# Patient Record
Sex: FEMALE | Race: WHITE | URBAN - METROPOLITAN AREA
[De-identification: names, ages, dates, MRNs, and addresses within clinical notes are randomized per-mention and may not be internally consistent; named-entity substitution may affect disease eponyms.]

---

## 2012-11-07 LAB — PAP SMEAR - HIM PATIENT REPORTED: NORMAL

## 2016-09-01 LAB
CHOLEST SERPL-MCNC: 196 MG/DL
HDLC SERPL-MCNC: 73 MG/DL
LDLC SERPL CALC-MCNC: 106 MG/DL
PAP-ABSTRACT: NORMAL
PHQ9 SCORE: 3
TRIGL SERPL-MCNC: 83 MG/DL

## 2016-10-18 LAB — PHQ9 SCORE: 0

## 2016-11-23 LAB
ALT SERPL-CCNC: 11 U/L (ref 0–45)
AST SERPL-CCNC: 17 U/L (ref 0–40)
CREAT SERPL-MCNC: 0.97 MG/DL (ref 0.6–1.1)
GFR SERPL CREATININE-BSD FRML MDRD: 60 ML/MIN/1.73M2
GLUCOSE SERPL-MCNC: 87 MG/DL (ref 70–125)
POTASSIUM SERPL-SCNC: 4.3 MMOL/L (ref 3.5–5)
TSH SERPL-ACNC: 0.93 UIU/ML (ref 0.3–5)

## 2017-01-12 ENCOUNTER — TRANSFERRED RECORDS (OUTPATIENT)
Dept: HEALTH INFORMATION MANAGEMENT | Facility: CLINIC | Age: 52
End: 2017-01-12

## 2017-01-16 ENCOUNTER — TRANSFERRED RECORDS (OUTPATIENT)
Dept: HEALTH INFORMATION MANAGEMENT | Facility: CLINIC | Age: 52
End: 2017-01-16

## 2017-01-16 ENCOUNTER — TELEPHONE (OUTPATIENT)
Dept: ONCOLOGY | Facility: CLINIC | Age: 52
End: 2017-01-16

## 2017-01-16 ENCOUNTER — CARE COORDINATION (OUTPATIENT)
Dept: ONCOLOGY | Facility: CLINIC | Age: 52
End: 2017-01-16

## 2017-01-16 DIAGNOSIS — C64.1 MALIGNANT NEOPLASM OF RIGHT KIDNEY (H): Primary | ICD-10-CM

## 2017-01-16 DIAGNOSIS — R91.8 LUNG MASS: ICD-10-CM

## 2017-01-16 NOTE — PROGRESS NOTES
Dr. Green requested patients CT from 1/12/17 be pushed to [facility] and report obtained from [facility].  Patient requires a CT guided Bx of a new MERARY mass. Contacted [facility] and received CT report from Dr. Louie MD.  [facility] [phone] will push images. MD is aware of scheduling status.  Shravan Wiseman RN, BSN, OCN  [facility] [place]  Patient Care Coordinator

## 2017-01-17 ENCOUNTER — CARE COORDINATION (OUTPATIENT)
Dept: ONCOLOGY | Facility: CLINIC | Age: 52
End: 2017-01-17

## 2017-01-17 NOTE — PROGRESS NOTES
Jennie from Rad dept called to inform Dr. Green that she was waiting to hear from the Radiologist ~ whether patient should have a PET prior to the Bx as suggested in CT report.  Will contact writer once Radiologist has determined best course of action.  Dr. Green is aware of patient's scheduling status.  Shravan Wiseman RN, BSN, OCN  [facility]  Patient Care Coordinator

## 2017-01-17 NOTE — PROGRESS NOTES
"Sierra from the Radiology dept.   from Radiology would prefer to have a PET scan completed prior to the Bx as recommended on the CT report.  Dr. Green notified and he will discuss plan with Pulmonary med at the [facility] and develop POC.  [facility] radiology dept notified that Bx  is on hold at this time.  Shravan Wiseman RN, BSN, OCN  [facility] [place]  Patient Care Coordinator    "

## 2017-01-18 ENCOUNTER — CARE COORDINATION (OUTPATIENT)
Dept: ONCOLOGY | Facility: CLINIC | Age: 52
End: 2017-01-18

## 2017-01-18 ENCOUNTER — PRE VISIT (OUTPATIENT)
Dept: SURGERY | Facility: CLINIC | Age: 52
End: 2017-01-18

## 2017-01-18 ENCOUNTER — TRANSFERRED RECORDS (OUTPATIENT)
Dept: HEALTH INFORMATION MANAGEMENT | Facility: CLINIC | Age: 52
End: 2017-01-18

## 2017-01-18 DIAGNOSIS — R91.8 PULMONARY NODULES: Primary | ICD-10-CM

## 2017-01-18 LAB
% GRANULOCYTES: 58.7 %
ALBUMIN SERPL-MCNC: 3.6 G/DL
ALP SERPL-CCNC: 82 U/L
ALT SERPL-CCNC: 18 U/L
ALT SERPL-CCNC: 18 U/L (ref 0–45)
ANION GAP SERPL CALCULATED.3IONS-SCNC: 7 MMOL/L
AST SERPL-CCNC: 23 U/L
AST SERPL-CCNC: 23 U/L (ref 0–40)
BILIRUB SERPL-MCNC: 0.3 MG/DL
BUN SERPL-MCNC: 16 MG/DL
CALCIUM SERPL-MCNC: 9.5 MG/DL
CHLORIDE SERPLBLD-SCNC: 108 MMOL/L
CO2 SERPL-SCNC: 25 MMOL/L
CREAT SERPL-MCNC: 1.03 MG/DL
CREAT SERPL-MCNC: 1.03 MG/DL (ref 0.6–1.1)
ERYTHROCYTE [DISTWIDTH] IN BLOOD BY AUTOMATED COUNT: 13.6 %
GFR SERPL CREATININE-BSD FRML MDRD: 56 ML/MIN/1.73M2
GFR SERPL CREATININE-BSD FRML MDRD: 58 ML/MIN/1.73M2
GLUCOSE SERPL-MCNC: 100 MG/DL (ref 70–125)
GLUCOSE SERPL-MCNC: 100 MG/DL (ref 70–99)
GRANULOCYTES #: 2.8
HCT VFR BLD AUTO: 38.9 %
HEMOGLOBIN: 12.2 G/DL
LYMPHOCYTES # BLD AUTO: 1.5 10*3/UL
LYMPHOCYTES NFR BLD AUTO: 32.8 %
MCH RBC QN AUTO: 28.8 PG
MCHC RBC AUTO-ENTMCNC: 31.4 G/DL
MCV RBC AUTO: 91.9 FL
MID #: 0.4 K/UL
MID %: 8.5 % (ref 0.1–24)
PLATELET COUNT - QUEST: 569 10^9/L (ref 150–450)
POTASSIUM SERPL-SCNC: 4.3 MMOL/L
POTASSIUM SERPL-SCNC: 4.3 MMOL/L (ref 3.5–5)
PROT SERPL-MCNC: 6.6 G/DL
RBC # BLD AUTO: 4.23 10^12/L
SODIUM SERPL-SCNC: 140 MMOL/L
WBC # BLD AUTO: 4.7 10^9/L

## 2017-01-18 NOTE — TELEPHONE ENCOUNTER
1.  Date/reason for appt: 17 - Lung Nodules  2.  Referring provider: Dr Green  3.  Call to patient (Yes / No - short description): Yes  4.  Previous care at / records requested from: [facility] in [facility]  5.  Recent Imagin17 CT Lung Bx, 17 CT chest/abd/pelv - in Epic  *Pt scheduled for chest CT prior to seeing Dr Velez*

## 2017-01-18 NOTE — PROGRESS NOTES
" spoke with the patient ~ her CT and the plan is to refer her to Thoracic Surgery at the [facility]". The Thoracic Surgery dept/scheduling  will make all the arrangements for the patient's appt.  Shravan Wiseman RN, BSN, OCN  [facility]  Patient Care Coordinator    "

## 2017-01-23 ENCOUNTER — CARE COORDINATION (OUTPATIENT)
Dept: ONCOLOGY | Facility: CLINIC | Age: 52
End: 2017-01-23

## 2017-01-23 NOTE — PROGRESS NOTES
Returned patient's call regarding her symptoms of some minor pain in her left upper shoulder blade area when she takes a deep breath or coughs.  She was asking about moving up her CT scan.  She states this pain is not as noticeable today; she is able to work as a  and plans to work tomorrow.  Discussed with Dr. Green ; patient given option of moving up her CT scan - but she wouldn't be able to get her answer regarding surgery sooner as she is is seeing Dr. Velez next Wednesday for consultation regarding possible surgery for pulmonary nodules.  Patient decided to keep appointments as they are and if symptoms worsen she will give our clinic a call.

## 2017-01-26 NOTE — PROGRESS NOTES
THORACIC SURGERY - NEW PATIENT OFFICE VISIT      Dear Dr. Saini,    I saw Ms. Gonsales at Dr. Green s request in consultation for the evaluation and treatment of a left upper lobe mass.     HPI  Ms. Suzi Gonsales is a 51 year old year-old female with a left upper lobe lung mass. She reports new onset of JIMÉNEZ, worse over the last several months. This somewhat limits her activities. +mildly productive cough; denies hemoptysis. Reports URI in Fall 2016 that lasted several weeks with spontaneous resolution. At that time she was found to have mild anemia (negative colonoscopy, treated with po iron) and the CT scans were performed as part of that workup.     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 Gonsales works as a  and is here with her  Tunde.     IMPRESSION   (R91.8) Mass of left lung  (primary encounter diagnosis)  (C64.1) Clear cell carcinoma of kidney, right (H)    51 year old year-old female with a left upper lobe mass and multiple pulmonary nodules  Renal cell carcinoma (T2bN0), S/P right nephrectomy 2014    PLAN  I spent a total of 30 minutes with Ms. Gonsales and her , Tunde, more than 50% of which were spent in counseling, coordination of care, and face-to-face time. I reviewed the plan as follows:  1.) IR Biopsy  2.) Will discuss in nodule clinic  All expressed questions were answered and all parties present were in agreement with the plan.  I appreciate the opportunity to participate in the care of your patient and will keep you updated.  Sincerely,

## 2017-01-27 ASSESSMENT — ENCOUNTER SYMPTOMS
RESPIRATORY PAIN: 0
SHORTNESS OF BREATH: 1
BRUISES/BLEEDS EASILY: 0
WHEEZING: 0
DYSPNEA ON EXERTION: 0
MUSCLE WEAKNESS: 0
SWOLLEN GLANDS: 0
NECK PAIN: 0
COUGH DISTURBING SLEEP: 1
BACK PAIN: 0
POSTURAL DYSPNEA: 0
ARTHRALGIAS: 0
SPUTUM PRODUCTION: 0
COUGH: 1
MYALGIAS: 1
MUSCLE CRAMPS: 0
SNORES LOUDLY: 0
JOINT SWELLING: 0
HEMOPTYSIS: 0
STIFFNESS: 0

## 2017-02-01 ENCOUNTER — OFFICE VISIT (OUTPATIENT)
Dept: SURGERY | Facility: CLINIC | Age: 52
End: 2017-02-01
Attending: THORACIC SURGERY (CARDIOTHORACIC VASCULAR SURGERY)
Payer: COMMERCIAL

## 2017-02-01 VITALS
HEART RATE: 77 BPM | DIASTOLIC BLOOD PRESSURE: 56 MMHG | TEMPERATURE: 98 F | BODY MASS INDEX: 22.31 KG/M2 | OXYGEN SATURATION: 95 % | SYSTOLIC BLOOD PRESSURE: 92 MMHG | WEIGHT: 121.2 LBS | RESPIRATION RATE: 18 BRPM | HEIGHT: 62 IN

## 2017-02-01 DIAGNOSIS — R91.8 LUNG MASS: Primary | ICD-10-CM

## 2017-02-01 DIAGNOSIS — C64.1 CLEAR CELL CARCINOMA OF KIDNEY, RIGHT (H): ICD-10-CM

## 2017-02-01 DIAGNOSIS — R91.8 MASS OF LEFT LUNG: Primary | ICD-10-CM

## 2017-02-01 PROCEDURE — 99212 OFFICE O/P EST SF 10 MIN: CPT | Mod: ZF | Performed by: THORACIC SURGERY (CARDIOTHORACIC VASCULAR SURGERY)

## 2017-02-01 RX ORDER — TRIAMCINOLONE ACETONIDE 1 MG/G
CREAM TOPICAL
Refills: 2 | Status: ON HOLD | COMMUNITY
Start: 2016-05-06 | End: 2017-06-23

## 2017-02-01 RX ORDER — BUPROPION HYDROCHLORIDE 300 MG/1
300 TABLET ORAL EVERY MORNING
COMMUNITY
Start: 2011-11-29

## 2017-02-01 RX ORDER — LORAZEPAM 0.5 MG/1
0.5 TABLET ORAL
Status: ON HOLD | COMMUNITY
Start: 2010-05-24 | End: 2017-02-06

## 2017-02-01 RX ORDER — ETHYNODIOL DIACETATE AND ETHINYL ESTRADIOL 1 MG-35MCG
1 KIT ORAL
Status: ON HOLD | COMMUNITY
Start: 2011-01-21 | End: 2017-02-06

## 2017-02-01 RX ORDER — BUPROPION HYDROCHLORIDE 300 MG/1
TABLET ORAL
Status: ON HOLD | COMMUNITY
Start: 2016-09-06 | End: 2017-02-06

## 2017-02-01 RX ORDER — CITALOPRAM HYDROBROMIDE 20 MG/1
20 TABLET ORAL EVERY EVENING
COMMUNITY
Start: 2011-01-21 | End: 2017-10-25

## 2017-02-01 RX ORDER — BUPROPION HYDROCHLORIDE 300 MG/1
TABLET ORAL
Status: ON HOLD | COMMUNITY
Start: 2016-10-01 | End: 2017-02-06

## 2017-02-01 NOTE — Clinical Note
2/1/2017      RE: Suzi Gonsales  [address] [place] [address]       THORACIC SURGERY - NEW PATIENT OFFICE VISIT      Dear Dr. Saini,    I saw Ms. Gonsales at Dr. Green s request in consultation for the evaluation and treatment of a left upper lobe mass.     HPI  Ms. Suzi Gonsales is a 51 year old year-old female with a left upper lobe lung mass. She reports new onset of JIMÉNEZ, worse over the last several months. This somewhat limits her activities. +mildly productive cough; denies hemoptysis. Reports URI in Fall 2016 that lasted several weeks with spontaneous resolution. At that time she was found to have mild anemia (negative colonoscopy, treated with po iron) and the CT scans were performed as part of that workup.     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 Gonsales works as a  and is here with her  Tunde.     IMPRESSION   (R91.8) Mass of left lung  (primary encounter diagnosis)  (C64.1) Clear cell carcinoma of kidney, right (H)    51 year old year-old female with a left upper lobe mass and multiple pulmonary nodules  Renal cell carcinoma (T2bN0), S/P right nephrectomy 2014    PLAN  I spent a total of 30 minutes with Ms. Gonsales and her , Tunde, more than 50% of which were spent in counseling, coordination of care, and face-to-face time. I reviewed the plan as follows:  1.) IR Biopsy  2.) Will discuss in nodule clinic  All expressed questions were answered and all parties present were in agreement with the plan.  I appreciate the opportunity to participate in the care of your patient and will keep you updated.      Sincerely,    Asad Velez MD

## 2017-02-01 NOTE — Clinical Note
2/1/2017       RE: Suzi Gonsales  [address]  [place] [address]     Dear Colleague,    Thank you for referring your patient, Suzi Gonsales, to the [facility]. Please see a copy of my visit note below.    THORACIC SURGERY - NEW PATIENT OFFICE VISIT      Dear Dr. Saini,    I saw Ms. Gonsales at Dr. Green s request in consultation for the evaluation and treatment of a left upper lobe mass.     HPI  Ms. Suzi Gonsales is a 51 year old year-old female with a left upper lobe lung mass. She reports new onset of JIMÉNEZ, worse over the last several months. This somewhat limits her activities. +mildly productive cough; denies hemoptysis. Reports URI in Fall 2016 that lasted several weeks with spontaneous resolution. At that time she was found to have mild anemia (negative colonoscopy, treated with po iron) and the CT scans were performed as part of that workup.     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     ETOH occasional  TOB 1/2 ppd x20 years; quit 13 years ago    Physical examination  BMI 24.35      From a personal perspective, Mrs. Gonsales works as a  and is here with her  Tunde.     IMPRESSION No diagnosis found.   51 year old year-old female with a left upper lobe mass    PLAN  I spent a total of *** minutes with Ms. Gonsales and ***, more than 50% of which were spent in counseling, coordination of care, and face-to-face time. I reviewed the plan as follows:  1.) Procedure planned: ***.  Necessary Preop Tests & Appointments: ***  Regional Anesthesia Plan: ***  Anticoagulation Plan: ***      All expressed questions were answered and all parties present were in agreement with the plan.  I appreciate the opportunity to participate in the care of your patient and will keep you updated.  Sincerely,    Again, thank you for allowing me to  participate in the care of your patient.      Sincerely,    Asad Velez MD

## 2017-02-01 NOTE — NURSING NOTE
"Suzi Gonsales is a 51 year old female who presents for:  Chief Complaint   Patient presents with     Oncology Clinic Visit     Pt is here for possible lung surgery        Initial Vitals:  BP 92/56 mmHg  Pulse 77  Temp(Src) 98  F (36.7  C) (Oral)  Resp 18  Ht 1.562 m (5' 1.5\")  Wt 54.976 kg (121 lb 3.2 oz)  BMI 22.53 kg/m2  SpO2 95% Estimated body mass index is 22.53 kg/(m^2) as calculated from the following:    Height as of this encounter: 1.562 m (5' 1.5\").    Weight as of this encounter: 54.976 kg (121 lb 3.2 oz).. Body surface area is 1.54 meters squared. BP completed using cuff size: regular  Data Unavailable No LMP recorded. Allergies and medications reviewed.     Medications: Medication refills not needed today.  Pharmacy name entered into [facility]: [facility] - [place], [place] - [address] AT [facility] [address]    Comments:     6 minutes for nursing intake (face to face time)   Brianna Brito CMA          "

## 2017-02-02 ASSESSMENT — ENCOUNTER SYMPTOMS
COUGH: 1
POLYPHAGIA: 0
SPUTUM PRODUCTION: 0
DECREASED APPETITE: 1
COUGH DISTURBING SLEEP: 0
CHILLS: 0
HEMOPTYSIS: 0
FATIGUE: 1
RESPIRATORY PAIN: 0
FEVER: 0
DYSPNEA ON EXERTION: 1
WEIGHT LOSS: 1
SHORTNESS OF BREATH: 1
SNORES LOUDLY: 0
WEIGHT GAIN: 0
INCREASED ENERGY: 1
POLYDIPSIA: 0
POSTURAL DYSPNEA: 0
NIGHT SWEATS: 0
HALLUCINATIONS: 0
ALTERED TEMPERATURE REGULATION: 1
WHEEZING: 0

## 2017-02-03 ENCOUNTER — OFFICE VISIT (OUTPATIENT)
Dept: INTERVENTIONAL RADIOLOGY/VASCULAR | Facility: CLINIC | Age: 52
End: 2017-02-03

## 2017-02-03 ENCOUNTER — TELEPHONE (OUTPATIENT)
Dept: INTERVENTIONAL RADIOLOGY/VASCULAR | Facility: CLINIC | Age: 52
End: 2017-02-03

## 2017-02-03 VITALS
BODY MASS INDEX: 22.5 KG/M2 | OXYGEN SATURATION: 97 % | WEIGHT: 121 LBS | SYSTOLIC BLOOD PRESSURE: 96 MMHG | DIASTOLIC BLOOD PRESSURE: 64 MMHG | HEART RATE: 76 BPM

## 2017-02-03 DIAGNOSIS — C64.1 MALIGNANT NEOPLASM OF RIGHT KIDNEY (H): ICD-10-CM

## 2017-02-03 DIAGNOSIS — R91.1 LESION OF LEFT LUNG: Primary | ICD-10-CM

## 2017-02-03 DIAGNOSIS — R91.1 LESION OF LEFT LUNG: ICD-10-CM

## 2017-02-03 LAB
ALBUMIN SERPL-MCNC: 3.8 G/DL (ref 3.4–5)
ALP SERPL-CCNC: 84 U/L (ref 40–150)
ALT SERPL W P-5'-P-CCNC: 25 U/L (ref 0–50)
ANION GAP SERPL CALCULATED.3IONS-SCNC: 7 MMOL/L (ref 3–14)
AST SERPL W P-5'-P-CCNC: 18 U/L (ref 0–45)
BASOPHILS # BLD AUTO: 0 10E9/L (ref 0–0.2)
BASOPHILS NFR BLD AUTO: 0.9 %
BILIRUB SERPL-MCNC: 0.2 MG/DL (ref 0.2–1.3)
BUN SERPL-MCNC: 16 MG/DL (ref 7–30)
CALCIUM SERPL-MCNC: 9.5 MG/DL (ref 8.5–10.1)
CHLORIDE SERPL-SCNC: 102 MMOL/L (ref 94–109)
CO2 SERPL-SCNC: 31 MMOL/L (ref 20–32)
CREAT SERPL-MCNC: 1.03 MG/DL (ref 0.52–1.04)
DIFFERENTIAL METHOD BLD: NORMAL
EOSINOPHIL # BLD AUTO: 0.2 10E9/L (ref 0–0.7)
EOSINOPHIL NFR BLD AUTO: 4.4 %
ERYTHROCYTE [DISTWIDTH] IN BLOOD BY AUTOMATED COUNT: 14.4 % (ref 10–15)
GFR SERPL CREATININE-BSD FRML MDRD: 56 ML/MIN/1.7M2
GLUCOSE SERPL-MCNC: 94 MG/DL (ref 70–99)
HCT VFR BLD AUTO: 40.8 % (ref 35–47)
HGB BLD-MCNC: 13 G/DL (ref 11.7–15.7)
IMM GRANULOCYTES # BLD: 0 10E9/L (ref 0–0.4)
IMM GRANULOCYTES NFR BLD: 0.7 %
INR PPP: 0.92 (ref 0.86–1.14)
LYMPHOCYTES # BLD AUTO: 1.7 10E9/L (ref 0.8–5.3)
LYMPHOCYTES NFR BLD AUTO: 36.9 %
MCH RBC QN AUTO: 29.7 PG (ref 26.5–33)
MCHC RBC AUTO-ENTMCNC: 31.9 G/DL (ref 31.5–36.5)
MCV RBC AUTO: 93 FL (ref 78–100)
MONOCYTES # BLD AUTO: 0.3 10E9/L (ref 0–1.3)
MONOCYTES NFR BLD AUTO: 7.3 %
NEUTROPHILS # BLD AUTO: 2.3 10E9/L (ref 1.6–8.3)
NEUTROPHILS NFR BLD AUTO: 49.8 %
NRBC # BLD AUTO: 0 10*3/UL
NRBC BLD AUTO-RTO: 0 /100
PLATELET # BLD AUTO: 405 10E9/L (ref 150–450)
POTASSIUM SERPL-SCNC: 4.4 MMOL/L (ref 3.4–5.3)
PROT SERPL-MCNC: 8.2 G/DL (ref 6.8–8.8)
RBC # BLD AUTO: 4.38 10E12/L (ref 3.8–5.2)
SODIUM SERPL-SCNC: 139 MMOL/L (ref 133–144)
WBC # BLD AUTO: 4.5 10E9/L (ref 4–11)

## 2017-02-03 NOTE — Clinical Note
2/3/2017       RE: Suzi Gonsales  [address] [place] [address]     Dear Colleague,    Thank you for referring your patient, Suzi Gonsales, to the [facility] INTERVENTIONAL RADIOLOGY at [facility]. Please see a copy of my visit note below.    First Name: Suzi   Age: 51 year old   Referring Physician: Dr. Cummings   REASON FOR REFERRAL: Consult for left upper lobe lung lesion biopsy    Assessment:  Suzi is a 52 yo with a hx of right renal cell carcinoma, s/p nephrectomy in 2014 with clean scans since then.  She was ill with an URI last fall, with continued dry cough, low appetite weight loss and fatigue.  Chest CT shows a new left upper lobe lung mass, malignancy vs infection.  Plan:  Image guided biopsy of left upper lobe lung mass (series 2, image 12).  In addition to pathology send for aerobic, anaerobic and fungal.  Blood work today    HPI: This is a patient with a PMH of right renal cell carcinoma, s/o right nephrectomy in 12/2/014.  She has been under surveillance since then.  The patient reports that late last fall she developed an upper respiratory tract infection that hung on for 3-4 weeks.  She had a non-productive cough and continues to have this.  No fever.  10-11 pound weight loss.  She has no appetite and is probably drinking less than the optimal amount of water.  She is still fatigued.  She had gone to her PCP a couple of times, but was never given an antibiotic.  She became worried and called her urologist.  He ordered a CT of the chest early.  Dr. Green her oncologist was notified of the abnormalities on the chest CT and the patient was referred to Dr. Velez from thoracic surgery.  She was also found to have anemia and had a normal colonoscopy.   The chest CT shows a left upper lobe lung mass.  Dr. Mcallister from  reviewed the imaging and felt that biopsy was feasible.  Series 2, Image 12.  The patient works as a  with [employer].  She  has next week off.  PAST MEDICAL HISTORY:   Past Medical History   Diagnosis Date     Anxiety      PAST SURGICAL HISTORY:   Past Surgical History   Procedure Laterality Date     C/section, low transverse  ,      , Low Transverse     Open radical nephrectomy Right 14     FAMILY HISTORY:   Family History   Problem Relation Age of Onset     Hypertension Father      Chronic Obstructive Pulmonary Disease Father      SOCIAL HISTORY:   Social History   Substance Use Topics     Smoking status: Former Smoker     Quit date: 2004     Smokeless tobacco: Never Used     Alcohol Use: Yes      Comment: Social about once a week     PROBLEM LIST:   Patient Active Problem List    Diagnosis Date Noted     Malignant neoplasm of right kidney (H) 2016     Priority: Medium     clear cell RCC with grade 3 of 4. Per charts tumor resection had negative margins and it was staged at pT2bN0       MEDICATIONS:   Prescription Medications as of 2/3/2017             Fexofenadine-Pseudoephedrine (ALLEGRA-D 12 HOUR PO)     buPROPion (WELLBUTRIN XL) 300 MG 24 hr tablet     ethynodiol-ethinyl estradiol (ZOVIA 1/35E, 28,) 1-35 MG-MCG per tablet Take 1 tablet by mouth    LORazepam (ATIVAN) 0.5 MG tablet Take 0.5 mg by mouth    citalopram (CELEXA) 20 MG tablet Take 20 mg by mouth    buPROPion (WELLBUTRIN XL) 300 MG 24 hr tablet     triamcinolone (KENALOG) 0.1 % cream APPLY TOPICALLY BID PRN    buPROPion (WELLBUTRIN XL) 300 MG 24 hr tablet     CITALOPRAM HYDROBROMIDE PO Take 20 mg by mouth daily    Zolpidem Tartrate (AMBIEN PO) Take 5 mg by mouth nightly as needed for sleep    Phenylephrine-Acetaminophen (TYLENOL SINUS CONGESTION/PAIN PO)     IBUPROFEN         ALLERGIES: Review of patient's allergies indicates no known allergies.  VITALS: BP 96/64 mmHg  Pulse 76  Wt 54.885 kg (121 lb)  SpO2 97%    ROS:  Answers for HPI/ROS submitted by the patient on 2017   General Symptoms: Yes  Skin Symptoms: No  HENT Symptoms:  No  EYE SYMPTOMS: No  HEART SYMPTOMS: No  LUNG SYMPTOMS: Yes  INTESTINAL SYMPTOMS: No  URINARY SYMPTOMS: No  GYNECOLOGIC SYMPTOMS: No  BREAST SYMPTOMS: No  SKELETAL SYMPTOMS: No  BLOOD SYMPTOMS: No  NERVOUS SYSTEM SYMPTOMS: No  MENTAL HEALTH SYMPTOMS: No  Fever: No  Loss of appetite: Yes  Weight loss: Yes  Weight gain: No  Fatigue: Yes  Night sweats: No  Chills: No  Increased stress: Yes  Excessive hunger: No  Excessive thirst: No  Feeling hot or cold when others believe the temperature is normal: Yes  Loss of height: No  Post-operative complications: No  Surgical site pain: No  Hallucinations: No  Change in or Loss of Energy: Yes  Hyperactivity: No  Confusion: No  Cough: Yes  Sputum or phlegm: No  Coughing up blood: No  Difficulty breating or shortness of breath: Yes  Snoring: No  Wheezing: No  Difficulty breathing on exertion: Yes  Respiratory pain: No  Nighttime Cough: No  Difficulty breathing when lying flat: No    Physical Examination: Vital signs are reviewed and they are stable  Constitutional: Pleasant, middle aged woman, in no acute physical distress, came with a friend to the appt  Cardiovascular: negative, RRR  Respiratory: negative findings: normal respiratory rate and rhythm, lungs clear to auscultation  Musculoskeletal: extremities normal- no gross deformities noted, gait normal and normal muscle tone  Skin: no suspicious lesions or rashes  Neurologic: negative  Psychiatric: affect normal/bright and mentation appears normal.    BMP RESULTS:  Lab Results   Component Value Date     02/03/2017    POTASSIUM 4.4 02/03/2017    CHLORIDE 102 02/03/2017    CO2 31 02/03/2017    ANIONGAP 7 02/03/2017    GLC 94 02/03/2017    BUN 16 02/03/2017    CR 1.03 02/03/2017    GFRESTIMATED 56* 02/03/2017    GFRESTBLACK 68 02/03/2017    MUNDO 9.5 02/03/2017        CBC RESULTS:  Lab Results   Component Value Date    WBC 4.5 02/03/2017    RBC 4.38 02/03/2017    HGB 13.0 02/03/2017    HCT 40.8 02/03/2017    MCV 93  02/03/2017    MCH 29.7 02/03/2017    MCHC 31.9 02/03/2017    RDW 14.4 02/03/2017     02/03/2017    .0* 01/18/2017       INR/PTT:  Lab Results   Component Value Date    INR 0.92 02/03/2017       Diagnostic studies: see CT 2/1/17.    PROVIDER NOTE:  I explained the procedure to Suzi and her friend.  This included:  Preparing for the procedure, the actual procedure and recovery.  I explained the risk of the lung biopsy:  pneumothorax, hemoptysis, possible need for a chest tube and overnight stay in the hospital, bleeding from the lung requiring an embolization procedure and death from the procedure.  I explained that usually the results return after three to four business days.    I explained that they would be contacted by a nurse coordinator following this to determine a future plan.  Thank you for involving us in the care of your patient.    40 minutes was spent with Suzi and her friend.  30 minutes was spent in counseling.  Shobha Ayala MS, ARIEL, CNS  Clinical Nurse Specialist  Interventional Radiology  [phone] (voice mail)  [phone] (pager)    CC  Patient Care Team:  Idalia Saini MD as PCP - General (Family Practice)  Alea Cummings APRN CNS (Clinical Nurse Specialist)  Shohba Ayala APRN CNS as Nurse Practitioner (Nurse)

## 2017-02-03 NOTE — PROGRESS NOTES
First Name: Suzi   Age: 51 year old   Referring Physician: Dr. Cummings   REASON FOR REFERRAL: Consult for left upper lobe lung lesion biopsy    Assessment:  Suzi is a 52 yo with a hx of right renal cell carcinoma, s/p nephrectomy in  with clean scans since then.  She was ill with an URI last fall, with continued dry cough, low appetite weight loss and fatigue.  Chest CT shows a new left upper lobe lung mass, malignancy vs infection.  Plan:  Image guided biopsy of left upper lobe lung mass (series 2, image 12).  In addition to pathology send for aerobic, anaerobic and fungal.  Blood work today    HPI: This is a patient with a PMH of right renal cell carcinoma, s/o right nephrectomy in .  She has been under surveillance since then.  The patient reports that late last  she developed an upper respiratory tract infection that hung on for 3-4 weeks.  She had a non-productive cough and continues to have this.  No fever.  10-11 pound weight loss.  She has no appetite and is probably drinking less than the optimal amount of water.  She is still fatigued.  She had gone to her PCP a couple of times, but was never given an antibiotic.  She became worried and called her urologist.  He ordered a CT of the chest early.  Dr. Green her oncologist was notified of the abnormalities on the chest CT and the patient was referred to Dr. Velez from thoracic surgery.  She was also found to have anemia and had a normal colonoscopy.   The chest CT shows a left upper lobe lung mass.  Dr. Mcallister from  reviewed the imaging and felt that biopsy was feasible.  Series 2, Image 12.  The patient works as a  with [employer].  She has next week off.  PAST MEDICAL HISTORY:   Past Medical History   Diagnosis Date     Anxiety      PAST SURGICAL HISTORY:   Past Surgical History   Procedure Laterality Date     C/section, low transverse  ,      , Low Transverse     Open radical nephrectomy Right  12/31/14     FAMILY HISTORY:   Family History   Problem Relation Age of Onset     Hypertension Father      Chronic Obstructive Pulmonary Disease Father      SOCIAL HISTORY:   Social History   Substance Use Topics     Smoking status: Former Smoker     Quit date: 01/01/2004     Smokeless tobacco: Never Used     Alcohol Use: Yes      Comment: Social about once a week     PROBLEM LIST:   Patient Active Problem List    Diagnosis Date Noted     Malignant neoplasm of right kidney (H) 05/27/2016     Priority: Medium     clear cell RCC with grade 3 of 4. Per charts tumor resection had negative margins and it was staged at pT2bN0       MEDICATIONS:   Prescription Medications as of 2/3/2017             Fexofenadine-Pseudoephedrine (ALLEGRA-D 12 HOUR PO)     buPROPion (WELLBUTRIN XL) 300 MG 24 hr tablet     ethynodiol-ethinyl estradiol (ZOVIA 1/35E, 28,) 1-35 MG-MCG per tablet Take 1 tablet by mouth    LORazepam (ATIVAN) 0.5 MG tablet Take 0.5 mg by mouth    citalopram (CELEXA) 20 MG tablet Take 20 mg by mouth    buPROPion (WELLBUTRIN XL) 300 MG 24 hr tablet     triamcinolone (KENALOG) 0.1 % cream APPLY TOPICALLY BID PRN    buPROPion (WELLBUTRIN XL) 300 MG 24 hr tablet     CITALOPRAM HYDROBROMIDE PO Take 20 mg by mouth daily    Zolpidem Tartrate (AMBIEN PO) Take 5 mg by mouth nightly as needed for sleep    Phenylephrine-Acetaminophen (TYLENOL SINUS CONGESTION/PAIN PO)     IBUPROFEN         ALLERGIES: Review of patient's allergies indicates no known allergies.  VITALS: BP 96/64 mmHg  Pulse 76  Wt 54.885 kg (121 lb)  SpO2 97%    ROS:  Answers for HPI/ROS submitted by the patient on 2/2/2017   General Symptoms: Yes  Skin Symptoms: No  HENT Symptoms: No  EYE SYMPTOMS: No  HEART SYMPTOMS: No  LUNG SYMPTOMS: Yes  INTESTINAL SYMPTOMS: No  URINARY SYMPTOMS: No  GYNECOLOGIC SYMPTOMS: No  BREAST SYMPTOMS: No  SKELETAL SYMPTOMS: No  BLOOD SYMPTOMS: No  NERVOUS SYSTEM SYMPTOMS: No  MENTAL HEALTH SYMPTOMS: No  Fever: No  Loss of  appetite: Yes  Weight loss: Yes  Weight gain: No  Fatigue: Yes  Night sweats: No  Chills: No  Increased stress: Yes  Excessive hunger: No  Excessive thirst: No  Feeling hot or cold when others believe the temperature is normal: Yes  Loss of height: No  Post-operative complications: No  Surgical site pain: No  Hallucinations: No  Change in or Loss of Energy: Yes  Hyperactivity: No  Confusion: No  Cough: Yes  Sputum or phlegm: No  Coughing up blood: No  Difficulty breating or shortness of breath: Yes  Snoring: No  Wheezing: No  Difficulty breathing on exertion: Yes  Respiratory pain: No  Nighttime Cough: No  Difficulty breathing when lying flat: No    Physical Examination: Vital signs are reviewed and they are stable  Constitutional: Pleasant, middle aged woman, in no acute physical distress, came with a friend to the appt  Cardiovascular: negative, RRR  Respiratory: negative findings: normal respiratory rate and rhythm, lungs clear to auscultation  Musculoskeletal: extremities normal- no gross deformities noted, gait normal and normal muscle tone  Skin: no suspicious lesions or rashes  Neurologic: negative  Psychiatric: affect normal/bright and mentation appears normal.    BMP RESULTS:  Lab Results   Component Value Date     02/03/2017    POTASSIUM 4.4 02/03/2017    CHLORIDE 102 02/03/2017    CO2 31 02/03/2017    ANIONGAP 7 02/03/2017    GLC 94 02/03/2017    BUN 16 02/03/2017    CR 1.03 02/03/2017    GFRESTIMATED 56* 02/03/2017    GFRESTBLACK 68 02/03/2017    MUNDO 9.5 02/03/2017        CBC RESULTS:  Lab Results   Component Value Date    WBC 4.5 02/03/2017    RBC 4.38 02/03/2017    HGB 13.0 02/03/2017    HCT 40.8 02/03/2017    MCV 93 02/03/2017    MCH 29.7 02/03/2017    MCHC 31.9 02/03/2017    RDW 14.4 02/03/2017     02/03/2017    .0* 01/18/2017       INR/PTT:  Lab Results   Component Value Date    INR 0.92 02/03/2017       Diagnostic studies: see CT 2/1/17.    PROVIDER NOTE:  I explained the  procedure to Suzi and her friend.  This included:  Preparing for the procedure, the actual procedure and recovery.  I explained the risk of the lung biopsy:  pneumothorax, hemoptysis, possible need for a chest tube and overnight stay in the hospital, bleeding from the lung requiring an embolization procedure and death from the procedure.  I explained that usually the results return after three to four business days.    I explained that they would be contacted by a nurse coordinator following this to determine a future plan.  Thank you for involving us in the care of your patient.    40 minutes was spent with Suzi and her friend.  30 minutes was spent in counseling.  Shobha Ayala MS, ARIEL, CNS  Clinical Nurse Specialist  Interventional Radiology  [phone] (voice mail)  [phone] (pager)    CC  Patient Care Team:  Idalia Saini MD as PCP - General (Family Practice)  Rebeca Damon APRN CNS (Clinical Nurse Specialist)  Shobha Ayala APRN CNS as Nurse Practitioner (Nurse)  REBECA DAMON

## 2017-02-06 ENCOUNTER — HOSPITAL ENCOUNTER (OUTPATIENT)
Dept: CT IMAGING | Facility: CLINIC | Age: 52
End: 2017-02-06
Attending: INTERNAL MEDICINE | Admitting: INTERNAL MEDICINE
Payer: COMMERCIAL

## 2017-02-06 ENCOUNTER — APPOINTMENT (OUTPATIENT)
Dept: MEDSURG UNIT | Facility: CLINIC | Age: 52
End: 2017-02-06
Attending: INTERNAL MEDICINE
Payer: COMMERCIAL

## 2017-02-06 ENCOUNTER — MYC MEDICAL ADVICE (OUTPATIENT)
Dept: SURGERY | Facility: CLINIC | Age: 52
End: 2017-02-06

## 2017-02-06 ENCOUNTER — HOSPITAL ENCOUNTER (OUTPATIENT)
Facility: CLINIC | Age: 52
Discharge: HOME | End: 2017-02-06
Attending: INTERNAL MEDICINE | Admitting: INTERNAL MEDICINE
Payer: COMMERCIAL

## 2017-02-06 ENCOUNTER — APPOINTMENT (OUTPATIENT)
Dept: GENERAL RADIOLOGY | Facility: CLINIC | Age: 52
End: 2017-02-06
Attending: INTERNAL MEDICINE
Payer: COMMERCIAL

## 2017-02-06 VITALS
DIASTOLIC BLOOD PRESSURE: 43 MMHG | HEART RATE: 75 BPM | RESPIRATION RATE: 14 BRPM | SYSTOLIC BLOOD PRESSURE: 92 MMHG | OXYGEN SATURATION: 98 %

## 2017-02-06 VITALS
HEIGHT: 62 IN | WEIGHT: 120 LBS | BODY MASS INDEX: 22.08 KG/M2 | RESPIRATION RATE: 16 BRPM | DIASTOLIC BLOOD PRESSURE: 58 MMHG | HEART RATE: 72 BPM | TEMPERATURE: 97.9 F | SYSTOLIC BLOOD PRESSURE: 92 MMHG | OXYGEN SATURATION: 96 %

## 2017-02-06 DIAGNOSIS — R91.8 LUNG MASS: ICD-10-CM

## 2017-02-06 DIAGNOSIS — R91.1 LESION OF LEFT LUNG: ICD-10-CM

## 2017-02-06 DIAGNOSIS — C64.1 MALIGNANT NEOPLASM OF RIGHT KIDNEY (H): ICD-10-CM

## 2017-02-06 LAB
GRAM STN SPEC: NORMAL
HCG SERPL QL: NEGATIVE
MICRO REPORT STATUS: NORMAL
SPECIMEN SOURCE: NORMAL

## 2017-02-06 PROCEDURE — 27210995 ZZH RX 272: Performed by: RADIOLOGY

## 2017-02-06 PROCEDURE — 71010 XR CHEST 1 VW: CPT

## 2017-02-06 PROCEDURE — 32405 CT LUNG MEDIASTINUM BIOPSY: CPT

## 2017-02-06 PROCEDURE — 87102 FUNGUS ISOLATION CULTURE: CPT | Performed by: CLINICAL NURSE SPECIALIST

## 2017-02-06 PROCEDURE — 99152 MOD SED SAME PHYS/QHP 5/>YRS: CPT

## 2017-02-06 PROCEDURE — 87205 SMEAR GRAM STAIN: CPT | Performed by: CLINICAL NURSE SPECIALIST

## 2017-02-06 PROCEDURE — 87176 TISSUE HOMOGENIZATION CULTR: CPT | Performed by: CLINICAL NURSE SPECIALIST

## 2017-02-06 PROCEDURE — 25000125 ZZHC RX 250: Performed by: RADIOLOGY

## 2017-02-06 PROCEDURE — 88333 PATH CONSLTJ SURG CYTO XM 1: CPT | Performed by: INTERNAL MEDICINE

## 2017-02-06 PROCEDURE — 99153 MOD SED SAME PHYS/QHP EA: CPT

## 2017-02-06 PROCEDURE — 87076 CULTURE ANAEROBE IDENT EACH: CPT | Performed by: CLINICAL NURSE SPECIALIST

## 2017-02-06 PROCEDURE — 27210909 ZZH NEEDLE CR5

## 2017-02-06 PROCEDURE — 40000166 ZZH STATISTIC PP CARE STAGE 1

## 2017-02-06 PROCEDURE — 40000940 XR CHEST 1 VW

## 2017-02-06 PROCEDURE — 87070 CULTURE OTHR SPECIMN AEROBIC: CPT | Performed by: CLINICAL NURSE SPECIALIST

## 2017-02-06 PROCEDURE — 88312 SPECIAL STAINS GROUP 1: CPT | Performed by: INTERNAL MEDICINE

## 2017-02-06 PROCEDURE — 88305 TISSUE EXAM BY PATHOLOGIST: CPT | Performed by: INTERNAL MEDICINE

## 2017-02-06 PROCEDURE — 87075 CULTR BACTERIA EXCEPT BLOOD: CPT | Performed by: CLINICAL NURSE SPECIALIST

## 2017-02-06 PROCEDURE — 84703 CHORIONIC GONADOTROPIN ASSAY: CPT | Performed by: PHYSICIAN ASSISTANT

## 2017-02-06 PROCEDURE — 25000128 H RX IP 250 OP 636: Performed by: PHYSICIAN ASSISTANT

## 2017-02-06 PROCEDURE — 88342 IMHCHEM/IMCYTCHM 1ST ANTB: CPT | Performed by: INTERNAL MEDICINE

## 2017-02-06 PROCEDURE — 25000128 H RX IP 250 OP 636: Performed by: RADIOLOGY

## 2017-02-06 PROCEDURE — 27210258 CT LUNG MEDIASTINUM BIOPSY

## 2017-02-06 RX ORDER — SODIUM CHLORIDE 9 MG/ML
INJECTION, SOLUTION INTRAVENOUS CONTINUOUS
Status: DISCONTINUED | OUTPATIENT
Start: 2017-02-06 | End: 2017-02-06 | Stop reason: HOSPADM

## 2017-02-06 RX ORDER — IBUPROFEN 200 MG
200-400 TABLET ORAL EVERY 6 HOURS PRN
Status: DISCONTINUED | OUTPATIENT
Start: 2017-02-06 | End: 2017-02-06 | Stop reason: HOSPADM

## 2017-02-06 RX ORDER — HYDROCODONE BITARTRATE AND ACETAMINOPHEN 5; 325 MG/1; MG/1
1-2 TABLET ORAL
Status: DISCONTINUED | OUTPATIENT
Start: 2017-02-06 | End: 2017-02-06 | Stop reason: HOSPADM

## 2017-02-06 RX ORDER — ACETAMINOPHEN 325 MG/1
650 TABLET ORAL EVERY 4 HOURS PRN
Status: DISCONTINUED | OUTPATIENT
Start: 2017-02-06 | End: 2017-02-06 | Stop reason: HOSPADM

## 2017-02-06 RX ORDER — FLUMAZENIL 0.1 MG/ML
0.2 INJECTION, SOLUTION INTRAVENOUS
Status: DISCONTINUED | OUTPATIENT
Start: 2017-02-06 | End: 2017-02-06 | Stop reason: HOSPADM

## 2017-02-06 RX ORDER — NALOXONE HYDROCHLORIDE 0.4 MG/ML
.1-.4 INJECTION, SOLUTION INTRAMUSCULAR; INTRAVENOUS; SUBCUTANEOUS
Status: DISCONTINUED | OUTPATIENT
Start: 2017-02-06 | End: 2017-02-06 | Stop reason: HOSPADM

## 2017-02-06 RX ORDER — FENTANYL CITRATE 50 UG/ML
25-50 INJECTION, SOLUTION INTRAMUSCULAR; INTRAVENOUS EVERY 5 MIN PRN
Status: DISCONTINUED | OUTPATIENT
Start: 2017-02-06 | End: 2017-02-06 | Stop reason: HOSPADM

## 2017-02-06 RX ORDER — LIDOCAINE 40 MG/G
CREAM TOPICAL
Status: DISCONTINUED | OUTPATIENT
Start: 2017-02-06 | End: 2017-02-06 | Stop reason: HOSPADM

## 2017-02-06 RX ADMIN — MIDAZOLAM 2 MG: 1 INJECTION INTRAMUSCULAR; INTRAVENOUS at 09:43

## 2017-02-06 RX ADMIN — FENTANYL CITRATE 100 MCG: 50 INJECTION, SOLUTION INTRAMUSCULAR; INTRAVENOUS at 09:44

## 2017-02-06 RX ADMIN — LIDOCAINE HYDROCHLORIDE 15 ML: 10 INJECTION, SOLUTION EPIDURAL; INFILTRATION; INTRACAUDAL; PERINEURAL at 09:44

## 2017-02-06 RX ADMIN — SODIUM CHLORIDE: 9 INJECTION, SOLUTION INTRAVENOUS at 07:17

## 2017-02-06 NOTE — PROGRESS NOTES
Prep and teaching complete for CT guided lung biopsy;  Tunde at bedside, will stay through procedure. Consent complete, awaiting HCG results and H and P is current.

## 2017-02-06 NOTE — PROGRESS NOTES
Pt up walking, steady. Tolerated PO. Site on left chest remains flat, dry and intact. Dr Pallas reviewed CXR and gave verbal order okay to discharge to home. Discharge instructions reviewed and copy to pt. Discharged to home per wheelchair with family.

## 2017-02-06 NOTE — PROGRESS NOTES
Interventional Radiology Intra-procedural Nursing Note    Patient Name: Suzi Gonsales  Medical Record Number: [id number]  Today's Date: February 6, 2017         Start Time: 0840  End of procedure time: 0945  Procedure: Percutaneous Biopsy of left upper lobe lung mass  Fire Safety Score: 2    Consent Review/Timeout Performed by: Itz Mcallister MD/ Juanito Delatorre MD  Procedure Performed By: Itz Mcallister MD/ Juanito Delatorre MD       Fentanyl administered: 100 mcgs  Versed administered: 2 mg    Start of Sedation Time: 0840  End of Sedation Time: 0945  Total Sedation Time:  65 minutes    Report given to: Melina KRISHNAN 2A  Time pt departs:  0955      Other Notes:  Alert female transported via cart from 2A Room 3 to IR Procedure Room CT 2 for planned intervention.  ID band confirmed and patient acknowledges understanding of planned procedure. Patient repositioned to procedure table via hover-mat and positioned supine.  Patient prepped and draped per policy see VS flowsheet, MAR for further information.   Pathology notified at 0904 for MD request for their presence.    Pathology present.  Core samples x 3 obtained and provided for evaluation.   Additional speicimen sent to lab per orders.  Post Scan stable.  NO change in oxygenation throughout procedure.  Left upper lobe lung site cleansed and dressed per protocol.      Patient returned to 2A Room 3 for post-procedure monitoring.    Estrella Avilez RN

## 2017-02-06 NOTE — BRIEF OP NOTE
Interventional Radiology Brief Post Procedure Note    Procedure: @FVRISFRMTLINK([id number])@    Proceduralist: Itz Mcallister MD and Chalo Angeles MD    Assistant: Romie Delatorre MD    Time Out: Prior to the start of the procedure and with procedural staff participation, I verbally confirmed the patient s identity using two indicators, relevant allergies, that the procedure was appropriate and matched the consent or emergent situation, and that the correct equipment/implants were available. Immediately prior to starting the procedure I conducted the Time Out with the procedural staff and re-confirmed the patient s name, procedure, and site/side. (The [employer] universal protocol was followed.)  Yes    Sedation: IR Nurse Monitored Care   Post Procedure Summary:  Prior to the start of the procedure and with procedural staff participation, I verbally confirmed the patient s identity using two indicators, relevant allergies, that the procedure was appropriate and matched the consent or emergent situation, and that the correct equipment/implants were available. Immediately prior to starting the procedure I conducted the Time Out with the procedural staff and re-confirmed the patient s name, procedure, and site/side. (The Joint Commission universal protocol was followed.)  Yes       Sedatives: Fentanyl and Midazolam (Versed)    Vital signs, airway and pulse oximetry were monitored and remained stable throughout the procedure and sedation was maintained until the procedure was complete.  The patient was monitored by staff until sedation discharge criteria were met.    Patient tolerance: Patient tolerated the procedure well with no immediate complications.    Time of sedation in minutes: 30 Minutes minutes from beginning to end of physician one to one monitoring.        Findings: no pneumothorax.  Successful left lung mass biopsy    Estimated Blood Loss: Minimal    Fluoroscopy Time: Not  applicable    SPECIMENS: Fluid and/or tissue for laboratory analysis and culture    Complications: 1. None     Condition: Stable    Plan: 2a.  CXR in 1 hr    Comments: See dictated procedure note for full details.    Romie Delatorre MD

## 2017-02-06 NOTE — IP AVS SNAPSHOT
MRN:[id number]                      After Visit Summary   2/6/2017    Suzi Gonsales    MRN: [id number]           Visit Information        Department      2/6/2017  6:30 AM [address] [facility]          Review of your medicines      UNREVIEWED medicines. Ask your doctor about these medicines        Dose / Directions    AMBIEN PO   Used for:  Malignant neoplasm of right kidney (H)        Dose:  5 mg   Take 5 mg by mouth nightly as needed for sleep   Refills:  0       buPROPion 300 MG 24 hr tablet   Commonly known as:  WELLBUTRIN XL        Refills:  0       citalopram 20 MG tablet   Commonly known as:  celeXA        Dose:  20 mg   Take 20 mg by mouth   Refills:  0       IBUPROFEN        Dose:  400 mg   Take 400 mg by mouth every 6 hours as needed   Refills:  0       triamcinolone 0.1 % cream   Commonly known as:  KENALOG        APPLY TOPICALLY BID PRN   Refills:  2                Protect others around you: Learn how to safely use, store and throw away your medicines at www.disposemymeds.org.         Follow-ups after your visit        Your next 10 appointments already scheduled     May 16, 2017  9:30 AM   Return Visit with  ONCOLOGY NURSE   [facility] ([facility])    [facility]  [address]  [facility] [address]   [phone]            May 16, 2017 10:00 AM   CT CHEST/ABDOMEN/PELVIS W CONTRAST with [facility] ([facility])    [address]  [place] [address]   [phone]           Please bring any scans or X-rays taken at other hospitals, if similar tests were done. Also bring a list of your medicines, including vitamins, minerals and over-the-counter drugs. It is safest to leave personal items at home.  Be sure to tell your doctor:   If you have any allergies.   If there s any chance you are pregnant.   If you are breastfeeding.   If you  have any special needs.  You may have contrast for this exam. To prepare:   Do not eat or drink for 2 hours before your exam. If you need to take medicine, you may take it with small sips of water. (We may ask you to take liquid medicine as well.)   The day before your exam, drink extra fluids at least six 8-ounce glasses (unless your doctor tells you to restrict your fluids).  Patients over 70 or patients with diabetes or kidney problems:   If you haven t had a blood test (creatinine test) within the last 30 days, go to your clinic or Diagnostic Imaging Department for this test.  If you have diabetes:   If your kidney function is normal, continue taking your metformin (Avandamet, Glucophage, Glucovance, Metaglip) on the day of your exam.   If your kidney function is abnormal, wait 48 hours before restarting this medicine.  You will have oral contrast for this exam:   You will drink the contrast at home. Get this from your clinic or Diagnostic Imaging Department. Please follow the directions given.  Please wear loose clothing, such as a sweat suit or jogging clothes. Avoid snaps, zippers and other metal. We may ask you to undress and put on a hospital gown.  If you have any questions, please call the Imaging Department where you will have your exam.            May 23, 2017  1:45 PM   Return Visit with Henri Green MD   [facility] ([facility])    [facility]  [address]  [facility] [address]   [phone]               Care Instructions        Further instructions from your care team       [facility]    Interventional Radiology  Patient Instructions Following Left Lung Biopsy    AFTER YOU GO HOME  ? If you were given sedation DO NOT drive or operate machinery at home or at work for at least 24 hours  ? DO relax and take it easy for 48 hours, no strenuous activity for 24 hours  ? DO drink plenty of fluids  ? DO  resume your regular diet, unless otherwise instructed by your Primary Physician  ? Keep the dressing dry and in place for 24 hours.  ? DO NOT SMOKE FOR AT LEAST 24 HOURS, if you have been given any medications that were to help you relax or sedate you during your procedure  ? DO NOT drink alcoholic beverages the day of your procedure  ? DO NOT do any strenuous exercise or lifting (> 10 lbs) for at least 7 days following your procedure  ? DO NOT take a bath or shower for at least 12 hours following your procedure  ? Remove dressing after shower the next day. Replace with Band aid for 2 days.  Never leave a wet dressing in place.  ? DO NOT make any important or legal decisions for 24 hours following your procedure  ? There should be minimum drainage from the biopsy site    CALL THE PHYSICIAN IF:  ? You start bleeding from the procedure site.  If you do start to bleed from that site, lie down flat and hold pressure on the site for a minimum of 10 minutes.  Your physician will tell you if you need to return to the hospital  ? You develop nausea or vomiting  ? You have excessive swelling, redness, or tenderness at the site  ? You have drainage that looks like it is infected.  ? You experience severe pain  ? You develop hives or a rash or unexplained itching  ? You develop shortness of breath  ? You develop a temperature of 101 degrees F or greater  ? You develop chest pain or cough up blood, lightheadedness or fainting    ADDITIONAL INSTRUCTIONS  If you are taking Coumadin, restart tonight.  Follow up with your Coumadin Clinic or Primary Care MD to have your INR rechecked.    [facility] INTERVENTIONAL RADIOLOGY DEPARTMENT  Procedure Physician:  Dr Mcallister and Dr Delatorre                                     Date of procedure:   February 6, 2017  Telephone Numbers: [phone] Monday-Friday 8:00 am to 4:30 pm  [phone] After 4:30 pm Monday-Friday, Weekends & Holidays.   Ask for the Interventional Radiologist on call.   "Someone is on call 24 hrs/day  [facility] toll free number: [phone] Monday-Friday 8:00 am to 4:30 pm  [facility] Emergency Dept: [phone]           Additional Information About Your Visit        [employer] Information     [employer] gives you secure access to your electronic health record. If you see a primary care provider, you can also send messages to your care team and make appointments. If you have questions, please call your primary care clinic.  If you do not have a primary care provider, please call [phone] and they will assist you.        Care EveryWhere ID     This is your Care EveryWhere ID. This could be used by other organizations to access your [facility] medical records  [id number]        Your Vitals Were     Blood Pressure Pulse Temperature    94/64 mmHg 76 97.9  F (36.6  C) (Oral)    Respirations Height Weight    16 1.575 m (5' 2\") 54.432 kg (120 lb)    BMI (Body Mass Index) Pulse Oximetry       21.94 kg/m2 99%        Primary Care Provider Office Phone # Fax #    Idalia Saini [phone]      Thank you!     Thank you for choosing [facility] for your care. Our goal is always to provide you with excellent care. Hearing back from our patients is one way we can continue to improve our services. Please take a few minutes to complete the written survey that you may receive in the mail after you visit with us. Thank you!             Medication List: This is a list of all your medications and when to take them. Check marks below indicate your daily home schedule. Keep this list as a reference.      Medications           Morning Afternoon Evening Bedtime As Needed    AMBIEN PO   Take 5 mg by mouth nightly as needed for sleep                                buPROPion 300 MG 24 hr tablet   Commonly known as:  WELLBUTRIN XL                                citalopram 20 MG tablet   Commonly known as:  celeXA   Take 20 mg by mouth                                IBUPROFEN   Take 400 mg by mouth every " 6 hours as needed                                triamcinolone 0.1 % cream   Commonly known as:  KENALOG   APPLY TOPICALLY BID PRN

## 2017-02-06 NOTE — IP AVS SNAPSHOT
[address] [address]                                       After Visit Summary   2/6/2017    Suzi Gonsales    MRN: [id number]           After Visit Summary Signature Page     I have received my discharge instructions, and my questions have been answered. I have discussed any challenges I see with this plan with the nurse or doctor.    ..........................................................................................................................................  Patient/Patient Representative Signature      ..........................................................................................................................................  Patient Representative Print Name and Relationship to Patient    ..................................................               ................................................  Date                                            Time    ..........................................................................................................................................  Reviewed by Signature/Title    ...................................................              ..............................................  Date                                                            Time

## 2017-02-06 NOTE — DISCHARGE INSTRUCTIONS
[facility]    Interventional Radiology  Patient Instructions Following Left Lung Biopsy    AFTER YOU GO HOME  ? If you were given sedation DO NOT drive or operate machinery at home or at work for at least 24 hours  ? DO relax and take it easy for 48 hours, no strenuous activity for 24 hours  ? DO drink plenty of fluids  ? DO resume your regular diet, unless otherwise instructed by your Primary Physician  ? Keep the dressing dry and in place for 24 hours.  ? DO NOT SMOKE FOR AT LEAST 24 HOURS, if you have been given any medications that were to help you relax or sedate you during your procedure  ? DO NOT drink alcoholic beverages the day of your procedure  ? DO NOT do any strenuous exercise or lifting (> 10 lbs) for at least 7 days following your procedure  ? DO NOT take a bath or shower for at least 12 hours following your procedure  ? Remove dressing after shower the next day. Replace with Band aid for 2 days.  Never leave a wet dressing in place.  ? DO NOT make any important or legal decisions for 24 hours following your procedure  ? There should be minimum drainage from the biopsy site    CALL THE PHYSICIAN IF:  ? You start bleeding from the procedure site.  If you do start to bleed from that site, lie down flat and hold pressure on the site for a minimum of 10 minutes.  Your physician will tell you if you need to return to the hospital  ? You develop nausea or vomiting  ? You have excessive swelling, redness, or tenderness at the site  ? You have drainage that looks like it is infected.  ? You experience severe pain  ? You develop hives or a rash or unexplained itching  ? You develop shortness of breath  ? You develop a temperature of 101 degrees F or greater  ? You develop chest pain or cough up blood, lightheadedness or fainting    ADDITIONAL INSTRUCTIONS  If you are taking Coumadin, restart tonight.  Follow up with your Coumadin Clinic or Primary Care MD to have your INR rechecked.    [facility]  INTERVENTIONAL RADIOLOGY DEPARTMENT  Procedure Physician:  Dr Mcallister and Dr Delatorre                                     Date of procedure:   February 6, 2017  Telephone Numbers: [phone] Monday-Friday 8:00 am to 4:30 pm  [phone] After 4:30 pm Monday-Friday, Weekends & Holidays.   Ask for the Interventional Radiologist on call.  Someone is on call 24 hrs/day  [facility] toll free number: [phone] Monday-Friday 8:00 am to 4:30 pm  [facility] Emergency Dept: [phone]

## 2017-02-06 NOTE — PROGRESS NOTES
Pt returned to 2A s/p L lung biopsy. VSS, pt denies pain. L upper chest site CDI. Continue to monitor

## 2017-02-06 NOTE — PROGRESS NOTES
Interventional Radiology Pre-Procedure Sedation Assessment   Time of Assessment: 7:30 AM    Expected Level: Moderate Sedation    Indication: Sedation is required for the following type of Procedure: Biopsy    Sedation and procedural consent: Risks, benefits and alternatives were discussed with Patient    PO Intake: Appropriately NPO for procedure    ASA Class: Class 2 - MILD SYSTEMIC DISEASE, NO ACUTE PROBLEMS, NO FUNCTIONAL LIMITATIONS.    Mallampati: Grade 3:  Soft palate visible, posterior pharyngeal wall not visible    Lungs: Lungs Clear with good breath sounds bilaterally    Heart: Normal heart sounds and rate    History and physical reviewed and no updates needed. I have reviewed the lab findings, diagnostic data, medications, and the plan for sedation. I have determined this patient to be an appropriate candidate for the planned sedation and procedure and have reassessed the patient IMMEDIATELY PRIOR to sedation and procedure.    Romie Delatorre MD

## 2017-02-07 LAB — COPATH REPORT: NORMAL

## 2017-02-08 ENCOUNTER — TELEPHONE (OUTPATIENT)
Dept: SURGERY | Facility: CLINIC | Age: 52
End: 2017-02-08

## 2017-02-08 NOTE — TELEPHONE ENCOUNTER
I contacted Suzi with her recent CT lung biopsy results which show organizing pneumonia, fibrosis and chronic inflammation.  Will discuss at nodule conference this Friday and I will contact her with any recommended follow up.

## 2017-02-09 ENCOUNTER — TEAM CONFERENCE (OUTPATIENT)
Dept: SURGERY | Facility: CLINIC | Age: 52
End: 2017-02-09

## 2017-02-09 DIAGNOSIS — R91.8 LUNG MASS: Primary | ICD-10-CM

## 2017-02-09 DIAGNOSIS — J84.89 ORGANIZING PNEUMONIA (H): ICD-10-CM

## 2017-02-09 NOTE — TELEPHONE ENCOUNTER
Pulmonary Nodule Conference      Patient Name: Suzi Gonsales    Reason for conference discussion (brief overview): Patient with history of clear cell kidney cancer, stage II.  Has new onset of JIMÉNEZ worse over the last several months, cough, and fatigue.  Had URI in Fall 2016 that lasted several weeks, then resolved.  CT performed at that time showed an area of consolidation in the MERARY, left hilar adenopathy and multiple, bilateral pulmonary nodules.  CT guided biopsy of MERARY mass on 2/6/2016 (see results below).    Specific Question:  Recommended follow up of organizing pneumonia    Pertinent Histology:    Lung, left upper lobe mass, CT-guided needle core biopsy:   - Organizing pneumonia with fibrosis and chronic inflammation   - GMS stain negative for fungal organisms   - No malignancy identified     Referring Physician: Agustin    The patient's case was presented at the multidisciplinary conference for the above noted reason.        There was a consensus recommendation for the following actions:     Recommend 2 week course of antibiotics with follow up in nodule clinic in 6 weeks with CT chest.    Case Lead:  Alea Cummings spoke with patient who agrees with plan.  Will call in rx for abx and arrange follow up appts.    Interventional Radiology Staff Present: [email or web address]

## 2017-02-10 ENCOUNTER — PRE VISIT (OUTPATIENT)
Dept: PULMONOLOGY | Facility: CLINIC | Age: 52
End: 2017-02-10

## 2017-02-10 NOTE — TELEPHONE ENCOUNTER
1.  Date/reason for appt: 3/22/17 - Lung Mass  2.  Referring provider: Dr Velez     3.  Call to patient (Yes / No - short description): No - not needed per Alea.  4.  Previous care at / records requested from: [facility] - records in Epic  5.  Recent Imagin17 CT chest, 17 CT lung biopsy & chest XR , 17 CT chest - in Epic

## 2017-02-11 LAB
BACTERIA SPEC CULT: NO GROWTH
MICRO REPORT STATUS: NORMAL
SPECIMEN SOURCE: NORMAL

## 2017-02-13 LAB
BACTERIA SPEC CULT: ABNORMAL
Lab: ABNORMAL
MICRO REPORT STATUS: ABNORMAL
SPECIMEN SOURCE: ABNORMAL

## 2017-03-01 DIAGNOSIS — J18.9 PULMONARY INFECTION: Primary | ICD-10-CM

## 2017-03-01 RX ORDER — LEVOFLOXACIN 500 MG/1
500 TABLET, FILM COATED ORAL DAILY
Qty: 10 TABLET | Refills: 0 | Status: SHIPPED | OUTPATIENT
Start: 2017-03-01 | End: 2017-03-11

## 2017-03-06 LAB
FUNGUS SPEC CULT: NORMAL
MICRO REPORT STATUS: NORMAL
SPECIMEN SOURCE: NORMAL

## 2017-03-22 ENCOUNTER — OFFICE VISIT (OUTPATIENT)
Dept: PULMONOLOGY | Facility: CLINIC | Age: 52
End: 2017-03-22
Attending: INTERNAL MEDICINE
Payer: COMMERCIAL

## 2017-03-22 VITALS
RESPIRATION RATE: 18 BRPM | SYSTOLIC BLOOD PRESSURE: 97 MMHG | TEMPERATURE: 97.6 F | WEIGHT: 124.2 LBS | OXYGEN SATURATION: 99 % | BODY MASS INDEX: 22.86 KG/M2 | HEART RATE: 58 BPM | DIASTOLIC BLOOD PRESSURE: 63 MMHG | HEIGHT: 62 IN

## 2017-03-22 DIAGNOSIS — R91.8 PULMONARY NODULES: Primary | ICD-10-CM

## 2017-03-22 PROCEDURE — 99212 OFFICE O/P EST SF 10 MIN: CPT | Mod: ZF

## 2017-03-22 ASSESSMENT — PAIN SCALES - GENERAL: PAINLEVEL: NO PAIN (0)

## 2017-03-22 NOTE — NURSING NOTE
"Suzi Gonsales is a 51 year old female who presents for:  Chief Complaint   Patient presents with     Oncology Clinic Visit     Malignant neoplasm of right kidney        Initial Vitals:  BP 97/63 (BP Location: Left arm, Patient Position: Chair, Cuff Size: Adult Regular)  Pulse 58  Temp 97.6  F (36.4  C) (Oral)  Resp 18  Ht 1.575 m (5' 2.01\")  Wt 56.3 kg (124 lb 3.2 oz)  SpO2 99%  BMI 22.71 kg/m2 Estimated body mass index is 22.71 kg/(m^2) as calculated from the following:    Height as of this encounter: 1.575 m (5' 2.01\").    Weight as of this encounter: 56.3 kg (124 lb 3.2 oz).. Body surface area is 1.57 meters squared. BP completed using cuff size: regular  No Pain (0) No LMP recorded. Allergies and medications reviewed.     Medications: Medication refills not needed today.  Pharm[facility] entered into [facility]: [id number] [place], [place] - [address] AT [facility] [address]    Comments:     7 minutes for nursing intake (face to face time)   Afsaneh Houser MA        "

## 2017-03-22 NOTE — LETTER
3/22/2017       RE: Suzi Gonsales  [address] [place] [address]     Dear Colleague,    Thank you for referring your patient, Suzi Gonsales, to the [facility] at [facility]. Please see a copy of my visit note below.    [facility]  Lung Nodule Clinic Note  March 22, 2017    Chief complaint:  Suzi Gonsales is a 51 year old female seen in the Pulmonary Clinic  for   Chief Complaint   Patient presents with     Oncology Clinic Visit     Malignant neoplasm of right kidney     Assessment and Plan:  Left upper lobe mass-like density recognized in 01/2017 that was new when compared to previous CT scan from 08/2016.  The patient has a history of clear cell tumor of the kidney, and is status post total nephrectomy a couple of years ago.  She has been followed by Dr. Green.  She was also seen by Dr. Velez from Thoracic Surgery and discussing in Pulmonary Nodule Meeting.  The decision was a trial of antibiotics, which she did for 2 weeks, and came here for repeat CT scan of the chest after 2 months of completing antibiotics.  To our review, her CT images look better with less mass-like density in the left upper lobe.  She has no symptoms.  She was also having URI symptoms when she presented in January.  She is almost back to her baseline.  We discussed today's CT findings, and recommended repeat CT scan in 3 months.  We will also discussed her case in our Pulmonary Nodule Meeting this Friday and call her back.     I spent more than 30 minutes face to face and greater than 50% of time was for counseling and coordination of care about abnormal ct chest     History of Present Illness:  A 51-year-old woman with respiratory symptoms in January of this year, which prompted her oncologist to get a CT scan of the chest revealing a mass-like density in the left upper lobe, which was biopsied with CT guidance revealing no infection, but organizing pneumonia.  She was treated with  antibiotics for 2 weeks.  Her symptoms almost completely resolved.  She had CT scan of the chest prior to this clinic visit, and is here to discuss the findings.     Exposure history: Asbestos;  No , TB;  No , Radiation;   No     No Known Allergies     Past Medical History:   Diagnosis Date     Anxiety         Past Surgical History:   Procedure Laterality Date     C/SECTION, LOW TRANSVERSE  ,     , Low Transverse     open radical nephrectomy Right 14        Social History     Social History     Marital status:      Spouse name: N/A     Number of children: N/A     Years of education: N/A     Occupational History     Not on file.     Social History Main Topics     Smoking status: Former Smoker     Quit date: 2004     Smokeless tobacco: Never Used     Alcohol use Yes      Comment: Social about once a week     Drug use: No     Sexual activity: Not on file     Other Topics Concern     Not on file     Social History Narrative        Family History   Problem Relation Age of Onset     Hypertension Father      Chronic Obstructive Pulmonary Disease Father           There is no immunization history on file for this patient.    Current Outpatient Prescriptions   Medication Sig     Fexofenadine-Pseudoephedrine (ALLEGRA-D 12 HOUR PO) Take 60 mg by mouth as needed     buPROPion (WELLBUTRIN XL) 300 MG 24 hr tablet      citalopram (CELEXA) 20 MG tablet Take 20 mg by mouth     triamcinolone (KENALOG) 0.1 % cream APPLY TOPICALLY BID PRN     Zolpidem Tartrate (AMBIEN PO) Take 5 mg by mouth nightly as needed for sleep     IBUPROFEN Take 400 mg by mouth every 6 hours as needed      No current facility-administered medications for this visit.         Review of Systems:  See below    Physical examination  Constitutional: Alert, oriented, not in distress  Vitals: B/P: 97/63, T: 97.6, P: 58, R: 18  Eyes: No icterus, nystagmus, pupils isocoric  Musculoskeletal: Normal muscle mass, no  dephormity  Integumentary:  No rash, normal texture and turgor, no edema  Neurological: Alert, orientedx3, no motor deficits, cranial nerves grossly normal  Psychiatric:  Mood and affect are appropriate with insight into his/her medical condition    Data:  Lab Results   Component Value Date    WBC 4.5 02/03/2017     Lab Results   Component Value Date    RBC 4.38 02/03/2017     Lab Results   Component Value Date    HGB 13.0 02/03/2017     Lab Results   Component Value Date    HCT 40.8 02/03/2017     Lab Results   Component Value Date    MCV 93 02/03/2017     Lab Results   Component Value Date    MCH 29.7 02/03/2017     Lab Results   Component Value Date    MCHC 31.9 02/03/2017     Lab Results   Component Value Date    RDW 14.4 02/03/2017     Lab Results   Component Value Date     02/03/2017       Lab Results   Component Value Date     02/03/2017      Lab Results   Component Value Date    POTASSIUM 4.4 02/03/2017     Lab Results   Component Value Date    CHLORIDE 102 02/03/2017     Lab Results   Component Value Date    MUNDO 9.5 02/03/2017     Lab Results   Component Value Date    CO2 31 02/03/2017     Lab Results   Component Value Date    BUN 16 02/03/2017     Lab Results   Component Value Date    CR 1.03 02/03/2017     Lab Results   Component Value Date    GLC 94 02/03/2017       Thank you for allowing me participate in the care of Suzi Gonsales.    EYAD Ni MD

## 2017-03-22 NOTE — MR AVS SNAPSHOT
After Visit Summary   3/22/2017    Suzi Gonsales    MRN: [id number]           Patient Information     Date Of Birth          1965        Visit Information        Provider Department      3/22/2017 10:00 AM Vasiliy Ni MD [facility]        Today's Diagnoses     Pulmonary nodules    -  1       Follow-ups after your visit        Your next 10 appointments already scheduled     May 16, 2017  9:30 AM CDT   Return Visit with  ONCOLOGY NURSE   [facility] ([facility])    [facility]  [address]  [address]  [facility] [address]   [phone]            May 16, 2017 10:00 AM CDT   CT CHEST/ABDOMEN/PELVIS W CONTRAST with [address] [facility] ([facility])    [address]  [facility] [id number] [phone]           Please bring any scans or X-rays taken at other hospitals, if similar tests were done. Also bring a list of your medicines, including vitamins, minerals and over-the-counter drugs. It is safest to leave personal items at home.  Be sure to tell your doctor:   If you have any allergies.   If there s any chance you are pregnant.   If you are breastfeeding.   If you have any special needs.  You may have contrast for this exam. To prepare:   Do not eat or drink for 2 hours before your exam. If you need to take medicine, you may take it with small sips of water. (We may ask you to take liquid medicine as well.)   The day before your exam, drink extra fluids at least six 8-ounce glasses (unless your doctor tells you to restrict your fluids).  Patients over 70 or patients with diabetes or kidney problems:   If you haven t had a blood test (creatinine test) within the last 30 days, go to your clinic or Diagnostic Imaging Department for this test.  If you have diabetes:   If your kidney function is normal, continue taking your metformin  (Avandamet, Glucophage, Glucovance, Metaglip) on the day of your exam.   If your kidney function is abnormal, wait 48 hours before restarting this medicine.  You will have oral contrast for this exam:   You will drink the contrast at home. Get this from your clinic or Diagnostic Imaging Department. Please follow the directions given.  Please wear loose clothing, such as a sweat suit or jogging clothes. Avoid snaps, zippers and other metal. We may ask you to undress and put on a hospital gown.  If you have any questions, please call the Imaging Department where you will have your exam.            May 23, 2017  1:45 PM CDT   Return Visit with Henri Green MD   [facility] ([facility])    [facility]  [address]  [facility] [address]   [phone]            Jun 21, 2017 10:00 AM CDT   (Arrive by 9:45 AM)   CT CHEST W/O CONTRAST with [facility]   [facility] CT ([facility])    [address] [place] [address] [phone]           Please bring any scans or X-rays taken at other hospitals, if similar tests were done. Also bring a list of your medicines, including vitamins, minerals and over-the-counter drugs. It is safest to leave personal items at home.  Be sure to tell your doctor:   If you have any allergies.   If there s any chance you are pregnant.   If you are breastfeeding.   If you have any special needs.  You do not need to do anything special to prepare.  Please wear loose clothing, such as a sweat suit or jogging clothes. Avoid snaps, zippers and other metal. We may ask you to undress and put on a hospital gown.            Jun 21, 2017 10:40 AM CDT   (Arrive by 10:25 AM)   Return Visit with Vasiliy Ni MD   [facility] ([facility])    [address] [place] [address] [phone]        "       Future tests that were ordered for you today     Open Future Orders        Priority Expected Expires Ordered    CT Chest w/o contrast Routine 6/22/2017 3/22/2018 3/22/2017            Who to contact     If you have questions or need follow up information about today's clinic visit or your schedule please contact [facility] directly at [phone].  Normal or non-critical lab and imaging results will be communicated to you by MyChart, letter or phone within 4 business days after the clinic has received the results. If you do not hear from us within 7 days, please contact the clinic through [employer] or phone. If you have a critical or abnormal lab result, we will notify you by phone as soon as possible.  Submit refill requests through [employer] or call your pharmacy and they will forward the refill request to us. Please allow 3 business days for your refill to be completed.          Additional Information About Your Visit        [employer] Information     [employer] gives you secure access to your electronic health record. If you see a primary care provider, you can also send messages to your care team and make appointments. If you have questions, please call your primary care clinic.  If you do not have a primary care provider, please call [phone] and they will assist you.        Care EveryWhere ID     This is your Care EveryWhere ID. This could be used by other organizations to access your [place] medical records  [id number]        Your Vitals Were     Pulse Temperature Respirations Height Pulse Oximetry BMI (Body Mass Index)    58 97.6  F (36.4  C) (Oral) 18 1.575 m (5' 2.01\") 99% 22.71 kg/m2       Blood Pressure from Last 3 Encounters:   03/22/17 97/63   02/06/17 92/58   02/06/17 92/43    Weight from Last 3 Encounters:   03/22/17 56.3 kg (124 lb 3.2 oz)   02/06/17 54.4 kg (120 lb)   02/03/17 54.9 kg (121 lb)               Primary Care Provider Office Phone # Fax #    Idalia Saini MD " [phone]       [facility] [address] [facility] [address]        Thank you!     Thank you for choosing [facility]  for your care. Our goal is always to provide you with excellent care. Hearing back from our patients is one way we can continue to improve our services. Please take a few minutes to complete the written survey that you may receive in the mail after your visit with us. Thank you!             Your Updated Medication List - Protect others around you: Learn how to safely use, store and throw away your medicines at www.disposemymeds.org.          This list is accurate as of: 3/22/17 10:29 AM.  Always use your most recent med list.                   Brand Name Dispense Instructions for use    ALLEGRA-D 12 HOUR PO      Take 60 mg by mouth as needed       AMBIEN PO      Take 5 mg by mouth nightly as needed for sleep       buPROPion 300 MG 24 hr tablet    WELLBUTRIN XL         citalopram 20 MG tablet    celeXA     Take 20 mg by mouth       IBUPROFEN      Take 400 mg by mouth every 6 hours as needed       triamcinolone 0.1 % cream    KENALOG     APPLY TOPICALLY BID PRN

## 2017-03-22 NOTE — PROGRESS NOTES
[facility]  Lung Nodule Clinic Note  March 22, 2017    Chief complaint:  Suzi Gonsales is a 51 year old female seen in the Pulmonary Clinic  for   Chief Complaint   Patient presents with     Oncology Clinic Visit     Malignant neoplasm of right kidney     Assessment and Plan:  Left upper lobe mass-like density recognized in 01/2017 that was new when compared to previous CT scan from 08/2016.  The patient has a history of clear cell tumor of the kidney, and is status post total nephrectomy a couple of years ago.  She has been followed by Dr. Green.  She was also seen by Dr. Velez from Thoracic Surgery and discussing in Pulmonary Nodule Meeting.  The decision was a trial of antibiotics, which she did for 2 weeks, and came here for repeat CT scan of the chest after 2 months of completing antibiotics.  To our review, her CT images look better with less mass-like density in the left upper lobe.  She has no symptoms.  She was also having URI symptoms when she presented in January.  She is almost back to her baseline.  We discussed today's CT findings, and recommended repeat CT scan in 3 months.  We will also discussed her case in our Pulmonary Nodule Meeting this Friday and call her back.     I spent more than 30 minutes face to face and greater than 50% of time was for counseling and coordination of care about abnormal ct chest     History of Present Illness:  A 51-year-old woman with respiratory symptoms in January of this year, which prompted her oncologist to get a CT scan of the chest revealing a mass-like density in the left upper lobe, which was biopsied with CT guidance revealing no infection, but organizing pneumonia.  She was treated with antibiotics for 2 weeks.  Her symptoms almost completely resolved.  She had CT scan of the chest prior to this clinic visit, and is here to discuss the findings.     Exposure history: Asbestos;  No , TB;  No , Radiation;   No     No Known Allergies     Past Medical History:    Diagnosis Date     Anxiety         Past Surgical History:   Procedure Laterality Date     C/SECTION, LOW TRANSVERSE  ,     , Low Transverse     open radical nephrectomy Right 14        Social History     Social History     Marital status:      Spouse name: N/A     Number of children: N/A     Years of education: N/A     Occupational History     Not on file.     Social History Main Topics     Smoking status: Former Smoker     Quit date: 2004     Smokeless tobacco: Never Used     Alcohol use Yes      Comment: Social about once a week     Drug use: No     Sexual activity: Not on file     Other Topics Concern     Not on file     Social History Narrative        Family History   Problem Relation Age of Onset     Hypertension Father      Chronic Obstructive Pulmonary Disease Father           There is no immunization history on file for this patient.    Current Outpatient Prescriptions   Medication Sig     Fexofenadine-Pseudoephedrine (ALLEGRA-D 12 HOUR PO) Take 60 mg by mouth as needed     buPROPion (WELLBUTRIN XL) 300 MG 24 hr tablet      citalopram (CELEXA) 20 MG tablet Take 20 mg by mouth     triamcinolone (KENALOG) 0.1 % cream APPLY TOPICALLY BID PRN     Zolpidem Tartrate (AMBIEN PO) Take 5 mg by mouth nightly as needed for sleep     IBUPROFEN Take 400 mg by mouth every 6 hours as needed      No current facility-administered medications for this visit.         Review of Systems:  See below    Physical examination  Constitutional: Alert, oriented, not in distress  Vitals: B/P: 97/63, T: 97.6, P: 58, R: 18  Eyes: No icterus, nystagmus, pupils isocoric  Musculoskeletal: Normal muscle mass, no dephormity  Integumentary:  No rash, normal texture and turgor, no edema  Neurological: Alert, orientedx3, no motor deficits, cranial nerves grossly normal  Psychiatric:  Mood and affect are appropriate with insight into his/her medical condition    Data:  Lab Results   Component Value Date    WBC  4.5 02/03/2017     Lab Results   Component Value Date    RBC 4.38 02/03/2017     Lab Results   Component Value Date    HGB 13.0 02/03/2017     Lab Results   Component Value Date    HCT 40.8 02/03/2017     Lab Results   Component Value Date    MCV 93 02/03/2017     Lab Results   Component Value Date    MCH 29.7 02/03/2017     Lab Results   Component Value Date    MCHC 31.9 02/03/2017     Lab Results   Component Value Date    RDW 14.4 02/03/2017     Lab Results   Component Value Date     02/03/2017       Lab Results   Component Value Date     02/03/2017      Lab Results   Component Value Date    POTASSIUM 4.4 02/03/2017     Lab Results   Component Value Date    CHLORIDE 102 02/03/2017     Lab Results   Component Value Date    MUNDO 9.5 02/03/2017     Lab Results   Component Value Date    CO2 31 02/03/2017     Lab Results   Component Value Date    BUN 16 02/03/2017     Lab Results   Component Value Date    CR 1.03 02/03/2017     Lab Results   Component Value Date    GLC 94 02/03/2017       Thank you for allowing me participate in the care of Suzi Gonsales.    EYAD Ni MD      Answers for HPI/ROS submitted by the patient on 3/21/2017   General Symptoms: No  Skin Symptoms: No  HENT Symptoms: No  EYE SYMPTOMS: No  HEART SYMPTOMS: No  LUNG SYMPTOMS: No  INTESTINAL SYMPTOMS: No  URINARY SYMPTOMS: No  GYNECOLOGIC SYMPTOMS: No  BREAST SYMPTOMS: No  SKELETAL SYMPTOMS: No  BLOOD SYMPTOMS: No  NERVOUS SYSTEM SYMPTOMS: No  MENTAL HEALTH SYMPTOMS: No

## 2017-03-24 ENCOUNTER — TEAM CONFERENCE (OUTPATIENT)
Dept: SURGERY | Facility: CLINIC | Age: 52
End: 2017-03-24

## 2017-03-24 DIAGNOSIS — R91.1 LUNG NODULE: Primary | ICD-10-CM

## 2017-03-24 NOTE — TELEPHONE ENCOUNTER
Pulmonary Nodule Conference      Patient Name: Suzi Gonsales    Reason for conference discussion (brief overview): Discussed in Feb. 2017, hx of clear cell kidney cancer, stage II.  CT guided biopsy was done because of an wood of consolidation in MERARY, left hilar adenopathy and multiple nodules.  (Results below)  At that time, had several months of JIMÉNEZ, cough and fatigue, was treated with 2 week course of antibiotics and repeat CT recommended in 6 weeks.   This was done and repeat CT recently done for review.   Radiology report mentions concern for lymphangitic spread of malignancy:  IMPRESSION:  1. Perilymphatic nodularity and interlobular thickening with soft  tissue extension to the left hilum still concerning for lymphangitic  spread of malignancy. Granulomatous process such as sarcoidosis is  also within the differential.  2. Other scattered nodules in the lungs unchanged from the prior  study. Continued follow-up is recommended.  3. Decrease in consolidation in the left apex biopsy proven organizing  pneumonia.      Specific Question:  What is opinion of group, next steps?    Pertinent Histology:  FINAL DIAGNOSIS:   Lung, left upper lobe mass, CT-guided needle core biopsy:   - Organizing pneumonia with fibrosis and chronic inflammation   - GMS stain negative for fungal organisms   - No malignancy identified     Referring Physician: Mya Ni/Agustin    The patient's case was presented at the multidisciplinary conference for the above noted reason.        There was a consensus recommendation for the following actions:     It was felt that patient should meet with Dr. Velez again in clinic to talk about possible surgical resection/biopsy to determine what is occurring.    Case Lead:  Shanon Newell    Interventional Radiology Staff Present: MD Shanon Weller will call patient with above recommendation and help arrange f/u.      ADDENDUM:   I attempted to reach patient, left VM asking her to call me  directly to discuss recommendations from our nodule conference today.  Will await her return call.

## 2017-03-30 ENCOUNTER — TELEPHONE (OUTPATIENT)
Dept: SURGERY | Facility: CLINIC | Age: 52
End: 2017-03-30

## 2017-03-30 DIAGNOSIS — Z85.528 H/O RENAL CELL CANCER: ICD-10-CM

## 2017-03-30 DIAGNOSIS — R91.1 LUNG NODULE: Primary | ICD-10-CM

## 2017-03-30 NOTE — TELEPHONE ENCOUNTER
I tried again to reach Suzi, left another VM asking her to call me to discuss recommendations that came from our Lung Nodule Conference last week.   I left my direct # for her to call, would like for her to meet with Dr. Velez again to discuss possible surgery.  Will await her return call.    ADDENDUM:   I spoke to Suzi and she is in agreement with meeting with Dr. Velez to discuss her recent CT findings and if surgery might be necessary.   We will get PFT's prior, message sent to scheduling to arrange.

## 2017-04-07 ASSESSMENT — ENCOUNTER SYMPTOMS
ALTERED TEMPERATURE REGULATION: 1
FATIGUE: 1
TASTE DISTURBANCE: 0
POSTURAL DYSPNEA: 0
FEVER: 0
SMELL DISTURBANCE: 0
NECK MASS: 0
RESPIRATORY PAIN: 0
HALLUCINATIONS: 0
SORE THROAT: 0
NIGHT SWEATS: 0
SINUS CONGESTION: 1
WEIGHT LOSS: 0
SINUS PAIN: 0
DECREASED APPETITE: 0
TROUBLE SWALLOWING: 0
CHILLS: 0
POLYPHAGIA: 0
COUGH DISTURBING SLEEP: 1
WHEEZING: 0
COUGH: 1
POLYDIPSIA: 0
SPUTUM PRODUCTION: 0
WEIGHT GAIN: 0
INCREASED ENERGY: 0
DYSPNEA ON EXERTION: 1
HEMOPTYSIS: 0
SHORTNESS OF BREATH: 1
SNORES LOUDLY: 0
HOARSE VOICE: 1

## 2017-04-13 NOTE — PROGRESS NOTES
THORACIC SURGERY FOLLOW UP VISIT    I saw Ms. Gonsales in follow-up today. The clinical summary follows:    INTERVAL STUDIES  CT chest 3/22/2017: MERARY peribronchial vascular nodular areas of consolidation that are decreased from previous study      IR biopsy 2/6/17: organizing pneumonia    CT chest 2/1/2017         CT chest 1/12/2017         CT chest 8/22/16       FEV1: 95  DLCO: 88    PMH  1) pT2bN0 G3 (Stage II) clear cell RCC s/p open R radical nephrectomy 12/31/14  2) Anxiety      ETOH occasional  TOB 1/2 ppd x20 years; quit 13 years ago  BMI 23.05    SUBJECTIVE   Patient states that she continues to have an occasional cough and fatigue; her SOB is improved, however, and she is overall feeling much better. No fevers or chills. Is eager to either have a answers and a plan for her lung nodule.    From a personal perspective, Mrs. Gonsales works as a  and is here with her  Tunde. They have two sons, one turns 15 today and they are going out for dinner. Surgery will need to be scheduled after May 10.      IMPRESSION   (R91.8) Mass of left lung (primary encounter diagnosis)  (C64.1) Clear cell carcinoma of kidney, right (H)    51 year old year-old female with a left upper lobe mass and multiple pulmonary nodules  Renal cell carcinoma (T2bN0), S/P right nephrectomy 2014    PLAN  I spent a total of 15 minutes with Ms. Suzi Gonsales and her  Tunde, more than 50% of which were spent in counseling, coordination of care, and face-to-face time. I reviewed the plan as follows:  1) Procedure planned: Left VATS upper lobe wedge/segmentectomy vs left upper lobectomy, possible thoracotomy.  I reviewed the indications, risks, and benefits of the procedure with Mrs. Suzi Gonsales. We discussed the intraoperative risks of bleeding and injury to vital organs, potential postoperative complications including, but not limited to, major respiratory events, arrhythmia, bleeding, infection, reoperation, and death. I  explained the anticipated hospital course (+/- 2-4 days) and postoperative recovery including pain control, chest drain management, and variable degrees of dyspnea (or need for supplemental oxygen) and fatigue that tend to get better with time.    2) Surgery after May 10  Necessary Tests & Appointments: PAC, CT chest with IV contrast  Pain Control Plan: EPIDURAL  - we believe an epidural would be best to help her with pain control, even if VATS.  Anticoagulation Plan: pneumoboots    They had all their questions answered and were in agreement with the plan.  I appreciate the opportunity to participate in the care of your patient and will keep you updated.  Sincerely,

## 2017-04-19 ENCOUNTER — OFFICE VISIT (OUTPATIENT)
Dept: SURGERY | Facility: CLINIC | Age: 52
End: 2017-04-19
Attending: THORACIC SURGERY (CARDIOTHORACIC VASCULAR SURGERY)
Payer: COMMERCIAL

## 2017-04-19 VITALS
HEIGHT: 62 IN | SYSTOLIC BLOOD PRESSURE: 100 MMHG | RESPIRATION RATE: 12 BRPM | WEIGHT: 124 LBS | DIASTOLIC BLOOD PRESSURE: 67 MMHG | HEART RATE: 65 BPM | BODY MASS INDEX: 22.82 KG/M2 | OXYGEN SATURATION: 96 % | TEMPERATURE: 98.9 F

## 2017-04-19 DIAGNOSIS — Z85.528 H/O RENAL CELL CANCER: ICD-10-CM

## 2017-04-19 DIAGNOSIS — R91.1 LUNG NODULE: ICD-10-CM

## 2017-04-19 DIAGNOSIS — C64.1: ICD-10-CM

## 2017-04-19 DIAGNOSIS — R91.1 LUNG NODULE: Primary | ICD-10-CM

## 2017-04-19 DIAGNOSIS — R91.8 MASS OF LEFT LUNG: Primary | ICD-10-CM

## 2017-04-19 PROCEDURE — 99212 OFFICE O/P EST SF 10 MIN: CPT | Mod: ZF

## 2017-04-19 ASSESSMENT — PAIN SCALES - GENERAL: PAINLEVEL: NO PAIN (0)

## 2017-04-19 NOTE — MR AVS SNAPSHOT
After Visit Summary   4/19/2017    Suzi Gonsales    MRN: [id number]           Patient Information     Date Of Birth          1965        Visit Information        Provider Department      4/19/2017 4:30 PM Asad Velez MD [facility]        Today's Diagnoses     Mass of left lung    -  1    Primary clear cell carcinoma of right kidney (H)           Follow-ups after your visit        Your next 10 appointments already scheduled     May 08, 2017 12:40 PM CDT   MyChart Long with ARIEL Carter CNP   [facility]    [address] [address]   [phone]            May 11, 2017 10:20 AM CDT   (Arrive by 10:05 AM)   CT CHEST/ABDOMEN/PELVIS W CONTRAST with UCCT1   [facility] [place] [facility] ([facility])    [address] [address] [phone]           Please bring any scans or X-rays taken at other hospitals, if similar tests were done. Also bring a list of your medicines, including vitamins, minerals and over-the-counter drugs. It is safest to leave personal items at home.  Be sure to tell your doctor:   If you have any allergies.   If there s any chance you are pregnant.   If you are breastfeeding.   If you have any special needs.  You may have contrast for this exam. To prepare:   Do not eat or drink for 2 hours before your exam. If you need to take medicine, you may take it with small sips of water. (We may ask you to take liquid medicine as well.)   The day before your exam, drink extra fluids at least six 8-ounce glasses (unless your doctor tells you to restrict your fluids).  Patients over 70 or patients with diabetes or kidney problems:   If you haven t had a blood test (creatinine test) within the last 30 days, go to your clinic or Diagnostic Imaging Department for this test.  If you have diabetes:   If your kidney function is  normal, continue taking your metformin (Avandamet, Glucophage, Glucovance, Metaglip) on the day of your exam.   If your kidney function is abnormal, wait 48 hours before restarting this medicine.  You will have oral contrast for this exam:   You will drink the contrast at home. Get this from your clinic or Diagnostic Imaging Department. Please follow the directions given.  Please wear loose clothing, such as a sweat suit or jogging clothes. Avoid snaps, zippers and other metal. We may ask you to undress and put on a hospital gown.  If you have any questions, please call the Imaging Department where you will have your exam.            May 11, 2017 11:30 AM CDT   (Arrive by 11:15 AM)   PAC EVALUATION with  Pac Deanne 3   [facility] [place] ([facility])    [address] [place] [address]   [phone]            May 11, 2017 12:30 PM CDT   (Arrive by 12:15 PM)   PAC Anesthesia Consult with  Pac Anesthesiologist   [facility] [place] ([facility])    [address] [place] [address]   [phone]            May 11, 2017 12:30 PM CDT   (Arrive by 12:15 PM)   PAC RN ASSESSMENT with  Pac Rn   [facility] Preoperative [facility] [place] ([facility])    [address] [place] [address]   [phone]            May 16, 2017  9:30 AM CDT   Return Visit with  ONCOLOGY NURSE   [facility] ([facility])    [facility]  [address]  [facility] [address]   [phone]            May 23, 2017   Procedure with Asad Velez MD   [facility] [place], Same Day Surgery (--)    [address] [facility] [address]   [phone]            May 23, 2017  1:45 PM CDT   Return Visit with Henri Green MD   [facility] ([facility])    [facility]  [address]  [place] [address]   [phone]            Jun 21, 2017 10:00 AM CDT   (Arrive by 9:45 AM)   CT CHEST W/O CONTRAST with UCCT2   [facility] [place] [facility] ([facility] [place])    [address] [address] [phone]           Please bring any scans or X-rays taken at other hospitals, if similar tests were done. Also bring a list of your medicines, including vitamins, minerals and over-the-counter drugs. It is safest to leave personal items at home.  Be sure to tell your doctor:   If you have any allergies.   If there s any chance you are pregnant.   If you are breastfeeding.   If you have any special needs.  You do not need to do anything special to prepare.  Please wear loose clothing, such as a sweat suit or jogging clothes. Avoid snaps, zippers and other metal. We may ask you to undress and put on a hospital gown.            Jun 21, 2017 10:40 AM CDT   (Arrive by 10:25 AM)   Return Visit with Vasiliy Ni MD   [facility] ([facility] [place])    [address] [address] [phone]              Who to contact     If you have questions or need follow up information about today's clinic visit or your schedule please contact [facility] directly at [phone].  Normal or non-critical lab and imaging results will be communicated to you by MyChart, letter or phone within 4 business days after the clinic has received the results. If you do not hear from us within 7 days, please contact the clinic through MyChart or phone. If you have a critical or abnormal lab result, we will notify you by phone as soon as possible.  Submit refill requests through [employer] or call your pharmacy and they will forward the refill request to us. Please allow 3 business days for your refill to be completed.  "         Additional Information About Your Visit        MyChart Information     [employer] gives you secure access to your electronic health record. If you see a primary care provider, you can also send messages to your care team and make appointments. If you have questions, please call your primary care clinic.  If you do not have a primary care provider, please call [phone] and they will assist you.        Care EveryWhere ID     This is your Care EveryWhere ID. This could be used by other organizations to access your [place] medical records  [id number]        Your Vitals Were     Pulse Temperature Respirations Height Pulse Oximetry BMI (Body Mass Index)    65 98.9  F (37.2  C) (Oral) 12 1.562 m (5' 1.5\") 96% 23.05 kg/m2       Blood Pressure from Last 3 Encounters:   04/19/17 100/67   03/22/17 97/63   02/06/17 92/58    Weight from Last 3 Encounters:   04/19/17 56.2 kg (124 lb)   03/22/17 56.3 kg (124 lb 3.2 oz)   02/06/17 54.4 kg (120 lb)              Today, you had the following     No orders found for display       Primary Care Provider Office Phone # Fax #    Idalia Saini [phone]       [facility] [address] [place] [address]        Thank you!     Thank you for choosing [facility]  for your care. Our goal is always to provide you with excellent care. Hearing back from our patients is one way we can continue to improve our services. Please take a few minutes to complete the written survey that you may receive in the mail after your visit with us. Thank you!             Your Updated Medication List - Protect others around you: Learn how to safely use, store and throw away your medicines at www.disposemymeds.org.          This list is accurate as of: 4/19/17 11:59 PM.  Always use your most recent med list.                   Brand Name Dispense Instructions for use    ALLEGRA-D 12 HOUR PO      Take 60 mg by mouth as needed       AMBIEN PO      Take 5 mg by " mouth nightly as needed for sleep       buPROPion 300 MG 24 hr tablet    WELLBUTRIN XL         citalopram 20 MG tablet    celeXA     Take 20 mg by mouth       IBUPROFEN      Take 400 mg by mouth every 6 hours as needed       triamcinolone 0.1 % cream    KENALOG     APPLY TOPICALLY BID PRN

## 2017-04-19 NOTE — LETTER
4/19/2017      RE: Suzi Gonsales  [address]  [place] [address]       THORACIC SURGERY FOLLOW UP VISIT    I saw Ms. Gonsales in follow-up today. The clinical summary follows:    INTERVAL STUDIES  CT chest 3/22/2017: MERARY peribronchial vascular nodular areas of consolidation that are decreased from previous study      IR biopsy 2/6/17: organizing pneumonia    CT chest 2/1/2017         CT chest 1/12/2017         CT chest 8/22/16       FEV1: 95  DLCO: 88    PMH  1) pT2bN0 G3 (Stage II) clear cell RCC s/p open R radical nephrectomy 12/31/14  2) Anxiety      ETOH occasional  TOB 1/2 ppd x20 years; quit 13 years ago  BMI 23.05    SUBJECTIVE   Patient states that she continues to have an occasional cough and fatigue; her SOB is improved, however, and she is overall feeling much better. No fevers or chills. Is eager to either have a answers and a plan for her lung nodule.    From a personal perspective, Mrs. Gonsales works as a  and is here with her  Tunde. They have two sons, one turns 15 today and they are going out for dinner. Surgery will need to be scheduled after May 10.      IMPRESSION   (R91.8) Mass of left lung (primary encounter diagnosis)  (C64.1) Clear cell carcinoma of kidney, right (H)    51 year old year-old female with a left upper lobe mass and multiple pulmonary nodules  Renal cell carcinoma (T2bN0), S/P right nephrectomy 2014    PLAN  I spent a total of 15 minutes with Ms. Suzi Gonsales and her  Tunde, more than 50% of which were spent in counseling, coordination of care, and face-to-face time. I reviewed the plan as follows:  1) Procedure planned: Left VATS upper lobe wedge/segmentectomy vs left upper lobectomy, possible thoracotomy.  I reviewed the indications, risks, and benefits of the procedure with Mrs. Suzi Gonsales. We discussed the intraoperative risks of bleeding and injury to vital organs, potential postoperative complications including, but not limited to, major  respiratory events, arrhythmia, bleeding, infection, reoperation, and death. I explained the anticipated hospital course (+/- 2-4 days) and postoperative recovery including pain control, chest drain management, and variable degrees of dyspnea (or need for supplemental oxygen) and fatigue that tend to get better with time.    2) Surgery after May 10  Necessary Tests & Appointments: PAC, CT chest with IV contrast  Pain Control Plan: EPIDURAL  - we believe an epidural would be best to help her with pain control, even if VATS.  Anticoagulation Plan: pneumoboots    They had all their questions answered and were in agreement with the plan.  I appreciate the opportunity to participate in the care of your patient and will keep you updated.  Sincerely,      Asad Velez MD

## 2017-04-19 NOTE — NURSING NOTE
"Suzi Gonsales is a 51 year old female who presents for:  Chief Complaint   Patient presents with     Oncology Clinic Visit     Malignant neoplasm of right kidney         Initial Vitals:  /67  Pulse 65  Temp 98.9  F (37.2  C) (Oral)  Resp 12  Ht 1.562 m (5' 1.5\")  Wt 56.2 kg (124 lb)  SpO2 96%  BMI 23.05 kg/m2 Estimated body mass index is 23.05 kg/(m^2) as calculated from the following:    Height as of this encounter: 1.562 m (5' 1.5\").    Weight as of this encounter: 56.2 kg (124 lb).. Body surface area is 1.56 meters squared. BP completed using cuff size: regular  No Pain (0) No LMP recorded. Patient is not currently having periods (Reason: Premenopausal). Allergies and medications reviewed.     Medications: Medication refills not needed today.  Pharmacy name entered into [employer]: [id number] - [place], [place] - [address] AT [facility] [address]    Comments: pt denies pain    5 minutes for nursing intake (face to face time)   Yas Espinosa CMA        "

## 2017-05-03 LAB
DLCOUNC-%PRED-PRE: 88 %
DLCOUNC-PRE: 19.05 ML/MIN/MMHG
DLCOUNC-PRED: 21.41 ML/MIN/MMHG
ERV-%PRED-PRE: 104 %
ERV-PRE: 0.96 L
ERV-PRED: 0.92 L
EXPTIME-PRE: 6.29 SEC
FEF2575-%PRED-PRE: 109 %
FEF2575-PRE: 2.68 L/SEC
FEF2575-PRED: 2.46 L/SEC
FEFMAX-%PRED-PRE: 103 %
FEFMAX-PRE: 6.47 L/SEC
FEFMAX-PRED: 6.25 L/SEC
FEV1-%PRED-PRE: 95 %
FEV1-PRE: 2.38 L
FEV1FEV6-PRE: 83 %
FEV1FEV6-PRED: 82 %
FEV1FVC-PRE: 82 %
FEV1FVC-PRED: 81 %
FEV1SVC-PRE: 79 %
FEV1SVC-PRED: 80 %
FIFMAX-PRE: 3.66 L/SEC
FRCPLETH-%PRED-PRE: 93 %
FRCPLETH-PRE: 2.37 L
FRCPLETH-PRED: 2.55 L
FVC-%PRED-PRE: 93 %
FVC-PRE: 2.89 L
FVC-PRED: 3.09 L
IC-%PRED-PRE: 94 %
IC-PRE: 2.06 L
IC-PRED: 2.18 L
MVV-%PRED-PRE: 121 %
MVV-PRE: 109 L/MIN
MVV-PRED: 90 L/MIN
RVPLETH-%PRED-PRE: 84 %
RVPLETH-PRE: 1.41 L
RVPLETH-PRED: 1.67 L
TLCPLETH-%PRED-PRE: 98 %
TLCPLETH-PRE: 4.44 L
TLCPLETH-PRED: 4.52 L
VA-%PRED-PRE: 92 %
VA-PRE: 4.3 L
VC-%PRED-PRE: 97 %
VC-PRE: 3.02 L
VC-PRED: 3.1 L

## 2017-05-10 ENCOUNTER — ANESTHESIA EVENT (OUTPATIENT)
Dept: SURGERY | Facility: CLINIC | Age: 52
End: 2017-05-10
Payer: COMMERCIAL

## 2017-05-11 ENCOUNTER — ALLIED HEALTH/NURSE VISIT (OUTPATIENT)
Dept: SURGERY | Facility: CLINIC | Age: 52
End: 2017-05-11

## 2017-05-11 ENCOUNTER — OFFICE VISIT (OUTPATIENT)
Dept: SURGERY | Facility: CLINIC | Age: 52
End: 2017-05-11

## 2017-05-11 VITALS
OXYGEN SATURATION: 97 % | BODY MASS INDEX: 23.15 KG/M2 | TEMPERATURE: 99 F | HEART RATE: 66 BPM | DIASTOLIC BLOOD PRESSURE: 63 MMHG | HEIGHT: 62 IN | WEIGHT: 125.8 LBS | RESPIRATION RATE: 16 BRPM | SYSTOLIC BLOOD PRESSURE: 95 MMHG

## 2017-05-11 DIAGNOSIS — Z01.818 PREOP EXAMINATION: Primary | ICD-10-CM

## 2017-05-11 DIAGNOSIS — Z01.818 PREOP EXAMINATION: ICD-10-CM

## 2017-05-11 LAB
ANION GAP SERPL CALCULATED.3IONS-SCNC: 8 MMOL/L (ref 3–14)
BUN SERPL-MCNC: 18 MG/DL (ref 7–30)
CALCIUM SERPL-MCNC: 9 MG/DL (ref 8.5–10.1)
CHLORIDE SERPL-SCNC: 103 MMOL/L (ref 94–109)
CO2 SERPL-SCNC: 27 MMOL/L (ref 20–32)
CREAT SERPL-MCNC: 0.98 MG/DL (ref 0.52–1.04)
ERYTHROCYTE [DISTWIDTH] IN BLOOD BY AUTOMATED COUNT: 13.8 % (ref 10–15)
GFR SERPL CREATININE-BSD FRML MDRD: 59 ML/MIN/1.7M2
GLUCOSE SERPL-MCNC: 72 MG/DL (ref 70–99)
HCT VFR BLD AUTO: 40.6 % (ref 35–47)
HGB BLD-MCNC: 12.9 G/DL (ref 11.7–15.7)
MCH RBC QN AUTO: 30.2 PG (ref 26.5–33)
MCHC RBC AUTO-ENTMCNC: 31.8 G/DL (ref 31.5–36.5)
MCV RBC AUTO: 95 FL (ref 78–100)
PLATELET # BLD AUTO: 324 10E9/L (ref 150–450)
POTASSIUM SERPL-SCNC: 3.8 MMOL/L (ref 3.4–5.3)
RBC # BLD AUTO: 4.27 10E12/L (ref 3.8–5.2)
SODIUM SERPL-SCNC: 137 MMOL/L (ref 133–144)
WBC # BLD AUTO: 4 10E9/L (ref 4–11)

## 2017-05-11 RX ORDER — CHLORHEXIDINE GLUCONATE ORAL RINSE 1.2 MG/ML
15 SOLUTION DENTAL ONCE
Status: CANCELLED | OUTPATIENT
Start: 2017-05-11 | End: 2017-05-11

## 2017-05-11 RX ORDER — CELECOXIB 100 MG/1
200 CAPSULE ORAL ONCE
Status: CANCELLED | OUTPATIENT
Start: 2017-05-11 | End: 2017-05-11

## 2017-05-11 RX ORDER — GABAPENTIN 300 MG/1
300 CAPSULE ORAL ONCE
Status: CANCELLED | OUTPATIENT
Start: 2017-05-11 | End: 2017-05-11

## 2017-05-11 RX ORDER — ACETAMINOPHEN 325 MG/1
975 TABLET ORAL ONCE
Status: CANCELLED | OUTPATIENT
Start: 2017-05-11 | End: 2017-05-11

## 2017-05-11 ASSESSMENT — LIFESTYLE VARIABLES: TOBACCO_USE: 1

## 2017-05-11 NOTE — MR AVS SNAPSHOT
After Visit Summary   2017    Suiz Gonsales    MRN: [id number]           Patient Information     Date Of Birth          1965        Visit Information        Provider Department      2017 12:30 PM Rn, [facility]        Care Instructions    Preparing for Your Surgery      Name:  Suzi Gonsales   MRN:  [id number]   :  1965   Today's Date:  2017     Arriving for surgery:  Surgery date:  17  Surgery time:  8 am  Arrival time:  5:30 am    Please come to:   [facility] [address]  [address] [place]  [address]    -   parking is available in front of the hospital from 5:15 am to 8:00 pm    -  Stop at the Information Desk in the lobby    -   Inform the information person that you are here for surgery. An escort to 3c will be provided. If you would not like an escort, please proceed to 3C on the 3rd floor. [phone]   -  Bring your ID and insurance card.    What can I eat or drink?  -  You may have solid food or milk products until 8 hours prior to your surgery. (12 midnight)  -  You may have water, apple juice, BLACK coffee (NO creamer or nondairy creamer), or 7up/Sprite until 2 hours prior to your surgery. (5:30 am- Stop on arrival to hospital.)    Which medicines can I take?  -  Do not bring your own medications to the hospital.        -  Follow Thoracic Surgery Clinic instructions on when to stop Ibuprofen. If no instructions given, NO Ibuprofen the day prior to surgery.    -  Do NOT take these medications the morning of surgery:  Triamcinolone (Kenalog) cream    -  Please take these medications the morning of surgery:  Bupropion,       Acetaminophen (Tylenol) if needed    How do I prepare myself?  -  Take two showers: one the night before surgery; and one the morning of surgery.         Use Scrubcare or Hibiclens to wash from neck down.  You may use your own shampoo and conditioner. No other  hair products.   -  Do NOT use lotion, powder, deodorant, or antiperspirant the day of your surgery.  -  Do NOT wear any makeup, fingernail polish or jewelry.    Questions or Concerns:  If you have questions or concerns, please call the  Preoperative Assessment Center, Monday-Friday 7AM-7PM:  [phone]    Enhanced Recovery After Surgery     This is a team effort, including you, to get you back on your feet, eating and drinking normally and out of the hospital as quickly as possible.  The goals are: 1) NO INFECTIONS and   2) RETURN TO NORMAL DIET    How can we achieve these goals?  1) STAY ACTIVE: Walk every day before your surgery; try to increase the amount every day.  Walk after surgery as much as you can-the nurses will help you.  Walking speeds healing and gets you home quicker, you heal better at home and have less risk of infection.     2) INCENTIVE SPIROMETER: Beginning 1 week prior to surgery, practice your incentive spirometer 4 times per day with 5-10 repetitions each time. Bring incentive spirometer to hospital day of surgery. Using the incentive spirometer can strengthen your muscles between your ribs and help you have a strong cough after surgery.  A more effective cough can help prevent problems with your lungs.    3) STAY HYDRATED: Drink clear liquids up until 2 hours before your surgery. We would like you to purchase a drink such as Gatorade.  Drink this before bedtime and on the way into the hospital, drink between 8-12 ounces or until you feel hydrated.  Keeping well hydrated leads to your veins being plump, you wake up faster, and you are less likely to be nauseated. Start drinking water as soon as you can after surgery and advance to clear liquids and food as tolerated.  IV fluids contain salt, drinking fluids will minimize the amount of IV fluids you need and decrease the amount of salt you get.     4) PAIN MANAGEMENT: If we minimize the amount of opioids and narcotics, and use regional  blocks (which numb the area where your surgery is) along with oral pain medications; you will have less side effects of nausea and constipation. Narcotics can slow down your bowels and cause you to stay in the hospital longer.     Our goal is to keep you comfortable; eating and drinking normally and back home safely.     AFTER YOUR SURGERY  Breathing exercises   Breathing exercises help you recover faster. Take deep breaths and let the air out slowly. This will:     Help you wake up after surgery.    Help prevent complications like pneumonia.  Preventing complications will help you go home sooner.   Nausea and vomiting   You may feel sick to your stomach after surgery; if so, let your nurse know.    Pain control:  After surgery, you may have pain. Our goal is to help you manage your pain. Pain medicine will help you feel comfortable enough to do activities that will help you heal.  These activities may include breathing exercises, walking and physical therapy.   To help your health care team treat your pain we will ask: 1) If you have pain  2) where it is located 3) describe your pain in your words  Methods of pain control include medications given by mouth, vein or by nerve block for some surgeries.  We may give you a pain control pump that will:  1) Deliver the medicine through a tube placed in your vein  2) Control the amount of medicine you receive  3) Allow you to push a button to deliver a dose of pain medicine  Sequential Compression Device (SCD) or Pneumo Boots:  You may need to wear SCD S on your legs or feet. These are wraps connected to a machine that pumps in air and releases it. The repeated pumping helps prevent blood clots from forming.                 Follow-ups after your visit        Your next 10 appointments already scheduled     May 11, 2017  1:00 PM BIJANT   LAB with  LAB    Health [facility] ([facility])    [address] [place] [address]    [phone]           Patient must bring picture ID.  Patient should be prepared to give a urine specimen  Please do not eat 10-12 hours before your appointment if you are coming in fasting for labs on lipids, cholesterol, or glucose (sugar).  Pregnant women should follow their Care Team instructions. Water with medications is okay. Do not drink coffee or other fluids.   If you have concerns about taking  your medications, please ask at office or if scheduling via [employer], send a message by clicking on Secure Messaging, Message Your Care Team.            May 23, 2017   Procedure with Asad Velez MD   [facility] [place], Same Day Surgery (--)    [address] [facility] [address] [phone]            Jun 21, 2017 10:00 AM CDT   (Arrive by 9:45 AM)   CT CHEST W/O CONTRAST with UCCT2   [facility] [place] [facility] ([facility])    [address] [place] [address] [phone]           Please bring any scans or X-rays taken at other hospitals, if similar tests were done. Also bring a list of your medicines, including vitamins, minerals and over-the-counter drugs. It is safest to leave personal items at home.  Be sure to tell your doctor:   If you have any allergies.   If there s any chance you are pregnant.   If you are breastfeeding.   If you have any special needs.  You do not need to do anything special to prepare.  Please wear loose clothing, such as a sweat suit or jogging clothes. Avoid snaps, zippers and other metal. We may ask you to undress and put on a hospital gown.            Jun 21, 2017 10:40 AM CDT   (Arrive by 10:25 AM)   Return Visit with Vasiliy Ni MD   [facility] ([facility] [place])    [address] [facility]  [address] [facility] [address] [phone]              Future tests that were ordered for you today     Open Future Orders        Priority Expected Expires Ordered    Basic  metabolic panel Routine 5/11/2017 6/10/2017 5/11/2017    CBC with platelets Routine 5/11/2017 6/10/2017 5/11/2017    ABO/Rh type and screen Routine 5/11/2017 6/10/2017 5/11/2017            Who to contact     Please call your clinic at [phone] to:    Ask questions about your health    Make or cancel appointments    Discuss your medicines    Learn about your test results    Speak to your doctor   If you have compliments or concerns about an experience at your clinic, or if you wish to file a complaint, please contact [facility] Patient Relations at [phone] or email us at [email or web address]         Additional Information About Your Visit        [employer] Information     [employer] gives you secure access to your electronic health record. If you see a primary care provider, you can also send messages to your care team and make appointments. If you have questions, please call your primary care clinic.  If you do not have a primary care provider, please call [phone] and they will assist you.      [employer] is an electronic gateway that provides easy, online access to your medical records. With [employer], you can request a clinic appointment, read your test results, renew a prescription or communicate with your care team.     To access your existing account, please contact your [facility] or call [phone] for assistance.        Care EveryWhere ID     This is your Care EveryWhere ID. This could be used by other organizations to access your [place] medical records  [id number]         Blood Pressure from Last 3 Encounters:   05/11/17 95/63   04/19/17 100/67   03/22/17 97/63    Weight from Last 3 Encounters:   05/11/17 57.1 kg (125 lb 12.8 oz)   04/19/17 56.2 kg (124 lb)   03/22/17 56.3 kg (124 lb 3.2 oz)              Today, you had the following     No orders found for display       Primary Care Provider Office Phone # Fax #    Idalia Saini  [phone]       [facility] [address] [facility] [address]        Thank you!     Thank you for choosing [facility]ATIVE [facility] [place]  for your care. Our goal is always to provide you with excellent care. Hearing back from our patients is one way we can continue to improve our services. Please take a few minutes to complete the written survey that you may receive in the mail after your visit with us. Thank you!             Your Updated Medication List - Protect others around you: Learn how to safely use, store and throw away your medicines at www.disposemymeds.org.          This list is accurate as of: 5/11/17 12:34 PM.  Always use your most recent med list.                   Brand Name Dispense Instructions for use    ALLEGRA-D 12 HOUR PO      Take 60 mg by mouth as needed       AMBIEN PO      Take 5 mg by mouth nightly as needed for sleep       buPROPion 300 MG 24 hr tablet    WELLBUTRIN XL     Take 300 mg by mouth every morning       citalopram 20 MG tablet    celeXA     Take 20 mg by mouth every evening       IBUPROFEN      Take 400 mg by mouth every 6 hours as needed       triamcinolone 0.1 % cream    KENALOG     APPLY TOPICALLY BID PRN

## 2017-05-11 NOTE — ANESTHESIA PREPROCEDURE EVALUATION
Anesthesia Evaluation     . Pt has had prior anesthetic. Type: General and Regional    No history of anesthetic complications          ROS/MED HX    ENT/Pulmonary:     (+)tobacco use, Past use , . Other pulmonary disease Left upper lobe lung mass.   (-) allergic rhinitis   Neurologic:  - neg neurologic ROS     Cardiovascular: Comment: Notes shortness of breath, sometimes at rest, and with exertion, but she is able to complete her duties as a .   - neg cardiovascular ROS   (+) ----. : . . JIMÉNEZ, . :. .      (-) taking anticoagulants/antiplatelets   METS/Exercise Tolerance:  4 - Raking leaves, gardening   Hematologic:  - neg hematologic  ROS       Musculoskeletal:  - neg musculoskeletal ROS       GI/Hepatic:  - neg GI/hepatic ROS       Renal/Genitourinary: Comment: Solitary kidney     (-) renal disease   Endo:  - neg endo ROS       Psychiatric:     (+) psychiatric history anxiety      Infectious Disease:  - neg infectious disease ROS       Malignancy:   (+) Malignancy History of Other  Other CA History of renal cancer, S/P right open radical nephrectomy Remission status post Surgery         Other:    - neg other ROS                 Physical Exam  Normal systems: dental    Airway   Mallampati: III  TM distance: >3 FB  Neck ROM: full    Dental     Cardiovascular   Rhythm and rate: regular and normal  (-) no peripheral edema    Pulmonary    breath sounds clear to auscultation    Other findings: 5/11/17: WBC 4; Hgb 12.9; Hct 40.6; Plt 324  5/11/17: Na 137; K 3.8; Cl 103; Glu 72; BUN 18; Cr 0.98; Ca 9    3/22/17 CT Chest:  IMPRESSION:  1. Perilymphatic nodularity and interlobular thickening with soft tissue extension to the left hilum still concerning for lymphangitic spread of malignancy. Granulomatous process such as sarcoidosis is also within the differential.  2. Other scattered nodules in the lungs unchanged from the prior study. Continued follow-up is recommended.  3. Decrease in consolidation in the  left apex biopsy proven organizing pneumonia.    4/19/17 PFT:  The FVC, FEV1, FEV1/FVC ratio and QXQ58-18% are within normal limits.  The inspiratory flow rates are within normal limits. MVV is normal.  Lung volumes are within normal limits.  The diffusing capacity is normal.  However, the diffusing capacity was not corrected for the patient's hemoglobin.  The results are within normal limits.  IMPRESSION: Normal Pulmonary Function    5/11/17 EKG: Sinus rhythm           PAC Discussion and Assessment    ASA Classification: 2  Case is suitable for: [place]  Anesthetic techniques and relevant risks discussed: GA and GA with regional block for post-op pain control  Invasive monitoring and risk discussed: Yes  Types:   Possibility and Risk of blood transfusion discussed: Yes  NPO instructions given:   Additional anesthetic preparation and risks discussed:   Needs early admission to pre-op area:   Other:     PAC Resident/NP Anesthesia Assessment:  Suzi Gonsales is a 51 year old female scheduled to undergo Left Thoracoscopy, Wedge Excision, Possible Lobectomy, Possible Open, Flexible Bronchoscopy on 5/23/17 with Dr. Asad Velez.    She has the following specific operative considerations:   1.  Solitary kidney: Recommend that nephrotoxic substances and medications be avoided.  Recommend close monitoring of fluid and electrolytes throughout the perioperative period.  2.  Orders and patient education per the ERAS Thoracic Surgery pathway, including type & screen, CBC, BMP, EKG today.    Revised Cardiac Risk Index: 0.9% risk of major adverse cardiac event.  VTE risk: 0.26%  SURINDER risk: Low  PONV risk score= 2.  (If > 2, anti-emetic intervention is recommended.)  Anesthesia considerations: Refer to PAC assessment in the anesthesia records.        Reviewed and Signed by PAC Mid-Level Provider/Resident  Mid-Level Provider/Resident: Marsha Whalen CNP  Date: 5/11/17  Time: 1245    Attending Anesthesiologist Anesthesia  Assessment:  Chart reviewed, patient seen and evaluated; agree with above assessment.    MPC 2-3 airway with small mouth opening, Lungs - clear to auscultation, RRR without murmur    Please note patient has solitary kidney.  Patient has had epidural in past for perioperative pain control with excellent results and is very interested in having regional anesthesia again.  Patient has had an episode of marked post operative respiratory distress suggestive of residual muscle relaxant.    Final plan per     Patient is appropriate for the planned procedure without further workup or medical management change.     Jacinto Kaplan MD  5/11/2017  1:00 PM      Reviewed and Signed by PAC Anesthesiologist  Anesthesiologist: Jacinto Kaplan mD  Date: 05/11/2017  Time:   Pass/Fail:   Disposition:     PAC Pharmacist Assessment:        Pharmacist:   Date:   Time:      Anesthesia Plan      History & Physical Review  History and physical reviewed and following examination; no interval change.    ASA Status:  3 .        Plan for General and ETT with Intravenous induction. Maintenance will be TIVA.    PONV prophylaxis:  Ondansetron (or other 5HT-3) and Dexamethasone or Solumedrol  8 or 8.5 ETT, propofol infusion      Postoperative Care  Postoperative pain management:  IV analgesics.      Consents  Anesthetic plan, risks, benefits and alternatives discussed with:  Patient.  Use of blood products discussed: Yes.   Consented to blood products.  .          I have seen and evaluated the patient and agree with the above assessment and plan.  Today's case is only for EBUS and EGD, not for wedge/thorascopy.  Leah Raza                  .

## 2017-05-11 NOTE — H&P
Pre-Operative H & P     Date of Encounter: 5/11/2017  Primary Care Physician:  Idalia Saini    CC: Left upper lobe lung mass.    HPI:  Suzi Gonsales is a 51 year old female who presents for pre-operative H & P in preparation for Left Thoracoscopy, Wedge Excision, Possible Lobectomy, Possible Open, Flexible Bronchoscopy on 5/23/17 with Dr. Asad Velez at [facility].     The patient has a history of clear cell kidney cancer, S/P right open radical nephrectomy done on 12/31/14.  During the staging workup, pulmonary nodules were identified and have since been followed with serial CT scans.  When a scan completed in May 2016 was concerning for an increase in the size of a couple of nodules, the patient was referred to [facility] Hematology/[facility]cology.  She was evaluated by Dr. Green on 5/27/16, and the decision was made to continue close monitoring.  In January of this year, a new left upper lobe lung mass, and multiple pulmonary nodules were identified on CT.  The patient was evaluated by Dr. Velez on 2/1, at that time, plans were made for biopsy of the mass.  This was performed on 2/6, and results showed organizing pneumonia, fibrosis, and chronic inflammation.  Her case was diagnosed in conference and the decision was made to initiate antibiotic therapy with repeat chest CT in six weeks.  The patient's symptoms of dyspnea on exertion, non-productive cough, anorexia, and fatigue improved.  She was evaulated in the [facility] and the patient's case was discussed at conference.  Due concern for lymphangitic spread of malignancy, the patient was referred by to Dr. Velez.  She was reevaluated on 4/19, and the decision was made to proceed with surgical intervention.  Arrangements are now being made for the above procedures.    History is obtained from the patient and the medical record.    Past Medical History:  Past Medical History:   Diagnosis Date     Anxiety       Former tobacco use      History of kidney cancer     Clear cell     Mass of left lung     Upper lobe     Solitary kidney     S/P right nephrectomy due to kidney cancer     Past Surgical History:  Past Surgical History:   Procedure Laterality Date     C/SECTION, LOW TRANSVERSE  ,     , Low Transverse     open radical nephrectomy Right 14     Hx of Blood transfusions/reactions: Denies.     Hx of abnormal bleeding or anti-platelet use: Denies.    Menstrual history: No LMP recorded. Patient is not currently having periods (Reason: Premenopausal).    Steroid use in the last year: Denies.    Personal or FH of difficulty with anesthesia:  Denies.    Prior to admission medications  Current Outpatient Prescriptions   Medication Sig Dispense Refill     Fexofenadine-Pseudoephedrine (ALLEGRA-D 12 HOUR PO) Take 60 mg by mouth as needed       buPROPion (WELLBUTRIN XL) 300 MG 24 hr tablet Take 300 mg by mouth every morning        citalopram (CELEXA) 20 MG tablet Take 20 mg by mouth every evening        triamcinolone (KENALOG) 0.1 % cream APPLY TOPICALLY BID PRN  2     IBUPROFEN Take 400 mg by mouth every 6 hours as needed        Zolpidem Tartrate (AMBIEN PO) Take 5 mg by mouth nightly as needed for sleep       Allergies  No Known Allergies    Social History  Social History     Social History     Marital status:      Spouse name: N/A     Number of children: N/A     Years of education: N/A     Occupational History     Not on file.     Social History Main Topics     Smoking status: Former Smoker     Types: Cigarettes     Quit date: 2004     Smokeless tobacco: Never Used     Alcohol use Yes      Comment: Occasional, social     Drug use: No     Sexual activity: Not Currently     Partners: Male     Other Topics Concern     Not on file     Social History Narrative     Family History  Family History   Problem Relation Age of Onset     Hypertension Father      Chronic Obstructive Pulmonary Disease Father   "    Hyperlipidemia Brother      Hyperlipidemia Brother      Review of Systems  Functional status: Independent in ADL's.  4 METS.     The complete review of systems is negative other than noted in the HPI or here.   Constitutional: Denies recent changes in sleeping patterns, or fevers/chills.  Reports that she lost approximately 12 pounds around the time of her diagnosis, but has since regained some of the weight.    Eyes:  No recent vision changes.  EENT: Denies recent changes in hearing, mouth pain, or difficulty swallowing.  Cardiovascular: Denies chest pain, orthopnea, or palpitations.  Notes some dyspnea on exertion, but is able to complete her duties as a .    Respiratory: Denies shortness of breath or significant cough.  Continues to have a slight cough, but greatly improved from previous.      GI: Denies nausea/vomiting or diarrhea.  Reports a tendency for constipation.      : Denies dysuria.    Musculoskeletal: Denies joint pain or swelling.    Skin: Denies rashes or wounds.    Hematologic: Denies easy bruising or bleeding.    Neurologic: Denies migraines, seizures, dizziness, numbness/tingling.  Psychiatric: Denies changes in mood or affect.      BP 95/63  Pulse 66  Temp 99  F (37.2  C) (Oral)  Resp 16  Ht 1.562 m (5' 1.5\")  Wt 57.1 kg (125 lb 12.8 oz)  SpO2 97%  BMI 23.38 kg/m2    125 lbs 12.8 oz  5' 1.5\"   Body mass index is 23.38 kg/(m^2).    Physical Exam  Constitutional: Patient awake, seated upright in a chair, in no apparent distress.  Appears stated age.  Eyes: Pupils equal, round and reactive to light.  Extra ocular muscles intact. Sclera clear.  Conjunctiva normal.  HENT: Head normocephalic.  Oral pharynx intact with moist mucous membranes.  Dentition intact.  No thyromegaly appreciated.   Respiratory: Lung sounds clear to auscultation bilaterally.  No rales, rhonchi, or wheezing noted.    Cardiovascular: S1, S2, regular rate and rhythm.  No murmurs, rubs, or gallops " noted. Radial and pedal pulses palpable, bilaterally.  No edema noted.   GI: Bowel sounds present.  Abdomen flat, soft, non-tender to light palpation.  No hepatosplenomegaly or masses palpated.   Genitourinary: Exam deferred.  Lymph/Hematologic: No cervical or supraclavicular lymphadenopathy noted.  No excessive bruising noted.    Skin: Color appropriate for race, warm, dry.  No rashes or wounds at anticipated surgical site.   Musculoskeletal: Full extension of the neck.  No redness, warmth, or swelling of the joints noted. Gross motor strength is normal.    Neurologic: Alert, oriented to name, place and time. Cranial nerves II-XII are grossly intact. Gait is normal.      Neuropsychiatric: Calm, cooperative. Normal affect.     Labs:  5/11/17: WBC 4; Hgb 12.9; Hct 40.6; Plt 324  5/11/17: Na 137; K 3.8; Cl 103; Glu 72; BUN 18; Cr 0.98; Ca 9    Imaging:  3/22/17 CT Chest:  IMPRESSION:  1. Perilymphatic nodularity and interlobular thickening with soft tissue extension to the left hilum still concerning for lymphangitic spread of malignancy. Granulomatous process such as sarcoidosis is also within the differential.  2. Other scattered nodules in the lungs unchanged from the prior study. Continued follow-up is recommended.  3. Decrease in consolidation in the left apex biopsy proven organizing pneumonia.    4/19/17 PFT:  The FVC, FEV1, FEV1/FVC ratio and CKP62-76% are within normal limits.  The inspiratory flow rates are within normal limits. MVV is normal.  Lung volumes are within normal limits.  The diffusing capacity is normal.  However, the diffusing capacity was not corrected for the patient's hemoglobin.  The results are within normal limits.  IMPRESSION: Normal Pulmonary Function    5/11/17 EKG: Sinus rhythm    Lab results, EKG were personally reviewed by this provider.      Assessment and Plan  Suzi Gonsales is a 51 year old female scheduled to undergo Left Thoracoscopy, Wedge Excision, Possible Lobectomy, Possible  Open, Flexible Bronchoscopy on 5/23/17 with Dr. Asad Velez.    She has the following specific operative considerations:   1.  Solitary kidney: Recommend that nephrotoxic substances and medications be avoided.  Recommend close monitoring of fluid and electrolytes throughout the perioperative period.  2.  Orders and patient education per the ERAS Thoracic Surgery pathway, including type & screen, CBC, BMP, EKG today.    Revised Cardiac Risk Index: 0.9% risk of major adverse cardiac event.  VTE risk: 0.26%  SURINDER risk: Low  PONV risk score= 2.  (If > 2, anti-emetic intervention is recommended.)  Anesthesia considerations: Refer to PAC assessment in the anesthesia records.    Patient was discussed with Dr. Kaplan.    Marsha Whalen NP  Preoperative Assessment Center  [facility]  Phone: [phone]  Fax: [phone]

## 2017-05-11 NOTE — PATIENT INSTRUCTIONS
Preparing for Your Surgery      Name:  Suzi Gonsales   MRN:  [id number]   :  1965   Today's Date:  2017     Arriving for surgery:  Surgery date:  17  Surgery time:  8 am  Arrival time:  5:30 am    Please come to:   [facility] [address]  [address] [place]  [address]    -   parking is available in front of the hospital from 5:15 am to 8:00 pm    -  Stop at the Information Desk in the lobby    -   Inform the information person that you are here for surgery. An escort to 3c will be provided. If you would not like an escort, please proceed to 3C on the 3rd floor. [phone]   -  Bring your ID and insurance card.    What can I eat or drink?  -  You may have solid food or milk products until 8 hours prior to your surgery. (12 midnight)  -  You may have water, apple juice, BLACK coffee (NO creamer or nondairy creamer), or 7up/Sprite until 2 hours prior to your surgery. (5:30 am- Stop on arrival to hospital.)    Which medicines can I take?  -  Do not bring your own medications to the hospital.        -  Follow Thoracic Surgery Clinic instructions on when to stop Ibuprofen. If no instructions given, NO Ibuprofen the day prior to surgery.    -  Do NOT take these medications the morning of surgery:  Triamcinolone (Kenalog) cream    -  Please take these medications the morning of surgery:  Bupropion,       Acetaminophen (Tylenol) if needed    How do I prepare myself?  -  Take two showers: one the night before surgery; and one the morning of surgery.         Use Scrubcare or Hibiclens to wash from neck down.  You may use your own shampoo and conditioner. No other hair products.   -  Do NOT use lotion, powder, deodorant, or antiperspirant the day of your surgery.  -  Do NOT wear any makeup, fingernail polish or jewelry.    Questions or Concerns:  If you have questions or concerns, please call the  Preoperative Assessment Center, Monday-Friday 7AM-7PM:   [phone]    Enhanced Recovery After Surgery     This is a team effort, including you, to get you back on your feet, eating and drinking normally and out of the hospital as quickly as possible.  The goals are: 1) NO INFECTIONS and   2) RETURN TO NORMAL DIET    How can we achieve these goals?  1) STAY ACTIVE: Walk every day before your surgery; try to increase the amount every day.  Walk after surgery as much as you can-the nurses will help you.  Walking speeds healing and gets you home quicker, you heal better at home and have less risk of infection.     2) INCENTIVE SPIROMETER: Beginning 1 week prior to surgery, practice your incentive spirometer 4 times per day with 5-10 repetitions each time. Bring incentive spirometer to hospital day of surgery. Using the incentive spirometer can strengthen your muscles between your ribs and help you have a strong cough after surgery.  A more effective cough can help prevent problems with your lungs.    3) STAY HYDRATED: Drink clear liquids up until 2 hours before your surgery. We would like you to purchase a drink such as Gatorade.  Drink this before bedtime and on the way into the hospital, drink between 8-12 ounces or until you feel hydrated.  Keeping well hydrated leads to your veins being plump, you wake up faster, and you are less likely to be nauseated. Start drinking water as soon as you can after surgery and advance to clear liquids and food as tolerated.  IV fluids contain salt, drinking fluids will minimize the amount of IV fluids you need and decrease the amount of salt you get.     4) PAIN MANAGEMENT: If we minimize the amount of opioids and narcotics, and use regional blocks (which numb the area where your surgery is) along with oral pain medications; you will have less side effects of nausea and constipation. Narcotics can slow down your bowels and cause you to stay in the hospital longer.     Our goal is to keep you comfortable; eating and drinking normally  and back home safely.     AFTER YOUR SURGERY  Breathing exercises   Breathing exercises help you recover faster. Take deep breaths and let the air out slowly. This will:     Help you wake up after surgery.    Help prevent complications like pneumonia.  Preventing complications will help you go home sooner.   Nausea and vomiting   You may feel sick to your stomach after surgery; if so, let your nurse know.    Pain control:  After surgery, you may have pain. Our goal is to help you manage your pain. Pain medicine will help you feel comfortable enough to do activities that will help you heal.  These activities may include breathing exercises, walking and physical therapy.   To help your health care team treat your pain we will ask: 1) If you have pain  2) where it is located 3) describe your pain in your words  Methods of pain control include medications given by mouth, vein or by nerve block for some surgeries.  We may give you a pain control pump that will:  1) Deliver the medicine through a tube placed in your vein  2) Control the amount of medicine you receive  3) Allow you to push a button to deliver a dose of pain medicine  Sequential Compression Device (SCD) or Pneumo Boots:  You may need to wear SCD S on your legs or feet. These are wraps connected to a machine that pumps in air and releases it. The repeated pumping helps prevent blood clots from forming.

## 2017-05-19 DIAGNOSIS — R91.8 MASS OF UPPER LOBE OF LEFT LUNG: ICD-10-CM

## 2017-05-19 DIAGNOSIS — C64.9 RENAL CELL CARCINOMA, UNSPECIFIED LATERALITY (H): Primary | ICD-10-CM

## 2017-05-19 RX ORDER — LORAZEPAM 0.5 MG/1
TABLET ORAL
Qty: 2 TABLET | Refills: 0 | Status: ON HOLD | OUTPATIENT
Start: 2017-05-19 | End: 2017-06-06

## 2017-05-19 RX ORDER — ALPRAZOLAM 0.5 MG
0.5 TABLET ORAL 3 TIMES DAILY PRN
Qty: 60 TABLET | Refills: 0 | Status: SHIPPED | OUTPATIENT
Start: 2017-05-19 | End: 2017-07-03

## 2017-05-23 ENCOUNTER — ANESTHESIA (OUTPATIENT)
Dept: SURGERY | Facility: CLINIC | Age: 52
End: 2017-05-23
Payer: COMMERCIAL

## 2017-05-23 ENCOUNTER — HOSPITAL ENCOUNTER (OUTPATIENT)
Facility: CLINIC | Age: 52
Discharge: HOME | End: 2017-05-23
Attending: THORACIC SURGERY (CARDIOTHORACIC VASCULAR SURGERY) | Admitting: THORACIC SURGERY (CARDIOTHORACIC VASCULAR SURGERY)
Payer: COMMERCIAL

## 2017-05-23 VITALS
OXYGEN SATURATION: 97 % | WEIGHT: 122.58 LBS | HEART RATE: 81 BPM | BODY MASS INDEX: 22.56 KG/M2 | TEMPERATURE: 97.8 F | RESPIRATION RATE: 16 BRPM | DIASTOLIC BLOOD PRESSURE: 66 MMHG | SYSTOLIC BLOOD PRESSURE: 102 MMHG | HEIGHT: 62 IN

## 2017-05-23 DIAGNOSIS — C64.1 MALIGNANT NEOPLASM OF RIGHT KIDNEY (H): Primary | ICD-10-CM

## 2017-05-23 LAB
ABO + RH BLD: NORMAL
ABO + RH BLD: NORMAL
BLD GP AB SCN SERPL QL: NORMAL
BLOOD BANK CMNT PATIENT-IMP: NORMAL
BLOOD BANK CMNT PATIENT-IMP: NORMAL
HCG UR QL: NEGATIVE
SPECIMEN EXP DATE BLD: NORMAL

## 2017-05-23 PROCEDURE — 88305 TISSUE EXAM BY PATHOLOGIST: CPT | Performed by: THORACIC SURGERY (CARDIOTHORACIC VASCULAR SURGERY)

## 2017-05-23 PROCEDURE — 00000155 ZZHCL STATISTIC H-CELL BLOCK W/STAIN: Performed by: THORACIC SURGERY (CARDIOTHORACIC VASCULAR SURGERY)

## 2017-05-23 PROCEDURE — 25000128 H RX IP 250 OP 636: Performed by: ANESTHESIOLOGY

## 2017-05-23 PROCEDURE — 25000566 ZZH SEVOFLURANE, EA 15 MIN: Performed by: THORACIC SURGERY (CARDIOTHORACIC VASCULAR SURGERY)

## 2017-05-23 PROCEDURE — 25000132 ZZH RX MED GY IP 250 OP 250 PS 637: Performed by: ANESTHESIOLOGY

## 2017-05-23 PROCEDURE — 88172 CYTP DX EVAL FNA 1ST EA SITE: CPT | Performed by: THORACIC SURGERY (CARDIOTHORACIC VASCULAR SURGERY)

## 2017-05-23 PROCEDURE — 37000008 ZZH ANESTHESIA TECHNICAL FEE, 1ST 30 MIN: Performed by: THORACIC SURGERY (CARDIOTHORACIC VASCULAR SURGERY)

## 2017-05-23 PROCEDURE — 88173 CYTOPATH EVAL FNA REPORT: CPT | Mod: 91 | Performed by: THORACIC SURGERY (CARDIOTHORACIC VASCULAR SURGERY)

## 2017-05-23 PROCEDURE — 36000051 ZZH SURGERY LEVEL 2 1ST 30 MIN - UMMC: Performed by: THORACIC SURGERY (CARDIOTHORACIC VASCULAR SURGERY)

## 2017-05-23 PROCEDURE — 71000027 ZZH RECOVERY PHASE 2 EACH 15 MINS: Performed by: THORACIC SURGERY (CARDIOTHORACIC VASCULAR SURGERY)

## 2017-05-23 PROCEDURE — 27210794 ZZH OR GENERAL SUPPLY STERILE: Performed by: THORACIC SURGERY (CARDIOTHORACIC VASCULAR SURGERY)

## 2017-05-23 PROCEDURE — 86901 BLOOD TYPING SEROLOGIC RH(D): CPT | Performed by: CLINICAL NURSE SPECIALIST

## 2017-05-23 PROCEDURE — P9041 ALBUMIN (HUMAN),5%, 50ML: HCPCS | Performed by: ANESTHESIOLOGY

## 2017-05-23 PROCEDURE — 27211024 ZZHC OR SUPPLY OTHER OPNP: Performed by: THORACIC SURGERY (CARDIOTHORACIC VASCULAR SURGERY)

## 2017-05-23 PROCEDURE — 88341 IMHCHEM/IMCYTCHM EA ADD ANTB: CPT | Performed by: THORACIC SURGERY (CARDIOTHORACIC VASCULAR SURGERY)

## 2017-05-23 PROCEDURE — 36000053 ZZH SURGERY LEVEL 2 EA 15 ADDTL MIN - UMMC: Performed by: THORACIC SURGERY (CARDIOTHORACIC VASCULAR SURGERY)

## 2017-05-23 PROCEDURE — 37000009 ZZH ANESTHESIA TECHNICAL FEE, EACH ADDTL 15 MIN: Performed by: THORACIC SURGERY (CARDIOTHORACIC VASCULAR SURGERY)

## 2017-05-23 PROCEDURE — 88342 IMHCHEM/IMCYTCHM 1ST ANTB: CPT | Performed by: THORACIC SURGERY (CARDIOTHORACIC VASCULAR SURGERY)

## 2017-05-23 PROCEDURE — 86850 RBC ANTIBODY SCREEN: CPT | Performed by: CLINICAL NURSE SPECIALIST

## 2017-05-23 PROCEDURE — 25000125 ZZHC RX 250: Performed by: ANESTHESIOLOGY

## 2017-05-23 PROCEDURE — C9399 UNCLASSIFIED DRUGS OR BIOLOG: HCPCS | Performed by: ANESTHESIOLOGY

## 2017-05-23 PROCEDURE — 81025 URINE PREGNANCY TEST: CPT | Performed by: ANESTHESIOLOGY

## 2017-05-23 PROCEDURE — 86900 BLOOD TYPING SEROLOGIC ABO: CPT | Performed by: CLINICAL NURSE SPECIALIST

## 2017-05-23 PROCEDURE — 71000014 ZZH RECOVERY PHASE 1 LEVEL 2 FIRST HR: Performed by: THORACIC SURGERY (CARDIOTHORACIC VASCULAR SURGERY)

## 2017-05-23 PROCEDURE — 40000170 ZZH STATISTIC PRE-PROCEDURE ASSESSMENT II: Performed by: THORACIC SURGERY (CARDIOTHORACIC VASCULAR SURGERY)

## 2017-05-23 RX ORDER — FENTANYL CITRATE 50 UG/ML
25-50 INJECTION, SOLUTION INTRAMUSCULAR; INTRAVENOUS
Status: DISCONTINUED | OUTPATIENT
Start: 2017-05-23 | End: 2017-05-23 | Stop reason: HOSPADM

## 2017-05-23 RX ORDER — DEXAMETHASONE SODIUM PHOSPHATE 4 MG/ML
INJECTION, SOLUTION INTRA-ARTICULAR; INTRALESIONAL; INTRAMUSCULAR; INTRAVENOUS; SOFT TISSUE PRN
Status: DISCONTINUED | OUTPATIENT
Start: 2017-05-23 | End: 2017-05-23

## 2017-05-23 RX ORDER — ONDANSETRON 2 MG/ML
INJECTION INTRAMUSCULAR; INTRAVENOUS PRN
Status: DISCONTINUED | OUTPATIENT
Start: 2017-05-23 | End: 2017-05-23

## 2017-05-23 RX ORDER — PROPOFOL 10 MG/ML
INJECTION, EMULSION INTRAVENOUS PRN
Status: DISCONTINUED | OUTPATIENT
Start: 2017-05-23 | End: 2017-05-23

## 2017-05-23 RX ORDER — ALBUMIN, HUMAN INJ 5% 5 %
SOLUTION INTRAVENOUS CONTINUOUS PRN
Status: DISCONTINUED | OUTPATIENT
Start: 2017-05-23 | End: 2017-05-23

## 2017-05-23 RX ORDER — ONDANSETRON 2 MG/ML
4 INJECTION INTRAMUSCULAR; INTRAVENOUS EVERY 30 MIN PRN
Status: DISCONTINUED | OUTPATIENT
Start: 2017-05-23 | End: 2017-05-23 | Stop reason: HOSPADM

## 2017-05-23 RX ORDER — ALPRAZOLAM 0.5 MG
0.5 TABLET ORAL 3 TIMES DAILY PRN
Status: CANCELLED | OUTPATIENT
Start: 2017-05-23

## 2017-05-23 RX ORDER — ACETAMINOPHEN 325 MG/1
975 TABLET ORAL ONCE
Status: COMPLETED | OUTPATIENT
Start: 2017-05-23 | End: 2017-05-23

## 2017-05-23 RX ORDER — CITALOPRAM HYDROBROMIDE 20 MG/1
20 TABLET ORAL EVERY EVENING
Status: CANCELLED | OUTPATIENT
Start: 2017-05-23

## 2017-05-23 RX ORDER — FEXOFENADINE HCL AND PSEUDOEPHEDRINE HCI 60; 120 MG/1; MG/1
1 TABLET, EXTENDED RELEASE ORAL EVERY 12 HOURS SCHEDULED
Status: CANCELLED | OUTPATIENT
Start: 2017-05-23

## 2017-05-23 RX ORDER — EPHEDRINE SULFATE 50 MG/ML
INJECTION, SOLUTION INTRAMUSCULAR; INTRAVENOUS; SUBCUTANEOUS PRN
Status: DISCONTINUED | OUTPATIENT
Start: 2017-05-23 | End: 2017-05-23

## 2017-05-23 RX ORDER — ACETAMINOPHEN 325 MG/1
650 TABLET ORAL
Status: DISCONTINUED | OUTPATIENT
Start: 2017-05-23 | End: 2017-05-23 | Stop reason: HOSPADM

## 2017-05-23 RX ORDER — SODIUM CHLORIDE, SODIUM LACTATE, POTASSIUM CHLORIDE, CALCIUM CHLORIDE 600; 310; 30; 20 MG/100ML; MG/100ML; MG/100ML; MG/100ML
INJECTION, SOLUTION INTRAVENOUS CONTINUOUS
Status: DISCONTINUED | OUTPATIENT
Start: 2017-05-23 | End: 2017-05-23 | Stop reason: HOSPADM

## 2017-05-23 RX ORDER — GABAPENTIN 300 MG/1
300 CAPSULE ORAL ONCE
Status: COMPLETED | OUTPATIENT
Start: 2017-05-23 | End: 2017-05-23

## 2017-05-23 RX ORDER — ONDANSETRON 4 MG/1
4 TABLET, ORALLY DISINTEGRATING ORAL EVERY 30 MIN PRN
Status: DISCONTINUED | OUTPATIENT
Start: 2017-05-23 | End: 2017-05-23 | Stop reason: HOSPADM

## 2017-05-23 RX ORDER — ZOLPIDEM TARTRATE 5 MG/1
5 TABLET ORAL
Status: CANCELLED | OUTPATIENT
Start: 2017-05-23

## 2017-05-23 RX ORDER — PROPOFOL 10 MG/ML
INJECTION, EMULSION INTRAVENOUS CONTINUOUS PRN
Status: DISCONTINUED | OUTPATIENT
Start: 2017-05-23 | End: 2017-05-23

## 2017-05-23 RX ORDER — BUPROPION HYDROCHLORIDE 300 MG/1
300 TABLET ORAL EVERY MORNING
Status: CANCELLED | OUTPATIENT
Start: 2017-05-23

## 2017-05-23 RX ORDER — CEFAZOLIN SODIUM 2 G/100ML
2 INJECTION, SOLUTION INTRAVENOUS
Status: DISCONTINUED | OUTPATIENT
Start: 2017-05-23 | End: 2017-05-23 | Stop reason: HOSPADM

## 2017-05-23 RX ORDER — TRIAMCINOLONE ACETONIDE 1 MG/G
CREAM TOPICAL 2 TIMES DAILY
Status: CANCELLED | OUTPATIENT
Start: 2017-05-23

## 2017-05-23 RX ORDER — FENTANYL CITRATE 50 UG/ML
INJECTION, SOLUTION INTRAMUSCULAR; INTRAVENOUS PRN
Status: DISCONTINUED | OUTPATIENT
Start: 2017-05-23 | End: 2017-05-23

## 2017-05-23 RX ORDER — MEPERIDINE HYDROCHLORIDE 25 MG/ML
12.5 INJECTION INTRAMUSCULAR; INTRAVENOUS; SUBCUTANEOUS
Status: DISCONTINUED | OUTPATIENT
Start: 2017-05-23 | End: 2017-05-23 | Stop reason: HOSPADM

## 2017-05-23 RX ORDER — CELECOXIB 200 MG/1
200 CAPSULE ORAL ONCE
Status: COMPLETED | OUTPATIENT
Start: 2017-05-23 | End: 2017-05-23

## 2017-05-23 RX ORDER — ACETAMINOPHEN 325 MG/1
650 TABLET ORAL EVERY 4 HOURS PRN
Qty: 100 TABLET | Refills: 0 | COMMUNITY
Start: 2017-05-23 | End: 2018-03-01

## 2017-05-23 RX ORDER — CEFAZOLIN SODIUM 1 G/3ML
1 INJECTION, POWDER, FOR SOLUTION INTRAMUSCULAR; INTRAVENOUS SEE ADMIN INSTRUCTIONS
Status: DISCONTINUED | OUTPATIENT
Start: 2017-05-23 | End: 2017-05-23 | Stop reason: HOSPADM

## 2017-05-23 RX ORDER — NALOXONE HYDROCHLORIDE 0.4 MG/ML
.1-.4 INJECTION, SOLUTION INTRAMUSCULAR; INTRAVENOUS; SUBCUTANEOUS
Status: DISCONTINUED | OUTPATIENT
Start: 2017-05-23 | End: 2017-05-23 | Stop reason: HOSPADM

## 2017-05-23 RX ORDER — LIDOCAINE 40 MG/G
CREAM TOPICAL
Status: DISCONTINUED | OUTPATIENT
Start: 2017-05-23 | End: 2017-05-23 | Stop reason: HOSPADM

## 2017-05-23 RX ORDER — SODIUM CHLORIDE, SODIUM LACTATE, POTASSIUM CHLORIDE, CALCIUM CHLORIDE 600; 310; 30; 20 MG/100ML; MG/100ML; MG/100ML; MG/100ML
INJECTION, SOLUTION INTRAVENOUS CONTINUOUS PRN
Status: DISCONTINUED | OUTPATIENT
Start: 2017-05-23 | End: 2017-05-23

## 2017-05-23 RX ORDER — CHLORHEXIDINE GLUCONATE ORAL RINSE 1.2 MG/ML
15 SOLUTION DENTAL ONCE
Status: COMPLETED | OUTPATIENT
Start: 2017-05-23 | End: 2017-05-23

## 2017-05-23 RX ADMIN — MIDAZOLAM HYDROCHLORIDE 2 MG: 1 INJECTION, SOLUTION INTRAMUSCULAR; INTRAVENOUS at 08:21

## 2017-05-23 RX ADMIN — ONDANSETRON 4 MG: 2 INJECTION INTRAMUSCULAR; INTRAVENOUS at 10:45

## 2017-05-23 RX ADMIN — ALBUMIN (HUMAN): 12.5 SOLUTION INTRAVENOUS at 10:29

## 2017-05-23 RX ADMIN — ROCURONIUM BROMIDE 15 MG: 10 INJECTION INTRAVENOUS at 09:17

## 2017-05-23 RX ADMIN — MIDAZOLAM HYDROCHLORIDE 0.5 MG: 1 INJECTION, SOLUTION INTRAMUSCULAR; INTRAVENOUS at 09:28

## 2017-05-23 RX ADMIN — FENTANYL CITRATE 50 MCG: 50 INJECTION, SOLUTION INTRAMUSCULAR; INTRAVENOUS at 09:24

## 2017-05-23 RX ADMIN — ACETAMINOPHEN 975 MG: 325 TABLET ORAL at 06:51

## 2017-05-23 RX ADMIN — FENTANYL CITRATE 50 MCG: 50 INJECTION, SOLUTION INTRAMUSCULAR; INTRAVENOUS at 08:29

## 2017-05-23 RX ADMIN — GABAPENTIN 300 MG: 300 CAPSULE ORAL at 06:48

## 2017-05-23 RX ADMIN — ROCURONIUM BROMIDE 20 MG: 10 INJECTION INTRAVENOUS at 10:12

## 2017-05-23 RX ADMIN — PROPOFOL 100 MCG/KG/MIN: 10 INJECTION, EMULSION INTRAVENOUS at 08:46

## 2017-05-23 RX ADMIN — CELECOXIB 200 MG: 200 CAPSULE ORAL at 06:48

## 2017-05-23 RX ADMIN — PROPOFOL 90 MG: 10 INJECTION, EMULSION INTRAVENOUS at 08:29

## 2017-05-23 RX ADMIN — FENTANYL CITRATE 50 MCG: 50 INJECTION, SOLUTION INTRAMUSCULAR; INTRAVENOUS at 09:43

## 2017-05-23 RX ADMIN — CHLORHEXIDINE GLUCONATE 15 ML: 1.2 RINSE ORAL at 07:05

## 2017-05-23 RX ADMIN — SUGAMMADEX 200 MG: 100 INJECTION, SOLUTION INTRAVENOUS at 10:45

## 2017-05-23 RX ADMIN — ROCURONIUM BROMIDE 35 MG: 10 INJECTION INTRAVENOUS at 08:29

## 2017-05-23 RX ADMIN — PROPOFOL 40 MG: 10 INJECTION, EMULSION INTRAVENOUS at 09:17

## 2017-05-23 RX ADMIN — ROCURONIUM BROMIDE 10 MG: 10 INJECTION INTRAVENOUS at 09:54

## 2017-05-23 RX ADMIN — SODIUM CHLORIDE, POTASSIUM CHLORIDE, SODIUM LACTATE AND CALCIUM CHLORIDE: 600; 310; 30; 20 INJECTION, SOLUTION INTRAVENOUS at 08:21

## 2017-05-23 RX ADMIN — PHENYLEPHRINE HYDROCHLORIDE 100 MCG: 10 INJECTION, SOLUTION INTRAMUSCULAR; INTRAVENOUS; SUBCUTANEOUS at 08:42

## 2017-05-23 RX ADMIN — ONDANSETRON 4 MG: 2 INJECTION INTRAMUSCULAR; INTRAVENOUS at 11:36

## 2017-05-23 RX ADMIN — DEXAMETHASONE SODIUM PHOSPHATE 10 MG: 4 INJECTION, SOLUTION INTRA-ARTICULAR; INTRALESIONAL; INTRAMUSCULAR; INTRAVENOUS; SOFT TISSUE at 08:46

## 2017-05-23 RX ADMIN — Medication 5 MG: at 08:44

## 2017-05-23 RX ADMIN — FENTANYL CITRATE 50 MCG: 50 INJECTION, SOLUTION INTRAMUSCULAR; INTRAVENOUS at 08:53

## 2017-05-23 NOTE — IP AVS SNAPSHOT
Same Day Surgery [address] [address]    Phone:  [phone]                                       After Visit Summary   5/23/2017    Suzi Gonsales    MRN: [id number]           After Visit Summary Signature Page     I have received my discharge instructions, and my questions have been answered. I have discussed any challenges I see with this plan with the nurse or doctor.    ..........................................................................................................................................  Patient/Patient Representative Signature      ..........................................................................................................................................  Patient Representative Print Name and Relationship to Patient    ..................................................               ................................................  Date                                            Time    ..........................................................................................................................................  Reviewed by Signature/Title    ...................................................              ..............................................  Date                                                            Time

## 2017-05-23 NOTE — ANESTHESIA CARE TRANSFER NOTE
Patient: Suzi Gonsales    Procedure(s):  EUS with FNB - Wound Class: II-Clean Contaminated    Diagnosis: Lung Nodule   Diagnosis Additional Information: No value filed.    Anesthesia Type:   General, ETT     Note:  Airway :Face Mask  Patient transferred to:PACU  Comments: Awake and alert, VSS, breathing nonlabored, report given.      Vitals: (Last set prior to Anesthesia Care Transfer)    CRNA VITALS  5/23/2017 1024 - 5/23/2017 1059      5/23/2017             NIBP: 116/52    Ht Rate: 81    SpO2: 100 %    EKG: Sinus rhythm                Electronically Signed By: Nakia Knowles MD  May 23, 2017  10:59 AM

## 2017-05-23 NOTE — IP AVS SNAPSHOT
MRN:[id number]                      After Visit Summary   5/23/2017    Suzi Gonsales    MRN: [id number]           Thank you!     Thank you for choosing [facility] for your care. Our goal is always to provide you with excellent care. Hearing back from our patients is one way we can continue to improve our services. Please take a few minutes to complete the written survey that you may receive in the mail after you visit with us. Thank you!        Patient Information     Date Of Birth          1965        About your hospital stay     You were admitted on:  May 23, 2017 You last received care in the:  Same Day Surgery [facility]    You were discharged on:  May 23, 2017       Who to Call     For medical emergencies, please call 911.  For non-urgent questions about your medical care, please call your primary care provider or clinic, [phone]  For questions related to your surgery, please call your surgery clinic        Attending Provider     Provider Specialty    Asad Velez MD Thoracic Diseases       Primary Care Provider Office Phone # Fax #    Idalia Saini [phone]       [address] [address]        After Care Instructions     Diet Instructions       Resume pre-procedure diet            Discharge Instructions       Patient already has follow up scheduled with Mya Green and Last.            No Alcohol       For 24 hours post procedure            No driving or operating machinery        until the day after procedure            Shower       May shower day of procedure            Weight bearing status - As tolerated                 Your next 10 appointments already scheduled     May 26, 2017  9:15 AM CDT   Return Visit with Henri Green MD   [facility]    [facility]  [address] [place] [address]   [phone]            Jun 21,  2017 10:00 AM CDT   (Arrive by 9:45 AM)   CT CHEST W/O CONTRAST with UCCT2   [facility] [place] [facility] ([facility] [place])    [address] [place] [address] [id number] [phone]           Please bring any scans or X-rays taken at other hospitals, if similar tests were done. Also bring a list of your medicines, including vitamins, minerals and over-the-counter drugs. It is safest to leave personal items at home.  Be sure to tell your doctor:   If you have any allergies.   If there s any chance you are pregnant.   If you are breastfeeding.   If you have any special needs.  You do not need to do anything special to prepare.  Please wear loose clothing, such as a sweat suit or jogging clothes. Avoid snaps, zippers and other metal. We may ask you to undress and put on a hospital gown.            Jun 21, 2017 10:40 AM CDT   (Arrive by 10:25 AM)   Return Visit with Vasiliy Ni MD   [facility] ([facility] [place])    [address] [place] [address] [id number] [phone]              Further instructions from your care team       [facility] [place]  Same-Day Surgery   Adult Discharge Orders & Instructions     For 24 hours after surgery    1. Get plenty of rest.  A responsible adult must stay with you for at least 24 hours after you leave the hospital.   2. Do not drive or use heavy equipment.  If you have weakness or tingling, don't drive or use heavy equipment until this feeling goes away.  3. Do not drink alcohol.  4. Avoid strenuous or risky activities.  Ask for help when climbing stairs.   5. You may feel lightheaded.  IF so, sit for a few minutes before standing.  Have someone help you get up.   6. If you have nausea (feel sick to your stomach): Drink only clear liquids such as apple juice, ginger ale, broth or 7-Up.  Rest may also help.  Be sure to drink enough fluids.  Move to a  regular diet as you feel able.  7. You may have a slight fever. Call the doctor if your fever is over 100 F (37.7 C) (taken under the tongue) or lasts longer than 24 hours.  8. You may have a dry mouth, a sore throat, muscle aches or trouble sleeping.  These should go away after 24 hours.  9. Do not make important or legal decisions.   Call your doctor for any of the followin.  Any sudden onset of abdominal and/or chest pain, pain not relieved by your pain medicine, shortness of breath, difficulty breathing, continuous nausea/vomiting, fevers, vomiting bright red blood or coffee ground looking emesis, black, bloody, or tarry looking stools, or any other concerns or questions you may have.    2. It has been over 8 to 10 hours since surgery and you are still not able to urinate (pass water).    3.  Headache for over 24 hours.    Use this number to contact the clinic during regular business hours only please.  To contact a doctor, call  Dr. Asad Velez @ [phone]--Thoracic surgery.  For after hours, weekends, and holidays you may call the hospital answering service [phone] and ask for the resident on call for Thoracic.  (Answered 24 hours a day)    If you are unable to reach anyone you may call the Emergency Department on the [place]: [phone]       (TTY for hearing impaired: [phone])          Additional Information     If you use hormonal birth control (such as the pill, patch, ring or implants): You'll need a second form of birth control for 7 days (condoms, a diaphragm or contraceptive foam). While in the hospital, you received a medicine called Bridion. Your normal birth control will not work as well for a week after taking this medicine.          Pending Results     Date and Time Order Name Status Description    2017 0845 Fine needle aspiration In process             Admission Information     Date & Time Provider Department Dept. Phone    2017 Asad Velez  "MD [facility] [phone]      Your Vitals Were     Blood Pressure Pulse Temperature Respirations Height Weight    102/54 87 97.6  F (36.4  C) (Oral) 16 1.562 m (5' 1.5\") 55.6 kg (122 lb 9.2 oz)    Last Period Pulse Oximetry BMI (Body Mass Index)             (Within Months) 97% 22.79 kg/m2         [facility] Information     [facility] gives you secure access to your electronic health record. If you see a primary care provider, you can also send messages to your care team and make appointments. If you have questions, please call your primary care clinic.  If you do not have a primary care provider, please call [phone] and they will assist you.        Care EveryWhere ID     This is your Care EveryWhere ID. This could be used by other organizations to access your [place] medical records  [id number]           Review of your medicines      UNREVIEWED medicines. Ask your doctor about these medicines        Dose / Directions    ALLEGRA-D 12 HOUR PO        Dose:  60 mg   Take 60 mg by mouth as needed   Refills:  0       ALPRAZolam 0.5 MG tablet   Commonly known as:  XANAX   Used for:  Renal cell carcinoma, unspecified laterality (H), Mass of upper lobe of left lung        Dose:  0.5 mg   Take 1 tablet (0.5 mg) by mouth 3 times daily as needed for anxiety   Quantity:  60 tablet   Refills:  0       AMBIEN PO   Used for:  Malignant neoplasm of right kidney (H)        Dose:  5 mg   Take 5 mg by mouth nightly as needed for sleep   Refills:  0       buPROPion 300 MG 24 hr tablet   Commonly known as:  WELLBUTRIN XL        Dose:  300 mg   Take 300 mg by mouth every morning   Refills:  0       citalopram 20 MG tablet   Commonly known as:  celeXA        Dose:  20 mg   Take 20 mg by mouth every evening   Refills:  0       IBUPROFEN        Dose:  400 mg   Take 400 mg by mouth every 6 hours as needed   Refills:  0       LORazepam 0.5 MG tablet   Commonly known as:  ATIVAN   Used for:  Renal cell " carcinoma, unspecified laterality (H), Mass of upper lobe of left lung        Take 1 tablet at bedtime the night before procedure; Take 1 tablet with small sip of water several hours prior to procedure.   Quantity:  2 tablet   Refills:  0       triamcinolone 0.1 % cream   Commonly known as:  KENALOG        APPLY TOPICALLY BID PRN   Refills:  2         START taking        Dose / Directions    acetaminophen 325 MG tablet   Commonly known as:  TYLENOL   Used for:  Malignant neoplasm of right kidney (H)        Dose:  650 mg   Take 2 tablets (650 mg) by mouth every 4 hours as needed for mild pain (mild pain)   Quantity:  100 tablet   Refills:  0            Where to get your medicines      Some of these will need a paper prescription and others can be bought over the counter. Ask your nurse if you have questions.     You don't need a prescription for these medications     acetaminophen 325 MG tablet                Protect others around you: Learn how to safely use, store and throw away your medicines at www.disposemymeds.org.             Medication List: This is a list of all your medications and when to take them. Check marks below indicate your daily home schedule. Keep this list as a reference.      Medications           Morning Afternoon Evening Bedtime As Needed    acetaminophen 325 MG tablet   Commonly known as:  TYLENOL   Take 2 tablets (650 mg) by mouth every 4 hours as needed for mild pain (mild pain)   Last time this was given:  975 mg on 5/23/2017  6:51 AM                                ALLEGRA-D 12 HOUR PO   Take 60 mg by mouth as needed                                ALPRAZolam 0.5 MG tablet   Commonly known as:  XANAX   Take 1 tablet (0.5 mg) by mouth 3 times daily as needed for anxiety                                AMBIEN PO   Take 5 mg by mouth nightly as needed for sleep                                buPROPion 300 MG 24 hr tablet   Commonly known as:  WELLBUTRIN XL   Take 300 mg by mouth every morning                                 citalopram 20 MG tablet   Commonly known as:  celeXA   Take 20 mg by mouth every evening                                IBUPROFEN   Take 400 mg by mouth every 6 hours as needed                                LORazepam 0.5 MG tablet   Commonly known as:  ATIVAN   Take 1 tablet at bedtime the night before procedure; Take 1 tablet with small sip of water several hours prior to procedure.                                triamcinolone 0.1 % cream   Commonly known as:  KENALOG   APPLY TOPICALLY BID PRN

## 2017-05-23 NOTE — BRIEF OP NOTE
[facility], [place]    Brief Operative Note    Pre-operative diagnosis: Lung Nodule   Post-operative diagnosis Left adrenal and pancreatic nodules, lymphadenopathy  Procedure: Procedure(s):  Endobronchial Ultrasound, Upper Esophagoscopy with Endoscopic Ultrasound  (ERAS patient) - Wound Class: II-Clean Contaminated   - Wound Class: II-Clean Contaminated  Surgeon: Surgeon(s) and Role:     * Asad Velez MD - Primary     * Luis Miner MD - Assisting  Anesthesia: General   Estimated blood loss: minimal  Drains: none  Specimens: Lymph nodes, nodules for cytology and permanent pathology  Findings:   Nodules, lymphadenopathy  Complications: none  Implants: none

## 2017-05-23 NOTE — OR NURSING
Pt meeting phase one completion criteria @   1145  .  Cont alert and oriented times 4.  Denies pain.  Nausea resolved after medicated with Zofran dose.   Lungs cont equal and clear bi lat.  Tolerating wean to room air.  Dr Velez to bedside to see pt and discuss procedure and follow plan of care.  Hand off report to 3c Rn as noted.

## 2017-05-23 NOTE — DISCHARGE INSTRUCTIONS
[facility], [place]  Same-Day Surgery   Adult Discharge Orders & Instructions     For 24 hours after surgery    1. Get plenty of rest.  A responsible adult must stay with you for at least 24 hours after you leave the hospital.   2. Do not drive or use heavy equipment.  If you have weakness or tingling, don't drive or use heavy equipment until this feeling goes away.  3. Do not drink alcohol.  4. Avoid strenuous or risky activities.  Ask for help when climbing stairs.   5. You may feel lightheaded.  IF so, sit for a few minutes before standing.  Have someone help you get up.   6. If you have nausea (feel sick to your stomach): Drink only clear liquids such as apple juice, ginger ale, broth or 7-Up.  Rest may also help.  Be sure to drink enough fluids.  Move to a regular diet as you feel able.  7. You may have a slight fever. Call the doctor if your fever is over 100 F (37.7 C) (taken under the tongue) or lasts longer than 24 hours.  8. You may have a dry mouth, a sore throat, muscle aches or trouble sleeping.  These should go away after 24 hours.  9. Do not make important or legal decisions.   Call your doctor for any of the followin.  Any sudden onset of abdominal and/or chest pain, pain not relieved by your pain medicine, shortness of breath, difficulty breathing, continuous nausea/vomiting, fevers, vomiting bright red blood or coffee ground looking emesis, black, bloody, or tarry looking stools, or any other concerns or questions you may have.    2. It has been over 8 to 10 hours since surgery and you are still not able to urinate (pass water).    3.  Headache for over 24 hours.    Use this number to contact the clinic during regular business hours only please.  To contact a doctor, call  Dr. Asad Velez @ [phone]--Thoracic surgery.  For after hours, weekends, and holidays you may call the hospital answering service [phone] and ask for the resident on call for  Thoracic.  (Answered 24 hours a day)    If you are unable to reach anyone you may call the Emergency Department on the [facility]: [phone]       (TTY for hearing impaired: [phone])

## 2017-05-23 NOTE — OR NURSING
Pt arrived to PACU.  PACU Rn assumed care of pt @ 1100.    Pt able to state name and deny pain or nausea at present time.  Lungs equal and clear bilat; tolerating nasal cannula.  See flowsheet.

## 2017-05-23 NOTE — BRIEF OP NOTE
[facility] Brief Operative Note (for Dr Guardado's portion)    Pre-operative diagnosis: Lung Nodule    Post-operative diagnosis Subcentimeter mass in the body of the pancreas   Procedure: Procedure(s):  EUS with FNB - Wound Class: II-Clean Contaminated   Surgeon: Edgar Guardado MD   Assistants(s): None   Estimated blood loss: None    Specimens: 4 passes of FNB SharkCore   Findings: Requested by Dr Velez to perform a focused foregut EUS to identify a small lesion of the pancreas and obtain samples for cytology. The lesion was identified within the body of the pancreas; it was well defined, hypoechoic and measured up to 10mm without involvement of any vascular or other abdominal structure. 4 passes were made in a transgastric fashion using a SC 22g. Initial cytology at [facility] was complicated by heme, however cores were demonstrated and sent in the block.    Recommendations: Recover as per Dr Paula team; see his note for further details; Avoid antithrombotics for at least three days. Dr Velez to communicate findings.

## 2017-05-23 NOTE — OR NURSING
Patient discharged to home with spouse-Tunde-per wheelchair.  Patient condition stable on discharge.   ROC Love RN

## 2017-05-23 NOTE — ANESTHESIA POSTPROCEDURE EVALUATION
Patient: Suzi Gonsales    Procedure(s):  EUS with FNB - Wound Class: II-Clean Contaminated    Diagnosis:Lung Nodule   Diagnosis Additional Information: No value filed.    Anesthesia Type:  General, ETT    Note:  Anesthesia Post Evaluation    Patient location during evaluation: Bedside  Patient participation: Able to fully participate in evaluation  Level of consciousness: awake  Pain management: adequate  Airway patency: patent  Cardiovascular status: acceptable  Respiratory status: acceptable  Hydration status: acceptable  PONV: none     Anesthetic complications: None          Last vitals:  Vitals:    05/23/17 0600 05/23/17 1100 05/23/17 1115   BP: 115/44 101/58 110/55   Pulse: 67     Resp: 18 14 15   Temp: 36.6  C (97.8  F) 36.9  C (98.4  F)    SpO2: 100% 100% 100%         Electronically Signed By: Leah Raza MD  May 23, 2017  11:31 AM

## 2017-05-23 NOTE — OR NURSING
Discharge instructions reviewed with patient and spouse-Tunde.  Verbal understanding expressed.  IV dc'd-catheter intact.  IV site without redness, swelling, or tenderness.  Patient dressed for discharge and tolerated activity well.  VSS.  Tolerated juice and crackers without nausea.  ROC Love RN

## 2017-05-24 ENCOUNTER — MYC MEDICAL ADVICE (OUTPATIENT)
Dept: ONCOLOGY | Facility: CLINIC | Age: 52
End: 2017-05-24

## 2017-05-25 LAB — COPATH REPORT: NORMAL

## 2017-05-26 ENCOUNTER — ONCOLOGY VISIT (OUTPATIENT)
Dept: ONCOLOGY | Facility: CLINIC | Age: 52
End: 2017-05-26
Attending: INTERNAL MEDICINE
Payer: COMMERCIAL

## 2017-05-26 ENCOUNTER — CARE COORDINATION (OUTPATIENT)
Dept: ONCOLOGY | Facility: CLINIC | Age: 52
End: 2017-05-26

## 2017-05-26 VITALS
BODY MASS INDEX: 23.07 KG/M2 | WEIGHT: 124.1 LBS | SYSTOLIC BLOOD PRESSURE: 105 MMHG | HEART RATE: 60 BPM | OXYGEN SATURATION: 99 % | DIASTOLIC BLOOD PRESSURE: 64 MMHG | TEMPERATURE: 97.9 F | RESPIRATION RATE: 18 BRPM

## 2017-05-26 DIAGNOSIS — C64.1 MALIGNANT NEOPLASM OF RIGHT KIDNEY (H): Primary | ICD-10-CM

## 2017-05-26 PROCEDURE — 99215 OFFICE O/P EST HI 40 MIN: CPT | Performed by: INTERNAL MEDICINE

## 2017-05-26 PROCEDURE — 99355 ZZC PROLONGED SERV,OFFICE,EA ADDN 1/2: CPT | Performed by: INTERNAL MEDICINE

## 2017-05-26 PROCEDURE — 40000269 ZZH STATISTIC NO CHARGE FACILITY FEE

## 2017-05-26 PROCEDURE — 99354 ZZC PROLONGED SERV,OFFICE,1ST HR: CPT | Performed by: INTERNAL MEDICINE

## 2017-05-26 ASSESSMENT — PAIN SCALES - GENERAL: PAINLEVEL: NO PAIN (0)

## 2017-05-26 NOTE — MR AVS SNAPSHOT
After Visit Summary   5/26/2017    Suzi Gonsales    MRN: [id number]           Patient Information     Date Of Birth          1965        Visit Information        Provider Department      5/26/2017 9:15 AM Henri Green MD [facility]        Today's Diagnoses     Malignant neoplasm of right kidney (H)    -  1      Care Instructions    - Coordinate Immunotherapy with high dose IL2    - Brain MRI, echocardiogram and bone scan at the earliest  Thursday June 1, 2017 [facility]:  8:15 Nuc Med Injection  8:30 am  Echo  9:30 MRI  11:15 Nuc Med Scan      - To see Ms. Day Ren at [facility] prior to admission once above tests are done Scheduled  Aline ALBERT given to patient            Follow-ups after your visit        Your next 10 appointments already scheduled     Jun 01, 2017  8:15 AM CDT   NM INJECTION with UUNMINJ1   [facility], Nuclear Medicine ([facility])    [address] [address] [phone]            Jun 01, 2017  8:30 AM CDT   Ech Limited with UUECHR2   [facility],  Echocardiography ([facility])    [address] [id number] [phone]           1.  Please bring or wear a comfortable two-piece outfit. 2.  You may eat, drink and take your normal medicines. 3.  For any questions that cannot be answered, please contact the ordering physician            Jun 01, 2017  9:30 AM CDT   MR BRAIN W CONTRAST with UUMR4   [facility], MRI ([facility])    [address] [id number] [phone]           Take your medicines as usual, unless your doctor tells you not to. Bring a list of your current medicines to your exam (including vitamins, minerals and over-the-counter drugs).  You will be given intravenous contrast for this exam.  To prepare:   The day before your exam, drink extra fluids at least six 8-ounce glasses (unless your doctor tells you to restrict your fluids).   Have a blood test (creatinine test) within 30 days of your exam. Go to your clinic or Diagnostic Imaging Department for this test.  The MRI machine uses a strong magnet. Please wear clothes without metal (snaps, zippers). A sweatsuit works well, or we may give you a hospital gown.  Please remove any body piercings and hair extensions before you arrive. You will also remove watches, jewelry, hairpins, wallets, dentures, partial dental plates and hearing aids. You may wear contact lenses, and you may be able to wear your rings. We have a safe place to keep your personal items, but it is safer to leave them at home.   **IMPORTANT** THE INSTRUCTIONS BELOW ARE ONLY FOR THOSE PATIENTS WHO HAVE BEEN TOLD THEY WILL RECEIVE SEDATION OR GENERAL ANESTHESIA DURING THEIR MRI PROCEDURE:  IF YOU WILL RECEIVE SEDATION (take medicine to help you relax during your exam):   You must get the medicine from your doctor before you arrive. Bring the medicine to the exam. Do not take it at home.   Arrive one hour early. Bring someone who can take you home after the test. Your medicine will make you sleepy. After the exam, you may not drive, take a bus or take a taxi by yourself.   No eating 8 hours before your exam. You may have clear liquids up until 4 hours before your exam. (Clear liquids include water, clear tea, black coffee and fruit juice without pulp.)  IF YOU WILL RECEIVE ANESTHESIA (be asleep for your exam):   Arrive 1 1/2 hours early. Bring someone who can take you home after the test. You may not drive, take a bus or take a taxi by yourself.   No eating 8 hours before your exam. You may have clear liquids up until 4 hours before your exam. (Clear liquids include water, clear tea, black coffee and fruit juice without pulp.)  Please call the Imaging Department at your exam site with any  questions.            Jun 01, 2017 11:15 AM CDT   NM BONE SCAN WHOLE BODY with UUNM3   [facility] [place], Nuclear Medicine ([facility])    [address] [id number] [phone]           Please bring a list of your medicines to the exam. (Include vitamins, minerals and over-the-counter drugs.) You should wear comfortable clothes. Leave your valuables at home. Please bring related prior results and films. Tell your doctor:   If you are breastfeeding or may be pregnant.   If you have had a barium test within the past few days. Barium may change the results of certain exams.   If you think you may need sedation (medicine to help you relax).  You may eat and drink as normal.  Drink plenty of fluids and empty bladder frequently between injection and scans.            Jun 06, 2017  9:30 AM CDT   (Arrive by 9:15 AM)   Return Visit with Day Ren PA-C   [facility] ([facility])    [address] [place] [id number] [phone]            Jun 21, 2017 10:00 AM CDT   (Arrive by 9:45 AM)   CT CHEST W/O CONTRAST with UCCT2   [facility] ([facility])    [address] [place] [id number] [phone]           Please bring any scans or X-rays taken at other hospitals, if similar tests were done. Also bring a list of your medicines, including vitamins, minerals and over-the-counter drugs. It is safest to leave personal items at home.  Be sure to tell your doctor:   If you have any allergies.   If there s any chance you are pregnant.   If you are breastfeeding.   If you have any special needs.  You do not need to do anything special to prepare.  Please wear loose clothing, such as a sweat suit or jogging clothes. Avoid snaps, zippers and other metal. We may ask you to undress and put on a hospital gown.            Jun 21,  2017 10:40 AM CDT   (Arrive by 10:25 AM)   Return Visit with Vasiliy Ni MD   [facility] ([facility])    [address] [address] [phone]              Future tests that were ordered for you today     Open Future Orders        Priority Expected Expires Ordered    Echocardiogram Limited Routine 5/26/2017 6/26/2017 5/26/2017    MR Brain w Contrast Routine 5/26/2017 6/26/2017 5/26/2017    NM Bone Scan Whole Body Routine 5/26/2017 6/26/2017 5/26/2017            Who to contact     If you have questions or need follow up information about today's clinic visit or your schedule please contact [facility] directly at [phone].  Normal or non-critical lab and imaging results will be communicated to you by [employer], letter or phone within 4 business days after the clinic has received the results. If you do not hear from us within 7 days, please contact the clinic through [employer] or phone. If you have a critical or abnormal lab result, we will notify you by phone as soon as possible.  Submit refill requests through [email or web address] or call your pharmacy and they will forward the refill request to us. Please allow 3 business days for your refill to be completed.          Additional Information About Your Visit        [employer] Information     [email or web address] gives you secure access to your electronic health record. If you see a primary care provider, you can also send messages to your care team and make appointments. If you have questions, please call your primary care clinic.  If you do not have a primary care provider, please call [phone] and they will assist you.        Care EveryWhere ID     This is your Care EveryWhere ID. This could be used by other organizations to access your [place] medical records  [id number]        Your Vitals Were     Pulse Temperature Respirations Last Period Pulse Oximetry BMI (Body Mass  Index)    60 97.9  F (36.6  C) (Tympanic) 18 (Within Months) 99% 23.07 kg/m2       Blood Pressure from Last 3 Encounters:   05/26/17 105/64   05/23/17 102/66   05/11/17 95/63    Weight from Last 3 Encounters:   05/26/17 56.3 kg (124 lb 1.6 oz)   05/23/17 55.6 kg (122 lb 9.2 oz)   05/11/17 57.1 kg (125 lb 12.8 oz)               Primary Care Provider Office Phone # Fax #    Idalia Saini [phone]       [facility] [address] [address]        Thank you!     Thank you for choosing [facility]  for your care. Our goal is always to provide you with excellent care. Hearing back from our patients is one way we can continue to improve our services. Please take a few minutes to complete the written survey that you may receive in the mail after your visit with us. Thank you!             Your Updated Medication List - Protect others around you: Learn how to safely use, store and throw away your medicines at www.disposemymeds.org.          This list is accurate as of: 5/26/17 11:27 AM.  Always use your most recent med list.                   Brand Name Dispense Instructions for use    acetaminophen 325 MG tablet    TYLENOL    100 tablet    Take 2 tablets (650 mg) by mouth every 4 hours as needed for mild pain (mild pain)       ALLEGRA-D 12 HOUR PO      Take 60 mg by mouth as needed       ALPRAZolam 0.5 MG tablet    XANAX    60 tablet    Take 1 tablet (0.5 mg) by mouth 3 times daily as needed for anxiety       AMBIEN PO      Take 5 mg by mouth nightly as needed for sleep       buPROPion 300 MG 24 hr tablet    WELLBUTRIN XL     Take 300 mg by mouth every morning       citalopram 20 MG tablet    celeXA     Take 20 mg by mouth every evening       IBUPROFEN      Take 400 mg by mouth every 6 hours as needed       LORazepam 0.5 MG tablet    ATIVAN    2 tablet    Take 1 tablet at bedtime the night before procedure; Take 1 tablet with small sip of water several hours prior to  procedure.       triamcinolone 0.1 % cream    KENALOG     APPLY TOPICALLY BID PRN

## 2017-05-26 NOTE — PROGRESS NOTES
[facility]ed [facility] to make arrangements for patient's first treatment of IL-2.  Clinical nurse spec. will contact writer with information.  Will f/u with admission arrangements for subsequent treatments once first admission is completed.  Patient will have treatment one week on, one week off for 4 cycles with CT repeated after cycle #2.  Treatments will last 5 days, if possible, with infusion every 8 hours for 15 treatments per cycle.  Patient will be seeing PA on 6/6 @ 0930 for visit + labs + PICC line placed prior to admission.  Patient is aware of the POC and knows she will hear from writer once admission is completed.  Shravan Wiseman, RN, BSN, OCN  [facility]  Patient Care Coordinator'

## 2017-05-26 NOTE — PROGRESS NOTES
[facility] CANCER CLINIC  FOLLOW-UP VISIT NOTE    PATIENT NAME: Suzi Gonsales MRN # [id number]  DATE OF VISIT: May 26, 2017 YOB: 1965    REFERRING PROVIDER: No referring provider defined for this encounter.    CANCER TYPE: Kidney - clear cell  STAGE: II (pT2a, N0, M0) at diagnosis; now stage IV biopsy proven mets to lungs, adrenal and pancreas    TREATMENT SUMMARY:  Suzi presented to ED with hematuria and right flank pain thinking she had a renal stone in December 2014. She was worked up with a CT abd/pelvis which suggested a renal mass. She was referred to Dr. Max Zelaya in [facility]. 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 Per charts tumor resection had negative margins and it was staged at pT2bN0. Her staging work up was negative except for pulmonary nodules.     SUBJECTIVE   Suzi is being followed for her clear cell kidney cancer.    She is accompanied by her  and a friend at this clinic visit who was taking notes on the computer. She has not been doing very well in the recent past. She had endoscopic biopsy last Tuesday (3 days ago) and is here to review findings. She is very anxious about the pathology results.         PAST MEDICAL HISTORY   Right clear cell kidney cancer as detailed above  Anxiety      CURRENT OUTPATIENT MEDICATIONS     Current Outpatient Prescriptions   Medication Sig     acetaminophen (TYLENOL) 325 MG tablet Take 2 tablets (650 mg) by mouth every 4 hours as needed for mild pain (mild pain)     LORazepam (ATIVAN) 0.5 MG tablet Take 1 tablet at bedtime the night before procedure;  Take 1 tablet with small sip of water several hours prior to procedure.     ALPRAZolam (XANAX) 0.5 MG tablet Take 1 tablet (0.5 mg) by mouth 3 times daily as needed for anxiety     Fexofenadine-Pseudoephedrine (ALLEGRA-D 12 HOUR PO) Take 60 mg by mouth as needed     buPROPion  (WELLBUTRIN XL) 300 MG 24 hr tablet Take 300 mg by mouth every morning      citalopram (CELEXA) 20 MG tablet Take 20 mg by mouth every evening      triamcinolone (KENALOG) 0.1 % cream APPLY TOPICALLY BID PRN     Zolpidem Tartrate (AMBIEN PO) Take 5 mg by mouth nightly as needed for sleep     IBUPROFEN Take 400 mg by mouth every 6 hours as needed      No current facility-administered medications for this visit.         ALLERGIES   No Known Allergies     REVIEW OF SYSTEMS   As above in the HPI, o/w complete 12-point ROS was negative.     PHYSICAL EXAM   B/P: 101/64, T: 97.2, P: 75, R: 16  SpO2 Readings from Last 4 Encounters:   05/26/17 99%   05/23/17 97%   05/11/17 97%   04/19/17 96%     Wt Readings from Last 3 Encounters:   05/26/17 56.3 kg (124 lb 1.6 oz)   05/23/17 55.6 kg (122 lb 9.2 oz)   05/11/17 57.1 kg (125 lb 12.8 oz)     GEN: NAD  HEENT: PERRL, EOMI, no icterus, injection or pallor. Oropharynx is clear.  NECK: no cervical or supraclavicular lymphadenopathy  LUNGS: clear bilaterally  CV: regular, no murmurs, rubs, or gallops  ABDOMEN: soft, non-tender, non-distended, normal bowel sounds, no hepatosplenomegaly by percussion or palpation  EXT: warm, well perfused, no edema  NEURO: alert  SKIN: no rashes     LABORATORY AND IMAGING STUDIES     Results for orders placed or performed during the hospital encounter of 08/22/16   CT Chest/Abdomen/Pelvis w Contrast    Narrative    CT CHEST, ABDOMEN AND PELVIS WITH CONTRAST 8/22/2016 1:08 PM     HISTORY: Restaging. Malignant neoplasm of right kidney, except renal  pelvis.     COMPARISON: CT abdomen and pelvis 5/6/2016.    TECHNIQUE: Axial images from the thoracic inlet to the symphysis are  performed with additional coronal reformatted images. 64 mL of Isovue  370 are given intravenously.  Radiation dose for this scan was reduced  using automated exposure control, adjustment of the mA and/or kV  according to patient size, or iterative reconstruction technique.  Oral  contrast is also given.    FINDINGS:   Chest: Scattered small indeterminate lung nodules are present  bilaterally. Some of these appear to represent small benign fissural  lymph nodes. No significant change since prior study. No enlarging  lung lesions are evident. No pleural or pericardial fluid. Heart is  normal in size. Esophagus is unremarkable. No enlarged lymph nodes.    Abdomen: Status post right nephrectomy. No evidence of recurrent mass  in the surgical bed. Remaining upper abdominal organs including the  liver, spleen, gallbladder, pancreas, left adrenal gland and left  kidney are unremarkable. Right adrenal gland is not visualized. No  enlarged abdominal lymph nodes. The bowel is unremarkable. No  obstruction or diverticulitis. Appendix is normal.    Pelvis: The bladder, uterus, adnexal regions and rectum are  unremarkable. No enlarged pelvic lymph nodes or free fluid. Bone  window examination is unremarkable. No aggressive appearing bone  lesions are evident.      Impression    IMPRESSION:  1. Status post right nephrectomy. No recurrent mass in the surgical  bed. Abdomen and pelvis are otherwise unremarkable. No enlarged lymph  nodes.  2. Scattered bilateral tiny indeterminate lung nodules appear stable.  Continued surveillance as clinically warranted. These may be benign in  nature but continued surveillance suggested to confirm long-term  stability.    MINISTERIO CLARKE MD     Lab Results   Component Value Date    PATH  05/23/2017     Patient Name: MICKEY AMEZCUA  MR#: [id number]  Specimen #: [id number]  Collected: 5/23/2017  Received: 5/23/2017  Reported: 5/25/2017 10:56  Ordering Phy(s): ROSALINE CHIU    For improved result formatting, select 'View Enhanced Report Format'  under Linked Documents section.    SPECIMEN/STAIN PROCESS:  A: FNA-EBUS guided, Left Adrenal Gland       Pap-Cyto x 4, Diff Quick Stain-cyto x 4, Cell Block w/ H&E-Cyto x  1, Save Ribbon, 1 x 1, Cell Block, Level 2 x 1, Save  Ribbon, 2 x 1, Cell  Block, Level 3 x 1, PAX-8 x 1, Inhibin x 1  B: FNA-EBUS guided, Lymph Node Station 5       Pap-Cyto x 6, Diff Quick Stain-cyto x 5, Cell Block w/ H&E-Cyto x  1, Save Ribbon, 1 x 1, Cell Block, Level 2 x 1, Save Ribbon, 2 x 1, Cell  Block, Level 3 x 1, PAX-8 x 1, Inhibin x 1  C: FNA-EBUS guided, Pancreas Mass       Pap-Cyto x 4, Diff Quick Stain-cyto x 4, Cell Block w/ H&E-Cyto x  1, Save Ribbon, 1 x 1, Cell Block, Level 2 x 1, Save Ribbon, 2 x 1, Cell  Block, Level 3 x 1,  x 1, PAX-8 x 1    ----------------------------------------------------------------    CYTOLOGIC INTERPRETATION:    A.  Adrenal gland lesion, left, endoscopic ultrasound-guided fine needle  aspiration:  - Positive for malignancy.  - Consistent with metastatic renal cell carcinoma  - See comment.  Specimen Adequacy: Satisfactory for evaluation.    B.  Lymph node, Station 5, endoscopic ultrasound-guided fine needle  aspiration:  - Positive for malignancy.  - Consistent with metastatic renal cell carcinoma  - See comment.  Specimen Adequacy: Satisfactory for evaluation.    C.  Pancreas mass, endoscopic ultrasound-guided fine needle aspiration:  - Positive for malignancy.  - Consistent with metastatic renal cell carcinoma  - See comment.  Specimen Adequacy: Satisfactory for evaluation.    COMMENT:  Immunohistochemical stains are performed on the cell block materials  from specimens A, B and C with appropriate control reactions. The PAX-8  immunostain highlights the tumor cells in specimens A, B and C. The  neoplastic cells are negative for calretinin in specimens A and B.  Overall the cytomorphologic features and immunohistochemical profile is  consistent with the patient's known metastatic renal cell carcinoma.    I have personally reviewed all specimens and/or slides, including the  listed special stains, and used them with my medical judgment to  determine the final diagnosis.    Electronically signed out by:  Gloria Gutierrez  M.D., [facility]    Processed and screened at [facility]    CLINICAL HISTORY:  The patient is a 51 year old female with a history of metastatic clear  cell renal cell carcinoma.    ,    GROSS:  A. FNA-EBUS guided, Left Adrenal Gland:  Received are 4 fixed slides,  processed for Pap stain, 4 air dried slides, processed for Diff Quik  stain, and material in formalin, processed for one hematoxylin stained  cell block.    B. FNA-EBUS guided, Lymph Node Station 5:  Received are 6 fixed slides,  processed for Pap stain, 5 air dried slides, processed for Diff Quik  stain, and material in formalin, processed for one hematoxylin stained  cell block.    C. FNA-EBUS guided, Pancreas Mass:  Received are 4 fixed slides,  processed for Pap stain, 4 air dried slides, processed for Diff Quik  stain, and material in formalin, processed for one hematoxylin stained  cell block.    INTRAOPERATIVE CONSULTATION:  FNA Performance: Fine needle aspiration was not performed by [facility],   Pathology staff.  Immediate Adequacy: On site specimen adequacy evaluation was performed  by Dr. ROC Gutierrez MD via telepathology.    Onsite adequacy/interpretation:  Pass A1 inadequate.  Pass A2 adequate.  Pass A3-A4 inadequate.  Pass B1 inadequate.  Pass B2 adequate.  Pass B3-B5 inadequate.  Pass B6  put directly into formalin.  Pass C1-C4 inadequate.    MICROSCOPIC:  Microscopic evaluation performed.    Gloria Gutierrez MD, Nicole Mcgowan MD, cytology fellow    CPT Codes:  A: 26105-IKFX-EYI, 55867-GFGS-NYH, 60393-NMZA, 97805-YCP, HCB,  91163-POX, 52949-WCC  B: 27518-DYBQ-JDW, 44898-ZNIV-MHW, 56185-CARY, 55366-TIZ, HCB,  62875-XMC, 74155-AVV  C: 71649-YDJZ-PKF, 00571-OEMB-OAH, 42991-WMZE, 56423-UXI, HCB,  19838-NPL, 73025-UNS    TESTING LAB LOCATION:  [facility], [address]   [address]  [phone]    COLLECTION  SITE:  Client:  [facility] [place]  Location:  [facility] ([place])            ASSESSMENT AND PLAN   1. Stage IV (pZ1M2L8) RCC clear cell s/p right radical nephrectomy  2. ECOG PS 0  3. Pulmonary nodules  4. No medical comorbidity    We reviewed a number of topics at this visit including his disease course, treatment options and prognosis.     I reviewed the pathology results with her - she clearly has metastatic disease. All of the biopsies from station 5 lymph node, pancreas and adrenals has revealed metastatic kidney cancer. With biopsy proven metastatic disease, her disease is incurable but remains treatable.     Most front line clinical trials with metastatic RCC had median survival around 2-3 years. The International Metastatic RCC Database Consortium derived the first prognostic model since the development of targeted treatment from a large multicentre cohort (Lancet Oncol. 2013 Feb;14(2):141-8). Six independent predictors of poor survival were identified: Karnofsky performance status of less than 80%, less than 1 year from diagnosis to treatment, anemia, hypercalcaemia, neutrophilia, and thrombocytosis. According to the number of poor prognostic factors, patients were segregated into favourable (no factors), intermediate (one or two factors), and poor (more than three factors) risk groups with median overall survival 43 2 months (95% CI 31 4-50 1) in the favourable risk group, 22 5 months (18 7-25 1) in the intermediate risk group and 7 8 months (6 5-9 7) in the poor risk group.     Recent Labs   Lab Test  05/11/17   1316  02/03/17   1109 01/18/17   HGB  12.9  13.0  12.2   PLT  324  405  569.0*   WBC  4.0  4.5  4.7   NEUTROPHIL   --   49.8   --    ANEU   --   2.3   --      Recent Labs   Lab Test  05/11/17   1316  02/03/17   1109 01/18/17   MUNDO  9.0  9.5  9.5   ALBUMIN   --   3.8  3.6     She seems to belong to the intermediate risk group but I would hope for much improved median survival  than the 22.5 months in above nomogram as she is young and has no medical comorbidities and 3 new therapies have been approved in last couple of years with survival advantage which were not available at the time of diagnosis.     I reviewed management of kidney cancer with her. She has heard that chemotherapy is not very effective for kidney cancer.      We reviewed immunotherapy in form of high dose IL2, PD1 inhibitors, targeted tyrosine kinase inhibitors like sunitinib/ pazopanib and clinical trials.     I reviewed high dose IL2 at great length. This modality involves inpatient treatment with intravenous high doses of interleukin - a chemical substance release in setting of infections. It is administered every 8 hrs for a maximum of 14 doses. After a break of 3-4 weeks, this treatment is repeated again. Restaging scans are done after another 4-6 weeks and entire treatment can be repeated once (2 more inpatient courses of therapy) in the setting of response and depending on how it was tolerated.     This gives fevers, chills, fatigue and feels like a sever case of flu. It also leads to peripheral accumulation of fluid called vascular leak. This can cause fluid accumulation in legs, lungs and other places and also drop blood pressure. In the past this was administered in the Intensive care Unit and was associated with significant mortality. We have been one of the bigger centers providing this treatment for long time and in our experience, we can safely administer this treatment without placing patients life at risk. In our experience it is not important to administer the last dose to get optimal response and response is not dependent on dose of this therapy.     I did clearly outline the low response rates in the order of 20-25% and the small but real possibility of long term disease control or possibly cure in about 6% of patients.     I extensively reviewed immunotherapy with a PD-L1 inhibitor - nivolumab. I  explained the concept of checkpoint inhibitor with a physiological role to sustain an effective immune response. PD-L1 is expressed on healthy, normal tissue so that the immune response is limited to infecting agents/dead tissue and does not extend over to the normal tissue. The tumor uses some of these checks and balances to its advantage and successfully evades the immune system. Using a monoclonal antibody to this PD-1 receptor, blocks this suppressive interaction of the tumor with our effector immune cells and permits anti-tumor immune response.     This elimination of a layer of protection obviously puts patient at risk for some autoimmunity which when it involves skin give rash and is called dermatitis, with gut it presents with diarrhea and is called colitis, and with lung it gives shortness of breath and is called pneumonitis. Similarly, depending on the affected tissue, we might have hepatitis, nephritis, thyroiditis, hypophysitis and so on.     There are numerous VEGF-TKI (vascular endothelial growth factor - tyrosine kinase inhibitor) which have been approved within the last decade and have shown both efficacy in terms of responses with shrinkage of tumor, long term disease control and improvement in survival. However none of these agents have led to cure even in selected patients. The optimal sequencing of these agents is not established as yet, but sunitinib and pazopanib are approved and most commonly used as a first line agents. I reviewed the expected toxicities from sunitinib and pazopanib. Common toxicities that are correlated to its efficacy include - HTN, fatigue, hypothyroidism and to some extent diarrhea. Of these side effects, fatigue is the biggest complain of most patients with sunitinib and diarrhea with pazopanib. Besides thishand foot skin reaction, diarrhea, stomatitis,alopecia, hair discoloration, skin rash,dysphonia, weight loss, hyopongadism, transaminitis, edema and renal dysfunction  can be seen with this medication.     In my opinion, for patients who are fit and healthy, it is worthwhile trying immunotherapy with high dose IL2 for there is a slim but realistic chance for durable response (cure). If high dose IL2 therapy is under consideration, sooner is better for most patients as the disease burden is low early on in the disease and immune system is well preserved. Brief period of treatment with IL2 still leaves the all other options available if needed. Durable responses though not frequent are certainly worth trying.     She has two teenage children and had question on how to disclose the information with them.     Plan:  - I will get staging bone scan and brain MRI  - I will get echocardiogram done  - I will have her followed in Medical Oncology to review findings prior to start of planned therapy with high dose IL2.     Over 120 min of direct face to face time spent with patient with more than 50% time spent in counseling and coordinating care.        Henri Green  ,  Division of Hematology, Oncology & Transplantation  [facility].

## 2017-05-26 NOTE — PATIENT INSTRUCTIONS
- Coordinate Immunotherapy with high dose IL2: pre IL-2 teaching completed per Ryland,RN    - Brain MRI, echocardiogram and bone scan at the earliest  Thursday June 1, 2017 [facility]:  8:15 Nuc Med Injection  8:30 am  Echo  9:30 MRI  11:15 Nuc Med Scan      - To see Ms. Day Ren at [facility] prior to admission once above tests are done Scheduled  Aline VENTURA 0930 on 6/6/17 with labs and PICC plmt prior to admission.  Bed Plmt called per gato song, rn Cycle #1 IL-2      AVS given to patient

## 2017-05-26 NOTE — NURSING NOTE
"Oncology Rooming Note    May 26, 2017 9:27 AM   Suzi Gonsales is a 51 year old female who presents for:    Chief Complaint   Patient presents with     Oncology Clinic Visit     kidney ca     Initial Vitals: /64 (BP Location: Right arm, Cuff Size: Adult Regular)  Pulse 60  Temp 97.9  F (36.6  C) (Tympanic)  Resp 18  Wt 56.3 kg (124 lb 1.6 oz)  LMP  (Within Months)  SpO2 99%  BMI 23.07 kg/m2 Estimated body mass index is 23.07 kg/(m^2) as calculated from the following:    Height as of 5/23/17: 1.562 m (5' 1.5\").    Weight as of this encounter: 56.3 kg (124 lb 1.6 oz). Body surface area is 1.56 meters squared.  No Pain (0) Comment: Data Unavailable   No LMP recorded (within months). Patient is not currently having periods (Reason: Premenopausal).  DISCHARGE PLAN:  Next appointments: See patient instruction section  Departure Mode: Ambulatory  Accompanied by:  and friend  0 minutes for nursing discharge (face to face time)   Tran Grayson CMA                "

## 2017-05-30 ASSESSMENT — ENCOUNTER SYMPTOMS
DYSPNEA ON EXERTION: 1
NIGHT SWEATS: 0
HALLUCINATIONS: 0
SPUTUM PRODUCTION: 1
DIARRHEA: 1
POLYDIPSIA: 0
FEVER: 0
BOWEL INCONTINENCE: 0
HOARSE VOICE: 0
SMELL DISTURBANCE: 0
BLOOD IN STOOL: 0
SNORES LOUDLY: 0
NECK MASS: 0
WEIGHT LOSS: 0
CONSTIPATION: 0
FATIGUE: 1
SHORTNESS OF BREATH: 1
HEARTBURN: 1
POSTURAL DYSPNEA: 0
RECTAL PAIN: 0
BLOATING: 0
TROUBLE SWALLOWING: 0
RECTAL BLEEDING: 0
TASTE DISTURBANCE: 0
INCREASED ENERGY: 1
WEIGHT GAIN: 0
HEMOPTYSIS: 0
CHILLS: 0
VOMITING: 0
WHEEZING: 0
DECREASED APPETITE: 0
JAUNDICE: 0
SORE THROAT: 0
NAUSEA: 1
ABDOMINAL PAIN: 0
COUGH DISTURBING SLEEP: 1
COUGH: 1
RESPIRATORY PAIN: 0
POLYPHAGIA: 0
SINUS CONGESTION: 1
ALTERED TEMPERATURE REGULATION: 0
SINUS PAIN: 0

## 2017-05-31 ENCOUNTER — TELEPHONE (OUTPATIENT)
Dept: ONCOLOGY | Facility: CLINIC | Age: 52
End: 2017-05-31

## 2017-05-31 DIAGNOSIS — C64.1 MALIGNANT NEOPLASM OF RIGHT KIDNEY (H): Primary | ICD-10-CM

## 2017-05-31 NOTE — TELEPHONE ENCOUNTER
Called and left detailed message for patient that she did have a planned admission scheduled for 6/6/17 following her clinic visit with TABITHA Lira.  She will have labs, PA appt + PICC line plcmt + admission.  If patient has any questions, she is to contact writer .  Shravan Wiseman RN, BSN, OCN  [facility]  Patient Care Coordinator

## 2017-06-01 ENCOUNTER — HOSPITAL ENCOUNTER (OUTPATIENT)
Dept: NUCLEAR MEDICINE | Facility: CLINIC | Age: 52
Setting detail: NUCLEAR MEDICINE
End: 2017-06-01
Attending: INTERNAL MEDICINE
Payer: COMMERCIAL

## 2017-06-01 ENCOUNTER — HOSPITAL ENCOUNTER (OUTPATIENT)
Dept: CARDIOLOGY | Facility: CLINIC | Age: 52
Discharge: HOME | End: 2017-06-01
Attending: INTERNAL MEDICINE | Admitting: INTERNAL MEDICINE
Payer: COMMERCIAL

## 2017-06-01 ENCOUNTER — HOSPITAL ENCOUNTER (OUTPATIENT)
Dept: MRI IMAGING | Facility: CLINIC | Age: 52
End: 2017-06-01
Attending: INTERNAL MEDICINE
Payer: COMMERCIAL

## 2017-06-01 DIAGNOSIS — C64.1 MALIGNANT NEOPLASM OF RIGHT KIDNEY (H): ICD-10-CM

## 2017-06-01 PROCEDURE — 78306 BONE IMAGING WHOLE BODY: CPT

## 2017-06-01 PROCEDURE — 34300033 ZZH RX 343: Performed by: INTERNAL MEDICINE

## 2017-06-01 PROCEDURE — 93306 TTE W/DOPPLER COMPLETE: CPT | Mod: 26 | Performed by: INTERNAL MEDICINE

## 2017-06-01 PROCEDURE — A9503 TC99M MEDRONATE: HCPCS | Performed by: INTERNAL MEDICINE

## 2017-06-01 RX ORDER — TC 99M MEDRONATE 20 MG/10ML
25 INJECTION, POWDER, LYOPHILIZED, FOR SOLUTION INTRAVENOUS ONCE
Status: COMPLETED | OUTPATIENT
Start: 2017-06-01 | End: 2017-06-01

## 2017-06-01 RX ORDER — GADOBUTROL 604.72 MG/ML
7.5 INJECTION INTRAVENOUS ONCE
Status: COMPLETED | OUTPATIENT
Start: 2017-06-01 | End: 2017-06-01

## 2017-06-01 RX ADMIN — HUMAN ALBUMIN MICROSPHERES AND PERFLUTREN 3 ML: 10; .22 INJECTION, SOLUTION INTRAVENOUS at 09:45

## 2017-06-01 RX ADMIN — GADOBUTROL 6 ML: 604.72 INJECTION INTRAVENOUS at 10:04

## 2017-06-01 RX ADMIN — TC 99M MEDRONATE 25.6 MCI.: 20 INJECTION, POWDER, LYOPHILIZED, FOR SOLUTION INTRAVENOUS at 08:51

## 2017-06-02 NOTE — NURSING NOTE
LATE ENTRY:  IL-2 immunotherapy teach completed:  Patient given information on IL-2 POC and tests required prior to start of treatment.  Patient will be having a Brain MRI and Bone Scan on 6/1/17.  She has completed her CT C/A/P on 5/11/17 and her Echo is scheduled for 6/1/17 as well.  Patient is aware that writer will make arrangements for admission for first treatment on 6/6/17 and contact her once scheduling is complete.  Please see teach under Education tab.  Shravan Wiseman, RN, BSN, OCN  [facility]  Patient Care Coordinator

## 2017-06-05 ENCOUNTER — TELEPHONE (OUTPATIENT)
Dept: ONCOLOGY | Facility: CLINIC | Age: 52
End: 2017-06-05

## 2017-06-05 NOTE — TELEPHONE ENCOUNTER
Pt's , Tunde, called today and left a voicemail.   He stated that he accidentally deleted the voicemail with directions/instructions that were left for Suzi on Friday.  He would like a call back to go over directions/instructions sometime today.

## 2017-06-05 NOTE — TELEPHONE ENCOUNTER
Returned patient's call ~ her first cycle of IL-2 tomorrow and her appt with PA at [facility].  Confirmed time of appt, address of clinic, where to park, check in for the hospital, and  Flow of treatment day.  Patient is aware that her [facility] paperwork is completed and will be faxed to [phone].  Copy made and orig sent for scanning.  Will check on patient next week for status and arrange for next cycle IL-2 admission. Patient verbalized understanding of all instructions.  Shravan Wiseman, RN, BSN, OCN  [facility]  Patient Care Coordinator

## 2017-06-06 ENCOUNTER — HOSPITAL ENCOUNTER (INPATIENT)
Dept: VASCULAR ULTRASOUND | Facility: CLINIC | Age: 52
DRG: 838 | End: 2017-06-06
Attending: PHYSICIAN ASSISTANT | Admitting: INTERNAL MEDICINE
Payer: COMMERCIAL

## 2017-06-06 ENCOUNTER — HOSPITAL ENCOUNTER (INPATIENT)
Facility: CLINIC | Age: 52
LOS: 4 days | Discharge: HOME | DRG: 838 | End: 2017-06-10
Attending: INTERNAL MEDICINE | Admitting: INTERNAL MEDICINE
Payer: COMMERCIAL

## 2017-06-06 ENCOUNTER — ONCOLOGY VISIT (OUTPATIENT)
Dept: ONCOLOGY | Facility: CLINIC | Age: 52
DRG: 838 | End: 2017-06-06
Attending: PHYSICIAN ASSISTANT
Payer: COMMERCIAL

## 2017-06-06 VITALS
SYSTOLIC BLOOD PRESSURE: 93 MMHG | HEIGHT: 62 IN | WEIGHT: 125.4 LBS | OXYGEN SATURATION: 95 % | DIASTOLIC BLOOD PRESSURE: 61 MMHG | RESPIRATION RATE: 16 BRPM | TEMPERATURE: 98.3 F | HEART RATE: 71 BPM | BODY MASS INDEX: 23.08 KG/M2

## 2017-06-06 DIAGNOSIS — C64.1 MALIGNANT NEOPLASM OF RIGHT KIDNEY (H): Primary | ICD-10-CM

## 2017-06-06 DIAGNOSIS — C64.1 MALIGNANT NEOPLASM OF RIGHT KIDNEY (H): ICD-10-CM

## 2017-06-06 PROBLEM — C64.9 METASTATIC RENAL CELL CARCINOMA (H): Status: ACTIVE | Noted: 2017-06-06

## 2017-06-06 LAB
ALBUMIN SERPL-MCNC: 3.6 G/DL (ref 3.4–5)
ALP SERPL-CCNC: 72 U/L (ref 40–150)
ALT SERPL W P-5'-P-CCNC: 25 U/L (ref 0–50)
ANION GAP SERPL CALCULATED.3IONS-SCNC: 8 MMOL/L (ref 3–14)
AST SERPL W P-5'-P-CCNC: 26 U/L (ref 0–45)
BASOPHILS # BLD AUTO: 0 10E9/L (ref 0–0.2)
BASOPHILS NFR BLD AUTO: 0.7 %
BILIRUB SERPL-MCNC: 0.4 MG/DL (ref 0.2–1.3)
BUN SERPL-MCNC: 12 MG/DL (ref 7–30)
CALCIUM SERPL-MCNC: 9.3 MG/DL (ref 8.5–10.1)
CHLORIDE SERPL-SCNC: 105 MMOL/L (ref 94–109)
CO2 SERPL-SCNC: 26 MMOL/L (ref 20–32)
CREAT SERPL-MCNC: 0.97 MG/DL (ref 0.52–1.04)
DIFFERENTIAL METHOD BLD: NORMAL
EOSINOPHIL # BLD AUTO: 0.1 10E9/L (ref 0–0.7)
EOSINOPHIL NFR BLD AUTO: 3.3 %
ERYTHROCYTE [DISTWIDTH] IN BLOOD BY AUTOMATED COUNT: 13.4 % (ref 10–15)
GFR SERPL CREATININE-BSD FRML MDRD: 60 ML/MIN/1.7M2
GLUCOSE SERPL-MCNC: 91 MG/DL (ref 70–99)
HCT VFR BLD AUTO: 36.7 % (ref 35–47)
HGB BLD-MCNC: 12 G/DL (ref 11.7–15.7)
IMM GRANULOCYTES # BLD: 0 10E9/L (ref 0–0.4)
IMM GRANULOCYTES NFR BLD: 0.2 %
LYMPHOCYTES # BLD AUTO: 1.2 10E9/L (ref 0.8–5.3)
LYMPHOCYTES NFR BLD AUTO: 27.4 %
MAGNESIUM SERPL-MCNC: 2.3 MG/DL (ref 1.6–2.3)
MCH RBC QN AUTO: 30.6 PG (ref 26.5–33)
MCHC RBC AUTO-ENTMCNC: 32.7 G/DL (ref 31.5–36.5)
MCV RBC AUTO: 94 FL (ref 78–100)
MONOCYTES # BLD AUTO: 0.4 10E9/L (ref 0–1.3)
MONOCYTES NFR BLD AUTO: 9.5 %
NEUTROPHILS # BLD AUTO: 2.5 10E9/L (ref 1.6–8.3)
NEUTROPHILS NFR BLD AUTO: 58.9 %
NRBC # BLD AUTO: 0 10*3/UL
NRBC BLD AUTO-RTO: 0 /100
PHOSPHATE SERPL-MCNC: 3.2 MG/DL (ref 2.5–4.5)
PLATELET # BLD AUTO: 355 10E9/L (ref 150–450)
POTASSIUM SERPL-SCNC: 4 MMOL/L (ref 3.4–5.3)
PROT SERPL-MCNC: 7.6 G/DL (ref 6.8–8.8)
RBC # BLD AUTO: 3.92 10E12/L (ref 3.8–5.2)
SODIUM SERPL-SCNC: 139 MMOL/L (ref 133–144)
WBC # BLD AUTO: 4.2 10E9/L (ref 4–11)

## 2017-06-06 PROCEDURE — 36569 INSJ PICC 5 YR+ W/O IMAGING: CPT

## 2017-06-06 PROCEDURE — 80053 COMPREHEN METABOLIC PANEL: CPT | Performed by: PHYSICIAN ASSISTANT

## 2017-06-06 PROCEDURE — 25000125 ZZHC RX 250: Performed by: PHYSICIAN ASSISTANT

## 2017-06-06 PROCEDURE — 99212 OFFICE O/P EST SF 10 MIN: CPT | Mod: 25

## 2017-06-06 PROCEDURE — 99207 ZZC CHGS TRANSFERRED TO HOSPITAL: CPT | Mod: ZP | Performed by: PHYSICIAN ASSISTANT

## 2017-06-06 PROCEDURE — 84100 ASSAY OF PHOSPHORUS: CPT | Performed by: PHYSICIAN ASSISTANT

## 2017-06-06 PROCEDURE — 85025 COMPLETE CBC W/AUTO DIFF WBC: CPT | Performed by: PHYSICIAN ASSISTANT

## 2017-06-06 PROCEDURE — 25000128 H RX IP 250 OP 636: Performed by: INTERNAL MEDICINE

## 2017-06-06 PROCEDURE — 25000132 ZZH RX MED GY IP 250 OP 250 PS 637: Performed by: INTERNAL MEDICINE

## 2017-06-06 PROCEDURE — 83735 ASSAY OF MAGNESIUM: CPT | Performed by: PHYSICIAN ASSISTANT

## 2017-06-06 PROCEDURE — 27210577 ZZ H INTRODUCER MICRO SET

## 2017-06-06 PROCEDURE — 27210195 ZZH KIT POWER PICC DOUBLE LUMEN

## 2017-06-06 PROCEDURE — 36415 COLL VENOUS BLD VENIPUNCTURE: CPT | Performed by: PHYSICIAN ASSISTANT

## 2017-06-06 PROCEDURE — 25800025 ZZH RX 258: Performed by: INTERNAL MEDICINE

## 2017-06-06 PROCEDURE — 12000008 ZZH R&B INTERMEDIATE UMMC

## 2017-06-06 PROCEDURE — 99223 1ST HOSP IP/OBS HIGH 75: CPT | Mod: AI | Performed by: INTERNAL MEDICINE

## 2017-06-06 PROCEDURE — 25000132 ZZH RX MED GY IP 250 OP 250 PS 637: Performed by: PHYSICIAN ASSISTANT

## 2017-06-06 PROCEDURE — 3E04302 INTRODUCTION OF HIGH-DOSE INTERLEUKIN-2 INTO CENTRAL VEIN, PERCUTANEOUS APPROACH: ICD-10-PCS | Performed by: INTERNAL MEDICINE

## 2017-06-06 RX ORDER — LORAZEPAM 0.5 MG/1
.5-1 TABLET ORAL EVERY 6 HOURS PRN
Status: CANCELLED
Start: 2017-06-06

## 2017-06-06 RX ORDER — ALBUTEROL SULFATE 0.83 MG/ML
2.5 SOLUTION RESPIRATORY (INHALATION)
Status: CANCELLED | OUTPATIENT
Start: 2017-06-06

## 2017-06-06 RX ORDER — DIPHENOXYLATE HCL/ATROPINE 2.5-.025MG
1 TABLET ORAL
Status: DISCONTINUED | OUTPATIENT
Start: 2017-06-06 | End: 2017-06-10 | Stop reason: HOSPADM

## 2017-06-06 RX ORDER — HEPARIN SODIUM,PORCINE 10 UNIT/ML
2-5 VIAL (ML) INTRAVENOUS
Status: DISCONTINUED | OUTPATIENT
Start: 2017-06-06 | End: 2017-06-10 | Stop reason: HOSPADM

## 2017-06-06 RX ORDER — DEXTROSE MONOHYDRATE 50 MG/ML
1000 INJECTION, SOLUTION INTRAVENOUS CONTINUOUS
Status: CANCELLED
Start: 2017-06-06

## 2017-06-06 RX ORDER — LIDOCAINE 40 MG/G
CREAM TOPICAL
Status: DISCONTINUED | OUTPATIENT
Start: 2017-06-06 | End: 2017-06-10 | Stop reason: HOSPADM

## 2017-06-06 RX ORDER — ONDANSETRON 2 MG/ML
8 INJECTION INTRAMUSCULAR; INTRAVENOUS EVERY 8 HOURS
Status: CANCELLED
Start: 2017-06-06

## 2017-06-06 RX ORDER — DEXTROSE MONOHYDRATE, SODIUM CHLORIDE, AND POTASSIUM CHLORIDE 50; 1.49; 9 G/1000ML; G/1000ML; G/1000ML
INJECTION, SOLUTION INTRAVENOUS CONTINUOUS
Status: DISCONTINUED | OUTPATIENT
Start: 2017-06-06 | End: 2017-06-10 | Stop reason: HOSPADM

## 2017-06-06 RX ORDER — LORAZEPAM 2 MG/ML
.5-1 INJECTION INTRAMUSCULAR EVERY 6 HOURS PRN
Status: CANCELLED | OUTPATIENT
Start: 2017-06-06

## 2017-06-06 RX ORDER — ACETAMINOPHEN 325 MG/1
650 TABLET ORAL EVERY 4 HOURS
Status: DISCONTINUED | OUTPATIENT
Start: 2017-06-06 | End: 2017-06-10 | Stop reason: HOSPADM

## 2017-06-06 RX ORDER — NALOXONE HYDROCHLORIDE 0.4 MG/ML
.1-.4 INJECTION, SOLUTION INTRAMUSCULAR; INTRAVENOUS; SUBCUTANEOUS
Status: DISCONTINUED | OUTPATIENT
Start: 2017-06-06 | End: 2017-06-10 | Stop reason: HOSPADM

## 2017-06-06 RX ORDER — METHYLPREDNISOLONE SODIUM SUCCINATE 125 MG/2ML
125 INJECTION, POWDER, LYOPHILIZED, FOR SOLUTION INTRAMUSCULAR; INTRAVENOUS
Status: CANCELLED
Start: 2017-06-06

## 2017-06-06 RX ORDER — CEPHALEXIN 500 MG/1
500 CAPSULE ORAL 2 TIMES DAILY
Status: DISCONTINUED | OUTPATIENT
Start: 2017-06-06 | End: 2017-06-10 | Stop reason: HOSPADM

## 2017-06-06 RX ORDER — LORAZEPAM 0.5 MG/1
.5-1 TABLET ORAL EVERY 6 HOURS PRN
Status: DISCONTINUED | OUTPATIENT
Start: 2017-06-06 | End: 2017-06-06

## 2017-06-06 RX ORDER — NAPROXEN 250 MG/1
250 TABLET ORAL 2 TIMES DAILY PRN
Status: CANCELLED
Start: 2017-06-06

## 2017-06-06 RX ORDER — DIPHENHYDRAMINE HCL 25 MG
25 CAPSULE ORAL EVERY 4 HOURS PRN
Status: CANCELLED
Start: 2017-06-06

## 2017-06-06 RX ORDER — ACETAMINOPHEN 325 MG/1
650 TABLET ORAL EVERY 4 HOURS
Status: CANCELLED
Start: 2017-06-06

## 2017-06-06 RX ORDER — DIPHENOXYLATE HCL/ATROPINE 2.5-.025MG
1 TABLET ORAL
Status: CANCELLED
Start: 2017-06-06

## 2017-06-06 RX ORDER — DEXTROSE MONOHYDRATE, SODIUM CHLORIDE, AND POTASSIUM CHLORIDE 50; 1.49; 9 G/1000ML; G/1000ML; G/1000ML
INJECTION, SOLUTION INTRAVENOUS CONTINUOUS
Status: CANCELLED
Start: 2017-06-06

## 2017-06-06 RX ORDER — DIPHENHYDRAMINE HYDROCHLORIDE 50 MG/ML
50 INJECTION INTRAMUSCULAR; INTRAVENOUS
Status: DISCONTINUED | OUTPATIENT
Start: 2017-06-06 | End: 2017-06-10 | Stop reason: HOSPADM

## 2017-06-06 RX ORDER — ALBUTEROL SULFATE 0.83 MG/ML
2.5 SOLUTION RESPIRATORY (INHALATION)
Status: DISCONTINUED | OUTPATIENT
Start: 2017-06-06 | End: 2017-06-09

## 2017-06-06 RX ORDER — MEPERIDINE HYDROCHLORIDE 25 MG/ML
25 INJECTION INTRAMUSCULAR; INTRAVENOUS; SUBCUTANEOUS
Status: CANCELLED
Start: 2017-06-20

## 2017-06-06 RX ORDER — MEPERIDINE HYDROCHLORIDE 25 MG/ML
25 INJECTION INTRAMUSCULAR; INTRAVENOUS; SUBCUTANEOUS
Status: CANCELLED
Start: 2017-06-06

## 2017-06-06 RX ORDER — PROCHLORPERAZINE MALEATE 5 MG
10 TABLET ORAL EVERY 6 HOURS PRN
Status: CANCELLED
Start: 2017-06-20

## 2017-06-06 RX ORDER — ALBUTEROL SULFATE 90 UG/1
1-2 AEROSOL, METERED RESPIRATORY (INHALATION)
Status: CANCELLED
Start: 2017-06-20

## 2017-06-06 RX ORDER — METHYLPREDNISOLONE SODIUM SUCCINATE 125 MG/2ML
125 INJECTION, POWDER, LYOPHILIZED, FOR SOLUTION INTRAMUSCULAR; INTRAVENOUS
Status: CANCELLED
Start: 2017-06-20

## 2017-06-06 RX ORDER — DEXTROSE MONOHYDRATE 50 MG/ML
1000 INJECTION, SOLUTION INTRAVENOUS CONTINUOUS
Status: DISCONTINUED | OUTPATIENT
Start: 2017-06-06 | End: 2017-06-10 | Stop reason: HOSPADM

## 2017-06-06 RX ORDER — METHYLPREDNISOLONE SODIUM SUCCINATE 125 MG/2ML
125 INJECTION, POWDER, LYOPHILIZED, FOR SOLUTION INTRAMUSCULAR; INTRAVENOUS
Status: DISCONTINUED | OUTPATIENT
Start: 2017-06-06 | End: 2017-06-09

## 2017-06-06 RX ORDER — PROCHLORPERAZINE MALEATE 5 MG
10 TABLET ORAL EVERY 6 HOURS PRN
Status: CANCELLED
Start: 2017-06-06

## 2017-06-06 RX ORDER — ZOLPIDEM TARTRATE 5 MG/1
5 TABLET ORAL
Status: DISCONTINUED | OUTPATIENT
Start: 2017-06-06 | End: 2017-06-10 | Stop reason: HOSPADM

## 2017-06-06 RX ORDER — SODIUM CHLORIDE 9 MG/ML
1000 INJECTION, SOLUTION INTRAVENOUS CONTINUOUS PRN
Status: CANCELLED
Start: 2017-06-20

## 2017-06-06 RX ORDER — CITALOPRAM HYDROBROMIDE 20 MG/1
20 TABLET ORAL EVERY EVENING
Status: DISCONTINUED | OUTPATIENT
Start: 2017-06-06 | End: 2017-06-10 | Stop reason: HOSPADM

## 2017-06-06 RX ORDER — DIPHENOXYLATE HCL/ATROPINE 2.5-.025MG
1 TABLET ORAL
Status: CANCELLED
Start: 2017-06-20

## 2017-06-06 RX ORDER — EPINEPHRINE 1 MG/ML
0.3 INJECTION INTRAMUSCULAR; INTRAVENOUS; SUBCUTANEOUS EVERY 5 MIN PRN
Status: CANCELLED | OUTPATIENT
Start: 2017-06-20

## 2017-06-06 RX ORDER — MEPERIDINE HYDROCHLORIDE 25 MG/ML
25 INJECTION INTRAMUSCULAR; INTRAVENOUS; SUBCUTANEOUS EVERY 30 MIN PRN
Status: DISCONTINUED | OUTPATIENT
Start: 2017-06-06 | End: 2017-06-10 | Stop reason: HOSPADM

## 2017-06-06 RX ORDER — LORAZEPAM 0.5 MG/1
.5-1 TABLET ORAL EVERY 6 HOURS PRN
Status: DISCONTINUED | OUTPATIENT
Start: 2017-06-06 | End: 2017-06-10 | Stop reason: HOSPADM

## 2017-06-06 RX ORDER — DIPHENHYDRAMINE HYDROCHLORIDE 50 MG/ML
50 INJECTION INTRAMUSCULAR; INTRAVENOUS
Status: CANCELLED
Start: 2017-06-06

## 2017-06-06 RX ORDER — DEXTROSE MONOHYDRATE, SODIUM CHLORIDE, AND POTASSIUM CHLORIDE 50; 1.49; 9 G/1000ML; G/1000ML; G/1000ML
INJECTION, SOLUTION INTRAVENOUS CONTINUOUS
Status: CANCELLED
Start: 2017-06-20

## 2017-06-06 RX ORDER — SODIUM CHLORIDE 9 MG/ML
1000 INJECTION, SOLUTION INTRAVENOUS CONTINUOUS PRN
Status: CANCELLED
Start: 2017-06-06

## 2017-06-06 RX ORDER — NAPROXEN 250 MG/1
250 TABLET ORAL 2 TIMES DAILY PRN
Status: CANCELLED
Start: 2017-06-20

## 2017-06-06 RX ORDER — ALBUTEROL SULFATE 90 UG/1
1-2 AEROSOL, METERED RESPIRATORY (INHALATION)
Status: DISCONTINUED | OUTPATIENT
Start: 2017-06-06 | End: 2017-06-10 | Stop reason: HOSPADM

## 2017-06-06 RX ORDER — SODIUM CHLORIDE 9 MG/ML
1000 INJECTION, SOLUTION INTRAVENOUS CONTINUOUS PRN
Status: DISCONTINUED | OUTPATIENT
Start: 2017-06-06 | End: 2017-06-10 | Stop reason: HOSPADM

## 2017-06-06 RX ORDER — NAPROXEN 250 MG/1
250 TABLET ORAL 2 TIMES DAILY PRN
Status: DISCONTINUED | OUTPATIENT
Start: 2017-06-06 | End: 2017-06-10 | Stop reason: HOSPADM

## 2017-06-06 RX ORDER — ALBUTEROL SULFATE 90 UG/1
1-2 AEROSOL, METERED RESPIRATORY (INHALATION)
Status: CANCELLED
Start: 2017-06-06

## 2017-06-06 RX ORDER — BUPROPION HYDROCHLORIDE 300 MG/1
300 TABLET ORAL EVERY MORNING
Status: DISCONTINUED | OUTPATIENT
Start: 2017-06-07 | End: 2017-06-10 | Stop reason: HOSPADM

## 2017-06-06 RX ORDER — EPINEPHRINE 1 MG/ML
0.3 INJECTION INTRAMUSCULAR; INTRAVENOUS; SUBCUTANEOUS EVERY 5 MIN PRN
Status: CANCELLED | OUTPATIENT
Start: 2017-06-06

## 2017-06-06 RX ORDER — DEXTROSE MONOHYDRATE 50 MG/ML
1000 INJECTION, SOLUTION INTRAVENOUS CONTINUOUS
Status: CANCELLED
Start: 2017-06-20

## 2017-06-06 RX ORDER — ONDANSETRON 2 MG/ML
8 INJECTION INTRAMUSCULAR; INTRAVENOUS EVERY 8 HOURS
Status: CANCELLED
Start: 2017-06-20

## 2017-06-06 RX ORDER — MEPERIDINE HYDROCHLORIDE 25 MG/ML
25 INJECTION INTRAMUSCULAR; INTRAVENOUS; SUBCUTANEOUS EVERY 30 MIN PRN
Status: CANCELLED | OUTPATIENT
Start: 2017-06-20

## 2017-06-06 RX ORDER — LORAZEPAM 0.5 MG/1
.5-1 TABLET ORAL EVERY 6 HOURS PRN
Status: CANCELLED
Start: 2017-06-20

## 2017-06-06 RX ORDER — MEPERIDINE HYDROCHLORIDE 25 MG/ML
25 INJECTION INTRAMUSCULAR; INTRAVENOUS; SUBCUTANEOUS EVERY 30 MIN PRN
Status: CANCELLED | OUTPATIENT
Start: 2017-06-06

## 2017-06-06 RX ORDER — LORAZEPAM 2 MG/ML
.5-1 INJECTION INTRAMUSCULAR EVERY 6 HOURS PRN
Status: DISCONTINUED | OUTPATIENT
Start: 2017-06-06 | End: 2017-06-10 | Stop reason: HOSPADM

## 2017-06-06 RX ORDER — DIPHENHYDRAMINE HCL 25 MG
25 CAPSULE ORAL EVERY 4 HOURS PRN
Status: DISCONTINUED | OUTPATIENT
Start: 2017-06-06 | End: 2017-06-10 | Stop reason: HOSPADM

## 2017-06-06 RX ORDER — ACETAMINOPHEN 325 MG/1
650 TABLET ORAL EVERY 4 HOURS
Status: CANCELLED
Start: 2017-06-20

## 2017-06-06 RX ORDER — DIPHENHYDRAMINE HCL 25 MG
25 CAPSULE ORAL EVERY 4 HOURS PRN
Status: CANCELLED
Start: 2017-06-20

## 2017-06-06 RX ORDER — ONDANSETRON 2 MG/ML
8 INJECTION INTRAMUSCULAR; INTRAVENOUS EVERY 8 HOURS
Status: DISCONTINUED | OUTPATIENT
Start: 2017-06-06 | End: 2017-06-10 | Stop reason: HOSPADM

## 2017-06-06 RX ORDER — PROCHLORPERAZINE MALEATE 10 MG
10 TABLET ORAL EVERY 6 HOURS PRN
Status: DISCONTINUED | OUTPATIENT
Start: 2017-06-06 | End: 2017-06-10 | Stop reason: HOSPADM

## 2017-06-06 RX ORDER — LORAZEPAM 2 MG/ML
.5-1 INJECTION INTRAMUSCULAR EVERY 6 HOURS PRN
Status: CANCELLED | OUTPATIENT
Start: 2017-06-20

## 2017-06-06 RX ORDER — DIPHENHYDRAMINE HYDROCHLORIDE 50 MG/ML
50 INJECTION INTRAMUSCULAR; INTRAVENOUS
Status: CANCELLED
Start: 2017-06-20

## 2017-06-06 RX ORDER — MEPERIDINE HYDROCHLORIDE 25 MG/ML
25 INJECTION INTRAMUSCULAR; INTRAVENOUS; SUBCUTANEOUS
Status: DISCONTINUED | OUTPATIENT
Start: 2017-06-06 | End: 2017-06-10 | Stop reason: HOSPADM

## 2017-06-06 RX ORDER — ALBUTEROL SULFATE 0.83 MG/ML
2.5 SOLUTION RESPIRATORY (INHALATION)
Status: CANCELLED | OUTPATIENT
Start: 2017-06-20

## 2017-06-06 RX ADMIN — RANITIDINE HYDROCHLORIDE 150 MG: 150 TABLET, FILM COATED ORAL at 15:22

## 2017-06-06 RX ADMIN — DEXTROSE MONOHYDRATE 1000 ML: 50 INJECTION, SOLUTION INTRAVENOUS at 17:34

## 2017-06-06 RX ADMIN — ACETAMINOPHEN 650 MG: 325 TABLET, FILM COATED ORAL at 16:47

## 2017-06-06 RX ADMIN — POTASSIUM CHLORIDE, DEXTROSE MONOHYDRATE AND SODIUM CHLORIDE: 150; 5; 900 INJECTION, SOLUTION INTRAVENOUS at 17:34

## 2017-06-06 RX ADMIN — LIDOCAINE HYDROCHLORIDE 5 ML: 10 INJECTION, SOLUTION INFILTRATION; PERINEURAL at 14:53

## 2017-06-06 RX ADMIN — ZOLPIDEM TARTRATE 5 MG: 5 TABLET, FILM COATED ORAL at 21:06

## 2017-06-06 RX ADMIN — LORAZEPAM 1 MG: 0.5 TABLET ORAL at 17:34

## 2017-06-06 RX ADMIN — ONDANSETRON 8 MG: 2 INJECTION INTRAMUSCULAR; INTRAVENOUS at 16:47

## 2017-06-06 RX ADMIN — CITALOPRAM HYDROBROMIDE 20 MG: 20 TABLET ORAL at 20:11

## 2017-06-06 RX ADMIN — CEPHALEXIN 500 MG: 500 CAPSULE ORAL at 15:22

## 2017-06-06 RX ADMIN — RANITIDINE HYDROCHLORIDE 150 MG: 150 TABLET, FILM COATED ORAL at 21:06

## 2017-06-06 RX ADMIN — CEPHALEXIN 500 MG: 500 CAPSULE ORAL at 21:06

## 2017-06-06 RX ADMIN — ALDESLEUKIN 34 MILLION UNITS: 1.1 INJECTION, POWDER, LYOPHILIZED, FOR SOLUTION INTRAVENOUS at 17:35

## 2017-06-06 RX ADMIN — ACETAMINOPHEN 650 MG: 325 TABLET, FILM COATED ORAL at 20:11

## 2017-06-06 ASSESSMENT — PAIN SCALES - GENERAL: PAINLEVEL: NO PAIN (0)

## 2017-06-06 NOTE — IP AVS SNAPSHOT
[address] [address]    Phone:  [phone]                                       After Visit Summary   6/6/2017    Suzi Gonsales    MRN: [id number]           After Visit Summary Signature Page     I have received my discharge instructions, and my questions have been answered. I have discussed any challenges I see with this plan with the nurse or doctor.    ..........................................................................................................................................  Patient/Patient Representative Signature      ..........................................................................................................................................  Patient Representative Print Name and Relationship to Patient    ..................................................               ................................................  Date                                            Time    ..........................................................................................................................................  Reviewed by Signature/Title    ...................................................              ..............................................  Date                                                            Time

## 2017-06-06 NOTE — LETTER
"6/6/2017       RE: Suzi Gonsales  [address] [place] [address]     Dear Colleague,    Thank you for referring your patient, Suzi Gonsales, to the [facility]. Please see a copy of my visit note below.    [facility]  FOLLOW-UP VISIT NOTE  Date of visit: 6-6-17      REASON FOR VISIT: mRCC pre-visit for IL-2    HPI: Suzi presented to ED with hematuria and right flank pain thinking she had a renal stone in December 2014. She was worked up with a CT abd/pelvis which suggested a renal mass. She was referred to Dr. Max Zelaya in [facility]. 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 Per charts tumor resection had negative margins and it was staged at pT2bN0.    Her staging work up was negative except for pulmonary nodules. She has been followed every 6 months with scans. But her most recent scan last week suggested increase in size of couple of nodules prompting to the current referral. Recent CT CAP on 5/11/17 showed enlarging hilar LAD, enlarging pulmonary nodules, adrenal nodules and a pancreatic body mass.  She met with DR Green and decided to pursue IL-2.     INTERVAL HISTORY: Suzi is doing well today.  She is very healthy with no major issues except some minor anxiety.   She is well informed about IL-2 today.     No recent f/s/c. No chest pain/cough/dyspnea. Daily BM, no black/bloody stools. No  issues. No daily pain.     EXAM:  BP 93/61  Pulse 71  Temp 98.3  F (36.8  C) (Oral)  Resp 16  Ht 1.562 m (5' 1.5\")  Wt 56.9 kg (125 lb 6.4 oz)  LMP  (Within Months)  SpO2 95%  BMI 23.31 kg/m2  Wt Readings from Last 4 Encounters:   06/06/17 56.9 kg (125 lb 6.4 oz)   05/26/17 56.3 kg (124 lb 1.6 oz)   05/23/17 55.6 kg (122 lb 9.2 oz)   05/11/17 57.1 kg (125 lb 12.8 oz)     Vital signs were reviewed.   Patient alert and oriented x3.   PERRLA. EOMI. No scleral icterus noted. OP " without thrush/sores.  Neck exam: No palpable cervical, supraclavicular or axillary nodes bilaterally.   Heart: RRR no murmurs noted.   Lungs: clear to auscultation bilaterally.  No crackles or wheezing.   Abd: positive bowel sounds in all four quadrants.  No tenderness to palpation.  No hepatomegaly.   Extremities: No lower extremity edema.   Neuro: grossly intact.   Mood and affect is stable.     LABS:      6/6/2017 10:58   Sodium 139   Potassium 4.0   Chloride 105   Carbon Dioxide 26   Urea Nitrogen 12   Creatinine 0.97   GFR Estimate 60 (L)   GFR Estimate If Black 73   Calcium 9.3   Anion Gap 8   Albumin 3.6   Protein Total 7.6   Bilirubin Total 0.4   Alkaline Phosphatase 72   ALT 25   AST 26   Glucose 91   WBC 4.2   Hemoglobin 12.0   Hematocrit 36.7   Platelet Count 355   RBC Count 3.92   MCV 94     BONE SCAN IMPRESSION: A photopenic area in the inferior sternum was not  associated with a lytic lesion on the recent comparison CT, however  continued attention on follow-up is recommended.     BRAIN MRI Impression:   No evidence of abnormal enhancing lesions intracranially.    ECHO Interpretation Summary  Normal left ventricular size, thickness, and function. Global and regional  left ventricular function is normal with an EF of 60-65%.  Normal right ventricular size and function.  No significant valvular pathology.  No pericardial effusion.    PFTS: The FVC, FEV1, FEV1/FVC ratio and RFJ67-20% are within normal limits.  The inspiratory flow rates are within normal limits. MVV is normal.  Lung volumes are within normal limits.  The diffusing capacity is normal.  However, the diffusing capacity was not   corrected for the patient's hemoglobin.  The results are within normal limits.  IMPRESSION:  Normal Pulmonary Function    ASSESSMENT/PLAN: 50 year old with mRCC here for IL-2 therapy    Suzi and I reviewed her bone scan, brain MRI, ECHO and PFTs- all looking ok and well enough to proceed with IL-2 therapy.  We  discussed the potential side effects including fevers/rigors/sweats, vascular leak leading, diarrhea, KYLE, LFT elevations, myelosuppression, fatigue, weakness and anorexia.  She was aware of the toxicity.  We'll see her back in clinic next week in follow up with labs and plan for admission for C2 in 2-3 weeks depending on recovery of labs and physical recovery.     Kia Ren PA-C

## 2017-06-06 NOTE — NURSING NOTE
"Oncology Rooming Note    June 6, 2017 9:31 AM   Suzi Gonsales is a 51 year old female who presents for:    Chief Complaint   Patient presents with     Oncology Clinic Visit     return patient visit for follow up/hopsital admin related to Malignant neoplasm of right kidney (H)     Initial Vitals: BP 93/61  Pulse 71  Temp 98.3  F (36.8  C) (Oral)  Resp 16  Ht 1.562 m (5' 1.5\")  Wt 56.9 kg (125 lb 6.4 oz)  LMP  (Within Months)  SpO2 95%  BMI 23.31 kg/m2 Estimated body mass index is 23.31 kg/(m^2) as calculated from the following:    Height as of this encounter: 1.562 m (5' 1.5\").    Weight as of this encounter: 56.9 kg (125 lb 6.4 oz). Body surface area is 1.57 meters squared.  No Pain (0) Comment: Data Unavailable   No LMP recorded (within months). Patient is not currently having periods (Reason: Premenopausal).  Allergies reviewed: Yes  Medications reviewed: Yes    Medications: Medication refills not needed today.  Pharmacy name entered into [employer]: [id number] - [place], [place] - [address] AT [facility] [address]    Clinical concerns: none delroy was notified.    5 minutes for nursing intake (face to face time)     Marisol Child CMA              "

## 2017-06-06 NOTE — MR AVS SNAPSHOT
After Visit Summary   6/6/2017    Suzi Gonsales    MRN: [id number]           Patient Information     Date Of Birth          1965        Visit Information        Provider Department      6/6/2017 9:30 AM Day Ren PA-C [facility]        Today's Diagnoses     Malignant neoplasm of right kidney (H)    -  1       Follow-ups after your visit        Your next 10 appointments already scheduled     Jun 16, 2017  1:45 PM CDT   [facility] Draw with [facility] LAB DRAW   [facility] Lab Draw ([facility])    [address] [place] [id number] [phone]            Jun 16, 2017  2:10 PM CDT   (Arrive by 1:55 PM)   Return Visit with BRENDA Betancur [facility] ([facility])    [address] [place] [id number] [phone]              Future tests that were ordered for you today     Open Future Orders        Priority Expected Expires Ordered    Comprehensive metabolic panel Routine 6/13/2017 6/16/2017 6/6/2017    CBC with platelets differential Routine 6/13/2017 6/16/2017 6/6/2017    IR PICC Vascular Routine  6/6/2018 6/6/2017            Who to contact     If you have questions or need follow up information about today's clinic visit or your schedule please contact [facility] directly at [phone].  Normal or non-critical lab and imaging results will be communicated to you by MyChart, letter or phone within 4 business days after the clinic has received the results. If you do not hear from us within 7 days, please contact the clinic through MyChart or phone. If you have a critical or abnormal lab result, we will notify you by phone as soon as possible.  Submit refill requests through [employer] or call your pharmacy and they will forward the refill request to us. Please allow 3 business days for your refill to be completed.     "      Additional Information About Your Visit        MyChart Information     [employer] gives you secure access to your electronic health record. If you see a primary care provider, you can also send messages to your care team and make appointments. If you have questions, please call your primary care clinic.  If you do not have a primary care provider, please call [phone] and they will assist you.        Care EveryWhere ID     This is your Care EveryWhere ID. This could be used by other organizations to access your [place] medical records  [id number]        Your Vitals Were     Pulse Temperature Respirations Height Last Period Pulse Oximetry    71 98.3  F (36.8  C) (Oral) 16 1.562 m (5' 1.5\") (Within Months) 95%    BMI (Body Mass Index)                   23.31 kg/m2            Blood Pressure from Last 3 Encounters:   06/06/17 93/61   05/26/17 105/64   05/23/17 102/66    Weight from Last 3 Encounters:   06/06/17 56.9 kg (125 lb 6.4 oz)   05/26/17 56.3 kg (124 lb 1.6 oz)   05/23/17 55.6 kg (122 lb 9.2 oz)              We Performed the Following     CBC with platelets differential     Comprehensive metabolic panel        Primary Care Provider Office Phone # Fax #    Idalia Saini [phone]       [facility] [address] [place] [id number]        Thank you!     Thank you for choosing [facility]  for your care. Our goal is always to provide you with excellent care. Hearing back from our patients is one way we can continue to improve our services. Please take a few minutes to complete the written survey that you may receive in the mail after your visit with us. Thank you!             Your Updated Medication List - Protect others around you: Learn how to safely use, store and throw away your medicines at www.disposemymeds.org.          This list is accurate as of: 6/6/17 12:17 PM.  Always use your most recent med list.                   Brand Name Dispense Instructions " for use    acetaminophen 325 MG tablet    TYLENOL    100 tablet    Take 2 tablets (650 mg) by mouth every 4 hours as needed for mild pain (mild pain)       ALPRAZolam 0.5 MG tablet    XANAX    60 tablet    Take 1 tablet (0.5 mg) by mouth 3 times daily as needed for anxiety       AMBIEN PO      Take 5 mg by mouth nightly as needed for sleep       buPROPion 300 MG 24 hr tablet    WELLBUTRIN XL     Take 300 mg by mouth every morning       citalopram 20 MG tablet    celeXA     Take 20 mg by mouth every evening       IBUPROFEN      Take 400 mg by mouth every 6 hours as needed       LORazepam 0.5 MG tablet    ATIVAN    2 tablet    Take 1 tablet at bedtime the night before procedure; Take 1 tablet with small sip of water several hours prior to procedure.       triamcinolone 0.1 % cream    KENALOG     APPLY TOPICALLY BID PRN

## 2017-06-06 NOTE — PROGRESS NOTES
Chemotherapy  D: Blood return is brisk per DL PICC. Urine output is adequate as recorded in intake and output flowsheet, chemotherapy agents double checked by two chemotherapy certified RN's, documentation in doc flowsheet recorded .     I: Premedications given (see electronic medical administration record). Dose #1 of IL-2 started to infuse over 15min. Education on IL-2 and pre meds given. Side effects and reaction sx explained and pt encouraged to call with questions. Denies further concerns at this time.     A: Tolerating well, A&O x4, denies pain/nausea, Pt ambulating in room.    P: Continue to monitor urine output and symptoms of nausea. Screen for symptoms of toxicity.

## 2017-06-06 NOTE — H&P
[facility], [place]    History and Physical  Hematology/Oncology     Date of Admission:  6/6/2017  Date of Service (when I saw the patient): 06/06/17    Assessment & Plan   Suzi Gonsales is a 50 y/o female with RCC s/p R radical nephrectomy in 12/2014, now with metastatic RCC (lungs, adrenal, and pancreas) who is admitted for Cycle 1 HD IL-2.     #Metastatic RCC. Admitted for Cycle 1 HD IL2.   - will need PICC placed on admission  - labs reviewed, ok to proceed    Treatment Plan:   - IVFs per trt plan  - Tylenol 650mg q4hr, Zofran 8mg q8hr, Zantac,150mg BID, Keflex 500mg BID  - aldesleukin 34 million units IV q8hr for up to 14 doses as tolerated  - PRN supportive care medications  - AVOID diuretics and steroids with IL-2    #H/o anxiety.   - continue PTA Wellbutrin, Celexa  - PRN Ativan per treatment plan    FEN  - IVFs per treatment plan, bolus PRN (SBP <85 or Urine Output <30 mL/hr)    PPx  - defer VTE prophylaxis given anticipated thrombocytopenia with IL-2  - Zantac as above    Dispo: Pending completion of tolerated IL-2 doses and recovery of acute issues.   - f/u 6/16 currently scheduled    Torri [place]ino) BRENDA Kim  Heme/Onc  [phone]    Code Status   Full Code    Primary Care Physician   Idalia Saini    Chief Complaint   Scheduled admission for Cycle 1 HD IL-2 therapy for metastatic RCC.     History is obtained from the patient and review of chart.    History of Present Illness   Suzi Gonsales is a 51 year old female with recently diagnosed metastatic renal cell carcinoma. She was initially diagnosed renal cell carcinoma in Dec 2014 after she initially presented with hematuria and R flank pain. A CT A/P was suggestive of renal mass. She was referred to Dr. Max Zelaya in [facility] and subsequenlty had a right open radical nephrectomy done on 12/31/14. 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 Per charts tumor resection had negative margins and it was  "staged at pT2bN0. Her staging work up was negative except for pulmonary nodules. She has been followed every 6 months with scans. A recent CT CAP on 17 showed enlarging hilar LAD, enlarging pulmonary nodules, adrenal nodules and a pancreatic body mass. She underwent endoscopic esophageal US with FNA and biopsies of identified nodules. Pathology was c/w metastatic renal cancer (lungs, adrenal, and pancreas). She has met with Dr. Green and decided to pursue IL-2.     She presents today for her first cycle of IL-2. She has been feeling well in general apart from some fatigue. States she is pretty healthy at baseline. Denies significant SOB; maybe a little \"like if I ran up the stairs\". Has a mild, dy cough at baseline. Denies headaches, nasal congestion/rhinorrhea, mouth pain/sores, or nausea. Would tend towards constipation at baseline. Denies any extremity edema. Has been well informed of IL-2. At this point can only anticipate how she will do. Was very glad to hear that her dog can visit during her admission.        Past Medical History       Past Medical History:   Diagnosis Date     Anxiety      Former tobacco use      History of kidney cancer     Clear cell     Mass of left lung     Upper lobe     Solitary kidney     S/P right nephrectomy due to kidney cancer       Past Surgical History   Past Surgical History:   Procedure Laterality Date     C/SECTION, LOW TRANSVERSE  2000    , Low Transverse     ENDOSCOPIC ULTRASOUND UPPER GASTROINTESTINAL TRACT (GI) N/A 2017    Procedure: ENDOSCOPIC ULTRASOUND, ESOPHAGOSCOPY / UPPER GASTROINTESTINAL TRACT (GI);  Esophagogastroduodenoscopy, Endoscopic Ultrasound with Fine Needle Biopsies;  Surgeon: Asad Velez MD;  Location: [facility] OR     open radical nephrectomy Right 14         Prior to Admission Medications   Prior to Admission Medications   Prescriptions Last Dose Informant Patient Reported? Taking?   ALPRAZolam (XANAX) 0.5 MG tablet " Past Week at Unknown time  No Yes   Sig: Take 1 tablet (0.5 mg) by mouth 3 times daily as needed for anxiety   IBUPROFEN Past Week at Unknown time  Yes Yes   Sig: Take 400 mg by mouth every 6 hours as needed    Zolpidem Tartrate (AMBIEN PO) 6/5/2017 at 2100  Yes Yes   Sig: Take 5 mg by mouth nightly as needed for sleep   acetaminophen (TYLENOL) 325 MG tablet Past Month at Unknown time  No Yes   Sig: Take 2 tablets (650 mg) by mouth every 4 hours as needed for mild pain (mild pain)   buPROPion (WELLBUTRIN XL) 300 MG 24 hr tablet 6/5/2017 at 1200  Yes Yes   Sig: Take 300 mg by mouth every morning    citalopram (CELEXA) 20 MG tablet 6/5/2017 at 2100  Yes Yes   Sig: Take 20 mg by mouth every evening    triamcinolone (KENALOG) 0.1 % cream Unknown at Unknown time  Yes No   Sig: APPLY TOPICALLY BID PRN      Facility-Administered Medications: None     Allergies   No Known Allergies    Social History    Suzi lives in [place] with her  and 2 teenage sons. They have 3 small dachsunds, one of which she would like to have visit if possible.   She works as a  for [employer]. She is currently on FMLA leave but would like to continue working as much as she can.     Social History     Social History     Marital status:      Spouse name: N/A     Number of children: N/A     Years of education: N/A     Occupational History     Not on file.     Social History Main Topics     Smoking status: Former Smoker     Types: Cigarettes     Quit date: 1/1/2004     Smokeless tobacco: Never Used     Alcohol use Yes      Comment: Occasional, social     Drug use: No     Sexual activity: Not Currently     Partners: Male     Other Topics Concern     Not on file     Social History Narrative     Family History   Family History   Problem Relation Age of Onset     Hypertension Father      Chronic Obstructive Pulmonary Disease Father      Hyperlipidemia Brother      Hyperlipidemia Brother          Review of Systems    The 10 point Review of Systems is negative other than noted in the HPI or here.     Physical Exam   Temp: 95.7  F (35.4  C) Temp src: Oral BP: 105/51 Pulse: 71   Resp: 20 SpO2: 98 % O2 Device: None (Room air)    Vital Signs with Ranges  Temp:  [95.7  F (35.4  C)-98.3  F (36.8  C)] 95.7  F (35.4  C)  Pulse:  [71] 71  Resp:  [16-20] 20  BP: ()/(51-61) 105/51  SpO2:  [95 %-98 %] 98 %  126 lbs 9.6 oz  Constitutional:  Pleasant female seen sitting up in bed, dressed in street clothes. Appears well, alert, interactive.  at bedside for support.   HEENT: NC/AT. Sclera anicteric. OP pink and moist, no lesions or thrush.   Respiratory: No increased work of breathing, good air exchange, on RA. Lungs slightly diminished in bases but generally clear to auscultation bilaterally, no crackles or wheezing.  Cardiovascular: RRR, no murmur noted.  GI: Normal BS. Abdomen soft, NT/ND.   Skin: Clean, dry, intact. No lesions or rashes on exposed skin surfaces.   Musculoskeletal: Extremities grossly normal in appearance. No extremity edema.   Neurologic: Alert and oriented. Grossly non-focal.   Neuropsychiatric: Calm, normal eye contact, memory for past and recent events intact and thought process normal. Affect congruent.       Data   Results for orders placed or performed in visit on 06/06/17 (from the past 24 hour(s))   Comprehensive metabolic panel   Result Value Ref Range    Sodium 139 133 - 144 mmol/L    Potassium 4.0 3.4 - 5.3 mmol/L    Chloride 105 94 - 109 mmol/L    Carbon Dioxide 26 20 - 32 mmol/L    Anion Gap 8 3 - 14 mmol/L    Glucose 91 70 - 99 mg/dL    Urea Nitrogen 12 7 - 30 mg/dL    Creatinine 0.97 0.52 - 1.04 mg/dL    GFR Estimate 60 (L) >60 mL/min/1.7m2    GFR Estimate If Black 73 >60 mL/min/1.7m2    Calcium 9.3 8.5 - 10.1 mg/dL    Bilirubin Total 0.4 0.2 - 1.3 mg/dL    Albumin 3.6 3.4 - 5.0 g/dL    Protein Total 7.6 6.8 - 8.8 g/dL    Alkaline Phosphatase 72 40 - 150 U/L    ALT 25 0 - 50 U/L    AST 26 0 -  45 U/L   CBC with platelets differential   Result Value Ref Range    WBC 4.2 4.0 - 11.0 10e9/L    RBC Count 3.92 3.8 - 5.2 10e12/L    Hemoglobin 12.0 11.7 - 15.7 g/dL    Hematocrit 36.7 35.0 - 47.0 %    MCV 94 78 - 100 fl    MCH 30.6 26.5 - 33.0 pg    MCHC 32.7 31.5 - 36.5 g/dL    RDW 13.4 10.0 - 15.0 %    Platelet Count 355 150 - 450 10e9/L    Diff Method Automated Method     % Neutrophils 58.9 %    % Lymphocytes 27.4 %    % Monocytes 9.5 %    % Eosinophils 3.3 %    % Basophils 0.7 %    % Immature Granulocytes 0.2 %    Nucleated RBCs 0 0 /100    Absolute Neutrophil 2.5 1.6 - 8.3 10e9/L    Absolute Lymphocytes 1.2 0.8 - 5.3 10e9/L    Absolute Monocytes 0.4 0.0 - 1.3 10e9/L    Absolute Eosinophils 0.1 0.0 - 0.7 10e9/L    Absolute Basophils 0.0 0.0 - 0.2 10e9/L    Abs Immature Granulocytes 0.0 0 - 0.4 10e9/L    Absolute Nucleated RBC 0.0    Magnesium   Result Value Ref Range    Magnesium 2.3 1.6 - 2.3 mg/dL   Phosphorus   Result Value Ref Range    Phosphorus 3.2 2.5 - 4.5 mg/dL

## 2017-06-06 NOTE — PROGRESS NOTES
Focus: Admit Note  D: Pt with renal cell cancer and is being admitted for cycle 1  high dose Aldesleukin therapy treatment. Pt report feeling a little anxious today in anticipation of IL-2 treatment. VSs. Labs checked and chemo protocol checked.   I: Reviewed expected side effects of aldesleukin with pt and her . Answered their questions and concern.  Call light and unit orientation completed. Nursing admission questionnaires and medication reconcillation completed.   A: Pt knowledgeable about treatment regimen. Asking appropriate questions. She is ready to get her treatment started.   P: Pt will need a picc line placement. Start aldesleukin therapy tonight.

## 2017-06-06 NOTE — PROGRESS NOTES
"[facility]  FOLLOW-UP VISIT NOTE  Date of visit: 6-6-17          REASON FOR VISIT: mRCC pre-visit for IL-2    HPI: Suzi presented to ED with hematuria and right flank pain thinking she had a renal stone in December 2014. She was worked up with a CT abd/pelvis which suggested a renal mass. She was referred to Dr. Max Zelaya in [facility]. 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 Per charts tumor resection had negative margins and it was staged at pT2bN0.    Her staging work up was negative except for pulmonary nodules. She has been followed every 6 months with scans. But her most recent scan last week suggested increase in size of couple of nodules prompting to the current referral. Recent CT CAP on 5/11/17 showed enlarging hilar LAD, enlarging pulmonary nodules, adrenal nodules and a pancreatic body mass.  She met with DR Green and decided to pursue IL-2.     INTERVAL HISTORY: Suzi is doing well today.  She is very healthy with no major issues except some minor anxiety.   She is well informed about IL-2 today.     No recent f/s/c. No chest pain/cough/dyspnea. Daily BM, no black/bloody stools. No  issues. No daily pain.     EXAM:  BP 93/61  Pulse 71  Temp 98.3  F (36.8  C) (Oral)  Resp 16  Ht 1.562 m (5' 1.5\")  Wt 56.9 kg (125 lb 6.4 oz)  LMP  (Within Months)  SpO2 95%  BMI 23.31 kg/m2  Wt Readings from Last 4 Encounters:   06/06/17 56.9 kg (125 lb 6.4 oz)   05/26/17 56.3 kg (124 lb 1.6 oz)   05/23/17 55.6 kg (122 lb 9.2 oz)   05/11/17 57.1 kg (125 lb 12.8 oz)     Vital signs were reviewed.   Patient alert and oriented x3.   PERRLA. EOMI. No scleral icterus noted. OP without thrush/sores.  Neck exam: No palpable cervical, supraclavicular or axillary nodes bilaterally.   Heart: RRR no murmurs noted.   Lungs: clear to auscultation bilaterally.  No crackles or wheezing.   Abd: positive " bowel sounds in all four quadrants.  No tenderness to palpation.  No hepatomegaly.   Extremities: No lower extremity edema.   Neuro: grossly intact.   Mood and affect is stable.     LABS:      6/6/2017 10:58   Sodium 139   Potassium 4.0   Chloride 105   Carbon Dioxide 26   Urea Nitrogen 12   Creatinine 0.97   GFR Estimate 60 (L)   GFR Estimate If Black 73   Calcium 9.3   Anion Gap 8   Albumin 3.6   Protein Total 7.6   Bilirubin Total 0.4   Alkaline Phosphatase 72   ALT 25   AST 26   Glucose 91   WBC 4.2   Hemoglobin 12.0   Hematocrit 36.7   Platelet Count 355   RBC Count 3.92   MCV 94     BONE SCAN IMPRESSION: A photopenic area in the inferior sternum was not  associated with a lytic lesion on the recent comparison CT, however  continued attention on follow-up is recommended.     BRAIN MRI Impression:   No evidence of abnormal enhancing lesions intracranially.    ECHO Interpretation Summary  Normal left ventricular size, thickness, and function. Global and regional  left ventricular function is normal with an EF of 60-65%.  Normal right ventricular size and function.  No significant valvular pathology.  No pericardial effusion.    PFTS: The FVC, FEV1, FEV1/FVC ratio and CGV22-66% are within normal limits.  The inspiratory flow rates are within normal limits. MVV is normal.  Lung volumes are within normal limits.  The diffusing capacity is normal.  However, the diffusing capacity was not   corrected for the patient's hemoglobin.  The results are within normal limits.  IMPRESSION:  Normal Pulmonary Function    ASSESSMENT/PLAN: 50 year old with mRCC here for IL-2 therapy    Suzi and NANNETTE reviewed her bone scan, brain MRI, ECHO and PFTs- all looking ok and well enough to proceed with IL-2 therapy.  We discussed the potential side effects including fevers/rigors/sweats, vascular leak leading, diarrhea, KYLE, LFT elevations, myelosuppression, fatigue, weakness and anorexia.  She was aware of the toxicity.  We'll see her back  in clinic next week in follow up with labs and plan for admission for C2 in 2-3 weeks depending on recovery of labs and physical recovery.     Kia Ren PA-C

## 2017-06-06 NOTE — PROGRESS NOTES
DATE/TIME  (DOT-TD, DOT-NOW) CHEMO CHECK ACTIVITY (REGIMEN & DOSE CHECK, DAY, DOSE #, NAME OF CHEMO #1)  CHEMO DRUG #2  CHEMO DRUG #3 NAME OF RN #1 (USE DOT-ME HERE) NAME OF RN#2 (2ND RN TO LOG IN SEPARATELY)   6/6/2017  1:43 PM   Chemo Protocol & Labs checked   Viraphet Xaphakdy Chanthavixay   Charo Tetz     6/6/2017  4:33 PM   IL-2 dose #1   Zara Putnam     6/6/2017  10:57 PM   IL-2 Dose #2   Caroline Landers     6/7/2017  10:04 AM     IL-2 dose 3   Viraphet Xaphakdy Chanthavixay   Charo Tetz     6/7/2017  5:38 PM   IL-2 dose #4   Zara Mckeon     06/08/17  12:35 AM   IL-2 Dose #5 double check   Caroline Redd     06/08/17  11:08 AM   IL-2 dose #6 double check   Danny Shen   Charo Tetz     6/8/2017  7:08 PM   IL-2 dose #7 Double check    Zara Gonzalez     6/9/2017  1:20 AM   IL-2 dose # 8 double check   Polly Nicole     6/9/2017  11:05 AM   IL-2 Dose #9   Sara Aleman

## 2017-06-06 NOTE — IP AVS SNAPSHOT
MRN:[id number]                      After Visit Summary   6/6/2017    Suzi Gonsales    MRN: [id number]           Thank you!     Thank you for choosing [facility] for your care. Our goal is always to provide you with excellent care. Hearing back from our patients is one way we can continue to improve our services. Please take a few minutes to complete the written survey that you may receive in the mail after you visit with us. Thank you!        Patient Information     Date Of Birth          1965        Designated Caregiver       Most Recent Value    Caregiver    Will someone help with your care after discharge? yes    Name of designated caregiver Tunde Gonsales []    Phone number of caregiver [phone] cell    Caregiver address [facility] [address]      About your hospital stay     You were admitted on:  June 6, 2017 You last received care in the:  [address] [facility]    You were discharged on:  Nicole 10, 2017        Reason for your hospital stay       High dose IL-2                  Who to Call     For medical emergencies, please call 911.  For non-urgent questions about your medical care, please call your primary care provider or clinic, [phone]          Attending Provider     Provider Specialty    Henri Green MD Hematology    Gerda Lawson MD Hematology & Oncology    [facility], Sanjiv Figueroa MD Oncology       Primary Care Provider Office Phone # Fax #    Idalia Saini [phone]       When to contact your care team       Call [phone] if you have any of the following: temperature greater than 100.4 , chest pain, SOB, dizziness or other worrisome symptoms.                  After Care Instructions     Activity       Your activity upon discharge: activity as tolerated            Diet       Follow this diet upon discharge: Orders Placed This Encounter      Snacks/Supplements Adult: With Meals      Room Service      Regular Diet  Adult                  Follow-up Appointments     Follow Up and recommended labs and tests       F/u with [facility] clinic next week with labs, you should receive a call with appointment. If you don't hear from them, please call [phone].                  Your next 10 appointments already scheduled     Jun 16, 2017  1:45 PM CDT   [facility] with  [facility]   [facility] ([facility])    [address]  [address] [facility] [address] [phone]            Jun 16, 2017  2:10 PM CDT   (Arrive by 1:55 PM)   Return Visit with BRENDA [facility] ([facility])    [address] [address] [phone]              Future tests that were ordered for you     *CBC with platelets differential       Last Lab Result: Hemoglobin (g/dL)       Date                     Value                 06/10/2017               9.9 (L)          ----------            Comprehensive metabolic panel                 Pending Results     Date and Time Order Name Status Description    6/9/2017 2330 Blood culture Preliminary     6/8/2017 2330 Blood culture Preliminary     6/7/2017 2330 Blood culture Preliminary     6/7/2017 0132 Blood culture Preliminary     6/7/2017 0132 Blood culture Preliminary     6/6/2017 2330 Blood culture In process     6/6/2017 1350 IR PICC Vascular In process             Statement of Approval     Ordered          06/10/17 1151  I have reviewed and agree with all the recommendations and orders detailed in this document.  EFFECTIVE NOW     Approved and electronically signed by:  Priyanka Flores MD             Admission Information     Date & Time Provider Department Dept. Phone    6/6/2017 Sanjiv Fernandez MD [address] [facility] [phone]      Your Vitals Were     Blood Pressure Pulse Temperature Respirations Height Weight    124/61 (BP Location:  "Right arm) 95 97.7  F (36.5  C) (Oral) 16 1.562 m (5' 1.5\") 63 kg (139 lb)    Last Period Pulse Oximetry BMI (Body Mass Index)             (Within Months) 92% 25.84 kg/m2         [facility] Information     [facility] gives you secure access to your electronic health record. If you see a primary care provider, you can also send messages to your care team and make appointments. If you have questions, please call your primary care clinic.  If you do not have a primary care provider, please call [phone] and they will assist you.        Care EveryWhere ID     This is your Care EveryWhere ID. This could be used by other organizations to access your [facility] medical records  [id number]           Review of your medicines      START taking        Dose / Directions    prochlorperazine 10 MG tablet   Commonly known as:  COMPAZINE   Used for:  Malignant neoplasm of right kidney (H)        Dose:  10 mg   Take 1 tablet (10 mg) by mouth every 6 hours as needed for nausea or vomiting (Breakthrough Nausea/Vomiting)   Quantity:  30 tablet   Refills:  0         CONTINUE these medicines which have NOT CHANGED        Dose / Directions    acetaminophen 325 MG tablet   Commonly known as:  TYLENOL   Used for:  Malignant neoplasm of right kidney (H)        Dose:  650 mg   Take 2 tablets (650 mg) by mouth every 4 hours as needed for mild pain (mild pain)   Quantity:  100 tablet   Refills:  0       ALPRAZolam 0.5 MG tablet   Commonly known as:  XANAX   Used for:  Renal cell carcinoma, unspecified laterality (H), Mass of upper lobe of left lung        Dose:  0.5 mg   Take 1 tablet (0.5 mg) by mouth 3 times daily as needed for anxiety   Quantity:  60 tablet   Refills:  0       AMBIEN PO   Used for:  Malignant neoplasm of right kidney (H)        Dose:  5 mg   Take 5 mg by mouth nightly as needed for sleep   Refills:  0       buPROPion 300 MG 24 hr tablet   Commonly known as:  WELLBUTRIN XL        Dose:  300 mg   Take 300 mg by mouth every " morning   Refills:  0       citalopram 20 MG tablet   Commonly known as:  celeXA        Dose:  20 mg   Take 20 mg by mouth every evening   Refills:  0       IBUPROFEN        Dose:  400 mg   Take 400 mg by mouth every 6 hours as needed   Refills:  0       triamcinolone 0.1 % cream   Commonly known as:  KENALOG        APPLY TOPICALLY BID PRN   Refills:  2            Where to get your medicines      These medications were sent to [id number] - [place], [place] - [address] AT [facility]  [address], [place] [address]    Hours:  24-hours Phone:  [phone]     prochlorperazine 10 MG tablet                Protect others around you: Learn how to safely use, store and throw away your medicines at www.disposemymeds.org.             Medication List: This is a list of all your medications and when to take them. Check marks below indicate your daily home schedule. Keep this list as a reference.      Medications           Morning Afternoon Evening Bedtime As Needed    acetaminophen 325 MG tablet   Commonly known as:  TYLENOL   Take 2 tablets (650 mg) by mouth every 4 hours as needed for mild pain (mild pain)   Last time this was given:  650 mg on 6/9/2017  8:49 PM                                   ALPRAZolam 0.5 MG tablet   Commonly known as:  XANAX   Take 1 tablet (0.5 mg) by mouth 3 times daily as needed for anxiety                                   AMBIEN PO   Take 5 mg by mouth nightly as needed for sleep   Last time this was given:  5 mg on 6/9/2017  9:25 PM                                   buPROPion 300 MG 24 hr tablet   Commonly known as:  WELLBUTRIN XL   Take 300 mg by mouth every morning   Last time this was given:  300 mg on 6/10/2017  9:56 AM                                   citalopram 20 MG tablet   Commonly known as:  celeXA   Take 20 mg by mouth every evening   Last time this was given:  20 mg on 6/9/2017  8:49 PM                                   IBUPROFEN    Take 400 mg by mouth every 6 hours as needed                                   prochlorperazine 10 MG tablet   Commonly known as:  COMPAZINE   Take 1 tablet (10 mg) by mouth every 6 hours as needed for nausea or vomiting (Breakthrough Nausea/Vomiting)                                   triamcinolone 0.1 % cream   Commonly known as:  KENALOG   APPLY TOPICALLY BID PRN

## 2017-06-07 LAB
ALBUMIN SERPL-MCNC: 2.8 G/DL (ref 3.4–5)
ALP SERPL-CCNC: 53 U/L (ref 40–150)
ALT SERPL W P-5'-P-CCNC: 23 U/L (ref 0–50)
ANION GAP SERPL CALCULATED.3IONS-SCNC: 9 MMOL/L (ref 3–14)
AST SERPL W P-5'-P-CCNC: 24 U/L (ref 0–45)
BASOPHILS # BLD AUTO: 0 10E9/L (ref 0–0.2)
BASOPHILS NFR BLD AUTO: 0.2 %
BILIRUB SERPL-MCNC: 0.2 MG/DL (ref 0.2–1.3)
BUN SERPL-MCNC: 9 MG/DL (ref 7–30)
CALCIUM SERPL-MCNC: 8.2 MG/DL (ref 8.5–10.1)
CHLORIDE SERPL-SCNC: 112 MMOL/L (ref 94–109)
CK SERPL-CCNC: 39 U/L (ref 30–225)
CO2 SERPL-SCNC: 21 MMOL/L (ref 20–32)
CREAT SERPL-MCNC: 1 MG/DL (ref 0.52–1.04)
DIFFERENTIAL METHOD BLD: ABNORMAL
EOSINOPHIL # BLD AUTO: 0 10E9/L (ref 0–0.7)
EOSINOPHIL NFR BLD AUTO: 0.5 %
ERYTHROCYTE [DISTWIDTH] IN BLOOD BY AUTOMATED COUNT: 13.9 % (ref 10–15)
GFR SERPL CREATININE-BSD FRML MDRD: 58 ML/MIN/1.7M2
GLUCOSE SERPL-MCNC: 134 MG/DL (ref 70–99)
HCT VFR BLD AUTO: 32.2 % (ref 35–47)
HGB BLD-MCNC: 10.6 G/DL (ref 11.7–15.7)
IMM GRANULOCYTES # BLD: 0 10E9/L (ref 0–0.4)
IMM GRANULOCYTES NFR BLD: 0.2 %
LACTATE BLD-SCNC: 1.9 MMOL/L (ref 0.7–2.1)
LDH SERPL L TO P-CCNC: 193 U/L (ref 81–234)
LYMPHOCYTES # BLD AUTO: 0.1 10E9/L (ref 0.8–5.3)
LYMPHOCYTES NFR BLD AUTO: 1.4 %
MAGNESIUM SERPL-MCNC: 1.7 MG/DL (ref 1.6–2.3)
MCH RBC QN AUTO: 30.3 PG (ref 26.5–33)
MCHC RBC AUTO-ENTMCNC: 32.9 G/DL (ref 31.5–36.5)
MCV RBC AUTO: 92 FL (ref 78–100)
MONOCYTES # BLD AUTO: 0.1 10E9/L (ref 0–1.3)
MONOCYTES NFR BLD AUTO: 2.1 %
NEUTROPHILS # BLD AUTO: 4.1 10E9/L (ref 1.6–8.3)
NEUTROPHILS NFR BLD AUTO: 95.6 %
NRBC # BLD AUTO: 0 10*3/UL
NRBC BLD AUTO-RTO: 0 /100
PHOSPHATE SERPL-MCNC: 1.9 MG/DL (ref 2.5–4.5)
PHOSPHATE SERPL-MCNC: 2.4 MG/DL (ref 2.5–4.5)
PLATELET # BLD AUTO: 225 10E9/L (ref 150–450)
POTASSIUM SERPL-SCNC: 4.2 MMOL/L (ref 3.4–5.3)
PROT SERPL-MCNC: 5.9 G/DL (ref 6.8–8.8)
RBC # BLD AUTO: 3.5 10E12/L (ref 3.8–5.2)
SODIUM SERPL-SCNC: 142 MMOL/L (ref 133–144)
WBC # BLD AUTO: 4.3 10E9/L (ref 4–11)

## 2017-06-07 PROCEDURE — 25000132 ZZH RX MED GY IP 250 OP 250 PS 637: Performed by: PHYSICIAN ASSISTANT

## 2017-06-07 PROCEDURE — 36592 COLLECT BLOOD FROM PICC: CPT | Performed by: INTERNAL MEDICINE

## 2017-06-07 PROCEDURE — 25000132 ZZH RX MED GY IP 250 OP 250 PS 637: Performed by: INTERNAL MEDICINE

## 2017-06-07 PROCEDURE — 82550 ASSAY OF CK (CPK): CPT | Performed by: INTERNAL MEDICINE

## 2017-06-07 PROCEDURE — 80053 COMPREHEN METABOLIC PANEL: CPT | Performed by: INTERNAL MEDICINE

## 2017-06-07 PROCEDURE — 84100 ASSAY OF PHOSPHORUS: CPT | Performed by: INTERNAL MEDICINE

## 2017-06-07 PROCEDURE — 87040 BLOOD CULTURE FOR BACTERIA: CPT | Performed by: INTERNAL MEDICINE

## 2017-06-07 PROCEDURE — 40000802 ZZH SITE CHECK

## 2017-06-07 PROCEDURE — 25000128 H RX IP 250 OP 636: Performed by: INTERNAL MEDICINE

## 2017-06-07 PROCEDURE — 83605 ASSAY OF LACTIC ACID: CPT | Performed by: INTERNAL MEDICINE

## 2017-06-07 PROCEDURE — 85025 COMPLETE CBC W/AUTO DIFF WBC: CPT | Performed by: INTERNAL MEDICINE

## 2017-06-07 PROCEDURE — 25800025 ZZH RX 258: Performed by: INTERNAL MEDICINE

## 2017-06-07 PROCEDURE — 83735 ASSAY OF MAGNESIUM: CPT | Performed by: INTERNAL MEDICINE

## 2017-06-07 PROCEDURE — 99233 SBSQ HOSP IP/OBS HIGH 50: CPT | Performed by: INTERNAL MEDICINE

## 2017-06-07 PROCEDURE — 12000008 ZZH R&B INTERMEDIATE UMMC

## 2017-06-07 PROCEDURE — 83615 LACTATE (LD) (LDH) ENZYME: CPT | Performed by: INTERNAL MEDICINE

## 2017-06-07 PROCEDURE — 25000128 H RX IP 250 OP 636: Performed by: PHYSICIAN ASSISTANT

## 2017-06-07 PROCEDURE — 25000125 ZZHC RX 250: Performed by: PHYSICIAN ASSISTANT

## 2017-06-07 PROCEDURE — 36415 COLL VENOUS BLD VENIPUNCTURE: CPT | Performed by: INTERNAL MEDICINE

## 2017-06-07 RX ORDER — MAGNESIUM SULFATE HEPTAHYDRATE 40 MG/ML
4 INJECTION, SOLUTION INTRAVENOUS EVERY 4 HOURS PRN
Status: DISCONTINUED | OUTPATIENT
Start: 2017-06-07 | End: 2017-06-10 | Stop reason: HOSPADM

## 2017-06-07 RX ORDER — POTASSIUM CHLORIDE 750 MG/1
20-40 TABLET, EXTENDED RELEASE ORAL
Status: DISCONTINUED | OUTPATIENT
Start: 2017-06-07 | End: 2017-06-10 | Stop reason: HOSPADM

## 2017-06-07 RX ORDER — POTASSIUM CHLORIDE 1.5 G/1.58G
20-40 POWDER, FOR SOLUTION ORAL
Status: DISCONTINUED | OUTPATIENT
Start: 2017-06-07 | End: 2017-06-10 | Stop reason: HOSPADM

## 2017-06-07 RX ORDER — POTASSIUM CL/LIDO/0.9 % NACL 10MEQ/0.1L
10 INTRAVENOUS SOLUTION, PIGGYBACK (ML) INTRAVENOUS
Status: DISCONTINUED | OUTPATIENT
Start: 2017-06-07 | End: 2017-06-10 | Stop reason: HOSPADM

## 2017-06-07 RX ORDER — POTASSIUM CHLORIDE 29.8 MG/ML
20 INJECTION INTRAVENOUS
Status: DISCONTINUED | OUTPATIENT
Start: 2017-06-07 | End: 2017-06-10 | Stop reason: HOSPADM

## 2017-06-07 RX ORDER — POTASSIUM CHLORIDE 7.45 MG/ML
10 INJECTION INTRAVENOUS
Status: DISCONTINUED | OUTPATIENT
Start: 2017-06-07 | End: 2017-06-10 | Stop reason: HOSPADM

## 2017-06-07 RX ADMIN — POTASSIUM CHLORIDE, DEXTROSE MONOHYDRATE AND SODIUM CHLORIDE: 150; 5; 900 INJECTION, SOLUTION INTRAVENOUS at 02:05

## 2017-06-07 RX ADMIN — SODIUM PHOSPHATE, MONOBASIC, MONOHYDRATE AND SODIUM PHOSPHATE, DIBASIC, ANHYDROUS 15 MMOL: 276; 142 INJECTION, SOLUTION INTRAVENOUS at 19:12

## 2017-06-07 RX ADMIN — ALDESLEUKIN 34 MILLION UNITS: 1.1 INJECTION, POWDER, LYOPHILIZED, FOR SOLUTION INTRAVENOUS at 01:59

## 2017-06-07 RX ADMIN — SODIUM PHOSPHATE, MONOBASIC, MONOHYDRATE AND SODIUM PHOSPHATE, DIBASIC, ANHYDROUS 20 MMOL: 276; 142 INJECTION, SOLUTION INTRAVENOUS at 10:08

## 2017-06-07 RX ADMIN — ACETAMINOPHEN 650 MG: 325 TABLET, FILM COATED ORAL at 04:11

## 2017-06-07 RX ADMIN — ACETAMINOPHEN 650 MG: 325 TABLET, FILM COATED ORAL at 20:11

## 2017-06-07 RX ADMIN — RANITIDINE HYDROCHLORIDE 150 MG: 150 TABLET, FILM COATED ORAL at 20:11

## 2017-06-07 RX ADMIN — ONDANSETRON 8 MG: 2 INJECTION INTRAMUSCULAR; INTRAVENOUS at 08:07

## 2017-06-07 RX ADMIN — ACETAMINOPHEN 650 MG: 325 TABLET, FILM COATED ORAL at 08:08

## 2017-06-07 RX ADMIN — PROCHLORPERAZINE EDISYLATE 10 MG: 5 INJECTION INTRAMUSCULAR; INTRAVENOUS at 19:56

## 2017-06-07 RX ADMIN — CEPHALEXIN 500 MG: 500 CAPSULE ORAL at 08:11

## 2017-06-07 RX ADMIN — RANITIDINE HYDROCHLORIDE 150 MG: 150 TABLET, FILM COATED ORAL at 08:08

## 2017-06-07 RX ADMIN — MEPERIDINE HYDROCHLORIDE 25 MG: 25 INJECTION, SOLUTION INTRAMUSCULAR; INTRAVENOUS; SUBCUTANEOUS at 12:36

## 2017-06-07 RX ADMIN — ONDANSETRON 8 MG: 2 INJECTION INTRAMUSCULAR; INTRAVENOUS at 16:20

## 2017-06-07 RX ADMIN — ACETAMINOPHEN 650 MG: 325 TABLET, FILM COATED ORAL at 12:03

## 2017-06-07 RX ADMIN — ONDANSETRON 8 MG: 2 INJECTION INTRAMUSCULAR; INTRAVENOUS at 00:14

## 2017-06-07 RX ADMIN — CITALOPRAM HYDROBROMIDE 20 MG: 20 TABLET ORAL at 20:11

## 2017-06-07 RX ADMIN — POTASSIUM CHLORIDE, DEXTROSE MONOHYDRATE AND SODIUM CHLORIDE: 150; 5; 900 INJECTION, SOLUTION INTRAVENOUS at 12:03

## 2017-06-07 RX ADMIN — LORAZEPAM 0.5 MG: 2 INJECTION INTRAMUSCULAR; INTRAVENOUS at 14:33

## 2017-06-07 RX ADMIN — ACETAMINOPHEN 650 MG: 325 TABLET, FILM COATED ORAL at 16:20

## 2017-06-07 RX ADMIN — LORAZEPAM 0.5 MG: 2 INJECTION INTRAMUSCULAR; INTRAVENOUS at 21:33

## 2017-06-07 RX ADMIN — ACETAMINOPHEN 650 MG: 325 TABLET, FILM COATED ORAL at 00:13

## 2017-06-07 RX ADMIN — BUPROPION HYDROCHLORIDE 300 MG: 300 TABLET, FILM COATED, EXTENDED RELEASE ORAL at 08:08

## 2017-06-07 RX ADMIN — CEPHALEXIN 500 MG: 500 CAPSULE ORAL at 20:13

## 2017-06-07 RX ADMIN — ALDESLEUKIN 34 MILLION UNITS: 1.1 INJECTION, POWDER, LYOPHILIZED, FOR SOLUTION INTRAVENOUS at 17:58

## 2017-06-07 RX ADMIN — LORAZEPAM 0.5 MG: 2 INJECTION INTRAMUSCULAR; INTRAVENOUS at 20:11

## 2017-06-07 RX ADMIN — ALDESLEUKIN 34 MILLION UNITS: 1.1 INJECTION, POWDER, LYOPHILIZED, FOR SOLUTION INTRAVENOUS at 10:08

## 2017-06-07 ASSESSMENT — PAIN DESCRIPTION - DESCRIPTORS
DESCRIPTORS: ACHING
DESCRIPTORS: ACHING

## 2017-06-07 NOTE — PROGRESS NOTES
Focus: High dose Aldesleukin dose # 3  D/I: Pt meeting urine output parameter of 1450ml in the last 8 hrs. VSS. Creatinine 1.00. Platelet 225,000. Dose # 3 of Aldesleukin therapy was given and infuse over 15 min. Positive blood return was confirmed with both lumens prior to starting infusion. Pt feeling tired and fatigue today. Otherwise no chills or rigors  2 hrs post infusion so far. Sleeping and resting in bed through out the day.   A: Pt tolerating aldesleukin therapy well.   P: Continue  with treatment.

## 2017-06-07 NOTE — PLAN OF CARE
Problem: Goal Outcome Summary  Goal: Goal Outcome Summary  Outcome: No Change  Pt receiving sodium phos 20mmol IV replacement for phos level 1.9. Phos level recheck ordered for 16:00. Pt received dose 3 of aldesleukin therapy at 10:00am and 2. 5 hrs post infusion, pt had a reaction with rigors and c/o of generalize all over body ache. Demerol 25mg IV given, Bear Huggar and warm blanket place on top of pt. No c/o of nausea.

## 2017-06-07 NOTE — PLAN OF CARE
"Problem: Goal Outcome Summary  Goal: Goal Outcome Summary  Outcome: No Change  VSS. BP soft at baseline per pt. Afebrile. Denies p/n/v/d. Received dose #1 IL-2 at 1735. Pt felt \"chilled\" at 2100. Warm blankets applied. No further interventions needed. Has met urine parameters for dose #2 which is due at 0130. Has been ordered. Pt would like to sleep until 0030 when tylenol and Zofran are due. D5 and D5 in NS with 20mEQ of K infusing into DL PICC. Brisk BR noted q 4hrs. Pt up ad ta. I&O adequate. Ativan x1 for anxiety as pt takes at home. Ambien at bedtime for sleep. Family at bedside throughout shift and are strong support system. Continue with POC.       "

## 2017-06-07 NOTE — PROGRESS NOTES
[facility], [place]    Hematology/Oncology Progress Note    Date of Service (when I saw the patient): 06/07/2017     Assessment & Plan   Suzi Gonsales is a 52 y/o female with RCC s/p R radical nephrectomy in 12/2014, now with metastatic RCC (lungs, adrenal, and pancreas) who is admitted for Cycle 1 HD IL-2.      #Metastatic RCC. Admitted for Cycle 1 HD IL2. Tolerated 3 doses thus far, complicated by expected hypotension/fever/chills/muscle aches.     Treatment Plan:   - IVFs per trt plan  - Tylenol 650mg q4hr, Zofran 8mg q8hr, Zantac,150mg BID, Keflex 500mg BID  - aldesleukin 34 million units IV q8hr for up to 14 doses as tolerated  - PRN supportive care medications  - AVOID diuretics and steroids with IL-2     #H/o anxiety.   - continue PTA Wellbutrin, Celexa  - PRN Ativan per treatment plan     FEN  - IVFs per treatment plan, bolus PRN (SBP <85 or urine output <30 mL/hr)     PPx  - defer VTE prophylaxis given anticipated thrombocytopenia with IL-2  - Zantac as above     Dispo: Pending completion of tolerated IL-2 doses and recovery of acute issues.   - f/u 6/16 currently scheduled     Torri Kim PA-C ([facility])  Heme/Onc  [phone]     Interval History   No acute events overnight; received 2 doses and getting 3rd this AM. Had fever but denies shaking/rigors. Also having intermittent migrating pains/muscle aches. Otherwise has been resting in between doses. Notes facial redness/flushing this AM. No SOB, nausea, or edema thus far.     Physical Exam   Temp: 99.1  F (37.3  C) Temp src: Oral BP: 104/53 Pulse: 88 Heart Rate: 101 Resp: 18 SpO2: 95 % O2 Device: None (Room air)    Vitals:    06/06/17 1239 06/07/17 0847   Weight: 57.4 kg (126 lb 9.6 oz) 58.4 kg (128 lb 11.2 oz)     Vital Signs with Ranges  Temp:  [95.7  F (35.4  C)-101.6  F (38.7  C)] 99.1  F (37.3  C)  Pulse:  [72-88] 88  Heart Rate:  [] 101  Resp:  [16-20] 18  BP: ()/(34-56) 104/53  SpO2:  [93 %-97 %] 95 %  I/O  last 3 completed shifts:  In: 2114 [P.O.:340; I.V.:1660; IV Piggyback:114]  Out: 2725 [Urine:2725]  Constitutional:  Pleasant female seen resting in bed. Appears well, alert, interactive.   HEENT: NC/AT.  Mild facial flushing. Sclera anicteric. MMM.   Respiratory: No increased work of breathing, good air exchange, on RA. No wheezing.  Skin: Clean, dry, intact. Mildly flushed. No lesions or rashes on exposed skin surfaces.   Musculoskeletal: Extremities grossly normal in appearance. No extremity edema.   Neurologic: Alert and oriented. Grossly non-focal.   Neuropsychiatric: Calm, normal eye contact. Affect congruent.   VAD: PICC is c/d/i.     Medications     - MEDICATION INSTRUCTIONS -       dextrose 5% and 0.9% NaCl with potassium chloride 20 mEq 100 mL/hr at 06/07/17 1203     D5W 1,000 mL (06/06/17 1734)     - MEDICATION INSTRUCTIONS -       NaCl         sodium chloride (PF)  10 mL Intracatheter Q7 Days     eucerin   Topical BID     ondansetron  8 mg Intravenous Q8H     acetaminophen  650 mg Oral Q4H     ranitidine  150 mg Oral BID     cephalexin  500 mg Oral BID     aldesleukin (PROLEUKIN) infusion  600,000 Units/kg (Treatment Plan Recorded) Intravenous Q8H     buPROPion  300 mg Oral QAM     citalopram  20 mg Oral QPM       Data    ROUTINE IP LABS (Last four results)  BMP  Recent Labs  Lab 06/07/17  0446 06/06/17  1058    139   POTASSIUM 4.2 4.0   CHLORIDE 112* 105   MUNDO 8.2* 9.3   CO2 21 26   BUN 9 12   CR 1.00 0.97   * 91     CBC  Recent Labs  Lab 06/07/17  0446 06/06/17  1058   WBC 4.3 4.2   RBC 3.50* 3.92   HGB 10.6* 12.0   HCT 32.2* 36.7   MCV 92 94   MCH 30.3 30.6   MCHC 32.9 32.7   RDW 13.9 13.4    355       Results for orders placed or performed during the hospital encounter of 06/06/17 (from the past 24 hour(s))   Blood culture   Result Value Ref Range    Specimen Description Blood Left Hand     Culture Micro No growth after 4 hours     Micro Report Status Pending    Blood culture    Result Value Ref Range    Specimen Description Blood GRAY PORT     Culture Micro No growth after 4 hours     Micro Report Status Pending    CBC with platelets differential   Result Value Ref Range    WBC 4.3 4.0 - 11.0 10e9/L    RBC Count 3.50 (L) 3.8 - 5.2 10e12/L    Hemoglobin 10.6 (L) 11.7 - 15.7 g/dL    Hematocrit 32.2 (L) 35.0 - 47.0 %    MCV 92 78 - 100 fl    MCH 30.3 26.5 - 33.0 pg    MCHC 32.9 31.5 - 36.5 g/dL    RDW 13.9 10.0 - 15.0 %    Platelet Count 225 150 - 450 10e9/L    Diff Method Automated Method     % Neutrophils 95.6 %    % Lymphocytes 1.4 %    % Monocytes 2.1 %    % Eosinophils 0.5 %    % Basophils 0.2 %    % Immature Granulocytes 0.2 %    Nucleated RBCs 0 0 /100    Absolute Neutrophil 4.1 1.6 - 8.3 10e9/L    Absolute Lymphocytes 0.1 (L) 0.8 - 5.3 10e9/L    Absolute Monocytes 0.1 0.0 - 1.3 10e9/L    Absolute Eosinophils 0.0 0.0 - 0.7 10e9/L    Absolute Basophils 0.0 0.0 - 0.2 10e9/L    Abs Immature Granulocytes 0.0 0 - 0.4 10e9/L    Absolute Nucleated RBC 0.0    Comprehensive metabolic panel   Result Value Ref Range    Sodium 142 133 - 144 mmol/L    Potassium 4.2 3.4 - 5.3 mmol/L    Chloride 112 (H) 94 - 109 mmol/L    Carbon Dioxide 21 20 - 32 mmol/L    Anion Gap 9 3 - 14 mmol/L    Glucose 134 (H) 70 - 99 mg/dL    Urea Nitrogen 9 7 - 30 mg/dL    Creatinine 1.00 0.52 - 1.04 mg/dL    GFR Estimate 58 (L) >60 mL/min/1.7m2    GFR Estimate If Black 70 >60 mL/min/1.7m2    Calcium 8.2 (L) 8.5 - 10.1 mg/dL    Bilirubin Total 0.2 0.2 - 1.3 mg/dL    Albumin 2.8 (L) 3.4 - 5.0 g/dL    Protein Total 5.9 (L) 6.8 - 8.8 g/dL    Alkaline Phosphatase 53 40 - 150 U/L    ALT 23 0 - 50 U/L    AST 24 0 - 45 U/L   Magnesium   Result Value Ref Range    Magnesium 1.7 1.6 - 2.3 mg/dL   Phosphorus   Result Value Ref Range    Phosphorus 1.9 (L) 2.5 - 4.5 mg/dL   Lactate Dehydrogenase   Result Value Ref Range    Lactate Dehydrogenase 193 81 - 234 U/L   CK total   Result Value Ref Range    CK Total 39 30 - 225 U/L   Lactic  acid level STAT   Result Value Ref Range    Lactic Acid 1.9 0.7 - 2.1 mmol/L

## 2017-06-07 NOTE — PROGRESS NOTES
Nursing Focus: Chemotherapy  D: Positive blood return via PICC. Insertion site is clean/dry/intact, dressing intact with no complaints of pain.  Urine output is recorded in intake in Doc Flowsheet.    I: Premedications given per order (see electronic medical administration record). Dose #2 of IL-2 started to infuse over 15 minutes. Reviewed pt teaching on chemotherapy side effects.  Pt denies need for further teaching. Chemotherapy double checked per protocol by two chemotherapy competent RN's.   A: Tolerating procedure well. Denies nausea and or pain. Met urine parameters. Labs WNL.   P: Continue to monitor urine output and symptoms of nausea. Screen for symptoms of toxicity.

## 2017-06-07 NOTE — PROGRESS NOTES
SPIRITUAL HEALTH SERVICES Progress Note  [facility] ([place] 7D    Initial visit with pt per pt's friend request. Other friends were with pt in the room while we entered. Pt was alert, but tired. Pt confirmed her [employer]ism kami. She is member of [facility] in [place], [place]. Pt also confirmed that she was anointed before hospitalization by her paris . I offered support and shared a healing prayer. Pt's friends joined us in prayer. They are very supportive. All appreciated the visit and expressed gratitude. This unit  will continue to f/u, but [employer] priests are also available if needed.                                                                                                                                          Father Osbaldo Holden

## 2017-06-07 NOTE — PLAN OF CARE
Problem: Goal Outcome Summary  Goal: Goal Outcome Summary  Outcome: No Change  Tmax 101.6; BCx drawn. Scheduled Tylenol given. No need for Naproxen as temp came down. Dose #2 of IL-2 given at 0200. Denied nausea. C/o general body aches; scheduled Tylenol given. Triggered SIRS; LA was... Pt stated she felt light-headed after returning from the bathroom. Pt encouraged to sit at side of bed before getting up and to call if this continues to happen. Urine parameters have been met for next dose, at 1000. Will need to be ordered if meeting all other parameters. Up ad ta. Cont to monitor and with POC.

## 2017-06-08 LAB
ALBUMIN SERPL-MCNC: 2.6 G/DL (ref 3.4–5)
ALP SERPL-CCNC: 52 U/L (ref 40–150)
ALT SERPL W P-5'-P-CCNC: 30 U/L (ref 0–50)
ANION GAP SERPL CALCULATED.3IONS-SCNC: 8 MMOL/L (ref 3–14)
AST SERPL W P-5'-P-CCNC: 32 U/L (ref 0–45)
BASOPHILS # BLD AUTO: 0 10E9/L (ref 0–0.2)
BASOPHILS NFR BLD AUTO: 0 %
BILIRUB SERPL-MCNC: 0.6 MG/DL (ref 0.2–1.3)
BUN SERPL-MCNC: 5 MG/DL (ref 7–30)
CALCIUM SERPL-MCNC: 7.5 MG/DL (ref 8.5–10.1)
CHLORIDE SERPL-SCNC: 110 MMOL/L (ref 94–109)
CK SERPL-CCNC: 46 U/L (ref 30–225)
CO2 SERPL-SCNC: 20 MMOL/L (ref 20–32)
CREAT SERPL-MCNC: 1 MG/DL (ref 0.52–1.04)
DIFFERENTIAL METHOD BLD: ABNORMAL
EOSINOPHIL # BLD AUTO: 0.2 10E9/L (ref 0–0.7)
EOSINOPHIL NFR BLD AUTO: 2.8 %
ERYTHROCYTE [DISTWIDTH] IN BLOOD BY AUTOMATED COUNT: 14.2 % (ref 10–15)
GFR SERPL CREATININE-BSD FRML MDRD: 58 ML/MIN/1.7M2
GLUCOSE SERPL-MCNC: 134 MG/DL (ref 70–99)
HCT VFR BLD AUTO: 32 % (ref 35–47)
HGB BLD-MCNC: 10.4 G/DL (ref 11.7–15.7)
IMM GRANULOCYTES # BLD: 0 10E9/L (ref 0–0.4)
IMM GRANULOCYTES NFR BLD: 0.3 %
LACTATE BLD-SCNC: 1.6 MMOL/L (ref 0.7–2.1)
LDH SERPL L TO P-CCNC: 176 U/L (ref 81–234)
LYMPHOCYTES # BLD AUTO: 0.2 10E9/L (ref 0.8–5.3)
LYMPHOCYTES NFR BLD AUTO: 2.3 %
MAGNESIUM SERPL-MCNC: 1.3 MG/DL (ref 1.6–2.3)
MAGNESIUM SERPL-MCNC: 2.7 MG/DL (ref 1.6–2.3)
MCH RBC QN AUTO: 30.1 PG (ref 26.5–33)
MCHC RBC AUTO-ENTMCNC: 32.5 G/DL (ref 31.5–36.5)
MCV RBC AUTO: 93 FL (ref 78–100)
MONOCYTES # BLD AUTO: 0.3 10E9/L (ref 0–1.3)
MONOCYTES NFR BLD AUTO: 4 %
NEUTROPHILS # BLD AUTO: 6.8 10E9/L (ref 1.6–8.3)
NEUTROPHILS NFR BLD AUTO: 90.6 %
NRBC # BLD AUTO: 0 10*3/UL
NRBC BLD AUTO-RTO: 0 /100
PHOSPHATE SERPL-MCNC: 1.6 MG/DL (ref 2.5–4.5)
PHOSPHATE SERPL-MCNC: 1.8 MG/DL (ref 2.5–4.5)
PLATELET # BLD AUTO: 196 10E9/L (ref 150–450)
POTASSIUM SERPL-SCNC: 3.3 MMOL/L (ref 3.4–5.3)
POTASSIUM SERPL-SCNC: 3.8 MMOL/L (ref 3.4–5.3)
PROT SERPL-MCNC: 5.5 G/DL (ref 6.8–8.8)
RBC # BLD AUTO: 3.45 10E12/L (ref 3.8–5.2)
SODIUM SERPL-SCNC: 139 MMOL/L (ref 133–144)
WBC # BLD AUTO: 7.5 10E9/L (ref 4–11)

## 2017-06-08 PROCEDURE — 84100 ASSAY OF PHOSPHORUS: CPT | Performed by: INTERNAL MEDICINE

## 2017-06-08 PROCEDURE — 36592 COLLECT BLOOD FROM PICC: CPT | Performed by: INTERNAL MEDICINE

## 2017-06-08 PROCEDURE — 25000128 H RX IP 250 OP 636: Performed by: PHYSICIAN ASSISTANT

## 2017-06-08 PROCEDURE — 99233 SBSQ HOSP IP/OBS HIGH 50: CPT | Performed by: INTERNAL MEDICINE

## 2017-06-08 PROCEDURE — 25000132 ZZH RX MED GY IP 250 OP 250 PS 637: Performed by: INTERNAL MEDICINE

## 2017-06-08 PROCEDURE — 83735 ASSAY OF MAGNESIUM: CPT | Performed by: INTERNAL MEDICINE

## 2017-06-08 PROCEDURE — 80053 COMPREHEN METABOLIC PANEL: CPT | Performed by: INTERNAL MEDICINE

## 2017-06-08 PROCEDURE — 87040 BLOOD CULTURE FOR BACTERIA: CPT | Performed by: INTERNAL MEDICINE

## 2017-06-08 PROCEDURE — 40000802 ZZH SITE CHECK

## 2017-06-08 PROCEDURE — 12000008 ZZH R&B INTERMEDIATE UMMC

## 2017-06-08 PROCEDURE — 83615 LACTATE (LD) (LDH) ENZYME: CPT | Performed by: INTERNAL MEDICINE

## 2017-06-08 PROCEDURE — 82550 ASSAY OF CK (CPK): CPT | Performed by: INTERNAL MEDICINE

## 2017-06-08 PROCEDURE — 25000132 ZZH RX MED GY IP 250 OP 250 PS 637: Performed by: PHYSICIAN ASSISTANT

## 2017-06-08 PROCEDURE — 25000125 ZZHC RX 250: Performed by: PHYSICIAN ASSISTANT

## 2017-06-08 PROCEDURE — 84132 ASSAY OF SERUM POTASSIUM: CPT | Performed by: INTERNAL MEDICINE

## 2017-06-08 PROCEDURE — 85025 COMPLETE CBC W/AUTO DIFF WBC: CPT | Performed by: INTERNAL MEDICINE

## 2017-06-08 PROCEDURE — 25800025 ZZH RX 258: Performed by: INTERNAL MEDICINE

## 2017-06-08 PROCEDURE — 25000128 H RX IP 250 OP 636: Performed by: INTERNAL MEDICINE

## 2017-06-08 PROCEDURE — 83605 ASSAY OF LACTIC ACID: CPT | Performed by: INTERNAL MEDICINE

## 2017-06-08 RX ADMIN — POTASSIUM CHLORIDE, DEXTROSE MONOHYDRATE AND SODIUM CHLORIDE: 150; 5; 900 INJECTION, SOLUTION INTRAVENOUS at 17:47

## 2017-06-08 RX ADMIN — MAGNESIUM SULFATE IN WATER 4 G: 40 INJECTION, SOLUTION INTRAVENOUS at 08:37

## 2017-06-08 RX ADMIN — CEPHALEXIN 500 MG: 500 CAPSULE ORAL at 10:36

## 2017-06-08 RX ADMIN — NAPROXEN 250 MG: 250 TABLET ORAL at 06:31

## 2017-06-08 RX ADMIN — POTASSIUM CHLORIDE, DEXTROSE MONOHYDRATE AND SODIUM CHLORIDE: 150; 5; 900 INJECTION, SOLUTION INTRAVENOUS at 01:57

## 2017-06-08 RX ADMIN — ALDESLEUKIN 34 MILLION UNITS: 1.1 INJECTION, POWDER, LYOPHILIZED, FOR SOLUTION INTRAVENOUS at 19:12

## 2017-06-08 RX ADMIN — RANITIDINE HYDROCHLORIDE 150 MG: 150 TABLET, FILM COATED ORAL at 20:33

## 2017-06-08 RX ADMIN — ONDANSETRON 8 MG: 2 INJECTION INTRAMUSCULAR; INTRAVENOUS at 00:27

## 2017-06-08 RX ADMIN — MEPERIDINE HYDROCHLORIDE 25 MG: 25 INJECTION, SOLUTION INTRAMUSCULAR; INTRAVENOUS; SUBCUTANEOUS at 14:19

## 2017-06-08 RX ADMIN — SODIUM PHOSPHATE, MONOBASIC, MONOHYDRATE AND SODIUM PHOSPHATE, DIBASIC, ANHYDROUS 20 MMOL: 276; 142 INJECTION, SOLUTION INTRAVENOUS at 12:16

## 2017-06-08 RX ADMIN — LORAZEPAM 0.5 MG: 2 INJECTION INTRAMUSCULAR; INTRAVENOUS at 17:46

## 2017-06-08 RX ADMIN — ALDESLEUKIN 34 MILLION UNITS: 1.1 INJECTION, POWDER, LYOPHILIZED, FOR SOLUTION INTRAVENOUS at 01:57

## 2017-06-08 RX ADMIN — ALDESLEUKIN 34 MILLION UNITS: 1.1 INJECTION, POWDER, LYOPHILIZED, FOR SOLUTION INTRAVENOUS at 11:12

## 2017-06-08 RX ADMIN — RANITIDINE HYDROCHLORIDE 150 MG: 150 TABLET, FILM COATED ORAL at 10:36

## 2017-06-08 RX ADMIN — ONDANSETRON 8 MG: 2 INJECTION INTRAMUSCULAR; INTRAVENOUS at 16:26

## 2017-06-08 RX ADMIN — SODIUM PHOSPHATE, MONOBASIC, MONOHYDRATE AND SODIUM PHOSPHATE, DIBASIC, ANHYDROUS 20 MMOL: 276; 142 INJECTION, SOLUTION INTRAVENOUS at 22:42

## 2017-06-08 RX ADMIN — LORAZEPAM 0.5 MG: 2 INJECTION INTRAMUSCULAR; INTRAVENOUS at 20:33

## 2017-06-08 RX ADMIN — ACETAMINOPHEN 650 MG: 325 TABLET, FILM COATED ORAL at 20:33

## 2017-06-08 RX ADMIN — POTASSIUM CHLORIDE 20 MEQ: 29.8 INJECTION, SOLUTION INTRAVENOUS at 14:08

## 2017-06-08 RX ADMIN — LORAZEPAM 0.5 MG: 2 INJECTION INTRAMUSCULAR; INTRAVENOUS at 12:33

## 2017-06-08 RX ADMIN — ACETAMINOPHEN 650 MG: 325 TABLET, FILM COATED ORAL at 00:26

## 2017-06-08 RX ADMIN — BUPROPION HYDROCHLORIDE 300 MG: 300 TABLET, FILM COATED, EXTENDED RELEASE ORAL at 10:36

## 2017-06-08 RX ADMIN — CEPHALEXIN 500 MG: 500 CAPSULE ORAL at 20:33

## 2017-06-08 RX ADMIN — POTASSIUM CHLORIDE 20 MEQ: 29.8 INJECTION, SOLUTION INTRAVENOUS at 11:03

## 2017-06-08 RX ADMIN — ACETAMINOPHEN 650 MG: 325 TABLET, FILM COATED ORAL at 05:29

## 2017-06-08 RX ADMIN — ACETAMINOPHEN 650 MG: 325 TABLET, FILM COATED ORAL at 10:36

## 2017-06-08 RX ADMIN — PROCHLORPERAZINE EDISYLATE 10 MG: 5 INJECTION INTRAMUSCULAR; INTRAVENOUS at 21:53

## 2017-06-08 RX ADMIN — MEPERIDINE HYDROCHLORIDE 25 MG: 25 INJECTION, SOLUTION INTRAMUSCULAR; INTRAVENOUS; SUBCUTANEOUS at 04:48

## 2017-06-08 RX ADMIN — METHYLPREDNISOLONE SODIUM SUCCINATE 125 MG: 125 INJECTION, POWDER, LYOPHILIZED, FOR SOLUTION INTRAMUSCULAR; INTRAVENOUS at 17:27

## 2017-06-08 RX ADMIN — CITALOPRAM HYDROBROMIDE 20 MG: 20 TABLET ORAL at 20:33

## 2017-06-08 RX ADMIN — ONDANSETRON 8 MG: 2 INJECTION INTRAMUSCULAR; INTRAVENOUS at 10:37

## 2017-06-08 RX ADMIN — ACETAMINOPHEN 650 MG: 325 TABLET, FILM COATED ORAL at 16:26

## 2017-06-08 RX ADMIN — LORAZEPAM 0.5 MG: 2 INJECTION INTRAMUSCULAR; INTRAVENOUS at 04:54

## 2017-06-08 RX ADMIN — DEXTROSE MONOHYDRATE 1000 ML: 50 INJECTION, SOLUTION INTRAVENOUS at 17:47

## 2017-06-08 RX ADMIN — WHITE PETROLATUM: 1.75 OINTMENT TOPICAL at 20:33

## 2017-06-08 NOTE — PLAN OF CARE
D/I: Dose #6 Aldesleukin given. Urine output/Blood pressure parameters met.  See flowsheet. Aldesleukin infused at 1112 over 15 minutes. No reaction for this dose yet. Acetaminophen given as scheduled every four hours. Intravenous fluid support.  A: Tolerating Aldesleukin thus far. Evidence of capillary leak syndrome was evident. LS remain clear. Pt flushed and increased generalized edema.   P: Continue to monitor fluid status and vital signs for evidence of capillary leak syndrome. Continue IV fluid support and scheduled acetaminophen. Notify MD if patient falls below blood pressure or urine output parameters.     Demerol used x1. Ativan given 90mins post IL-2. No emesis and chills for short period. K, mag and phos replaced. Pt showered. Didn't eat. Continue with POC.

## 2017-06-08 NOTE — PLAN OF CARE
Problem: Goal Outcome Summary  Goal: Goal Outcome Summary  Outcome: No Change  Tmax 101.5f. Scheduled tylenol. Temp trended down; no naproxen needed. HRmax 110. Soft BP at baseline.  AOVSS. Up ad ta. Dose #4 IL_2 administered at 1800. No reaction except mild nausea at 2000. Dose #5 due at 0200. Has met urine parameters. Dose ordered. PRN compazine administered. Ativan 0.5mg x1 for anxiety and nausea. Complaining of generalized body aches. Denies need for pain med intervention. Phos recheck at 1600 2.4. Needed one more bag for replacement. Recheck in am. Continue with POC.     Addendum: pt awoke at 2130 and had large emesis. Not measured and pt threw up in bed, self, and floor. Pt cleaned up and 0.5 ativan administered to help with nausea. Pt complaining of slight chills warm blankets given. Pt currently resting comfortably.

## 2017-06-08 NOTE — PROGRESS NOTES
"[facility], [place]    Hematology/Oncology Progress Note    Date of Service (when I saw the patient): 06/08/2017     Assessment & Plan   Suzi Gonsales is a 50 y/o female with RCC s/p R radical nephrectomy in 12/2014, now with metastatic RCC (lungs, adrenal, and pancreas) who is admitted for Cycle 1 HD IL-2.      #Metastatic RCC. Admitted for Cycle 1 HD IL2. Has tolerated 5 doses thus far, complicated by expected hypotension, tachycardia, fevers, chills/rigors, muscle aches, facial flushing, and nausea/emesis. Creatinine stable, Plts WNL, UOP meeting parameters.      Treatment Plan:   - IVFs per trt plan  - Tylenol 650mg q4hr, Zofran 8mg q8hr, Zantac,150mg BID, Keflex 500mg BID  - aldesleukin 34 million units IV q8hr for up to 14 doses as tolerated  - PRN supportive care medications  - AVOID diuretics and steroids with IL-2     #H/o anxiety.   - continue PTA Wellbutrin, Celexa  - PRN Ativan per treatment plan     FEN  - IVFs per treatment plan, bolus PRN (SBP <85 or urine output <30 mL/hr)     PPx  - defer VTE prophylaxis given anticipated thrombocytopenia with IL-2  - Zantac as above     Dispo: Pending completion of tolerated IL-2 doses and recovery of acute issues.   - f/u 6/16 currently scheduled     Torri Kim PA-C ([place])  Heme/Onc  [phone]     Interval History   States she had a rough night, with vomiting and rigors. But states the \"drugs are good\" - demerol helped, antiemetics also helping. Feeling better now, has a friend visiting. Feels puffy and flushed in her face. Denies SOB or difficulty breathing. Nausea controlled at the moment.U Urinating well and meeting UOP parameters thus far. Was able to get a shower this morning. States her goal is 10 doses at this point in time.     Physical Exam   Temp: 97.8  F (36.6  C) Temp src: Oral BP: (!) 87/39   Heart Rate: 88 Resp: 18 SpO2: 94 % O2 Device: None (Room air)    Vitals:    06/06/17 1239 06/07/17 0847 06/08/17 1058   Weight: " 57.4 kg (126 lb 9.6 oz) 58.4 kg (128 lb 11.2 oz) 60.1 kg (132 lb 9.6 oz)     Vital Signs with Ranges  Temp:  [97.8  F (36.6  C)-104.1  F (40.1  C)] 97.8  F (36.6  C)  Heart Rate:  [] 88  Resp:  [16-20] 18  BP: ()/(37-52) 87/39  SpO2:  [90 %-97 %] 94 %  I/O last 3 completed shifts:  In: 4256.67 [P.O.:740; I.V.:3345.67; IV Piggyback:171]  Out: 3175 [Urine:3175]  Constitutional:  Pleasant female seen resting in bed. Appears well, alert, interactive.   HEENT: NC/AT. Mild facial edema and flushing with some dry skin noted along sides of mouth. Sclera anicteric. MMM.   Respiratory: No increased work of breathing, good air exchange, on RA. No wheezing.  Skin: Clean, dry, intact. Mildly flushed. No lesions or rashes on exposed skin surfaces.   Musculoskeletal: Extremities grossly normal in appearance. No significant extremity edema.   Neurologic: Alert and oriented. Grossly non-focal.    Neuropsychiatric: Calm, normal eye contact. Affect congruent.   VAD: PICC is c/d/i.     Medications     - MEDICATION INSTRUCTIONS -       dextrose 5% and 0.9% NaCl with potassium chloride 20 mEq 100 mL/hr at 06/08/17 0157     D5W 1,000 mL (06/06/17 1734)     - MEDICATION INSTRUCTIONS -       NaCl         sodium chloride (PF)  10 mL Intracatheter Q7 Days     eucerin   Topical BID     ondansetron  8 mg Intravenous Q8H     acetaminophen  650 mg Oral Q4H     ranitidine  150 mg Oral BID     cephalexin  500 mg Oral BID     aldesleukin (PROLEUKIN) infusion  600,000 Units/kg (Treatment Plan Recorded) Intravenous Q8H     buPROPion  300 mg Oral QAM     citalopram  20 mg Oral QPM       Data    ROUTINE IP LABS (Last four results)  BMP    Recent Labs  Lab 06/08/17  0549 06/07/17  0446 06/06/17  1058    142 139   POTASSIUM 3.3* 4.2 4.0   CHLORIDE 110* 112* 105   MUNDO 7.5* 8.2* 9.3   CO2 20 21 26   BUN 5* 9 12   CR 1.00 1.00 0.97   * 134* 91     CBC    Recent Labs  Lab 06/08/17  0549 06/07/17  0446 06/06/17  1058   WBC 7.5 4.3 4.2    RBC 3.45* 3.50* 3.92   HGB 10.4* 10.6* 12.0   HCT 32.0* 32.2* 36.7   MCV 93 92 94   MCH 30.1 30.3 30.6   MCHC 32.5 32.9 32.7   RDW 14.2 13.9 13.4    225 355       Results for orders placed or performed during the hospital encounter of 06/06/17 (from the past 24 hour(s))   Phosphorus   Result Value Ref Range    Phosphorus 2.4 (L) 2.5 - 4.5 mg/dL   CBC with platelets differential   Result Value Ref Range    WBC 7.5 4.0 - 11.0 10e9/L    RBC Count 3.45 (L) 3.8 - 5.2 10e12/L    Hemoglobin 10.4 (L) 11.7 - 15.7 g/dL    Hematocrit 32.0 (L) 35.0 - 47.0 %    MCV 93 78 - 100 fl    MCH 30.1 26.5 - 33.0 pg    MCHC 32.5 31.5 - 36.5 g/dL    RDW 14.2 10.0 - 15.0 %    Platelet Count 196 150 - 450 10e9/L    Diff Method Automated Method     % Neutrophils 90.6 %    % Lymphocytes 2.3 %    % Monocytes 4.0 %    % Eosinophils 2.8 %    % Basophils 0.0 %    % Immature Granulocytes 0.3 %    Nucleated RBCs 0 0 /100    Absolute Neutrophil 6.8 1.6 - 8.3 10e9/L    Absolute Lymphocytes 0.2 (L) 0.8 - 5.3 10e9/L    Absolute Monocytes 0.3 0.0 - 1.3 10e9/L    Absolute Eosinophils 0.2 0.0 - 0.7 10e9/L    Absolute Basophils 0.0 0.0 - 0.2 10e9/L    Abs Immature Granulocytes 0.0 0 - 0.4 10e9/L    Absolute Nucleated RBC 0.0    Comprehensive metabolic panel   Result Value Ref Range    Sodium 139 133 - 144 mmol/L    Potassium 3.3 (L) 3.4 - 5.3 mmol/L    Chloride 110 (H) 94 - 109 mmol/L    Carbon Dioxide 20 20 - 32 mmol/L    Anion Gap 8 3 - 14 mmol/L    Glucose 134 (H) 70 - 99 mg/dL    Urea Nitrogen 5 (L) 7 - 30 mg/dL    Creatinine 1.00 0.52 - 1.04 mg/dL    GFR Estimate 58 (L) >60 mL/min/1.7m2    GFR Estimate If Black 70 >60 mL/min/1.7m2    Calcium 7.5 (L) 8.5 - 10.1 mg/dL    Bilirubin Total 0.6 0.2 - 1.3 mg/dL    Albumin 2.6 (L) 3.4 - 5.0 g/dL    Protein Total 5.5 (L) 6.8 - 8.8 g/dL    Alkaline Phosphatase 52 40 - 150 U/L    ALT 30 0 - 50 U/L    AST 32 0 - 45 U/L   Magnesium   Result Value Ref Range    Magnesium 1.3 (L) 1.6 - 2.3 mg/dL   Phosphorus    Result Value Ref Range    Phosphorus 1.8 (L) 2.5 - 4.5 mg/dL   Lactate Dehydrogenase   Result Value Ref Range    Lactate Dehydrogenase 176 81 - 234 U/L   CK total   Result Value Ref Range    CK Total 46 30 - 225 U/L   Blood culture   Result Value Ref Range    Specimen Description Blood PICC     Culture Micro Pending     Micro Report Status Pending    Lactic acid level STAT   Result Value Ref Range    Lactic Acid 1.6 0.7 - 2.1 mmol/L

## 2017-06-08 NOTE — PLAN OF CARE
Problem: Goal Outcome Summary  Goal: Goal Outcome Summary  Outcome: No Change  Tmax 104.1. HR tachy. Stable on RA. BP soft per baseline. Denies pain or nausea. Dose #5 of IL-2 given at 0200 and pt rigored about 2.5hrs later. One dose of demerol given along with Ativan per pt request. Bear hugger applied. Pt has met urine parameters for dose #6 at 1000. Will need to be ordered. Triggered SIRS for HR, WBC, and temp. Awaiting LA. Up ad ta. Cont to monitor and with POC.    Addendum: Temp came down a bit after Tylenol but then increased back up to 103.1. HR in the high 120s. Naproxen given. Will continue to monitor.

## 2017-06-08 NOTE — PROGRESS NOTES
Nursing Focus: Chemotherapy  D: Positive blood return via PICC. Insertion site is clean/dry/intact, dressing intact with no complaints of pain.  Urine output is recorded in intake in Doc Flowsheet.    I: Premedications given per order (see electronic medical administration record). Dose #5 of IL-2 started to infuse over 15 minutes. Reviewed pt teaching on chemotherapy side effects.  Pt denies need for further teaching. Chemotherapy double checked per protocol by two chemotherapy competent RN's.   A: Tolerating procedure well. Denies nausea and or pain. Pt met urine parameters, labs WNL, fever trending down.  P: Continue to monitor urine output and symptoms of nausea. Screen for symptoms of toxicity.

## 2017-06-09 LAB
ALBUMIN SERPL-MCNC: 2.4 G/DL (ref 3.4–5)
ALP SERPL-CCNC: 58 U/L (ref 40–150)
ALT SERPL W P-5'-P-CCNC: 68 U/L (ref 0–50)
ANION GAP SERPL CALCULATED.3IONS-SCNC: 8 MMOL/L (ref 3–14)
AST SERPL W P-5'-P-CCNC: 58 U/L (ref 0–45)
BASOPHILS # BLD AUTO: 0 10E9/L (ref 0–0.2)
BASOPHILS NFR BLD AUTO: 0.2 %
BILIRUB SERPL-MCNC: 0.8 MG/DL (ref 0.2–1.3)
BUN SERPL-MCNC: 6 MG/DL (ref 7–30)
CALCIUM SERPL-MCNC: 7.3 MG/DL (ref 8.5–10.1)
CHLORIDE SERPL-SCNC: 113 MMOL/L (ref 94–109)
CK SERPL-CCNC: 134 U/L (ref 30–225)
CO2 SERPL-SCNC: 19 MMOL/L (ref 20–32)
CREAT SERPL-MCNC: 0.79 MG/DL (ref 0.52–1.04)
DIFFERENTIAL METHOD BLD: ABNORMAL
EOSINOPHIL # BLD AUTO: 0 10E9/L (ref 0–0.7)
EOSINOPHIL NFR BLD AUTO: 0.5 %
ERYTHROCYTE [DISTWIDTH] IN BLOOD BY AUTOMATED COUNT: 14.3 % (ref 10–15)
GFR SERPL CREATININE-BSD FRML MDRD: 76 ML/MIN/1.7M2
GLUCOSE SERPL-MCNC: 173 MG/DL (ref 70–99)
HCT VFR BLD AUTO: 29.9 % (ref 35–47)
HGB BLD-MCNC: 9.8 G/DL (ref 11.7–15.7)
IMM GRANULOCYTES # BLD: 0 10E9/L (ref 0–0.4)
IMM GRANULOCYTES NFR BLD: 0.7 %
LACTATE BLD-SCNC: 3.3 MMOL/L (ref 0.7–2.1)
LDH SERPL L TO P-CCNC: 222 U/L (ref 81–234)
LYMPHOCYTES # BLD AUTO: 0.1 10E9/L (ref 0.8–5.3)
LYMPHOCYTES NFR BLD AUTO: 1.3 %
MAGNESIUM SERPL-MCNC: 2.5 MG/DL (ref 1.6–2.3)
MCH RBC QN AUTO: 30.2 PG (ref 26.5–33)
MCHC RBC AUTO-ENTMCNC: 32.8 G/DL (ref 31.5–36.5)
MCV RBC AUTO: 92 FL (ref 78–100)
MONOCYTES # BLD AUTO: 0.2 10E9/L (ref 0–1.3)
MONOCYTES NFR BLD AUTO: 2.8 %
NEUTROPHILS # BLD AUTO: 5.8 10E9/L (ref 1.6–8.3)
NEUTROPHILS NFR BLD AUTO: 94.5 %
NRBC # BLD AUTO: 0 10*3/UL
NRBC BLD AUTO-RTO: 0 /100
PHOSPHATE SERPL-MCNC: 1.8 MG/DL (ref 2.5–4.5)
PHOSPHATE SERPL-MCNC: 2 MG/DL (ref 2.5–4.5)
PLATELET # BLD AUTO: 150 10E9/L (ref 150–450)
POTASSIUM SERPL-SCNC: 4 MMOL/L (ref 3.4–5.3)
PROT SERPL-MCNC: 5.4 G/DL (ref 6.8–8.8)
RBC # BLD AUTO: 3.25 10E12/L (ref 3.8–5.2)
SODIUM SERPL-SCNC: 140 MMOL/L (ref 133–144)
WBC # BLD AUTO: 6.1 10E9/L (ref 4–11)

## 2017-06-09 PROCEDURE — 36592 COLLECT BLOOD FROM PICC: CPT | Performed by: INTERNAL MEDICINE

## 2017-06-09 PROCEDURE — 87040 BLOOD CULTURE FOR BACTERIA: CPT | Performed by: INTERNAL MEDICINE

## 2017-06-09 PROCEDURE — 84100 ASSAY OF PHOSPHORUS: CPT | Performed by: INTERNAL MEDICINE

## 2017-06-09 PROCEDURE — 82550 ASSAY OF CK (CPK): CPT | Performed by: INTERNAL MEDICINE

## 2017-06-09 PROCEDURE — 25000132 ZZH RX MED GY IP 250 OP 250 PS 637: Performed by: PHYSICIAN ASSISTANT

## 2017-06-09 PROCEDURE — 25000132 ZZH RX MED GY IP 250 OP 250 PS 637: Performed by: INTERNAL MEDICINE

## 2017-06-09 PROCEDURE — 80053 COMPREHEN METABOLIC PANEL: CPT | Performed by: INTERNAL MEDICINE

## 2017-06-09 PROCEDURE — 12000008 ZZH R&B INTERMEDIATE UMMC

## 2017-06-09 PROCEDURE — 83605 ASSAY OF LACTIC ACID: CPT | Performed by: INTERNAL MEDICINE

## 2017-06-09 PROCEDURE — 25000128 H RX IP 250 OP 636: Performed by: INTERNAL MEDICINE

## 2017-06-09 PROCEDURE — 99233 SBSQ HOSP IP/OBS HIGH 50: CPT | Performed by: INTERNAL MEDICINE

## 2017-06-09 PROCEDURE — 94640 AIRWAY INHALATION TREATMENT: CPT | Mod: 76

## 2017-06-09 PROCEDURE — 25000125 ZZHC RX 250: Performed by: PHYSICIAN ASSISTANT

## 2017-06-09 PROCEDURE — 85025 COMPLETE CBC W/AUTO DIFF WBC: CPT | Performed by: INTERNAL MEDICINE

## 2017-06-09 PROCEDURE — 94640 AIRWAY INHALATION TREATMENT: CPT

## 2017-06-09 PROCEDURE — 25800025 ZZH RX 258: Performed by: INTERNAL MEDICINE

## 2017-06-09 PROCEDURE — 83615 LACTATE (LD) (LDH) ENZYME: CPT | Performed by: INTERNAL MEDICINE

## 2017-06-09 PROCEDURE — 25000128 H RX IP 250 OP 636: Performed by: PHYSICIAN ASSISTANT

## 2017-06-09 PROCEDURE — 40000275 ZZH STATISTIC RCP TIME EA 10 MIN

## 2017-06-09 PROCEDURE — 83735 ASSAY OF MAGNESIUM: CPT | Performed by: INTERNAL MEDICINE

## 2017-06-09 RX ORDER — NYSTATIN 100000/ML
500000 SUSPENSION, ORAL (FINAL DOSE FORM) ORAL 4 TIMES DAILY
Status: DISCONTINUED | OUTPATIENT
Start: 2017-06-09 | End: 2017-06-10 | Stop reason: HOSPADM

## 2017-06-09 RX ORDER — ALBUTEROL SULFATE 0.83 MG/ML
2.5 SOLUTION RESPIRATORY (INHALATION) EVERY 4 HOURS PRN
Status: DISCONTINUED | OUTPATIENT
Start: 2017-06-09 | End: 2017-06-10 | Stop reason: HOSPADM

## 2017-06-09 RX ADMIN — NYSTATIN 500000 UNITS: 100000 SUSPENSION ORAL at 20:49

## 2017-06-09 RX ADMIN — WHITE PETROLATUM: 1.75 OINTMENT TOPICAL at 11:14

## 2017-06-09 RX ADMIN — NYSTATIN 500000 UNITS: 100000 SUSPENSION ORAL at 16:33

## 2017-06-09 RX ADMIN — BUPROPION HYDROCHLORIDE 300 MG: 300 TABLET, FILM COATED, EXTENDED RELEASE ORAL at 08:29

## 2017-06-09 RX ADMIN — ALBUTEROL SULFATE 2.5 MG: 2.5 SOLUTION RESPIRATORY (INHALATION) at 10:47

## 2017-06-09 RX ADMIN — ACETAMINOPHEN 650 MG: 325 TABLET, FILM COATED ORAL at 16:33

## 2017-06-09 RX ADMIN — CITALOPRAM HYDROBROMIDE 20 MG: 20 TABLET ORAL at 20:49

## 2017-06-09 RX ADMIN — SODIUM PHOSPHATE, MONOBASIC, MONOHYDRATE AND SODIUM PHOSPHATE, DIBASIC, ANHYDROUS 15 MMOL: 276; 142 INJECTION, SOLUTION INTRAVENOUS at 21:28

## 2017-06-09 RX ADMIN — ACETAMINOPHEN 650 MG: 325 TABLET, FILM COATED ORAL at 12:45

## 2017-06-09 RX ADMIN — PROCHLORPERAZINE EDISYLATE 10 MG: 5 INJECTION INTRAMUSCULAR; INTRAVENOUS at 08:32

## 2017-06-09 RX ADMIN — ACETAMINOPHEN 650 MG: 325 TABLET, FILM COATED ORAL at 08:29

## 2017-06-09 RX ADMIN — ALDESLEUKIN 34 MILLION UNITS: 1.1 INJECTION, POWDER, LYOPHILIZED, FOR SOLUTION INTRAVENOUS at 11:12

## 2017-06-09 RX ADMIN — POTASSIUM CHLORIDE, DEXTROSE MONOHYDRATE AND SODIUM CHLORIDE: 150; 5; 900 INJECTION, SOLUTION INTRAVENOUS at 16:32

## 2017-06-09 RX ADMIN — CEPHALEXIN 500 MG: 500 CAPSULE ORAL at 20:49

## 2017-06-09 RX ADMIN — WHITE PETROLATUM: 1.75 OINTMENT TOPICAL at 20:51

## 2017-06-09 RX ADMIN — ONDANSETRON 8 MG: 2 INJECTION INTRAMUSCULAR; INTRAVENOUS at 08:27

## 2017-06-09 RX ADMIN — LORAZEPAM 0.5 MG: 2 INJECTION INTRAMUSCULAR; INTRAVENOUS at 12:44

## 2017-06-09 RX ADMIN — ACETAMINOPHEN 650 MG: 325 TABLET, FILM COATED ORAL at 04:26

## 2017-06-09 RX ADMIN — POTASSIUM CHLORIDE, DEXTROSE MONOHYDRATE AND SODIUM CHLORIDE: 150; 5; 900 INJECTION, SOLUTION INTRAVENOUS at 04:22

## 2017-06-09 RX ADMIN — ACETAMINOPHEN 650 MG: 325 TABLET, FILM COATED ORAL at 20:49

## 2017-06-09 RX ADMIN — RANITIDINE HYDROCHLORIDE 150 MG: 150 TABLET, FILM COATED ORAL at 20:49

## 2017-06-09 RX ADMIN — SODIUM PHOSPHATE, MONOBASIC, MONOHYDRATE AND SODIUM PHOSPHATE, DIBASIC, ANHYDROUS 20 MMOL: 276; 142 INJECTION, SOLUTION INTRAVENOUS at 11:33

## 2017-06-09 RX ADMIN — SODIUM CHLORIDE 250 ML: 9 INJECTION, SOLUTION INTRAVENOUS at 04:44

## 2017-06-09 RX ADMIN — ACETAMINOPHEN 650 MG: 325 TABLET, FILM COATED ORAL at 00:19

## 2017-06-09 RX ADMIN — ONDANSETRON 8 MG: 2 INJECTION INTRAMUSCULAR; INTRAVENOUS at 16:33

## 2017-06-09 RX ADMIN — NYSTATIN 500000 UNITS: 100000 SUSPENSION ORAL at 11:08

## 2017-06-09 RX ADMIN — LORAZEPAM 0.5 MG: 2 INJECTION INTRAMUSCULAR; INTRAVENOUS at 04:14

## 2017-06-09 RX ADMIN — CEPHALEXIN 500 MG: 500 CAPSULE ORAL at 08:29

## 2017-06-09 RX ADMIN — ALBUTEROL SULFATE 2.5 MG: 2.5 SOLUTION RESPIRATORY (INHALATION) at 16:22

## 2017-06-09 RX ADMIN — RANITIDINE HYDROCHLORIDE 150 MG: 150 TABLET, FILM COATED ORAL at 08:29

## 2017-06-09 RX ADMIN — ALDESLEUKIN 34 MILLION UNITS: 1.1 INJECTION, POWDER, LYOPHILIZED, FOR SOLUTION INTRAVENOUS at 03:01

## 2017-06-09 RX ADMIN — ZOLPIDEM TARTRATE 5 MG: 5 TABLET, FILM COATED ORAL at 21:25

## 2017-06-09 RX ADMIN — ONDANSETRON 8 MG: 2 INJECTION INTRAMUSCULAR; INTRAVENOUS at 00:19

## 2017-06-09 RX ADMIN — SODIUM CHLORIDE 500 ML: 9 INJECTION, SOLUTION INTRAVENOUS at 18:33

## 2017-06-09 NOTE — PROGRESS NOTES
Nursing Focus: Chemotherapy  D: Positive blood return via PICC. Insertion site is clean/dry/intact, dressing intact with no complaints of pain.  Urine output is recorded in intake in Doc Flowsheet.    I: Premedications given per order (see electronic medical administration record). Dose # 8 of IL-2 infused over 15 minutes. Reviewed pt teaching on chemotherapy side effects.  Pt denies need for further teaching. Chemotherapy double checked per protocol by two chemotherapy competent RN's.   A: Tolerating procedure well. Given IV lorazepam x 1 prophylactic. Denies nausea and or pain. No rigors noted. No chills reported. Temp 96.6. NS fluid bolus given x 1 for BP of 83/69. Recheck after bolus was 104/52.  P: Continue to monitor urine output and symptoms of nausea. Screen for symptoms of toxicity.

## 2017-06-09 NOTE — PROGRESS NOTES
Chemotherapy  D: Blood return is brisk per DL PICC. Urine output is adequate as recorded in intake and output flowsheet, chemotherapy agents double checked by two chemotherapy certified RN's, documentation in doc flowsheet recorded .     I: Premedications given (see electronic medical administration record). Dose #7 of IL-2 started to infuse over 15min. Denies need for further education.     A: Tolerating well, A&O x4, denies pain/nausea, Pt ambulating in room.    P: Continue to monitor urine output and symptoms of nausea. Screen for symptoms of toxicity.

## 2017-06-09 NOTE — PLAN OF CARE
"Problem: Chemotherapy Effects (Adult)  Goal: Signs and Symptoms of Listed Potential Problems Will be Absent or Manageable (Chemotherapy Effects)  Signs and symptoms of listed potential problems will be absent or manageable by discharge/transition of care (reference Chemotherapy Effects (Adult) CPG).   Outcome: No Change  This am pt with labored breathing.  LS with exp wheezes.  Also said \"I am having hallucinations.  I see my brother standing in the doorway but I know he is not there.\"  Otherwise mentation is intact.  Torri LU notified of breathing issue and hallucinations.  She assessed pt and cleared Suzi for dose #9.  Dr Fernandez said if she has more hallucination they may stop IL-2.  Suzi received Dose #9 of IL-2 at 1112 this am.  VS and urine parameters met.  IL-2 infused via PICC with + blood return over 15 minutes. Not eating but drinking water.  Thrush noted on tongue and Nystatin was started.  Phos replaced IV.      "

## 2017-06-09 NOTE — PROGRESS NOTES
[facility], [place]    Hematology/Oncology Progress Note    Date of Service (when I saw the patient): 06/09/2017     Assessment & Plan   Suzi Gonsales is a 50 y/o female with RCC s/p R radical nephrectomy in 12/2014, now with metastatic RCC (lungs, adrenal, and pancreas) who is admitted for Cycle 1 HD IL-2.      #Metastatic RCC. Admitted for Cycle 1 HD IL2. Has tolerated 8 doses thus far, complicated by expected hypotension, tachycardia, fevers, chills/rigors, muscle aches, facial flushing, nausea/emesis. Now with wheezing, but no acute SOB or hypoxia. Pt also did report to RN a visual hallucination of seeing her brother in the room. She has been receiving Ativan regularly, which could possibly be contributing, as this helps with nausea and anxiety. This morning she is up, alert and oriented, performing daily cares. D/w Dr. Fernandez; ok to proceed with Dose #9 today. If hallucination recurs, will d/c further IL2 dosing.   - creatinine remains stable, Plts WNL, UOP still meeting parameters.      Treatment Plan:   - IVFs per trt plan  - Tylenol 650mg q4hr, Zofran 8mg q8hr, Zantac,150mg BID, Keflex 500mg BID  - aldesleukin 34 million units IV q8hr for up to 14 doses as tolerated  - PRN supportive care medications  - AVOID diuretics and steroids with IL-2. Note, pt did receive dose of IV solumedrol on 6/8 evening (given per hypersensitivity protocol orders for bronchospasm with new onset wheezing yesterday evening). Continue to try to avoid steroid. Will make order for albuterol neb available (separate from inh/nebs available per hypersensitivity protocol order).       #H/o anxiety.   - continue PTA Wellbutrin, Celexa  - PRN Ativan     #Thrush. New on tongue today.   - will start Nystatin QID  - declines biotene, states she will stick with water     FEN  - IVFs per treatment plan, bolus PRN (SBP <85 or urine output <30 mL/hr)     PPx  - defer VTE prophylaxis given anticipated thrombocytopenia  "with IL-2  - Zantac as above  - avoid laxatives given possible diarrhea with IL2     Dispo: Pending completion of tolerated IL-2 doses and recovery of acute issues. Pt's goal is 10 doses, if able to continue. Anticipate discharge to home likely on Saturday (6/10).   - f/u 6/16 as currently scheduled     Torri Paulino) BRENDA Kim  Heme/Onc  [phone]     Interval History   Suzi is seen up in her room, brushing her hair/doing daily cares. Reports a better night last night. She did have new wheezing starting last evening. Described a rattling in her chest, \"like I have a cat in my chest\". She can hear it often. Denies acute SOB, not requiring O2 support. However, notes that she probably couldn't get a very deep breath in. Ongoing facial puffiness/flushing. Starting to feel a little puffy in her hands. Endorses dry mouth, but denies pain or sores. No current nausea. Denies diarrhea; states her last BM was normal for her (tends toward constipation at baseline as discussed on admission).     Physical Exam   Temp: 97.8  F (36.6  C) Temp src: Oral BP: 112/57 Pulse: 95 Heart Rate: 90 Resp: 22 SpO2: 94 % O2 Device: None (Room air)    Vitals:    06/07/17 0847 06/08/17 1058 06/09/17 0814   Weight: 58.4 kg (128 lb 11.2 oz) 60.1 kg (132 lb 9.6 oz) 62.1 kg (137 lb)     Vital Signs with Ranges  Temp:  [96.1  F (35.6  C)-98.4  F (36.9  C)] 97.8  F (36.6  C)  Pulse:  [] 95  Heart Rate:  [81-90] 90  Resp:  [16-24] 22  BP: ()/(42-69) 112/57  SpO2:  [94 %-96 %] 94 %  I/O last 3 completed shifts:  In: 3691 [P.O.:200; I.V.:3070; IV Piggyback:421]  Out: 2975 [Urine:2975]  Constitutional:  Pleasant female seen up ad ta in room, the situates self back in bed, maneuvering with IV tubing well. Appears to be feeling ok, alert and interactive. NAD.   HEENT: NC/AT. Mild facial edema and flushing continue. with some dry skin noted along sides of mouth. Sclera anicteric. OP pink and slightly dry. +thrush.  Respiratory: Breathing " does not appear labored, on RA. Good air exchange. Lungs CTA b/l. No audible wheezing, but there is a soft rattling/course sound in her lower anterior mid chest.   Skin: Clean, dry, intact. Mildly flushed. No lesions or rashes on exposed skin surfaces.   Musculoskeletal: Extremities grossly normal in appearance. Trace extremity edema of hands and distal LEs.   Neurologic: Alert and oriented. Grossly non-focal.    Neuropsychiatric: Calm, normal eye contact. Mentation appears normal, affect congruent.   VAD: PICC, c/d/i.     Medications     - MEDICATION INSTRUCTIONS -       dextrose 5% and 0.9% NaCl with potassium chloride 20 mEq 100 mL/hr at 06/09/17 0422     D5W 1,000 mL (06/08/17 1747)     - MEDICATION INSTRUCTIONS -       NaCl         nystatin  500,000 Units Oral 4x Daily     sodium chloride (PF)  10 mL Intracatheter Q7 Days     eucerin   Topical BID     ondansetron  8 mg Intravenous Q8H     acetaminophen  650 mg Oral Q4H     ranitidine  150 mg Oral BID     cephalexin  500 mg Oral BID     aldesleukin (PROLEUKIN) infusion  600,000 Units/kg (Treatment Plan Recorded) Intravenous Q8H     buPROPion  300 mg Oral QAM     citalopram  20 mg Oral QPM       Data    ROUTINE IP LABS (Last four results)  BMP    Recent Labs  Lab 06/09/17  0622 06/08/17  1849 06/08/17  0549 06/07/17  0446 06/06/17  1058     --  139 142 139   POTASSIUM 4.0 3.8 3.3* 4.2 4.0   CHLORIDE 113*  --  110* 112* 105   MUNDO 7.3*  --  7.5* 8.2* 9.3   CO2 19*  --  20 21 26   BUN 6*  --  5* 9 12   CR 0.79  --  1.00 1.00 0.97   *  --  134* 134* 91     CBC    Recent Labs  Lab 06/09/17  0622 06/08/17  0549 06/07/17  0446 06/06/17  1058   WBC 6.1 7.5 4.3 4.2   RBC 3.25* 3.45* 3.50* 3.92   HGB 9.8* 10.4* 10.6* 12.0   HCT 29.9* 32.0* 32.2* 36.7   MCV 92 93 92 94   MCH 30.2 30.1 30.3 30.6   MCHC 32.8 32.5 32.9 32.7   RDW 14.3 14.2 13.9 13.4    196 225 355       Results for orders placed or performed during the hospital encounter of 06/06/17 (from  the past 24 hour(s))   Magnesium   Result Value Ref Range    Magnesium 2.7 (H) 1.6 - 2.3 mg/dL   Phosphorus   Result Value Ref Range    Phosphorus 1.6 (L) 2.5 - 4.5 mg/dL   Potassium   Result Value Ref Range    Potassium 3.8 3.4 - 5.3 mmol/L   CBC with platelets differential   Result Value Ref Range    WBC 6.1 4.0 - 11.0 10e9/L    RBC Count 3.25 (L) 3.8 - 5.2 10e12/L    Hemoglobin 9.8 (L) 11.7 - 15.7 g/dL    Hematocrit 29.9 (L) 35.0 - 47.0 %    MCV 92 78 - 100 fl    MCH 30.2 26.5 - 33.0 pg    MCHC 32.8 31.5 - 36.5 g/dL    RDW 14.3 10.0 - 15.0 %    Platelet Count 150 150 - 450 10e9/L    Diff Method Automated Method     % Neutrophils 94.5 %    % Lymphocytes 1.3 %    % Monocytes 2.8 %    % Eosinophils 0.5 %    % Basophils 0.2 %    % Immature Granulocytes 0.7 %    Nucleated RBCs 0 0 /100    Absolute Neutrophil 5.8 1.6 - 8.3 10e9/L    Absolute Lymphocytes 0.1 (L) 0.8 - 5.3 10e9/L    Absolute Monocytes 0.2 0.0 - 1.3 10e9/L    Absolute Eosinophils 0.0 0.0 - 0.7 10e9/L    Absolute Basophils 0.0 0.0 - 0.2 10e9/L    Abs Immature Granulocytes 0.0 0 - 0.4 10e9/L    Absolute Nucleated RBC 0.0    Comprehensive metabolic panel   Result Value Ref Range    Sodium 140 133 - 144 mmol/L    Potassium 4.0 3.4 - 5.3 mmol/L    Chloride 113 (H) 94 - 109 mmol/L    Carbon Dioxide 19 (L) 20 - 32 mmol/L    Anion Gap 8 3 - 14 mmol/L    Glucose 173 (H) 70 - 99 mg/dL    Urea Nitrogen 6 (L) 7 - 30 mg/dL    Creatinine 0.79 0.52 - 1.04 mg/dL    GFR Estimate 76 >60 mL/min/1.7m2    GFR Estimate If Black >90   GFR Calc   >60 mL/min/1.7m2    Calcium 7.3 (L) 8.5 - 10.1 mg/dL    Bilirubin Total 0.8 0.2 - 1.3 mg/dL    Albumin 2.4 (L) 3.4 - 5.0 g/dL    Protein Total 5.4 (L) 6.8 - 8.8 g/dL    Alkaline Phosphatase 58 40 - 150 U/L    ALT 68 (H) 0 - 50 U/L    AST 58 (H) 0 - 45 U/L   Magnesium   Result Value Ref Range    Magnesium 2.5 (H) 1.6 - 2.3 mg/dL   Phosphorus   Result Value Ref Range    Phosphorus 1.8 (L) 2.5 - 4.5 mg/dL   Lactate  Dehydrogenase   Result Value Ref Range    Lactate Dehydrogenase 222 81 - 234 U/L   CK total   Result Value Ref Range    CK Total 134 30 - 225 U/L   Blood culture   Result Value Ref Range    Specimen Description Blood PURPLE PORT     Culture Micro Pending     Micro Report Status Pending

## 2017-06-09 NOTE — PLAN OF CARE
Problem: Chemotherapy Effects (Adult)  Goal: Signs and Symptoms of Listed Potential Problems Will be Absent or Manageable (Chemotherapy Effects)  Signs and symptoms of listed potential problems will be absent or manageable by discharge/transition of care (reference Chemotherapy Effects (Adult) CPG).      Afebrile. Given phos replacement during the night. Will have lab recheck this morning. Given dose # 8 of IL-2 @ 0300 and prn IV lorazepam prophylactic @ 0425 to help prevent nausea, since patient has had problems with that the last few times. Slept well. No complaints. BP this morning was 83/69. Receiving prn fluid bolus at this time. Will recheck BP this AM.     Addendum @ 8833: BP after fluid bolus was 104/52. Had 600 cc of urinary output @ this time. Next dose of IL-2 will be due @ 1100.

## 2017-06-09 NOTE — PLAN OF CARE
"Problem: Goal Outcome Summary  Goal: Goal Outcome Summary  Outcome: Improving  VSS. Afebrile. Soft BP at baseline. Potassium recheck 3.8; no further replacements. Mag recheck 2.7; no further replacements needed. Phos recheck 1.6; replacement ordered. Now hanging. 0.5mg IV ativan x2 throughout shift to help control nausea and anxiety. PRN compazine x1 after IL-2 to help control nausea. Dose # 7 IL-2 at 1900. Waiting for reaction. Next dose due at 0300. Pt has met urine parameters. Dose not ordered yet. Pt complained of \"rattle in chest\" and wheezing prior to dose #7. PRN solumedrol administered with relief. O2 sat WNL and pt denies SOB. Currently resting comfortably. No appetite. Fluids encouraged. Continue with POC.       "

## 2017-06-10 ENCOUNTER — APPOINTMENT (OUTPATIENT)
Dept: GENERAL RADIOLOGY | Facility: CLINIC | Age: 52
DRG: 838 | End: 2017-06-10
Attending: INTERNAL MEDICINE
Payer: COMMERCIAL

## 2017-06-10 VITALS
HEART RATE: 95 BPM | RESPIRATION RATE: 16 BRPM | WEIGHT: 139 LBS | OXYGEN SATURATION: 92 % | SYSTOLIC BLOOD PRESSURE: 124 MMHG | TEMPERATURE: 97.7 F | DIASTOLIC BLOOD PRESSURE: 61 MMHG | BODY MASS INDEX: 25.58 KG/M2 | HEIGHT: 62 IN

## 2017-06-10 LAB
ALBUMIN SERPL-MCNC: 2 G/DL (ref 3.4–5)
ALP SERPL-CCNC: 66 U/L (ref 40–150)
ALT SERPL W P-5'-P-CCNC: 54 U/L (ref 0–50)
ANION GAP SERPL CALCULATED.3IONS-SCNC: 6 MMOL/L (ref 3–14)
AST SERPL W P-5'-P-CCNC: 36 U/L (ref 0–45)
BASOPHILS # BLD AUTO: 0 10E9/L (ref 0–0.2)
BASOPHILS NFR BLD AUTO: 0 %
BILIRUB SERPL-MCNC: 0.2 MG/DL (ref 0.2–1.3)
BUN SERPL-MCNC: 6 MG/DL (ref 7–30)
CA-I SERPL ISE-MCNC: 4.3 MG/DL (ref 4.4–5.2)
CALCIUM SERPL-MCNC: 7.7 MG/DL (ref 8.5–10.1)
CHLORIDE SERPL-SCNC: 118 MMOL/L (ref 94–109)
CK SERPL-CCNC: 63 U/L (ref 30–225)
CO2 SERPL-SCNC: 20 MMOL/L (ref 20–32)
CREAT SERPL-MCNC: 0.9 MG/DL (ref 0.52–1.04)
DIFFERENTIAL METHOD BLD: ABNORMAL
EOSINOPHIL # BLD AUTO: 0.6 10E9/L (ref 0–0.7)
EOSINOPHIL NFR BLD AUTO: 8.8 %
ERYTHROCYTE [DISTWIDTH] IN BLOOD BY AUTOMATED COUNT: 14.8 % (ref 10–15)
GFR SERPL CREATININE-BSD FRML MDRD: 66 ML/MIN/1.7M2
GLUCOSE SERPL-MCNC: 92 MG/DL (ref 70–99)
HCT VFR BLD AUTO: 29.6 % (ref 35–47)
HGB BLD-MCNC: 9.9 G/DL (ref 11.7–15.7)
LDH SERPL L TO P-CCNC: 199 U/L (ref 81–234)
LYMPHOCYTES # BLD AUTO: 0.3 10E9/L (ref 0.8–5.3)
LYMPHOCYTES NFR BLD AUTO: 4.4 %
MAGNESIUM SERPL-MCNC: 2 MG/DL (ref 1.6–2.3)
MCH RBC QN AUTO: 30.4 PG (ref 26.5–33)
MCHC RBC AUTO-ENTMCNC: 33.4 G/DL (ref 31.5–36.5)
MCV RBC AUTO: 91 FL (ref 78–100)
MONOCYTES # BLD AUTO: 0.1 10E9/L (ref 0–1.3)
MONOCYTES NFR BLD AUTO: 0.9 %
NEUTROPHILS # BLD AUTO: 6.3 10E9/L (ref 1.6–8.3)
NEUTROPHILS NFR BLD AUTO: 85.9 %
PHOSPHATE SERPL-MCNC: 2.2 MG/DL (ref 2.5–4.5)
PLATELET # BLD AUTO: 151 10E9/L (ref 150–450)
PLATELET # BLD EST: ABNORMAL 10*3/UL
POTASSIUM SERPL-SCNC: 4.4 MMOL/L (ref 3.4–5.3)
PROT SERPL-MCNC: 4.9 G/DL (ref 6.8–8.8)
RBC # BLD AUTO: 3.26 10E12/L (ref 3.8–5.2)
RBC MORPH BLD: NORMAL
SODIUM SERPL-SCNC: 144 MMOL/L (ref 133–144)
WBC # BLD AUTO: 7.3 10E9/L (ref 4–11)

## 2017-06-10 PROCEDURE — 25000125 ZZHC RX 250: Performed by: PHYSICIAN ASSISTANT

## 2017-06-10 PROCEDURE — 36592 COLLECT BLOOD FROM PICC: CPT | Performed by: INTERNAL MEDICINE

## 2017-06-10 PROCEDURE — 82550 ASSAY OF CK (CPK): CPT | Performed by: INTERNAL MEDICINE

## 2017-06-10 PROCEDURE — 85025 COMPLETE CBC W/AUTO DIFF WBC: CPT | Performed by: INTERNAL MEDICINE

## 2017-06-10 PROCEDURE — 83615 LACTATE (LD) (LDH) ENZYME: CPT | Performed by: INTERNAL MEDICINE

## 2017-06-10 PROCEDURE — 99239 HOSP IP/OBS DSCHRG MGMT >30: CPT | Mod: GC | Performed by: INTERNAL MEDICINE

## 2017-06-10 PROCEDURE — 25000132 ZZH RX MED GY IP 250 OP 250 PS 637: Performed by: INTERNAL MEDICINE

## 2017-06-10 PROCEDURE — 80053 COMPREHEN METABOLIC PANEL: CPT | Performed by: INTERNAL MEDICINE

## 2017-06-10 PROCEDURE — 82330 ASSAY OF CALCIUM: CPT | Performed by: INTERNAL MEDICINE

## 2017-06-10 PROCEDURE — 25000128 H RX IP 250 OP 636: Performed by: PHYSICIAN ASSISTANT

## 2017-06-10 PROCEDURE — 71010 XR CHEST PORT 1 VW: CPT

## 2017-06-10 PROCEDURE — 84100 ASSAY OF PHOSPHORUS: CPT | Performed by: INTERNAL MEDICINE

## 2017-06-10 PROCEDURE — 87040 BLOOD CULTURE FOR BACTERIA: CPT | Performed by: INTERNAL MEDICINE

## 2017-06-10 PROCEDURE — 83735 ASSAY OF MAGNESIUM: CPT | Performed by: INTERNAL MEDICINE

## 2017-06-10 PROCEDURE — 25000132 ZZH RX MED GY IP 250 OP 250 PS 637: Performed by: PHYSICIAN ASSISTANT

## 2017-06-10 PROCEDURE — 25000128 H RX IP 250 OP 636: Performed by: INTERNAL MEDICINE

## 2017-06-10 RX ORDER — PROCHLORPERAZINE MALEATE 10 MG
10 TABLET ORAL EVERY 6 HOURS PRN
Qty: 30 TABLET | Refills: 0 | Status: SHIPPED | OUTPATIENT
Start: 2017-06-10 | End: 2018-01-17

## 2017-06-10 RX ADMIN — ONDANSETRON 8 MG: 2 INJECTION INTRAMUSCULAR; INTRAVENOUS at 00:14

## 2017-06-10 RX ADMIN — SODIUM PHOSPHATE, MONOBASIC, MONOHYDRATE AND SODIUM PHOSPHATE, DIBASIC, ANHYDROUS 15 MMOL: 276; 142 INJECTION, SOLUTION INTRAVENOUS at 09:07

## 2017-06-10 RX ADMIN — BUPROPION HYDROCHLORIDE 300 MG: 300 TABLET, FILM COATED, EXTENDED RELEASE ORAL at 09:56

## 2017-06-10 RX ADMIN — ONDANSETRON 8 MG: 2 INJECTION INTRAMUSCULAR; INTRAVENOUS at 09:56

## 2017-06-10 RX ADMIN — GUAIFENESIN 10 ML: 100 SOLUTION ORAL at 11:40

## 2017-06-10 NOTE — PLAN OF CARE
Problem: Goal Outcome Summary  Goal: Goal Outcome Summary  Outcome: Improving  00:00 -07:00 am  Pt received total of 9 doses of IL-2 and continues to decline dose # 10 which was due yesterday  Vital signs improving to pt baseline with RR remains in 24  On 2 L/NC with good oxygen saturation   Continue with  frequent dry cough, JIMÉNEZ, but no acute SOB  LS coarses/crackles throughout with intermittent expiratory wheezes  Neb treatment x 1 with relief   Denied nausea or pain  Voiding good urine output  Pt reports somewhat feeling a bit better that yesterday  No acute events overnight  Continue w/POC and offer support

## 2017-06-10 NOTE — PLAN OF CARE
Problem: Goal Outcome Summary  Goal: Goal Outcome Summary  Outcome: Declining  Pt with increased respiratory rate and difficult breathing early odilia shift, RR 28 sats 94 % on room air, lungs with aliza wheeze.  HOB elevated,02 placed with sats 96-97%, RT called and neb given with some relief. RR to 24 by 18:30 and Suzi stating she felt she could take her scheduled Interleukin-2 dose.  Sepsis protocol triggered with increased resp rate and tachycardia.  Lactic acid 3.3,  notified and assessed pt.   ml over 1 hour given.  Suzi with increased respiratory rate again after IV flush, deferred Interleukin-2 dose an additional 2 hours. Suzi reporting she didn't feel she would be able to tolerate another dose due to sob.  Dyspnic with talking, and frequent cough, lungs with crackles and occasional aliza wheeze. Is making urine has voided 1250 ml this shift.  notified.   Did NOT give dose 10 of Interleukin-2 this evening.  Pt aware dose is here, may reconsider on night shift, but she seems to have decided she is done. Phos infusing for 2.0 phos level. Continue cares.

## 2017-06-10 NOTE — PROGRESS NOTES
Discharge  D: Orders for discharge and outpatient medications written.  I: Home medications and return to clinic schedule reviewed with patient. Discharge instructions and parameters for calling Health Care Provider reviewed. Patient left at 1230 accompanied by spouse and children.   A: Patient/family verbalized understanding and was ready for discharge.  VSS, afebrile. Sat-ing well on RA.  Pt eager to go home and denies further questions.   P: Patient instructed to  medications in Pharmacy. Follow up as scheduled in clinic 6/16.

## 2017-06-10 NOTE — DISCHARGE SUMMARY
"[facility] [place]    Discharge Summary  Hematology / Oncology    Date of Admission:  6/6/2017  Date of Discharge:  06/10/2017  Discharging Physician: Dr. Fernandez  Primary Heme/Oncologist:Dr. Green  Date of Service (when I saw the patient): 06/10/2017    Discharge Diagnoses  Metastatic RCC  Anxiety  Thrush  Cough    History of Present Illness  Adopted from HPI:    Suzi Gonsales is a 51 year old female with recently diagnosed metastatic renal cell carcinoma. She was initially diagnosed renal cell carcinoma in Dec 2014 after she initially presented with hematuria and R flank pain. A CT A/P was suggestive of renal mass. She was referred to [facility]. Max Zel[facility] and subsequenlty had a right open radical nephrectomy done on 12/31/14. 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 Per charts tumor resection had negative margins and it was staged at pT2bN0. Her staging work up was negative except for pulmonary nodules. She has been followed every 6 months with scans. A recent CT CAP on 5/11/17 showed enlarging hilar LAD, enlarging pulmonary nodules, adrenal nodules and a pancreatic body mass. She underwent endoscopic esophageal US with FNA and biopsies of identified nodules. Pathology was c/w metastatic renal cancer (lungs, adrenal, and pancreas). She has met with Dr. Green and decided to pursue IL-2.      She presented  for her first cycle of IL-2. She has been feeling well in general apart from some fatigue. States she is pretty healthy at baseline. Denies significant SOB; maybe a little \"like if I ran up the stairs\". Has a mild, dy cough at baseline. Denies headaches, nasal congestion/rhinorrhea, mouth pain/sores, or nausea. Would tend towards constipation at baseline. Denies any extremity edema.      Hospital Course  Summarized by problem below    Suzi Gonsales is a 52 y/o female with RCC s/p R radical nephrectomy in 12/2014, now with metastatic RCC (lungs, adrenal, " "and pancreas) who is admitted for Cycle 1 HD IL-2.       #Metastatic RCC. Admitted for Cycle 1 HD IL2. Has tolerated 8 doses thus far, complicated by expected hypotension, tachycardia, fevers, chills/rigors, muscle aches, facial flushing, nausea/emesis. Now with wheezing, and some hypoxia, briefly needed O2, 2 L via NC, this am her O2 sats 93% on RA, no wheezing. CXR- shows mild pulmonary edema. She tolerated 9 doses of IL-2.        #H/o anxiety.   - continue PTA Wellbutrin, Celexa  - PRN Ativan      #Thrush. New on tongue.   - started Nystatin QID  - declines biotene, states she will stick with water        Code Status  FULL    Physical Exam  /74 mmHg  Pulse 84  Temp(Src) 97.6  F (36.4  C) (Axillary)  Resp 16  Ht 1.676 m (5' 6\")  Wt 86.002 kg (189 lb 9.6 oz)  BMI 30.62 kg/m2  SpO2 100%      Constitutional: NAD. Alert and interactive.   HEENT: NCAT. PERRL, EOMI, anicteric sclera. Oral mucosa pink and moist with no lesions or thrush.  Respiratory: Non-labored breathing, good air exchange, lungs clear to auscultation bilaterally. No cough or wheeze noted.   Cardiovascular: Regular rate and rhythm. No murmur or rub.   GI: Normoactive bowel sounds. Abdomen soft, non-distended, and non-tender. No palpable masses or organomegaly.  Genitourinary: Deferred.   Skin: Warm and dry. No concerning lesions or rash on exposed surfaces.  Neurologic: A&O x 3, CNs 2-12 grossly intact, speech normal, gait normal, sensation to light touch grossly WNL. Grossly non-focal.  Neuropsychiatric: Mentation and affect appear normal/appropriate.    Follow-up Labs/Appointments:  Will schedule follow up next week with labs.    Consultations This Hospital Stay  none    Discharge Medications   Suzi Gonsales   Home Medication Instructions MAXIMINO:[id number]    Printed on:06/10/17 1152   Medication Information                      acetaminophen (TYLENOL) 325 MG tablet  Take 2 tablets (650 mg) by mouth every 4 hours as needed for mild pain " (mild pain)             ALPRAZolam (XANAX) 0.5 MG tablet  Take 1 tablet (0.5 mg) by mouth 3 times daily as needed for anxiety             buPROPion (WELLBUTRIN XL) 300 MG 24 hr tablet  Take 300 mg by mouth every morning              citalopram (CELEXA) 20 MG tablet  Take 20 mg by mouth every evening              IBUPROFEN  Take 400 mg by mouth every 6 hours as needed              prochlorperazine (COMPAZINE) 10 MG tablet  Take 1 tablet (10 mg) by mouth every 6 hours as needed for nausea or vomiting (Breakthrough Nausea/Vomiting)             triamcinolone (KENALOG) 0.1 % cream  APPLY TOPICALLY BID PRN             Zolpidem Tartrate (AMBIEN PO)  Take 5 mg by mouth nightly as needed for sleep                       Data  Results for orders placed or performed during the hospital encounter of 06/06/17 (from the past 24 hour(s))   Phosphorus   Result Value Ref Range    Phosphorus 2.0 (L) 2.5 - 4.5 mg/dL   Lactic acid level STAT   Result Value Ref Range    Lactic Acid 3.3 (H) 0.7 - 2.1 mmol/L   CBC with platelets differential   Result Value Ref Range    WBC 7.3 4.0 - 11.0 10e9/L    RBC Count 3.26 (L) 3.8 - 5.2 10e12/L    Hemoglobin 9.9 (L) 11.7 - 15.7 g/dL    Hematocrit 29.6 (L) 35.0 - 47.0 %    MCV 91 78 - 100 fl    MCH 30.4 26.5 - 33.0 pg    MCHC 33.4 31.5 - 36.5 g/dL    RDW 14.8 10.0 - 15.0 %    Platelet Count 151 150 - 450 10e9/L    Diff Method Manual Differential     % Neutrophils 85.9 %    % Lymphocytes 4.4 %    % Monocytes 0.9 %    % Eosinophils 8.8 %    % Basophils 0.0 %    Absolute Neutrophil 6.3 1.6 - 8.3 10e9/L    Absolute Lymphocytes 0.3 (L) 0.8 - 5.3 10e9/L    Absolute Monocytes 0.1 0.0 - 1.3 10e9/L    Absolute Eosinophils 0.6 0.0 - 0.7 10e9/L    Absolute Basophils 0.0 0.0 - 0.2 10e9/L    RBC Morphology Normal     Platelet Estimate Confirming automated cell count    Comprehensive metabolic panel   Result Value Ref Range    Sodium 144 133 - 144 mmol/L    Potassium 4.4 3.4 - 5.3 mmol/L    Chloride 118 (H) 94 -  109 mmol/L    Carbon Dioxide 20 20 - 32 mmol/L    Anion Gap 6 3 - 14 mmol/L    Glucose 92 70 - 99 mg/dL    Urea Nitrogen 6 (L) 7 - 30 mg/dL    Creatinine 0.90 0.52 - 1.04 mg/dL    GFR Estimate 66 >60 mL/min/1.7m2    GFR Estimate If Black 80 >60 mL/min/1.7m2    Calcium 7.7 (L) 8.5 - 10.1 mg/dL    Bilirubin Total 0.2 0.2 - 1.3 mg/dL    Albumin 2.0 (L) 3.4 - 5.0 g/dL    Protein Total 4.9 (L) 6.8 - 8.8 g/dL    Alkaline Phosphatase 66 40 - 150 U/L    ALT 54 (H) 0 - 50 U/L    AST 36 0 - 45 U/L   Magnesium   Result Value Ref Range    Magnesium 2.0 1.6 - 2.3 mg/dL   Phosphorus   Result Value Ref Range    Phosphorus 2.2 (L) 2.5 - 4.5 mg/dL   Lactate Dehydrogenase   Result Value Ref Range    Lactate Dehydrogenase 199 81 - 234 U/L   CK total   Result Value Ref Range    CK Total 63 30 - 225 U/L   Blood culture   Result Value Ref Range    Specimen Description Blood PICC     Culture Micro Pending     Micro Report Status Pending    Calcium ionized   Result Value Ref Range    Calcium Ionized 4.3 (L) 4.4 - 5.2 mg/dL   XR Chest Port 1 View    Narrative    EXAM: XR CHEST PORT 1 VW  6/10/2017 10:34 AM      HISTORY: dyspnea    COMPARISON: CT 3/22/2017, radiographs 2/6/2017.    FINDINGS: AP radiograph of the chest. Left upper extremity PICC  projects over the cavoatrial junction. Surgical clips project over the  right upper quadrant. Cardiac silhouette is unchanged. Stable left  apical mass. Indistinct pulmonary vasculature with diffuse reticular  opacities. Patchy bibasilar atelectasis/consolidation. No pleural  effusions. No pneumothorax.      Impression    IMPRESSION:   1. Indistinct pulmonary vasculature with diffuse reticular opacities,  likely pulmonary edema.  2. Stable left apical opacities.  3. Patchy bibasilar atelectasis/consolidation.    I have personally reviewed the examination and initial interpretation  and I agree with the findings.    CRISTÓBAL CUTLER           Discussed Assessment and Plan with

## 2017-06-11 ENCOUNTER — APPOINTMENT (OUTPATIENT)
Dept: GENERAL RADIOLOGY | Facility: CLINIC | Age: 52
End: 2017-06-11
Attending: EMERGENCY MEDICINE
Payer: COMMERCIAL

## 2017-06-11 ENCOUNTER — HOSPITAL ENCOUNTER (EMERGENCY)
Facility: CLINIC | Age: 52
Discharge: HOME | End: 2017-06-11
Attending: EMERGENCY MEDICINE | Admitting: EMERGENCY MEDICINE
Payer: COMMERCIAL

## 2017-06-11 ENCOUNTER — NURSE TRIAGE (OUTPATIENT)
Dept: NURSING | Facility: CLINIC | Age: 52
End: 2017-06-11

## 2017-06-11 VITALS
WEIGHT: 136.2 LBS | SYSTOLIC BLOOD PRESSURE: 125 MMHG | RESPIRATION RATE: 16 BRPM | TEMPERATURE: 98.2 F | HEART RATE: 66 BPM | BODY MASS INDEX: 25.71 KG/M2 | DIASTOLIC BLOOD PRESSURE: 52 MMHG | HEIGHT: 61 IN | OXYGEN SATURATION: 94 %

## 2017-06-11 DIAGNOSIS — R05.9 COUGH: ICD-10-CM

## 2017-06-11 DIAGNOSIS — C64.1 MALIGNANT NEOPLASM OF RIGHT KIDNEY (H): ICD-10-CM

## 2017-06-11 LAB
ALBUMIN SERPL-MCNC: 2.7 G/DL (ref 3.4–5)
ALP SERPL-CCNC: 93 U/L (ref 40–150)
ALT SERPL W P-5'-P-CCNC: 91 U/L (ref 0–50)
ANION GAP SERPL CALCULATED.3IONS-SCNC: 7 MMOL/L (ref 3–14)
AST SERPL W P-5'-P-CCNC: 55 U/L (ref 0–45)
BASOPHILS # BLD AUTO: 0.1 10E9/L (ref 0–0.2)
BASOPHILS NFR BLD AUTO: 1.1 %
BILIRUB SERPL-MCNC: 0.4 MG/DL (ref 0.2–1.3)
BUN SERPL-MCNC: 8 MG/DL (ref 7–30)
CALCIUM SERPL-MCNC: 8.5 MG/DL (ref 8.5–10.1)
CHLORIDE SERPL-SCNC: 110 MMOL/L (ref 94–109)
CO2 SERPL-SCNC: 24 MMOL/L (ref 20–32)
CREAT SERPL-MCNC: 1.01 MG/DL (ref 0.52–1.04)
DIFFERENTIAL METHOD BLD: ABNORMAL
EOSINOPHIL # BLD AUTO: 0.7 10E9/L (ref 0–0.7)
EOSINOPHIL NFR BLD AUTO: 7.5 %
ERYTHROCYTE [DISTWIDTH] IN BLOOD BY AUTOMATED COUNT: 14.5 % (ref 10–15)
GFR SERPL CREATININE-BSD FRML MDRD: 57 ML/MIN/1.7M2
GLUCOSE SERPL-MCNC: 97 MG/DL (ref 70–99)
HCT VFR BLD AUTO: 30.7 % (ref 35–47)
HGB BLD-MCNC: 10.2 G/DL (ref 11.7–15.7)
IMM GRANULOCYTES # BLD: 0 10E9/L (ref 0–0.4)
IMM GRANULOCYTES NFR BLD: 0.5 %
LYMPHOCYTES # BLD AUTO: 4.4 10E9/L (ref 0.8–5.3)
LYMPHOCYTES NFR BLD AUTO: 51 %
MCH RBC QN AUTO: 30.2 PG (ref 26.5–33)
MCHC RBC AUTO-ENTMCNC: 33.2 G/DL (ref 31.5–36.5)
MCV RBC AUTO: 91 FL (ref 78–100)
MONOCYTES # BLD AUTO: 0.7 10E9/L (ref 0–1.3)
MONOCYTES NFR BLD AUTO: 7.9 %
NEUTROPHILS # BLD AUTO: 2.8 10E9/L (ref 1.6–8.3)
NEUTROPHILS NFR BLD AUTO: 32 %
NRBC # BLD AUTO: 0 10*3/UL
NRBC BLD AUTO-RTO: 0 /100
PLATELET # BLD AUTO: 205 10E9/L (ref 150–450)
PLATELET # BLD EST: ABNORMAL 10*3/UL
POTASSIUM SERPL-SCNC: 4.1 MMOL/L (ref 3.4–5.3)
PROT SERPL-MCNC: 5.8 G/DL (ref 6.8–8.8)
RBC # BLD AUTO: 3.38 10E12/L (ref 3.8–5.2)
SODIUM SERPL-SCNC: 140 MMOL/L (ref 133–144)
WBC # BLD AUTO: 8.7 10E9/L (ref 4–11)

## 2017-06-11 PROCEDURE — 71020 XR CHEST 2 VW: CPT

## 2017-06-11 PROCEDURE — 80053 COMPREHEN METABOLIC PANEL: CPT | Performed by: EMERGENCY MEDICINE

## 2017-06-11 PROCEDURE — 99284 EMERGENCY DEPT VISIT MOD MDM: CPT | Mod: 25 | Performed by: EMERGENCY MEDICINE

## 2017-06-11 PROCEDURE — 85025 COMPLETE CBC W/AUTO DIFF WBC: CPT | Performed by: EMERGENCY MEDICINE

## 2017-06-11 PROCEDURE — 99283 EMERGENCY DEPT VISIT LOW MDM: CPT | Mod: Z6 | Performed by: EMERGENCY MEDICINE

## 2017-06-11 RX ORDER — ACETAMINOPHEN AND CODEINE PHOSPHATE 300; 30 MG/1; MG/1
1 TABLET ORAL EVERY 6 HOURS PRN
Qty: 20 TABLET | Refills: 0 | Status: ON HOLD | OUTPATIENT
Start: 2017-06-11 | End: 2017-08-03

## 2017-06-11 RX ORDER — BENZONATATE 100 MG/1
100 CAPSULE ORAL 3 TIMES DAILY PRN
Qty: 42 CAPSULE | Refills: 0 | Status: SHIPPED | OUTPATIENT
Start: 2017-06-11 | End: 2017-11-22

## 2017-06-11 ASSESSMENT — ENCOUNTER SYMPTOMS
FATIGUE: 1
ABDOMINAL PAIN: 0
CHILLS: 0
SHORTNESS OF BREATH: 0
COUGH: 1
FEVER: 0

## 2017-06-11 NOTE — ED AVS SNAPSHOT
[facility], [place], Emergency Department    [address] [place] [address]    Phone:  [phone]                                       Suzi Gonsales   MRN: [id number]    Department:  [facility], Emergency Department   Date of Visit:  6/11/2017           After Visit Summary Signature Page     I have received my discharge instructions, and my questions have been answered. I have discussed any challenges I see with this plan with the nurse or doctor.    ..........................................................................................................................................  Patient/Patient Representative Signature      ..........................................................................................................................................  Patient Representative Print Name and Relationship to Patient    ..................................................               ................................................  Date                                            Time    ..........................................................................................................................................  Reviewed by Signature/Title    ...................................................              ..............................................  Date                                                            Time

## 2017-06-11 NOTE — ED AVS SNAPSHOT
[facility], Emergency Department    [address] [place] [address]    Phone:  [phone]                                       Suzi Gonsales   MRN: [id number]    Department:  [facility] [place], Emergency Department   Date of Visit:  6/11/2017           Patient Information     Date Of Birth          1965        Your diagnoses for this visit were:     Cough        You were seen by Riccardo Parham MD.      Follow-up Information     Follow up with Idalia Saini MD.    Specialty:  Family Practice    Contact information:    [facility]  [address] [facility] [address]  [phone]          Discharge Instructions         Cough, Chronic, Uncertain Cause (Adult)    Everyone has had a cough as part of the common cold, flu, or bronchitis. This kind of cough occurs along with an achy feeling, low-grade fever, nasal and sinus congestion, and a scratchy or sore throat. This usually gets better in 2 to 3 weeks. A cough that lasts longer than 3 weeks may be due to other causes.  If your cough does not improve over the next 2 weeks, further testing may be needed. Follow up with your healthcare provider as advised. Cough suppressants may be recommended. Based on your exam today, the exact cause of your cough is not certain. Below are some common causes for persistent cough.  Smokers cough   Smoker s cough  doesn t go away. If you continue to smoke, it only gets worse. The cough is from irritation in the air passages. Talk to your healthcare provider about quitting. Medicines or nicotine-replacement products, like gum or the patch, may make quitting easier.  Postnasal drip  A cough that is worse at night may be due to postnasal drip. Excess mucus in the nose drains from the back of your nose to your throat. This triggers the cough reflex. Postnasal drip may be due to a sinus infection or allergy. Common allergens include dust, tobacco smoke (both inhaled and secondhand smoke), environmental pollutants,  pollen, mold, pets, cleaning agents, room deodorizers, and chemical fumes. Over-the-counter antihistamines or decongestants may be helpful for allergies. A sinus infection may requires antibiotic treatment. See your healthcare provider if symptoms continue.  Medicines  Certain prescribed medicines can cause a chronic cough in some people:    ACE inhibitors for high blood pressure. These include benazepril, captopril, enalapril, fosinopril, lisinopril, quinapril, ramipril, and others.    Beta-blockers for high blood pressure and other conditions. These include propranolol, atenolol, metoprolol, nadolol, and others.  Let your healthcare provider know if you are taking any of these.  Asthma  Cough may be the only sign of mild asthma. You may have tests to find out if asthma is causing your cough. You may also take asthma medicine on a trial basis.  Acid reflux (heartburn, GERD)   The esophagus is the tube that carries food from the mouth to the stomach. A valve at its lower end prevents stomach acids from flowing upward. If this valve does not work properly, acid from the stomach enters the esophagus. This may cause a burning pain in the upper abdomen or lower chest, belching, or cough. Symptoms are often worse when lying flat. Avoid eating or drinking before bedtime. Try using extra pillows to raise your upper body, or place 4-inch blocks under the head of your bed. You may try an over-the-counter antacid or an acid-blocking medicine such as famotidine, cimetidine, ranitidine, esomeprazole, lansoprazole, or omeprazole. Stronger medicines for this condition can be prescribed by your healthcare provider.  Follow-up care  Follow up with your healthcare provider, or as advised, if your cough does not improve. Further testing may be needed.  (Note: If an X-ray was taken, a specialist will review it. You will be notified of any new findings that may affect your care.)  When to seek medical advice  Call your healthcare  provider right away if any of these occur:    Mild wheezing or difficulty breathing    Fever of 100.4 F (38 C) or higher, or as directed by your healthcare provider    Unexpected weight loss    Coughing up large amounts of colored sputum    Night sweats (sheets and pajamas get soaking wet)  Call 911, or get immediate medical care  Contact emergency services right away if any of these occur:    Coughing up blood    Moderate to severe trouble breathing or wheezing    8681-9226 [employer]. [address] [place] [address]. All rights reserved. This information is not intended as a substitute for professional medical care. Always follow your healthcare professional's instructions.          Future Appointments        Provider Department Dept Phone Center    6/16/2017 1:45 PM [facility] Lab Draw [phone] [facility]    6/16/2017 2:10 PM BRENDA Garcia [facility] [phone] [facility]      24 Hour Appointment Hotline       To make an appointment at any [facility], call [phone] ([phone]). If you don't have a family doctor or clinic, we will help you find one. [facility] clinics are conveniently located to serve the needs of you and your family.             Review of your medicines      START taking        Dose / Directions Last dose taken    acetaminophen-codeine 300-30 MG per tablet   Commonly known as:  TYLENOL/codeine #3   Dose:  1 tablet   Quantity:  20 tablet        Take 1 tablet by mouth every 6 hours as needed for mild pain or pain (PRN pain or cough)   Refills:  0        benzonatate 100 MG capsule   Commonly known as:  TESSALON   Dose:  100 mg   Quantity:  42 capsule        Take 1 capsule (100 mg) by mouth 3 times daily as needed for cough   Refills:  0          Our records show that you are taking the medicines listed below. If these are incorrect, please call your family doctor or clinic.        Dose / Directions Last dose  taken    acetaminophen 325 MG tablet   Commonly known as:  TYLENOL   Dose:  650 mg   Quantity:  100 tablet        Take 2 tablets (650 mg) by mouth every 4 hours as needed for mild pain (mild pain)   Refills:  0        ALPRAZolam 0.5 MG tablet   Commonly known as:  XANAX   Dose:  0.5 mg   Quantity:  60 tablet        Take 1 tablet (0.5 mg) by mouth 3 times daily as needed for anxiety   Refills:  0        AMBIEN PO   Dose:  5 mg        Take 5 mg by mouth nightly as needed for sleep   Refills:  0        buPROPion 300 MG 24 hr tablet   Commonly known as:  WELLBUTRIN XL   Dose:  300 mg        Take 300 mg by mouth every morning   Refills:  0        citalopram 20 MG tablet   Commonly known as:  celeXA   Dose:  20 mg        Take 20 mg by mouth every evening   Refills:  0        IBUPROFEN   Dose:  400 mg        Take 400 mg by mouth every 6 hours as needed   Refills:  0        prochlorperazine 10 MG tablet   Commonly known as:  COMPAZINE   Dose:  10 mg   Quantity:  30 tablet        Take 1 tablet (10 mg) by mouth every 6 hours as needed for nausea or vomiting (Breakthrough Nausea/Vomiting)   Refills:  0        triamcinolone 0.1 % cream   Commonly known as:  KENALOG        APPLY TOPICALLY BID PRN   Refills:  2                Prescriptions were sent or printed at these locations (2 Prescriptions)                   Other Prescriptions                Printed at Department/Unit printer (2 of 2)         benzonatate (TESSALON) 100 MG capsule               acetaminophen-codeine (TYLENOL/CODEINE #3) 300-30 MG per tablet                Procedures and tests performed during your visit     CBC with platelets differential    Comprehensive metabolic panel    XR Chest 2 Views      Orders Needing Specimen Collection     None      Pending Results     Date and Time Order Name Status Description    6/11/2017 1651 XR Chest 2 Views Preliminary     6/11/2017 1651 Comprehensive metabolic panel In process             Pending Culture Results     No  orders found from 6/9/2017 to 6/12/2017.            Pending Results Instructions     If you had any lab results that were not finalized at the time of your Discharge, you can call the ED Lab Result RN at [phone]. You will be contacted by this team for any positive Lab results or changes in treatment. The nurses are available 7 days a week from 10A to 6:30P.  You can leave a message 24 hours per day and they will return your call.        Thank you for choosing [facility]       Thank you for choosing [facility] for your care. Our goal is always to provide you with excellent care. Hearing back from our patients is one way we can continue to improve our services. Please take a few minutes to complete the written survey that you may receive in the mail after you visit with us. Thank you!        [employer] Information     [employer] gives you secure access to your electronic health record. If you see a primary care provider, you can also send messages to your care team and make appointments. If you have questions, please call your primary care clinic.  If you do not have a primary care provider, please call [phone] and they will assist you.        Care EveryWhere ID     This is your Care EveryWhere ID. This could be used by other organizations to access your [facility] medical records  [id number]        After Visit Summary       This is your record. Keep this with you and show to your community pharmacist(s) and doctor(s) at your next visit.

## 2017-06-11 NOTE — DISCHARGE INSTRUCTIONS
Cough, Chronic, Uncertain Cause (Adult)    Everyone has had a cough as part of the common cold, flu, or bronchitis. This kind of cough occurs along with an achy feeling, low-grade fever, nasal and sinus congestion, and a scratchy or sore throat. This usually gets better in 2 to 3 weeks. A cough that lasts longer than 3 weeks may be due to other causes.  If your cough does not improve over the next 2 weeks, further testing may be needed. Follow up with your healthcare provider as advised. Cough suppressants may be recommended. Based on your exam today, the exact cause of your cough is not certain. Below are some common causes for persistent cough.  Smokers cough   Smoker s cough  doesn t go away. If you continue to smoke, it only gets worse. The cough is from irritation in the air passages. Talk to your healthcare provider about quitting. Medicines or nicotine-replacement products, like gum or the patch, may make quitting easier.  Postnasal drip  A cough that is worse at night may be due to postnasal drip. Excess mucus in the nose drains from the back of your nose to your throat. This triggers the cough reflex. Postnasal drip may be due to a sinus infection or allergy. Common allergens include dust, tobacco smoke (both inhaled and secondhand smoke), environmental pollutants, pollen, mold, pets, cleaning agents, room deodorizers, and chemical fumes. Over-the-counter antihistamines or decongestants may be helpful for allergies. A sinus infection may requires antibiotic treatment. See your healthcare provider if symptoms continue.  Medicines  Certain prescribed medicines can cause a chronic cough in some people:    ACE inhibitors for high blood pressure. These include benazepril, captopril, enalapril, fosinopril, lisinopril, quinapril, ramipril, and others.    Beta-blockers for high blood pressure and other conditions. These include propranolol, atenolol, metoprolol, nadolol, and others.  Let your healthcare provider  know if you are taking any of these.  Asthma  Cough may be the only sign of mild asthma. You may have tests to find out if asthma is causing your cough. You may also take asthma medicine on a trial basis.  Acid reflux (heartburn, GERD)   The esophagus is the tube that carries food from the mouth to the stomach. A valve at its lower end prevents stomach acids from flowing upward. If this valve does not work properly, acid from the stomach enters the esophagus. This may cause a burning pain in the upper abdomen or lower chest, belching, or cough. Symptoms are often worse when lying flat. Avoid eating or drinking before bedtime. Try using extra pillows to raise your upper body, or place 4-inch blocks under the head of your bed. You may try an over-the-counter antacid or an acid-blocking medicine such as famotidine, cimetidine, ranitidine, esomeprazole, lansoprazole, or omeprazole. Stronger medicines for this condition can be prescribed by your healthcare provider.  Follow-up care  Follow up with your healthcare provider, or as advised, if your cough does not improve. Further testing may be needed.  (Note: If an X-ray was taken, a specialist will review it. You will be notified of any new findings that may affect your care.)  When to seek medical advice  Call your healthcare provider right away if any of these occur:    Mild wheezing or difficulty breathing    Fever of 100.4 F (38 C) or higher, or as directed by your healthcare provider    Unexpected weight loss    Coughing up large amounts of colored sputum    Night sweats (sheets and pajamas get soaking wet)  Call 911, or get immediate medical care  Contact emergency services right away if any of these occur:    Coughing up blood    Moderate to severe trouble breathing or wheezing    [id number] [employer]. [address] [place] [address], [place] [address]. All rights reserved. This information is not intended as a substitute for professional medical care. Always  follow your healthcare professional's instructions.

## 2017-06-11 NOTE — ED PROVIDER NOTES
History     Chief Complaint   Patient presents with     Cough     HPI  Suzi Gonsales is a 51 year old female with metastatic renal cell carcinoma. The patient was dismissed from the hospital yesterday on the oncology service after initiation of IL-2 therapy. The patient returns to the Emergency Department today for persistent coughing fits. She states her oncologist was aware of this and recommended Robitussin. The patient denies any fever, shaking chills, shortness of breath. She does describe a mild component of orthopnea. She denies lower extremity pain. No history of pulmonary embolism or heart failure. She reports no trauma to the chest. She reports intermittent coughing fits despite Robitussin therapy and is requesting symptomatic management for this.     I have reviewed the Medications, Allergies, Past Medical and Surgical History, and Social History in the [employer] system.  Past Medical History:   Diagnosis Date     Anxiety      Former tobacco use      History of kidney cancer     Clear cell     Mass of left lung     Upper lobe     Solitary kidney     S/P right nephrectomy due to kidney cancer       Past Surgical History:   Procedure Laterality Date     C/SECTION, LOW TRANSVERSE  ,     , Low Transverse     ENDOSCOPIC ULTRASOUND UPPER GASTROINTESTINAL TRACT (GI) N/A 2017    Procedure: ENDOSCOPIC ULTRASOUND, ESOPHAGOSCOPY / UPPER GASTROINTESTINAL TRACT (GI);  Esophagogastroduodenoscopy, Endoscopic Ultrasound with Fine Needle Biopsies;  Surgeon: Asad Velez MD;  Location: [facility] OR     open radical nephrectomy Right 14       Family History   Problem Relation Age of Onset     Hypertension Father      Chronic Obstructive Pulmonary Disease Father      Hyperlipidemia Brother      Hyperlipidemia Brother        Social History   Substance Use Topics     Smoking status: Former Smoker     Types: Cigarettes     Quit date: 2004     Smokeless tobacco: Never Used     Alcohol use Yes      " Comment: Occasional, social       No current facility-administered medications for this encounter.      Current Outpatient Prescriptions   Medication     prochlorperazine (COMPAZINE) 10 MG tablet     ALPRAZolam (XANAX) 0.5 MG tablet     buPROPion (WELLBUTRIN XL) 300 MG 24 hr tablet     citalopram (CELEXA) 20 MG tablet     Zolpidem Tartrate (AMBIEN PO)     IBUPROFEN     acetaminophen (TYLENOL) 325 MG tablet     triamcinolone (KENALOG) 0.1 % cream        No Known Allergies   Review of Systems   Constitutional: Positive for fatigue. Negative for chills and fever.   Respiratory: Positive for cough. Negative for shortness of breath.         Mild orthopnea   Cardiovascular: Negative for chest pain and leg swelling.   Gastrointestinal: Negative for abdominal pain.   All other systems reviewed and are negative.      Physical Exam   BP: 114/59  Heart Rate: 72  Temp: 98.2  F (36.8  C)  Resp: 16  Height: 154.9 cm (5' 1\")  Weight: 61.8 kg (136 lb 3.2 oz)  SpO2: 95 %  Physical Exam   Constitutional: She is oriented to person, place, and time. She appears well-developed and well-nourished. No distress.   Cardiovascular: Normal rate, regular rhythm and normal heart sounds.    Pulmonary/Chest: Effort normal and breath sounds normal. No respiratory distress. She has no wheezes. She has no rales.   Abdominal: Soft. There is no tenderness.   Musculoskeletal: She exhibits no edema.   Neurological: She is alert and oriented to person, place, and time. No cranial nerve deficit.   Skin: Skin is warm and dry. No rash noted.   Psychiatric: She has a normal mood and affect. Her behavior is normal.       ED Course     ED Course     Procedures          Labs Ordered and Resulted from Time of ED Arrival Up to the Time of Departure from the ED - No data to display         Assessments & Plan (with Medical Decision Making)   51 year old female with metastatic renal cell carcinoma recently completing her first round of IL-2 therapy. On arrival she " is noted to be alert, afebrile, hemodynamically stable. SPO2 is noted to be in the upper 90s on room air. She is speaking in full sentences without evidence of increased work of breathing. She has no evidence of cardiovascular compromise or fluid overload. My suspicion at this time for heart failure, development of pulmonary embolism, pneumothorax, or infectious etiology such as pneumonia is low. She may have a component of bronchitis. I discussed recommendations to obtain chest x-ray. Knowing the patient is on IL-2 by her story I suspect this to be more secondary to inflammation of the lung parenchyma. We will confirm with her oncologist. If this is the case I do not believe the patient requires emergent hospitalization. We will aid with further anti-tussives if needed with consideration of Tessalon Pearls and possible temporary codeine.     I have reviewed the nursing notes.    I have reviewed the findings, diagnosis, plan and need for follow up with the patient.    New Prescriptions    No medications on file       Final diagnoses:   Cough   Part of the medical record was transcribed by Wendy Phelan, Medical Scribe, from a dictation done by Dr. Parham    6/11/2017   [facility], [place], EMERGENCY DEPARTMENT     Riccardo Parham MD  06/11/17 [id number]

## 2017-06-11 NOTE — ED NOTES
"Pt has stage 4 kidney cancer and was discharged from this hospital after 4 days inpatient and IL2 treatment. Pt arrived to the ED today with c/o a congested cough and generalized fatigue. Denies SOB or sputum production. Pt reports some SOB when lying down. Pt reports she \"just wants to sleep.\" Vitals stable, afebrile. Alert and oriented x4.   "

## 2017-06-11 NOTE — TELEPHONE ENCOUNTER
Reason for Disposition    Wheezing is present    Additional Information    Negative: Severe difficulty breathing (e.g., struggling for each breath, speaks in single words)    Negative: Bluish lips, tongue, or face now    Negative: [1] Rapid onset of cough AND [2] has hives    Negative: Coughing started suddenly after medicine, an allergic food or bee sting    Negative: [1] Difficulty breathing AND [2] exposure to flames, smoke, or fumes    Negative: [1] Stridor AND [2] difficulty breathing    Negative: Sounds like a life-threatening emergency to the triager    Negative: Choked on object of food that could be caught in the throat    Negative: Chest pain is main symptom    Negative: [1] Previous asthma attacks AND [2] this feels like asthma attack    Negative: Cough lasts > 3 weeks    Negative: Wet (productive) cough (i.e., white-yellow, yellow, green, or cristi colored sputum)    Negative: Chest pain  (Exception: MILD central chest pain, present only when coughing)    Negative: Difficulty breathing    Protocols used: COUGH - ACUTE NON-PRODUCTIVE-ADULT-

## 2017-06-12 ENCOUNTER — CARE COORDINATION (OUTPATIENT)
Dept: CARE COORDINATION | Facility: CLINIC | Age: 52
End: 2017-06-12

## 2017-06-12 NOTE — PROGRESS NOTES
Patient was readmitted to the ED before a post dc follow up call could be made so no call will be needed after ED visit        Riccardo Parham MD   Emergency Medicine          History          Chief Complaint   Patient presents with     Cough

## 2017-06-13 LAB
BACTERIA SPEC CULT: NO GROWTH
BACTERIA SPEC CULT: NO GROWTH
MICRO REPORT STATUS: NORMAL
MICRO REPORT STATUS: NORMAL
SPECIMEN SOURCE: NORMAL
SPECIMEN SOURCE: NORMAL

## 2017-06-15 LAB
BACTERIA SPEC CULT: NO GROWTH
MICRO REPORT STATUS: NORMAL
SPECIMEN SOURCE: NORMAL

## 2017-06-16 ENCOUNTER — APPOINTMENT (OUTPATIENT)
Dept: LAB | Facility: CLINIC | Age: 52
End: 2017-06-16
Attending: INTERNAL MEDICINE
Payer: COMMERCIAL

## 2017-06-16 ENCOUNTER — ONCOLOGY VISIT (OUTPATIENT)
Dept: ONCOLOGY | Facility: CLINIC | Age: 52
End: 2017-06-16
Attending: PHYSICIAN ASSISTANT
Payer: COMMERCIAL

## 2017-06-16 VITALS
WEIGHT: 122.5 LBS | HEIGHT: 61 IN | HEART RATE: 69 BPM | OXYGEN SATURATION: 98 % | BODY MASS INDEX: 23.13 KG/M2 | SYSTOLIC BLOOD PRESSURE: 102 MMHG | RESPIRATION RATE: 16 BRPM | DIASTOLIC BLOOD PRESSURE: 67 MMHG | TEMPERATURE: 98.2 F

## 2017-06-16 DIAGNOSIS — C64.1 MALIGNANT NEOPLASM OF RIGHT KIDNEY (H): ICD-10-CM

## 2017-06-16 LAB
ALBUMIN SERPL-MCNC: 3.7 G/DL (ref 3.4–5)
ALP SERPL-CCNC: 96 U/L (ref 40–150)
ALT SERPL W P-5'-P-CCNC: 59 U/L (ref 0–50)
ANION GAP SERPL CALCULATED.3IONS-SCNC: 8 MMOL/L (ref 3–14)
AST SERPL W P-5'-P-CCNC: 33 U/L (ref 0–45)
BACTERIA SPEC CULT: NO GROWTH
BASOPHILS # BLD AUTO: 0.2 10E9/L (ref 0–0.2)
BASOPHILS NFR BLD AUTO: 2.6 %
BILIRUB SERPL-MCNC: 0.5 MG/DL (ref 0.2–1.3)
BUN SERPL-MCNC: 15 MG/DL (ref 7–30)
CALCIUM SERPL-MCNC: 9.2 MG/DL (ref 8.5–10.1)
CHLORIDE SERPL-SCNC: 99 MMOL/L (ref 94–109)
CO2 SERPL-SCNC: 27 MMOL/L (ref 20–32)
CREAT SERPL-MCNC: 1.09 MG/DL (ref 0.52–1.04)
DIFFERENTIAL METHOD BLD: ABNORMAL
EOSINOPHIL # BLD AUTO: 0.3 10E9/L (ref 0–0.7)
EOSINOPHIL NFR BLD AUTO: 3.5 %
ERYTHROCYTE [DISTWIDTH] IN BLOOD BY AUTOMATED COUNT: 14.6 % (ref 10–15)
GFR SERPL CREATININE-BSD FRML MDRD: 53 ML/MIN/1.7M2
GLUCOSE SERPL-MCNC: 92 MG/DL (ref 70–99)
HCT VFR BLD AUTO: 39.1 % (ref 35–47)
HGB BLD-MCNC: 12.7 G/DL (ref 11.7–15.7)
LDH SERPL L TO P-CCNC: 232 U/L (ref 81–234)
LYMPHOCYTES # BLD AUTO: 3.1 10E9/L (ref 0.8–5.3)
LYMPHOCYTES NFR BLD AUTO: 39.5 %
MCH RBC QN AUTO: 30.2 PG (ref 26.5–33)
MCHC RBC AUTO-ENTMCNC: 32.5 G/DL (ref 31.5–36.5)
MCV RBC AUTO: 93 FL (ref 78–100)
METAMYELOCYTES # BLD: 0.3 10E9/L
METAMYELOCYTES NFR BLD MANUAL: 3.5 %
MICRO REPORT STATUS: NORMAL
MONOCYTES # BLD AUTO: 0.2 10E9/L (ref 0–1.3)
MONOCYTES NFR BLD AUTO: 2.6 %
NEUTROPHILS # BLD AUTO: 3.8 10E9/L (ref 1.6–8.3)
NEUTROPHILS NFR BLD AUTO: 48.3 %
PLATELET # BLD AUTO: 397 10E9/L (ref 150–450)
POTASSIUM SERPL-SCNC: 4.4 MMOL/L (ref 3.4–5.3)
PROT SERPL-MCNC: 8 G/DL (ref 6.8–8.8)
RBC # BLD AUTO: 4.2 10E12/L (ref 3.8–5.2)
RBC MORPH BLD: NORMAL
SODIUM SERPL-SCNC: 134 MMOL/L (ref 133–144)
SPECIMEN SOURCE: NORMAL
WBC # BLD AUTO: 7.9 10E9/L (ref 4–11)

## 2017-06-16 PROCEDURE — 83615 LACTATE (LD) (LDH) ENZYME: CPT | Performed by: PHYSICIAN ASSISTANT

## 2017-06-16 PROCEDURE — 99213 OFFICE O/P EST LOW 20 MIN: CPT | Mod: ZP | Performed by: PHYSICIAN ASSISTANT

## 2017-06-16 PROCEDURE — 80053 COMPREHEN METABOLIC PANEL: CPT | Performed by: PHYSICIAN ASSISTANT

## 2017-06-16 PROCEDURE — 36415 COLL VENOUS BLD VENIPUNCTURE: CPT | Performed by: PHYSICIAN ASSISTANT

## 2017-06-16 PROCEDURE — 99212 OFFICE O/P EST SF 10 MIN: CPT | Mod: ZF

## 2017-06-16 PROCEDURE — 85025 COMPLETE CBC W/AUTO DIFF WBC: CPT | Performed by: PHYSICIAN ASSISTANT

## 2017-06-16 ASSESSMENT — PAIN SCALES - GENERAL: PAINLEVEL: NO PAIN (0)

## 2017-06-16 NOTE — NURSING NOTE
"Oncology Rooming Note    June 16, 2017 2:14 PM   Suzi Gonsales is a 51 year old female who presents for:    Chief Complaint   Patient presents with     Blood Draw     labs collected from R arm venipuncture, vitals taken      Oncology Clinic Visit     Return: Kidney ca     Initial Vitals: /67  Pulse 69  Temp 98.2  F (36.8  C)  Resp 16  Ht 1.549 m (5' 0.98\")  Wt 55.6 kg (122 lb 8 oz)  LMP  (Within Months)  SpO2 98%  BMI 23.16 kg/m2 Estimated body mass index is 23.16 kg/(m^2) as calculated from the following:    Height as of this encounter: 1.549 m (5' 0.98\").    Weight as of this encounter: 55.6 kg (122 lb 8 oz). Body surface area is 1.55 meters squared.  No Pain (0) Comment: Data Unavailable   No LMP recorded (within months). Patient is not currently having periods (Reason: Premenopausal).  Allergies reviewed: Yes  Medications reviewed: Yes    Medications: Medication refills not needed today.  Pharmacy name entered into [facility]: [id number] - [place], [place] - [address] AT [facility] [address]    Clinical concerns: no new concerns     6 minutes for nursing intake (face to face time)     Liliane Moss CMA                "

## 2017-06-16 NOTE — NURSING NOTE
Chief Complaint   Patient presents with     Blood Draw     labs collected from R arm venipuncture, vitals taken    Debbi Martinez RN

## 2017-06-16 NOTE — LETTER
"6/16/2017      RE: Suzi Gonsales  [address] [place] [address]       [facility] CLINIC  FOLLOW-UP VISIT NOTE  Date of visit: 6-16-17          REASON FOR VISIT: mRCC post-visit for IL-2    HPI: Suzi presented to ED with hematuria and right flank pain thinking she had a renal stone in December 2014. She was worked up with a CT abd/pelvis which suggested a renal mass. She was referred to Dr. Max Zelaya in [facility]. 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 Per charts tumor resection had negative margins and it was staged at pT2bN0.    Her staging work up was negative except for pulmonary nodules. She has been followed every 6 months with scans. But her most recent scan last week suggested increase in size of couple of nodules prompting to the current referral. Recent CT CAP on 5/11/17 showed enlarging hilar LAD, enlarging pulmonary nodules, adrenal nodules and a pancreatic body mass.  She met with DR Green and decided to pursue IL-2.     INTERVAL HISTORY: Suzi is doing well today. She was admitted on 6/6/17 for IL-2.  Overall she felt it went as planned with nausea/vomiting, rigors, fatigue, anorexia and rigors.  She feels she is getting stronger every day but still enjoying a daily nap. Skin is dry/itching.  Otherwise eating OK.     No recent f/s/c. No chest pain/cough/dyspnea. Daily BM, no black/bloody stools. No  issues. No daily pain.     EXAM:  /67  Pulse 69  Temp 98.2  F (36.8  C)  Resp 16  Ht 1.549 m (5' 0.98\")  Wt 55.6 kg (122 lb 8 oz)  LMP  (Within Months)  SpO2 98%  BMI 23.16 kg/m2  Wt Readings from Last 4 Encounters:   06/16/17 55.6 kg (122 lb 8 oz)   06/11/17 61.8 kg (136 lb 3.2 oz)   06/10/17 63 kg (139 lb)   06/06/17 56.9 kg (125 lb 6.4 oz)     Vital signs were reviewed.   Patient alert and oriented x3.   PERRLA. EOMI. No scleral icterus noted. OP without " thrush/sores.  Neck exam: No palpable cervical, supraclavicular or axillary nodes bilaterally.   Heart: RRR no murmurs noted.   Lungs: clear to auscultation bilaterally.  No crackles or wheezing.   Abd: positive bowel sounds in all four quadrants.  No tenderness to palpation.  No hepatomegaly.   Extremities: No lower extremity edema.   Neuro: grossly intact.   Mood and affect is stable.   Skin is dry + flaking     LABS:      6/10/2017 06:15 6/11/2017 16:48 6/16/2017 13:55   WBC 7.3 8.7 7.9   Hemoglobin 9.9 (L) 10.2 (L) 12.7   Hematocrit 29.6 (L) 30.7 (L) 39.1   Platelet Count 151 205 397   RBC Count 3.26 (L) 3.38 (L) 4.20   MCV 91 91 93      6/8/2017 05:49 6/9/2017 06:22 6/10/2017 06:15 6/11/2017 16:48 6/16/2017 13:55   Sodium 139 140 144 140 134   Potassium 3.3 (L) 4.0 4.4 4.1 4.4   Chloride 110 (H) 113 (H) 118 (H) 110 (H) 99   Carbon Dioxide 20 19 (L) 20 24 27   Urea Nitrogen 5 (L) 6 (L) 6 (L) 8 15   Creatinine 1.00 0.79 0.90 1.01 1.09 (H)   GFR Estimate 58 (L) 76 66 57 (L) 53 (L)   GFR Estimate If Black 70 >90... 80 69 64   Calcium 7.5 (L) 7.3 (L) 7.7 (L) 8.5 9.2   Anion Gap 8 8 6 7 8   Magnesium 1.3 (L) 2.5 (H) 2.0     Phosphorus 1.8 (L) 1.8 (L) 2.2 (L)     Albumin 2.6 (L) 2.4 (L) 2.0 (L) 2.7 (L) 3.7   Protein Total 5.5 (L) 5.4 (L) 4.9 (L) 5.8 (L) 8.0   Bilirubin Total 0.6 0.8 0.2 0.4 0.5   Alkaline Phosphatase 52 58 66 93 96   ALT 30 68 (H) 54 (H) 91 (H) 59 (H)   AST 32 58 (H) 36 55 (H) 33         ASSESSMENT/PLAN: 50 year old with mRCC s/p first cycle of IL-2 receiving 9 doses    Suzi is doing well today.  Her creat is fairly stable, but I would like her to be drinking closer to 64 oz/daily of fluids.  She feels her stamina is improving and she is eating well.  We will have her come back on 6/20 for C2 and plan to image her at the end of the month.     Kia Ren PA-C

## 2017-06-16 NOTE — PROGRESS NOTES
"[facility]ER CLINIC  FOLLOW-UP VISIT NOTE  Date of visit: 6-16-17          REASON FOR VISIT: mRCC post-visit for IL-2    HPI: Suzi presented to ED with hematuria and right flank pain thinking she had a renal stone in December 2014. She was worked up with a CT abd/pelvis which suggested a renal mass. She was referred to Dr. Max Zelaya in [facility]. 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 Per charts tumor resection had negative margins and it was staged at pT2bN0.    Her staging work up was negative except for pulmonary nodules. She has been followed every 6 months with scans. But her most recent scan last week suggested increase in size of couple of nodules prompting to the current referral. Recent CT CAP on 5/11/17 showed enlarging hilar LAD, enlarging pulmonary nodules, adrenal nodules and a pancreatic body mass.  She met with DR Green and decided to pursue IL-2.     INTERVAL HISTORY: Suzi is doing well today. She was admitted on 6/6/17 for IL-2.  Overall she felt it went as planned with nausea/vomiting, rigors, fatigue, anorexia and rigors.  She feels she is getting stronger every day but still enjoying a daily nap. Skin is dry/itching.  Otherwise eating OK.     No recent f/s/c. No chest pain/cough/dyspnea. Daily BM, no black/bloody stools. No  issues. No daily pain.     EXAM:  /67  Pulse 69  Temp 98.2  F (36.8  C)  Resp 16  Ht 1.549 m (5' 0.98\")  Wt 55.6 kg (122 lb 8 oz)  LMP  (Within Months)  SpO2 98%  BMI 23.16 kg/m2  Wt Readings from Last 4 Encounters:   06/16/17 55.6 kg (122 lb 8 oz)   06/11/17 61.8 kg (136 lb 3.2 oz)   06/10/17 63 kg (139 lb)   06/06/17 56.9 kg (125 lb 6.4 oz)     Vital signs were reviewed.   Patient alert and oriented x3.   PERRLA. EOMI. No scleral icterus noted. OP without thrush/sores.  Neck exam: No palpable cervical, supraclavicular or axillary nodes " bilaterally.   Heart: RRR no murmurs noted.   Lungs: clear to auscultation bilaterally.  No crackles or wheezing.   Abd: positive bowel sounds in all four quadrants.  No tenderness to palpation.  No hepatomegaly.   Extremities: No lower extremity edema.   Neuro: grossly intact.   Mood and affect is stable.   Skin is dry + flaking     LABS:      6/10/2017 06:15 6/11/2017 16:48 6/16/2017 13:55   WBC 7.3 8.7 7.9   Hemoglobin 9.9 (L) 10.2 (L) 12.7   Hematocrit 29.6 (L) 30.7 (L) 39.1   Platelet Count 151 205 397   RBC Count 3.26 (L) 3.38 (L) 4.20   MCV 91 91 93      6/8/2017 05:49 6/9/2017 06:22 6/10/2017 06:15 6/11/2017 16:48 6/16/2017 13:55   Sodium 139 140 144 140 134   Potassium 3.3 (L) 4.0 4.4 4.1 4.4   Chloride 110 (H) 113 (H) 118 (H) 110 (H) 99   Carbon Dioxide 20 19 (L) 20 24 27   Urea Nitrogen 5 (L) 6 (L) 6 (L) 8 15   Creatinine 1.00 0.79 0.90 1.01 1.09 (H)   GFR Estimate 58 (L) 76 66 57 (L) 53 (L)   GFR Estimate If Black 70 >90... 80 69 64   Calcium 7.5 (L) 7.3 (L) 7.7 (L) 8.5 9.2   Anion Gap 8 8 6 7 8   Magnesium 1.3 (L) 2.5 (H) 2.0     Phosphorus 1.8 (L) 1.8 (L) 2.2 (L)     Albumin 2.6 (L) 2.4 (L) 2.0 (L) 2.7 (L) 3.7   Protein Total 5.5 (L) 5.4 (L) 4.9 (L) 5.8 (L) 8.0   Bilirubin Total 0.6 0.8 0.2 0.4 0.5   Alkaline Phosphatase 52 58 66 93 96   ALT 30 68 (H) 54 (H) 91 (H) 59 (H)   AST 32 58 (H) 36 55 (H) 33         ASSESSMENT/PLAN: 50 year old with mRCC s/p first cycle of IL-2 receiving 9 doses    Suzi is doing well today.  Her creat is fairly stable, but I would like her to be drinking closer to 64 oz/daily of fluids.  She feels her stamina is improving and she is eating well.  We will have her come back on 6/20 for C2 and plan to image her at the end of the month.     Kia Ren PA-C

## 2017-06-16 NOTE — MR AVS SNAPSHOT
After Visit Summary   6/16/2017    Suzi Gonsales    MRN: [id number]           Patient Information     Date Of Birth          1965        Visit Information        Provider Department      6/16/2017 2:10 PM Day Ren PA-C [facility]        Today's Diagnoses     Malignant neoplasm of right kidney (H)           Follow-ups after your visit        Future tests that were ordered for you today     Open Standing Orders        Priority Remaining Interval Expires Ordered    Comprehensive metabolic panel Routine 20/20 weekly 12/31/2017 6/16/2017    CBC with platelets differential Routine 20/20 weekly 12/31/2017 6/16/2017          Open Future Orders        Priority Expected Expires Ordered    CT Chest/Abdomen/Pelvis w Contrast Routine  6/16/2018 6/16/2017    IR PICC Vascular Routine  6/16/2018 6/16/2017            Who to contact     If you have questions or need follow up information about today's clinic visit or your schedule please contact [facility] directly at [phone].  Normal or non-critical lab and imaging results will be communicated to you by [employer], letter or phone within 4 business days after the clinic has received the results. If you do not hear from us within 7 days, please contact the clinic through [employer] or phone. If you have a critical or abnormal lab result, we will notify you by phone as soon as possible.  Submit refill requests through [employer] or call your pharmacy and they will forward the refill request to us. Please allow 3 business days for your refill to be completed.          Additional Information About Your Visit        [employer] Information     [employer] gives you secure access to your electronic health record. If you see a primary care provider, you can also send messages to your care team and make appointments. If you have questions, please call your primary care clinic.  If you do not have a primary care provider, please call  "[phone] and they will assist you.        Care EveryWhere ID     This is your Care EveryWhere ID. This could be used by other organizations to access your [facility] medical records  [id number]        Your Vitals Were     Pulse Temperature Respirations Height Last Period Pulse Oximetry    69 98.2  F (36.8  C) 16 1.549 m (5' 0.98\") (Within Months) 98%    BMI (Body Mass Index)                   23.16 kg/m2            Blood Pressure from Last 3 Encounters:   06/16/17 102/67   06/11/17 125/52   06/10/17 124/61    Weight from Last 3 Encounters:   06/16/17 55.6 kg (122 lb 8 oz)   06/11/17 61.8 kg (136 lb 3.2 oz)   06/10/17 63 kg (139 lb)              We Performed the Following     CBC with platelets differential     Comprehensive metabolic panel     Lactate Dehydrogenase        Primary Care Provider Office Phone # Fax #    Idalia Saini [phone]       [facility] [address] [place] [id number]        Thank you!     Thank you for choosing [facility]  for your care. Our goal is always to provide you with excellent care. Hearing back from our patients is one way we can continue to improve our services. Please take a few minutes to complete the written survey that you may receive in the mail after your visit with us. Thank you!             Your Updated Medication List - Protect others around you: Learn how to safely use, store and throw away your medicines at www.disposemymeds.org.          This list is accurate as of: 6/16/17  3:23 PM.  Always use your most recent med list.                   Brand Name Dispense Instructions for use    acetaminophen 325 MG tablet    TYLENOL    100 tablet    Take 2 tablets (650 mg) by mouth every 4 hours as needed for mild pain (mild pain)       acetaminophen-codeine 300-30 MG per tablet    TYLENOL/codeine #3    20 tablet    Take 1 tablet by mouth every 6 hours as needed for mild pain or pain (PRN pain or cough)       ALPRAZolam 0.5 MG tablet "    XANAX    60 tablet    Take 1 tablet (0.5 mg) by mouth 3 times daily as needed for anxiety       AMBIEN PO      Take 5 mg by mouth nightly as needed for sleep       benzonatate 100 MG capsule    TESSALON    42 capsule    Take 1 capsule (100 mg) by mouth 3 times daily as needed for cough       buPROPion 300 MG 24 hr tablet    WELLBUTRIN XL     Take 300 mg by mouth every morning       citalopram 20 MG tablet    celeXA     Take 20 mg by mouth every evening       IBUPROFEN      Take 400 mg by mouth every 6 hours as needed       prochlorperazine 10 MG tablet    COMPAZINE    30 tablet    Take 1 tablet (10 mg) by mouth every 6 hours as needed for nausea or vomiting (Breakthrough Nausea/Vomiting)       triamcinolone 0.1 % cream    KENALOG     APPLY TOPICALLY BID PRN

## 2017-06-20 ENCOUNTER — APPOINTMENT (OUTPATIENT)
Dept: LAB | Facility: CLINIC | Age: 52
DRG: 838 | End: 2017-06-20
Attending: PHYSICIAN ASSISTANT
Payer: COMMERCIAL

## 2017-06-20 ENCOUNTER — APPOINTMENT (OUTPATIENT)
Dept: GENERAL RADIOLOGY | Facility: CLINIC | Age: 52
DRG: 838 | End: 2017-06-20
Attending: PHYSICIAN ASSISTANT
Payer: COMMERCIAL

## 2017-06-20 ENCOUNTER — ONCOLOGY VISIT (OUTPATIENT)
Dept: ONCOLOGY | Facility: CLINIC | Age: 52
DRG: 838 | End: 2017-06-20
Attending: PHYSICIAN ASSISTANT
Payer: COMMERCIAL

## 2017-06-20 ENCOUNTER — HOSPITAL ENCOUNTER (INPATIENT)
Facility: CLINIC | Age: 52
LOS: 3 days | Discharge: HOME | DRG: 838 | End: 2017-06-23
Attending: INTERNAL MEDICINE | Admitting: INTERNAL MEDICINE
Payer: COMMERCIAL

## 2017-06-20 VITALS
HEIGHT: 61 IN | BODY MASS INDEX: 22.98 KG/M2 | RESPIRATION RATE: 16 BRPM | OXYGEN SATURATION: 98 % | HEART RATE: 77 BPM | SYSTOLIC BLOOD PRESSURE: 99 MMHG | TEMPERATURE: 98.3 F | WEIGHT: 121.69 LBS | DIASTOLIC BLOOD PRESSURE: 64 MMHG

## 2017-06-20 DIAGNOSIS — C64.1 MALIGNANT NEOPLASM OF RIGHT KIDNEY (H): ICD-10-CM

## 2017-06-20 PROBLEM — C64.9 CLEAR CELL CARCINOMA OF KIDNEY (H): Status: ACTIVE | Noted: 2017-06-20

## 2017-06-20 LAB
ALBUMIN SERPL-MCNC: 3.3 G/DL (ref 3.4–5)
ALP SERPL-CCNC: 84 U/L (ref 40–150)
ALT SERPL W P-5'-P-CCNC: 39 U/L (ref 0–50)
ANION GAP SERPL CALCULATED.3IONS-SCNC: 8 MMOL/L (ref 3–14)
AST SERPL W P-5'-P-CCNC: 30 U/L (ref 0–45)
BASOPHILS # BLD AUTO: 0.1 10E9/L (ref 0–0.2)
BASOPHILS NFR BLD AUTO: 2.4 %
BILIRUB SERPL-MCNC: 0.3 MG/DL (ref 0.2–1.3)
BUN SERPL-MCNC: 12 MG/DL (ref 7–30)
CALCIUM SERPL-MCNC: 8.5 MG/DL (ref 8.5–10.1)
CHLORIDE SERPL-SCNC: 105 MMOL/L (ref 94–109)
CO2 SERPL-SCNC: 24 MMOL/L (ref 20–32)
CREAT SERPL-MCNC: 1.05 MG/DL (ref 0.52–1.04)
DIFFERENTIAL METHOD BLD: ABNORMAL
EOSINOPHIL # BLD AUTO: 0.5 10E9/L (ref 0–0.7)
EOSINOPHIL NFR BLD AUTO: 9.3 %
ERYTHROCYTE [DISTWIDTH] IN BLOOD BY AUTOMATED COUNT: 14.2 % (ref 10–15)
GFR SERPL CREATININE-BSD FRML MDRD: 55 ML/MIN/1.7M2
GLUCOSE SERPL-MCNC: 92 MG/DL (ref 70–99)
HCT VFR BLD AUTO: 36.3 % (ref 35–47)
HGB BLD-MCNC: 11.8 G/DL (ref 11.7–15.7)
IMM GRANULOCYTES # BLD: 0.1 10E9/L (ref 0–0.4)
IMM GRANULOCYTES NFR BLD: 1.6 %
LYMPHOCYTES # BLD AUTO: 2.2 10E9/L (ref 0.8–5.3)
LYMPHOCYTES NFR BLD AUTO: 44.4 %
MAGNESIUM SERPL-MCNC: 2.3 MG/DL (ref 1.6–2.3)
MCH RBC QN AUTO: 30.6 PG (ref 26.5–33)
MCHC RBC AUTO-ENTMCNC: 32.5 G/DL (ref 31.5–36.5)
MCV RBC AUTO: 94 FL (ref 78–100)
MONOCYTES # BLD AUTO: 0.4 10E9/L (ref 0–1.3)
MONOCYTES NFR BLD AUTO: 8.7 %
NEUTROPHILS # BLD AUTO: 1.7 10E9/L (ref 1.6–8.3)
NEUTROPHILS NFR BLD AUTO: 33.6 %
NRBC # BLD AUTO: 0 10*3/UL
NRBC BLD AUTO-RTO: 0 /100
PHOSPHATE SERPL-MCNC: 3.4 MG/DL (ref 2.5–4.5)
PLATELET # BLD AUTO: 473 10E9/L (ref 150–450)
POTASSIUM SERPL-SCNC: 3.9 MMOL/L (ref 3.4–5.3)
PROT SERPL-MCNC: 7.1 G/DL (ref 6.8–8.8)
RBC # BLD AUTO: 3.86 10E12/L (ref 3.8–5.2)
SODIUM SERPL-SCNC: 137 MMOL/L (ref 133–144)
WBC # BLD AUTO: 4.9 10E9/L (ref 4–11)

## 2017-06-20 PROCEDURE — 12000008 ZZH R&B INTERMEDIATE UMMC

## 2017-06-20 PROCEDURE — 25800025 ZZH RX 258: Performed by: INTERNAL MEDICINE

## 2017-06-20 PROCEDURE — C1769 GUIDE WIRE: HCPCS

## 2017-06-20 PROCEDURE — C1751 CATH, INF, PER/CENT/MIDLINE: HCPCS

## 2017-06-20 PROCEDURE — 99212 OFFICE O/P EST SF 10 MIN: CPT

## 2017-06-20 PROCEDURE — 84100 ASSAY OF PHOSPHORUS: CPT | Performed by: INTERNAL MEDICINE

## 2017-06-20 PROCEDURE — 99207 ZZC CHGS TRANSFERRED TO HOSPITAL: CPT | Mod: ZP | Performed by: PHYSICIAN ASSISTANT

## 2017-06-20 PROCEDURE — 80053 COMPREHEN METABOLIC PANEL: CPT | Performed by: INTERNAL MEDICINE

## 2017-06-20 PROCEDURE — 25000132 ZZH RX MED GY IP 250 OP 250 PS 637: Performed by: INTERNAL MEDICINE

## 2017-06-20 PROCEDURE — 99211 OFF/OP EST MAY X REQ PHY/QHP: CPT | Mod: ZF

## 2017-06-20 PROCEDURE — 85025 COMPLETE CBC W/AUTO DIFF WBC: CPT | Performed by: INTERNAL MEDICINE

## 2017-06-20 PROCEDURE — 02HV33Z INSERTION OF INFUSION DEVICE INTO SUPERIOR VENA CAVA, PERCUTANEOUS APPROACH: ICD-10-PCS | Performed by: INTERNAL MEDICINE

## 2017-06-20 PROCEDURE — 36569 INSJ PICC 5 YR+ W/O IMAGING: CPT

## 2017-06-20 PROCEDURE — 3E04302 INTRODUCTION OF HIGH-DOSE INTERLEUKIN-2 INTO CENTRAL VEIN, PERCUTANEOUS APPROACH: ICD-10-PCS | Performed by: INTERNAL MEDICINE

## 2017-06-20 PROCEDURE — 99223 1ST HOSP IP/OBS HIGH 75: CPT | Mod: AI | Performed by: INTERNAL MEDICINE

## 2017-06-20 PROCEDURE — 25000132 ZZH RX MED GY IP 250 OP 250 PS 637: Performed by: PHYSICIAN ASSISTANT

## 2017-06-20 PROCEDURE — 40000986 XR CHEST 1 VW

## 2017-06-20 PROCEDURE — 36415 COLL VENOUS BLD VENIPUNCTURE: CPT | Performed by: INTERNAL MEDICINE

## 2017-06-20 PROCEDURE — 25000128 H RX IP 250 OP 636: Performed by: INTERNAL MEDICINE

## 2017-06-20 PROCEDURE — 83735 ASSAY OF MAGNESIUM: CPT | Performed by: INTERNAL MEDICINE

## 2017-06-20 RX ORDER — DEXTROSE MONOHYDRATE, SODIUM CHLORIDE, AND POTASSIUM CHLORIDE 50; 1.49; 9 G/1000ML; G/1000ML; G/1000ML
INJECTION, SOLUTION INTRAVENOUS CONTINUOUS
Status: DISCONTINUED | OUTPATIENT
Start: 2017-06-20 | End: 2017-06-23 | Stop reason: HOSPADM

## 2017-06-20 RX ORDER — ACETAMINOPHEN 325 MG/1
650 TABLET ORAL EVERY 4 HOURS
Status: DISCONTINUED | OUTPATIENT
Start: 2017-06-20 | End: 2017-06-23 | Stop reason: HOSPADM

## 2017-06-20 RX ORDER — ALPRAZOLAM 0.25 MG
0.5 TABLET ORAL 3 TIMES DAILY PRN
Status: DISCONTINUED | OUTPATIENT
Start: 2017-06-20 | End: 2017-06-23 | Stop reason: HOSPADM

## 2017-06-20 RX ORDER — POTASSIUM CHLORIDE 29.8 MG/ML
20 INJECTION INTRAVENOUS
Status: DISCONTINUED | OUTPATIENT
Start: 2017-06-20 | End: 2017-06-23 | Stop reason: HOSPADM

## 2017-06-20 RX ORDER — POTASSIUM CHLORIDE 7.45 MG/ML
10 INJECTION INTRAVENOUS
Status: DISCONTINUED | OUTPATIENT
Start: 2017-06-20 | End: 2017-06-23 | Stop reason: HOSPADM

## 2017-06-20 RX ORDER — CITALOPRAM HYDROBROMIDE 20 MG/1
20 TABLET ORAL EVERY EVENING
Status: DISCONTINUED | OUTPATIENT
Start: 2017-06-20 | End: 2017-06-23 | Stop reason: HOSPADM

## 2017-06-20 RX ORDER — LORAZEPAM 0.5 MG/1
.5-1 TABLET ORAL EVERY 6 HOURS PRN
Status: DISCONTINUED | OUTPATIENT
Start: 2017-06-20 | End: 2017-06-23 | Stop reason: HOSPADM

## 2017-06-20 RX ORDER — MAGNESIUM SULFATE HEPTAHYDRATE 40 MG/ML
4 INJECTION, SOLUTION INTRAVENOUS EVERY 4 HOURS PRN
Status: DISCONTINUED | OUTPATIENT
Start: 2017-06-20 | End: 2017-06-23 | Stop reason: HOSPADM

## 2017-06-20 RX ORDER — BENZONATATE 100 MG/1
100 CAPSULE ORAL 3 TIMES DAILY PRN
Status: DISCONTINUED | OUTPATIENT
Start: 2017-06-20 | End: 2017-06-23 | Stop reason: HOSPADM

## 2017-06-20 RX ORDER — LIDOCAINE 40 MG/G
CREAM TOPICAL
Status: DISCONTINUED | OUTPATIENT
Start: 2017-06-20 | End: 2017-06-23 | Stop reason: HOSPADM

## 2017-06-20 RX ORDER — BUPROPION HYDROCHLORIDE 300 MG/1
300 TABLET ORAL EVERY MORNING
Status: DISCONTINUED | OUTPATIENT
Start: 2017-06-20 | End: 2017-06-23 | Stop reason: HOSPADM

## 2017-06-20 RX ORDER — PROCHLORPERAZINE MALEATE 10 MG
10 TABLET ORAL EVERY 6 HOURS PRN
Status: DISCONTINUED | OUTPATIENT
Start: 2017-06-20 | End: 2017-06-23 | Stop reason: HOSPADM

## 2017-06-20 RX ORDER — MEPERIDINE HYDROCHLORIDE 25 MG/ML
25 INJECTION INTRAMUSCULAR; INTRAVENOUS; SUBCUTANEOUS
Status: DISCONTINUED | OUTPATIENT
Start: 2017-06-20 | End: 2017-06-23 | Stop reason: HOSPADM

## 2017-06-20 RX ORDER — SODIUM CHLORIDE 9 MG/ML
1000 INJECTION, SOLUTION INTRAVENOUS CONTINUOUS PRN
Status: DISCONTINUED | OUTPATIENT
Start: 2017-06-20 | End: 2017-06-23 | Stop reason: HOSPADM

## 2017-06-20 RX ORDER — NAPROXEN 250 MG/1
250 TABLET ORAL 2 TIMES DAILY PRN
Status: DISCONTINUED | OUTPATIENT
Start: 2017-06-20 | End: 2017-06-23 | Stop reason: HOSPADM

## 2017-06-20 RX ORDER — POTASSIUM CHLORIDE 1.5 G/1.58G
20-40 POWDER, FOR SOLUTION ORAL
Status: DISCONTINUED | OUTPATIENT
Start: 2017-06-20 | End: 2017-06-23 | Stop reason: HOSPADM

## 2017-06-20 RX ORDER — LORAZEPAM 0.5 MG/1
.5-1 TABLET ORAL EVERY 4 HOURS PRN
Status: DISCONTINUED | OUTPATIENT
Start: 2017-06-20 | End: 2017-06-20

## 2017-06-20 RX ORDER — LORAZEPAM 2 MG/ML
.5-1 INJECTION INTRAMUSCULAR EVERY 4 HOURS PRN
Status: DISCONTINUED | OUTPATIENT
Start: 2017-06-20 | End: 2017-06-20

## 2017-06-20 RX ORDER — LORAZEPAM 2 MG/ML
.5-1 INJECTION INTRAMUSCULAR EVERY 6 HOURS PRN
Status: DISCONTINUED | OUTPATIENT
Start: 2017-06-20 | End: 2017-06-22

## 2017-06-20 RX ORDER — ONDANSETRON 2 MG/ML
8 INJECTION INTRAMUSCULAR; INTRAVENOUS EVERY 8 HOURS
Status: DISCONTINUED | OUTPATIENT
Start: 2017-06-20 | End: 2017-06-23 | Stop reason: HOSPADM

## 2017-06-20 RX ORDER — ZOLPIDEM TARTRATE 5 MG/1
5 TABLET ORAL
Status: DISCONTINUED | OUTPATIENT
Start: 2017-06-20 | End: 2017-06-22

## 2017-06-20 RX ORDER — METHYLPREDNISOLONE SODIUM SUCCINATE 125 MG/2ML
125 INJECTION, POWDER, LYOPHILIZED, FOR SOLUTION INTRAMUSCULAR; INTRAVENOUS
Status: DISCONTINUED | OUTPATIENT
Start: 2017-06-20 | End: 2017-06-23 | Stop reason: HOSPADM

## 2017-06-20 RX ORDER — PROCHLORPERAZINE MALEATE 5 MG
5-10 TABLET ORAL EVERY 6 HOURS PRN
Status: DISCONTINUED | OUTPATIENT
Start: 2017-06-20 | End: 2017-06-20

## 2017-06-20 RX ORDER — MEPERIDINE HYDROCHLORIDE 25 MG/ML
25 INJECTION INTRAMUSCULAR; INTRAVENOUS; SUBCUTANEOUS EVERY 30 MIN PRN
Status: DISCONTINUED | OUTPATIENT
Start: 2017-06-20 | End: 2017-06-23 | Stop reason: HOSPADM

## 2017-06-20 RX ORDER — ZOLPIDEM TARTRATE 5 MG/1
5 TABLET ORAL
Status: DISCONTINUED | OUTPATIENT
Start: 2017-06-20 | End: 2017-06-20

## 2017-06-20 RX ORDER — ALBUTEROL SULFATE 90 UG/1
1-2 AEROSOL, METERED RESPIRATORY (INHALATION)
Status: DISCONTINUED | OUTPATIENT
Start: 2017-06-20 | End: 2017-06-23 | Stop reason: HOSPADM

## 2017-06-20 RX ORDER — HEPARIN SODIUM,PORCINE 10 UNIT/ML
2-5 VIAL (ML) INTRAVENOUS
Status: COMPLETED | OUTPATIENT
Start: 2017-06-20 | End: 2017-06-21

## 2017-06-20 RX ORDER — DIPHENHYDRAMINE HCL 25 MG
25 CAPSULE ORAL EVERY 4 HOURS PRN
Status: DISCONTINUED | OUTPATIENT
Start: 2017-06-20 | End: 2017-06-23 | Stop reason: HOSPADM

## 2017-06-20 RX ORDER — NALOXONE HYDROCHLORIDE 0.4 MG/ML
.1-.4 INJECTION, SOLUTION INTRAMUSCULAR; INTRAVENOUS; SUBCUTANEOUS
Status: DISCONTINUED | OUTPATIENT
Start: 2017-06-20 | End: 2017-06-23 | Stop reason: HOSPADM

## 2017-06-20 RX ORDER — POTASSIUM CHLORIDE 750 MG/1
20-40 TABLET, EXTENDED RELEASE ORAL
Status: DISCONTINUED | OUTPATIENT
Start: 2017-06-20 | End: 2017-06-23 | Stop reason: HOSPADM

## 2017-06-20 RX ORDER — DEXTROSE MONOHYDRATE 50 MG/ML
1000 INJECTION, SOLUTION INTRAVENOUS CONTINUOUS
Status: DISCONTINUED | OUTPATIENT
Start: 2017-06-20 | End: 2017-06-23 | Stop reason: HOSPADM

## 2017-06-20 RX ORDER — CODEINE PHOSPHATE AND GUAIFENESIN 10; 100 MG/5ML; MG/5ML
5-10 SOLUTION ORAL EVERY 4 HOURS PRN
Status: DISCONTINUED | OUTPATIENT
Start: 2017-06-20 | End: 2017-06-23 | Stop reason: HOSPADM

## 2017-06-20 RX ORDER — DIPHENOXYLATE HCL/ATROPINE 2.5-.025MG
1 TABLET ORAL
Status: DISCONTINUED | OUTPATIENT
Start: 2017-06-20 | End: 2017-06-23 | Stop reason: HOSPADM

## 2017-06-20 RX ORDER — ALBUTEROL SULFATE 0.83 MG/ML
2.5 SOLUTION RESPIRATORY (INHALATION)
Status: DISCONTINUED | OUTPATIENT
Start: 2017-06-20 | End: 2017-06-23 | Stop reason: HOSPADM

## 2017-06-20 RX ORDER — CEPHALEXIN 500 MG/1
500 CAPSULE ORAL 2 TIMES DAILY
Status: DISCONTINUED | OUTPATIENT
Start: 2017-06-20 | End: 2017-06-23 | Stop reason: HOSPADM

## 2017-06-20 RX ORDER — DIPHENHYDRAMINE HYDROCHLORIDE 50 MG/ML
50 INJECTION INTRAMUSCULAR; INTRAVENOUS
Status: DISCONTINUED | OUTPATIENT
Start: 2017-06-20 | End: 2017-06-23 | Stop reason: HOSPADM

## 2017-06-20 RX ORDER — POTASSIUM CL/LIDO/0.9 % NACL 10MEQ/0.1L
10 INTRAVENOUS SOLUTION, PIGGYBACK (ML) INTRAVENOUS
Status: DISCONTINUED | OUTPATIENT
Start: 2017-06-20 | End: 2017-06-23 | Stop reason: HOSPADM

## 2017-06-20 RX ADMIN — ALDESLEUKIN 34 MILLION UNITS: 1.1 INJECTION, POWDER, LYOPHILIZED, FOR SOLUTION INTRAVENOUS at 18:47

## 2017-06-20 RX ADMIN — BUPROPION HYDROCHLORIDE 300 MG: 300 TABLET, FILM COATED, EXTENDED RELEASE ORAL at 14:06

## 2017-06-20 RX ADMIN — ACETAMINOPHEN 650 MG: 325 TABLET, FILM COATED ORAL at 23:06

## 2017-06-20 RX ADMIN — MEPERIDINE HYDROCHLORIDE 25 MG: 25 INJECTION, SOLUTION INTRAMUSCULAR; INTRAVENOUS; SUBCUTANEOUS at 22:16

## 2017-06-20 RX ADMIN — CEPHALEXIN 500 MG: 500 CAPSULE ORAL at 19:53

## 2017-06-20 RX ADMIN — ACETAMINOPHEN 650 MG: 325 TABLET, FILM COATED ORAL at 18:08

## 2017-06-20 RX ADMIN — CEPHALEXIN 500 MG: 500 CAPSULE ORAL at 14:06

## 2017-06-20 RX ADMIN — PROCHLORPERAZINE EDISYLATE 10 MG: 5 INJECTION INTRAMUSCULAR; INTRAVENOUS at 22:34

## 2017-06-20 RX ADMIN — ONDANSETRON 8 MG: 2 INJECTION INTRAMUSCULAR; INTRAVENOUS at 18:07

## 2017-06-20 RX ADMIN — RANITIDINE 150 MG: 150 TABLET ORAL at 18:08

## 2017-06-20 RX ADMIN — RANITIDINE 150 MG: 150 TABLET ORAL at 23:06

## 2017-06-20 RX ADMIN — POTASSIUM CHLORIDE, DEXTROSE MONOHYDRATE AND SODIUM CHLORIDE: 150; 5; 900 INJECTION, SOLUTION INTRAVENOUS at 18:09

## 2017-06-20 RX ADMIN — ZOLPIDEM TARTRATE 5 MG: 5 TABLET, FILM COATED ORAL at 21:09

## 2017-06-20 RX ADMIN — CITALOPRAM HYDROBROMIDE 20 MG: 20 TABLET ORAL at 19:53

## 2017-06-20 RX ADMIN — DEXTROSE MONOHYDRATE 1000 ML: 50 INJECTION, SOLUTION INTRAVENOUS at 18:09

## 2017-06-20 RX ADMIN — ALPRAZOLAM 0.5 MG: 0.25 TABLET ORAL at 14:06

## 2017-06-20 RX ADMIN — WHITE PETROLATUM: 1.75 OINTMENT TOPICAL at 19:53

## 2017-06-20 ASSESSMENT — PAIN SCALES - GENERAL: PAINLEVEL: NO PAIN (0)

## 2017-06-20 ASSESSMENT — ACTIVITIES OF DAILY LIVING (ADL): FALL_HISTORY_WITHIN_LAST_SIX_MONTHS: NO

## 2017-06-20 NOTE — LETTER
[address] [facility]  [address] [address]  Phone: [phone]      June 23, 2017      Suzi Gonsales  [address]  [place] [address]    Dear Suzi,    We have been trying to reach you to introduce you to [facility] s Care Coordination program.  The goal of care coordination is to help you manage your health and improve access to the [facility] system in the most efficient manner.  The Care Coordinator is a nurse who understands the healthcare system and will assist you in improving your access to care.     As your Physician and Care Coordinator we partner to help you achieve your health care goals.     We will continue to reach out; however, if you are able to call your Care Coordinator at ***, that would be appreciated.  We at [facility] are focused on providing you with the highest-quality healthcare experience possible.      It is a pleasure to partner with you as we work towards achieving your optimal state of wellness.        Sincerely,        Idalia Florez  [facility] [address] [place] [address]

## 2017-06-20 NOTE — LETTER
Transition Communication Hand-off for Care Transitions to Next Level of Care Provider    Name: Suzi Gonsales  MRN #: [id number]  Primary Care Provider: Idalia Saini     Primary Clinic: [address] [address]     Reason for Hospitalization:  2nd cycle IL2 Chemotherapy treatment  Clear cell carcinoma of kidney (H)  Admit Date/Time: 6/20/2017 11:31 AM  Discharge Date: 06/23/17   renal cell cancer admitted for cycle #2 of IL2. Has had 5 doses. Issues with hypotension  Follow-up plan:  Future Appointments  Date Time Provider Department Center   6/26/2017 8:45 AM  [facility]   6/26/2017 9:20 AM Day Ren PA-C [facility]   7/10/2017 12:40 PM CT2 Day [facility]   7/11/2017 9:45 AM Henri Green MD [facility]     ________________________________________  Mami Land, RN, BSN    7D/Oncology Care Coordinator  Pager  [phone]  Phone [phone]

## 2017-06-20 NOTE — IP AVS SNAPSHOT
MRN:[id number]                      After Visit Summary   6/20/2017    Suzi Gonsales    MRN: [id number]           Thank you!     Thank you for choosing [facility] for your care. Our goal is always to provide you with excellent care. Hearing back from our patients is one way we can continue to improve our services. Please take a few minutes to complete the written survey that you may receive in the mail after you visit with us. Thank you!        Patient Information     Date Of Birth          1965        Designated Caregiver       Most Recent Value    Caregiver    Will someone help with your care after discharge? no      About your hospital stay     You were admitted on:  June 20, 2017 You last received care in the:  [address] [facility]    You were discharged on:  June 23, 2017        Reason for your hospital stay       IL2 , cycle #2, for metastatic renal cell carcinoma                  Who to Call     For medical emergencies, please call 911.  For non-urgent questions about your medical care, please call your primary care provider or clinic, [phone]          Attending Provider     Provider Specialty    Henri Green MD Hematology    Will Gerda Fuentes MD Hematology & Oncology       Primary Care Provider Office Phone # Fax #    Idalia Saini [phone]      After Care Instructions     Activity       Your activity upon discharge: activity as tolerated            Diet       Follow this diet upon discharge: Regular            Discharge Instructions       Contact [facility] ([phone])  for temperature > 100.4, shortness of breath, chest pain, headaches, vision changes, bleeding, uncontrolled nausea, vomiting, diarrhea, or pain.                  Follow-up Appointments     Follow Up and recommended labs and tests       Follow up in Dr. Green's clinic next week with repeat labs. Keep your previously scheduled appointments for CT scan and   Norma in July                  Your next 10 appointments already scheduled     Jun 26, 2017  8:45 AM CDT   [facility] with  [facility] DRAW   [facility] ([facility])    [address] [facility]  [address] [facility] [address]   [phone]            Jun 26, 2017  9:20 AM CDT   (Arrive by 9:05 AM)   Return Visit with Day Ren PA-C   [facility] ([facility])    [address] [place] [address]   [phone]            Jul 10, 2017 12:40 PM CDT   (Arrive by 12:25 PM)   CT CHEST/ABDOMEN/PELVIS W CONTRAST with UCCT2   [facility] CT ([facility])    [address] [place] [address]   [phone]           Please bring any scans or X-rays taken at other hospitals, if similar tests were done. Also bring a list of your medicines, including vitamins, minerals and over-the-counter drugs. It is safest to leave personal items at home.  Be sure to tell your doctor:   If you have any allergies.   If there s any chance you are pregnant.   If you are breastfeeding.   If you have any special needs.  You may have contrast for this exam. To prepare:   Do not eat or drink for 2 hours before your exam. If you need to take medicine, you may take it with small sips of water. (We may ask you to take liquid medicine as well.)   The day before your exam, drink extra fluids at least six 8-ounce glasses (unless your doctor tells you to restrict your fluids).  Patients over 70 or patients with diabetes or kidney problems:   If you haven t had a blood test (creatinine test) within the last 30 days, go to your clinic or Diagnostic Imaging Department for this test.  If you have diabetes:   If your kidney function is normal, continue taking your metformin (Avandamet, Glucophage, Glucovance, Metaglip) on the day of your exam.   If your kidney function is  abnormal, wait 48 hours before restarting this medicine.  You will have oral contrast for this exam:   You will drink the contrast at home. Get this from your clinic or Diagnostic Imaging Department. Please follow the directions given.  Please wear loose clothing, such as a sweat suit or jogging clothes. Avoid snaps, zippers and other metal. We may ask you to undress and put on a hospital gown.  If you have any questions, please call the Imaging Department where you will have your exam.            Jul 11, 2017  9:45 AM CDT   Return Visit with Henri Green MD   [facility] ([facility])    [facility]  [address]  [address]  [facility] [address] [phone]              Future tests that were ordered for you     CBC with platelets differential       Last Lab Result: Hemoglobin (g/dL)       Date                     Value                 06/23/2017               9.7 (L)          ----------            Comprehensive metabolic panel           Magnesium           Phosphorus                 Pending Results     Date and Time Order Name Status Description    6/22/2017 2330 Blood culture Preliminary     6/21/2017 2330 Blood culture Preliminary     6/20/2017 2330 Blood culture Preliminary             Statement of Approval     Ordered          06/23/17 1010  I have reviewed and agree with all the recommendations and orders detailed in this document.  EFFECTIVE NOW     Approved and electronically signed by:  Romelia Vergara PA-C             Admission Information     Date & Time Provider Department Dept. Phone    6/20/2017 Gerda Lawson MD [address] [facility] [phone]      Your Vitals Were     Blood Pressure Pulse Temperature Respirations Weight Pulse Oximetry    100/46 (BP Location: Right arm) 119 98.2  F (36.8  C) (Oral) 20 63.8 kg (140 lb 9.6 oz) 98%    BMI (Body Mass Index)                   26.58 kg/m2           MyChart  Information     [facility] gives you secure access to your electronic health record. If you see a primary care provider, you can also send messages to your care team and make appointments. If you have questions, please call your primary care clinic.  If you do not have a primary care provider, please call [phone] and they will assist you.        Care EveryWhere ID     This is your Care EveryWhere ID. This could be used by other organizations to access your [facility] medical records  [id number]        Equal Access to Services     ROSAURA INTERIANO : Hadii aad ku hadasho Soomaali, waaxda luqadaha, qaybta kaalmada adeegyada, waxay yasmine oswald . So [facility] [phone].    ATENCIÓN: Si habla español, tiene a chacon disposición servicios gratuitos de asistencia lingüística. [facility] [phone].    We comply with applicable federal civil rights laws and [place] laws. We do not discriminate on the basis of race, color, national origin, age, disability sex, sexual orientation or gender identity.               Review of your medicines      CONTINUE these medicines which have NOT CHANGED        Dose / Directions    acetaminophen 325 MG tablet   Commonly known as:  TYLENOL   Used for:  Malignant neoplasm of right kidney (H)        Dose:  650 mg   Take 2 tablets (650 mg) by mouth every 4 hours as needed for mild pain (mild pain)   Quantity:  100 tablet   Refills:  0       acetaminophen-codeine 300-30 MG per tablet   Commonly known as:  TYLENOL/codeine #3        Dose:  1 tablet   Take 1 tablet by mouth every 6 hours as needed for mild pain or pain (PRN pain or cough)   Quantity:  20 tablet   Refills:  0       ALPRAZolam 0.5 MG tablet   Commonly known as:  XANAX   Used for:  Renal cell carcinoma, unspecified laterality (H), Mass of upper lobe of left lung        Dose:  0.5 mg   Take 1 tablet (0.5 mg) by mouth 3 times daily as needed for anxiety   Quantity:  60 tablet   Refills:  0       AMBIEN PO   Used for:   Malignant neoplasm of right kidney (H)        Dose:  5 mg   Take 5 mg by mouth nightly as needed for sleep   Refills:  0       benzonatate 100 MG capsule   Commonly known as:  TESSALON        Dose:  100 mg   Take 1 capsule (100 mg) by mouth 3 times daily as needed for cough   Quantity:  42 capsule   Refills:  0       buPROPion 300 MG 24 hr tablet   Commonly known as:  WELLBUTRIN XL        Dose:  300 mg   Take 300 mg by mouth every morning   Refills:  0       citalopram 20 MG tablet   Commonly known as:  celeXA        Dose:  20 mg   Take 20 mg by mouth every evening   Refills:  0       IBUPROFEN        Dose:  400 mg   Take 400 mg by mouth every 6 hours as needed   Refills:  0       prochlorperazine 10 MG tablet   Commonly known as:  COMPAZINE   Used for:  Malignant neoplasm of right kidney (H)        Dose:  10 mg   Take 1 tablet (10 mg) by mouth every 6 hours as needed for nausea or vomiting (Breakthrough Nausea/Vomiting)   Quantity:  30 tablet   Refills:  0         STOP taking     triamcinolone 0.1 % cream   Commonly known as:  KENALOG                    Protect others around you: Learn how to safely use, store and throw away your medicines at www.disposemymeds.org.             Medication List: This is a list of all your medications and when to take them. Check marks below indicate your daily home schedule. Keep this list as a reference.      Medications           Morning Afternoon Evening Bedtime As Needed    acetaminophen 325 MG tablet   Commonly known as:  TYLENOL   Take 2 tablets (650 mg) by mouth every 4 hours as needed for mild pain (mild pain)   Last time this was given:  650 mg on 6/23/2017  9:53 AM                                   acetaminophen-codeine 300-30 MG per tablet   Commonly known as:  TYLENOL/codeine #3   Take 1 tablet by mouth every 6 hours as needed for mild pain or pain (PRN pain or cough)                                   ALPRAZolam 0.5 MG tablet   Commonly known as:  XANAX   Take 1 tablet  (0.5 mg) by mouth 3 times daily as needed for anxiety   Last time this was given:  0.5 mg on 6/22/2017  9:49 PM                                   AMBIEN PO   Take 5 mg by mouth nightly as needed for sleep   Last time this was given:  5 mg on 6/21/2017  9:55 PM                                   benzonatate 100 MG capsule   Commonly known as:  TESSALON   Take 1 capsule (100 mg) by mouth 3 times daily as needed for cough                                buPROPion 300 MG 24 hr tablet   Commonly known as:  WELLBUTRIN XL   Take 300 mg by mouth every morning   Last time this was given:  300 mg on 6/23/2017  9:53 AM                                   citalopram 20 MG tablet   Commonly known as:  celeXA   Take 20 mg by mouth every evening   Last time this was given:  20 mg on 6/22/2017  7:26 PM                                   IBUPROFEN   Take 400 mg by mouth every 6 hours as needed                                   prochlorperazine 10 MG tablet   Commonly known as:  COMPAZINE   Take 1 tablet (10 mg) by mouth every 6 hours as needed for nausea or vomiting (Breakthrough Nausea/Vomiting)

## 2017-06-20 NOTE — PROGRESS NOTES
DATE/TIME  (DOT-TD, DOT-NOW) CHEMO CHECK ACTIVITY (REGIMEN & DOSE CHECK, DAY, DOSE #, NAME OF CHEMO #1)  CHEMO DRUG #2  CHEMO DRUG #3 NAME OF RN #1 (USE DOT-ME HERE) NAME OF RN#2 (2ND RN TO LOG IN SEPARATELY)   6/20/2017 1:08 PM  Aldesleukin protocol/dosing checks   Kvng Wiseman     6/20/2017  4:24 PM   IL-2 dose # 1 double check   Polly Griffin     06/21/17  2:00 AM IL-2 dose #2 double check   Danita Smith     06/21/17  10:52 AM   IL-2 dose #3 double check    Yamilka Aleman    6/21/2017  5:43 PM   IL-2 dose #4 double check   Zara Tinoco     06/22/17  11:26 AM   IL-2 dose #5 double check    Yamilka Aleman  (irvin)   6/22/2017  7:05 PM   IL-2 dose #6 double check   Zara Landers    (Bhumi)

## 2017-06-20 NOTE — NURSING NOTE
Chief Complaint   Patient presents with     Blood Draw     Labs drawn via Right Arm VPT.      Elvia Deshpande RN

## 2017-06-20 NOTE — MR AVS SNAPSHOT
After Visit Summary   6/20/2017    Suzi Gonsales    MRN: [id number]           Patient Information     Date Of Birth          1965        Visit Information        Provider Department      6/20/2017 10:20 AM Day Ren PA-C [facility]        Today's Diagnoses     Malignant neoplasm of right kidney (H)           Follow-ups after your visit        Your next 10 appointments already scheduled     Jul 10, 2017 12:40 PM CDT   (Arrive by 12:25 PM)   CT CHEST/ABDOMEN/PELVIS W CONTRAST with UCCT2   [facility] [place] [facility] ([facility])    [address] [address] [phone]           Please bring any scans or X-rays taken at other hospitals, if similar tests were done. Also bring a list of your medicines, including vitamins, minerals and over-the-counter drugs. It is safest to leave personal items at home.  Be sure to tell your doctor:   If you have any allergies.   If there s any chance you are pregnant.   If you are breastfeeding.   If you have any special needs.  You may have contrast for this exam. To prepare:   Do not eat or drink for 2 hours before your exam. If you need to take medicine, you may take it with small sips of water. (We may ask you to take liquid medicine as well.)   The day before your exam, drink extra fluids at least six 8-ounce glasses (unless your doctor tells you to restrict your fluids).  Patients over 70 or patients with diabetes or kidney problems:   If you haven t had a blood test (creatinine test) within the last 30 days, go to your clinic or Diagnostic Imaging Department for this test.  If you have diabetes:   If your kidney function is normal, continue taking your metformin (Avandamet, Glucophage, Glucovance, Metaglip) on the day of your exam.   If your kidney function is abnormal, wait 48 hours before restarting this medicine.  You will have oral contrast for this exam:   You will  drink the contrast at home. Get this from your clinic or Diagnostic Imaging Department. Please follow the directions given.  Please wear loose clothing, such as a sweat suit or jogging clothes. Avoid snaps, zippers and other metal. We may ask you to undress and put on a hospital gown.  If you have any questions, please call the Imaging Department where you will have your exam.            Jul 11, 2017  9:45 AM CDT   Return Visit with Henri Green MD   [facility] ([facility])    [facility]  [address]  [facility] [address] [phone]              Who to contact     If you have questions or need follow up information about today's clinic visit or your schedule please contact [facility] directly at [phone].  Normal or non-critical lab and imaging results will be communicated to you by MyChart, letter or phone within 4 business days after the clinic has received the results. If you do not hear from us within 7 days, please contact the clinic through [facility] or phone. If you have a critical or abnormal lab result, we will notify you by phone as soon as possible.  Submit refill requests through [facility] or call your pharmacy and they will forward the refill request to us. Please allow 3 business days for your refill to be completed.          Additional Information About Your Visit        MyChart Information     [facility] gives you secure access to your electronic health record. If you see a primary care provider, you can also send messages to your care team and make appointments. If you have questions, please call your primary care clinic.  If you do not have a primary care provider, please call [phone] and they will assist you.        Care EveryWhere ID     This is your Care EveryWhere ID. This could be used by other organizations to access your [facility] medical records  [id number]        Your Vitals Were  "    Pulse Temperature Respirations Height Last Period Pulse Oximetry    77 98.3  F (36.8  C) (Oral) 16 1.549 m (5' 0.98\") (Within Months) 98%    BMI (Body Mass Index)                   23.01 kg/m2            Blood Pressure from Last 3 Encounters:   06/21/17 92/41   06/20/17 99/64   06/16/17 102/67    Weight from Last 3 Encounters:   06/20/17 55.2 kg (121 lb 11.1 oz)   06/16/17 55.6 kg (122 lb 8 oz)   06/11/17 61.8 kg (136 lb 3.2 oz)              We Performed the Following     CBC with platelets differential     Comprehensive metabolic panel     Magnesium     Phosphorus        Primary Care Provider Office Phone # Fax #    Idlaia Saini [phone]       [facility] [address] [facility] [address]        Thank you!     Thank you for choosing [facility]  for your care. Our goal is always to provide you with excellent care. Hearing back from our patients is one way we can continue to improve our services. Please take a few minutes to complete the written survey that you may receive in the mail after your visit with us. Thank you!             Your Updated Medication List - Protect others around you: Learn how to safely use, store and throw away your medicines at www.disposemymeds.org.          This list is accurate as of: 6/20/17 11:31 AM.  Always use your most recent med list.                   Brand Name Dispense Instructions for use    acetaminophen 325 MG tablet    TYLENOL    100 tablet    Take 2 tablets (650 mg) by mouth every 4 hours as needed for mild pain (mild pain)       acetaminophen-codeine 300-30 MG per tablet    TYLENOL/codeine #3    20 tablet    Take 1 tablet by mouth every 6 hours as needed for mild pain or pain (PRN pain or cough)       ALPRAZolam 0.5 MG tablet    XANAX    60 tablet    Take 1 tablet (0.5 mg) by mouth 3 times daily as needed for anxiety       AMBIEN PO      Take 5 mg by mouth nightly as needed for sleep       benzonatate 100 MG capsule    " TESSALON    42 capsule    Take 1 capsule (100 mg) by mouth 3 times daily as needed for cough       buPROPion 300 MG 24 hr tablet    WELLBUTRIN XL     Take 300 mg by mouth every morning       citalopram 20 MG tablet    celeXA     Take 20 mg by mouth every evening       IBUPROFEN      Take 400 mg by mouth every 6 hours as needed       prochlorperazine 10 MG tablet    COMPAZINE    30 tablet    Take 1 tablet (10 mg) by mouth every 6 hours as needed for nausea or vomiting (Breakthrough Nausea/Vomiting)       triamcinolone 0.1 % cream    KENALOG     APPLY TOPICALLY BID PRN

## 2017-06-20 NOTE — PLAN OF CARE
Problem: Goal Outcome Summary  Goal: Goal Outcome Summary  Outcome: No Change  Nursing Focus: Admission  D: Arrived at 11:45 from home. Patient accompanied by spouse. Admitted for interleukin-2 therapy. Complains of wanting to have private room.       I: Admission process began.  Patient oriented to room, enviroment, call light.  MD notified of patient's arrival on unit. Nurse manager and charge nurse notified of room concerns. She is scheduled for peripherally inserted central catheter placement today. Protocol and dosing check done for interleukin.     A: Vital signs stable, afebrile.  Patient stable at this time and getting along with roommate.      P: Implement plan of care when available. Continue to monitor patient. Nursing interventions as appropriate. Notify MD with changes in pt status. Awaiting PICC placement.

## 2017-06-20 NOTE — H&P
[facility], [place]    History and Physical  Hospitalist       Date of Admission:  6/20/2017  Date of Service (when I saw the patient): 06/20/17    Assessment & Plan   Suzi Gonsales is a 50 y/o female with metastatic clear cell renal carcinoma and anxiety s/p right radical nephrectomy in 12/2014, now with metastatic RCC (lungs, adrenal, and pancreas) who is admitted for Cycle 2 HD IL-2. She received cycle #1 on 6/6, completed 9 doses and tolerated it well overall. She is feeling well and labs are appropriate for IL2.      #Metastatic RCC: Admitted for Cycle 2 HD IL2.   - PICC placed     REGIMEN: D1=6/20/17   - IVFs 100/hr  - Premeds: Tylenol 650mg q4hr, Zofran 8mg q8hr, Zantac,150mg BID, Keflex 500mg BID  - aldesleukin 34 million units IV q8hr for up to 14 doses as tolerated  - PRN supportive care medications  - AVOID diuretics and steroids with IL-2     #H/o anxiety.   - continue PTA Wellbutrin, Celexa  - PRN Xanax     FEN  - IVFs per treatment plan, bolus PRN (SBP <85 or Urine Output <30 mL/hr)  - Regular diet  - Electrolyte replacement prn per protocol     PPx  - defer VTE prophylaxis given anticipated thrombocytopenia with IL-2  - Zantac as above     Dispo: Pending completion of tolerated IL-2 doses and recovery of acute issues.     Code Status: Full Code    Romelia Vergara PA-C  Hematology/Oncology    Primary Care Physician   Idalia Saini    Chief Complaint   IL-2 for metastatic RCC    History is obtained from the patient and chart review    History of Present Illness   Suzi Gonsales is a 50 y/o female with metastatic clear cell renal carcinoma and anxiety s/p right radical nephrectomy in 12/2014, now with metastatic RCC (lungs, adrenal, and pancreas) who is admitted for Cycle 2 HD IL-2. She received cycle #1 on 6/6, completed 9 doses and tolerated it well overall. She is feeling well and labs are appropriate for IL2. She denies any fevers or chills. No n/v/d. No headaches,  dizziness or vision changes. No mouth sores or dryness. No sore throat. Eating and drinking well. Mild constipation. No dysuria. No abd pain. No leg swelling. No skin rashes. ROS is otherwise negative.     ONCOLOGIC HISTORY: (adapted from clinic notes)  Suzi presented to ED with hematuria and right flank pain thinking she had a renal stone in 2014. She was worked up with a CT abd/pelvis which suggested a renal mass. She was referred to Dr. Max Zelaya in [facility]. She had right open radical nephrectomy done on 14.Pathology from this revealed clear cell RCC with grade 3 of 4. Per charts tumor resection had negative margins and it was staged at pT2bN0. Her staging work up was negative except for pulmonary nodules. She has been followed every 6 months with scans. But her most recent scan last week suggested increase in size of couple of nodules prompting to the current referral. Recent CT CAP on 17 showed enlarging hilar LAD, enlarging pulmonary nodules, adrenal nodules and a pancreatic body mass.  She met with DR Green and decided to pursue IL-2.     Past Medical History    I have reviewed this patient's medical history and updated it with pertinent information if needed.   Past Medical History:   Diagnosis Date     Anxiety      Former tobacco use      History of kidney cancer     Clear cell     Mass of left lung     Upper lobe     Solitary kidney     S/P right nephrectomy due to kidney cancer       Past Surgical History   I have reviewed this patient's surgical history and updated it with pertinent information if needed.  Past Surgical History:   Procedure Laterality Date     C/SECTION, LOW TRANSVERSE  ,     , Low Transverse     ENDOSCOPIC ULTRASOUND UPPER GASTROINTESTINAL TRACT (GI) N/A 2017    Procedure: ENDOSCOPIC ULTRASOUND, ESOPHAGOSCOPY / UPPER GASTROINTESTINAL TRACT (GI);  Esophagogastroduodenoscopy, Endoscopic Ultrasound with Fine Needle Biopsies;  Surgeon:  Asad Velez MD;  Location: [facility] OR     open radical nephrectomy Right 12/31/14       Prior to Admission Medications   Prior to Admission Medications   Prescriptions Last Dose Informant Patient Reported? Taking?   ALPRAZolam (XANAX) 0.5 MG tablet   No No   Sig: Take 1 tablet (0.5 mg) by mouth 3 times daily as needed for anxiety   IBUPROFEN   Yes No   Sig: Take 400 mg by mouth every 6 hours as needed    Zolpidem Tartrate (AMBIEN PO)   Yes No   Sig: Take 5 mg by mouth nightly as needed for sleep   acetaminophen (TYLENOL) 325 MG tablet Past Week at Unknown time  No Yes   Sig: Take 2 tablets (650 mg) by mouth every 4 hours as needed for mild pain (mild pain)   acetaminophen-codeine (TYLENOL/CODEINE #3) 300-30 MG per tablet   No No   Sig: Take 1 tablet by mouth every 6 hours as needed for mild pain or pain (PRN pain or cough)   benzonatate (TESSALON) 100 MG capsule   No No   Sig: Take 1 capsule (100 mg) by mouth 3 times daily as needed for cough   buPROPion (WELLBUTRIN XL) 300 MG 24 hr tablet   Yes No   Sig: Take 300 mg by mouth every morning    citalopram (CELEXA) 20 MG tablet   Yes No   Sig: Take 20 mg by mouth every evening    prochlorperazine (COMPAZINE) 10 MG tablet   No No   Sig: Take 1 tablet (10 mg) by mouth every 6 hours as needed for nausea or vomiting (Breakthrough Nausea/Vomiting)   triamcinolone (KENALOG) 0.1 % cream   Yes No   Sig: APPLY TOPICALLY BID PRN      Facility-Administered Medications: None     Allergies   No Known Allergies    Social History   I have reviewed this patient's social history and updated it with pertinent information if needed. Suzi Gonsales  reports that she quit smoking about 13 years ago. Her smoking use included Cigarettes. She has never used smokeless tobacco. She reports that she drinks alcohol. She reports that she does not use illicit drugs.    Family History   I have reviewed this patient's family history and updated it with pertinent information if needed.   Family  History   Problem Relation Age of Onset     Hypertension Father      Chronic Obstructive Pulmonary Disease Father      Hyperlipidemia Brother      Hyperlipidemia Brother        Review of Systems   The 10 point Review of Systems is negative other than noted in the HPI or here.     Physical Exam   Temp: 96.8  F (36  C) Temp src: Oral BP: 101/52 Pulse: 66   Resp: 18 SpO2: 97 % O2 Device: None (Room air)    Vital Signs with Ranges  Temp:  [96.8  F (36  C)-98.3  F (36.8  C)] 96.8  F (36  C)  Pulse:  [66-77] 66  Resp:  [16-18] 18  BP: ()/(52-64) 101/52  SpO2:  [97 %-98 %] 97 %  0 lbs 0 oz    Constitutional: Well appearing, no acute distress  Eyes: PERRL, EOMs intact, sclera anicteric, conjunctiva clear  HEENT: normocephalic, MMM, no mucositis or thrush  Respiratory: clear to auscultation bilaterally  Cardiovascular: RRR, S1S2, no m/r/g  GI: soft, nondistended, nontender  Genitourinary: deferred  Skin: no rashes on exposed skin surfaces  Musculoskeletal: No LE edema, normal gait  Neurologic: alert and oriented x4, no focal deficits, CNII-XII intact  Psychiatric: Appropriate affect    Data   Data reviewed today:  I personally reviewed no images or EKG's today.    Recent Labs  Lab 06/20/17  1030 06/16/17  1355   WBC 4.9 7.9   HGB 11.8 12.7   MCV 94 93   * 397    134   POTASSIUM 3.9 4.4   CHLORIDE 105 99   CO2 24 27   BUN 12 15   CR 1.05* 1.09*   ANIONGAP 8 8   MUNDO 8.5 9.2   GLC 92 92   ALBUMIN 3.3* 3.7   PROTTOTAL 7.1 8.0   BILITOTAL 0.3 0.5   ALKPHOS 84 96   ALT 39 59*   AST 30 33       No results found for this or any previous visit (from the past 24 hour(s)).

## 2017-06-20 NOTE — NURSING NOTE
"Oncology Rooming Note    June 20, 2017 10:39 AM   Suzi Gonsales is a 51 year old female who presents for:    Chief Complaint   Patient presents with     Blood Draw     Labs drawn via Right Arm VPT.      Oncology Clinic Visit     Metastatic renal cell carcinoma      Initial Vitals: BP 99/64  Pulse 77  Temp 98.3  F (36.8  C) (Oral)  Resp 16  Ht 1.549 m (5' 0.98\")  Wt 55.2 kg (121 lb 11.1 oz)  LMP  (Within Months)  SpO2 98%  BMI 23.01 kg/m2 Estimated body mass index is 23.01 kg/(m^2) as calculated from the following:    Height as of this encounter: 1.549 m (5' 0.98\").    Weight as of this encounter: 55.2 kg (121 lb 11.1 oz). Body surface area is 1.54 meters squared.  No Pain (0) Comment: Data Unavailable   No LMP recorded (within months). Patient is not currently having periods (Reason: Premenopausal).  Allergies reviewed: Yes  Medications reviewed: Yes    Medications: Medication refills not needed today.  Pharmacy name entered into [employer]: [id number] - [place], [place] - [address] AT [facility] [address]    Clinical concerns:  NO new concerns  Provider was notified.    7 minutes for nursing intake (face to face time)     Afsaneh Houser MA              "

## 2017-06-20 NOTE — LETTER
"6/20/2017      RE: Suzi Gonsales  [address] [place] [address]       [facility]  FOLLOW-UP VISIT NOTE  Date of visit: 6-20-17          REASON FOR VISIT: mRCC assessment prior to C2 of IL-2    HPI: Suzi presented to ED with hematuria and right flank pain thinking she had a renal stone in December 2014. She was worked up with a CT abd/pelvis which suggested a renal mass. She was referred to Dr. Max Zelaya in [facility]. 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 Per charts tumor resection had negative margins and it was staged at pT2bN0.    Her staging work up was negative except for pulmonary nodules. She has been followed every 6 months with scans. But her most recent scan suggested increase in size of couple of nodules prompting to the current referral to Dr Green.  CT CAP on 5/11/17 showed enlarging hilar LAD, enlarging pulmonary nodules, adrenal nodules and a pancreatic body mass. Biopsies of hilar LN, adrenal nodule and pancreatic mass were + for RCC. She met with DR Green and decided to pursue IL-2.     INTERVAL HISTORY: Suzi is doing well today. She was admitted on 6/6/17 for IL-2 and tolerated 9 doses.  Overall she felt it went as planned with nausea/vomiting, rigors, fatigue, anorexia and rigors.  Today she feels this entire last week she has felt completely recovered.  She has been eating/drinking and normal energy levels.  She is active all day and takes a 1 hour nap in the afternoon.    No recent f/s/c. No chest pain/cough/dyspnea. Daily BM, no black/bloody stools. No  issues. No daily pain.     EXAM:  BP 99/64  Pulse 77  Temp 98.3  F (36.8  C) (Oral)  Resp 16  Ht 1.549 m (5' 0.98\")  Wt 55.2 kg (121 lb 11.1 oz)  LMP  (Within Months)  SpO2 98%  BMI 23.01 kg/m2  Wt Readings from Last 4 Encounters:   06/20/17 55.2 kg (121 lb 11.1 oz)   06/16/17 55.6 kg (122 lb 8 oz) "   06/11/17 61.8 kg (136 lb 3.2 oz)   06/10/17 63 kg (139 lb)     Vital signs were reviewed.   Patient alert and oriented x3.   PERRLA. EOMI. No scleral icterus noted. OP without thrush/sores.  Neck exam: No palpable cervical, supraclavicular or axillary nodes bilaterally.   Heart: RRR no murmurs noted.   Lungs: clear to auscultation bilaterally.  No crackles or wheezing.   Abd: positive bowel sounds in all four quadrants.  No tenderness to palpation.  No hepatomegaly.   Extremities: No lower extremity edema.   Neuro: grossly intact.   Mood and affect is stable.   Skin is dry + flaking, but better than last week  LABS:      6/16/2017 13:55 6/20/2017 10:30   Sodium 134 137   Potassium 4.4 3.9   Chloride 99 105   Carbon Dioxide 27 24   Urea Nitrogen 15 12   Creatinine 1.09 (H) 1.05 (H)   GFR Estimate 53 (L) 55 (L)   GFR Estimate If Black 64 66   Calcium 9.2 8.5   Anion Gap 8 8   Magnesium  2.3   Phosphorus  3.4   Albumin 3.7 3.3 (L)   Protein Total 8.0 7.1   Bilirubin Total 0.5 0.3   Alkaline Phosphatase 96 84   ALT 59 (H) 39   AST 33 30   Lactate Dehydrogenase 232    Glucose 92 92   WBC 7.9 4.9   Hemoglobin 12.7 11.8   Hematocrit 39.1 36.3   Platelet Count 397 473 (H)   RBC Count 4.20 3.86   MCV 93 94     ASSESSMENT/PLAN: 50 year old with mRCC s/p first cycle of IL-2 receiving 9 doses    Suzi is doing well today.  Her creat good today, she is physically recovered from her first cycle of IL-2.  She asks some questions today about prognosis, future treatment options, but remains optimistic about giving IL-2 her best shot.  We will get CT CAP in a 3-4 weeks and have her see Dr Green.  She would like ~5 weeks between cycle 2 and 3 which is fine.  I will get her appt set up and we'll see her after C2.    Kia Ren PA-C

## 2017-06-21 LAB
ALBUMIN SERPL-MCNC: 2.8 G/DL (ref 3.4–5)
ALP SERPL-CCNC: 63 U/L (ref 40–150)
ALT SERPL W P-5'-P-CCNC: 31 U/L (ref 0–50)
ANION GAP SERPL CALCULATED.3IONS-SCNC: 8 MMOL/L (ref 3–14)
AST SERPL W P-5'-P-CCNC: 22 U/L (ref 0–45)
BASOPHILS # BLD AUTO: 0.1 10E9/L (ref 0–0.2)
BASOPHILS NFR BLD AUTO: 0.8 %
BILIRUB SERPL-MCNC: 0.9 MG/DL (ref 0.2–1.3)
BUN SERPL-MCNC: 8 MG/DL (ref 7–30)
CALCIUM SERPL-MCNC: 7.9 MG/DL (ref 8.5–10.1)
CHLORIDE SERPL-SCNC: 110 MMOL/L (ref 94–109)
CK SERPL-CCNC: 21 U/L (ref 30–225)
CO2 SERPL-SCNC: 20 MMOL/L (ref 20–32)
CREAT SERPL-MCNC: 1.05 MG/DL (ref 0.52–1.04)
DIFFERENTIAL METHOD BLD: ABNORMAL
EOSINOPHIL # BLD AUTO: 0.2 10E9/L (ref 0–0.7)
EOSINOPHIL NFR BLD AUTO: 2.3 %
ERYTHROCYTE [DISTWIDTH] IN BLOOD BY AUTOMATED COUNT: 14.5 % (ref 10–15)
GFR SERPL CREATININE-BSD FRML MDRD: 55 ML/MIN/1.7M2
GLUCOSE SERPL-MCNC: 126 MG/DL (ref 70–99)
HCT VFR BLD AUTO: 33.2 % (ref 35–47)
HGB BLD-MCNC: 10.9 G/DL (ref 11.7–15.7)
IMM GRANULOCYTES # BLD: 0 10E9/L (ref 0–0.4)
IMM GRANULOCYTES NFR BLD: 0.3 %
LACTATE BLD-SCNC: 2.1 MMOL/L (ref 0.7–2.1)
LACTATE BLD-SCNC: 2.4 MMOL/L (ref 0.7–2.1)
LDH SERPL L TO P-CCNC: 175 U/L (ref 81–234)
LYMPHOCYTES # BLD AUTO: 0.1 10E9/L (ref 0.8–5.3)
LYMPHOCYTES NFR BLD AUTO: 1.2 %
MAGNESIUM SERPL-MCNC: 1.7 MG/DL (ref 1.6–2.3)
MCH RBC QN AUTO: 30.1 PG (ref 26.5–33)
MCHC RBC AUTO-ENTMCNC: 32.8 G/DL (ref 31.5–36.5)
MCV RBC AUTO: 92 FL (ref 78–100)
MONOCYTES # BLD AUTO: 0.1 10E9/L (ref 0–1.3)
MONOCYTES NFR BLD AUTO: 1.5 %
NEUTROPHILS # BLD AUTO: 6.1 10E9/L (ref 1.6–8.3)
NEUTROPHILS NFR BLD AUTO: 93.9 %
NRBC # BLD AUTO: 0 10*3/UL
NRBC BLD AUTO-RTO: 0 /100
PHOSPHATE SERPL-MCNC: 2.4 MG/DL (ref 2.5–4.5)
PLATELET # BLD AUTO: 362 10E9/L (ref 150–450)
POTASSIUM SERPL-SCNC: 4.3 MMOL/L (ref 3.4–5.3)
PROT SERPL-MCNC: 6.1 G/DL (ref 6.8–8.8)
RBC # BLD AUTO: 3.62 10E12/L (ref 3.8–5.2)
SODIUM SERPL-SCNC: 138 MMOL/L (ref 133–144)
WBC # BLD AUTO: 6.5 10E9/L (ref 4–11)

## 2017-06-21 PROCEDURE — 40000802 ZZH SITE CHECK

## 2017-06-21 PROCEDURE — 36592 COLLECT BLOOD FROM PICC: CPT | Performed by: INTERNAL MEDICINE

## 2017-06-21 PROCEDURE — 25000125 ZZHC RX 250: Performed by: PHYSICIAN ASSISTANT

## 2017-06-21 PROCEDURE — 25000128 H RX IP 250 OP 636: Performed by: PHYSICIAN ASSISTANT

## 2017-06-21 PROCEDURE — 83735 ASSAY OF MAGNESIUM: CPT | Performed by: INTERNAL MEDICINE

## 2017-06-21 PROCEDURE — 25000132 ZZH RX MED GY IP 250 OP 250 PS 637: Performed by: PHYSICIAN ASSISTANT

## 2017-06-21 PROCEDURE — 82550 ASSAY OF CK (CPK): CPT | Performed by: INTERNAL MEDICINE

## 2017-06-21 PROCEDURE — 83615 LACTATE (LD) (LDH) ENZYME: CPT | Performed by: INTERNAL MEDICINE

## 2017-06-21 PROCEDURE — 80053 COMPREHEN METABOLIC PANEL: CPT | Performed by: INTERNAL MEDICINE

## 2017-06-21 PROCEDURE — 12000008 ZZH R&B INTERMEDIATE UMMC

## 2017-06-21 PROCEDURE — 25800025 ZZH RX 258: Performed by: INTERNAL MEDICINE

## 2017-06-21 PROCEDURE — 85025 COMPLETE CBC W/AUTO DIFF WBC: CPT | Performed by: INTERNAL MEDICINE

## 2017-06-21 PROCEDURE — 99233 SBSQ HOSP IP/OBS HIGH 50: CPT | Performed by: INTERNAL MEDICINE

## 2017-06-21 PROCEDURE — 84100 ASSAY OF PHOSPHORUS: CPT | Performed by: INTERNAL MEDICINE

## 2017-06-21 PROCEDURE — 25000132 ZZH RX MED GY IP 250 OP 250 PS 637: Performed by: INTERNAL MEDICINE

## 2017-06-21 PROCEDURE — 87040 BLOOD CULTURE FOR BACTERIA: CPT | Performed by: INTERNAL MEDICINE

## 2017-06-21 PROCEDURE — 83605 ASSAY OF LACTIC ACID: CPT | Performed by: INTERNAL MEDICINE

## 2017-06-21 PROCEDURE — 25000128 H RX IP 250 OP 636: Performed by: INTERNAL MEDICINE

## 2017-06-21 RX ADMIN — CITALOPRAM HYDROBROMIDE 20 MG: 20 TABLET ORAL at 19:22

## 2017-06-21 RX ADMIN — ALPRAZOLAM 0.5 MG: 0.25 TABLET ORAL at 12:22

## 2017-06-21 RX ADMIN — POTASSIUM PHOSPHATE, MONOBASIC AND POTASSIUM PHOSPHATE, DIBASIC 15 MMOL: 224; 236 INJECTION, SOLUTION INTRAVENOUS at 12:18

## 2017-06-21 RX ADMIN — CEPHALEXIN 500 MG: 500 CAPSULE ORAL at 19:22

## 2017-06-21 RX ADMIN — RANITIDINE 150 MG: 150 TABLET ORAL at 19:22

## 2017-06-21 RX ADMIN — LORAZEPAM 0.5 MG: 0.5 TABLET ORAL at 18:53

## 2017-06-21 RX ADMIN — POTASSIUM CHLORIDE, DEXTROSE MONOHYDRATE AND SODIUM CHLORIDE: 150; 5; 900 INJECTION, SOLUTION INTRAVENOUS at 05:41

## 2017-06-21 RX ADMIN — ZOLPIDEM TARTRATE 5 MG: 5 TABLET, FILM COATED ORAL at 21:55

## 2017-06-21 RX ADMIN — WHITE PETROLATUM: 1.75 OINTMENT TOPICAL at 08:10

## 2017-06-21 RX ADMIN — BUPROPION HYDROCHLORIDE 300 MG: 300 TABLET, FILM COATED, EXTENDED RELEASE ORAL at 08:10

## 2017-06-21 RX ADMIN — CEPHALEXIN 500 MG: 500 CAPSULE ORAL at 08:10

## 2017-06-21 RX ADMIN — ALDESLEUKIN 34 MILLION UNITS: 1.1 INJECTION, POWDER, LYOPHILIZED, FOR SOLUTION INTRAVENOUS at 11:08

## 2017-06-21 RX ADMIN — ALDESLEUKIN 34 MILLION UNITS: 1.1 INJECTION, POWDER, LYOPHILIZED, FOR SOLUTION INTRAVENOUS at 18:56

## 2017-06-21 RX ADMIN — ACETAMINOPHEN 650 MG: 325 TABLET, FILM COATED ORAL at 18:20

## 2017-06-21 RX ADMIN — LORAZEPAM 0.5 MG: 2 INJECTION INTRAMUSCULAR; INTRAVENOUS at 21:47

## 2017-06-21 RX ADMIN — MEPERIDINE HYDROCHLORIDE 25 MG: 25 INJECTION, SOLUTION INTRAMUSCULAR; INTRAVENOUS; SUBCUTANEOUS at 21:53

## 2017-06-21 RX ADMIN — ACETAMINOPHEN 650 MG: 325 TABLET, FILM COATED ORAL at 02:24

## 2017-06-21 RX ADMIN — POTASSIUM CHLORIDE, DEXTROSE MONOHYDRATE AND SODIUM CHLORIDE: 150; 5; 900 INJECTION, SOLUTION INTRAVENOUS at 13:43

## 2017-06-21 RX ADMIN — SODIUM CHLORIDE 250 ML: 9 INJECTION, SOLUTION INTRAVENOUS at 02:46

## 2017-06-21 RX ADMIN — MEPERIDINE HYDROCHLORIDE 25 MG: 25 INJECTION, SOLUTION INTRAMUSCULAR; INTRAVENOUS; SUBCUTANEOUS at 14:08

## 2017-06-21 RX ADMIN — NAPROXEN 250 MG: 250 TABLET ORAL at 23:21

## 2017-06-21 RX ADMIN — ONDANSETRON 8 MG: 2 INJECTION INTRAMUSCULAR; INTRAVENOUS at 02:15

## 2017-06-21 RX ADMIN — PROCHLORPERAZINE EDISYLATE 10 MG: 5 INJECTION INTRAMUSCULAR; INTRAVENOUS at 20:50

## 2017-06-21 RX ADMIN — PROCHLORPERAZINE EDISYLATE 10 MG: 5 INJECTION INTRAMUSCULAR; INTRAVENOUS at 14:07

## 2017-06-21 RX ADMIN — WHITE PETROLATUM: 1.75 OINTMENT TOPICAL at 19:23

## 2017-06-21 RX ADMIN — ACETAMINOPHEN 650 MG: 325 TABLET, FILM COATED ORAL at 07:03

## 2017-06-21 RX ADMIN — RANITIDINE 150 MG: 150 TABLET ORAL at 08:10

## 2017-06-21 RX ADMIN — ACETAMINOPHEN 650 MG: 325 TABLET, FILM COATED ORAL at 21:55

## 2017-06-21 RX ADMIN — ACETAMINOPHEN 650 MG: 325 TABLET, FILM COATED ORAL at 15:35

## 2017-06-21 RX ADMIN — ACETAMINOPHEN 650 MG: 325 TABLET, FILM COATED ORAL at 09:34

## 2017-06-21 RX ADMIN — ONDANSETRON 8 MG: 2 INJECTION INTRAMUSCULAR; INTRAVENOUS at 09:34

## 2017-06-21 RX ADMIN — ALDESLEUKIN 34 MILLION UNITS: 1.1 INJECTION, POWDER, LYOPHILIZED, FOR SOLUTION INTRAVENOUS at 03:38

## 2017-06-21 RX ADMIN — SODIUM CHLORIDE, PRESERVATIVE FREE 5 ML: 5 INJECTION INTRAVENOUS at 06:56

## 2017-06-21 RX ADMIN — ONDANSETRON 8 MG: 2 INJECTION INTRAMUSCULAR; INTRAVENOUS at 17:51

## 2017-06-21 NOTE — PROGRESS NOTES
[facility], [place]    Hematology / Oncology Progress Note    Date of Service (when I saw the patient): 06/21/2017     Assessment & Plan   Suzi Gonsales is a 50 y/o female with metastatic clear cell renal carcinoma and anxiety s/p right radical nephrectomy in 12/2014, now with metastatic RCC (lungs, adrenal, and pancreas) who is admitted for Cycle 2 HD IL-2. She received cycle #1 on 6/6, completed 9 doses and tolerated it well overall. She is feeling well and labs are appropriate for IL2.      #Metastatic RCC: Admitted for Cycle 2 HD IL2.   - PICC placed     REGIMEN: D1=6/20/17; Today is D2, has received 2 doses thus far and is tolerating it well thus far except for hypotension requiring a fluid bolus.    - IVFs 100/hr  - Premeds: Tylenol 650mg q4hr, Zofran 8mg q8hr, Zantac,150mg BID, Keflex 500mg BID  - aldesleukin 34 million units IV q8hr for up to 14 doses as tolerated  - PRN supportive care medications  - AVOID diuretics and steroids with IL-2      #H/o anxiety.   - continue PTA Wellbutrin, Celexa  - PRN Xanax      FEN  - IVFs per treatment plan, bolus PRN (SBP <85 or Urine Output <30 mL/hr)  - Regular diet  - Electrolyte replacement prn per protocol      PPx  - defer VTE prophylaxis given anticipated thrombocytopenia with IL-2  - Zantac as above      Dispo: Pending completion of tolerated IL-2 doses and recovery of acute issues.      Code Status: Full Code     Romelia Vergara PA-C  Hematology/Oncology    Interval History   She is tired this AM, did not sleep well. Has completed 2 doses of IL2 which she tolerated as expected with rigors, n/v, hypotension (requiring a fluid bolus). She has a poor appetite. No diarrhea. No skin rashes. No cough or SOB. No edema. Urinating ok.     Physical Exam   Temp: 101.3  F (38.5  C) Temp src: Oral BP: 93/49 Pulse: 67 Heart Rate: 112 Resp: 20 SpO2: 98 % O2 Device: None (Room air)    There were no vitals filed for this visit.  Vital Signs with  Ranges  Temp:  [96.3  F (35.7  C)-101.6  F (38.7  C)] 101.3  F (38.5  C)  Pulse:  [66-77] 67  Heart Rate:  [108-119] 112  Resp:  [16-20] 20  BP: ()/(32-64) 93/49  SpO2:  [90 %-98 %] 98 %  I/O last 3 completed shifts:  In: 1519 [P.O.:400; I.V.:755; IV Piggyback:364]  Out: 765 [Urine:405; Emesis/NG output:360]    Constitutional: Well appearing, no acute distress  Eyes: sclera anicteric, conjunctiva clear  HEENT: normocephalic, MMM, no mucositis or thrush  Respiratory: clear to auscultation bilaterally  Cardiovascular: RRR, S1S2, no m/r/g  GI: soft, nondistended, nontender  Skin: no rashes on exposed skin surfaces  Musculoskeletal: No LE edema, normal gait  Neurologic: alert and oriented x4, no focal deficits  Psychiatric: Appropriate affect    Medications     - MEDICATION INSTRUCTIONS -       dextrose 5% and 0.9% NaCl with potassium chloride 20 mEq 100 mL/hr at 06/21/17 0541     D5W 1,000 mL (06/20/17 1809)     - MEDICATION INSTRUCTIONS -       NaCl         eucerin   Topical BID     ondansetron  8 mg Intravenous Q8H     acetaminophen  650 mg Oral Q4H     ranitidine  150 mg Oral BID     cephalexin  500 mg Oral BID     aldesleukin (PROLEUKIN) infusion  600,000 Units/kg (Treatment Plan Recorded) Intravenous Q8H     citalopram  20 mg Oral QPM     buPROPion  300 mg Oral QAM     sodium chloride (PF)  10 mL Intracatheter Q7 Days       Data   Results for orders placed or performed during the hospital encounter of 06/20/17 (from the past 24 hour(s))   XR Chest 1 View    Narrative    Exam: XR CHEST 1 VW, 6/20/2017 5:54 PM    Indication: RN placed PICC - verify tip placement    Comparison: 6/11/2017, 6/10/2017, 3/22/2017.    Findings:   A single PA view of the chest was obtained. The left arm PICC tip  projects over the low SVC. The cardiomediastinal silhouette is within  normal limits. No pneumothorax or pleural effusion. Unchanged left  apical opacities. Improved interstitial prominence.  Surgical clips  project over the  upper abdomen.      Impression    Impression:   1. The left arm PICC tip projects over the low SVC.  2. Unchanged left apical opacities, biopsy-proven organizing  pneumonia.  3. Improvement in previously seen interstitial prominence.    I have personally reviewed the examination and initial interpretation  and I agree with the findings.    GAY LORA MD   Lactic acid level STAT   Result Value Ref Range    Lactic Acid 2.4 (H) 0.7 - 2.1 mmol/L   CBC with platelets differential   Result Value Ref Range    WBC 6.5 4.0 - 11.0 10e9/L    RBC Count 3.62 (L) 3.8 - 5.2 10e12/L    Hemoglobin 10.9 (L) 11.7 - 15.7 g/dL    Hematocrit 33.2 (L) 35.0 - 47.0 %    MCV 92 78 - 100 fl    MCH 30.1 26.5 - 33.0 pg    MCHC 32.8 31.5 - 36.5 g/dL    RDW 14.5 10.0 - 15.0 %    Platelet Count 362 150 - 450 10e9/L    Diff Method Automated Method     % Neutrophils 93.9 %    % Lymphocytes 1.2 %    % Monocytes 1.5 %    % Eosinophils 2.3 %    % Basophils 0.8 %    % Immature Granulocytes 0.3 %    Nucleated RBCs 0 0 /100    Absolute Neutrophil 6.1 1.6 - 8.3 10e9/L    Absolute Lymphocytes 0.1 (L) 0.8 - 5.3 10e9/L    Absolute Monocytes 0.1 0.0 - 1.3 10e9/L    Absolute Eosinophils 0.2 0.0 - 0.7 10e9/L    Absolute Basophils 0.1 0.0 - 0.2 10e9/L    Abs Immature Granulocytes 0.0 0 - 0.4 10e9/L    Absolute Nucleated RBC 0.0    Comprehensive metabolic panel   Result Value Ref Range    Sodium 138 133 - 144 mmol/L    Potassium 4.3 3.4 - 5.3 mmol/L    Chloride 110 (H) 94 - 109 mmol/L    Carbon Dioxide 20 20 - 32 mmol/L    Anion Gap 8 3 - 14 mmol/L    Glucose 126 (H) 70 - 99 mg/dL    Urea Nitrogen 8 7 - 30 mg/dL    Creatinine 1.05 (H) 0.52 - 1.04 mg/dL    GFR Estimate 55 (L) >60 mL/min/1.7m2    GFR Estimate If Black 66 >60 mL/min/1.7m2    Calcium 7.9 (L) 8.5 - 10.1 mg/dL    Bilirubin Total 0.9 0.2 - 1.3 mg/dL    Albumin 2.8 (L) 3.4 - 5.0 g/dL    Protein Total 6.1 (L) 6.8 - 8.8 g/dL    Alkaline Phosphatase 63 40 - 150 U/L    ALT 31 0 - 50 U/L    AST 22 0 -  45 U/L   Magnesium   Result Value Ref Range    Magnesium 1.7 1.6 - 2.3 mg/dL   Phosphorus   Result Value Ref Range    Phosphorus 2.4 (L) 2.5 - 4.5 mg/dL   Lactate Dehydrogenase   Result Value Ref Range    Lactate Dehydrogenase 175 81 - 234 U/L   CK total   Result Value Ref Range    CK Total 21 (L) 30 - 225 U/L   Blood culture   Result Value Ref Range    Specimen Description Blood PURPLE PORT     Culture Micro Pending     Micro Report Status Pending        ATTENDING PHYSICIAN ADDENDUM AND NOTE:  The patient s case evaluated by me separate from the inpatient midlevel provider, Romelia Vergara. The note above reflects my assessment and plan. I have personally reviewed lab results, and vital and radiology results. >50% of time was spent in assessment and coordination of care.  Mrs. Suzi Gonsales is a 51-year-old woman with metastatic clear cell renal cell carcinoma, admitted June 20 for IL-2 therapy. She s had ongoing issues with nausea, and overnight had non-symptomatic lowering of blood pressure to 82/32, a company by mild rigors and fever. At the time of my evaluation, she is tired but otherwise and without symptoms. The plan for today is to continue with IL-2 therapy and symptomatic management as scheduled.    Addison Arguelles MD, PhD   of Medicine   Division of Hematology, Oncology, and Transplantation

## 2017-06-21 NOTE — PLAN OF CARE
"Problem: Chemotherapy Effects (Adult)  Goal: Signs and Symptoms of Listed Potential Problems Will be Absent or Manageable (Chemotherapy Effects)  Signs and symptoms of listed potential problems will be absent or manageable by discharge/transition of care (reference Chemotherapy Effects (Adult) CPG).      Afebrile. Did have temp of 99.6 after receiving her IL-2 dose. Diastolic BP's running in the 40's. Patient states her BP's normally \"run low.\" Pt had picc line placed. Given first dose of IL-2 @ 1850. Did have mild rigors. Given demerol x 1 @ 2215 and compazine @ 2235 for mild nausea.Next dose due at 0250 and will need to be ordered from pharmacy if urine parameters and BP are met. Requested prn ambien @ .       "

## 2017-06-21 NOTE — PROGRESS NOTES
"[facility]  FOLLOW-UP VISIT NOTE  Date of visit: 6-20-17          REASON FOR VISIT: mRCC assessment prior to C2 of IL-2    HPI: Suzi presented to ED with hematuria and right flank pain thinking she had a renal stone in December 2014. She was worked up with a CT abd/pelvis which suggested a renal mass. She was referred to Dr. Max Zelaya in [facility]. 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 Per charts tumor resection had negative margins and it was staged at pT2bN0.    Her staging work up was negative except for pulmonary nodules. She has been followed every 6 months with scans. But her most recent scan suggested increase in size of couple of nodules prompting to the current referral to Dr Green.  CT CAP on 5/11/17 showed enlarging hilar LAD, enlarging pulmonary nodules, adrenal nodules and a pancreatic body mass. Biopsies of hilar LN, adrenal nodule and pancreatic mass were + for RCC. She met with DR Green and decided to pursue IL-2.     INTERVAL HISTORY: Suzi is doing well today. She was admitted on 6/6/17 for IL-2 and tolerated 9 doses.  Overall she felt it went as planned with nausea/vomiting, rigors, fatigue, anorexia and rigors.  Today she feels this entire last week she has felt completely recovered.  She has been eating/drinking and normal energy levels.  She is active all day and takes a 1 hour nap in the afternoon.    No recent f/s/c. No chest pain/cough/dyspnea. Daily BM, no black/bloody stools. No  issues. No daily pain.     EXAM:  BP 99/64  Pulse 77  Temp 98.3  F (36.8  C) (Oral)  Resp 16  Ht 1.549 m (5' 0.98\")  Wt 55.2 kg (121 lb 11.1 oz)  LMP  (Within Months)  SpO2 98%  BMI 23.01 kg/m2  Wt Readings from Last 4 Encounters:   06/20/17 55.2 kg (121 lb 11.1 oz)   06/16/17 55.6 kg (122 lb 8 oz)   06/11/17 61.8 kg (136 lb 3.2 oz)   06/10/17 63 kg (139 lb)     Vital signs were " reviewed.   Patient alert and oriented x3.   PERRLA. EOMI. No scleral icterus noted. OP without thrush/sores.  Neck exam: No palpable cervical, supraclavicular or axillary nodes bilaterally.   Heart: RRR no murmurs noted.   Lungs: clear to auscultation bilaterally.  No crackles or wheezing.   Abd: positive bowel sounds in all four quadrants.  No tenderness to palpation.  No hepatomegaly.   Extremities: No lower extremity edema.   Neuro: grossly intact.   Mood and affect is stable.   Skin is dry + flaking, but better than last week  LABS:      6/16/2017 13:55 6/20/2017 10:30   Sodium 134 137   Potassium 4.4 3.9   Chloride 99 105   Carbon Dioxide 27 24   Urea Nitrogen 15 12   Creatinine 1.09 (H) 1.05 (H)   GFR Estimate 53 (L) 55 (L)   GFR Estimate If Black 64 66   Calcium 9.2 8.5   Anion Gap 8 8   Magnesium  2.3   Phosphorus  3.4   Albumin 3.7 3.3 (L)   Protein Total 8.0 7.1   Bilirubin Total 0.5 0.3   Alkaline Phosphatase 96 84   ALT 59 (H) 39   AST 33 30   Lactate Dehydrogenase 232    Glucose 92 92   WBC 7.9 4.9   Hemoglobin 12.7 11.8   Hematocrit 39.1 36.3   Platelet Count 397 473 (H)   RBC Count 4.20 3.86   MCV 93 94     ASSESSMENT/PLAN: 50 year old with mRCC s/p first cycle of IL-2 receiving 9 doses    Suzi is doing well today.  Her creat good today, she is physically recovered from her first cycle of IL-2.  She asks some questions today about prognosis, future treatment options, but remains optimistic about giving IL-2 her best shot.  We will get CT CAP in a 3-4 weeks and have her see Dr Green.  She would like ~5 weeks between cycle 2 and 3 which is fine.  I will get her appt set up and we'll see her after C2.    Kia Ren PA-C

## 2017-06-21 NOTE — PROGRESS NOTES
Nursing Focus: Chemotherapy    D: Positive blood return via PICC. Insertion site is clean/dry/intact, dressing intact with no complaints of pain. Urine output is recorded in intake/output section in Doc Flowsheets.    I: Premedications given per order (see electronic medical administration record). Dose # 3 of IL-2 started to infuse over 15 minutes. Reviewed pt teaching on chemotherapy side effects.  Pt denies need for further teaching. Chemotherapy double checked per protocol by two chemotherapy competent RN's.   A: Tolerating procedure well. Denies nausea and or pain.   P: Continue to monitor urine output and symptoms of nausea. Screen for symptoms of toxicity. Notify MD with any concerns and or changes in pt's condition. Continue with plan of care.

## 2017-06-21 NOTE — PLAN OF CARE
Problem: Goal Outcome Summary  Goal: Goal Outcome Summary  Outcome: No Change  Temp max 101.3. BP soft. Tachycardic in the low 100's. OVSS. Denied pain. C/O nausea, adequately controlled with scheduled antiemetics. Up ad ta in room. Poor po intake, no appetite. Adequate urine output. Hard stool x 1, declined prn bowel medications. Received Dose # 3 IL-2 this afternoon without incident. Phosphorus currently being replaced, recheck with morning labs.    Addendum 1430: Pt experienced rigors at approximately 1400 (dose # 3 IL-2 at 1108), relieved with warm blankets and demerol x 1. Pt with small amount of emesis prior to receiving demerol.

## 2017-06-21 NOTE — PLAN OF CARE
Problem: Goal Outcome Summary  Goal: Goal Outcome Summary  Outcome: No Change  T max 101.6, HR in 110s. BPs low in 80/30s. Pt reports dizziness, and nausea. PRN bolus x 1 with good effect on BP, pt able to meet parameters for dose #2.      D/I: Dose #2 Aldesleukin given at 0340. Urine output/Blood pressure parameters met.  See flowsheet. Aldesleukin infused at 228 ml/hr over 15 minutes.Acetaminophen given as scheduled every four hours. Intravenous fluid support. Pt reports chilling this AM at 0700, but declined to use Gema hugger at this point.   A: Nausea and emesis x 1, schedule zofran x 1 with good relief.  Has not yet voided enough for next dose, I/O recorded in flowsheet.   P: Continue to monitor fluid status and vital signs for evidence of capillary leak syndrome. Continue IV fluid support and scheduled acetaminophen. Notify MD if patient falls below blood pressure or urine output parameters.

## 2017-06-22 LAB
ALBUMIN SERPL-MCNC: 2.1 G/DL (ref 3.4–5)
ALP SERPL-CCNC: 44 U/L (ref 40–150)
ALT SERPL W P-5'-P-CCNC: 32 U/L (ref 0–50)
ANION GAP SERPL CALCULATED.3IONS-SCNC: 7 MMOL/L (ref 3–14)
AST SERPL W P-5'-P-CCNC: 27 U/L (ref 0–45)
BASE DEFICIT BLDV-SCNC: 7.3 MMOL/L
BASOPHILS # BLD AUTO: 0 10E9/L (ref 0–0.2)
BASOPHILS NFR BLD AUTO: 0.2 %
BILIRUB SERPL-MCNC: 1 MG/DL (ref 0.2–1.3)
BUN SERPL-MCNC: 9 MG/DL (ref 7–30)
CALCIUM SERPL-MCNC: 6.7 MG/DL (ref 8.5–10.1)
CHLORIDE SERPL-SCNC: 113 MMOL/L (ref 94–109)
CK SERPL-CCNC: 40 U/L (ref 30–225)
CO2 SERPL-SCNC: 19 MMOL/L (ref 20–32)
CREAT SERPL-MCNC: 1.1 MG/DL (ref 0.52–1.04)
DIFFERENTIAL METHOD BLD: ABNORMAL
EOSINOPHIL # BLD AUTO: 1.1 10E9/L (ref 0–0.7)
EOSINOPHIL NFR BLD AUTO: 10.7 %
ERYTHROCYTE [DISTWIDTH] IN BLOOD BY AUTOMATED COUNT: 15.1 % (ref 10–15)
GFR SERPL CREATININE-BSD FRML MDRD: 52 ML/MIN/1.7M2
GLUCOSE SERPL-MCNC: 139 MG/DL (ref 70–99)
HCO3 BLDV-SCNC: 18 MMOL/L (ref 21–28)
HCT VFR BLD AUTO: 28 % (ref 35–47)
HGB BLD-MCNC: 9.1 G/DL (ref 11.7–15.7)
IMM GRANULOCYTES # BLD: 0 10E9/L (ref 0–0.4)
IMM GRANULOCYTES NFR BLD: 0.2 %
LACTATE BLD-SCNC: 2.2 MMOL/L (ref 0.7–2.1)
LDH SERPL L TO P-CCNC: 176 U/L (ref 81–234)
LYMPHOCYTES # BLD AUTO: 0.3 10E9/L (ref 0.8–5.3)
LYMPHOCYTES NFR BLD AUTO: 3 %
MAGNESIUM SERPL-MCNC: 1.2 MG/DL (ref 1.6–2.3)
MAGNESIUM SERPL-MCNC: 2.8 MG/DL (ref 1.6–2.3)
MCH RBC QN AUTO: 29.5 PG (ref 26.5–33)
MCHC RBC AUTO-ENTMCNC: 32.5 G/DL (ref 31.5–36.5)
MCV RBC AUTO: 91 FL (ref 78–100)
MONOCYTES # BLD AUTO: 0.5 10E9/L (ref 0–1.3)
MONOCYTES NFR BLD AUTO: 4.5 %
NEUTROPHILS # BLD AUTO: 8.7 10E9/L (ref 1.6–8.3)
NEUTROPHILS NFR BLD AUTO: 81.4 %
NRBC # BLD AUTO: 0 10*3/UL
NRBC BLD AUTO-RTO: 0 /100
O2/TOTAL GAS SETTING VFR VENT: 21 %
OXYHGB MFR BLDV: 76 %
PCO2 BLDV: 36 MM HG (ref 40–50)
PH BLDV: 7.32 PH (ref 7.32–7.43)
PHOSPHATE SERPL-MCNC: 2.3 MG/DL (ref 2.5–4.5)
PLATELET # BLD AUTO: 322 10E9/L (ref 150–450)
PO2 BLDV: 44 MM HG (ref 25–47)
POTASSIUM SERPL-SCNC: 4.1 MMOL/L (ref 3.4–5.3)
PROT SERPL-MCNC: 4.6 G/DL (ref 6.8–8.8)
RBC # BLD AUTO: 3.08 10E12/L (ref 3.8–5.2)
SODIUM SERPL-SCNC: 138 MMOL/L (ref 133–144)
WBC # BLD AUTO: 10.7 10E9/L (ref 4–11)

## 2017-06-22 PROCEDURE — 83735 ASSAY OF MAGNESIUM: CPT | Performed by: INTERNAL MEDICINE

## 2017-06-22 PROCEDURE — 25000128 H RX IP 250 OP 636: Performed by: INTERNAL MEDICINE

## 2017-06-22 PROCEDURE — 25000132 ZZH RX MED GY IP 250 OP 250 PS 637: Performed by: PHYSICIAN ASSISTANT

## 2017-06-22 PROCEDURE — 25000128 H RX IP 250 OP 636: Performed by: PHYSICIAN ASSISTANT

## 2017-06-22 PROCEDURE — 84100 ASSAY OF PHOSPHORUS: CPT | Performed by: INTERNAL MEDICINE

## 2017-06-22 PROCEDURE — 99233 SBSQ HOSP IP/OBS HIGH 50: CPT | Performed by: INTERNAL MEDICINE

## 2017-06-22 PROCEDURE — 36592 COLLECT BLOOD FROM PICC: CPT | Performed by: INTERNAL MEDICINE

## 2017-06-22 PROCEDURE — 25800025 ZZH RX 258: Performed by: INTERNAL MEDICINE

## 2017-06-22 PROCEDURE — 80053 COMPREHEN METABOLIC PANEL: CPT | Performed by: INTERNAL MEDICINE

## 2017-06-22 PROCEDURE — 12000008 ZZH R&B INTERMEDIATE UMMC

## 2017-06-22 PROCEDURE — 82550 ASSAY OF CK (CPK): CPT | Performed by: INTERNAL MEDICINE

## 2017-06-22 PROCEDURE — 40000802 ZZH SITE CHECK

## 2017-06-22 PROCEDURE — 82805 BLOOD GASES W/O2 SATURATION: CPT | Performed by: INTERNAL MEDICINE

## 2017-06-22 PROCEDURE — 85025 COMPLETE CBC W/AUTO DIFF WBC: CPT | Performed by: INTERNAL MEDICINE

## 2017-06-22 PROCEDURE — 25000125 ZZHC RX 250: Performed by: PHYSICIAN ASSISTANT

## 2017-06-22 PROCEDURE — 83615 LACTATE (LD) (LDH) ENZYME: CPT | Performed by: INTERNAL MEDICINE

## 2017-06-22 PROCEDURE — 25000132 ZZH RX MED GY IP 250 OP 250 PS 637: Performed by: INTERNAL MEDICINE

## 2017-06-22 PROCEDURE — 87040 BLOOD CULTURE FOR BACTERIA: CPT | Performed by: INTERNAL MEDICINE

## 2017-06-22 PROCEDURE — 83605 ASSAY OF LACTIC ACID: CPT | Performed by: INTERNAL MEDICINE

## 2017-06-22 RX ORDER — POLYETHYLENE GLYCOL 3350 17 G/17G
17 POWDER, FOR SOLUTION ORAL DAILY PRN
Status: DISCONTINUED | OUTPATIENT
Start: 2017-06-22 | End: 2017-06-23 | Stop reason: HOSPADM

## 2017-06-22 RX ORDER — SENNOSIDES 8.6 MG
1-2 TABLET ORAL 2 TIMES DAILY PRN
Status: DISCONTINUED | OUTPATIENT
Start: 2017-06-22 | End: 2017-06-23 | Stop reason: HOSPADM

## 2017-06-22 RX ADMIN — WHITE PETROLATUM: 1.75 OINTMENT TOPICAL at 19:27

## 2017-06-22 RX ADMIN — POTASSIUM CHLORIDE, DEXTROSE MONOHYDRATE AND SODIUM CHLORIDE: 150; 5; 900 INJECTION, SOLUTION INTRAVENOUS at 00:10

## 2017-06-22 RX ADMIN — ACETAMINOPHEN 650 MG: 325 TABLET, FILM COATED ORAL at 13:20

## 2017-06-22 RX ADMIN — ONDANSETRON 8 MG: 2 INJECTION INTRAMUSCULAR; INTRAVENOUS at 09:37

## 2017-06-22 RX ADMIN — ACETAMINOPHEN 650 MG: 325 TABLET, FILM COATED ORAL at 06:39

## 2017-06-22 RX ADMIN — ALDESLEUKIN 34 MILLION UNITS: 1.1 INJECTION, POWDER, LYOPHILIZED, FOR SOLUTION INTRAVENOUS at 11:27

## 2017-06-22 RX ADMIN — SENNOSIDES 2 TABLET: 8.6 TABLET, FILM COATED ORAL at 09:07

## 2017-06-22 RX ADMIN — CALCIUM GLUCONATE 1 G: 94 INJECTION, SOLUTION INTRAVENOUS at 09:38

## 2017-06-22 RX ADMIN — ACETAMINOPHEN 650 MG: 325 TABLET, FILM COATED ORAL at 17:52

## 2017-06-22 RX ADMIN — DIPHENOXYLATE HYDROCHLORIDE AND ATROPINE SULFATE 1 TABLET: 2.5; .025 TABLET ORAL at 18:14

## 2017-06-22 RX ADMIN — ACETAMINOPHEN 650 MG: 325 TABLET, FILM COATED ORAL at 21:49

## 2017-06-22 RX ADMIN — SODIUM CHLORIDE 1000 ML: 9 INJECTION, SOLUTION INTRAVENOUS at 09:37

## 2017-06-22 RX ADMIN — POTASSIUM CHLORIDE, DEXTROSE MONOHYDRATE AND SODIUM CHLORIDE: 150; 5; 900 INJECTION, SOLUTION INTRAVENOUS at 17:55

## 2017-06-22 RX ADMIN — WHITE PETROLATUM: 1.75 OINTMENT TOPICAL at 08:07

## 2017-06-22 RX ADMIN — ONDANSETRON 8 MG: 2 INJECTION INTRAMUSCULAR; INTRAVENOUS at 01:41

## 2017-06-22 RX ADMIN — SODIUM CHLORIDE 250 ML: 9 INJECTION, SOLUTION INTRAVENOUS at 01:46

## 2017-06-22 RX ADMIN — CITALOPRAM HYDROBROMIDE 20 MG: 20 TABLET ORAL at 19:26

## 2017-06-22 RX ADMIN — PROCHLORPERAZINE EDISYLATE 10 MG: 5 INJECTION INTRAMUSCULAR; INTRAVENOUS at 12:02

## 2017-06-22 RX ADMIN — CEPHALEXIN 500 MG: 500 CAPSULE ORAL at 19:26

## 2017-06-22 RX ADMIN — CEPHALEXIN 500 MG: 500 CAPSULE ORAL at 08:07

## 2017-06-22 RX ADMIN — DEXTROSE MONOHYDRATE 1000 ML: 50 INJECTION, SOLUTION INTRAVENOUS at 17:54

## 2017-06-22 RX ADMIN — ALPRAZOLAM 0.5 MG: 0.25 TABLET ORAL at 21:49

## 2017-06-22 RX ADMIN — SODIUM CHLORIDE 250 ML: 9 INJECTION, SOLUTION INTRAVENOUS at 08:22

## 2017-06-22 RX ADMIN — MEPERIDINE HYDROCHLORIDE 25 MG: 25 INJECTION, SOLUTION INTRAMUSCULAR; INTRAVENOUS; SUBCUTANEOUS at 14:03

## 2017-06-22 RX ADMIN — ONDANSETRON 8 MG: 2 INJECTION INTRAMUSCULAR; INTRAVENOUS at 18:14

## 2017-06-22 RX ADMIN — BUPROPION HYDROCHLORIDE 300 MG: 300 TABLET, FILM COATED, EXTENDED RELEASE ORAL at 08:07

## 2017-06-22 RX ADMIN — PROCHLORPERAZINE EDISYLATE 10 MG: 5 INJECTION INTRAMUSCULAR; INTRAVENOUS at 21:40

## 2017-06-22 RX ADMIN — RANITIDINE 150 MG: 150 TABLET ORAL at 19:26

## 2017-06-22 RX ADMIN — ACETAMINOPHEN 650 MG: 325 TABLET, FILM COATED ORAL at 01:40

## 2017-06-22 RX ADMIN — SODIUM CHLORIDE 250 ML: 9 INJECTION, SOLUTION INTRAVENOUS at 02:20

## 2017-06-22 RX ADMIN — RANITIDINE 150 MG: 150 TABLET ORAL at 08:07

## 2017-06-22 RX ADMIN — ACETAMINOPHEN 650 MG: 325 TABLET, FILM COATED ORAL at 09:42

## 2017-06-22 RX ADMIN — ALDESLEUKIN 34 MILLION UNITS: 1.1 INJECTION, POWDER, LYOPHILIZED, FOR SOLUTION INTRAVENOUS at 19:32

## 2017-06-22 RX ADMIN — POTASSIUM PHOSPHATE, MONOBASIC AND POTASSIUM PHOSPHATE, DIBASIC 15 MMOL: 224; 236 INJECTION, SOLUTION INTRAVENOUS at 12:02

## 2017-06-22 RX ADMIN — SODIUM CHLORIDE 500 ML: 9 INJECTION, SOLUTION INTRAVENOUS at 06:39

## 2017-06-22 RX ADMIN — MAGNESIUM SULFATE IN WATER 4 G: 40 INJECTION, SOLUTION INTRAVENOUS at 08:04

## 2017-06-22 RX ADMIN — ALPRAZOLAM 0.5 MG: 0.25 TABLET ORAL at 12:09

## 2017-06-22 NOTE — PLAN OF CARE
Problem: Goal Outcome Summary  Goal: Goal Outcome Summary  Outcome: No Change  Hypotensive (mildly symptomatic: slight dizziness), OVSS. Denied pain, nausea/vomiting. Up with standby assist. Bed alarm on at all times for pt safety due to increased generalized weakness. No po intake. Adequate urine output. Constipated, prn senna administered with no results as of yet. Received a total of 1,250 cc NS as fluid boluses for hypotension this shift. Received dose # 5 IL-2 at 11:27 AM. Experienced rigors at approximately 1400, relieved with warm blankets and 25 mg prn IV Demerol x 1. Calcium gluconate, magnesium and phosphorus replaced. Magnesium recheck was 2.8, no further intervention required. Calcium and phosphorus rechecks with morning labs.

## 2017-06-22 NOTE — PROGRESS NOTES
[facility], [place]    Hematology / Oncology Progress Note    Date of Service (when I saw the patient): 06/22/2017     Assessment & Plan   Suzi Gonsales is a 52 y/o female with metastatic clear cell renal carcinoma and anxiety s/p right radical nephrectomy in 12/2014, now with metastatic RCC (lungs, adrenal, and pancreas) who is admitted for Cycle 2 HD IL-2. She received cycle #1 on 6/6, completed 9 doses and tolerated it well overall.      #Metastatic RCC: Admitted for Cycle 2 HD IL2.   - PICC placed     REGIMEN: D1=6/20/17; Today is D3, has received 4 doses thus far. Dose #5 delayed due to hypotension. BP in the 70s/30s-80s/40s. Mildly symptomatic (slight dizziness). Her baseline BP on admission was 100/50. BP improved to 87/50 (appropriate for treatment) after 1.5L of IVF (received 250 cc boluses x2 early AM, 500 cc at 6AM and an additional 500 cc at 9AM).    - IVFs 100/hr  - Premeds: Tylenol 650mg q4hr, Zofran 8mg q8hr, Zantac,150mg BID, Keflex 500mg BID  - aldesleukin 34 million units IV q8hr for up to 14 doses as tolerated  - PRN supportive care medications  - AVOID diuretics and steroids with IL-2      #H/o anxiety.   - continue PTA Wellbutrin, Celexa  - PRN Xanax (not to be given within 4 hours of lorazepam if given for nausea)      FEN  - IVFs per treatment plan, bolus PRN (SBP <85 or Urine Output <30 mL/hr)  - Regular diet  - Electrolyte replacement prn per protocol      PPx  - defer VTE prophylaxis given anticipated thrombocytopenia with IL-2  - Zantac as above      Dispo: Pending completion of tolerated IL-2 doses and recovery of acute issues.      Code Status: Full Code     Romelia Vergara PA-C  Hematology/Oncology    Interval History   She states that she is feeling well overall. Has had n/v overnight. Mildly dizzy if she stands up but not feeling her heart is racing or otherwise symptomatic from hypotension.  She has a poor appetite. No diarrhea. No skin rashes. No  cough or SOB. No edema. Urinating ok. Confusion overnight. Had taken ambien and then took lorazepam for nausea.     Physical Exam   Temp: 97.1  F (36.2  C) Temp src: Oral BP: (!) 87/50   Heart Rate: 100 Resp: 18 SpO2: 96 % O2 Device: None (Room air)    Vitals:    06/22/17 0809   Weight: 60.6 kg (133 lb 9.6 oz)     Vital Signs with Ranges  Temp:  [97.1  F (36.2  C)-103  F (39.4  C)] 97.1  F (36.2  C)  Heart Rate:  [] 100  Resp:  [18-20] 18  BP: ()/(24-50) 87/50  SpO2:  [94 %-99 %] 96 %  I/O last 3 completed shifts:  In: 4581 [P.O.:290; I.V.:3200; IV Piggyback:1091]  Out: 1600 [Urine:1600]    Constitutional: Well appearing, no acute distress  Eyes: sclera anicteric, conjunctiva clear  HEENT: normocephalic, MMM, no mucositis or thrush  Respiratory: clear to auscultation bilaterally  Cardiovascular: RRR, S1S2, no m/r/g  GI: soft, nondistended, nontender  Skin: no rashes on exposed skin surfaces  Musculoskeletal: No LE edema, normal gait  Neurologic: alert and oriented x4, no focal deficits  Psychiatric: Appropriate affect    Medications     - MEDICATION INSTRUCTIONS -       dextrose 5% and 0.9% NaCl with potassium chloride 20 mEq 100 mL/hr at 06/22/17 0010     D5W 1,000 mL (06/20/17 1809)     - MEDICATION INSTRUCTIONS -       NaCl         sodium chloride 0.9%  1,000 mL Intravenous Once     eucerin   Topical BID     ondansetron  8 mg Intravenous Q8H     acetaminophen  650 mg Oral Q4H     ranitidine  150 mg Oral BID     cephalexin  500 mg Oral BID     aldesleukin (PROLEUKIN) infusion  600,000 Units/kg (Treatment Plan Recorded) Intravenous Q8H     citalopram  20 mg Oral QPM     buPROPion  300 mg Oral QAM     sodium chloride (PF)  10 mL Intracatheter Q7 Days       Data   Results for orders placed or performed during the hospital encounter of 06/20/17 (from the past 24 hour(s))   Lactic acid level STAT   Result Value Ref Range    Lactic Acid 2.1 0.7 - 2.1 mmol/L   CBC with platelets differential   Result Value  Ref Range    WBC 10.7 4.0 - 11.0 10e9/L    RBC Count 3.08 (L) 3.8 - 5.2 10e12/L    Hemoglobin 9.1 (L) 11.7 - 15.7 g/dL    Hematocrit 28.0 (L) 35.0 - 47.0 %    MCV 91 78 - 100 fl    MCH 29.5 26.5 - 33.0 pg    MCHC 32.5 31.5 - 36.5 g/dL    RDW 15.1 (H) 10.0 - 15.0 %    Platelet Count 322 150 - 450 10e9/L    Diff Method Automated Method     % Neutrophils 81.4 %    % Lymphocytes 3.0 %    % Monocytes 4.5 %    % Eosinophils 10.7 %    % Basophils 0.2 %    % Immature Granulocytes 0.2 %    Nucleated RBCs 0 0 /100    Absolute Neutrophil 8.7 (H) 1.6 - 8.3 10e9/L    Absolute Lymphocytes 0.3 (L) 0.8 - 5.3 10e9/L    Absolute Monocytes 0.5 0.0 - 1.3 10e9/L    Absolute Eosinophils 1.1 (H) 0.0 - 0.7 10e9/L    Absolute Basophils 0.0 0.0 - 0.2 10e9/L    Abs Immature Granulocytes 0.0 0 - 0.4 10e9/L    Absolute Nucleated RBC 0.0    Comprehensive metabolic panel   Result Value Ref Range    Sodium 138 133 - 144 mmol/L    Potassium 4.1 3.4 - 5.3 mmol/L    Chloride 113 (H) 94 - 109 mmol/L    Carbon Dioxide 19 (L) 20 - 32 mmol/L    Anion Gap 7 3 - 14 mmol/L    Glucose 139 (H) 70 - 99 mg/dL    Urea Nitrogen 9 7 - 30 mg/dL    Creatinine 1.10 (H) 0.52 - 1.04 mg/dL    GFR Estimate 52 (L) >60 mL/min/1.7m2    GFR Estimate If Black 63 >60 mL/min/1.7m2    Calcium 6.7 (L) 8.5 - 10.1 mg/dL    Bilirubin Total 1.0 0.2 - 1.3 mg/dL    Albumin 2.1 (L) 3.4 - 5.0 g/dL    Protein Total 4.6 (L) 6.8 - 8.8 g/dL    Alkaline Phosphatase 44 40 - 150 U/L    ALT 32 0 - 50 U/L    AST 27 0 - 45 U/L   Magnesium   Result Value Ref Range    Magnesium 1.2 (L) 1.6 - 2.3 mg/dL   Phosphorus   Result Value Ref Range    Phosphorus 2.3 (L) 2.5 - 4.5 mg/dL   Lactate Dehydrogenase   Result Value Ref Range    Lactate Dehydrogenase 176 81 - 234 U/L   CK total   Result Value Ref Range    CK Total 40 30 - 225 U/L   Blood culture   Result Value Ref Range    Specimen Description Blood GRAY Port     Culture Micro Pending     Micro Report Status Pending    Blood gas venous with  oxyhemoglobin   Result Value Ref Range    Ph Venous 7.32 7.32 - 7.43 pH    PCO2 Venous 36 (L) 40 - 50 mm Hg    PO2 Venous 44 25 - 47 mm Hg    Bicarbonate Venous 18 (L) 21 - 28 mmol/L    FIO2 21.0     Oxyhemoglobin Venous 76 %    Base Deficit Venous 7.3 mmol/L       ATTENDING PHYSICIAN ADDENDUM AND NOTE:  The patient s case evaluated by me separate from the inpatient midlevel provider, Romelia Vergara. The note above reflects my assessment and plan. I have personally reviewed lab results, and vital and radiology results. >50% of time was spent in assessment and coordination of care.  Mrs. Suzi Gonsales is a 51-year-old woman with metastatic clear cell renal cell carcinoma, admitted June 20 for IL-2 therapy. She continues to have issues with low blood pressure, even beyond her usually low baseline values of approximately 100 systolic BP, diastolic 50 to 60 BP. She denies any symptomatic issues such as dizziness at the times of low blood pressure. She would ideally like to receive two more IL-2 infusions before discharge. We will continue to monitor her blood pressure carefully, and if it meets minimum parameters we can then proceed with the infusions.    Addison Arguelles MD, PhD   of Medicine   Division of Hematology, Oncology, and Transplantation

## 2017-06-22 NOTE — PROGRESS NOTES
Chemotherapy  D: Blood return is brisk per DL PICC. Urine output is adequate as recorded in intake and output flowsheet, chemotherapy agents double checked by two chemotherapy certified RN's, documentation in doc flowsheet recorded .     I: Premedications given (see electronic medical administration record). Dose #4 of IL-2 started to infuse over 15min.     A: Tolerating well, A&O x4, denies pain/nausea, Pt ambulating in room.    P: Continue to monitor urine output and symptoms of nausea. Screen for symptoms of toxicity.

## 2017-06-22 NOTE — PLAN OF CARE
"Problem: Goal Outcome Summary  Goal: Goal Outcome Summary  Outcome: No Change  Tmax 101f. HRmax 133. Most recent checks WNL. Continues with soft BP; mostly positional. Sepsis triggered d/t HR, BP, and Temp. LA 2.1 and VS x2 completed. MD notified. Received dose #4 of IL-2 at 1853. Ativan administered at time of IL-2 dose to help with nausea. PRN compazine also administered 2 hrs post dose. Warm blankets applied d/t pt feeling \"cold.\" Around 2200, Pt heard throwing up at bedside. 0.5mg IV ativan given for nausea and demerol x1 for rigors. Gema hugger applied. Temp monitored.  Denies chest pain. Next dose of IL-2 due at 0300. Has 100ml of UO since dose #4. Continuing to monitor.  and friend visited and lifted pt spirits. Continue with POC.     Addendum: Tmax 101.8f with Gema hugger. Removed and continuing to monitor Temp.   Addendum: Pt temp continues to be elevated at 103f. D/t gema hugger and blankets applied for rigors. Pt with only 2 blankets and cool wash cloth applied. Pt A&O. Naproxen administered. Continue to monitor.     "

## 2017-06-22 NOTE — PLAN OF CARE
Problem: Goal Outcome Summary  Goal: Goal Outcome Summary  Outcome: Declining  Patient Vitals for the past 8 hrs:    Temp Temp src Heart Rate BP BP - Mean Resp   06/22/17 0651 - - - (!) 82/36 - -   06/22/17 0641 - - - (!) 82/29 - -   06/22/17 0635 - - - (!) 79/33 - -   06/22/17 0559 - - 90 (!) 76/32 - -   06/22/17 0511 97.4  F (36.3  C) Oral 104 (!) 84/49 - 18   06/22/17 0358 97.3  F (36.3  C) Oral 95 (!) 81/39 47 20   06/22/17 0251 - - 101 (!) 88/42 - -   06/22/17 0216 - - - (!) 83/47 - -   06/22/17 0144 - - - (!) 81/39 - -   06/22/17 0057 100.2  F (37.9  C) Oral 118 (!) 84/42 - 20   06/21/17 2323 102.5  F (39.2  C) Oral - - - -   06/21/17 2309 103  F (39.4  C) Oral - - - -     BPs hypotensive. T max overnight 100.2. Pt not able to meet parameters for dose 5 of IL-2 despite 2x 250 ml bolus and 1 additional 500 cc bolus per moonlighter order. Pt somnolent after receiving ambien, ativan and demerol on evening shift, falling asleep after a few seconds of being awake, unable to follow commands due to somnolence. Pt found walking around room after having episode of urinary incontinence and she reports an emesis, but cannot recall later that she was in the bathroom. Bed alarm on for safety. Pt voiding adequately tonight after bolus.      P: Continue to monitor fluid status and vital signs for evidence of capillary leak syndrome. Continue IV fluid support and scheduled acetaminophen. MD aware of persistent hypotension, is updating main team this AM for updates to plan of care. Blood gases have been ordered, pending lab draws.

## 2017-06-23 VITALS
DIASTOLIC BLOOD PRESSURE: 46 MMHG | HEART RATE: 119 BPM | OXYGEN SATURATION: 98 % | BODY MASS INDEX: 26.58 KG/M2 | SYSTOLIC BLOOD PRESSURE: 100 MMHG | RESPIRATION RATE: 20 BRPM | WEIGHT: 140.6 LBS | TEMPERATURE: 98.2 F

## 2017-06-23 LAB
ALBUMIN SERPL-MCNC: 2 G/DL (ref 3.4–5)
ALP SERPL-CCNC: 51 U/L (ref 40–150)
ALT SERPL W P-5'-P-CCNC: 75 U/L (ref 0–50)
ANION GAP SERPL CALCULATED.3IONS-SCNC: 8 MMOL/L (ref 3–14)
AST SERPL W P-5'-P-CCNC: 70 U/L (ref 0–45)
BASOPHILS # BLD AUTO: 0 10E9/L (ref 0–0.2)
BASOPHILS NFR BLD AUTO: 0.4 %
BILIRUB SERPL-MCNC: 1.1 MG/DL (ref 0.2–1.3)
BUN SERPL-MCNC: 5 MG/DL (ref 7–30)
CALCIUM SERPL-MCNC: 7.1 MG/DL (ref 8.5–10.1)
CHLORIDE SERPL-SCNC: 116 MMOL/L (ref 94–109)
CK SERPL-CCNC: 43 U/L (ref 30–225)
CO2 SERPL-SCNC: 16 MMOL/L (ref 20–32)
CREAT SERPL-MCNC: 0.98 MG/DL (ref 0.52–1.04)
DIFFERENTIAL METHOD BLD: ABNORMAL
EOSINOPHIL # BLD AUTO: 1.2 10E9/L (ref 0–0.7)
EOSINOPHIL NFR BLD AUTO: 26.2 %
ERYTHROCYTE [DISTWIDTH] IN BLOOD BY AUTOMATED COUNT: 15.2 % (ref 10–15)
GFR SERPL CREATININE-BSD FRML MDRD: 59 ML/MIN/1.7M2
GLUCOSE SERPL-MCNC: 102 MG/DL (ref 70–99)
HCT VFR BLD AUTO: 29.8 % (ref 35–47)
HGB BLD-MCNC: 9.7 G/DL (ref 11.7–15.7)
IMM GRANULOCYTES # BLD: 0 10E9/L (ref 0–0.4)
IMM GRANULOCYTES NFR BLD: 0.2 %
LACTATE BLD-SCNC: 3.7 MMOL/L (ref 0.7–2.1)
LDH SERPL L TO P-CCNC: 221 U/L (ref 81–234)
LYMPHOCYTES # BLD AUTO: 0.2 10E9/L (ref 0.8–5.3)
LYMPHOCYTES NFR BLD AUTO: 3.5 %
MAGNESIUM SERPL-MCNC: 2 MG/DL (ref 1.6–2.3)
MCH RBC QN AUTO: 29.4 PG (ref 26.5–33)
MCHC RBC AUTO-ENTMCNC: 32.6 G/DL (ref 31.5–36.5)
MCV RBC AUTO: 90 FL (ref 78–100)
MONOCYTES # BLD AUTO: 0.2 10E9/L (ref 0–1.3)
MONOCYTES NFR BLD AUTO: 3.7 %
NEUTROPHILS # BLD AUTO: 3 10E9/L (ref 1.6–8.3)
NEUTROPHILS NFR BLD AUTO: 66 %
NRBC # BLD AUTO: 0 10*3/UL
NRBC BLD AUTO-RTO: 0 /100
PHOSPHATE SERPL-MCNC: 1.7 MG/DL (ref 2.5–4.5)
PLATELET # BLD AUTO: 265 10E9/L (ref 150–450)
POTASSIUM SERPL-SCNC: 4.3 MMOL/L (ref 3.4–5.3)
PROT SERPL-MCNC: 4.4 G/DL (ref 6.8–8.8)
RBC # BLD AUTO: 3.3 10E12/L (ref 3.8–5.2)
SODIUM SERPL-SCNC: 140 MMOL/L (ref 133–144)
WBC # BLD AUTO: 4.6 10E9/L (ref 4–11)

## 2017-06-23 PROCEDURE — 83735 ASSAY OF MAGNESIUM: CPT | Performed by: INTERNAL MEDICINE

## 2017-06-23 PROCEDURE — 40000802 ZZH SITE CHECK

## 2017-06-23 PROCEDURE — 83615 LACTATE (LD) (LDH) ENZYME: CPT | Performed by: INTERNAL MEDICINE

## 2017-06-23 PROCEDURE — 25000128 H RX IP 250 OP 636: Performed by: INTERNAL MEDICINE

## 2017-06-23 PROCEDURE — 99238 HOSP IP/OBS DSCHRG MGMT 30/<: CPT | Performed by: INTERNAL MEDICINE

## 2017-06-23 PROCEDURE — 36592 COLLECT BLOOD FROM PICC: CPT | Performed by: INTERNAL MEDICINE

## 2017-06-23 PROCEDURE — 84100 ASSAY OF PHOSPHORUS: CPT | Performed by: INTERNAL MEDICINE

## 2017-06-23 PROCEDURE — 85025 COMPLETE CBC W/AUTO DIFF WBC: CPT | Performed by: INTERNAL MEDICINE

## 2017-06-23 PROCEDURE — 25000125 ZZHC RX 250: Performed by: PHYSICIAN ASSISTANT

## 2017-06-23 PROCEDURE — 25000132 ZZH RX MED GY IP 250 OP 250 PS 637: Performed by: PHYSICIAN ASSISTANT

## 2017-06-23 PROCEDURE — 87040 BLOOD CULTURE FOR BACTERIA: CPT | Performed by: INTERNAL MEDICINE

## 2017-06-23 PROCEDURE — 25000132 ZZH RX MED GY IP 250 OP 250 PS 637: Performed by: INTERNAL MEDICINE

## 2017-06-23 PROCEDURE — 25800025 ZZH RX 258: Performed by: INTERNAL MEDICINE

## 2017-06-23 PROCEDURE — 25000128 H RX IP 250 OP 636: Performed by: PHYSICIAN ASSISTANT

## 2017-06-23 PROCEDURE — 80053 COMPREHEN METABOLIC PANEL: CPT | Performed by: INTERNAL MEDICINE

## 2017-06-23 PROCEDURE — 82550 ASSAY OF CK (CPK): CPT | Performed by: INTERNAL MEDICINE

## 2017-06-23 PROCEDURE — 83605 ASSAY OF LACTIC ACID: CPT | Performed by: INTERNAL MEDICINE

## 2017-06-23 RX ADMIN — POTASSIUM PHOSPHATE, MONOBASIC AND POTASSIUM PHOSPHATE, DIBASIC 20 MMOL: 224; 236 INJECTION, SOLUTION INTRAVENOUS at 09:31

## 2017-06-23 RX ADMIN — ALDESLEUKIN 34 MILLION UNITS: 1.1 INJECTION, POWDER, LYOPHILIZED, FOR SOLUTION INTRAVENOUS at 03:28

## 2017-06-23 RX ADMIN — DIPHENOXYLATE HYDROCHLORIDE AND ATROPINE SULFATE 1 TABLET: 2.5; .025 TABLET ORAL at 04:34

## 2017-06-23 RX ADMIN — ACETAMINOPHEN 650 MG: 325 TABLET, FILM COATED ORAL at 09:53

## 2017-06-23 RX ADMIN — RANITIDINE 150 MG: 150 TABLET ORAL at 09:53

## 2017-06-23 RX ADMIN — ACETAMINOPHEN 650 MG: 325 TABLET, FILM COATED ORAL at 05:52

## 2017-06-23 RX ADMIN — CEPHALEXIN 500 MG: 500 CAPSULE ORAL at 09:53

## 2017-06-23 RX ADMIN — ONDANSETRON 8 MG: 2 INJECTION INTRAMUSCULAR; INTRAVENOUS at 02:08

## 2017-06-23 RX ADMIN — POTASSIUM CHLORIDE, DEXTROSE MONOHYDRATE AND SODIUM CHLORIDE: 150; 5; 900 INJECTION, SOLUTION INTRAVENOUS at 03:28

## 2017-06-23 RX ADMIN — ONDANSETRON 8 MG: 2 INJECTION INTRAMUSCULAR; INTRAVENOUS at 09:53

## 2017-06-23 RX ADMIN — ACETAMINOPHEN 650 MG: 325 TABLET, FILM COATED ORAL at 02:08

## 2017-06-23 RX ADMIN — BUPROPION HYDROCHLORIDE 300 MG: 300 TABLET, FILM COATED, EXTENDED RELEASE ORAL at 09:53

## 2017-06-23 NOTE — PROGRESS NOTES
[facility], [place]    Sepsis Evaluation Progress Note    Date of Service: 06/23/2017    I was called to see Suzi Gonsales due to abnormal vital signs triggering the Sepsis SIRS screening alert. She is not known to have an infection.     Physical Exam    Vital Signs:  Temp: 98.1  F (36.7  C) Temp src: Oral BP: (!) 88/39 Pulse: 119 Heart Rate: 122 Resp: 20 SpO2: 94 % O2 Device: None (Room air) Oxygen Delivery: 1.5 LPM    Lab:  Lactic Acid   Date Value Ref Range Status   06/23/2017 3.7 (H) 0.7 - 2.1 mmol/L Final       The patient is at baseline mental status.    The rest of their physical exam is normal.    Assessment and Plan    The SIRS and exam findings are likely due to IL2, there is no sign of sepsis at this time.    Disposition: The patient will remain on the current unit. We will continue to monitor this patient closely.    Romelia Vergara PA-C

## 2017-06-23 NOTE — PLAN OF CARE
Problem: Goal Outcome Summary  Goal: Goal Outcome Summary  Outcome: No Change  D/I: Dose #7 Aldesleukin given. Urine output/Blood pressure parameters met.  See flowsheet. Aldesleukin infused at 0330 over 15 minutes. Acetaminophen given as scheduled every four hours. Intravenous fluid support.   A: Tolerating Aldesleukin thus far. Pt alert and oriented this shift, no signs of somnolence, bed alarm on for safety due to intermittent dizziness. Pt not calling when getting up to commode, fall risk reinforced. Lomotil x 1 for loose stools.  Pt reports feeling very weak, requested oxygen via nasal cannula. Pt education reinforced regarding fall risk and importance of calling when getting up.   P: Continue to monitor fluid status and vital signs for evidence of capillary leak syndrome. Continue IV fluid support and scheduled acetaminophen. Notify MD if patient falls below blood pressure or urine output parameters.          Update this AM: pt starting to feel chilled at 0655, Gema jacinto applied.

## 2017-06-23 NOTE — DISCHARGE SUMMARY
[facility], [place]    Discharge Summary  Hospitalist    Date of Admission:  6/20/2017  Date of Discharge:  6/23/2017  Discharging Provider: Romelia Vergara  Date of Service (when I saw the patient): 06/23/17    Discharge Diagnoses   IL2, cycle #2  Metastatic renal cell carcinoma  Hypotension  N/v    History of Present Illness   Suzi Gonsales is a 52 y/o female with metastatic clear cell renal carcinoma and anxiety s/p right radical nephrectomy in 12/2014, now with metastatic RCC (lungs, adrenal, and pancreas) who is admitted for Cycle 2 HD IL-2. She received cycle #1 on 6/6, completed 9 doses and tolerated it well overall.     Hospital Course   Suzi Gonsales was admitted on 6/20/2017.  The following problems were addressed during her hospitalization:    #Metastatic RCC: Admitted for Cycle 2 HD IL2. PICC placed on admission. She completed a total of 7 doses and at that time wanted to be done due to weakness. She had expected side effects of n/v/d, tachycardia, fevers, rigors, poor appetite, fatigue. She had a delay of treatment after 4 cycles for hypotension (70s/30s). This improved with IVF boluses and stabilized through doses 5-7.   - F/u with [facility] and labs next week to make sure she is feeling better since treatment  - F/u scheduled for CT scans and Dr. Green in July        #H/o anxiety.   - continue PTA Wellbutrin, Celexa  - PRN Xanax     Roemlia Vergara PA-C  Hematology/Oncology    Significant Results and Procedures     Pending Results     Unresulted Labs Ordered in the Past 30 Days of this Admission     Date and Time Order Name Status Description    6/22/2017 2330 Blood culture Preliminary     6/21/2017 2330 Blood culture Preliminary     6/20/2017 2330 Blood culture Preliminary           Code Status   Full Code       Primary Care Physician   Idalia Saini          Discharge Disposition   Discharged to home  Condition at discharge: Satisfactory    Consultations This  Hospital Stay   VASCULAR ACCESS ADULT IP CONSULT  VASCULAR ACCESS CARE ADULT IP CONSULT    Time Spent on this Encounter     Discharge Orders     CBC with platelets differential   Last Lab Result: Hemoglobin (g/dL)      Date                     Value                06/23/2017               9.7 (L)          ----------     Comprehensive metabolic panel     Magnesium     Phosphorus     Reason for your hospital stay   IL2 , cycle #2, for metastatic renal cell carcinoma     Activity   Your activity upon discharge: activity as tolerated     Discharge Instructions   [facility] ([phone])  for temperature > 100.4, shortness of breath, chest pain, headaches, vision changes, bleeding, uncontrolled nausea, vomiting, diarrhea, or pain.     Follow Up and recommended labs and tests   Follow up in Dr. Green's clinic next week with repeat labs. Keep your previously scheduled appointments for CT scan and Dr. Green in July     Full Code     Diet   Follow this diet upon discharge: Regular       Discharge Medications   Current Discharge Medication List      CONTINUE these medications which have NOT CHANGED    Details   acetaminophen (TYLENOL) 325 MG tablet Take 2 tablets (650 mg) by mouth every 4 hours as needed for mild pain (mild pain)  Qty: 100 tablet, Refills: 0    Associated Diagnoses: Malignant neoplasm of right kidney (H)      benzonatate (TESSALON) 100 MG capsule Take 1 capsule (100 mg) by mouth 3 times daily as needed for cough  Qty: 42 capsule, Refills: 0      acetaminophen-codeine (TYLENOL/CODEINE #3) 300-30 MG per tablet Take 1 tablet by mouth every 6 hours as needed for mild pain or pain (PRN pain or cough)  Qty: 20 tablet, Refills: 0      prochlorperazine (COMPAZINE) 10 MG tablet Take 1 tablet (10 mg) by mouth every 6 hours as needed for nausea or vomiting (Breakthrough Nausea/Vomiting)  Qty: 30 tablet, Refills: 0    Associated Diagnoses: Malignant neoplasm of right kidney (H)      ALPRAZolam  (XANAX) 0.5 MG tablet Take 1 tablet (0.5 mg) by mouth 3 times daily as needed for anxiety  Qty: 60 tablet, Refills: 0    Associated Diagnoses: Renal cell carcinoma, unspecified laterality (H); Mass of upper lobe of left lung      buPROPion (WELLBUTRIN XL) 300 MG 24 hr tablet Take 300 mg by mouth every morning       citalopram (CELEXA) 20 MG tablet Take 20 mg by mouth every evening       Zolpidem Tartrate (AMBIEN PO) Take 5 mg by mouth nightly as needed for sleep    Associated Diagnoses: Malignant neoplasm of right kidney (H)      IBUPROFEN Take 400 mg by mouth every 6 hours as needed          STOP taking these medications       triamcinolone (KENALOG) 0.1 % cream Comments:   Reason for Stopping:             Allergies   No Known Allergies  Data   Most Recent 3 CBC's:  Recent Labs   Lab Test  06/23/17   0617  06/22/17   0629  06/21/17   0707   WBC  4.6  10.7  6.5   HGB  9.7*  9.1*  10.9*   MCV  90  91  92   PLT  265  322  362      Most Recent 3 BMP's:  Recent Labs   Lab Test  06/23/17   0617  06/22/17   0629  06/21/17   0707   NA  140  138  138   POTASSIUM  4.3  4.1  4.3   CHLORIDE  116*  113*  110*   CO2  16*  19*  20   BUN  5*  9  8   CR  0.98  1.10*  1.05*   ANIONGAP  8  7  8   MUNDO  7.1*  6.7*  7.9*   GLC  102*  139*  126*     Most Recent 2 LFT's:  Recent Labs   Lab Test  06/23/17 0617  06/22/17   0629   AST  70*  27   ALT  75*  32   ALKPHOS  51  44   BILITOTAL  1.1  1.0     Most Recent INR's and Anticoagulation Dosing History:  Anticoagulation Dose History     Recent Dosing and Labs Latest Ref Rng & Units 2/3/2017    INR 0.86 - 1.14 0.92        Most Recent 3 Troponin's:No lab results found.  Most Recent Cholesterol Panel:No lab results found.  Most Recent 6 Bacteria Isolates From Any Culture (See EPIC Reports for Culture Details):  Recent Labs   Lab Test  06/23/17   0617  06/22/17   0629  06/21/17   0707  06/10/17   0615  06/09/17   0622  06/08/17   0549   CULT  No growth after 1 hour  No growth after 23 hours   No growth after 2 days  No growth  No growth  No growth     Most Recent TSH, T4 and A1c Labs:No lab results found.  Results for orders placed or performed during the hospital encounter of 06/20/17   XR Chest 1 View    Narrative    Exam: XR CHEST 1 VW, 6/20/2017 5:54 PM    Indication: RN placed PICC - verify tip placement    Comparison: 6/11/2017, 6/10/2017, 3/22/2017.    Findings:   A single PA view of the chest was obtained. The left arm PICC tip  projects over the low SVC. The cardiomediastinal silhouette is within  normal limits. No pneumothorax or pleural effusion. Unchanged left  apical opacities. Improved interstitial prominence.  Surgical clips  project over the upper abdomen.      Impression    Impression:   1. The left arm PICC tip projects over the low SVC.  2. Unchanged left apical opacities, biopsy-proven organizing  pneumonia.  3. Improvement in previously seen interstitial prominence.    I have personally reviewed the examination and initial interpretation  and I agree with the findings.    GAY LORA MD       ATTENDING PHYSICIAN ADDENDUM AND NOTE:  The patient s case evaluated by me separate from the inpatient midlevel provider, Romelia Vergara. The note above reflects my assessment and plan. I have personally reviewed lab results, and vital and radiology results. >50% of time was spent in assessment and coordination of care.  Mrs. Suzi Gonsales is a 51-year-old woman with metastatic clear cell renal cell carcinoma, admitted June 20 for IL-2 therapy. Yesterday and overnight, she received her fifth, sixth, and seventh doses of IL-2. She has requested to receive no further IL-2 during this admission, and questions whether she should continue IL-2 treatment at all. We will discharge to home in stable condition and in the care of her , and she will follow up with her primary oncologist to have further discussion about how to proceed with her cancer care.    Addison Arguelles MD, PhD  Assistant  Professor of Medicine   Division of Hematology, Oncology, and Transplantation

## 2017-06-23 NOTE — PLAN OF CARE
Nursing Focus: Discharge    D: Patient discharged to home at 1435. Patient stable upon discharge.  All belongings sent with patient and family.    I: Discharge prescriptions sent to discharge pharmacy to be filled. All discharge medications and instructions reviewed with patient. Patient instructed to call clinic triage nurse if she experiences a fever >100.4, uncontrolled nausea, vomiting, diarrhea, or pain; or experiences any signs or symptoms of bleeding. Other phone numbers to call with questions or concerns after discharge reviewed with patient. Follow-up outpatient appointment scheduled reviewed with patient.  PICC removed. Education completed.  Care Plan goals met and adequate for discharge.    A: Patient verbalized understanding of discharge medications and instructions. Patient will  medications at discharge pharmacy. BPs soft but stable. AOVSS. Denies pain. Nausea well controlled by scheduled zofran. Phos was 1.7, replacement given.      P: Patient to follow-up in clinic on July 26 for labs and PA visit.

## 2017-06-23 NOTE — PROGRESS NOTES
Chemotherapy  D: Blood return is brisk per DLPICC. Urine output is adequate as recorded in intake and output flowsheet, chemotherapy agents double checked by two chemotherapy certified RN's, documentation in doc flowsheet recorded .     I: Premedications given (see electronic medical administration record). Dose #6 of IL-2 started to infuse over 15minutes.     A: Tolerating well, A&O x4, pain/nausea controlled at this time, Pt ambulating to bedside commode. Having loose stools; measuring.     P: Continue to monitor urine output and symptoms of nausea. Screen for symptoms of toxicity.

## 2017-06-23 NOTE — PLAN OF CARE
Problem: Goal Outcome Summary  Goal: Goal Outcome Summary  Outcome: No Change  VSS. HR tachy and continues with Soft BP. BP 90/40 and met urine parameters for IL-2 dose #6. Administered at 1732. PRN compazine x1 and xanax administered for anxiety. No further reaction at this time. Pt having frequent small diarrhea. Lomotil administered. Commode at bedside d/t frequency of stools. Urine and stool mixed. Commode with hat in place. BA turned off d/t pt being alert and oriented. Calling appropriately. O2 at 2L applied d/t pt complaining of SOB. Much improved and currently sating well on RA. Sepsis triggered with LA of 2.2. MD notified. Pt currently resting comfortably. Was in good spirits after  and dog visited. Continue with POC.

## 2017-06-26 ENCOUNTER — APPOINTMENT (OUTPATIENT)
Dept: LAB | Facility: CLINIC | Age: 52
DRG: 838 | End: 2017-06-26
Attending: INTERNAL MEDICINE
Payer: COMMERCIAL

## 2017-06-26 ENCOUNTER — CARE COORDINATION (OUTPATIENT)
Dept: CARE COORDINATION | Facility: CLINIC | Age: 52
End: 2017-06-26

## 2017-06-26 ENCOUNTER — ONCOLOGY VISIT (OUTPATIENT)
Dept: ONCOLOGY | Facility: CLINIC | Age: 52
DRG: 838 | End: 2017-06-26
Attending: PHYSICIAN ASSISTANT
Payer: COMMERCIAL

## 2017-06-26 ENCOUNTER — TELEPHONE (OUTPATIENT)
Dept: ONCOLOGY | Facility: CLINIC | Age: 52
End: 2017-06-26

## 2017-06-26 ENCOUNTER — HOSPITAL ENCOUNTER (OUTPATIENT)
Age: 52
DRG: 838 | End: 2017-06-26
Payer: COMMERCIAL

## 2017-06-26 VITALS
HEART RATE: 64 BPM | DIASTOLIC BLOOD PRESSURE: 76 MMHG | RESPIRATION RATE: 16 BRPM | TEMPERATURE: 97.3 F | BODY MASS INDEX: 24.55 KG/M2 | SYSTOLIC BLOOD PRESSURE: 117 MMHG | HEIGHT: 61 IN | OXYGEN SATURATION: 100 % | WEIGHT: 130 LBS

## 2017-06-26 DIAGNOSIS — C64.1 MALIGNANT NEOPLASM OF RIGHT KIDNEY (H): ICD-10-CM

## 2017-06-26 LAB
ALBUMIN SERPL-MCNC: 2.7 G/DL (ref 3.4–5)
ALP SERPL-CCNC: 121 U/L (ref 40–150)
ALT SERPL W P-5'-P-CCNC: 62 U/L (ref 0–50)
ANION GAP SERPL CALCULATED.3IONS-SCNC: 6 MMOL/L (ref 3–14)
AST SERPL W P-5'-P-CCNC: 36 U/L (ref 0–45)
BASOPHILS # BLD AUTO: 0 10E9/L (ref 0–0.2)
BASOPHILS NFR BLD AUTO: 0.4 %
BILIRUB SERPL-MCNC: 0.4 MG/DL (ref 0.2–1.3)
BUN SERPL-MCNC: 6 MG/DL (ref 7–30)
CALCIUM SERPL-MCNC: 8.2 MG/DL (ref 8.5–10.1)
CHLORIDE SERPL-SCNC: 105 MMOL/L (ref 94–109)
CO2 SERPL-SCNC: 28 MMOL/L (ref 20–32)
CREAT SERPL-MCNC: 0.99 MG/DL (ref 0.52–1.04)
DIFFERENTIAL METHOD BLD: ABNORMAL
EOSINOPHIL # BLD AUTO: 2.8 10E9/L (ref 0–0.7)
EOSINOPHIL NFR BLD AUTO: 25.6 %
ERYTHROCYTE [DISTWIDTH] IN BLOOD BY AUTOMATED COUNT: 15 % (ref 10–15)
GFR SERPL CREATININE-BSD FRML MDRD: 58 ML/MIN/1.7M2
GLUCOSE SERPL-MCNC: 84 MG/DL (ref 70–99)
HCT VFR BLD AUTO: 30.6 % (ref 35–47)
HGB BLD-MCNC: 10.4 G/DL (ref 11.7–15.7)
IMM GRANULOCYTES # BLD: 0.1 10E9/L (ref 0–0.4)
IMM GRANULOCYTES NFR BLD: 0.7 %
LYMPHOCYTES # BLD AUTO: 6.5 10E9/L (ref 0.8–5.3)
LYMPHOCYTES NFR BLD AUTO: 60.1 %
MAGNESIUM SERPL-MCNC: 1.9 MG/DL (ref 1.6–2.3)
MCH RBC QN AUTO: 30.9 PG (ref 26.5–33)
MCHC RBC AUTO-ENTMCNC: 34 G/DL (ref 31.5–36.5)
MCV RBC AUTO: 91 FL (ref 78–100)
MONOCYTES # BLD AUTO: 0.5 10E9/L (ref 0–1.3)
MONOCYTES NFR BLD AUTO: 4.4 %
NEUTROPHILS # BLD AUTO: 0.9 10E9/L (ref 1.6–8.3)
NEUTROPHILS NFR BLD AUTO: 8.8 %
NRBC # BLD AUTO: 0 10*3/UL
NRBC BLD AUTO-RTO: 0 /100
PHOSPHATE SERPL-MCNC: 4.8 MG/DL (ref 2.5–4.5)
PLATELET # BLD AUTO: 327 10E9/L (ref 150–450)
POTASSIUM SERPL-SCNC: 3.7 MMOL/L (ref 3.4–5.3)
PROT SERPL-MCNC: 6 G/DL (ref 6.8–8.8)
RBC # BLD AUTO: 3.37 10E12/L (ref 3.8–5.2)
SODIUM SERPL-SCNC: 138 MMOL/L (ref 133–144)
WBC # BLD AUTO: 10.8 10E9/L (ref 4–11)

## 2017-06-26 PROCEDURE — 99212 OFFICE O/P EST SF 10 MIN: CPT | Mod: ZF

## 2017-06-26 PROCEDURE — 99214 OFFICE O/P EST MOD 30 MIN: CPT | Mod: ZP | Performed by: PHYSICIAN ASSISTANT

## 2017-06-26 PROCEDURE — 36415 COLL VENOUS BLD VENIPUNCTURE: CPT | Performed by: PHYSICIAN ASSISTANT

## 2017-06-26 PROCEDURE — 84100 ASSAY OF PHOSPHORUS: CPT | Performed by: PHYSICIAN ASSISTANT

## 2017-06-26 PROCEDURE — 83735 ASSAY OF MAGNESIUM: CPT | Performed by: PHYSICIAN ASSISTANT

## 2017-06-26 PROCEDURE — 80053 COMPREHEN METABOLIC PANEL: CPT | Performed by: PHYSICIAN ASSISTANT

## 2017-06-26 PROCEDURE — 85025 COMPLETE CBC W/AUTO DIFF WBC: CPT | Performed by: PHYSICIAN ASSISTANT

## 2017-06-26 ASSESSMENT — ENCOUNTER SYMPTOMS
FATIGUE: 1
FLANK PAIN: 0
NIGHT SWEATS: 0
INCREASED ENERGY: 1
PALPITATIONS: 0
BLOATING: 0
HYPOTENSION: 1
NUMBNESS: 0
HALLUCINATIONS: 0
HOARSE VOICE: 1
COUGH DISTURBING SLEEP: 1
TASTE DISTURBANCE: 0
DIZZINESS: 1
WEIGHT LOSS: 1
MEMORY LOSS: 0
CLAUDICATION: 0
RECTAL PAIN: 0
DYSPNEA ON EXERTION: 1
NAIL CHANGES: 0
PARALYSIS: 0
SPUTUM PRODUCTION: 1
LOSS OF CONSCIOUSNESS: 0
SHORTNESS OF BREATH: 1
POLYPHAGIA: 0
LEG SWELLING: 1
SINUS PAIN: 0
ALTERED TEMPERATURE REGULATION: 0
TROUBLE SWALLOWING: 0
HYPERTENSION: 0
NAUSEA: 1
DECREASED APPETITE: 1
TINGLING: 0
JAUNDICE: 0
SKIN CHANGES: 0
BLOOD IN STOOL: 0
SNORES LOUDLY: 0
DIFFICULTY URINATING: 0
LEG PAIN: 0
SMELL DISTURBANCE: 0
BOWEL INCONTINENCE: 1
TREMORS: 0
DIARRHEA: 1
HEARTBURN: 0
TACHYCARDIA: 0
LIGHT-HEADEDNESS: 0
WEAKNESS: 1
SINUS CONGESTION: 0
CONSTIPATION: 0
WHEEZING: 0
SLEEP DISTURBANCES DUE TO BREATHING: 0
CHILLS: 1
HEMATURIA: 0
NECK MASS: 0
SYNCOPE: 0
POLYDIPSIA: 0
SPEECH CHANGE: 0
HEADACHES: 0
RECTAL BLEEDING: 0
FEVER: 1
ORTHOPNEA: 0
WEIGHT GAIN: 1
COUGH: 1
HEMOPTYSIS: 0
SEIZURES: 0
EXERCISE INTOLERANCE: 0
ABDOMINAL PAIN: 0
SORE THROAT: 0
POSTURAL DYSPNEA: 0
POOR WOUND HEALING: 0
DYSURIA: 0
DISTURBANCES IN COORDINATION: 1
RESPIRATORY PAIN: 0

## 2017-06-26 ASSESSMENT — PAIN SCALES - GENERAL: PAINLEVEL: NO PAIN (0)

## 2017-06-26 NOTE — TELEPHONE ENCOUNTER
Called patient and left message inquiring how she is doing following #2 IL-2.  Also, made appt for admission for 7/31/17 per Kia Ren note from clinic visit today(updated admission request which orig requested admission on 7/5/17 & 7/18/17). Left message for patient to call writer with any questions, concerns, & update.  Shravan Wiseman, RN, BSN, OCN  [facility]  Patient Care Coordinator

## 2017-06-26 NOTE — MR AVS SNAPSHOT
After Visit Summary   6/26/2017    Suzi Gonsales    MRN: [id number]           Patient Information     Date Of Birth          1965        Visit Information        Provider Department      6/26/2017 9:20 AM Day Ren PA-C [facility]        Today's Diagnoses     Malignant neoplasm of right kidney (H)           Follow-ups after your visit        Your next 10 appointments already scheduled     Jul 24, 2017 12:40 PM CDT   (Arrive by 12:25 PM)   CT CHEST/ABDOMEN/PELVIS W CONTRAST with UCCT1   [facility] [place] ([facility])    [address] [address] [phone]           Please bring any scans or X-rays taken at other hospitals, if similar tests were done. Also bring a list of your medicines, including vitamins, minerals and over-the-counter drugs. It is safest to leave personal items at home.  Be sure to tell your doctor:   If you have any allergies.   If there s any chance you are pregnant.   If you are breastfeeding.   If you have any special needs.  You may have contrast for this exam. To prepare:   Do not eat or drink for 2 hours before your exam. If you need to take medicine, you may take it with small sips of water. (We may ask you to take liquid medicine as well.)   The day before your exam, drink extra fluids at least six 8-ounce glasses (unless your doctor tells you to restrict your fluids).  Patients over 70 or patients with diabetes or kidney problems:   If you haven t had a blood test (creatinine test) within the last 30 days, go to your clinic or Diagnostic Imaging Department for this test.  If you have diabetes:   If your kidney function is normal, continue taking your metformin (Avandamet, Glucophage, Glucovance, Metaglip) on the day of your exam.   If your kidney function is abnormal, wait 48 hours before restarting this medicine.  You will have oral contrast for this exam:   You will  drink the contrast at home. Get this from your clinic or Diagnostic Imaging Department. Please follow the directions given.  Please wear loose clothing, such as a sweat suit or jogging clothes. Avoid snaps, zippers and other metal. We may ask you to undress and put on a hospital gown.  If you have any questions, please call the Imaging Department where you will have your exam.            Jul 25, 2017 12:15 PM CDT   Return Visit with Henri Green MD   [facility] ([facility])    [facility]  [address]  [facility] [address]   [phone]            Jul 31, 2017 11:15 AM CDT   [facility] Lab Draw with  [facility] LAB DRAW   [facility] Draw ([facility])    [address] [place] [id number] [phone]            Jul 31, 2017 11:50 AM CDT   (Arrive by 11:35 AM)   Return Visit with Day Ren PA-C   [facility] ([facility])    [address] [place] [id number] [phone]              Future tests that were ordered for you today     Open Future Orders        Priority Expected Expires Ordered    IR PICC Vascular Routine 7/31/2017 6/26/2018 6/26/2017            Who to contact     If you have questions or need follow up information about today's clinic visit or your schedule please contact [facility] directly at [phone].  Normal or non-critical lab and imaging results will be communicated to you by MyChart, letter or phone within 4 business days after the clinic has received the results. If you do not hear from us within 7 days, please contact the clinic through MyChart or phone. If you have a critical or abnormal lab result, we will notify you by phone as soon as possible.  Submit refill requests through [employer] or call your pharmacy and they will forward the refill request to  "us. Please allow 3 business days for your refill to be completed.          Additional Information About Your Visit        MyChart Information     [employer] gives you secure access to your electronic health record. If you see a primary care provider, you can also send messages to your care team and make appointments. If you have questions, please call your primary care clinic.  If you do not have a primary care provider, please call [phone] and they will assist you.        Care EveryWhere ID     This is your Care EveryWhere ID. This could be used by other organizations to access your [facility] medical records  [id number]        Your Vitals Were     Pulse Temperature Respirations Height Pulse Oximetry BMI (Body Mass Index)    64 97.3  F (36.3  C) 16 1.549 m (5' 0.98\") 100% 24.58 kg/m2       Blood Pressure from Last 3 Encounters:   06/26/17 117/76   06/23/17 100/46   06/20/17 99/64    Weight from Last 3 Encounters:   06/26/17 59 kg (130 lb)   06/23/17 63.8 kg (140 lb 9.6 oz)   06/20/17 55.2 kg (121 lb 11.1 oz)              We Performed the Following     CBC with platelets differential     Comprehensive metabolic panel     Magnesium     Phosphorus        Primary Care Provider Office Phone # Fax #    Idalia Saini [phone]       [facility] [address] [facility] [address]        Equal Access to Services     [facility] HospitalNAYELY : Hadii aad ku hadasho Sovanessa, waaxda luqadaha, qaybta kaalmada danutayada, josemanuel oswald . So [facility] [phone].    ATENCIÓN: Si habla español, tiene a chacon disposición servicios gratuitos de asistencia lingüística. Israame al [phone].    We comply with applicable federal civil rights laws and [place] laws. We do not discriminate on the basis of race, color, national origin, age, disability sex, sexual orientation or gender identity.            Thank you!     Thank you for choosing [facility]  for your care. Our goal is " always to provide you with excellent care. Hearing back from our patients is one way we can continue to improve our services. Please take a few minutes to complete the written survey that you may receive in the mail after your visit with us. Thank you!             Your Updated Medication List - Protect others around you: Learn how to safely use, store and throw away your medicines at www.disposemymeds.org.          This list is accurate as of: 6/26/17 11:59 PM.  Always use your most recent med list.                   Brand Name Dispense Instructions for use Diagnosis    acetaminophen 325 MG tablet    TYLENOL    100 tablet    Take 2 tablets (650 mg) by mouth every 4 hours as needed for mild pain (mild pain)    Malignant neoplasm of right kidney (H)       acetaminophen-codeine 300-30 MG per tablet    TYLENOL/codeine #3    20 tablet    Take 1 tablet by mouth every 6 hours as needed for mild pain or pain (PRN pain or cough)        ALPRAZolam 0.5 MG tablet    XANAX    60 tablet    Take 1 tablet (0.5 mg) by mouth 3 times daily as needed for anxiety    Renal cell carcinoma, unspecified laterality (H), Mass of upper lobe of left lung       AMBIEN PO      Take 5 mg by mouth nightly as needed for sleep    Malignant neoplasm of right kidney (H)       benzonatate 100 MG capsule    TESSALON    42 capsule    Take 1 capsule (100 mg) by mouth 3 times daily as needed for cough        buPROPion 300 MG 24 hr tablet    WELLBUTRIN XL     Take 300 mg by mouth every morning        citalopram 20 MG tablet    celeXA     Take 20 mg by mouth every evening        IBUPROFEN      Take 400 mg by mouth every 6 hours as needed        prochlorperazine 10 MG tablet    COMPAZINE    30 tablet    Take 1 tablet (10 mg) by mouth every 6 hours as needed for nausea or vomiting (Breakthrough Nausea/Vomiting)    Malignant neoplasm of right kidney (H)

## 2017-06-26 NOTE — NURSING NOTE
"Oncology Rooming Note    June 26, 2017 9:35 AM   Suzi Gonsales is a 52 year old female who presents for:    Chief Complaint   Patient presents with     Oncology Clinic Visit     return patient visit for labs/follow up related to Kidney CA     Labs Only     venipuncture, vitals checked     Initial Vitals: /76  Pulse 64  Temp 97.3  F (36.3  C)  Resp 16  Ht 1.549 m (5' 0.98\")  Wt 59 kg (130 lb)  SpO2 100%  BMI 24.58 kg/m2 Estimated body mass index is 24.58 kg/(m^2) as calculated from the following:    Height as of this encounter: 1.549 m (5' 0.98\").    Weight as of this encounter: 59 kg (130 lb). Body surface area is 1.59 meters squared.  No Pain (0) Comment: Data Unavailable   No LMP recorded. Patient is not currently having periods (Reason: Premenopausal).  Allergies reviewed: Yes  Medications reviewed: Yes    Medications: Medication refills not needed today.  Pharmacy name entered into at[employer]: [id number] - [place], [place] - [address] AT [facility] [address]    Clinical concerns: none anastasia potts was notified.    5 minutes for nursing intake (face to face time)     Marisol Child CMA              "

## 2017-06-26 NOTE — PROGRESS NOTES
[facility]  FOLLOW-UP VISIT NOTE  Date of visit: 6-26-17          REASON FOR VISIT: mRCC assessment post C2 of IL-2    HPI: Annika presented to ED with hematuria and right flank pain thinking she had a renal stone in December 2014. She was worked up with a CT abd/pelvis which suggested a renal mass. She was referred to Dr. Max Zelaya in [facility]. 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 Per charts tumor resection had negative margins and it was staged at pT2bN0.    Her staging work up was negative except for pulmonary nodules. She has been followed every 6 months with scans. But her most recent scan suggested increase in size of couple of nodules prompting to the current referral to Dr Green.  CT CAP on 5/11/17 showed enlarging hilar LAD, enlarging pulmonary nodules, adrenal nodules and a pancreatic body mass. Biopsies of hilar LN, adrenal nodule and pancreatic mass were + for RCC. She met with DR Green and decided to pursue IL-2.     C1: 6/6/17- 9 doses  C2: 6/20/17- 7 doses    INTERVAL HISTORY: Annika is doing well today. She was admitted on 6/20/17 for IL-2 and tolerated 7 doses.  Overall she felt it went as planned with nausea/vomiting, rigors, fatigue, anorexia and rigors.  Cycle 2 was harder for annika- had a shared room, barely slept and had more vomiting that prior cycle. She is feeling better today, ate well yesterday.  Did have diarrhea and took imodium on Fri-Sat.  None yesterday or today.     No recent f/s/c. No chest pain/cough/dyspnea. No black/bloody stools. No  issues. No daily pain.     EXAM:  /76  Pulse 64  Temp 97.3  F (36.3  C)  Resp 16  Wt 59 kg (130 lb)  SpO2 100%  BMI 24.58 kg/m2  Wt Readings from Last 4 Encounters:   06/26/17 59 kg (130 lb)   06/23/17 63.8 kg (140 lb 9.6 oz)   06/20/17 55.2 kg (121 lb 11.1 oz)   06/16/17 55.6 kg (122 lb 8 oz)     Vital signs were  reviewed.   Patient alert and oriented x3.   PERRLA. EOMI. No scleral icterus noted. OP without thrush/sores.  Neck exam: No palpable cervical, supraclavicular or axillary nodes bilaterally.   Heart: RRR no murmurs noted.   Lungs: clear to auscultation bilaterally.  No crackles or wheezing.   Abd: positive bowel sounds in all four quadrants.  No tenderness to palpation.  No hepatomegaly.   Extremities: No lower extremity edema.   Neuro: grossly intact.   Mood and affect is stable.   Skin is dry + flaking    LABS:      6/20/2017 10:30 6/21/2017 07:07 6/22/2017 06:29 6/23/2017 06:17 6/26/2017 09:16   WBC 4.9 6.5 10.7 4.6 10.8    Hemoglobin 11.8 10.9 (L) 9.1 (L) 9.7 (L) 10.4 (L)   Hematocrit 36.3 33.2 (L) 28.0 (L) 29.8 (L) 30.6 (L)   Platelet Count 473 (H) 362 322 265 327   RBC Count 3.86 3.62 (L) 3.08 (L) 3.30 (L) 3.37 (L)   MCV 94 92 91 90 91      6/22/2017 06:29 6/23/2017 06:17 6/26/2017 09:16   Sodium 138 140 138   Potassium 4.1 4.3 3.7   Chloride 113 (H) 116 (H) 105   Carbon Dioxide 19 (L) 16 (L) 28   Urea Nitrogen 9 5 (L) 6 (L)   Creatinine 1.10 (H) 0.98 0.99   GFR Estimate 52 (L) 59 (L) 58 (L)   GFR Estimate If Black 63 72 71   Calcium 6.7 (L) 7.1 (L) 8.2 (L)   Anion Gap 7 8 6   Magnesium 1.2 (L) 2.0 1.9   Phosphorus 2.3 (L) 1.7 (L) 4.8 (H)   Albumin 2.1 (L) 2.0 (L) 2.7 (L)   Protein Total 4.6 (L) 4.4 (L) 6.0 (L)   Bilirubin Total 1.0 1.1 0.4   Alkaline Phosphatase 44 51 121   ALT 32 75 (H) 62 (H)   AST 27 70 (H) 36     ASSESSMENT/PLAN: 50 year old with mRCC s/p first cycle of IL-2 receiving 9 doses and C2 receiving 7.     Suzi is doing well today.  She is still fatigued and weak, but no more hypotension or emesis.  She has started to eat again and sleeping well.  She has some low neutrophills today, but normal elevation of lymphocytes and eosinophils which will likely recover in the next week.  She has no s/s of infection on exam today. Usually IL-2 diarrhea goes away the day the IL-2 ends, so I am a  little worried she had diarrhea Saturday needing Imodium.  If this comes back tomorrow- I stressed she called as C Diff has been at high levels in the hospital.    She asks some questions today about prognosis, future treatment options, but remains optimistic about giving IL-2 her best shot.  We will get CT CAP in a 4 weeks and have her see Dr Green.  She would like ~5 weeks between cycle 2 and 3 which is fine.  I will her set up for C3 on 7/31 pending results of the CT the prior week.     Kia Ren PA-C

## 2017-06-26 NOTE — LETTER
6/26/2017      RE: Annika Gonsales  [address] [place] [address]       [facility]  FOLLOW-UP VISIT NOTE  Date of visit: 6-26-17          REASON FOR VISIT: mRCC assessment post C2 of IL-2    HPI: Annika presented to ED with hematuria and right flank pain thinking she had a renal stone in December 2014. She was worked up with a CT abd/pelvis which suggested a renal mass. She was referred to Dr. Max Zelaya in [facility]. 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 Per charts tumor resection had negative margins and it was staged at pT2bN0.    Her staging work up was negative except for pulmonary nodules. She has been followed every 6 months with scans. But her most recent scan suggested increase in size of couple of nodules prompting to the current referral to Dr Green.  CT CAP on 5/11/17 showed enlarging hilar LAD, enlarging pulmonary nodules, adrenal nodules and a pancreatic body mass. Biopsies of hilar LN, adrenal nodule and pancreatic mass were + for RCC. She met with DR Green and decided to pursue IL-2.     C1: 6/6/17- 9 doses  C2: 6/20/17- 7 doses    INTERVAL HISTORY: Annika is doing well today. She was admitted on 6/20/17 for IL-2 and tolerated 7 doses.  Overall she felt it went as planned with nausea/vomiting, rigors, fatigue, anorexia and rigors.  Cycle 2 was harder for annika- had a shared room, barely slept and had more vomiting that prior cycle. She is feeling better today, ate well yesterday.  Did have diarrhea and took imodium on Fri-Sat.  None yesterday or today.     No recent f/s/c. No chest pain/cough/dyspnea. No black/bloody stools. No  issues. No daily pain.     EXAM:  /76  Pulse 64  Temp 97.3  F (36.3  C)  Resp 16  Wt 59 kg (130 lb)  SpO2 100%  BMI 24.58 kg/m2  Wt Readings from Last 4 Encounters:   06/26/17 59 kg (130 lb)   06/23/17 63.8 kg (140 lb 9.6 oz)   06/20/17  55.2 kg (121 lb 11.1 oz)   06/16/17 55.6 kg (122 lb 8 oz)     Vital signs were reviewed.   Patient alert and oriented x3.   PERRLA. EOMI. No scleral icterus noted. OP without thrush/sores.  Neck exam: No palpable cervical, supraclavicular or axillary nodes bilaterally.   Heart: RRR no murmurs noted.   Lungs: clear to auscultation bilaterally.  No crackles or wheezing.   Abd: positive bowel sounds in all four quadrants.  No tenderness to palpation.  No hepatomegaly.   Extremities: No lower extremity edema.   Neuro: grossly intact.   Mood and affect is stable.   Skin is dry + flaking    LABS:      6/20/2017 10:30 6/21/2017 07:07 6/22/2017 06:29 6/23/2017 06:17 6/26/2017 09:16   WBC 4.9 6.5 10.7 4.6 10.8    Hemoglobin 11.8 10.9 (L) 9.1 (L) 9.7 (L) 10.4 (L)   Hematocrit 36.3 33.2 (L) 28.0 (L) 29.8 (L) 30.6 (L)   Platelet Count 473 (H) 362 322 265 327   RBC Count 3.86 3.62 (L) 3.08 (L) 3.30 (L) 3.37 (L)   MCV 94 92 91 90 91      6/22/2017 06:29 6/23/2017 06:17 6/26/2017 09:16   Sodium 138 140 138   Potassium 4.1 4.3 3.7   Chloride 113 (H) 116 (H) 105   Carbon Dioxide 19 (L) 16 (L) 28   Urea Nitrogen 9 5 (L) 6 (L)   Creatinine 1.10 (H) 0.98 0.99   GFR Estimate 52 (L) 59 (L) 58 (L)   GFR Estimate If Black 63 72 71   Calcium 6.7 (L) 7.1 (L) 8.2 (L)   Anion Gap 7 8 6   Magnesium 1.2 (L) 2.0 1.9   Phosphorus 2.3 (L) 1.7 (L) 4.8 (H)   Albumin 2.1 (L) 2.0 (L) 2.7 (L)   Protein Total 4.6 (L) 4.4 (L) 6.0 (L)   Bilirubin Total 1.0 1.1 0.4   Alkaline Phosphatase 44 51 121   ALT 32 75 (H) 62 (H)   AST 27 70 (H) 36     ASSESSMENT/PLAN: 50 year old with mRCC s/p first cycle of IL-2 receiving 9 doses and C2 receiving 7.     Suzi is doing well today.  She is still fatigued and weak, but no more hypotension or emesis.  She has started to eat again and sleeping well.  She has some low neutrophills today, but normal elevation of lymphocytes and eosinophils which will likely recover in the next week.  She has no s/s of infection on  exam today. Usually IL-2 diarrhea goes away the day the IL-2 ends, so I am a little worried she had diarrhea Saturday needing Imodium.  If this comes back tomorrow- I stressed she called as C Diff has been at high levels in the hospital.    She asks some questions today about prognosis, future treatment options, but remains optimistic about giving IL-2 her best shot.  We will get CT CAP in a 4 weeks and have her see Dr Green.  She would like ~5 weeks between cycle 2 and 3 which is fine.  I will her set up for C3 on 7/31 pending results of the CT the prior week.     Kia Ren PA-C

## 2017-06-26 NOTE — NURSING NOTE
Chief Complaint   Patient presents with     Oncology Clinic Visit     Kidney CA     Labs Only     venipuncture, vitals checked

## 2017-06-26 NOTE — PROGRESS NOTES
Patient has clinic visit today 6/26 so no post DC follow up call is needed        Jun 26, 2017  8:45 AM CDT   [facility] Lab Draw with  [facility] LAB DRAW   [facility] Draw ([facility])     [address] [place] [id number] [phone]                 Jun 26, 2017  9:20 AM CDT   (Arrive by 9:05 AM)   Return Visit with Day Ren PA-C   [facility] ([facility])     [address] [place] [id number] [phone]

## 2017-06-27 LAB
BACTERIA SPEC CULT: NO GROWTH
MICRO REPORT STATUS: NORMAL
SPECIMEN SOURCE: NORMAL

## 2017-06-27 NOTE — PATIENT INSTRUCTIONS
June 2017 Sunday Monday Tuesday Wednesday Thursday Friday Saturday                       1     NM INJ    8:15 AM   (15 min.)   [id number]   [facility], Nuclear Medicine     ECH LIMITED    8:30 AM   (60 min.)   UUECHR2   [facility],  Echocardiography     MR BRAIN W    9:40 AM   (45 min.)   UUMR3   [facility], MRI     NM BONE SCAN WHOLE BODY   11:15 AM   (60 min.)   UUNM3   [facility], Nuclear Medicine 2     3       4     5     6     [facility] RETURN    9:15 AM   (50 min.)   Day Ren PA-C [facility]     Admission   12:28 PM   Gerda Lawson MD   [address] [facility]   (Discharge: 6/10/2017)     IR PICC VASCULAR    2:00 PM   (60 min.)   [id number]   [facility], Vascular Access 7     8     9     10     XR CHEST PORT 1 VIEW    9:50 AM   (20 min.)   [id number]   [facility],  Radiology   11     Admission    4:27 PM   Riccardo Parham MD   [facility], Emergency Department   (Discharge: 6/11/2017)     XR CHEST 2 VIEWS    6:00 PM   (15 min.)   UUXR3   [facility],  Radiology 12     13     14     15     16     [facility] LAB DRAW    1:45 PM   (15 min.)   [facility] DRAW   [facility] Lab Draw     [facility] RETURN    1:55 PM   (50 min.)   Day Ren PA-C [facility] 17       18     19     20     [facility] LAB DRAW    9:45 AM   (15 min.)   [facility] D[facility]    [facility] Draw     [facility] RETURN   10:05 AM   (50 min.)   Day Ren PA-C [facility]     Admission   11:31 AM   Gerda Lawson MD   [address] [facility]   (Discharge: 6/23/2017)     IR PICC VASCULAR    4:00 PM   (60 min.)   [id number]   [facility], Vascular Access     XR CHEST 1 VIEW    5:40 PM   (15 min.)   UUXR1   [facility],  Radiology 21     22     23     24       25  Happy Birthday!     26     P [facility]IC LAB DRAW    8:45 AM   (15 min.)   [facility] DRAW   [facility] Lab Draw     P RETURN     9:05 AM   (50 min.)   Day Ren PA-C   [facility] 27 28 29 30 July 2017 Sunday Monday Tuesday Wednesday Thursday Friday Saturday                                 1       2     3     4     5     6     7     8       9     10     11     12     13     14     15       16     17     18     19     20     21     22       23     24     CT CHEST/ABDOMEN/PELVIS W   12:25 PM   (20 min.)   UCCT1   [facility] CT 25     RETURN   12:15 PM   (30 min.)   Henri Green MD   [facility] 26     27     28     29       30     31     [facility] LAB DRAW   11:15 AM   (15 min.)    M[facility] LAB DRAW   [facility] Masonic Lab Draw     [facility] RETURN   11:35 AM   (50 min.)   Day Ren PA-C   [facility]                                     August 2017 Sunday Monday Tuesday Wednesday Thursday Friday Saturday             1     2     3     4     5       6     7     8     9     10     11     12       13     14     15     16     17     18     19       20     21     22     23     24     25     26       27     28     29     30     31

## 2017-06-28 LAB
BACTERIA SPEC CULT: NO GROWTH
MICRO REPORT STATUS: NORMAL
SPECIMEN SOURCE: NORMAL

## 2017-06-29 LAB
BACTERIA SPEC CULT: NO GROWTH
MICRO REPORT STATUS: NORMAL
SPECIMEN SOURCE: NORMAL

## 2017-07-06 ENCOUNTER — CARE COORDINATION (OUTPATIENT)
Dept: ONCOLOGY | Facility: CLINIC | Age: 52
End: 2017-07-06

## 2017-07-06 NOTE — PROGRESS NOTES
Writer heard from TABITHA Ren ~ patient c/o itching and treatment. PA responded to patient via MyChart with recommendations: Benadryl, Aveeno oatmeal or poss Rx for Gabapentin, as well as giving symptoms time to dissipate.  Have not heard further from patient, but will follow input from PA if patient should call for further assistance.  Shravan Wiseman, RN, BSN, OCN  [facility] [place]  Patient Care Coordinator

## 2017-07-18 ENCOUNTER — TELEPHONE (OUTPATIENT)
Dept: ONCOLOGY | Facility: CLINIC | Age: 52
End: 2017-07-18

## 2017-07-18 NOTE — TELEPHONE ENCOUNTER
Called patient and left message that [facility] paperwork had been faxed to [phone].  Copy made and orig sent for scanning.  Also, left message that she had been given an $800 katie from the [employer].  Patient to call with any questions or concerns.  Shravan Wiseman RN, BSN, OCN  [facility] [place]  Patient Care Coordinator

## 2017-07-25 ENCOUNTER — ONCOLOGY VISIT (OUTPATIENT)
Dept: ONCOLOGY | Facility: CLINIC | Age: 52
End: 2017-07-25
Attending: INTERNAL MEDICINE
Payer: COMMERCIAL

## 2017-07-25 ENCOUNTER — HOSPITAL ENCOUNTER (OUTPATIENT)
Facility: CLINIC | Age: 52
Setting detail: SPECIMEN
Discharge: HOME | End: 2017-07-25
Attending: INTERNAL MEDICINE | Admitting: PHYSICIAN ASSISTANT
Payer: COMMERCIAL

## 2017-07-25 VITALS
DIASTOLIC BLOOD PRESSURE: 65 MMHG | OXYGEN SATURATION: 100 % | WEIGHT: 122 LBS | SYSTOLIC BLOOD PRESSURE: 102 MMHG | HEIGHT: 61 IN | RESPIRATION RATE: 16 BRPM | TEMPERATURE: 98.2 F | HEART RATE: 68 BPM | BODY MASS INDEX: 23.03 KG/M2

## 2017-07-25 DIAGNOSIS — C64.1 MALIGNANT NEOPLASM OF RIGHT KIDNEY (H): ICD-10-CM

## 2017-07-25 PROBLEM — C64.9 METASTATIC RENAL CELL CARCINOMA (H): Status: RESOLVED | Noted: 2017-06-06 | Resolved: 2017-07-25

## 2017-07-25 PROBLEM — C64.9 CLEAR CELL CARCINOMA OF KIDNEY (H): Status: RESOLVED | Noted: 2017-06-20 | Resolved: 2017-07-25

## 2017-07-25 LAB
ALBUMIN SERPL-MCNC: 3.9 G/DL (ref 3.4–5)
ALP SERPL-CCNC: 91 U/L (ref 40–150)
ALT SERPL W P-5'-P-CCNC: 26 U/L (ref 0–50)
ANION GAP SERPL CALCULATED.3IONS-SCNC: 4 MMOL/L (ref 3–14)
AST SERPL W P-5'-P-CCNC: 24 U/L (ref 0–45)
BASOPHILS # BLD AUTO: 0.1 10E9/L (ref 0–0.2)
BASOPHILS NFR BLD AUTO: 1.1 %
BILIRUB SERPL-MCNC: 0.4 MG/DL (ref 0.2–1.3)
BUN SERPL-MCNC: 16 MG/DL (ref 7–30)
CALCIUM SERPL-MCNC: 9.3 MG/DL (ref 8.5–10.1)
CHLORIDE SERPL-SCNC: 105 MMOL/L (ref 94–109)
CO2 SERPL-SCNC: 27 MMOL/L (ref 20–32)
CREAT SERPL-MCNC: 0.99 MG/DL (ref 0.52–1.04)
DIFFERENTIAL METHOD BLD: ABNORMAL
EOSINOPHIL # BLD AUTO: 1.1 10E9/L (ref 0–0.7)
EOSINOPHIL NFR BLD AUTO: 16.8 %
ERYTHROCYTE [DISTWIDTH] IN BLOOD BY AUTOMATED COUNT: 15.4 % (ref 10–15)
GFR SERPL CREATININE-BSD FRML MDRD: 59 ML/MIN/1.7M2
GLUCOSE SERPL-MCNC: 90 MG/DL (ref 70–99)
HCT VFR BLD AUTO: 39.8 % (ref 35–47)
HGB BLD-MCNC: 12.8 G/DL (ref 11.7–15.7)
IMM GRANULOCYTES # BLD: 0 10E9/L (ref 0–0.4)
IMM GRANULOCYTES NFR BLD: 0.5 %
LYMPHOCYTES # BLD AUTO: 2.6 10E9/L (ref 0.8–5.3)
LYMPHOCYTES NFR BLD AUTO: 39.7 %
MCH RBC QN AUTO: 30.3 PG (ref 26.5–33)
MCHC RBC AUTO-ENTMCNC: 32.2 G/DL (ref 31.5–36.5)
MCV RBC AUTO: 94 FL (ref 78–100)
MONOCYTES # BLD AUTO: 0.4 10E9/L (ref 0–1.3)
MONOCYTES NFR BLD AUTO: 5.6 %
NEUTROPHILS # BLD AUTO: 2.4 10E9/L (ref 1.6–8.3)
NEUTROPHILS NFR BLD AUTO: 36.3 %
NRBC # BLD AUTO: 0 10*3/UL
NRBC BLD AUTO-RTO: 0 /100
PLATELET # BLD AUTO: 386 10E9/L (ref 150–450)
POTASSIUM SERPL-SCNC: 4.4 MMOL/L (ref 3.4–5.3)
PROT SERPL-MCNC: 8 G/DL (ref 6.8–8.8)
RBC # BLD AUTO: 4.22 10E12/L (ref 3.8–5.2)
SODIUM SERPL-SCNC: 136 MMOL/L (ref 133–144)
WBC # BLD AUTO: 6.7 10E9/L (ref 4–11)

## 2017-07-25 PROCEDURE — 85025 COMPLETE CBC W/AUTO DIFF WBC: CPT | Performed by: PHYSICIAN ASSISTANT

## 2017-07-25 PROCEDURE — 99211 OFF/OP EST MAY X REQ PHY/QHP: CPT

## 2017-07-25 PROCEDURE — 80053 COMPREHEN METABOLIC PANEL: CPT | Performed by: PHYSICIAN ASSISTANT

## 2017-07-25 PROCEDURE — 99215 OFFICE O/P EST HI 40 MIN: CPT | Performed by: INTERNAL MEDICINE

## 2017-07-25 PROCEDURE — 36415 COLL VENOUS BLD VENIPUNCTURE: CPT

## 2017-07-25 ASSESSMENT — PAIN SCALES - GENERAL: PAINLEVEL: NO PAIN (0)

## 2017-07-25 NOTE — NURSING NOTE
"Oncology Rooming Note    July 25, 2017 12:33 PM   Suzi Gonsales is a 52 year old female who presents for:    Chief Complaint   Patient presents with     Oncology Clinic Visit     Follow up      Initial Vitals: /65 (BP Location: Right arm, Patient Position: Chair, Cuff Size: Adult Regular)  Pulse 68  Temp 98.2  F (36.8  C) (Tympanic)  Resp 16  Ht 1.549 m (5' 0.98\")  Wt 55.3 kg (122 lb)  SpO2 100%  BMI 23.07 kg/m2 Estimated body mass index is 23.07 kg/(m^2) as calculated from the following:    Height as of this encounter: 1.549 m (5' 0.98\").    Weight as of this encounter: 55.3 kg (122 lb). Body surface area is 1.54 meters squared.  No Pain (0) Comment: Data Unavailable   No LMP recorded. Patient is not currently having periods (Reason: Premenopausal).  Allergies reviewed: Yes  Medications reviewed: Yes    Medications: Medication refills not needed today.  Pharmacy name entered into [employer]: [id number] - [place], [place] - [address] AT [facility] [address]    Clinical concerns: Follow up     8 minutes for nursing intake (face to face time)     Roxann Mccauley CMA     DISCHARGE PLAN:  Next appointments: See patient instruction section  Departure Mode: Ambulatory  Accompanied by:   0 minutes for nursing discharge (face to face time)   Roxann Mccauley CMA                  "

## 2017-07-25 NOTE — PROGRESS NOTES
[facility] CANCER CLINIC  FOLLOW-UP VISIT NOTE  Date of visit: Jul 25, 2017           REASON FOR VISIT: mRCC assessment post C2 of IL-2    HPI: Annika presented to ED with hematuria and right flank pain thinking she had a renal stone in December 2014. She was worked up with a CT abd/pelvis which suggested a renal mass. She was referred to Dr. Max Zelaya in [facility]. 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 Per charts tumor resection had negative margins and it was staged at pT2bN0.    Her staging work up was negative except for pulmonary nodules. She has been followed every 6 months with scans. But her most recent scan suggested increase in size of couple of nodules prompting to the current referral to Dr Green.  CT CAP on 5/11/17 showed enlarging hilar LAD, enlarging pulmonary nodules, adrenal nodules and a pancreatic body mass. Biopsies of hilar LN, adrenal nodule and pancreatic mass were + for RCC. She met with DR Green and decided to pursue IL-2.     C1: 6/6/17- 9 doses  C2: 6/20/17- 7 doses    INTERVAL HISTORY: Annika is doing well today. She was admitted on 6/20/17 for IL-2 and tolerated 7 doses.  Overall she felt it went as planned with nausea/vomiting, rigors, fatigue, anorexia and rigors.  Cycle 2 was harder for annika- had a shared room, barely slept and had more vomiting that prior cycle. She is feeling better today, ate well yesterday.  Did have diarrhea and took imodium on Fri-Sat.  None yesterday or today.     No recent f/s/c. No chest pain/cough/dyspnea. No black/bloody stools. No  issues. No daily pain.     EXAM:  There were no vitals taken for this visit.  Wt Readings from Last 4 Encounters:   06/26/17 59 kg (130 lb)   06/23/17 63.8 kg (140 lb 9.6 oz)   06/20/17 55.2 kg (121 lb 11.1 oz)   06/16/17 55.6 kg (122 lb 8 oz)     Vital signs were reviewed.   Patient alert and oriented x3.   PERRLA. EOMI.  No scleral icterus noted. OP without thrush/sores.  Neck exam: No palpable cervical, supraclavicular or axillary nodes bilaterally.   Heart: RRR no murmurs noted.   Lungs: clear to auscultation bilaterally.  No crackles or wheezing.   Abd: positive bowel sounds in all four quadrants.  No tenderness to palpation.  No hepatomegaly.   Extremities: No lower extremity edema.   Neuro: grossly intact.   Mood and affect is stable.   Skin is dry + flaking    LABS:     Recent Labs   Lab Test  06/26/17   0916  06/23/17   0617  06/22/17   0629  06/21/17   0707  06/20/17   1030   NA  138  140  138  138  137   POTASSIUM  3.7  4.3  4.1  4.3  3.9   CHLORIDE  105  116*  113*  110*  105   CO2  28  16*  19*  20  24   ANIONGAP  6  8  7  8  8   BUN  6*  5*  9  8  12   CR  0.99  0.98  1.10*  1.05*  1.05*   GLC  84  102*  139*  126*  92   MUNDO  8.2*  7.1*  6.7*  7.9*  8.5     Recent Labs   Lab Test  06/26/17   0916  06/23/17   0617  06/22/17   1248  06/22/17   0629  06/21/17   0707  06/20/17   1030   MAG  1.9  2.0  2.8*  1.2*  1.7  2.3   PHOS  4.8*  1.7*   --   2.3*  2.4*  3.4     Recent Labs   Lab Test  06/26/17   0916  06/23/17   0617  06/22/17   0629  06/21/17   0707  06/20/17   1030   WBC  10.8  4.6  10.7  6.5  4.9   HGB  10.4*  9.7*  9.1*  10.9*  11.8   PLT  327  265  322  362  473*   MCV  91  90  91  92  94   NEUTROPHIL  8.8  66.0  81.4  93.9  33.6     Recent Labs   Lab Test  06/26/17   0916  06/23/17   0617  06/22/17   0629  06/21/17   0707   BILITOTAL  0.4  1.1  1.0  0.9   ALKPHOS  121  51  44  63   ALT  62*  75*  32  31   AST  36  70*  27  22   ALBUMIN  2.7*  2.0*  2.1*  2.8*   LDH   --   221  176  175       Results for orders placed or performed in visit on 07/24/17   CT Chest/Abdomen/Pelvis w Contrast    Narrative    EXAMINATION: CT CHEST/ABDOMEN/PELVIS W CONTRAST, 7/24/2017 1:09 PM    TECHNIQUE:  Helical CT images from the thoracic inlet through the  symphysis pubis were obtained  with contrast. Contrast dose: 80  mL  Isovue-[id number]    COMPARISON: 5/11/2017    HISTORY: mRCC, routine fup after IL-2, Malignant neoplasm of right  kidney, except renal pelvis    FINDINGS:    LUNGS: Left apical scarring with associated spiculated mass measuring  2.6 x 2.0 cm, stable. There are multiple nodules in the left lung and  scattered nodules in the right lung, overall stable in size and number  from the prior study. Mild bilateral lower lobes dependent  atelectasis. No acute airspace opacities.    Chest: Rim calcified focus in the right lobe of the thyroid gland.  Central tracheobronchial tree is patent. Thoracic aorta and main  pulmonary artery have normal diameter. Cardiac size within normal  limits. No pericardial effusions. No pleural effusions. No  pneumothorax. Subcentimeter axillary lymph nodes, not enlarged by size  criteria.     Enlarged heterogeneously enhancing necrotic lymph nodes in the  mediastinum, measuring up to 1.2 cm in the aortopulmonary window,  series 3 image 84, stable. Enlarged heterogeneously enhancing necrotic  left hilar lymph nodes measuring up to 1.8 cm, series 3 image 91,  previously measuring 1.5 cm, not significantly changed. No right hilar  lymphadenopathy. 8 mm right hilar lymph nodes, not enlarged by size  criteria. Otherwise, scattered subcentimeter mediastinal lymph nodes  are seen.    Abdomen and pelvis: Homogeneous liver parenchyma. No focal liver  lesions. No intra or extrahepatic biliary dilatation. Hepatic venous  system and portal venous system are patent. Unremarkable gallbladder.    4 mm rim-enhancing hypoattenuating focus in the pancreatic body,  stable. 9 mm hypoattenuating focus in the pancreatic tail, stable.    The spleen is not enlarged.    Postoperative changes of right adrenalectomy and right nephrectomy  with associated surgical clips. No recurrent and/or residual tumor in  the surgical bed. Left kidney with normal size and position with  subcentimeter hypoattenuating foci, too small to  characterize, stable  from the prior study. No left renal stones or hydronephrosis. The  urinary bladder is well distended and unremarkable in appearance.  Heterogeneously enhancing uterus. No adnexal masses.    There are 2 heterogeneously enhancing lesions in the left adrenal  gland, necrotic appearing measuring 1.4 cm in the medial limb,  previously measuring 1.3 cm as well as measuring 0.9 cm in the lateral  limb, previously measuring 0.9 cm.    Decompressed stomach. No dilated loops of bowel. No bowel wall  thickening. Colonic diverticulosis. No associated CT findings of acute  diverticulitis.    No abdominal aortic aneurysm. No free fluid. No free air. Scattered  retroperitoneal and mesenteric lymph nodes the largest one measuring  1.3 cm heterogeneously enhancing along the right external iliac chain,  previously measuring 0.9 cm. Subcentimeter bilateral inguinal lymph  nodes with a preserved fatty hilum, likely reactive. Small fat  containing umbilical hernia.    Bones: Multilevel degenerative changes of the spine. Degenerative  changes of bilateral SI joints and hips. Scattered Schmorl's nodes.  Scattered intradiscal gas. Degenerative changes of both shoulders.  Stable wedge compression deformity fracture of the vertebral bodies  T7. No aggressive sclerotic or lytic lesions in the bones.      Impression    IMPRESSION: In this patient with history of renal cell carcinoma,  status post right nephrectomy and right adrenalectomy:  1. Enlarged necrotic mediastinal and left hilar lymph nodes, some of them slightly increased from the prior study. Right external iliac chain necrotic enlarged lymph node, increased in size from the prior study.  2. Stable necrotic lesions in the left adrenal gland concerning for metastasis.  3. Stable pulmonary nodules bilaterally, particularly involving the left lung, concerning for metastasis.  4. Stable hypoattenuating foci with rim enhancement in the pancreatic body and tail,  concerning for metastasis.    NIA GUILLORY MD       ASSESSMENT/PLAN: 50 year old with mRCC s/p first cycle of IL-2 receiving 9 doses and C2 receiving 7.     Suzi is doing well today.  She has recovered from her side effects from high dose IL2.     We reviewed her scans together. Her scans appear stable. Official read does suggest mild progression and there are a few lesions that have grown by couple of mm in size. There is a lot of scarring in MERARY. I am not sure if all of it or most of it is infectious as she had MERARY biopsy which suggested organizing pneumonia with fibrosis and chronic inflammation. Her adrenal and pancreatic lesions are stable. She did point out that her adrenal lesion did appear out of nowhere. Her scans were completely negative in January and by May she was notified of disease at several sites. She is worried if she will die soon and how her prognosis is.     Her short term prognosis remains good. Her disease is incurable. I do hope that we will get a few years from this and next line of immunotherapy - PD1 inhibitors. She is willing to fly to different places if needed for clinical trials. I reviewed immunotherapy with nivolumab with her and would recommend if she the next scan shows progression or fails to show response.     She would like to continue with IL2 and I agree with the plans.     We do have a clinical trial with axitinib and crizotinib for first line setting. We have another trial with anti-ENPP3 which is enrolling patients having progressed on VEGF directed therapy. She spoke about combination immunotherapy trials. We will have a frontline therapy trial with ipilimumab and nivolumab but in the first line setting and she would not be a candidate due to her ongoing IL2 therapy.     She would like me to continue to look out for trials.     She will be admitted for high dose IL2 on July 31st after evaluation with Ms. Day Ren. She will have another admission in mid  August. She plans to resume work in September. I will see her in late September with labs and restaging scans.     Over 45 min of direct face to face time spent with patient with more than 50% time spent in counseling and coordinating care.

## 2017-07-25 NOTE — PATIENT INSTRUCTIONS
Continue with planned admission for high dose IL2. Pt scheduled for admission 7/31/17.    Will schedule follow up at a later date at the time of her discharge from hospital

## 2017-07-26 ASSESSMENT — ENCOUNTER SYMPTOMS
NAIL CHANGES: 0
SKIN CHANGES: 0
POOR WOUND HEALING: 0

## 2017-07-31 ENCOUNTER — HOSPITAL ENCOUNTER (OUTPATIENT)
Dept: VASCULAR ULTRASOUND | Facility: CLINIC | Age: 52
DRG: 838 | End: 2017-07-31
Attending: PHYSICIAN ASSISTANT | Admitting: INTERNAL MEDICINE
Payer: COMMERCIAL

## 2017-07-31 ENCOUNTER — ONCOLOGY VISIT (OUTPATIENT)
Dept: ONCOLOGY | Facility: CLINIC | Age: 52
DRG: 838 | End: 2017-07-31
Attending: PHYSICIAN ASSISTANT
Payer: COMMERCIAL

## 2017-07-31 ENCOUNTER — APPOINTMENT (OUTPATIENT)
Dept: LAB | Facility: CLINIC | Age: 52
DRG: 838 | End: 2017-07-31
Attending: PHYSICIAN ASSISTANT
Payer: COMMERCIAL

## 2017-07-31 ENCOUNTER — HOSPITAL ENCOUNTER (INPATIENT)
Facility: CLINIC | Age: 52
LOS: 3 days | Discharge: HOME | DRG: 838 | End: 2017-08-03
Attending: INTERNAL MEDICINE | Admitting: INTERNAL MEDICINE
Payer: COMMERCIAL

## 2017-07-31 VITALS
RESPIRATION RATE: 16 BRPM | HEART RATE: 78 BPM | TEMPERATURE: 97.6 F | SYSTOLIC BLOOD PRESSURE: 107 MMHG | BODY MASS INDEX: 22.94 KG/M2 | OXYGEN SATURATION: 97 % | DIASTOLIC BLOOD PRESSURE: 69 MMHG | HEIGHT: 61 IN | WEIGHT: 121.5 LBS

## 2017-07-31 DIAGNOSIS — E83.39 HYPOPHOSPHATEMIA: Primary | ICD-10-CM

## 2017-07-31 DIAGNOSIS — C64.1 MALIGNANT NEOPLASM OF RIGHT KIDNEY (H): ICD-10-CM

## 2017-07-31 PROBLEM — C64.9 METASTATIC RENAL CELL CARCINOMA (H): Status: ACTIVE | Noted: 2017-07-31

## 2017-07-31 LAB
ALBUMIN SERPL-MCNC: 3.9 G/DL (ref 3.4–5)
ALP SERPL-CCNC: 81 U/L (ref 40–150)
ALT SERPL W P-5'-P-CCNC: 27 U/L (ref 0–50)
ANION GAP SERPL CALCULATED.3IONS-SCNC: 7 MMOL/L (ref 3–14)
AST SERPL W P-5'-P-CCNC: 25 U/L (ref 0–45)
BASOPHILS # BLD AUTO: 0.1 10E9/L (ref 0–0.2)
BASOPHILS NFR BLD AUTO: 0.9 %
BILIRUB SERPL-MCNC: 0.4 MG/DL (ref 0.2–1.3)
BUN SERPL-MCNC: 19 MG/DL (ref 7–30)
CALCIUM SERPL-MCNC: 9.6 MG/DL (ref 8.5–10.1)
CHLORIDE SERPL-SCNC: 101 MMOL/L (ref 94–109)
CO2 SERPL-SCNC: 25 MMOL/L (ref 20–32)
CREAT SERPL-MCNC: 0.99 MG/DL (ref 0.52–1.04)
DIFFERENTIAL METHOD BLD: ABNORMAL
EOSINOPHIL # BLD AUTO: 0.4 10E9/L (ref 0–0.7)
EOSINOPHIL NFR BLD AUTO: 7 %
ERYTHROCYTE [DISTWIDTH] IN BLOOD BY AUTOMATED COUNT: 15.2 % (ref 10–15)
GFR SERPL CREATININE-BSD FRML MDRD: 58 ML/MIN/1.7M2
GLUCOSE SERPL-MCNC: 97 MG/DL (ref 70–99)
HCT VFR BLD AUTO: 37 % (ref 35–47)
HGB BLD-MCNC: 12.5 G/DL (ref 11.7–15.7)
IMM GRANULOCYTES # BLD: 0 10E9/L (ref 0–0.4)
IMM GRANULOCYTES NFR BLD: 0 %
LYMPHOCYTES # BLD AUTO: 2.3 10E9/L (ref 0.8–5.3)
LYMPHOCYTES NFR BLD AUTO: 42.3 %
MCH RBC QN AUTO: 31.1 PG (ref 26.5–33)
MCHC RBC AUTO-ENTMCNC: 33.8 G/DL (ref 31.5–36.5)
MCV RBC AUTO: 92 FL (ref 78–100)
MONOCYTES # BLD AUTO: 0.4 10E9/L (ref 0–1.3)
MONOCYTES NFR BLD AUTO: 6.5 %
NEUTROPHILS # BLD AUTO: 2.3 10E9/L (ref 1.6–8.3)
NEUTROPHILS NFR BLD AUTO: 43.3 %
NRBC # BLD AUTO: 0 10*3/UL
NRBC BLD AUTO-RTO: 0 /100
PLATELET # BLD AUTO: 441 10E9/L (ref 150–450)
POTASSIUM SERPL-SCNC: 4.3 MMOL/L (ref 3.4–5.3)
PROT SERPL-MCNC: 7.8 G/DL (ref 6.8–8.8)
RBC # BLD AUTO: 4.02 10E12/L (ref 3.8–5.2)
SODIUM SERPL-SCNC: 133 MMOL/L (ref 133–144)
WBC # BLD AUTO: 5.4 10E9/L (ref 4–11)

## 2017-07-31 PROCEDURE — 85025 COMPLETE CBC W/AUTO DIFF WBC: CPT | Performed by: PHYSICIAN ASSISTANT

## 2017-07-31 PROCEDURE — 80053 COMPREHEN METABOLIC PANEL: CPT | Performed by: PHYSICIAN ASSISTANT

## 2017-07-31 PROCEDURE — 99213 OFFICE O/P EST LOW 20 MIN: CPT | Mod: 25

## 2017-07-31 PROCEDURE — 25000132 ZZH RX MED GY IP 250 OP 250 PS 637: Performed by: PHYSICIAN ASSISTANT

## 2017-07-31 PROCEDURE — 99212 OFFICE O/P EST SF 10 MIN: CPT | Mod: ZF

## 2017-07-31 PROCEDURE — 25800025 ZZH RX 258: Performed by: PHYSICIAN ASSISTANT

## 2017-07-31 PROCEDURE — 25000125 ZZHC RX 250: Performed by: PHYSICIAN ASSISTANT

## 2017-07-31 PROCEDURE — 3E04302 INTRODUCTION OF HIGH-DOSE INTERLEUKIN-2 INTO CENTRAL VEIN, PERCUTANEOUS APPROACH: ICD-10-PCS | Performed by: INTERNAL MEDICINE

## 2017-07-31 PROCEDURE — 25000128 H RX IP 250 OP 636: Performed by: PHYSICIAN ASSISTANT

## 2017-07-31 PROCEDURE — 99207 ZZC CHGS TRANSFERRED TO HOSPITAL: CPT | Mod: 25 | Performed by: PHYSICIAN ASSISTANT

## 2017-07-31 PROCEDURE — 12000008 ZZH R&B INTERMEDIATE UMMC

## 2017-07-31 PROCEDURE — C1751 CATH, INF, PER/CENT/MIDLINE: HCPCS

## 2017-07-31 PROCEDURE — 36569 INSJ PICC 5 YR+ W/O IMAGING: CPT

## 2017-07-31 PROCEDURE — 27210208 ZZH KIT VALVED DOUBLE LUMEN

## 2017-07-31 PROCEDURE — 99223 1ST HOSP IP/OBS HIGH 75: CPT | Mod: AI | Performed by: INTERNAL MEDICINE

## 2017-07-31 RX ORDER — DEXTROSE MONOHYDRATE 50 MG/ML
1000 INJECTION, SOLUTION INTRAVENOUS CONTINUOUS
Status: DISCONTINUED | OUTPATIENT
Start: 2017-07-31 | End: 2017-08-03 | Stop reason: HOSPADM

## 2017-07-31 RX ORDER — POTASSIUM CL/LIDO/0.9 % NACL 10MEQ/0.1L
10 INTRAVENOUS SOLUTION, PIGGYBACK (ML) INTRAVENOUS
Status: CANCELLED | OUTPATIENT
Start: 2017-07-31

## 2017-07-31 RX ORDER — POTASSIUM CHLORIDE 29.8 MG/ML
20 INJECTION INTRAVENOUS
Status: CANCELLED | OUTPATIENT
Start: 2017-07-31

## 2017-07-31 RX ORDER — ALBUTEROL SULFATE 90 UG/1
1-2 AEROSOL, METERED RESPIRATORY (INHALATION)
Status: DISCONTINUED | OUTPATIENT
Start: 2017-07-31 | End: 2017-08-03 | Stop reason: HOSPADM

## 2017-07-31 RX ORDER — MEPERIDINE HYDROCHLORIDE 25 MG/ML
25 INJECTION INTRAMUSCULAR; INTRAVENOUS; SUBCUTANEOUS EVERY 30 MIN PRN
Status: DISCONTINUED | OUTPATIENT
Start: 2017-07-31 | End: 2017-07-31

## 2017-07-31 RX ORDER — MAGNESIUM SULFATE HEPTAHYDRATE 40 MG/ML
4 INJECTION, SOLUTION INTRAVENOUS EVERY 4 HOURS PRN
Status: CANCELLED | OUTPATIENT
Start: 2017-07-31

## 2017-07-31 RX ORDER — HEPARIN SODIUM,PORCINE 10 UNIT/ML
2-5 VIAL (ML) INTRAVENOUS
Status: DISCONTINUED | OUTPATIENT
Start: 2017-07-31 | End: 2017-08-01 | Stop reason: HOSPADM

## 2017-07-31 RX ORDER — POTASSIUM CHLORIDE 750 MG/1
20-40 TABLET, EXTENDED RELEASE ORAL
Status: CANCELLED | OUTPATIENT
Start: 2017-07-31

## 2017-07-31 RX ORDER — DIPHENHYDRAMINE HYDROCHLORIDE 50 MG/ML
50 INJECTION INTRAMUSCULAR; INTRAVENOUS
Status: CANCELLED | OUTPATIENT
Start: 2017-07-31

## 2017-07-31 RX ORDER — BUPROPION HYDROCHLORIDE 300 MG/1
300 TABLET ORAL EVERY MORNING
Status: DISCONTINUED | OUTPATIENT
Start: 2017-07-31 | End: 2017-08-03 | Stop reason: HOSPADM

## 2017-07-31 RX ORDER — POTASSIUM CHLORIDE 7.45 MG/ML
10 INJECTION INTRAVENOUS
Status: CANCELLED | OUTPATIENT
Start: 2017-07-31

## 2017-07-31 RX ORDER — DIPHENOXYLATE HCL/ATROPINE 2.5-.025MG
1 TABLET ORAL
Status: DISCONTINUED | OUTPATIENT
Start: 2017-07-31 | End: 2017-08-03 | Stop reason: HOSPADM

## 2017-07-31 RX ORDER — NAPROXEN 250 MG/1
250 TABLET ORAL 2 TIMES DAILY PRN
Status: CANCELLED | OUTPATIENT
Start: 2017-07-31

## 2017-07-31 RX ORDER — DEXTROSE MONOHYDRATE 50 MG/ML
1000 INJECTION, SOLUTION INTRAVENOUS CONTINUOUS
Status: CANCELLED | OUTPATIENT
Start: 2017-07-31

## 2017-07-31 RX ORDER — LORAZEPAM 2 MG/ML
.5-1 INJECTION INTRAMUSCULAR EVERY 6 HOURS PRN
Status: CANCELLED | OUTPATIENT
Start: 2017-07-31

## 2017-07-31 RX ORDER — MAGNESIUM SULFATE HEPTAHYDRATE 40 MG/ML
4 INJECTION, SOLUTION INTRAVENOUS EVERY 4 HOURS PRN
Status: DISCONTINUED | OUTPATIENT
Start: 2017-07-31 | End: 2017-08-03 | Stop reason: HOSPADM

## 2017-07-31 RX ORDER — POTASSIUM CHLORIDE 750 MG/1
20-40 TABLET, EXTENDED RELEASE ORAL
Status: DISCONTINUED | OUTPATIENT
Start: 2017-07-31 | End: 2017-08-03 | Stop reason: HOSPADM

## 2017-07-31 RX ORDER — POTASSIUM CHLORIDE 1.5 G/1.58G
20-40 POWDER, FOR SOLUTION ORAL
Status: DISCONTINUED | OUTPATIENT
Start: 2017-07-31 | End: 2017-08-03 | Stop reason: HOSPADM

## 2017-07-31 RX ORDER — PROCHLORPERAZINE MALEATE 10 MG
10 TABLET ORAL EVERY 6 HOURS PRN
Status: CANCELLED | OUTPATIENT
Start: 2017-07-31

## 2017-07-31 RX ORDER — LORAZEPAM 2 MG/ML
.5-1 INJECTION INTRAMUSCULAR EVERY 6 HOURS PRN
Status: DISCONTINUED | OUTPATIENT
Start: 2017-07-31 | End: 2017-07-31

## 2017-07-31 RX ORDER — ONDANSETRON 2 MG/ML
8 INJECTION INTRAMUSCULAR; INTRAVENOUS EVERY 8 HOURS
Status: CANCELLED | OUTPATIENT
Start: 2017-07-31

## 2017-07-31 RX ORDER — MEPERIDINE HYDROCHLORIDE 25 MG/ML
25 INJECTION INTRAMUSCULAR; INTRAVENOUS; SUBCUTANEOUS
Status: DISCONTINUED | OUTPATIENT
Start: 2017-07-31 | End: 2017-08-03 | Stop reason: HOSPADM

## 2017-07-31 RX ORDER — POTASSIUM CHLORIDE 29.8 MG/ML
20 INJECTION INTRAVENOUS
Status: DISCONTINUED | OUTPATIENT
Start: 2017-07-31 | End: 2017-08-03 | Stop reason: HOSPADM

## 2017-07-31 RX ORDER — ACETAMINOPHEN 325 MG/1
650 TABLET ORAL ONCE
Status: COMPLETED | OUTPATIENT
Start: 2017-07-31 | End: 2017-07-31

## 2017-07-31 RX ORDER — SODIUM CHLORIDE 9 MG/ML
1000 INJECTION, SOLUTION INTRAVENOUS CONTINUOUS PRN
Status: CANCELLED | OUTPATIENT
Start: 2017-07-31

## 2017-07-31 RX ORDER — DIPHENHYDRAMINE HCL 25 MG
25 CAPSULE ORAL EVERY 4 HOURS PRN
Status: DISCONTINUED | OUTPATIENT
Start: 2017-07-31 | End: 2017-08-03 | Stop reason: HOSPADM

## 2017-07-31 RX ORDER — LORAZEPAM 0.5 MG/1
.5-1 TABLET ORAL EVERY 6 HOURS PRN
Status: DISCONTINUED | OUTPATIENT
Start: 2017-07-31 | End: 2017-07-31

## 2017-07-31 RX ORDER — ALBUTEROL SULFATE 0.83 MG/ML
2.5 SOLUTION RESPIRATORY (INHALATION)
Status: CANCELLED | OUTPATIENT
Start: 2017-07-31

## 2017-07-31 RX ORDER — LORAZEPAM 0.5 MG/1
.5-1 TABLET ORAL EVERY 4 HOURS PRN
Status: DISCONTINUED | OUTPATIENT
Start: 2017-07-31 | End: 2017-08-03 | Stop reason: HOSPADM

## 2017-07-31 RX ORDER — LORAZEPAM 0.5 MG/1
0.5 TABLET ORAL
COMMUNITY
Start: 2010-05-24 | End: 2017-07-31

## 2017-07-31 RX ORDER — PROCHLORPERAZINE MALEATE 5 MG
5-10 TABLET ORAL EVERY 6 HOURS PRN
Status: CANCELLED | OUTPATIENT
Start: 2017-07-31

## 2017-07-31 RX ORDER — MEPERIDINE HYDROCHLORIDE 25 MG/ML
25 INJECTION INTRAMUSCULAR; INTRAVENOUS; SUBCUTANEOUS EVERY 30 MIN PRN
Status: CANCELLED | OUTPATIENT
Start: 2017-07-31

## 2017-07-31 RX ORDER — ALBUTEROL SULFATE 0.83 MG/ML
2.5 SOLUTION RESPIRATORY (INHALATION)
Status: DISCONTINUED | OUTPATIENT
Start: 2017-07-31 | End: 2017-08-03 | Stop reason: HOSPADM

## 2017-07-31 RX ORDER — CEPHALEXIN 500 MG/1
500 CAPSULE ORAL 2 TIMES DAILY
Status: DISCONTINUED | OUTPATIENT
Start: 2017-07-31 | End: 2017-08-03 | Stop reason: HOSPADM

## 2017-07-31 RX ORDER — EPINEPHRINE 1 MG/ML
0.3 INJECTION INTRAMUSCULAR; INTRAVENOUS; SUBCUTANEOUS EVERY 5 MIN PRN
Status: DISCONTINUED | OUTPATIENT
Start: 2017-07-31 | End: 2017-08-03 | Stop reason: HOSPADM

## 2017-07-31 RX ORDER — NALOXONE HYDROCHLORIDE 0.4 MG/ML
.1-.4 INJECTION, SOLUTION INTRAMUSCULAR; INTRAVENOUS; SUBCUTANEOUS
Status: DISCONTINUED | OUTPATIENT
Start: 2017-07-31 | End: 2017-08-03 | Stop reason: HOSPADM

## 2017-07-31 RX ORDER — DEXTROSE MONOHYDRATE, SODIUM CHLORIDE, AND POTASSIUM CHLORIDE 50; 1.49; 9 G/1000ML; G/1000ML; G/1000ML
INJECTION, SOLUTION INTRAVENOUS CONTINUOUS
Status: DISCONTINUED | OUTPATIENT
Start: 2017-07-31 | End: 2017-08-03 | Stop reason: HOSPADM

## 2017-07-31 RX ORDER — POTASSIUM CL/LIDO/0.9 % NACL 10MEQ/0.1L
10 INTRAVENOUS SOLUTION, PIGGYBACK (ML) INTRAVENOUS
Status: DISCONTINUED | OUTPATIENT
Start: 2017-07-31 | End: 2017-08-03 | Stop reason: HOSPADM

## 2017-07-31 RX ORDER — DIPHENHYDRAMINE HYDROCHLORIDE 50 MG/ML
50 INJECTION INTRAMUSCULAR; INTRAVENOUS
Status: DISCONTINUED | OUTPATIENT
Start: 2017-07-31 | End: 2017-08-03 | Stop reason: HOSPADM

## 2017-07-31 RX ORDER — BENZONATATE 100 MG/1
100 CAPSULE ORAL 3 TIMES DAILY PRN
Status: DISCONTINUED | OUTPATIENT
Start: 2017-07-31 | End: 2017-08-03 | Stop reason: HOSPADM

## 2017-07-31 RX ORDER — CITALOPRAM HYDROBROMIDE 20 MG/1
20 TABLET ORAL EVERY EVENING
Status: DISCONTINUED | OUTPATIENT
Start: 2017-07-31 | End: 2017-08-03 | Stop reason: HOSPADM

## 2017-07-31 RX ORDER — NAPROXEN 250 MG/1
250 TABLET ORAL 2 TIMES DAILY PRN
Status: DISCONTINUED | OUTPATIENT
Start: 2017-07-31 | End: 2017-08-03 | Stop reason: HOSPADM

## 2017-07-31 RX ORDER — MEPERIDINE HYDROCHLORIDE 25 MG/ML
25 INJECTION INTRAMUSCULAR; INTRAVENOUS; SUBCUTANEOUS
Status: CANCELLED | OUTPATIENT
Start: 2017-07-31 | End: 2017-08-06

## 2017-07-31 RX ORDER — ACETAMINOPHEN 325 MG/1
650 TABLET ORAL EVERY 4 HOURS
Status: CANCELLED | OUTPATIENT
Start: 2017-07-31

## 2017-07-31 RX ORDER — DIPHENHYDRAMINE HCL 25 MG
25 CAPSULE ORAL EVERY 4 HOURS PRN
Status: CANCELLED | OUTPATIENT
Start: 2017-07-31

## 2017-07-31 RX ORDER — POTASSIUM CHLORIDE 1.5 G/1.58G
20-40 POWDER, FOR SOLUTION ORAL
Status: CANCELLED | OUTPATIENT
Start: 2017-07-31

## 2017-07-31 RX ORDER — LIDOCAINE 40 MG/G
CREAM TOPICAL
Status: DISCONTINUED | OUTPATIENT
Start: 2017-07-31 | End: 2017-08-01 | Stop reason: HOSPADM

## 2017-07-31 RX ORDER — ONDANSETRON 2 MG/ML
8 INJECTION INTRAMUSCULAR; INTRAVENOUS EVERY 8 HOURS
Status: DISCONTINUED | OUTPATIENT
Start: 2017-07-31 | End: 2017-08-03 | Stop reason: HOSPADM

## 2017-07-31 RX ORDER — LORAZEPAM 0.5 MG/1
.5-1 TABLET ORAL EVERY 6 HOURS PRN
Status: CANCELLED | OUTPATIENT
Start: 2017-07-31

## 2017-07-31 RX ORDER — DEXTROSE MONOHYDRATE, SODIUM CHLORIDE, AND POTASSIUM CHLORIDE 50; 1.49; 9 G/1000ML; G/1000ML; G/1000ML
INJECTION, SOLUTION INTRAVENOUS CONTINUOUS
Status: CANCELLED | OUTPATIENT
Start: 2017-07-31

## 2017-07-31 RX ORDER — PROCHLORPERAZINE MALEATE 10 MG
10 TABLET ORAL EVERY 6 HOURS PRN
Status: DISCONTINUED | OUTPATIENT
Start: 2017-07-31 | End: 2017-08-03 | Stop reason: HOSPADM

## 2017-07-31 RX ORDER — CEPHALEXIN 500 MG/1
500 CAPSULE ORAL 2 TIMES DAILY
Status: CANCELLED | OUTPATIENT
Start: 2017-07-31

## 2017-07-31 RX ORDER — LORAZEPAM 2 MG/ML
.5-1 INJECTION INTRAMUSCULAR EVERY 4 HOURS PRN
Status: DISCONTINUED | OUTPATIENT
Start: 2017-07-31 | End: 2017-08-03 | Stop reason: HOSPADM

## 2017-07-31 RX ORDER — ALBUTEROL SULFATE 90 UG/1
1-2 AEROSOL, METERED RESPIRATORY (INHALATION)
Status: CANCELLED | OUTPATIENT
Start: 2017-07-31

## 2017-07-31 RX ORDER — ACETAMINOPHEN 325 MG/1
650 TABLET ORAL EVERY 4 HOURS
Status: DISCONTINUED | OUTPATIENT
Start: 2017-07-31 | End: 2017-08-03 | Stop reason: HOSPADM

## 2017-07-31 RX ORDER — METHYLPREDNISOLONE SODIUM SUCCINATE 125 MG/2ML
125 INJECTION, POWDER, LYOPHILIZED, FOR SOLUTION INTRAMUSCULAR; INTRAVENOUS
Status: DISCONTINUED | OUTPATIENT
Start: 2017-07-31 | End: 2017-08-03 | Stop reason: HOSPADM

## 2017-07-31 RX ORDER — METHYLPREDNISOLONE SODIUM SUCCINATE 125 MG/2ML
125 INJECTION, POWDER, LYOPHILIZED, FOR SOLUTION INTRAMUSCULAR; INTRAVENOUS
Status: CANCELLED | OUTPATIENT
Start: 2017-07-31

## 2017-07-31 RX ORDER — ACETAMINOPHEN 325 MG/1
650 TABLET ORAL EVERY 4 HOURS PRN
Status: CANCELLED | OUTPATIENT
Start: 2017-07-31

## 2017-07-31 RX ORDER — DIPHENOXYLATE HCL/ATROPINE 2.5-.025MG
1 TABLET ORAL
Status: CANCELLED | OUTPATIENT
Start: 2017-07-31

## 2017-07-31 RX ORDER — SODIUM CHLORIDE 9 MG/ML
1000 INJECTION, SOLUTION INTRAVENOUS CONTINUOUS PRN
Status: DISCONTINUED | OUTPATIENT
Start: 2017-07-31 | End: 2017-08-03 | Stop reason: HOSPADM

## 2017-07-31 RX ORDER — POTASSIUM CHLORIDE 7.45 MG/ML
10 INJECTION INTRAVENOUS
Status: DISCONTINUED | OUTPATIENT
Start: 2017-07-31 | End: 2017-08-03 | Stop reason: HOSPADM

## 2017-07-31 RX ADMIN — ACETAMINOPHEN 650 MG: 325 TABLET, FILM COATED ORAL at 19:56

## 2017-07-31 RX ADMIN — DEXTROSE MONOHYDRATE 1000 ML: 50 INJECTION, SOLUTION INTRAVENOUS at 17:24

## 2017-07-31 RX ADMIN — PROCHLORPERAZINE EDISYLATE 10 MG: 5 INJECTION INTRAMUSCULAR; INTRAVENOUS at 22:30

## 2017-07-31 RX ADMIN — CEPHALEXIN 500 MG: 500 CAPSULE ORAL at 19:56

## 2017-07-31 RX ADMIN — POTASSIUM CHLORIDE, DEXTROSE MONOHYDRATE AND SODIUM CHLORIDE: 150; 5; 900 INJECTION, SOLUTION INTRAVENOUS at 17:15

## 2017-07-31 RX ADMIN — BUPROPION HYDROCHLORIDE 300 MG: 300 TABLET, FILM COATED, EXTENDED RELEASE ORAL at 17:20

## 2017-07-31 RX ADMIN — LORAZEPAM 0.5 MG: 0.5 TABLET ORAL at 22:24

## 2017-07-31 RX ADMIN — ALDESLEUKIN 34 MILLION UNITS: 1.1 INJECTION, POWDER, LYOPHILIZED, FOR SOLUTION INTRAVENOUS at 17:50

## 2017-07-31 RX ADMIN — LIDOCAINE HYDROCHLORIDE 3 ML: 10 INJECTION, SOLUTION EPIDURAL; INFILTRATION; INTRACAUDAL; PERINEURAL at 13:51

## 2017-07-31 RX ADMIN — ONDANSETRON 8 MG: 2 INJECTION INTRAMUSCULAR; INTRAVENOUS at 17:17

## 2017-07-31 RX ADMIN — RANITIDINE HYDROCHLORIDE 150 MG: 150 TABLET, FILM COATED ORAL at 17:21

## 2017-07-31 RX ADMIN — ACETAMINOPHEN 650 MG: 325 TABLET, FILM COATED ORAL at 14:57

## 2017-07-31 RX ADMIN — LORAZEPAM 0.5 MG: 0.5 TABLET ORAL at 20:01

## 2017-07-31 RX ADMIN — CITALOPRAM HYDROBROMIDE 20 MG: 20 TABLET ORAL at 19:56

## 2017-07-31 ASSESSMENT — PAIN SCALES - GENERAL: PAINLEVEL: NO PAIN (0)

## 2017-07-31 ASSESSMENT — PAIN DESCRIPTION - DESCRIPTORS: DESCRIPTORS: SORE

## 2017-07-31 NOTE — LETTER
"7/31/2017      RE: Suzi Gonsales  [address] [place] [address]       [facility]  FOLLOW-UP VISIT NOTE  Date of visit: Jul 31, 2017           REASON FOR VISIT: mRCC assessment prior to IL-2, C3    HPI: Suzi presented to ED with hematuria and right flank pain thinking she had a renal stone in December 2014. She was worked up with a CT abd/pelvis which suggested a renal mass. She was referred to Dr. Max Zelaya in [facility]. 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 Per charts tumor resection had negative margins and it was staged at pT2bN0.    Her staging work up was negative except for pulmonary nodules. She has been followed every 6 months with scans. But her most recent scan suggested increase in size of couple of nodules prompting to the current referral to Dr Green.  CT CAP on 5/11/17 showed enlarging hilar LAD, enlarging pulmonary nodules, adrenal nodules and a pancreatic body mass. Biopsies of hilar LN, adrenal nodule and pancreatic mass were + for RCC. She met with DR Green and decided to pursue IL-2.     C1: 6/6/17- 9 doses  C2: 6/20/17- 7 doses  CT CAP on 7/24/17    INTERVAL HISTORY: Suzi is here today for her assessment prior to C3 of IL-2.  She had this last month off and really enjoyed it- she has been at the cabin, in [place] and just enjoying the summer. She feels \"almost\" normal, but not quite - regarding her stamina.  In [place] she was able to walk 2-5 miles daily and tolerated it well.  No new pain or concerns today. She had an upset stomach this weekend and this AM - unsure if it was nerves or not, but gone now.     No recent f/s/c. No chest pain/cough/dyspnea. No black/bloody stools. No  issues. No daily pain.     EXAM:  /69  Pulse 78  Temp 97.6  F (36.4  C) (Oral)  Resp 16  Ht 1.549 m (5' 0.98\")  Wt 55.1 kg (121 lb 8 oz)  SpO2 97%  BMI 22.97 kg/m2  Wt " Readings from Last 4 Encounters:   07/31/17 55.1 kg (121 lb 8 oz)   07/25/17 55.3 kg (122 lb)   06/26/17 59 kg (130 lb)   06/23/17 63.8 kg (140 lb 9.6 oz)     Vital signs were reviewed.   Patient alert and oriented x3.   PERRLA. EOMI. No scleral icterus noted. OP without thrush/sores.  Neck exam: No palpable cervical, supraclavicular or axillary nodes bilaterally.   Heart: RRR no murmurs noted.   Lungs: clear to auscultation bilaterally.  No crackles or wheezing.   Abd: positive bowel sounds in all four quadrants.  No tenderness to palpation.  No hepatomegaly.   Extremities: No lower extremity edema.   Neuro: grossly intact.   Mood and affect is stable.   Skin is back to baseline.     LABS:      7/25/2017 12:25 7/31/2017 11:38   Sodium 136 133   Potassium 4.4 4.3   Chloride 105 101   Carbon Dioxide 27 25   Urea Nitrogen 16 19   Creatinine 0.99 0.99   GFR Estimate 59 (L) 58 (L)   GFR Estimate If Black 71 71   Calcium 9.3 9.6   Anion Gap 4 7   Albumin 3.9 3.9   Protein Total 8.0 7.8   Bilirubin Total 0.4 0.4   Alkaline Phosphatase 91 81   ALT 26 27   AST 24 25   Glucose 90 97   WBC 6.7 5.4   Hemoglobin 12.8 12.5   Hematocrit 39.8 37.0   Platelet Count 386 441   RBC Count 4.22 4.02   MCV 94 92     ASSESSMENT/PLAN: 50 year old with mRCC s/p first cycle of IL-2 receiving 9 doses and C2 receiving 7.  She has had a month off with a relatively stable CT scan - only mild progression of LAD and will now go through C3 and C4.     Suzi is doing well today.  She is feeling slightly apprehensive, but ready to go through with C3 of IL-2 today.  Her CT scans were relatively stable after C1 and C2.  This may represent some stability given that her disease rapidly grew this spring.  We had a long discussion today regarding potential clinical trials and standard of care (mTOR inhibitors, TKIs, immunotherapy, etc).    I'll see Suzi back next week and then plan for Admission on 8/14.  CT scans and Dr Green would be mid-September.     Kia  Gela GUTIERREZ

## 2017-07-31 NOTE — NURSING NOTE
Chief Complaint   Patient presents with     Blood Draw     Labs drawn from right arm in lab by MA     Pt tolerated well  Afsaneh Houser MA

## 2017-07-31 NOTE — MR AVS SNAPSHOT
After Visit Summary   7/31/2017    Suzi Gonsales    MRN: [id number]           Patient Information     Date Of Birth          1965        Visit Information        Provider Department      7/31/2017 11:50 AM Day Ren PA-C [facility]        Today's Diagnoses     Malignant neoplasm of right kidney (H)           Follow-ups after your visit        Future tests that were ordered for you today     Open Standing Orders        Priority Remaining Interval Expires Ordered    XR Chest 1 View STAT 2/2 CONDITIONAL X 2  7/31/2017    XR Chest Port 1 View STAT 2/2 CONDITIONAL X 2  7/31/2017    Retention sutures Routine [id number] PRN  7/31/2017          Open Future Orders        Priority Expected Expires Ordered    Comprehensive metabolic panel Routine 8/7/2017 8/11/2017 7/31/2017    CBC with platelets differential Routine 8/7/2017 8/11/2017 7/31/2017            Who to contact     If you have questions or need follow up information about today's clinic visit or your schedule please contact [facility] directly at [phone].  Normal or non-critical lab and imaging results will be communicated to you by [employer]hart, letter or phone within 4 business days after the clinic has received the results. If you do not hear from us within 7 days, please contact the clinic through [employer] or phone. If you have a critical or abnormal lab result, we will notify you by phone as soon as possible.  Submit refill requests through [employer] or call your pharmacy and they will forward the refill request to us. Please allow 3 business days for your refill to be completed.          Additional Information About Your Visit        [employer] Information     [employer] gives you secure access to your electronic health record. If you see a primary care provider, you can also send messages to your care team and make appointments. If you have questions, please call your primary care clinic.  If you do  "not have a primary care provider, please call [phone] and they will assist you.        Care EveryWhere ID     This is your Care EveryWhere ID. This could be used by other organizations to access your [facility] medical records  [id number]        Your Vitals Were     Pulse Temperature Respirations Height Pulse Oximetry BMI (Body Mass Index)    78 97.6  F (36.4  C) (Oral) 16 1.549 m (5' 0.98\") 97% 22.97 kg/m2       Blood Pressure from Last 3 Encounters:   07/31/17 107/69   07/25/17 102/65   06/26/17 117/76    Weight from Last 3 Encounters:   07/31/17 55.1 kg (121 lb 8 oz)   07/25/17 55.3 kg (122 lb)   06/26/17 59 kg (130 lb)              We Performed the Following     CBC with platelets differential     Comprehensive metabolic panel          Today's Medication Changes          These changes are accurate as of: 7/31/17  2:00 PM.  If you have any questions, ask your nurse or doctor.               Stop taking these medicines if you haven't already. Please contact your care team if you have questions.     LORazepam 0.5 MG tablet   Commonly known as:  ATIVAN   Stopped by:  Day Ren PA-C                    Primary Care Provider Office Phone # Fax #    Idalia Saini [phone]       [facility] [address] [place] [address]        Equal Access to Services     [facility] AH: Hadii jimmie fang hadasho Sovanessa, waaxda luqadaha, qaybta kaalmada jarek, josemanuel oswald . So [facility] [phone].    ATENCIÓN: Si habla español, tiene a chacon disposición servicios gratuitos de asistencia lingüística. Llame al [phone].    We comply with applicable federal civil rights laws and [place] laws. We do not discriminate on the basis of race, color, national origin, age, disability sex, sexual orientation or gender identity.            Thank you!     Thank you for choosing [facility]  for your care. Our goal is always to provide you with excellent care. " Hearing back from our patients is one way we can continue to improve our services. Please take a few minutes to complete the written survey that you may receive in the mail after your visit with us. Thank you!             Your Updated Medication List - Protect others around you: Learn how to safely use, store and throw away your medicines at www.disposemymeds.org.          This list is accurate as of: 7/31/17  2:00 PM.  Always use your most recent med list.                   Brand Name Dispense Instructions for use Diagnosis    acetaminophen 325 MG tablet    TYLENOL    100 tablet    Take 2 tablets (650 mg) by mouth every 4 hours as needed for mild pain (mild pain)    Malignant neoplasm of right kidney (H)       acetaminophen-codeine 300-30 MG per tablet    TYLENOL/codeine #3    20 tablet    Take 1 tablet by mouth every 6 hours as needed for mild pain or pain (PRN pain or cough)        ALPRAZolam 0.5 MG tablet    XANAX    60 tablet    Take 1 tablet (0.5 mg) by mouth 3 times daily as needed for anxiety    Renal cell carcinoma, unspecified laterality (H), Mass of upper lobe of left lung       AMBIEN PO      Take 5 mg by mouth nightly as needed for sleep    Malignant neoplasm of right kidney (H)       benzonatate 100 MG capsule    TESSALON    42 capsule    Take 1 capsule (100 mg) by mouth 3 times daily as needed for cough        buPROPion 300 MG 24 hr tablet    WELLBUTRIN XL     Take 300 mg by mouth every morning        citalopram 20 MG tablet    celeXA     Take 20 mg by mouth every evening        IBUPROFEN      Take 400 mg by mouth every 6 hours as needed        prochlorperazine 10 MG tablet    COMPAZINE    30 tablet    Take 1 tablet (10 mg) by mouth every 6 hours as needed for nausea or vomiting (Breakthrough Nausea/Vomiting)    Malignant neoplasm of right kidney (H)

## 2017-07-31 NOTE — PROGRESS NOTES
"[facility]  FOLLOW-UP VISIT NOTE  Date of visit: Jul 31, 2017           REASON FOR VISIT: mRCC assessment prior to IL-2, C3    HPI: Suzi presented to ED with hematuria and right flank pain thinking she had a renal stone in December 2014. She was worked up with a CT abd/pelvis which suggested a renal mass. She was referred to Dr. Max Zelaya in [facility]. 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 Per charts tumor resection had negative margins and it was staged at pT2bN0.    Her staging work up was negative except for pulmonary nodules. She has been followed every 6 months with scans. But her most recent scan suggested increase in size of couple of nodules prompting to the current referral to Dr Green.  CT CAP on 5/11/17 showed enlarging hilar LAD, enlarging pulmonary nodules, adrenal nodules and a pancreatic body mass. Biopsies of hilar LN, adrenal nodule and pancreatic mass were + for RCC. She met with DR Green and decided to pursue IL-2.     C1: 6/6/17- 9 doses  C2: 6/20/17- 7 doses  CT CAP on 7/24/17    INTERVAL HISTORY: Suzi is here today for her assessment prior to C3 of IL-2.  She had this last month off and really enjoyed it- she has been at the cabin, in [place] and just enjoying the summer. She feels \"almost\" normal, but not quite - regarding her stamina.  In [place] she was able to walk 2-5 miles daily and tolerated it well.  No new pain or concerns today. She had an upset stomach this weekend and this AM - unsure if it was nerves or not, but gone now.     No recent f/s/c. No chest pain/cough/dyspnea. No black/bloody stools. No  issues. No daily pain.     EXAM:  /69  Pulse 78  Temp 97.6  F (36.4  C) (Oral)  Resp 16  Ht 1.549 m (5' 0.98\")  Wt 55.1 kg (121 lb 8 oz)  SpO2 97%  BMI 22.97 kg/m2  Wt Readings from Last 4 Encounters:   07/31/17 55.1 kg (121 lb 8 oz)   07/25/17 55.3 kg " (122 lb)   06/26/17 59 kg (130 lb)   06/23/17 63.8 kg (140 lb 9.6 oz)     Vital signs were reviewed.   Patient alert and oriented x3.   PERRLA. EOMI. No scleral icterus noted. OP without thrush/sores.  Neck exam: No palpable cervical, supraclavicular or axillary nodes bilaterally.   Heart: RRR no murmurs noted.   Lungs: clear to auscultation bilaterally.  No crackles or wheezing.   Abd: positive bowel sounds in all four quadrants.  No tenderness to palpation.  No hepatomegaly.   Extremities: No lower extremity edema.   Neuro: grossly intact.   Mood and affect is stable.   Skin is back to baseline.     LABS:      7/25/2017 12:25 7/31/2017 11:38   Sodium 136 133   Potassium 4.4 4.3   Chloride 105 101   Carbon Dioxide 27 25   Urea Nitrogen 16 19   Creatinine 0.99 0.99   GFR Estimate 59 (L) 58 (L)   GFR Estimate If Black 71 71   Calcium 9.3 9.6   Anion Gap 4 7   Albumin 3.9 3.9   Protein Total 8.0 7.8   Bilirubin Total 0.4 0.4   Alkaline Phosphatase 91 81   ALT 26 27   AST 24 25   Glucose 90 97   WBC 6.7 5.4   Hemoglobin 12.8 12.5   Hematocrit 39.8 37.0   Platelet Count 386 441   RBC Count 4.22 4.02   MCV 94 92     ASSESSMENT/PLAN: 50 year old with mRCC s/p first cycle of IL-2 receiving 9 doses and C2 receiving 7.  She has had a month off with a relatively stable CT scan - only mild progression of LAD and will now go through C3 and C4.     Suzi is doing well today.  She is feeling slightly apprehensive, but ready to go through with C3 of IL-2 today.  Her CT scans were relatively stable after C1 and C2.  This may represent some stability given that her disease rapidly grew this spring.  We had a long discussion today regarding potential clinical trials and standard of care (mTOR inhibitors, TKIs, immunotherapy, etc).    I'll see Suzi back next week and then plan for Admission on 8/14.  CT scans and Dr Green would be mid-September.     Kia Ren PA-C

## 2017-07-31 NOTE — IP AVS SNAPSHOT
MRN:[id number]                      After Visit Summary   7/31/2017    Suzi Gonsales    MRN: [id number]           Thank you!     Thank you for choosing [facility] for your care. Our goal is always to provide you with excellent care. Hearing back from our patients is one way we can continue to improve our services. Please take a few minutes to complete the written survey that you may receive in the mail after you visit with us. Thank you!        Patient Information     Date Of Birth          1965        Designated Caregiver       Most Recent Value    Caregiver    Will someone help with your care after discharge? yes    Name of designated caregiver Tunde Gonsales    Phone number of caregiver [phone]    Caregiver address [address], [place], [address]      About your hospital stay     You were admitted on:  July 31, 2017 You last received care in the:  [facility]    You were discharged on:  August 3, 2017        Reason for your hospital stay       IL2 for metastatic renal cell carcinoma                  Who to Call     For medical emergencies, please call 911.  For non-urgent questions about your medical care, please call your primary care provider or clinic, [phone]          Attending Provider     Provider Specialty    Henri Green MD Hematology    ChairezFady DO Internal Medicine-Hematology & Oncology       Primary Care Provider Office Phone # Fax #    Idalia Saini [phone]      After Care Instructions     Activity       Your activity upon discharge: activity as tolerated            Diet       Follow this diet upon discharge: Regular            Discharge Instructions       Take phosphorus tablets 4 times per day (about every 6 hours) until your phosphorus recheck tomorrow    Contact the [facility] ([phone]) for temperature > 100.4, shortness of breath, chest pain, headaches, vision changes, bleeding,  "uncontrolled nausea, vomiting, diarrhea, or pain.                  Follow-up Appointments     Follow Up and recommended labs and tests       Follow up in clinic tomorrow to recheck your phosphorus level. Follow up as scheduled in the oncology clinic on 8/8                  Your next 10 appointments already scheduled     Aug 04, 2017  9:45 AM CDT   [facility] with  [facility]   [facility] ([facility])    [address] [address]   [phone]            Aug 08, 2017 11:15 AM CDT   [facility] with  [facility]   [facility] ([facility])    [address] [place] [id number]   [phone]            Aug 08, 2017 11:40 AM CDT   (Arrive by 11:25 AM)   Return Visit with Day Ren PA-C   [facility] ([facility])    [address] [place] [id number]   [phone]              Future tests that were ordered for you     Phosphorus                 Pending Results     Date and Time Order Name Status Description    8/2/2017 2330 Blood culture Preliminary     8/1/2017 2330 Blood culture Preliminary     7/31/2017 2330 Blood culture Preliminary     7/31/2017 1329 IR PICC Vascular In process             Statement of Approval     Ordered          08/03/17 1553  I have reviewed and agree with all the recommendations and orders detailed in this document.  EFFECTIVE NOW     Approved and electronically signed by:  Romelia Vergara PA-C             Admission Information     Date & Time Provider Department Dept. Phone    7/31/2017 Fady Chairez, DO [facility] [place] [phone]      Your Vitals Were     Blood Pressure Pulse Temperature Respirations Height Weight    104/51 (BP Location: Right arm) 104 97.4  F (36.3  C) (Oral) 20 1.575 m (5' 2\") 59.8 kg (131 lb 14.4 oz)    Pulse " Oximetry BMI (Body Mass Index)                97% 24.12 kg/m2          MyChart Information     [employer] gives you secure access to your electronic health record. If you see a primary care provider, you can also send messages to your care team and make appointments. If you have questions, please call your primary care clinic.  If you do not have a primary care provider, please call [phone] and they will assist you.        Care EveryWhere ID     This is your Care EveryWhere ID. This could be used by other organizations to access your [facility] medical records  [id number]        Equal Access to Services     [facility]: Hadyesika Santana, alexis robles, michelle tilley, josemanuel oswald . So [facility] [place] [phone].    ATENCIÓN: Si habla español, tiene a chacon disposición servicios gratuitos de asistencia lingüística. Mario al [phone].    We comply with applicable federal civil rights laws and [place] laws. We do not discriminate on the basis of race, color, national origin, age, disability sex, sexual orientation or gender identity.               Review of your medicines      START taking        Dose / Directions    potassium phosphate (monobasic) 500 MG tablet   Commonly known as:  K-PHOS   Used for:  Hypophosphatemia        Dose:  500 mg   Take 1 tablet (500 mg) by mouth 4 times daily   Quantity:  30 tablet   Refills:  0         CONTINUE these medicines which have NOT CHANGED        Dose / Directions    acetaminophen 325 MG tablet   Commonly known as:  TYLENOL   Used for:  Malignant neoplasm of right kidney (H)        Dose:  650 mg   Take 2 tablets (650 mg) by mouth every 4 hours as needed for mild pain (mild pain)   Quantity:  100 tablet   Refills:  0       ALPRAZolam 0.5 MG tablet   Commonly known as:  XANAX   Used for:  Renal cell carcinoma, unspecified laterality (H), Mass of upper lobe of left lung        Dose:  0.5 mg   Take 1 tablet (0.5 mg) by mouth 3  times daily as needed for anxiety   Quantity:  60 tablet   Refills:  0       AMBIEN PO   Used for:  Malignant neoplasm of right kidney (H)        Dose:  5 mg   Take 5 mg by mouth nightly as needed for sleep   Refills:  0       benzonatate 100 MG capsule   Commonly known as:  TESSALON        Dose:  100 mg   Take 1 capsule (100 mg) by mouth 3 times daily as needed for cough   Quantity:  42 capsule   Refills:  0       buPROPion 300 MG 24 hr tablet   Commonly known as:  WELLBUTRIN XL        Dose:  300 mg   Take 300 mg by mouth every morning   Refills:  0       citalopram 20 MG tablet   Commonly known as:  celeXA        Dose:  20 mg   Take 20 mg by mouth every evening   Refills:  0       IBUPROFEN        Dose:  400 mg   Take 400 mg by mouth every 6 hours as needed   Refills:  0       prochlorperazine 10 MG tablet   Commonly known as:  COMPAZINE   Used for:  Malignant neoplasm of right kidney (H)        Dose:  10 mg   Take 1 tablet (10 mg) by mouth every 6 hours as needed for nausea or vomiting (Breakthrough Nausea/Vomiting)   Quantity:  30 tablet   Refills:  0         STOP taking     acetaminophen-codeine 300-30 MG per tablet   Commonly known as:  TYLENOL/codeine #3                Where to get your medicines      These medications were sent to [facility] [id number] - [place], [place] - [address] AT [facility] [address]  [facility] [address], [place] [address]    Hours:  24-hours Phone:  [phone]     potassium phosphate (monobasic) 500 MG tablet                Protect others around you: Learn how to safely use, store and throw away your medicines at www.disposemymeds.org.             Medication List: This is a list of all your medications and when to take them. Check marks below indicate your daily home schedule. Keep this list as a reference.      Medications           Morning Afternoon Evening Bedtime As Needed    acetaminophen 325 MG tablet   Commonly known as:  TYLENOL   Take 2 tablets (650  mg) by mouth every 4 hours as needed for mild pain (mild pain)   Last time this was given:  650 mg on 8/3/2017  4:42 PM                                   ALPRAZolam 0.5 MG tablet   Commonly known as:  XANAX   Take 1 tablet (0.5 mg) by mouth 3 times daily as needed for anxiety                                   AMBIEN PO   Take 5 mg by mouth nightly as needed for sleep                                   benzonatate 100 MG capsule   Commonly known as:  TESSALON   Take 1 capsule (100 mg) by mouth 3 times daily as needed for cough                                   buPROPion 300 MG 24 hr tablet   Commonly known as:  WELLBUTRIN XL   Take 300 mg by mouth every morning   Last time this was given:  300 mg on 8/3/2017  9:33 AM                                   citalopram 20 MG tablet   Commonly known as:  celeXA   Take 20 mg by mouth every evening   Last time this was given:  20 mg on 8/2/2017  8:13 PM                                   IBUPROFEN   Take 400 mg by mouth every 6 hours as needed                                   potassium phosphate (monobasic) 500 MG tablet   Commonly known as:  K-PHOS   Take 1 tablet (500 mg) by mouth 4 times daily                                            prochlorperazine 10 MG tablet   Commonly known as:  COMPAZINE   Take 1 tablet (10 mg) by mouth every 6 hours as needed for nausea or vomiting (Breakthrough Nausea/Vomiting)   Last time this was given:  10 mg on 8/3/2017  4:42 PM

## 2017-07-31 NOTE — H&P
[facility] [place]    History and Physical  Hospitalist       Date of Admission:  7/31/2017  Date of Service (when I saw the patient): 07/31/17    Assessment & Plan   Suzi Gonsales is a 50 y/o female with metastatic clear cell renal carcinoma and anxiety s/p right radical nephrectomy in 12/2014, now with metastatic RCC (lungs, adrenal, and pancreas) who is admitted for Cycle 3 HD IL-2. She received cycle #1 on 6/6, completed 9 doses and tolerated it well overall. She received cycle #2 on 6/20/17, she completed 7 doses, complicated by hypotension and fatigue. She saw Dr. Green (primary oncologist) with a repeat CT scan last week which showed stable disease. She has now fully recovered and is feeling well. Labs are appropriate for IL2 cycle #3.      #Metastatic RCC: Admitted for Cycle 3 HD IL2.   - PICC placed prior to admission     REGIMEN: D1=7/31/17   - IVFs 100/hr  - Premeds: Tylenol 650mg q4hr, Zofran 8mg q8hr, [id number] BID, Keflex 500mg BID  - aldesleukin 34 million units (600,000 units/kg) IV q8hr for up to 14 doses as tolerated  - PRN supportive care medications  - AVOID diuretics and steroids with IL-2      #H/o anxiety.   - continue PTA Wellbutrin, Celexa  - PRN lorazepam      FEN  - IVFs per treatment plan, bolus PRN (SBP <85 or Urine Output <30 mL/hr)  - Regular diet  - Electrolyte replacement prn per protocol      PPx  - defer VTE prophylaxis given anticipated thrombocytopenia with IL-2  - Zantac as above      Dispo: Pending completion of tolerated IL-2 doses (anticipate 3-5) and recovery of acute issues.      Code Status: Full Code     Romelia Vergara PA-C  Hematology/Oncology     Primary Care Physician   Idalia Saini    Chief Complaint   Metastatic renal cell carcinoma, admission for cycle #3 of IL2    History is obtained from the patient and anthony review    History of Present Illness   Suzi Gonsales is a 50 y/o female with metastatic clear cell renal carcinoma and  anxiety s/p right radical nephrectomy in 12/2014, now with metastatic RCC (lungs, adrenal, and pancreas) who is admitted for Cycle 3 HD IL-2. She received cycle #1 on 6/6, completed 9 doses and tolerated it well overall. She received cycle #2 on 6/20/17, she completed 7 doses, complicated by hypotension and fatigue. She saw Dr. Green (primary oncologist) with a repeat CT scan last week which showed stable disease. She had about 1 week of fatigue, dry and itchy and peeling skin which have since resolved. She is back to normal activities. She denies any fevers or chills. No n/v/d. No headaches, dizziness or vision changes. No mouth sores or dryness. No sore throat. Eating and drinking well. Mild constipation. No dysuria. No abd pain. No leg swelling. No skin rashes. ROS is otherwise negative.      ONCOLOGIC HISTORY: (adapted from clinic notes)  Suzi presented to ED with hematuria and right flank pain thinking she had a renal stone in December 2014. She was worked up with a CT abd/pelvis which suggested a renal mass. She was referred to Dr. Max Zelaya in [facility], [place], operated by [facility]. 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 Per charts tumor resection had negative margins and it was staged at pT2bN0. Her staging work up was negative except for pulmonary nodules. She has been followed every 6 months with scans. But her most recent scan last week suggested increase in size of couple of nodules prompting to the current referral. Recent CT CAP on 5/11/17 showed enlarging hilar LAD, enlarging pulmonary nodules, adrenal nodules and a pancreatic body mass. Bx confirmed recurrent renal cell carcinoma.  She met with Dr Green and decided to pursue IL-2.     Past Medical History    I have reviewed this patient's medical history and updated it with pertinent information if needed.   Past Medical History:   Diagnosis Date     Anxiety      Former tobacco use      History  of kidney cancer     Clear cell     Mass of left lung     Upper lobe     Solitary kidney     S/P right nephrectomy due to kidney cancer       Past Surgical History   I have reviewed this patient's surgical history and updated it with pertinent information if needed.  Past Surgical History:   Procedure Laterality Date     C/SECTION, LOW TRANSVERSE  ,     , Low Transverse     ENDOSCOPIC ULTRASOUND UPPER GASTROINTESTINAL TRACT (GI) N/A 2017    Procedure: ENDOSCOPIC ULTRASOUND, ESOPHAGOSCOPY / UPPER GASTROINTESTINAL TRACT (GI);  Esophagogastroduodenoscopy, Endoscopic Ultrasound with Fine Needle Biopsies;  Surgeon: Asad Velez MD;  Location: [facility] OR     open radical nephrectomy Right 14     PICC INSERTION Left 2017    5fr DL BioFlo PICC, 40cm (4cm external) in the L basilic vein w/ tip in the low SVC       Prior to Admission Medications   Prior to Admission Medications   Prescriptions Last Dose Informant Patient Reported? Taking?   ALPRAZolam (XANAX) 0.5 MG tablet Past Week at Unknown time  No Yes   Sig: Take 1 tablet (0.5 mg) by mouth 3 times daily as needed for anxiety   IBUPROFEN Past Week at Unknown time  Yes Yes   Sig: Take 400 mg by mouth every 6 hours as needed    Zolpidem Tartrate (AMBIEN PO) 2017 at Unknown time  Yes Yes   Sig: Take 5 mg by mouth nightly as needed for sleep   acetaminophen (TYLENOL) 325 MG tablet Past Month at Unknown time  No Yes   Sig: Take 2 tablets (650 mg) by mouth every 4 hours as needed for mild pain (mild pain)   acetaminophen-codeine (TYLENOL/CODEINE #3) 300-30 MG per tablet Unknown at Unknown time  No No   Sig: Take 1 tablet by mouth every 6 hours as needed for mild pain or pain (PRN pain or cough)   Patient not taking: Reported on 2017   benzonatate (TESSALON) 100 MG capsule Past Month at Unknown time  No Yes   Sig: Take 1 capsule (100 mg) by mouth 3 times daily as needed for cough   buPROPion (WELLBUTRIN XL) 300 MG 24 hr tablet  7/30/2017 at Unknown time  Yes Yes   Sig: Take 300 mg by mouth every morning    citalopram (CELEXA) 20 MG tablet 7/30/2017 at Unknown time  Yes Yes   Sig: Take 20 mg by mouth every evening    prochlorperazine (COMPAZINE) 10 MG tablet 7/30/2017 at Unknown time  No Yes   Sig: Take 1 tablet (10 mg) by mouth every 6 hours as needed for nausea or vomiting (Breakthrough Nausea/Vomiting)      Facility-Administered Medications: None     Allergies   No Known Allergies    Social History   I have reviewed this patient's social history and updated it with pertinent information if needed. Suzi Gonsales  reports that she quit smoking about 13 years ago. Her smoking use included Cigarettes. She has never used smokeless tobacco. She reports that she drinks alcohol. She reports that she does not use illicit drugs.    Family History   I have reviewed this patient's family history and updated it with pertinent information if needed.   Family History   Problem Relation Age of Onset     Hypertension Father      Chronic Obstructive Pulmonary Disease Father      Hyperlipidemia Brother      Hyperlipidemia Brother        Review of Systems   The 10 point Review of Systems is negative other than noted in the HPI or here.     Physical Exam   Temp: 97.2  F (36.2  C) Temp src: Oral BP: 114/44 Pulse: 70   Resp: 18 SpO2: 97 % O2 Device: None (Room air)    Vital Signs with Ranges  Temp:  [97.2  F (36.2  C)-97.6  F (36.4  C)] 97.2  F (36.2  C)  Pulse:  [70-78] 70  Resp:  [16-18] 18  BP: (107-114)/(44-69) 114/44  SpO2:  [97 %] 97 %  121 lbs 9.6 oz    Constitutional: Well appearing, no acute distress  Eyes: PERRL, EOMs intact, sclera anicteric, conjunctiva clear  HEENT: normocephalic, atraumatic, MMM, no mucositis or thrush  Respiratory: clear to auscultation bilaterally  Cardiovascular: RRR, no m/r/g  GI: soft, nondistended, nontender  Genitourinary: deferred  Skin: no visible rashes, PICC line in place c/d/i  Musculoskeletal: No LE edema, no swollen  or erythematous joints  Neurologic: Alert and oriented x 4, no focal deficits, normal gait, CN II-XII intact  Psychiatric: appropriate affect    Data   Data reviewed today:  I personally reviewed no images or EKG's today.    Recent Labs  Lab 07/31/17  1138 07/25/17  1225   WBC 5.4 6.7   HGB 12.5 12.8   MCV 92 94    386    136   POTASSIUM 4.3 4.4   CHLORIDE 101 105   CO2 25 27   BUN 19 16   CR 0.99 0.99   ANIONGAP 7 4   MUNDO 9.6 9.3   GLC 97 90   ALBUMIN 3.9 3.9   PROTTOTAL 7.8 8.0   BILITOTAL 0.4 0.4   ALKPHOS 81 91   ALT 27 26   AST 25 24       No results found for this or any previous visit (from the past 24 hour(s)).

## 2017-07-31 NOTE — PROGRESS NOTES
DATE/TIME  (DOT-TD, DOT-NOW) CHEMO CHECK ACTIVITY (REGIMEN & DOSE CHECK, DAY, DOSE #, NAME OF CHEMO #1)  CHEMO DRUG #2  CHEMO DRUG #3 NAME OF RN #1 (USE DOT-ME HERE) NAME OF RN#2 (2ND RN TO LOG IN SEPARATELY)   7/31/2017 2:41 PM IL-2 Protocol Double Check   Isi Chairezecrogerio Wiseman     7/31/2017 5:41 PM  Interleukin-2 dose 1 double check   Alissa Mckeon     08/01/17  2:23 AM   IL-2 dose #2 double check   Yamilka Coppola     8/1/2017  Interleukin-2 dose #3 check       8/1/2017 7:25 PM  Interleukin-2 dose #4 double check   Alissa Alberts     08/02/17  4:24 AM   IL-2 dose #5 double check   Caroline Zaidi     08/02/17  2:49 PM   IL-2 dose #6 double check   Charo Snowden   8/2/2017  11:52 PM     IL-2 dose #7 double check   Rodriguez Coppola   (caroline)   8/3/2017  7:57 AM   IL-2 dose #8 double check   Dunia Solomon

## 2017-07-31 NOTE — PROGRESS NOTES
Nursing Focus: Admission    D: Arrived at 1435 from home. Patient accompanied by her  Tunde.  Admitted for cycle # 3 of IL-2. Complains of left arm soreness at PICC insertion site.      I: Admission process began.  Patient oriented to room, enviroment, call light.  Md. notified of patients arrival on unit.     A: Vital signs stable, afebrile.  Patient stable at this time.  Complaining of left arm soreness.     P: Implement plan of care when available. Continue to monitor patient. Nursing interventions as appropriate. Notify md with changes in pt status.

## 2017-07-31 NOTE — NURSING NOTE
"Oncology Rooming Note    July 31, 2017 11:50 AM   Suzi Gonsales is a 52 year old female who presents for:    Chief Complaint   Patient presents with     Blood Draw     Labs drawn from right arm in lab by MA     Oncology Clinic Visit     Pre-Admission to Hospital     Initial Vitals: /69  Pulse 78  Temp 97.6  F (36.4  C) (Oral)  Resp 16  Ht 1.549 m (5' 0.98\")  Wt 55.1 kg (121 lb 8 oz)  SpO2 97%  BMI 22.97 kg/m2 Estimated body mass index is 22.97 kg/(m^2) as calculated from the following:    Height as of this encounter: 1.549 m (5' 0.98\").    Weight as of this encounter: 55.1 kg (121 lb 8 oz). Body surface area is 1.54 meters squared.  No Pain (0) Comment: Data Unavailable   No LMP recorded. Patient is not currently having periods (Reason: Premenopausal).  Allergies reviewed: Yes  Medications reviewed: Yes    Medications: MEDICATION REFILLS NEEDED TODAY. Provider was notified.  Pharmacy name entered into [facility]: [id number] - [place], [place] - [address] AT [facility] [address]    Clinical concerns: Refill on Gregory Ren was notified.    10 minutes for nursing intake (face to face time)     Tonia Ramírez LPN              "

## 2017-07-31 NOTE — IP AVS SNAPSHOT
[address] [address]    Phone:  [phone]                                       After Visit Summary   7/31/2017    Suzi Gonsales    MRN: [id number]           After Visit Summary Signature Page     I have received my discharge instructions, and my questions have been answered. I have discussed any challenges I see with this plan with the nurse or doctor.    ..........................................................................................................................................  Patient/Patient Representative Signature      ..........................................................................................................................................  Patient Representative Print Name and Relationship to Patient    ..................................................               ................................................  Date                                            Time    ..........................................................................................................................................  Reviewed by Signature/Title    ...................................................              ..............................................  Date                                                            Time

## 2017-08-01 LAB
ALBUMIN SERPL-MCNC: 3 G/DL (ref 3.4–5)
ALP SERPL-CCNC: 53 U/L (ref 40–150)
ALT SERPL W P-5'-P-CCNC: 19 U/L (ref 0–50)
ANION GAP SERPL CALCULATED.3IONS-SCNC: 8 MMOL/L (ref 3–14)
AST SERPL W P-5'-P-CCNC: 16 U/L (ref 0–45)
BASOPHILS # BLD AUTO: 0 10E9/L (ref 0–0.2)
BASOPHILS NFR BLD AUTO: 0.2 %
BILIRUB SERPL-MCNC: 0.4 MG/DL (ref 0.2–1.3)
BUN SERPL-MCNC: 11 MG/DL (ref 7–30)
CALCIUM SERPL-MCNC: 8 MG/DL (ref 8.5–10.1)
CHLORIDE SERPL-SCNC: 111 MMOL/L (ref 94–109)
CK SERPL-CCNC: 22 U/L (ref 30–225)
CO2 SERPL-SCNC: 20 MMOL/L (ref 20–32)
CREAT SERPL-MCNC: 0.98 MG/DL (ref 0.52–1.04)
DIFFERENTIAL METHOD BLD: ABNORMAL
EOSINOPHIL # BLD AUTO: 0.1 10E9/L (ref 0–0.7)
EOSINOPHIL NFR BLD AUTO: 2 %
ERYTHROCYTE [DISTWIDTH] IN BLOOD BY AUTOMATED COUNT: 15.7 % (ref 10–15)
GFR SERPL CREATININE-BSD FRML MDRD: 59 ML/MIN/1.7M2
GLUCOSE SERPL-MCNC: 155 MG/DL (ref 70–99)
HCT VFR BLD AUTO: 32.7 % (ref 35–47)
HGB BLD-MCNC: 10.7 G/DL (ref 11.7–15.7)
IMM GRANULOCYTES # BLD: 0 10E9/L (ref 0–0.4)
IMM GRANULOCYTES NFR BLD: 0.2 %
LACTATE BLD-SCNC: 2.3 MMOL/L (ref 0.7–2.1)
LDH SERPL L TO P-CCNC: 129 U/L (ref 81–234)
LYMPHOCYTES # BLD AUTO: 0.1 10E9/L (ref 0.8–5.3)
LYMPHOCYTES NFR BLD AUTO: 1.9 %
MAGNESIUM SERPL-MCNC: 1.6 MG/DL (ref 1.6–2.3)
MCH RBC QN AUTO: 30.4 PG (ref 26.5–33)
MCHC RBC AUTO-ENTMCNC: 32.7 G/DL (ref 31.5–36.5)
MCV RBC AUTO: 93 FL (ref 78–100)
MONOCYTES # BLD AUTO: 0.2 10E9/L (ref 0–1.3)
MONOCYTES NFR BLD AUTO: 2.6 %
NEUTROPHILS # BLD AUTO: 6 10E9/L (ref 1.6–8.3)
NEUTROPHILS NFR BLD AUTO: 93.1 %
NRBC # BLD AUTO: 0 10*3/UL
NRBC BLD AUTO-RTO: 0 /100
PHOSPHATE SERPL-MCNC: 2.7 MG/DL (ref 2.5–4.5)
PLATELET # BLD AUTO: 271 10E9/L (ref 150–450)
POTASSIUM SERPL-SCNC: 3.9 MMOL/L (ref 3.4–5.3)
PROT SERPL-MCNC: 6 G/DL (ref 6.8–8.8)
RBC # BLD AUTO: 3.52 10E12/L (ref 3.8–5.2)
SODIUM SERPL-SCNC: 140 MMOL/L (ref 133–144)
WBC # BLD AUTO: 6.4 10E9/L (ref 4–11)

## 2017-08-01 PROCEDURE — 87040 BLOOD CULTURE FOR BACTERIA: CPT | Performed by: PHYSICIAN ASSISTANT

## 2017-08-01 PROCEDURE — 36592 COLLECT BLOOD FROM PICC: CPT | Performed by: INTERNAL MEDICINE

## 2017-08-01 PROCEDURE — 82550 ASSAY OF CK (CPK): CPT | Performed by: PHYSICIAN ASSISTANT

## 2017-08-01 PROCEDURE — 80053 COMPREHEN METABOLIC PANEL: CPT | Performed by: PHYSICIAN ASSISTANT

## 2017-08-01 PROCEDURE — 12000008 ZZH R&B INTERMEDIATE UMMC

## 2017-08-01 PROCEDURE — 25800025 ZZH RX 258: Performed by: PHYSICIAN ASSISTANT

## 2017-08-01 PROCEDURE — 84100 ASSAY OF PHOSPHORUS: CPT | Performed by: PHYSICIAN ASSISTANT

## 2017-08-01 PROCEDURE — 85025 COMPLETE CBC W/AUTO DIFF WBC: CPT | Performed by: PHYSICIAN ASSISTANT

## 2017-08-01 PROCEDURE — 83615 LACTATE (LD) (LDH) ENZYME: CPT | Performed by: PHYSICIAN ASSISTANT

## 2017-08-01 PROCEDURE — 83735 ASSAY OF MAGNESIUM: CPT | Performed by: PHYSICIAN ASSISTANT

## 2017-08-01 PROCEDURE — 36592 COLLECT BLOOD FROM PICC: CPT | Performed by: PHYSICIAN ASSISTANT

## 2017-08-01 PROCEDURE — 25000132 ZZH RX MED GY IP 250 OP 250 PS 637: Performed by: PHYSICIAN ASSISTANT

## 2017-08-01 PROCEDURE — 40000558 ZZH STATISTIC CVC DRESSING CHANGE

## 2017-08-01 PROCEDURE — 99233 SBSQ HOSP IP/OBS HIGH 50: CPT | Performed by: INTERNAL MEDICINE

## 2017-08-01 PROCEDURE — 25000128 H RX IP 250 OP 636: Performed by: PHYSICIAN ASSISTANT

## 2017-08-01 PROCEDURE — 83605 ASSAY OF LACTIC ACID: CPT | Performed by: INTERNAL MEDICINE

## 2017-08-01 RX ADMIN — ACETAMINOPHEN 650 MG: 325 TABLET, FILM COATED ORAL at 00:32

## 2017-08-01 RX ADMIN — ACETAMINOPHEN 650 MG: 325 TABLET, FILM COATED ORAL at 15:50

## 2017-08-01 RX ADMIN — SODIUM CHLORIDE 250 ML: 9 INJECTION, SOLUTION INTRAVENOUS at 19:32

## 2017-08-01 RX ADMIN — NAPROXEN 250 MG: 250 TABLET ORAL at 23:38

## 2017-08-01 RX ADMIN — LORAZEPAM 1 MG: 0.5 TABLET ORAL at 11:11

## 2017-08-01 RX ADMIN — ALDESLEUKIN 34 MILLION UNITS: 1.1 INJECTION, POWDER, LYOPHILIZED, FOR SOLUTION INTRAVENOUS at 02:42

## 2017-08-01 RX ADMIN — RANITIDINE HYDROCHLORIDE 150 MG: 150 TABLET, FILM COATED ORAL at 19:37

## 2017-08-01 RX ADMIN — ONDANSETRON 8 MG: 2 INJECTION INTRAMUSCULAR; INTRAVENOUS at 17:16

## 2017-08-01 RX ADMIN — RANITIDINE HYDROCHLORIDE 150 MG: 150 TABLET, FILM COATED ORAL at 08:37

## 2017-08-01 RX ADMIN — ACETAMINOPHEN 650 MG: 325 TABLET, FILM COATED ORAL at 08:37

## 2017-08-01 RX ADMIN — BUPROPION HYDROCHLORIDE 300 MG: 300 TABLET, FILM COATED, EXTENDED RELEASE ORAL at 08:37

## 2017-08-01 RX ADMIN — ACETAMINOPHEN 650 MG: 325 TABLET, FILM COATED ORAL at 11:19

## 2017-08-01 RX ADMIN — MEPERIDINE HYDROCHLORIDE 25 MG: 25 INJECTION, SOLUTION INTRAMUSCULAR; INTRAVENOUS; SUBCUTANEOUS at 22:44

## 2017-08-01 RX ADMIN — ACETAMINOPHEN 650 MG: 325 TABLET, FILM COATED ORAL at 20:04

## 2017-08-01 RX ADMIN — MEPERIDINE HYDROCHLORIDE 25 MG: 25 INJECTION, SOLUTION INTRAMUSCULAR; INTRAVENOUS; SUBCUTANEOUS at 13:40

## 2017-08-01 RX ADMIN — MEPERIDINE HYDROCHLORIDE 25 MG: 25 INJECTION, SOLUTION INTRAMUSCULAR; INTRAVENOUS; SUBCUTANEOUS at 05:27

## 2017-08-01 RX ADMIN — POTASSIUM CHLORIDE, DEXTROSE MONOHYDRATE AND SODIUM CHLORIDE: 150; 5; 900 INJECTION, SOLUTION INTRAVENOUS at 11:23

## 2017-08-01 RX ADMIN — ACETAMINOPHEN 650 MG: 325 TABLET, FILM COATED ORAL at 04:50

## 2017-08-01 RX ADMIN — LORAZEPAM 1 MG: 0.5 TABLET ORAL at 17:43

## 2017-08-01 RX ADMIN — ONDANSETRON 8 MG: 2 INJECTION INTRAMUSCULAR; INTRAVENOUS at 00:35

## 2017-08-01 RX ADMIN — POTASSIUM CHLORIDE, DEXTROSE MONOHYDRATE AND SODIUM CHLORIDE: 150; 5; 900 INJECTION, SOLUTION INTRAVENOUS at 23:33

## 2017-08-01 RX ADMIN — ONDANSETRON 8 MG: 2 INJECTION INTRAMUSCULAR; INTRAVENOUS at 08:37

## 2017-08-01 RX ADMIN — PROCHLORPERAZINE MALEATE 10 MG: 10 TABLET, FILM COATED ORAL at 11:19

## 2017-08-01 RX ADMIN — POTASSIUM CHLORIDE, DEXTROSE MONOHYDRATE AND SODIUM CHLORIDE: 150; 5; 900 INJECTION, SOLUTION INTRAVENOUS at 02:49

## 2017-08-01 RX ADMIN — CITALOPRAM HYDROBROMIDE 20 MG: 20 TABLET ORAL at 19:37

## 2017-08-01 RX ADMIN — ALDESLEUKIN 34 MILLION UNITS: 1.1 INJECTION, POWDER, LYOPHILIZED, FOR SOLUTION INTRAVENOUS at 11:19

## 2017-08-01 RX ADMIN — CEPHALEXIN 500 MG: 500 CAPSULE ORAL at 08:36

## 2017-08-01 RX ADMIN — CEPHALEXIN 500 MG: 500 CAPSULE ORAL at 19:37

## 2017-08-01 RX ADMIN — ALDESLEUKIN 34 MILLION UNITS: 1.1 INJECTION, POWDER, LYOPHILIZED, FOR SOLUTION INTRAVENOUS at 20:16

## 2017-08-01 NOTE — PLAN OF CARE
Problem: Chemotherapy Effects (Adult)  Goal: Signs and Symptoms of Listed Potential Problems Will be Absent or Manageable (Chemotherapy Effects)  Signs and symptoms of listed potential problems will be absent or manageable by discharge/transition of care (reference Chemotherapy Effects (Adult) CPG).   Outcome: No Change  D: Dose 1 Interleukin-2 via picc.  Picc with brisk blood return.  Urine output >30ml/hr, blood pressure and heart rate meet parameters.  Labs within parameters. Tylenol and zofran pre treatment as ordered.   I:  Interleukin-2 IV over 15 minutes.    A:  Treatment begun.    P:  Moniter and treat signs of capillary leak syndrome (see fluid flush and 02 orders).  Suzi will notify nursing when she develops chills, bear hugger at bedside ready for use. Notify Dr per parameters or if with change in status.  Call light in reach.  Teaching ongoing.

## 2017-08-01 NOTE — PROVIDER NOTIFICATION
Results for MICKEY AMEZCUA (MRN [id number]) as of 8/1/2017 18:09   Ref. Range 8/1/2017 17:22   Lactic Acid Latest Ref Range: 0.7 - 2.1 mmol/L 2.3 (H)     Febrile and tachycardia post Interleukin 2 reaction late day shift. Sepsis protocol triggered, Dr notified to assess pt with 2.3 lactic acid. Temp trending down, heart rate trending down but still elevated, will continue to assess.

## 2017-08-01 NOTE — PLAN OF CARE
Problem: Goal Outcome Summary  Goal: Goal Outcome Summary  Outcome: No Change     D/I: Received dose #2 IL-2 at 0250. Blood return present per PICC. Pt met urine parameters and VSS.   A/P: Chemo double checked by 2 chemo competent RNs. Monitor for S/sx toxicity.     Pt experienced some rigors 2.5 hours after dose #2 IL-2. Gema hugger applied for 30 minutes and 1 dose of demerol given with relief. Pt denies nausea and pain. Continue Plan of care.

## 2017-08-01 NOTE — PLAN OF CARE
"Problem: Goal Outcome Summary  Goal: Goal Outcome Summary  Outcome: No Change  AF, VSS.  Received dose 1 Interleukin-2 at about 18:00.  Reported feeling a \"twinge\" about 2 hours later but no chills, reading her ipad resting comfortably. Up and awake until about 21:00 then napping. Has had  500 ml urine out after first dose of Interleukin-2, has met urine parameters for second dose that will be due at 02:00.      "

## 2017-08-01 NOTE — PROGRESS NOTES
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 She is admitted for cycle 3 high dose IL-2 therapy. She received cycle 1 on 6/6, completed 9 doses and tolerated it well overall. She received cycle 2 on 6/20, completed 7 doses, complicated by hypotension and fatigue. She saw Dr. Green (primary oncologist) with a repeat CT scan last week which showed stable disease. She has now fully recovered and is feeling well.    # Metastatic RCC. Admitted for cycle 3 HD IL-2. S/p 2 doses (#2 at 0250) and tolerating well with mild nausea, vomiting x1, rigors with 2nd dose. Feeling ok this AM. Meeting parameters to continue.   -PICC placed on admission.   -IVF per tx plan.   -Premeds: tylenol, zofran, zantac.   -Keflex prophy.   -Aldesleukin 34 million units IV q8h for up to 14 doses as tolerated.   -Prn antiemetics.   -Avoid diuretics and steroids.     # Hx depression, anxiety. Continue home wellbutrin, celexa. Ativan prn.     FEN:  -PRN lyte replacement  -RDAT    Prophy/Misc:  -VTE: mechanical   -GI/PUD: zantac  -Bowels: monitor    Code status: FULL CODE  Disposition: D/c home pending completion of IL-2 and recovery from acute toxicities; anticipate about 2-3 more days.     Stefanie Sequeira DNP, APRN, CNP  Hematology/Oncology  Pager: [phone]    Interval History   Suzi reports doing well this AM. She had nausea and episode of vomiting with first dose IL-2. She had rigors some time after second dose. Since then, she has been able to sleep well and is feeling ok at the moment. No BM today; had diarrhea with last cycle so will just monitor for now. Resting comfortably. No immediate concerns.     Physical Exam   Temp: 99.3  F (37.4  C) Temp src: Oral  BP: 95/40 Pulse: 109   Resp: 18 SpO2: 93 % O2 Device: None (Room air)    Vitals:    07/31/17 1436   Weight: 55.2 kg (121 lb 9.6 oz)     Vital Signs with Ranges  Temp:  [97.2  F (36.2  C)-100  F (37.8  C)] 99.3  F (37.4  C)  Pulse:  [] 109  Resp:  [16-18] 18  BP: ()/(40-69) 95/40  SpO2:  [93 %-98 %] 93 %  I/O last 3 completed shifts:  In: 4345 [P.O.:2350; I.V.:1881; IV Piggyback:114]  Out: 2335 [Urine:2335]    Constitutional: Pleasant woman seen resting comfortably in bed in NAD. Alert and interactive.   HEENT: NCAT. PERRL, anicteric sclera.  Respiratory: Non-labored breathing on RA.   Cardiovascular: Regular mild tachycardia. No murmur or rub.   GI: Normoactive bowel sounds. Abdomen soft, non-distended, and non-tender.   Skin: Warm and dry. No concerning lesions or rash on exposed surfaces.  Musculoskeletal: Extremities grossly normal, non-tender, no edema.  Neurologic: A&O x 3, CNs 2-12 grossly intact, speech normal.  Neuropsychiatric: Mentation and affect appear normal/appropriate.  Vascular Access: LUE is CDI, no swelling, mildly tender.     Medications   Current Facility-Administered Medications   Medication     benzonatate (TESSALON) capsule 100 mg     Medication Instruction     potassium chloride SA (K-DUR/KLOR-CON M) CR tablet 20-40 mEq     potassium chloride (KLOR-CON) Packet 20-40 mEq     potassium chloride 10 mEq in 100 mL intermittent infusion     potassium chloride 10 mEq in 100 mL intermittent infusion with 10 mg lidocaine     potassium chloride 20 mEq in 50 mL intermittent infusion     magnesium sulfate 4 g in 100 mL sterile water (premade)     sodium phosphate 15 mmol in D5W intermittent infusion     sodium phosphate 20 mmol in D5W intermittent infusion     sodium phosphate 25 mmol in D5W intermittent infusion     buPROPion (WELLBUTRIN XL) 24 hr tablet 300 mg     citalopram (celeXA) tablet 20 mg     eucerin cream     dextrose 5% and 0.9% NaCl with potassium chloride 20 mEq infusion      dextrose 5% infusion     0.9% sodium chloride BOLUS     ondansetron (ZOFRAN) injection 8 mg     acetaminophen (TYLENOL) tablet 650 mg     ranitidine (ZANTAC) tablet 150 mg     cephALEXin (KEFLEX) capsule 500 mg     diphenhydrAMINE (BENADRYL) capsule 25 mg     meperidine (DEMEROL) injection 25 mg     naproxen (NAPROSYN) tablet 250 mg     diphenoxylate-atropine (LOMOTIL) 2.5-0.025 MG per tablet 1 tablet     aldesleukin (PROLEUKIN) 34 Million Units in D5W 57 mL infusion     prochlorperazine (COMPAZINE) tablet 10 mg     prochlorperazine (COMPAZINE) injection 10 mg     MEDICATION INSTRUCTION     methylPREDNISolone sodium succinate (solu-MEDROL) injection 125 mg     diphenhydrAMINE (BENADRYL) injection 50 mg     EPINEPHrine (ADRENALIN) injection 0.3 mg     albuterol (PROAIR HFA/PROVENTIL HFA/VENTOLIN HFA) Inhaler 1-2 puff     albuterol neb solution 2.5 mg     0.9% sodium chloride infusion     naloxone (NARCAN) injection 0.1-0.4 mg     LORazepam (ATIVAN) injection 0.5-1 mg     LORazepam (ATIVAN) tablet 0.5-1 mg       Data   CBC  Recent Labs  Lab 08/01/17  0706 07/31/17  1138 07/25/17  1225   WBC 6.4 5.4 6.7   RBC 3.52* 4.02 4.22   HGB 10.7* 12.5 12.8   HCT 32.7* 37.0 39.8   MCV 93 92 94   MCH 30.4 31.1 30.3   MCHC 32.7 33.8 32.2   RDW 15.7* 15.2* 15.4*    441 386     CMP  Recent Labs  Lab 08/01/17  0706 07/31/17  1138 07/25/17  1225    133 136   POTASSIUM 3.9 4.3 4.4   CHLORIDE 111* 101 105   CO2 20 25 27   ANIONGAP 8 7 4   * 97 90   BUN 11 19 16   CR 0.98 0.99 0.99   GFRESTIMATED 59* 58* 59*   GFRESTBLACK 72 71 71   MUNDO 8.0* 9.6 9.3   MAG 1.6  --   --    PHOS 2.7  --   --    PROTTOTAL 6.0* 7.8 8.0   ALBUMIN 3.0* 3.9 3.9   BILITOTAL 0.4 0.4 0.4   ALKPHOS 53 81 91   AST 16 25 24   ALT 19 27 26     INRNo lab results found in last 7 days.    Results for orders placed or performed in visit on 07/24/17   CT Chest/Abdomen/Pelvis w Contrast    Narrative    EXAMINATION: CT CHEST/ABDOMEN/PELVIS W CONTRAST,  7/24/2017 1:09 PM    TECHNIQUE:  Helical CT images from the thoracic inlet through the  symphysis pubis were obtained  with contrast. Contrast dose: 80 mL  Isovue-370    COMPARISON: 5/11/2017    HISTORY: mRCC, routine fup after IL-2, Malignant neoplasm of right  kidney, except renal pelvis    FINDINGS:    LUNGS: Left apical scarring with associated spiculated mass measuring  2.6 x 2.0 cm, stable. There are multiple nodules in the left lung and  scattered nodules in the right lung, overall stable in size and number  from the prior study. Mild bilateral lower lobes dependent  atelectasis. No acute airspace opacities.    Chest: Rim calcified focus in the right lobe of the thyroid gland.  Central tracheobronchial tree is patent. Thoracic aorta and main  pulmonary artery have normal diameter. Cardiac size within normal  limits. No pericardial effusions. No pleural effusions. No  pneumothorax. Subcentimeter axillary lymph nodes, not enlarged by size  criteria.     Enlarged heterogeneously enhancing necrotic lymph nodes in the  mediastinum, measuring up to 1.2 cm in the aortopulmonary window,  series 3 image 84, stable. Enlarged heterogeneously enhancing necrotic  left hilar lymph nodes measuring up to 1.8 cm, series 3 image 91,  previously measuring 1.5 cm, not significantly changed. No right hilar  lymphadenopathy. 8 mm right hilar lymph nodes, not enlarged by size  criteria. Otherwise, scattered subcentimeter mediastinal lymph nodes  are seen.    Abdomen and pelvis: Homogeneous liver parenchyma. No focal liver  lesions. No intra or extrahepatic biliary dilatation. Hepatic venous  system and portal venous system are patent. Unremarkable gallbladder.    4 mm rim-enhancing hypoattenuating focus in the pancreatic body,  stable. 9 mm hypoattenuating focus in the pancreatic tail, stable.    The spleen is not enlarged.    Postoperative changes of right adrenalectomy and right nephrectomy  with associated surgical clips. No  recurrent and/or residual tumor in  the surgical bed. Left kidney with normal size and position with  subcentimeter hypoattenuating foci, too small to characterize, stable  from the prior study. No left renal stones or hydronephrosis. The  urinary bladder is well distended and unremarkable in appearance.  Heterogeneously enhancing uterus. No adnexal masses.    There are 2 heterogeneously enhancing lesions in the left adrenal  gland, necrotic appearing measuring 1.4 cm in the medial limb,  previously measuring 1.3 cm as well as measuring 0.9 cm in the lateral  limb, previously measuring 0.9 cm.    Decompressed stomach. No dilated loops of bowel. No bowel wall  thickening. Colonic diverticulosis. No associated CT findings of acute  diverticulitis.    No abdominal aortic aneurysm. No free fluid. No free air. Scattered  retroperitoneal and mesenteric lymph nodes the largest one measuring  1.3 cm heterogeneously enhancing along the right external iliac chain,  previously measuring 0.9 cm. Subcentimeter bilateral inguinal lymph  nodes with a preserved fatty hilum, likely reactive. Small fat  containing umbilical hernia.    Bones: Multilevel degenerative changes of the spine. Degenerative  changes of bilateral SI joints and hips. Scattered Schmorl's nodes.  Scattered intradiscal gas. Degenerative changes of both shoulders.  Stable wedge compression deformity fracture of the vertebral bodies  T7. No aggressive sclerotic or lytic lesions in the bones.      Impression    IMPRESSION: In this patient with history of renal cell carcinoma,  status post right nephrectomy and right adrenalectomy:  1. Enlarged necrotic mediastinal and left hilar lymph nodes, some of  them slightly increased from the prior study. Right external iliac  chain necrotic enlarged lymph node, increased in size from the prior  study.  2. Stable necrotic lesions in the left adrenal gland concerning for  metastasis.  3. Stable pulmonary nodules bilaterally,  particularly involving the  left lung, concerning for metastasis.  4. Stable hypoattenuating foci with rim enhancement in the pancreatic  body and tail, concerning for metastasis.    NIA GUILLORY MD

## 2017-08-01 NOTE — PLAN OF CARE
Problem: Goal Outcome Summary  Goal: Goal Outcome Summary  Outcome: No Change  Afebrile, VSS. Denied pain, nausea/vomiting. Up ad ta in room. Very poor po intake. Adequate urine output. BM today. Requested prn Compazine prior to dose # 4 IL-2. Received 1 mg prn Ativan x 1 for anxiety. Received dose # 4 IL-2 at 11:19 AM, tolerating well thus far. Visitors here this afternoon. Currently meeting urine parameters for dose # 5 IL-2 due at due at 1919 (evening nurse will need to order IL-2 from pharmacy).

## 2017-08-02 LAB
ALBUMIN SERPL-MCNC: 2.3 G/DL (ref 3.4–5)
ALP SERPL-CCNC: 41 U/L (ref 40–150)
ALT SERPL W P-5'-P-CCNC: 24 U/L (ref 0–50)
ANION GAP SERPL CALCULATED.3IONS-SCNC: 7 MMOL/L (ref 3–14)
AST SERPL W P-5'-P-CCNC: 22 U/L (ref 0–45)
BASOPHILS # BLD AUTO: 0 10E9/L (ref 0–0.2)
BASOPHILS NFR BLD AUTO: 0.1 %
BILIRUB SERPL-MCNC: 0.3 MG/DL (ref 0.2–1.3)
BUN SERPL-MCNC: 9 MG/DL (ref 7–30)
CALCIUM SERPL-MCNC: 7.7 MG/DL (ref 8.5–10.1)
CHLORIDE SERPL-SCNC: 110 MMOL/L (ref 94–109)
CK SERPL-CCNC: 86 U/L (ref 30–225)
CO2 SERPL-SCNC: 20 MMOL/L (ref 20–32)
CREAT SERPL-MCNC: 1.1 MG/DL (ref 0.52–1.04)
DIFFERENTIAL METHOD BLD: ABNORMAL
EOSINOPHIL # BLD AUTO: 0.6 10E9/L (ref 0–0.7)
EOSINOPHIL NFR BLD AUTO: 6.7 %
ERYTHROCYTE [DISTWIDTH] IN BLOOD BY AUTOMATED COUNT: 15.8 % (ref 10–15)
GFR SERPL CREATININE-BSD FRML MDRD: 52 ML/MIN/1.7M2
GLUCOSE SERPL-MCNC: 126 MG/DL (ref 70–99)
HCT VFR BLD AUTO: 31.1 % (ref 35–47)
HGB BLD-MCNC: 10.1 G/DL (ref 11.7–15.7)
IMM GRANULOCYTES # BLD: 0 10E9/L (ref 0–0.4)
IMM GRANULOCYTES NFR BLD: 0.2 %
LDH SERPL L TO P-CCNC: 150 U/L (ref 81–234)
LYMPHOCYTES # BLD AUTO: 0.2 10E9/L (ref 0.8–5.3)
LYMPHOCYTES NFR BLD AUTO: 2 %
MAGNESIUM SERPL-MCNC: 1.3 MG/DL (ref 1.6–2.3)
MAGNESIUM SERPL-MCNC: 3.1 MG/DL (ref 1.6–2.3)
MCH RBC QN AUTO: 30 PG (ref 26.5–33)
MCHC RBC AUTO-ENTMCNC: 32.5 G/DL (ref 31.5–36.5)
MCV RBC AUTO: 92 FL (ref 78–100)
MONOCYTES # BLD AUTO: 0.3 10E9/L (ref 0–1.3)
MONOCYTES NFR BLD AUTO: 3 %
NEUTROPHILS # BLD AUTO: 7.9 10E9/L (ref 1.6–8.3)
NEUTROPHILS NFR BLD AUTO: 88 %
NRBC # BLD AUTO: 0 10*3/UL
NRBC BLD AUTO-RTO: 0 /100
PHOSPHATE SERPL-MCNC: 2.2 MG/DL (ref 2.5–4.5)
PLATELET # BLD AUTO: 229 10E9/L (ref 150–450)
POTASSIUM SERPL-SCNC: 4 MMOL/L (ref 3.4–5.3)
PROT SERPL-MCNC: 5.1 G/DL (ref 6.8–8.8)
RBC # BLD AUTO: 3.37 10E12/L (ref 3.8–5.2)
SODIUM SERPL-SCNC: 137 MMOL/L (ref 133–144)
WBC # BLD AUTO: 9 10E9/L (ref 4–11)

## 2017-08-02 PROCEDURE — 83735 ASSAY OF MAGNESIUM: CPT | Performed by: PHYSICIAN ASSISTANT

## 2017-08-02 PROCEDURE — 25000128 H RX IP 250 OP 636: Performed by: INTERNAL MEDICINE

## 2017-08-02 PROCEDURE — 25000125 ZZHC RX 250: Performed by: INTERNAL MEDICINE

## 2017-08-02 PROCEDURE — 83615 LACTATE (LD) (LDH) ENZYME: CPT | Performed by: PHYSICIAN ASSISTANT

## 2017-08-02 PROCEDURE — 85025 COMPLETE CBC W/AUTO DIFF WBC: CPT | Performed by: PHYSICIAN ASSISTANT

## 2017-08-02 PROCEDURE — 36592 COLLECT BLOOD FROM PICC: CPT | Performed by: INTERNAL MEDICINE

## 2017-08-02 PROCEDURE — 84100 ASSAY OF PHOSPHORUS: CPT | Performed by: PHYSICIAN ASSISTANT

## 2017-08-02 PROCEDURE — 87040 BLOOD CULTURE FOR BACTERIA: CPT | Performed by: PHYSICIAN ASSISTANT

## 2017-08-02 PROCEDURE — 25000128 H RX IP 250 OP 636: Performed by: PHYSICIAN ASSISTANT

## 2017-08-02 PROCEDURE — 12000008 ZZH R&B INTERMEDIATE UMMC

## 2017-08-02 PROCEDURE — 99233 SBSQ HOSP IP/OBS HIGH 50: CPT | Performed by: INTERNAL MEDICINE

## 2017-08-02 PROCEDURE — 82550 ASSAY OF CK (CPK): CPT | Performed by: PHYSICIAN ASSISTANT

## 2017-08-02 PROCEDURE — 25800025 ZZH RX 258: Performed by: PHYSICIAN ASSISTANT

## 2017-08-02 PROCEDURE — 83735 ASSAY OF MAGNESIUM: CPT | Performed by: INTERNAL MEDICINE

## 2017-08-02 PROCEDURE — 80053 COMPREHEN METABOLIC PANEL: CPT | Performed by: PHYSICIAN ASSISTANT

## 2017-08-02 PROCEDURE — 36592 COLLECT BLOOD FROM PICC: CPT | Performed by: PHYSICIAN ASSISTANT

## 2017-08-02 PROCEDURE — 25000132 ZZH RX MED GY IP 250 OP 250 PS 637: Performed by: PHYSICIAN ASSISTANT

## 2017-08-02 PROCEDURE — 40000802 ZZH SITE CHECK

## 2017-08-02 RX ADMIN — ACETAMINOPHEN 650 MG: 325 TABLET, FILM COATED ORAL at 13:14

## 2017-08-02 RX ADMIN — ONDANSETRON 8 MG: 2 INJECTION INTRAMUSCULAR; INTRAVENOUS at 08:12

## 2017-08-02 RX ADMIN — POTASSIUM CHLORIDE, DEXTROSE MONOHYDRATE AND SODIUM CHLORIDE: 150; 5; 900 INJECTION, SOLUTION INTRAVENOUS at 09:35

## 2017-08-02 RX ADMIN — PROCHLORPERAZINE EDISYLATE 10 MG: 5 INJECTION INTRAMUSCULAR; INTRAVENOUS at 17:46

## 2017-08-02 RX ADMIN — POTASSIUM CHLORIDE, DEXTROSE MONOHYDRATE AND SODIUM CHLORIDE: 150; 5; 900 INJECTION, SOLUTION INTRAVENOUS at 20:37

## 2017-08-02 RX ADMIN — CITALOPRAM HYDROBROMIDE 20 MG: 20 TABLET ORAL at 20:13

## 2017-08-02 RX ADMIN — BUPROPION HYDROCHLORIDE 300 MG: 300 TABLET, FILM COATED, EXTENDED RELEASE ORAL at 08:12

## 2017-08-02 RX ADMIN — ALDESLEUKIN 34 MILLION UNITS: 1.1 INJECTION, POWDER, LYOPHILIZED, FOR SOLUTION INTRAVENOUS at 23:46

## 2017-08-02 RX ADMIN — MAGNESIUM SULFATE IN WATER 4 G: 40 INJECTION, SOLUTION INTRAVENOUS at 08:12

## 2017-08-02 RX ADMIN — ACETAMINOPHEN 650 MG: 325 TABLET, FILM COATED ORAL at 20:37

## 2017-08-02 RX ADMIN — PROCHLORPERAZINE EDISYLATE 10 MG: 5 INJECTION INTRAMUSCULAR; INTRAVENOUS at 11:41

## 2017-08-02 RX ADMIN — MEPERIDINE HYDROCHLORIDE 25 MG: 25 INJECTION, SOLUTION INTRAMUSCULAR; INTRAVENOUS; SUBCUTANEOUS at 17:31

## 2017-08-02 RX ADMIN — RANITIDINE HYDROCHLORIDE 150 MG: 150 TABLET, FILM COATED ORAL at 08:12

## 2017-08-02 RX ADMIN — ONDANSETRON 8 MG: 2 INJECTION INTRAMUSCULAR; INTRAVENOUS at 16:47

## 2017-08-02 RX ADMIN — DEXTROSE MONOHYDRATE 1000 ML: 50 INJECTION, SOLUTION INTRAVENOUS at 09:40

## 2017-08-02 RX ADMIN — ACETAMINOPHEN 650 MG: 325 TABLET, FILM COATED ORAL at 00:15

## 2017-08-02 RX ADMIN — RANITIDINE HYDROCHLORIDE 150 MG: 150 TABLET, FILM COATED ORAL at 20:13

## 2017-08-02 RX ADMIN — ACETAMINOPHEN 650 MG: 325 TABLET, FILM COATED ORAL at 08:13

## 2017-08-02 RX ADMIN — CEPHALEXIN 500 MG: 500 CAPSULE ORAL at 08:12

## 2017-08-02 RX ADMIN — ACETAMINOPHEN 650 MG: 325 TABLET, FILM COATED ORAL at 16:47

## 2017-08-02 RX ADMIN — SODIUM PHOSPHATE, MONOBASIC, MONOHYDRATE AND SODIUM PHOSPHATE, DIBASIC, ANHYDROUS 15 MMOL: 276; 142 INJECTION, SOLUTION INTRAVENOUS at 09:35

## 2017-08-02 RX ADMIN — ONDANSETRON 8 MG: 2 INJECTION INTRAMUSCULAR; INTRAVENOUS at 00:17

## 2017-08-02 RX ADMIN — ACETAMINOPHEN 650 MG: 325 TABLET, FILM COATED ORAL at 04:31

## 2017-08-02 RX ADMIN — LORAZEPAM 1 MG: 2 INJECTION INTRAMUSCULAR; INTRAVENOUS at 08:25

## 2017-08-02 RX ADMIN — SODIUM CHLORIDE 250 ML: 9 INJECTION, SOLUTION INTRAVENOUS at 02:48

## 2017-08-02 RX ADMIN — ALDESLEUKIN 34 MILLION UNITS: 1.1 INJECTION, POWDER, LYOPHILIZED, FOR SOLUTION INTRAVENOUS at 04:43

## 2017-08-02 RX ADMIN — CEPHALEXIN 500 MG: 500 CAPSULE ORAL at 20:13

## 2017-08-02 RX ADMIN — ALDESLEUKIN 34 MILLION UNITS: 1.1 INJECTION, POWDER, LYOPHILIZED, FOR SOLUTION INTRAVENOUS at 14:53

## 2017-08-02 RX ADMIN — SODIUM CHLORIDE 250 ML: 9 INJECTION, SOLUTION INTRAVENOUS at 13:15

## 2017-08-02 NOTE — PROGRESS NOTES
[facility], [place]    Hematology / Oncology Progress Note    Date of Admission: 7/31/2017  Hospital Day #: 2      Assessment & Plan   Suzi Gonslaes is a 52 year old woman with metastatic clear cell renal carcinoma s/p right radical nephrectomy in 12/2014 now with metastases to lungs, adrenal, and pancreas. She is admitted for cycle 3 high dose IL-2 therapy. She received cycle 1 on 6/6, completed 9 doses and tolerated it well overall. She received cycle 2 on 6/20, completed 7 doses, complicated by hypotension and fatigue. She saw Dr. Green (primary oncologist) with a repeat CT scan last week which showed stable disease. She has now fully recovered and is feeling well.    # Metastatic RCC. Admitted for cycle 3 HD IL-2. S/p 5 doses (#2 at 0445) and tolerating fairly well with mild nausea, vomiting x1, rigors, diarrhea x 1, hypotension requiring 250 cc bolus x1, fatigue.  Will continue IL2 as she continues to meet parameters, AM labs are appropriate for IL2.   -PICC placed on admission.   -IVF per tx plan.   -Premeds: tylenol, zofran, zantac.   -Keflex prophy.   -Aldesleukin 34 million units IV q8h for up to 14 doses as tolerated.   -Prn antiemetics.   -Avoid diuretics and steroids.     # Hx depression, anxiety. Continue home wellbutrin, celexa. Ativan prn.     FEN:  -PRN lyte replacement  -RDAT    Prophy/Misc:  -VTE: mechanical   -GI/PUD: zantac  -Bowels: monitor    Code status: FULL CODE  Disposition: D/c home pending completion of IL-2 and recovery from acute toxicities; anticipate about 2 more days (8/4).     Romelia Vergara PA-C  Hematology/Oncology    Interval History   Suzi reports states she is very tired but is otherwise doing ok. She wants to continue with treatment and feels it is going better than her last cycle. She has had a couple episodes of vomiting. Diarrhea this AM. Urinating well. No SOB. Mild cough this AM. No leg swelling. Has been ambulating. Decreased appetite. Had some  fevers and rigors.     Physical Exam   Temp: 98.6  F (37  C) Temp src: Oral BP: 107/47 Pulse: 104 Heart Rate: 114 Resp: 16 SpO2: 96 % O2 Device: None (Room air)    Vitals:    07/31/17 1436 08/01/17 1123 08/02/17 0815   Weight: 55.2 kg (121 lb 9.6 oz) 56.8 kg (125 lb 3.2 oz) 58.9 kg (129 lb 14.4 oz)     Vital Signs with Ranges  Temp:  [98.5  F (36.9  C)-102.9  F (39.4  C)] 98.6  F (37  C)  Pulse:  [104-117] 104  Heart Rate:  [103-133] 114  Resp:  [16-24] 16  BP: ()/(36-53) 107/47  SpO2:  [93 %-97 %] 96 %  I/O last 3 completed shifts:  In: 4489 [P.O.:690; I.V.:3128; IV Piggyback:671]  Out: 1585 [Urine:1585]    Constitutional: Pleasant woman seen resting comfortably in bed in NAD. Appears tired. Alert and interactive.   HEENT: NCAT. PERRL, anicteric sclera.  Respiratory: Non-labored breathing on RA.   Cardiovascular: Regular mild tachycardia. No murmur or rub.   GI: Normoactive bowel sounds. Abdomen soft, non-distended, and non-tender.   Skin: Warm and dry. No concerning lesions or rash on exposed surfaces.  Musculoskeletal: Extremities grossly normal, non-tender, no edema.  Neurologic: A&O x 3, CNs 2-12 grossly intact, speech normal.  Neuropsychiatric: Mentation and affect appear normal/appropriate.  Vascular Access: LUE is CDI, no swelling, mildly tender.     Medications   Current Facility-Administered Medications   Medication     sodium chloride (PF) 0.9% PF flush 3 mL     benzonatate (TESSALON) capsule 100 mg     Medication Instruction     potassium chloride SA (K-DUR/KLOR-CON M) CR tablet 20-40 mEq     potassium chloride (KLOR-CON) Packet 20-40 mEq     potassium chloride 10 mEq in 100 mL intermittent infusion     potassium chloride 10 mEq in 100 mL intermittent infusion with 10 mg lidocaine     potassium chloride 20 mEq in 50 mL intermittent infusion     magnesium sulfate 4 g in 100 mL sterile water (premade)     sodium phosphate 15 mmol in D5W intermittent infusion     sodium phosphate 20 mmol in D5W  intermittent infusion     sodium phosphate 25 mmol in D5W intermittent infusion     buPROPion (WELLBUTRIN XL) 24 hr tablet 300 mg     citalopram (celeXA) tablet 20 mg     eucerin cream     dextrose 5% and 0.9% NaCl with potassium chloride 20 mEq infusion     dextrose 5% infusion     0.9% sodium chloride BOLUS     ondansetron (ZOFRAN) injection 8 mg     acetaminophen (TYLENOL) tablet 650 mg     ranitidine (ZANTAC) tablet 150 mg     cephALEXin (KEFLEX) capsule 500 mg     diphenhydrAMINE (BENADRYL) capsule 25 mg     meperidine (DEMEROL) injection 25 mg     naproxen (NAPROSYN) tablet 250 mg     diphenoxylate-atropine (LOMOTIL) 2.5-0.025 MG per tablet 1 tablet     aldesleukin (PROLEUKIN) 34 Million Units in D5W 57 mL infusion     prochlorperazine (COMPAZINE) tablet 10 mg     prochlorperazine (COMPAZINE) injection 10 mg     MEDICATION INSTRUCTION     methylPREDNISolone sodium succinate (solu-MEDROL) injection 125 mg     diphenhydrAMINE (BENADRYL) injection 50 mg     EPINEPHrine (ADRENALIN) injection 0.3 mg     albuterol (PROAIR HFA/PROVENTIL HFA/VENTOLIN HFA) Inhaler 1-2 puff     albuterol neb solution 2.5 mg     0.9% sodium chloride infusion     naloxone (NARCAN) injection 0.1-0.4 mg     LORazepam (ATIVAN) injection 0.5-1 mg     LORazepam (ATIVAN) tablet 0.5-1 mg       Data   CBC    Recent Labs  Lab 08/02/17  0638 08/01/17  0706 07/31/17  1138   WBC 9.0 6.4 5.4   RBC 3.37* 3.52* 4.02   HGB 10.1* 10.7* 12.5   HCT 31.1* 32.7* 37.0   MCV 92 93 92   MCH 30.0 30.4 31.1   MCHC 32.5 32.7 33.8   RDW 15.8* 15.7* 15.2*    271 441     CMP    Recent Labs  Lab 08/02/17  0638 08/01/17  0706 07/31/17  1138    140 133   POTASSIUM 4.0 3.9 4.3   CHLORIDE 110* 111* 101   CO2 20 20 25   ANIONGAP 7 8 7   * 155* 97   BUN 9 11 19   CR 1.10* 0.98 0.99   GFRESTIMATED 52* 59* 58*   GFRESTBLACK 63 72 71   MUNDO 7.7* 8.0* 9.6   MAG 1.3* 1.6  --    PHOS 2.2* 2.7  --    PROTTOTAL 5.1* 6.0* 7.8   ALBUMIN 2.3* 3.0* 3.9   BILITOTAL 0.3  0.4 0.4   ALKPHOS 41 53 81   AST 22 16 25   ALT 24 19 27     INRNo lab results found in last 7 days.    Results for orders placed or performed in visit on 07/24/17   CT Chest/Abdomen/Pelvis w Contrast    Narrative    EXAMINATION: CT CHEST/ABDOMEN/PELVIS W CONTRAST, 7/24/2017 1:09 PM    TECHNIQUE:  Helical CT images from the thoracic inlet through the  symphysis pubis were obtained  with contrast. Contrast dose: 80 mL  Isovue-370    COMPARISON: 5/11/2017    HISTORY: mRCC, routine fup after IL-2, Malignant neoplasm of right  kidney, except renal pelvis    FINDINGS:    LUNGS: Left apical scarring with associated spiculated mass measuring  2.6 x 2.0 cm, stable. There are multiple nodules in the left lung and  scattered nodules in the right lung, overall stable in size and number  from the prior study. Mild bilateral lower lobes dependent  atelectasis. No acute airspace opacities.    Chest: Rim calcified focus in the right lobe of the thyroid gland.  Central tracheobronchial tree is patent. Thoracic aorta and main  pulmonary artery have normal diameter. Cardiac size within normal  limits. No pericardial effusions. No pleural effusions. No  pneumothorax. Subcentimeter axillary lymph nodes, not enlarged by size  criteria.     Enlarged heterogeneously enhancing necrotic lymph nodes in the  mediastinum, measuring up to 1.2 cm in the aortopulmonary window,  series 3 image 84, stable. Enlarged heterogeneously enhancing necrotic  left hilar lymph nodes measuring up to 1.8 cm, series 3 image 91,  previously measuring 1.5 cm, not significantly changed. No right hilar  lymphadenopathy. 8 mm right hilar lymph nodes, not enlarged by size  criteria. Otherwise, scattered subcentimeter mediastinal lymph nodes  are seen.    Abdomen and pelvis: Homogeneous liver parenchyma. No focal liver  lesions. No intra or extrahepatic biliary dilatation. Hepatic venous  system and portal venous system are patent. Unremarkable gallbladder.    4 mm  rim-enhancing hypoattenuating focus in the pancreatic body,  stable. 9 mm hypoattenuating focus in the pancreatic tail, stable.    The spleen is not enlarged.    Postoperative changes of right adrenalectomy and right nephrectomy  with associated surgical clips. No recurrent and/or residual tumor in  the surgical bed. Left kidney with normal size and position with  subcentimeter hypoattenuating foci, too small to characterize, stable  from the prior study. No left renal stones or hydronephrosis. The  urinary bladder is well distended and unremarkable in appearance.  Heterogeneously enhancing uterus. No adnexal masses.    There are 2 heterogeneously enhancing lesions in the left adrenal  gland, necrotic appearing measuring 1.4 cm in the medial limb,  previously measuring 1.3 cm as well as measuring 0.9 cm in the lateral  limb, previously measuring 0.9 cm.    Decompressed stomach. No dilated loops of bowel. No bowel wall  thickening. Colonic diverticulosis. No associated CT findings of acute  diverticulitis.    No abdominal aortic aneurysm. No free fluid. No free air. Scattered  retroperitoneal and mesenteric lymph nodes the largest one measuring  1.3 cm heterogeneously enhancing along the right external iliac chain,  previously measuring 0.9 cm. Subcentimeter bilateral inguinal lymph  nodes with a preserved fatty hilum, likely reactive. Small fat  containing umbilical hernia.    Bones: Multilevel degenerative changes of the spine. Degenerative  changes of bilateral SI joints and hips. Scattered Schmorl's nodes.  Scattered intradiscal gas. Degenerative changes of both shoulders.  Stable wedge compression deformity fracture of the vertebral bodies  T7. No aggressive sclerotic or lytic lesions in the bones.      Impression    IMPRESSION: In this patient with history of renal cell carcinoma,  status post right nephrectomy and right adrenalectomy:  1. Enlarged necrotic mediastinal and left hilar lymph nodes, some of  them  slightly increased from the prior study. Right external iliac  chain necrotic enlarged lymph node, increased in size from the prior  study.  2. Stable necrotic lesions in the left adrenal gland concerning for  metastasis.  3. Stable pulmonary nodules bilaterally, particularly involving the  left lung, concerning for metastasis.  4. Stable hypoattenuating foci with rim enhancement in the pancreatic  body and tail, concerning for metastasis.    NIA GUILLORY MD

## 2017-08-02 NOTE — PLAN OF CARE
Problem: Goal Outcome Summary  Goal: Goal Outcome Summary  Outcome: Declining  Afebrile. Tachycardic to 117. OVSS. Denied pain. C/O nausea, emesis x 1 (100 cc). Nausea adequately controlled after receiving prn Ativan and Compazine x 1. Up ad ta in room. Very poor po intake. Adequate urine output. Formed BM x 2 and 200 cc diarrhea, declined Imodium.  Magnesium and phosphorus replaced. Magnesium recheck was 3.1, no further intervention required. Phosphorus recheck with morning labs. Awaiting dose # 6 IL-2 to arrive from pharmacy. Notify MD with any concerns and or changes in pt's condition. Continue with plan of care.

## 2017-08-02 NOTE — PROGRESS NOTES
Nursing Focus: Chemotherapy  D: Positive blood return via PICC. Insertion site is clean/dry/intact, dressing intact with no complaints of pain.  Urine output is recorded in intake in Doc Flowsheet.    I: Premedications given per order (see electronic medical administration record). Dose #5 of IL-2 started to infuse over 15 minutes. Reviewed pt teaching on chemotherapy side effects.  Pt denies need for further teaching. Chemotherapy double checked per protocol by two chemotherapy competent RN's.   A: Tolerating procedure well. Denies nausea and or pain. Urine parameters met. VS within parameters.  P: Continue to monitor urine output and symptoms of nausea. Screen for symptoms of toxicity.

## 2017-08-02 NOTE — PROGRESS NOTES
Nursing Focus: Chemotherapy     D: Positive blood return via PICC. Insertion site is clean/dry/intact, dressing intact with no complaints of pain. Urine output is recorded in intake/output section in Doc Flowsheets.    I: Premedications given per order (see electronic medical administration record). Dose # 6 of IL-2 started to infuse over 15 minutes. Reviewed pt teaching on chemotherapy side effects. Pt denies need for further teaching. Chemotherapy double checked per protocol by two chemotherapy competent RN's.   A: Tolerating procedure well. Denies nausea and or pain.   P: Continue to monitor urine output and symptoms of nausea. Screen for symptoms of toxicity. Notify MD with any concerns and or changes in pt's condition. Continue with plan of care.

## 2017-08-02 NOTE — PLAN OF CARE
Problem: Goal Outcome Summary  Goal: Goal Outcome Summary  Outcome: Declining  BPs hypotensive all night. 250ml NS bolus given once for BP 84/41. Pt did state she feels dizzy at times when up. Instructed to call. BP improved to 95/43 prior to IL-2 dose. HR tachy. Tmax 102.9; scheduled Tylenol given. Came down to 98.5. Dose #5 of IL-2 given at 0445. Pt's wt up 4lbs since admission. Denied nausea or pain. Voiding adequate amounts. Denies diarrhea. Cont to closely monitor wt and BPs. Cont POC.    Addendum: Pt had one diarrheal stool this morning, unmeasured. Hat now in bathroom for pt to measure. Instructed pt to let us know when she has a stool. Pt has not reacted from dose #5 yet.

## 2017-08-02 NOTE — PLAN OF CARE
Problem: Goal Outcome Summary  Goal: Goal Outcome Summary  Outcome: Declining  Febrile and tachycardic at start of shift after Interleukin-2 reaction late day shift. Temp high of 102.7, heart rate to 133.  Extra blankets removed, frequent vital sign check (naproxen was unavailable when febrile, dose was ordered and now in med bin for next temp spike).  Sepsis protocol triggered with tachycardia and fevers, lactic acid 2.3, Dr notified and assessed pt.  When time for Interleukin-2 dose BP noted to be 80/41, .  BP rechecked and no change,  ml bolus over 30 minutes with BP/HR within parameters.  Dose 4 Interleukin-2 dose given, see chemo note.  In bed much of shift, did get up and sit in chair for about 15 minutes to allow bed change.  Ambulated to bathroom with standby assist x 1 this odilia.  Refused evening meal, is drinking water. Ativan x 1 for anxiety when pt got a room mate and wanted a different room, no other room available.  Room mate is quiet and considerate, Suzi more relaxed able to rest after ativan.  Expect Interleukin-2 reaction around 22:30, call light in reach.     Interleukin-2 reaction with rigors at about 22:45/  Bear hugger placed and demerol 25 mg given.  At 30 min check temp to 101.3, bear hugger turned off. At next 30 minute check temp to 102.1, other vitals within parameters.  Naproxen given, recheck VS in 30 minutes.

## 2017-08-02 NOTE — PLAN OF CARE
Problem: Chemotherapy Effects (Adult)  Goal: Signs and Symptoms of Listed Potential Problems Will be Absent or Manageable (Chemotherapy Effects)  Signs and symptoms of listed potential problems will be absent or manageable by discharge/transition of care (reference Chemotherapy Effects (Adult) CPG).   Outcome: Declining  D: Dose 4 Interleukin-2 via picc.  Picc with brisk blood return.  Urine output >30ml/hr.  Blood pressure and heart rate met parameters after 250 ml NS flush.      I:  Interleukin-2 IV over 15 minutes.    A:  Treatment continues, required NS flush to maintain blood pressure within parameters.    P:  Moniter and treat signs of capillary leak syndrome (see fluid flush and 02 orders).  Suzi will notify nursing when she develops chills, bear hugger at bedside ready for use. Notify Dr per parameters or if with change in status.  Call light in reach.

## 2017-08-03 VITALS
HEART RATE: 104 BPM | TEMPERATURE: 97.4 F | BODY MASS INDEX: 24.27 KG/M2 | SYSTOLIC BLOOD PRESSURE: 104 MMHG | HEIGHT: 62 IN | DIASTOLIC BLOOD PRESSURE: 51 MMHG | WEIGHT: 131.9 LBS | OXYGEN SATURATION: 97 % | RESPIRATION RATE: 20 BRPM

## 2017-08-03 LAB
ALBUMIN SERPL-MCNC: 2 G/DL (ref 3.4–5)
ALP SERPL-CCNC: 57 U/L (ref 40–150)
ALT SERPL W P-5'-P-CCNC: 50 U/L (ref 0–50)
ANION GAP SERPL CALCULATED.3IONS-SCNC: 7 MMOL/L (ref 3–14)
AST SERPL W P-5'-P-CCNC: 44 U/L (ref 0–45)
BASOPHILS # BLD AUTO: 0 10E9/L (ref 0–0.2)
BASOPHILS NFR BLD AUTO: 0 %
BILIRUB SERPL-MCNC: 0.3 MG/DL (ref 0.2–1.3)
BUN SERPL-MCNC: 6 MG/DL (ref 7–30)
CALCIUM SERPL-MCNC: 7.3 MG/DL (ref 8.5–10.1)
CHLORIDE SERPL-SCNC: 116 MMOL/L (ref 94–109)
CK SERPL-CCNC: 52 U/L (ref 30–225)
CO2 SERPL-SCNC: 16 MMOL/L (ref 20–32)
CREAT SERPL-MCNC: 0.98 MG/DL (ref 0.52–1.04)
DIFFERENTIAL METHOD BLD: ABNORMAL
EOSINOPHIL # BLD AUTO: 0.6 10E9/L (ref 0–0.7)
EOSINOPHIL NFR BLD AUTO: 9.3 %
ERYTHROCYTE [DISTWIDTH] IN BLOOD BY AUTOMATED COUNT: 15.9 % (ref 10–15)
GFR SERPL CREATININE-BSD FRML MDRD: 59 ML/MIN/1.7M2
GLUCOSE SERPL-MCNC: 110 MG/DL (ref 70–99)
HCT VFR BLD AUTO: 29.3 % (ref 35–47)
HGB BLD-MCNC: 9.7 G/DL (ref 11.7–15.7)
IMM GRANULOCYTES # BLD: 0 10E9/L (ref 0–0.4)
IMM GRANULOCYTES NFR BLD: 0.3 %
LDH SERPL L TO P-CCNC: 204 U/L (ref 81–234)
LYMPHOCYTES # BLD AUTO: 0.2 10E9/L (ref 0.8–5.3)
LYMPHOCYTES NFR BLD AUTO: 2.7 %
MAGNESIUM SERPL-MCNC: 2.2 MG/DL (ref 1.6–2.3)
MCH RBC QN AUTO: 30.3 PG (ref 26.5–33)
MCHC RBC AUTO-ENTMCNC: 33.1 G/DL (ref 31.5–36.5)
MCV RBC AUTO: 92 FL (ref 78–100)
MONOCYTES # BLD AUTO: 0.3 10E9/L (ref 0–1.3)
MONOCYTES NFR BLD AUTO: 4.3 %
NEUTROPHILS # BLD AUTO: 5 10E9/L (ref 1.6–8.3)
NEUTROPHILS NFR BLD AUTO: 83.4 %
NRBC # BLD AUTO: 0 10*3/UL
NRBC BLD AUTO-RTO: 0 /100
PHOSPHATE SERPL-MCNC: 1.2 MG/DL (ref 2.5–4.5)
PLATELET # BLD AUTO: 160 10E9/L (ref 150–450)
POTASSIUM SERPL-SCNC: 4.2 MMOL/L (ref 3.4–5.3)
PROT SERPL-MCNC: 4.6 G/DL (ref 6.8–8.8)
RBC # BLD AUTO: 3.2 10E12/L (ref 3.8–5.2)
SODIUM SERPL-SCNC: 139 MMOL/L (ref 133–144)
WBC # BLD AUTO: 6 10E9/L (ref 4–11)

## 2017-08-03 PROCEDURE — 25000132 ZZH RX MED GY IP 250 OP 250 PS 637: Performed by: PHYSICIAN ASSISTANT

## 2017-08-03 PROCEDURE — 25000128 H RX IP 250 OP 636: Performed by: PHYSICIAN ASSISTANT

## 2017-08-03 PROCEDURE — 85025 COMPLETE CBC W/AUTO DIFF WBC: CPT | Performed by: PHYSICIAN ASSISTANT

## 2017-08-03 PROCEDURE — 25000125 ZZHC RX 250: Performed by: PHYSICIAN ASSISTANT

## 2017-08-03 PROCEDURE — 84100 ASSAY OF PHOSPHORUS: CPT | Performed by: PHYSICIAN ASSISTANT

## 2017-08-03 PROCEDURE — 87040 BLOOD CULTURE FOR BACTERIA: CPT | Performed by: PHYSICIAN ASSISTANT

## 2017-08-03 PROCEDURE — 82550 ASSAY OF CK (CPK): CPT | Performed by: PHYSICIAN ASSISTANT

## 2017-08-03 PROCEDURE — 25800025 ZZH RX 258: Performed by: PHYSICIAN ASSISTANT

## 2017-08-03 PROCEDURE — 36592 COLLECT BLOOD FROM PICC: CPT | Performed by: PHYSICIAN ASSISTANT

## 2017-08-03 PROCEDURE — 99239 HOSP IP/OBS DSCHRG MGMT >30: CPT | Performed by: INTERNAL MEDICINE

## 2017-08-03 PROCEDURE — 40000802 ZZH SITE CHECK

## 2017-08-03 PROCEDURE — 25000128 H RX IP 250 OP 636: Performed by: INTERNAL MEDICINE

## 2017-08-03 PROCEDURE — 83615 LACTATE (LD) (LDH) ENZYME: CPT | Performed by: PHYSICIAN ASSISTANT

## 2017-08-03 PROCEDURE — 83735 ASSAY OF MAGNESIUM: CPT | Performed by: PHYSICIAN ASSISTANT

## 2017-08-03 PROCEDURE — 80053 COMPREHEN METABOLIC PANEL: CPT | Performed by: PHYSICIAN ASSISTANT

## 2017-08-03 RX ADMIN — ONDANSETRON 8 MG: 2 INJECTION INTRAMUSCULAR; INTRAVENOUS at 09:34

## 2017-08-03 RX ADMIN — PROCHLORPERAZINE MALEATE 10 MG: 10 TABLET, FILM COATED ORAL at 16:42

## 2017-08-03 RX ADMIN — ACETAMINOPHEN 650 MG: 325 TABLET, FILM COATED ORAL at 00:57

## 2017-08-03 RX ADMIN — ONDANSETRON 8 MG: 2 INJECTION INTRAMUSCULAR; INTRAVENOUS at 00:57

## 2017-08-03 RX ADMIN — LORAZEPAM 1 MG: 2 INJECTION INTRAMUSCULAR; INTRAVENOUS at 03:15

## 2017-08-03 RX ADMIN — MEPERIDINE HYDROCHLORIDE 25 MG: 25 INJECTION, SOLUTION INTRAMUSCULAR; INTRAVENOUS; SUBCUTANEOUS at 11:05

## 2017-08-03 RX ADMIN — DIPHENOXYLATE HYDROCHLORIDE AND ATROPINE SULFATE 1 TABLET: 2.5; .025 TABLET ORAL at 16:42

## 2017-08-03 RX ADMIN — POTASSIUM CHLORIDE, DEXTROSE MONOHYDRATE AND SODIUM CHLORIDE: 150; 5; 900 INJECTION, SOLUTION INTRAVENOUS at 06:21

## 2017-08-03 RX ADMIN — ACETAMINOPHEN 650 MG: 325 TABLET, FILM COATED ORAL at 09:32

## 2017-08-03 RX ADMIN — ALDESLEUKIN 34 MILLION UNITS: 1.1 INJECTION, POWDER, LYOPHILIZED, FOR SOLUTION INTRAVENOUS at 07:59

## 2017-08-03 RX ADMIN — BUPROPION HYDROCHLORIDE 300 MG: 300 TABLET, FILM COATED, EXTENDED RELEASE ORAL at 09:33

## 2017-08-03 RX ADMIN — SODIUM PHOSPHATE, MONOBASIC, MONOHYDRATE AND SODIUM PHOSPHATE, DIBASIC, ANHYDROUS 20 MMOL: 276; 142 INJECTION, SOLUTION INTRAVENOUS at 11:11

## 2017-08-03 RX ADMIN — DIPHENOXYLATE HYDROCHLORIDE AND ATROPINE SULFATE 1 TABLET: 2.5; .025 TABLET ORAL at 09:34

## 2017-08-03 RX ADMIN — RANITIDINE HYDROCHLORIDE 150 MG: 150 TABLET, FILM COATED ORAL at 09:31

## 2017-08-03 RX ADMIN — CEPHALEXIN 500 MG: 500 CAPSULE ORAL at 09:33

## 2017-08-03 RX ADMIN — ACETAMINOPHEN 650 MG: 325 TABLET, FILM COATED ORAL at 04:56

## 2017-08-03 RX ADMIN — ACETAMINOPHEN 650 MG: 325 TABLET, FILM COATED ORAL at 16:42

## 2017-08-03 RX ADMIN — LORAZEPAM 0.5 MG: 2 INJECTION INTRAMUSCULAR; INTRAVENOUS at 11:05

## 2017-08-03 NOTE — PROGRESS NOTES
[facility], [place]    Sepsis Evaluation Progress Note    Date of Service: 08/03/2017    I was called to see Suzi Gonsales due to abnormal vital signs triggering the Sepsis SIRS screening alert. She is not known to have an infection, currently getting IL2 treatment and all vital sign abnormalities are known to be caused by IL2.     Physical Exam    Vital Signs:  Temp: 96.6  F (35.9  C) Temp src: Oral BP: 97/50 Pulse: 104 Heart Rate: 115 Resp: 22 SpO2: 96 % O2 Device: None (Room air)      Lab:  Lactic Acid   Date Value Ref Range Status   08/01/2017 2.3 (H) 0.7 - 2.1 mmol/L Final       The patient is at baseline mental status.    The rest of their physical exam is normal.    Assessment and Plan    The SIRS and exam findings are likely due to IL2 treatment, there is no sign of sepsis at this time.    Disposition: The patient has an underlying condition of IL2 treatment that will continue to affect their vital signs and should not have further labs drawn to assess sepsis unless their clinical appearance changes.    Romelia Vergara PA-C

## 2017-08-03 NOTE — PROGRESS NOTES
Care Coordinator- Discharge Planning     Admission Date/Time:  7/31/2017  Attending MD:  Fady Chairez DO     Data  Date of initial CC assessment:  7/31  Chart reviewed, discussed with interdisciplinary team.   Patient was admitted for:   1. Hypophosphatemia    2. Malignant neoplasm of right kidney (H)         Assessment  Concerns with insurance coverage for discharge needs: None.  Current Living Situation: Patient lives with family.  Support System: Supportive and Involved  Services Involved: n/a  Transportation: Family or Friend will provide  Barriers to Discharge: none    Bedside rounds with Dr. Carbone.  Pt strongly requesting to d/c home today.  Post hospital lab appt scheduled for tmrw and provider f/u next week.      Plan  Anticipated Discharge Date:  08/03/17  Anticipated Discharge Plan:  home    CTS Handoff completed:  YES  Mami Land RN   ________________________________________  Mami Land RN, BSN    7D/Oncology Care Coordinator  Pager  [phone]  Phone [phone]

## 2017-08-03 NOTE — PLAN OF CARE
Problem: Goal Outcome Summary  Goal: Goal Outcome Summary  Outcome: No Change     BPs stable. Tmax 99.7; tachycardia 110s. Emesis x1; compazine given in addition to scheduled zofran. Had one loose stool of 20 mL; patient refused intervention but continue to monitor. Rigors 2.5 hours after dose 6; demerol given. Has had 245 mL of urine output since last dose. Dose #7 due at 2250; waiting for dose to come from pharmacy; called multiple times with them stating that it was on the way. Continue with plan of care.

## 2017-08-03 NOTE — PROGRESS NOTES
[facility], [place]    Hematology / Oncology Progress Note    Date of Admission: 7/31/2017  Hospital Day #: 3      Assessment & Plan   Suzi Gonsales is a 52 year old woman with metastatic clear cell renal carcinoma s/p right radical nephrectomy in 12/2014 now with metastases to lungs, adrenal, and pancreas. She is admitted for cycle 3 high dose IL-2 therapy. She received cycle 1 on 6/6, completed 9 doses and tolerated it well overall. She received cycle 2 on 6/20, completed 7 doses, complicated by hypotension and fatigue. She saw Dr. Green (primary oncologist) with a repeat CT scan last week which showed stable disease. She has now fully recovered and is feeling well.    # Metastatic RCC. Admitted for cycle 3 HD IL-2. S/p 8 doses (#8 at 0800) and tolerating fairly well with mild nausea, vomiting x1, rigors, diarrhea x 1, hypotension, fatigue.  Will continue IL2 as she continues to meet parameters, AM labs are appropriate for IL2.   -PICC placed on admission.   -IVF per tx plan.   -Premeds: tylenol, zofran, zantac.   -Keflex prophy.   -Aldesleukin 34 million units IV q8h for up to 14 doses as tolerated.   -Prn antiemetics.   -Avoid diuretics and steroids.     # Hx depression, anxiety. Continue home wellbutrin, celexa. Ativan prn.     FEN:  -PRN lyte replacement; Will get phosphorus replacement today  -RDAT    Prophy/Misc:  -VTE: mechanical   -GI/PUD: zantac  -Bowels: monitor    Code status: FULL CODE  Disposition: D/c home pending completion of IL-2 and recovery from acute toxicities; anticipate tomorrow (8/4).     Romelia Vergara PA-C  Hematology/Oncology    Interval History   Suzi reports states she is very tired but is otherwise doing ok. She wants to continue with treatment and feels it is going better than her last cycle. She has had a couple episodes of vomiting and diarrhea. Urinating well. No SOB. Mild cough this AM. No leg swelling but feels her face is puffy. Has been ambulating.  Decreased appetite. No fevers in the past 24 hours.      Physical Exam   Temp: 97.6  F (36.4  C) Temp src: Oral BP: (!) 88/47   Heart Rate: 114 Resp: 24 SpO2: 95 % O2 Device: None (Room air)    Vitals:    08/01/17 1123 08/02/17 0815 08/03/17 0830   Weight: 56.8 kg (125 lb 3.2 oz) 58.9 kg (129 lb 14.4 oz) 59.8 kg (131 lb 14.4 oz)     Vital Signs with Ranges  Temp:  [96.5  F (35.8  C)-99.7  F (37.6  C)] 97.6  F (36.4  C)  Heart Rate:  [] 114  Resp:  [16-24] 24  BP: ()/(45-56) 88/47  SpO2:  [94 %-98 %] 95 %  I/O last 3 completed shifts:  In: 4087 [P.O.:600; I.V.:3430; IV Piggyback:57]  Out: 1710 [Urine:1170; Emesis/NG output:320; Stool:220]    Constitutional: Pleasant woman seen resting comfortably in bed in NAD. Appears tired. Alert and interactive.   HEENT: NCAT. PERRL, anicteric sclera.  Respiratory: Non-labored breathing on RA.   Cardiovascular: Regular mild tachycardia. No murmur or rub.   GI: Normoactive bowel sounds. Abdomen soft, non-distended, and non-tender.   Skin: Warm and dry. No concerning lesions or rash on exposed surfaces.  Musculoskeletal: Extremities grossly normal, non-tender, no edema.  Neurologic: A&O x 3, CNs 2-12 grossly intact, speech normal.  Neuropsychiatric: Mentation and affect appear normal/appropriate.  Vascular Access: LUE is CDI, no swelling, mildly tender.     Medications   Current Facility-Administered Medications   Medication     aldesleukin (PROLEUKIN) 34 Million Units in D5W 57 mL infusion     sodium phosphate 10 mmol in D5W intermittent infusion     sodium phosphate 15 mmol in D5W intermittent infusion     sodium phosphate 20 mmol in D5W intermittent infusion     sodium phosphate 25 mmol in D5W intermittent infusion     sodium chloride (PF) 0.9% PF flush 3 mL     benzonatate (TESSALON) capsule 100 mg     Medication Instruction     potassium chloride SA (K-DUR/KLOR-CON M) CR tablet 20-40 mEq     potassium chloride (KLOR-CON) Packet 20-40 mEq     potassium chloride 10  mEq in 100 mL intermittent infusion     potassium chloride 10 mEq in 100 mL intermittent infusion with 10 mg lidocaine     potassium chloride 20 mEq in 50 mL intermittent infusion     magnesium sulfate 4 g in 100 mL sterile water (premade)     buPROPion (WELLBUTRIN XL) 24 hr tablet 300 mg     citalopram (celeXA) tablet 20 mg     eucerin cream     dextrose 5% and 0.9% NaCl with potassium chloride 20 mEq infusion     dextrose 5% infusion     0.9% sodium chloride BOLUS     ondansetron (ZOFRAN) injection 8 mg     acetaminophen (TYLENOL) tablet 650 mg     ranitidine (ZANTAC) tablet 150 mg     cephALEXin (KEFLEX) capsule 500 mg     diphenhydrAMINE (BENADRYL) capsule 25 mg     meperidine (DEMEROL) injection 25 mg     naproxen (NAPROSYN) tablet 250 mg     diphenoxylate-atropine (LOMOTIL) 2.5-0.025 MG per tablet 1 tablet     prochlorperazine (COMPAZINE) tablet 10 mg     prochlorperazine (COMPAZINE) injection 10 mg     MEDICATION INSTRUCTION     methylPREDNISolone sodium succinate (solu-MEDROL) injection 125 mg     diphenhydrAMINE (BENADRYL) injection 50 mg     EPINEPHrine (ADRENALIN) injection 0.3 mg     albuterol (PROAIR HFA/PROVENTIL HFA/VENTOLIN HFA) Inhaler 1-2 puff     albuterol neb solution 2.5 mg     0.9% sodium chloride infusion     naloxone (NARCAN) injection 0.1-0.4 mg     LORazepam (ATIVAN) injection 0.5-1 mg     LORazepam (ATIVAN) tablet 0.5-1 mg       Data   CBC    Recent Labs  Lab 08/03/17  0600 08/02/17  0638 08/01/17  0706 07/31/17  1138   WBC 6.0 9.0 6.4 5.4   RBC 3.20* 3.37* 3.52* 4.02   HGB 9.7* 10.1* 10.7* 12.5   HCT 29.3* 31.1* 32.7* 37.0   MCV 92 92 93 92   MCH 30.3 30.0 30.4 31.1   MCHC 33.1 32.5 32.7 33.8   RDW 15.9* 15.8* 15.7* 15.2*    229 271 441     CMP    Recent Labs  Lab 08/03/17  0600 08/02/17  1140 08/02/17  0638 08/01/17  0706 07/31/17  1138     --  137 140 133   POTASSIUM 4.2  --  4.0 3.9 4.3   CHLORIDE 116*  --  110* 111* 101   CO2 16*  --  20 20 25   ANIONGAP 7  --  7 8 7    *  --  126* 155* 97   BUN 6*  --  9 11 19   CR 0.98  --  1.10* 0.98 0.99   GFRESTIMATED 59*  --  52* 59* 58*   GFRESTBLACK 72  --  63 72 71   MUNDO 7.3*  --  7.7* 8.0* 9.6   MAG 2.2 3.1* 1.3* 1.6  --    PHOS 1.2*  --  2.2* 2.7  --    PROTTOTAL 4.6*  --  5.1* 6.0* 7.8   ALBUMIN 2.0*  --  2.3* 3.0* 3.9   BILITOTAL 0.3  --  0.3 0.4 0.4   ALKPHOS 57  --  41 53 81   AST 44  --  22 16 25   ALT 50  --  24 19 27     INRNo lab results found in last 7 days.    Results for orders placed or performed in visit on 07/24/17   CT Chest/Abdomen/Pelvis w Contrast    Narrative    EXAMINATION: CT CHEST/ABDOMEN/PELVIS W CONTRAST, 7/24/2017 1:09 PM    TECHNIQUE:  Helical CT images from the thoracic inlet through the  symphysis pubis were obtained  with contrast. Contrast dose: 80 mL  [id number]    COMPARISON: 5/11/2017    HISTORY: mRCC, routine fup after IL-2, Malignant neoplasm of right  kidney, except renal pelvis    FINDINGS:    LUNGS: Left apical scarring with associated spiculated mass measuring  2.6 x 2.0 cm, stable. There are multiple nodules in the left lung and  scattered nodules in the right lung, overall stable in size and number  from the prior study. Mild bilateral lower lobes dependent  atelectasis. No acute airspace opacities.    Chest: Rim calcified focus in the right lobe of the thyroid gland.  Central tracheobronchial tree is patent. Thoracic aorta and main  pulmonary artery have normal diameter. Cardiac size within normal  limits. No pericardial effusions. No pleural effusions. No  pneumothorax. Subcentimeter axillary lymph nodes, not enlarged by size  criteria.     Enlarged heterogeneously enhancing necrotic lymph nodes in the  mediastinum, measuring up to 1.2 cm in the aortopulmonary window,  series 3 image 84, stable. Enlarged heterogeneously enhancing necrotic  left hilar lymph nodes measuring up to 1.8 cm, series 3 image 91,  previously measuring 1.5 cm, not significantly changed. No right  hilar  lymphadenopathy. 8 mm right hilar lymph nodes, not enlarged by size  criteria. Otherwise, scattered subcentimeter mediastinal lymph nodes  are seen.    Abdomen and pelvis: Homogeneous liver parenchyma. No focal liver  lesions. No intra or extrahepatic biliary dilatation. Hepatic venous  system and portal venous system are patent. Unremarkable gallbladder.    4 mm rim-enhancing hypoattenuating focus in the pancreatic body,  stable. 9 mm hypoattenuating focus in the pancreatic tail, stable.    The spleen is not enlarged.    Postoperative changes of right adrenalectomy and right nephrectomy  with associated surgical clips. No recurrent and/or residual tumor in  the surgical bed. Left kidney with normal size and position with  subcentimeter hypoattenuating foci, too small to characterize, stable  from the prior study. No left renal stones or hydronephrosis. The  urinary bladder is well distended and unremarkable in appearance.  Heterogeneously enhancing uterus. No adnexal masses.    There are 2 heterogeneously enhancing lesions in the left adrenal  gland, necrotic appearing measuring 1.4 cm in the medial limb,  previously measuring 1.3 cm as well as measuring 0.9 cm in the lateral  limb, previously measuring 0.9 cm.    Decompressed stomach. No dilated loops of bowel. No bowel wall  thickening. Colonic diverticulosis. No associated CT findings of acute  diverticulitis.    No abdominal aortic aneurysm. No free fluid. No free air. Scattered  retroperitoneal and mesenteric lymph nodes the largest one measuring  1.3 cm heterogeneously enhancing along the right external iliac chain,  previously measuring 0.9 cm. Subcentimeter bilateral inguinal lymph  nodes with a preserved fatty hilum, likely reactive. Small fat  containing umbilical hernia.    Bones: Multilevel degenerative changes of the spine. Degenerative  changes of bilateral SI joints and hips. Scattered Schmorl's nodes.  Scattered intradiscal gas. Degenerative  changes of both shoulders.  Stable wedge compression deformity fracture of the vertebral bodies  T7. No aggressive sclerotic or lytic lesions in the bones.      Impression    IMPRESSION: In this patient with history of renal cell carcinoma,  status post right nephrectomy and right adrenalectomy:  1. Enlarged necrotic mediastinal and left hilar lymph nodes, some of  them slightly increased from the prior study. Right external iliac  chain necrotic enlarged lymph node, increased in size from the prior  study.  2. Stable necrotic lesions in the left adrenal gland concerning for  metastasis.  3. Stable pulmonary nodules bilaterally, particularly involving the  left lung, concerning for metastasis.  4. Stable hypoattenuating foci with rim enhancement in the pancreatic  body and tail, concerning for metastasis.    NIA GUILLORY MD

## 2017-08-03 NOTE — DISCHARGE SUMMARY
[facility], [place]    Discharge Summary  Hospitalist    Date of Admission:  7/31/2017  Date of Discharge:  8/3/2017  Discharging Provider: Romelia Vergara  Date of Service (when I saw the patient): 08/03/17    Discharge Diagnoses   IL2 for metastatic renal cell carcinoma  Hypophosphatemia 2/2 IL2    History of Present Illness   Suzi Gonsales is a 52 year old woman with metastatic clear cell renal carcinoma s/p right radical nephrectomy in 12/2014 now with metastases to lungs, adrenal, and pancreas. She is admitted for cycle 3 high dose IL-2 therapy. She received cycle 1 on 6/6, completed 9 doses and tolerated it well overall. She received cycle 2 on 6/20, completed 7 doses, complicated by hypotension and fatigue. She saw Dr. Green (primary oncologist) with a repeat CT scan last week which showed stable disease. She has now fully recovered and is feeling well.    Hospital Course   Suzi Gonsales was admitted on 7/31/2017.  The following problems were addressed during her hospitalization:    # Metastatic RCC. Admitted for cycle 3 HD IL-2. S/p 8 doses. She tolerated this fairly well with expected side effects of fever, tachycardia, rigors, fatigue, n/v/d. She was too fatigued to continue and chose to stop after 8 doses. PICC removed prior to discharge  - f/u in oncology clinic next week (8/8)     # Hx depression, anxiety. Continue home wellbutrin, celexa. Ativan prn.    #Hypophosphatemia: Phosphorus 1.2 on the AM of discharge (due to IL2), replaced IV.  - K Phos 500 mg q6h and recheck level in clinic tomorrow morning at which time it can be decided if she should continue to take oral phos or if she needs more IV replacement.       Romelia Vergara PA-C  Hematology/Oncology    Significant Results and Procedures     Pending Results   Unresulted Labs Ordered in the Past 30 Days of this Admission     Date and Time Order Name Status Description    8/2/2017 9085 Blood culture  Preliminary     8/1/2017 2330 Blood culture Preliminary     7/31/2017 2330 Blood culture Preliminary           Code Status   Full Code       Primary Care Physician   Idalia Saini        Discharge Disposition   Discharged to home  Condition at discharge: Satisfactory    Consultations This Hospital Stay   None    Time Spent on this Encounter     Discharge Orders     Phosphorus     Reason for your hospital stay   IL2 for metastatic renal cell carcinoma     Follow Up and recommended labs and tests   Follow up in clinic tomorrow to recheck your phosphorus level. Follow up as scheduled in the oncology clinic on 8/8     Activity   Your activity upon discharge: activity as tolerated     Discharge Instructions   Take phosphorus tablets 4 times per day (about every 6 hours) until your phosphorus recheck tomorrow    [facility] ([phone]) for temperature > 100.4, shortness of breath, chest pain, headaches, vision changes, bleeding, uncontrolled nausea, vomiting, diarrhea, or pain.     Full Code     Diet   Follow this diet upon discharge: Regular       Discharge Medications   Current Discharge Medication List      START taking these medications    Details   potassium phosphate, monobasic, (K-PHOS) 500 MG tablet Take 1 tablet (500 mg) by mouth 4 times daily  Qty: 30 tablet, Refills: 0    Associated Diagnoses: Hypophosphatemia         CONTINUE these medications which have NOT CHANGED    Details   ALPRAZolam (XANAX) 0.5 MG tablet Take 1 tablet (0.5 mg) by mouth 3 times daily as needed for anxiety  Qty: 60 tablet, Refills: 0    Comments: Future refill requests should go to her oncologist or primary provider, please.  Associated Diagnoses: Renal cell carcinoma, unspecified laterality (H); Mass of upper lobe of left lung      benzonatate (TESSALON) 100 MG capsule Take 1 capsule (100 mg) by mouth 3 times daily as needed for cough  Qty: 42 capsule, Refills: 0      prochlorperazine (COMPAZINE) 10 MG  tablet Take 1 tablet (10 mg) by mouth every 6 hours as needed for nausea or vomiting (Breakthrough Nausea/Vomiting)  Qty: 30 tablet, Refills: 0    Associated Diagnoses: Malignant neoplasm of right kidney (H)      acetaminophen (TYLENOL) 325 MG tablet Take 2 tablets (650 mg) by mouth every 4 hours as needed for mild pain (mild pain)  Qty: 100 tablet, Refills: 0    Associated Diagnoses: Malignant neoplasm of right kidney (H)      buPROPion (WELLBUTRIN XL) 300 MG 24 hr tablet Take 300 mg by mouth every morning       citalopram (CELEXA) 20 MG tablet Take 20 mg by mouth every evening       Zolpidem Tartrate (AMBIEN PO) Take 5 mg by mouth nightly as needed for sleep    Associated Diagnoses: Malignant neoplasm of right kidney (H)      IBUPROFEN Take 400 mg by mouth every 6 hours as needed          STOP taking these medications       acetaminophen-codeine (TYLENOL/CODEINE #3) 300-30 MG per tablet Comments:   Reason for Stopping:             Allergies   No Known Allergies  Data   Most Recent 3 CBC's:  Recent Labs   Lab Test  08/03/17   0600  08/02/17   0638  08/01/17   0706   WBC  6.0  9.0  6.4   HGB  9.7*  10.1*  10.7*   MCV  92  92  93   PLT  160  229  271      Most Recent 3 BMP's:  Recent Labs   Lab Test  08/03/17   0600  08/02/17   0638  08/01/17   0706   NA  139  137  140   POTASSIUM  4.2  4.0  3.9   CHLORIDE  116*  110*  111*   CO2  16*  20  20   BUN  6*  9  11   CR  0.98  1.10*  0.98   ANIONGAP  7  7  8   MUNDO  7.3*  7.7*  8.0*   GLC  110*  126*  155*     Most Recent 2 LFT's:  Recent Labs   Lab Test  08/03/17   0600  08/02/17   0638   AST  44  22   ALT  50  24   ALKPHOS  57  41   BILITOTAL  0.3  0.3     Most Recent INR's and Anticoagulation Dosing History:  Anticoagulation Dose History     Recent Dosing and Labs Latest Ref Rng & Units 2/3/2017    INR 0.86 - 1.14 0.92        Most Recent 3 Troponin's:No lab results found.  Most Recent Cholesterol Panel:No lab results found.  Most Recent 6 Bacteria Isolates From Any  Culture (See EPIC Reports for Culture Details):  Recent Labs   Lab Test  08/03/17   0600  08/02/17   0638  08/01/17   0706  06/23/17   0617  06/22/17   0629  06/21/17   0707   CULT  No growth after 6 hours  No growth after 1 day  No growth after 2 days  No growth  No growth  No growth     Most Recent TSH, T4 and A1c Labs:No lab results found.  Results for orders placed or performed in visit on 07/24/17   CT Chest/Abdomen/Pelvis w Contrast    Narrative    EXAMINATION: CT CHEST/ABDOMEN/PELVIS W CONTRAST, 7/24/2017 1:09 PM    TECHNIQUE:  Helical CT images from the thoracic inlet through the  symphysis pubis were obtained  with contrast. Contrast dose: 80 mL  Isovue-[id number]    COMPARISON: 5/11/2017    HISTORY: mRCC, routine fup after IL-2, Malignant neoplasm of right  kidney, except renal pelvis    FINDINGS:    LUNGS: Left apical scarring with associated spiculated mass measuring  2.6 x 2.0 cm, stable. There are multiple nodules in the left lung and  scattered nodules in the right lung, overall stable in size and number  from the prior study. Mild bilateral lower lobes dependent  atelectasis. No acute airspace opacities.    Chest: Rim calcified focus in the right lobe of the thyroid gland.  Central tracheobronchial tree is patent. Thoracic aorta and main  pulmonary artery have normal diameter. Cardiac size within normal  limits. No pericardial effusions. No pleural effusions. No  pneumothorax. Subcentimeter axillary lymph nodes, not enlarged by size  criteria.     Enlarged heterogeneously enhancing necrotic lymph nodes in the  mediastinum, measuring up to 1.2 cm in the aortopulmonary window,  series 3 image 84, stable. Enlarged heterogeneously enhancing necrotic  left hilar lymph nodes measuring up to 1.8 cm, series 3 image 91,  previously measuring 1.5 cm, not significantly changed. No right hilar  lymphadenopathy. 8 mm right hilar lymph nodes, not enlarged by size  criteria. Otherwise, scattered subcentimeter mediastinal  lymph nodes  are seen.    Abdomen and pelvis: Homogeneous liver parenchyma. No focal liver  lesions. No intra or extrahepatic biliary dilatation. Hepatic venous  system and portal venous system are patent. Unremarkable gallbladder.    4 mm rim-enhancing hypoattenuating focus in the pancreatic body,  stable. 9 mm hypoattenuating focus in the pancreatic tail, stable.    The spleen is not enlarged.    Postoperative changes of right adrenalectomy and right nephrectomy  with associated surgical clips. No recurrent and/or residual tumor in  the surgical bed. Left kidney with normal size and position with  subcentimeter hypoattenuating foci, too small to characterize, stable  from the prior study. No left renal stones or hydronephrosis. The  urinary bladder is well distended and unremarkable in appearance.  Heterogeneously enhancing uterus. No adnexal masses.    There are 2 heterogeneously enhancing lesions in the left adrenal  gland, necrotic appearing measuring 1.4 cm in the medial limb,  previously measuring 1.3 cm as well as measuring 0.9 cm in the lateral  limb, previously measuring 0.9 cm.    Decompressed stomach. No dilated loops of bowel. No bowel wall  thickening. Colonic diverticulosis. No associated CT findings of acute  diverticulitis.    No abdominal aortic aneurysm. No free fluid. No free air. Scattered  retroperitoneal and mesenteric lymph nodes the largest one measuring  1.3 cm heterogeneously enhancing along the right external iliac chain,  previously measuring 0.9 cm. Subcentimeter bilateral inguinal lymph  nodes with a preserved fatty hilum, likely reactive. Small fat  containing umbilical hernia.    Bones: Multilevel degenerative changes of the spine. Degenerative  changes of bilateral SI joints and hips. Scattered Schmorl's nodes.  Scattered intradiscal gas. Degenerative changes of both shoulders.  Stable wedge compression deformity fracture of the vertebral bodies  T7. No aggressive sclerotic or lytic  lesions in the bones.      Impression    IMPRESSION: In this patient with history of renal cell carcinoma,  status post right nephrectomy and right adrenalectomy:  1. Enlarged necrotic mediastinal and left hilar lymph nodes, some of  them slightly increased from the prior study. Right external iliac  chain necrotic enlarged lymph node, increased in size from the prior  study.  2. Stable necrotic lesions in the left adrenal gland concerning for  metastasis.  3. Stable pulmonary nodules bilaterally, particularly involving the  left lung, concerning for metastasis.  4. Stable hypoattenuating foci with rim enhancement in the pancreatic  body and tail, concerning for metastasis.    NIA GUILLORY MD

## 2017-08-03 NOTE — PLAN OF CARE
D/I: Dose #8 Aldesleukin given. Urine output/Blood pressure parameters met.  See flowsheet. Aldesleukin infused at 0800 over 15 minutes. Meperidine 1 dose IV required for rigors and chilling.  Ativan 0.5mg IV given for nausea.  Acetaminophen given as scheduled every four hours. Intravenous fluid support.  A: Tolerating Aldesleukin with expected effects from capillary leak syndrome. Evidence of capillary leak syndrome was generalized edema, weight gain, hypotension.  P: Continue to monitor fluid status and vital signs for evidence of capillary leak syndrome. Continue IV fluid support and scheduled acetaminophen. Notify MD if patient falls below blood pressure or urine output parameters.

## 2017-08-03 NOTE — PROGRESS NOTES
Nursing Focus: Chemotherapy  D: Positive blood return via PICC. Insertion site is clean/dry/intact, dressing intact with no complaints of pain.  Urine output is recorded in intake in Doc Flowsheet.    I: Premedications given per order (see electronic medical administration record). Dose #7 of IL-2 started to infuse over 15 minutes. Reviewed pt teaching on chemotherapy side effects.  Pt denies need for further teaching. Chemotherapy double checked per protocol by two chemotherapy competent RN's.   A: Tolerating procedure well. Denies nausea and or pain. Urine parameters met. VS WNL. Labs WNL.  P: Continue to monitor urine output and symptoms of nausea. Screen for symptoms of toxicity.

## 2017-08-03 NOTE — PLAN OF CARE
Problem: Goal Outcome Summary  Goal: Goal Outcome Summary  Outcome: No Change  Dose #8 IL-2 given at 0800; developed mild rigors and mild nausea 3 hours after dose, rigors relieved with bear hugger blanket and demerol 25mg, nausea relieved by ativan 0.5mg.  BP dipped to 88/47 then spontaneously recovered to 103/47.  Weight up 3kg in the past 3 days; lung sounds clear.  Declining to try eating.  Phosphorus level 1.2, replacement infusing.  Diarrhea continues with 2 episodes overnight and 1 episode this morning; lomotil given.  She talked with Dr. Carbone about stopping IL-2 for this course.  She requests to discharge today; Dr. Carbone instructed she needs to be eating a bit prior to discharge to avoid dangerously low phosphorus levels.  If she is adamant about leaving today as she appealed to Dr. Carbone, she may discharge this evening and return to clinic tomorrow to monitor electrolytes, etc.  No orders as yet and patient is sleeping this afternoon.

## 2017-08-03 NOTE — PLAN OF CARE
Problem: Goal Outcome Summary  Goal: Goal Outcome Summary  Outcome: No Change  Tmax 99. HR tachy up to 118. OVSS. Pt received dose #7 of IL-2 at 2346; pt reacted a little over 3 hours later with 200ml emesis but no rigors. 1mg of Ativan given with relief. Pt had two diarrhea stools overnight; unmeasured (one was in the toilet, other in her brief). Pt reminded on importance of measuring if able. Weight is up 8lbs since admission. Has had 300ml of UOP since last dose. Has met parameters for next dose at 0800; dose is up here ready for administration. Cont to monitor and with POC.

## 2017-08-04 ENCOUNTER — CARE COORDINATION (OUTPATIENT)
Dept: CARE COORDINATION | Facility: CLINIC | Age: 52
End: 2017-08-04

## 2017-08-04 DIAGNOSIS — E83.39 HYPOPHOSPHATEMIA: ICD-10-CM

## 2017-08-04 DIAGNOSIS — C64.1 MALIGNANT NEOPLASM OF RIGHT KIDNEY (H): ICD-10-CM

## 2017-08-04 LAB
ALBUMIN SERPL-MCNC: 2.6 G/DL (ref 3.4–5)
ALP SERPL-CCNC: 101 U/L (ref 40–150)
ALT SERPL W P-5'-P-CCNC: 60 U/L (ref 0–50)
ANION GAP SERPL CALCULATED.3IONS-SCNC: 8 MMOL/L (ref 3–14)
ANISOCYTOSIS BLD QL SMEAR: SLIGHT
AST SERPL W P-5'-P-CCNC: 33 U/L (ref 0–45)
BASOPHILS # BLD AUTO: 0 10E9/L (ref 0–0.2)
BASOPHILS NFR BLD AUTO: 0 %
BILIRUB SERPL-MCNC: 0.4 MG/DL (ref 0.2–1.3)
BUN SERPL-MCNC: 6 MG/DL (ref 7–30)
CALCIUM SERPL-MCNC: 8.5 MG/DL (ref 8.5–10.1)
CHLORIDE SERPL-SCNC: 112 MMOL/L (ref 94–109)
CO2 SERPL-SCNC: 19 MMOL/L (ref 20–32)
CREAT SERPL-MCNC: 1.05 MG/DL (ref 0.52–1.04)
DIFFERENTIAL METHOD BLD: ABNORMAL
EOSINOPHIL # BLD AUTO: 0.8 10E9/L (ref 0–0.7)
EOSINOPHIL NFR BLD AUTO: 13 %
ERYTHROCYTE [DISTWIDTH] IN BLOOD BY AUTOMATED COUNT: 15.9 % (ref 10–15)
GFR SERPL CREATININE-BSD FRML MDRD: 55 ML/MIN/1.7M2
GLUCOSE SERPL-MCNC: 83 MG/DL (ref 70–99)
HCT VFR BLD AUTO: 35.1 % (ref 35–47)
HGB BLD-MCNC: 11.7 G/DL (ref 11.7–15.7)
LYMPHOCYTES # BLD AUTO: 1.4 10E9/L (ref 0.8–5.3)
LYMPHOCYTES NFR BLD AUTO: 22 %
MCH RBC QN AUTO: 31 PG (ref 26.5–33)
MCHC RBC AUTO-ENTMCNC: 33.3 G/DL (ref 31.5–36.5)
MCV RBC AUTO: 93 FL (ref 78–100)
MONOCYTES # BLD AUTO: 0.7 10E9/L (ref 0–1.3)
MONOCYTES NFR BLD AUTO: 11 %
NEUTROPHILS # BLD AUTO: 3.5 10E9/L (ref 1.6–8.3)
NEUTROPHILS NFR BLD AUTO: 54 %
PHOSPHATE SERPL-MCNC: 2.1 MG/DL (ref 2.5–4.5)
PLATELET # BLD AUTO: 216 10E9/L (ref 150–450)
POTASSIUM SERPL-SCNC: 4.3 MMOL/L (ref 3.4–5.3)
PROT SERPL-MCNC: 5.7 G/DL (ref 6.8–8.8)
RBC # BLD AUTO: 3.77 10E12/L (ref 3.8–5.2)
SODIUM SERPL-SCNC: 139 MMOL/L (ref 133–144)
WBC # BLD AUTO: 6.4 10E9/L (ref 4–11)

## 2017-08-04 PROCEDURE — 85025 COMPLETE CBC W/AUTO DIFF WBC: CPT | Performed by: INTERNAL MEDICINE

## 2017-08-04 PROCEDURE — 80053 COMPREHEN METABOLIC PANEL: CPT | Performed by: INTERNAL MEDICINE

## 2017-08-04 PROCEDURE — 84100 ASSAY OF PHOSPHORUS: CPT | Performed by: INTERNAL MEDICINE

## 2017-08-04 NOTE — PROGRESS NOTES
Patient has clinic visit within 24-48 hours of Discharge so no post DC follow up call is needed          Follow-up Appointments           Follow Up and recommended labs and tests         Follow up in clinic tomorrow

## 2017-08-04 NOTE — PROGRESS NOTES
Discharge    D: Orders for discharge and outpatient medications written.    I: Home medications and return to clinic schedule reviewed with patient. Discharge instructions and parameters for calling Health Care Provider reviewed. Reviewed signs and symptoms to watch for including temp >100.4, shortness of breath, chest pain, HAs, vision changes, bleeding, uncontrolled nausea, vomiting, diarrhea, and pain. Phos replaced IV for 1.2. Provider ok with rechecking tomorrow morning in clinic. Patient going home on oral phos Q6 hours starting when doses picked up from pharmacy. PICC line removed without issues; dress C/D/I. Sent with extra supplies. Patient left at 1900 accompanied by .     A: Patient/family verbalized understanding and was ready for discharge.     P: Patient instructed to  medications at [facility] in [place]. Follow up as scheduled for 8/4 lab to recheck phos level. Will also have follow up lab and appointment with [facility] on 8/8.

## 2017-08-07 LAB
BACTERIA SPEC CULT: NO GROWTH
MICRO REPORT STATUS: NORMAL
SPECIMEN SOURCE: NORMAL

## 2017-08-08 ENCOUNTER — APPOINTMENT (OUTPATIENT)
Dept: LAB | Facility: CLINIC | Age: 52
End: 2017-08-08
Attending: INTERNAL MEDICINE
Payer: COMMERCIAL

## 2017-08-08 ENCOUNTER — ONCOLOGY VISIT (OUTPATIENT)
Dept: ONCOLOGY | Facility: CLINIC | Age: 52
End: 2017-08-08
Attending: PHYSICIAN ASSISTANT
Payer: COMMERCIAL

## 2017-08-08 VITALS
OXYGEN SATURATION: 98 % | HEART RATE: 73 BPM | DIASTOLIC BLOOD PRESSURE: 72 MMHG | SYSTOLIC BLOOD PRESSURE: 103 MMHG | RESPIRATION RATE: 16 BRPM | TEMPERATURE: 98 F | BODY MASS INDEX: 22.18 KG/M2 | WEIGHT: 120.5 LBS | HEIGHT: 62 IN

## 2017-08-08 DIAGNOSIS — C64.1 MALIGNANT NEOPLASM OF RIGHT KIDNEY (H): ICD-10-CM

## 2017-08-08 LAB
ALBUMIN SERPL-MCNC: 3.6 G/DL (ref 3.4–5)
ALP SERPL-CCNC: 103 U/L (ref 40–150)
ALT SERPL W P-5'-P-CCNC: 36 U/L (ref 0–50)
ANION GAP SERPL CALCULATED.3IONS-SCNC: 10 MMOL/L (ref 3–14)
AST SERPL W P-5'-P-CCNC: 17 U/L (ref 0–45)
BACTERIA SPEC CULT: NO GROWTH
BASOPHILS # BLD AUTO: 0.1 10E9/L (ref 0–0.2)
BASOPHILS NFR BLD AUTO: 1.2 %
BILIRUB SERPL-MCNC: 0.4 MG/DL (ref 0.2–1.3)
BUN SERPL-MCNC: 12 MG/DL (ref 7–30)
CALCIUM SERPL-MCNC: 9.4 MG/DL (ref 8.5–10.1)
CHLORIDE SERPL-SCNC: 97 MMOL/L (ref 94–109)
CO2 SERPL-SCNC: 26 MMOL/L (ref 20–32)
CREAT SERPL-MCNC: 1.05 MG/DL (ref 0.52–1.04)
DIFFERENTIAL METHOD BLD: ABNORMAL
EOSINOPHIL # BLD AUTO: 1.9 10E9/L (ref 0–0.7)
EOSINOPHIL NFR BLD AUTO: 17.4 %
ERYTHROCYTE [DISTWIDTH] IN BLOOD BY AUTOMATED COUNT: 15.6 % (ref 10–15)
GFR SERPL CREATININE-BSD FRML MDRD: 55 ML/MIN/1.7M2
GLUCOSE SERPL-MCNC: 98 MG/DL (ref 70–99)
HCT VFR BLD AUTO: 36.8 % (ref 35–47)
HGB BLD-MCNC: 11.9 G/DL (ref 11.7–15.7)
IMM GRANULOCYTES # BLD: 0.4 10E9/L (ref 0–0.4)
IMM GRANULOCYTES NFR BLD: 3.4 %
LYMPHOCYTES # BLD AUTO: 5.2 10E9/L (ref 0.8–5.3)
LYMPHOCYTES NFR BLD AUTO: 47.9 %
MCH RBC QN AUTO: 29.8 PG (ref 26.5–33)
MCHC RBC AUTO-ENTMCNC: 32.3 G/DL (ref 31.5–36.5)
MCV RBC AUTO: 92 FL (ref 78–100)
MICRO REPORT STATUS: NORMAL
MONOCYTES # BLD AUTO: 0.9 10E9/L (ref 0–1.3)
MONOCYTES NFR BLD AUTO: 8.4 %
NEUTROPHILS # BLD AUTO: 2.4 10E9/L (ref 1.6–8.3)
NEUTROPHILS NFR BLD AUTO: 21.7 %
NRBC # BLD AUTO: 0 10*3/UL
NRBC BLD AUTO-RTO: 0 /100
PHOSPHATE SERPL-MCNC: 3.8 MG/DL (ref 2.5–4.5)
PLATELET # BLD AUTO: 360 10E9/L (ref 150–450)
POTASSIUM SERPL-SCNC: 4.3 MMOL/L (ref 3.4–5.3)
PROT SERPL-MCNC: 7.8 G/DL (ref 6.8–8.8)
RBC # BLD AUTO: 4 10E12/L (ref 3.8–5.2)
SODIUM SERPL-SCNC: 133 MMOL/L (ref 133–144)
SPECIMEN SOURCE: NORMAL
WBC # BLD AUTO: 10.8 10E9/L (ref 4–11)

## 2017-08-08 PROCEDURE — 99213 OFFICE O/P EST LOW 20 MIN: CPT

## 2017-08-08 PROCEDURE — 36415 COLL VENOUS BLD VENIPUNCTURE: CPT

## 2017-08-08 PROCEDURE — 99212 OFFICE O/P EST SF 10 MIN: CPT | Mod: ZF

## 2017-08-08 PROCEDURE — 99213 OFFICE O/P EST LOW 20 MIN: CPT | Mod: ZP | Performed by: PHYSICIAN ASSISTANT

## 2017-08-08 PROCEDURE — 80053 COMPREHEN METABOLIC PANEL: CPT | Performed by: PHYSICIAN ASSISTANT

## 2017-08-08 PROCEDURE — 85025 COMPLETE CBC W/AUTO DIFF WBC: CPT | Performed by: PHYSICIAN ASSISTANT

## 2017-08-08 PROCEDURE — 84100 ASSAY OF PHOSPHORUS: CPT | Performed by: PHYSICIAN ASSISTANT

## 2017-08-08 ASSESSMENT — PAIN SCALES - GENERAL: PAINLEVEL: NO PAIN (0)

## 2017-08-08 NOTE — LETTER
"8/8/2017      RE: Suzi Gonsales  [address] [place] [address]       [facility]  FOLLOW-UP VISIT NOTE  Date of visit: Aug 8, 2017           REASON FOR VISIT: mRCC assessment post to IL-2, C3    HPI: Suzi presented to ED with hematuria and right flank pain thinking she had a renal stone in December 2014. She was worked up with a CT abd/pelvis which suggested a renal mass. She was referred to Dr. Max Zelaya in [facility]. 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 Per charts tumor resection had negative margins and it was staged at pT2bN0.    Her staging work up was negative except for pulmonary nodules. She has been followed every 6 months with scans. But her most recent scan suggested increase in size of couple of nodules prompting to the current referral to Dr Green.  CT CAP on 5/11/17 showed enlarging hilar LAD, enlarging pulmonary nodules, adrenal nodules and a pancreatic body mass. Biopsies of hilar LN, adrenal nodule and pancreatic mass were + for RCC. She met with DR Green and decided to pursue IL-2.     C1: 6/6/17- 9 doses  C2: 6/20/17- 7 doses  CT CAP on 7/24/17  C3: 7/31/17 - 8 doses    INTERVAL HISTORY: Suzi is here today after C3.  She tolerated 8 doses and stopped as she was exhausted and literally could not be \"confined to her hospital bed any longer\".  Inpatient apparently tried to keep her longer and she was tolerating her doses with expected toxicity, but well.  She recovered within days after discharge and has been walking outside, shopping and eating well feeling nearly perfect again.     No recent f/s/c. No chest pain/cough/dyspnea. No black/bloody stools. No  issues. No daily pain.     EXAM:  /72  Pulse 73  Temp 98  F (36.7  C)  Resp 16  Ht 1.575 m (5' 2\")  Wt 54.7 kg (120 lb 8 oz)  SpO2 98%  BMI 22.04 kg/m2  Wt Readings from Last 4 Encounters:   08/08/17 " 54.7 kg (120 lb 8 oz)   08/03/17 59.8 kg (131 lb 14.4 oz)   07/31/17 55.1 kg (121 lb 8 oz)   07/25/17 55.3 kg (122 lb)     Vital signs were reviewed.   Patient alert and oriented x3.   PERRLA. EOMI. No scleral icterus noted. OP without thrush/sores.  Neck exam: No palpable cervical, supraclavicular or axillary nodes bilaterally.   Heart: RRR no murmurs noted.   Lungs: clear to auscultation bilaterally.  No crackles or wheezing.   Abd: positive bowel sounds in all four quadrants.  No tenderness to palpation.  No hepatomegaly.   Extremities: No lower extremity edema.   Neuro: grossly intact.   Mood and affect is stable.   Skin is dry and flaky.  LABS:      8/4/2017 10:16 8/8/2017 12:12   WBC 6.4 10.8   Hemoglobin 11.7 11.9   Hematocrit 35.1 36.8   Platelet Count 216 360   RBC Count 3.77 (L) 4.00   MCV 93 92        8/2/2017 06:38 8/3/2017 06:00 8/4/2017 10:16 8/8/2017 12:12   Sodium 137 139 139 133   Potassium 4.0 4.2 4.3 4.3   Chloride 110 (H) 116 (H) 112 (H) 97   Carbon Dioxide 20 16 (L) 19 (L) 26   Urea Nitrogen 9 6 (L) 6 (L) 12   Creatinine 1.10 (H) 0.98 1.05 (H) 1.05 (H)   GFR Estimate 52 (L) 59 (L) 55 (L) 55 (L)   GFR Estimate If Black 63 72 66 66   Calcium 7.7 (L) 7.3 (L) 8.5 9.4   Anion Gap 7 7 8 10   Magnesium 1.3 (L) 2.2     Phosphorus 2.2 (L) 1.2 (L) 2.1 (L) 3.8   Albumin 2.3 (L) 2.0 (L) 2.6 (L) 3.6   Protein Total 5.1 (L) 4.6 (L) 5.7 (L) 7.8   Bilirubin Total 0.3 0.3 0.4 0.4   Alkaline Phosphatase 41 57 101 103   ALT 24 50 60 (H) 36   AST 22 44 33 17     ASSESSMENT/PLAN: 50 year old with mRCC s/p first cycle of IL-2 receiving 9 doses and C2 receiving 7.  She has had a month off with a relatively stable CT scan - only mild progression of LAD.     Suzi is doing well today.  She did fantastic with C3 and finished 8 doses.  She nearly is completely physically recovered today, per her.  Her labs are near normal as well.  She'll be back next week for C4 and I set her up for mid-September CT scans and to follow up  with Dr Green.       Kia Ren PA-C

## 2017-08-08 NOTE — NURSING NOTE
Chief Complaint   Patient presents with     Blood Draw     venipuncture      Labs collected from R arm venipuncture, vitals taken

## 2017-08-08 NOTE — MR AVS SNAPSHOT
After Visit Summary   8/8/2017    Suzi Gonsales    MRN: [id number]           Patient Information     Date Of Birth          1965        Visit Information        Provider Department      8/8/2017 11:40 AM Day Ren PA-C M [facility]        Today's Diagnoses     Malignant neoplasm of right kidney (H)           Follow-ups after your visit        Your next 10 appointments already scheduled     Aug 15, 2017 10:30 AM CDT   [facility] Lab Draw with  [facility] DRAW   [facility])    [address] [place] [id number]   [phone]            Aug 15, 2017 11:00 AM CDT   (Arrive by 10:45 AM)   Return Visit with BRENDA Betancur [facility] ([facility])    [address] [place] [address]   [phone]            Aug 15, 2017  1:30 PM CDT   IR PICC VASCULAR with UUVAS1   [facility] [place] V[facility] Access ([facility])    [address] [address]              1. You will need to have had a history and physical exam within 7 days of the procedure. 2. Laboratory test are to be obtained by your doctor prior to the exam (CBCP, INR and PTT) 3. Someone will need to drive you to and from the hospital. 4. If you are or may be pregnant, contact your doctor or a Radiology nurse prior to the day of the exam. 5. If you have diabetes, check with your doctor or a Radiology nurse to see if your insulin needs to be adjusted for the exam. 6. If you are taking Coumadin (to thin you blood) please contact your doctor or a Radiology nurse at least 3 days before the exam for special instructions. 7. The day before your exam you may eat your regular diet and are encouraged to drink at least 2 quarts of clear liquids. Drink no alcoholic beverages for 24 hours prior to the exam. 8. Do not eat  any solid food or milk products for 6 hours prior to the exam. You may drink clear liquids until 2 hours prior to the exam. Clear liquids include the following: water, Jell-O, clear broth, apple juice or any noncarbonated drink that you can see through (no pop!) 9. The morning of the exam you may brush your teeth and take medications as directed with a sip of water. 10. Tell the Radiology nurse if you have any allergies.            Sep 12, 2017 12:40 PM CDT   (Arrive by 12:25 PM)   CT CHEST/ABDOMEN/PELVIS W CONTRAST with UCCT1   [facility] CT ([facility] [place] [facility] [place])    [address]  1st Floor  [facility] [address] [phone]           Please bring any scans or X-rays taken at other hospitals, if similar tests were done. Also bring a list of your medicines, including vitamins, minerals and over-the-counter drugs. It is safest to leave personal items at home.  Be sure to tell your doctor:   If you have any allergies.   If there s any chance you are pregnant.   If you are breastfeeding.   If you have any special needs.  You may have contrast for this exam. To prepare:   Do not eat or drink for 2 hours before your exam. If you need to take medicine, you may take it with small sips of water. (We may ask you to take liquid medicine as well.)   The day before your exam, drink extra fluids at least six 8-ounce glasses (unless your doctor tells you to restrict your fluids).  Patients over 70 or patients with diabetes or kidney problems:   If you haven t had a blood test (creatinine test) within the last 30 days, go to your clinic or Diagnostic Imaging Department for this test.  If you have diabetes:   If your kidney function is normal, continue taking your metformin (Avandamet, Glucophage, Glucovance, Metaglip) on the day of your exam.   If your kidney function is abnormal, wait 48 hours before restarting this medicine.  You will have oral contrast for this exam:   You will drink  the contrast at home. Get this from your clinic or Diagnostic Imaging Department. Please follow the directions given.  Please wear loose clothing, such as a sweat suit or jogging clothes. Avoid snaps, zippers and other metal. We may ask you to undress and put on a hospital gown.  If you have any questions, please call the Imaging Department where you will have your exam.            Sep 13, 2017  3:15 PM CDT   (Arrive by 3:00 PM)   Return Visit with Henri Green MD   [facility] ([facility] [place])    [address]  2nd Floor  [facility] [address] [phone]              Future tests that were ordered for you today     Open Future Orders        Priority Expected Expires Ordered    CT Chest/Abdomen/Pelvis w Contrast Routine  8/8/2018 8/8/2017            Who to contact     If you have questions or need follow up information about today's clinic visit or your schedule please contact [facility] directly at [phone].  Normal or non-critical lab and imaging results will be communicated to you by [employer], letter or phone within 4 business days after the clinic has received the results. If you do not hear from us within 7 days, please contact the clinic through [employer] or phone. If you have a critical or abnormal lab result, we will notify you by phone as soon as possible.  Submit refill requests through [facility] or call your pharmacy and they will forward the refill request to us. Please allow 3 business days for your refill to be completed.          Additional Information About Your Visit        [facility] Information     [facility] gives you secure access to your electronic health record. If you see a primary care provider, you can also send messages to your care team and make appointments. If you have questions, please call your primary care clinic.  If you do not have a primary care provider, please call [phone] and they will assist you.    "     Care EveryWhere ID     This is your Care EveryWhere ID. This could be used by other organizations to access your [facility] medical records  [id number]        Your Vitals Were     Pulse Temperature Respirations Height Pulse Oximetry BMI (Body Mass Index)    73 98  F (36.7  C) 16 1.575 m (5' 2\") 98% 22.04 kg/m2       Blood Pressure from Last 3 Encounters:   08/08/17 103/72   08/03/17 104/51   07/31/17 107/69    Weight from Last 3 Encounters:   08/08/17 54.7 kg (120 lb 8 oz)   08/03/17 59.8 kg (131 lb 14.4 oz)   07/31/17 55.1 kg (121 lb 8 oz)              We Performed the Following     CBC with platelets differential     Comprehensive metabolic panel     Phosphorus        Primary Care Provider Office Phone # Fax #    Idalia Saini [phone]       [facility] [address] [place] [address]        Equal Access to Services     [facility] : Hadii aad ku hadasho Soomaali, waaxda luqadaha, qaybta kaalmada adeegyada, waxay idiin haymaryn danuta oswald . So [facility] [phone].    ATENCIÓN: Si habla español, tiene a chacon disposición servicios gratuitos de asistencia lingüística. Llame al [phone].    We comply with applicable federal civil rights laws and [place] laws. We do not discriminate on the basis of race, color, national origin, age, disability sex, sexual orientation or gender identity.            Thank you!     Thank you for choosing [facility]  for your care. Our goal is always to provide you with excellent care. Hearing back from our patients is one way we can continue to improve our services. Please take a few minutes to complete the written survey that you may receive in the mail after your visit with us. Thank you!             Your Updated Medication List - Protect others around you: Learn how to safely use, store and throw away your medicines at www.disposemymeds.org.          This list is accurate as of: 8/8/17  2:29 PM.  Always use your most " recent med list.                   Brand Name Dispense Instructions for use Diagnosis    acetaminophen 325 MG tablet    TYLENOL    100 tablet    Take 2 tablets (650 mg) by mouth every 4 hours as needed for mild pain (mild pain)    Malignant neoplasm of right kidney (H)       acetaminophen-codeine 300-30 MG per tablet    TYLENOL #3     TK 1 T PO Q 6 HOURS PRN FOR MILD PAIN OR COUGH        ALPRAZolam 0.5 MG tablet    XANAX    60 tablet    Take 1 tablet (0.5 mg) by mouth 3 times daily as needed for anxiety    Renal cell carcinoma, unspecified laterality (H), Mass of upper lobe of left lung       AMBIEN PO      Take 5 mg by mouth nightly as needed for sleep    Malignant neoplasm of right kidney (H)       benzonatate 100 MG capsule    TESSALON    42 capsule    Take 1 capsule (100 mg) by mouth 3 times daily as needed for cough        buPROPion 300 MG 24 hr tablet    WELLBUTRIN XL     Take 300 mg by mouth every morning        citalopram 20 MG tablet    celeXA     Take 20 mg by mouth every evening        IBUPROFEN      Take 400 mg by mouth every 6 hours as needed        potassium phosphate (monobasic) 500 MG tablet    K-PHOS    30 tablet    Take 1 tablet (500 mg) by mouth 4 times daily    Hypophosphatemia       prochlorperazine 10 MG tablet    COMPAZINE    30 tablet    Take 1 tablet (10 mg) by mouth every 6 hours as needed for nausea or vomiting (Breakthrough Nausea/Vomiting)    Malignant neoplasm of right kidney (H)

## 2017-08-08 NOTE — NURSING NOTE
"Oncology Rooming Note    August 8, 2017 12:20 PM   Suzi Gonsales is a 52 year old female who presents for:    Chief Complaint   Patient presents with     Blood Draw     venipuncture      Oncology Clinic Visit     Follow up-Kidney CA     Initial Vitals: /72  Pulse 73  Temp 98  F (36.7  C)  Resp 16  Ht 1.575 m (5' 2\")  Wt 54.7 kg (120 lb 8 oz)  SpO2 98%  BMI 22.04 kg/m2 Estimated body mass index is 22.04 kg/(m^2) as calculated from the following:    Height as of this encounter: 1.575 m (5' 2\").    Weight as of this encounter: 54.7 kg (120 lb 8 oz). Body surface area is 1.55 meters squared.  No Pain (0) Comment: Data Unavailable   No LMP recorded. Patient is not currently having periods (Reason: Premenopausal).  Allergies reviewed: Yes  Medications reviewed: Yes    Medications: Medication refills not needed today.  Pharmacy name entered into [facility]: [id number] - [place], [place] - [address] [place] [facility] [address]    Clinical concerns: No Concerns Kia Ren was notified.    10 minutes for nursing intake (face to face time)     Tonia Ramírez LPN              "

## 2017-08-09 LAB
BACTERIA SPEC CULT: NO GROWTH
MICRO REPORT STATUS: NORMAL
SPECIMEN SOURCE: NORMAL

## 2017-08-09 NOTE — PROGRESS NOTES
"[facility]ER CLINIC  FOLLOW-UP VISIT NOTE  Date of visit: Aug 8, 2017           REASON FOR VISIT: mRC assessment post to IL-2, C3    HPI: Suzi presented to ED with hematuria and right flank pain thinking she had a renal stone in December 2014. She was worked up with a CT abd/pelvis which suggested a renal mass. She was referred to Dr. Max Zelaya in [facility]. 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 Per charts tumor resection had negative margins and it was staged at pT2bN0.    Her staging work up was negative except for pulmonary nodules. She has been followed every 6 months with scans. But her most recent scan suggested increase in size of couple of nodules prompting to the current referral to Dr Green.  CT CAP on 5/11/17 showed enlarging hilar LAD, enlarging pulmonary nodules, adrenal nodules and a pancreatic body mass. Biopsies of hilar LN, adrenal nodule and pancreatic mass were + for RCC. She met with DR Green and decided to pursue IL-2.     C1: 6/6/17- 9 doses  C2: 6/20/17- 7 doses  CT CAP on 7/24/17  C3: 7/31/17 - 8 doses    INTERVAL HISTORY: Suzi is here today after C3.  She tolerated 8 doses and stopped as she was exhausted and literally could not be \"confined to her hospital bed any longer\".  Inpatient apparently tried to keep her longer and she was tolerating her doses with expected toxicity, but well.  She recovered within days after discharge and has been walking outside, shopping and eating well feeling nearly perfect again.     No recent f/s/c. No chest pain/cough/dyspnea. No black/bloody stools. No  issues. No daily pain.     EXAM:  /72  Pulse 73  Temp 98  F (36.7  C)  Resp 16  Ht 1.575 m (5' 2\")  Wt 54.7 kg (120 lb 8 oz)  SpO2 98%  BMI 22.04 kg/m2  Wt Readings from Last 4 Encounters:   08/08/17 54.7 kg (120 lb 8 oz)   08/03/17 59.8 kg (131 lb 14.4 oz)   07/31/17 55.1 kg " (121 lb 8 oz)   07/25/17 55.3 kg (122 lb)     Vital signs were reviewed.   Patient alert and oriented x3.   PERRLA. EOMI. No scleral icterus noted. OP without thrush/sores.  Neck exam: No palpable cervical, supraclavicular or axillary nodes bilaterally.   Heart: RRR no murmurs noted.   Lungs: clear to auscultation bilaterally.  No crackles or wheezing.   Abd: positive bowel sounds in all four quadrants.  No tenderness to palpation.  No hepatomegaly.   Extremities: No lower extremity edema.   Neuro: grossly intact.   Mood and affect is stable.   Skin is dry and flaky.  LABS:      8/4/2017 10:16 8/8/2017 12:12   WBC 6.4 10.8   Hemoglobin 11.7 11.9   Hematocrit 35.1 36.8   Platelet Count 216 360   RBC Count 3.77 (L) 4.00   MCV 93 92        8/2/2017 06:38 8/3/2017 06:00 8/4/2017 10:16 8/8/2017 12:12   Sodium 137 139 139 133   Potassium 4.0 4.2 4.3 4.3   Chloride 110 (H) 116 (H) 112 (H) 97   Carbon Dioxide 20 16 (L) 19 (L) 26   Urea Nitrogen 9 6 (L) 6 (L) 12   Creatinine 1.10 (H) 0.98 1.05 (H) 1.05 (H)   GFR Estimate 52 (L) 59 (L) 55 (L) 55 (L)   GFR Estimate If Black 63 72 66 66   Calcium 7.7 (L) 7.3 (L) 8.5 9.4   Anion Gap 7 7 8 10   Magnesium 1.3 (L) 2.2     Phosphorus 2.2 (L) 1.2 (L) 2.1 (L) 3.8   Albumin 2.3 (L) 2.0 (L) 2.6 (L) 3.6   Protein Total 5.1 (L) 4.6 (L) 5.7 (L) 7.8   Bilirubin Total 0.3 0.3 0.4 0.4   Alkaline Phosphatase 41 57 101 103   ALT 24 50 60 (H) 36   AST 22 44 33 17     ASSESSMENT/PLAN: 50 year old with mRCC s/p first cycle of IL-2 receiving 9 doses and C2 receiving 7.  She has had a month off with a relatively stable CT scan - only mild progression of LAD.     Suzi is doing well today.  She did fantastic with C3 and finished 8 doses.  She nearly is completely physically recovered today, per her.  Her labs are near normal as well.  She'll be back next week for C4 and I set her up for mid-September CT scans and to follow up with Dr Green.       Kia Ren PA-C

## 2017-08-15 ENCOUNTER — HOSPITAL ENCOUNTER (INPATIENT)
Facility: CLINIC | Age: 52
LOS: 4 days | Discharge: HOME | DRG: 838 | End: 2017-08-19
Attending: INTERNAL MEDICINE | Admitting: INTERNAL MEDICINE
Payer: COMMERCIAL

## 2017-08-15 ENCOUNTER — ONCOLOGY VISIT (OUTPATIENT)
Dept: ONCOLOGY | Facility: CLINIC | Age: 52
DRG: 838 | End: 2017-08-15
Attending: PHYSICIAN ASSISTANT
Payer: COMMERCIAL

## 2017-08-15 ENCOUNTER — HOSPITAL ENCOUNTER (OUTPATIENT)
Dept: VASCULAR ULTRASOUND | Facility: CLINIC | Age: 52
DRG: 838 | End: 2017-08-15
Attending: PHYSICIAN ASSISTANT
Payer: COMMERCIAL

## 2017-08-15 ENCOUNTER — APPOINTMENT (OUTPATIENT)
Dept: LAB | Facility: CLINIC | Age: 52
DRG: 838 | End: 2017-08-15
Attending: PHYSICIAN ASSISTANT
Payer: COMMERCIAL

## 2017-08-15 VITALS
OXYGEN SATURATION: 97 % | SYSTOLIC BLOOD PRESSURE: 104 MMHG | BODY MASS INDEX: 21.82 KG/M2 | HEART RATE: 60 BPM | DIASTOLIC BLOOD PRESSURE: 65 MMHG | RESPIRATION RATE: 16 BRPM | TEMPERATURE: 97.9 F | WEIGHT: 119.3 LBS

## 2017-08-15 DIAGNOSIS — C64.1 MALIGNANT NEOPLASM OF RIGHT KIDNEY (H): ICD-10-CM

## 2017-08-15 DIAGNOSIS — C64.1 CLEAR CELL RENAL CELL CARCINOMA, RIGHT (H): Primary | ICD-10-CM

## 2017-08-15 PROBLEM — C64.9 CLEAR CELL RENAL CELL CARCINOMA (H): Status: ACTIVE | Noted: 2017-08-15

## 2017-08-15 LAB
ALBUMIN SERPL-MCNC: 3.7 G/DL (ref 3.4–5)
ALP SERPL-CCNC: 82 U/L (ref 40–150)
ALT SERPL W P-5'-P-CCNC: 28 U/L (ref 0–50)
ANION GAP SERPL CALCULATED.3IONS-SCNC: 9 MMOL/L (ref 3–14)
AST SERPL W P-5'-P-CCNC: 20 U/L (ref 0–45)
BASOPHILS # BLD AUTO: 0.1 10E9/L (ref 0–0.2)
BASOPHILS NFR BLD AUTO: 2.2 %
BILIRUB SERPL-MCNC: 0.5 MG/DL (ref 0.2–1.3)
BUN SERPL-MCNC: 14 MG/DL (ref 7–30)
CALCIUM SERPL-MCNC: 9.2 MG/DL (ref 8.5–10.1)
CHLORIDE SERPL-SCNC: 105 MMOL/L (ref 94–109)
CO2 SERPL-SCNC: 23 MMOL/L (ref 20–32)
CREAT SERPL-MCNC: 0.92 MG/DL (ref 0.52–1.04)
DIFFERENTIAL METHOD BLD: ABNORMAL
EOSINOPHIL # BLD AUTO: 0.7 10E9/L (ref 0–0.7)
EOSINOPHIL NFR BLD AUTO: 11.4 %
ERYTHROCYTE [DISTWIDTH] IN BLOOD BY AUTOMATED COUNT: 15.6 % (ref 10–15)
GFR SERPL CREATININE-BSD FRML MDRD: 64 ML/MIN/1.7M2
GLUCOSE SERPL-MCNC: 97 MG/DL (ref 70–99)
HCT VFR BLD AUTO: 39.6 % (ref 35–47)
HGB BLD-MCNC: 12.9 G/DL (ref 11.7–15.7)
IMM GRANULOCYTES # BLD: 0 10E9/L (ref 0–0.4)
IMM GRANULOCYTES NFR BLD: 0.3 %
LYMPHOCYTES # BLD AUTO: 3.3 10E9/L (ref 0.8–5.3)
LYMPHOCYTES NFR BLD AUTO: 52 %
MCH RBC QN AUTO: 30.3 PG (ref 26.5–33)
MCHC RBC AUTO-ENTMCNC: 32.6 G/DL (ref 31.5–36.5)
MCV RBC AUTO: 93 FL (ref 78–100)
MONOCYTES # BLD AUTO: 0.4 10E9/L (ref 0–1.3)
MONOCYTES NFR BLD AUTO: 5.9 %
NEUTROPHILS # BLD AUTO: 1.8 10E9/L (ref 1.6–8.3)
NEUTROPHILS NFR BLD AUTO: 28.2 %
NRBC # BLD AUTO: 0 10*3/UL
NRBC BLD AUTO-RTO: 0 /100
PLATELET # BLD AUTO: 577 10E9/L (ref 150–450)
POTASSIUM SERPL-SCNC: 4.1 MMOL/L (ref 3.4–5.3)
PROT SERPL-MCNC: 7.8 G/DL (ref 6.8–8.8)
RBC # BLD AUTO: 4.26 10E12/L (ref 3.8–5.2)
SODIUM SERPL-SCNC: 137 MMOL/L (ref 133–144)
WBC # BLD AUTO: 6.3 10E9/L (ref 4–11)

## 2017-08-15 PROCEDURE — 85025 COMPLETE CBC W/AUTO DIFF WBC: CPT | Performed by: PHYSICIAN ASSISTANT

## 2017-08-15 PROCEDURE — C1751 CATH, INF, PER/CENT/MIDLINE: HCPCS

## 2017-08-15 PROCEDURE — 25000132 ZZH RX MED GY IP 250 OP 250 PS 637: Performed by: PHYSICIAN ASSISTANT

## 2017-08-15 PROCEDURE — 02HV33Z INSERTION OF INFUSION DEVICE INTO SUPERIOR VENA CAVA, PERCUTANEOUS APPROACH: ICD-10-PCS | Performed by: RADIOLOGY

## 2017-08-15 PROCEDURE — 25000132 ZZH RX MED GY IP 250 OP 250 PS 637: Performed by: INTERNAL MEDICINE

## 2017-08-15 PROCEDURE — 99207 ZZC CHGS TRANSFERRED TO HOSPITAL: CPT | Mod: ZP | Performed by: PHYSICIAN ASSISTANT

## 2017-08-15 PROCEDURE — 3E04302 INTRODUCTION OF HIGH-DOSE INTERLEUKIN-2 INTO CENTRAL VEIN, PERCUTANEOUS APPROACH: ICD-10-PCS | Performed by: INTERNAL MEDICINE

## 2017-08-15 PROCEDURE — 12000008 ZZH R&B INTERMEDIATE UMMC

## 2017-08-15 PROCEDURE — 25000125 ZZHC RX 250: Performed by: PHYSICIAN ASSISTANT

## 2017-08-15 PROCEDURE — 36569 INSJ PICC 5 YR+ W/O IMAGING: CPT

## 2017-08-15 PROCEDURE — 99212 OFFICE O/P EST SF 10 MIN: CPT | Mod: ZF

## 2017-08-15 PROCEDURE — 25800025 ZZH RX 258: Performed by: INTERNAL MEDICINE

## 2017-08-15 PROCEDURE — 25000128 H RX IP 250 OP 636: Performed by: INTERNAL MEDICINE

## 2017-08-15 PROCEDURE — 80053 COMPREHEN METABOLIC PANEL: CPT | Performed by: PHYSICIAN ASSISTANT

## 2017-08-15 PROCEDURE — 99223 1ST HOSP IP/OBS HIGH 75: CPT | Mod: AI | Performed by: INTERNAL MEDICINE

## 2017-08-15 RX ORDER — NAPROXEN 250 MG/1
250 TABLET ORAL 2 TIMES DAILY PRN
Status: DISCONTINUED | OUTPATIENT
Start: 2017-08-15 | End: 2017-08-19 | Stop reason: HOSPADM

## 2017-08-15 RX ORDER — DIPHENOXYLATE HCL/ATROPINE 2.5-.025MG
1 TABLET ORAL
Status: DISCONTINUED | OUTPATIENT
Start: 2017-08-15 | End: 2017-08-19 | Stop reason: HOSPADM

## 2017-08-15 RX ORDER — ALBUTEROL SULFATE 0.83 MG/ML
2.5 SOLUTION RESPIRATORY (INHALATION)
Status: DISCONTINUED | OUTPATIENT
Start: 2017-08-15 | End: 2017-08-19 | Stop reason: HOSPADM

## 2017-08-15 RX ORDER — ZOLPIDEM TARTRATE 5 MG/1
5 TABLET ORAL
Status: DISCONTINUED | OUTPATIENT
Start: 2017-08-15 | End: 2017-08-16

## 2017-08-15 RX ORDER — DIPHENHYDRAMINE HYDROCHLORIDE 50 MG/ML
50 INJECTION INTRAMUSCULAR; INTRAVENOUS
Status: DISCONTINUED | OUTPATIENT
Start: 2017-08-15 | End: 2017-08-19 | Stop reason: HOSPADM

## 2017-08-15 RX ORDER — CITALOPRAM HYDROBROMIDE 20 MG/1
20 TABLET ORAL EVERY EVENING
Status: DISCONTINUED | OUTPATIENT
Start: 2017-08-15 | End: 2017-08-19 | Stop reason: HOSPADM

## 2017-08-15 RX ORDER — METHYLPREDNISOLONE SODIUM SUCCINATE 125 MG/2ML
125 INJECTION, POWDER, LYOPHILIZED, FOR SOLUTION INTRAMUSCULAR; INTRAVENOUS
Status: DISCONTINUED | OUTPATIENT
Start: 2017-08-15 | End: 2017-08-19 | Stop reason: HOSPADM

## 2017-08-15 RX ORDER — ONDANSETRON 2 MG/ML
8 INJECTION INTRAMUSCULAR; INTRAVENOUS EVERY 8 HOURS
Status: DISCONTINUED | OUTPATIENT
Start: 2017-08-15 | End: 2017-08-19 | Stop reason: HOSPADM

## 2017-08-15 RX ORDER — POTASSIUM CHLORIDE 1.5 G/1.58G
20-40 POWDER, FOR SOLUTION ORAL
Status: DISCONTINUED | OUTPATIENT
Start: 2017-08-15 | End: 2017-08-19 | Stop reason: HOSPADM

## 2017-08-15 RX ORDER — POTASSIUM CL/LIDO/0.9 % NACL 10MEQ/0.1L
10 INTRAVENOUS SOLUTION, PIGGYBACK (ML) INTRAVENOUS
Status: DISCONTINUED | OUTPATIENT
Start: 2017-08-15 | End: 2017-08-19 | Stop reason: HOSPADM

## 2017-08-15 RX ORDER — LIDOCAINE 40 MG/G
CREAM TOPICAL
Status: DISCONTINUED | OUTPATIENT
Start: 2017-08-15 | End: 2017-08-19 | Stop reason: HOSPADM

## 2017-08-15 RX ORDER — EPINEPHRINE 1 MG/ML
0.3 INJECTION INTRAMUSCULAR; INTRAVENOUS; SUBCUTANEOUS EVERY 5 MIN PRN
Status: DISCONTINUED | OUTPATIENT
Start: 2017-08-15 | End: 2017-08-19 | Stop reason: HOSPADM

## 2017-08-15 RX ORDER — POTASSIUM CHLORIDE 29.8 MG/ML
20 INJECTION INTRAVENOUS
Status: DISCONTINUED | OUTPATIENT
Start: 2017-08-15 | End: 2017-08-19 | Stop reason: HOSPADM

## 2017-08-15 RX ORDER — SODIUM CHLORIDE 9 MG/ML
1000 INJECTION, SOLUTION INTRAVENOUS CONTINUOUS PRN
Status: DISCONTINUED | OUTPATIENT
Start: 2017-08-15 | End: 2017-08-19 | Stop reason: HOSPADM

## 2017-08-15 RX ORDER — DEXTROSE MONOHYDRATE, SODIUM CHLORIDE, AND POTASSIUM CHLORIDE 50; 1.49; 9 G/1000ML; G/1000ML; G/1000ML
INJECTION, SOLUTION INTRAVENOUS CONTINUOUS
Status: DISCONTINUED | OUTPATIENT
Start: 2017-08-15 | End: 2017-08-19 | Stop reason: HOSPADM

## 2017-08-15 RX ORDER — POTASSIUM CHLORIDE 750 MG/1
20-40 TABLET, EXTENDED RELEASE ORAL
Status: DISCONTINUED | OUTPATIENT
Start: 2017-08-15 | End: 2017-08-19 | Stop reason: HOSPADM

## 2017-08-15 RX ORDER — ACETAMINOPHEN 325 MG/1
650 TABLET ORAL EVERY 4 HOURS PRN
Status: DISCONTINUED | OUTPATIENT
Start: 2017-08-15 | End: 2017-08-19 | Stop reason: HOSPADM

## 2017-08-15 RX ORDER — MEPERIDINE HYDROCHLORIDE 25 MG/ML
25 INJECTION INTRAMUSCULAR; INTRAVENOUS; SUBCUTANEOUS
Status: DISCONTINUED | OUTPATIENT
Start: 2017-08-15 | End: 2017-08-19 | Stop reason: HOSPADM

## 2017-08-15 RX ORDER — ONDANSETRON 8 MG/1
8 TABLET, ORALLY DISINTEGRATING ORAL ONCE
Status: COMPLETED | OUTPATIENT
Start: 2017-08-15 | End: 2017-08-15

## 2017-08-15 RX ORDER — LIDOCAINE 40 MG/G
CREAM TOPICAL
Status: DISCONTINUED | OUTPATIENT
Start: 2017-08-15 | End: 2017-08-16 | Stop reason: HOSPADM

## 2017-08-15 RX ORDER — NALOXONE HYDROCHLORIDE 0.4 MG/ML
.1-.4 INJECTION, SOLUTION INTRAMUSCULAR; INTRAVENOUS; SUBCUTANEOUS
Status: DISCONTINUED | OUTPATIENT
Start: 2017-08-15 | End: 2017-08-19 | Stop reason: HOSPADM

## 2017-08-15 RX ORDER — ALBUTEROL SULFATE 90 UG/1
1-2 AEROSOL, METERED RESPIRATORY (INHALATION)
Status: DISCONTINUED | OUTPATIENT
Start: 2017-08-15 | End: 2017-08-19 | Stop reason: HOSPADM

## 2017-08-15 RX ORDER — POTASSIUM CHLORIDE 7.45 MG/ML
10 INJECTION INTRAVENOUS
Status: DISCONTINUED | OUTPATIENT
Start: 2017-08-15 | End: 2017-08-19 | Stop reason: HOSPADM

## 2017-08-15 RX ORDER — DIPHENHYDRAMINE HCL 25 MG
25 CAPSULE ORAL EVERY 4 HOURS PRN
Status: DISCONTINUED | OUTPATIENT
Start: 2017-08-15 | End: 2017-08-19 | Stop reason: HOSPADM

## 2017-08-15 RX ORDER — BUPROPION HYDROCHLORIDE 300 MG/1
300 TABLET ORAL EVERY MORNING
Status: DISCONTINUED | OUTPATIENT
Start: 2017-08-16 | End: 2017-08-19 | Stop reason: HOSPADM

## 2017-08-15 RX ORDER — MEPERIDINE HYDROCHLORIDE 25 MG/ML
25 INJECTION INTRAMUSCULAR; INTRAVENOUS; SUBCUTANEOUS EVERY 30 MIN PRN
Status: DISCONTINUED | OUTPATIENT
Start: 2017-08-15 | End: 2017-08-19 | Stop reason: HOSPADM

## 2017-08-15 RX ORDER — PROCHLORPERAZINE MALEATE 10 MG
10 TABLET ORAL EVERY 6 HOURS PRN
Status: DISCONTINUED | OUTPATIENT
Start: 2017-08-15 | End: 2017-08-19 | Stop reason: HOSPADM

## 2017-08-15 RX ORDER — ACETAMINOPHEN 325 MG/1
650 TABLET ORAL EVERY 4 HOURS
Status: DISCONTINUED | OUTPATIENT
Start: 2017-08-15 | End: 2017-08-19 | Stop reason: HOSPADM

## 2017-08-15 RX ORDER — MAGNESIUM SULFATE HEPTAHYDRATE 40 MG/ML
4 INJECTION, SOLUTION INTRAVENOUS EVERY 4 HOURS PRN
Status: DISCONTINUED | OUTPATIENT
Start: 2017-08-15 | End: 2017-08-19 | Stop reason: HOSPADM

## 2017-08-15 RX ORDER — CEPHALEXIN 500 MG/1
500 CAPSULE ORAL 2 TIMES DAILY
Status: DISCONTINUED | OUTPATIENT
Start: 2017-08-15 | End: 2017-08-19 | Stop reason: HOSPADM

## 2017-08-15 RX ORDER — HEPARIN SODIUM,PORCINE 10 UNIT/ML
2-5 VIAL (ML) INTRAVENOUS
Status: DISCONTINUED | OUTPATIENT
Start: 2017-08-15 | End: 2017-08-16 | Stop reason: HOSPADM

## 2017-08-15 RX ORDER — ALPRAZOLAM 0.5 MG
0.5 TABLET ORAL 3 TIMES DAILY PRN
Status: DISCONTINUED | OUTPATIENT
Start: 2017-08-15 | End: 2017-08-16

## 2017-08-15 RX ORDER — DEXTROSE MONOHYDRATE 50 MG/ML
1000 INJECTION, SOLUTION INTRAVENOUS CONTINUOUS
Status: DISCONTINUED | OUTPATIENT
Start: 2017-08-15 | End: 2017-08-19 | Stop reason: HOSPADM

## 2017-08-15 RX ORDER — LORAZEPAM 2 MG/ML
.5-1 INJECTION INTRAMUSCULAR EVERY 4 HOURS PRN
Status: DISCONTINUED | OUTPATIENT
Start: 2017-08-15 | End: 2017-08-19 | Stop reason: HOSPADM

## 2017-08-15 RX ORDER — LORAZEPAM 0.5 MG/1
.5-1 TABLET ORAL EVERY 4 HOURS PRN
Status: DISCONTINUED | OUTPATIENT
Start: 2017-08-15 | End: 2017-08-19 | Stop reason: HOSPADM

## 2017-08-15 RX ADMIN — CITALOPRAM HYDROBROMIDE 20 MG: 20 TABLET ORAL at 19:14

## 2017-08-15 RX ADMIN — PROCHLORPERAZINE MALEATE 10 MG: 10 TABLET, FILM COATED ORAL at 22:05

## 2017-08-15 RX ADMIN — ACETAMINOPHEN 650 MG: 325 TABLET, FILM COATED ORAL at 22:33

## 2017-08-15 RX ADMIN — ALPRAZOLAM 0.5 MG: 0.5 TABLET ORAL at 14:56

## 2017-08-15 RX ADMIN — ACETAMINOPHEN 650 MG: 325 TABLET, FILM COATED ORAL at 18:35

## 2017-08-15 RX ADMIN — RANITIDINE HYDROCHLORIDE 150 MG: 150 TABLET, FILM COATED ORAL at 18:19

## 2017-08-15 RX ADMIN — ALDESLEUKIN 34 MILLION UNITS: 1.1 INJECTION, POWDER, LYOPHILIZED, FOR SOLUTION INTRAVENOUS at 19:14

## 2017-08-15 RX ADMIN — LIDOCAINE HYDROCHLORIDE 2 ML: 10 INJECTION, SOLUTION INFILTRATION; PERINEURAL at 14:28

## 2017-08-15 RX ADMIN — DEXTROSE MONOHYDRATE 50 ML: 50 INJECTION, SOLUTION INTRAVENOUS at 18:19

## 2017-08-15 RX ADMIN — ZOLPIDEM TARTRATE 5 MG: 5 TABLET, FILM COATED ORAL at 21:34

## 2017-08-15 RX ADMIN — ONDANSETRON 8 MG: 8 TABLET, ORALLY DISINTEGRATING ORAL at 14:56

## 2017-08-15 RX ADMIN — POTASSIUM CHLORIDE, DEXTROSE MONOHYDRATE AND SODIUM CHLORIDE: 150; 5; 900 INJECTION, SOLUTION INTRAVENOUS at 18:13

## 2017-08-15 RX ADMIN — CEPHALEXIN 500 MG: 500 CAPSULE ORAL at 18:19

## 2017-08-15 RX ADMIN — LORAZEPAM 0.5 MG: 2 INJECTION INTRAMUSCULAR; INTRAVENOUS at 23:07

## 2017-08-15 RX ADMIN — ONDANSETRON 8 MG: 2 INJECTION INTRAMUSCULAR; INTRAVENOUS at 18:35

## 2017-08-15 ASSESSMENT — PAIN SCALES - GENERAL: PAINLEVEL: NO PAIN (0)

## 2017-08-15 NOTE — IP AVS SNAPSHOT
MRN:[id number]                      After Visit Summary   8/15/2017    Suzi Gonsales    MRN: [id number]           Thank you!     Thank you for choosing [facility] for your care. Our goal is always to provide you with excellent care. Hearing back from our patients is one way we can continue to improve our services. Please take a few minutes to complete the written survey that you may receive in the mail after you visit with us. Thank you!        Patient Information     Date Of Birth          1965        Designated Caregiver       Most Recent Value    Caregiver    Will someone help with your care after discharge? no      About your hospital stay     You were admitted on:  August 15, 2017 You last received care in the:  [address] [facility] [place]    You were discharged on:  August 19, 2017        Reason for your hospital stay       You were hospitalized for high-dose IL-2 therapy for kidney cancer.                  Who to Call     For medical emergencies, please call 911.  For non-urgent questions about your medical care, please call your primary care provider or clinic, [phone]          Attending Provider     Provider Specialty    Sanjiv Fernandez MD Oncology    Gerda Lawson MD Hematology & Oncology       Primary Care Provider Office Phone # Fax #    Idalia Saini -[phone]       When to contact your care team       Please call the [facility] [phone] for fever (temp >100.5), chills, uncontrolled nausea, vomiting, constipation, or diarrhea, unrelieved pain, bleeding not relieved with pressure, dizziness, chest pain, shortness of breath, loss of consciousness, and any new or concerning symptoms.                  After Care Instructions     Activity       Your activity upon discharge: activity as tolerated            Diet       Follow this diet upon discharge: Regular                  Follow-up Appointments      [facility] Follow-up and recommended labs and tests       Follow up with [facility] within 1 week for IL-2 follow-up and labs.    Appointments on [place] and/or [facility] (with [facility] or [facility] provider or service). Call [phone] if you haven't heard regarding these appointments within 7 days of discharge.                  Your next 10 appointments already scheduled     Aug 24, 2017  3:00 PM CDT   [facility] Lab Draw with [facility] LAB DRAW   [facility] Lab Draw ([facility])    [address]  2nd Floor  [facility] [address]   [phone]            Aug 24, 2017  3:30 PM CDT   (Arrive by 3:15 PM)   Return Visit with Elina Still PA-C   [facility] ([facility])    [address]  2nd [facility] [address]   [phone]            Sep 12, 2017 12:40 PM CDT   (Arrive by 12:25 PM)   CT CHEST/ABDOMEN/PELVIS W CONTRAST with UCCT1   [facility] [place] [facility] ([facility])    [address]  1st Floor  [facility] [address]   [phone]           Please bring any scans or X-rays taken at other hospitals, if similar tests were done. Also bring a list of your medicines, including vitamins, minerals and over-the-counter drugs. It is safest to leave personal items at home.  Be sure to tell your doctor:   If you have any allergies.   If there s any chance you are pregnant.   If you are breastfeeding.   If you have any special needs.  You may have contrast for this exam. To prepare:   Do not eat or drink for 2 hours before your exam. If you need to take medicine, you may take it with small sips of water. (We may ask you to take liquid medicine as well.)   The day before your exam, drink extra fluids at least six 8-ounce glasses (unless your doctor tells you to restrict your fluids).  Patients over 70 or patients with diabetes or kidney problems:   If you haven t had a blood  test (creatinine test) within the last 30 days, go to your clinic or Diagnostic Imaging Department for this test.  If you have diabetes:   If your kidney function is normal, continue taking your metformin (Avandamet, Glucophage, Glucovance, Metaglip) on the day of your exam.   If your kidney function is abnormal, wait 48 hours before restarting this medicine.  You will have oral contrast for this exam:   You will drink the contrast at home. Get this from your clinic or Diagnostic Imaging Department. Please follow the directions given.  Please wear loose clothing, such as a sweat suit or jogging clothes. Avoid snaps, zippers and other metal. We may ask you to undress and put on a hospital gown.  If you have any questions, please call the Imaging Department where you will have your exam.            Sep 13, 2017  3:15 PM CDT   (Arrive by 3:00 PM)   Return Visit with Henri Green MD   [facility] ([facility] [place])    [address] [place] [address] [phone]              Future tests that were ordered for you     CBC with platelets differential       Last Lab Result: Hemoglobin (g/dL)       Date                     Value                 08/18/2017               9.9 (L)          ----------            Comprehensive metabolic panel                 Pending Results     Date and Time Order Name Status Description    8/18/2017 2330 Blood culture Preliminary     8/18/2017 0744 EKG 12-lead, complete Preliminary     8/17/2017 2330 Blood culture Preliminary     8/16/2017 2330 Blood culture Preliminary     8/15/2017 2330 Blood culture Preliminary             Statement of Approval     Ordered          08/19/17 0947  I have reviewed and agree with all the recommendations and orders detailed in this document.  EFFECTIVE NOW     Approved and electronically signed by:  Beatrice Conde PA-C             Admission Information     Date & Time  "Provider Department Dept. Phone    8/15/2017 Gerda Lawson MD [address] [facility] [place] [phone]      Your Vitals Were     Blood Pressure Pulse Temperature Respirations Height Weight    115/66 (BP Location: Left arm) 133 98.1  F (36.7  C) (Oral) 18 1.575 m (5' 2\") 60.5 kg (133 lb 4.8 oz)    Pulse Oximetry BMI (Body Mass Index)                95% 24.38 kg/m2          [employer] Information     [employer] gives you secure access to your electronic health record. If you see a primary care provider, you can also send messages to your care team and make appointments. If you have questions, please call your primary care clinic.  If you do not have a primary care provider, please call [phone] and they will assist you.        Care EveryWhere ID     This is your Care EveryWhere ID. This could be used by other organizations to access your [place] medical records  [id number]        Equal Access to Services     ROSAURA INTERIANO : Hadii jimmie Santana, waaxda luqadaha, qaybta kaalmada adeegyajodi, josemanuel oswald . So [facility] [phone].    ATENCIÓN: Si habla español, tiene a chacon disposición servicios gratuitos de asistencia lingüística. Llame al [phone].    We comply with applicable federal civil rights laws and [place] laws. We do not discriminate on the basis of race, color, national origin, age, disability sex, sexual orientation or gender identity.               Review of your medicines      CONTINUE these medicines which have NOT CHANGED        Dose / Directions    acetaminophen 325 MG tablet   Commonly known as:  TYLENOL   Used for:  Malignant neoplasm of right kidney (H)        Dose:  650 mg   Take 2 tablets (650 mg) by mouth every 4 hours as needed for mild pain (mild pain)   Quantity:  100 tablet   Refills:  0       acetaminophen-codeine 300-30 MG per tablet   Commonly known as:  TYLENOL #3        TK 1 T PO Q 6 HOURS PRN FOR MILD PAIN OR COUGH   Refills:  0       " ALPRAZolam 0.5 MG tablet   Commonly known as:  XANAX   Used for:  Renal cell carcinoma, unspecified laterality (H), Mass of upper lobe of left lung        Dose:  0.5 mg   Take 1 tablet (0.5 mg) by mouth 3 times daily as needed for anxiety   Quantity:  60 tablet   Refills:  0       AMBIEN PO   Used for:  Malignant neoplasm of right kidney (H)        Dose:  5 mg   Take 5 mg by mouth nightly as needed for sleep   Refills:  0       benzonatate 100 MG capsule   Commonly known as:  TESSALON        Dose:  100 mg   Take 1 capsule (100 mg) by mouth 3 times daily as needed for cough   Quantity:  42 capsule   Refills:  0       buPROPion 300 MG 24 hr tablet   Commonly known as:  WELLBUTRIN XL        Dose:  300 mg   Take 300 mg by mouth every morning   Refills:  0       citalopram 20 MG tablet   Commonly known as:  celeXA        Dose:  20 mg   Take 20 mg by mouth every evening   Refills:  0       IBUPROFEN        Dose:  400 mg   Take 400 mg by mouth every 6 hours as needed   Refills:  0       potassium phosphate (monobasic) 500 MG tablet   Commonly known as:  K-PHOS   Used for:  Hypophosphatemia        Dose:  500 mg   Take 1 tablet (500 mg) by mouth 4 times daily   Quantity:  30 tablet   Refills:  0       prochlorperazine 10 MG tablet   Commonly known as:  COMPAZINE   Used for:  Malignant neoplasm of right kidney (H)        Dose:  10 mg   Take 1 tablet (10 mg) by mouth every 6 hours as needed for nausea or vomiting (Breakthrough Nausea/Vomiting)   Quantity:  30 tablet   Refills:  0                Protect others around you: Learn how to safely use, store and throw away your medicines at www.disposemymeds.org.             Medication List: This is a list of all your medications and when to take them. Check marks below indicate your daily home schedule. Keep this list as a reference.      Medications           Morning Afternoon Evening Bedtime As Needed    acetaminophen 325 MG tablet   Commonly known as:  TYLENOL   Take 2 tablets  (650 mg) by mouth every 4 hours as needed for mild pain (mild pain)   Last time this was given:  650 mg on 8/18/2017  8:13 PM                                acetaminophen-codeine 300-30 MG per tablet   Commonly known as:  TYLENOL #3   TK 1 T PO Q 6 HOURS PRN FOR MILD PAIN OR COUGH                                ALPRAZolam 0.5 MG tablet   Commonly known as:  XANAX   Take 1 tablet (0.5 mg) by mouth 3 times daily as needed for anxiety   Last time this was given:  0.5 mg on 8/15/2017  2:56 PM                                AMBIEN PO   Take 5 mg by mouth nightly as needed for sleep   Last time this was given:  5 mg on 8/18/2017  9:26 PM                                benzonatate 100 MG capsule   Commonly known as:  TESSALON   Take 1 capsule (100 mg) by mouth 3 times daily as needed for cough                                buPROPion 300 MG 24 hr tablet   Commonly known as:  WELLBUTRIN XL   Take 300 mg by mouth every morning   Last time this was given:  300 mg on 8/19/2017  7:37 AM                                citalopram 20 MG tablet   Commonly known as:  celeXA   Take 20 mg by mouth every evening   Last time this was given:  20 mg on 8/18/2017  8:13 PM                                IBUPROFEN   Take 400 mg by mouth every 6 hours as needed                                potassium phosphate (monobasic) 500 MG tablet   Commonly known as:  K-PHOS   Take 1 tablet (500 mg) by mouth 4 times daily                                prochlorperazine 10 MG tablet   Commonly known as:  COMPAZINE   Take 1 tablet (10 mg) by mouth every 6 hours as needed for nausea or vomiting (Breakthrough Nausea/Vomiting)   Last time this was given:  10 mg on 8/16/2017 11:37 AM

## 2017-08-15 NOTE — NURSING NOTE
"Oncology Rooming Note    August 15, 2017 11:29 AM   Suzi Gonsales is a 52 year old female who presents for:    Chief Complaint   Patient presents with     Blood Draw     PICC     Oncology Clinic Visit     Return-Kidney Ca     Initial Vitals: /65 (BP Location: Right arm)  Pulse 60  Temp 97.9  F (36.6  C) (Oral)  Resp 16  Wt 54.1 kg (119 lb 4.8 oz)  SpO2 97%  BMI 21.82 kg/m2 Estimated body mass index is 21.82 kg/(m^2) as calculated from the following:    Height as of 8/8/17: 1.575 m (5' 2\").    Weight as of this encounter: 54.1 kg (119 lb 4.8 oz). Body surface area is 1.54 meters squared.  No Pain (0) Comment: Data Unavailable   No LMP recorded. Patient is not currently having periods (Reason: Premenopausal).  Allergies reviewed: Yes  Medications reviewed: Yes    Medications: Medication refills not needed today.  Pharmacy name entered into [employer]: [facility] - [place], [place] - [address] AT [facility] [address]    Clinical concerns: No new concerns.    6 minutes for nursing intake (face to face time)     Martine Robins LPN            "

## 2017-08-15 NOTE — Clinical Note
8/15/2017       RE: Suzi Gonsales  [address] [place] [address]     Dear Colleague,    Thank you for referring your patient, Suzi Gonsales, to the [facility]. Please see a copy of my visit note below.    No notes on file    Again, thank you for allowing me to participate in the care of your patient.      Sincerely,    Day Ren PA-C

## 2017-08-15 NOTE — MR AVS SNAPSHOT
After Visit Summary   8/15/2017    Suzi Gonsales    MRN: [id number]           Patient Information     Date Of Birth          1965        Visit Information        Provider Department      8/15/2017 11:00 AM Day Ren PA-C [facility] [place]        Today's Diagnoses     Malignant neoplasm of right kidney (H)           Follow-ups after your visit        Your next 10 appointments already scheduled     Aug 24, 2017  3:00 PM CDT   [facility] Lab Draw with [facility] LAB DRAW   [facility] Lab Draw ([facility])    [address]  2nd [facility] [address]   [phone]            Aug 24, 2017  3:30 PM CDT   (Arrive by 3:15 PM)   Return Visit with Elina Still PA-C   [facility] Clinic ([facility])    [address] [place] [address]   [phone]            Sep 13, 2017  2:20 PM CDT   (Arrive by 2:05 PM)   CT CHEST/ABDOMEN/PELVIS W CONTRAST with UCCT2   [facility] [place] ([facility])    [address] [place] [address]  1st [facility] [address]   [phone]           Please bring any scans or X-rays taken at other hospitals, if similar tests were done. Also bring a list of your medicines, including vitamins, minerals and over-the-counter drugs. It is safest to leave personal items at home.  Be sure to tell your doctor:   If you have any allergies.   If there s any chance you are pregnant.   If you are breastfeeding.   If you have any special needs.  You may have contrast for this exam. To prepare:   Do not eat or drink for 2 hours before your exam. If you need to take medicine, you may take it with small sips of water. (We may ask you to take liquid medicine as well.)   The day before your exam, drink extra fluids at least six 8-ounce glasses (unless your doctor tells you to restrict your fluids).  Patients over 70 or patients with  diabetes or kidney problems:   If you haven t had a blood test (creatinine test) within the last 30 days, go to your clinic or Diagnostic Imaging Department for this test.  If you have diabetes:   If your kidney function is normal, continue taking your metformin (Avandamet, Glucophage, Glucovance, Metaglip) on the day of your exam.   If your kidney function is abnormal, wait 48 hours before restarting this medicine.  You will have oral contrast for this exam:   You will drink the contrast at home. Get this from your clinic or Diagnostic Imaging Department. Please follow the directions given.  Please wear loose clothing, such as a sweat suit or jogging clothes. Avoid snaps, zippers and other metal. We may ask you to undress and put on a hospital gown.  If you have any questions, please call the Imaging Department where you will have your exam.            Sep 13, 2017  3:15 PM CDT   (Arrive by 3:00 PM)   Return Visit with Henri Green MD   [facility] ([facility])    [address] [place] [address] [phone]              Who to contact     If you have questions or need follow up information about today's clinic visit or your schedule please contact [facility] directly at [phone].  Normal or non-critical lab and imaging results will be communicated to you by MyChart, letter or phone within 4 business days after the clinic has received the results. If you do not hear from us within 7 days, please contact the clinic through MyChart or phone. If you have a critical or abnormal lab result, we will notify you by phone as soon as possible.  Submit refill requests through [employer] or call your pharmacy and they will forward the refill request to us. Please allow 3 business days for your refill to be completed.          Additional Information About Your Visit        [employer] Information     [employer] gives you secure access  to your electronic health record. If you see a primary care provider, you can also send messages to your care team and make appointments. If you have questions, please call your primary care clinic.  If you do not have a primary care provider, please call [phone] and they will assist you.        Care EveryWhere ID     This is your Care EveryWhere ID. This could be used by other organizations to access your [place] medical records  [id number]        Your Vitals Were     Pulse Temperature Respirations Pulse Oximetry BMI (Body Mass Index)       60 97.9  F (36.6  C) (Oral) 16 97% 21.82 kg/m2        Blood Pressure from Last 3 Encounters:   08/19/17 115/66   08/15/17 104/65   08/08/17 103/72    Weight from Last 3 Encounters:   08/18/17 60.5 kg (133 lb 4.8 oz)   08/15/17 54.1 kg (119 lb 4.8 oz)   08/08/17 54.7 kg (120 lb 8 oz)              We Performed the Following     CBC with platelets differential     Comprehensive metabolic panel        Primary Care Provider Office Phone # Fax #    Idalia Saini [phone]       [facility] [address] [id number]        Equal Access to Services     ROSAURA INTERIANO : Hadii aad ku hadasho Soomaali, waaxda luqadaha, qaybta kaalmada adeegyada, josemanuel oswald . So [facility] [phone].    ATENCIÓN: Si habla español, tiene a chacon disposición servicios gratuitos de asistencia lingüística. Llame al [phone].    We comply with applicable federal civil rights laws and [place] laws. We do not discriminate on the basis of race, color, national origin, age, disability sex, sexual orientation or gender identity.            Thank you!     Thank you for choosing [facility]  for your care. Our goal is always to provide you with excellent care. Hearing back from our patients is one way we can continue to improve our services. Please take a few minutes to complete the written survey that you may receive in the mail  after your visit with us. Thank you!             Your Updated Medication List - Protect others around you: Learn how to safely use, store and throw away your medicines at www.disposemymeds.org.          This list is accurate as of: 8/15/17  2:15 PM.  Always use your most recent med list.                   Brand Name Dispense Instructions for use Diagnosis    acetaminophen 325 MG tablet    TYLENOL    100 tablet    Take 2 tablets (650 mg) by mouth every 4 hours as needed for mild pain (mild pain)    Malignant neoplasm of right kidney (H)       acetaminophen-codeine 300-30 MG per tablet    TYLENOL #3     TK 1 T PO Q 6 HOURS PRN FOR MILD PAIN OR COUGH        ALPRAZolam 0.5 MG tablet    XANAX    60 tablet    Take 1 tablet (0.5 mg) by mouth 3 times daily as needed for anxiety    Renal cell carcinoma, unspecified laterality (H), Mass of upper lobe of left lung       AMBIEN PO      Take 5 mg by mouth nightly as needed for sleep    Malignant neoplasm of right kidney (H)       benzonatate 100 MG capsule    TESSALON    42 capsule    Take 1 capsule (100 mg) by mouth 3 times daily as needed for cough        buPROPion 300 MG 24 hr tablet    WELLBUTRIN XL     Take 300 mg by mouth every morning        citalopram 20 MG tablet    celeXA     Take 20 mg by mouth every evening        IBUPROFEN      Take 400 mg by mouth every 6 hours as needed        potassium phosphate (monobasic) 500 MG tablet    K-PHOS    30 tablet    Take 1 tablet (500 mg) by mouth 4 times daily    Hypophosphatemia       prochlorperazine 10 MG tablet    COMPAZINE    30 tablet    Take 1 tablet (10 mg) by mouth every 6 hours as needed for nausea or vomiting (Breakthrough Nausea/Vomiting)    Malignant neoplasm of right kidney (H)

## 2017-08-15 NOTE — NURSING NOTE
Chief Complaint   Patient presents with     Blood Draw     PICC     Labs drawn by RN via right antecubital space.  Vitals taken, pt tolerated procedure.  Checked in for next appointment.     Danita Lynne

## 2017-08-15 NOTE — IP AVS SNAPSHOT
[address] [address]    Phone:  [phone]                                       After Visit Summary   8/15/2017    Suzi Gonsales    MRN: [id number]           After Visit Summary Signature Page     I have received my discharge instructions, and my questions have been answered. I have discussed any challenges I see with this plan with the nurse or doctor.    ..........................................................................................................................................  Patient/Patient Representative Signature      ..........................................................................................................................................  Patient Representative Print Name and Relationship to Patient    ..................................................               ................................................  Date                                            Time    ..........................................................................................................................................  Reviewed by Signature/Title    ...................................................              ..............................................  Date                                                            Time

## 2017-08-15 NOTE — PROGRESS NOTES
DATE/TIME  (DOT-TD, DOT-NOW) CHEMO CHECK ACTIVITY (REGIMEN & DOSE CHECK, DAY, DOSE #, NAME OF CHEMO #1)  CHEMO DRUG #2  CHEMO DRUG #3 NAME OF RN #1 (USE DOT-ME HERE) NAME OF RN#2 (2ND RN TO LOG IN SEPARATELY)   8/15/2017  4:14 PM   Cycle 4 IL-2 protocol check   Martine Mckeon     8/15/2017  6:50 PM   Dose #1 IL-2   Martine Zaidi      8/16/17  9:30 PM  Dose #3 IL-2   Martine Henry     08/17/17  2:31 PM Dose #4 IL-2   Isi Saini     8/18/2017   12:14 PM  Dose # 5 IL-2    Sofia ADLER

## 2017-08-15 NOTE — LETTER
"8/15/2017      RE: Suzi Gonsales  [address] [place] [address]       [facility] CANCER CLINIC  FOLLOW-UP VISIT NOTE  Date of visit: Aug 15, 2017           REASON FOR VISIT: mRCC assessment post to IL-2, C4    HPI: Suzi presented to ED with hematuria and right flank pain thinking she had a renal stone in December 2014. She was worked up with a CT abd/pelvis which suggested a renal mass. She was referred to Dr. Max Zelaya in [facility]. 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 Per charts tumor resection had negative margins and it was staged at pT2bN0.    Her staging work up was negative except for pulmonary nodules. She has been followed every 6 months with scans. But her most recent scan suggested increase in size of couple of nodules prompting to the current referral to Dr Green.  CT CAP on 5/11/17 showed enlarging hilar LAD, enlarging pulmonary nodules, adrenal nodules and a pancreatic body mass. Biopsies of hilar LN, adrenal nodule and pancreatic mass were + for RCC. She met with DR Green and decided to pursue IL-2.     C1: 6/6/17- 9 doses  C2: 6/20/17- 7 doses  CT CAP on 7/24/17  C3: 7/31/17 - 8 doses    INTERVAL HISTORY: Suzi is here today for assessment prior to C4.  She tolerated 8 doses after C3 and stopped as she was exhausted and literally could not be \"confined to her hospital bed any longer\".  Inpatient apparently tried to keep her longer and she was tolerating her doses with expected toxicity, but well.  She recovered within days after discharge and has been walking outside, shopping and eating well feeling nearly perfect again.     No recent f/s/c. No chest pain/cough/dyspnea. No black/bloody stools. No  issues. No daily pain. Today she feels great and ready for admission.     EXAM:  /65 (BP Location: Right arm)  Pulse 60  Temp 97.9  F (36.6  C) (Oral)  Resp 16  Wt 54.1 kg (119 " lb 4.8 oz)  SpO2 97%  BMI 21.82 kg/m2  Wt Readings from Last 4 Encounters:   08/18/17 60.5 kg (133 lb 4.8 oz)   08/15/17 54.1 kg (119 lb 4.8 oz)   08/08/17 54.7 kg (120 lb 8 oz)   08/03/17 59.8 kg (131 lb 14.4 oz)     Vital signs were reviewed.   Patient alert and oriented x3.   PERRLA. EOMI. No scleral icterus noted. OP without thrush/sores.  Neck exam: No palpable cervical, supraclavicular or axillary nodes bilaterally.   Heart: RRR no murmurs noted.   Lungs: clear to auscultation bilaterally.  No crackles or wheezing.   Abd: positive bowel sounds in all four quadrants.  No tenderness to palpation.  No hepatomegaly.   Extremities: No lower extremity edema.   Neuro: grossly intact.   Mood and affect is stable.   Skin is dry and flaky.  LABS:      8/15/2017 11:22   Sodium 137   Potassium 4.1   Chloride 105   Carbon Dioxide 23   Urea Nitrogen 14   Creatinine 0.92   GFR Estimate 64   GFR Estimate If Black 77   Calcium 9.2   Anion Gap 9   Albumin 3.7   Protein Total 7.8   Bilirubin Total 0.5   Alkaline Phosphatase 82   ALT 28   AST 20   Glucose 97   WBC 6.3   Hemoglobin 12.9   Hematocrit 39.6   Platelet Count 577 (H)   RBC Count 4.26   MCV 93     ASSESSMENT/PLAN: 50 year old with mRCC s/p first cycle of IL-2 receiving 9 doses and C2 receiving 7.  She has had a month off with a relatively stable CT scan - only mild progression of LAD.     Suzi is doing well today.  She did fantastic with C3 and finished 8 doses.  She is completely physically recovered today, per her and ready for C4.  Her labs are normal as well.  After C4 she'll be back mid-September CT scans and to follow up with Dr Green.       Kia Ren PA-C

## 2017-08-15 NOTE — H&P
[facility], [place]  History and Physical  Hematology / Oncology     Date of Admission:  8/15/2017    Assessment & Plan   Suzi Gonsales is a 52 year old female who presents with metastatic clear cell renal carcinoma and anxiety s/p right radical nephrectomy in 12/2014, now with metastatic RCC (lungs, adrenal, and pancreas) who is admitted for Cycle 4 HD IL-2. She received cycle #1 on 6/6, completed 9 doses and tolerated it well overall. She received cycle #3 on 6/20/17, she completed 7 doses, complicated by hypotension and fatigue. She received Cycle 3 on 7/21/2017 with 8 doses. She saw Dr. Green (primary oncologist) with a repeat CT scan last week which showed stable disease. She has now fully recovered and is feeling well, admitted for Cycle 4 IL-2.      #Metastatic RCC: Admitted for Cycle 4 high-dose IL-2.   - PICC placed prior to admission      REGIMEN: D1=08/15/17   -Initiate IL2 with 600,000 units/kg= 34,000,000 units q8h for up to 14 doses.    Supportive care with:  -Scheduled zofran q8h  -Zantac 150mg bid  -Keflex ppx  -D5NS + 20 KCl @100cc/hr  -Fluid boluses per protocol  -Strict I/O, daily weights  -Scheduled tylenol  -NO steroids or diuretics  -Compazine, ativan prn       #H/o anxiety.   - continue PTA Wellbutrin, Celexa  - PRN lorazepam      FEN  - IVFs per treatment plan, bolus with caution PRN (SBP <85 or Urine Output <30 mL/hr)  - Regular diet  - Electrolyte replacement prn per protocol      PPx  - defer VTE prophylaxis given anticipated thrombocytopenia with IL-2  - Zantac as above      Dispo: Pending completion of tolerated IL-2 doses (anticipate 3-5 day stay) and recovery of acute issues.     Patient and plan of care discussed with staff attending, Dr. Saravia.    TABITHA Kellogg-S2    The patient was seen in conjunction with AIDA KelloggS who served as a scribe for today's progress note. I have reviewed and edited the above note, and agree with the above  findings and plan.     Apple Monsalve PA-C  Hematology/Oncology  [phone]    Code Status   Full Code    Primary Care Physician   Idalia Saini    Chief Complaint   Clear Cell Renal Carcinoma    History is obtained from the patient's medical record    History of Present Illness   Suzi Gonsales is a 52 y/o female with metastatic clear cell renal carcinoma and anxiety s/p right radical nephrectomy in 12/2014, now with metastatic RCC (lungs, adrenal, and pancreas) who is admitted for Cycle 4 HD IL-2. She received cycle #1 on 6/6, completed 9 doses and tolerated it well overall. She received cycle #2 on 6/20/17, she completed 7 doses, complicated by hypotension and fatigue. She received Cycle 3 on 7/31/17 with 8 doses. She saw Dr. Green (primary oncologist) with a repeat CT scan last month which showed stable disease. She has had fatigue, dry and itchy and peeling skin which have since resolved. She is back to normal activities. She denies any fevers or chills. No n/v/d. No headaches vision changes. No mouth sores or dryness. No sore throat. Eating and drinking well.  No dysuria. No abd pain. No leg swelling. ROS is otherwise negative.       ONCOLOGIC HISTORY: (adapted from clinic notes)  Suzi presented to ED with hematuria and right flank pain thinking she had a renal stone in December 2014. She was worked up with a CT abd/pelvis which suggested a renal mass. She was referred to Dr. Max Zelaya in [facility]. 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 Per charts tumor resection had negative margins and it was staged at pT2bN0. Her staging work up was negative except for pulmonary nodules. She has been followed every 6 months with scans. But her most recent scan last week suggested increase in size of couple of nodules prompting to the current referral. Recent CT CAP on 5/11/17 showed enlarging hilar LAD, enlarging pulmonary nodules,  adrenal nodules and a pancreatic body mass. Biopsies confirmed recurrent renal cell carcinoma.  She met with Dr Green and decided to pursue IL-2 and has since received 3 cycles with stable scans.    Past Medical History    I have reviewed this patient's medical history and updated it with pertinent information if needed.   Past Medical History:   Diagnosis Date     Anxiety      Former tobacco use      History of kidney cancer     Clear cell     Mass of left lung     Upper lobe     Solitary kidney     S/P right nephrectomy due to kidney cancer       Past Surgical History   I have reviewed this patient's surgical history and updated it with pertinent information if needed.  Past Surgical History:   Procedure Laterality Date     C/SECTION, LOW TRANSVERSE  ,     , Low Transverse     ENDOSCOPIC ULTRASOUND UPPER GASTROINTESTINAL TRACT (GI) N/A 2017    Procedure: ENDOSCOPIC ULTRASOUND, ESOPHAGOSCOPY / UPPER GASTROINTESTINAL TRACT (GI);  Esophagogastroduodenoscopy, Endoscopic Ultrasound with Fine Needle Biopsies;  Surgeon: Asad Velez MD;  Location: [facility] OR     open radical nephrectomy Right 14     PICC INSERTION Left 2017    5fr DL BioFlo PICC, 40cm (4cm external) in the L basilic vein w/ tip in the low SVC     PICC INSERTION Right 08/15/2017    5fr DL BioFlo PICC, 36cm (1cm external) in the R basilic vein w/ tip in the low SVC       Prior to Admission Medications   Prior to Admission Medications   Prescriptions Last Dose Informant Patient Reported? Taking?   ALPRAZolam (XANAX) 0.5 MG tablet   No No   Sig: Take 1 tablet (0.5 mg) by mouth 3 times daily as needed for anxiety   IBUPROFEN   Yes No   Sig: Take 400 mg by mouth every 6 hours as needed    Zolpidem Tartrate (AMBIEN PO)   Yes No   Sig: Take 5 mg by mouth nightly as needed for sleep   acetaminophen (TYLENOL) 325 MG tablet   No No   Sig: Take 2 tablets (650 mg) by mouth every 4 hours as needed for mild pain (mild pain)    acetaminophen-codeine (TYLENOL #3) 300-30 MG per tablet   Yes No   Sig: TK 1 T PO Q 6 HOURS PRN FOR MILD PAIN OR COUGH   benzonatate (TESSALON) 100 MG capsule   No No   Sig: Take 1 capsule (100 mg) by mouth 3 times daily as needed for cough   buPROPion (WELLBUTRIN XL) 300 MG 24 hr tablet   Yes No   Sig: Take 300 mg by mouth every morning    citalopram (CELEXA) 20 MG tablet   Yes No   Sig: Take 20 mg by mouth every evening    potassium phosphate, monobasic, (K-PHOS) 500 MG tablet   No No   Sig: Take 1 tablet (500 mg) by mouth 4 times daily   Patient not taking: Reported on 8/15/2017   prochlorperazine (COMPAZINE) 10 MG tablet   No No   Sig: Take 1 tablet (10 mg) by mouth every 6 hours as needed for nausea or vomiting (Breakthrough Nausea/Vomiting)      Facility-Administered Medications: None     Allergies   No Known Allergies    Social History   I have reviewed this patient's social history and updated it with pertinent information if needed. Suzi Gonsales  reports that she quit smoking about 13 years ago. Her smoking use included Cigarettes. She has never used smokeless tobacco. She reports that she drinks alcohol. She reports that she does not use illicit drugs.    Family History   I have reviewed this patient's family history and updated it with pertinent information if needed.   Family History   Problem Relation Age of Onset     Hypertension Father      Chronic Obstructive Pulmonary Disease Father      Hyperlipidemia Brother      Hyperlipidemia Brother        Review of Systems   The 10 point Review of Systems is negative other than noted in the HPI or here.     Physical Exam   Temp: 96.2  F (35.7  C) Temp src: Oral BP: 110/53 Pulse: 99   Resp: 18        Vital Signs with Ranges  Temp:  [96.2  F (35.7  C)-97.9  F (36.6  C)] 96.2  F (35.7  C)  Pulse:  [60-99] 99  Resp:  [16-18] 18  BP: (104-110)/(53-65) 110/53  SpO2:  [97 %] 97 %  118 lbs 4.8 oz    Constitutional: Adult female seen sitting up in bed in no acute  distress. No signs of sepsis or volume depletion.  HEENT: NCAT, PERRL, EOMI. Conjunctiva clear. OP moist and pink, no erythema, exudate, or lesions.   Respiratory: CTA B/L. No w/r/r  Cardiovascular: RRR, Normal S1/S2. No m/c/r.   GI: +BS in all 4 quadrants. No tenderness or distention on palpation. No hepatosplenomegaly.   Lymph/Hematologic: No cervical adenopathy.  Skin: Warm, dry skin with no lesions or rashes to exposed skin surfaces.   Musculoskeletal: FROM in all extremities. No edema in extremities.   Neurologic: A & O x 3. CN II-XII intact.   Neuropsychiatric: Normal mood and affect.    Data   Results for orders placed or performed in visit on 08/15/17 (from the past 24 hour(s))   Comprehensive metabolic panel   Result Value Ref Range    Sodium 137 133 - 144 mmol/L    Potassium 4.1 3.4 - 5.3 mmol/L    Chloride 105 94 - 109 mmol/L    Carbon Dioxide 23 20 - 32 mmol/L    Anion Gap 9 3 - 14 mmol/L    Glucose 97 70 - 99 mg/dL    Urea Nitrogen 14 7 - 30 mg/dL    Creatinine 0.92 0.52 - 1.04 mg/dL    GFR Estimate 64 >60 mL/min/1.7m2    GFR Estimate If Black 77 >60 mL/min/1.7m2    Calcium 9.2 8.5 - 10.1 mg/dL    Bilirubin Total 0.5 0.2 - 1.3 mg/dL    Albumin 3.7 3.4 - 5.0 g/dL    Protein Total 7.8 6.8 - 8.8 g/dL    Alkaline Phosphatase 82 40 - 150 U/L    ALT 28 0 - 50 U/L    AST 20 0 - 45 U/L   CBC with platelets differential   Result Value Ref Range    WBC 6.3 4.0 - 11.0 10e9/L    RBC Count 4.26 3.8 - 5.2 10e12/L    Hemoglobin 12.9 11.7 - 15.7 g/dL    Hematocrit 39.6 35.0 - 47.0 %    MCV 93 78 - 100 fl    MCH 30.3 26.5 - 33.0 pg    MCHC 32.6 31.5 - 36.5 g/dL    RDW 15.6 (H) 10.0 - 15.0 %    Platelet Count 577 (H) 150 - 450 10e9/L    Diff Method Automated Method     % Neutrophils 28.2 %    % Lymphocytes 52.0 %    % Monocytes 5.9 %    % Eosinophils 11.4 %    % Basophils 2.2 %    % Immature Granulocytes 0.3 %    Nucleated RBCs 0 0 /100    Absolute Neutrophil 1.8 1.6 - 8.3 10e9/L    Absolute Lymphocytes 3.3 0.8 - 5.3  10e9/L    Absolute Monocytes 0.4 0.0 - 1.3 10e9/L    Absolute Eosinophils 0.7 0.0 - 0.7 10e9/L    Absolute Basophils 0.1 0.0 - 0.2 10e9/L    Abs Immature Granulocytes 0.0 0 - 0.4 10e9/L    Absolute Nucleated RBC 0.0

## 2017-08-16 LAB
ALBUMIN SERPL-MCNC: 2.6 G/DL (ref 3.4–5)
ALP SERPL-CCNC: 53 U/L (ref 40–150)
ALT SERPL W P-5'-P-CCNC: 20 U/L (ref 0–50)
ANION GAP SERPL CALCULATED.3IONS-SCNC: 8 MMOL/L (ref 3–14)
AST SERPL W P-5'-P-CCNC: 12 U/L (ref 0–45)
BASOPHILS # BLD AUTO: 0 10E9/L (ref 0–0.2)
BASOPHILS NFR BLD AUTO: 0.5 %
BILIRUB SERPL-MCNC: 0.3 MG/DL (ref 0.2–1.3)
BUN SERPL-MCNC: 13 MG/DL (ref 7–30)
CALCIUM SERPL-MCNC: 7.5 MG/DL (ref 8.5–10.1)
CHLORIDE SERPL-SCNC: 112 MMOL/L (ref 94–109)
CK SERPL-CCNC: 18 U/L (ref 30–225)
CO2 SERPL-SCNC: 20 MMOL/L (ref 20–32)
CREAT SERPL-MCNC: 1.07 MG/DL (ref 0.52–1.04)
DIFFERENTIAL METHOD BLD: ABNORMAL
EOSINOPHIL # BLD AUTO: 0.4 10E9/L (ref 0–0.7)
EOSINOPHIL NFR BLD AUTO: 4.7 %
ERYTHROCYTE [DISTWIDTH] IN BLOOD BY AUTOMATED COUNT: 15.6 % (ref 10–15)
GFR SERPL CREATININE-BSD FRML MDRD: 54 ML/MIN/1.7M2
GLUCOSE SERPL-MCNC: 132 MG/DL (ref 70–99)
HCT VFR BLD AUTO: 34.5 % (ref 35–47)
HGB BLD-MCNC: 10.9 G/DL (ref 11.7–15.7)
IMM GRANULOCYTES # BLD: 0 10E9/L (ref 0–0.4)
IMM GRANULOCYTES NFR BLD: 0.3 %
LACTATE BLD-SCNC: 1.5 MMOL/L (ref 0.7–2.1)
LDH SERPL L TO P-CCNC: 165 U/L (ref 81–234)
LYMPHOCYTES # BLD AUTO: 0.2 10E9/L (ref 0.8–5.3)
LYMPHOCYTES NFR BLD AUTO: 2.3 %
MAGNESIUM SERPL-MCNC: 1.5 MG/DL (ref 1.6–2.3)
MAGNESIUM SERPL-MCNC: 3.1 MG/DL (ref 1.6–2.3)
MCH RBC QN AUTO: 29.5 PG (ref 26.5–33)
MCHC RBC AUTO-ENTMCNC: 31.6 G/DL (ref 31.5–36.5)
MCV RBC AUTO: 93 FL (ref 78–100)
MONOCYTES # BLD AUTO: 0.3 10E9/L (ref 0–1.3)
MONOCYTES NFR BLD AUTO: 3.8 %
NEUTROPHILS # BLD AUTO: 7.7 10E9/L (ref 1.6–8.3)
NEUTROPHILS NFR BLD AUTO: 88.4 %
NRBC # BLD AUTO: 0 10*3/UL
NRBC BLD AUTO-RTO: 0 /100
PHOSPHATE SERPL-MCNC: 1.4 MG/DL (ref 2.5–4.5)
PHOSPHATE SERPL-MCNC: 2 MG/DL (ref 2.5–4.5)
PLATELET # BLD AUTO: 377 10E9/L (ref 150–450)
POTASSIUM SERPL-SCNC: 4.1 MMOL/L (ref 3.4–5.3)
PROT SERPL-MCNC: 5.6 G/DL (ref 6.8–8.8)
RBC # BLD AUTO: 3.7 10E12/L (ref 3.8–5.2)
SODIUM SERPL-SCNC: 139 MMOL/L (ref 133–144)
WBC # BLD AUTO: 8.7 10E9/L (ref 4–11)

## 2017-08-16 PROCEDURE — 36592 COLLECT BLOOD FROM PICC: CPT | Performed by: INTERNAL MEDICINE

## 2017-08-16 PROCEDURE — 99233 SBSQ HOSP IP/OBS HIGH 50: CPT | Performed by: INTERNAL MEDICINE

## 2017-08-16 PROCEDURE — 25800025 ZZH RX 258: Performed by: INTERNAL MEDICINE

## 2017-08-16 PROCEDURE — 82550 ASSAY OF CK (CPK): CPT | Performed by: PHYSICIAN ASSISTANT

## 2017-08-16 PROCEDURE — 87040 BLOOD CULTURE FOR BACTERIA: CPT | Performed by: INTERNAL MEDICINE

## 2017-08-16 PROCEDURE — 83605 ASSAY OF LACTIC ACID: CPT | Performed by: INTERNAL MEDICINE

## 2017-08-16 PROCEDURE — 25000132 ZZH RX MED GY IP 250 OP 250 PS 637: Performed by: PHYSICIAN ASSISTANT

## 2017-08-16 PROCEDURE — 83615 LACTATE (LD) (LDH) ENZYME: CPT | Performed by: PHYSICIAN ASSISTANT

## 2017-08-16 PROCEDURE — 83735 ASSAY OF MAGNESIUM: CPT | Performed by: PHYSICIAN ASSISTANT

## 2017-08-16 PROCEDURE — 85025 COMPLETE CBC W/AUTO DIFF WBC: CPT | Performed by: PHYSICIAN ASSISTANT

## 2017-08-16 PROCEDURE — 12000008 ZZH R&B INTERMEDIATE UMMC

## 2017-08-16 PROCEDURE — 25000128 H RX IP 250 OP 636: Performed by: INTERNAL MEDICINE

## 2017-08-16 PROCEDURE — 25000125 ZZHC RX 250: Performed by: PHYSICIAN ASSISTANT

## 2017-08-16 PROCEDURE — 25000132 ZZH RX MED GY IP 250 OP 250 PS 637: Performed by: INTERNAL MEDICINE

## 2017-08-16 PROCEDURE — 80053 COMPREHEN METABOLIC PANEL: CPT | Performed by: PHYSICIAN ASSISTANT

## 2017-08-16 PROCEDURE — 84100 ASSAY OF PHOSPHORUS: CPT | Performed by: PHYSICIAN ASSISTANT

## 2017-08-16 PROCEDURE — 25000128 H RX IP 250 OP 636: Performed by: PHYSICIAN ASSISTANT

## 2017-08-16 PROCEDURE — 36592 COLLECT BLOOD FROM PICC: CPT | Performed by: PHYSICIAN ASSISTANT

## 2017-08-16 RX ORDER — ALPRAZOLAM 0.5 MG
0.5 TABLET ORAL 3 TIMES DAILY PRN
Status: DISCONTINUED | OUTPATIENT
Start: 2017-08-16 | End: 2017-08-19 | Stop reason: HOSPADM

## 2017-08-16 RX ADMIN — ACETAMINOPHEN 650 MG: 325 TABLET, FILM COATED ORAL at 15:31

## 2017-08-16 RX ADMIN — ONDANSETRON 8 MG: 2 INJECTION INTRAMUSCULAR; INTRAVENOUS at 20:49

## 2017-08-16 RX ADMIN — ACETAMINOPHEN 650 MG: 325 TABLET, FILM COATED ORAL at 02:55

## 2017-08-16 RX ADMIN — POTASSIUM CHLORIDE, DEXTROSE MONOHYDRATE AND SODIUM CHLORIDE: 150; 5; 900 INJECTION, SOLUTION INTRAVENOUS at 04:52

## 2017-08-16 RX ADMIN — BUPROPION HYDROCHLORIDE 300 MG: 300 TABLET, FILM COATED, EXTENDED RELEASE ORAL at 07:55

## 2017-08-16 RX ADMIN — CEPHALEXIN 500 MG: 500 CAPSULE ORAL at 20:38

## 2017-08-16 RX ADMIN — ALDESLEUKIN 34 MILLION UNITS: 1.1 INJECTION, POWDER, LYOPHILIZED, FOR SOLUTION INTRAVENOUS at 21:53

## 2017-08-16 RX ADMIN — SODIUM CHLORIDE 250 ML: 9 INJECTION, SOLUTION INTRAVENOUS at 02:55

## 2017-08-16 RX ADMIN — POTASSIUM CHLORIDE, DEXTROSE MONOHYDRATE AND SODIUM CHLORIDE: 150; 5; 900 INJECTION, SOLUTION INTRAVENOUS at 14:05

## 2017-08-16 RX ADMIN — SODIUM PHOSPHATE, MONOBASIC, MONOHYDRATE AND SODIUM PHOSPHATE, DIBASIC, ANHYDROUS 20 MMOL: 276; 142 INJECTION, SOLUTION INTRAVENOUS at 10:01

## 2017-08-16 RX ADMIN — ACETAMINOPHEN 650 MG: 325 TABLET, FILM COATED ORAL at 10:01

## 2017-08-16 RX ADMIN — SODIUM CHLORIDE 250 ML: 9 INJECTION, SOLUTION INTRAVENOUS at 00:01

## 2017-08-16 RX ADMIN — ACETAMINOPHEN 650 MG: 325 TABLET, FILM COATED ORAL at 20:41

## 2017-08-16 RX ADMIN — ONDANSETRON 8 MG: 2 INJECTION INTRAMUSCULAR; INTRAVENOUS at 01:54

## 2017-08-16 RX ADMIN — PROCHLORPERAZINE EDISYLATE 10 MG: 5 INJECTION INTRAMUSCULAR; INTRAVENOUS at 20:26

## 2017-08-16 RX ADMIN — MAGNESIUM SULFATE IN WATER 4 G: 40 INJECTION, SOLUTION INTRAVENOUS at 07:55

## 2017-08-16 RX ADMIN — PROCHLORPERAZINE MALEATE 10 MG: 10 TABLET, FILM COATED ORAL at 11:37

## 2017-08-16 RX ADMIN — CEPHALEXIN 500 MG: 500 CAPSULE ORAL at 07:55

## 2017-08-16 RX ADMIN — RANITIDINE HYDROCHLORIDE 150 MG: 150 TABLET, FILM COATED ORAL at 20:38

## 2017-08-16 RX ADMIN — MEPERIDINE HYDROCHLORIDE 25 MG: 25 INJECTION, SOLUTION INTRAMUSCULAR; INTRAVENOUS; SUBCUTANEOUS at 13:47

## 2017-08-16 RX ADMIN — SODIUM CHLORIDE 250 ML: 9 INJECTION, SOLUTION INTRAVENOUS at 19:17

## 2017-08-16 RX ADMIN — CITALOPRAM HYDROBROMIDE 20 MG: 20 TABLET ORAL at 20:38

## 2017-08-16 RX ADMIN — ALDESLEUKIN 34 MILLION UNITS: 1.1 INJECTION, POWDER, LYOPHILIZED, FOR SOLUTION INTRAVENOUS at 11:30

## 2017-08-16 RX ADMIN — RANITIDINE HYDROCHLORIDE 150 MG: 150 TABLET, FILM COATED ORAL at 07:55

## 2017-08-16 RX ADMIN — SODIUM PHOSPHATE, MONOBASIC, MONOHYDRATE AND SODIUM PHOSPHATE, DIBASIC, ANHYDROUS 15 MMOL: 276; 142 INJECTION, SOLUTION INTRAVENOUS at 17:23

## 2017-08-16 RX ADMIN — ACETAMINOPHEN 650 MG: 325 TABLET, FILM COATED ORAL at 06:25

## 2017-08-16 RX ADMIN — ONDANSETRON 8 MG: 2 INJECTION INTRAMUSCULAR; INTRAVENOUS at 10:01

## 2017-08-16 NOTE — PLAN OF CARE
Problem: Goal Outcome Summary  Goal: Goal Outcome Summary  Outcome: No Change  A&Ox4, but lethargic. Pt. easily aroused. BP's hypotensive, 77/44. HR tachycardic 90's-110. Afebrile. Two 250 mL boluses given for low BP's and decreased urine output. Pt. only voided 50 mL overnight. Pt. has not met urine parameter for the second dose of IL-2 after multiple attempts. Writer asked pt. to attempt to void, but pt. refused several times overnight to try. Pt. denies pain. Pt. had one 100 mL emesis, scheduled Zofran given for some relief. No BM overnight. Pt. c/o vertigo, pt. unsteady on feet. Up with assist of 1. Bed alarm on.  No acute events. Pt. rested between cares. Will continue POC and notify MD with changes.

## 2017-08-16 NOTE — PROGRESS NOTES
Nursing Focus: Chemotherapy  D: Positive blood return via PICC. Insertion site is clean/dry/intact, dressing intact with no complaints of pain.  Urine output is recorded in intake in Doc Flowsheet.    I: Premedications given per order (see electronic medical administration record). Dose #1 of IL-2 infused over 15 minutes. Minor chills, otherwise no rxn at this point, typically reacts 2 hours after dose, per pt. Reviewed pt teaching on chemotherapy side effects.  Pt denies need for further teaching. Chemotherapy double checked per protocol by two chemotherapy competent RN's.   A: Tolerating procedure well. Denies nausea and or pain.   P: Continue to monitor urine output and symptoms of nausea. Screen for symptoms of toxicity.

## 2017-08-16 NOTE — PLAN OF CARE
Problem: Goal Outcome Summary  Goal: Goal Outcome Summary  Outcome: No Change  AVSS. C/O generalized body aches. C/O nausea and dry heaves, relieved with scheduled Zofran and prn Compazine. Up ad ta in room. Refused po intake except for sips of clears. Adequate urine output. LBM 8/14. Magnesium replaced, recheck 3.1 (no further intervention required). Phosphorus replaced, recheck ordered for 1600. Received dose # 2 IL-2 at 1130. Experienced rigors approximately 2 hours after receiving IL-2 and were relieved with demerol x 1 and mary lou hugger.

## 2017-08-16 NOTE — PROGRESS NOTES
[facility], [place]  Hematology / Oncology Progress Note     Assessment & Plan   Suzi Gonsales is a 52 year old female who presents with metastatic clear cell renal carcinoma and anxiety s/p right radical nephrectomy in 12/2014, now with metastatic RCC (lungs, adrenal, and pancreas) who is admitted for Cycle 4 HD IL-2. She received cycle #1 on 6/6, completed 9 doses and tolerated it well overall. She received cycle #3 on 6/20/17, she completed 7 doses, complicated by hypotension and fatigue. She received Cycle 3 on 7/21/2017 with 8 doses. She saw Dr. Green (primary oncologist) with a repeat CT scan last week which showed stable disease. She is admitted for Cycle 4 high-dose IL-2.      #Metastatic RCC: Admitted for Cycle 4 high-dose IL-2.   - PICC placed prior to admission      REGIMEN: D1=08/15/17   -Initiate IL2 with 600,000 units/kg= 34,000,000 units q8h for up to 14 doses.    Supportive care with:  -Scheduled zofran q8h  -Zantac 150mg bid  -Keflex ppx  -D5NS + 20 KCl @100cc/hr  -Fluid boluses per protocol  -Strict I/O, daily weights  -Scheduled tylenol  -NO steroids or diuretics  -Compazine, ativan prn   -First dose given and overnight patient was hypotensive, tachycardic, and had severe N/V. Was given 250 cc bolus of fluids x 2. She did not meet the urine output parameters overnight but able to meet these around 1130, now s/p dose 2. Will give further doses per parameters.      #H/o anxiety.   - continue PTA Wellbutrin, Celexa  - PRN lorazepam      FEN  - IVFs per treatment plan, bolus with caution PRN (SBP <85 or Urine Output <30 mL/hr)  - Regular diet  - Electrolyte replacement prn per protocol      PPx  - defer VTE prophylaxis given anticipated thrombocytopenia with IL-2  - Zantac as above      Dispo: Pending completion of tolerated IL-2 doses (anticipate 3-5 day stay) and recovery of acute issues.      Patient and plan of care discussed with staff attending, Dr. Fernandez.     Hubert  AIDA MonsalveS2     The patient was seen in conjunction with AIDA KelloggS who served as a scribe for today's progress note. I have reviewed and edited the above note, and agree with the above findings and plan.    AIDA RivasC  Hematology/Oncology  Pager: [phone]    Interval History   Suzi is seen in her room resting in her bed. She states that she had a tough night with N/V. She has been having trouble urinating (no pain, but decreased output) and keeping fluids down. She does feel better this morning and has been trying to intake more fluids. She denies HA, visual changes, or hearing changes. She denies mouth sores or sore throat. No chest pain or SOB. She has had no abdominal pain or changes in bowel. No edema, arthralgias, or myalgias.     Physical Exam   Temp: 97.5  F (36.4  C) Temp src: Oral BP: 92/50 Pulse: 98 Heart Rate: 98 Resp: 16 SpO2: 98 % O2 Device: None (Room air)    Vitals:    08/15/17 1427 08/16/17 0729   Weight: 53.7 kg (118 lb 4.8 oz) 55.9 kg (123 lb 4.8 oz)     Vital Signs with Ranges  Temp:  [96.2  F (35.7  C)-99.9  F (37.7  C)] 97.5  F (36.4  C)  Pulse:  [60-99] 98  Heart Rate:  [] 98  Resp:  [16-18] 16  BP: ()/(24-65) 92/50  SpO2:  [94 %-98 %] 98 %  I/O last 3 completed shifts:  In: 1640 [P.O.:240; I.V.:1400]  Out: 150 [Urine:50; Emesis/NG output:100]    Constitutional: Adult female seen sitting up in bed in no acute distress. No signs of sepsis or volume depletion.  HEENT: NCAT, PERRL, EOMI. Conjunctiva clear. OP moist and pink, no erythema, exudate, or lesions.   Respiratory: CTA B/L. No w/r/r  Cardiovascular: RRR, Normal S1/S2. No m/c/r.   GI: +BS in all 4 quadrants. No tenderness or distention on palpation. No hepatosplenomegaly.   Lymph/Hematologic: No cervical adenopathy.  Skin: Warm, dry skin with no lesions or rashes to exposed skin surfaces.   Musculoskeletal: FROM in all extremities. No edema in extremities.   Neurologic: A & O x 3. CN II-XII intact.    Neuropsychiatric: Normal mood and affect.    Medications     dextrose 5% and 0.9% NaCl with potassium chloride 20 mEq 100 mL/hr at 08/16/17 0452     D5W 50 mL (08/15/17 1819)     - MEDICATION INSTRUCTIONS -       NaCl         buPROPion  300 mg Oral QAM     citalopram  20 mg Oral QPM     sodium chloride (PF)  10 mL Intracatheter Q8H     eucerin   Topical BID     ondansetron  8 mg Intravenous Q8H     acetaminophen  650 mg Oral Q4H     ranitidine  150 mg Oral BID     cephalexin  500 mg Oral BID     aldesleukin (PROLEUKIN) infusion  600,000 Units/kg (Treatment Plan Recorded) Intravenous Q8H       Data   Results for orders placed or performed during the hospital encounter of 08/15/17 (from the past 24 hour(s))   Lactic acid level STAT   Result Value Ref Range    Lactic Acid 1.5 0.7 - 2.1 mmol/L   CBC with platelets differential   Result Value Ref Range    WBC 8.7 4.0 - 11.0 10e9/L    RBC Count 3.70 (L) 3.8 - 5.2 10e12/L    Hemoglobin 10.9 (L) 11.7 - 15.7 g/dL    Hematocrit 34.5 (L) 35.0 - 47.0 %    MCV 93 78 - 100 fl    MCH 29.5 26.5 - 33.0 pg    MCHC 31.6 31.5 - 36.5 g/dL    RDW 15.6 (H) 10.0 - 15.0 %    Platelet Count 377 150 - 450 10e9/L    Diff Method Automated Method     % Neutrophils 88.4 %    % Lymphocytes 2.3 %    % Monocytes 3.8 %    % Eosinophils 4.7 %    % Basophils 0.5 %    % Immature Granulocytes 0.3 %    Nucleated RBCs 0 0 /100    Absolute Neutrophil 7.7 1.6 - 8.3 10e9/L    Absolute Lymphocytes 0.2 (L) 0.8 - 5.3 10e9/L    Absolute Monocytes 0.3 0.0 - 1.3 10e9/L    Absolute Eosinophils 0.4 0.0 - 0.7 10e9/L    Absolute Basophils 0.0 0.0 - 0.2 10e9/L    Abs Immature Granulocytes 0.0 0 - 0.4 10e9/L    Absolute Nucleated RBC 0.0    Comprehensive metabolic panel   Result Value Ref Range    Sodium 139 133 - 144 mmol/L    Potassium 4.1 3.4 - 5.3 mmol/L    Chloride 112 (H) 94 - 109 mmol/L    Carbon Dioxide 20 20 - 32 mmol/L    Anion Gap 8 3 - 14 mmol/L    Glucose 132 (H) 70 - 99 mg/dL    Urea Nitrogen 13 7 - 30  mg/dL    Creatinine 1.07 (H) 0.52 - 1.04 mg/dL    GFR Estimate 54 (L) >60 mL/min/1.7m2    GFR Estimate If Black 65 >60 mL/min/1.7m2    Calcium 7.5 (L) 8.5 - 10.1 mg/dL    Bilirubin Total 0.3 0.2 - 1.3 mg/dL    Albumin 2.6 (L) 3.4 - 5.0 g/dL    Protein Total 5.6 (L) 6.8 - 8.8 g/dL    Alkaline Phosphatase 53 40 - 150 U/L    ALT 20 0 - 50 U/L    AST 12 0 - 45 U/L   Magnesium   Result Value Ref Range    Magnesium 1.5 (L) 1.6 - 2.3 mg/dL   Phosphorus   Result Value Ref Range    Phosphorus 1.4 (L) 2.5 - 4.5 mg/dL   Lactate Dehydrogenase   Result Value Ref Range    Lactate Dehydrogenase 165 81 - 234 U/L   CK total   Result Value Ref Range    CK Total 18 (L) 30 - 225 U/L   Blood culture   Result Value Ref Range    Specimen Description Blood     Culture Micro No growth after 1 hour

## 2017-08-16 NOTE — PROGRESS NOTES
Nursing Focus: Chemotherapy  D: Positive blood return via PICC. Insertion site is clean/dry/intact, dressing intact with no complaints of pain. Urine output is recorded in intake/output section in Doc Flowsheets.    I: Premedications given per order (see electronic medical administration record). Dose # 2 of IL-2 started to infuse over 15 minutes. Reviewed pt teaching on chemotherapy side effects.  Pt denies need for further teaching. Chemotherapy double checked per protocol by two chemotherapy competent RN's.   A: Tolerating procedure well. Denies nausea and or pain.   P: Continue to monitor urine output and symptoms of nausea. Screen for symptoms of toxicity. Notify MD with any concerns and or changes in pt's condition. Continue with plan of care.

## 2017-08-16 NOTE — PLAN OF CARE
Problem: Chemotherapy Effects (Adult)  Goal: Signs and Symptoms of Listed Potential Problems Will be Absent or Manageable (Chemotherapy Effects)  Signs and symptoms of listed potential problems will be absent or manageable by discharge/transition of care (reference Chemotherapy Effects (Adult) CPG).   Outcome: No Change  AVSS. Afebrile. PRN compazine given x1 for nausea. Cycle 4 dose #1 of IL-2 infused, minor chills, bear hugger used with improvement in chills. Denies pain. PRN ambien given at bedtime.  Pt educated to call if she needs to get out of bed.  No UOP since IL-2 infusion, will monitor. Continue to monitor and with POC.     Addendum: Pt had episode of emesis, PRN ativan 0.5mg given with relief. Resting comfortably. BA on.

## 2017-08-17 LAB
ALBUMIN SERPL-MCNC: 2.2 G/DL (ref 3.4–5)
ALP SERPL-CCNC: 43 U/L (ref 40–150)
ALT SERPL W P-5'-P-CCNC: 21 U/L (ref 0–50)
ANION GAP SERPL CALCULATED.3IONS-SCNC: 6 MMOL/L (ref 3–14)
AST SERPL W P-5'-P-CCNC: 16 U/L (ref 0–45)
BASOPHILS # BLD AUTO: 0 10E9/L (ref 0–0.2)
BASOPHILS NFR BLD AUTO: 0.3 %
BILIRUB SERPL-MCNC: 0.3 MG/DL (ref 0.2–1.3)
BUN SERPL-MCNC: 7 MG/DL (ref 7–30)
CALCIUM SERPL-MCNC: 7.2 MG/DL (ref 8.5–10.1)
CHLORIDE SERPL-SCNC: 110 MMOL/L (ref 94–109)
CK SERPL-CCNC: 64 U/L (ref 30–225)
CO2 SERPL-SCNC: 20 MMOL/L (ref 20–32)
CREAT SERPL-MCNC: 1.04 MG/DL (ref 0.52–1.04)
DIFFERENTIAL METHOD BLD: ABNORMAL
EOSINOPHIL # BLD AUTO: 1.7 10E9/L (ref 0–0.7)
EOSINOPHIL NFR BLD AUTO: 15.1 %
ERYTHROCYTE [DISTWIDTH] IN BLOOD BY AUTOMATED COUNT: 15.7 % (ref 10–15)
GFR SERPL CREATININE-BSD FRML MDRD: 55 ML/MIN/1.7M2
GLUCOSE SERPL-MCNC: 136 MG/DL (ref 70–99)
HCT VFR BLD AUTO: 31.6 % (ref 35–47)
HGB BLD-MCNC: 10.4 G/DL (ref 11.7–15.7)
IMM GRANULOCYTES # BLD: 0 10E9/L (ref 0–0.4)
IMM GRANULOCYTES NFR BLD: 0.3 %
LACTATE BLD-SCNC: 2 MMOL/L (ref 0.7–2.1)
LDH SERPL L TO P-CCNC: 182 U/L (ref 81–234)
LYMPHOCYTES # BLD AUTO: 0.2 10E9/L (ref 0.8–5.3)
LYMPHOCYTES NFR BLD AUTO: 1.5 %
MAGNESIUM SERPL-MCNC: 2 MG/DL (ref 1.6–2.3)
MCH RBC QN AUTO: 29.9 PG (ref 26.5–33)
MCHC RBC AUTO-ENTMCNC: 32.9 G/DL (ref 31.5–36.5)
MCV RBC AUTO: 91 FL (ref 78–100)
MONOCYTES # BLD AUTO: 0.7 10E9/L (ref 0–1.3)
MONOCYTES NFR BLD AUTO: 6.1 %
NEUTROPHILS # BLD AUTO: 8.6 10E9/L (ref 1.6–8.3)
NEUTROPHILS NFR BLD AUTO: 76.7 %
NRBC # BLD AUTO: 0 10*3/UL
NRBC BLD AUTO-RTO: 0 /100
PHOSPHATE SERPL-MCNC: 2.3 MG/DL (ref 2.5–4.5)
PLATELET # BLD AUTO: 316 10E9/L (ref 150–450)
POTASSIUM SERPL-SCNC: 4 MMOL/L (ref 3.4–5.3)
PROT SERPL-MCNC: 4.8 G/DL (ref 6.8–8.8)
RBC # BLD AUTO: 3.48 10E12/L (ref 3.8–5.2)
SODIUM SERPL-SCNC: 136 MMOL/L (ref 133–144)
WBC # BLD AUTO: 11.2 10E9/L (ref 4–11)

## 2017-08-17 PROCEDURE — 25000128 H RX IP 250 OP 636: Performed by: PHYSICIAN ASSISTANT

## 2017-08-17 PROCEDURE — 87040 BLOOD CULTURE FOR BACTERIA: CPT | Performed by: INTERNAL MEDICINE

## 2017-08-17 PROCEDURE — 25000128 H RX IP 250 OP 636: Performed by: INTERNAL MEDICINE

## 2017-08-17 PROCEDURE — 93010 ELECTROCARDIOGRAM REPORT: CPT | Performed by: INTERNAL MEDICINE

## 2017-08-17 PROCEDURE — 40000802 ZZH SITE CHECK

## 2017-08-17 PROCEDURE — 83735 ASSAY OF MAGNESIUM: CPT | Performed by: PHYSICIAN ASSISTANT

## 2017-08-17 PROCEDURE — 25000132 ZZH RX MED GY IP 250 OP 250 PS 637: Performed by: PHYSICIAN ASSISTANT

## 2017-08-17 PROCEDURE — 83605 ASSAY OF LACTIC ACID: CPT | Performed by: INTERNAL MEDICINE

## 2017-08-17 PROCEDURE — 80053 COMPREHEN METABOLIC PANEL: CPT | Performed by: PHYSICIAN ASSISTANT

## 2017-08-17 PROCEDURE — 93005 ELECTROCARDIOGRAM TRACING: CPT

## 2017-08-17 PROCEDURE — 85025 COMPLETE CBC W/AUTO DIFF WBC: CPT | Performed by: PHYSICIAN ASSISTANT

## 2017-08-17 PROCEDURE — 36592 COLLECT BLOOD FROM PICC: CPT | Performed by: INTERNAL MEDICINE

## 2017-08-17 PROCEDURE — 25800025 ZZH RX 258: Performed by: INTERNAL MEDICINE

## 2017-08-17 PROCEDURE — 12000008 ZZH R&B INTERMEDIATE UMMC

## 2017-08-17 PROCEDURE — 84100 ASSAY OF PHOSPHORUS: CPT | Performed by: PHYSICIAN ASSISTANT

## 2017-08-17 PROCEDURE — 82550 ASSAY OF CK (CPK): CPT | Performed by: PHYSICIAN ASSISTANT

## 2017-08-17 PROCEDURE — 99233 SBSQ HOSP IP/OBS HIGH 50: CPT | Performed by: INTERNAL MEDICINE

## 2017-08-17 PROCEDURE — 25000125 ZZHC RX 250: Performed by: PHYSICIAN ASSISTANT

## 2017-08-17 PROCEDURE — 83615 LACTATE (LD) (LDH) ENZYME: CPT | Performed by: PHYSICIAN ASSISTANT

## 2017-08-17 PROCEDURE — 25000132 ZZH RX MED GY IP 250 OP 250 PS 637: Performed by: INTERNAL MEDICINE

## 2017-08-17 PROCEDURE — 36592 COLLECT BLOOD FROM PICC: CPT | Performed by: PHYSICIAN ASSISTANT

## 2017-08-17 RX ADMIN — DIPHENOXYLATE HYDROCHLORIDE AND ATROPINE SULFATE 1 TABLET: 2.5; .025 TABLET ORAL at 22:01

## 2017-08-17 RX ADMIN — DIPHENOXYLATE HYDROCHLORIDE AND ATROPINE SULFATE 1 TABLET: 2.5; .025 TABLET ORAL at 10:51

## 2017-08-17 RX ADMIN — ACETAMINOPHEN 650 MG: 325 TABLET, FILM COATED ORAL at 06:37

## 2017-08-17 RX ADMIN — POTASSIUM CHLORIDE 20 MEQ: 29.8 INJECTION, SOLUTION INTRAVENOUS at 08:29

## 2017-08-17 RX ADMIN — ONDANSETRON 8 MG: 2 INJECTION INTRAMUSCULAR; INTRAVENOUS at 09:54

## 2017-08-17 RX ADMIN — RANITIDINE HYDROCHLORIDE 150 MG: 150 TABLET, FILM COATED ORAL at 20:00

## 2017-08-17 RX ADMIN — DEXTROSE MONOHYDRATE 1000 ML: 50 INJECTION, SOLUTION INTRAVENOUS at 16:39

## 2017-08-17 RX ADMIN — LORAZEPAM 1 MG: 0.5 TABLET ORAL at 13:22

## 2017-08-17 RX ADMIN — ACETAMINOPHEN 650 MG: 325 TABLET, FILM COATED ORAL at 18:03

## 2017-08-17 RX ADMIN — SODIUM CHLORIDE 250 ML: 9 INJECTION, SOLUTION INTRAVENOUS at 06:35

## 2017-08-17 RX ADMIN — SODIUM PHOSPHATE, MONOBASIC, MONOHYDRATE AND SODIUM PHOSPHATE, DIBASIC, ANHYDROUS 15 MMOL: 276; 142 INJECTION, SOLUTION INTRAVENOUS at 12:35

## 2017-08-17 RX ADMIN — CEPHALEXIN 500 MG: 500 CAPSULE ORAL at 20:00

## 2017-08-17 RX ADMIN — PROCHLORPERAZINE EDISYLATE 10 MG: 5 INJECTION INTRAMUSCULAR; INTRAVENOUS at 20:09

## 2017-08-17 RX ADMIN — SODIUM CHLORIDE 250 ML: 9 INJECTION, SOLUTION INTRAVENOUS at 11:14

## 2017-08-17 RX ADMIN — MEPERIDINE HYDROCHLORIDE 25 MG: 25 INJECTION, SOLUTION INTRAMUSCULAR; INTRAVENOUS; SUBCUTANEOUS at 00:24

## 2017-08-17 RX ADMIN — ACETAMINOPHEN 650 MG: 325 TABLET, FILM COATED ORAL at 01:53

## 2017-08-17 RX ADMIN — POTASSIUM CHLORIDE, DEXTROSE MONOHYDRATE AND SODIUM CHLORIDE: 150; 5; 900 INJECTION, SOLUTION INTRAVENOUS at 02:40

## 2017-08-17 RX ADMIN — Medication 2 G: at 09:54

## 2017-08-17 RX ADMIN — MEPERIDINE HYDROCHLORIDE 25 MG: 25 INJECTION, SOLUTION INTRAMUSCULAR; INTRAVENOUS; SUBCUTANEOUS at 17:04

## 2017-08-17 RX ADMIN — POTASSIUM CHLORIDE, DEXTROSE MONOHYDRATE AND SODIUM CHLORIDE: 150; 5; 900 INJECTION, SOLUTION INTRAVENOUS at 12:35

## 2017-08-17 RX ADMIN — BUPROPION HYDROCHLORIDE 300 MG: 300 TABLET, FILM COATED, EXTENDED RELEASE ORAL at 09:54

## 2017-08-17 RX ADMIN — ACETAMINOPHEN 650 MG: 325 TABLET, FILM COATED ORAL at 22:01

## 2017-08-17 RX ADMIN — RANITIDINE HYDROCHLORIDE 150 MG: 150 TABLET, FILM COATED ORAL at 09:54

## 2017-08-17 RX ADMIN — ALDESLEUKIN 34 MILLION UNITS: 1.1 INJECTION, POWDER, LYOPHILIZED, FOR SOLUTION INTRAVENOUS at 14:35

## 2017-08-17 RX ADMIN — POTASSIUM CHLORIDE, DEXTROSE MONOHYDRATE AND SODIUM CHLORIDE: 150; 5; 900 INJECTION, SOLUTION INTRAVENOUS at 23:07

## 2017-08-17 RX ADMIN — ONDANSETRON 8 MG: 2 INJECTION INTRAMUSCULAR; INTRAVENOUS at 01:53

## 2017-08-17 RX ADMIN — MEPERIDINE HYDROCHLORIDE 25 MG: 25 INJECTION, SOLUTION INTRAMUSCULAR; INTRAVENOUS; SUBCUTANEOUS at 16:24

## 2017-08-17 RX ADMIN — PROCHLORPERAZINE EDISYLATE 10 MG: 5 INJECTION INTRAMUSCULAR; INTRAVENOUS at 06:47

## 2017-08-17 RX ADMIN — CITALOPRAM HYDROBROMIDE 20 MG: 20 TABLET ORAL at 20:00

## 2017-08-17 RX ADMIN — ACETAMINOPHEN 650 MG: 325 TABLET, FILM COATED ORAL at 09:55

## 2017-08-17 RX ADMIN — ONDANSETRON 8 MG: 2 INJECTION INTRAMUSCULAR; INTRAVENOUS at 18:03

## 2017-08-17 RX ADMIN — ACETAMINOPHEN 650 MG: 325 TABLET, FILM COATED ORAL at 13:18

## 2017-08-17 RX ADMIN — DIPHENOXYLATE HYDROCHLORIDE AND ATROPINE SULFATE 1 TABLET: 2.5; .025 TABLET ORAL at 06:17

## 2017-08-17 RX ADMIN — CEPHALEXIN 500 MG: 500 CAPSULE ORAL at 09:55

## 2017-08-17 NOTE — PLAN OF CARE
Problem: Goal Outcome Summary  Goal: Goal Outcome Summary  Outcome: No Change  AVSS. Denied pain. Nausea adequately controlled with scheduled Zofran and 1 mg po prn Ativan x 1. Up ad ta in room. Refused po intake except for sips of clears. Adequate urine output. Continues to have diarrhea, Lomotil given x 1. Potassium and magnesium replaced, recheck with morning labs. Currently has phosphorus being replaced, recheck with morning labs. Received dose # 4 IL-2 at 1435.

## 2017-08-17 NOTE — PLAN OF CARE
Problem: Chemotherapy Effects (Adult)  Goal: Signs and Symptoms of Listed Potential Problems Will be Absent or Manageable (Chemotherapy Effects)  Signs and symptoms of listed potential problems will be absent or manageable by discharge/transition of care (reference Chemotherapy Effects (Adult) CPG).   Outcome: No Change  BP soft, tmax 101.7, scheduled tylenol given, temp decreased.Tachycardic. MD aware, PRN bolus given x1 for low BP and UOP parameters not met.  Patient very upset about being here. Said multiple times that she wanted to just be done.  Hesitant to start dose #3 of IL-2, did agree to start when parameters were met.  No rxn yet.   at bedside, supportive. Up with SBA, feeling weak. Had 1 episode of emesis, PRN compazine given with relief.  Denies pain. Continue to monitor and with POC.

## 2017-08-17 NOTE — PROGRESS NOTES
Nursing Focus: Chemotherapy  D: Positive blood return via PICC. Insertion site is clean/dry/intact, dressing intact with no complaints of pain. Urine output is recorded in intake/output section in Doc Flowsheets.    I: Premedications given per order (see electronic medical administration record). Dose #4 of IL-2 started to infuse over 15 minutes. Reviewed pt teaching on chemotherapy side effects.  Pt denies need for further teaching. Chemotherapy double checked per protocol by two chemotherapy competent RN's.   A: Tolerating procedure well. Denies nausea and or pain.   P: Continue to monitor urine output and symptoms of nausea. Screen for symptoms of toxicity. Notify MD with any concerns and or changes in pt's condition. Continue with plan of care.

## 2017-08-17 NOTE — PROGRESS NOTES
[facility], [place]  Hematology / Oncology Progress Note     Assessment & Plan   Suzi Gonsales is a 52 year old female who presents with metastatic clear cell renal carcinoma and anxiety s/p right radical nephrectomy in 12/2014, now with metastatic RCC (lungs, adrenal, and pancreas) who is admitted for Cycle 4 HD IL-2. She received cycle #1 on 6/6, completed 9 doses and tolerated it well overall. She received cycle #3 on 6/20/17, she completed 7 doses, complicated by hypotension and fatigue. She received Cycle 3 on 7/21/2017 with 8 doses. She saw Dr. Green (primary oncologist) with a repeat CT scan last week which showed stable disease. She is admitted for Cycle 4 high-dose IL-2.      #Metastatic RCC: Admitted for Cycle 4 high-dose IL-2.   - PICC placed prior to admission      REGIMEN: D1=08/15/17   -Initiate IL2 with 600,000 units/kg= 34,000,000 units q8h for up to 14 doses.    Supportive care with:  -Scheduled zofran q8h  -Zantac 150mg bid  -Keflex ppx  -D5NS + 20 KCl @100cc/hr  -Fluid boluses per protocol  -Strict I/O, daily weights  -Scheduled tylenol  -NO steroids or diuretics  -Compazine, ativan prn   -S/p 4th dose this afternoon. Will give further doses if patient continues to meet parameters. Patient states she would like to continue with treatment, goal is to reach 5-6 doses.       #H/o anxiety.   - continue PTA Wellbutrin, Celexa  - PRN alprazolam      FEN  - IVFs per treatment plan, bolus with caution PRN (SBP <85 or Urine Output <30 mL/hr)  - Regular diet  - Electrolyte replacement prn per protocol      PPx  - defer VTE prophylaxis given anticipated thrombocytopenia with IL-2  - Zantac as above      Dispo: Pending completion of tolerated IL-2 doses (anticipate 3-5 day stay) and recovery of acute issues.      Patient and plan of care discussed with staff attending, Dr. Fernandez.     Hubert Monsalve, PA-S2     The patient was seen in conjunction with AIDA KelloggS who served as  a scribe for today's progress note. I have reviewed and edited the above note, and agree with the above findings and plan.    Apple Monsalve PA-C  Hematology/Oncology  Pager: [phone]    Interval History   Suzi is resting in her room, but feeling better this morning. She had a rough night with N/V, diarrhea, and poor oral intake. She has been having lots of diarrhea and trouble urinating (no pain, but decreased output). She is having trouble keeping fluids and food down but this is improving so far this morning. She denies HA or visual changes. She denies mouth sores or sore throat. No chest pain or SOB. She has had no abdominal pain. States she feels puffy in the face but not in the legs. No arthralgias or myalgias.     Physical Exam   Temp: 98.2  F (36.8  C) Temp src: Oral BP: 98/52 Pulse: 99 Heart Rate: 117 Resp: 18 SpO2: 94 % O2 Device: None (Room air)    Vitals:    08/15/17 1427 08/16/17 0729 08/17/17 0700   Weight: 53.7 kg (118 lb 4.8 oz) 55.9 kg (123 lb 4.8 oz) 59.4 kg (131 lb)     Vital Signs with Ranges  Temp:  [97  F (36.1  C)-103.3  F (39.6  C)] 98.2  F (36.8  C)  Pulse:  [96-99] 99  Heart Rate:  [104-131] 117  Resp:  [13-18] 18  BP: ()/(42-84) 98/52  SpO2:  [90 %-97 %] 94 %  I/O last 3 completed shifts:  In: 2867 [P.O.:360; I.V.:2200; IV Piggyback:307]  Out: 1326 [Urine:975; Emesis/NG output:1; Stool:350]    Constitutional: Adult female seen sitting up in bed in no acute distress. No signs of sepsis or volume depletion.  HEENT: NCAT, PERRL, EOMI. Conjunctiva clear. OP moist and pink, no erythema, exudate, or lesions.   Respiratory: CTA B/L. No w/r/r  Cardiovascular: RRR, Normal S1/S2. No m/c/r.   GI: +BS in all 4 quadrants. No tenderness or distention on palpation. No hepatosplenomegaly.   Lymph/Hematologic: No cervical adenopathy.  Skin: Warm, dry skin with no lesions or rashes to exposed skin surfaces.   Musculoskeletal: FROM in all extremities. No edema in extremities. PICC line clear of  erythema or warmth.   Neurologic: A & O x 3. CN II-XII intact.   Neuropsychiatric: Normal mood and affect.    Medications     dextrose 5% and 0.9% NaCl with potassium chloride 20 mEq 100 mL/hr at 08/17/17 0240     D5W 50 mL (08/15/17 1819)     - MEDICATION INSTRUCTIONS -       NaCl         buPROPion  300 mg Oral QAM     citalopram  20 mg Oral QPM     sodium chloride (PF)  10 mL Intracatheter Q8H     eucerin   Topical BID     ondansetron  8 mg Intravenous Q8H     acetaminophen  650 mg Oral Q4H     ranitidine  150 mg Oral BID     cephalexin  500 mg Oral BID     aldesleukin (PROLEUKIN) infusion  600,000 Units/kg (Treatment Plan Recorded) Intravenous Q8H       Data   Results for orders placed or performed during the hospital encounter of 08/15/17 (from the past 24 hour(s))   Magnesium   Result Value Ref Range    Magnesium 3.1 (H) 1.6 - 2.3 mg/dL   Phosphorus   Result Value Ref Range    Phosphorus 2.0 (L) 2.5 - 4.5 mg/dL   Lactic acid level STAT   Result Value Ref Range    Lactic Acid 2.0 0.7 - 2.1 mmol/L   CBC with platelets differential   Result Value Ref Range    WBC 11.2 (H) 4.0 - 11.0 10e9/L    RBC Count 3.48 (L) 3.8 - 5.2 10e12/L    Hemoglobin 10.4 (L) 11.7 - 15.7 g/dL    Hematocrit 31.6 (L) 35.0 - 47.0 %    MCV 91 78 - 100 fl    MCH 29.9 26.5 - 33.0 pg    MCHC 32.9 31.5 - 36.5 g/dL    RDW 15.7 (H) 10.0 - 15.0 %    Platelet Count 316 150 - 450 10e9/L    Diff Method Automated Method     % Neutrophils 76.7 %    % Lymphocytes 1.5 %    % Monocytes 6.1 %    % Eosinophils 15.1 %    % Basophils 0.3 %    % Immature Granulocytes 0.3 %    Nucleated RBCs 0 0 /100    Absolute Neutrophil 8.6 (H) 1.6 - 8.3 10e9/L    Absolute Lymphocytes 0.2 (L) 0.8 - 5.3 10e9/L    Absolute Monocytes 0.7 0.0 - 1.3 10e9/L    Absolute Eosinophils 1.7 (H) 0.0 - 0.7 10e9/L    Absolute Basophils 0.0 0.0 - 0.2 10e9/L    Abs Immature Granulocytes 0.0 0 - 0.4 10e9/L    Absolute Nucleated RBC 0.0    Comprehensive metabolic panel   Result Value Ref Range     Sodium 136 133 - 144 mmol/L    Potassium 4.0 3.4 - 5.3 mmol/L    Chloride 110 (H) 94 - 109 mmol/L    Carbon Dioxide 20 20 - 32 mmol/L    Anion Gap 6 3 - 14 mmol/L    Glucose 136 (H) 70 - 99 mg/dL    Urea Nitrogen 7 7 - 30 mg/dL    Creatinine 1.04 0.52 - 1.04 mg/dL    GFR Estimate 55 (L) >60 mL/min/1.7m2    GFR Estimate If Black 67 >60 mL/min/1.7m2    Calcium 7.2 (L) 8.5 - 10.1 mg/dL    Bilirubin Total 0.3 0.2 - 1.3 mg/dL    Albumin 2.2 (L) 3.4 - 5.0 g/dL    Protein Total 4.8 (L) 6.8 - 8.8 g/dL    Alkaline Phosphatase 43 40 - 150 U/L    ALT 21 0 - 50 U/L    AST 16 0 - 45 U/L   Magnesium   Result Value Ref Range    Magnesium 2.0 1.6 - 2.3 mg/dL   Phosphorus   Result Value Ref Range    Phosphorus 2.3 (L) 2.5 - 4.5 mg/dL   Lactate Dehydrogenase   Result Value Ref Range    Lactate Dehydrogenase 182 81 - 234 U/L   CK total   Result Value Ref Range    CK Total 64 30 - 225 U/L   Blood culture   Result Value Ref Range    Specimen Description Blood     Culture Micro PENDING

## 2017-08-17 NOTE — PLAN OF CARE
Problem: Goal Outcome Summary  Goal: Goal Outcome Summary  Alert and oriented X 4. Expresses frustration about frequent monitoring/lack of sleep. Developed rigors about 2 hours after IL-2 dose. Had unmeasured emesis and was incontinent of bladder. Used Gema Hugger warming blanket. Meperidine 25 mg given. Has had two loose stool this morning. Loperamide given. Not meeting urine output parameters for next IL-2 dose. NS bolus given.

## 2017-08-18 LAB
ALBUMIN SERPL-MCNC: 1.9 G/DL (ref 3.4–5)
ALP SERPL-CCNC: 41 U/L (ref 40–150)
ALT SERPL W P-5'-P-CCNC: 32 U/L (ref 0–50)
ANION GAP SERPL CALCULATED.3IONS-SCNC: 4 MMOL/L (ref 3–14)
AST SERPL W P-5'-P-CCNC: 31 U/L (ref 0–45)
BASOPHILS # BLD AUTO: 0 10E9/L (ref 0–0.2)
BASOPHILS NFR BLD AUTO: 0.3 %
BILIRUB SERPL-MCNC: 0.2 MG/DL (ref 0.2–1.3)
BUN SERPL-MCNC: 5 MG/DL (ref 7–30)
CALCIUM SERPL-MCNC: 7.1 MG/DL (ref 8.5–10.1)
CHLORIDE SERPL-SCNC: 119 MMOL/L (ref 94–109)
CK SERPL-CCNC: 282 U/L (ref 30–225)
CO2 SERPL-SCNC: 18 MMOL/L (ref 20–32)
CREAT SERPL-MCNC: 1 MG/DL (ref 0.52–1.04)
DIFFERENTIAL METHOD BLD: ABNORMAL
EOSINOPHIL # BLD AUTO: 1.7 10E9/L (ref 0–0.7)
EOSINOPHIL NFR BLD AUTO: 23.8 %
ERYTHROCYTE [DISTWIDTH] IN BLOOD BY AUTOMATED COUNT: 16 % (ref 10–15)
GFR SERPL CREATININE-BSD FRML MDRD: 58 ML/MIN/1.7M2
GLUCOSE SERPL-MCNC: 108 MG/DL (ref 70–99)
HCT VFR BLD AUTO: 29.7 % (ref 35–47)
HGB BLD-MCNC: 9.9 G/DL (ref 11.7–15.7)
IMM GRANULOCYTES # BLD: 0 10E9/L (ref 0–0.4)
IMM GRANULOCYTES NFR BLD: 0.3 %
INTERPRETATION ECG - MUSE: NORMAL
LACTATE BLD-SCNC: 1.2 MMOL/L (ref 0.7–2.1)
LDH SERPL L TO P-CCNC: 199 U/L (ref 81–234)
LYMPHOCYTES # BLD AUTO: 0.6 10E9/L (ref 0.8–5.3)
LYMPHOCYTES NFR BLD AUTO: 7.9 %
MAGNESIUM SERPL-MCNC: 2.3 MG/DL (ref 1.6–2.3)
MCH RBC QN AUTO: 30.5 PG (ref 26.5–33)
MCHC RBC AUTO-ENTMCNC: 33.3 G/DL (ref 31.5–36.5)
MCV RBC AUTO: 91 FL (ref 78–100)
MONOCYTES # BLD AUTO: 0.3 10E9/L (ref 0–1.3)
MONOCYTES NFR BLD AUTO: 4.5 %
NEUTROPHILS # BLD AUTO: 4.6 10E9/L (ref 1.6–8.3)
NEUTROPHILS NFR BLD AUTO: 63.2 %
NRBC # BLD AUTO: 0 10*3/UL
NRBC BLD AUTO-RTO: 0 /100
PHOSPHATE SERPL-MCNC: 2.4 MG/DL (ref 2.5–4.5)
PLATELET # BLD AUTO: 221 10E9/L (ref 150–450)
POTASSIUM SERPL-SCNC: 4.5 MMOL/L (ref 3.4–5.3)
PROT SERPL-MCNC: 4.3 G/DL (ref 6.8–8.8)
RBC # BLD AUTO: 3.25 10E12/L (ref 3.8–5.2)
SODIUM SERPL-SCNC: 142 MMOL/L (ref 133–144)
WBC # BLD AUTO: 7.3 10E9/L (ref 4–11)

## 2017-08-18 PROCEDURE — 85025 COMPLETE CBC W/AUTO DIFF WBC: CPT | Performed by: PHYSICIAN ASSISTANT

## 2017-08-18 PROCEDURE — 25000132 ZZH RX MED GY IP 250 OP 250 PS 637: Performed by: INTERNAL MEDICINE

## 2017-08-18 PROCEDURE — 82550 ASSAY OF CK (CPK): CPT | Performed by: PHYSICIAN ASSISTANT

## 2017-08-18 PROCEDURE — 99233 SBSQ HOSP IP/OBS HIGH 50: CPT | Performed by: INTERNAL MEDICINE

## 2017-08-18 PROCEDURE — 25000128 H RX IP 250 OP 636: Performed by: INTERNAL MEDICINE

## 2017-08-18 PROCEDURE — 83615 LACTATE (LD) (LDH) ENZYME: CPT | Performed by: PHYSICIAN ASSISTANT

## 2017-08-18 PROCEDURE — 25000128 H RX IP 250 OP 636: Performed by: STUDENT IN AN ORGANIZED HEALTH CARE EDUCATION/TRAINING PROGRAM

## 2017-08-18 PROCEDURE — 87040 BLOOD CULTURE FOR BACTERIA: CPT | Performed by: INTERNAL MEDICINE

## 2017-08-18 PROCEDURE — 12000008 ZZH R&B INTERMEDIATE UMMC

## 2017-08-18 PROCEDURE — 25000132 ZZH RX MED GY IP 250 OP 250 PS 637: Performed by: PHYSICIAN ASSISTANT

## 2017-08-18 PROCEDURE — 83605 ASSAY OF LACTIC ACID: CPT | Performed by: INTERNAL MEDICINE

## 2017-08-18 PROCEDURE — 83735 ASSAY OF MAGNESIUM: CPT | Performed by: PHYSICIAN ASSISTANT

## 2017-08-18 PROCEDURE — 93005 ELECTROCARDIOGRAM TRACING: CPT

## 2017-08-18 PROCEDURE — 25800025 ZZH RX 258: Performed by: INTERNAL MEDICINE

## 2017-08-18 PROCEDURE — 36592 COLLECT BLOOD FROM PICC: CPT | Performed by: PHYSICIAN ASSISTANT

## 2017-08-18 PROCEDURE — 25000125 ZZHC RX 250: Performed by: PHYSICIAN ASSISTANT

## 2017-08-18 PROCEDURE — 80053 COMPREHEN METABOLIC PANEL: CPT | Performed by: PHYSICIAN ASSISTANT

## 2017-08-18 PROCEDURE — 40000802 ZZH SITE CHECK

## 2017-08-18 PROCEDURE — 93010 ELECTROCARDIOGRAM REPORT: CPT | Performed by: INTERNAL MEDICINE

## 2017-08-18 PROCEDURE — 84100 ASSAY OF PHOSPHORUS: CPT | Performed by: PHYSICIAN ASSISTANT

## 2017-08-18 PROCEDURE — 25000128 H RX IP 250 OP 636: Performed by: PHYSICIAN ASSISTANT

## 2017-08-18 RX ORDER — ZOLPIDEM TARTRATE 5 MG/1
5 TABLET ORAL
Status: DISCONTINUED | OUTPATIENT
Start: 2017-08-18 | End: 2017-08-19 | Stop reason: HOSPADM

## 2017-08-18 RX ADMIN — SODIUM CHLORIDE 500 ML: 9 INJECTION, SOLUTION INTRAVENOUS at 00:01

## 2017-08-18 RX ADMIN — BUPROPION HYDROCHLORIDE 300 MG: 300 TABLET, FILM COATED, EXTENDED RELEASE ORAL at 08:12

## 2017-08-18 RX ADMIN — ALDESLEUKIN 34 MILLION UNITS: 1.1 INJECTION, POWDER, LYOPHILIZED, FOR SOLUTION INTRAVENOUS at 12:16

## 2017-08-18 RX ADMIN — ACETAMINOPHEN 650 MG: 325 TABLET, FILM COATED ORAL at 08:12

## 2017-08-18 RX ADMIN — MEPERIDINE HYDROCHLORIDE 25 MG: 25 INJECTION, SOLUTION INTRAMUSCULAR; INTRAVENOUS; SUBCUTANEOUS at 14:33

## 2017-08-18 RX ADMIN — CITALOPRAM HYDROBROMIDE 20 MG: 20 TABLET ORAL at 20:13

## 2017-08-18 RX ADMIN — ACETAMINOPHEN 650 MG: 325 TABLET, FILM COATED ORAL at 12:01

## 2017-08-18 RX ADMIN — RANITIDINE HYDROCHLORIDE 150 MG: 150 TABLET, FILM COATED ORAL at 08:13

## 2017-08-18 RX ADMIN — CEPHALEXIN 500 MG: 500 CAPSULE ORAL at 08:12

## 2017-08-18 RX ADMIN — SODIUM PHOSPHATE, MONOBASIC, MONOHYDRATE AND SODIUM PHOSPHATE, DIBASIC, ANHYDROUS 15 MMOL: 276; 142 INJECTION, SOLUTION INTRAVENOUS at 09:16

## 2017-08-18 RX ADMIN — ONDANSETRON 8 MG: 2 INJECTION INTRAMUSCULAR; INTRAVENOUS at 23:45

## 2017-08-18 RX ADMIN — PROCHLORPERAZINE EDISYLATE 10 MG: 5 INJECTION INTRAMUSCULAR; INTRAVENOUS at 14:33

## 2017-08-18 RX ADMIN — ACETAMINOPHEN 650 MG: 325 TABLET, FILM COATED ORAL at 20:13

## 2017-08-18 RX ADMIN — DIPHENOXYLATE HYDROCHLORIDE AND ATROPINE SULFATE 1 TABLET: 2.5; .025 TABLET ORAL at 20:41

## 2017-08-18 RX ADMIN — ACETAMINOPHEN 650 MG: 325 TABLET, FILM COATED ORAL at 15:48

## 2017-08-18 RX ADMIN — ONDANSETRON 8 MG: 2 INJECTION INTRAMUSCULAR; INTRAVENOUS at 15:48

## 2017-08-18 RX ADMIN — ZOLPIDEM TARTRATE 5 MG: 5 TABLET, FILM COATED ORAL at 21:26

## 2017-08-18 RX ADMIN — POTASSIUM CHLORIDE, DEXTROSE MONOHYDRATE AND SODIUM CHLORIDE: 150; 5; 900 INJECTION, SOLUTION INTRAVENOUS at 19:54

## 2017-08-18 RX ADMIN — RANITIDINE HYDROCHLORIDE 150 MG: 150 TABLET, FILM COATED ORAL at 20:13

## 2017-08-18 RX ADMIN — ONDANSETRON 8 MG: 2 INJECTION INTRAMUSCULAR; INTRAVENOUS at 08:08

## 2017-08-18 RX ADMIN — DIPHENOXYLATE HYDROCHLORIDE AND ATROPINE SULFATE 1 TABLET: 2.5; .025 TABLET ORAL at 08:12

## 2017-08-18 RX ADMIN — ONDANSETRON 8 MG: 2 INJECTION INTRAMUSCULAR; INTRAVENOUS at 02:28

## 2017-08-18 RX ADMIN — CEPHALEXIN 500 MG: 500 CAPSULE ORAL at 20:13

## 2017-08-18 RX ADMIN — ACETAMINOPHEN 650 MG: 325 TABLET, FILM COATED ORAL at 02:28

## 2017-08-18 NOTE — DISCHARGE SUMMARY
[facility], [place]  Discharge Summary  Hematology / Oncology    Date of Admission:  8/15/2017  Date of Discharge:  8/18/2017  Discharging Provider: Apple Monsalve    Discharge Diagnoses   Patient Active Problem List   Diagnosis     Malignant neoplasm of right kidney (H)     Metastatic renal cell carcinoma (H)     Clear cell renal cell carcinoma (H)       History of Present Illness   Suzi Gonsales is an 52 year old female who presented with metastatic clear cell renal carcinoma and anxiety s/p right radical nephrectomy in 12/2014, now with metastatic RCC (lungs, adrenal, and pancreas). She was admitted for Cycle #4 of HD IL-2n therapy. She received cycle #1 on 6/6, completed 9 doses and tolerated it well overall. She received cycle #3 on 6/20/17, she completed 7 doses, complicated by hypotension and fatigue. She received Cycle 3 on 7/21/2017 with 8 doses. She saw Dr. Green (primary oncologist) with a repeat CT scan last week which showed stable disease. Completed 5 doses of high-dose IL-2. It was advised that she remain as an inpatient overnight for continued monitoring given significant symptoms from IL-2 therapy, particularly tachycardia. Refused to stay longer. Will f/u with [facility] within a week with labs prior.     Hospital Course   Suzi Gonsales was admitted on 8/15/2017.  The following problems were addressed during her hospitalization:    #Metastatic RCC: Admitted for Cycle 4 high-dose IL-2.   - PICC placed prior to admission      REGIMEN: D1=08/15/17   -Initiate IL2 with 600,000 units/kg= 34,000,000 units q8h for up to 14 doses.    Supportive care with:  -Scheduled zofran q8h  -Zantac 150mg bid  -Keflex ppx  -D5NS + 20 KCl @100cc/hr  -Fluid boluses per protocol  -Strict I/O, daily weights  -Scheduled tylenol  -NO steroids or diuretics  -Compazine, ativan prn   -S/p 5th dose at 1100 on 8/18/17. Patient would like to discontinue therapy after the 5th dose. EKG last night (8/17/17) revealed  tachycardia and QT prolongation. Repeat EKG conducted on 08/18 AM with normal sinus rhythm and no ST prolongation.   Her CK level was elevated from 60 to almost 300 overnight, most likely due to the extreme rigors overnight. Patient request on 8/18 after 5th dose to stop therapy and be discharged home.       #H/o anxiety.   - continue PTA Wellbutrin, Celexa  - PRN alprazolam    Patient and plan of care discussed with staff attending, Dr. Fernandez.     Apple Monsalve PA-C  Hematology/Oncology  [phone]      Unresulted Labs Ordered in the Past 30 Days of this Admission     Date and Time Order Name Status Description    8/17/2017 2330 Blood culture Preliminary     8/16/2017 2330 Blood culture Preliminary     8/15/2017 2330 Blood culture Preliminary           Code Status   Full Code    Primary Care Physician   Idalia Saini    Physical Exam   Temp: 97.1  F (36.2  C) Temp src: Oral BP: 111/50 Pulse: 104 Heart Rate: 124 Resp: 18 SpO2: 97 % O2 Device: None (Room air)    Vitals:    08/16/17 0729 08/17/17 0700 08/18/17 0742   Weight: 55.9 kg (123 lb 4.8 oz) 59.4 kg (131 lb) 60.5 kg (133 lb 4.8 oz)     Vital Signs with Ranges  Temp:  [95.5  F (35.3  C)-101  F (38.3  C)] 97.1  F (36.2  C)  Pulse:  [] 104  Heart Rate:  [] 124  Resp:  [16-20] 18  BP: ()/(32-59) 111/50  SpO2:  [94 %-100 %] 97 %  I/O last 3 completed shifts:  In: 3797 [I.V.:2990; IV Piggyback:807]  Out: 3020 [Urine:2795; Other:175; Stool:50]    Constitutional: Adult female seen sitting up in bed in no acute distress. No signs of sepsis or volume depletion.  HEENT: NCAT, PERRL, EOMI. Conjunctiva clear. OP moist and pink, no erythema, exudate, or lesions.   Respiratory: CTA B/L. No w/r/r  Cardiovascular: RRR, Normal S1/S2. No m/c/r.   GI: +BS in all 4 quadrants. No tenderness or distention on palpation. No hepatosplenomegaly.   Lymph/Hematologic: No cervical adenopathy.  Skin: Warm, dry skin with no lesions or rashes to exposed skin surfaces.    Musculoskeletal: FROM in all extremities. No edema in all extremities. PICC line c/d/i, no erythema. Some swelling to upper and lower extremities noted, equal bilaterally.  Neurologic: A & O x 3. CN II-XII intact.   Neuropsychiatric: Normal mood and affect    Time Spent on this Encounter       Discharge Disposition   Discharged to home  Condition at discharge: Stable  Patient should follow up in one week follow up in one week with outpatient cancer clinic.     Consultations This Hospital Stay   None    Discharge Orders   No discharge procedures on file.  Discharge Medications   Current Discharge Medication List      CONTINUE these medications which have NOT CHANGED    Details   acetaminophen-codeine (TYLENOL #3) 300-30 MG per tablet TK 1 T PO Q 6 HOURS PRN FOR MILD PAIN OR COUGH  Refills: 0      potassium phosphate, monobasic, (K-PHOS) 500 MG tablet Take 1 tablet (500 mg) by mouth 4 times daily  Qty: 30 tablet, Refills: 0    Associated Diagnoses: Hypophosphatemia      ALPRAZolam (XANAX) 0.5 MG tablet Take 1 tablet (0.5 mg) by mouth 3 times daily as needed for anxiety  Qty: 60 tablet, Refills: 0    Comments: Future refill requests should go to her oncologist or primary provider, please.  Associated Diagnoses: Renal cell carcinoma, unspecified laterality (H); Mass of upper lobe of left lung      benzonatate (TESSALON) 100 MG capsule Take 1 capsule (100 mg) by mouth 3 times daily as needed for cough  Qty: 42 capsule, Refills: 0      prochlorperazine (COMPAZINE) 10 MG tablet Take 1 tablet (10 mg) by mouth every 6 hours as needed for nausea or vomiting (Breakthrough Nausea/Vomiting)  Qty: 30 tablet, Refills: 0    Associated Diagnoses: Malignant neoplasm of right kidney (H)      acetaminophen (TYLENOL) 325 MG tablet Take 2 tablets (650 mg) by mouth every 4 hours as needed for mild pain (mild pain)  Qty: 100 tablet, Refills: 0    Associated Diagnoses: Malignant neoplasm of right kidney (H)      buPROPion (WELLBUTRIN XL)  300 MG 24 hr tablet Take 300 mg by mouth every morning       citalopram (CELEXA) 20 MG tablet Take 20 mg by mouth every evening       Zolpidem Tartrate (AMBIEN PO) Take 5 mg by mouth nightly as needed for sleep    Associated Diagnoses: Malignant neoplasm of right kidney (H)      IBUPROFEN Take 400 mg by mouth every 6 hours as needed            Allergies   No Known Allergies  Data   Most Recent 3 CBC's:  Recent Labs   Lab Test  08/18/17 0328 08/17/17   0557  08/16/17   0507   WBC  7.3  11.2*  8.7   HGB  9.9*  10.4*  10.9*   MCV  91  91  93   PLT  221  316  377      Most Recent 3 BMP's:  Recent Labs   Lab Test  08/18/17 0328 08/17/17   0557  08/16/17   0507   NA  142  136  139   POTASSIUM  4.5  4.0  4.1   CHLORIDE  119*  110*  112*   CO2  18*  20  20   BUN  5*  7  13   CR  1.00  1.04  1.07*   ANIONGAP  4  6  8   MUNDO  7.1*  7.2*  7.5*   GLC  108*  136*  132*     Most Recent 2 LFT's:  Recent Labs   Lab Test  08/18/17 0328 08/17/17   0557   AST  31  16   ALT  32  21   ALKPHOS  41  43   BILITOTAL  0.2  0.3     Most Recent 6 Bacteria Isolates From Any Culture (See EPIC Reports for Culture Details):  Recent Labs   Lab Test  08/18/17 0328 08/17/17   0557  08/16/17   0515  08/03/17   0600  08/02/17   0638  08/01/17   0706   CULT  No growth after 10 hours  No growth after 1 day  No growth after 2 days  No growth  No growth  No growth

## 2017-08-18 NOTE — PLAN OF CARE
Problem: Goal Outcome Summary  Goal: Goal Outcome Summary  Alert and oriented X 4. sBP 80's and 90's. Slightly tachycardic. Denies nausea or SOB. Denies chest pain. 500 mL NS bolus completed. Voided 800 mL and had small stool. Sleeping between cares.

## 2017-08-18 NOTE — PLAN OF CARE
Temp max 101.1, BC completed with am labs, HR ranging between 140-130's. AOVSS. Denies pain. Nausea is well controlled with scheduled zofran. Pt is having episodes of diarrhea, lomitl given. Waiting for patients VS to stabilize so pt can discharge.  present at bedside. Continue to monitor and w/ POC.

## 2017-08-18 NOTE — PLAN OF CARE
AVSS, afebrile. Denies pain. EKG completed, WDL and NSR. Dose #5 of IL-2 given. Two hours after dose #5 patient started to chill/rigor, mary lou hugger applied and IV demerol 25mg given x1. Intermittent nausea with episodes of emesis, compazine 10mg IV given x1, relief provided. Phosphorus was 2.4, replacement given, recheck with morning labs. Pt is up 15lbs today. Once IL-2 reaction subsides and patient is stable pt will discharge this evening. Continue to monitor and w/ POC.

## 2017-08-18 NOTE — PLAN OF CARE
Problem: Chemotherapy Effects (Adult)  Goal: Signs and Symptoms of Listed Potential Problems Will be Absent or Manageable (Chemotherapy Effects)  Signs and symptoms of listed potential problems will be absent or manageable by discharge/transition of care (reference Chemotherapy Effects (Adult) CPG).   Outcome: No Change  Tmax 101.1, Tachycardic in 130's, MD notified, EKG ordered showing QTc prolongation @ 568. Per MD, if pt HR decreases to meet parameters, will repeat EKG to check QTc.  Patient has met UOP parameters and BP is WDL.  Pt continues with diarrhea, incontinent of stool, lomotil given.  Rxn to 1430 IL-2 dose happened approximately 2 hours after given.  Pt said it is the worst rigors she has had. Gema jacinto applied and demerol given x2.  Pt  at bedside for much of shift, supportive. Continue to monitor and with POC.     Addendum: BP @ 92/40, , MAP @ 57. MD paged. Will hold off on dose of IL-2 per MD. No repeat EKG neccessary per overnight MD. MD ordered 500cc bolus.

## 2017-08-18 NOTE — PROGRESS NOTES
Chemotherapy  D: Blood return is positive per PICC catheter. Urine output is good as recorded in intake and output flowsheet, chemotherapy agents double checked by two chemotherapy certified RN's, documentation in doc flowsheet recorded .     I: Premedications given (see electronic medical administration record). Dose #5 of IL-2 started to infuse over 15 minutes.     A: Tolerating well thus far. A&O x4.  Denies pain/nausea. Pt ambulating independently.    P: Continue to monitor urine output and symptoms of nausea. Screen for symptoms of toxicity.

## 2017-08-18 NOTE — PROGRESS NOTES
[facility], [place]  Hematology / Oncology Progress Note     Assessment & Plan   Suzi Gonsales is a 52 year old female who presents with metastatic clear cell renal carcinoma and anxiety s/p right radical nephrectomy in 12/2014, now with metastatic RCC (lungs, adrenal, and pancreas) who is admitted for Cycle 4 HD IL-2. She received cycle #1 on 6/6, completed 9 doses and tolerated it well overall. She received cycle #3 on 6/20/17, she completed 7 doses, complicated by hypotension and fatigue. She received Cycle 3 on 7/21/2017 with 8 doses. She saw Dr. Green (primary oncologist) with a repeat CT scan last week which showed stable disease. She is admitted for Cycle 4 high-dose IL-2.      #Metastatic RCC: Admitted for Cycle 4 high-dose IL-2.   - PICC placed prior to admission      REGIMEN: D1=08/15/17   -Initiate IL2 with 600,000 units/kg= 34,000,000 units q8h for up to 14 doses.    Supportive care with:  -Scheduled zofran q8h  -Zantac 150mg bid  -Keflex ppx  -D5NS + 20 KCl @100cc/hr  -Fluid boluses per protocol  -Strict I/O, daily weights  -Scheduled tylenol  -NO steroids or diuretics  -Compazine, ativan prn   -S/p 5th dose at 1100 on 8/18/17. Patient would like to discontinue therapy after the 5th dose. EKG last night (8/17/17) revealed tachycardia and QT prolongation. Repeat EKG conducted today with normal sinus rhythm and no ST prolongation.   Her CK level was elevated from 60 to almost 300 overnight, most likely due to the extreme rigors overnight. Likely will discharge patient later this evening after 5th dose or tomorrow morning pending any acute toxicities.       #H/o anxiety.   - continue PTA Wellbutrin, Celexa  - PRN alprazolam      FEN  - IVFs per treatment plan, bolus with caution PRN (SBP <85 or Urine Output <30 mL/hr)  - Regular diet  - Electrolyte replacement prn per protocol      PPx  - defer VTE prophylaxis given anticipated thrombocytopenia with IL-2  - Zantac as  above      Dispo: Pending completion of tolerated IL-2 doses (anticipate 3-5 day stay) and recovery of acute issues.      Patient and plan of care discussed with staff attending, Dr. Fernandez.     AIDA KelloggS2     The patient was seen in conjunction with AIDA KelloggS who served as a scribe for today's progress note. I have reviewed and edited the above note, and agree with the above findings and plan.    Apple Monsalve PA-C  Hematology/Oncology  Pager: [phone]    Interval History   Suzi states that she is doing well today but had a rough night with more extreme rigors. Her N/V, diarrhea, and oral intake have improved overnight. She has been having diarrhea but no trouble urinating. She denies HA or visual changes. She denies mouth sores or sore throat. No chest pain or SOB. She has had no abdominal pain. States she feels puffy in the face, arms and legs. No arthralgias. Myalgia in the left shoulder, but no edema and no difficulty moving the shoulder.      Physical Exam   Temp: 95.5  F (35.3  C) Temp src: Oral BP: 114/58 Pulse: 90 Heart Rate: 124 Resp: 18 SpO2: 97 % O2 Device: None (Room air)    Vitals:    08/16/17 0729 08/17/17 0700 08/18/17 0742   Weight: 55.9 kg (123 lb 4.8 oz) 59.4 kg (131 lb) 60.5 kg (133 lb 4.8 oz)     Vital Signs with Ranges  Temp:  [95.5  F (35.3  C)-101  F (38.3  C)] 95.5  F (35.3  C)  Pulse:  [] 90  Heart Rate:  [] 124  Resp:  [16-20] 18  BP: ()/(32-59) 114/58  SpO2:  [94 %-100 %] 97 %  I/O last 3 completed shifts:  In: 3797 [I.V.:2990; IV Piggyback:807]  Out: 3020 [Urine:2795; Other:175; Stool:50]    Constitutional: Adult female seen sitting up in bed in no acute distress. No signs of sepsis or volume depletion.  HEENT: NCAT, PERRL, EOMI. Conjunctiva clear. OP moist and pink, no erythema, exudate, or lesions.   Respiratory: CTA B/L. No w/r/r  Cardiovascular: RRR, Normal S1/S2. No m/c/r.   GI: +BS in all 4 quadrants. No tenderness or distention on  palpation. No hepatosplenomegaly.   Lymph/Hematologic: No cervical adenopathy.  Skin: Warm, dry skin with no lesions or rashes to exposed skin surfaces.   Musculoskeletal: FROM in all extremities. No edema in all extremities. PICC line c/d/i, no erythema. Some swelling to upper and lower extremities noted, equal bilaterally.  Neurologic: A & O x 3. CN II-XII intact.   Neuropsychiatric: Normal mood and affect.    Medications     dextrose 5% and 0.9% NaCl with potassium chloride 20 mEq 100 mL/hr at 08/17/17 2307     D5W 1,000 mL (08/17/17 1639)     - MEDICATION INSTRUCTIONS -       NaCl         buPROPion  300 mg Oral QAM     citalopram  20 mg Oral QPM     sodium chloride (PF)  10 mL Intracatheter Q8H     eucerin   Topical BID     ondansetron  8 mg Intravenous Q8H     acetaminophen  650 mg Oral Q4H     ranitidine  150 mg Oral BID     cephalexin  500 mg Oral BID     aldesleukin (PROLEUKIN) infusion  600,000 Units/kg (Treatment Plan Recorded) Intravenous Q8H       Data   Results for orders placed or performed during the hospital encounter of 08/15/17 (from the past 24 hour(s))   EKG 12-lead, complete   Result Value Ref Range    Interpretation ECG Click View Image link to view waveform and result    CBC with platelets differential   Result Value Ref Range    WBC 7.3 4.0 - 11.0 10e9/L    RBC Count 3.25 (L) 3.8 - 5.2 10e12/L    Hemoglobin 9.9 (L) 11.7 - 15.7 g/dL    Hematocrit 29.7 (L) 35.0 - 47.0 %    MCV 91 78 - 100 fl    MCH 30.5 26.5 - 33.0 pg    MCHC 33.3 31.5 - 36.5 g/dL    RDW 16.0 (H) 10.0 - 15.0 %    Platelet Count 221 150 - 450 10e9/L    Diff Method Automated Method     % Neutrophils 63.2 %    % Lymphocytes 7.9 %    % Monocytes 4.5 %    % Eosinophils 23.8 %    % Basophils 0.3 %    % Immature Granulocytes 0.3 %    Nucleated RBCs 0 0 /100    Absolute Neutrophil 4.6 1.6 - 8.3 10e9/L    Absolute Lymphocytes 0.6 (L) 0.8 - 5.3 10e9/L    Absolute Monocytes 0.3 0.0 - 1.3 10e9/L    Absolute Eosinophils 1.7 (H) 0.0 - 0.7  10e9/L    Absolute Basophils 0.0 0.0 - 0.2 10e9/L    Abs Immature Granulocytes 0.0 0 - 0.4 10e9/L    Absolute Nucleated RBC 0.0    Comprehensive metabolic panel   Result Value Ref Range    Sodium 142 133 - 144 mmol/L    Potassium 4.5 3.4 - 5.3 mmol/L    Chloride 119 (H) 94 - 109 mmol/L    Carbon Dioxide 18 (L) 20 - 32 mmol/L    Anion Gap 4 3 - 14 mmol/L    Glucose 108 (H) 70 - 99 mg/dL    Urea Nitrogen 5 (L) 7 - 30 mg/dL    Creatinine 1.00 0.52 - 1.04 mg/dL    GFR Estimate 58 (L) >60 mL/min/1.7m2    GFR Estimate If Black 70 >60 mL/min/1.7m2    Calcium 7.1 (L) 8.5 - 10.1 mg/dL    Bilirubin Total 0.2 0.2 - 1.3 mg/dL    Albumin 1.9 (L) 3.4 - 5.0 g/dL    Protein Total 4.3 (L) 6.8 - 8.8 g/dL    Alkaline Phosphatase 41 40 - 150 U/L    ALT 32 0 - 50 U/L    AST 31 0 - 45 U/L   Magnesium   Result Value Ref Range    Magnesium 2.3 1.6 - 2.3 mg/dL   Phosphorus   Result Value Ref Range    Phosphorus 2.4 (L) 2.5 - 4.5 mg/dL   Lactate Dehydrogenase   Result Value Ref Range    Lactate Dehydrogenase 199 81 - 234 U/L   CK total   Result Value Ref Range    CK Total 282 (H) 30 - 225 U/L   Blood culture   Result Value Ref Range    Specimen Description Blood GRAY PORT     Culture Micro No growth after 3 hours    Lactic acid level STAT   Result Value Ref Range    Lactic Acid 1.2 0.7 - 2.1 mmol/L   EKG 12-lead, complete   Result Value Ref Range    Interpretation ECG Click View Image link to view waveform and result

## 2017-08-19 ENCOUNTER — MYC MEDICAL ADVICE (OUTPATIENT)
Dept: ONCOLOGY | Facility: CLINIC | Age: 52
End: 2017-08-19

## 2017-08-19 VITALS
WEIGHT: 133.3 LBS | DIASTOLIC BLOOD PRESSURE: 66 MMHG | SYSTOLIC BLOOD PRESSURE: 115 MMHG | HEIGHT: 62 IN | HEART RATE: 133 BPM | RESPIRATION RATE: 18 BRPM | OXYGEN SATURATION: 95 % | BODY MASS INDEX: 24.53 KG/M2 | TEMPERATURE: 98.1 F

## 2017-08-19 LAB
ALBUMIN SERPL-MCNC: 2 G/DL (ref 3.4–5)
ALP SERPL-CCNC: 60 U/L (ref 40–150)
ALT SERPL W P-5'-P-CCNC: 50 U/L (ref 0–50)
ANION GAP SERPL CALCULATED.3IONS-SCNC: 6 MMOL/L (ref 3–14)
AST SERPL W P-5'-P-CCNC: 31 U/L (ref 0–45)
BASOPHILS # BLD AUTO: 0.1 10E9/L (ref 0–0.2)
BASOPHILS NFR BLD AUTO: 0.7 %
BILIRUB SERPL-MCNC: 0.2 MG/DL (ref 0.2–1.3)
BUN SERPL-MCNC: 4 MG/DL (ref 7–30)
CALCIUM SERPL-MCNC: 7.8 MG/DL (ref 8.5–10.1)
CHLORIDE SERPL-SCNC: 119 MMOL/L (ref 94–109)
CK SERPL-CCNC: 148 U/L (ref 30–225)
CO2 SERPL-SCNC: 18 MMOL/L (ref 20–32)
CREAT SERPL-MCNC: 1.05 MG/DL (ref 0.52–1.04)
DIFFERENTIAL METHOD BLD: ABNORMAL
EOSINOPHIL # BLD AUTO: 2.6 10E9/L (ref 0–0.7)
EOSINOPHIL NFR BLD AUTO: 34.6 %
ERYTHROCYTE [DISTWIDTH] IN BLOOD BY AUTOMATED COUNT: 16.2 % (ref 10–15)
GFR SERPL CREATININE-BSD FRML MDRD: 55 ML/MIN/1.7M2
GLUCOSE SERPL-MCNC: 96 MG/DL (ref 70–99)
HCT VFR BLD AUTO: 30.4 % (ref 35–47)
HGB BLD-MCNC: 9.7 G/DL (ref 11.7–15.7)
IMM GRANULOCYTES # BLD: 0 10E9/L (ref 0–0.4)
IMM GRANULOCYTES NFR BLD: 0.3 %
LDH SERPL L TO P-CCNC: 214 U/L (ref 81–234)
LYMPHOCYTES # BLD AUTO: 1.4 10E9/L (ref 0.8–5.3)
LYMPHOCYTES NFR BLD AUTO: 18.6 %
MAGNESIUM SERPL-MCNC: 1.8 MG/DL (ref 1.6–2.3)
MCH RBC QN AUTO: 29.4 PG (ref 26.5–33)
MCHC RBC AUTO-ENTMCNC: 31.9 G/DL (ref 31.5–36.5)
MCV RBC AUTO: 92 FL (ref 78–100)
MONOCYTES # BLD AUTO: 0.4 10E9/L (ref 0–1.3)
MONOCYTES NFR BLD AUTO: 5.2 %
NEUTROPHILS # BLD AUTO: 3 10E9/L (ref 1.6–8.3)
NEUTROPHILS NFR BLD AUTO: 40.6 %
NRBC # BLD AUTO: 0 10*3/UL
NRBC BLD AUTO-RTO: 0 /100
PHOSPHATE SERPL-MCNC: 2.2 MG/DL (ref 2.5–4.5)
PLATELET # BLD AUTO: 267 10E9/L (ref 150–450)
PLATELET # BLD EST: ABNORMAL 10*3/UL
POTASSIUM SERPL-SCNC: 4.5 MMOL/L (ref 3.4–5.3)
PROT SERPL-MCNC: 4.5 G/DL (ref 6.8–8.8)
RBC # BLD AUTO: 3.3 10E12/L (ref 3.8–5.2)
SODIUM SERPL-SCNC: 142 MMOL/L (ref 133–144)
WBC # BLD AUTO: 7.4 10E9/L (ref 4–11)

## 2017-08-19 PROCEDURE — 87040 BLOOD CULTURE FOR BACTERIA: CPT | Performed by: INTERNAL MEDICINE

## 2017-08-19 PROCEDURE — 25000132 ZZH RX MED GY IP 250 OP 250 PS 637: Performed by: INTERNAL MEDICINE

## 2017-08-19 PROCEDURE — 25000128 H RX IP 250 OP 636: Performed by: PHYSICIAN ASSISTANT

## 2017-08-19 PROCEDURE — 36592 COLLECT BLOOD FROM PICC: CPT | Performed by: PHYSICIAN ASSISTANT

## 2017-08-19 PROCEDURE — 25000125 ZZHC RX 250: Performed by: PHYSICIAN ASSISTANT

## 2017-08-19 PROCEDURE — 83615 LACTATE (LD) (LDH) ENZYME: CPT | Performed by: PHYSICIAN ASSISTANT

## 2017-08-19 PROCEDURE — 82550 ASSAY OF CK (CPK): CPT | Performed by: PHYSICIAN ASSISTANT

## 2017-08-19 PROCEDURE — 80053 COMPREHEN METABOLIC PANEL: CPT | Performed by: PHYSICIAN ASSISTANT

## 2017-08-19 PROCEDURE — 85025 COMPLETE CBC W/AUTO DIFF WBC: CPT | Performed by: PHYSICIAN ASSISTANT

## 2017-08-19 PROCEDURE — 84100 ASSAY OF PHOSPHORUS: CPT | Performed by: PHYSICIAN ASSISTANT

## 2017-08-19 PROCEDURE — 25000132 ZZH RX MED GY IP 250 OP 250 PS 637: Performed by: PHYSICIAN ASSISTANT

## 2017-08-19 PROCEDURE — 25800025 ZZH RX 258: Performed by: INTERNAL MEDICINE

## 2017-08-19 PROCEDURE — 83735 ASSAY OF MAGNESIUM: CPT | Performed by: PHYSICIAN ASSISTANT

## 2017-08-19 PROCEDURE — 25000128 H RX IP 250 OP 636: Performed by: INTERNAL MEDICINE

## 2017-08-19 PROCEDURE — 99239 HOSP IP/OBS DSCHRG MGMT >30: CPT | Performed by: INTERNAL MEDICINE

## 2017-08-19 RX ADMIN — POTASSIUM CHLORIDE, DEXTROSE MONOHYDRATE AND SODIUM CHLORIDE: 150; 5; 900 INJECTION, SOLUTION INTRAVENOUS at 05:36

## 2017-08-19 RX ADMIN — ONDANSETRON 8 MG: 2 INJECTION INTRAMUSCULAR; INTRAVENOUS at 07:37

## 2017-08-19 RX ADMIN — BUPROPION HYDROCHLORIDE 300 MG: 300 TABLET, FILM COATED, EXTENDED RELEASE ORAL at 07:37

## 2017-08-19 RX ADMIN — Medication 2 G: at 07:35

## 2017-08-19 RX ADMIN — SODIUM PHOSPHATE, MONOBASIC, MONOHYDRATE AND SODIUM PHOSPHATE, DIBASIC, ANHYDROUS 15 MMOL: 276; 142 INJECTION, SOLUTION INTRAVENOUS at 08:57

## 2017-08-19 RX ADMIN — DIPHENOXYLATE HYDROCHLORIDE AND ATROPINE SULFATE 1 TABLET: 2.5; .025 TABLET ORAL at 06:53

## 2017-08-19 NOTE — PLAN OF CARE
Nursing Focus: Discharge    D: Patient discharged to home at 1015. Patient stable and accompanied by family.    I: Magnesium replaced. Discharge prescriptions sent to discharge pharmacy to be filled. All discharge medications and instructions reviewed with Suzi. Patient instructed to call clinic triage nurse if she experiences a fever >100.4, uncontrolled nausea, vomiting, diarrhea, or pain; or experiences any signs or symptoms of bleeding. Other phone numbers to call with questions or concerns after discharge reviewed. PICC removed. Education completed.    A: Suzi tavera verbalized understanding of discharge medications and instructions. No medications to get from pharmacy .     P: Patient to follow-up in clinic on the 24th with PA.

## 2017-08-19 NOTE — DISCHARGE SUMMARY
[facility], [place]    Discharge Summary  Hematology / Oncology    Date of Admission:  8/15/2017  Date of Discharge:  8/19/2017 10:24 AM  Discharging Provider: Beatrice Salinas    Discharge Diagnoses   Active Problems:    Clear cell renal cell carcinoma (H)      History of Present Illness   Suzi Gonsales is an 52 year old female who presented with metastatic clear cell renal carcinoma and anxiety s/p right radical nephrectomy in 12/2014, now with metastatic RCC (lungs, adrenal, and pancreas). She was admitted for Cycle #4 of HD IL-2n therapy. She received cycle #1 on 6/6, completed 9 doses and tolerated it well overall. She received cycle #3 on 6/20/17, she completed 7 doses, complicated by hypotension and fatigue. She received Cycle 3 on 7/21/2017 with 8 doses. She saw Dr. Green (primary oncologist) with a repeat CT scan last week which showed stable disease. Completed 5 doses of high-dose IL-2. It was advised that she remain as an inpatient overnight for continued monitoring given significant symptoms from IL-2 therapy, particularly tachycardia. She initially refused to stay one more night then changed her mind.  Her tachycardia improved during her last night here and she was anxious to leave the am of 8/19/17.       Hospital Course   Suzi Gonsales was admitted on 8/15/2017.  The following problems were addressed during her hospitalization:      #Metastatic RCC: Admitted for Cycle 4 high-dose IL-2.   - PICC placed prior to admission      REGIMEN: D1=08/15/17   -Initiate IL2 with 600,000 units/kg= 34,000,000 units q8h for up to 14 doses.    Supportive care with:  -Scheduled zofran q8h  -Zantac 150mg bid  -Keflex ppx  -D5NS + 20 KCl @100cc/hr  -Fluid boluses per protocol  -Strict I/O, daily weights  -Scheduled tylenol  -NO steroids or diuretics  -Compazine, ativan prn   -S/p 5th dose at 1100 on 8/18/17. Patient would like to discontinue therapy after the 5th dose. EKG last night  (8/17/17) revealed tachycardia and QT prolongation. Repeat EKG conducted on 08/18 AM with normal sinus rhythm and no ST prolongation.   Her CK level was elevated from 60 to almost 300 overnight, most likely due to the extreme rigors overnight. Patient requested on 8/18 after 5th dose to stop therapy and be discharged home. She later changed her mind and stayed the evening of 8/18 and was anxious to go the am of 8/19/17.  Tachycardia resolved at that point.      #H/o anxiety.   - continue PTA Wellbutrin, Celexa  - PRN alprazolam     Patient and plan of care discussed with staff attending, Dr. Fernandez.      Beatrice Salinas PA-C    Pending Results   These results will be followed up by hem/onc team.  Unresulted Labs Ordered in the Past 30 Days of this Admission     Date and Time Order Name Status Description    8/18/2017 2330 Blood culture Preliminary     8/17/2017 2330 Blood culture Preliminary     8/16/2017 2330 Blood culture Preliminary     8/15/2017 2330 Blood culture Preliminary           Code Status   Full Code    Primary Care Physician   Idalia Saini    Physical Exam   Temp: 98.1  F (36.7  C) Temp src: Oral BP: 115/66 Pulse: 133 Heart Rate: 112 Resp: 18 SpO2: 95 % O2 Device: None (Room air)    Vitals:    08/16/17 0729 08/17/17 0700 08/18/17 0742   Weight: 55.9 kg (123 lb 4.8 oz) 59.4 kg (131 lb) 60.5 kg (133 lb 4.8 oz)     Vital Signs with Ranges  Temp:  [98.1  F (36.7  C)-101.1  F (38.4  C)] 98.1  F (36.7  C)  Pulse:  [133-140] 133  Heart Rate:  [112-137] 112  Resp:  [18-19] 18  BP: ()/(51-66) 115/66  SpO2:  [94 %-99 %] 95 %  I/O last 3 completed shifts:  In: 2823.33 [P.O.:200; I.V.:2623.33]  Out: 2245 [Urine:2245]    Constitutional: pleasant in NAD  ENT: mmm, no oral lesions/sores, OP is clear, no exudate, erythema or swelling  Neck: supple  Respiratory: CTAB  Cardiovascular: nl s1/s2 no MRGs, rate is currently 96  GI: abdomen is soft, non-tender  Skin: warm/dry  Musculoskeletal: calves soft,  NT, no edema  Neurologic: awake/alert, speech is clear    Time Spent on this Encounter   I, Beatrice Salinas, personally saw the patient today and spent less than or equal to 30 minutes discharging this patient.    Discharge Disposition   Discharged to home  Condition at discharge: Good    Consultations This Hospital Stay   None    Discharge Orders     CBC with platelets differential   Last Lab Result: Hemoglobin (g/dL)      Date                     Value                08/18/2017               9.9 (L)          ----------     Comprehensive metabolic panel     Reason for your hospital stay   You were hospitalized for high-dose IL-2 therapy for kidney cancer.     Adult [facility] Follow-up and recommended labs and tests   Follow up with [facility] within 1 week for IL-2 follow-up and labs.    Appointments on [place] and/or [facility] (with [facility] or [facility] provider or service). Call [phone] if you haven't heard regarding these appointments within 7 days of discharge.     Activity   Your activity upon discharge: activity as tolerated     When to contact your care team   Please call the [facility] [phone] for fever (temp >100.5), chills, uncontrolled nausea, vomiting, constipation, or diarrhea, unrelieved pain, bleeding not relieved with pressure, dizziness, chest pain, shortness of breath, loss of consciousness, and any new or concerning symptoms.     Full Code     Diet   Follow this diet upon discharge: Regular       Discharge Medications   Current Discharge Medication List      CONTINUE these medications which have NOT CHANGED    Details   acetaminophen-codeine (TYLENOL #3) 300-30 MG per tablet TK 1 T PO Q 6 HOURS PRN FOR MILD PAIN OR COUGH  Refills: 0      potassium phosphate, monobasic, (K-PHOS) 500 MG tablet Take 1 tablet (500 mg) by mouth 4 times daily  Qty: 30 tablet, Refills: 0    Associated Diagnoses: Hypophosphatemia      ALPRAZolam (XANAX) 0.5 MG  tablet Take 1 tablet (0.5 mg) by mouth 3 times daily as needed for anxiety  Qty: 60 tablet, Refills: 0    Comments: Future refill requests should go to her oncologist or primary provider, please.  Associated Diagnoses: Renal cell carcinoma, unspecified laterality (H); Mass of upper lobe of left lung      benzonatate (TESSALON) 100 MG capsule Take 1 capsule (100 mg) by mouth 3 times daily as needed for cough  Qty: 42 capsule, Refills: 0      prochlorperazine (COMPAZINE) 10 MG tablet Take 1 tablet (10 mg) by mouth every 6 hours as needed for nausea or vomiting (Breakthrough Nausea/Vomiting)  Qty: 30 tablet, Refills: 0    Associated Diagnoses: Malignant neoplasm of right kidney (H)      acetaminophen (TYLENOL) 325 MG tablet Take 2 tablets (650 mg) by mouth every 4 hours as needed for mild pain (mild pain)  Qty: 100 tablet, Refills: 0    Associated Diagnoses: Malignant neoplasm of right kidney (H)      buPROPion (WELLBUTRIN XL) 300 MG 24 hr tablet Take 300 mg by mouth every morning       citalopram (CELEXA) 20 MG tablet Take 20 mg by mouth every evening       Zolpidem Tartrate (AMBIEN PO) Take 5 mg by mouth nightly as needed for sleep    Associated Diagnoses: Malignant neoplasm of right kidney (H)      IBUPROFEN Take 400 mg by mouth every 6 hours as needed            Allergies   No Known Allergies  Data   Most Recent 3 CBC's:  Recent Labs   Lab Test  08/19/17   0535  08/18/17   0328  08/17/17   0557   WBC  7.4  7.3  11.2*   HGB  9.7*  9.9*  10.4*   MCV  92  91  91   PLT  267  221  316      Most Recent 3 BMP's:  Recent Labs   Lab Test  08/19/17   0535  08/18/17   0328  08/17/17   0557   NA  142  142  136   POTASSIUM  4.5  4.5  4.0   CHLORIDE  119*  119*  110*   CO2  18*  18*  20   BUN  4*  5*  7   CR  1.05*  1.00  1.04   ANIONGAP  6  4  6   MUNDO  7.8*  7.1*  7.2*   GLC  96  108*  136*     Most Recent 2 LFT's:  Recent Labs   Lab Test  08/19/17   0535  08/18/17   0328   AST  31  31   ALT  50  32   ALKPHOS  60  41    BILITOTAL  0.2  0.2     Most Recent INR's and Anticoagulation Dosing History:  Anticoagulation Dose History     Recent Dosing and Labs Latest Ref Rng & Units 2/3/2017    INR 0.86 - 1.14 0.92        Most Recent 3 Troponin's:No lab results found.  Most Recent Cholesterol Panel:No lab results found.  Most Recent 6 Bacteria Isolates From Any Culture (See EPIC Reports for Culture Details):  Recent Labs   Lab Test  08/19/17   0535  08/18/17   0328  08/17/17   0557  08/16/17   0515  08/03/17   0600  08/02/17   0638   CULT  No growth after 7 hours  No growth after 1 day  No growth after 2 days  No growth after 3 days  No growth  No growth     Most Recent TSH, T4 and A1c Labs:No lab results found.

## 2017-08-19 NOTE — PLAN OF CARE
Problem: Goal Outcome Summary  Goal: Goal Outcome Summary  Outcome: Improving  1150-8254: VSS. HR tachy but improved. Afebrile. Scant episodes of diarrhea. Lomotil x1. voiding adequally; generalized edema improving. D5W fluids d/c d/t pt not continuing wiith IL-2. D5NS with 20mEq of K infusing. Ambien x1 for sleep. Plan to d/c this am as HR has slowed. Continue with POC.     Addendum: second dose of lomotil administered this am. Md paged for d/c orders to be placed as VSS stable.

## 2017-08-21 LAB — INTERPRETATION ECG - MUSE: NORMAL

## 2017-08-22 ENCOUNTER — TELEPHONE (OUTPATIENT)
Dept: ONCOLOGY | Facility: CLINIC | Age: 52
End: 2017-08-22

## 2017-08-22 LAB
BACTERIA SPEC CULT: NO GROWTH
SPECIMEN SOURCE: NORMAL

## 2017-08-22 NOTE — TELEPHONE ENCOUNTER
Pt would like to extend her FMLA to 9/6/17.  She is sending us a form tomorrow to fill out so that her FMLA will be extended until 9/6/17.  We will look for fax from patient tomorrow.

## 2017-08-23 LAB
BACTERIA SPEC CULT: NO GROWTH
SPECIMEN SOURCE: NORMAL

## 2017-08-23 NOTE — TELEPHONE ENCOUNTER
Called Suzi to let her know we received her paperwork.  We will fill it out and have Dr. Green sign it when he is here on Friday.  We will then fax the form to David Flores [phone] Re: Suzi Ayalaernestinejake [phone].  Pt happy with plan.  No further questions or concerns at this time.

## 2017-08-23 NOTE — PROGRESS NOTES
"[facility]  FOLLOW-UP VISIT NOTE  Date of visit: Aug 15, 2017           REASON FOR VISIT: [facility] assessment post to IL-2, C4    HPI: Suzi presented to ED with hematuria and right flank pain thinking she had a renal stone in December 2014. She was worked up with a CT abd/pelvis which suggested a renal mass. She was referred to Dr. Max Zelaya in [facility]. 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 Per charts tumor resection had negative margins and it was staged at pT2bN0.    Her staging work up was negative except for pulmonary nodules. She has been followed every 6 months with scans. But her most recent scan suggested increase in size of couple of nodules prompting to the current referral to Dr Green.  CT CAP on 5/11/17 showed enlarging hilar LAD, enlarging pulmonary nodules, adrenal nodules and a pancreatic body mass. Biopsies of hilar LN, adrenal nodule and pancreatic mass were + for RCC. She met with DR Green and decided to pursue IL-2.     C1: 6/6/17- 9 doses  C2: 6/20/17- 7 doses  CT CAP on 7/24/17  C3: 7/31/17 - 8 doses    INTERVAL HISTORY: Suzi is here today for assessment prior to C4.  She tolerated 8 doses after C3 and stopped as she was exhausted and literally could not be \"confined to her hospital bed any longer\".  Inpatient apparently tried to keep her longer and she was tolerating her doses with expected toxicity, but well.  She recovered within days after discharge and has been walking outside, shopping and eating well feeling nearly perfect again.     No recent f/s/c. No chest pain/cough/dyspnea. No black/bloody stools. No  issues. No daily pain. Today she feels great and ready for admission.     EXAM:  /65 (BP Location: Right arm)  Pulse 60  Temp 97.9  F (36.6  C) (Oral)  Resp 16  Wt 54.1 kg (119 lb 4.8 oz)  SpO2 97%  BMI 21.82 kg/m2  Wt Readings from Last 4 Encounters: "   08/18/17 60.5 kg (133 lb 4.8 oz)   08/15/17 54.1 kg (119 lb 4.8 oz)   08/08/17 54.7 kg (120 lb 8 oz)   08/03/17 59.8 kg (131 lb 14.4 oz)     Vital signs were reviewed.   Patient alert and oriented x3.   PERRLA. EOMI. No scleral icterus noted. OP without thrush/sores.  Neck exam: No palpable cervical, supraclavicular or axillary nodes bilaterally.   Heart: RRR no murmurs noted.   Lungs: clear to auscultation bilaterally.  No crackles or wheezing.   Abd: positive bowel sounds in all four quadrants.  No tenderness to palpation.  No hepatomegaly.   Extremities: No lower extremity edema.   Neuro: grossly intact.   Mood and affect is stable.   Skin is dry and flaky.  LABS:      8/15/2017 11:22   Sodium 137   Potassium 4.1   Chloride 105   Carbon Dioxide 23   Urea Nitrogen 14   Creatinine 0.92   GFR Estimate 64   GFR Estimate If Black 77   Calcium 9.2   Anion Gap 9   Albumin 3.7   Protein Total 7.8   Bilirubin Total 0.5   Alkaline Phosphatase 82   ALT 28   AST 20   Glucose 97   WBC 6.3   Hemoglobin 12.9   Hematocrit 39.6   Platelet Count 577 (H)   RBC Count 4.26   MCV 93     ASSESSMENT/PLAN: 50 year old with mRCC s/p first cycle of IL-2 receiving 9 doses and C2 receiving 7.  She has had a month off with a relatively stable CT scan - only mild progression of LAD.     Suzi is doing well today.  She did fantastic with C3 and finished 8 doses.  She is completely physically recovered today, per her and ready for C4.  Her labs are normal as well.  After C4 she'll be back mid-September CT scans and to follow up with Dr Green.       Kia Ren PA-C

## 2017-08-23 NOTE — TELEPHONE ENCOUNTER
Called patient to let her know we received her [employer] message regarding her FMLA paperwork.  Told patient that we will be happy to fill it out for her.  Pt plans to fax the paperwork to us today.  Gave her our fax number.  Confirmed with patient that she would like her FMLA extended to 9/6/17.  Will fill out paperwork and fax it to appropriate place once it is complete.  No further questions or concerns at this time.

## 2017-08-24 ENCOUNTER — CARE COORDINATION (OUTPATIENT)
Dept: CARE COORDINATION | Facility: CLINIC | Age: 52
End: 2017-08-24

## 2017-08-24 ENCOUNTER — ONCOLOGY VISIT (OUTPATIENT)
Dept: ONCOLOGY | Facility: CLINIC | Age: 52
End: 2017-08-24
Attending: PHYSICIAN ASSISTANT
Payer: COMMERCIAL

## 2017-08-24 VITALS
DIASTOLIC BLOOD PRESSURE: 62 MMHG | TEMPERATURE: 97.8 F | SYSTOLIC BLOOD PRESSURE: 102 MMHG | HEART RATE: 105 BPM | OXYGEN SATURATION: 95 % | WEIGHT: 119.2 LBS | BODY MASS INDEX: 21.8 KG/M2

## 2017-08-24 DIAGNOSIS — C64.9 METASTATIC RENAL CELL CARCINOMA, UNSPECIFIED LATERALITY (H): ICD-10-CM

## 2017-08-24 DIAGNOSIS — N28.9 RENAL INSUFFICIENCY: ICD-10-CM

## 2017-08-24 DIAGNOSIS — L85.3 DRY SKIN: ICD-10-CM

## 2017-08-24 DIAGNOSIS — C64.9 METASTATIC RENAL CELL CARCINOMA, UNSPECIFIED LATERALITY (H): Primary | ICD-10-CM

## 2017-08-24 DIAGNOSIS — D72.10 EOSINOPHILIA: ICD-10-CM

## 2017-08-24 LAB
ALBUMIN SERPL-MCNC: 3.7 G/DL (ref 3.4–5)
ALP SERPL-CCNC: 106 U/L (ref 40–150)
ALT SERPL W P-5'-P-CCNC: 33 U/L (ref 0–50)
ANION GAP SERPL CALCULATED.3IONS-SCNC: 7 MMOL/L (ref 3–14)
AST SERPL W P-5'-P-CCNC: 19 U/L (ref 0–45)
BACTERIA SPEC CULT: NO GROWTH
BASOPHILS # BLD AUTO: 0.2 10E9/L (ref 0–0.2)
BASOPHILS NFR BLD AUTO: 1.7 %
BILIRUB SERPL-MCNC: 0.3 MG/DL (ref 0.2–1.3)
BUN SERPL-MCNC: 12 MG/DL (ref 7–30)
CALCIUM SERPL-MCNC: 9.4 MG/DL (ref 8.5–10.1)
CHLORIDE SERPL-SCNC: 100 MMOL/L (ref 94–109)
CO2 SERPL-SCNC: 28 MMOL/L (ref 20–32)
CREAT SERPL-MCNC: 1.14 MG/DL (ref 0.52–1.04)
DIFFERENTIAL METHOD BLD: ABNORMAL
EOSINOPHIL # BLD AUTO: 3.1 10E9/L (ref 0–0.7)
EOSINOPHIL NFR BLD AUTO: 26 %
ERYTHROCYTE [DISTWIDTH] IN BLOOD BY AUTOMATED COUNT: 15.7 % (ref 10–15)
GFR SERPL CREATININE-BSD FRML MDRD: 50 ML/MIN/1.7M2
GLUCOSE SERPL-MCNC: 90 MG/DL (ref 70–99)
HCT VFR BLD AUTO: 39.1 % (ref 35–47)
HGB BLD-MCNC: 12.4 G/DL (ref 11.7–15.7)
IMM GRANULOCYTES # BLD: 0.5 10E9/L (ref 0–0.4)
IMM GRANULOCYTES NFR BLD: 4 %
LYMPHOCYTES # BLD AUTO: 4.1 10E9/L (ref 0.8–5.3)
LYMPHOCYTES NFR BLD AUTO: 34 %
MCH RBC QN AUTO: 29.6 PG (ref 26.5–33)
MCHC RBC AUTO-ENTMCNC: 31.7 G/DL (ref 31.5–36.5)
MCV RBC AUTO: 93 FL (ref 78–100)
MONOCYTES # BLD AUTO: 1 10E9/L (ref 0–1.3)
MONOCYTES NFR BLD AUTO: 7.9 %
NEUTROPHILS # BLD AUTO: 3.2 10E9/L (ref 1.6–8.3)
NEUTROPHILS NFR BLD AUTO: 26.4 %
NRBC # BLD AUTO: 0 10*3/UL
NRBC BLD AUTO-RTO: 0 /100
PHOSPHATE SERPL-MCNC: 3.4 MG/DL (ref 2.5–4.5)
PLATELET # BLD AUTO: 427 10E9/L (ref 150–450)
POTASSIUM SERPL-SCNC: 4.4 MMOL/L (ref 3.4–5.3)
PROT SERPL-MCNC: 8.2 G/DL (ref 6.8–8.8)
RBC # BLD AUTO: 4.19 10E12/L (ref 3.8–5.2)
SODIUM SERPL-SCNC: 136 MMOL/L (ref 133–144)
SPECIMEN SOURCE: NORMAL
WBC # BLD AUTO: 12.1 10E9/L (ref 4–11)

## 2017-08-24 PROCEDURE — 84100 ASSAY OF PHOSPHORUS: CPT | Performed by: PHYSICIAN ASSISTANT

## 2017-08-24 PROCEDURE — 85025 COMPLETE CBC W/AUTO DIFF WBC: CPT | Performed by: PHYSICIAN ASSISTANT

## 2017-08-24 PROCEDURE — 36415 COLL VENOUS BLD VENIPUNCTURE: CPT

## 2017-08-24 PROCEDURE — 99214 OFFICE O/P EST MOD 30 MIN: CPT | Mod: ZP | Performed by: PHYSICIAN ASSISTANT

## 2017-08-24 PROCEDURE — 80053 COMPREHEN METABOLIC PANEL: CPT | Performed by: PHYSICIAN ASSISTANT

## 2017-08-24 PROCEDURE — 99212 OFFICE O/P EST SF 10 MIN: CPT | Mod: ZF

## 2017-08-24 ASSESSMENT — PAIN SCALES - GENERAL: PAINLEVEL: NO PAIN (0)

## 2017-08-24 NOTE — NURSING NOTE
"Oncology Rooming Note    August 24, 2017 3:30 PM   Suzi Gonsales is a 52 year old female who presents for:    Chief Complaint   Patient presents with     Oncology Clinic Visit     Return-Kidney Ca     Initial Vitals: There were no vitals taken for this visit. Estimated body mass index is 21.8 kg/(m^2) as calculated from the following:    Height as of 8/15/17: 1.575 m (5' 2\").    Weight as of an earlier encounter on 8/24/17: 54.1 kg (119 lb 3.2 oz). There is no height or weight on file to calculate BSA.  Data Unavailable Comment: Data Unavailable   No LMP recorded. Patient is not currently having periods (Reason: Premenopausal).  Allergies reviewed: Yes  Medications reviewed: Yes    Medications: Medication refills not needed today.  Pharm[facility] entered into [id number] [place], [place] - [address] AT [facility] [address]    Clinical concerns: No new concerns     6 minutes for nursing intake (face to face time)     Martine Robins LPN            "

## 2017-08-24 NOTE — LETTER
8/24/2017      RE: Suzi Gonsales  [address] [place] [address]       [facility] CANCER CLINIC  FOLLOW-UP VISIT NOTE  Date of visit: Aug 24, 2017           REASON FOR VISIT: mRCC assessment post to IL-2, C4    HPI: Suzi presented to ED with hematuria and right flank pain thinking she had a renal stone in December 2014. She was worked up with a CT abd/pelvis which suggested a renal mass. She was referred to Dr. Max Zelaya in [facility]. 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 Per charts tumor resection had negative margins and it was staged at pT2bN0.    Her staging work up was negative except for pulmonary nodules. She has been followed every 6 months with scans. But her most recent scan suggested increase in size of couple of nodules prompting to the current referral to Dr Green.  CT CAP on 5/11/17 showed enlarging hilar LAD, enlarging pulmonary nodules, adrenal nodules and a pancreatic body mass. Biopsies of hilar LN, adrenal nodule and pancreatic mass were + for RCC. She met with DR Green and decided to pursue IL-2.     C1: 6/6/17- 9 doses  C2: 6/20/17- 7 doses  CT CAP on 7/24/17  C3: 7/31/17 - 8 doses  C4 8/15-5 doses.     INTERVAL HISTORY: Suzi is here today for f/u after cycle 4 IL-2. She has done considerably well with this treatment and seems to recover quickly. Her energy is completely back to baseline and very good. Edema has resolved. Skin is still a bit dry and itchy, but no rash and these symptoms are improving. She is eating and drinking and has no concerns today. She is pleased to be done with IL-2 therapy.     No recent f/s/c. No chest pain/cough/dyspnea. No dizziness. No abdominal pain. No black/bloody stools. No  issues. No daily pain.     EXAM:    Vital Signs 8/24/2017   Systolic 102   Diastolic 62   Pulse 105   Temperature 97.8   Respirations    Weight (LB) 119 lb 3.2 oz   Height     BMI (Calculated)    Pain    O2 95     Wt Readings from Last 4 Encounters:   08/24/17 54.1 kg (119 lb 3.2 oz)   08/18/17 60.5 kg (133 lb 4.8 oz)   08/15/17 54.1 kg (119 lb 4.8 oz)   08/08/17 54.7 kg (120 lb 8 oz)      Vital signs were reviewed.   Patient alert and oriented x3.   PERRLA. EOMI. No scleral icterus noted. OP without thrush/sores.  Neck exam: No palpable cervical, supraclavicular or axillary nodes bilaterally.   Heart: RRR no murmurs noted.   Lungs: clear to auscultation bilaterally.  No crackles or wheezing.   Abd: positive bowel sounds in all four quadrants.  No tenderness to palpation.  No hepatomegaly.   Extremities: No lower extremity edema.   Neuro: grossly intact.   Mood and affect is stable.   Skin is dry and flaky.    LABS:   Results for MICKEY AMEZCUA (MRN [id number]) as of 8/24/2017 18:14   Ref. Range 8/18/2017 03:28 8/19/2017 05:35 8/24/2017 15:20   Sodium Latest Ref Range: 133 - 144 mmol/L 142 142 136   Potassium Latest Ref Range: 3.4 - 5.3 mmol/L 4.5 4.5 4.4   Chloride Latest Ref Range: 94 - 109 mmol/L 119 (H) 119 (H) 100   Carbon Dioxide Latest Ref Range: 20 - 32 mmol/L 18 (L) 18 (L) 28   Urea Nitrogen Latest Ref Range: 7 - 30 mg/dL 5 (L) 4 (L) 12   Creatinine Latest Ref Range: 0.52 - 1.04 mg/dL 1.00 1.05 (H) 1.14 (H)   GFR Estimate Latest Ref Range: >60 mL/min/1.7m2 58 (L) 55 (L) 50 (L)   GFR Estimate If Black Latest Ref Range: >60 mL/min/1.7m2 70 66 60 (L)   Calcium Latest Ref Range: 8.5 - 10.1 mg/dL 7.1 (L) 7.8 (L) 9.4   Anion Gap Latest Ref Range: 3 - 14 mmol/L 4 6 7   Magnesium Latest Ref Range: 1.6 - 2.3 mg/dL 2.3 1.8    Phosphorus Latest Ref Range: 2.5 - 4.5 mg/dL 2.4 (L) 2.2 (L) 3.4   Albumin Latest Ref Range: 3.4 - 5.0 g/dL 1.9 (L) 2.0 (L) 3.7   Protein Total Latest Ref Range: 6.8 - 8.8 g/dL 4.3 (L) 4.5 (L) 8.2   Bilirubin Total Latest Ref Range: 0.2 - 1.3 mg/dL 0.2 0.2 0.3   Alkaline Phosphatase Latest Ref Range: 40 - 150 U/L 41 60 106   ALT Latest Ref Range: 0 - 50 U/L 32 50 33    AST Latest Ref Range: 0 - 45 U/L 31 31 19   CK Total Latest Ref Range: 30 - 225 U/L 282 (H) 148    Results for MICKEY AMEZCUA (MRN [id number]) as of 8/24/2017 18:14   Ref. Range 8/19/2017 05:35 8/24/2017 15:20   WBC Latest Ref Range: 4.0 - 11.0 10e9/L 7.4 12.1 (H)   Hemoglobin Latest Ref Range: 11.7 - 15.7 g/dL 9.7 (L) 12.4   Hematocrit Latest Ref Range: 35.0 - 47.0 % 30.4 (L) 39.1   Platelet Count Latest Ref Range: 150 - 450 10e9/L 267 427   RBC Count Latest Ref Range: 3.8 - 5.2 10e12/L 3.30 (L) 4.19   MCV Latest Ref Range: 78 - 100 fl 92 93   MCH Latest Ref Range: 26.5 - 33.0 pg 29.4 29.6   MCHC Latest Ref Range: 31.5 - 36.5 g/dL 31.9 31.7   RDW Latest Ref Range: 10.0 - 15.0 % 16.2 (H) 15.7 (H)   Diff Method Unknown Automated Method Automated Method   % Neutrophils Latest Units: % 40.6 26.4   % Lymphocytes Latest Units: % 18.6 34.0   % Monocytes Latest Units: % 5.2 7.9   % Eosinophils Latest Units: % 34.6 26.0   % Basophils Latest Units: % 0.7 1.7   % Immature Granulocytes Latest Units: % 0.3 4.0   Nucleated RBCs Latest Ref Range: 0 /100 0 0   Absolute Neutrophil Latest Ref Range: 1.6 - 8.3 10e9/L 3.0 3.2   Absolute Lymphocytes Latest Ref Range: 0.8 - 5.3 10e9/L 1.4 4.1   Absolute Monocytes Latest Ref Range: 0.0 - 1.3 10e9/L 0.4 1.0   Absolute Eosinophils Latest Ref Range: 0.0 - 0.7 10e9/L 2.6 (H) 3.1 (H)   Absolute Basophils Latest Ref Range: 0.0 - 0.2 10e9/L 0.1 0.2   Abs Immature Granulocytes Latest Ref Range: 0 - 0.4 10e9/L 0.0 0.5 (H)   Absolute Nucleated RBC Unknown 0.0 0.0       ASSESSMENT/PLAN: 50 year old with mRCC s/p 4 cycles of IL-2. Tolerated cycle 4 with 5 doses, developed some tachycardia which resolved prior to discharge. She had a stable CT after 2 cycles.  She is here for f/u after cycle 4 and is doing quite well. Energy back to baseline. Edema has resolved. Has some dry skin and mild eosinophilia from the IL-2. Cr is up slightly and she was advised to push fluids. She will return in 3 weeks for  CT scan and to discuss with Dr. Green.     It is my privilege to be involved in the care of the above patient.     Elina Still PA-C  [facility] [address] [address] [phone]

## 2017-08-24 NOTE — PROGRESS NOTES
[facility] CANCER CLINIC  FOLLOW-UP VISIT NOTE  Date of visit: Aug 24, 2017           REASON FOR VISIT: [facility] assessment post to IL-2, C4    HPI: Suzi presented to ED with hematuria and right flank pain thinking she had a renal stone in December 2014. She was worked up with a CT abd/pelvis which suggested a renal mass. She was referred to Dr. Max Zelaya in [facility]. 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 Per charts tumor resection had negative margins and it was staged at pT2bN0.    Her staging work up was negative except for pulmonary nodules. She has been followed every 6 months with scans. But her most recent scan suggested increase in size of couple of nodules prompting to the current referral to Dr Green.  CT CAP on 5/11/17 showed enlarging hilar LAD, enlarging pulmonary nodules, adrenal nodules and a pancreatic body mass. Biopsies of hilar LN, adrenal nodule and pancreatic mass were + for RCC. She met with DR Green and decided to pursue IL-2.     C1: 6/6/17- 9 doses  C2: 6/20/17- 7 doses  CT CAP on 7/24/17  C3: 7/31/17 - 8 doses  C4 8/15-5 doses.     INTERVAL HISTORY: Suzi is here today for f/u after cycle 4 IL-2. She has done considerably well with this treatment and seems to recover quickly. Her energy is completely back to baseline and very good. Edema has resolved. Skin is still a bit dry and itchy, but no rash and these symptoms are improving. She is eating and drinking and has no concerns today. She is pleased to be done with IL-2 therapy.     No recent f/s/c. No chest pain/cough/dyspnea. No dizziness. No abdominal pain. No black/bloody stools. No  issues. No daily pain.     EXAM:    Vital Signs 8/24/2017   Systolic 102   Diastolic 62   Pulse 105   Temperature 97.8   Respirations    Weight (LB) 119 lb 3.2 oz   Height    BMI (Calculated)    Pain    O2 95     Wt Readings from Last 4 Encounters:    08/24/17 54.1 kg (119 lb 3.2 oz)   08/18/17 60.5 kg (133 lb 4.8 oz)   08/15/17 54.1 kg (119 lb 4.8 oz)   08/08/17 54.7 kg (120 lb 8 oz)      Vital signs were reviewed.   Patient alert and oriented x3.   PERRLA. EOMI. No scleral icterus noted. OP without thrush/sores.  Neck exam: No palpable cervical, supraclavicular or axillary nodes bilaterally.   Heart: RRR no murmurs noted.   Lungs: clear to auscultation bilaterally.  No crackles or wheezing.   Abd: positive bowel sounds in all four quadrants.  No tenderness to palpation.  No hepatomegaly.   Extremities: No lower extremity edema.   Neuro: grossly intact.   Mood and affect is stable.   Skin is dry and flaky.    LABS:   Results for MICKEY AMEZCUA (MRN [id number]) as of 8/24/2017 18:14   Ref. Range 8/18/2017 03:28 8/19/2017 05:35 8/24/2017 15:20   Sodium Latest Ref Range: 133 - 144 mmol/L 142 142 136   Potassium Latest Ref Range: 3.4 - 5.3 mmol/L 4.5 4.5 4.4   Chloride Latest Ref Range: 94 - 109 mmol/L 119 (H) 119 (H) 100   Carbon Dioxide Latest Ref Range: 20 - 32 mmol/L 18 (L) 18 (L) 28   Urea Nitrogen Latest Ref Range: 7 - 30 mg/dL 5 (L) 4 (L) 12   Creatinine Latest Ref Range: 0.52 - 1.04 mg/dL 1.00 1.05 (H) 1.14 (H)   GFR Estimate Latest Ref Range: >60 mL/min/1.7m2 58 (L) 55 (L) 50 (L)   GFR Estimate If Black Latest Ref Range: >60 mL/min/1.7m2 70 66 60 (L)   Calcium Latest Ref Range: 8.5 - 10.1 mg/dL 7.1 (L) 7.8 (L) 9.4   Anion Gap Latest Ref Range: 3 - 14 mmol/L 4 6 7   Magnesium Latest Ref Range: 1.6 - 2.3 mg/dL 2.3 1.8    Phosphorus Latest Ref Range: 2.5 - 4.5 mg/dL 2.4 (L) 2.2 (L) 3.4   Albumin Latest Ref Range: 3.4 - 5.0 g/dL 1.9 (L) 2.0 (L) 3.7   Protein Total Latest Ref Range: 6.8 - 8.8 g/dL 4.3 (L) 4.5 (L) 8.2   Bilirubin Total Latest Ref Range: 0.2 - 1.3 mg/dL 0.2 0.2 0.3   Alkaline Phosphatase Latest Ref Range: 40 - 150 U/L 41 60 106   ALT Latest Ref Range: 0 - 50 U/L 32 50 33   AST Latest Ref Range: 0 - 45 U/L 31 31 19   CK Total Latest Ref Range: 30 -  225 U/L 282 (H) 148    Results for MICKEY AMEZCUA (MRN [id number]) as of 8/24/2017 18:14   Ref. Range 8/19/2017 05:35 8/24/2017 15:20   WBC Latest Ref Range: 4.0 - 11.0 10e9/L 7.4 12.1 (H)   Hemoglobin Latest Ref Range: 11.7 - 15.7 g/dL 9.7 (L) 12.4   Hematocrit Latest Ref Range: 35.0 - 47.0 % 30.4 (L) 39.1   Platelet Count Latest Ref Range: 150 - 450 10e9/L 267 427   RBC Count Latest Ref Range: 3.8 - 5.2 10e12/L 3.30 (L) 4.19   MCV Latest Ref Range: 78 - 100 fl 92 93   MCH Latest Ref Range: 26.5 - 33.0 pg 29.4 29.6   MCHC Latest Ref Range: 31.5 - 36.5 g/dL 31.9 31.7   RDW Latest Ref Range: 10.0 - 15.0 % 16.2 (H) 15.7 (H)   Diff Method Unknown Automated Method Automated Method   % Neutrophils Latest Units: % 40.6 26.4   % Lymphocytes Latest Units: % 18.6 34.0   % Monocytes Latest Units: % 5.2 7.9   % Eosinophils Latest Units: % 34.6 26.0   % Basophils Latest Units: % 0.7 1.7   % Immature Granulocytes Latest Units: % 0.3 4.0   Nucleated RBCs Latest Ref Range: 0 /100 0 0   Absolute Neutrophil Latest Ref Range: 1.6 - 8.3 10e9/L 3.0 3.2   Absolute Lymphocytes Latest Ref Range: 0.8 - 5.3 10e9/L 1.4 4.1   Absolute Monocytes Latest Ref Range: 0.0 - 1.3 10e9/L 0.4 1.0   Absolute Eosinophils Latest Ref Range: 0.0 - 0.7 10e9/L 2.6 (H) 3.1 (H)   Absolute Basophils Latest Ref Range: 0.0 - 0.2 10e9/L 0.1 0.2   Abs Immature Granulocytes Latest Ref Range: 0 - 0.4 10e9/L 0.0 0.5 (H)   Absolute Nucleated RBC Unknown 0.0 0.0       ASSESSMENT/PLAN: 50 year old with mRCC s/p 4 cycles of IL-2. Tolerated cycle 4 with 5 doses, developed some tachycardia which resolved prior to discharge. She had a stable CT after 2 cycles.  She is here for f/u after cycle 4 and is doing quite well. Energy back to baseline. Edema has resolved. Has some dry skin and mild eosinophilia from the IL-2. Cr is up slightly and she was advised to push fluids. She will return in 3 weeks for CT scan and to discuss with Dr. Green.     It is my privilege to be involved in  the care of the above patient.     Elina Still PA-C  [facility] [address] [address] [phone]

## 2017-08-24 NOTE — PROGRESS NOTES
Patient has clinic visit today 8/24 so no post DC follow up call is needed            Date: 8/24/2017 Status: Brionna   Time: 3:30 PM Length: 50     Visit Type: UMP RETURN [[id number]]   MAXIMINO: [id number]   Provider: Elina Still PA-C Department:  ONCOLOGY ADULT   Special Needs:   Comments:     Referral Number:   Referral Status:         CSN: [id number]   Notes: ONC:Kidney Ca - hospital f/u - task/gwendolyn     Made On:   8/18/2017 4:44 PM By:   ANDRES POWERS [[email or web address]] (ES)

## 2017-08-24 NOTE — NURSING NOTE
Chief Complaint   Patient presents with     Blood Draw     labs drawn via venipuncture     /62 (BP Location: Right arm, Patient Position: Chair, Cuff Size: Adult Regular)  Pulse 105  Temp 97.8  F (36.6  C) (Oral)  Wt 54.1 kg (119 lb 3.2 oz)  SpO2 95%  BMI 21.8 kg/m2    Vitals taken.  Blood collected from right antecub venipuncture. Pt tolerated well. Pt checked in for next appointment.    Estee Hampton RN

## 2017-08-24 NOTE — MR AVS SNAPSHOT
After Visit Summary   8/24/2017    Suzi Gonsales    MRN: [id number]           Patient Information     Date Of Birth          1965        Visit Information        Provider Department      8/24/2017 3:30 PM Elina Still PA-C [facility]        Today's Diagnoses     Metastatic renal cell carcinoma, unspecified laterality (H)    -  1    Eosinophilia        Renal insufficiency        Dry skin           Follow-ups after your visit        Your next 10 appointments already scheduled     Sep 13, 2017  2:20 PM CDT   (Arrive by 2:05 PM)   CT CHEST/ABDOMEN/PELVIS W CONTRAST with UCCT2   [facility] CT ([facility])    [address]  1st Floor  [facility] [address] [phone]           Please bring any scans or X-rays taken at other hospitals, if similar tests were done. Also bring a list of your medicines, including vitamins, minerals and over-the-counter drugs. It is safest to leave personal items at home.  Be sure to tell your doctor:   If you have any allergies.   If there s any chance you are pregnant.   If you are breastfeeding.   If you have any special needs.  You may have contrast for this exam. To prepare:   Do not eat or drink for 2 hours before your exam. If you need to take medicine, you may take it with small sips of water. (We may ask you to take liquid medicine as well.)   The day before your exam, drink extra fluids at least six 8-ounce glasses (unless your doctor tells you to restrict your fluids).  Patients over 70 or patients with diabetes or kidney problems:   If you haven t had a blood test (creatinine test) within the last 30 days, go to your clinic or Diagnostic Imaging Department for this test.  If you have diabetes:   If your kidney function is normal, continue taking your metformin (Avandamet, Glucophage, Glucovance, Metaglip) on the day of your exam.   If your kidney function is abnormal, wait 48 hours  before restarting this medicine.  You will have oral contrast for this exam:   You will drink the contrast at home. Get this from your clinic or Diagnostic Imaging Department. Please follow the directions given.  Please wear loose clothing, such as a sweat suit or jogging clothes. Avoid snaps, zippers and other metal. We may ask you to undress and put on a hospital gown.  If you have any questions, please call the Imaging Department where you will have your exam.            Sep 13, 2017  3:15 PM CDT   (Arrive by 3:00 PM)   Return Visit with Henri Green MD   [facility] ([facility])    [address]  [address] [facility] [address] [phone]              Future tests that were ordered for you today     Open Future Orders        Priority Expected Expires Ordered    *CBC with platelets differential Routine  9/13/2017 8/24/2017    Comprehensive metabolic panel Routine 8/25/2017 9/13/2017 8/24/2017            Who to contact     If you have questions or need follow up information about today's clinic visit or your schedule please contact [facility] directly at [phone].  Normal or non-critical lab and imaging results will be communicated to you by MyChart, letter or phone within 4 business days after the clinic has received the results. If you do not hear from us within 7 days, please contact the clinic through [facility] or phone. If you have a critical or abnormal lab result, we will notify you by phone as soon as possible.  Submit refill requests through popAD or call your pharmacy and they will forward the refill request to us. Please allow 3 business days for your refill to be completed.          Additional Information About Your Visit        popAD Information     popAD gives you secure access to your electronic health record. If you see a primary care provider, you can also send messages to your care team and make  appointments. If you have questions, please call your primary care clinic.  If you do not have a primary care provider, please call [phone] and they will assist you.        Care EveryWhere ID     This is your Care EveryWhere ID. This could be used by other organizations to access your [facility] medical records  [id number]         Blood Pressure from Last 3 Encounters:   08/24/17 102/62   08/19/17 115/66   08/15/17 104/65    Weight from Last 3 Encounters:   08/24/17 54.1 kg (119 lb 3.2 oz)   08/18/17 60.5 kg (133 lb 4.8 oz)   08/15/17 54.1 kg (119 lb 4.8 oz)               Primary Care Provider Office Phone # Fax #    Idalia Saini [phone]       [facility] [address] [address]        Equal Access to Services     MARI INTERIANO : Hadii aad ku hadasho Soomaali, waaxda luqadaha, qaybta kaalmada adeegyada, waxay debbiein hayaan danuta pooln . So [facility] [phone].    ATENCIÓN: Si habla español, tiene a chacon disposición servicios gratuitos de asistencia lingüística. Mario al [phone].    We comply with applicable federal civil rights laws and [place] laws. We do not discriminate on the basis of race, color, national origin, age, disability sex, sexual orientation or gender identity.            Thank you!     Thank you for choosing [facility]  for your care. Our goal is always to provide you with excellent care. Hearing back from our patients is one way we can continue to improve our services. Please take a few minutes to complete the written survey that you may receive in the mail after your visit with us. Thank you!             Your Updated Medication List - Protect others around you: Learn how to safely use, store and throw away your medicines at www.disposemymeds.org.          This list is accurate as of: 8/24/17  6:25 PM.  Always use your most recent med list.                   Brand Name Dispense Instructions for use Diagnosis    acetaminophen  325 MG tablet    TYLENOL    100 tablet    Take 2 tablets (650 mg) by mouth every 4 hours as needed for mild pain (mild pain)    Malignant neoplasm of right kidney (H)       acetaminophen-codeine 300-30 MG per tablet    TYLENOL #3     TK 1 T PO Q 6 HOURS PRN FOR MILD PAIN OR COUGH        ALPRAZolam 0.5 MG tablet    XANAX    60 tablet    Take 1 tablet (0.5 mg) by mouth 3 times daily as needed for anxiety    Renal cell carcinoma, unspecified laterality (H), Mass of upper lobe of left lung       AMBIEN PO      Take 5 mg by mouth nightly as needed for sleep    Malignant neoplasm of right kidney (H)       benzonatate 100 MG capsule    TESSALON    42 capsule    Take 1 capsule (100 mg) by mouth 3 times daily as needed for cough        buPROPion 300 MG 24 hr tablet    WELLBUTRIN XL     Take 300 mg by mouth every morning        citalopram 20 MG tablet    celeXA     Take 20 mg by mouth every evening        IBUPROFEN      Take 400 mg by mouth every 6 hours as needed        potassium phosphate (monobasic) 500 MG tablet    K-PHOS    30 tablet    Take 1 tablet (500 mg) by mouth 4 times daily    Hypophosphatemia       prochlorperazine 10 MG tablet    COMPAZINE    30 tablet    Take 1 tablet (10 mg) by mouth every 6 hours as needed for nausea or vomiting (Breakthrough Nausea/Vomiting)    Malignant neoplasm of right kidney (H)

## 2017-08-25 LAB
BACTERIA SPEC CULT: NO GROWTH
SPECIMEN SOURCE: NORMAL

## 2017-08-25 NOTE — TELEPHONE ENCOUNTER
Form has been signed by Dr Green and faxed to [phone].  Pt notified that form has been faxed.  Will send form to [employer] for scanning.  Sadie Westbrook RN BSN

## 2017-09-13 ENCOUNTER — APPOINTMENT (OUTPATIENT)
Dept: LAB | Facility: CLINIC | Age: 52
End: 2017-09-13
Attending: INTERNAL MEDICINE
Payer: COMMERCIAL

## 2017-09-13 ENCOUNTER — ONCOLOGY VISIT (OUTPATIENT)
Dept: ONCOLOGY | Facility: CLINIC | Age: 52
End: 2017-09-13
Attending: INTERNAL MEDICINE
Payer: COMMERCIAL

## 2017-09-13 VITALS
BODY MASS INDEX: 22.43 KG/M2 | OXYGEN SATURATION: 100 % | HEIGHT: 62 IN | HEART RATE: 67 BPM | TEMPERATURE: 97.6 F | DIASTOLIC BLOOD PRESSURE: 40 MMHG | SYSTOLIC BLOOD PRESSURE: 115 MMHG | WEIGHT: 121.9 LBS | RESPIRATION RATE: 18 BRPM

## 2017-09-13 DIAGNOSIS — C64.1 MALIGNANT NEOPLASM OF RIGHT KIDNEY (H): ICD-10-CM

## 2017-09-13 DIAGNOSIS — C64.9 METASTATIC RENAL CELL CARCINOMA, UNSPECIFIED LATERALITY (H): ICD-10-CM

## 2017-09-13 LAB
ALBUMIN SERPL-MCNC: 3.5 G/DL (ref 3.4–5)
ALP SERPL-CCNC: 74 U/L (ref 40–150)
ALT SERPL W P-5'-P-CCNC: 26 U/L (ref 0–50)
ANION GAP SERPL CALCULATED.3IONS-SCNC: 8 MMOL/L (ref 3–14)
AST SERPL W P-5'-P-CCNC: 25 U/L (ref 0–45)
BASOPHILS # BLD AUTO: 0.1 10E9/L (ref 0–0.2)
BASOPHILS NFR BLD AUTO: 0.7 %
BILIRUB SERPL-MCNC: 0.3 MG/DL (ref 0.2–1.3)
BUN SERPL-MCNC: 17 MG/DL (ref 7–30)
CALCIUM SERPL-MCNC: 9.2 MG/DL (ref 8.5–10.1)
CHLORIDE SERPL-SCNC: 105 MMOL/L (ref 94–109)
CO2 SERPL-SCNC: 23 MMOL/L (ref 20–32)
CREAT BLD-MCNC: 0.9 MG/DL (ref 0.52–1.04)
CREAT SERPL-MCNC: 0.88 MG/DL (ref 0.52–1.04)
DIFFERENTIAL METHOD BLD: ABNORMAL
EOSINOPHIL # BLD AUTO: 1.3 10E9/L (ref 0–0.7)
EOSINOPHIL NFR BLD AUTO: 17.5 %
ERYTHROCYTE [DISTWIDTH] IN BLOOD BY AUTOMATED COUNT: 14.8 % (ref 10–15)
GFR SERPL CREATININE-BSD FRML MDRD: 66 ML/MIN/1.7M2
GFR SERPL CREATININE-BSD FRML MDRD: 67 ML/MIN/1.7M2
GLUCOSE SERPL-MCNC: 79 MG/DL (ref 70–99)
HCT VFR BLD AUTO: 37.8 % (ref 35–47)
HGB BLD-MCNC: 11.9 G/DL (ref 11.7–15.7)
IMM GRANULOCYTES # BLD: 0 10E9/L (ref 0–0.4)
IMM GRANULOCYTES NFR BLD: 0.3 %
LYMPHOCYTES # BLD AUTO: 3.4 10E9/L (ref 0.8–5.3)
LYMPHOCYTES NFR BLD AUTO: 46 %
MCH RBC QN AUTO: 30 PG (ref 26.5–33)
MCHC RBC AUTO-ENTMCNC: 31.5 G/DL (ref 31.5–36.5)
MCV RBC AUTO: 95 FL (ref 78–100)
MONOCYTES # BLD AUTO: 0.4 10E9/L (ref 0–1.3)
MONOCYTES NFR BLD AUTO: 5.9 %
NEUTROPHILS # BLD AUTO: 2.2 10E9/L (ref 1.6–8.3)
NEUTROPHILS NFR BLD AUTO: 29.6 %
NRBC # BLD AUTO: 0 10*3/UL
NRBC BLD AUTO-RTO: 0 /100
PLATELET # BLD AUTO: 352 10E9/L (ref 150–450)
POTASSIUM SERPL-SCNC: 3.8 MMOL/L (ref 3.4–5.3)
PROT SERPL-MCNC: 7.8 G/DL (ref 6.8–8.8)
RBC # BLD AUTO: 3.97 10E12/L (ref 3.8–5.2)
SODIUM SERPL-SCNC: 136 MMOL/L (ref 133–144)
WBC # BLD AUTO: 7.4 10E9/L (ref 4–11)

## 2017-09-13 PROCEDURE — 80053 COMPREHEN METABOLIC PANEL: CPT | Performed by: INTERNAL MEDICINE

## 2017-09-13 PROCEDURE — 85025 COMPLETE CBC W/AUTO DIFF WBC: CPT | Performed by: INTERNAL MEDICINE

## 2017-09-13 PROCEDURE — 99212 OFFICE O/P EST SF 10 MIN: CPT | Mod: ZF

## 2017-09-13 PROCEDURE — 82565 ASSAY OF CREATININE: CPT

## 2017-09-13 PROCEDURE — 99215 OFFICE O/P EST HI 40 MIN: CPT | Mod: ZP | Performed by: INTERNAL MEDICINE

## 2017-09-13 ASSESSMENT — PAIN SCALES - GENERAL: PAINLEVEL: NO PAIN (0)

## 2017-09-13 NOTE — NURSING NOTE
Chief Complaint   Patient presents with     Blood Draw     Pt already with a 22ga angio from CT scan today, labs drawn from IV, deaccessed, vitals completed, checked into next appointment.   Cinthia Metz,RN

## 2017-09-13 NOTE — NURSING NOTE
"Oncology Rooming Note    September 13, 2017 3:16 PM   Suzi Gonsales is a 52 year old female who presents for:    Chief Complaint   Patient presents with     Blood Draw     Pt already with a 22ga angio from CT scan today, labs drawn from IV, deaccessed, vitals completed, checked into next appointment.     Oncology Clinic Visit     return patient visit for follow up related to kidney cancer     Initial Vitals: /40 (BP Location: Right arm, Patient Position: Sitting)  Pulse 67  Temp 97.6  F (36.4  C) (Oral)  Resp 18  Ht 1.575 m (5' 2\")  Wt 55.3 kg (121 lb 14.4 oz)  SpO2 100%  BMI 22.3 kg/m2 Estimated body mass index is 22.3 kg/(m^2) as calculated from the following:    Height as of this encounter: 1.575 m (5' 2\").    Weight as of this encounter: 55.3 kg (121 lb 14.4 oz). Body surface area is 1.56 meters squared.  No Pain (0) Comment: Data Unavailable   No LMP recorded. Patient is not currently having periods (Reason: Premenopausal).  Allergies reviewed: Yes  Medications reviewed: Yes    Medications: Medication refills not needed today.  Pharmacy name entered into [employer]: [id number] - [place], [place] - [address] AT [facility] [address]    Clinical concerns: no concerns dr. carter was notified.    5 minutes for nursing intake (face to face time)     Marisol Child CMA              "

## 2017-09-13 NOTE — MR AVS SNAPSHOT
"              After Visit Summary   9/13/2017    Suzi Gonsales    MRN: [id number]           Patient Information     Date Of Birth          1965        Visit Information        Provider Department      9/13/2017 3:15 PM Henri Green MD [facility]        Today's Diagnoses     Malignant neoplasm of right kidney (H)        Metastatic renal cell carcinoma, unspecified laterality (H)           Follow-ups after your visit        Who to contact     If you have questions or need follow up information about today's clinic visit or your schedule please contact [facility] directly at [phone].  Normal or non-critical lab and imaging results will be communicated to you by [employer], letter or phone within 4 business days after the clinic has received the results. If you do not hear from us within 7 days, please contact the clinic through [employer] or phone. If you have a critical or abnormal lab result, we will notify you by phone as soon as possible.  Submit refill requests through [employer] or call your pharmacy and they will forward the refill request to us. Please allow 3 business days for your refill to be completed.          Additional Information About Your Visit        MyChart Information     [employer] gives you secure access to your electronic health record. If you see a primary care provider, you can also send messages to your care team and make appointments. If you have questions, please call your primary care clinic.  If you do not have a primary care provider, please call [phone] and they will assist you.        Care EveryWhere ID     This is your Care EveryWhere ID. This could be used by other organizations to access your [place] medical records  [id number]        Your Vitals Were     Pulse Temperature Respirations Height Pulse Oximetry BMI (Body Mass Index)    67 97.6  F (36.4  C) (Oral) 18 1.575 m (5' 2\") 100% 22.3 kg/m2       Blood Pressure from Last 3 " Encounters:   09/13/17 115/40   08/24/17 102/62   08/19/17 115/66    Weight from Last 3 Encounters:   09/13/17 55.3 kg (121 lb 14.4 oz)   08/24/17 54.1 kg (119 lb 3.2 oz)   08/18/17 60.5 kg (133 lb 4.8 oz)              We Performed the Following     CBC with platelets and differential     Comprehensive metabolic panel        Primary Care Provider Office Phone # Fax #    Idalia Saini [phone]       [facility] [address] [address]        Equal Access to Services     [facility]: Hadii jimmie fang hadasho Sovanessa, waaxda luqadaha, qaybta kaalmada aderohityajodi, josemanuel oswald . So [facility] [phone].    ATENCIÓN: Si habla español, tiene a chacon disposición servicios gratuitos de asistencia lingüística. [facility] [phone].    We comply with applicable federal civil rights laws and [place] laws. We do not discriminate on the basis of race, color, national origin, age, disability sex, sexual orientation or gender identity.            Thank you!     Thank you for choosing [facility]  for your care. Our goal is always to provide you with excellent care. Hearing back from our patients is one way we can continue to improve our services. Please take a few minutes to complete the written survey that you may receive in the mail after your visit with us. Thank you!             Your Updated Medication List - Protect others around you: Learn how to safely use, store and throw away your medicines at www.disposemymeds.org.          This list is accurate as of: 9/13/17 11:59 PM.  Always use your most recent med list.                   Brand Name Dispense Instructions for use Diagnosis    acetaminophen 325 MG tablet    TYLENOL    100 tablet    Take 2 tablets (650 mg) by mouth every 4 hours as needed for mild pain (mild pain)    Malignant neoplasm of right kidney (H)       acetaminophen-codeine 300-30 MG per tablet    TYLENOL #3     TK 1 T PO Q 6 HOURS PRN  FOR MILD PAIN OR COUGH        ALPRAZolam 0.5 MG tablet    XANAX    60 tablet    Take 1 tablet (0.5 mg) by mouth 3 times daily as needed for anxiety    Renal cell carcinoma, unspecified laterality (H), Mass of upper lobe of left lung       AMBIEN PO      Take 5 mg by mouth nightly as needed for sleep    Malignant neoplasm of right kidney (H)       benzonatate 100 MG capsule    TESSALON    42 capsule    Take 1 capsule (100 mg) by mouth 3 times daily as needed for cough        buPROPion 300 MG 24 hr tablet    WELLBUTRIN XL     Take 300 mg by mouth every morning        citalopram 20 MG tablet    celeXA     Take 20 mg by mouth every evening        IBUPROFEN      Take 400 mg by mouth every 6 hours as needed        potassium phosphate (monobasic) 500 MG tablet    K-PHOS    30 tablet    Take 1 tablet (500 mg) by mouth 4 times daily    Hypophosphatemia       prochlorperazine 10 MG tablet    COMPAZINE    30 tablet    Take 1 tablet (10 mg) by mouth every 6 hours as needed for nausea or vomiting (Breakthrough Nausea/Vomiting)    Malignant neoplasm of right kidney (H)

## 2017-09-13 NOTE — LETTER
9/13/2017       RE: Suzi Gonsales  [address] [place] [address]     Dear Colleague,    Thank you for referring your patient, Suzi Gonsales, to the [facility]. Please see a copy of my visit note below.    [facility]  FOLLOW-UP VISIT NOTE  Date of visit: Sep 13, 2017           REASON FOR VISIT: mRCC assessment post to IL-2, C4    HPI: Suzi presented to ED with hematuria and right flank pain thinking she had a renal stone in December 2014. She was worked up with a CT abd/pelvis which suggested a renal mass. [facility]. 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 Per charts tumor resection had negative margins and it was staged at pT2bN0.    Her staging work up was negative except for pulmonary nodules. She has been followed every 6 months with scans. But her most recent scan suggested increase in size of couple of nodules prompting to the current referral to Dr Green.  CT CAP on 5/11/17 showed enlarging hilar LAD, enlarging pulmonary nodules, adrenal nodules and a pancreatic body mass. Biopsies of hilar LN, adrenal nodule and pancreatic mass were + for RCC. She met with DR Green and decided to pursue IL-2.     C1: 6/6/17- 9 doses  C2: 6/20/17- 7 doses  CT CAP on 7/24/17  C3: 7/31/17 - 8 doses  C4 8/15/17 -5 doses.     INTERVAL HISTORY: Suzi is here today for f/u of her metastatic RCC after completing her therapy with high dose IL-2.     She has done considerably well with this treatment and seems to recover quickly. Her energy is completely back to baseline and very good. Edema has resolved. She is eating and drinking and has no concerns today. She is pleased to be done with IL-2 therapy.     No recent f/s/c. No chest pain/cough/dyspnea. No dizziness. No abdominal pain. No black/bloody stools. No  issues. No daily pain.     EXAM:    BP  "115/40 (BP Location: Right arm, Patient Position: Sitting)  Pulse 67  Temp 97.6  F (36.4  C) (Oral)  Resp 18  Ht 1.575 m (5' 2\")  Wt 55.3 kg (121 lb 14.4 oz)  SpO2 100%  BMI 22.3 kg/m2    Vital signs were reviewed.   Patient alert and oriented x3.   PERRLA. EOMI. No scleral icterus noted. OP without thrush/sores.  Neck exam: No palpable cervical, supraclavicular or axillary nodes bilaterally.   Heart: RRR no murmurs noted.   Lungs: clear to auscultation bilaterally.  No crackles or wheezing.   Abd: positive bowel sounds in all four quadrants.  No tenderness to palpation.  No hepatomegaly.   Extremities: No lower extremity edema.   Neuro: grossly intact.   Mood and affect is stable.   Skin is dry and flaky.    LABS:   Recent Labs   Lab Test  09/13/17   1506  08/24/17   1520  08/19/17   0535  08/18/17   0328  08/17/17   0557   NA  136  136  142  142  136   POTASSIUM  3.8  4.4  4.5  4.5  4.0   CHLORIDE  105  100  119*  119*  110*   CO2  23  28  18*  18*  20   ANIONGAP  8  7  6  4  6   BUN  17  12  4*  5*  7   CR  0.88  1.14*  1.05*  1.00  1.04   GLC  79  90  96  108*  136*   MUNDO  9.2  9.4  7.8*  7.1*  7.2*     Recent Labs   Lab Test  08/24/17   1520  08/19/17   0535  08/18/17   0328  08/17/17   0557  08/16/17   1550  08/16/17   1241  08/16/17   0507   MAG   --   1.8  2.3  2.0   --   3.1*  1.5*   PHOS  3.4  2.2*  2.4*  2.3*  2.0*   --   1.4*     Recent Labs   Lab Test  09/13/17   1506  08/24/17   1520  08/19/17   0535  08/18/17   0328  08/17/17   0557   WBC  7.4  12.1*  7.4  7.3  11.2*   HGB  11.9  12.4  9.7*  9.9*  10.4*   PLT  352  427  267  221  316   MCV  95  93  92  91  91   NEUTROPHIL  29.6  26.4  40.6  63.2  76.7     Recent Labs   Lab Test  09/13/17   1506  08/24/17   1520  08/19/17   0535  08/18/17   0328  08/17/17   0557   BILITOTAL  0.3  0.3  0.2  0.2  0.3   ALKPHOS  74  106  60  41  43   ALT  26  33  50  32  21   AST  25  19  31  31  16   ALBUMIN  3.5  3.7  2.0*  1.9*  2.2*   LDH   --    --   214  199  " 182     No results found for: TSH  No results for input(s): CEA in the last 85675 hours.  Results for orders placed or performed in visit on 09/13/17   CT Chest/Abdomen/Pelvis w Contrast    Narrative    EXAMINATION: CT CHEST/ABDOMEN/PELVIS W CONTRAST, 9/13/2017 2:44 PM    TECHNIQUE:  Helical CT images from the thoracic inlet through the  symphysis pubis were obtained  with contrast. Contrast dose:  Isovue-370  73cc    COMPARISON: CT CAP 7/24/2017    HISTORY: mRCC, follow up after IL2, Malignant neoplasm of right  kidney, except renal pelvis    Additional history from the medical record: The patient is a  52-year-old female with history of renal cell cancer with    FINDINGS:    Chest: Unchanged calcified nodule in the right lobe of the thyroid  gland. Normal heart size. No pericardial effusion. Normal caliber  thoracic aorta and main pulmonary artery. No central pulmonary  embolism. No pleural effusion or pneumothorax.    Left supraclavicular lymph node (series 3 image 36) is increased in  size measuring 18 x 14 mm, previously 8 x 9 mm and now demonstrates a  central area of hypoattenuation.    Left peribronchial nodule/lymph node in the upper lobe (series 3 image  90) is increased in size measuring 16 mm, previously 20 mm with  central hypoattenuation.    Centrally necrotic left hilar lymph node (series 3 image 110) measures  21 x 16 mm, not significantly changed in size.    Centrally necrotic left hilar lymph node more inferiorly (series 3  image 126) measures 17 x 13 mm, not significantly changed in size.    Nodularity and interlobular septal thickening in the left upper lobe  is not significantly changed in extent since the prior exam. For  example, a left upper lobe nodule (series 6 image 33) is unchanged in  size measuring 9 x 9 mm and left upper lobe soft tissue attenuation  anteriorly (series 6 image 30) is unchanged measuring 22 x 12 mm.  Other smaller pulmonary in the left upper lobe and elsewhere in  both  lungs are not significantly changed in size. No new or enlarging  pulmonary nodules.    Abdomen and pelvis: Postsurgical changes of right nephrectomy and  right adrenalectomy without suspicious nodularity in the resection  bed.    Multiple tiny hypodensities in left kidney are unchanged 2 small to  characterize. Enhancing left adrenal nodule is not significantly  changed in size measuring 15 x 11 mm. A second enhancing left adrenal  nodule (series 3 image 247) is decreased in size measuring 5 x 6 mm,  previously 9 x 10 mm.    Subtle rim-enhancing nodule in the body the pancreas measuring 9 mm is  unchanged in size but less conspicuous on the current exam than on the  prior (series 3 image 247). Mild decrease in size of hypodensity in  the tail of the pancreas measuring 7 mm, previously 9 mm.    Enhancing and centrally hypodense right external iliac chain lymph  node (series 3 image 469) measures 13 x 9 mm, previously 14 x 11 mm.    Normal liver, gallbladder, and spleen. No biliary ductal dilatation.  No left-sided hydronephrosis. Normal urinary bladder. Mild  heterogeneous enhancement of the uterus, unchanged. No bowel  obstruction. No intraperitoneal free fluid, free air, pneumatosis, or  portal venous gas. Normal appendix.    Bones and soft tissues: No acute or suspicious osseous abnormality.  Wedge compression deformity of the T7 vertebral body is unchanged.  Soft tissues of the chest and abdominal wall are unremarkable.      Impression    IMPRESSION: In this patient with history of metastatic renal cell  cancer status post multiple cycles of interleukin-2 therapy beginning  on 6/6/2017:  1. Unchanged or mildly decreased size of the majority of the  previously visualized metastases including left hilar lymph node  metastases, left upper lobe pulmonary nodules, left adrenal nodules,  pancreatic nodules, and a right external iliac chain lymph node.  2. The exception is a left supraclavicular lymph node which  is  increased in size and demonstrates new central necrosis since  7/24/2017.  This could represent pseudoprogression in the setting of  immune therapy versus true progression and continued short-term  follow-up is recommended. No new metastatic lesions identified in the  chest, abdomen, or pelvis.    I have personally reviewed the examination and initial interpretation  and I agree with the findings.    CRISTÓBAL BOO MD         ASSESSMENT/PLAN: 52 year old with mRCC s/p 4 cycles of IL-2. Tolerated cycle 4 with 5 doses, developed some tachycardia which resolved prior to discharge. She had a stable CT after 2 cycles.  She is here for f/u after cycle 4     I have reviewed actual images of her restaging scan done earlier today. Official read was pending at the time of clinic visit. I reviewed actual images with her and her . Official read is also consistent. There is a small growth in one of the SCL node. We will have to observe this.     I explained her that she is at least among the 25% of patients who have a treatment response to high dose IL2. Now we have to see if she belongs to the 6% of long term responders.     She does not need any therapy at this time.     I reviewed PD1/PDL1 directed therapy as the next line of therapy. Stereotactic radiation therapy with an aim to receive an immune response was a favored strategy.  I have suggested starting nivolumab at about the same time to enhance the immune response to radiation therapy for a better abscopal effect. He is starting his radiation therapy next week.      I again reviewed immunotherapy with a PD-L1 inhibitor - nivolumab.  I explained to them the concept of checkpoint inhibitor with a physiological ole to sustain an effective immune response.  PD-L1 is expressed on healthy, normal tissue in the area of trauma so that the immune response is limited to dead tissue and the infecting agents and does not extend over to the normal tissue.  The tumor  uses some of these checks and balances to its advantage and successfully evades the immune system.  Using a monoclonal antibody to this PD-1 receptor, we could take away some of the immune suppression that occurs in the patient's white cells that are directed to the tumor and would have a better chance of response.       This will obviously put him at risk for some autoimmunity which when it involves skin give rash and is called dermatitis, with gut it presents with diarrhea and is called colitis, and with lung it gives shortness of breath and is called pneumonitis.  Similarly, depending on the affected tissue, we might have hepatitis, nephritis, thyroiditis, hypophysitis and so on.       Nivolumab is administered intravenously every 2 weeks as an outpatient.  For the most part, only a few percentage of patients has substantial side effects; for example, pneumonitis or hepatitis.  In these cases, we identify and act aggressively.  We can give him doses of steroid to suppress the autoimmune response.  The antitumor response is known to persist even beyond that.    Over time we would have treatment combinations and cytokine therapy with high dose IL2 should complement checkpoint inhibitors.     Over 45 min of direct face to face time spent with patient with more than 50% time spent in counseling and coordinating care.      Again, thank you for allowing me to participate in the care of your patient.      Sincerely,    Henri Green MD

## 2017-09-17 NOTE — PROGRESS NOTES
"[facility] CANCER CLINIC  FOLLOW-UP VISIT NOTE  Date of visit: Sep 13, 2017           REASON FOR VISIT: mRCC assessment post to IL-2, C4    HPI: Suzi presented to ED with hematuria and right flank pain thinking she had a renal stone in December 2014. She was worked up with a CT abd/pelvis which suggested a renal mass. She was referred to Dr. Max Zelaya in [facility]. 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 Per charts tumor resection had negative margins and it was staged at pT2bN0.    Her staging work up was negative except for pulmonary nodules. She has been followed every 6 months with scans. But her most recent scan suggested increase in size of couple of nodules prompting to the current referral to Dr Green.  CT CAP on 5/11/17 showed enlarging hilar LAD, enlarging pulmonary nodules, adrenal nodules and a pancreatic body mass. Biopsies of hilar LN, adrenal nodule and pancreatic mass were + for RCC. She met with DR Green and decided to pursue IL-2.     C1: 6/6/17- 9 doses  C2: 6/20/17- 7 doses  CT CAP on 7/24/17  C3: 7/31/17 - 8 doses  C4 8/15/17 -5 doses.     INTERVAL HISTORY: Suzi is here today for f/u of her metastatic RCC after completing her therapy with high dose IL-2.     She has done considerably well with this treatment and seems to recover quickly. Her energy is completely back to baseline and very good. Edema has resolved. She is eating and drinking and has no concerns today. She is pleased to be done with IL-2 therapy.     No recent f/s/c. No chest pain/cough/dyspnea. No dizziness. No abdominal pain. No black/bloody stools. No  issues. No daily pain.     EXAM:    /40 (BP Location: Right arm, Patient Position: Sitting)  Pulse 67  Temp 97.6  F (36.4  C) (Oral)  Resp 18  Ht 1.575 m (5' 2\")  Wt 55.3 kg (121 lb 14.4 oz)  SpO2 100%  BMI 22.3 kg/m2    Vital signs were reviewed.   Patient " alert and oriented x3.   PERRLA. EOMI. No scleral icterus noted. OP without thrush/sores.  Neck exam: No palpable cervical, supraclavicular or axillary nodes bilaterally.   Heart: RRR no murmurs noted.   Lungs: clear to auscultation bilaterally.  No crackles or wheezing.   Abd: positive bowel sounds in all four quadrants.  No tenderness to palpation.  No hepatomegaly.   Extremities: No lower extremity edema.   Neuro: grossly intact.   Mood and affect is stable.   Skin is dry and flaky.    LABS:   Recent Labs   Lab Test  09/13/17   1506  08/24/17   1520  08/19/17   0535  08/18/17   0328  08/17/17   0557   NA  136  136  142  142  136   POTASSIUM  3.8  4.4  4.5  4.5  4.0   CHLORIDE  105  100  119*  119*  110*   CO2  23  28  18*  18*  20   ANIONGAP  8  7  6  4  6   BUN  17  12  4*  5*  7   CR  0.88  1.14*  1.05*  1.00  1.04   GLC  79  90  96  108*  136*   MUNDO  9.2  9.4  7.8*  7.1*  7.2*     Recent Labs   Lab Test  08/24/17   1520  08/19/17   0535  08/18/17   0328  08/17/17   0557  08/16/17   1550  08/16/17   1241  08/16/17   0507   MAG   --   1.8  2.3  2.0   --   3.1*  1.5*   PHOS  3.4  2.2*  2.4*  2.3*  2.0*   --   1.4*     Recent Labs   Lab Test  09/13/17   1506  08/24/17   1520  08/19/17   0535  08/18/17   0328  08/17/17   0557   WBC  7.4  12.1*  7.4  7.3  11.2*   HGB  11.9  12.4  9.7*  9.9*  10.4*   PLT  352  427  267  221  316   MCV  95  93  92  91  91   NEUTROPHIL  29.6  26.4  40.6  63.2  76.7     Recent Labs   Lab Test  09/13/17   1506  08/24/17   1520  08/19/17   0535  08/18/17   0328  08/17/17   0557   BILITOTAL  0.3  0.3  0.2  0.2  0.3   ALKPHOS  74  106  60  41  43   ALT  26  33  50  32  21   AST  25  19  31  31  16   ALBUMIN  3.5  3.7  2.0*  1.9*  2.2*   LDH   --    --   214  199  182     No results found for: TSH  No results for input(s): CEA in the last 02886 hours.  Results for orders placed or performed in visit on 09/13/17   CT Chest/Abdomen/Pelvis w Contrast    Narrative    EXAMINATION: CT  CHEST/ABDOMEN/PELVIS W CONTRAST, 9/13/2017 2:44 PM    TECHNIQUE:  Helical CT images from the thoracic inlet through the  symphysis pubis were obtained  with contrast. Contrast dose:  Isovue-370  73cc    COMPARISON: CT CAP 7/24/2017    HISTORY: mRCC, follow up after IL2, Malignant neoplasm of right  kidney, except renal pelvis    Additional history from the medical record: The patient is a  52-year-old female with history of renal cell cancer with    FINDINGS:    Chest: Unchanged calcified nodule in the right lobe of the thyroid  gland. Normal heart size. No pericardial effusion. Normal caliber  thoracic aorta and main pulmonary artery. No central pulmonary  embolism. No pleural effusion or pneumothorax.    Left supraclavicular lymph node (series 3 image 36) is increased in  size measuring 18 x 14 mm, previously 8 x 9 mm and now demonstrates a  central area of hypoattenuation.    Left peribronchial nodule/lymph node in the upper lobe (series 3 image  90) is increased in size measuring 16 mm, previously 20 mm with  central hypoattenuation.    Centrally necrotic left hilar lymph node (series 3 image 110) measures  21 x 16 mm, not significantly changed in size.    Centrally necrotic left hilar lymph node more inferiorly (series 3  image 126) measures 17 x 13 mm, not significantly changed in size.    Nodularity and interlobular septal thickening in the left upper lobe  is not significantly changed in extent since the prior exam. For  example, a left upper lobe nodule (series 6 image 33) is unchanged in  size measuring 9 x 9 mm and left upper lobe soft tissue attenuation  anteriorly (series 6 image 30) is unchanged measuring 22 x 12 mm.  Other smaller pulmonary in the left upper lobe and elsewhere in both  lungs are not significantly changed in size. No new or enlarging  pulmonary nodules.    Abdomen and pelvis: Postsurgical changes of right nephrectomy and  right adrenalectomy without suspicious nodularity in the  resection  bed.    Multiple tiny hypodensities in left kidney are unchanged 2 small to  characterize. Enhancing left adrenal nodule is not significantly  changed in size measuring 15 x 11 mm. A second enhancing left adrenal  nodule (series 3 image 247) is decreased in size measuring 5 x 6 mm,  previously 9 x 10 mm.    Subtle rim-enhancing nodule in the body the pancreas measuring 9 mm is  unchanged in size but less conspicuous on the current exam than on the  prior (series 3 image 247). Mild decrease in size of hypodensity in  the tail of the pancreas measuring 7 mm, previously 9 mm.    Enhancing and centrally hypodense right external iliac chain lymph  node (series 3 image 469) measures 13 x 9 mm, previously 14 x 11 mm.    Normal liver, gallbladder, and spleen. No biliary ductal dilatation.  No left-sided hydronephrosis. Normal urinary bladder. Mild  heterogeneous enhancement of the uterus, unchanged. No bowel  obstruction. No intraperitoneal free fluid, free air, pneumatosis, or  portal venous gas. Normal appendix.    Bones and soft tissues: No acute or suspicious osseous abnormality.  Wedge compression deformity of the T7 vertebral body is unchanged.  Soft tissues of the chest and abdominal wall are unremarkable.      Impression    IMPRESSION: In this patient with history of metastatic renal cell  cancer status post multiple cycles of interleukin-2 therapy beginning  on 6/6/2017:  1. Unchanged or mildly decreased size of the majority of the  previously visualized metastases including left hilar lymph node  metastases, left upper lobe pulmonary nodules, left adrenal nodules,  pancreatic nodules, and a right external iliac chain lymph node.  2. The exception is a left supraclavicular lymph node which is  increased in size and demonstrates new central necrosis since  7/24/2017.  This could represent pseudoprogression in the setting of  immune therapy versus true progression and continued short-term  follow-up is  recommended. No new metastatic lesions identified in the  chest, abdomen, or pelvis.    I have personally reviewed the examination and initial interpretation  and I agree with the findings.    CRISTÓBAL BOO MD         ASSESSMENT/PLAN: 52 year old with mRCC s/p 4 cycles of IL-2. Tolerated cycle 4 with 5 doses, developed some tachycardia which resolved prior to discharge. She had a stable CT after 2 cycles.  She is here for f/u after cycle 4     I have reviewed actual images of her restaging scan done earlier today. Official read was pending at the time of clinic visit. I reviewed actual images with her and her . Official read is also consistent. There is a small growth in one of the SCL node. We will have to observe this.     I explained her that she is at least among the 25% of patients who have a treatment response to high dose IL2. Now we have to see if she belongs to the 6% of long term responders.     She does not need any therapy at this time.     I reviewed PD1/PDL1 directed therapy as the next line of therapy. Stereotactic radiation therapy with an aim to receive an immune response was a favored strategy.  I have suggested starting nivolumab at about the same time to enhance the immune response to radiation therapy for a better abscopal effect. He is starting his radiation therapy next week.      I again reviewed immunotherapy with a PD-L1 inhibitor - nivolumab.  I explained to them the concept of checkpoint inhibitor with a physiological ole to sustain an effective immune response.  PD-L1 is expressed on healthy, normal tissue in the area of trauma so that the immune response is limited to dead tissue and the infecting agents and does not extend over to the normal tissue.  The tumor uses some of these checks and balances to its advantage and successfully evades the immune system.  Using a monoclonal antibody to this PD-1 receptor, we could take away some of the immune suppression that occurs in  the patient's white cells that are directed to the tumor and would have a better chance of response.       This will obviously put him at risk for some autoimmunity which when it involves skin give rash and is called dermatitis, with gut it presents with diarrhea and is called colitis, and with lung it gives shortness of breath and is called pneumonitis.  Similarly, depending on the affected tissue, we might have hepatitis, nephritis, thyroiditis, hypophysitis and so on.       Nivolumab is administered intravenously every 2 weeks as an outpatient.  For the most part, only a few percentage of patients has substantial side effects; for example, pneumonitis or hepatitis.  In these cases, we identify and act aggressively.  We can give him doses of steroid to suppress the autoimmune response.  The antitumor response is known to persist even beyond that.    Over time we would have treatment combinations and cytokine therapy with high dose IL2 should complement checkpoint inhibitors.     Over 45 min of direct face to face time spent with patient with more than 50% time spent in counseling and coordinating care.

## 2017-09-18 ENCOUNTER — TELEPHONE (OUTPATIENT)
Dept: ONCOLOGY | Facility: CLINIC | Age: 52
End: 2017-09-18

## 2017-09-18 NOTE — TELEPHONE ENCOUNTER
"Social work received phone call from patient indicating she had been told \"you guys have [employer] gift cards for patients to help them.\" She indicated that she recently completed treatment within the last three weeks but would not be able to return to work for several months as she is recovering from her treatment.  Patient indicated she is \"in bad shape financially\" and repeatedly requested assistance through [employer] gift cards.  Social work offered information about [employer] grants, which patient stated she has already applied for. Social work explained that gift cards are for clinic use in only emergent, immediate need situations and would only be provided once.  Patient indicated understanding and stated \"well, I would call my situation an emergency.\" Social work agreed to provide this one time assistance of [employer] gift cards and agreed to mail them to patient's home per her request.  Additionally, SW encouraged patient to work with [employer] regarding other grants she may be eligible for. No other needs indicated.    Soo Yeon Han, [facility]  [phone]    "

## 2017-10-03 ENCOUNTER — OFFICE VISIT (OUTPATIENT)
Dept: FAMILY MEDICINE | Facility: CLINIC | Age: 52
End: 2017-10-03
Payer: COMMERCIAL

## 2017-10-03 VITALS
HEART RATE: 79 BPM | WEIGHT: 121 LBS | TEMPERATURE: 98.2 F | BODY MASS INDEX: 22.26 KG/M2 | DIASTOLIC BLOOD PRESSURE: 64 MMHG | HEIGHT: 62 IN | SYSTOLIC BLOOD PRESSURE: 96 MMHG | OXYGEN SATURATION: 97 %

## 2017-10-03 DIAGNOSIS — F41.0 PANIC ATTACK: ICD-10-CM

## 2017-10-03 DIAGNOSIS — Z23 NEED FOR PROPHYLACTIC VACCINATION WITH STREPTOCOCCUS PNEUMONIAE (PNEUMOCOCCUS) AND INFLUENZA VACCINES: ICD-10-CM

## 2017-10-03 DIAGNOSIS — C64.9 METASTATIC RENAL CELL CARCINOMA TO LUNG, RIGHT (H): Primary | ICD-10-CM

## 2017-10-03 DIAGNOSIS — Z23 NEED FOR PROPHYLACTIC VACCINATION AND INOCULATION AGAINST INFLUENZA: ICD-10-CM

## 2017-10-03 DIAGNOSIS — C78.01 METASTATIC RENAL CELL CARCINOMA TO LUNG, RIGHT (H): Primary | ICD-10-CM

## 2017-10-03 PROCEDURE — 90471 IMMUNIZATION ADMIN: CPT | Performed by: NURSE PRACTITIONER

## 2017-10-03 PROCEDURE — 90670 PCV13 VACCINE IM: CPT | Performed by: NURSE PRACTITIONER

## 2017-10-03 PROCEDURE — 90688 IIV4 VACCINE SPLT 0.5 ML IM: CPT | Performed by: NURSE PRACTITIONER

## 2017-10-03 PROCEDURE — 99202 OFFICE O/P NEW SF 15 MIN: CPT | Mod: 25 | Performed by: NURSE PRACTITIONER

## 2017-10-03 RX ORDER — CLONAZEPAM 0.5 MG/1
0.25-0.5 TABLET ORAL 2 TIMES DAILY PRN
Qty: 20 TABLET | Refills: 0 | Status: SHIPPED | OUTPATIENT
Start: 2017-10-03 | End: 2017-12-26

## 2017-10-03 NOTE — PROGRESS NOTES
"  SUBJECTIVE:   Suzi Gonsales is a 52 year old female who presents to clinic today for the following health issues:    New Patient/Transfer of Care    Former care at [facility].  transfering here to get all care within one system.  Oncologist at [facility].      Is currently under Dr Green care for stage 4 RCC.  Just completed il2 therapy.  Feeling well.      Due for pap and Mammo.  Colonoscopy UTD through 2020.  Wants to get flu shot and pneumonia vaccine today.      Was seeing OBGYN, prefers to have as few provider as possible.  Currently seeing urology, oncology and primary and OBGYN.      Has been out of work for 4 months, stamina is down, wants to go back next month.  Working as a  at [employer].  Is working on getting back to the gym and start walking.      Problem list and histories reviewed & adjusted, as indicated.  Additional history: as documented    Reviewed and updated as needed this visit by clinical staff  Tobacco  Allergies  Meds  Problems  Med Hx  Surg Hx  Fam Hx  Soc Hx        Reviewed and updated as needed this visit by Provider  Tobacco  Allergies  Meds  Problems  Med Hx  Surg Hx  Fam Hx  Soc Hx          ROS:  C: NEGATIVE for fever, chills, change in weight  E/M: NEGATIVE for ear, mouth and throat problems  R: NEGATIVE for significant cough or SOB  CV: NEGATIVE for chest pain, palpitations or peripheral edema    OBJECTIVE:     BP 96/64 (BP Location: Left arm, Patient Position: Chair, Cuff Size: Adult Regular)  Pulse 79  Temp 98.2  F (36.8  C) (Oral)  Ht 5' 2\" (1.575 m)  Wt 121 lb (54.9 kg)  SpO2 97%  BMI 22.13 kg/m2  Body mass index is 22.13 kg/(m^2).  GENERAL: healthy, alert and no distress  NECK: no adenopathy, no asymmetry, masses, or scars and thyroid normal to palpation  RESP: lungs clear to auscultation - no rales, rhonchi or wheezes  CV: regular rate and rhythm, normal S1 S2, no S3 or S4, no murmur, click or rub, no peripheral edema and peripheral pulses " strong  ABDOMEN: soft, nontender, no hepatosplenomegaly, no masses and bowel sounds normal  MS: no gross musculoskeletal defects noted, no edema  SKIN: no suspicious lesions or rashes  PSYCH: occasional panic    ASSESSMENT/PLAN:       ICD-10-CM    1. Metastatic renal cell carcinoma to lung, right (H) C78.01     C64.9    2. Panic attack F41.0 clonazePAM (KLONOPIN) 0.5 MG tablet   3. Need for prophylactic vaccination and inoculation against influenza Z23 FLU VACCINE, 3 YRS +, IM (QUADRIVALENT W/PRESERVATIVES/MULTI-DOSE) [81790]     Vaccine Administration, Initial [87297]   4. Need for prophylactic vaccination with Streptococcus pneumoniae (Pneumococcus) and Influenza vaccines Z23 Pneumococcal vaccine 13 valent PCV13 IM (Prevnar) [19670]     ADMIN: Vaccine, Initial (62840)     Following with Dr Green in oncology and Dr Zelaya in urology.  Here to establish care as PCP.  Wondering if I can fill her refills for celexa, ambien, wellbutrin and xanax.  Long discussion  that I prefer to use klonopin vs xanax related to overdose and addictive potential.  She still has some xanax left, will give her a small amount of klonopin and see if it works for her with her next episode of panic.  Advised not to use both at the same time.  Do not mix with alcohol at the same time.  Sparing PRN use only.  Not for daily use.  She estimates she may use it twice a week.  Does not need refills at this time of celexa and wellbutrin or ambien.      ARIEL Carter [facility]    Injectable Influenza Immunization Documentation    1.  Is the person to be vaccinated sick today?   No    2. Does the person to be vaccinated have an allergy to a component   of the vaccine?   No    3. Has the person to be vaccinated ever had a serious reaction   to influenza vaccine in the past?   No    4. Has the person to be vaccinated ever had Guillain-Barré syndrome?   No    Form completed by Desi Shen MA

## 2017-10-03 NOTE — MR AVS SNAPSHOT
After Visit Summary   10/3/2017    Suzi Gonsales    MRN: [id number]           Patient Information     Date Of Birth          1965        Visit Information        Provider Department      10/3/2017 11:00 AM Molly Adame APRN [facility]        Today's Diagnoses     Metastatic renal cell carcinoma to lung, right (H)    -  1    Panic attack        Need for prophylactic vaccination and inoculation against influenza        Need for prophylactic vaccination with Streptococcus pneumoniae (Pneumococcus) and Influenza vaccines           Follow-ups after your visit        Your next 10 appointments already scheduled     Nov 29, 2017 11:45 AM CST   Lab with UC LAB   [facility] ([facility])    [address] [place] [address] [id number] [phone]            Nov 29, 2017 12:20 PM CST   (Arrive by 12:05 PM)   CT CHEST/ABDOMEN/PELVIS W CONTRAST with UCCT2   [facility] [place] [facility] ([facility])    [address] [place] [address] [id number] [phone]           Please bring any scans or X-rays taken at other hospitals, if similar tests were done. Also bring a list of your medicines, including vitamins, minerals and over-the-counter drugs. It is safest to leave personal items at home.  Be sure to tell your doctor:   If you have any allergies.   If there s any chance you are pregnant.   If you are breastfeeding.   If you have any special needs.  You may have contrast for this exam. To prepare:   Do not eat or drink for 2 hours before your exam. If you need to take medicine, you may take it with small sips of water. (We may ask you to take liquid medicine as well.)   The day before your exam, drink extra fluids at least six 8-ounce glasses (unless your doctor tells you to restrict your fluids).  Patients over 70 or patients with diabetes or kidney problems:   If you haven t had a blood test  (creatinine test) within the last 30 days, go to your clinic or Diagnostic Imaging Department for this test.  If you have diabetes:   If your kidney function is normal, continue taking your metformin (Avandamet, Glucophage, Glucovance, Metaglip) on the day of your exam.   If your kidney function is abnormal, wait 48 hours before restarting this medicine.  You will have oral contrast for this exam:   You will drink the contrast at home. Get this from your clinic or Diagnostic Imaging Department. Please follow the directions given.  Please wear loose clothing, such as a sweat suit or jogging clothes. Avoid snaps, zippers and other metal. We may ask you to undress and put on a hospital gown.  If you have any questions, please call the Imaging Department where you will have your exam.            Nov 29, 2017  3:15 PM CST   (Arrive by 3:00 PM)   Return Visit with Henri Green MD   [facility] ([facility])    [address] [place] [id number] [phone]              Who to contact     If you have questions or need follow up information about today's clinic visit or your schedule please contact [facility] directly at [phone].  Normal or non-critical lab and imaging results will be communicated to you by WriteReader ApShart, letter or phone within 4 business days after the clinic has received the results. If you do not hear from us within 7 days, please contact the clinic through WriteReader ApShart or phone. If you have a critical or abnormal lab result, we will notify you by phone as soon as possible.  Submit refill requests through Webtab or call your pharmacy and they will forward the refill request to us. Please allow 3 business days for your refill to be completed.          Additional Information About Your Visit        Webtab Information     Webtab gives you secure access to your electronic health record. If you see a primary care  "provider, you can also send messages to your care team and make appointments. If you have questions, please call your primary care clinic.  If you do not have a primary care provider, please call [phone] and they will assist you.        Care EveryWhere ID     This is your Care EveryWhere ID. This could be used by other organizations to access your [place] medical records  [id number]        Your Vitals Were     Pulse Temperature Height Pulse Oximetry BMI (Body Mass Index)       79 98.2  F (36.8  C) (Oral) 5' 2\" (1.575 m) 97% 22.13 kg/m2        Blood Pressure from Last 3 Encounters:   10/03/17 96/64   09/13/17 115/40   08/24/17 102/62    Weight from Last 3 Encounters:   10/03/17 121 lb (54.9 kg)   09/13/17 121 lb 14.4 oz (55.3 kg)   08/24/17 119 lb 3.2 oz (54.1 kg)              We Performed the Following     ADMIN: Vaccine, Initial (59384)     FLU VACCINE, 3 YRS +, IM (QUADRIVALENT W/PRESERVATIVES/MULTI-DOSE) [33634]     Pneumococcal vaccine 13 valent PCV13 IM (Prevnar) [44344]     Vaccine Administration, Initial [97460]          Today's Medication Changes          These changes are accurate as of: 10/3/17  2:53 PM.  If you have any questions, ask your nurse or doctor.               Start taking these medicines.        Dose/Directions    clonazePAM 0.5 MG tablet   Commonly known as:  klonoPIN   Used for:  Panic attack   Started by:  Molly Adame APRN CNP        Dose:  0.25-0.5 mg   Take 0.5-1 tablets (0.25-0.5 mg) by mouth 2 times daily as needed for anxiety   Quantity:  20 tablet   Refills:  0            Where to get your medicines      Some of these will need a paper prescription and others can be bought over the counter.  Ask your nurse if you have questions.     Bring a paper prescription for each of these medications     clonazePAM 0.5 MG tablet                Primary Care Provider Office Phone # Fax #    Idalia Saini [phone]       [address] " [address]        Equal Access to Services     [facility] : Hadii jimmie fang wenceslaoloki Max, wakylahda luqadaha, qaybta kaceejodi tilley, josemanuel plata. So [facility] [phone].    ATENCIÓN: Si habla español, tiene a chacon disposición servicios gratuitos de asistencia lingüística. Israame al [phone].    We comply with applicable federal civil rights laws and [place] laws. We do not discriminate on the basis of race, color, national origin, age, disability, sex, sexual orientation, or gender identity.            Thank you!     Thank you for choosing [facility]  for your care. Our goal is always to provide you with excellent care. Hearing back from our patients is one way we can continue to improve our services. Please take a few minutes to complete the written survey that you may receive in the mail after your visit with us. Thank you!             Your Updated Medication List - Protect others around you: Learn how to safely use, store and throw away your medicines at www.disposemymeds.org.          This list is accurate as of: 10/3/17  2:53 PM.  Always use your most recent med list.                   Brand Name Dispense Instructions for use Diagnosis    acetaminophen 325 MG tablet    TYLENOL    100 tablet    Take 2 tablets (650 mg) by mouth every 4 hours as needed for mild pain (mild pain)    Malignant neoplasm of right kidney (H)       acetaminophen-codeine 300-30 MG per tablet    TYLENOL #3     TK 1 T PO Q 6 HOURS PRN FOR MILD PAIN OR COUGH        ALPRAZolam 0.5 MG tablet    XANAX    60 tablet    Take 1 tablet (0.5 mg) by mouth 3 times daily as needed for anxiety    Renal cell carcinoma, unspecified laterality (H), Mass of upper lobe of left lung       AMBIEN PO      Take 5 mg by mouth nightly as needed for sleep    Malignant neoplasm of right kidney (H)       benzonatate 100 MG capsule    TESSALON    42 capsule    Take 1 capsule (100 mg) by mouth 3 times daily as needed for  cough        buPROPion 300 MG 24 hr tablet    WELLBUTRIN XL     Take 300 mg by mouth every morning        citalopram 20 MG tablet    celeXA     Take 20 mg by mouth every evening        clonazePAM 0.5 MG tablet    klonoPIN    20 tablet    Take 0.5-1 tablets (0.25-0.5 mg) by mouth 2 times daily as needed for anxiety    Panic attack       IBUPROFEN      Take 400 mg by mouth every 6 hours as needed        potassium phosphate (monobasic) 500 MG tablet    K-PHOS    30 tablet    Take 1 tablet (500 mg) by mouth 4 times daily    Hypophosphatemia       prochlorperazine 10 MG tablet    COMPAZINE    30 tablet    Take 1 tablet (10 mg) by mouth every 6 hours as needed for nausea or vomiting (Breakthrough Nausea/Vomiting)    Malignant neoplasm of right kidney (H)

## 2017-10-03 NOTE — NURSING NOTE
"Chief Complaint   Patient presents with     Establish Care       Initial BP 96/64 (BP Location: Left arm, Patient Position: Chair, Cuff Size: Adult Regular)  Pulse 79  Temp 98.2  F (36.8  C) (Oral)  Ht 5' 2\" (1.575 m)  Wt 121 lb (54.9 kg)  SpO2 97%  BMI 22.13 kg/m2 Estimated body mass index is 22.13 kg/(m^2) as calculated from the following:    Height as of this encounter: 5' 2\" (1.575 m).    Weight as of this encounter: 121 lb (54.9 kg).  Medication Reconciliation: complete     Desi Shen MA      "

## 2017-10-06 ENCOUNTER — MYC MEDICAL ADVICE (OUTPATIENT)
Dept: FAMILY MEDICINE | Facility: CLINIC | Age: 52
End: 2017-10-06

## 2017-10-06 DIAGNOSIS — G47.9 SLEEP DISORDER: Primary | ICD-10-CM

## 2017-10-09 RX ORDER — ZOLPIDEM TARTRATE 5 MG/1
5 TABLET ORAL
Qty: 30 TABLET | Refills: 1 | Status: SHIPPED | OUTPATIENT
Start: 2017-10-09 | End: 2017-12-11

## 2017-11-07 DIAGNOSIS — F41.0 PANIC ATTACK: ICD-10-CM

## 2017-11-07 NOTE — TELEPHONE ENCOUNTER
**Patient is requesting a 90 day supply.**      Controlled Substance Refill Request for  CLONAZEPAM 0.5 MG  Problem List Complete:  No     PROVIDER TO CONSIDER COMPLETION OF PROBLEM LIST AND OVERVIEW/CONTROLLED SUBSTANCE AGREEMENT    Medication Detail      Disp Refills Start End LENCHO   clonazePAM (KLONOPIN) 0.5 MG tablet 20 tablet 0 10/3/2017  --   Sig: Take 0.5-1 tablets (0.25-0.5 mg) by mouth 2 times daily as needed for anxiety         Last Office Visit with [facility] primary care provider: 10-3-17    Future Office visit:     Controlled substance agreement on file: No.     Processing:  .     checked in past 6 months?  No, route to RN

## 2017-11-08 NOTE — TELEPHONE ENCOUNTER
Per pt req below  Wants #90 day of klonopin  Should I advise her we dont do #90 days of benzo's?  Please advise.  Thanks!     Roberta Herrera RN

## 2017-11-15 RX ORDER — CLONAZEPAM 0.5 MG/1
0.25-0.5 TABLET ORAL 2 TIMES DAILY PRN
Qty: 20 TABLET | Refills: 0 | Status: CANCELLED | OUTPATIENT
Start: 2017-11-15

## 2017-11-21 ASSESSMENT — ENCOUNTER SYMPTOMS
HEMOPTYSIS: 0
ABDOMINAL PAIN: 1
JOINT SWELLING: 0
ARTHRALGIAS: 0
MYALGIAS: 1
COUGH: 1
SPUTUM PRODUCTION: 0
DECREASED CONCENTRATION: 1
PANIC: 0
BOWEL INCONTINENCE: 0
VOMITING: 0
DYSPNEA ON EXERTION: 0
BLOATING: 1
BLOOD IN STOOL: 0
JAUNDICE: 0
NAUSEA: 0
COUGH DISTURBING SLEEP: 1
MUSCLE CRAMPS: 0
SHORTNESS OF BREATH: 0
RECTAL PAIN: 0
DEPRESSION: 1
BACK PAIN: 1
NECK PAIN: 1
SNORES LOUDLY: 0
STIFFNESS: 1
MUSCLE WEAKNESS: 0
CONSTIPATION: 0
NERVOUS/ANXIOUS: 1
DIARRHEA: 0
HEARTBURN: 1
WHEEZING: 0
INSOMNIA: 0
POSTURAL DYSPNEA: 0

## 2017-11-22 ENCOUNTER — ONCOLOGY VISIT (OUTPATIENT)
Dept: ONCOLOGY | Facility: CLINIC | Age: 52
End: 2017-11-22
Attending: INTERNAL MEDICINE
Payer: COMMERCIAL

## 2017-11-22 VITALS
OXYGEN SATURATION: 99 % | RESPIRATION RATE: 18 BRPM | HEIGHT: 62 IN | WEIGHT: 121 LBS | SYSTOLIC BLOOD PRESSURE: 100 MMHG | DIASTOLIC BLOOD PRESSURE: 65 MMHG | HEART RATE: 76 BPM | BODY MASS INDEX: 22.26 KG/M2 | TEMPERATURE: 98 F

## 2017-11-22 DIAGNOSIS — C78.01 METASTATIC RENAL CELL CARCINOMA TO LUNG, RIGHT (H): Primary | ICD-10-CM

## 2017-11-22 DIAGNOSIS — C64.1 MALIGNANT NEOPLASM OF RIGHT KIDNEY (H): ICD-10-CM

## 2017-11-22 DIAGNOSIS — C64.9 METASTATIC RENAL CELL CARCINOMA TO LUNG, RIGHT (H): Primary | ICD-10-CM

## 2017-11-22 DIAGNOSIS — C64.9 METASTATIC RENAL CELL CARCINOMA, UNSPECIFIED LATERALITY (H): ICD-10-CM

## 2017-11-22 LAB
ALBUMIN SERPL-MCNC: 2.9 G/DL (ref 3.4–5)
ALP SERPL-CCNC: 55 U/L (ref 40–150)
ALT SERPL W P-5'-P-CCNC: 18 U/L (ref 0–50)
ANION GAP SERPL CALCULATED.3IONS-SCNC: 6 MMOL/L (ref 3–14)
AST SERPL W P-5'-P-CCNC: 40 U/L (ref 0–45)
BASOPHILS # BLD AUTO: 0 10E9/L (ref 0–0.2)
BASOPHILS NFR BLD AUTO: 0.5 %
BILIRUB SERPL-MCNC: 0.4 MG/DL (ref 0.2–1.3)
BUN SERPL-MCNC: 19 MG/DL (ref 7–30)
CALCIUM SERPL-MCNC: 8.7 MG/DL (ref 8.5–10.1)
CHLORIDE SERPL-SCNC: 104 MMOL/L (ref 94–109)
CO2 SERPL-SCNC: 25 MMOL/L (ref 20–32)
CREAT SERPL-MCNC: 0.97 MG/DL (ref 0.52–1.04)
DIFFERENTIAL METHOD BLD: ABNORMAL
EOSINOPHIL # BLD AUTO: 0.3 10E9/L (ref 0–0.7)
EOSINOPHIL NFR BLD AUTO: 6.4 %
ERYTHROCYTE [DISTWIDTH] IN BLOOD BY AUTOMATED COUNT: 13.3 % (ref 10–15)
GFR SERPL CREATININE-BSD FRML MDRD: 60 ML/MIN/1.7M2
GLUCOSE SERPL-MCNC: 82 MG/DL (ref 70–99)
HCT VFR BLD AUTO: 32 % (ref 35–47)
HGB BLD-MCNC: 10.6 G/DL (ref 11.7–15.7)
IMM GRANULOCYTES # BLD: 0 10E9/L (ref 0–0.4)
IMM GRANULOCYTES NFR BLD: 0.2 %
LYMPHOCYTES # BLD AUTO: 1.6 10E9/L (ref 0.8–5.3)
LYMPHOCYTES NFR BLD AUTO: 38.9 %
MCH RBC QN AUTO: 30.2 PG (ref 26.5–33)
MCHC RBC AUTO-ENTMCNC: 33.1 G/DL (ref 31.5–36.5)
MCV RBC AUTO: 91 FL (ref 78–100)
MONOCYTES # BLD AUTO: 0.3 10E9/L (ref 0–1.3)
MONOCYTES NFR BLD AUTO: 8.1 %
NEUTROPHILS # BLD AUTO: 1.9 10E9/L (ref 1.6–8.3)
NEUTROPHILS NFR BLD AUTO: 45.9 %
NRBC # BLD AUTO: 0 10*3/UL
NRBC BLD AUTO-RTO: 0 /100
PLATELET # BLD AUTO: 377 10E9/L (ref 150–450)
POTASSIUM SERPL-SCNC: 5.2 MMOL/L (ref 3.4–5.3)
PROT SERPL-MCNC: 6.6 G/DL (ref 6.8–8.8)
RBC # BLD AUTO: 3.51 10E12/L (ref 3.8–5.2)
SODIUM SERPL-SCNC: 135 MMOL/L (ref 133–144)
WBC # BLD AUTO: 4.1 10E9/L (ref 4–11)

## 2017-11-22 PROCEDURE — 99212 OFFICE O/P EST SF 10 MIN: CPT | Mod: ZF

## 2017-11-22 PROCEDURE — 85025 COMPLETE CBC W/AUTO DIFF WBC: CPT | Performed by: INTERNAL MEDICINE

## 2017-11-22 PROCEDURE — 99215 OFFICE O/P EST HI 40 MIN: CPT | Mod: ZP | Performed by: INTERNAL MEDICINE

## 2017-11-22 PROCEDURE — 80053 COMPREHEN METABOLIC PANEL: CPT | Performed by: INTERNAL MEDICINE

## 2017-11-22 RX ORDER — BENZONATATE 100 MG/1
100 CAPSULE ORAL 3 TIMES DAILY PRN
Qty: 60 CAPSULE | Refills: 0 | Status: SHIPPED | OUTPATIENT
Start: 2017-11-22 | End: 2018-03-01

## 2017-11-22 RX ORDER — AZITHROMYCIN 250 MG/1
TABLET, FILM COATED ORAL
Qty: 6 TABLET | Refills: 0 | Status: SHIPPED | OUTPATIENT
Start: 2017-11-22 | End: 2017-12-01

## 2017-11-22 ASSESSMENT — PAIN SCALES - GENERAL: PAINLEVEL: NO PAIN (0)

## 2017-11-22 NOTE — MR AVS SNAPSHOT
After Visit Summary   11/22/2017    Suzi Gonsales    MRN: [id number]           Patient Information     Date Of Birth          1965        Visit Information        Provider Department      11/22/2017 3:15 PM Henri Green MD [facility]        Today's Diagnoses     Metastatic renal cell carcinoma to lung, right (H)    -  1       Follow-ups after your visit        Your next 10 appointments already scheduled     Dec 05, 2017 10:45 AM CST   [facility] Lab Draw with  [facility] LAB DRAW   [facility] ([facility])    [address] [place] [id number] [phone]            Dec 05, 2017 11:10 AM CST   (Arrive by 10:55 AM)   Return Visit with Molly Max PA-C   [facility] ([facility])    [address] [place] [id number] [phone]            Dec 05, 2017 12:30 PM CST   Infusion 120 with  ONCOLOGY INFUSION, UC 29 ATC   [facility] ([facility])    [address] [place] [id number] [phone]              Future tests that were ordered for you today     Open Future Orders        Priority Expected Expires Ordered    IR Chest Port Placement > 5 Yrs of Age Routine  11/22/2018 11/22/2017            Who to contact     If you have questions or need follow up information about today's clinic visit or your schedule please contact [facility] directly at [phone].  Normal or non-critical lab and imaging results will be communicated to you by MyChart, letter or phone within 4 business days after the clinic has received the results. If you do not hear from us within 7 days, please contact the clinic through MyChart or phone. If you have a critical or abnormal lab result, we will notify you by phone as soon as possible.  Submit refill requests through  [email or web address] or call your pharmacy and they will forward the refill request to us. Please allow 3 business days for your refill to be completed.          Additional Information About Your Visit        MyChart Information     [employer] gives you secure access to your electronic health record. If you see a primary care provider, you can also send messages to your care team and make appointments. If you have questions, please call your primary care clinic.  If you do not have a primary care provider, please call [phone] and they will assist you.        Care EveryWhere ID     This is your Care EveryWhere ID. This could be used by other organizations to access your [place] medical records  [id number]        Your Vitals Were     Pulse Temperature Respirations Height Head Circumference Pulse Oximetry    76 98  F (36.7  C) (Oral) 18 1.575 m (5' 2\") 17 cm (6.69\") 99%    Breastfeeding? BMI (Body Mass Index)                No 22.13 kg/m2           Blood Pressure from Last 3 Encounters:   11/22/17 100/65   10/03/17 96/64   09/13/17 115/40    Weight from Last 3 Encounters:   11/22/17 54.9 kg (121 lb)   10/03/17 54.9 kg (121 lb)   09/13/17 55.3 kg (121 lb 14.4 oz)                 Today's Medication Changes          These changes are accurate as of: 11/22/17  5:40 PM.  If you have any questions, ask your nurse or doctor.               Start taking these medicines.        Dose/Directions    azithromycin 250 MG tablet   Commonly known as:  ZITHROMAX   Used for:  Metastatic renal cell carcinoma to lung, right (H)   Started by:  Henri Green MD        Two tablets first day, then one tablet daily for four days.   Quantity:  6 tablet   Refills:  0         These medicines have changed or have updated prescriptions.        Dose/Directions    acetaminophen-codeine 300-30 MG per tablet   Commonly known as:  TYLENOL #3   This may have changed:  See the new instructions.   Used for:  Metastatic renal cell carcinoma to lung, right (H) "   Changed by:  Henri Green MD        Dose:  1 tablet   Take 1 tablet by mouth every 6 hours as needed for moderate pain   Quantity:  45 tablet   Refills:  0         Stop taking these medicines if you haven't already. Please contact your care team if you have questions.     potassium phosphate (monobasic) 500 MG tablet   Commonly known as:  K-PHOS   Stopped by:  Henri Green MD                Where to get your medicines      These medications were sent to [facility] [id number] - [place], [place] - [address] AT [facility] [address]  [address], [place] [address]    Hours:  24-hours Phone:  [phone]     azithromycin 250 MG tablet    benzonatate 100 MG capsule         Some of these will need a paper prescription and others can be bought over the counter.  Ask your nurse if you have questions.     Bring a paper prescription for each of these medications     acetaminophen-codeine 300-30 MG per tablet                Primary Care Provider Office Phone # Fax #    Molly ARIEL Platt [facility] [phone] [address] [facility] [address]        Equal Access to Services     ROSAURA INTERIANO AH: Hadii jimmie ku hadasho Soomaali, waaxda luqadaha, qaybta kaalmada adeegyada, josemanuel plata. So [facility] [phone].    ATENCIÓN: Si habla español, tiene a chacon disposición servicios gratZia Health Clinicos de asistencia lingüística. Mario al [phone].    We comply with applicable federal civil rights laws and [place] laws. We do not discriminate on the basis of race, color, national origin, age, disability, sex, sexual orientation, or gender identity.            Thank you!     Thank you for choosing [facility]  for your care. Our goal is always to provide you with excellent care. Hearing back from our patients is one way we can continue to improve our services. Please take a few minutes to complete the written survey that you may  receive in the mail after your visit with us. Thank you!             Your Updated Medication List - Protect others around you: Learn how to safely use, store and throw away your medicines at www.disposemymeds.org.          This list is accurate as of: 11/22/17  5:40 PM.  Always use your most recent med list.                   Brand Name Dispense Instructions for use Diagnosis    acetaminophen 325 MG tablet    TYLENOL    100 tablet    Take 2 tablets (650 mg) by mouth every 4 hours as needed for mild pain (mild pain)    Malignant neoplasm of right kidney (H)       acetaminophen-codeine 300-30 MG per tablet    TYLENOL #3    45 tablet    Take 1 tablet by mouth every 6 hours as needed for moderate pain    Metastatic renal cell carcinoma to lung, right (H)       ALPRAZolam 0.5 MG tablet    XANAX    60 tablet    Take 1 tablet (0.5 mg) by mouth 3 times daily as needed for anxiety    Renal cell carcinoma, unspecified laterality (H), Mass of upper lobe of left lung       * AMBIEN PO      Take 5 mg by mouth nightly as needed for sleep    Malignant neoplasm of right kidney (H)       * zolpidem 5 MG tablet    AMBIEN    30 tablet    Take 1 tablet (5 mg) by mouth nightly as needed for sleep    Sleep disorder       * zolpidem 5 MG tablet    AMBIEN    30 tablet    Take 1 tablet (5 mg) by mouth nightly as needed for sleep    Insomnia, unspecified type       azithromycin 250 MG tablet    ZITHROMAX    6 tablet    Two tablets first day, then one tablet daily for four days.    Metastatic renal cell carcinoma to lung, right (H)       benzonatate 100 MG capsule    TESSALON    60 capsule    Take 1 capsule (100 mg) by mouth 3 times daily as needed for cough    Metastatic renal cell carcinoma to lung, right (H)       buPROPion 300 MG 24 hr tablet    WELLBUTRIN XL     Take 300 mg by mouth every morning        * citalopram 20 MG tablet    celeXA    90 tablet    Take 1 tablet (20 mg) by mouth every evening    Insomnia, unspecified type       *  citalopram 20 MG tablet    celeXA    30 tablet    Take 1 tablet (20 mg) by mouth daily    Insomnia, unspecified type       clonazePAM 0.5 MG tablet    klonoPIN    20 tablet    Take 0.5-1 tablets (0.25-0.5 mg) by mouth 2 times daily as needed for anxiety    Panic attack       IBUPROFEN      Take 400 mg by mouth every 6 hours as needed        prochlorperazine 10 MG tablet    COMPAZINE    30 tablet    Take 1 tablet (10 mg) by mouth every 6 hours as needed for nausea or vomiting (Breakthrough Nausea/Vomiting)    Malignant neoplasm of right kidney (H)       * Notice:  This list has 5 medication(s) that are the same as other medications prescribed for you. Read the directions carefully, and ask your doctor or other care provider to review them with you.

## 2017-11-22 NOTE — PROGRESS NOTES
[facility]  FOLLOW-UP VISIT NOTE  Date of visit: Nov 22, 2017           REASON FOR VISIT: mRC assessment post to IL-2, C4    HPI: Suzi presented to ED with hematuria and right flank pain thinking she had a renal stone in December 2014. She was worked up with a CT abd/pelvis which suggested a renal mass. She was referred to Dr. Max Zelaya in [facility]. 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 Per charts tumor resection had negative margins and it was staged at pT2bN0.    Her staging work up was negative except for pulmonary nodules. She has been followed every 6 months with scans. But her most recent scan suggested increase in size of couple of nodules prompting to the current referral to Medical Oncology.  CT CAP on 5/11/17 showed enlarging hilar LAD, enlarging pulmonary nodules, adrenal nodules and a pancreatic body mass. Biopsies of hilar LN, adrenal nodule and pancreatic mass were + for RCC. She decided to pursue IL-2.     C1: 6/6/17- 9 doses  C2: 6/20/17- 7 doses  CT CAP on 7/24/17  C3: 7/31/17 - 8 doses  C4 8/15/17 -5 doses.     INTERVAL HISTORY: Suzi is here today for f/u of her metastatic RCC after completing her therapy with high dose IL-2.     She has done considerably well with this treatment and seems to recover quickly. Her energy is completely back to baseline and very good. Edema has resolved. She is eating and drinking and has no concerns today. She is pleased to be done with IL-2 therapy.     No recent f/s/c. No chest pain/cough/dyspnea. No dizziness. No abdominal pain. No black/bloody stools. No  issues. No daily pain.     Answers for HPI/ROS submitted by the patient on 11/21/2017   General Symptoms: No  Skin Symptoms: No  HENT Symptoms: No  EYE SYMPTOMS: No  HEART SYMPTOMS: No  LUNG SYMPTOMS: Yes  INTESTINAL SYMPTOMS: Yes  URINARY SYMPTOMS: No  GYNECOLOGIC SYMPTOMS:  "No  BREAST SYMPTOMS: No  SKELETAL SYMPTOMS: Yes  BLOOD SYMPTOMS: No  NERVOUS SYSTEM SYMPTOMS: No  MENTAL HEALTH SYMPTOMS: Yes  Cough: Yes  Sputum or phlegm: No  Coughing up blood: No  Difficulty breating or shortness of breath: No  Snoring: No  Wheezing: No  Difficulty breathing on exertion: No  Nighttime Cough: Yes  Difficulty breathing when lying flat: No  Heart burn or indigestion: Yes  Nausea: No  Vomiting: No  Abdominal pain: Yes  Bloating: Yes  Constipation: No  Diarrhea: No  Blood in stool: No  Black stools: Yes  Rectal or Anal pain: No  Fecal incontinence: No  Yellowing of skin or eyes: No  Vomit with blood: No  Change in stools: No  Back pain: Yes  Muscle aches: Yes  Neck pain: Yes  Swollen joints: No  Joint pain: No  Bone pain: No  Muscle cramps: No  Muscle weakness: No  Joint stiffness: Yes  Bone fracture: No  Nervous or Anxious: Yes  Depression: Yes  Trouble sleeping: No  Trouble thinking or concentrating: Yes  Mood changes: Yes  Panic attacks: No    EXAM:    /65  Pulse 76  Temp 98  F (36.7  C) (Oral)  Resp 18  Ht 1.575 m (5' 2\")  Wt 54.9 kg (121 lb)  HC 17 cm (6.69\")  SpO2 99%  Breastfeeding? No  BMI 22.13 kg/m2    Vital signs were reviewed.   Patient alert and oriented x3.   PERRLA. EOMI. No scleral icterus noted. OP without thrush/sores.  Neck exam: No palpable cervical, supraclavicular or axillary nodes bilaterally.   Heart: RRR no murmurs noted.   Lungs: clear to auscultation bilaterally.  No crackles or wheezing.   Abd: positive bowel sounds in all four quadrants.  No tenderness to palpation.  No hepatomegaly.   Extremities: No lower extremity edema.   Neuro: grossly intact.   Mood and affect is stable.   Skin is dry and flaky.    LABS:   Recent Labs   Lab Test  11/22/17   1239  09/13/17   1506  08/24/17   1520  08/19/17   0535  08/18/17   0328   NA  135  136  136  142  142   POTASSIUM  5.2  3.8  4.4  4.5  4.5   CHLORIDE  104  105  100  119*  119*   CO2  25  23  28  18*  18* "   ANIONGAP  6  8  7  6  4   BUN  19 17  12  4*  5*   CR  0.97  0.88  1.14*  1.05*  1.00   GLC  82  79  90  96  108*   MUNDO  8.7  9.2  9.4  7.8*  7.1*     Recent Labs   Lab Test  08/24/17   1520  08/19/17   0535  08/18/17   0328  08/17/17   0557  08/16/17   1550  08/16/17   1241  08/16/17   0507   MAG   --   1.8  2.3  2.0   --   3.1*  1.5*   PHOS  3.4  2.2*  2.4*  2.3*  2.0*   --   1.4*     Recent Labs   Lab Test  11/22/17   1239  09/13/17   1506  08/24/17   1520  08/19/17   0535  08/18/17   0328   WBC  4.1  7.4  12.1*  7.4  7.3   HGB  10.6*  11.9  12.4  9.7*  9.9*   PLT  377  352  427  267  221   MCV  91  95  93  92  91   NEUTROPHIL  45.9  29.6  26.4  40.6  63.2     Recent Labs   Lab Test  11/22/17   1239  09/13/17   1506  08/24/17   1520  08/19/17   0535  08/18/17   0328  08/17/17   0557   BILITOTAL  0.4  0.3  0.3  0.2  0.2  0.3   ALKPHOS  55  74  106  60  41  43   ALT  18  26  33  50  32  21   AST  40  25  19  31  31  16   ALBUMIN  2.9*  3.5  3.7  2.0*  1.9*  2.2*   LDH   --    --    --   214  199  182     TSH   Date Value Ref Range Status   11/23/2016 0.93 0.30 - 5.00 uIU/mL Final     No results for input(s): CEA in the last 56963 hours.  Results for orders placed or performed in visit on 11/22/17   CT Chest/Abdomen/Pelvis w Contrast    Narrative    EXAMINATION: CT CHEST/ABDOMEN/PELVIS W CONTRAST  11/22/2017 12:43 PM      CLINICAL HISTORY: Restaging; Malignant neoplasm of right kidney (H);  Metastatic renal cell carcinoma, unspecified laterality (H)    COMPARISON: CT chest/abdomen/pelvis dated 9/13/2017        PROCEDURE COMMENTS: CT of the chest, abdomen, and pelvis was performed  with Isovue 370 74ml intravenous and oral contrast. Axial MIP  images  of the chest, and coronal and sagittal reformatted images of the  chest, abdomen, and pelvis obtained.    FINDINGS:      Chest:  The trachea and central airways are clear. Increased left upper lobe  lobulated and spiculated mass measures 3.2 x 3.2 cm, previously  in  this area demonstrated scattered nodules which were clearly smaller in  overall volume. There is interlobular septal thickening around this  mass. The peripheral nodular lesion also appears enlarged (image 63  series 6). No pleural effusion or pneumothorax. Mild dependent  atelectasis.    Heart size is within normal limits. There is no pericardial effusion.   Normal thoracic vasculature. Increased size of left supraclavicular  lymph node with central necrosis measures 1.4 cm (image 7 series 2),  previously measured 1.1 cm. Enlarged mediastinum and left confluent  hilar lymph nodes with central necrosis.    Abdomen/pelvis:    Liver: No focal mass.     Gallbladder: No radiopaque gallstones. No pericystic fluid.    Pancreas: There is no pancreatic duct dilatation. Stable 7 mm  hypoattenuating lesion in the pancreatic tail, likely benign.  Increased size of mass in the body of the pancreas measuring 1.5 cm,  previously 1.0 cm.    Spleen: Not enlarged.    Adrenal glands: Right adrenal gland is surgically removed. Enhancing  left adrenal gland nodule measures 1.9 x 1.7 cm, previously 1.5 x 1.1  cm.    Urinary tract: Status post right nephrectomy. Multiple hypoattenuating  lesions in the left kidney, enlarged, largest one measures 1.6 x 1.4  cm (image 57 series 2) previously 1.2 cm. There is no hydronephrosis  or hydroureter. Urinary bladder is unremarkable.    Reproductive organs: No focal mass.    GI system: No abnormally dilated small bowel or colon. No definite  bowel wall thickening.    Peritoneum/fluid: No ascites     Vessels: No aneurysmal dilatation of the abdominal aorta.  The portal,  splenic, and superior mesenteric veins are patent.  The origins of the  celiac and superior mesenteric arteries are patent.    Lymph nodes: Enlarged right external iliac lymph node best seen on  series 3 image 475 has increased from previous, currently measuring  1.3 cm in short axis, previously 1.0 cm..    Bones and the soft  tissues: No aggressive osseous lesions. multilevel  degenerative changes of the thoracic and lumbar spine.      Impression    IMPRESSION:  In this patient with known metastatic renal cell cancer status post  surgical intervention and multiple chemotherapy:  1.  Left upper lobe mass appears more confluent and enlarged in size,  measures up to 3.2 cm. Surrounding interlobular septal thickening can  be seen in edema or lymphatic spread of the disease. Left hilar and  supraclavicular lymph nodes with central necrosis appears slightly  increased in size.  2.  Left adrenal gland enhancing nodule increased in size measures up  to 1.9 cm.  3.  Increased size of left renal lesions.  4.  Increased size of right iliac chain lymph node metastasis.  5.  Increased size of 1.5 cm pancreatic metastasis.    I have personally reviewed the examination and initial interpretation  and I agree with the findings.    GAY LORA MD       ASSESSMENT/PLAN: 52 year old with mRCC s/p 4 cycles of IL-2. Tolerated cycle 4 with 5 doses, developed some tachycardia which resolved prior to discharge. She had a stable CT after 2 cycles.  She is here for f/u after cycle 4     I have reviewed actual images of her restaging scan done earlier today. Official read was pending at the time of clinic visit. I reviewed actual images. There is a small growth in most of the lesions. It appears that she does have progressive disease.     I reviewed PD1/PDL1 directed therapy as the preferred next line of therapy.  I again reviewed immunotherapy with a PD-L1 inhibitor - nivolumab.  I explained to them the concept of checkpoint inhibitor with a physiological ole to sustain an effective immune response.  PD-L1 is expressed on healthy, normal tissue in the area of trauma so that the immune response is limited to dead tissue and the infecting agents and does not extend over to the normal tissue.  The tumor uses some of these checks and balances to its advantage  and successfully evades the immune system.  Using a monoclonal antibody to this PD-1 receptor, we could take away some of the immune suppression that occurs in the patient's white cells that are directed to the tumor and would have a better chance of response.       This will obviously put him at risk for some autoimmunity which when it involves skin give rash and is called dermatitis, with gut it presents with diarrhea and is called colitis, and with lung it gives shortness of breath and is called pneumonitis.  Similarly, depending on the affected tissue, we might have hepatitis, nephritis, thyroiditis, hypophysitis and so on.       Nivolumab is administered intravenously every 2 weeks as an outpatient.  For the most part, only a few percentage of patients has substantial side effects; for example, pneumonitis or hepatitis.  In these cases, we identify and act aggressively.  We can give him doses of steroid to suppress the autoimmune response.  The antitumor response is known to persist even beyond that.    Over time we would have immunotherapy combinations and cytokine therapy with high dose IL2 should complement checkpoint inhibitors.     She has a cold which is causing some distress. I did fill in Z-pack, tessalon and tylenol with codeine for symptomatic relief.     Preoperative Physical: I have done the necessary history and physical and the laboratory tests. She does not need additional evaluation prior to the planned PORT placement. She is average risk for low to intermediate risk procedure. She does not have any HTN, CAD, DM TII that would need optimization. He has had previous surgeries without anesthesia complications. No history of bleeding/clotting diathesis.      Over 45 min of direct face to face time spent with patient with more than 50% time spent in counseling and coordinating care.

## 2017-11-22 NOTE — NURSING NOTE
"Oncology Rooming Note    November 22, 2017 3:24 PM   Suzi Gonsales is a 52 year old female who presents for:    Chief Complaint   Patient presents with     Oncology Clinic Visit     return patient visit for follow up related to kidney cancer     Initial Vitals: /65  Pulse 76  Temp 98  F (36.7  C) (Oral)  Resp 18  Ht 1.575 m (5' 2\")  Wt 54.9 kg (121 lb)  HC 17 cm (6.69\")  SpO2 99%  Breastfeeding? No  BMI 22.13 kg/m2 Estimated body mass index is 22.13 kg/(m^2) as calculated from the following:    Height as of this encounter: 1.575 m (5' 2\").    Weight as of this encounter: 54.9 kg (121 lb). Body surface area is 1.55 meters squared.  No Pain (0) Comment: Data Unavailable   No LMP recorded. Patient is not currently having periods (Reason: Premenopausal).  Allergies reviewed: Yes  Medications reviewed: Yes    Medications: MEDICATION REFILLS NEEDED TODAY. Provider was notified.  Pharmacy name entered into [facility]:    [id number] - [place], [place] - [address] AT [facility] [address]  [facility] - USE FOR MAILING ONLY - [place], [place]    Clinical concerns: no concerns dr. carter was notified.    6 minutes for nursing intake (face to face time)     Marisol Child CMA              "

## 2017-11-22 NOTE — LETTER
11/22/2017       RE: Suzi Gonsales  [address] [place] [address]     Dear Colleague,    Thank you for referring your patient, Suzi Gonsales, to the [facility]. Please see a copy of my visit note below.    [facility]  FOLLOW-UP VISIT NOTE  Date of visit: Nov 22, 2017           REASON FOR VISIT: mRCC assessment post to IL-2, C4    HPI: Suzi presented to ED with hematuria and right flank pain thinking she had a renal stone in December 2014. She was worked up with a CT abd/pelvis which suggested a renal mass. She was referred to Dr. Max Zel[facility]. 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 Per charts tumor resection had negative margins and it was staged at pT2bN0.    Her staging work up was negative except for pulmonary nodules. She has been followed every 6 months with scans. But her most recent scan suggested increase in size of couple of nodules prompting to the current referral to Medical Oncology.  CT CAP on 5/11/17 showed enlarging hilar LAD, enlarging pulmonary nodules, adrenal nodules and a pancreatic body mass. Biopsies of hilar LN, adrenal nodule and pancreatic mass were + for RCC. She decided to pursue IL-2.     C1: 6/6/17- 9 doses  C2: 6/20/17- 7 doses  CT CAP on 7/24/17  C3: 7/31/17 - 8 doses  C4 8/15/17 -5 doses.     INTERVAL HISTORY: Suzi is here today for f/u of her metastatic RCC after completing her therapy with high dose IL-2.     She has done considerably well with this treatment and seems to recover quickly. Her energy is completely back to baseline and very good. Edema has resolved. She is eating and drinking and has no concerns today. She is pleased to be done with IL-2 therapy.     No recent f/s/c. No chest pain/cough/dyspnea. No dizziness. No abdominal pain. No black/bloody stools. No  issues. No daily pain.     Answers for HPI/ROS  "submitted by the patient on 11/21/2017   General Symptoms: No  Skin Symptoms: No  HENT Symptoms: No  EYE SYMPTOMS: No  HEART SYMPTOMS: No  LUNG SYMPTOMS: Yes  INTESTINAL SYMPTOMS: Yes  URINARY SYMPTOMS: No  GYNECOLOGIC SYMPTOMS: No  BREAST SYMPTOMS: No  SKELETAL SYMPTOMS: Yes  BLOOD SYMPTOMS: No  NERVOUS SYSTEM SYMPTOMS: No  MENTAL HEALTH SYMPTOMS: Yes  Cough: Yes  Sputum or phlegm: No  Coughing up blood: No  Difficulty breating or shortness of breath: No  Snoring: No  Wheezing: No  Difficulty breathing on exertion: No  Nighttime Cough: Yes  Difficulty breathing when lying flat: No  Heart burn or indigestion: Yes  Nausea: No  Vomiting: No  Abdominal pain: Yes  Bloating: Yes  Constipation: No  Diarrhea: No  Blood in stool: No  Black stools: Yes  Rectal or Anal pain: No  Fecal incontinence: No  Yellowing of skin or eyes: No  Vomit with blood: No  Change in stools: No  Back pain: Yes  Muscle aches: Yes  Neck pain: Yes  Swollen joints: No  Joint pain: No  Bone pain: No  Muscle cramps: No  Muscle weakness: No  Joint stiffness: Yes  Bone fracture: No  Nervous or Anxious: Yes  Depression: Yes  Trouble sleeping: No  Trouble thinking or concentrating: Yes  Mood changes: Yes  Panic attacks: No    EXAM:    /65  Pulse 76  Temp 98  F (36.7  C) (Oral)  Resp 18  Ht 1.575 m (5' 2\")  Wt 54.9 kg (121 lb)  HC 17 cm (6.69\")  SpO2 99%  Breastfeeding? No  BMI 22.13 kg/m2    Vital signs were reviewed.   Patient alert and oriented x3.   PERRLA. EOMI. No scleral icterus noted. OP without thrush/sores.  Neck exam: No palpable cervical, supraclavicular or axillary nodes bilaterally.   Heart: RRR no murmurs noted.   Lungs: clear to auscultation bilaterally.  No crackles or wheezing.   Abd: positive bowel sounds in all four quadrants.  No tenderness to palpation.  No hepatomegaly.   Extremities: No lower extremity edema.   Neuro: grossly intact.   Mood and affect is stable.   Skin is dry and flaky.    LABS:   Recent Labs   Lab " Test  11/22/17   1239  09/13/17   1506  08/24/17   1520  08/19/17   0535  08/18/17   0328   NA  135  136  136  142  142   POTASSIUM  5.2  3.8  4.4  4.5  4.5   CHLORIDE  104  105  100  119*  119*   CO2  25  23  28  18*  18*   ANIONGAP  6  8  7  6  4   BUN  19  17  12  4*  5*   CR  0.97  0.88  1.14*  1.05*  1.00   GLC  82  79  90  96  108*   MUNDO  8.7  9.2  9.4  7.8*  7.1*     Recent Labs   Lab Test  08/24/17   1520  08/19/17   0535  08/18/17   0328  08/17/17   0557  08/16/17   1550  08/16/17   1241  08/16/17   0507   MAG   --   1.8  2.3  2.0   --   3.1*  1.5*   PHOS  3.4  2.2*  2.4*  2.3*  2.0*   --   1.4*     Recent Labs   Lab Test  11/22/17   1239  09/13/17   1506  08/24/17   1520  08/19/17   0535  08/18/17   0328   WBC  4.1  7.4  12.1*  7.4  7.3   HGB  10.6*  11.9  12.4  9.7*  9.9*   PLT  377  352  427  267  221   MCV  91  95  93  92  91   NEUTROPHIL  45.9  29.6  26.4  40.6  63.2     Recent Labs   Lab Test  11/22/17   1239  09/13/17   1506  08/24/17   1520  08/19/17   0535  08/18/17   0328  08/17/17   0557   BILITOTAL  0.4  0.3  0.3  0.2  0.2  0.3   ALKPHOS  55  74  106  60  41  43   ALT  18  26  33  50  32  21   AST  40  25  19  31  31  16   ALBUMIN  2.9*  3.5  3.7  2.0*  1.9*  2.2*   LDH   --    --    --   214  199  182     TSH   Date Value Ref Range Status   11/23/2016 0.93 0.30 - 5.00 uIU/mL Final     No results for input(s): CEA in the last 45416 hours.  Results for orders placed or performed in visit on 11/22/17   CT Chest/Abdomen/Pelvis w Contrast    Narrative    EXAMINATION: CT CHEST/ABDOMEN/PELVIS W CONTRAST  11/22/2017 12:43 PM      CLINICAL HISTORY: Restaging; Malignant neoplasm of right kidney (H);  Metastatic renal cell carcinoma, unspecified laterality (H)    COMPARISON: CT chest/abdomen/pelvis dated 9/13/2017        PROCEDURE COMMENTS: CT of the chest, abdomen, and pelvis was performed  with Isovue 370 74ml intravenous and oral contrast. Axial MIP  images  of the chest, and coronal and sagittal  reformatted images of the  chest, abdomen, and pelvis obtained.    FINDINGS:      Chest:  The trachea and central airways are clear. Increased left upper lobe  lobulated and spiculated mass measures 3.2 x 3.2 cm, previously in  this area demonstrated scattered nodules which were clearly smaller in  overall volume. There is interlobular septal thickening around this  mass. The peripheral nodular lesion also appears enlarged (image 63  series 6). No pleural effusion or pneumothorax. Mild dependent  atelectasis.    Heart size is within normal limits. There is no pericardial effusion.   Normal thoracic vasculature. Increased size of left supraclavicular  lymph node with central necrosis measures 1.4 cm (image 7 series 2),  previously measured 1.1 cm. Enlarged mediastinum and left confluent  hilar lymph nodes with central necrosis.    Abdomen/pelvis:    Liver: No focal mass.     Gallbladder: No radiopaque gallstones. No pericystic fluid.    Pancreas: There is no pancreatic duct dilatation. Stable 7 mm  hypoattenuating lesion in the pancreatic tail, likely benign.  Increased size of mass in the body of the pancreas measuring 1.5 cm,  previously 1.0 cm.    Spleen: Not enlarged.    Adrenal glands: Right adrenal gland is surgically removed. Enhancing  left adrenal gland nodule measures 1.9 x 1.7 cm, previously 1.5 x 1.1  cm.    Urinary tract: Status post right nephrectomy. Multiple hypoattenuating  lesions in the left kidney, enlarged, largest one measures 1.6 x 1.4  cm (image 57 series 2) previously 1.2 cm. There is no hydronephrosis  or hydroureter. Urinary bladder is unremarkable.    Reproductive organs: No focal mass.    GI system: No abnormally dilated small bowel or colon. No definite  bowel wall thickening.    Peritoneum/fluid: No ascites     Vessels: No aneurysmal dilatation of the abdominal aorta.  The portal,  splenic, and superior mesenteric veins are patent.  The origins of the  celiac and superior mesenteric  arteries are patent.    Lymph nodes: Enlarged right external iliac lymph node best seen on  series 3 image 475 has increased from previous, currently measuring  1.3 cm in short axis, previously 1.0 cm..    Bones and the soft tissues: No aggressive osseous lesions. multilevel  degenerative changes of the thoracic and lumbar spine.      Impression    IMPRESSION:  In this patient with known metastatic renal cell cancer status post  surgical intervention and multiple chemotherapy:  1.  Left upper lobe mass appears more confluent and enlarged in size,  measures up to 3.2 cm. Surrounding interlobular septal thickening can  be seen in edema or lymphatic spread of the disease. Left hilar and  supraclavicular lymph nodes with central necrosis appears slightly  increased in size.  2.  Left adrenal gland enhancing nodule increased in size measures up  to 1.9 cm.  3.  Increased size of left renal lesions.  4.  Increased size of right iliac chain lymph node metastasis.  5.  Increased size of 1.5 cm pancreatic metastasis.    I have personally reviewed the examination and initial interpretation  and I agree with the findings.    GAY LORA MD       ASSESSMENT/PLAN: 52 year old with mRCC s/p 4 cycles of IL-2. Tolerated cycle 4 with 5 doses, developed some tachycardia which resolved prior to discharge. She had a stable CT after 2 cycles.  She is here for f/u after cycle 4     I have reviewed actual images of her restaging scan done earlier today. Official read was pending at the time of clinic visit. I reviewed actual images. There is a small growth in most of the lesions. It appears that she does have progressive disease.     I reviewed PD1/PDL1 directed therapy as the preferred next line of therapy.  I again reviewed immunotherapy with a PD-L1 inhibitor - nivolumab.  I explained to them the concept of checkpoint inhibitor with a physiological ole to sustain an effective immune response.  PD-L1 is expressed on healthy,  normal tissue in the area of trauma so that the immune response is limited to dead tissue and the infecting agents and does not extend over to the normal tissue.  The tumor uses some of these checks and balances to its advantage and successfully evades the immune system.  Using a monoclonal antibody to this PD-1 receptor, we could take away some of the immune suppression that occurs in the patient's white cells that are directed to the tumor and would have a better chance of response.       This will obviously put him at risk for some autoimmunity which when it involves skin give rash and is called dermatitis, with gut it presents with diarrhea and is called colitis, and with lung it gives shortness of breath and is called pneumonitis.  Similarly, depending on the affected tissue, we might have hepatitis, nephritis, thyroiditis, hypophysitis and so on.       Nivolumab is administered intravenously every 2 weeks as an outpatient.  For the most part, only a few percentage of patients has substantial side effects; for example, pneumonitis or hepatitis.  In these cases, we identify and act aggressively.  We can give him doses of steroid to suppress the autoimmune response.  The antitumor response is known to persist even beyond that.    Over time we would have immunotherapy combinations and cytokine therapy with high dose IL2 should complement checkpoint inhibitors.     She has a cold which is causing some distress. I did fill in Z-pack, tessalon and tylenol with codeine for symptomatic relief.     Preoperative Physical: I have done the necessary history and physical and the laboratory tests. She does not need additional evaluation prior to the planned PORT placement. She is average risk for low to intermediate risk procedure. She does not have any HTN, CAD, DM TII that would need optimization. He has had previous surgeries without anesthesia complications. No history of bleeding/clotting diathesis.      Over 45 min of  direct face to face time spent with patient with more than 50% time spent in counseling and coordinating care.        Again, thank you for allowing me to participate in the care of your patient.      Sincerely,    Henri Green MD

## 2017-11-22 NOTE — LETTER
2017        TO WHOMSOEVER IT MAY CONCERN      . Suzi Gonsales ( - 1965) is under my care for recurrent metastatic kidney cancer with spread to lungs, pancreas and adrenal glands. She has progressed on recent therapy with high dose aldesleukin. Her disease is incurable but remains treatable and would need continuous immunotherapy or other targeted therapies for her cancer care.     Please feel free to reach my office with any additional questions.     Thank you,    Sincerely,        Henri Green  ,  Division of Hematology, Oncology & Transplantation  [facility].      cc  Suzi Gonsales  [address] [place] [address]

## 2017-12-01 ENCOUNTER — ANESTHESIA EVENT (OUTPATIENT)
Dept: SURGERY | Facility: AMBULATORY SURGERY CENTER | Age: 52
End: 2017-12-01

## 2017-12-04 ENCOUNTER — ANESTHESIA (OUTPATIENT)
Dept: SURGERY | Facility: AMBULATORY SURGERY CENTER | Age: 52
End: 2017-12-04

## 2017-12-04 ENCOUNTER — SURGERY (OUTPATIENT)
Age: 52
End: 2017-12-04

## 2017-12-04 ENCOUNTER — HOSPITAL ENCOUNTER (OUTPATIENT)
Facility: AMBULATORY SURGERY CENTER | Age: 52
End: 2017-12-04
Attending: PHYSICIAN ASSISTANT

## 2017-12-04 VITALS
HEART RATE: 68 BPM | BODY MASS INDEX: 22.26 KG/M2 | HEIGHT: 62 IN | SYSTOLIC BLOOD PRESSURE: 109 MMHG | WEIGHT: 121 LBS | RESPIRATION RATE: 14 BRPM | TEMPERATURE: 98 F | DIASTOLIC BLOOD PRESSURE: 63 MMHG | OXYGEN SATURATION: 100 %

## 2017-12-04 DIAGNOSIS — C78.01 METASTATIC RENAL CELL CARCINOMA TO LUNG, RIGHT (H): Primary | ICD-10-CM

## 2017-12-04 DIAGNOSIS — C64.9 METASTATIC RENAL CELL CARCINOMA TO LUNG, RIGHT (H): Primary | ICD-10-CM

## 2017-12-04 LAB
ERYTHROCYTE [DISTWIDTH] IN BLOOD BY AUTOMATED COUNT: 13.4 % (ref 10–15)
HCT VFR BLD AUTO: 36.9 % (ref 35–47)
HGB BLD-MCNC: 12.5 G/DL (ref 11.7–15.7)
INR PPP: 0.98 (ref 0.86–1.14)
MCH RBC QN AUTO: 30.4 PG (ref 26.5–33)
MCHC RBC AUTO-ENTMCNC: 33.9 G/DL (ref 31.5–36.5)
MCV RBC AUTO: 90 FL (ref 78–100)
PLATELET # BLD AUTO: 400 10E9/L (ref 150–450)
RBC # BLD AUTO: 4.11 10E12/L (ref 3.8–5.2)
WBC # BLD AUTO: 5.2 10E9/L (ref 4–11)

## 2017-12-04 DEVICE — CATH PORT POWERPORT CLEARVUE SLIM 6FR 5616000: Type: IMPLANTABLE DEVICE | Site: CHEST  WALL | Status: FUNCTIONAL

## 2017-12-04 RX ORDER — SODIUM CHLORIDE 9 MG/ML
INJECTION, SOLUTION INTRAVENOUS CONTINUOUS
Status: DISCONTINUED | OUTPATIENT
Start: 2017-12-04 | End: 2017-12-05 | Stop reason: HOSPADM

## 2017-12-04 RX ORDER — HEPARIN SODIUM,PORCINE 10 UNIT/ML
5 VIAL (ML) INTRAVENOUS EVERY 24 HOURS
Status: DISCONTINUED | OUTPATIENT
Start: 2017-12-04 | End: 2017-12-05 | Stop reason: HOSPADM

## 2017-12-04 RX ORDER — SODIUM CHLORIDE, SODIUM LACTATE, POTASSIUM CHLORIDE, CALCIUM CHLORIDE 600; 310; 30; 20 MG/100ML; MG/100ML; MG/100ML; MG/100ML
INJECTION, SOLUTION INTRAVENOUS CONTINUOUS PRN
Status: DISCONTINUED | OUTPATIENT
Start: 2017-12-04 | End: 2017-12-04

## 2017-12-04 RX ORDER — PROPOFOL 10 MG/ML
INJECTION, EMULSION INTRAVENOUS CONTINUOUS PRN
Status: DISCONTINUED | OUTPATIENT
Start: 2017-12-04 | End: 2017-12-04

## 2017-12-04 RX ORDER — OXYCODONE AND ACETAMINOPHEN 5; 325 MG/1; MG/1
1 TABLET ORAL ONCE
Qty: 1 TABLET | Refills: 0 | Status: SHIPPED | OUTPATIENT
Start: 2017-12-04 | End: 2017-12-04

## 2017-12-04 RX ORDER — FENTANYL CITRATE 50 UG/ML
25-50 INJECTION, SOLUTION INTRAMUSCULAR; INTRAVENOUS EVERY 5 MIN PRN
Status: DISCONTINUED | OUTPATIENT
Start: 2017-12-04 | End: 2017-12-05 | Stop reason: HOSPADM

## 2017-12-04 RX ORDER — SODIUM CHLORIDE, SODIUM LACTATE, POTASSIUM CHLORIDE, CALCIUM CHLORIDE 600; 310; 30; 20 MG/100ML; MG/100ML; MG/100ML; MG/100ML
INJECTION, SOLUTION INTRAVENOUS CONTINUOUS
Status: DISCONTINUED | OUTPATIENT
Start: 2017-12-04 | End: 2017-12-05 | Stop reason: HOSPADM

## 2017-12-04 RX ORDER — FENTANYL CITRATE 50 UG/ML
INJECTION, SOLUTION INTRAMUSCULAR; INTRAVENOUS PRN
Status: DISCONTINUED | OUTPATIENT
Start: 2017-12-04 | End: 2017-12-04

## 2017-12-04 RX ORDER — ONDANSETRON 2 MG/ML
4 INJECTION INTRAMUSCULAR; INTRAVENOUS EVERY 30 MIN PRN
Status: DISCONTINUED | OUTPATIENT
Start: 2017-12-04 | End: 2017-12-05 | Stop reason: HOSPADM

## 2017-12-04 RX ORDER — HEPARIN SODIUM,PORCINE 10 UNIT/ML
VIAL (ML) INTRAVENOUS PRN
Status: DISCONTINUED | OUTPATIENT
Start: 2017-12-04 | End: 2017-12-04 | Stop reason: HOSPADM

## 2017-12-04 RX ORDER — HYDROMORPHONE HYDROCHLORIDE 1 MG/ML
.3-.5 INJECTION, SOLUTION INTRAMUSCULAR; INTRAVENOUS; SUBCUTANEOUS EVERY 10 MIN PRN
Status: DISCONTINUED | OUTPATIENT
Start: 2017-12-04 | End: 2017-12-05 | Stop reason: HOSPADM

## 2017-12-04 RX ORDER — OXYCODONE AND ACETAMINOPHEN 5; 325 MG/1; MG/1
1 TABLET ORAL ONCE
Status: COMPLETED | OUTPATIENT
Start: 2017-12-04 | End: 2017-12-04

## 2017-12-04 RX ORDER — HEPARIN SODIUM (PORCINE) LOCK FLUSH IV SOLN 100 UNIT/ML 100 UNIT/ML
5 SOLUTION INTRAVENOUS ONCE
Status: DISCONTINUED | OUTPATIENT
Start: 2017-12-04 | End: 2017-12-05 | Stop reason: HOSPADM

## 2017-12-04 RX ORDER — NALOXONE HYDROCHLORIDE 0.4 MG/ML
.1-.4 INJECTION, SOLUTION INTRAMUSCULAR; INTRAVENOUS; SUBCUTANEOUS
Status: DISCONTINUED | OUTPATIENT
Start: 2017-12-04 | End: 2017-12-05 | Stop reason: HOSPADM

## 2017-12-04 RX ORDER — PROPOFOL 10 MG/ML
INJECTION, EMULSION INTRAVENOUS PRN
Status: DISCONTINUED | OUTPATIENT
Start: 2017-12-04 | End: 2017-12-04

## 2017-12-04 RX ORDER — LIDOCAINE HYDROCHLORIDE 20 MG/ML
INJECTION, SOLUTION INFILTRATION; PERINEURAL PRN
Status: DISCONTINUED | OUTPATIENT
Start: 2017-12-04 | End: 2017-12-04

## 2017-12-04 RX ORDER — HEPARIN SODIUM (PORCINE) LOCK FLUSH IV SOLN 100 UNIT/ML 100 UNIT/ML
5 SOLUTION INTRAVENOUS
Status: DISCONTINUED | OUTPATIENT
Start: 2017-12-04 | End: 2017-12-05 | Stop reason: HOSPADM

## 2017-12-04 RX ORDER — ONDANSETRON 2 MG/ML
INJECTION INTRAMUSCULAR; INTRAVENOUS PRN
Status: DISCONTINUED | OUTPATIENT
Start: 2017-12-04 | End: 2017-12-04

## 2017-12-04 RX ORDER — ONDANSETRON 4 MG/1
4 TABLET, ORALLY DISINTEGRATING ORAL EVERY 30 MIN PRN
Status: DISCONTINUED | OUTPATIENT
Start: 2017-12-04 | End: 2017-12-05 | Stop reason: HOSPADM

## 2017-12-04 RX ADMIN — PROPOFOL 50 MG: 10 INJECTION, EMULSION INTRAVENOUS at 09:47

## 2017-12-04 RX ADMIN — SODIUM CHLORIDE, SODIUM LACTATE, POTASSIUM CHLORIDE, CALCIUM CHLORIDE: 600; 310; 30; 20 INJECTION, SOLUTION INTRAVENOUS at 09:22

## 2017-12-04 RX ADMIN — ONDANSETRON 4 MG: 2 INJECTION INTRAMUSCULAR; INTRAVENOUS at 09:29

## 2017-12-04 RX ADMIN — Medication 5 ML: at 10:19

## 2017-12-04 RX ADMIN — LIDOCAINE HYDROCHLORIDE 40 MG: 20 INJECTION, SOLUTION INFILTRATION; PERINEURAL at 09:29

## 2017-12-04 RX ADMIN — LIDOCAINE HYDROCHLORIDE 40 MG: 20 INJECTION, SOLUTION INFILTRATION; PERINEURAL at 10:14

## 2017-12-04 RX ADMIN — PROPOFOL 100 MCG/KG/MIN: 10 INJECTION, EMULSION INTRAVENOUS at 09:32

## 2017-12-04 RX ADMIN — Medication 10 ML: at 10:09

## 2017-12-04 RX ADMIN — PROPOFOL 50 MG: 10 INJECTION, EMULSION INTRAVENOUS at 09:31

## 2017-12-04 RX ADMIN — FENTANYL CITRATE 50 MCG: 50 INJECTION, SOLUTION INTRAMUSCULAR; INTRAVENOUS at 09:49

## 2017-12-04 RX ADMIN — OXYCODONE AND ACETAMINOPHEN 1 TABLET: 5; 325 TABLET ORAL at 10:55

## 2017-12-04 ASSESSMENT — LIFESTYLE VARIABLES: TOBACCO_USE: 1

## 2017-12-04 NOTE — IP AVS SNAPSHOT
MRN:[id number]                      After Visit Summary   12/4/2017    Suzi Gonsales    MRN: [id number]           Thank you!     Thank you for choosing [facility] for your care. Our goal is always to provide you with excellent care. Hearing back from our patients is one way we can continue to improve our services. Please take a few minutes to complete the written survey that you may receive in the mail after you visit with us. Thank you!        Patient Information     Date Of Birth          1965        About your hospital stay     You were admitted on:  December 4, 2017 You last received care in the:  [facility]    You were discharged on:  December 4, 2017       Who to Call     For medical emergencies, please call 911.  For non-urgent questions about your medical care, please call your primary care provider or clinic, [phone]  For questions related to your surgery, please call your surgery clinic        Attending Provider     Provider Melanie Haynes PA-C Physician Assistant       Primary Care Provider Office Phone # Fax #    [facility] [phone]      Your next 10 appointments already scheduled     Dec 05, 2017 10:45 AM CST   [facility] Draw with  [facility]   [facility] ([facility])    [address] [place] [id number] [phone]            Dec 05, 2017 11:10 AM CST   (Arrive by 10:55 AM)   Return Visit with Molly Max PA-C   [facility] [place] ([facility])    [address] [place] [id number] [phone]            Dec 05, 2017 12:30 PM CST   Infusion 120 with  ONCOLOGY INFUSION, UC 29 ATC   [facility] [place] ([facility])    [address] [place] [id number] [phone]              Further  instructions from your care team         A collaboration between [facility] and [facility]  Experts in minimally invasive, targeted treatments performed using imaging guidance    Venous Access Device,  Port Catheter or Tunneled Central Line Placement    Today you had a procedure today to install a venous access device; either a tunneled central vein catheter or a subcutaneous port catheter.    One of our Radiology PAs performed this procedure for you today:  ? Quintin Marin PA-C  ? CUONG Alonso PA-C  ? Joe Weinstein PA-C  ? Melanie Cevallos PA-C   ? Neftali Davis PA-C    After you go home:  - Drink plenty of fluids.  Generally 6-8 (8 ounce) glasses a day is recommended.  - Resume your regular diet unless otherwise ordered by a medical provider.  - Keep any applied tape/gauze dressings clean and dry.  Change tape/gauze dressings if they get wet or soiled.  - You may shower the following day after procedure, however cover and protect from moisture any tape/gauze dressings.  You may let water hit and run over dried skin glue, but do not scrub.  Pat the area dry after showering.  - Port placement incisions are closed with absorbable suture, meaning they do not need to be removed at a later date, and a topical skin adhesive (skin glue).  This glue will wear off in 7-14 days.  Do not remove before this time.  If 14 days have passed and residual glue is present, you may gently remove it.  - Do not apply gels, lotions, or ointments to the glue site for the first 10 days as this may cause the glue to prematurely soften and fail.  - Do not perform strenuous activities or lift greater than 10 pounds for the next three days.  - If there is bleeding or oozing from the procedure site, apply firm pressure to the area for 5-10 minutes.  If the bleeding continues seek medical advice at the numbers below.  - Mild procedure site discomfort can be treated with an ice pack and  over-the-counter pain relievers.        For 24 hours after any sedation used:  - Relax and take it easy.  No strenuous activities.  - Do not drive or operate machines at home or at work.  - No alcohol consumption.  - Do not make any important or legal decisions.    Call our Interventional Radiology (IR) service if:  - If you start bleeding from the procedure site.  If you do start to bleed from the site, lie down and hold some pressure on the site.  Our radiology provider can help you decide if you need to return to the hospital.  - If you have new or worsening pain related to the procedure.  - If you have concerning swelling at the procedure site.  - If you develop persistent nausea or vomiting.  - If you develop hives or a rash or any unexplained itching.  - If you have a fever of greater than 100.5  F and chills in the first 5 days after procedure.  - Any other concerns related to your procedure.      [facility]  Interventional Radiology (IR)  [address] [place] [address]    Contact Number:  [phone]  (IR control desk)  - Monday - Friday 8:00 am - 4:30 pm    After hours for urgent concerns:  [phone]  - After 4:30 pm Monday - Friday, Weekends and Holidays.   - Ask for Interventional Radiology on-call.  Someone is available 24 hours a day.  - [facility] toll free number:  [phone]          [facility]  Home Care Following Anesthesia  For 24 hours after surgery:  1. Get plenty of rest.  A responsible adult must stay with you for at least 24 hours after you leave the surgery center.  2. Do not drive or use heavy equipment.  If you have weakness or tingling, don't drive or use heavy equipment until this feeling goes away.   3. Do not drink alcohol.   4. Avoid strenuous or risky activities.  Ask for help when climbing stairs.  5. You may feel lightheaded.  IF so, sit for a few minutes before standing.   Have someone help you get up.   6. If you have nausea (feel sick to your stomach): Drink only clear liquids such as apple juice, ginger ale, broth or 7-Up.  Rest may also help.  Be sure to drink enough fluids.  Move to a regular diet as you feel able.   7. You may have a slight fever.  Call the doctor if your fever is over 100 F (37.7 C) (taken under the tongue) or lasts longer than 24 hours.  8. You may have a dry mouth, a sore throat, muscle aches or trouble sleeping. These should go away after 24 hours.  9. Do not make important or legal decisions.     Tips for taking pain medications  To get the best pain relief possible, remember these points:    Take pain medications as directed, before pain becomes severe.    Pain medication can upset your stomach: taking it with food may help.    Constipation is a common side effect of pain medication. Drink plenty of  fluids.    Eat foods high in fiber. Take a stool softener if recommended by your doctor or pharmacist.    Do not drink alcohol, drive or operate machinery while taking pain medications.    Ask about other ways to control pain, such as with heat, ice or relaxation.    Tylenol/Acetaminophen Consumption  To help encourage the safe use of acetaminophen, the makers of TYLENOL  have lowered the maximum daily dose for single-ingredient Extra Strength TYLENOL  (acetaminophen) products sold in the [place] from 8 pills per day (4,000 mg) to 6 pills per day (3,000 mg). The dosing interval has also changed from 2 pills every 4-6 hours to 2 pills every 6 hours.    If you feel your pain relief is insufficient, you may take Tylenol/Acetaminophen in addition to your narcotic pain medication.     Be careful not to exceed 3,000 mg of Tylenol/Acetaminophen in a 24 hour period from all sources.    If you are taking extra strength Tylenol/acetaminophen (500 mg), the maximum dose is 6 tablets in 24 hours.    If you are taking regular strength acetaminophen (325 mg), the maximum dose  "is 9 tablets in 24 hours.    Call a doctor for any of the followin. Signs of infection (fever, growing tenderness at the surgery site, a large amount of drainage or bleeding, severe pain, foul-smelling drainage, redness, swelling).  2. It has been over 8 to 10 hours since surgery and you are still not able to urinate (pass water).  3. Headache for over 24 hours.  4. Numbness, tingling or weakness the day after surgery (if you had spinal anesthesia).  Your doctor is:                   Or dial [phone] and ask for the resident on call for:    For emergency care, call the:  [facility]:  [phone] (TTY for hearing impaired: [phone])                  Pending Results     Date and Time Order Name Status Description    2017 0902 IR CHEST PORT PLACEMENT > 5 YRS OF AGE Preliminary             Admission Information     Date & Time Provider Department Dept. Phone    2017 Melanie Cevallos PA-C [facility] [phone]      Your Vitals Were     Blood Pressure Pulse Temperature Respirations Height Weight    105/51 68 98  F (36.7  C) (Oral) 16 1.575 m (5' 2\") 54.9 kg (121 lb)    Last Period Pulse Oximetry BMI (Body Mass Index)             (Within Months) 97% 22.13 kg/m2         [employer] Information     [employer] gives you secure access to your electronic health record. If you see a primary care provider, you can also send messages to your care team and make appointments. If you have questions, please call your primary care clinic.  If you do not have a primary care provider, please call [phone] and they will assist you.      [employer] is an electronic gateway that provides easy, online access to your medical records. With [employer], you can request a clinic appointment, read your test results, renew a prescription or communicate with your care team.     To access your existing account, please contact your [facility] or call " [phone] for assistance.        Care EveryWhere ID     This is your Care EveryWhere ID. This could be used by other organizations to access your [facility] medical records  [id number]        Equal Access to Services     ROSAURA INTERIANO : Hadii aad ku hadcarolineloki Dannavanessa, wakylahda luqadaha, qaybta kaalmada jarek, josemanuel mobleytaty lawrohit welchpercy plata. So [facility] [phone].    ATENCIÓN: Si habla español, tiene a chacon disposición servicios gratuitos de asistencia lingüística. Llame al [phone].    We comply with applicable federal civil rights laws and [place] laws. We do not discriminate on the basis of race, color, national origin, age, disability, sex, sexual orientation, or gender identity.               Review of your medicines      UNREVIEWED medicines. Ask your doctor about these medicines        Dose / Directions    acetaminophen 325 MG tablet   Commonly known as:  TYLENOL   Used for:  Malignant neoplasm of right kidney (H)        Dose:  650 mg   Take 2 tablets (650 mg) by mouth every 4 hours as needed for mild pain (mild pain)   Quantity:  100 tablet   Refills:  0       acetaminophen-codeine 300-30 MG per tablet   Commonly known as:  TYLENOL #3   Used for:  Metastatic renal cell carcinoma to lung, right (H)        Dose:  1 tablet   Take 1 tablet by mouth every 6 hours as needed for moderate pain   Quantity:  45 tablet   Refills:  0       ALPRAZolam 0.5 MG tablet   Commonly known as:  XANAX   Used for:  Renal cell carcinoma, unspecified laterality (H), Mass of upper lobe of left lung        Dose:  0.5 mg   Take 1 tablet (0.5 mg) by mouth 3 times daily as needed for anxiety   Quantity:  60 tablet   Refills:  0       benzonatate 100 MG capsule   Commonly known as:  TESSALON   Used for:  Metastatic renal cell carcinoma to lung, right (H)        Dose:  100 mg   Take 1 capsule (100 mg) by mouth 3 times daily as needed for cough   Quantity:  60 capsule   Refills:  0       buPROPion 300 MG 24 hr tablet    Commonly known as:  WELLBUTRIN XL        Dose:  300 mg   Take 300 mg by mouth every morning   Refills:  0       citalopram 20 MG tablet   Commonly known as:  celeXA   Used for:  Insomnia, unspecified type        Dose:  20 mg   Take 1 tablet (20 mg) by mouth every evening   Quantity:  90 tablet   Refills:  3       clonazePAM 0.5 MG tablet   Commonly known as:  klonoPIN   Used for:  Panic attack        Dose:  0.25-0.5 mg   Take 0.5-1 tablets (0.25-0.5 mg) by mouth 2 times daily as needed for anxiety   Quantity:  20 tablet   Refills:  0       IBUPROFEN        Dose:  400 mg   Take 400 mg by mouth every 6 hours as needed   Refills:  0       prochlorperazine 10 MG tablet   Commonly known as:  COMPAZINE   Used for:  Malignant neoplasm of right kidney (H)        Dose:  10 mg   Take 1 tablet (10 mg) by mouth every 6 hours as needed for nausea or vomiting (Breakthrough Nausea/Vomiting)   Quantity:  30 tablet   Refills:  0       zolpidem 5 MG tablet   Commonly known as:  AMBIEN   Used for:  Sleep disorder        Dose:  5 mg   Take 1 tablet (5 mg) by mouth nightly as needed for sleep   Quantity:  30 tablet   Refills:  1         START taking        Dose / Directions    oxyCODONE-acetaminophen 5-325 MG per tablet   Commonly known as:  PERCOCET   Used for:  Metastatic renal cell carcinoma to lung, right (H)        Dose:  1 tablet   Take 1 tablet by mouth once for 1 dose   Quantity:  1 tablet   Refills:  0            Where to get your medicines      Some of these will need a paper prescription and others can be bought over the counter. Ask your nurse if you have questions.     Bring a paper prescription for each of these medications     oxyCODONE-acetaminophen 5-325 MG per tablet                Protect others around you: Learn how to safely use, store and throw away your medicines at www.disposemymeds.org.             Medication List: This is a list of all your medications and when to take them. Check marks below indicate your  daily home schedule. Keep this list as a reference.      Medications           Morning Afternoon Evening Bedtime As Needed    acetaminophen 325 MG tablet   Commonly known as:  TYLENOL   Take 2 tablets (650 mg) by mouth every 4 hours as needed for mild pain (mild pain)                                acetaminophen-codeine 300-30 MG per tablet   Commonly known as:  TYLENOL #3   Take 1 tablet by mouth every 6 hours as needed for moderate pain                                ALPRAZolam 0.5 MG tablet   Commonly known as:  XANAX   Take 1 tablet (0.5 mg) by mouth 3 times daily as needed for anxiety                                benzonatate 100 MG capsule   Commonly known as:  TESSALON   Take 1 capsule (100 mg) by mouth 3 times daily as needed for cough                                buPROPion 300 MG 24 hr tablet   Commonly known as:  WELLBUTRIN XL   Take 300 mg by mouth every morning                                citalopram 20 MG tablet   Commonly known as:  celeXA   Take 1 tablet (20 mg) by mouth every evening                                clonazePAM 0.5 MG tablet   Commonly known as:  klonoPIN   Take 0.5-1 tablets (0.25-0.5 mg) by mouth 2 times daily as needed for anxiety                                IBUPROFEN   Take 400 mg by mouth every 6 hours as needed                                oxyCODONE-acetaminophen 5-325 MG per tablet   Commonly known as:  PERCOCET   Take 1 tablet by mouth once for 1 dose   Last time this was given:  1 tablet on 12/4/2017 10:55 AM                                prochlorperazine 10 MG tablet   Commonly known as:  COMPAZINE   Take 1 tablet (10 mg) by mouth every 6 hours as needed for nausea or vomiting (Breakthrough Nausea/Vomiting)                                zolpidem 5 MG tablet   Commonly known as:  AMBIEN   Take 1 tablet (5 mg) by mouth nightly as needed for sleep

## 2017-12-04 NOTE — ADDENDUM NOTE
Addendum  created 12/04/17 1256 by Oumar Middleton APRN CRNA    Anesthesia Intra [employer] edited

## 2017-12-04 NOTE — ANESTHESIA PREPROCEDURE EVALUATION
Anesthesia Evaluation     . Pt has had prior anesthetic.     No history of anesthetic complications          ROS/MED HX    ENT/Pulmonary:     (+)tobacco use, Past use , . .    Neurologic:  - neg neurologic ROS     Cardiovascular:  - neg cardiovascular ROS       METS/Exercise Tolerance:  >4 METS   Hematologic:         Musculoskeletal:  - neg musculoskeletal ROS       GI/Hepatic:     (+) GERD (Rarely. Denies symptoms recently)       Renal/Genitourinary:     (+) chronic renal disease (solitary kidney),       Endo:  - neg endo ROS       Psychiatric:     (+) psychiatric history anxiety (Panic attacks)      Infectious Disease:  - neg infectious disease ROS       Malignancy:   (+) Malignancy History of Other  Other CA Metastatic kindey CA s/p nephrectomy. Lung mass status post Surgery         Other:    (+) No chance of pregnancy C-spine cleared: N/A, no H/O Chronic Pain,no other significant disability                    Physical Exam  Normal systems: cardiovascular, pulmonary and dental    Airway   Mallampati: II  TM distance: >3 FB  Neck ROM: full    Dental     Cardiovascular       Pulmonary                     Anesthesia Plan      History & Physical Review  History and physical reviewed and following examination; no interval change.    ASA Status:  2 .    NPO Status:  > 6 hours    Plan for MAC with Intravenous and Propofol induction. Maintenance will be TIVA.  Reason for MAC:  Deep or markedly invasive procedure (G8)  PONV prophylaxis:  Ondansetron (or other 5HT-3)       Postoperative Care  Postoperative pain management:  IV analgesics and Oral pain medications.      Consents  Anesthetic plan, risks, benefits and alternatives discussed with:  Patient..                      History and physical assessed; Patient examined.   Risks and alternatives presented and discussed. Patient and family agree. All questions answered.      Hernan Grant MD  Staff Anesthesiologist  *[phone]

## 2017-12-04 NOTE — ANESTHESIA CARE TRANSFER NOTE
Patient: Suzi Gonsales    Procedure(s):  Chest Port Placement-Right - Wound Class: I-Clean    Diagnosis: Metastatic Renal Cell Carcinoma to Lung, Right  Diagnosis Additional Information: No value filed.    Anesthesia Type:   MAC     Note:  Airway :Room Air  Patient transferred to:Phase II  Handoff Report: Identifed the Patient, Identified the Reponsible Provider, Reviewed the pertinent medical history, Discussed the surgical course, Reviewed Intra-OP anesthesia mangement and issues during anesthesia, Set expectations for post-procedure period and Allowed opportunity for questions and acknowledgement of understanding      Vitals: (Last set prior to Anesthesia Care Transfer)    CRNA VITALS  12/4/2017 0956 - 12/4/2017 1026      12/4/2017             Resp Rate (set): 10                Electronically Signed By: ARIEL Nogueira CRNA  December 4, 2017  10:26 AM

## 2017-12-04 NOTE — IP AVS SNAPSHOT
[facility]    [address] [place] [id number]    Phone:  [phone]    Fax:  [phone]                                       After Visit Summary   12/4/2017    Suzi Gonsales    MRN: [id number]           After Visit Summary Signature Page     I have received my discharge instructions, and my questions have been answered. I have discussed any challenges I see with this plan with the nurse or doctor.    ..........................................................................................................................................  Patient/Patient Representative Signature      ..........................................................................................................................................  Patient Representative Print Name and Relationship to Patient    ..................................................               ................................................  Date                                            Time    ..........................................................................................................................................  Reviewed by Signature/Title    ...................................................              ..............................................  Date                                                            Time

## 2017-12-04 NOTE — PROCEDURES
"Interventional Radiology Brief Post Procedure Note    Procedure: Chest Port Placement    Proceduralist: Melanie Cevallos MS, BRENDA    Assistant: None    Time Out: Prior to the start of the procedure and with procedural staff participation, I verbally confirmed the patient s identity using two indicators, relevant allergies, that the procedure was appropriate and matched the consent or emergent situation, and that the correct equipment/implants were available. Immediately prior to starting the procedure I conducted the Time Out with the procedural staff and re-confirmed the patient s name, procedure, and site/side. (The Joint Commission universal protocol was followed.)  Yes        Sedation: Monitored Anesthesia Care (MAC) administered and documented by Anesthesia Care Provider    Findings: Completed image-guided placement of 6 [place] 24 cm single lumen power-injectable central venous chest port via right IJ. Aspirates and flushes freely, heparin locked and is ready for immediate use. Currently accessed with 3/4\" access needle.      Estimated Blood Loss: Minimal    Fluoroscopy Time: 0.8  minute(s)    SPECIMENS: None    Complications: 1. None     Condition: Stable    Plan: Follow up per primary team.     Comments: See dictated procedure note for full details.    Melanie Cevallos PA-C          "

## 2017-12-04 NOTE — DISCHARGE INSTRUCTIONS
A collaboration between [facility] and [facility]  Experts in minimally invasive, targeted treatments performed using imaging guidance    Venous Access Device,  Port Catheter or Tunneled Central Line Placement    Today you had a procedure today to install a venous access device; either a tunneled central vein catheter or a subcutaneous port catheter.    One of our Radiology PAs performed this procedure for you today:  ? Quintin Marin PA-C  ? CUONG Alonso PA-C  ? Joe Weinstein PA-C  ? Melanie Cevallos PA-C   ? Neftali Davis PA-C    After you go home:  - Drink plenty of fluids.  Generally 6-8 (8 ounce) glasses a day is recommended.  - Resume your regular diet unless otherwise ordered by a medical provider.  - Keep any applied tape/gauze dressings clean and dry.  Change tape/gauze dressings if they get wet or soiled.  - You may shower the following day after procedure, however cover and protect from moisture any tape/gauze dressings.  You may let water hit and run over dried skin glue, but do not scrub.  Pat the area dry after showering.  - Port placement incisions are closed with absorbable suture, meaning they do not need to be removed at a later date, and a topical skin adhesive (skin glue).  This glue will wear off in 7-14 days.  Do not remove before this time.  If 14 days have passed and residual glue is present, you may gently remove it.  - Do not apply gels, lotions, or ointments to the glue site for the first 10 days as this may cause the glue to prematurely soften and fail.  - Do not perform strenuous activities or lift greater than 10 pounds for the next three days.  - If there is bleeding or oozing from the procedure site, apply firm pressure to the area for 5-10 minutes.  If the bleeding continues seek medical advice at the numbers below.  - Mild procedure site discomfort can be treated with an ice pack and over-the-counter pain  relievers.        For 24 hours after any sedation used:  - Relax and take it easy.  No strenuous activities.  - Do not drive or operate machines at home or at work.  - No alcohol consumption.  - Do not make any important or legal decisions.    Call our Interventional Radiology (IR) service if:  - If you start bleeding from the procedure site.  If you do start to bleed from the site, lie down and hold some pressure on the site.  Our radiology provider can help you decide if you need to return to the hospital.  - If you have new or worsening pain related to the procedure.  - If you have concerning swelling at the procedure site.  - If you develop persistent nausea or vomiting.  - If you develop hives or a rash or any unexplained itching.  - If you have a fever of greater than 100.5  F and chills in the first 5 days after procedure.  - Any other concerns related to your procedure.      [facility]  Interventional Radiology (IR)  [address] [place] [address]    Contact Number:  [phone]  (IR control desk)  - Monday - Friday 8:00 am - 4:30 pm    After hours for urgent concerns:  [phone]  - After 4:30 pm Monday - Friday, Weekends and Holidays.   - Ask for Interventional Radiology on-call.  Someone is available 24 hours a day.  - [facility] toll free number:  [phone]          [facility]  Home Care Following Anesthesia  For 24 hours after surgery:  1. Get plenty of rest.  A responsible adult must stay with you for at least 24 hours after you leave the surgery center.  2. Do not drive or use heavy equipment.  If you have weakness or tingling, don't drive or use heavy equipment until this feeling goes away.   3. Do not drink alcohol.   4. Avoid strenuous or risky activities.  Ask for help when climbing stairs.  5. You may feel lightheaded.  IF so, sit for a few minutes before standing.  Have someone help you  get up.   6. If you have nausea (feel sick to your stomach): Drink only clear liquids such as apple juice, ginger ale, broth or 7-Up.  Rest may also help.  Be sure to drink enough fluids.  Move to a regular diet as you feel able.   7. You may have a slight fever.  Call the doctor if your fever is over 100 F (37.7 C) (taken under the tongue) or lasts longer than 24 hours.  8. You may have a dry mouth, a sore throat, muscle aches or trouble sleeping. These should go away after 24 hours.  9. Do not make important or legal decisions.     Tips for taking pain medications  To get the best pain relief possible, remember these points:    Take pain medications as directed, before pain becomes severe.    Pain medication can upset your stomach: taking it with food may help.    Constipation is a common side effect of pain medication. Drink plenty of  fluids.    Eat foods high in fiber. Take a stool softener if recommended by your doctor or pharmacist.    Do not drink alcohol, drive or operate machinery while taking pain medications.    Ask about other ways to control pain, such as with heat, ice or relaxation.    Tylenol/Acetaminophen Consumption  To help encourage the safe use of acetaminophen, the makers of TYLENOL  have lowered the maximum daily dose for single-ingredient Extra Strength TYLENOL  (acetaminophen) products sold in the [place] from 8 pills per day (4,000 mg) to 6 pills per day (3,000 mg). The dosing interval has also changed from 2 pills every 4-6 hours to 2 pills every 6 hours.    If you feel your pain relief is insufficient, you may take Tylenol/Acetaminophen in addition to your narcotic pain medication.     Be careful not to exceed 3,000 mg of Tylenol/Acetaminophen in a 24 hour period from all sources.    If you are taking extra strength Tylenol/acetaminophen (500 mg), the maximum dose is 6 tablets in 24 hours.    If you are taking regular strength acetaminophen (325 mg), the maximum dose is 9 tablets in 24  hours.    Call a doctor for any of the followin. Signs of infection (fever, growing tenderness at the surgery site, a large amount of drainage or bleeding, severe pain, foul-smelling drainage, redness, swelling).  2. It has been over 8 to 10 hours since surgery and you are still not able to urinate (pass water).  3. Headache for over 24 hours.  4. Numbness, tingling or weakness the day after surgery (if you had spinal anesthesia).  Your doctor is:                   Or dial [phone] and ask for the resident on call for:    For emergency care, call the:  [facility]:  [phone] (TTY for hearing impaired: [phone])

## 2017-12-04 NOTE — ANESTHESIA POSTPROCEDURE EVALUATION
Patient: Suzi Gonsales    Procedure(s):  Chest Port Placement-Right - Wound Class: I-Clean    Diagnosis:Metastatic Renal Cell Carcinoma to Lung, Right  Diagnosis Additional Information: No value filed.    Anesthesia Type:  MAC    Note:  Anesthesia Post Evaluation    Patient location during evaluation: Phase 2  Patient participation: Able to fully participate in evaluation  Level of consciousness: awake and alert  Pain management: adequate  Airway patency: patent  Cardiovascular status: acceptable  Respiratory status: acceptable  Hydration status: acceptable  PONV: none     Anesthetic complications: None          Last vitals:  Vitals:    12/04/17 0859 12/04/17 1030 12/04/17 1045   BP: 105/51 (!) 100/34 109/63   Pulse:      Resp:  14    Temp:      SpO2:  100% 100%         Electronically Signed By: Hernan Grant MD  December 4, 2017  11:32 AM

## 2017-12-05 ENCOUNTER — ONCOLOGY VISIT (OUTPATIENT)
Dept: ONCOLOGY | Facility: CLINIC | Age: 52
End: 2017-12-05
Attending: PHYSICIAN ASSISTANT
Payer: COMMERCIAL

## 2017-12-05 ENCOUNTER — APPOINTMENT (OUTPATIENT)
Dept: LAB | Facility: CLINIC | Age: 52
End: 2017-12-05
Attending: INTERNAL MEDICINE
Payer: COMMERCIAL

## 2017-12-05 ENCOUNTER — INFUSION THERAPY VISIT (OUTPATIENT)
Dept: ONCOLOGY | Facility: CLINIC | Age: 52
End: 2017-12-05
Attending: INTERNAL MEDICINE
Payer: COMMERCIAL

## 2017-12-05 VITALS
DIASTOLIC BLOOD PRESSURE: 57 MMHG | HEART RATE: 66 BPM | BODY MASS INDEX: 22.86 KG/M2 | WEIGHT: 125 LBS | SYSTOLIC BLOOD PRESSURE: 100 MMHG | TEMPERATURE: 98.7 F | RESPIRATION RATE: 16 BRPM | OXYGEN SATURATION: 97 %

## 2017-12-05 DIAGNOSIS — F41.9 ANXIETY: ICD-10-CM

## 2017-12-05 DIAGNOSIS — C64.1 MALIGNANT NEOPLASM OF RIGHT KIDNEY (H): Primary | ICD-10-CM

## 2017-12-05 DIAGNOSIS — C64.9 METASTATIC RENAL CELL CARCINOMA TO LUNG, RIGHT (H): ICD-10-CM

## 2017-12-05 DIAGNOSIS — C78.01 METASTATIC RENAL CELL CARCINOMA TO LUNG, RIGHT (H): ICD-10-CM

## 2017-12-05 LAB
ALBUMIN SERPL-MCNC: 3.8 G/DL (ref 3.4–5)
ALP SERPL-CCNC: 70 U/L (ref 40–150)
ALT SERPL W P-5'-P-CCNC: 9 U/L (ref 0–50)
ANION GAP SERPL CALCULATED.3IONS-SCNC: 9 MMOL/L (ref 3–14)
AST SERPL W P-5'-P-CCNC: 20 U/L (ref 0–45)
BASOPHILS # BLD AUTO: 0 10E9/L (ref 0–0.2)
BASOPHILS NFR BLD AUTO: 0.6 %
BILIRUB SERPL-MCNC: 0.4 MG/DL (ref 0.2–1.3)
BUN SERPL-MCNC: 17 MG/DL (ref 7–30)
CALCIUM SERPL-MCNC: 9.6 MG/DL (ref 8.5–10.1)
CHLORIDE SERPL-SCNC: 102 MMOL/L (ref 94–109)
CO2 SERPL-SCNC: 25 MMOL/L (ref 20–32)
CREAT SERPL-MCNC: 1.09 MG/DL (ref 0.52–1.04)
DIFFERENTIAL METHOD BLD: NORMAL
EOSINOPHIL # BLD AUTO: 0.3 10E9/L (ref 0–0.7)
EOSINOPHIL NFR BLD AUTO: 5.1 %
ERYTHROCYTE [DISTWIDTH] IN BLOOD BY AUTOMATED COUNT: 13.2 % (ref 10–15)
GFR SERPL CREATININE-BSD FRML MDRD: 52 ML/MIN/1.7M2
GLUCOSE SERPL-MCNC: 94 MG/DL (ref 70–99)
HCT VFR BLD AUTO: 35.9 % (ref 35–47)
HGB BLD-MCNC: 11.7 G/DL (ref 11.7–15.7)
IMM GRANULOCYTES # BLD: 0 10E9/L (ref 0–0.4)
IMM GRANULOCYTES NFR BLD: 0.2 %
LYMPHOCYTES # BLD AUTO: 1.9 10E9/L (ref 0.8–5.3)
LYMPHOCYTES NFR BLD AUTO: 38.8 %
MCH RBC QN AUTO: 30 PG (ref 26.5–33)
MCHC RBC AUTO-ENTMCNC: 32.6 G/DL (ref 31.5–36.5)
MCV RBC AUTO: 92 FL (ref 78–100)
MONOCYTES # BLD AUTO: 0.3 10E9/L (ref 0–1.3)
MONOCYTES NFR BLD AUTO: 6 %
NEUTROPHILS # BLD AUTO: 2.4 10E9/L (ref 1.6–8.3)
NEUTROPHILS NFR BLD AUTO: 49.3 %
NRBC # BLD AUTO: 0 10*3/UL
NRBC BLD AUTO-RTO: 0 /100
PLATELET # BLD AUTO: 378 10E9/L (ref 150–450)
POTASSIUM SERPL-SCNC: 4.3 MMOL/L (ref 3.4–5.3)
PROT SERPL-MCNC: 8 G/DL (ref 6.8–8.8)
RBC # BLD AUTO: 3.9 10E12/L (ref 3.8–5.2)
SODIUM SERPL-SCNC: 135 MMOL/L (ref 133–144)
WBC # BLD AUTO: 4.9 10E9/L (ref 4–11)

## 2017-12-05 PROCEDURE — 80053 COMPREHEN METABOLIC PANEL: CPT | Performed by: PHYSICIAN ASSISTANT

## 2017-12-05 PROCEDURE — 96413 CHEMO IV INFUSION 1 HR: CPT

## 2017-12-05 PROCEDURE — 99213 OFFICE O/P EST LOW 20 MIN: CPT | Mod: ZF

## 2017-12-05 PROCEDURE — 25000128 H RX IP 250 OP 636: Mod: ZF | Performed by: INTERNAL MEDICINE

## 2017-12-05 PROCEDURE — 85025 COMPLETE CBC W/AUTO DIFF WBC: CPT | Performed by: PHYSICIAN ASSISTANT

## 2017-12-05 PROCEDURE — 99214 OFFICE O/P EST MOD 30 MIN: CPT | Mod: ZP | Performed by: PHYSICIAN ASSISTANT

## 2017-12-05 PROCEDURE — 25000128 H RX IP 250 OP 636: Mod: ZF | Performed by: PHYSICIAN ASSISTANT

## 2017-12-05 RX ORDER — EPINEPHRINE 1 MG/ML
0.3 INJECTION, SOLUTION, CONCENTRATE INTRAVENOUS EVERY 5 MIN PRN
Status: CANCELLED | OUTPATIENT
Start: 2018-01-17

## 2017-12-05 RX ORDER — SODIUM CHLORIDE 9 MG/ML
1000 INJECTION, SOLUTION INTRAVENOUS CONTINUOUS PRN
Status: CANCELLED
Start: 2018-01-04

## 2017-12-05 RX ORDER — ALBUTEROL SULFATE 90 UG/1
1-2 AEROSOL, METERED RESPIRATORY (INHALATION)
Status: CANCELLED
Start: 2017-12-05

## 2017-12-05 RX ORDER — DIPHENHYDRAMINE HYDROCHLORIDE 50 MG/ML
50 INJECTION INTRAMUSCULAR; INTRAVENOUS
Status: CANCELLED
Start: 2018-01-04

## 2017-12-05 RX ORDER — ALBUTEROL SULFATE 0.83 MG/ML
2.5 SOLUTION RESPIRATORY (INHALATION)
Status: CANCELLED | OUTPATIENT
Start: 2018-01-17

## 2017-12-05 RX ORDER — LORAZEPAM 2 MG/ML
0.5 INJECTION INTRAMUSCULAR EVERY 4 HOURS PRN
Status: CANCELLED
Start: 2018-01-17

## 2017-12-05 RX ORDER — EPINEPHRINE 0.3 MG/.3ML
0.3 INJECTION SUBCUTANEOUS EVERY 5 MIN PRN
Status: CANCELLED | OUTPATIENT
Start: 2017-12-05

## 2017-12-05 RX ORDER — HEPARIN SODIUM (PORCINE) LOCK FLUSH IV SOLN 100 UNIT/ML 100 UNIT/ML
5 SOLUTION INTRAVENOUS EVERY 8 HOURS
Status: DISCONTINUED | OUTPATIENT
Start: 2017-12-05 | End: 2017-12-05 | Stop reason: HOSPADM

## 2017-12-05 RX ORDER — ALBUTEROL SULFATE 0.83 MG/ML
2.5 SOLUTION RESPIRATORY (INHALATION)
Status: CANCELLED | OUTPATIENT
Start: 2017-12-19

## 2017-12-05 RX ORDER — LORAZEPAM 0.5 MG/1
0.5 TABLET ORAL EVERY 4 HOURS PRN
Qty: 30 TABLET | Refills: 2 | Status: SHIPPED | OUTPATIENT
Start: 2017-12-05 | End: 2018-01-01

## 2017-12-05 RX ORDER — LORAZEPAM 2 MG/ML
0.5 INJECTION INTRAMUSCULAR EVERY 4 HOURS PRN
Status: CANCELLED
Start: 2017-12-05

## 2017-12-05 RX ORDER — METHYLPREDNISOLONE SODIUM SUCCINATE 125 MG/2ML
125 INJECTION, POWDER, LYOPHILIZED, FOR SOLUTION INTRAMUSCULAR; INTRAVENOUS
Status: CANCELLED
Start: 2018-01-04

## 2017-12-05 RX ORDER — SODIUM CHLORIDE 9 MG/ML
1000 INJECTION, SOLUTION INTRAVENOUS CONTINUOUS PRN
Status: CANCELLED
Start: 2018-01-17

## 2017-12-05 RX ORDER — HEPARIN SODIUM (PORCINE) LOCK FLUSH IV SOLN 100 UNIT/ML 100 UNIT/ML
5 SOLUTION INTRAVENOUS
Status: COMPLETED | OUTPATIENT
Start: 2017-12-05 | End: 2017-12-05

## 2017-12-05 RX ORDER — ALBUTEROL SULFATE 90 UG/1
1-2 AEROSOL, METERED RESPIRATORY (INHALATION)
Status: CANCELLED
Start: 2018-01-04

## 2017-12-05 RX ORDER — EPINEPHRINE 0.3 MG/.3ML
0.3 INJECTION SUBCUTANEOUS EVERY 5 MIN PRN
Status: CANCELLED | OUTPATIENT
Start: 2017-12-19

## 2017-12-05 RX ORDER — SODIUM CHLORIDE 9 MG/ML
1000 INJECTION, SOLUTION INTRAVENOUS CONTINUOUS PRN
Status: CANCELLED
Start: 2017-12-19

## 2017-12-05 RX ORDER — DIPHENHYDRAMINE HYDROCHLORIDE 50 MG/ML
50 INJECTION INTRAMUSCULAR; INTRAVENOUS
Status: CANCELLED
Start: 2017-12-19

## 2017-12-05 RX ORDER — METHYLPREDNISOLONE SODIUM SUCCINATE 125 MG/2ML
125 INJECTION, POWDER, LYOPHILIZED, FOR SOLUTION INTRAMUSCULAR; INTRAVENOUS
Status: CANCELLED
Start: 2017-12-05

## 2017-12-05 RX ORDER — EPINEPHRINE 0.3 MG/.3ML
0.3 INJECTION SUBCUTANEOUS EVERY 5 MIN PRN
Status: CANCELLED | OUTPATIENT
Start: 2018-01-04

## 2017-12-05 RX ORDER — ALBUTEROL SULFATE 90 UG/1
1-2 AEROSOL, METERED RESPIRATORY (INHALATION)
Status: CANCELLED
Start: 2017-12-19

## 2017-12-05 RX ORDER — ALBUTEROL SULFATE 90 UG/1
1-2 AEROSOL, METERED RESPIRATORY (INHALATION)
Status: CANCELLED
Start: 2018-01-17

## 2017-12-05 RX ORDER — EPINEPHRINE 1 MG/ML
0.3 INJECTION, SOLUTION, CONCENTRATE INTRAVENOUS EVERY 5 MIN PRN
Status: CANCELLED | OUTPATIENT
Start: 2017-12-19

## 2017-12-05 RX ORDER — DIPHENHYDRAMINE HYDROCHLORIDE 50 MG/ML
50 INJECTION INTRAMUSCULAR; INTRAVENOUS
Status: CANCELLED
Start: 2017-12-05

## 2017-12-05 RX ORDER — LIDOCAINE/PRILOCAINE 2.5 %-2.5%
CREAM (GRAM) TOPICAL
Qty: 60 G | Refills: 1 | Status: SHIPPED | OUTPATIENT
Start: 2017-12-05 | End: 2018-01-01

## 2017-12-05 RX ORDER — ALBUTEROL SULFATE 0.83 MG/ML
2.5 SOLUTION RESPIRATORY (INHALATION)
Status: CANCELLED | OUTPATIENT
Start: 2018-01-04

## 2017-12-05 RX ORDER — LORAZEPAM 2 MG/ML
0.5 INJECTION INTRAMUSCULAR EVERY 4 HOURS PRN
Status: CANCELLED
Start: 2018-01-04

## 2017-12-05 RX ORDER — ALBUTEROL SULFATE 0.83 MG/ML
2.5 SOLUTION RESPIRATORY (INHALATION)
Status: CANCELLED | OUTPATIENT
Start: 2017-12-05

## 2017-12-05 RX ORDER — METHYLPREDNISOLONE SODIUM SUCCINATE 125 MG/2ML
125 INJECTION, POWDER, LYOPHILIZED, FOR SOLUTION INTRAMUSCULAR; INTRAVENOUS
Status: CANCELLED
Start: 2018-01-17

## 2017-12-05 RX ORDER — EPINEPHRINE 1 MG/ML
0.3 INJECTION, SOLUTION, CONCENTRATE INTRAVENOUS EVERY 5 MIN PRN
Status: CANCELLED | OUTPATIENT
Start: 2017-12-05

## 2017-12-05 RX ORDER — MEPERIDINE HYDROCHLORIDE 25 MG/ML
25 INJECTION INTRAMUSCULAR; INTRAVENOUS; SUBCUTANEOUS EVERY 30 MIN PRN
Status: CANCELLED | OUTPATIENT
Start: 2017-12-05

## 2017-12-05 RX ORDER — MEPERIDINE HYDROCHLORIDE 25 MG/ML
25 INJECTION INTRAMUSCULAR; INTRAVENOUS; SUBCUTANEOUS EVERY 30 MIN PRN
Status: CANCELLED | OUTPATIENT
Start: 2018-01-04

## 2017-12-05 RX ORDER — EPINEPHRINE 0.3 MG/.3ML
0.3 INJECTION SUBCUTANEOUS EVERY 5 MIN PRN
Status: CANCELLED | OUTPATIENT
Start: 2018-01-17

## 2017-12-05 RX ORDER — MEPERIDINE HYDROCHLORIDE 25 MG/ML
25 INJECTION INTRAMUSCULAR; INTRAVENOUS; SUBCUTANEOUS EVERY 30 MIN PRN
Status: CANCELLED | OUTPATIENT
Start: 2017-12-19

## 2017-12-05 RX ORDER — SODIUM CHLORIDE 9 MG/ML
1000 INJECTION, SOLUTION INTRAVENOUS CONTINUOUS PRN
Status: CANCELLED
Start: 2017-12-05

## 2017-12-05 RX ORDER — LORAZEPAM 2 MG/ML
0.5 INJECTION INTRAMUSCULAR EVERY 4 HOURS PRN
Status: CANCELLED
Start: 2017-12-19

## 2017-12-05 RX ORDER — PROCHLORPERAZINE MALEATE 10 MG
10 TABLET ORAL EVERY 6 HOURS PRN
Qty: 30 TABLET | Refills: 2 | Status: SHIPPED | OUTPATIENT
Start: 2017-12-05 | End: 2018-01-01

## 2017-12-05 RX ORDER — DIPHENHYDRAMINE HYDROCHLORIDE 50 MG/ML
50 INJECTION INTRAMUSCULAR; INTRAVENOUS
Status: CANCELLED
Start: 2018-01-17

## 2017-12-05 RX ORDER — MEPERIDINE HYDROCHLORIDE 25 MG/ML
25 INJECTION INTRAMUSCULAR; INTRAVENOUS; SUBCUTANEOUS EVERY 30 MIN PRN
Status: CANCELLED | OUTPATIENT
Start: 2018-01-17

## 2017-12-05 RX ORDER — EPINEPHRINE 1 MG/ML
0.3 INJECTION, SOLUTION, CONCENTRATE INTRAVENOUS EVERY 5 MIN PRN
Status: CANCELLED | OUTPATIENT
Start: 2018-01-04

## 2017-12-05 RX ORDER — METHYLPREDNISOLONE SODIUM SUCCINATE 125 MG/2ML
125 INJECTION, POWDER, LYOPHILIZED, FOR SOLUTION INTRAMUSCULAR; INTRAVENOUS
Status: CANCELLED
Start: 2017-12-19

## 2017-12-05 RX ADMIN — SODIUM CHLORIDE, PRESERVATIVE FREE 5 ML: 5 INJECTION INTRAVENOUS at 11:17

## 2017-12-05 RX ADMIN — SODIUM CHLORIDE, PRESERVATIVE FREE 5 ML: 5 INJECTION INTRAVENOUS at 14:53

## 2017-12-05 RX ADMIN — SODIUM CHLORIDE 240 MG: 900 INJECTION, SOLUTION INTRAVENOUS at 13:41

## 2017-12-05 ASSESSMENT — PAIN SCALES - GENERAL: PAINLEVEL: MILD PAIN (3)

## 2017-12-05 NOTE — NURSING NOTE
Good blood return noted from already-accessed port.  Labs drawn by rn.  Port flushed with NS and heparin.  Pt tolerated well.  VS taken.  Pt checked in for next appt.  Mendy Bursn RN

## 2017-12-05 NOTE — NURSING NOTE
"Oncology Rooming Note    December 5, 2017 11:36 AM   Suzi Gonsales is a 52 year old female who presents for:    Chief Complaint   Patient presents with     Oncology Clinic Visit     f/u Kidney CA     Port Draw     labs drawn from port by rn.  vs taken     Initial Vitals: /57 (BP Location: Right arm, Patient Position: Sitting, Cuff Size: Adult Regular)  Pulse 66  Temp 98.7  F (37.1  C) (Oral)  Resp 16  Wt 56.7 kg (125 lb)  LMP  (Within Months)  SpO2 97%  BMI 22.86 kg/m2 Estimated body mass index is 22.86 kg/(m^2) as calculated from the following:    Height as of 12/4/17: 1.575 m (5' 2\").    Weight as of this encounter: 56.7 kg (125 lb). Body surface area is 1.57 meters squared.  Mild Pain (3) Comment: Near port   No LMP recorded (within months). Patient is postmenopausal.  Allergies reviewed: Yes  Medications reviewed: Yes    Medications: Medication refills not needed today.  Pharmacy name entered into Confide[employer]:    [id number] - [place], [place] - [address] AT [facility] OF [employer] [address]  [facility] - USE FOR MAILING ONLY - [place], [place]    Clinical concerns: none Molly was NOT notified.    10 minutes for nursing intake (face to face time)     SMITHA BASHIR LPN            "

## 2017-12-05 NOTE — MR AVS SNAPSHOT
After Visit Summary   12/5/2017    Suzi Gonsales    MRN: [id number]           Patient Information     Date Of Birth          1965        Visit Information        Provider Department      12/5/2017 11:10 AM Molly Max PA-C [facility]        Today's Diagnoses     Malignant neoplasm of right kidney (H)    -  1    Metastatic renal cell carcinoma to lung, right (H)        Anxiety           Follow-ups after your visit        Additional Services     PALLIATIVE CARE REFERRAL       Referral for palliative care social work only.    Your provider has referred you to Palliative Care Services.    To schedule an Outpatient Palliative Care Referral appointment, please call: PREFERRED PROVIDERS:.    Please be aware that coverage of these services is subject to the terms and limitations of your health insurance plan.  Call member services at your health plan with any benefit or coverage questions.      Please bring the following with you to your appointment:    (1) Any X-Rays, CTs or MRIs which have been performed.  Contact the facility where they were done to arrange for  prior to your scheduled appointment.   (2) If you have recently seen a provider outside of the [facility], please bring your most recent clinic note and/or imaging results  (3) List of current medications - please bring ALL of the medications that you are currently taking (in their original bottles) to your appointment  (4) This referral request  (5) Any documents/labs given to you for this referral    Services Requested: Evaluate and treat symptoms (including writing prescriptions)    Please complete the following questions:  1. What is patient's life-limiting diagnosis? Metastatic renal cell carcinoma  2. What is the reason for the referral? Depression and increased anxiety recently  3. What is the patient's prognosis? fair    Palliative Care Definition:    Palliative Care is specialized medical care for  people with serious illness.  This type of care is focused on providing patients with relief from symptoms, pain and stresses of serious illness - whatever the diagnosis may be.  The goal of Palliative Care is to improve quality of life for both the patient and the family.  Palliative Care is provided by a team of doctors, nurses and other specialists who work with the patient's other doctors to provide an extra layer of support.  Palliative Care is appropriate at any age and at any stage in a serious illness, and can be provided together with curative treatment.                  Your next 10 appointments already scheduled     Dec 18, 2017  3:15 PM CST   [facility] Lab Draw with [facility] LAB DRAW   [facility] Draw ([facility])    [address] [place] [id number] [phone]            Dec 18, 2017  4:00 PM CST   (Arrive by 3:45 PM)   Return Visit with Dya Ren PA-C   [facility] ([facility])    [address] [place] [id number] [phone]            Dec 18, 2017  4:00 PM CST   Infusion 120 with  ONCOLOGY INFUSION,  24 ATC   [facility] ([facility])    [address] [place] [id number] [phone]            Dec 28, 2017  9:00 AM CST   (Arrive by 8:45 AM)   NEW WITH ROOM with Nathalie Fernando [facility],  2 119 CONSULT [facility] ([facility])    [address] [place] [id number] [phone]              Who to contact     If you have questions or need follow up information about today's clinic visit or your schedule please contact [facility] directly at [phone].  Normal or non-critical lab and imaging results will be communicated to you by MyChart, letter or phone within 4 business days after  the clinic has received the results. If you do not hear from us within 7 days, please contact the clinic through Branch[employer] or phone. If you have a critical or abnormal lab result, we will notify you by phone as soon as possible.  Submit refill requests through Branch2 or call your pharmacy and they will forward the refill request to us. Please allow 3 business days for your refill to be completed.          Additional Information About Your Visit        [employer] Information     Branch[employer] gives you secure access to your electronic health record. If you see a primary care provider, you can also send messages to your care team and make appointments. If you have questions, please call your primary care clinic.  If you do not have a primary care provider, please call [phone] and they will assist you.        Care EveryWhere ID     This is your Care EveryWhere ID. This could be used by other organizations to access your [facility] medical records  [id number]        Your Vitals Were     Pulse Temperature Respirations Last Period Pulse Oximetry BMI (Body Mass Index)    66 98.7  F (37.1  C) (Oral) 16 (Within Months) 97% 22.86 kg/m2       Blood Pressure from Last 3 Encounters:   12/05/17 100/57   12/04/17 109/63   11/22/17 100/65    Weight from Last 3 Encounters:   12/05/17 56.7 kg (125 lb)   12/04/17 54.9 kg (121 lb)   11/22/17 54.9 kg (121 lb)              We Performed the Following     PALLIATIVE CARE REFERRAL          Today's Medication Changes          These changes are accurate as of: 12/5/17 11:59 PM.  If you have any questions, ask your nurse or doctor.               Start taking these medicines.        Dose/Directions    lidocaine-prilocaine cream   Commonly known as:  EMLA   Used for:  Malignant neoplasm of right kidney (H)   Started by:  Molly Max PA-C        Apply 1 hour prior to port access.   Quantity:  60 g   Refills:  1       LORazepam 0.5 MG tablet   Commonly known as:  ATIVAN   Used for:  Malignant  neoplasm of right kidney (H)        Dose:  0.5 mg   Take 1 tablet (0.5 mg) by mouth every 4 hours as needed (Anxiety, Nausea/Vomiting or Sleep)   Quantity:  30 tablet   Refills:  2         These medicines have changed or have updated prescriptions.        Dose/Directions    * prochlorperazine 10 MG tablet   Commonly known as:  COMPAZINE   This may have changed:  Another medication with the same name was added. Make sure you understand how and when to take each.   Used for:  Malignant neoplasm of right kidney (H)        Dose:  10 mg   Take 1 tablet (10 mg) by mouth every 6 hours as needed for nausea or vomiting (Breakthrough Nausea/Vomiting)   Quantity:  30 tablet   Refills:  0       * prochlorperazine 10 MG tablet   Commonly known as:  COMPAZINE   This may have changed:  You were already taking a medication with the same name, and this prescription was added. Make sure you understand how and when to take each.   Used for:  Malignant neoplasm of right kidney (H)        Dose:  10 mg   Take 1 tablet (10 mg) by mouth every 6 hours as needed (Nausea/Vomiting)   Quantity:  30 tablet   Refills:  2       * Notice:  This list has 2 medication(s) that are the same as other medications prescribed for you. Read the directions carefully, and ask your doctor or other care provider to review them with you.         Where to get your medicines      These medications were sent to [id number] - [place], [place] - [address] AT [facility] [address]  [facility] [address], [place] [address]    Hours:  24-hours Phone:  [phone]     lidocaine-prilocaine cream    prochlorperazine 10 MG tablet         Some of these will need a paper prescription and others can be bought over the counter.  Ask your nurse if you have questions.     Bring a paper prescription for each of these medications     LORazepam 0.5 MG tablet                Primary Care Provider Office Phone # Fax #    ARIEL Carter CNP  [phone]       [address] [place] [address]        Equal Access to Services     ROSAURA INTERIANO : Hadii aad ku hadwale Santana, wakylahda lujaneen, qaybta kaalmada jarek, josemanuel debbiein hayaan anirohit marin lanayelitaty boni. So [facility] [phone].    ATENCIÓN: Si habla español, tiene a chacon disposición servicios gratuitos de asistencia lingüística. Llame al [phone].    We comply with applicable federal civil rights laws and [place] laws. We do not discriminate on the basis of race, color, national origin, age, disability, sex, sexual orientation, or gender identity.            Thank you!     Thank you for choosing [facility]  for your care. Our goal is always to provide you with excellent care. Hearing back from our patients is one way we can continue to improve our services. Please take a few minutes to complete the written survey that you may receive in the mail after your visit with us. Thank you!             Your Updated Medication List - Protect others around you: Learn how to safely use, store and throw away your medicines at www.disposemymeds.org.          This list is accurate as of: 12/5/17 11:59 PM.  Always use your most recent med list.                   Brand Name Dispense Instructions for use Diagnosis    acetaminophen 325 MG tablet    TYLENOL    100 tablet    Take 2 tablets (650 mg) by mouth every 4 hours as needed for mild pain (mild pain)    Malignant neoplasm of right kidney (H)       acetaminophen-codeine 300-30 MG per tablet    TYLENOL #3    45 tablet    Take 1 tablet by mouth every 6 hours as needed for moderate pain    Metastatic renal cell carcinoma to lung, right (H)       ALPRAZolam 0.5 MG tablet    XANAX    60 tablet    Take 1 tablet (0.5 mg) by mouth 3 times daily as needed for anxiety    Renal cell carcinoma, unspecified laterality (H), Mass of upper lobe of left lung       benzonatate 100 MG capsule    TESSALON    60 capsule    Take 1 capsule (100  mg) by mouth 3 times daily as needed for cough    Metastatic renal cell carcinoma to lung, right (H)       buPROPion 300 MG 24 hr tablet    WELLBUTRIN XL     Take 300 mg by mouth every morning        citalopram 20 MG tablet    celeXA    90 tablet    Take 1 tablet (20 mg) by mouth every evening    Insomnia, unspecified type       clonazePAM 0.5 MG tablet    klonoPIN    20 tablet    Take 0.5-1 tablets (0.25-0.5 mg) by mouth 2 times daily as needed for anxiety    Panic attack       IBUPROFEN      Take 400 mg by mouth every 6 hours as needed        lidocaine-prilocaine cream    EMLA    60 g    Apply 1 hour prior to port access.    Malignant neoplasm of right kidney (H)       LORazepam 0.5 MG tablet    ATIVAN    30 tablet    Take 1 tablet (0.5 mg) by mouth every 4 hours as needed (Anxiety, Nausea/Vomiting or Sleep)    Malignant neoplasm of right kidney (H)       * prochlorperazine 10 MG tablet    COMPAZINE    30 tablet    Take 1 tablet (10 mg) by mouth every 6 hours as needed for nausea or vomiting (Breakthrough Nausea/Vomiting)    Malignant neoplasm of right kidney (H)       * prochlorperazine 10 MG tablet    COMPAZINE    30 tablet    Take 1 tablet (10 mg) by mouth every 6 hours as needed (Nausea/Vomiting)    Malignant neoplasm of right kidney (H)       zolpidem 5 MG tablet    AMBIEN    30 tablet    Take 1 tablet (5 mg) by mouth nightly as needed for sleep    Sleep disorder       * Notice:  This list has 2 medication(s) that are the same as other medications prescribed for you. Read the directions carefully, and ask your doctor or other care provider to review them with you.

## 2017-12-05 NOTE — LETTER
12/5/2017      RE: Suzi Gonsales  [address] [place] [address]       [facility] CANCER CLINIC  FOLLOW-UP VISIT NOTE  Date of visit: Dec 5, 2017           REASON FOR VISIT: mRCC     HPI: Suzi presented to ED with hematuria and right flank pain thinking she had a renal stone in December 2014. She was worked up with a CT abd/pelvis which suggested a renal mass. She was referred to Dr. Max Zelaya in [facility]. 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 Per charts tumor resection had negative margins and it was staged at pT2bN0.    Her staging work up was negative except for pulmonary nodules. She has been followed every 6 months with scans. CT CAP on 5/11/17 showed enlarging hilar LAD, enlarging pulmonary nodules, adrenal nodules and a pancreatic body mass. Biopsies of hilar LN, adrenal nodule and pancreatic mass were + for RCC. She decided to pursue IL-2, of which she received 4 cycles, last on 8/15/17. CT CAP on 9/13/17 showed generally stable disease. CT CAP on 11/22/17 showed disease progression. She is here today for routine follow up prior to consideration of starting on nivolumab.     INTERVAL HISTORY:   Patient reports improvement in her sinus congestion after taking the Z-Reese.  She continues to be busy with her children who are 15 and 17-year-old boys.  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  She has noticed increased anxiety lately and is concerned about her recent disease progression.  She continues to take Celexa and Wellbutrin which she has taken for several years.  She alternates and taking Xanax or Klonopin about every 3 days.  All of these medications have been managed by her primary care provider.  Her port placement went well.  She denies other concerns.    Current Outpatient Prescriptions   Medication Sig Dispense Refill      lidocaine-prilocaine (EMLA) cream Apply 1 hour prior to port access. 60 g 1     benzonatate (TESSALON) 100 MG capsule Take 1 capsule (100 mg) by mouth 3 times daily as needed for cough 60 capsule 0     acetaminophen-codeine (TYLENOL #3) 300-30 MG per tablet Take 1 tablet by mouth every 6 hours as needed for moderate pain 45 tablet 0     citalopram (CELEXA) 20 MG tablet Take 1 tablet (20 mg) by mouth every evening 90 tablet 3     zolpidem (AMBIEN) 5 MG tablet Take 1 tablet (5 mg) by mouth nightly as needed for sleep 30 tablet 1     clonazePAM (KLONOPIN) 0.5 MG tablet Take 0.5-1 tablets (0.25-0.5 mg) by mouth 2 times daily as needed for anxiety 20 tablet 0     ALPRAZolam (XANAX) 0.5 MG tablet Take 1 tablet (0.5 mg) by mouth 3 times daily as needed for anxiety 60 tablet 0     prochlorperazine (COMPAZINE) 10 MG tablet Take 1 tablet (10 mg) by mouth every 6 hours as needed for nausea or vomiting (Breakthrough Nausea/Vomiting) 30 tablet 0     acetaminophen (TYLENOL) 325 MG tablet Take 2 tablets (650 mg) by mouth every 4 hours as needed for mild pain (mild pain) 100 tablet 0     buPROPion (WELLBUTRIN XL) 300 MG 24 hr tablet Take 300 mg by mouth every morning        IBUPROFEN Take 400 mg by mouth every 6 hours as needed        LORazepam (ATIVAN) 0.5 MG tablet Take 1 tablet (0.5 mg) by mouth every 4 hours as needed (Anxiety, Nausea/Vomiting or Sleep) 30 tablet 2     prochlorperazine (COMPAZINE) 10 MG tablet Take 1 tablet (10 mg) by mouth every 6 hours as needed (Nausea/Vomiting) 30 tablet 2     EXAM:    /57 (BP Location: Right arm, Patient Position: Sitting, Cuff Size: Adult Regular)  Pulse 66  Temp 98.7  F (37.1  C) (Oral)  Resp 16  Wt 56.7 kg (125 lb)  LMP  (Within Months)  SpO2 97%  BMI 22.86 kg/m2    Vital signs were reviewed.   Patient alert and oriented.   PERRLA. EOMI. No scleral icterus noted. OP without thrush/sores.  Neck exam: No palpable cervical or supraclavicular nodes bilaterally.   Heart: RRR no  murmurs noted.   Lungs: clear to auscultation bilaterally.  No crackles or wheezing.   Abd: positive bowel sounds in all four quadrants.  No tenderness to palpation.  No hepatomegaly.   Extremities: No lower extremity edema.   Neuro: grossly intact.   Mood and affect is stable.   Skin has no rashes or lesions on exposed areas. Port is accessed in right chest.     LABS:    12/5/2017 11:24   Sodium 135   Potassium 4.3   Chloride 102   Carbon Dioxide 25   Urea Nitrogen 17   Creatinine 1.09 (H)   GFR Estimate 52 (L)   GFR Estimate If Black 63   Calcium 9.6   Anion Gap 9   Albumin 3.8   Protein Total 8.0   Bilirubin Total 0.4   Alkaline Phosphatase 70   ALT 9   AST 20   Glucose 94   WBC 4.9   Hemoglobin 11.7   Hematocrit 35.9   Platelet Count 378   RBC Count 3.90   MCV 92   MCH 30.0   MCHC 32.6   RDW 13.2   Diff Method Automated Method   % Neutrophils 49.3   % Lymphocytes 38.8   % Monocytes 6.0   % Eosinophils 5.1   % Basophils 0.6   % Immature Granulocytes 0.2   Nucleated RBCs 0   Absolute Neutrophil 2.4   Absolute Lymphocytes 1.9   Absolute Monocytes 0.3   Absolute Eosinophils 0.3   Absolute Basophils 0.0   Abs Immature Granulocytes 0.0   Absolute Nucleated RBC 0.0       ASSESSMENT/PLAN: 52 year old with mRCC s/p 4 cycles of IL-2. Tolerated cycle 4 with 5 doses, developed some tachycardia which resolved prior to discharge. She had a stable CT after 2 cycles, mild disease progression after 4 cycles.      RCC. Patient will start on nivolumab today. We reviewed the most common side effect is fatigue, of which the intensity is variable. We also reviewed the potential for an immune mediated side effect involving inflammation in the pituitary gland, thyroid, heart, lungs, liver, kidneys, pancreas, or colon. We reviewed immune mediated side effects would be treated with high dose steroids. She will return in 2 weeks for follow up with Kia Ren PA-C prior to her next dose. She will call sooner for concerns    Depression  and anxiety. Increased anxiety lately. Discussed referral to palliative care social work, of which patient is agreeable. Patient will continue to follow with her PCP, who prescribes her Celexa, Wellbutrin, and benzo's. She does not feel that she needs a dose adjustment today.     IV access. Port recently placed. Patient requested rx for Emla cream, which was given. Discussed trying next access without cream since current access was placed when port was placed yesterday. If next access goes well, then no need for Emla cream. If not, apply Emla 1 hour prior to port access.      Molly Max PA-C  [facility]  [address], [place] [address] [phone]

## 2017-12-05 NOTE — PATIENT INSTRUCTIONS
Contact Numbers    [facility] [place] [phone]  [facility] [place]:  [phone]    Call triage with chills and/or temperature greater than or equal to 100.5, uncontrolled nausea/vomiting, diarrhea, constipation, dizziness, shortness of breath, chest pain, bleeding, unexplained bruising, or any new/concerning symptoms, questions/concerns.     If you are having any concerning symptoms or wish to speak to a provider before your next infusion visit, please call your care coordinator or triage to notify them so we can adequately serve you.       After Hours: [phone]    If after hours, weekends, or holidays, call main hospital  and ask for Oncology doctor on call.           December 2017 Sunday Monday Tuesday Wednesday Thursday Friday Saturday                            1     2       3     4     Outpatient Visit    8:27 AM   [facility]     IR CHEST PORT PLACEMENT >5 YRS    8:30 AM   (75 min.)   [email or web address]   [facility] ASC Imaging     INSERT PORT VASCULAR ACCESS   10:00 AM   Melanie Cevallos PA-C   [facility] OR 5     [facility] D[facility]   10:45 AM   (15 min.)    [facility] DRAW   [facility] Lab Draw     [facility] RETURN   10:55 AM   (50 min.)   Molly Max PA-C   [facility]   12:30 PM   (120 min.)    ONCOLOGY INFUSION   [facility] 6     7     8     9       10     11     12     13     14     15     16       17     18     [facility] DRAW    3:15 PM   (15 min.)    [facility]     [facility] RETURN    3:45 PM   (50 min.)   Day Ren PA-C   [facility]     [facility] ONC INFUSION 120    4:00 PM   (120 min.)    ONCOLOGY INFUSION   [facility] 19     20     21     22     23       24     25     26     27     28     29     UMP NEW    7:45 AM   (60 min.)   Fransisco Brown MD   [facility] 30       31                                               January 2018 Sunday Monday Tuesday Wednesday Thursday Friday Saturday        1     2     3     4     5     6       7     8     9     10     11     12     13       14     15     16     17     18     19     20       21     22     23     24     25     26     27       28     29     30     31                                 Lab Results:  Recent Results (from the past 12 hour(s))   Comprehensive metabolic panel    Collection Time: 12/05/17 11:24 AM   Result Value Ref Range    Sodium 135 133 - 144 mmol/L    Potassium 4.3 3.4 - 5.3 mmol/L    Chloride 102 94 - 109 mmol/L    Carbon Dioxide 25 20 - 32 mmol/L    Anion Gap 9 3 - 14 mmol/L    Glucose 94 70 - 99 mg/dL    Urea Nitrogen 17 7 - 30 mg/dL    Creatinine 1.09 (H) 0.52 - 1.04 mg/dL    GFR Estimate 52 (L) >60 mL/min/1.7m2    GFR Estimate If Black 63 >60 mL/min/1.7m2    Calcium 9.6 8.5 - 10.1 mg/dL    Bilirubin Total 0.4 0.2 - 1.3 mg/dL    Albumin 3.8 3.4 - 5.0 g/dL    Protein Total 8.0 6.8 - 8.8 g/dL    Alkaline Phosphatase 70 40 - 150 U/L    ALT 9 0 - 50 U/L    AST 20 0 - 45 U/L   CBC with platelets differential    Collection Time: 12/05/17 11:24 AM   Result Value Ref Range    WBC 4.9 4.0 - 11.0 10e9/L    RBC Count 3.90 3.8 - 5.2 10e12/L    Hemoglobin 11.7 11.7 - 15.7 g/dL    Hematocrit 35.9 35.0 - 47.0 %    MCV 92 78 - 100 fl    MCH 30.0 26.5 - 33.0 pg    MCHC 32.6 31.5 - 36.5 g/dL    RDW 13.2 10.0 - 15.0 %    Platelet Count 378 150 - 450 10e9/L    Diff Method Automated Method     % Neutrophils 49.3 %    % Lymphocytes 38.8 %    % Monocytes 6.0 %    % Eosinophils 5.1 %    % Basophils 0.6 %    % Immature Granulocytes 0.2 %    Nucleated RBCs 0 0 /100    Absolute Neutrophil 2.4 1.6 - 8.3 10e9/L    Absolute Lymphocytes 1.9 0.8 - 5.3 10e9/L    Absolute Monocytes 0.3 0.0 - 1.3 10e9/L    Absolute Eosinophils 0.3 0.0 - 0.7 10e9/L    Absolute Basophils 0.0 0.0 - 0.2 10e9/L    Abs Immature Granulocytes 0.0 0 - 0.4 10e9/L    Absolute Nucleated RBC 0.0

## 2017-12-05 NOTE — MR AVS SNAPSHOT
After Visit Summary   12/5/2017    Suzi Gonsales    MRN: [id number]           Patient Information     Date Of Birth          1965        Visit Information        Provider Department      12/5/2017 12:30 PM  29 ATC;  ONCOLOGY INFUSION [facility]        Today's Diagnoses     Malignant neoplasm of right kidney (H)    -  1      Care Instructions    Contact Numbers    [facility] [place]: [phone]  [facility] [place] Triage:  [phone]    Call triage with chills and/or temperature greater than or equal to 100.5, uncontrolled nausea/vomiting, diarrhea, constipation, dizziness, shortness of breath, chest pain, bleeding, unexplained bruising, or any new/concerning symptoms, questions/concerns.     If you are having any concerning symptoms or wish to speak to a provider before your next infusion visit, please call your care coordinator or triage to notify them so we can adequately serve you.       After Hours: [phone]    If after hours, weekends, or holidays, call main hospital  and ask for Oncology doctor on call.           December 2017 Sunday Monday Tuesday Wednesday Thursday Friday Saturday                            1     2       3     4     Outpatient Visit    8:27 AM   [facility]     IR CHEST PORT PLACEMENT >5 YRS    8:30 AM   (75 min.)   [id number]   [facility] ASC Imaging     INSERT PORT VASCULAR ACCESS   10:00 AM   Melanie Cevallos PA-C [facility] OR 5     [facility] D[facility]   10:45 AM   (15 min.)    [facility] DRAW   [facility] Lab Draw     [facility] RETURN   10:55 AM   (50 min.)   Molly Max PA-C   [facility]     UMP ONC INFUSION 120   12:30 PM   (120 min.)    ONCOLOGY INFUSION   [facility] 6     7     8     9       10     11     12     13     14     15     16       17     18     [facility] DRAW    3:15 PM   (15 min.)    [facility] LAB DRAW   [facility] Lab Draw     [facility] RETURN    3:45  PM   (50 min.)   Day Ren PA-C   [facility] ONC INFUSION 120    4:00 PM   (120 min.)   UC ONCOLOGY INFUSION   [facility] 19     20     21     22     23       24     25     26     27     28     29     UMP NEW    7:45 AM   (60 min.)   Fransisco Brown MD   [facility] 30 31 January 2018 Sunday Monday Tuesday Wednesday Thursday Friday Saturday        1     2     3     4     5     6       7     8     9     10     11     12     13       14     15     16     17     18     19     20       21     22     23     24     25     26     27       28     29     30     31                                 Lab Results:  Recent Results (from the past 12 hour(s))   Comprehensive metabolic panel    Collection Time: 12/05/17 11:24 AM   Result Value Ref Range    Sodium 135 133 - 144 mmol/L    Potassium 4.3 3.4 - 5.3 mmol/L    Chloride 102 94 - 109 mmol/L    Carbon Dioxide 25 20 - 32 mmol/L    Anion Gap 9 3 - 14 mmol/L    Glucose 94 70 - 99 mg/dL    Urea Nitrogen 17 7 - 30 mg/dL    Creatinine 1.09 (H) 0.52 - 1.04 mg/dL    GFR Estimate 52 (L) >60 mL/min/1.7m2    GFR Estimate If Black 63 >60 mL/min/1.7m2    Calcium 9.6 8.5 - 10.1 mg/dL    Bilirubin Total 0.4 0.2 - 1.3 mg/dL    Albumin 3.8 3.4 - 5.0 g/dL    Protein Total 8.0 6.8 - 8.8 g/dL    Alkaline Phosphatase 70 40 - 150 U/L    ALT 9 0 - 50 U/L    AST 20 0 - 45 U/L   CBC with platelets differential    Collection Time: 12/05/17 11:24 AM   Result Value Ref Range    WBC 4.9 4.0 - 11.0 10e9/L    RBC Count 3.90 3.8 - 5.2 10e12/L    Hemoglobin 11.7 11.7 - 15.7 g/dL    Hematocrit 35.9 35.0 - 47.0 %    MCV 92 78 - 100 fl    MCH 30.0 26.5 - 33.0 pg    MCHC 32.6 31.5 - 36.5 g/dL    RDW 13.2 10.0 - 15.0 %    Platelet Count 378 150 - 450 10e9/L    Diff Method Automated Method     % Neutrophils 49.3 %    % Lymphocytes 38.8 %    % Monocytes 6.0 %    % Eosinophils 5.1 %     % Basophils 0.6 %    % Immature Granulocytes 0.2 %    Nucleated RBCs 0 0 /100    Absolute Neutrophil 2.4 1.6 - 8.3 10e9/L    Absolute Lymphocytes 1.9 0.8 - 5.3 10e9/L    Absolute Monocytes 0.3 0.0 - 1.3 10e9/L    Absolute Eosinophils 0.3 0.0 - 0.7 10e9/L    Absolute Basophils 0.0 0.0 - 0.2 10e9/L    Abs Immature Granulocytes 0.0 0 - 0.4 10e9/L    Absolute Nucleated RBC 0.0                Follow-ups after your visit        Your next 10 appointments already scheduled     Dec 18, 2017  3:15 PM CST   [facility] Draw with [facility] DRAW   [facility] ([facility])    [address] [place] [id number] [phone]            Dec 18, 2017  4:00 PM CST   (Arrive by 3:45 PM)   Return Visit with Day Ren PA-C   [facility] ([facility]    [address] [place] [id number] [phone]            Dec 18, 2017  4:00 PM CST   Infusion 120 with  ONCOLOGY INFUSION, UC 24 ATC   [facility] ([facility])    [address] [place] [id number] [phone]            Dec 29, 2017  8:00 AM CST   (Arrive by 7:45 AM)   New Patient Visit with Fransisco Brown MD   [facility] ([facility]    [address] [place] [address] [id number] [phone]              Who to contact     If you have questions or need follow up information about today's clinic visit or your schedule please contact [facility] directly at [phone].  Normal or non-critical lab and imaging results will be communicated to you by MyChart, letter or phone within 4 business days after the clinic has received the results. If you do not hear from us within 7 days, please contact the clinic through MyChart or phone. If you have a critical or abnormal lab result, we  will notify you by phone as soon as possible.  Submit refill requests through BetterYou or call your pharmacy and they will forward the refill request to us. Please allow 3 business days for your refill to be completed.          Additional Information About Your Visit        [employer] Information     BetterYou gives you secure access to your electronic health record. If you see a primary care provider, you can also send messages to your care team and make appointments. If you have questions, please call your primary care clinic.  If you do not have a primary care provider, please call [phone] and they will assist you.        Care EveryWhere ID     This is your Care EveryWhere ID. This could be used by other organizations to access your [facility] medical records  [id number]        Your Vitals Were     Last Period                   (Within Months)            Blood Pressure from Last 3 Encounters:   12/05/17 100/57   12/04/17 109/63   11/22/17 100/65    Weight from Last 3 Encounters:   12/05/17 56.7 kg (125 lb)   12/04/17 54.9 kg (121 lb)   11/22/17 54.9 kg (121 lb)              We Performed the Following     CBC with platelets differential     Comprehensive metabolic panel          Today's Medication Changes          These changes are accurate as of: 12/5/17  2:41 PM.  If you have any questions, ask your nurse or doctor.               Start taking these medicines.        Dose/Directions    lidocaine-prilocaine cream   Commonly known as:  EMLA   Used for:  Malignant neoplasm of right kidney (H)   Started by:  Molly Max PA-C        Apply 1 hour prior to port access.   Quantity:  60 g   Refills:  1       LORazepam 0.5 MG tablet   Commonly known as:  ATIVAN   Used for:  Malignant neoplasm of right kidney (H)        Dose:  0.5 mg   Take 1 tablet (0.5 mg) by mouth every 4 hours as needed (Anxiety, Nausea/Vomiting or Sleep)   Quantity:  30 tablet   Refills:  2         These medicines have changed or have updated  prescriptions.        Dose/Directions    * prochlorperazine 10 MG tablet   Commonly known as:  COMPAZINE   This may have changed:  Another medication with the same name was added. Make sure you understand how and when to take each.   Used for:  Malignant neoplasm of right kidney (H)        Dose:  10 mg   Take 1 tablet (10 mg) by mouth every 6 hours as needed for nausea or vomiting (Breakthrough Nausea/Vomiting)   Quantity:  30 tablet   Refills:  0       * prochlorperazine 10 MG tablet   Commonly known as:  COMPAZINE   This may have changed:  You were already taking a medication with the same name, and this prescription was added. Make sure you understand how and when to take each.   Used for:  Malignant neoplasm of right kidney (H)        Dose:  10 mg   Take 1 tablet (10 mg) by mouth every 6 hours as needed (Nausea/Vomiting)   Quantity:  30 tablet   Refills:  2       * Notice:  This list has 2 medication(s) that are the same as other medications prescribed for you. Read the directions carefully, and ask your doctor or other care provider to review them with you.         Where to get your medicines      These medications were sent to [id number] - [place], [place] - [address] [facility] [address]  [address] [place], [place] [address], [place] [address]    Hours:  24-hours Phone:  [phone]     lidocaine-prilocaine cream    prochlorperazine 10 MG tablet         Some of these will need a paper prescription and others can be bought over the counter.  Ask your nurse if you have questions.     Bring a paper prescription for each of these medications     LORazepam 0.5 MG tablet                Primary Care Provider Office Phone # Fax #    ARIEL Carter [facility] [phone] [address] [facility] [address]        Equal Access to Services     [facility] JAYDON AH: Odilon Santana, wakylahda lujaneen, qaybta kaalmajodi tilley, josemanuel oswald  ah. So [facility] [phone].    ATENCIÓN: Si maranda olmedo, tiene a chacon disposición servicios gratuitos de asistencia lingüística. Mario al [phone].    We comply with applicable federal civil rights laws and [place] laws. We do not discriminate on the basis of race, color, national origin, age, disability, sex, sexual orientation, or gender identity.            Thank you!     Thank you for choosing [facility]  for your care. Our goal is always to provide you with excellent care. Hearing back from our patients is one way we can continue to improve our services. Please take a few minutes to complete the written survey that you may receive in the mail after your visit with us. Thank you!             Your Updated Medication List - Protect others around you: Learn how to safely use, store and throw away your medicines at www.disposemymeds.org.          This list is accurate as of: 12/5/17  2:41 PM.  Always use your most recent med list.                   Brand Name Dispense Instructions for use Diagnosis    acetaminophen 325 MG tablet    TYLENOL    100 tablet    Take 2 tablets (650 mg) by mouth every 4 hours as needed for mild pain (mild pain)    Malignant neoplasm of right kidney (H)       acetaminophen-codeine 300-30 MG per tablet    TYLENOL #3    45 tablet    Take 1 tablet by mouth every 6 hours as needed for moderate pain    Metastatic renal cell carcinoma to lung, right (H)       ALPRAZolam 0.5 MG tablet    XANAX    60 tablet    Take 1 tablet (0.5 mg) by mouth 3 times daily as needed for anxiety    Renal cell carcinoma, unspecified laterality (H), Mass of upper lobe of left lung       benzonatate 100 MG capsule    TESSALON    60 capsule    Take 1 capsule (100 mg) by mouth 3 times daily as needed for cough    Metastatic renal cell carcinoma to lung, right (H)       buPROPion 300 MG 24 hr tablet    WELLBUTRIN XL     Take 300 mg by mouth every morning        citalopram 20 MG tablet    celeXA    90  tablet    Take 1 tablet (20 mg) by mouth every evening    Insomnia, unspecified type       clonazePAM 0.5 MG tablet    klonoPIN    20 tablet    Take 0.5-1 tablets (0.25-0.5 mg) by mouth 2 times daily as needed for anxiety    Panic attack       IBUPROFEN      Take 400 mg by mouth every 6 hours as needed        lidocaine-prilocaine cream    EMLA    60 g    Apply 1 hour prior to port access.    Malignant neoplasm of right kidney (H)       LORazepam 0.5 MG tablet    ATIVAN    30 tablet    Take 1 tablet (0.5 mg) by mouth every 4 hours as needed (Anxiety, Nausea/Vomiting or Sleep)    Malignant neoplasm of right kidney (H)       * prochlorperazine 10 MG tablet    COMPAZINE    30 tablet    Take 1 tablet (10 mg) by mouth every 6 hours as needed for nausea or vomiting (Breakthrough Nausea/Vomiting)    Malignant neoplasm of right kidney (H)       * prochlorperazine 10 MG tablet    COMPAZINE    30 tablet    Take 1 tablet (10 mg) by mouth every 6 hours as needed (Nausea/Vomiting)    Malignant neoplasm of right kidney (H)       zolpidem 5 MG tablet    AMBIEN    30 tablet    Take 1 tablet (5 mg) by mouth nightly as needed for sleep    Sleep disorder       * Notice:  This list has 2 medication(s) that are the same as other medications prescribed for you. Read the directions carefully, and ask your doctor or other care provider to review them with you.

## 2017-12-06 NOTE — PROGRESS NOTES
[facility] CANCER CLINIC  FOLLOW-UP VISIT NOTE  Date of visit: Dec 5, 2017           REASON FOR VISIT: [facility]     HPI: Suzi presented to ED with hematuria and right flank pain thinking she had a renal stone in December 2014. She was worked up with a CT abd/pelvis which suggested a renal mass. She was referred to Dr. Max Zelaya in [facility]. 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 Per charts tumor resection had negative margins and it was staged at pT2bN0.    Her staging work up was negative except for pulmonary nodules. She has been followed every 6 months with scans. CT CAP on 5/11/17 showed enlarging hilar LAD, enlarging pulmonary nodules, adrenal nodules and a pancreatic body mass. Biopsies of hilar LN, adrenal nodule and pancreatic mass were + for RCC. She decided to pursue IL-2, of which she received 4 cycles, last on 8/15/17. CT CAP on 9/13/17 showed generally stable disease. CT CAP on 11/22/17 showed disease progression. She is here today for routine follow up prior to consideration of starting on nivolumab.     INTERVAL HISTORY:   Patient reports improvement in her sinus congestion after taking the Z-Reese.  She continues to be busy with her children who are 15 and 17-year-old boys.  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  She has noticed increased anxiety lately and is concerned about her recent disease progression.  She continues to take Celexa and Wellbutrin which she has taken for several years.  She alternates and taking Xanax or Klonopin about every 3 days.  All of these medications have been managed by her primary care provider.  Her port placement went well.  She denies other concerns.    Current Outpatient Prescriptions   Medication Sig Dispense Refill     lidocaine-prilocaine (EMLA) cream Apply 1 hour prior to port access. 60 g 1     benzonatate  (TESSALON) 100 MG capsule Take 1 capsule (100 mg) by mouth 3 times daily as needed for cough 60 capsule 0     acetaminophen-codeine (TYLENOL #3) 300-30 MG per tablet Take 1 tablet by mouth every 6 hours as needed for moderate pain 45 tablet 0     citalopram (CELEXA) 20 MG tablet Take 1 tablet (20 mg) by mouth every evening 90 tablet 3     zolpidem (AMBIEN) 5 MG tablet Take 1 tablet (5 mg) by mouth nightly as needed for sleep 30 tablet 1     clonazePAM (KLONOPIN) 0.5 MG tablet Take 0.5-1 tablets (0.25-0.5 mg) by mouth 2 times daily as needed for anxiety 20 tablet 0     ALPRAZolam (XANAX) 0.5 MG tablet Take 1 tablet (0.5 mg) by mouth 3 times daily as needed for anxiety 60 tablet 0     prochlorperazine (COMPAZINE) 10 MG tablet Take 1 tablet (10 mg) by mouth every 6 hours as needed for nausea or vomiting (Breakthrough Nausea/Vomiting) 30 tablet 0     acetaminophen (TYLENOL) 325 MG tablet Take 2 tablets (650 mg) by mouth every 4 hours as needed for mild pain (mild pain) 100 tablet 0     buPROPion (WELLBUTRIN XL) 300 MG 24 hr tablet Take 300 mg by mouth every morning        IBUPROFEN Take 400 mg by mouth every 6 hours as needed        LORazepam (ATIVAN) 0.5 MG tablet Take 1 tablet (0.5 mg) by mouth every 4 hours as needed (Anxiety, Nausea/Vomiting or Sleep) 30 tablet 2     prochlorperazine (COMPAZINE) 10 MG tablet Take 1 tablet (10 mg) by mouth every 6 hours as needed (Nausea/Vomiting) 30 tablet 2     EXAM:    /57 (BP Location: Right arm, Patient Position: Sitting, Cuff Size: Adult Regular)  Pulse 66  Temp 98.7  F (37.1  C) (Oral)  Resp 16  Wt 56.7 kg (125 lb)  LMP  (Within Months)  SpO2 97%  BMI 22.86 kg/m2    Vital signs were reviewed.   Patient alert and oriented.   PERRLA. EOMI. No scleral icterus noted. OP without thrush/sores.  Neck exam: No palpable cervical or supraclavicular nodes bilaterally.   Heart: RRR no murmurs noted.   Lungs: clear to auscultation bilaterally.  No crackles or wheezing.   Abd:  positive bowel sounds in all four quadrants.  No tenderness to palpation.  No hepatomegaly.   Extremities: No lower extremity edema.   Neuro: grossly intact.   Mood and affect is stable.   Skin has no rashes or lesions on exposed areas. Port is accessed in right chest.     LABS:    12/5/2017 11:24   Sodium 135   Potassium 4.3   Chloride 102   Carbon Dioxide 25   Urea Nitrogen 17   Creatinine 1.09 (H)   GFR Estimate 52 (L)   GFR Estimate If Black 63   Calcium 9.6   Anion Gap 9   Albumin 3.8   Protein Total 8.0   Bilirubin Total 0.4   Alkaline Phosphatase 70   ALT 9   AST 20   Glucose 94   WBC 4.9   Hemoglobin 11.7   Hematocrit 35.9   Platelet Count 378   RBC Count 3.90   MCV 92   MCH 30.0   MCHC 32.6   RDW 13.2   Diff Method Automated Method   % Neutrophils 49.3   % Lymphocytes 38.8   % Monocytes 6.0   % Eosinophils 5.1   % Basophils 0.6   % Immature Granulocytes 0.2   Nucleated RBCs 0   Absolute Neutrophil 2.4   Absolute Lymphocytes 1.9   Absolute Monocytes 0.3   Absolute Eosinophils 0.3   Absolute Basophils 0.0   Abs Immature Granulocytes 0.0   Absolute Nucleated RBC 0.0       ASSESSMENT/PLAN: 52 year old with mRCC s/p 4 cycles of IL-2. Tolerated cycle 4 with 5 doses, developed some tachycardia which resolved prior to discharge. She had a stable CT after 2 cycles, mild disease progression after 4 cycles.      RCC. Patient will start on nivolumab today. We reviewed the most common side effect is fatigue, of which the intensity is variable. We also reviewed the potential for an immune mediated side effect involving inflammation in the pituitary gland, thyroid, heart, lungs, liver, kidneys, pancreas, or colon. We reviewed immune mediated side effects would be treated with high dose steroids. She will return in 2 weeks for follow up with Kia Ren PA-C prior to her next dose. She will call sooner for concerns    Depression and anxiety. Increased anxiety lately. Discussed referral to palliative care social work,  of which patient is agreeable. Patient will continue to follow with her PCP, who prescribes her Celexa, Wellbutrin, and benzo's. She does not feel that she needs a dose adjustment today.     IV access. Port recently placed. Patient requested rx for Emla cream, which was given. Discussed trying next access without cream since current access was placed when port was placed yesterday. If next access goes well, then no need for Emla cream. If not, apply Emla 1 hour prior to port access.      Molly Max PA-C  [facility]  [address], [place] [address] [phone]

## 2017-12-11 DIAGNOSIS — G47.9 SLEEP DISORDER: ICD-10-CM

## 2017-12-11 RX ORDER — ZOLPIDEM TARTRATE 5 MG/1
5 TABLET ORAL
Qty: 30 TABLET | Refills: 1 | Status: SHIPPED | OUTPATIENT
Start: 2017-12-11 | End: 2017-12-18

## 2017-12-11 NOTE — TELEPHONE ENCOUNTER
Medication Detail      Disp Refills Start End LENCHO   zolpidem (AMBIEN) 5 MG tablet 30 tablet 1 10/9/2017  --   Sig: Take 1 tablet (5 mg) by mouth nightly as needed for sleep   Class: Local Print   Route: Oral   Order: [id number]       Last Office Visit: 10/3/2017    Future Office visit:       Routing refill request to provider for review/approval because:  Drug not on the [facility], P or [facility] refill protocol or controlled substance

## 2017-12-12 NOTE — TELEPHONE ENCOUNTER
RX in nurse basket. Fax Rx to [facility] on [address] in [place]. Called and LM on VM that script should be ready at her pharmacy.    Anthony Browne MA

## 2017-12-13 ASSESSMENT — ENCOUNTER SYMPTOMS
INCREASED ENERGY: 1
COUGH DISTURBING SLEEP: 1
FATIGUE: 1
POLYPHAGIA: 0
SNORES LOUDLY: 0
NERVOUS/ANXIOUS: 1
WHEEZING: 0
HALLUCINATIONS: 0
POSTURAL DYSPNEA: 0
DECREASED APPETITE: 1
WEIGHT LOSS: 1
DEPRESSION: 0
DYSPNEA ON EXERTION: 0
INSOMNIA: 1
FEVER: 0
SPUTUM PRODUCTION: 1
PANIC: 0
SHORTNESS OF BREATH: 0
NIGHT SWEATS: 0
COUGH: 1
DECREASED CONCENTRATION: 0
POLYDIPSIA: 0
CHILLS: 0
HEMOPTYSIS: 0
ALTERED TEMPERATURE REGULATION: 0
WEIGHT GAIN: 0

## 2017-12-15 ENCOUNTER — TELEPHONE (OUTPATIENT)
Dept: ONCOLOGY | Facility: CLINIC | Age: 52
End: 2017-12-15

## 2017-12-15 NOTE — TELEPHONE ENCOUNTER
Social Work Note: Telephone Call  Oncology Clinic    Data/Intervention:  Patient Name:  Suzi Gonsales  /Age:  1965 (52 year old)    Call From:  Social work attempted to call patient   Reason for Call:  Social work received referral from RNCC to contact patient regarding questions about [employer] program    Assessment:  Social work attempted to call patient regarding [employer] application and referral process.  No response to attempted call, voicemail message left with SW contact information and request for return phone call.    Plan:  Social work will attempt again later and is available to assist with any identified needs.    Soo Yeon Han LICSW  12/15/2017  Pager: [phone]  Phone Number: [phone]

## 2017-12-18 ENCOUNTER — INFUSION THERAPY VISIT (OUTPATIENT)
Dept: ONCOLOGY | Facility: CLINIC | Age: 52
End: 2017-12-18
Attending: INTERNAL MEDICINE
Payer: COMMERCIAL

## 2017-12-18 ENCOUNTER — APPOINTMENT (OUTPATIENT)
Dept: LAB | Facility: CLINIC | Age: 52
End: 2017-12-18
Attending: INTERNAL MEDICINE
Payer: COMMERCIAL

## 2017-12-18 VITALS
OXYGEN SATURATION: 94 % | RESPIRATION RATE: 16 BRPM | HEART RATE: 81 BPM | TEMPERATURE: 98 F | DIASTOLIC BLOOD PRESSURE: 59 MMHG | BODY MASS INDEX: 22.97 KG/M2 | SYSTOLIC BLOOD PRESSURE: 99 MMHG | WEIGHT: 125.6 LBS

## 2017-12-18 DIAGNOSIS — C64.9 RENAL CELL CARCINOMA, UNSPECIFIED LATERALITY (H): ICD-10-CM

## 2017-12-18 DIAGNOSIS — G47.9 SLEEP DISORDER: ICD-10-CM

## 2017-12-18 DIAGNOSIS — R91.8 MASS OF UPPER LOBE OF LEFT LUNG: ICD-10-CM

## 2017-12-18 DIAGNOSIS — C64.1 MALIGNANT NEOPLASM OF RIGHT KIDNEY (H): Primary | ICD-10-CM

## 2017-12-18 LAB
ALBUMIN SERPL-MCNC: 3.6 G/DL (ref 3.4–5)
ALP SERPL-CCNC: 69 U/L (ref 40–150)
ALT SERPL W P-5'-P-CCNC: 17 U/L (ref 0–50)
ANION GAP SERPL CALCULATED.3IONS-SCNC: 7 MMOL/L (ref 3–14)
AST SERPL W P-5'-P-CCNC: 21 U/L (ref 0–45)
BASOPHILS # BLD AUTO: 0 10E9/L (ref 0–0.2)
BASOPHILS NFR BLD AUTO: 0.6 %
BILIRUB SERPL-MCNC: 0.3 MG/DL (ref 0.2–1.3)
BUN SERPL-MCNC: 18 MG/DL (ref 7–30)
CALCIUM SERPL-MCNC: 9.1 MG/DL (ref 8.5–10.1)
CHLORIDE SERPL-SCNC: 103 MMOL/L (ref 94–109)
CO2 SERPL-SCNC: 26 MMOL/L (ref 20–32)
CREAT SERPL-MCNC: 1 MG/DL (ref 0.52–1.04)
DIFFERENTIAL METHOD BLD: ABNORMAL
EOSINOPHIL # BLD AUTO: 0.3 10E9/L (ref 0–0.7)
EOSINOPHIL NFR BLD AUTO: 5.9 %
ERYTHROCYTE [DISTWIDTH] IN BLOOD BY AUTOMATED COUNT: 12.8 % (ref 10–15)
GFR SERPL CREATININE-BSD FRML MDRD: 58 ML/MIN/1.7M2
GLUCOSE SERPL-MCNC: 128 MG/DL (ref 70–99)
HCT VFR BLD AUTO: 34.6 % (ref 35–47)
HGB BLD-MCNC: 11.3 G/DL (ref 11.7–15.7)
IMM GRANULOCYTES # BLD: 0 10E9/L (ref 0–0.4)
IMM GRANULOCYTES NFR BLD: 0.2 %
LYMPHOCYTES # BLD AUTO: 2.2 10E9/L (ref 0.8–5.3)
LYMPHOCYTES NFR BLD AUTO: 45.5 %
MCH RBC QN AUTO: 29.6 PG (ref 26.5–33)
MCHC RBC AUTO-ENTMCNC: 32.7 G/DL (ref 31.5–36.5)
MCV RBC AUTO: 91 FL (ref 78–100)
MONOCYTES # BLD AUTO: 0.3 10E9/L (ref 0–1.3)
MONOCYTES NFR BLD AUTO: 7.2 %
NEUTROPHILS # BLD AUTO: 1.9 10E9/L (ref 1.6–8.3)
NEUTROPHILS NFR BLD AUTO: 40.6 %
NRBC # BLD AUTO: 0 10*3/UL
NRBC BLD AUTO-RTO: 0 /100
PLATELET # BLD AUTO: 363 10E9/L (ref 150–450)
POTASSIUM SERPL-SCNC: 3.8 MMOL/L (ref 3.4–5.3)
PROT SERPL-MCNC: 7.7 G/DL (ref 6.8–8.8)
RBC # BLD AUTO: 3.82 10E12/L (ref 3.8–5.2)
SODIUM SERPL-SCNC: 136 MMOL/L (ref 133–144)
TSH SERPL DL<=0.005 MIU/L-ACNC: 1.29 MU/L (ref 0.4–4)
WBC # BLD AUTO: 4.7 10E9/L (ref 4–11)

## 2017-12-18 PROCEDURE — 25000128 H RX IP 250 OP 636: Mod: ZF | Performed by: PHYSICIAN ASSISTANT

## 2017-12-18 PROCEDURE — 80053 COMPREHEN METABOLIC PANEL: CPT | Performed by: PHYSICIAN ASSISTANT

## 2017-12-18 PROCEDURE — 85025 COMPLETE CBC W/AUTO DIFF WBC: CPT | Performed by: PHYSICIAN ASSISTANT

## 2017-12-18 PROCEDURE — 99214 OFFICE O/P EST MOD 30 MIN: CPT | Mod: ZP | Performed by: PHYSICIAN ASSISTANT

## 2017-12-18 PROCEDURE — 84443 ASSAY THYROID STIM HORMONE: CPT | Performed by: PHYSICIAN ASSISTANT

## 2017-12-18 PROCEDURE — 82306 VITAMIN D 25 HYDROXY: CPT | Performed by: PHYSICIAN ASSISTANT

## 2017-12-18 PROCEDURE — 25000128 H RX IP 250 OP 636: Mod: ZF | Performed by: INTERNAL MEDICINE

## 2017-12-18 PROCEDURE — 96413 CHEMO IV INFUSION 1 HR: CPT

## 2017-12-18 RX ORDER — HEPARIN SODIUM (PORCINE) LOCK FLUSH IV SOLN 100 UNIT/ML 100 UNIT/ML
5 SOLUTION INTRAVENOUS ONCE
Status: COMPLETED | OUTPATIENT
Start: 2017-12-18 | End: 2017-12-18

## 2017-12-18 RX ORDER — ZOLPIDEM TARTRATE 5 MG/1
5 TABLET ORAL
Qty: 30 TABLET | Refills: 1 | Status: SHIPPED | OUTPATIENT
Start: 2017-12-18 | End: 2018-02-20

## 2017-12-18 RX ORDER — ALPRAZOLAM 0.5 MG
0.5 TABLET ORAL 3 TIMES DAILY PRN
Qty: 60 TABLET | Refills: 0 | Status: SHIPPED | OUTPATIENT
Start: 2017-12-18 | End: 2018-01-01

## 2017-12-18 RX ORDER — HEPARIN SODIUM (PORCINE) LOCK FLUSH IV SOLN 100 UNIT/ML 100 UNIT/ML
5 SOLUTION INTRAVENOUS EVERY 8 HOURS
Status: DISCONTINUED | OUTPATIENT
Start: 2017-12-18 | End: 2017-12-18 | Stop reason: HOSPADM

## 2017-12-18 RX ADMIN — SODIUM CHLORIDE, PRESERVATIVE FREE 5 ML: 5 INJECTION INTRAVENOUS at 15:24

## 2017-12-18 RX ADMIN — SODIUM CHLORIDE 240 MG: 9 INJECTION, SOLUTION INTRAVENOUS at 16:50

## 2017-12-18 ASSESSMENT — PAIN SCALES - GENERAL: PAINLEVEL: NO PAIN (0)

## 2017-12-18 NOTE — MR AVS SNAPSHOT
After Visit Summary   12/18/2017    Suzi Gonsales    MRN: [id number]           Patient Information     Date Of Birth          1965        Visit Information        Provider Department      12/18/2017 4:00 PM Day Ren PA-C [facility]        Today's Diagnoses     Sleep disorder        Renal cell carcinoma, unspecified laterality (H)        Mass of upper lobe of left lung           Follow-ups after your visit        Who to contact     If you have questions or need follow up information about today's clinic visit or your schedule please contact [facility]CER [facility] directly at [phone].  Normal or non-critical lab and imaging results will be communicated to you by [employer], letter or phone within 4 business days after the clinic has received the results. If you do not hear from us within 7 days, please contact the clinic through [email or web address] or phone. If you have a critical or abnormal lab result, we will notify you by phone as soon as possible.  Submit refill requests through [employer] or call your pharmacy and they will forward the refill request to us. Please allow 3 business days for your refill to be completed.          Additional Information About Your Visit        MyChart Information     [employer] gives you secure access to your electronic health record. If you see a primary care provider, you can also send messages to your care team and make appointments. If you have questions, please call your primary care clinic.  If you do not have a primary care provider, please call [phone] and they will assist you.        Care EveryWhere ID     This is your Care EveryWhere ID. This could be used by other organizations to access your [place] [place] medical records  [id number]        Your Vitals Were     Pulse Temperature Respirations Last Period Pulse Oximetry BMI (Body Mass Index)    81 98  F (36.7  C) (Oral) 16 (Within Months) 94% 22.97 kg/m2       Blood Pressure from  Last 3 Encounters:   12/18/17 99/59   12/05/17 100/57   12/04/17 109/63    Weight from Last 3 Encounters:   12/18/17 57 kg (125 lb 9.6 oz)   12/05/17 56.7 kg (125 lb)   12/04/17 54.9 kg (121 lb)              Today, you had the following     No orders found for display         Where to get your medicines      Some of these will need a paper prescription and others can be bought over the counter.  Ask your nurse if you have questions.     Bring a paper prescription for each of these medications     ALPRAZolam 0.5 MG tablet    zolpidem 5 MG tablet          Primary Care Provider Office Phone # Fax #    Molly Adame ARIEL [facility] [phone] [address] [facility] [address] [facility] [address]        Equal Access to Services     ROSAURA INTERIANO : Odilon Santana, wajamie robles, michelle tilley, josemanuel oswald . So [facility] [phone].    ATENCIÓN: Si habla español, tiene a chacon disposición servicios gratuitos de asistencia lingüística. Mario al [phone].    We comply with applicable federal civil rights laws and [place] laws. We do not discriminate on the basis of race, color, national origin, age, disability, sex, sexual orientation, or gender identity.            Thank you!     Thank you for choosing [facility]  for your care. Our goal is always to provide you with excellent care. Hearing back from our patients is one way we can continue to improve our services. Please take a few minutes to complete the written survey that you may receive in the mail after your visit with us. Thank you!             Your Updated Medication List - Protect others around you: Learn how to safely use, store and throw away your medicines at www.disposemymeds.org.          This list is accurate as of: 12/18/17  6:42 PM.  Always use your most recent med list.                   Brand Name Dispense Instructions for use Diagnosis    acetaminophen 325 MG tablet     TYLENOL    100 tablet    Take 2 tablets (650 mg) by mouth every 4 hours as needed for mild pain (mild pain)    Malignant neoplasm of right kidney (H)       acetaminophen-codeine 300-30 MG per tablet    TYLENOL #3    45 tablet    Take 1 tablet by mouth every 6 hours as needed for moderate pain    Metastatic renal cell carcinoma to lung, right (H)       ALPRAZolam 0.5 MG tablet    XANAX    60 tablet    Take 1 tablet (0.5 mg) by mouth 3 times daily as needed for anxiety    Renal cell carcinoma, unspecified laterality (H), Mass of upper lobe of left lung       benzonatate 100 MG capsule    TESSALON    60 capsule    Take 1 capsule (100 mg) by mouth 3 times daily as needed for cough    Metastatic renal cell carcinoma to lung, right (H)       buPROPion 300 MG 24 hr tablet    WELLBUTRIN XL     Take 300 mg by mouth every morning        citalopram 20 MG tablet    celeXA    90 tablet    Take 1 tablet (20 mg) by mouth every evening    Insomnia, unspecified type       clonazePAM 0.5 MG tablet    klonoPIN    20 tablet    Take 0.5-1 tablets (0.25-0.5 mg) by mouth 2 times daily as needed for anxiety    Panic attack       IBUPROFEN      Take 400 mg by mouth every 6 hours as needed        lidocaine-prilocaine cream    EMLA    60 g    Apply 1 hour prior to port access.    Malignant neoplasm of right kidney (H)       LORazepam 0.5 MG tablet    ATIVAN    30 tablet    Take 1 tablet (0.5 mg) by mouth every 4 hours as needed (Anxiety, Nausea/Vomiting or Sleep)    Malignant neoplasm of right kidney (H)       * prochlorperazine 10 MG tablet    COMPAZINE    30 tablet    Take 1 tablet (10 mg) by mouth every 6 hours as needed for nausea or vomiting (Breakthrough Nausea/Vomiting)    Malignant neoplasm of right kidney (H)       * prochlorperazine 10 MG tablet    COMPAZINE    30 tablet    Take 1 tablet (10 mg) by mouth every 6 hours as needed (Nausea/Vomiting)    Malignant neoplasm of right kidney (H)       zolpidem 5 MG tablet    AMBIEN    30  tablet    Take 1 tablet (5 mg) by mouth nightly as needed for sleep    Sleep disorder       * Notice:  This list has 2 medication(s) that are the same as other medications prescribed for you. Read the directions carefully, and ask your doctor or other care provider to review them with you.

## 2017-12-18 NOTE — NURSING NOTE
"Chief Complaint   Patient presents with     Port Draw     Labs drawn from port by RN. Line flushed with saline and heparin. Vs taken and pt checked in for appt     Port accessed with 20g 3/4\" gripper needle by RN, labs collected, line flushed with saline and heparin.  Vitals taken. Pt checked in for appointment(s).    Analy Hart RN  "

## 2017-12-18 NOTE — MR AVS SNAPSHOT
After Visit Summary   12/18/2017    Suzi Gonsales    MRN: [id number]           Patient Information     Date Of Birth          1965        Visit Information        Provider Department      12/18/2017 4:00 PM UC 24 ATC;  ONCOLOGY INFUSION Former[facility]        Today's Diagnoses     Malignant neoplasm of right kidney (H)    -  1      Care Instructions    Contact Numbers    [facility] Main Line: [phone]    Triage Nurse Line: [phone]      Please call the [facility] Nurse Triage line if you experience a temperature greater than or equal to 100.5, shaking chills, have uncontrolled nausea, vomiting and/or diarrhea, dizziness, shortness of breath, bleeding not relieved with pressure, or if you have any other questions or concerns.     If it is after hours, weekends, or holidays, please call the main hospital  at  [phone] and ask to speak to the adult Oncology doctor on call.     If you are having any concerning symptoms or wish to speak to a provider before your next infusion visit, please call your care coordinator or triage them so we can adequately serve you.      If you need to refill your narcotic prescription or other medication, please call triage before your infusion appointment.        December 2017 Sunday Monday Tuesday Wednesday Thursday Friday Saturday                            1     2       3     4     Outpatient Visit    8:27 AM   [facility]     IR CHEST PORT PLACEMENT >5 YRS    8:30 AM   (75 min.)   [id number]   [facility]     INSERT PORT VASCULAR ACCESS   10:00 AM   Melanie Cevallos PA-C    OR 5     [facility] LAB DRAW   10:45 AM   (15 min.)    [facility] LAB DRAW   [facility] Lab Draw     [facility] RETURN   10:55 AM   (50 min.)   Molly Max PA-C   [facility] ONC INFUSION 120   12:30 PM   (120 min.)    ONCOLOGY INFUSION   [facility] 6      7     8     9       10     11     12     13     14     15     16       17     18     [facility]    3[facility]15 PM   (15 min.)    [facility] LAB DRAW   [facility] Lab Draw     [facility] RETURN    3:45 PM   (50 min.)   Day Ren PA-C   [facility] ONC INFUSION 120    4:00 PM   (120 min.)   UC ONCOLOGY INFUSION   [facility] 19     20     21     22     23       24     25     26     27     28     29     30       31                                              January 2018 Sunday Monday Tuesday Wednesday Thursday Friday Saturday        1     2     3     4     5     6       7     8     9     10     11     12     13       14     15     16     17     18     19     20       21     22     23     24     25     26     27       28     29     30     31                                Recent Results (from the past 24 hour(s))   Comprehensive metabolic panel    Collection Time: 12/18/17  3:33 PM   Result Value Ref Range    Sodium 136 133 - 144 mmol/L    Potassium 3.8 3.4 - 5.3 mmol/L    Chloride 103 94 - 109 mmol/L    Carbon Dioxide 26 20 - 32 mmol/L    Anion Gap 7 3 - 14 mmol/L    Glucose 128 (H) 70 - 99 mg/dL    Urea Nitrogen 18 7 - 30 mg/dL    Creatinine 1.00 0.52 - 1.04 mg/dL    GFR Estimate 58 (L) >60 mL/min/1.7m2    GFR Estimate If Black 70 >60 mL/min/1.7m2    Calcium 9.1 8.5 - 10.1 mg/dL    Bilirubin Total 0.3 0.2 - 1.3 mg/dL    Albumin 3.6 3.4 - 5.0 g/dL    Protein Total 7.7 6.8 - 8.8 g/dL    Alkaline Phosphatase 69 40 - 150 U/L    ALT 17 0 - 50 U/L    AST 21 0 - 45 U/L   CBC with platelets differential    Collection Time: 12/18/17  3:33 PM   Result Value Ref Range    WBC 4.7 4.0 - 11.0 10e9/L    RBC Count 3.82 3.8 - 5.2 10e12/L    Hemoglobin 11.3 (L) 11.7 - 15.7 g/dL    Hematocrit 34.6 (L) 35.0 - 47.0 %    MCV 91 78 - 100 fl    MCH 29.6 26.5 - 33.0 pg    MCHC 32.7 31.5 - 36.5 g/dL    RDW 12.8 10.0 - 15.0 %    Platelet Count 363 150 - 450 10e9/L    Diff Method  Automated Method     % Neutrophils 40.6 %    % Lymphocytes 45.5 %    % Monocytes 7.2 %    % Eosinophils 5.9 %    % Basophils 0.6 %    % Immature Granulocytes 0.2 %    Nucleated RBCs 0 0 /100    Absolute Neutrophil 1.9 1.6 - 8.3 10e9/L    Absolute Lymphocytes 2.2 0.8 - 5.3 10e9/L    Absolute Monocytes 0.3 0.0 - 1.3 10e9/L    Absolute Eosinophils 0.3 0.0 - 0.7 10e9/L    Absolute Basophils 0.0 0.0 - 0.2 10e9/L    Abs Immature Granulocytes 0.0 0 - 0.4 10e9/L    Absolute Nucleated RBC 0.0    TSH    Collection Time: 12/18/17  3:33 PM   Result Value Ref Range    TSH 1.29 0.40 - 4.00 mU/L               Follow-ups after your visit        Who to contact     If you have questions or need follow up information about today's clinic visit or your schedule please contact [facility] CANCER [facility] directly at [phone].  Normal or non-critical lab and imaging results will be communicated to you by [employer], letter or phone within 4 business days after the clinic has received the results. If you do not hear from us within 7 days, please contact the clinic through [employer] or phone. If you have a critical or abnormal lab result, we will notify you by phone as soon as possible.  Submit refill requests through [employer] or call your pharmacy and they will forward the refill request to us. Please allow 3 business days for your refill to be completed.          Additional Information About Your Visit        [employer] Information     [employer] gives you secure access to your electronic health record. If you see a primary care provider, you can also send messages to your care team and make appointments. If you have questions, please call your primary care clinic.  If you do not have a primary care provider, please call [phone] and they will assist you.        Care EveryWhere ID     This is your Care EveryWhere ID. This could be used by other organizations to access your [place] medical records  [id number]        Your Vitals Were      Last Period                   (Within Months)            Blood Pressure from Last 3 Encounters:   12/18/17 99/59   12/05/17 100/57   12/04/17 109/63    Weight from Last 3 Encounters:   12/18/17 57 kg (125 lb 9.6 oz)   12/05/17 56.7 kg (125 lb)   12/04/17 54.9 kg (121 lb)              We Performed the Following     CBC with platelets differential     Comprehensive metabolic panel     TSH     Vitamin D Deficiency          Where to get your medicines      Some of these will need a paper prescription and others can be bought over the counter.  Ask your nurse if you have questions.     Bring a paper prescription for each of these medications     ALPRAZolam 0.5 MG tablet    zolpidem 5 MG tablet          Primary Care Provider Office Phone # Fax #    Molly Foote ARIEL Adame [phone] [address] [address]        Equal Access to Services     ROSAURA INTERIANO : Hadii jimmie Santana, wajamie robles, qaybta kaalmajodi tilley, josemanuel oswald . So [facility] [phone].    ATENCIÓN: Si habla español, tiene a chacon disposición servicios gratuitos de asistencia lingüística. Llame al [phone].    We comply with applicable federal civil rights laws and [place] laws. We do not discriminate on the basis of race, color, national origin, age, disability, sex, sexual orientation, or gender identity.            Thank you!     Thank you for choosing [facility]  for your care. Our goal is always to provide you with excellent care. Hearing back from our patients is one way we can continue to improve our services. Please take a few minutes to complete the written survey that you may receive in the mail after your visit with us. Thank you!             Your Updated Medication List - Protect others around you: Learn how to safely use, store and throw away your medicines at www.disposemymeds.org.          This list is accurate as of: 12/18/17  6:03  PM.  Always use your most recent med list.                   Brand Name Dispense Instructions for use Diagnosis    acetaminophen 325 MG tablet    TYLENOL    100 tablet    Take 2 tablets (650 mg) by mouth every 4 hours as needed for mild pain (mild pain)    Malignant neoplasm of right kidney (H)       acetaminophen-codeine 300-30 MG per tablet    TYLENOL #3    45 tablet    Take 1 tablet by mouth every 6 hours as needed for moderate pain    Metastatic renal cell carcinoma to lung, right (H)       ALPRAZolam 0.5 MG tablet    XANAX    60 tablet    Take 1 tablet (0.5 mg) by mouth 3 times daily as needed for anxiety    Renal cell carcinoma, unspecified laterality (H), Mass of upper lobe of left lung       benzonatate 100 MG capsule    TESSALON    60 capsule    Take 1 capsule (100 mg) by mouth 3 times daily as needed for cough    Metastatic renal cell carcinoma to lung, right (H)       buPROPion 300 MG 24 hr tablet    WELLBUTRIN XL     Take 300 mg by mouth every morning        citalopram 20 MG tablet    celeXA    90 tablet    Take 1 tablet (20 mg) by mouth every evening    Insomnia, unspecified type       clonazePAM 0.5 MG tablet    klonoPIN    20 tablet    Take 0.5-1 tablets (0.25-0.5 mg) by mouth 2 times daily as needed for anxiety    Panic attack       IBUPROFEN      Take 400 mg by mouth every 6 hours as needed        lidocaine-prilocaine cream    EMLA    60 g    Apply 1 hour prior to port access.    Malignant neoplasm of right kidney (H)       LORazepam 0.5 MG tablet    ATIVAN    30 tablet    Take 1 tablet (0.5 mg) by mouth every 4 hours as needed (Anxiety, Nausea/Vomiting or Sleep)    Malignant neoplasm of right kidney (H)       * prochlorperazine 10 MG tablet    COMPAZINE    30 tablet    Take 1 tablet (10 mg) by mouth every 6 hours as needed for nausea or vomiting (Breakthrough Nausea/Vomiting)    Malignant neoplasm of right kidney (H)       * prochlorperazine 10 MG tablet    COMPAZINE    30 tablet    Take 1 tablet  (10 mg) by mouth every 6 hours as needed (Nausea/Vomiting)    Malignant neoplasm of right kidney (H)       zolpidem 5 MG tablet    AMBIEN    30 tablet    Take 1 tablet (5 mg) by mouth nightly as needed for sleep    Sleep disorder       * Notice:  This list has 2 medication(s) that are the same as other medications prescribed for you. Read the directions carefully, and ask your doctor or other care provider to review them with you.

## 2017-12-18 NOTE — PROGRESS NOTES
Infusion Nursing Note:  Suzi Gonsales presents today for C1D15 Nivolumab.    Patient seen by provider today: Yes: Kia LU    Note: N/A.    Intravenous Access:  Implanted Port.      Treatment Conditions:  Lab Results   Component Value Date    HGB 11.3 12/18/2017     Lab Results   Component Value Date    WBC 4.7 12/18/2017      Lab Results   Component Value Date    ANEU 1.9 12/18/2017     Lab Results   Component Value Date     12/18/2017      Lab Results   Component Value Date     12/18/2017                   Lab Results   Component Value Date    POTASSIUM 3.8 12/18/2017           Lab Results   Component Value Date    MAG 1.8 08/19/2017            Lab Results   Component Value Date    CR 1.00 12/18/2017                   Lab Results   Component Value Date    MUNDO 9.1 12/18/2017                Lab Results   Component Value Date    BILITOTAL 0.3 12/18/2017           Lab Results   Component Value Date    ALBUMIN 3.6 12/18/2017                    Lab Results   Component Value Date    ALT 17 12/18/2017           Lab Results   Component Value Date    AST 21 12/18/2017     Results reviewed, labs MET treatment parameters, ok to proceed with treatment.          Post Infusion Assessment:  Patient tolerated infusion without incident.  Blood return noted pre and post infusion.  Site patent and intact, free from redness, edema or discomfort.  No evidence of extravasations.  Access discontinued per protocol.    Discharge Plan:   Patient declined prescription refills.  Patient and/or family verbalized understanding of discharge instructions and all questions answered.  AVS to patient via [employer].  Future appointments to be scheduled.   Patient discharged in stable condition accompanied by: .  Departure Mode: Ambulatory.    Caroline Shen RN

## 2017-12-19 LAB — DEPRECATED CALCIDIOL+CALCIFEROL SERPL-MC: 71 UG/L (ref 20–75)

## 2017-12-19 NOTE — PROGRESS NOTES
[facility] CANCER CLINIC  FOLLOW-UP VISIT NOTE  Date of visit: Dec 18, 2017           REASON FOR VISIT: [facility] here for assessment prior to C1D15 Opdivo    HPI: Suzi presented to ED with hematuria and right flank pain thinking she had a renal stone in December 2014. She was worked up with a CT abd/pelvis which suggested a renal mass. She was referred to Dr. Max Zelaya in [facility]. 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 Per charts tumor resection had negative margins and it was staged at pT2bN0.    Her staging work up was negative except for pulmonary nodules. She has been followed every 6 months with scans. CT CAP on 5/11/17 showed enlarging hilar LAD, enlarging pulmonary nodules, adrenal nodules and a pancreatic body mass. Biopsies of hilar LN, adrenal nodule and pancreatic mass were + for RCC. She decided to pursue IL-2, of which she received 4 cycles, last on 8/15/17. CT CAP on 9/13/17 showed generally stable disease. CT CAP on 11/22/17 showed disease progression. She is here today for routine follow up prior to consideration of starting on nivolumab.     INTERVAL HISTORY:   Suzi is doing well today and tolerated her Opdivo without issues.  She has some baseline fatigue that she deals with but mostly is feeling well and getting the things done during the day that she wishes.  She continues to be busy with her children who are 15 and 17-year-old boys.  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  She has noticed increased anxiety lately and is concerned about her recent disease progression.  She continues to take Celexa and Wellbutrin which she has taken for several years.  She alternates and taking Xanax or Klonopin about every 3 days.  All of these medications have been managed by her primary care provider.  Her port placement went well but there is some mild  discomfort residual. She denies other concerns.    Current Outpatient Prescriptions   Medication Sig Dispense Refill     zolpidem (AMBIEN) 5 MG tablet Take 1 tablet (5 mg) by mouth nightly as needed for sleep 30 tablet 1     ALPRAZolam (XANAX) 0.5 MG tablet Take 1 tablet (0.5 mg) by mouth 3 times daily as needed for anxiety 60 tablet 0     lidocaine-prilocaine (EMLA) cream Apply 1 hour prior to port access. 60 g 1     LORazepam (ATIVAN) 0.5 MG tablet Take 1 tablet (0.5 mg) by mouth every 4 hours as needed (Anxiety, Nausea/Vomiting or Sleep) 30 tablet 2     prochlorperazine (COMPAZINE) 10 MG tablet Take 1 tablet (10 mg) by mouth every 6 hours as needed (Nausea/Vomiting) 30 tablet 2     benzonatate (TESSALON) 100 MG capsule Take 1 capsule (100 mg) by mouth 3 times daily as needed for cough 60 capsule 0     acetaminophen-codeine (TYLENOL #3) 300-30 MG per tablet Take 1 tablet by mouth every 6 hours as needed for moderate pain 45 tablet 0     citalopram (CELEXA) 20 MG tablet Take 1 tablet (20 mg) by mouth every evening 90 tablet 3     clonazePAM (KLONOPIN) 0.5 MG tablet Take 0.5-1 tablets (0.25-0.5 mg) by mouth 2 times daily as needed for anxiety 20 tablet 0     prochlorperazine (COMPAZINE) 10 MG tablet Take 1 tablet (10 mg) by mouth every 6 hours as needed for nausea or vomiting (Breakthrough Nausea/Vomiting) 30 tablet 0     acetaminophen (TYLENOL) 325 MG tablet Take 2 tablets (650 mg) by mouth every 4 hours as needed for mild pain (mild pain) 100 tablet 0     buPROPion (WELLBUTRIN XL) 300 MG 24 hr tablet Take 300 mg by mouth every morning        IBUPROFEN Take 400 mg by mouth every 6 hours as needed        EXAM:    BP 99/59 (BP Location: Right arm, Cuff Size: Adult Regular)  Pulse 81  Temp 98  F (36.7  C) (Oral)  Resp 16  Wt 57 kg (125 lb 9.6 oz)  LMP  (Within Months)  SpO2 94%  BMI 22.97 kg/m2    Wt Readings from Last 4 Encounters:   12/18/17 57 kg (125 lb 9.6 oz)   12/05/17 56.7 kg (125 lb)   12/04/17 54.9 kg  (121 lb)   11/22/17 54.9 kg (121 lb)       Vital signs were reviewed.   Patient alert and oriented.   PERRLA. EOMI. No scleral icterus noted. OP without thrush/sores.  Neck exam: No palpable cervical or supraclavicular nodes bilaterally.   Heart: RRR no murmurs noted.   Lungs: clear to auscultation bilaterally.  No crackles or wheezing.   Abd: positive bowel sounds in all four quadrants.  No tenderness to palpation.  No hepatomegaly.   Extremities: No lower extremity edema.   Neuro: grossly intact.   Mood and affect is stable.   Skin has no rashes or lesions on exposed areas. Port is accessed in right chest.     LABS:      12/18/2017 15:33   Sodium 136   Potassium 3.8   Chloride 103   Carbon Dioxide 26   Urea Nitrogen 18   Creatinine 1.00   GFR Estimate 58 (L)   GFR Estimate If Black 70   Calcium 9.1   Anion Gap 7   Albumin 3.6   Protein Total 7.7   Bilirubin Total 0.3   Alkaline Phosphatase 69   ALT 17   AST 21   TSH 1.29   Glucose 128 (H)   WBC 4.7   Hemoglobin 11.3 (L)   Hematocrit 34.6 (L)   Platelet Count 363   RBC Count 3.82   MCV 91     ASSESSMENT/PLAN: 52 year old with mRCC s/p 4 cycles of IL-2.  She had a stable CT after 2 cycles, mild disease progression after 4 cycles.  After a short break, CT showed worsening disease and she has started with Opdivo.     RCC. Patient will start on nivolumab on 12/5. We reviewed the most common side effect is fatigue, of which the intensity is variable. We also reviewed the potential for an immune mediated side effect involving inflammation in the pituitary gland, thyroid, heart, lungs, liver, kidneys, pancreas, or colon. We reviewed immune mediated side effects would be treated with high dose steroids. I will see her once a month and we will get a CT Scan after 3 months.     Depression and anxiety. Increased anxiety lately and referral to [facility] was placed last visit. Patient will continue to follow with her PCP, who prescribes her Celexa, Wellbutrin, and benzo's. She does  not feel that she needs a dose adjustment today.     IV access. Port recently placed with some mild discomfort, improving.     Kia Ren PA-C  [facility]  [address] [place] [address] [phone]

## 2017-12-19 NOTE — PATIENT INSTRUCTIONS
Contact Numbers    [facility] Main Line: [phone]    Triage Nurse Line: [phone]      Please call the [facility] Nurse Triage line if you experience a temperature greater than or equal to 100.5, shaking chills, have uncontrolled nausea, vomiting and/or diarrhea, dizziness, shortness of breath, bleeding not relieved with pressure, or if you have any other questions or concerns.     If it is after hours, weekends, or holidays, please call the main hospital  at  [phone] and ask to speak to the adult Oncology doctor on call.     If you are having any concerning symptoms or wish to speak to a provider before your next infusion visit, please call your care coordinator or triage them so we can adequately serve you.      If you need to refill your narcotic prescription or other medication, please call triage before your infusion appointment.        December 2017 Sunday Monday Tuesday Wednesday Thursday Friday Saturday                            1     2       3     4     Outpatient Visit    8:27 AM   [facility]     IR CHEST PORT PLACEMENT >5 YRS    8:30 AM   (75 min.)   [id number]   [facility] Imaging     INSERT PORT VASCULAR ACCESS   10:00 AM   Melanie Cevallos PA-C   [facility] OR 5     P [facility] DRAW   10:45 AM   (15 min.)    [facility] LAB DRAW   [facility] Lab Draw     P RETURN   10:55 AM   (50 min.)   Molly Max [facility]-C   [facility]   12:30 PM   (120 min.)    ONCOLOGY INFUSION   [facility] 6     7     8     9       10     11     12     13     14     15     16       17     18     P [facility] LAB DRAW    3:15 PM   (15 min.)    [facility] LAB DRAW   [facility] Lab Draw     [facility] RETURN    3:45 PM   (50 min.)   Day Ren PA-C   [facility] ONC INFUSION 120    4:00 PM   (120 min.)    ONCOLOGY INFUSION   [facility] 19      20     21     22     23       24     25     26     27     28     29     30       31                                              January 2018 Sunday Monday Tuesday Wednesday Thursday Friday Saturday        1     2     3     4     5     6       7     8     9     10     11     12     13       14     15     16     17     18     19     20       21     22     23     24     25     26     27       28     29     30     31                                Recent Results (from the past 24 hour(s))   Comprehensive metabolic panel    Collection Time: 12/18/17  3:33 PM   Result Value Ref Range    Sodium 136 133 - 144 mmol/L    Potassium 3.8 3.4 - 5.3 mmol/L    Chloride 103 94 - 109 mmol/L    Carbon Dioxide 26 20 - 32 mmol/L    Anion Gap 7 3 - 14 mmol/L    Glucose 128 (H) 70 - 99 mg/dL    Urea Nitrogen 18 7 - 30 mg/dL    Creatinine 1.00 0.52 - 1.04 mg/dL    GFR Estimate 58 (L) >60 mL/min/1.7m2    GFR Estimate If Black 70 >60 mL/min/1.7m2    Calcium 9.1 8.5 - 10.1 mg/dL    Bilirubin Total 0.3 0.2 - 1.3 mg/dL    Albumin 3.6 3.4 - 5.0 g/dL    Protein Total 7.7 6.8 - 8.8 g/dL    Alkaline Phosphatase 69 40 - 150 U/L    ALT 17 0 - 50 U/L    AST 21 0 - 45 U/L   CBC with platelets differential    Collection Time: 12/18/17  3:33 PM   Result Value Ref Range    WBC 4.7 4.0 - 11.0 10e9/L    RBC Count 3.82 3.8 - 5.2 10e12/L    Hemoglobin 11.3 (L) 11.7 - 15.7 g/dL    Hematocrit 34.6 (L) 35.0 - 47.0 %    MCV 91 78 - 100 fl    MCH 29.6 26.5 - 33.0 pg    MCHC 32.7 31.5 - 36.5 g/dL    RDW 12.8 10.0 - 15.0 %    Platelet Count 363 150 - 450 10e9/L    Diff Method Automated Method     % Neutrophils 40.6 %    % Lymphocytes 45.5 %    % Monocytes 7.2 %    % Eosinophils 5.9 %    % Basophils 0.6 %    % Immature Granulocytes 0.2 %    Nucleated RBCs 0 0 /100    Absolute Neutrophil 1.9 1.6 - 8.3 10e9/L    Absolute Lymphocytes 2.2 0.8 - 5.3 10e9/L    Absolute Monocytes 0.3 0.0 - 1.3 10e9/L    Absolute Eosinophils 0.3 0.0 - 0.7 10e9/L    Absolute Basophils 0.0  0.0 - 0.2 10e9/L    Abs Immature Granulocytes 0.0 0 - 0.4 10e9/L    Absolute Nucleated RBC 0.0    TSH    Collection Time: 12/18/17  3:33 PM   Result Value Ref Range    TSH 1.29 0.40 - 4.00 mU/L

## 2017-12-20 ENCOUNTER — TELEPHONE (OUTPATIENT)
Dept: ONCOLOGY | Facility: CLINIC | Age: 52
End: 2017-12-20

## 2017-12-20 NOTE — TELEPHONE ENCOUNTER
Social Work Note: Telephone Call  Oncology Clinic    Data/Intervention:  Patient Name:  Suzi Gonsales  /Age:  1965 (52 year old)    Call From:  Social work received call from patient   Reason for Call:  Follow up to SW phone call regarding [employer] application    Assessment:  Social work received phone call from patient regarding [employer] program.  Social work explained that program has a two part application including a provider certification which this worker would work with medical team on getting completed.  Social work explained patient portion of application and indicated that once provider certification is completed, application would be mailed to patient to complete and then send to organization.  Patient indicated understanding of education given.    Application forms placed in mail with completed provider information.    Plan:  SW provided phone number to patient for any further questions.  SW is available to assist with needs.    Soo Yeon Han LICSW  2017  Pager: [phone]  Phone Number: [phone]

## 2017-12-26 DIAGNOSIS — F41.0 PANIC ATTACK: ICD-10-CM

## 2017-12-28 NOTE — TELEPHONE ENCOUNTER
Clonazepam      Last Written Prescription Date:  10/3/17  Last Fill Quantity: 20,   # refills: 0  Last Office Visit: 10/3/17  Future Office visit:       Routing refill request to provider for review/approval because:  Drug not on the [facility], [facility] or [facility] refill protocol or controlled substance.  Patient also has alprazolam listed from a different provider.  Annalisa Gibson RN

## 2017-12-29 RX ORDER — CLONAZEPAM 0.5 MG/1
TABLET ORAL
Qty: 20 TABLET | Refills: 0 | Status: SHIPPED | OUTPATIENT
Start: 2017-12-29 | End: 2018-02-12

## 2018-01-01 ENCOUNTER — APPOINTMENT (OUTPATIENT)
Dept: PHYSICAL THERAPY | Facility: CLINIC | Age: 53
DRG: 481 | End: 2018-01-01
Attending: ORTHOPAEDIC SURGERY
Payer: COMMERCIAL

## 2018-01-01 ENCOUNTER — CARE COORDINATION (OUTPATIENT)
Dept: ONCOLOGY | Facility: CLINIC | Age: 53
End: 2018-01-01

## 2018-01-01 ENCOUNTER — SURGERY (OUTPATIENT)
Age: 53
End: 2018-01-01

## 2018-01-01 ENCOUNTER — APPOINTMENT (OUTPATIENT)
Dept: LAB | Facility: CLINIC | Age: 53
End: 2018-01-01
Attending: INTERNAL MEDICINE
Payer: COMMERCIAL

## 2018-01-01 ENCOUNTER — TELEPHONE (OUTPATIENT)
Dept: PHARMACY | Facility: CLINIC | Age: 53
End: 2018-01-01

## 2018-01-01 ENCOUNTER — APPOINTMENT (OUTPATIENT)
Dept: OCCUPATIONAL THERAPY | Facility: CLINIC | Age: 53
DRG: 481 | End: 2018-01-01
Attending: ORTHOPAEDIC SURGERY
Payer: COMMERCIAL

## 2018-01-01 ENCOUNTER — ANCILLARY PROCEDURE (OUTPATIENT)
Dept: RADIOLOGY | Facility: AMBULATORY SURGERY CENTER | Age: 53
End: 2018-01-01
Attending: INTERNAL MEDICINE
Payer: COMMERCIAL

## 2018-01-01 ENCOUNTER — APPOINTMENT (OUTPATIENT)
Dept: RADIATION ONCOLOGY | Facility: CLINIC | Age: 53
End: 2018-01-01
Attending: RADIOLOGY
Payer: COMMERCIAL

## 2018-01-01 ENCOUNTER — TELEPHONE (OUTPATIENT)
Dept: ONCOLOGY | Facility: CLINIC | Age: 53
End: 2018-01-01

## 2018-01-01 ENCOUNTER — INFUSION THERAPY VISIT (OUTPATIENT)
Dept: INFUSION THERAPY | Facility: CLINIC | Age: 53
End: 2018-01-01
Attending: NURSE PRACTITIONER
Payer: COMMERCIAL

## 2018-01-01 ENCOUNTER — RADIANT APPOINTMENT (OUTPATIENT)
Dept: RADIOLOGY | Facility: AMBULATORY SURGERY CENTER | Age: 53
End: 2018-01-01
Attending: PHYSICIAN ASSISTANT
Payer: COMMERCIAL

## 2018-01-01 ENCOUNTER — HOSPITAL ENCOUNTER (INPATIENT)
Facility: CLINIC | Age: 53
LOS: 3 days | Discharge: HOME | DRG: 481 | End: 2018-10-04
Attending: ORTHOPAEDIC SURGERY | Admitting: ORTHOPAEDIC SURGERY
Payer: COMMERCIAL

## 2018-01-01 ENCOUNTER — APPOINTMENT (OUTPATIENT)
Dept: INTERVENTIONAL RADIOLOGY/VASCULAR | Facility: CLINIC | Age: 53
End: 2018-01-01
Attending: PHYSICIAN ASSISTANT
Payer: COMMERCIAL

## 2018-01-01 ENCOUNTER — ONCOLOGY VISIT (OUTPATIENT)
Dept: ONCOLOGY | Facility: CLINIC | Age: 53
End: 2018-01-01
Attending: INTERNAL MEDICINE
Payer: COMMERCIAL

## 2018-01-01 ENCOUNTER — RADIANT APPOINTMENT (OUTPATIENT)
Dept: GENERAL RADIOLOGY | Facility: CLINIC | Age: 53
End: 2018-01-01
Attending: PHYSICIAN ASSISTANT
Payer: COMMERCIAL

## 2018-01-01 ENCOUNTER — MYC MEDICAL ADVICE (OUTPATIENT)
Dept: FAMILY MEDICINE | Facility: CLINIC | Age: 53
End: 2018-01-01

## 2018-01-01 ENCOUNTER — HOSPITAL ENCOUNTER (OUTPATIENT)
Facility: AMBULATORY SURGERY CENTER | Age: 53
End: 2018-01-01
Attending: PHYSICIAN ASSISTANT
Payer: COMMERCIAL

## 2018-01-01 ENCOUNTER — TELEPHONE (OUTPATIENT)
Dept: RADIATION ONCOLOGY | Facility: CLINIC | Age: 53
End: 2018-01-01

## 2018-01-01 ENCOUNTER — HOSPITAL ENCOUNTER (OUTPATIENT)
Dept: MRI IMAGING | Facility: CLINIC | Age: 53
Discharge: HOME | End: 2018-04-05
Attending: RADIOLOGY | Admitting: RADIOLOGY
Payer: COMMERCIAL

## 2018-01-01 ENCOUNTER — PRE VISIT (OUTPATIENT)
Dept: ORTHOPEDICS | Facility: CLINIC | Age: 53
End: 2018-01-01

## 2018-01-01 ENCOUNTER — RADIANT APPOINTMENT (OUTPATIENT)
Dept: CT IMAGING | Facility: CLINIC | Age: 53
End: 2018-01-01
Attending: PHYSICIAN ASSISTANT
Payer: COMMERCIAL

## 2018-01-01 ENCOUNTER — OFFICE VISIT (OUTPATIENT)
Dept: SURGERY | Facility: CLINIC | Age: 53
End: 2018-01-01
Payer: COMMERCIAL

## 2018-01-01 ENCOUNTER — ONCOLOGY VISIT (OUTPATIENT)
Dept: ONCOLOGY | Facility: CLINIC | Age: 53
End: 2018-01-01
Attending: PHYSICIAN ASSISTANT
Payer: COMMERCIAL

## 2018-01-01 ENCOUNTER — ONCOLOGY VISIT (OUTPATIENT)
Dept: RADIATION ONCOLOGY | Facility: CLINIC | Age: 53
End: 2018-01-01

## 2018-01-01 ENCOUNTER — OFFICE VISIT (OUTPATIENT)
Dept: ORTHOPEDICS | Facility: CLINIC | Age: 53
End: 2018-01-01
Payer: COMMERCIAL

## 2018-01-01 ENCOUNTER — HOSPITAL ENCOUNTER (OUTPATIENT)
Facility: AMBULATORY SURGERY CENTER | Age: 53
End: 2018-06-12
Attending: PHYSICIAN ASSISTANT
Payer: COMMERCIAL

## 2018-01-01 ENCOUNTER — RADIANT APPOINTMENT (OUTPATIENT)
Dept: GENERAL RADIOLOGY | Facility: CLINIC | Age: 53
End: 2018-01-01
Payer: COMMERCIAL

## 2018-01-01 ENCOUNTER — ANESTHESIA EVENT (OUTPATIENT)
Dept: SURGERY | Facility: CLINIC | Age: 53
DRG: 481 | End: 2018-01-01
Payer: COMMERCIAL

## 2018-01-01 ENCOUNTER — TELEPHONE (OUTPATIENT)
Dept: INTERVENTIONAL RADIOLOGY/VASCULAR | Facility: CLINIC | Age: 53
End: 2018-01-01

## 2018-01-01 ENCOUNTER — RADIANT APPOINTMENT (OUTPATIENT)
Dept: GENERAL RADIOLOGY | Facility: CLINIC | Age: 53
End: 2018-01-01
Attending: ORTHOPAEDIC SURGERY
Payer: COMMERCIAL

## 2018-01-01 ENCOUNTER — HOSPITAL ENCOUNTER (OUTPATIENT)
Facility: AMBULATORY SURGERY CENTER | Age: 53
End: 2018-09-10
Attending: PHYSICIAN ASSISTANT
Payer: COMMERCIAL

## 2018-01-01 ENCOUNTER — HOSPITAL ENCOUNTER (OUTPATIENT)
Facility: CLINIC | Age: 53
End: 2018-01-01
Admitting: RADIOLOGY
Payer: COMMERCIAL

## 2018-01-01 ENCOUNTER — HOSPITAL ENCOUNTER (OUTPATIENT)
Dept: MRI IMAGING | Facility: CLINIC | Age: 53
Discharge: HOME | End: 2018-09-06
Attending: RADIOLOGY | Admitting: RADIOLOGY
Payer: COMMERCIAL

## 2018-01-01 ENCOUNTER — HOSPITAL ENCOUNTER (OUTPATIENT)
Facility: AMBULATORY SURGERY CENTER | Age: 53
End: 2018-12-21
Attending: PHYSICIAN ASSISTANT
Payer: COMMERCIAL

## 2018-01-01 ENCOUNTER — TELEPHONE (OUTPATIENT)
Dept: ORTHOPEDICS | Facility: CLINIC | Age: 53
End: 2018-01-01

## 2018-01-01 ENCOUNTER — HOSPITAL ENCOUNTER (OUTPATIENT)
Facility: AMBULATORY SURGERY CENTER | Age: 53
End: 2018-11-06
Attending: PHYSICIAN ASSISTANT
Payer: COMMERCIAL

## 2018-01-01 ENCOUNTER — HOSPITAL ENCOUNTER (OUTPATIENT)
Dept: MEDSURG UNIT | Facility: CLINIC | Age: 53
End: 2018-04-05
Attending: RADIOLOGY
Payer: COMMERCIAL

## 2018-01-01 ENCOUNTER — MYC MEDICAL ADVICE (OUTPATIENT)
Dept: ONCOLOGY | Facility: CLINIC | Age: 53
End: 2018-01-01

## 2018-01-01 ENCOUNTER — APPOINTMENT (OUTPATIENT)
Dept: LAB | Facility: CLINIC | Age: 53
End: 2018-01-01
Attending: PHYSICIAN ASSISTANT
Payer: COMMERCIAL

## 2018-01-01 ENCOUNTER — ONCOLOGY VISIT (OUTPATIENT)
Dept: ONCOLOGY | Facility: CLINIC | Age: 53
End: 2018-01-01
Attending: NURSE PRACTITIONER
Payer: COMMERCIAL

## 2018-01-01 ENCOUNTER — HOSPITAL ENCOUNTER (OUTPATIENT)
Facility: AMBULATORY SURGERY CENTER | Age: 53
End: 2018-06-25
Attending: PHYSICIAN ASSISTANT
Payer: COMMERCIAL

## 2018-01-01 ENCOUNTER — OFFICE VISIT (OUTPATIENT)
Dept: NEUROSURGERY | Facility: CLINIC | Age: 53
End: 2018-01-01
Payer: COMMERCIAL

## 2018-01-01 ENCOUNTER — HOSPITAL ENCOUNTER (OUTPATIENT)
Dept: MRI IMAGING | Facility: CLINIC | Age: 53
Discharge: HOME | End: 2018-06-05
Attending: RADIOLOGY | Admitting: RADIOLOGY
Payer: COMMERCIAL

## 2018-01-01 ENCOUNTER — APPOINTMENT (OUTPATIENT)
Dept: GENERAL RADIOLOGY | Facility: CLINIC | Age: 53
DRG: 481 | End: 2018-01-01
Attending: ORTHOPAEDIC SURGERY
Payer: COMMERCIAL

## 2018-01-01 ENCOUNTER — PATIENT OUTREACH (OUTPATIENT)
Dept: CARE COORDINATION | Facility: CLINIC | Age: 53
End: 2018-01-01

## 2018-01-01 ENCOUNTER — ANESTHESIA (OUTPATIENT)
Dept: SURGERY | Facility: CLINIC | Age: 53
DRG: 481 | End: 2018-01-01
Payer: COMMERCIAL

## 2018-01-01 ENCOUNTER — APPOINTMENT (OUTPATIENT)
Dept: MEDSURG UNIT | Facility: CLINIC | Age: 53
End: 2018-01-01
Attending: RADIOLOGY
Payer: COMMERCIAL

## 2018-01-01 ENCOUNTER — HOSPITAL ENCOUNTER (OUTPATIENT)
Facility: AMBULATORY SURGERY CENTER | Age: 53
End: 2018-11-23
Attending: PHYSICIAN ASSISTANT
Payer: COMMERCIAL

## 2018-01-01 ENCOUNTER — HOSPITAL ENCOUNTER (OUTPATIENT)
Facility: CLINIC | Age: 53
Discharge: HOME | End: 2018-05-16
Attending: RADIOLOGY | Admitting: RADIOLOGY
Payer: COMMERCIAL

## 2018-01-01 ENCOUNTER — DOCUMENTATION ONLY (OUTPATIENT)
Dept: PHARMACY | Facility: CLINIC | Age: 53
End: 2018-01-01

## 2018-01-01 ENCOUNTER — INFUSION THERAPY VISIT (OUTPATIENT)
Dept: TRANSPLANT | Facility: CLINIC | Age: 53
End: 2018-01-01
Attending: INTERNAL MEDICINE
Payer: COMMERCIAL

## 2018-01-01 VITALS
HEIGHT: 61 IN | DIASTOLIC BLOOD PRESSURE: 45 MMHG | HEART RATE: 83 BPM | SYSTOLIC BLOOD PRESSURE: 101 MMHG | TEMPERATURE: 98.4 F | BODY MASS INDEX: 22.22 KG/M2 | OXYGEN SATURATION: 96 % | WEIGHT: 117.7 LBS

## 2018-01-01 VITALS
OXYGEN SATURATION: 95 % | SYSTOLIC BLOOD PRESSURE: 100 MMHG | RESPIRATION RATE: 16 BRPM | DIASTOLIC BLOOD PRESSURE: 67 MMHG | BODY MASS INDEX: 22.26 KG/M2 | WEIGHT: 117.8 LBS | HEART RATE: 92 BPM | TEMPERATURE: 98.3 F

## 2018-01-01 VITALS
HEART RATE: 76 BPM | BODY MASS INDEX: 23.55 KG/M2 | WEIGHT: 128 LBS | SYSTOLIC BLOOD PRESSURE: 107 MMHG | HEIGHT: 62 IN | DIASTOLIC BLOOD PRESSURE: 52 MMHG

## 2018-01-01 VITALS — WEIGHT: 116.6 LBS | BODY MASS INDEX: 15.79 KG/M2 | HEIGHT: 72 IN

## 2018-01-01 VITALS
WEIGHT: 125.8 LBS | HEIGHT: 62 IN | BODY MASS INDEX: 23.15 KG/M2 | SYSTOLIC BLOOD PRESSURE: 116 MMHG | RESPIRATION RATE: 18 BRPM | TEMPERATURE: 98.1 F | OXYGEN SATURATION: 94 % | DIASTOLIC BLOOD PRESSURE: 52 MMHG | HEART RATE: 98 BPM

## 2018-01-01 VITALS
BODY MASS INDEX: 21.94 KG/M2 | DIASTOLIC BLOOD PRESSURE: 55 MMHG | OXYGEN SATURATION: 98 % | SYSTOLIC BLOOD PRESSURE: 106 MMHG | TEMPERATURE: 97.3 F | WEIGHT: 120 LBS | HEART RATE: 96 BPM

## 2018-01-01 VITALS
OXYGEN SATURATION: 98 % | DIASTOLIC BLOOD PRESSURE: 70 MMHG | SYSTOLIC BLOOD PRESSURE: 110 MMHG | RESPIRATION RATE: 16 BRPM | BODY MASS INDEX: 22.97 KG/M2 | WEIGHT: 117 LBS | HEART RATE: 86 BPM | TEMPERATURE: 98 F | HEIGHT: 60 IN

## 2018-01-01 VITALS
WEIGHT: 125.8 LBS | HEART RATE: 89 BPM | OXYGEN SATURATION: 96 % | TEMPERATURE: 99.3 F | RESPIRATION RATE: 18 BRPM | HEIGHT: 62 IN | SYSTOLIC BLOOD PRESSURE: 93 MMHG | DIASTOLIC BLOOD PRESSURE: 54 MMHG | BODY MASS INDEX: 23.15 KG/M2

## 2018-01-01 VITALS
HEART RATE: 106 BPM | HEIGHT: 62 IN | RESPIRATION RATE: 16 BRPM | WEIGHT: 127.1 LBS | DIASTOLIC BLOOD PRESSURE: 58 MMHG | BODY MASS INDEX: 23.39 KG/M2 | SYSTOLIC BLOOD PRESSURE: 99 MMHG | TEMPERATURE: 98 F | OXYGEN SATURATION: 93 %

## 2018-01-01 VITALS
DIASTOLIC BLOOD PRESSURE: 71 MMHG | HEART RATE: 79 BPM | RESPIRATION RATE: 16 BRPM | SYSTOLIC BLOOD PRESSURE: 125 MMHG | OXYGEN SATURATION: 98 % | WEIGHT: 119.3 LBS | TEMPERATURE: 97.9 F | BODY MASS INDEX: 22.54 KG/M2

## 2018-01-01 VITALS
RESPIRATION RATE: 18 BRPM | TEMPERATURE: 98.8 F | HEIGHT: 61 IN | SYSTOLIC BLOOD PRESSURE: 114 MMHG | DIASTOLIC BLOOD PRESSURE: 69 MMHG | OXYGEN SATURATION: 97 % | WEIGHT: 120 LBS | BODY MASS INDEX: 22.66 KG/M2

## 2018-01-01 VITALS
BODY MASS INDEX: 21.97 KG/M2 | HEART RATE: 98 BPM | HEIGHT: 62 IN | WEIGHT: 117.7 LBS | WEIGHT: 119.4 LBS | SYSTOLIC BLOOD PRESSURE: 110 MMHG | DIASTOLIC BLOOD PRESSURE: 65 MMHG | BODY MASS INDEX: 22.22 KG/M2 | TEMPERATURE: 97.7 F | HEIGHT: 61 IN | OXYGEN SATURATION: 97 %

## 2018-01-01 VITALS
OXYGEN SATURATION: 94 % | BODY MASS INDEX: 22.01 KG/M2 | HEIGHT: 61 IN | DIASTOLIC BLOOD PRESSURE: 69 MMHG | WEIGHT: 116.6 LBS | HEART RATE: 98 BPM | SYSTOLIC BLOOD PRESSURE: 105 MMHG | TEMPERATURE: 97.6 F

## 2018-01-01 VITALS
OXYGEN SATURATION: 96 % | DIASTOLIC BLOOD PRESSURE: 76 MMHG | TEMPERATURE: 98.7 F | RESPIRATION RATE: 16 BRPM | SYSTOLIC BLOOD PRESSURE: 111 MMHG

## 2018-01-01 VITALS
SYSTOLIC BLOOD PRESSURE: 109 MMHG | OXYGEN SATURATION: 92 % | TEMPERATURE: 99.1 F | RESPIRATION RATE: 16 BRPM | DIASTOLIC BLOOD PRESSURE: 54 MMHG

## 2018-01-01 VITALS — DIASTOLIC BLOOD PRESSURE: 50 MMHG | HEART RATE: 102 BPM | SYSTOLIC BLOOD PRESSURE: 122 MMHG

## 2018-01-01 VITALS
DIASTOLIC BLOOD PRESSURE: 45 MMHG | SYSTOLIC BLOOD PRESSURE: 98 MMHG | OXYGEN SATURATION: 92 % | RESPIRATION RATE: 16 BRPM | HEART RATE: 113 BPM

## 2018-01-01 VITALS
HEIGHT: 60 IN | BODY MASS INDEX: 22.68 KG/M2 | RESPIRATION RATE: 16 BRPM | SYSTOLIC BLOOD PRESSURE: 126 MMHG | OXYGEN SATURATION: 95 % | WEIGHT: 115.5 LBS | TEMPERATURE: 97.8 F | DIASTOLIC BLOOD PRESSURE: 79 MMHG | HEART RATE: 80 BPM

## 2018-01-01 VITALS
RESPIRATION RATE: 16 BRPM | TEMPERATURE: 98.2 F | HEART RATE: 77 BPM | OXYGEN SATURATION: 99 % | HEIGHT: 60 IN | WEIGHT: 117 LBS | SYSTOLIC BLOOD PRESSURE: 109 MMHG | BODY MASS INDEX: 22.97 KG/M2 | DIASTOLIC BLOOD PRESSURE: 63 MMHG

## 2018-01-01 VITALS — SYSTOLIC BLOOD PRESSURE: 116 MMHG | BODY MASS INDEX: 15.33 KG/M2 | DIASTOLIC BLOOD PRESSURE: 57 MMHG | WEIGHT: 116.2 LBS

## 2018-01-01 VITALS
TEMPERATURE: 97.9 F | OXYGEN SATURATION: 97 % | HEIGHT: 61 IN | RESPIRATION RATE: 16 BRPM | HEART RATE: 72 BPM | BODY MASS INDEX: 21.96 KG/M2 | WEIGHT: 116.3 LBS | DIASTOLIC BLOOD PRESSURE: 57 MMHG | SYSTOLIC BLOOD PRESSURE: 97 MMHG

## 2018-01-01 VITALS
TEMPERATURE: 97.3 F | BODY MASS INDEX: 22.66 KG/M2 | RESPIRATION RATE: 16 BRPM | OXYGEN SATURATION: 97 % | DIASTOLIC BLOOD PRESSURE: 66 MMHG | WEIGHT: 120 LBS | HEIGHT: 61 IN | HEART RATE: 88 BPM | SYSTOLIC BLOOD PRESSURE: 106 MMHG

## 2018-01-01 VITALS — WEIGHT: 125.6 LBS | DIASTOLIC BLOOD PRESSURE: 60 MMHG | SYSTOLIC BLOOD PRESSURE: 94 MMHG | BODY MASS INDEX: 22.97 KG/M2

## 2018-01-01 VITALS
SYSTOLIC BLOOD PRESSURE: 109 MMHG | RESPIRATION RATE: 18 BRPM | DIASTOLIC BLOOD PRESSURE: 43 MMHG | WEIGHT: 124.1 LBS | BODY MASS INDEX: 22.69 KG/M2 | OXYGEN SATURATION: 96 % | HEART RATE: 100 BPM | TEMPERATURE: 97.8 F

## 2018-01-01 VITALS
WEIGHT: 120 LBS | BODY MASS INDEX: 22.66 KG/M2 | TEMPERATURE: 98.5 F | DIASTOLIC BLOOD PRESSURE: 62 MMHG | RESPIRATION RATE: 16 BRPM | SYSTOLIC BLOOD PRESSURE: 113 MMHG | OXYGEN SATURATION: 95 % | HEART RATE: 94 BPM | HEIGHT: 61 IN

## 2018-01-01 VITALS
DIASTOLIC BLOOD PRESSURE: 44 MMHG | OXYGEN SATURATION: 94 % | TEMPERATURE: 97.3 F | HEART RATE: 105 BPM | SYSTOLIC BLOOD PRESSURE: 97 MMHG | WEIGHT: 118.17 LBS | BODY MASS INDEX: 22.31 KG/M2 | HEIGHT: 61 IN | RESPIRATION RATE: 16 BRPM

## 2018-01-01 VITALS
WEIGHT: 126.3 LBS | RESPIRATION RATE: 18 BRPM | OXYGEN SATURATION: 95 % | BODY MASS INDEX: 23.09 KG/M2 | SYSTOLIC BLOOD PRESSURE: 98 MMHG | TEMPERATURE: 97.6 F | HEART RATE: 89 BPM | DIASTOLIC BLOOD PRESSURE: 63 MMHG

## 2018-01-01 VITALS
DIASTOLIC BLOOD PRESSURE: 56 MMHG | SYSTOLIC BLOOD PRESSURE: 108 MMHG | OXYGEN SATURATION: 95 % | TEMPERATURE: 98 F | HEART RATE: 91 BPM | RESPIRATION RATE: 22 BRPM

## 2018-01-01 VITALS
HEART RATE: 87 BPM | HEIGHT: 61 IN | DIASTOLIC BLOOD PRESSURE: 71 MMHG | BODY MASS INDEX: 21.76 KG/M2 | SYSTOLIC BLOOD PRESSURE: 113 MMHG | OXYGEN SATURATION: 98 % | WEIGHT: 119 LBS | TEMPERATURE: 99.3 F | DIASTOLIC BLOOD PRESSURE: 70 MMHG | RESPIRATION RATE: 16 BRPM | BODY MASS INDEX: 22.11 KG/M2 | SYSTOLIC BLOOD PRESSURE: 102 MMHG | WEIGHT: 117.11 LBS

## 2018-01-01 VITALS — SYSTOLIC BLOOD PRESSURE: 105 MMHG | DIASTOLIC BLOOD PRESSURE: 70 MMHG | WEIGHT: 118 LBS | BODY MASS INDEX: 22.3 KG/M2

## 2018-01-01 VITALS
SYSTOLIC BLOOD PRESSURE: 119 MMHG | WEIGHT: 119.5 LBS | TEMPERATURE: 98.4 F | HEART RATE: 88 BPM | BODY MASS INDEX: 22.58 KG/M2 | OXYGEN SATURATION: 98 % | DIASTOLIC BLOOD PRESSURE: 64 MMHG

## 2018-01-01 VITALS
TEMPERATURE: 97.5 F | HEART RATE: 106 BPM | WEIGHT: 130.07 LBS | BODY MASS INDEX: 23.78 KG/M2 | SYSTOLIC BLOOD PRESSURE: 82 MMHG | DIASTOLIC BLOOD PRESSURE: 48 MMHG | RESPIRATION RATE: 18 BRPM | OXYGEN SATURATION: 92 %

## 2018-01-01 VITALS — WEIGHT: 120 LBS | BODY MASS INDEX: 22.67 KG/M2

## 2018-01-01 DIAGNOSIS — C79.31 BRAIN METASTASIS: ICD-10-CM

## 2018-01-01 DIAGNOSIS — R10.9 ABDOMINAL CRAMPING: ICD-10-CM

## 2018-01-01 DIAGNOSIS — J90 RECURRENT LEFT PLEURAL EFFUSION: Primary | ICD-10-CM

## 2018-01-01 DIAGNOSIS — C64.1 MALIGNANT NEOPLASM OF RIGHT KIDNEY (H): ICD-10-CM

## 2018-01-01 DIAGNOSIS — G47.9 SLEEP DISORDER: ICD-10-CM

## 2018-01-01 DIAGNOSIS — C79.51 BONE METASTASIS: Primary | ICD-10-CM

## 2018-01-01 DIAGNOSIS — J90 PLEURAL EFFUSION: Primary | ICD-10-CM

## 2018-01-01 DIAGNOSIS — C78.01 METASTATIC RENAL CELL CARCINOMA TO LUNG, RIGHT (H): ICD-10-CM

## 2018-01-01 DIAGNOSIS — C64.9 METASTATIC RENAL CELL CARCINOMA TO LUNG, RIGHT (H): Primary | ICD-10-CM

## 2018-01-01 DIAGNOSIS — C79.31 METASTATIC RENAL CELL CARCINOMA TO BRAIN (H): Primary | ICD-10-CM

## 2018-01-01 DIAGNOSIS — C64.9 METASTATIC RENAL CELL CARCINOMA (H): Primary | ICD-10-CM

## 2018-01-01 DIAGNOSIS — C79.31 BRAIN METASTASIS: Primary | ICD-10-CM

## 2018-01-01 DIAGNOSIS — C64.9 METASTATIC RENAL CELL CARCINOMA, UNSPECIFIED LATERALITY (H): Primary | ICD-10-CM

## 2018-01-01 DIAGNOSIS — C64.9 METASTATIC RENAL CELL CARCINOMA TO LUNG, RIGHT (H): ICD-10-CM

## 2018-01-01 DIAGNOSIS — C78.01 METASTATIC RENAL CELL CARCINOMA TO LUNG, RIGHT (H): Primary | ICD-10-CM

## 2018-01-01 DIAGNOSIS — C64.9 RENAL CELL CARCINOMA, UNSPECIFIED LATERALITY (H): ICD-10-CM

## 2018-01-01 DIAGNOSIS — E86.0 DEHYDRATION: ICD-10-CM

## 2018-01-01 DIAGNOSIS — R19.7 DIARRHEA: ICD-10-CM

## 2018-01-01 DIAGNOSIS — R63.5 ABNORMAL WEIGHT GAIN: Primary | ICD-10-CM

## 2018-01-01 DIAGNOSIS — R91.8 MASS OF UPPER LOBE OF LEFT LUNG: ICD-10-CM

## 2018-01-01 DIAGNOSIS — C64.1 RENAL CELL CARCINOMA, RIGHT (H): ICD-10-CM

## 2018-01-01 DIAGNOSIS — C79.51 BONE METASTASIS: ICD-10-CM

## 2018-01-01 DIAGNOSIS — E83.52 HYPERCALCEMIA: ICD-10-CM

## 2018-01-01 DIAGNOSIS — C64.1 MALIGNANT NEOPLASM OF RIGHT KIDNEY (H): Primary | ICD-10-CM

## 2018-01-01 DIAGNOSIS — R19.7 DIARRHEA: Primary | ICD-10-CM

## 2018-01-01 DIAGNOSIS — J90 PLEURAL EFFUSION: ICD-10-CM

## 2018-01-01 DIAGNOSIS — J90 RECURRENT LEFT PLEURAL EFFUSION: ICD-10-CM

## 2018-01-01 DIAGNOSIS — Z09 SURGERY FOLLOW-UP: ICD-10-CM

## 2018-01-01 DIAGNOSIS — R11.2 NAUSEA AND VOMITING, INTRACTABILITY OF VOMITING NOT SPECIFIED, UNSPECIFIED VOMITING TYPE: ICD-10-CM

## 2018-01-01 DIAGNOSIS — C64.9 METASTATIC RENAL CELL CARCINOMA, UNSPECIFIED LATERALITY (H): ICD-10-CM

## 2018-01-01 DIAGNOSIS — E86.0 DEHYDRATION: Primary | ICD-10-CM

## 2018-01-01 DIAGNOSIS — J90 PLEURAL EFFUSION, BILATERAL: Primary | ICD-10-CM

## 2018-01-01 DIAGNOSIS — E83.52 HYPERCALCEMIA: Primary | ICD-10-CM

## 2018-01-01 DIAGNOSIS — R05.9 COUGH: ICD-10-CM

## 2018-01-01 DIAGNOSIS — Z48.02 VISIT FOR SUTURE REMOVAL: ICD-10-CM

## 2018-01-01 DIAGNOSIS — C64.9 METASTATIC RENAL CELL CARCINOMA TO BRAIN (H): Primary | ICD-10-CM

## 2018-01-01 DIAGNOSIS — R07.9 CHEST PAIN: ICD-10-CM

## 2018-01-01 DIAGNOSIS — R11.2 NAUSEA WITH VOMITING: ICD-10-CM

## 2018-01-01 DIAGNOSIS — C79.31 METASTASIS TO BRAIN (H): ICD-10-CM

## 2018-01-01 DIAGNOSIS — R53.83 FATIGUE, UNSPECIFIED TYPE: Primary | ICD-10-CM

## 2018-01-01 DIAGNOSIS — C79.51 METASTATIC BONE TUMOR (H): Primary | ICD-10-CM

## 2018-01-01 DIAGNOSIS — R07.89 ATYPICAL CHEST PAIN: ICD-10-CM

## 2018-01-01 DIAGNOSIS — C64.1 RENAL CELL CARCINOMA, RIGHT (H): Primary | ICD-10-CM

## 2018-01-01 DIAGNOSIS — C79.31 METASTASIS TO BRAIN (H): Primary | ICD-10-CM

## 2018-01-01 DIAGNOSIS — C64.9 METASTATIC RENAL CELL CARCINOMA (H): ICD-10-CM

## 2018-01-01 DIAGNOSIS — Z01.818 PREOP GENERAL PHYSICAL EXAM: Primary | ICD-10-CM

## 2018-01-01 DIAGNOSIS — R63.5 ABNORMAL WEIGHT GAIN: ICD-10-CM

## 2018-01-01 DIAGNOSIS — R11.0 NAUSEA: Primary | ICD-10-CM

## 2018-01-01 DIAGNOSIS — C64.9 PRIMARY MALIGNANT NEOPLASM OF KIDNEY WITH METASTASIS FROM KIDNEY TO OTHER SITE, UNSPECIFIED LATERALITY (H): Primary | ICD-10-CM

## 2018-01-01 DIAGNOSIS — G93.6 CEREBRAL EDEMA (H): ICD-10-CM

## 2018-01-01 DIAGNOSIS — R06.02 SOB (SHORTNESS OF BREATH): ICD-10-CM

## 2018-01-01 DIAGNOSIS — Z01.818 PREOP GENERAL PHYSICAL EXAM: ICD-10-CM

## 2018-01-01 DIAGNOSIS — R05.9 COUGH: Primary | ICD-10-CM

## 2018-01-01 DIAGNOSIS — R19.7 DIARRHEA, UNSPECIFIED TYPE: Primary | ICD-10-CM

## 2018-01-01 LAB
ABO + RH BLD: NORMAL
ABO + RH BLD: NORMAL
ALBUMIN SERPL-MCNC: 2.6 G/DL (ref 3.4–5)
ALBUMIN SERPL-MCNC: 2.6 G/DL (ref 3.4–5)
ALBUMIN SERPL-MCNC: 2.7 G/DL (ref 3.4–5)
ALBUMIN SERPL-MCNC: 2.7 G/DL (ref 3.4–5)
ALBUMIN SERPL-MCNC: 2.8 G/DL (ref 3.4–5)
ALBUMIN SERPL-MCNC: 2.9 G/DL (ref 3.4–5)
ALBUMIN SERPL-MCNC: 3.1 G/DL (ref 3.4–5)
ALBUMIN SERPL-MCNC: 3.1 G/DL (ref 3.4–5)
ALBUMIN SERPL-MCNC: 3.2 G/DL (ref 3.4–5)
ALBUMIN SERPL-MCNC: 3.2 G/DL (ref 3.4–5)
ALBUMIN SERPL-MCNC: 3.6 G/DL (ref 3.4–5)
ALBUMIN UR-MCNC: NEGATIVE MG/DL
ALP SERPL-CCNC: 62 U/L (ref 40–150)
ALP SERPL-CCNC: 63 U/L (ref 40–150)
ALP SERPL-CCNC: 64 U/L (ref 40–150)
ALP SERPL-CCNC: 66 U/L (ref 40–150)
ALP SERPL-CCNC: 69 U/L (ref 40–150)
ALP SERPL-CCNC: 74 U/L (ref 40–150)
ALP SERPL-CCNC: 75 U/L (ref 40–150)
ALP SERPL-CCNC: 77 U/L (ref 40–150)
ALP SERPL-CCNC: 78 U/L (ref 40–150)
ALP SERPL-CCNC: 80 U/L (ref 40–150)
ALP SERPL-CCNC: 80 U/L (ref 40–150)
ALP SERPL-CCNC: 83 U/L (ref 40–150)
ALP SERPL-CCNC: 85 U/L (ref 40–150)
ALP SERPL-CCNC: 89 U/L (ref 40–150)
ALP SERPL-CCNC: 89 U/L (ref 40–150)
ALP SERPL-CCNC: 99 U/L (ref 40–150)
ALT SERPL W P-5'-P-CCNC: 13 U/L (ref 0–50)
ALT SERPL W P-5'-P-CCNC: 14 U/L (ref 0–50)
ALT SERPL W P-5'-P-CCNC: 17 U/L (ref 0–50)
ALT SERPL W P-5'-P-CCNC: 18 U/L (ref 0–50)
ALT SERPL W P-5'-P-CCNC: 18 U/L (ref 0–50)
ALT SERPL W P-5'-P-CCNC: 27 U/L (ref 0–50)
ALT SERPL W P-5'-P-CCNC: 31 U/L (ref 0–50)
ALT SERPL W P-5'-P-CCNC: 34 U/L (ref 0–50)
ALT SERPL W P-5'-P-CCNC: 37 U/L (ref 0–50)
ALT SERPL W P-5'-P-CCNC: 39 U/L (ref 0–50)
ALT SERPL W P-5'-P-CCNC: 39 U/L (ref 0–50)
ALT SERPL W P-5'-P-CCNC: 44 U/L (ref 0–50)
ALT SERPL W P-5'-P-CCNC: 45 U/L (ref 0–50)
ALT SERPL W P-5'-P-CCNC: 52 U/L (ref 0–50)
ALT SERPL W P-5'-P-CCNC: 54 U/L (ref 0–50)
ALT SERPL W P-5'-P-CCNC: 72 U/L (ref 0–50)
ANION GAP SERPL CALCULATED.3IONS-SCNC: 10 MMOL/L (ref 3–14)
ANION GAP SERPL CALCULATED.3IONS-SCNC: 11 MMOL/L (ref 3–14)
ANION GAP SERPL CALCULATED.3IONS-SCNC: 11 MMOL/L (ref 3–14)
ANION GAP SERPL CALCULATED.3IONS-SCNC: 12 MMOL/L (ref 3–14)
ANION GAP SERPL CALCULATED.3IONS-SCNC: 3 MMOL/L (ref 3–14)
ANION GAP SERPL CALCULATED.3IONS-SCNC: 5 MMOL/L (ref 3–14)
ANION GAP SERPL CALCULATED.3IONS-SCNC: 6 MMOL/L (ref 3–14)
ANION GAP SERPL CALCULATED.3IONS-SCNC: 6 MMOL/L (ref 3–14)
ANION GAP SERPL CALCULATED.3IONS-SCNC: 7 MMOL/L (ref 3–14)
ANION GAP SERPL CALCULATED.3IONS-SCNC: 7 MMOL/L (ref 3–14)
ANION GAP SERPL CALCULATED.3IONS-SCNC: 8 MMOL/L (ref 3–14)
ANION GAP SERPL CALCULATED.3IONS-SCNC: 8 MMOL/L (ref 3–14)
ANION GAP SERPL CALCULATED.3IONS-SCNC: 9 MMOL/L (ref 3–14)
ANISOCYTOSIS BLD QL SMEAR: SLIGHT
APPEARANCE UR: CLEAR
AST SERPL W P-5'-P-CCNC: 20 U/L (ref 0–45)
AST SERPL W P-5'-P-CCNC: 26 U/L (ref 0–45)
AST SERPL W P-5'-P-CCNC: 26 U/L (ref 0–45)
AST SERPL W P-5'-P-CCNC: 28 U/L (ref 0–45)
AST SERPL W P-5'-P-CCNC: 31 U/L (ref 0–45)
AST SERPL W P-5'-P-CCNC: 40 U/L (ref 0–45)
AST SERPL W P-5'-P-CCNC: 40 U/L (ref 0–45)
AST SERPL W P-5'-P-CCNC: 42 U/L (ref 0–45)
AST SERPL W P-5'-P-CCNC: 43 U/L (ref 0–45)
AST SERPL W P-5'-P-CCNC: 44 U/L (ref 0–45)
AST SERPL W P-5'-P-CCNC: 45 U/L (ref 0–45)
AST SERPL W P-5'-P-CCNC: 51 U/L (ref 0–45)
AST SERPL W P-5'-P-CCNC: 59 U/L (ref 0–45)
AST SERPL W P-5'-P-CCNC: 81 U/L (ref 0–45)
BACTERIA SPEC CULT: NO GROWTH
BASOPHILS # BLD AUTO: 0 10E9/L (ref 0–0.2)
BASOPHILS # BLD AUTO: 0.1 10E9/L (ref 0–0.2)
BASOPHILS NFR BLD AUTO: 0.2 %
BASOPHILS NFR BLD AUTO: 0.4 %
BASOPHILS NFR BLD AUTO: 0.5 %
BASOPHILS NFR BLD AUTO: 0.6 %
BASOPHILS NFR BLD AUTO: 0.7 %
BASOPHILS NFR BLD AUTO: 0.8 %
BASOPHILS NFR BLD AUTO: 0.8 %
BASOPHILS NFR BLD AUTO: 0.9 %
BASOPHILS NFR BLD AUTO: 0.9 %
BASOPHILS NFR BLD AUTO: 1 %
BASOPHILS NFR BLD AUTO: 1.2 %
BASOPHILS NFR BLD AUTO: 1.3 %
BASOPHILS NFR BLD AUTO: 1.4 %
BASOPHILS NFR BLD AUTO: 1.8 %
BILIRUB SERPL-MCNC: 0.1 MG/DL (ref 0.2–1.3)
BILIRUB SERPL-MCNC: 0.2 MG/DL (ref 0.2–1.3)
BILIRUB SERPL-MCNC: 0.3 MG/DL (ref 0.2–1.3)
BILIRUB SERPL-MCNC: 0.4 MG/DL (ref 0.2–1.3)
BILIRUB UR QL STRIP: NEGATIVE
BLD GP AB SCN SERPL QL: NORMAL
BLOOD BANK CMNT PATIENT-IMP: NORMAL
BLOOD BANK CMNT PATIENT-IMP: NORMAL
BUN SERPL-MCNC: 10 MG/DL (ref 7–30)
BUN SERPL-MCNC: 11 MG/DL (ref 7–30)
BUN SERPL-MCNC: 11 MG/DL (ref 7–30)
BUN SERPL-MCNC: 12 MG/DL (ref 7–30)
BUN SERPL-MCNC: 12 MG/DL (ref 7–30)
BUN SERPL-MCNC: 13 MG/DL (ref 7–30)
BUN SERPL-MCNC: 13 MG/DL (ref 7–30)
BUN SERPL-MCNC: 14 MG/DL (ref 7–30)
BUN SERPL-MCNC: 5 MG/DL (ref 7–30)
BUN SERPL-MCNC: 6 MG/DL (ref 7–30)
BUN SERPL-MCNC: 7 MG/DL (ref 7–30)
BUN SERPL-MCNC: 8 MG/DL (ref 7–30)
CA-I SERPL ISE-MCNC: 4.6 MG/DL (ref 4.4–5.2)
CA-I SERPL ISE-MCNC: 4.8 MG/DL (ref 4.4–5.2)
CALCIUM SERPL-MCNC: 10.7 MG/DL (ref 8.5–10.1)
CALCIUM SERPL-MCNC: 7.2 MG/DL (ref 8.5–10.1)
CALCIUM SERPL-MCNC: 7.6 MG/DL (ref 8.5–10.1)
CALCIUM SERPL-MCNC: 7.7 MG/DL (ref 8.5–10.1)
CALCIUM SERPL-MCNC: 7.9 MG/DL (ref 8.5–10.1)
CALCIUM SERPL-MCNC: 8.3 MG/DL (ref 8.5–10.1)
CALCIUM SERPL-MCNC: 8.4 MG/DL (ref 8.5–10.1)
CALCIUM SERPL-MCNC: 8.5 MG/DL (ref 8.5–10.1)
CALCIUM SERPL-MCNC: 8.6 MG/DL (ref 8.5–10.1)
CALCIUM SERPL-MCNC: 8.7 MG/DL (ref 8.5–10.1)
CALCIUM SERPL-MCNC: 8.9 MG/DL (ref 8.5–10.1)
CALCIUM SERPL-MCNC: 9 MG/DL (ref 8.5–10.1)
CALCIUM SERPL-MCNC: 9.1 MG/DL (ref 8.5–10.1)
CHLORIDE SERPL-SCNC: 101 MMOL/L (ref 94–109)
CHLORIDE SERPL-SCNC: 102 MMOL/L (ref 94–109)
CHLORIDE SERPL-SCNC: 103 MMOL/L (ref 94–109)
CHLORIDE SERPL-SCNC: 103 MMOL/L (ref 94–109)
CHLORIDE SERPL-SCNC: 104 MMOL/L (ref 94–109)
CHLORIDE SERPL-SCNC: 104 MMOL/L (ref 94–109)
CHLORIDE SERPL-SCNC: 105 MMOL/L (ref 94–109)
CHLORIDE SERPL-SCNC: 106 MMOL/L (ref 94–109)
CHLORIDE SERPL-SCNC: 107 MMOL/L (ref 94–109)
CHLORIDE SERPL-SCNC: 107 MMOL/L (ref 94–109)
CHLORIDE SERPL-SCNC: 108 MMOL/L (ref 94–109)
CHLORIDE SERPL-SCNC: 109 MMOL/L (ref 94–109)
CHYLO FLD QL: NORMAL
CO2 SERPL-SCNC: 20 MMOL/L (ref 20–32)
CO2 SERPL-SCNC: 20 MMOL/L (ref 20–32)
CO2 SERPL-SCNC: 21 MMOL/L (ref 20–32)
CO2 SERPL-SCNC: 21 MMOL/L (ref 20–32)
CO2 SERPL-SCNC: 22 MMOL/L (ref 20–32)
CO2 SERPL-SCNC: 23 MMOL/L (ref 20–32)
CO2 SERPL-SCNC: 24 MMOL/L (ref 20–32)
CO2 SERPL-SCNC: 26 MMOL/L (ref 20–32)
CO2 SERPL-SCNC: 26 MMOL/L (ref 20–32)
CO2 SERPL-SCNC: 27 MMOL/L (ref 20–32)
CO2 SERPL-SCNC: 30 MMOL/L (ref 20–32)
CO2 SERPL-SCNC: 31 MMOL/L (ref 20–32)
COLOR UR AUTO: NORMAL
COPATH REPORT: NORMAL
COPATH REPORT: NORMAL
CREAT SERPL-MCNC: 0.67 MG/DL (ref 0.52–1.04)
CREAT SERPL-MCNC: 0.68 MG/DL (ref 0.52–1.04)
CREAT SERPL-MCNC: 0.71 MG/DL (ref 0.52–1.04)
CREAT SERPL-MCNC: 0.72 MG/DL (ref 0.52–1.04)
CREAT SERPL-MCNC: 0.72 MG/DL (ref 0.52–1.04)
CREAT SERPL-MCNC: 0.73 MG/DL (ref 0.52–1.04)
CREAT SERPL-MCNC: 0.78 MG/DL (ref 0.52–1.04)
CREAT SERPL-MCNC: 0.79 MG/DL (ref 0.52–1.04)
CREAT SERPL-MCNC: 0.8 MG/DL (ref 0.52–1.04)
CREAT SERPL-MCNC: 0.82 MG/DL (ref 0.52–1.04)
CREAT SERPL-MCNC: 0.82 MG/DL (ref 0.52–1.04)
CREAT SERPL-MCNC: 0.83 MG/DL (ref 0.52–1.04)
CREAT SERPL-MCNC: 0.88 MG/DL (ref 0.52–1.04)
CREAT SERPL-MCNC: 0.88 MG/DL (ref 0.52–1.04)
CREAT SERPL-MCNC: 0.9 MG/DL (ref 0.52–1.04)
CREAT SERPL-MCNC: 0.98 MG/DL (ref 0.52–1.04)
CREAT SERPL-MCNC: 1.03 MG/DL (ref 0.52–1.04)
CREAT SERPL-MCNC: 1.05 MG/DL (ref 0.52–1.04)
CREAT SERPL-MCNC: 1.18 MG/DL (ref 0.52–1.04)
DIFFERENTIAL METHOD BLD: ABNORMAL
EOSINOPHIL # BLD AUTO: 0 10E9/L (ref 0–0.7)
EOSINOPHIL # BLD AUTO: 0 10E9/L (ref 0–0.7)
EOSINOPHIL # BLD AUTO: 0.1 10E9/L (ref 0–0.7)
EOSINOPHIL # BLD AUTO: 0.2 10E9/L (ref 0–0.7)
EOSINOPHIL # BLD AUTO: 0.3 10E9/L (ref 0–0.7)
EOSINOPHIL # BLD AUTO: 0.4 10E9/L (ref 0–0.7)
EOSINOPHIL # BLD AUTO: 0.4 10E9/L (ref 0–0.7)
EOSINOPHIL # BLD AUTO: 0.6 10E9/L (ref 0–0.7)
EOSINOPHIL # BLD AUTO: 0.9 10E9/L (ref 0–0.7)
EOSINOPHIL # BLD AUTO: 0.9 10E9/L (ref 0–0.7)
EOSINOPHIL # BLD AUTO: 1.3 10E9/L (ref 0–0.7)
EOSINOPHIL # BLD AUTO: 1.7 10E9/L (ref 0–0.7)
EOSINOPHIL NFR BLD AUTO: 0 %
EOSINOPHIL NFR BLD AUTO: 0.2 %
EOSINOPHIL NFR BLD AUTO: 1.8 %
EOSINOPHIL NFR BLD AUTO: 1.8 %
EOSINOPHIL NFR BLD AUTO: 15.5 %
EOSINOPHIL NFR BLD AUTO: 2.5 %
EOSINOPHIL NFR BLD AUTO: 21.3 %
EOSINOPHIL NFR BLD AUTO: 23.4 %
EOSINOPHIL NFR BLD AUTO: 27.7 %
EOSINOPHIL NFR BLD AUTO: 33.1 %
EOSINOPHIL NFR BLD AUTO: 6.1 %
EOSINOPHIL NFR BLD AUTO: 7.5 %
EOSINOPHIL NFR BLD AUTO: 7.6 %
EOSINOPHIL NFR BLD AUTO: 8 %
EOSINOPHIL NFR BLD AUTO: 9 %
EOSINOPHIL NFR BLD AUTO: 9.4 %
ERYTHROCYTE [DISTWIDTH] IN BLOOD BY AUTOMATED COUNT: 14.3 % (ref 10–15)
ERYTHROCYTE [DISTWIDTH] IN BLOOD BY AUTOMATED COUNT: 14.4 % (ref 10–15)
ERYTHROCYTE [DISTWIDTH] IN BLOOD BY AUTOMATED COUNT: 14.7 % (ref 10–15)
ERYTHROCYTE [DISTWIDTH] IN BLOOD BY AUTOMATED COUNT: 14.9 % (ref 10–15)
ERYTHROCYTE [DISTWIDTH] IN BLOOD BY AUTOMATED COUNT: 15.3 % (ref 10–15)
ERYTHROCYTE [DISTWIDTH] IN BLOOD BY AUTOMATED COUNT: 15.3 % (ref 10–15)
ERYTHROCYTE [DISTWIDTH] IN BLOOD BY AUTOMATED COUNT: 15.9 % (ref 10–15)
ERYTHROCYTE [DISTWIDTH] IN BLOOD BY AUTOMATED COUNT: 15.9 % (ref 10–15)
ERYTHROCYTE [DISTWIDTH] IN BLOOD BY AUTOMATED COUNT: 16.3 % (ref 10–15)
ERYTHROCYTE [DISTWIDTH] IN BLOOD BY AUTOMATED COUNT: 16.9 % (ref 10–15)
ERYTHROCYTE [DISTWIDTH] IN BLOOD BY AUTOMATED COUNT: 17.1 % (ref 10–15)
ERYTHROCYTE [DISTWIDTH] IN BLOOD BY AUTOMATED COUNT: 17.4 % (ref 10–15)
ERYTHROCYTE [DISTWIDTH] IN BLOOD BY AUTOMATED COUNT: 17.7 % (ref 10–15)
ERYTHROCYTE [DISTWIDTH] IN BLOOD BY AUTOMATED COUNT: 17.8 % (ref 10–15)
ERYTHROCYTE [DISTWIDTH] IN BLOOD BY AUTOMATED COUNT: 18.3 % (ref 10–15)
ERYTHROCYTE [DISTWIDTH] IN BLOOD BY AUTOMATED COUNT: 18.6 % (ref 10–15)
ERYTHROCYTE [DISTWIDTH] IN BLOOD BY AUTOMATED COUNT: 18.7 % (ref 10–15)
ERYTHROCYTE [DISTWIDTH] IN BLOOD BY AUTOMATED COUNT: 19.5 % (ref 10–15)
GFR SERPL CREATININE-BSD FRML MDRD: 48 ML/MIN/1.7M2
GFR SERPL CREATININE-BSD FRML MDRD: 55 ML/MIN/1.7M2
GFR SERPL CREATININE-BSD FRML MDRD: 56 ML/MIN/1.7M2
GFR SERPL CREATININE-BSD FRML MDRD: 59 ML/MIN/1.7M2
GFR SERPL CREATININE-BSD FRML MDRD: 65 ML/MIN/1.7M2
GFR SERPL CREATININE-BSD FRML MDRD: 66 ML/MIN/1.7M2
GFR SERPL CREATININE-BSD FRML MDRD: 67 ML/MIN/1.7M2
GFR SERPL CREATININE-BSD FRML MDRD: 72 ML/MIN/1.7M2
GFR SERPL CREATININE-BSD FRML MDRD: 74 ML/MIN/1.7M2
GFR SERPL CREATININE-BSD FRML MDRD: 76 ML/MIN/1.7M2
GFR SERPL CREATININE-BSD FRML MDRD: 77 ML/MIN/1.7M2
GFR SERPL CREATININE-BSD FRML MDRD: 82 ML/MIN/1.7M2
GFR SERPL CREATININE-BSD FRML MDRD: 84 ML/MIN/1.7M2
GFR SERPL CREATININE-BSD FRML MDRD: 85 ML/MIN/1.7M2
GFR SERPL CREATININE-BSD FRML MDRD: 86 ML/MIN/1.7M2
GFR SERPL CREATININE-BSD FRML MDRD: >90 ML/MIN/1.7M2
GFR SERPL CREATININE-BSD FRML MDRD: >90 ML/MIN/1.7M2
GLUCOSE BLDC GLUCOMTR-MCNC: 92 MG/DL (ref 70–99)
GLUCOSE FLD-MCNC: 118 MG/DL
GLUCOSE SERPL-MCNC: 100 MG/DL (ref 70–99)
GLUCOSE SERPL-MCNC: 101 MG/DL (ref 70–99)
GLUCOSE SERPL-MCNC: 102 MG/DL (ref 70–99)
GLUCOSE SERPL-MCNC: 108 MG/DL (ref 70–99)
GLUCOSE SERPL-MCNC: 116 MG/DL (ref 70–99)
GLUCOSE SERPL-MCNC: 117 MG/DL (ref 70–99)
GLUCOSE SERPL-MCNC: 124 MG/DL (ref 70–99)
GLUCOSE SERPL-MCNC: 129 MG/DL (ref 70–99)
GLUCOSE SERPL-MCNC: 130 MG/DL (ref 70–99)
GLUCOSE SERPL-MCNC: 143 MG/DL (ref 70–99)
GLUCOSE SERPL-MCNC: 143 MG/DL (ref 70–99)
GLUCOSE SERPL-MCNC: 145 MG/DL (ref 70–99)
GLUCOSE SERPL-MCNC: 146 MG/DL (ref 70–99)
GLUCOSE SERPL-MCNC: 159 MG/DL (ref 70–99)
GLUCOSE SERPL-MCNC: 174 MG/DL (ref 70–99)
GLUCOSE SERPL-MCNC: 88 MG/DL (ref 70–99)
GLUCOSE SERPL-MCNC: 90 MG/DL (ref 70–99)
GLUCOSE SERPL-MCNC: 92 MG/DL (ref 70–99)
GLUCOSE SERPL-MCNC: 98 MG/DL (ref 70–99)
GLUCOSE UR STRIP-MCNC: NEGATIVE MG/DL
GRAM STN SPEC: NORMAL
HBA1C MFR BLD: 6.1 % (ref 0–5.6)
HCT VFR BLD AUTO: 30 % (ref 35–47)
HCT VFR BLD AUTO: 31.1 % (ref 35–47)
HCT VFR BLD AUTO: 31.4 % (ref 35–47)
HCT VFR BLD AUTO: 32.5 % (ref 35–47)
HCT VFR BLD AUTO: 33 % (ref 35–47)
HCT VFR BLD AUTO: 33.5 % (ref 35–47)
HCT VFR BLD AUTO: 34.1 % (ref 35–47)
HCT VFR BLD AUTO: 34.3 % (ref 35–47)
HCT VFR BLD AUTO: 35.9 % (ref 35–47)
HCT VFR BLD AUTO: 36.5 % (ref 35–47)
HCT VFR BLD AUTO: 36.5 % (ref 35–47)
HCT VFR BLD AUTO: 36.6 % (ref 35–47)
HCT VFR BLD AUTO: 37.4 % (ref 35–47)
HCT VFR BLD AUTO: 37.4 % (ref 35–47)
HCT VFR BLD AUTO: 38.9 % (ref 35–47)
HCT VFR BLD AUTO: 39.4 % (ref 35–47)
HCT VFR BLD AUTO: 39.7 % (ref 35–47)
HCT VFR BLD AUTO: 41.3 % (ref 35–47)
HGB BLD-MCNC: 10 G/DL (ref 11.7–15.7)
HGB BLD-MCNC: 10.2 G/DL (ref 11.7–15.7)
HGB BLD-MCNC: 10.2 G/DL (ref 11.7–15.7)
HGB BLD-MCNC: 10.5 G/DL (ref 11.7–15.7)
HGB BLD-MCNC: 10.7 G/DL (ref 11.7–15.7)
HGB BLD-MCNC: 11 G/DL (ref 11.7–15.7)
HGB BLD-MCNC: 11.1 G/DL (ref 11.7–15.7)
HGB BLD-MCNC: 11.3 G/DL (ref 11.7–15.7)
HGB BLD-MCNC: 11.6 G/DL (ref 11.7–15.7)
HGB BLD-MCNC: 11.8 G/DL (ref 11.7–15.7)
HGB BLD-MCNC: 12 G/DL (ref 11.7–15.7)
HGB BLD-MCNC: 12 G/DL (ref 11.7–15.7)
HGB BLD-MCNC: 12.1 G/DL (ref 11.7–15.7)
HGB BLD-MCNC: 12.7 G/DL (ref 11.7–15.7)
HGB BLD-MCNC: 12.7 G/DL (ref 11.7–15.7)
HGB BLD-MCNC: 12.9 G/DL (ref 11.7–15.7)
HGB BLD-MCNC: 13.7 G/DL (ref 11.7–15.7)
HGB BLD-MCNC: 9.7 G/DL (ref 11.7–15.7)
HGB UR QL STRIP: NEGATIVE
IMM GRANULOCYTES # BLD: 0 10E9/L (ref 0–0.4)
IMM GRANULOCYTES # BLD: 0.1 10E9/L (ref 0–0.4)
IMM GRANULOCYTES # BLD: 0.2 10E9/L (ref 0–0.4)
IMM GRANULOCYTES # BLD: 0.2 10E9/L (ref 0–0.4)
IMM GRANULOCYTES NFR BLD: 0 %
IMM GRANULOCYTES NFR BLD: 0.2 %
IMM GRANULOCYTES NFR BLD: 0.2 %
IMM GRANULOCYTES NFR BLD: 0.3 %
IMM GRANULOCYTES NFR BLD: 0.5 %
IMM GRANULOCYTES NFR BLD: 0.5 %
IMM GRANULOCYTES NFR BLD: 0.9 %
IMM GRANULOCYTES NFR BLD: 1 %
IMM GRANULOCYTES NFR BLD: 1.3 %
IMM GRANULOCYTES NFR BLD: 1.4 %
IMM GRANULOCYTES NFR BLD: 2.1 %
IMM GRANULOCYTES NFR BLD: 3.9 %
IMM GRANULOCYTES NFR BLD: 4.7 %
INR BLD: 1.2 (ref 0.86–1.14)
INR PPP: 0.99 (ref 0.86–1.14)
INR PPP: 1.04 (ref 0.86–1.14)
INR PPP: 1.06 (ref 0.86–1.14)
INR PPP: 1.16 (ref 0.86–1.14)
KETONES UR STRIP-MCNC: NEGATIVE MG/DL
LDH FLD L TO P-CCNC: 177 U/L
LEUKOCYTE ESTERASE UR QL STRIP: NEGATIVE
LYMPHOCYTES # BLD AUTO: 0.4 10E9/L (ref 0.8–5.3)
LYMPHOCYTES # BLD AUTO: 0.4 10E9/L (ref 0.8–5.3)
LYMPHOCYTES # BLD AUTO: 0.6 10E9/L (ref 0.8–5.3)
LYMPHOCYTES # BLD AUTO: 0.7 10E9/L (ref 0.8–5.3)
LYMPHOCYTES # BLD AUTO: 0.8 10E9/L (ref 0.8–5.3)
LYMPHOCYTES # BLD AUTO: 0.8 10E9/L (ref 0.8–5.3)
LYMPHOCYTES # BLD AUTO: 0.9 10E9/L (ref 0.8–5.3)
LYMPHOCYTES # BLD AUTO: 1 10E9/L (ref 0.8–5.3)
LYMPHOCYTES # BLD AUTO: 1.1 10E9/L (ref 0.8–5.3)
LYMPHOCYTES # BLD AUTO: 1.8 10E9/L (ref 0.8–5.3)
LYMPHOCYTES NFR BLD AUTO: 10.6 %
LYMPHOCYTES NFR BLD AUTO: 13 %
LYMPHOCYTES NFR BLD AUTO: 13.8 %
LYMPHOCYTES NFR BLD AUTO: 15.2 %
LYMPHOCYTES NFR BLD AUTO: 19.7 %
LYMPHOCYTES NFR BLD AUTO: 21 %
LYMPHOCYTES NFR BLD AUTO: 21.1 %
LYMPHOCYTES NFR BLD AUTO: 21.5 %
LYMPHOCYTES NFR BLD AUTO: 22.3 %
LYMPHOCYTES NFR BLD AUTO: 22.4 %
LYMPHOCYTES NFR BLD AUTO: 23.2 %
LYMPHOCYTES NFR BLD AUTO: 24.1 %
LYMPHOCYTES NFR BLD AUTO: 24.9 %
LYMPHOCYTES NFR BLD AUTO: 28.3 %
LYMPHOCYTES NFR BLD AUTO: 34 %
LYMPHOCYTES NFR BLD AUTO: 4.7 %
MCH RBC QN AUTO: 28.8 PG (ref 26.5–33)
MCH RBC QN AUTO: 29.1 PG (ref 26.5–33)
MCH RBC QN AUTO: 29.3 PG (ref 26.5–33)
MCH RBC QN AUTO: 29.4 PG (ref 26.5–33)
MCH RBC QN AUTO: 29.9 PG (ref 26.5–33)
MCH RBC QN AUTO: 30.3 PG (ref 26.5–33)
MCH RBC QN AUTO: 30.6 PG (ref 26.5–33)
MCH RBC QN AUTO: 30.6 PG (ref 26.5–33)
MCH RBC QN AUTO: 30.7 PG (ref 26.5–33)
MCH RBC QN AUTO: 30.7 PG (ref 26.5–33)
MCH RBC QN AUTO: 30.8 PG (ref 26.5–33)
MCH RBC QN AUTO: 31.5 PG (ref 26.5–33)
MCH RBC QN AUTO: 32 PG (ref 26.5–33)
MCH RBC QN AUTO: 32.3 PG (ref 26.5–33)
MCH RBC QN AUTO: 32.5 PG (ref 26.5–33)
MCH RBC QN AUTO: 33 PG (ref 26.5–33)
MCH RBC QN AUTO: 33.3 PG (ref 26.5–33)
MCH RBC QN AUTO: 33.5 PG (ref 26.5–33)
MCHC RBC AUTO-ENTMCNC: 32 G/DL (ref 31.5–36.5)
MCHC RBC AUTO-ENTMCNC: 32.1 G/DL (ref 31.5–36.5)
MCHC RBC AUTO-ENTMCNC: 32.2 G/DL (ref 31.5–36.5)
MCHC RBC AUTO-ENTMCNC: 32.2 G/DL (ref 31.5–36.5)
MCHC RBC AUTO-ENTMCNC: 32.3 G/DL (ref 31.5–36.5)
MCHC RBC AUTO-ENTMCNC: 32.4 G/DL (ref 31.5–36.5)
MCHC RBC AUTO-ENTMCNC: 32.4 G/DL (ref 31.5–36.5)
MCHC RBC AUTO-ENTMCNC: 32.5 G/DL (ref 31.5–36.5)
MCHC RBC AUTO-ENTMCNC: 32.6 G/DL (ref 31.5–36.5)
MCHC RBC AUTO-ENTMCNC: 32.7 G/DL (ref 31.5–36.5)
MCHC RBC AUTO-ENTMCNC: 32.8 G/DL (ref 31.5–36.5)
MCHC RBC AUTO-ENTMCNC: 32.9 G/DL (ref 31.5–36.5)
MCHC RBC AUTO-ENTMCNC: 33.2 G/DL (ref 31.5–36.5)
MCHC RBC AUTO-ENTMCNC: 33.4 G/DL (ref 31.5–36.5)
MCHC RBC AUTO-ENTMCNC: 34 G/DL (ref 31.5–36.5)
MCHC RBC AUTO-ENTMCNC: 34.2 G/DL (ref 31.5–36.5)
MCV RBC AUTO: 100 FL (ref 78–100)
MCV RBC AUTO: 102 FL (ref 78–100)
MCV RBC AUTO: 88 FL (ref 78–100)
MCV RBC AUTO: 89 FL (ref 78–100)
MCV RBC AUTO: 90 FL (ref 78–100)
MCV RBC AUTO: 91 FL (ref 78–100)
MCV RBC AUTO: 92 FL (ref 78–100)
MCV RBC AUTO: 94 FL (ref 78–100)
MCV RBC AUTO: 95 FL (ref 78–100)
MCV RBC AUTO: 96 FL (ref 78–100)
MCV RBC AUTO: 97 FL (ref 78–100)
MCV RBC AUTO: 98 FL (ref 78–100)
MCV RBC AUTO: 99 FL (ref 78–100)
MCV RBC AUTO: 99 FL (ref 78–100)
MONOCYTES # BLD AUTO: 0.2 10E9/L (ref 0–1.3)
MONOCYTES # BLD AUTO: 0.3 10E9/L (ref 0–1.3)
MONOCYTES # BLD AUTO: 0.4 10E9/L (ref 0–1.3)
MONOCYTES # BLD AUTO: 0.5 10E9/L (ref 0–1.3)
MONOCYTES NFR BLD AUTO: 10.2 %
MONOCYTES NFR BLD AUTO: 11.1 %
MONOCYTES NFR BLD AUTO: 11.4 %
MONOCYTES NFR BLD AUTO: 2.7 %
MONOCYTES NFR BLD AUTO: 3.5 %
MONOCYTES NFR BLD AUTO: 4.7 %
MONOCYTES NFR BLD AUTO: 4.9 %
MONOCYTES NFR BLD AUTO: 5.3 %
MONOCYTES NFR BLD AUTO: 6.2 %
MONOCYTES NFR BLD AUTO: 6.3 %
MONOCYTES NFR BLD AUTO: 6.4 %
MONOCYTES NFR BLD AUTO: 7.5 %
MONOCYTES NFR BLD AUTO: 8 %
MONOCYTES NFR BLD AUTO: 8.7 %
MONOCYTES NFR BLD AUTO: 8.7 %
MONOCYTES NFR BLD AUTO: 9 %
MYELOCYTES # BLD: 0.1 10E9/L
MYELOCYTES NFR BLD MANUAL: 1.8 %
NEUTROPHILS # BLD AUTO: 1.7 10E9/L (ref 1.6–8.3)
NEUTROPHILS # BLD AUTO: 1.7 10E9/L (ref 1.6–8.3)
NEUTROPHILS # BLD AUTO: 1.8 10E9/L (ref 1.6–8.3)
NEUTROPHILS # BLD AUTO: 2 10E9/L (ref 1.6–8.3)
NEUTROPHILS # BLD AUTO: 2 10E9/L (ref 1.6–8.3)
NEUTROPHILS # BLD AUTO: 2.1 10E9/L (ref 1.6–8.3)
NEUTROPHILS # BLD AUTO: 2.1 10E9/L (ref 1.6–8.3)
NEUTROPHILS # BLD AUTO: 2.2 10E9/L (ref 1.6–8.3)
NEUTROPHILS # BLD AUTO: 2.3 10E9/L (ref 1.6–8.3)
NEUTROPHILS # BLD AUTO: 2.4 10E9/L (ref 1.6–8.3)
NEUTROPHILS # BLD AUTO: 2.5 10E9/L (ref 1.6–8.3)
NEUTROPHILS # BLD AUTO: 2.7 10E9/L (ref 1.6–8.3)
NEUTROPHILS # BLD AUTO: 3.4 10E9/L (ref 1.6–8.3)
NEUTROPHILS # BLD AUTO: 3.9 10E9/L (ref 1.6–8.3)
NEUTROPHILS # BLD AUTO: 6.3 10E9/L (ref 1.6–8.3)
NEUTROPHILS # BLD AUTO: 8.2 10E9/L (ref 1.6–8.3)
NEUTROPHILS NFR BLD AUTO: 38.3 %
NEUTROPHILS NFR BLD AUTO: 42.4 %
NEUTROPHILS NFR BLD AUTO: 43.8 %
NEUTROPHILS NFR BLD AUTO: 49.1 %
NEUTROPHILS NFR BLD AUTO: 50.1 %
NEUTROPHILS NFR BLD AUTO: 51.1 %
NEUTROPHILS NFR BLD AUTO: 52.9 %
NEUTROPHILS NFR BLD AUTO: 56.8 %
NEUTROPHILS NFR BLD AUTO: 57.1 %
NEUTROPHILS NFR BLD AUTO: 59.7 %
NEUTROPHILS NFR BLD AUTO: 64.9 %
NEUTROPHILS NFR BLD AUTO: 72 %
NEUTROPHILS NFR BLD AUTO: 72.4 %
NEUTROPHILS NFR BLD AUTO: 76 %
NEUTROPHILS NFR BLD AUTO: 80.4 %
NEUTROPHILS NFR BLD AUTO: 89.5 %
NITRATE UR QL: NEGATIVE
NRBC # BLD AUTO: 0 10*3/UL
NRBC BLD AUTO-RTO: 0 /100
NT-PROBNP SERPL-MCNC: 36 PG/ML (ref 0–125)
PH FLD: 7.6 PH
PH UR STRIP: 7 PH (ref 5–7)
PLATELET # BLD AUTO: 221 10E9/L (ref 150–450)
PLATELET # BLD AUTO: 286 10E9/L (ref 150–450)
PLATELET # BLD AUTO: 312 10E9/L (ref 150–450)
PLATELET # BLD AUTO: 317 10E9/L (ref 150–450)
PLATELET # BLD AUTO: 319 10E9/L (ref 150–450)
PLATELET # BLD AUTO: 322 10E9/L (ref 150–450)
PLATELET # BLD AUTO: 337 10E9/L (ref 150–450)
PLATELET # BLD AUTO: 367 10E9/L (ref 150–450)
PLATELET # BLD AUTO: 403 10E9/L (ref 150–450)
PLATELET # BLD AUTO: 417 10E9/L (ref 150–450)
PLATELET # BLD AUTO: 426 10E9/L (ref 150–450)
PLATELET # BLD AUTO: 447 10E9/L (ref 150–450)
PLATELET # BLD AUTO: 448 10E9/L (ref 150–450)
PLATELET # BLD AUTO: 474 10E9/L (ref 150–450)
PLATELET # BLD AUTO: 478 10E9/L (ref 150–450)
PLATELET # BLD AUTO: 490 10E9/L (ref 150–450)
PLATELET # BLD AUTO: 538 10E9/L (ref 150–450)
PLATELET # BLD AUTO: 554 10E9/L (ref 150–450)
PLATELET # BLD EST: ABNORMAL 10*3/UL
POTASSIUM SERPL-SCNC: 3 MMOL/L (ref 3.4–5.3)
POTASSIUM SERPL-SCNC: 3.2 MMOL/L (ref 3.4–5.3)
POTASSIUM SERPL-SCNC: 3.3 MMOL/L (ref 3.4–5.3)
POTASSIUM SERPL-SCNC: 3.4 MMOL/L (ref 3.4–5.3)
POTASSIUM SERPL-SCNC: 3.4 MMOL/L (ref 3.4–5.3)
POTASSIUM SERPL-SCNC: 3.5 MMOL/L (ref 3.4–5.3)
POTASSIUM SERPL-SCNC: 3.7 MMOL/L (ref 3.4–5.3)
POTASSIUM SERPL-SCNC: 3.9 MMOL/L (ref 3.4–5.3)
POTASSIUM SERPL-SCNC: 4 MMOL/L (ref 3.4–5.3)
POTASSIUM SERPL-SCNC: 4.3 MMOL/L (ref 3.4–5.3)
PROMYELOCYTES # BLD MANUAL: 0.1 10E9/L
PROMYELOCYTES NFR BLD MANUAL: 0.9 %
PROT FLD-MCNC: 3.8 G/DL
PROT SERPL-MCNC: 6.2 G/DL (ref 6.8–8.8)
PROT SERPL-MCNC: 6.4 G/DL (ref 6.8–8.8)
PROT SERPL-MCNC: 6.6 G/DL (ref 6.8–8.8)
PROT SERPL-MCNC: 6.6 G/DL (ref 6.8–8.8)
PROT SERPL-MCNC: 6.7 G/DL (ref 6.8–8.8)
PROT SERPL-MCNC: 6.9 G/DL (ref 6.8–8.8)
PROT SERPL-MCNC: 6.9 G/DL (ref 6.8–8.8)
PROT SERPL-MCNC: 7 G/DL (ref 6.8–8.8)
PROT SERPL-MCNC: 7.1 G/DL (ref 6.8–8.8)
PROT SERPL-MCNC: 7.1 G/DL (ref 6.8–8.8)
PROT SERPL-MCNC: 7.2 G/DL (ref 6.8–8.8)
PROT SERPL-MCNC: 7.5 G/DL (ref 6.8–8.8)
RADIOLOGIST FLAGS: NORMAL
RADIOLOGIST FLAGS: NORMAL
RBC # BLD AUTO: 3.14 10E12/L (ref 3.8–5.2)
RBC # BLD AUTO: 3.16 10E12/L (ref 3.8–5.2)
RBC # BLD AUTO: 3.27 10E12/L (ref 3.8–5.2)
RBC # BLD AUTO: 3.28 10E12/L (ref 3.8–5.2)
RBC # BLD AUTO: 3.33 10E12/L (ref 3.8–5.2)
RBC # BLD AUTO: 3.5 10E12/L (ref 3.8–5.2)
RBC # BLD AUTO: 3.52 10E12/L (ref 3.8–5.2)
RBC # BLD AUTO: 3.67 10E12/L (ref 3.8–5.2)
RBC # BLD AUTO: 3.78 10E12/L (ref 3.8–5.2)
RBC # BLD AUTO: 3.89 10E12/L (ref 3.8–5.2)
RBC # BLD AUTO: 3.91 10E12/L (ref 3.8–5.2)
RBC # BLD AUTO: 3.93 10E12/L (ref 3.8–5.2)
RBC # BLD AUTO: 4.06 10E12/L (ref 3.8–5.2)
RBC # BLD AUTO: 4.08 10E12/L (ref 3.8–5.2)
RBC # BLD AUTO: 4.1 10E12/L (ref 3.8–5.2)
RBC # BLD AUTO: 4.31 10E12/L (ref 3.8–5.2)
RBC # BLD AUTO: 4.33 10E12/L (ref 3.8–5.2)
RBC # BLD AUTO: 4.35 10E12/L (ref 3.8–5.2)
RBC #/AREA URNS AUTO: 1 /HPF (ref 0–2)
SODIUM SERPL-SCNC: 134 MMOL/L (ref 133–144)
SODIUM SERPL-SCNC: 135 MMOL/L (ref 133–144)
SODIUM SERPL-SCNC: 136 MMOL/L (ref 133–144)
SODIUM SERPL-SCNC: 137 MMOL/L (ref 133–144)
SODIUM SERPL-SCNC: 138 MMOL/L (ref 133–144)
SODIUM SERPL-SCNC: 141 MMOL/L (ref 133–144)
SODIUM SERPL-SCNC: 142 MMOL/L (ref 133–144)
SODIUM SERPL-SCNC: 142 MMOL/L (ref 133–144)
SOURCE: NORMAL
SP GR UR STRIP: 1 (ref 1–1.03)
SPECIMEN EXP DATE BLD: NORMAL
SPECIMEN SOURCE FLD: NORMAL
SPECIMEN SOURCE: NORMAL
SPECIMEN SOURCE: NORMAL
SQUAMOUS #/AREA URNS AUTO: 1 /HPF (ref 0–1)
T4 FREE SERPL-MCNC: 0.9 NG/DL (ref 0.76–1.46)
TROPONIN I SERPL-MCNC: <0.015 UG/L (ref 0–0.04)
TSH SERPL DL<=0.005 MIU/L-ACNC: 1.02 MU/L (ref 0.4–4)
TSH SERPL DL<=0.005 MIU/L-ACNC: 8.91 MU/L (ref 0.4–4)
UROBILINOGEN UR STRIP-MCNC: 0 MG/DL (ref 0–2)
WBC # BLD AUTO: 2.8 10E9/L (ref 4–11)
WBC # BLD AUTO: 3 10E9/L (ref 4–11)
WBC # BLD AUTO: 3.7 10E9/L (ref 4–11)
WBC # BLD AUTO: 3.8 10E9/L (ref 4–11)
WBC # BLD AUTO: 3.9 10E9/L (ref 4–11)
WBC # BLD AUTO: 4 10E9/L (ref 4–11)
WBC # BLD AUTO: 4 10E9/L (ref 4–11)
WBC # BLD AUTO: 4.1 10E9/L (ref 4–11)
WBC # BLD AUTO: 4.2 10E9/L (ref 4–11)
WBC # BLD AUTO: 4.4 10E9/L (ref 4–11)
WBC # BLD AUTO: 4.7 10E9/L (ref 4–11)
WBC # BLD AUTO: 4.7 10E9/L (ref 4–11)
WBC # BLD AUTO: 5.1 10E9/L (ref 4–11)
WBC # BLD AUTO: 5.3 10E9/L (ref 4–11)
WBC # BLD AUTO: 5.3 10E9/L (ref 4–11)
WBC # BLD AUTO: 5.4 10E9/L (ref 4–11)
WBC # BLD AUTO: 7.8 10E9/L (ref 4–11)
WBC # BLD AUTO: 9.1 10E9/L (ref 4–11)
WBC #/AREA URNS AUTO: 0 /HPF (ref 0–5)

## 2018-01-01 PROCEDURE — 96372 THER/PROPH/DIAG INJ SC/IM: CPT

## 2018-01-01 PROCEDURE — 77290 THER RAD SIMULAJ FIELD CPLX: CPT | Performed by: RADIOLOGY

## 2018-01-01 PROCEDURE — 25000128 H RX IP 250 OP 636: Mod: ZF | Performed by: PHYSICIAN ASSISTANT

## 2018-01-01 PROCEDURE — 25000132 ZZH RX MED GY IP 250 OP 250 PS 637: Performed by: STUDENT IN AN ORGANIZED HEALTH CARE EDUCATION/TRAINING PROGRAM

## 2018-01-01 PROCEDURE — 99214 OFFICE O/P EST MOD 30 MIN: CPT | Mod: ZP | Performed by: PHYSICIAN ASSISTANT

## 2018-01-01 PROCEDURE — 80053 COMPREHEN METABOLIC PANEL: CPT | Performed by: PHYSICIAN ASSISTANT

## 2018-01-01 PROCEDURE — 85025 COMPLETE CBC W/AUTO DIFF WBC: CPT | Performed by: PHYSICIAN ASSISTANT

## 2018-01-01 PROCEDURE — 25000128 H RX IP 250 OP 636: Performed by: INTERNAL MEDICINE

## 2018-01-01 PROCEDURE — 77387 GUIDANCE FOR RADJ TX DLVR: CPT | Performed by: RADIOLOGY

## 2018-01-01 PROCEDURE — 99215 OFFICE O/P EST HI 40 MIN: CPT | Mod: ZP | Performed by: INTERNAL MEDICINE

## 2018-01-01 PROCEDURE — G0463 HOSPITAL OUTPT CLINIC VISIT: HCPCS | Mod: ZF

## 2018-01-01 PROCEDURE — 27210903 ZZH KIT CR5

## 2018-01-01 PROCEDURE — 25000128 H RX IP 250 OP 636: Mod: ZF | Performed by: INTERNAL MEDICINE

## 2018-01-01 PROCEDURE — 36591 DRAW BLOOD OFF VENOUS DEVICE: CPT

## 2018-01-01 PROCEDURE — 80048 BASIC METABOLIC PNL TOTAL CA: CPT | Performed by: NURSE PRACTITIONER

## 2018-01-01 PROCEDURE — A9585 GADOBUTROL INJECTION: HCPCS | Performed by: RADIOLOGY

## 2018-01-01 PROCEDURE — C1713 ANCHOR/SCREW BN/BN,TIS/BN: HCPCS | Performed by: ORTHOPAEDIC SURGERY

## 2018-01-01 PROCEDURE — 40000172 ZZH STATISTIC PROCEDURE PREP ONLY

## 2018-01-01 PROCEDURE — 25000132 ZZH RX MED GY IP 250 OP 250 PS 637: Mod: ZF | Performed by: RADIOLOGY

## 2018-01-01 PROCEDURE — G0463 HOSPITAL OUTPT CLINIC VISIT: HCPCS | Mod: 25 | Performed by: RADIOLOGY

## 2018-01-01 PROCEDURE — 0QH704Z INSERTION OF INTERNAL FIXATION DEVICE INTO LEFT UPPER FEMUR, OPEN APPROACH: ICD-10-PCS | Performed by: ORTHOPAEDIC SURGERY

## 2018-01-01 PROCEDURE — 25000581 ZZH RX MED A9270 GY (STAT IND- M) 250: Mod: ZF | Performed by: PHYSICIAN ASSISTANT

## 2018-01-01 PROCEDURE — 40000014 ZZH STATISTIC ARTERIAL MONITORING DAILY

## 2018-01-01 PROCEDURE — 70552 MRI BRAIN STEM W/DYE: CPT

## 2018-01-01 PROCEDURE — 40000193 ZZH STATISTIC PT WARD VISIT

## 2018-01-01 PROCEDURE — 99215 OFFICE O/P EST HI 40 MIN: CPT | Mod: ZP | Performed by: PHYSICIAN ASSISTANT

## 2018-01-01 PROCEDURE — 36415 COLL VENOUS BLD VENIPUNCTURE: CPT

## 2018-01-01 PROCEDURE — 25000131 ZZH RX MED GY IP 250 OP 636 PS 637: Performed by: STUDENT IN AN ORGANIZED HEALTH CARE EDUCATION/TRAINING PROGRAM

## 2018-01-01 PROCEDURE — 25000132 ZZH RX MED GY IP 250 OP 250 PS 637: Performed by: RADIOLOGY

## 2018-01-01 PROCEDURE — 25000566 ZZH SEVOFLURANE, EA 15 MIN: Performed by: ORTHOPAEDIC SURGERY

## 2018-01-01 PROCEDURE — 80048 BASIC METABOLIC PNL TOTAL CA: CPT | Performed by: INTERNAL MEDICINE

## 2018-01-01 PROCEDURE — 25000128 H RX IP 250 OP 636: Performed by: RADIOLOGY

## 2018-01-01 PROCEDURE — 25000128 H RX IP 250 OP 636: Performed by: STUDENT IN AN ORGANIZED HEALTH CARE EDUCATION/TRAINING PROGRAM

## 2018-01-01 PROCEDURE — 27211039 ZZH NEEDLE CR2

## 2018-01-01 PROCEDURE — 96361 HYDRATE IV INFUSION ADD-ON: CPT

## 2018-01-01 PROCEDURE — 77412 RADIATION TX DELIVERY LVL 3: CPT | Performed by: RADIOLOGY

## 2018-01-01 PROCEDURE — 27210794 ZZH OR GENERAL SUPPLY STERILE: Performed by: ORTHOPAEDIC SURGERY

## 2018-01-01 PROCEDURE — 37000008 ZZH ANESTHESIA TECHNICAL FEE, 1ST 30 MIN: Performed by: ORTHOPAEDIC SURGERY

## 2018-01-01 PROCEDURE — 90472 IMMUNIZATION ADMIN EACH ADD: CPT

## 2018-01-01 PROCEDURE — 25000128 H RX IP 250 OP 636: Performed by: NURSE ANESTHETIST, CERTIFIED REGISTERED

## 2018-01-01 PROCEDURE — 82330 ASSAY OF CALCIUM: CPT | Performed by: PHYSICIAN ASSISTANT

## 2018-01-01 PROCEDURE — 25000128 H RX IP 250 OP 636: Mod: ZF | Performed by: NURSE PRACTITIONER

## 2018-01-01 PROCEDURE — 77334 RADIATION TREATMENT AID(S): CPT | Performed by: RADIOLOGY

## 2018-01-01 PROCEDURE — 77300 RADIATION THERAPY DOSE PLAN: CPT | Performed by: RADIOLOGY

## 2018-01-01 PROCEDURE — 36000064 ZZH SURGERY LEVEL 4 EA 15 ADDTL MIN - UMMC: Performed by: ORTHOPAEDIC SURGERY

## 2018-01-01 PROCEDURE — 84443 ASSAY THYROID STIM HORMONE: CPT | Performed by: PHYSICIAN ASSISTANT

## 2018-01-01 PROCEDURE — 12000001 ZZH R&B MED SURG/OB UMMC

## 2018-01-01 PROCEDURE — 77336 RADIATION PHYSICS CONSULT: CPT | Performed by: RADIOLOGY

## 2018-01-01 PROCEDURE — 40000278 XR SURGERY CARM FLUORO LESS THAN 5 MIN: Mod: TC

## 2018-01-01 PROCEDURE — 25000125 ZZHC RX 250: Performed by: ORTHOPAEDIC SURGERY

## 2018-01-01 PROCEDURE — 96374 THER/PROPH/DIAG INJ IV PUSH: CPT

## 2018-01-01 PROCEDURE — 25000128 H RX IP 250 OP 636: Mod: ZF | Performed by: RADIOLOGY

## 2018-01-01 PROCEDURE — 86900 BLOOD TYPING SEROLOGIC ABO: CPT | Performed by: NURSE PRACTITIONER

## 2018-01-01 PROCEDURE — 99207 ZZC CDG-MDM COMPONENT: MEETS LOW - DOWN CODED: CPT | Performed by: INTERNAL MEDICINE

## 2018-01-01 PROCEDURE — 25000132 ZZH RX MED GY IP 250 OP 250 PS 637: Performed by: INTERNAL MEDICINE

## 2018-01-01 PROCEDURE — 81001 URINALYSIS AUTO W/SCOPE: CPT | Performed by: NURSE PRACTITIONER

## 2018-01-01 PROCEDURE — 36415 COLL VENOUS BLD VENIPUNCTURE: CPT | Performed by: ORTHOPAEDIC SURGERY

## 2018-01-01 PROCEDURE — 99232 SBSQ HOSP IP/OBS MODERATE 35: CPT | Performed by: INTERNAL MEDICINE

## 2018-01-01 PROCEDURE — 40000170 ZZH STATISTIC PRE-PROCEDURE ASSESSMENT II: Performed by: ORTHOPAEDIC SURGERY

## 2018-01-01 PROCEDURE — 32555 ASPIRATE PLEURA W/ IMAGING: CPT

## 2018-01-01 PROCEDURE — 40000133 ZZH STATISTIC OT WARD VISIT

## 2018-01-01 PROCEDURE — 88311 DECALCIFY TISSUE: CPT | Performed by: ORTHOPAEDIC SURGERY

## 2018-01-01 PROCEDURE — 85025 COMPLETE CBC W/AUTO DIFF WBC: CPT | Performed by: NURSE PRACTITIONER

## 2018-01-01 PROCEDURE — 99215 OFFICE O/P EST HI 40 MIN: CPT | Mod: ZP | Performed by: NURSE PRACTITIONER

## 2018-01-01 PROCEDURE — 25000125 ZZHC RX 250: Performed by: NURSE ANESTHETIST, CERTIFIED REGISTERED

## 2018-01-01 PROCEDURE — 88311 DECALCIFY TISSUE: CPT | Mod: 26 | Performed by: ORTHOPAEDIC SURGERY

## 2018-01-01 PROCEDURE — G0463 HOSPITAL OUTPT CLINIC VISIT: HCPCS

## 2018-01-01 PROCEDURE — 40000193 ZZH STATISTIC PT WARD VISIT: Performed by: PHYSICAL THERAPIST

## 2018-01-01 PROCEDURE — 90750 HZV VACC RECOMBINANT IM: CPT | Mod: ZF | Performed by: PHYSICIAN ASSISTANT

## 2018-01-01 PROCEDURE — 71000015 ZZH RECOVERY PHASE 1 LEVEL 2 EA ADDTL HR: Performed by: ORTHOPAEDIC SURGERY

## 2018-01-01 PROCEDURE — 77295 3-D RADIOTHERAPY PLAN: CPT | Performed by: RADIOLOGY

## 2018-01-01 PROCEDURE — 85018 HEMOGLOBIN: CPT | Performed by: ORTHOPAEDIC SURGERY

## 2018-01-01 PROCEDURE — 27210732 ZZH ACCESSORY CR1

## 2018-01-01 PROCEDURE — 25000128 H RX IP 250 OP 636: Performed by: ANESTHESIOLOGY

## 2018-01-01 PROCEDURE — 77417 THER RADIOLOGY PORT IMAGE(S): CPT | Performed by: RADIOLOGY

## 2018-01-01 PROCEDURE — 27211024 ZZHC OR SUPPLY OTHER OPNP: Performed by: ORTHOPAEDIC SURGERY

## 2018-01-01 PROCEDURE — 85027 COMPLETE CBC AUTOMATED: CPT | Performed by: INTERNAL MEDICINE

## 2018-01-01 PROCEDURE — 25000125 ZZHC RX 250: Mod: ZF | Performed by: RADIOLOGY

## 2018-01-01 PROCEDURE — 97116 GAIT TRAINING THERAPY: CPT | Mod: GP

## 2018-01-01 PROCEDURE — 97116 GAIT TRAINING THERAPY: CPT | Mod: GP | Performed by: PHYSICAL THERAPIST

## 2018-01-01 PROCEDURE — 97530 THERAPEUTIC ACTIVITIES: CPT | Mod: GO

## 2018-01-01 PROCEDURE — 80053 COMPREHEN METABOLIC PANEL: CPT | Performed by: NURSE PRACTITIONER

## 2018-01-01 PROCEDURE — 84443 ASSAY THYROID STIM HORMONE: CPT | Performed by: NURSE PRACTITIONER

## 2018-01-01 PROCEDURE — 96360 HYDRATION IV INFUSION INIT: CPT

## 2018-01-01 PROCEDURE — 82664 ELECTROPHORETIC TEST: CPT | Performed by: PHYSICIAN ASSISTANT

## 2018-01-01 PROCEDURE — 97530 THERAPEUTIC ACTIVITIES: CPT | Mod: GP

## 2018-01-01 PROCEDURE — G0463 HOSPITAL OUTPT CLINIC VISIT: HCPCS | Performed by: RADIOLOGY

## 2018-01-01 PROCEDURE — 97535 SELF CARE MNGMENT TRAINING: CPT | Mod: GO

## 2018-01-01 PROCEDURE — 84439 ASSAY OF FREE THYROXINE: CPT | Performed by: PHYSICIAN ASSISTANT

## 2018-01-01 PROCEDURE — 80048 BASIC METABOLIC PNL TOTAL CA: CPT | Performed by: ORTHOPAEDIC SURGERY

## 2018-01-01 PROCEDURE — 71000014 ZZH RECOVERY PHASE 1 LEVEL 2 FIRST HR: Performed by: ORTHOPAEDIC SURGERY

## 2018-01-01 PROCEDURE — 97165 OT EVAL LOW COMPLEX 30 MIN: CPT | Mod: GO

## 2018-01-01 PROCEDURE — 82330 ASSAY OF CALCIUM: CPT | Performed by: NURSE PRACTITIONER

## 2018-01-01 PROCEDURE — 90686 IIV4 VACC NO PRSV 0.5 ML IM: CPT | Mod: ZF | Performed by: PHYSICIAN ASSISTANT

## 2018-01-01 PROCEDURE — G0463 HOSPITAL OUTPT CLINIC VISIT: HCPCS | Mod: 25

## 2018-01-01 PROCEDURE — 36000066 ZZH SURGERY LEVEL 4 W FLUORO 1ST 30 MIN - UMMC: Performed by: ORTHOPAEDIC SURGERY

## 2018-01-01 PROCEDURE — 25000128 H RX IP 250 OP 636: Performed by: ORTHOPAEDIC SURGERY

## 2018-01-01 PROCEDURE — 83036 HEMOGLOBIN GLYCOSYLATED A1C: CPT | Performed by: NURSE PRACTITIONER

## 2018-01-01 PROCEDURE — 36415 COLL VENOUS BLD VENIPUNCTURE: CPT | Performed by: INTERNAL MEDICINE

## 2018-01-01 PROCEDURE — 40000166 ZZH STATISTIC PP CARE STAGE 1

## 2018-01-01 PROCEDURE — 70553 MRI BRAIN STEM W/O & W/DYE: CPT

## 2018-01-01 PROCEDURE — 96372 THER/PROPH/DIAG INJ SC/IM: CPT | Mod: ZF

## 2018-01-01 PROCEDURE — 97161 PT EVAL LOW COMPLEX 20 MIN: CPT | Mod: GP

## 2018-01-01 PROCEDURE — 77280 THER RAD SIMULAJ FIELD SMPL: CPT | Performed by: RADIOLOGY

## 2018-01-01 PROCEDURE — 86901 BLOOD TYPING SEROLOGIC RH(D): CPT | Performed by: NURSE PRACTITIONER

## 2018-01-01 PROCEDURE — 84484 ASSAY OF TROPONIN QUANT: CPT | Performed by: PHYSICIAN ASSISTANT

## 2018-01-01 PROCEDURE — 83880 ASSAY OF NATRIURETIC PEPTIDE: CPT | Performed by: PHYSICIAN ASSISTANT

## 2018-01-01 PROCEDURE — 77371 SRS MULTISOURCE: CPT | Performed by: RADIOLOGY

## 2018-01-01 PROCEDURE — 99233 SBSQ HOSP IP/OBS HIGH 50: CPT | Performed by: INTERNAL MEDICINE

## 2018-01-01 PROCEDURE — 37000009 ZZH ANESTHESIA TECHNICAL FEE, EACH ADDTL 15 MIN: Performed by: ORTHOPAEDIC SURGERY

## 2018-01-01 PROCEDURE — 86850 RBC ANTIBODY SCREEN: CPT | Performed by: NURSE PRACTITIONER

## 2018-01-01 PROCEDURE — 00000146 ZZHCL STATISTIC GLUCOSE BY METER IP

## 2018-01-01 PROCEDURE — 77370 RADIATION PHYSICS CONSULT: CPT | Performed by: RADIOLOGY

## 2018-01-01 PROCEDURE — G0008 ADMIN INFLUENZA VIRUS VAC: HCPCS

## 2018-01-01 PROCEDURE — 85610 PROTHROMBIN TIME: CPT | Mod: QW

## 2018-01-01 PROCEDURE — 88307 TISSUE EXAM BY PATHOLOGIST: CPT | Mod: 26 | Performed by: ORTHOPAEDIC SURGERY

## 2018-01-01 PROCEDURE — 0QB70ZZ EXCISION OF LEFT UPPER FEMUR, OPEN APPROACH: ICD-10-PCS | Performed by: ORTHOPAEDIC SURGERY

## 2018-01-01 PROCEDURE — 88307 TISSUE EXAM BY PATHOLOGIST: CPT | Performed by: ORTHOPAEDIC SURGERY

## 2018-01-01 PROCEDURE — 97530 THERAPEUTIC ACTIVITIES: CPT | Mod: GP | Performed by: PHYSICAL THERAPIST

## 2018-01-01 PROCEDURE — 85027 COMPLETE CBC AUTOMATED: CPT | Performed by: NURSE PRACTITIONER

## 2018-01-01 PROCEDURE — 85610 PROTHROMBIN TIME: CPT | Performed by: NURSE PRACTITIONER

## 2018-01-01 DEVICE — IMP SCR SYN DHS/DCS LAG 12.7X90MM 1 STEP SS 280.290: Type: IMPLANTABLE DEVICE | Site: HIP | Status: FUNCTIONAL

## 2018-01-01 DEVICE — IMP SCR SYN CORTEX 4.5X34MM SELF TAP SS 214.834: Type: IMPLANTABLE DEVICE | Site: HIP | Status: FUNCTIONAL

## 2018-01-01 DEVICE — IMP SCR SYN CORTEX 4.5X36MM SELF TAP SS 214.836: Type: IMPLANTABLE DEVICE | Site: HIP | Status: FUNCTIONAL

## 2018-01-01 RX ORDER — HEPARIN SODIUM (PORCINE) LOCK FLUSH IV SOLN 100 UNIT/ML 100 UNIT/ML
500 SOLUTION INTRAVENOUS ONCE
Status: COMPLETED | OUTPATIENT
Start: 2018-01-01 | End: 2018-01-01

## 2018-01-01 RX ORDER — METOCLOPRAMIDE 10 MG/1
10 TABLET ORAL EVERY 6 HOURS PRN
Status: DISCONTINUED | OUTPATIENT
Start: 2018-01-01 | End: 2018-01-01 | Stop reason: HOSPADM

## 2018-01-01 RX ORDER — KETOROLAC TROMETHAMINE 30 MG/ML
INJECTION, SOLUTION INTRAMUSCULAR; INTRAVENOUS PRN
Status: DISCONTINUED | OUTPATIENT
Start: 2018-01-01 | End: 2018-01-01

## 2018-01-01 RX ORDER — LORAZEPAM 0.5 MG/1
.5-1 TABLET ORAL
Status: DISCONTINUED | OUTPATIENT
Start: 2018-01-01 | End: 2018-01-01 | Stop reason: HOSPADM

## 2018-01-01 RX ORDER — LIDOCAINE 40 MG/G
CREAM TOPICAL
Status: DISCONTINUED | OUTPATIENT
Start: 2018-01-01 | End: 2018-01-01 | Stop reason: HOSPADM

## 2018-01-01 RX ORDER — POLYETHYLENE GLYCOL 3350 17 G/17G
17 POWDER, FOR SOLUTION ORAL DAILY
Status: DISCONTINUED | OUTPATIENT
Start: 2018-01-01 | End: 2018-01-01 | Stop reason: HOSPADM

## 2018-01-01 RX ORDER — DIPHENOXYLATE HCL/ATROPINE 2.5-.025MG
1-2 TABLET ORAL 4 TIMES DAILY PRN
Qty: 100 TABLET | Refills: 1 | Status: ON HOLD | OUTPATIENT
Start: 2018-01-01 | End: 2019-01-01

## 2018-01-01 RX ORDER — KETAMINE HYDROCHLORIDE 10 MG/ML
50 INJECTION INTRAMUSCULAR; INTRAVENOUS ONCE
Status: COMPLETED | OUTPATIENT
Start: 2018-01-01 | End: 2018-01-01

## 2018-01-01 RX ORDER — ZOLPIDEM TARTRATE 5 MG/1
5 TABLET ORAL
Qty: 90 TABLET | Refills: 1 | Status: SHIPPED | OUTPATIENT
Start: 2018-01-01 | End: 2018-01-01

## 2018-01-01 RX ORDER — ACETAMINOPHEN 325 MG/1
975 TABLET ORAL ONCE
Status: COMPLETED | OUTPATIENT
Start: 2018-01-01 | End: 2018-01-01

## 2018-01-01 RX ORDER — HEPARIN SODIUM (PORCINE) LOCK FLUSH IV SOLN 100 UNIT/ML 100 UNIT/ML
5 SOLUTION INTRAVENOUS ONCE
Status: COMPLETED | OUTPATIENT
Start: 2018-01-01 | End: 2018-01-01

## 2018-01-01 RX ORDER — ACETAMINOPHEN 325 MG/1
975 TABLET ORAL EVERY 6 HOURS
Status: DISCONTINUED | OUTPATIENT
Start: 2018-01-01 | End: 2018-01-01 | Stop reason: HOSPADM

## 2018-01-01 RX ORDER — IOPAMIDOL 755 MG/ML
72 INJECTION, SOLUTION INTRAVASCULAR ONCE
Status: COMPLETED | OUTPATIENT
Start: 2018-01-01 | End: 2018-01-01

## 2018-01-01 RX ORDER — SENNOSIDES 8.6 MG
1-2 TABLET ORAL 2 TIMES DAILY PRN
Qty: 60 EACH | Refills: 1 | Status: SHIPPED | OUTPATIENT
Start: 2018-01-01 | End: 2018-01-01

## 2018-01-01 RX ORDER — BUPROPION HYDROCHLORIDE 150 MG/1
300 TABLET ORAL EVERY MORNING
Status: DISCONTINUED | OUTPATIENT
Start: 2018-01-01 | End: 2018-01-01 | Stop reason: HOSPADM

## 2018-01-01 RX ORDER — HYDROCODONE BITARTRATE AND ACETAMINOPHEN 5; 325 MG/1; MG/1
1-2 TABLET ORAL EVERY 6 HOURS PRN
Status: DISCONTINUED | OUTPATIENT
Start: 2018-01-01 | End: 2018-01-01 | Stop reason: HOSPADM

## 2018-01-01 RX ORDER — CITALOPRAM HYDROBROMIDE 20 MG/1
20 TABLET ORAL EVERY EVENING
Status: DISCONTINUED | OUTPATIENT
Start: 2018-01-01 | End: 2018-01-01 | Stop reason: HOSPADM

## 2018-01-01 RX ORDER — NALOXONE HYDROCHLORIDE 0.4 MG/ML
.1-.4 INJECTION, SOLUTION INTRAMUSCULAR; INTRAVENOUS; SUBCUTANEOUS
Status: DISCONTINUED | OUTPATIENT
Start: 2018-01-01 | End: 2018-01-01

## 2018-01-01 RX ORDER — NICOTINE POLACRILEX 4 MG
15-30 LOZENGE BUCCAL
Status: DISCONTINUED | OUTPATIENT
Start: 2018-01-01 | End: 2018-01-01 | Stop reason: HOSPADM

## 2018-01-01 RX ORDER — ONDANSETRON 4 MG/1
4 TABLET, ORALLY DISINTEGRATING ORAL EVERY 30 MIN PRN
Status: DISCONTINUED | OUTPATIENT
Start: 2018-01-01 | End: 2018-01-01 | Stop reason: HOSPADM

## 2018-01-01 RX ORDER — IOPAMIDOL 755 MG/ML
73 INJECTION, SOLUTION INTRAVASCULAR ONCE
Status: COMPLETED | OUTPATIENT
Start: 2018-01-01 | End: 2018-01-01

## 2018-01-01 RX ORDER — HEPARIN SODIUM (PORCINE) LOCK FLUSH IV SOLN 100 UNIT/ML 100 UNIT/ML
5 SOLUTION INTRAVENOUS ONCE
Status: DISCONTINUED | OUTPATIENT
Start: 2018-01-01 | End: 2018-01-01 | Stop reason: HOSPADM

## 2018-01-01 RX ORDER — OLANZAPINE 5 MG/1
5 TABLET ORAL AT BEDTIME
Qty: 5 TABLET | Refills: 0 | Status: SHIPPED | OUTPATIENT
Start: 2018-01-01 | End: 2018-01-01

## 2018-01-01 RX ORDER — LORAZEPAM 0.5 MG/1
0.5 TABLET ORAL ONCE
Status: COMPLETED | OUTPATIENT
Start: 2018-01-01 | End: 2018-01-01

## 2018-01-01 RX ORDER — SODIUM CHLORIDE 9 MG/ML
INJECTION, SOLUTION INTRAVENOUS CONTINUOUS
Status: DISCONTINUED | OUTPATIENT
Start: 2018-01-01 | End: 2018-01-01

## 2018-01-01 RX ORDER — NALOXONE HYDROCHLORIDE 0.4 MG/ML
.1-.4 INJECTION, SOLUTION INTRAMUSCULAR; INTRAVENOUS; SUBCUTANEOUS
Status: DISCONTINUED | OUTPATIENT
Start: 2018-01-01 | End: 2018-01-01 | Stop reason: HOSPADM

## 2018-01-01 RX ORDER — HEPARIN SODIUM (PORCINE) LOCK FLUSH IV SOLN 100 UNIT/ML 100 UNIT/ML
5 SOLUTION INTRAVENOUS EVERY 8 HOURS
Status: DISCONTINUED | OUTPATIENT
Start: 2018-01-01 | End: 2018-01-01 | Stop reason: HOSPADM

## 2018-01-01 RX ORDER — ONDANSETRON 2 MG/ML
4 INJECTION INTRAMUSCULAR; INTRAVENOUS EVERY 6 HOURS PRN
Status: DISCONTINUED | OUTPATIENT
Start: 2018-01-01 | End: 2018-01-01 | Stop reason: HOSPADM

## 2018-01-01 RX ORDER — CODEINE PHOSPHATE AND GUAIFENESIN 10; 100 MG/5ML; MG/5ML
1-2 SOLUTION ORAL EVERY 6 HOURS PRN
Qty: 180 ML | Refills: 0
Start: 2018-01-01 | End: 2018-01-01

## 2018-01-01 RX ORDER — OXYCODONE HYDROCHLORIDE 5 MG/1
5-10 TABLET ORAL EVERY 4 HOURS PRN
Qty: 60 TABLET | Refills: 0 | Status: ON HOLD | OUTPATIENT
Start: 2018-01-01 | End: 2019-01-01

## 2018-01-01 RX ORDER — LORAZEPAM 0.5 MG/1
.5-1 TABLET ORAL
Status: COMPLETED | OUTPATIENT
Start: 2018-01-01 | End: 2018-01-01

## 2018-01-01 RX ORDER — BISACODYL 10 MG
10 SUPPOSITORY, RECTAL RECTAL ONCE
Status: DISCONTINUED | OUTPATIENT
Start: 2018-01-01 | End: 2018-01-01 | Stop reason: HOSPADM

## 2018-01-01 RX ORDER — ACETAMINOPHEN 500 MG
1000 TABLET ORAL ONCE
Status: COMPLETED | OUTPATIENT
Start: 2018-01-01 | End: 2018-01-01

## 2018-01-01 RX ORDER — MAGNESIUM HYDROXIDE 1200 MG/15ML
LIQUID ORAL PRN
Status: DISCONTINUED | OUTPATIENT
Start: 2018-01-01 | End: 2018-01-01 | Stop reason: HOSPADM

## 2018-01-01 RX ORDER — CEFAZOLIN SODIUM 2 G/100ML
2 INJECTION, SOLUTION INTRAVENOUS
Status: COMPLETED | OUTPATIENT
Start: 2018-01-01 | End: 2018-01-01

## 2018-01-01 RX ORDER — HYDROXYZINE HYDROCHLORIDE 25 MG/1
25 TABLET, FILM COATED ORAL EVERY 6 HOURS PRN
Status: DISCONTINUED | OUTPATIENT
Start: 2018-01-01 | End: 2018-01-01

## 2018-01-01 RX ORDER — ZOLPIDEM TARTRATE 5 MG/1
5 TABLET ORAL
Qty: 10 TABLET | Refills: 0 | Status: SHIPPED | OUTPATIENT
Start: 2018-01-01 | End: 2018-01-01

## 2018-01-01 RX ORDER — SODIUM CHLORIDE, SODIUM LACTATE, POTASSIUM CHLORIDE, CALCIUM CHLORIDE 600; 310; 30; 20 MG/100ML; MG/100ML; MG/100ML; MG/100ML
INJECTION, SOLUTION INTRAVENOUS CONTINUOUS
Status: DISCONTINUED | OUTPATIENT
Start: 2018-01-01 | End: 2018-01-01 | Stop reason: ALTCHOICE

## 2018-01-01 RX ORDER — LORAZEPAM 0.5 MG/1
.5-2 TABLET ORAL
Status: COMPLETED | OUTPATIENT
Start: 2018-01-01 | End: 2018-01-01

## 2018-01-01 RX ORDER — DEXTROSE MONOHYDRATE 25 G/50ML
25-50 INJECTION, SOLUTION INTRAVENOUS
Status: DISCONTINUED | OUTPATIENT
Start: 2018-01-01 | End: 2018-01-01 | Stop reason: HOSPADM

## 2018-01-01 RX ORDER — LIDOCAINE 40 MG/G
CREAM TOPICAL
Status: CANCELLED | OUTPATIENT
Start: 2018-01-01

## 2018-01-01 RX ORDER — HEPARIN SODIUM (PORCINE) LOCK FLUSH IV SOLN 100 UNIT/ML 100 UNIT/ML
5 SOLUTION INTRAVENOUS EVERY 8 HOURS PRN
Status: DISCONTINUED | OUTPATIENT
Start: 2018-01-01 | End: 2018-01-01 | Stop reason: HOSPADM

## 2018-01-01 RX ORDER — EPHEDRINE SULFATE 50 MG/ML
INJECTION, SOLUTION INTRAMUSCULAR; INTRAVENOUS; SUBCUTANEOUS PRN
Status: DISCONTINUED | OUTPATIENT
Start: 2018-01-01 | End: 2018-01-01

## 2018-01-01 RX ORDER — LABETALOL HYDROCHLORIDE 5 MG/ML
10 INJECTION, SOLUTION INTRAVENOUS
Status: DISCONTINUED | OUTPATIENT
Start: 2018-01-01 | End: 2018-01-01 | Stop reason: HOSPADM

## 2018-01-01 RX ORDER — ACETAMINOPHEN 325 MG/1
650 TABLET ORAL EVERY 4 HOURS PRN
Status: DISCONTINUED | OUTPATIENT
Start: 2018-01-01 | End: 2018-01-01 | Stop reason: HOSPADM

## 2018-01-01 RX ORDER — OXYCODONE HYDROCHLORIDE 5 MG/1
1-2 CAPSULE ORAL EVERY 4 HOURS PRN
Qty: 30 CAPSULE | Refills: 0 | Status: SHIPPED | OUTPATIENT
Start: 2018-01-01 | End: 2018-01-01

## 2018-01-01 RX ORDER — LIDOCAINE 40 MG/G
1 CREAM TOPICAL SEE ADMIN INSTRUCTIONS
Status: COMPLETED | OUTPATIENT
Start: 2018-01-01 | End: 2018-01-01

## 2018-01-01 RX ORDER — PALONOSETRON 0.05 MG/ML
0.25 INJECTION, SOLUTION INTRAVENOUS ONCE
Status: CANCELLED
Start: 2018-01-01 | End: 2018-01-01

## 2018-01-01 RX ORDER — OXYCODONE AND ACETAMINOPHEN 5; 325 MG/1; MG/1
1-2 TABLET ORAL EVERY 4 HOURS PRN
Status: DISCONTINUED | OUTPATIENT
Start: 2018-01-01 | End: 2018-01-01

## 2018-01-01 RX ORDER — METOCLOPRAMIDE HYDROCHLORIDE 5 MG/ML
10 INJECTION INTRAMUSCULAR; INTRAVENOUS EVERY 6 HOURS PRN
Status: DISCONTINUED | OUTPATIENT
Start: 2018-01-01 | End: 2018-01-01 | Stop reason: HOSPADM

## 2018-01-01 RX ORDER — SODIUM CHLORIDE 9 MG/ML
INJECTION, SOLUTION INTRAVENOUS CONTINUOUS
Status: DISCONTINUED | OUTPATIENT
Start: 2018-01-01 | End: 2018-01-01 | Stop reason: HOSPADM

## 2018-01-01 RX ORDER — CEFAZOLIN SODIUM 1 G/3ML
1 INJECTION, POWDER, FOR SOLUTION INTRAMUSCULAR; INTRAVENOUS EVERY 8 HOURS
Status: COMPLETED | OUTPATIENT
Start: 2018-01-01 | End: 2018-01-01

## 2018-01-01 RX ORDER — GADOBUTROL 604.72 MG/ML
7.5 INJECTION INTRAVENOUS ONCE
Status: COMPLETED | OUTPATIENT
Start: 2018-01-01 | End: 2018-01-01

## 2018-01-01 RX ORDER — PALONOSETRON 0.05 MG/ML
0.25 INJECTION, SOLUTION INTRAVENOUS ONCE
Status: COMPLETED | OUTPATIENT
Start: 2018-01-01 | End: 2018-01-01

## 2018-01-01 RX ORDER — NALOXONE HYDROCHLORIDE 0.4 MG/ML
.1-.4 INJECTION, SOLUTION INTRAMUSCULAR; INTRAVENOUS; SUBCUTANEOUS
Status: ACTIVE | OUTPATIENT
Start: 2018-01-01 | End: 2018-01-01

## 2018-01-01 RX ORDER — HEPARIN SODIUM (PORCINE) LOCK FLUSH IV SOLN 100 UNIT/ML 100 UNIT/ML
5 SOLUTION INTRAVENOUS DAILY PRN
Status: DISCONTINUED | OUTPATIENT
Start: 2018-01-01 | End: 2018-01-01 | Stop reason: HOSPADM

## 2018-01-01 RX ORDER — HEPARIN SODIUM,PORCINE 10 UNIT/ML
5-10 VIAL (ML) INTRAVENOUS EVERY 24 HOURS
Status: DISCONTINUED | OUTPATIENT
Start: 2018-01-01 | End: 2018-01-01 | Stop reason: HOSPADM

## 2018-01-01 RX ORDER — SENNOSIDES 8.6 MG
1-2 TABLET ORAL 2 TIMES DAILY PRN
Qty: 60 EACH | Refills: 1 | Status: ON HOLD | COMMUNITY
Start: 2018-01-01 | End: 2019-01-01

## 2018-01-01 RX ORDER — HYDROCODONE BITARTRATE AND ACETAMINOPHEN 5; 325 MG/1; MG/1
1 TABLET ORAL EVERY 6 HOURS PRN
Qty: 30 TABLET | Refills: 0 | Status: ON HOLD | OUTPATIENT
Start: 2018-01-01 | End: 2018-01-01

## 2018-01-01 RX ORDER — SODIUM CHLORIDE, SODIUM LACTATE, POTASSIUM CHLORIDE, CALCIUM CHLORIDE 600; 310; 30; 20 MG/100ML; MG/100ML; MG/100ML; MG/100ML
INJECTION, SOLUTION INTRAVENOUS CONTINUOUS
Status: DISCONTINUED | OUTPATIENT
Start: 2018-01-01 | End: 2018-01-01 | Stop reason: HOSPADM

## 2018-01-01 RX ORDER — DIPHENOXYLATE HCL/ATROPINE 2.5-.025MG
1-2 TABLET ORAL 4 TIMES DAILY PRN
Status: DISCONTINUED | OUTPATIENT
Start: 2018-01-01 | End: 2018-01-01 | Stop reason: HOSPADM

## 2018-01-01 RX ORDER — ONDANSETRON 4 MG/1
8 TABLET, FILM COATED ORAL EVERY 8 HOURS PRN
Status: DISCONTINUED | OUTPATIENT
Start: 2018-01-01 | End: 2018-01-01 | Stop reason: HOSPADM

## 2018-01-01 RX ORDER — HEPARIN SODIUM,PORCINE 10 UNIT/ML
5-10 VIAL (ML) INTRAVENOUS
Status: DISCONTINUED | OUTPATIENT
Start: 2018-01-01 | End: 2018-01-01 | Stop reason: HOSPADM

## 2018-01-01 RX ORDER — DEXAMETHASONE 4 MG/1
TABLET ORAL
Qty: 30 TABLET | Refills: 0 | Status: SHIPPED | OUTPATIENT
Start: 2018-01-01 | End: 2018-01-01

## 2018-01-01 RX ORDER — ZOLEDRONIC ACID 0.04 MG/ML
4 INJECTION, SOLUTION INTRAVENOUS ONCE
Status: COMPLETED | OUTPATIENT
Start: 2018-01-01 | End: 2018-01-01

## 2018-01-01 RX ORDER — ALPRAZOLAM 0.5 MG
0.5 TABLET ORAL 3 TIMES DAILY PRN
Qty: 60 TABLET | Refills: 0 | Status: SHIPPED | OUTPATIENT
Start: 2018-01-01 | End: 2018-01-01

## 2018-01-01 RX ORDER — SENNOSIDES 8.6 MG
8.6 TABLET ORAL 2 TIMES DAILY PRN
Status: DISCONTINUED | OUTPATIENT
Start: 2018-01-01 | End: 2018-01-01

## 2018-01-01 RX ORDER — DEXAMETHASONE 4 MG/1
8 TABLET ORAL
Qty: 10 TABLET | Refills: 0 | Status: SHIPPED | OUTPATIENT
Start: 2018-01-01 | End: 2018-01-01

## 2018-01-01 RX ORDER — CABOZANTINIB 40 MG/1
TABLET ORAL
Qty: 30 TABLET | Refills: 0 | Status: SHIPPED | OUTPATIENT
Start: 2018-01-01 | End: 2018-01-01

## 2018-01-01 RX ORDER — ONDANSETRON 4 MG/1
8 TABLET, ORALLY DISINTEGRATING ORAL EVERY 6 HOURS PRN
Status: DISCONTINUED | OUTPATIENT
Start: 2018-01-01 | End: 2018-01-01 | Stop reason: HOSPADM

## 2018-01-01 RX ORDER — CABOZANTINIB 40 MG/1
TABLET ORAL
Qty: 30 TABLET | Refills: 0 | OUTPATIENT
Start: 2018-01-01

## 2018-01-01 RX ORDER — ONDANSETRON 8 MG/1
8 TABLET, FILM COATED ORAL EVERY 8 HOURS PRN
Qty: 30 TABLET | Refills: 3 | Status: SHIPPED | OUTPATIENT
Start: 2018-01-01 | End: 2019-01-01

## 2018-01-01 RX ORDER — ZOLEDRONIC ACID 0.04 MG/ML
4 INJECTION, SOLUTION INTRAVENOUS ONCE
Status: CANCELLED | OUTPATIENT
Start: 2018-01-01 | End: 2018-01-01

## 2018-01-01 RX ORDER — ZOLPIDEM TARTRATE 5 MG/1
5-10 TABLET ORAL
Status: DISCONTINUED | OUTPATIENT
Start: 2018-01-01 | End: 2018-01-01 | Stop reason: HOSPADM

## 2018-01-01 RX ORDER — HYDROCODONE BITARTRATE AND ACETAMINOPHEN 5; 325 MG/1; MG/1
1 TABLET ORAL EVERY 6 HOURS PRN
COMMUNITY
End: 2018-01-01

## 2018-01-01 RX ORDER — LIDOCAINE HYDROCHLORIDE 20 MG/ML
INJECTION, SOLUTION INFILTRATION; PERINEURAL PRN
Status: DISCONTINUED | OUTPATIENT
Start: 2018-01-01 | End: 2018-01-01

## 2018-01-01 RX ORDER — OXYCODONE HYDROCHLORIDE 5 MG/1
5 TABLET ORAL EVERY 4 HOURS PRN
Status: DISCONTINUED | OUTPATIENT
Start: 2018-01-01 | End: 2018-01-01

## 2018-01-01 RX ORDER — FENTANYL CITRATE 50 UG/ML
INJECTION, SOLUTION INTRAMUSCULAR; INTRAVENOUS PRN
Status: DISCONTINUED | OUTPATIENT
Start: 2018-01-01 | End: 2018-01-01

## 2018-01-01 RX ORDER — HYDROMORPHONE HCL/0.9% NACL/PF 0.2MG/0.2
.2-.4 SYRINGE (ML) INTRAVENOUS
Status: DISCONTINUED | OUTPATIENT
Start: 2018-01-01 | End: 2018-01-01 | Stop reason: HOSPADM

## 2018-01-01 RX ORDER — ALPRAZOLAM 0.5 MG
0.5 TABLET ORAL 3 TIMES DAILY PRN
Qty: 60 TABLET | Refills: 3 | Status: ON HOLD
Start: 2018-01-01 | End: 2019-01-01

## 2018-01-01 RX ORDER — HYDROMORPHONE HYDROCHLORIDE 1 MG/ML
.3-.5 INJECTION, SOLUTION INTRAMUSCULAR; INTRAVENOUS; SUBCUTANEOUS EVERY 5 MIN PRN
Status: DISCONTINUED | OUTPATIENT
Start: 2018-01-01 | End: 2018-01-01 | Stop reason: HOSPADM

## 2018-01-01 RX ORDER — ZOLPIDEM TARTRATE 5 MG/1
5 TABLET ORAL
Qty: 30 TABLET | Refills: 3 | Status: SHIPPED | OUTPATIENT
Start: 2018-01-01 | End: 2018-01-01

## 2018-01-01 RX ORDER — PROCHLORPERAZINE MALEATE 5 MG
10 TABLET ORAL EVERY 6 HOURS PRN
Status: DISCONTINUED | OUTPATIENT
Start: 2018-01-01 | End: 2018-01-01 | Stop reason: HOSPADM

## 2018-01-01 RX ORDER — SODIUM CHLORIDE, SODIUM LACTATE, POTASSIUM CHLORIDE, CALCIUM CHLORIDE 600; 310; 30; 20 MG/100ML; MG/100ML; MG/100ML; MG/100ML
INJECTION, SOLUTION INTRAVENOUS CONTINUOUS PRN
Status: DISCONTINUED | OUTPATIENT
Start: 2018-01-01 | End: 2018-01-01

## 2018-01-01 RX ORDER — POLYETHYLENE GLYCOL 3350 17 G/17G
17 POWDER, FOR SOLUTION ORAL DAILY PRN
Qty: 7 PACKET | Status: ON HOLD | COMMUNITY
Start: 2018-01-01 | End: 2019-01-01

## 2018-01-01 RX ORDER — HEPARIN SODIUM (PORCINE) LOCK FLUSH IV SOLN 100 UNIT/ML 100 UNIT/ML
5 SOLUTION INTRAVENOUS
Status: DISCONTINUED | OUTPATIENT
Start: 2018-01-01 | End: 2018-01-01 | Stop reason: HOSPADM

## 2018-01-01 RX ORDER — LIDOCAINE HYDROCHLORIDE 10 MG/ML
1-30 INJECTION, SOLUTION EPIDURAL; INFILTRATION; INTRACAUDAL; PERINEURAL
Status: DISCONTINUED | OUTPATIENT
Start: 2018-01-01 | End: 2018-01-01 | Stop reason: HOSPADM

## 2018-01-01 RX ORDER — ALPRAZOLAM 0.5 MG
0.5 TABLET ORAL 3 TIMES DAILY PRN
Status: DISCONTINUED | OUTPATIENT
Start: 2018-01-01 | End: 2018-01-01 | Stop reason: HOSPADM

## 2018-01-01 RX ORDER — BUPIVACAINE HYDROCHLORIDE AND EPINEPHRINE 2.5; 5 MG/ML; UG/ML
INJECTION, SOLUTION INFILTRATION; PERINEURAL PRN
Status: DISCONTINUED | OUTPATIENT
Start: 2018-01-01 | End: 2018-01-01 | Stop reason: HOSPADM

## 2018-01-01 RX ORDER — FENTANYL CITRATE 50 UG/ML
25-50 INJECTION, SOLUTION INTRAMUSCULAR; INTRAVENOUS
Status: DISCONTINUED | OUTPATIENT
Start: 2018-01-01 | End: 2018-01-01 | Stop reason: HOSPADM

## 2018-01-01 RX ORDER — HYDROCODONE BITARTRATE AND ACETAMINOPHEN 5; 325 MG/1; MG/1
TABLET ORAL
Status: ON HOLD | COMMUNITY
Start: 2018-01-01 | End: 2019-01-01

## 2018-01-01 RX ORDER — OXYCODONE AND ACETAMINOPHEN 5; 325 MG/1; MG/1
1 TABLET ORAL EVERY 6 HOURS PRN
Qty: 60 TABLET | Refills: 0 | Status: SHIPPED | OUTPATIENT
Start: 2018-01-01 | End: 2018-01-01

## 2018-01-01 RX ORDER — ACETAMINOPHEN 325 MG/1
975 TABLET ORAL EVERY 6 HOURS
Qty: 100 TABLET | Refills: 1 | Status: ON HOLD | OUTPATIENT
Start: 2018-01-01 | End: 2019-01-01

## 2018-01-01 RX ORDER — ZOLPIDEM TARTRATE 5 MG/1
5 TABLET ORAL
Qty: 30 TABLET | Refills: 3 | Status: ON HOLD
Start: 2018-01-01 | End: 2019-01-01

## 2018-01-01 RX ORDER — POTASSIUM CHLORIDE 1500 MG/1
20 TABLET, EXTENDED RELEASE ORAL DAILY
Qty: 14 TABLET | Refills: 0 | Status: SHIPPED | OUTPATIENT
Start: 2018-01-01 | End: 2018-01-01

## 2018-01-01 RX ORDER — ONDANSETRON 4 MG/1
4 TABLET, ORALLY DISINTEGRATING ORAL EVERY 6 HOURS PRN
Status: DISCONTINUED | OUTPATIENT
Start: 2018-01-01 | End: 2018-01-01

## 2018-01-01 RX ORDER — ZOLPIDEM TARTRATE 5 MG/1
TABLET ORAL
Qty: 30 TABLET | Refills: 0 | Status: CANCELLED | OUTPATIENT
Start: 2018-01-01

## 2018-01-01 RX ORDER — PROPOFOL 10 MG/ML
INJECTION, EMULSION INTRAVENOUS PRN
Status: DISCONTINUED | OUTPATIENT
Start: 2018-01-01 | End: 2018-01-01

## 2018-01-01 RX ORDER — IOPAMIDOL 755 MG/ML
51 INJECTION, SOLUTION INTRAVASCULAR ONCE
Status: COMPLETED | OUTPATIENT
Start: 2018-01-01 | End: 2018-01-01

## 2018-01-01 RX ORDER — HYDROMORPHONE HYDROCHLORIDE 1 MG/ML
.3-.5 INJECTION, SOLUTION INTRAMUSCULAR; INTRAVENOUS; SUBCUTANEOUS
Status: DISCONTINUED | OUTPATIENT
Start: 2018-01-01 | End: 2018-01-01

## 2018-01-01 RX ORDER — ZOLPIDEM TARTRATE 5 MG/1
5 TABLET ORAL
Qty: 30 TABLET | Refills: 1 | Status: CANCELLED | OUTPATIENT
Start: 2018-01-01

## 2018-01-01 RX ORDER — ONDANSETRON 2 MG/ML
4 INJECTION INTRAMUSCULAR; INTRAVENOUS EVERY 30 MIN PRN
Status: DISCONTINUED | OUTPATIENT
Start: 2018-01-01 | End: 2018-01-01 | Stop reason: HOSPADM

## 2018-01-01 RX ORDER — BENZONATATE 100 MG/1
100 CAPSULE ORAL 3 TIMES DAILY PRN
Qty: 42 CAPSULE | Refills: 0 | Status: SHIPPED | OUTPATIENT
Start: 2018-01-01 | End: 2018-01-01

## 2018-01-01 RX ORDER — OMEPRAZOLE 40 MG/1
40 CAPSULE, DELAYED RELEASE ORAL DAILY
Qty: 30 CAPSULE | Refills: 3 | Status: SHIPPED | OUTPATIENT
Start: 2018-01-01

## 2018-01-01 RX ORDER — ZOLPIDEM TARTRATE 5 MG/1
TABLET ORAL
Qty: 30 TABLET | Refills: 0 | Status: SHIPPED | OUTPATIENT
Start: 2018-01-01 | End: 2019-01-01

## 2018-01-01 RX ORDER — ONDANSETRON 2 MG/ML
4 INJECTION INTRAMUSCULAR; INTRAVENOUS
Status: DISCONTINUED | OUTPATIENT
Start: 2018-01-01 | End: 2018-01-01 | Stop reason: HOSPADM

## 2018-01-01 RX ORDER — AMOXICILLIN 250 MG
2 CAPSULE ORAL 2 TIMES DAILY
Status: DISCONTINUED | OUTPATIENT
Start: 2018-01-01 | End: 2018-01-01 | Stop reason: HOSPADM

## 2018-01-01 RX ORDER — HYDROCODONE BITARTRATE AND ACETAMINOPHEN 5; 325 MG/1; MG/1
1 TABLET ORAL EVERY 6 HOURS PRN
Qty: 60 TABLET | Refills: 0 | Status: SHIPPED | OUTPATIENT
Start: 2018-01-01 | End: 2018-01-01

## 2018-01-01 RX ORDER — KETOROLAC TROMETHAMINE 30 MG/ML
30 INJECTION, SOLUTION INTRAMUSCULAR; INTRAVENOUS EVERY 6 HOURS
Status: CANCELLED | OUTPATIENT
Start: 2018-01-01 | End: 2018-01-01

## 2018-01-01 RX ORDER — HEPARIN SODIUM (PORCINE) LOCK FLUSH IV SOLN 100 UNIT/ML 100 UNIT/ML
5 SOLUTION INTRAVENOUS
Status: COMPLETED | OUTPATIENT
Start: 2018-01-01 | End: 2018-01-01

## 2018-01-01 RX ORDER — HEPARIN SODIUM (PORCINE) LOCK FLUSH IV SOLN 100 UNIT/ML 100 UNIT/ML
500 SOLUTION INTRAVENOUS SEE ADMIN INSTRUCTIONS
Status: COMPLETED | OUTPATIENT
Start: 2018-01-01 | End: 2018-01-01

## 2018-01-01 RX ORDER — ONDANSETRON 2 MG/ML
INJECTION INTRAMUSCULAR; INTRAVENOUS PRN
Status: DISCONTINUED | OUTPATIENT
Start: 2018-01-01 | End: 2018-01-01

## 2018-01-01 RX ORDER — OXYCODONE HYDROCHLORIDE 5 MG/1
5-10 TABLET ORAL
Status: DISCONTINUED | OUTPATIENT
Start: 2018-01-01 | End: 2018-01-01 | Stop reason: HOSPADM

## 2018-01-01 RX ORDER — HEPARIN SODIUM,PORCINE 10 UNIT/ML
5 VIAL (ML) INTRAVENOUS
Status: COMPLETED | OUTPATIENT
Start: 2018-01-01 | End: 2018-01-01

## 2018-01-01 RX ORDER — OXYCODONE HYDROCHLORIDE 5 MG/1
5-10 TABLET ORAL
Qty: 70 TABLET | Refills: 0 | Status: SHIPPED | OUTPATIENT
Start: 2018-01-01 | End: 2018-01-01

## 2018-01-01 RX ORDER — ASPIRIN 325 MG
325 TABLET, DELAYED RELEASE (ENTERIC COATED) ORAL DAILY
Qty: 26 TABLET | Refills: 0 | Status: SHIPPED | OUTPATIENT
Start: 2018-01-01 | End: 2018-01-01

## 2018-01-01 RX ORDER — IOPAMIDOL 755 MG/ML
80 INJECTION, SOLUTION INTRAVASCULAR ONCE
Status: COMPLETED | OUTPATIENT
Start: 2018-01-01 | End: 2018-01-01

## 2018-01-01 RX ADMIN — OXYCODONE HYDROCHLORIDE 10 MG: 5 TABLET ORAL at 16:30

## 2018-01-01 RX ADMIN — SENNOSIDES AND DOCUSATE SODIUM 2 TABLET: 8.6; 5 TABLET ORAL at 08:08

## 2018-01-01 RX ADMIN — LORAZEPAM 1.5 MG: 0.5 TABLET ORAL at 06:00

## 2018-01-01 RX ADMIN — SODIUM CHLORIDE, PRESERVATIVE FREE 5 ML: 5 INJECTION INTRAVENOUS at 15:51

## 2018-01-01 RX ADMIN — OXYCODONE HYDROCHLORIDE 10 MG: 5 TABLET ORAL at 06:25

## 2018-01-01 RX ADMIN — ACETAMINOPHEN 975 MG: 325 TABLET ORAL at 10:07

## 2018-01-01 RX ADMIN — HYDROMORPHONE HYDROCHLORIDE 0.3 MG: 1 INJECTION, SOLUTION INTRAMUSCULAR; INTRAVENOUS; SUBCUTANEOUS at 14:51

## 2018-01-01 RX ADMIN — ACETAMINOPHEN 975 MG: 325 TABLET, FILM COATED ORAL at 02:55

## 2018-01-01 RX ADMIN — DENOSUMAB 120 MG: 120 INJECTION SUBCUTANEOUS at 14:07

## 2018-01-01 RX ADMIN — HYDROMORPHONE HYDROCHLORIDE 0.4 MG: 1 INJECTION, SOLUTION INTRAMUSCULAR; INTRAVENOUS; SUBCUTANEOUS at 15:02

## 2018-01-01 RX ADMIN — KETOROLAC TROMETHAMINE 30 MG: 30 INJECTION, SOLUTION INTRAMUSCULAR at 12:58

## 2018-01-01 RX ADMIN — OXYCODONE HYDROCHLORIDE 5 MG: 5 TABLET ORAL at 12:21

## 2018-01-01 RX ADMIN — ZOSTER VACCINE RECOMBINANT, ADJUVANTED 0.5 ML: KIT at 16:05

## 2018-01-01 RX ADMIN — DIPHENOXYLATE HYDROCHLORIDE AND ATROPINE SULFATE 2 TABLET: 2.5; .025 TABLET ORAL at 13:36

## 2018-01-01 RX ADMIN — SODIUM CHLORIDE, PRESERVATIVE FREE 5 ML: 5 INJECTION INTRAVENOUS at 12:48

## 2018-01-01 RX ADMIN — CITALOPRAM HYDROBROMIDE 20 MG: 20 TABLET ORAL at 19:35

## 2018-01-01 RX ADMIN — Medication 10 ML: at 07:16

## 2018-01-01 RX ADMIN — ACETAMINOPHEN 975 MG: 325 TABLET, FILM COATED ORAL at 08:28

## 2018-01-01 RX ADMIN — OMEPRAZOLE 40 MG: 20 CAPSULE, DELAYED RELEASE ORAL at 08:31

## 2018-01-01 RX ADMIN — SODIUM CHLORIDE, PRESERVATIVE FREE 5 ML: 5 INJECTION INTRAVENOUS at 14:25

## 2018-01-01 RX ADMIN — ONDANSETRON HYDROCHLORIDE 8 MG: 4 TABLET, FILM COATED ORAL at 09:25

## 2018-01-01 RX ADMIN — LORAZEPAM 1 MG: 0.5 TABLET ORAL at 10:36

## 2018-01-01 RX ADMIN — BUPROPION HYDROCHLORIDE 300 MG: 150 TABLET, FILM COATED, EXTENDED RELEASE ORAL at 08:31

## 2018-01-01 RX ADMIN — SODIUM CHLORIDE 1000 ML: 9 INJECTION, SOLUTION INTRAVENOUS at 18:12

## 2018-01-01 RX ADMIN — OXYCODONE HYDROCHLORIDE 10 MG: 5 TABLET ORAL at 10:29

## 2018-01-01 RX ADMIN — OXYCODONE HYDROCHLORIDE AND ACETAMINOPHEN 2 TABLET: 5; 325 TABLET ORAL at 00:10

## 2018-01-01 RX ADMIN — ROCURONIUM BROMIDE 20 MG: 10 INJECTION INTRAVENOUS at 12:32

## 2018-01-01 RX ADMIN — ALPRAZOLAM 0.5 MG: 0.5 TABLET ORAL at 18:21

## 2018-01-01 RX ADMIN — ZOLPIDEM TARTRATE 10 MG: 5 TABLET, FILM COATED ORAL at 20:43

## 2018-01-01 RX ADMIN — IOPAMIDOL 72 ML: 755 INJECTION, SOLUTION INTRAVASCULAR at 15:03

## 2018-01-01 RX ADMIN — CEFAZOLIN SODIUM 2 G: 2 INJECTION, SOLUTION INTRAVENOUS at 12:15

## 2018-01-01 RX ADMIN — SODIUM CHLORIDE, PRESERVATIVE FREE 5 ML: 5 INJECTION INTRAVENOUS at 06:01

## 2018-01-01 RX ADMIN — ACETAMINOPHEN 975 MG: 325 TABLET, FILM COATED ORAL at 13:16

## 2018-01-01 RX ADMIN — IOPAMIDOL 72 ML: 755 INJECTION, SOLUTION INTRAVASCULAR at 17:15

## 2018-01-01 RX ADMIN — OMEPRAZOLE 40 MG: 20 CAPSULE, DELAYED RELEASE ORAL at 16:29

## 2018-01-01 RX ADMIN — Medication 5 MG: at 11:58

## 2018-01-01 RX ADMIN — ACETAMINOPHEN 975 MG: 325 TABLET, FILM COATED ORAL at 13:34

## 2018-01-01 RX ADMIN — HEPARIN SODIUM (PORCINE) LOCK FLUSH IV SOLN 100 UNIT/ML 500 UNITS: 100 SOLUTION at 16:16

## 2018-01-01 RX ADMIN — DENOSUMAB 120 MG: 120 INJECTION SUBCUTANEOUS at 14:09

## 2018-01-01 RX ADMIN — SODIUM CHLORIDE, POTASSIUM CHLORIDE, SODIUM LACTATE AND CALCIUM CHLORIDE: 600; 310; 30; 20 INJECTION, SOLUTION INTRAVENOUS at 09:56

## 2018-01-01 RX ADMIN — CITALOPRAM HYDROBROMIDE 20 MG: 20 TABLET ORAL at 20:11

## 2018-01-01 RX ADMIN — OXYCODONE HYDROCHLORIDE 10 MG: 5 TABLET ORAL at 19:35

## 2018-01-01 RX ADMIN — PHENYLEPHRINE HYDROCHLORIDE 50 MCG: 10 INJECTION, SOLUTION INTRAMUSCULAR; INTRAVENOUS; SUBCUTANEOUS at 12:57

## 2018-01-01 RX ADMIN — PHENYLEPHRINE HYDROCHLORIDE 50 MCG: 10 INJECTION, SOLUTION INTRAMUSCULAR; INTRAVENOUS; SUBCUTANEOUS at 13:27

## 2018-01-01 RX ADMIN — FENTANYL CITRATE 50 MCG: 50 INJECTION, SOLUTION INTRAMUSCULAR; INTRAVENOUS at 14:15

## 2018-01-01 RX ADMIN — POLYETHYLENE GLYCOL 3350 17 G: 17 POWDER, FOR SOLUTION ORAL at 14:14

## 2018-01-01 RX ADMIN — ACETAMINOPHEN 650 MG: 325 TABLET, FILM COATED ORAL at 20:06

## 2018-01-01 RX ADMIN — Medication 6 ML: at 09:41

## 2018-01-01 RX ADMIN — SODIUM CHLORIDE, PRESERVATIVE FREE 5 ML: 5 INJECTION INTRAVENOUS at 14:43

## 2018-01-01 RX ADMIN — DENOSUMAB 120 MG: 120 INJECTION SUBCUTANEOUS at 14:43

## 2018-01-01 RX ADMIN — FENTANYL CITRATE 50 MCG: 50 INJECTION, SOLUTION INTRAMUSCULAR; INTRAVENOUS at 11:46

## 2018-01-01 RX ADMIN — OXYCODONE HYDROCHLORIDE 10 MG: 5 TABLET ORAL at 23:03

## 2018-01-01 RX ADMIN — ACETAMINOPHEN 975 MG: 325 TABLET, FILM COATED ORAL at 08:07

## 2018-01-01 RX ADMIN — CITALOPRAM HYDROBROMIDE 20 MG: 20 TABLET ORAL at 20:06

## 2018-01-01 RX ADMIN — OXYCODONE HYDROCHLORIDE 10 MG: 5 TABLET ORAL at 22:35

## 2018-01-01 RX ADMIN — OXYCODONE HYDROCHLORIDE 10 MG: 5 TABLET ORAL at 02:55

## 2018-01-01 RX ADMIN — IOPAMIDOL 73 ML: 755 INJECTION, SOLUTION INTRAVASCULAR at 12:36

## 2018-01-01 RX ADMIN — OXYCODONE HYDROCHLORIDE 10 MG: 5 TABLET ORAL at 09:25

## 2018-01-01 RX ADMIN — GADOBUTROL 5 ML: 604.72 INJECTION INTRAVENOUS at 12:57

## 2018-01-01 RX ADMIN — PROPOFOL 60 MG: 10 INJECTION, EMULSION INTRAVENOUS at 11:46

## 2018-01-01 RX ADMIN — PALONOSETRON HYDROCHLORIDE 0.25 MG: 0.25 INJECTION INTRAVENOUS at 15:50

## 2018-01-01 RX ADMIN — OXYCODONE HYDROCHLORIDE 10 MG: 5 TABLET ORAL at 07:31

## 2018-01-01 RX ADMIN — OXYCODONE HYDROCHLORIDE 10 MG: 5 TABLET ORAL at 13:34

## 2018-01-01 RX ADMIN — HYDROXYZINE HYDROCHLORIDE 25 MG: 25 TABLET ORAL at 13:16

## 2018-01-01 RX ADMIN — SODIUM CHLORIDE, PRESERVATIVE FREE 5 ML: 5 INJECTION INTRAVENOUS at 07:12

## 2018-01-01 RX ADMIN — ZOLPIDEM TARTRATE 10 MG: 5 TABLET, FILM COATED ORAL at 01:29

## 2018-01-01 RX ADMIN — SODIUM CHLORIDE, POTASSIUM CHLORIDE, SODIUM LACTATE AND CALCIUM CHLORIDE 500 ML: 600; 310; 30; 20 INJECTION, SOLUTION INTRAVENOUS at 22:32

## 2018-01-01 RX ADMIN — SODIUM CHLORIDE, PRESERVATIVE FREE 5 ML: 5 INJECTION INTRAVENOUS at 16:01

## 2018-01-01 RX ADMIN — SUGAMMADEX 110 MG: 100 INJECTION, SOLUTION INTRAVENOUS at 14:05

## 2018-01-01 RX ADMIN — GADOBUTROL 5 ML: 604.72 INJECTION INTRAVENOUS at 11:46

## 2018-01-01 RX ADMIN — ROCURONIUM BROMIDE 30 MG: 10 INJECTION INTRAVENOUS at 11:47

## 2018-01-01 RX ADMIN — ASPIRIN 325 MG: 325 TABLET, DELAYED RELEASE ORAL at 10:14

## 2018-01-01 RX ADMIN — SODIUM CHLORIDE, PRESERVATIVE FREE 5 ML: 5 INJECTION INTRAVENOUS at 14:26

## 2018-01-01 RX ADMIN — INFLUENZA A VIRUS A/MICHIGAN/45/2015 X-275 (H1N1) ANTIGEN (FORMALDEHYDE INACTIVATED), INFLUENZA A VIRUS A/SINGAPORE/INFIMH-16-0019/2016 IVR-186 (H3N2) ANTIGEN (FORMALDEHYDE INACTIVATED), INFLUENZA B VIRUS B/PHUKET/3073/2013 ANTIGEN (FORMALDEHYDE INACTIVATED), AND INFLUENZA B VIRUS B/MARYLAND/15/2016 BX-69A ANTIGEN (FORMALDEHYDE INACTIVATED) 0.5 ML: 15; 15; 15; 15 INJECTION, SUSPENSION INTRAMUSCULAR at 16:04

## 2018-01-01 RX ADMIN — IOPAMIDOL 80 ML: 755 INJECTION, SOLUTION INTRAVASCULAR at 16:08

## 2018-01-01 RX ADMIN — MIDAZOLAM 2 MG: 1 INJECTION INTRAMUSCULAR; INTRAVENOUS at 11:35

## 2018-01-01 RX ADMIN — SENNOSIDES AND DOCUSATE SODIUM 2 TABLET: 8.6; 5 TABLET ORAL at 20:43

## 2018-01-01 RX ADMIN — Medication 5 ML: at 15:29

## 2018-01-01 RX ADMIN — LORAZEPAM 0.5 MG: 0.5 TABLET ORAL at 06:47

## 2018-01-01 RX ADMIN — OXYCODONE HYDROCHLORIDE 5 MG: 5 TABLET ORAL at 13:16

## 2018-01-01 RX ADMIN — OXYCODONE HYDROCHLORIDE 5 MG: 5 TABLET ORAL at 04:38

## 2018-01-01 RX ADMIN — BUPIVACAINE HYDROCHLORIDE 30 ML: 7.5 INJECTION, SOLUTION EPIDURAL; RETROBULBAR at 06:00

## 2018-01-01 RX ADMIN — SODIUM CHLORIDE, PRESERVATIVE FREE 500 UNITS: 5 INJECTION INTRAVENOUS at 18:56

## 2018-01-01 RX ADMIN — Medication 5 ML: at 11:34

## 2018-01-01 RX ADMIN — SODIUM CHLORIDE 1000 ML: 9 INJECTION, SOLUTION INTRAVENOUS at 15:24

## 2018-01-01 RX ADMIN — SODIUM CHLORIDE, PRESERVATIVE FREE 5 ML: 5 INJECTION INTRAVENOUS at 13:06

## 2018-01-01 RX ADMIN — HYDROMORPHONE HYDROCHLORIDE 0.5 MG: 1 INJECTION, SOLUTION INTRAMUSCULAR; INTRAVENOUS; SUBCUTANEOUS at 22:25

## 2018-01-01 RX ADMIN — SODIUM CHLORIDE 1000 ML: 9 INJECTION, SOLUTION INTRAVENOUS at 16:36

## 2018-01-01 RX ADMIN — PHENYLEPHRINE HYDROCHLORIDE 50 MCG: 10 INJECTION, SOLUTION INTRAMUSCULAR; INTRAVENOUS; SUBCUTANEOUS at 13:10

## 2018-01-01 RX ADMIN — SODIUM CHLORIDE, PRESERVATIVE FREE 5 ML: 5 INJECTION INTRAVENOUS at 16:38

## 2018-01-01 RX ADMIN — HYDROMORPHONE HYDROCHLORIDE 0.3 MG: 1 INJECTION, SOLUTION INTRAMUSCULAR; INTRAVENOUS; SUBCUTANEOUS at 14:42

## 2018-01-01 RX ADMIN — SENNOSIDES AND DOCUSATE SODIUM 2 TABLET: 8.6; 5 TABLET ORAL at 22:25

## 2018-01-01 RX ADMIN — KETAMINE HCL-NACL SOLN PREF SY 50 MG/5ML-0.9% (10MG/ML) 20 MG: 10 SOLUTION PREFILLED SYRINGE at 11:44

## 2018-01-01 RX ADMIN — SODIUM CHLORIDE: 9 INJECTION, SOLUTION INTRAVENOUS at 16:29

## 2018-01-01 RX ADMIN — ONDANSETRON 4 MG: 2 INJECTION, SOLUTION INTRAMUSCULAR; INTRAVENOUS at 14:53

## 2018-01-01 RX ADMIN — SODIUM CHLORIDE, POTASSIUM CHLORIDE, SODIUM LACTATE AND CALCIUM CHLORIDE 1000 ML: 600; 310; 30; 20 INJECTION, SOLUTION INTRAVENOUS at 04:49

## 2018-01-01 RX ADMIN — PROCHLORPERAZINE MALEATE 10 MG: 5 TABLET, FILM COATED ORAL at 21:39

## 2018-01-01 RX ADMIN — ACETAMINOPHEN 975 MG: 325 TABLET, FILM COATED ORAL at 02:25

## 2018-01-01 RX ADMIN — SODIUM CHLORIDE, POTASSIUM CHLORIDE, SODIUM LACTATE AND CALCIUM CHLORIDE: 600; 310; 30; 20 INJECTION, SOLUTION INTRAVENOUS at 02:55

## 2018-01-01 RX ADMIN — BUPROPION HYDROCHLORIDE 300 MG: 150 TABLET, FILM COATED, EXTENDED RELEASE ORAL at 08:28

## 2018-01-01 RX ADMIN — PHENYLEPHRINE HYDROCHLORIDE 50 MCG: 10 INJECTION, SOLUTION INTRAMUSCULAR; INTRAVENOUS; SUBCUTANEOUS at 12:51

## 2018-01-01 RX ADMIN — Medication 5 ML: at 14:24

## 2018-01-01 RX ADMIN — HEPARIN 500 UNITS: 100 SYRINGE at 15:59

## 2018-01-01 RX ADMIN — HYDROMORPHONE HYDROCHLORIDE 0.3 MG: 1 INJECTION, SOLUTION INTRAMUSCULAR; INTRAVENOUS; SUBCUTANEOUS at 14:19

## 2018-01-01 RX ADMIN — ZOLEDRONIC ACID 4 MG: 0.04 INJECTION, SOLUTION INTRAVENOUS at 18:16

## 2018-01-01 RX ADMIN — ENOXAPARIN SODIUM 40 MG: 40 INJECTION SUBCUTANEOUS at 06:26

## 2018-01-01 RX ADMIN — SODIUM CHLORIDE, PRESERVATIVE FREE 5 ML: 5 INJECTION INTRAVENOUS at 09:56

## 2018-01-01 RX ADMIN — BUPROPION HYDROCHLORIDE 300 MG: 150 TABLET, FILM COATED, EXTENDED RELEASE ORAL at 08:08

## 2018-01-01 RX ADMIN — HEPARIN SODIUM (PORCINE) LOCK FLUSH IV SOLN 100 UNIT/ML 5 ML: 100 SOLUTION at 08:20

## 2018-01-01 RX ADMIN — ENOXAPARIN SODIUM 40 MG: 40 INJECTION SUBCUTANEOUS at 08:27

## 2018-01-01 RX ADMIN — ZOLPIDEM TARTRATE 10 MG: 5 TABLET, FILM COATED ORAL at 22:25

## 2018-01-01 RX ADMIN — OMEPRAZOLE 40 MG: 20 CAPSULE, DELAYED RELEASE ORAL at 08:27

## 2018-01-01 RX ADMIN — FENTANYL CITRATE 50 MCG: 50 INJECTION, SOLUTION INTRAMUSCULAR; INTRAVENOUS at 12:40

## 2018-01-01 RX ADMIN — DIPHENOXYLATE HYDROCHLORIDE AND ATROPINE SULFATE 2 TABLET: 2.5; .025 TABLET ORAL at 09:56

## 2018-01-01 RX ADMIN — HEPARIN SODIUM (PORCINE) LOCK FLUSH IV SOLN 100 UNIT/ML 5 ML: 100 SOLUTION at 11:13

## 2018-01-01 RX ADMIN — ACETAMINOPHEN 1000 MG: 500 TABLET, FILM COATED ORAL at 15:57

## 2018-01-01 RX ADMIN — Medication 5 ML: at 14:13

## 2018-01-01 RX ADMIN — PHENYLEPHRINE HYDROCHLORIDE 100 MCG: 10 INJECTION, SOLUTION INTRAMUSCULAR; INTRAVENOUS; SUBCUTANEOUS at 11:57

## 2018-01-01 RX ADMIN — ACETAMINOPHEN 975 MG: 325 TABLET, FILM COATED ORAL at 07:31

## 2018-01-01 RX ADMIN — ONDANSETRON HYDROCHLORIDE 8 MG: 4 TABLET, FILM COATED ORAL at 19:30

## 2018-01-01 RX ADMIN — OXYCODONE HYDROCHLORIDE AND ACETAMINOPHEN 2 TABLET: 5; 325 TABLET ORAL at 04:05

## 2018-01-01 RX ADMIN — ONDANSETRON 4 MG: 2 INJECTION INTRAMUSCULAR; INTRAVENOUS at 12:58

## 2018-01-01 RX ADMIN — SODIUM CHLORIDE, POTASSIUM CHLORIDE, SODIUM LACTATE AND CALCIUM CHLORIDE: 600; 310; 30; 20 INJECTION, SOLUTION INTRAVENOUS at 11:15

## 2018-01-01 RX ADMIN — OXYCODONE HYDROCHLORIDE 5 MG: 5 TABLET ORAL at 09:07

## 2018-01-01 RX ADMIN — ALPRAZOLAM 0.5 MG: 0.5 TABLET ORAL at 19:40

## 2018-01-01 RX ADMIN — HYDROMORPHONE HYDROCHLORIDE 0.5 MG: 1 INJECTION, SOLUTION INTRAMUSCULAR; INTRAVENOUS; SUBCUTANEOUS at 15:28

## 2018-01-01 RX ADMIN — Medication 5 MG: at 12:15

## 2018-01-01 RX ADMIN — SODIUM CHLORIDE, PRESERVATIVE FREE 500 UNITS: 5 INJECTION INTRAVENOUS at 14:05

## 2018-01-01 RX ADMIN — SODIUM CHLORIDE, PRESERVATIVE FREE 5 ML: 5 INJECTION INTRAVENOUS at 14:45

## 2018-01-01 RX ADMIN — Medication 9 ML: at 12:49

## 2018-01-01 RX ADMIN — ACETAMINOPHEN 975 MG: 325 TABLET, FILM COATED ORAL at 13:36

## 2018-01-01 RX ADMIN — CEFAZOLIN 1 G: 1 INJECTION, POWDER, FOR SOLUTION INTRAMUSCULAR; INTRAVENOUS at 20:12

## 2018-01-01 RX ADMIN — LIDOCAINE HYDROCHLORIDE 100 MG: 20 INJECTION, SOLUTION INFILTRATION; PERINEURAL at 11:46

## 2018-01-01 RX ADMIN — SODIUM CHLORIDE, PRESERVATIVE FREE 5 ML: 5 INJECTION INTRAVENOUS at 12:46

## 2018-01-01 RX ADMIN — Medication 6 ML: at 07:14

## 2018-01-01 RX ADMIN — SODIUM CHLORIDE, POTASSIUM CHLORIDE, SODIUM LACTATE AND CALCIUM CHLORIDE: 600; 310; 30; 20 INJECTION, SOLUTION INTRAVENOUS at 13:52

## 2018-01-01 RX ADMIN — HYDROMORPHONE HYDROCHLORIDE 0.3 MG: 1 INJECTION, SOLUTION INTRAMUSCULAR; INTRAVENOUS; SUBCUTANEOUS at 00:42

## 2018-01-01 RX ADMIN — OXYCODONE HYDROCHLORIDE 10 MG: 5 TABLET ORAL at 19:30

## 2018-01-01 RX ADMIN — GADOBUTROL 6 ML: 604.72 INJECTION INTRAVENOUS at 07:38

## 2018-01-01 RX ADMIN — IOPAMIDOL 73 ML: 755 INJECTION, SOLUTION INTRAVASCULAR at 12:00

## 2018-01-01 RX ADMIN — ONDANSETRON 4 MG: 2 INJECTION INTRAMUSCULAR; INTRAVENOUS at 03:25

## 2018-01-01 RX ADMIN — ACETAMINOPHEN 975 MG: 325 TABLET, FILM COATED ORAL at 20:43

## 2018-01-01 RX ADMIN — OMEPRAZOLE 40 MG: 20 CAPSULE, DELAYED RELEASE ORAL at 06:25

## 2018-01-01 RX ADMIN — CEFAZOLIN 1 G: 1 INJECTION, POWDER, FOR SOLUTION INTRAMUSCULAR; INTRAVENOUS at 04:05

## 2018-01-01 RX ADMIN — Medication 10 ML: at 10:36

## 2018-01-01 RX ADMIN — LIDOCAINE 1 G: 40 CREAM TOPICAL at 06:00

## 2018-01-01 RX ADMIN — SODIUM CHLORIDE, PRESERVATIVE FREE 5 ML: 5 INJECTION INTRAVENOUS at 15:45

## 2018-01-01 RX ADMIN — SODIUM CHLORIDE 1000 ML: 9 INJECTION, SOLUTION INTRAVENOUS at 14:27

## 2018-01-01 RX ADMIN — Medication 5 ML: at 17:42

## 2018-01-01 RX ADMIN — OXYCODONE HYDROCHLORIDE AND ACETAMINOPHEN 2 TABLET: 5; 325 TABLET ORAL at 18:21

## 2018-01-01 RX ADMIN — IOPAMIDOL 51 ML: 755 INJECTION, SOLUTION INTRAVASCULAR at 16:03

## 2018-01-01 ASSESSMENT — PAIN SCALES - GENERAL
PAINLEVEL: NO PAIN (0)
PAINLEVEL: MILD PAIN (3)
PAINLEVEL: MODERATE PAIN (5)
PAINLEVEL: NO PAIN (0)
PAINLEVEL: MODERATE PAIN (5)
PAINLEVEL: EXTREME PAIN (8)
PAINLEVEL: NO PAIN (0)
PAINLEVEL: MILD PAIN (3)
PAINLEVEL: MODERATE PAIN (4)
PAINLEVEL: NO PAIN (0)
PAINLEVEL: SEVERE PAIN (6)
PAINLEVEL: NO PAIN (0)
PAINLEVEL: NO PAIN (0)

## 2018-01-01 ASSESSMENT — ENCOUNTER SYMPTOMS
PND: 0
SINUS PAIN: 0
DEPRESSION: 1
TINGLING: 1
PHOTOPHOBIA: 0
NECK PAIN: 1
NERVOUS/ANXIOUS: 1
FREQUENCY: 0
LOSS OF CONSCIOUSNESS: 0
PALPITATIONS: 0
BRUISES/BLEEDS EASILY: 0
EYE PAIN: 0
SORE THROAT: 0
HALLUCINATIONS: 0
FOCAL WEAKNESS: 0
FLANK PAIN: 0
DIAPHORESIS: 0
DOUBLE VISION: 0
DIARRHEA: 0
COUGH: 1
TREMORS: 0
SEIZURES: 0
SHORTNESS OF BREATH: 1
VOMITING: 1
NAUSEA: 1
WHEEZING: 0
FALLS: 0
HEADACHES: 1
ORTHOPNEA: 0
ABDOMINAL PAIN: 0
WEIGHT LOSS: 0
HEMOPTYSIS: 0
WEAKNESS: 1
POLYDIPSIA: 0
BACK PAIN: 1
BLOOD IN STOOL: 0
DYSURIA: 0
SPUTUM PRODUCTION: 0
INSOMNIA: 0
EYE DISCHARGE: 0
CLAUDICATION: 0
CHILLS: 0
HEMATURIA: 0
MEMORY LOSS: 0
CONSTIPATION: 1
EYE REDNESS: 0
BLURRED VISION: 0
STRIDOR: 0
HEARTBURN: 1
SPEECH CHANGE: 0
DIZZINESS: 0
MYALGIAS: 0
FEVER: 0
SENSORY CHANGE: 0

## 2018-01-01 ASSESSMENT — ACTIVITIES OF DAILY LIVING (ADL)
ADLS_ACUITY_SCORE: 12
ADLS_ACUITY_SCORE: 16
ADLS_ACUITY_SCORE: 12
ADLS_ACUITY_SCORE: 16
ADLS_ACUITY_SCORE: 12
ADLS_ACUITY_SCORE: 13
ADLS_ACUITY_SCORE: 12
ADLS_ACUITY_SCORE: 16
ADLS_ACUITY_SCORE: 12
FALL_HISTORY_WITHIN_LAST_SIX_MONTHS: NO
ADLS_ACUITY_SCORE: 16
ADLS_ACUITY_SCORE: 12

## 2018-01-01 ASSESSMENT — LIFESTYLE VARIABLES
TOBACCO_USE: 1
SUBSTANCE_ABUSE: 0

## 2018-01-01 ASSESSMENT — MIFFLIN-ST. JEOR: SCORE: 1045.4

## 2018-01-01 ASSESSMENT — PAIN DESCRIPTION - DESCRIPTORS: DESCRIPTORS: DISCOMFORT

## 2018-01-02 ENCOUNTER — TELEPHONE (OUTPATIENT)
Dept: ONCOLOGY | Facility: CLINIC | Age: 53
End: 2018-01-02

## 2018-01-02 NOTE — TELEPHONE ENCOUNTER
Rx in nurse basket. Fax to [facility] on [address] in [facility]. Called and LM on  that script is ready.    Anthony Browne MA

## 2018-01-02 NOTE — TELEPHONE ENCOUNTER
Mamta from [employer] (phone # [phone]) left message stating Suzi is requesting their assistance for Opdivo. Mamta requesting application forms be completed by Dr. Green and patient and faxed to [phone]. Forms can be found at [email or web address]. Patient sees Dr. Green at [facility]. Message routed to [facility] pool to follow up.

## 2018-01-03 ASSESSMENT — ENCOUNTER SYMPTOMS
WEIGHT LOSS: 0
COUGH DISTURBING SLEEP: 1
BOWEL INCONTINENCE: 0
PANIC: 1
ABDOMINAL PAIN: 0
HEARTBURN: 1
MEMORY LOSS: 0
TREMORS: 0
SEIZURES: 0
MUSCLE WEAKNESS: 0
CHILLS: 0
WHEEZING: 0
BLOOD IN STOOL: 0
POSTURAL DYSPNEA: 0
FATIGUE: 1
RECTAL PAIN: 0
INCREASED ENERGY: 1
MUSCLE CRAMPS: 0
POLYPHAGIA: 0
NAUSEA: 1
DIARRHEA: 1
BACK PAIN: 1
DISTURBANCES IN COORDINATION: 0
CONSTIPATION: 0
POLYDIPSIA: 1
DECREASED CONCENTRATION: 0
DIZZINESS: 0
NERVOUS/ANXIOUS: 1
INSOMNIA: 1
COUGH: 1
HEADACHES: 1
JOINT SWELLING: 0
SHORTNESS OF BREATH: 0
VOMITING: 0
PARALYSIS: 0
HALLUCINATIONS: 0
NUMBNESS: 0
TINGLING: 0
DEPRESSION: 1
SPEECH CHANGE: 0
DYSPNEA ON EXERTION: 0
ARTHRALGIAS: 0
MYALGIAS: 1
ALTERED TEMPERATURE REGULATION: 1
NIGHT SWEATS: 0
LOSS OF CONSCIOUSNESS: 0
NECK PAIN: 1
SPUTUM PRODUCTION: 1
DECREASED APPETITE: 1
FEVER: 0
WEAKNESS: 1
JAUNDICE: 0
HEMOPTYSIS: 0
BLOATING: 0
WEIGHT GAIN: 0
SNORES LOUDLY: 0
STIFFNESS: 1

## 2018-01-04 ENCOUNTER — APPOINTMENT (OUTPATIENT)
Dept: LAB | Facility: CLINIC | Age: 53
End: 2018-01-04
Attending: INTERNAL MEDICINE
Payer: COMMERCIAL

## 2018-01-04 ENCOUNTER — INFUSION THERAPY VISIT (OUTPATIENT)
Dept: ONCOLOGY | Facility: CLINIC | Age: 53
End: 2018-01-04
Attending: INTERNAL MEDICINE
Payer: COMMERCIAL

## 2018-01-04 VITALS
BODY MASS INDEX: 23.17 KG/M2 | DIASTOLIC BLOOD PRESSURE: 64 MMHG | TEMPERATURE: 98.2 F | SYSTOLIC BLOOD PRESSURE: 105 MMHG | HEART RATE: 69 BPM | WEIGHT: 126.7 LBS | RESPIRATION RATE: 16 BRPM | OXYGEN SATURATION: 95 %

## 2018-01-04 DIAGNOSIS — C64.1 MALIGNANT NEOPLASM OF RIGHT KIDNEY (H): Primary | ICD-10-CM

## 2018-01-04 LAB
ALBUMIN SERPL-MCNC: 3.6 G/DL (ref 3.4–5)
ALP SERPL-CCNC: 76 U/L (ref 40–150)
ALT SERPL W P-5'-P-CCNC: 16 U/L (ref 0–50)
ANION GAP SERPL CALCULATED.3IONS-SCNC: 6 MMOL/L (ref 3–14)
AST SERPL W P-5'-P-CCNC: 20 U/L (ref 0–45)
BASOPHILS # BLD AUTO: 0 10E9/L (ref 0–0.2)
BASOPHILS NFR BLD AUTO: 0.9 %
BILIRUB SERPL-MCNC: 0.3 MG/DL (ref 0.2–1.3)
BUN SERPL-MCNC: 15 MG/DL (ref 7–30)
CALCIUM SERPL-MCNC: 9.1 MG/DL (ref 8.5–10.1)
CHLORIDE SERPL-SCNC: 105 MMOL/L (ref 94–109)
CO2 SERPL-SCNC: 26 MMOL/L (ref 20–32)
CREAT SERPL-MCNC: 0.9 MG/DL (ref 0.52–1.04)
DIFFERENTIAL METHOD BLD: NORMAL
EOSINOPHIL # BLD AUTO: 0.2 10E9/L (ref 0–0.7)
EOSINOPHIL NFR BLD AUTO: 4.3 %
ERYTHROCYTE [DISTWIDTH] IN BLOOD BY AUTOMATED COUNT: 13.3 % (ref 10–15)
GFR SERPL CREATININE-BSD FRML MDRD: 66 ML/MIN/1.7M2
GLUCOSE SERPL-MCNC: 97 MG/DL (ref 70–99)
HCT VFR BLD AUTO: 37 % (ref 35–47)
HGB BLD-MCNC: 12 G/DL (ref 11.7–15.7)
IMM GRANULOCYTES # BLD: 0 10E9/L (ref 0–0.4)
IMM GRANULOCYTES NFR BLD: 0.2 %
LYMPHOCYTES # BLD AUTO: 2.1 10E9/L (ref 0.8–5.3)
LYMPHOCYTES NFR BLD AUTO: 45.9 %
MCH RBC QN AUTO: 29.7 PG (ref 26.5–33)
MCHC RBC AUTO-ENTMCNC: 32.4 G/DL (ref 31.5–36.5)
MCV RBC AUTO: 92 FL (ref 78–100)
MONOCYTES # BLD AUTO: 0.4 10E9/L (ref 0–1.3)
MONOCYTES NFR BLD AUTO: 7.5 %
NEUTROPHILS # BLD AUTO: 1.9 10E9/L (ref 1.6–8.3)
NEUTROPHILS NFR BLD AUTO: 41.2 %
NRBC # BLD AUTO: 0 10*3/UL
NRBC BLD AUTO-RTO: 0 /100
PLATELET # BLD AUTO: 382 10E9/L (ref 150–450)
POTASSIUM SERPL-SCNC: 4.2 MMOL/L (ref 3.4–5.3)
PROT SERPL-MCNC: 7.8 G/DL (ref 6.8–8.8)
RBC # BLD AUTO: 4.04 10E12/L (ref 3.8–5.2)
SODIUM SERPL-SCNC: 138 MMOL/L (ref 133–144)
TSH SERPL DL<=0.005 MIU/L-ACNC: 0.91 MU/L (ref 0.4–4)
WBC # BLD AUTO: 4.7 10E9/L (ref 4–11)

## 2018-01-04 PROCEDURE — 84443 ASSAY THYROID STIM HORMONE: CPT | Performed by: INTERNAL MEDICINE

## 2018-01-04 PROCEDURE — 85025 COMPLETE CBC W/AUTO DIFF WBC: CPT | Performed by: INTERNAL MEDICINE

## 2018-01-04 PROCEDURE — 25000128 H RX IP 250 OP 636: Mod: ZF | Performed by: INTERNAL MEDICINE

## 2018-01-04 PROCEDURE — 80053 COMPREHEN METABOLIC PANEL: CPT | Performed by: INTERNAL MEDICINE

## 2018-01-04 PROCEDURE — 96413 CHEMO IV INFUSION 1 HR: CPT

## 2018-01-04 RX ORDER — HEPARIN SODIUM (PORCINE) LOCK FLUSH IV SOLN 100 UNIT/ML 100 UNIT/ML
5 SOLUTION INTRAVENOUS ONCE
Status: COMPLETED | OUTPATIENT
Start: 2018-01-04 | End: 2018-01-04

## 2018-01-04 RX ORDER — HEPARIN SODIUM (PORCINE) LOCK FLUSH IV SOLN 100 UNIT/ML 100 UNIT/ML
5 SOLUTION INTRAVENOUS EVERY 8 HOURS
Status: DISCONTINUED | OUTPATIENT
Start: 2018-01-04 | End: 2018-01-04 | Stop reason: HOSPADM

## 2018-01-04 RX ADMIN — SODIUM CHLORIDE 240 MG: 9 INJECTION, SOLUTION INTRAVENOUS at 15:29

## 2018-01-04 RX ADMIN — SODIUM CHLORIDE, PRESERVATIVE FREE 5 ML: 5 INJECTION INTRAVENOUS at 16:29

## 2018-01-04 RX ADMIN — SODIUM CHLORIDE, PRESERVATIVE FREE 5 ML: 5 INJECTION INTRAVENOUS at 14:11

## 2018-01-04 ASSESSMENT — PAIN SCALES - GENERAL: PAINLEVEL: NO PAIN (0)

## 2018-01-04 NOTE — PATIENT INSTRUCTIONS
Contact Numbers  [facility]: [phone]  (Choose Option 3 for triage RN)  After Hours: [phone]    Call triage with chills and/or temperature greater than or equal to 100.5, uncontrolled nausea/vomiting, diarrhea, constipation, dizziness, shortness of breath, chest pain, bleeding, unexplained bruising, or any new/concerning symptoms, questions/concerns.     If after hours, weekends, or holidays, call the main clinic number. Calls will be forwarded to the hospital , please ask for the adult oncology doctor on call.     If you are having any concerning symptoms or wish to speak to a provider before your next infusion visit, please call your care coordinator or triage to notify them so we can adequately serve you.     If you need a refill on a narcotic prescription, please call triage or your care coordinator before your infusion appointment.             January 2018 Sunday Monday Tuesday Wednesday Thursday Friday Saturday        1     2     3     4     [facility]    2:00 PM   (15 min.)    [facility]   [facility] Lab Draw     [facility] ONC INFUSION 120    2:30 PM   (120 min.)    ONCOLOGY INFUSION   [facility] 5     6       7     8     9     10     11     12     13       14     15     16     17     [facility]    7:15 AM   (15 min.)    [facility]   [facility] Lab Draw     [facility] RETURN    7:35 AM   (50 min.)   Molly Max PA-C   [facility]     [facility] ONC INFUSION 120    9:00 AM   (120 min.)    ONCOLOGY INFUSION   [facility] 18     19     20       21     22     23     24     25     26     27       28     29     30     31     [facility]   12:00 PM   (15 min.)    [facility]   [facility] Lab Draw     [facility] ONC INFUSION 120   12:30 PM   (120 min.)    ONCOLOGY INFUSION   [facility]                           February 2018 Sunday Monday Tuesday  Wednesday Thursday Friday Saturday                       1     2     3       4     5     6     7     8     9     10       11     12     13     14     15     16     17       18     19     20     21     22     23     24       25     26     27     28                                 Lab Results:  Recent Results (from the past 12 hour(s))   Comprehensive metabolic panel    Collection Time: 01/04/18  2:15 PM   Result Value Ref Range    Sodium 138 133 - 144 mmol/L    Potassium 4.2 3.4 - 5.3 mmol/L    Chloride 105 94 - 109 mmol/L    Carbon Dioxide 26 20 - 32 mmol/L    Anion Gap 6 3 - 14 mmol/L    Glucose 97 70 - 99 mg/dL    Urea Nitrogen 15 7 - 30 mg/dL    Creatinine 0.90 0.52 - 1.04 mg/dL    GFR Estimate 66 >60 mL/min/1.7m2    GFR Estimate If Black 79 >60 mL/min/1.7m2    Calcium 9.1 8.5 - 10.1 mg/dL    Bilirubin Total 0.3 0.2 - 1.3 mg/dL    Albumin 3.6 3.4 - 5.0 g/dL    Protein Total 7.8 6.8 - 8.8 g/dL    Alkaline Phosphatase 76 40 - 150 U/L    ALT 16 0 - 50 U/L    AST 20 0 - 45 U/L   TSH with free T4 reflex    Collection Time: 01/04/18  2:15 PM   Result Value Ref Range    TSH 0.91 0.40 - 4.00 mU/L   CBC with platelets differential    Collection Time: 01/04/18  2:15 PM   Result Value Ref Range    WBC 4.7 4.0 - 11.0 10e9/L    RBC Count 4.04 3.8 - 5.2 10e12/L    Hemoglobin 12.0 11.7 - 15.7 g/dL    Hematocrit 37.0 35.0 - 47.0 %    MCV 92 78 - 100 fl    MCH 29.7 26.5 - 33.0 pg    MCHC 32.4 31.5 - 36.5 g/dL    RDW 13.3 10.0 - 15.0 %    Platelet Count 382 150 - 450 10e9/L    Diff Method Automated Method     % Neutrophils 41.2 %    % Lymphocytes 45.9 %    % Monocytes 7.5 %    % Eosinophils 4.3 %    % Basophils 0.9 %    % Immature Granulocytes 0.2 %    Nucleated RBCs 0 0 /100    Absolute Neutrophil 1.9 1.6 - 8.3 10e9/L    Absolute Lymphocytes 2.1 0.8 - 5.3 10e9/L    Absolute Monocytes 0.4 0.0 - 1.3 10e9/L    Absolute Eosinophils 0.2 0.0 - 0.7 10e9/L    Absolute Basophils 0.0 0.0 - 0.2 10e9/L    Abs Immature Granulocytes 0.0 0 -  0.4 10e9/L    Absolute Nucleated RBC 0.0

## 2018-01-04 NOTE — PROGRESS NOTES
Infusion Nursing Note:  Suzi Gonsales presents today for Day 1 Cycle 2 Nivolumab.    Patient seen by provider today: No    Note: Suzi reports doing well today. She notes some diarrhea after Nivolumab for a few days, but states it is mild and clears up on its own. She continues to feel fatigued and has a stable dry cough. She otherwise offers no concerns today.     Intravenous Access:  Implanted Port.    Treatment Conditions:  Lab Results   Component Value Date    HGB 12.0 01/04/2018     Lab Results   Component Value Date    WBC 4.7 01/04/2018      Lab Results   Component Value Date    ANEU 1.9 01/04/2018     Lab Results   Component Value Date     01/04/2018      Lab Results   Component Value Date     01/04/2018                   Lab Results   Component Value Date    POTASSIUM 4.2 01/04/2018           Lab Results   Component Value Date    MAG 1.8 08/19/2017            Lab Results   Component Value Date    CR 0.90 01/04/2018                   Lab Results   Component Value Date    MUNDO 9.1 01/04/2018                Lab Results   Component Value Date    BILITOTAL 0.3 01/04/2018           Lab Results   Component Value Date    ALBUMIN 3.6 01/04/2018                    Lab Results   Component Value Date    ALT 16 01/04/2018           Lab Results   Component Value Date    AST 20 01/04/2018     Results reviewed, labs MET treatment parameters, ok to proceed with treatment.    Post Infusion Assessment:  Patient tolerated infusion without incident.  Blood return noted pre and post infusion.  Access discontinued per protocol.    Discharge Plan:   Patient declined prescription refills.  AVS to patient via [employer].  Patient will return 01/17 for next appointment.   Patient discharged in stable condition accompanied by: self.  Departure Mode: Ambulatory.    Lana Brown RN

## 2018-01-17 ENCOUNTER — ONCOLOGY VISIT (OUTPATIENT)
Dept: ONCOLOGY | Facility: CLINIC | Age: 53
End: 2018-01-17
Attending: PHYSICIAN ASSISTANT
Payer: COMMERCIAL

## 2018-01-17 ENCOUNTER — INFUSION THERAPY VISIT (OUTPATIENT)
Dept: ONCOLOGY | Facility: CLINIC | Age: 53
End: 2018-01-17
Attending: INTERNAL MEDICINE
Payer: COMMERCIAL

## 2018-01-17 ENCOUNTER — APPOINTMENT (OUTPATIENT)
Dept: LAB | Facility: CLINIC | Age: 53
End: 2018-01-17
Attending: INTERNAL MEDICINE
Payer: COMMERCIAL

## 2018-01-17 VITALS
BODY MASS INDEX: 23.05 KG/M2 | HEART RATE: 74 BPM | RESPIRATION RATE: 16 BRPM | OXYGEN SATURATION: 97 % | DIASTOLIC BLOOD PRESSURE: 54 MMHG | TEMPERATURE: 97.8 F | WEIGHT: 126 LBS | SYSTOLIC BLOOD PRESSURE: 100 MMHG

## 2018-01-17 DIAGNOSIS — C64.1 MALIGNANT NEOPLASM OF RIGHT KIDNEY (H): Primary | ICD-10-CM

## 2018-01-17 DIAGNOSIS — C64.1 MALIGNANT NEOPLASM OF RIGHT KIDNEY (H): ICD-10-CM

## 2018-01-17 LAB
ALBUMIN SERPL-MCNC: 3.6 G/DL (ref 3.4–5)
ALP SERPL-CCNC: 71 U/L (ref 40–150)
ALT SERPL W P-5'-P-CCNC: 15 U/L (ref 0–50)
ANION GAP SERPL CALCULATED.3IONS-SCNC: 8 MMOL/L (ref 3–14)
AST SERPL W P-5'-P-CCNC: 25 U/L (ref 0–45)
BASOPHILS # BLD AUTO: 0 10E9/L (ref 0–0.2)
BASOPHILS NFR BLD AUTO: 0.6 %
BILIRUB SERPL-MCNC: 0.4 MG/DL (ref 0.2–1.3)
BUN SERPL-MCNC: 17 MG/DL (ref 7–30)
CALCIUM SERPL-MCNC: 9.2 MG/DL (ref 8.5–10.1)
CHLORIDE SERPL-SCNC: 104 MMOL/L (ref 94–109)
CO2 SERPL-SCNC: 25 MMOL/L (ref 20–32)
CREAT SERPL-MCNC: 1.02 MG/DL (ref 0.52–1.04)
DIFFERENTIAL METHOD BLD: NORMAL
EOSINOPHIL # BLD AUTO: 0.3 10E9/L (ref 0–0.7)
EOSINOPHIL NFR BLD AUTO: 5.3 %
ERYTHROCYTE [DISTWIDTH] IN BLOOD BY AUTOMATED COUNT: 13.4 % (ref 10–15)
GFR SERPL CREATININE-BSD FRML MDRD: 57 ML/MIN/1.7M2
GLUCOSE SERPL-MCNC: 108 MG/DL (ref 70–99)
HCT VFR BLD AUTO: 35.8 % (ref 35–47)
HGB BLD-MCNC: 11.7 G/DL (ref 11.7–15.7)
IMM GRANULOCYTES # BLD: 0 10E9/L (ref 0–0.4)
IMM GRANULOCYTES NFR BLD: 0.2 %
LYMPHOCYTES # BLD AUTO: 2.2 10E9/L (ref 0.8–5.3)
LYMPHOCYTES NFR BLD AUTO: 45.8 %
MCH RBC QN AUTO: 29.7 PG (ref 26.5–33)
MCHC RBC AUTO-ENTMCNC: 32.7 G/DL (ref 31.5–36.5)
MCV RBC AUTO: 91 FL (ref 78–100)
MONOCYTES # BLD AUTO: 0.4 10E9/L (ref 0–1.3)
MONOCYTES NFR BLD AUTO: 7.8 %
NEUTROPHILS # BLD AUTO: 2 10E9/L (ref 1.6–8.3)
NEUTROPHILS NFR BLD AUTO: 40.3 %
NRBC # BLD AUTO: 0 10*3/UL
NRBC BLD AUTO-RTO: 0 /100
PLATELET # BLD AUTO: 329 10E9/L (ref 150–450)
POTASSIUM SERPL-SCNC: 3.9 MMOL/L (ref 3.4–5.3)
PROT SERPL-MCNC: 7.6 G/DL (ref 6.8–8.8)
RBC # BLD AUTO: 3.94 10E12/L (ref 3.8–5.2)
SODIUM SERPL-SCNC: 138 MMOL/L (ref 133–144)
WBC # BLD AUTO: 4.9 10E9/L (ref 4–11)

## 2018-01-17 PROCEDURE — 85025 COMPLETE CBC W/AUTO DIFF WBC: CPT | Performed by: PHYSICIAN ASSISTANT

## 2018-01-17 PROCEDURE — 25000128 H RX IP 250 OP 636: Mod: ZF | Performed by: INTERNAL MEDICINE

## 2018-01-17 PROCEDURE — 99214 OFFICE O/P EST MOD 30 MIN: CPT | Mod: ZP | Performed by: PHYSICIAN ASSISTANT

## 2018-01-17 PROCEDURE — 96413 CHEMO IV INFUSION 1 HR: CPT

## 2018-01-17 PROCEDURE — 25000128 H RX IP 250 OP 636: Mod: ZF | Performed by: PHYSICIAN ASSISTANT

## 2018-01-17 PROCEDURE — 80053 COMPREHEN METABOLIC PANEL: CPT | Performed by: PHYSICIAN ASSISTANT

## 2018-01-17 PROCEDURE — G0463 HOSPITAL OUTPT CLINIC VISIT: HCPCS | Mod: ZF

## 2018-01-17 RX ORDER — HEPARIN SODIUM (PORCINE) LOCK FLUSH IV SOLN 100 UNIT/ML 100 UNIT/ML
5 SOLUTION INTRAVENOUS EVERY 8 HOURS
Status: DISCONTINUED | OUTPATIENT
Start: 2018-01-17 | End: 2018-01-17 | Stop reason: HOSPADM

## 2018-01-17 RX ORDER — HEPARIN SODIUM (PORCINE) LOCK FLUSH IV SOLN 100 UNIT/ML 100 UNIT/ML
5 SOLUTION INTRAVENOUS EVERY 8 HOURS
Status: DISCONTINUED | OUTPATIENT
Start: 2018-01-17 | End: 2018-01-25 | Stop reason: HOSPADM

## 2018-01-17 RX ADMIN — SODIUM CHLORIDE 240 MG: 9 INJECTION, SOLUTION INTRAVENOUS at 09:27

## 2018-01-17 RX ADMIN — SODIUM CHLORIDE, PRESERVATIVE FREE 5 ML: 5 INJECTION INTRAVENOUS at 07:24

## 2018-01-17 RX ADMIN — SODIUM CHLORIDE, PRESERVATIVE FREE 5 ML: 5 INJECTION INTRAVENOUS at 10:30

## 2018-01-17 ASSESSMENT — PAIN SCALES - GENERAL: PAINLEVEL: NO PAIN (0)

## 2018-01-17 NOTE — PROGRESS NOTES
[facility] CANCER CLINIC  FOLLOW-UP VISIT NOTE  Date of visit: Jan 17, 2018       REASON FOR VISIT: [facility] here for assessment currently on Opdivo    HPI: Suzi presented to ED with hematuria and right flank pain thinking she had a renal stone in December 2014. She was worked up with a CT abd/pelvis which suggested a renal mass. She was referred to Dr. Max Zelaya in [facility]. 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 Per charts tumor resection had negative margins and it was staged at pT2bN0.    Her staging work up was negative except for pulmonary nodules. She has been followed every 6 months with scans. CT CAP on 5/11/17 showed enlarging hilar LAD, enlarging pulmonary nodules, adrenal nodules and a pancreatic body mass. Biopsies of hilar LN, adrenal nodule and pancreatic mass were + for RCC. She decided to pursue IL-2, of which she received 4 cycles, last on 8/15/17. CT CAP on 9/13/17 showed generally stable disease. CT CAP on 11/22/17 showed disease progression.  She is here today for nivolumab cycle 2 day 15.    INTERVAL HISTORY: Suzi is doing well today.  She has constant fatigue, however no changes recently.  She has had a persistent cough for the last 6 months and Tessalon Perles seems to help her symptoms. She admits to some mild dyspnea on exertion, however not short of breath at rest. She denies fever chills, sputum, hemoptysis or lower extremity edema.  Appetite has been lower but she is pushing 3 meals a day and 64 ounces of fluids per day.  She had diarrhea previously, however that has completely resolved.  She denies any pain.  She has occasional nausea, but not requiring any medications and resolves on its own.  Denies vomiting, diarrhea or constipation.  Denies bleeding.  She admits to occasional headaches, nothing consistent and denies any visual changes.  Denies rashes, mouth sores, peripheral  neuropathy.  Anxiety depression have been well controlled on Klonopin and Wellbutrin.  She has no other concerns or questions at this time.    Review of Systems: See interval hx. Denies fevers, chills, HA, dizziness, n/t, changes in vision, cough, sore throat, CP, SOB, abdominal pain, N/V, diarrhea, changes in urination, bleeding, bruising, rash.       Current Outpatient Prescriptions   Medication Sig Dispense Refill     clonazePAM (KLONOPIN) 0.5 MG tablet TAKE 0.5-1 TABLETS BY MOUTH 2 TIMES DAILY AS NEEDED FOR ANXIETY 20 tablet 0     zolpidem (AMBIEN) 5 MG tablet Take 1 tablet (5 mg) by mouth nightly as needed for sleep 30 tablet 1     ALPRAZolam (XANAX) 0.5 MG tablet Take 1 tablet (0.5 mg) by mouth 3 times daily as needed for anxiety 60 tablet 0     lidocaine-prilocaine (EMLA) cream Apply 1 hour prior to port access. 60 g 1     LORazepam (ATIVAN) 0.5 MG tablet Take 1 tablet (0.5 mg) by mouth every 4 hours as needed (Anxiety, Nausea/Vomiting or Sleep) 30 tablet 2     prochlorperazine (COMPAZINE) 10 MG tablet Take 1 tablet (10 mg) by mouth every 6 hours as needed (Nausea/Vomiting) 30 tablet 2     benzonatate (TESSALON) 100 MG capsule Take 1 capsule (100 mg) by mouth 3 times daily as needed for cough 60 capsule 0     acetaminophen-codeine (TYLENOL #3) 300-30 MG per tablet Take 1 tablet by mouth every 6 hours as needed for moderate pain 45 tablet 0     citalopram (CELEXA) 20 MG tablet Take 1 tablet (20 mg) by mouth every evening 90 tablet 3     prochlorperazine (COMPAZINE) 10 MG tablet Take 1 tablet (10 mg) by mouth every 6 hours as needed for nausea or vomiting (Breakthrough Nausea/Vomiting) 30 tablet 0     acetaminophen (TYLENOL) 325 MG tablet Take 2 tablets (650 mg) by mouth every 4 hours as needed for mild pain (mild pain) 100 tablet 0     buPROPion (WELLBUTRIN XL) 300 MG 24 hr tablet Take 300 mg by mouth every morning        IBUPROFEN Take 400 mg by mouth every 6 hours as needed        EXAM:    LMP  (Within  Months)    Wt Readings from Last 4 Encounters:   01/04/18 57.5 kg (126 lb 11.2 oz)   12/18/17 57 kg (125 lb 9.6 oz)   12/05/17 56.7 kg (125 lb)   12/04/17 54.9 kg (121 lb)       Vital signs were reviewed.   Patient alert and oriented.   PERRLA. EOMI. No scleral icterus noted. OP without thrush/sores.  Neck exam: No palpable cervical or supraclavicular nodes bilaterally.   Heart: RRR no murmurs noted.   Lungs: clear to auscultation bilaterally.  No crackles or wheezing.   Abd: positive bowel sounds in all four quadrants.  No tenderness to palpation.  No hepatomegaly.   Extremities: No lower extremity edema.   Neuro: grossly intact.   Mood and affect is stable.   Skin has no rashes or lesions on exposed areas. Port is accessed in right chest.     LABS:    1/17/2018 07:24   Sodium 138   Potassium 3.9   Chloride 104   Carbon Dioxide 25   Urea Nitrogen 17   Creatinine 1.02   GFR Estimate 57 (L)   GFR Estimate If Black 68   Calcium 9.2   Anion Gap 8   Albumin 3.6   Protein Total 7.6   Bilirubin Total 0.4   Alkaline Phosphatase 71   ALT 15   AST 25   Glucose 108 (H)   WBC 4.9   Hemoglobin 11.7   Hematocrit 35.8   Platelet Count 329   RBC Count 3.94   MCV 91   MCH 29.7   MCHC 32.7   RDW 13.4   Diff Method Automated Method   % Neutrophils 40.3   % Lymphocytes 45.8   % Monocytes 7.8   % Eosinophils 5.3   % Basophils 0.6   % Immature Granulocytes 0.2   Nucleated RBCs 0   Absolute Neutrophil 2.0   Absolute Lymphocytes 2.2   Absolute Monocytes 0.4   Absolute Eosinophils 0.3   Absolute Basophils 0.0   Abs Immature Granulocytes 0.0   Absolute Nucleated RBC 0.0       ASSESSMENT/PLAN: 52 year old with mRCC s/p 4 cycles of IL-2.  She had a stable CT after 2 cycles, mild disease progression after 4 cycles.  After a short break, CT showed worsening disease and she has started with Opdivo.     1. RCC: Suzi started nivolumab on 12/5/17.  She has tolerated cycle 1 well.  She is here for cycle 2, day 15 and plan for every 2 weeks. Labs are  within treatment parameters, therefore will proceed with cycle 2, day 15.   --1/31 nivolumab infusion  --2/13 Kia, nivolumab  --2/26 or 2/27 re-staging CT  --2/28 Dr Green    2. Cough: Persistent for 6 months.  Likely from lung nodules.  Differential also includes pneumonia, CHF, pneumothorax, however no infectious symptoms, afebrile, no lower extremity edema and no chest pain or shortness of breath at rest.  Vital signs stable with sats of 97% room air.  Continue Tessalon Perles at home.    3. Depression and anxiety: Currently on Celexa, Wellbutrin and benzodiazepine and these medications are prescribed by her primary clinic.   assisting as well.    4. FEN: Creatinine 1.02 within normal limits today.  Has been fluctuating, however no concern at this time.  We will continue to monitor.      Mimi Jha PA-C    The patient was seen in conjunction with Mimi Jha PA-C who served as a scribe for today's visit. I have reviewed and edited the note and agree with the above findings and plan.  Molly Max PA-C

## 2018-01-17 NOTE — PATIENT INSTRUCTIONS
Contact Numbers    [facility] [place]: [phone]  [facility] [place]:  [phone]    Call triage with chills and/or temperature greater than or equal to 100.5, uncontrolled nausea/vomiting, diarrhea, constipation, dizziness, shortness of breath, chest pain, bleeding, unexplained bruising, or any new/concerning symptoms, questions/concerns.     If you are having any concerning symptoms or wish to speak to a provider before your next infusion visit, please call your care coordinator or triage to notify them so we can adequately serve you.       After Hours: [phone]    If after hours, weekends, or holidays, call main hospital  and ask for Oncology doctor on call.           January 2018 Sunday Monday Tuesday Wednesday Thursday Friday Saturday        1     2     3     4     [facility]    2:00 PM   (15 min.)    [facility] DRAW   [facility]     [facility] INFUSION 120    2:30 PM   (120 min.)    ONCOLOGY INFUSION   [facility] 5     6       7     8     9     10     11     12     13       14     15     16     17     [facility]    7:15 AM   (15 min.)    [facility] D[facility]   [facility] Draw     [facility] RETURN    7:35 AM   (50 min.)   Molly Max PA-C   [facility]     [facility] ONC INFUSION 120    9:00 AM   (120 min.)    ONCOLOGY INFUSION   [facility] 18     19     20       21     22     23     24     25     26     27       28     29     30     31     [facility]   12:00 PM   (15 min.)   [facility]   [facility]     [facility] INFUSION 120   12:30 PM   (120 min.)    ONCOLOGY INFUSION   [facility]                           February 2018 Sunday Monday Tuesday Wednesday Thursday Friday Saturday                       1     2     3       4     5     6     7     8     9     10       11     12     13     [facility]   12:00 PM   (15 min.)   [facility]    [facility] Lab Draw     [facility] RETURN   12:15 PM   (50 min.)   Day Ren PA-C   [facility] ONC INFUSION 120    1:30 PM   (120 min.)    ONCOLOGY INFUSION   [facility] 14     15     16     17       18     19     20     21     22     23     24       25     26     27     28                                Recent Results (from the past 24 hour(s))   Comprehensive metabolic panel    Collection Time: 01/17/18  7:24 AM   Result Value Ref Range    Sodium 138 133 - 144 mmol/L    Potassium 3.9 3.4 - 5.3 mmol/L    Chloride 104 94 - 109 mmol/L    Carbon Dioxide 25 20 - 32 mmol/L    Anion Gap 8 3 - 14 mmol/L    Glucose 108 (H) 70 - 99 mg/dL    Urea Nitrogen 17 7 - 30 mg/dL    Creatinine 1.02 0.52 - 1.04 mg/dL    GFR Estimate 57 (L) >60 mL/min/1.7m2    GFR Estimate If Black 68 >60 mL/min/1.7m2    Calcium 9.2 8.5 - 10.1 mg/dL    Bilirubin Total 0.4 0.2 - 1.3 mg/dL    Albumin 3.6 3.4 - 5.0 g/dL    Protein Total 7.6 6.8 - 8.8 g/dL    Alkaline Phosphatase 71 40 - 150 U/L    ALT 15 0 - 50 U/L    AST 25 0 - 45 U/L   CBC with platelets differential    Collection Time: 01/17/18  7:24 AM   Result Value Ref Range    WBC 4.9 4.0 - 11.0 10e9/L    RBC Count 3.94 3.8 - 5.2 10e12/L    Hemoglobin 11.7 11.7 - 15.7 g/dL    Hematocrit 35.8 35.0 - 47.0 %    MCV 91 78 - 100 fl    MCH 29.7 26.5 - 33.0 pg    MCHC 32.7 31.5 - 36.5 g/dL    RDW 13.4 10.0 - 15.0 %    Platelet Count 329 150 - 450 10e9/L    Diff Method Automated Method     % Neutrophils 40.3 %    % Lymphocytes 45.8 %    % Monocytes 7.8 %    % Eosinophils 5.3 %    % Basophils 0.6 %    % Immature Granulocytes 0.2 %    Nucleated RBCs 0 0 /100    Absolute Neutrophil 2.0 1.6 - 8.3 10e9/L    Absolute Lymphocytes 2.2 0.8 - 5.3 10e9/L    Absolute Monocytes 0.4 0.0 - 1.3 10e9/L    Absolute Eosinophils 0.3 0.0 - 0.7 10e9/L    Absolute Basophils 0.0 0.0 - 0.2 10e9/L    Abs Immature Granulocytes 0.0 0 - 0.4 10e9/L    Absolute Nucleated RBC 0.0

## 2018-01-17 NOTE — MR AVS SNAPSHOT
After Visit Summary   1/17/2018    Suzi Gonsales    MRN: [id number]           Patient Information     Date Of Birth          1965        Visit Information        Provider Department      1/17/2018 9:00 AM  25 ATC;  ONCOLOGY INFUSION [facility]        Today's Diagnoses     Malignant neoplasm of right kidney (H)    -  1      Care Instructions    Contact Numbers    [facility] [place]: [phone]  [facility] [place]:  [phone]    Call triage with chills and/or temperature greater than or equal to 100.5, uncontrolled nausea/vomiting, diarrhea, constipation, dizziness, shortness of breath, chest pain, bleeding, unexplained bruising, or any new/concerning symptoms, questions/concerns.     If you are having any concerning symptoms or wish to speak to a provider before your next infusion visit, please call your care coordinator or triage to notify them so we can adequately serve you.       After Hours: [phone]    If after hours, weekends, or holidays, call main hospital  and ask for Oncology doctor on call.           January 2018 Sunday Monday Tuesday Wednesday Thursday Friday Saturday        1     2     3     4     P [facility] LAB DRAW    2:00 PM   (15 min.)    MASONIC LAB DRAW   [facility] Lab Draw     [facility] ONC INFUSION 120    2:30 PM   (120 min.)    ONCOLOGY INFUSION   [facility] 5     6       7     8     9     10     11     12     13       14     15     16     17     [facility] DRAW    7:15 AM   (15 min.)    MASONIC LAB DRAW   [facility] Lab Draw     [facility] RETURN    7:35 AM   (50 min.)   [facility]olly Max [facility]-C   [facility]    9:00 AM   (120 min.)    ONCOLOGY INFUSION   [facility] 18     19     20       21     22     23     24     25     26     27       28     29     30     31     [facility] LAB DRAW   12:00 PM   (15 min.)    MASONIC LAB DRAW   [facility]  Lab Draw     P ONC INFUSION 120   12:30 PM   (120 min.)    ONCOLOGY INFUSION   [facility]                           February 2018 Sunday Monday Tuesday Wednesday Thursday Friday Saturday                       1     2     3       4     5     6     7     8     9     10       11     12     13     [facility] MASONIC LAB DRAW   12:00 PM   (15 min.)    MASONIC LAB DRAW   [facility] Lab Draw     [facility] RETURN   12:15 PM   (50 min.)   Day Ren PA-C   [facility] ONC INFUSION 120    1:30 PM   (120 min.)    ONCOLOGY INFUSION   [facility] 14     15     16     17       18     19     20     21     22     23     24       25     26     27     28                                Recent Results (from the past 24 hour(s))   Comprehensive metabolic panel    Collection Time: 01/17/18  7:24 AM   Result Value Ref Range    Sodium 138 133 - 144 mmol/L    Potassium 3.9 3.4 - 5.3 mmol/L    Chloride 104 94 - 109 mmol/L    Carbon Dioxide 25 20 - 32 mmol/L    Anion Gap 8 3 - 14 mmol/L    Glucose 108 (H) 70 - 99 mg/dL    Urea Nitrogen 17 7 - 30 mg/dL    Creatinine 1.02 0.52 - 1.04 mg/dL    GFR Estimate 57 (L) >60 mL/min/1.7m2    GFR Estimate If Black 68 >60 mL/min/1.7m2    Calcium 9.2 8.5 - 10.1 mg/dL    Bilirubin Total 0.4 0.2 - 1.3 mg/dL    Albumin 3.6 3.4 - 5.0 g/dL    Protein Total 7.6 6.8 - 8.8 g/dL    Alkaline Phosphatase 71 40 - 150 U/L    ALT 15 0 - 50 U/L    AST 25 0 - 45 U/L   CBC with platelets differential    Collection Time: 01/17/18  7:24 AM   Result Value Ref Range    WBC 4.9 4.0 - 11.0 10e9/L    RBC Count 3.94 3.8 - 5.2 10e12/L    Hemoglobin 11.7 11.7 - 15.7 g/dL    Hematocrit 35.8 35.0 - 47.0 %    MCV 91 78 - 100 fl    MCH 29.7 26.5 - 33.0 pg    MCHC 32.7 31.5 - 36.5 g/dL    RDW 13.4 10.0 - 15.0 %    Platelet Count 329 150 - 450 10e9/L    Diff Method Automated Method     % Neutrophils 40.3 %    % Lymphocytes 45.8 %    % Monocytes 7.8 %    %  Eosinophils 5.3 %    % Basophils 0.6 %    % Immature Granulocytes 0.2 %    Nucleated RBCs 0 0 /100    Absolute Neutrophil 2.0 1.6 - 8.3 10e9/L    Absolute Lymphocytes 2.2 0.8 - 5.3 10e9/L    Absolute Monocytes 0.4 0.0 - 1.3 10e9/L    Absolute Eosinophils 0.3 0.0 - 0.7 10e9/L    Absolute Basophils 0.0 0.0 - 0.2 10e9/L    Abs Immature Granulocytes 0.0 0 - 0.4 10e9/L    Absolute Nucleated RBC 0.0                 Follow-ups after your visit        Your next 10 appointments already scheduled     Jan 31, 2018 12:00 PM CST   [facility] Lab Draw with UC [facility] LAB DRAW   [facility]    [address] [place] [address]  [address] [place] [id number] [phone]            Jan 31, 2018 12:30 PM CST   Infusion 120 with UC ONCOLOGY INFUSION, UC 29 ATC   [facility] ([facility])    [address] [place] [address]  [address] [place] [id number] [phone]            Feb 13, 2018 12:00 PM CST   Masonic Lab Draw with UC MASONIC LAB DRAW   [facility] Masonic Lab Draw ([facility])    [address] [place] [address]  [address] [place] [id number] [phone]            Feb 13, 2018 12:30 PM CST   (Arrive by 12:15 PM)   Return Visit with Day Ren PA-C   [facility] ([facility])    [address] [place] [address]  [address]  [facility] [id number] [phone]            Feb 13, 2018  1:30 PM CST   Infusion 120 with UC ONCOLOGY INFUSION, UC 22 ATC   [facility] ([facility])    [address] [place] [address]  [address] [place] [id number] [phone]              Who to contact     If you have questions or need follow up information about today's clinic visit or your schedule please contact [facility] directly at [phone].  Normal or non-critical lab and imaging results will be communicated to you by Zak  letter or phone within 4 business days after the clinic has received the results. If you do not hear from us within 7 days, please contact the clinic through BookMyShow or phone. If you have a critical or abnormal lab result, we will notify you by phone as soon as possible.  Submit refill requests through BookMyShow or call your pharmacy and they will forward the refill request to us. Please allow 3 business days for your refill to be completed.          Additional Information About Your Visit        [employer] Information     BookMyShow gives you secure access to your electronic health record. If you see a primary care provider, you can also send messages to your care team and make appointments. If you have questions, please call your primary care clinic.  If you do not have a primary care provider, please call [phone] and they will assist you.        Care EveryWhere ID     This is your Care EveryWhere ID. This could be used by other organizations to access your [facility] medical records  [id number]        Your Vitals Were     Last Period                   (Within Months)            Blood Pressure from Last 3 Encounters:   01/17/18 100/54   01/04/18 105/64   12/18/17 99/59    Weight from Last 3 Encounters:   01/17/18 57.2 kg (126 lb)   01/04/18 57.5 kg (126 lb 11.2 oz)   12/18/17 57 kg (125 lb 9.6 oz)              Today, you had the following     No orders found for display       Primary Care Provider Office Phone # Fax #    Molly Adame, ARIEL [facility] [phone] [address] [facility] [address]        Equal Access to Services     [facility] Center[facility] AH: Hadii aad ku hadasho Soomaali, waaxda luqadaha, qaybta kaalmada adeegyada, josemanuel oswald . So [facility] [phone].    ATENCIÓN: Si habla español, tiene a chacon disposición servicios gratuitos de asistencia lingüística. Llame al [phone].    We comply with applicable federal civil rights laws and [place] laws. We do not  discriminate on the basis of race, color, national origin, age, disability, sex, sexual orientation, or gender identity.            Thank you!     Thank you for choosing [facility]CER CLINIC  for your care. Our goal is always to provide you with excellent care. Hearing back from our patients is one way we can continue to improve our services. Please take a few minutes to complete the written survey that you may receive in the mail after your visit with us. Thank you!             Your Updated Medication List - Protect others around you: Learn how to safely use, store and throw away your medicines at www.disposemymeds.org.          This list is accurate as of: 1/17/18 11:25 AM.  Always use your most recent med list.                   Brand Name Dispense Instructions for use Diagnosis    acetaminophen 325 MG tablet    TYLENOL    100 tablet    Take 2 tablets (650 mg) by mouth every 4 hours as needed for mild pain (mild pain)    Malignant neoplasm of right kidney (H)       acetaminophen-codeine 300-30 MG per tablet    TYLENOL #3    45 tablet    Take 1 tablet by mouth every 6 hours as needed for moderate pain    Metastatic renal cell carcinoma to lung, right (H)       ALPRAZolam 0.5 MG tablet    XANAX    60 tablet    Take 1 tablet (0.5 mg) by mouth 3 times daily as needed for anxiety    Renal cell carcinoma, unspecified laterality (H), Mass of upper lobe of left lung       benzonatate 100 MG capsule    TESSALON    60 capsule    Take 1 capsule (100 mg) by mouth 3 times daily as needed for cough    Metastatic renal cell carcinoma to lung, right (H)       buPROPion 300 MG 24 hr tablet    WELLBUTRIN XL     Take 300 mg by mouth every morning        citalopram 20 MG tablet    celeXA    90 tablet    Take 1 tablet (20 mg) by mouth every evening    Insomnia, unspecified type       clonazePAM 0.5 MG tablet    klonoPIN    20 tablet    TAKE 0.5-1 TABLETS BY MOUTH 2 TIMES DAILY AS NEEDED FOR ANXIETY    Panic attack        IBUPROFEN      Take 400 mg by mouth every 6 hours as needed        lidocaine-prilocaine cream    EMLA    60 g    Apply 1 hour prior to port access.    Malignant neoplasm of right kidney (H)       LORazepam 0.5 MG tablet    ATIVAN    30 tablet    Take 1 tablet (0.5 mg) by mouth every 4 hours as needed (Anxiety, Nausea/Vomiting or Sleep)    Malignant neoplasm of right kidney (H)       prochlorperazine 10 MG tablet    COMPAZINE    30 tablet    Take 1 tablet (10 mg) by mouth every 6 hours as needed (Nausea/Vomiting)    Malignant neoplasm of right kidney (H)       zolpidem 5 MG tablet    AMBIEN    30 tablet    Take 1 tablet (5 mg) by mouth nightly as needed for sleep    Sleep disorder

## 2018-01-17 NOTE — MR AVS SNAPSHOT
After Visit Summary   1/17/2018    Suzi Gonsales    MRN: [id number]           Patient Information     Date Of Birth          1965        Visit Information        Provider Department      1/17/2018 7:50 AM Molly Max PA-C [facility]        Today's Diagnoses     Malignant neoplasm of right kidney (H)           Follow-ups after your visit        Your next 10 appointments already scheduled     Jan 31, 2018 12:00 PM CST   M[facility]ic Lab Draw with UC MASONIC LAB DRAW   [employer] [facility] Lab Draw ([facility])    [address]  [address]  [facility] [address]   [phone]            Jan 31, 2018 12:30 PM CST   Infusion 120 with UC ONCOLOGY INFUSION, UC 29 ATC   [facility] ([facility])    [address] [place] [address]  [address]  [facility] [address]   [phone]            Feb 13, 2018 12:00 PM CST   Masonic Lab Draw with UC [facility] LAB DRAW   [facility] ([facility])    [address] [place] [address]  [address]  [facility] [address]   [phone]            Feb 13, 2018 12:30 PM CST   (Arrive by 12:15 PM)   Return Visit with Day Ren PA-C   [facility] ([facility])    [address] [place] [address]  [address]  [facility] [address]   [phone]            Feb 13, 2018  1:30 PM CST   Infusion 120 with UC ONCOLOGY INFUSION, UC 22 ATC   [facility] ([facility])    [address] [place] [address]  [address]  [facility] [address]   [phone]            Feb 27, 2018 12:40 PM CST   (Arrive by 12:25 PM)   CT CHEST/ABDOMEN/PELVIS W CONTRAST with UCCT2   [facility] [place] [facility] ([facility])    [address] [place] [address]  1st Floor  [facility] [address] [phone]           Please bring any scans or X-rays taken at other hospitals, if similar tests were  done. Also bring a list of your medicines, including vitamins, minerals and over-the-counter drugs. It is safest to leave personal items at home.  Be sure to tell your doctor:   If you have any allergies.   If there s any chance you are pregnant.   If you are breastfeeding.   If you have any special needs.  You may have contrast for this exam. To prepare:   Do not eat or drink for 2 hours before your exam. If you need to take medicine, you may take it with small sips of water. (We may ask you to take liquid medicine as well.)   The day before your exam, drink extra fluids at least six 8-ounce glasses (unless your doctor tells you to restrict your fluids).  Patients over 70 or patients with diabetes or kidney problems:   If you haven t had a blood test (creatinine test) within the last 30 days, go to your clinic or Diagnostic Imaging Department for this test.  If you have diabetes:   If your kidney function is normal, continue taking your metformin (Avandamet, Glucophage, Glucovance, Metaglip) on the day of your exam.   If your kidney function is abnormal, wait 48 hours before restarting this medicine.  You will have oral contrast for this exam:   You will drink the contrast at home. Get this from your clinic or Diagnostic Imaging Department. Please follow the directions given.  Please wear loose clothing, such as a sweat suit or jogging clothes. Avoid snaps, zippers and other metal. We may ask you to undress and put on a hospital gown.  If you have any questions, please call the Imaging Department where you will have your exam.            Feb 28, 2018 12:00 PM CST   [facility] Lab Draw with  CHEY LAB DRAW   [facility] Lab Draw ([facility]    [address]  [address]  [facility] [id number] [phone]            Feb 28, 2018 12:30 PM CST   (Arrive by 12:15 PM)   Return Visit with Henri Green MD   [facility] ([facility]  [place])    [address]  [address]  [facility] [place] [address] [phone]              Who to contact     If you have questions or need follow up information about today's clinic visit or your schedule please contact [facility] directly at [phone].  Normal or non-critical lab and imaging results will be communicated to you by MyChart, letter or phone within 4 business days after the clinic has received the results. If you do not hear from us within 7 days, please contact the clinic through MyChart or phone. If you have a critical or abnormal lab result, we will notify you by phone as soon as possible.  Submit refill requests through my6sense or call your pharmacy and they will forward the refill request to us. Please allow 3 business days for your refill to be completed.          Additional Information About Your Visit        MyChart Information     my6sense gives you secure access to your electronic health record. If you see a primary care provider, you can also send messages to your care team and make appointments. If you have questions, please call your primary care clinic.  If you do not have a primary care provider, please call [phone] and they will assist you.        Care EveryWhere ID     This is your Care EveryWhere ID. This could be used by other organizations to access your [facility] medical records  [id number]        Your Vitals Were     Pulse Temperature Respirations Last Period Pulse Oximetry BMI (Body Mass Index)    74 97.8  F (36.6  C) 16 (Within Months) 97% 23.05 kg/m2       Blood Pressure from Last 3 Encounters:   01/17/18 100/54   01/04/18 105/64   12/18/17 99/59    Weight from Last 3 Encounters:   01/17/18 57.2 kg (126 lb)   01/04/18 57.5 kg (126 lb 11.2 oz)   12/18/17 57 kg (125 lb 9.6 oz)              We Performed the Following     CBC with platelets differential     Comprehensive metabolic panel        Primary Care Provider Office Phone # Fax #    Molly  Dary Adame, APRN [phone]       [address] [facility] [address] [facility] [address]        Equal Access to Services     ROSAURA INTERIANO : Hadii aad ku hadcarolineloki Reneali, wakylahda luqjose, qaybta kaalmada jarek, josemanuel debbiein hayaan anirohit marin darek plata. So [facility] [phone].    ATENCIÓN: Si habla español, tiene a chacon disposición servicios gratuitos de asistencia lingüística. Llame al [phone].    We comply with applicable federal civil rights laws and [place] laws. We do not discriminate on the basis of race, color, national origin, age, disability, sex, sexual orientation, or gender identity.            Thank you!     Thank you for choosing [facility]  for your care. Our goal is always to provide you with excellent care. Hearing back from our patients is one way we can continue to improve our services. Please take a few minutes to complete the written survey that you may receive in the mail after your visit with us. Thank you!             Your Updated Medication List - Protect others around you: Learn how to safely use, store and throw away your medicines at www.disposemymeds.org.          This list is accurate as of: 1/17/18 11:59 PM.  Always use your most recent med list.                   Brand Name Dispense Instructions for use Diagnosis    acetaminophen 325 MG tablet    TYLENOL    100 tablet    Take 2 tablets (650 mg) by mouth every 4 hours as needed for mild pain (mild pain)    Malignant neoplasm of right kidney (H)       acetaminophen-codeine 300-30 MG per tablet    TYLENOL #3    45 tablet    Take 1 tablet by mouth every 6 hours as needed for moderate pain    Metastatic renal cell carcinoma to lung, right (H)       ALPRAZolam 0.5 MG tablet    XANAX    60 tablet    Take 1 tablet (0.5 mg) by mouth 3 times daily as needed for anxiety    Renal cell carcinoma, unspecified laterality (H), Mass of upper lobe of left lung       benzonatate 100 MG capsule    TESSALON    60 capsule     Take 1 capsule (100 mg) by mouth 3 times daily as needed for cough    Metastatic renal cell carcinoma to lung, right (H)       buPROPion 300 MG 24 hr tablet    WELLBUTRIN XL     Take 300 mg by mouth every morning        citalopram 20 MG tablet    celeXA    90 tablet    Take 1 tablet (20 mg) by mouth every evening    Insomnia, unspecified type       clonazePAM 0.5 MG tablet    klonoPIN    20 tablet    TAKE 0.5-1 TABLETS BY MOUTH 2 TIMES DAILY AS NEEDED FOR ANXIETY    Panic attack       IBUPROFEN      Take 400 mg by mouth every 6 hours as needed        lidocaine-prilocaine cream    EMLA    60 g    Apply 1 hour prior to port access.    Malignant neoplasm of right kidney (H)       LORazepam 0.5 MG tablet    ATIVAN    30 tablet    Take 1 tablet (0.5 mg) by mouth every 4 hours as needed (Anxiety, Nausea/Vomiting or Sleep)    Malignant neoplasm of right kidney (H)       prochlorperazine 10 MG tablet    COMPAZINE    30 tablet    Take 1 tablet (10 mg) by mouth every 6 hours as needed (Nausea/Vomiting)    Malignant neoplasm of right kidney (H)       zolpidem 5 MG tablet    AMBIEN    30 tablet    Take 1 tablet (5 mg) by mouth nightly as needed for sleep    Sleep disorder

## 2018-01-17 NOTE — NURSING NOTE
"Oncology Rooming Note    January 17, 2018 7:55 AM   Suzi Gonsales is a 52 year old female who presents for:    Chief Complaint   Patient presents with     Port Draw     accessed with power needle, heparin locked, vitals checked     Oncology Clinic Visit     Kidney CA     Initial Vitals: /54  Pulse 74  Temp 97.8  F (36.6  C)  Resp 16  Wt 57.2 kg (126 lb)  LMP  (Within Months)  SpO2 97%  BMI 23.05 kg/m2 Estimated body mass index is 23.05 kg/(m^2) as calculated from the following:    Height as of 12/4/17: 1.575 m (5' 2\").    Weight as of this encounter: 57.2 kg (126 lb). Body surface area is 1.58 meters squared.  No Pain (0) Comment: Data Unavailable   No LMP recorded (within months). Patient is postmenopausal.  Allergies reviewed: Yes  Medications reviewed: Yes    Medications: Refill of Ambien needed today.  Pharmacy name entered into Pramana:    [id number] - [place], [place] - [address] AT [facility] [address]  [facility] - USE FOR MAILING ONLY - [place], [place]    Clinical concerns: Pt requests refill of Ambien.  Molly Max was notified.    6 minutes for nursing intake (face to face time)     Dilia Faria CMA              "

## 2018-01-17 NOTE — Clinical Note
1/17/2018       RE: Suzi Gonsales  [address] [place] [address]     Dear Colleague,    Thank you for referring your patient, Suzi Gonsales, to the [facility]. Please see a copy of my visit note below.    [facility]  FOLLOW-UP VISIT NOTE  Date of visit: Jan 17, 2018       REASON FOR VISIT: mRCC here for assessment currently on Opdivo    HPI: Suzi presented to ED with hematuria and right flank pain thinking she had a renal stone in December 2014. She was worked up with a CT abd/pelvis which suggested a renal mass. She was referred to Dr. Max Zelaya in [facility]. 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 Per charts tumor resection had negative margins and it was staged at pT2bN0.    Her staging work up was negative except for pulmonary nodules. She has been followed every 6 months with scans. CT CAP on 5/11/17 showed enlarging hilar LAD, enlarging pulmonary nodules, adrenal nodules and a pancreatic body mass. Biopsies of hilar LN, adrenal nodule and pancreatic mass were + for RCC. She decided to pursue IL-2, of which she received 4 cycles, last on 8/15/17. CT CAP on 9/13/17 showed generally stable disease. CT CAP on 11/22/17 showed disease progression.  She is here today for nivolumab cycle 2.    INTERVAL HISTORY: Suzi is doing well today.  She has constant fatigue, however no changes recently.  She has had a persistent cough for the last 6 months and Tessalon Perles seems to help her symptoms. She admits to some mild dyspnea on exertion, however not short of breath at rest. She denies fever chills, sputum, hemoptysis or lower extremity edema.  Appetite has been lower but she is pushing 3 meals a day and 64 ounces of fluids per day.  She had diarrhea previously, however that has completely resolved.  She denies any pain.  She has occasional nausea, but not requiring any  medications and resolves on its own.  Denies vomiting, diarrhea or constipation.  Denies bleeding.  She admits to occasional headaches, nothing consistent and denies any visual changes.  Denies rashes, mouth sores, peripheral neuropathy.  Anxiety depression have been well controlled on Klonopin and Wellbutrin.  She has no other concerns or questions at this time.    Review of Systems: See interval hx. Denies fevers, chills, HA, dizziness, n/t, changes in vision, cough, sore throat, CP, SOB, abdominal pain, N/V, diarrhea, changes in urination, bleeding, bruising, rash.       Current Outpatient Prescriptions   Medication Sig Dispense Refill     clonazePAM (KLONOPIN) 0.5 MG tablet TAKE 0.5-1 TABLETS BY MOUTH 2 TIMES DAILY AS NEEDED FOR ANXIETY 20 tablet 0     zolpidem (AMBIEN) 5 MG tablet Take 1 tablet (5 mg) by mouth nightly as needed for sleep 30 tablet 1     ALPRAZolam (XANAX) 0.5 MG tablet Take 1 tablet (0.5 mg) by mouth 3 times daily as needed for anxiety 60 tablet 0     lidocaine-prilocaine (EMLA) cream Apply 1 hour prior to port access. 60 g 1     LORazepam (ATIVAN) 0.5 MG tablet Take 1 tablet (0.5 mg) by mouth every 4 hours as needed (Anxiety, Nausea/Vomiting or Sleep) 30 tablet 2     prochlorperazine (COMPAZINE) 10 MG tablet Take 1 tablet (10 mg) by mouth every 6 hours as needed (Nausea/Vomiting) 30 tablet 2     benzonatate (TESSALON) 100 MG capsule Take 1 capsule (100 mg) by mouth 3 times daily as needed for cough 60 capsule 0     acetaminophen-codeine (TYLENOL #3) 300-30 MG per tablet Take 1 tablet by mouth every 6 hours as needed for moderate pain 45 tablet 0     citalopram (CELEXA) 20 MG tablet Take 1 tablet (20 mg) by mouth every evening 90 tablet 3     prochlorperazine (COMPAZINE) 10 MG tablet Take 1 tablet (10 mg) by mouth every 6 hours as needed for nausea or vomiting (Breakthrough Nausea/Vomiting) 30 tablet 0     acetaminophen (TYLENOL) 325 MG tablet Take 2 tablets (650 mg) by mouth every 4 hours as  needed for mild pain (mild pain) 100 tablet 0     buPROPion (WELLBUTRIN XL) 300 MG 24 hr tablet Take 300 mg by mouth every morning        IBUPROFEN Take 400 mg by mouth every 6 hours as needed        EXAM:    LMP  (Within Months)    Wt Readings from Last 4 Encounters:   01/04/18 57.5 kg (126 lb 11.2 oz)   12/18/17 57 kg (125 lb 9.6 oz)   12/05/17 56.7 kg (125 lb)   12/04/17 54.9 kg (121 lb)       Vital signs were reviewed.   Patient alert and oriented.   PERRLA. EOMI. No scleral icterus noted. OP without thrush/sores.  Neck exam: No palpable cervical or supraclavicular nodes bilaterally.   Heart: RRR no murmurs noted.   Lungs: clear to auscultation bilaterally.  No crackles or wheezing.   Abd: positive bowel sounds in all four quadrants.  No tenderness to palpation.  No hepatomegaly.   Extremities: No lower extremity edema.   Neuro: grossly intact.   Mood and affect is stable.   Skin has no rashes or lesions on exposed areas. Port is accessed in right chest.     LABS:   Results for MICKEY AMEZCUA (MRN [id number]) as of 1/17/2018 09:43   1/17/2018 07:24   Sodium 138   Potassium 3.9   Chloride 104   Carbon Dioxide 25   Urea Nitrogen 17   Creatinine 1.02   GFR Estimate 57 (L)   GFR Estimate If Black 68   Calcium 9.2   Anion Gap 8   Albumin 3.6   Protein Total 7.6   Bilirubin Total 0.4   Alkaline Phosphatase 71   ALT 15   AST 25   Glucose 108 (H)   WBC 4.9   Hemoglobin 11.7   Hematocrit 35.8   Platelet Count 329   RBC Count 3.94   MCV 91   MCH 29.7   MCHC 32.7   RDW 13.4   Diff Method Automated Method   % Neutrophils 40.3   % Lymphocytes 45.8   % Monocytes 7.8   % Eosinophils 5.3   % Basophils 0.6   % Immature Granulocytes 0.2   Nucleated RBCs 0   Absolute Neutrophil 2.0   Absolute Lymphocytes 2.2   Absolute Monocytes 0.4   Absolute Eosinophils 0.3   Absolute Basophils 0.0   Abs Immature Granulocytes 0.0   Absolute Nucleated RBC 0.0       ASSESSMENT/PLAN: 52 year old with mRCC s/p 4 cycles of IL-2.  She had a stable CT  after 2 cycles, mild disease progression after 4 cycles.  After a short break, CT showed worsening disease and she has started with Opdivo.     1. RCC: Suzi started nivolumab on 12/5/17.  She has tolerated cycle 1 well.  She is here for cycle 2, day 15 and plan for every 2 weeks. Labs are within treatment parameters, therefore will proceed with cycle 2, day 15.   --1/31 nivolumab infusion  --2/13 Kia, nivolumab  --2/26 or 2/27 re-staging CT  --2/28 Dr Green    2. Cough: Persistent for 6 months.  Likely from lung nodules.  Differential also includes pneumonia, CHF, pneumothorax, however no infectious symptoms, afebrile, no lower extremity edema and no chest pain or shortness of breath at rest.  Vital signs stable with sats of 97% room air.  Continue Tessalon Perles at home.    3. Depression and anxiety: Currently on Celexa, Wellbutrin and benzodiazepine and these medications are prescribed by her primary clinic.   assisting as well.    4. FEN: Creatinine 1.02 within normal limits today.  Has been fluctuating, however no concern at this time.  We will continue to monitor.      Mimi Jha PA-C    Again, thank you for allowing me to participate in the care of your patient.      Sincerely,    Molly Max PA-C

## 2018-01-17 NOTE — LETTER
1/17/2018      RE: Suzi Gonsales  [address] [place] [address]       [facility]INIC  FOLLOW-UP VISIT NOTE  Date of visit: Jan 17, 2018       REASON FOR VISIT: mRCC here for assessment currently on Opdivo    HPI: Suzi presented to ED with hematuria and right flank pain thinking she had a renal stone in December 2014. She was worked up with a CT abd/pelvis which suggested a renal mass. She was referred to Dr. Max Zelaya in [facility]. 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 Per charts tumor resection had negative margins and it was staged at pT2bN0.    Her staging work up was negative except for pulmonary nodules. She has been followed every 6 months with scans. CT CAP on 5/11/17 showed enlarging hilar LAD, enlarging pulmonary nodules, adrenal nodules and a pancreatic body mass. Biopsies of hilar LN, adrenal nodule and pancreatic mass were + for RCC. She decided to pursue IL-2, of which she received 4 cycles, last on 8/15/17. CT CAP on 9/13/17 showed generally stable disease. CT CAP on 11/22/17 showed disease progression.  She is here today for nivolumab cycle 2 day 15.    INTERVAL HISTORY: Suzi is doing well today.  She has constant fatigue, however no changes recently.  She has had a persistent cough for the last 6 months and Tessalon Perles seems to help her symptoms. She admits to some mild dyspnea on exertion, however not short of breath at rest. She denies fever chills, sputum, hemoptysis or lower extremity edema.  Appetite has been lower but she is pushing 3 meals a day and 64 ounces of fluids per day.  She had diarrhea previously, however that has completely resolved.  She denies any pain.  She has occasional nausea, but not requiring any medications and resolves on its own.  Denies vomiting, diarrhea or constipation.  Denies bleeding.  She admits to occasional headaches, nothing  consistent and denies any visual changes.  Denies rashes, mouth sores, peripheral neuropathy.  Anxiety depression have been well controlled on Klonopin and Wellbutrin.  She has no other concerns or questions at this time.    Review of Systems: See interval hx. Denies fevers, chills, HA, dizziness, n/t, changes in vision, cough, sore throat, CP, SOB, abdominal pain, N/V, diarrhea, changes in urination, bleeding, bruising, rash.       Current Outpatient Prescriptions   Medication Sig Dispense Refill     clonazePAM (KLONOPIN) 0.5 MG tablet TAKE 0.5-1 TABLETS BY MOUTH 2 TIMES DAILY AS NEEDED FOR ANXIETY 20 tablet 0     zolpidem (AMBIEN) 5 MG tablet Take 1 tablet (5 mg) by mouth nightly as needed for sleep 30 tablet 1     ALPRAZolam (XANAX) 0.5 MG tablet Take 1 tablet (0.5 mg) by mouth 3 times daily as needed for anxiety 60 tablet 0     lidocaine-prilocaine (EMLA) cream Apply 1 hour prior to port access. 60 g 1     LORazepam (ATIVAN) 0.5 MG tablet Take 1 tablet (0.5 mg) by mouth every 4 hours as needed (Anxiety, Nausea/Vomiting or Sleep) 30 tablet 2     prochlorperazine (COMPAZINE) 10 MG tablet Take 1 tablet (10 mg) by mouth every 6 hours as needed (Nausea/Vomiting) 30 tablet 2     benzonatate (TESSALON) 100 MG capsule Take 1 capsule (100 mg) by mouth 3 times daily as needed for cough 60 capsule 0     acetaminophen-codeine (TYLENOL #3) 300-30 MG per tablet Take 1 tablet by mouth every 6 hours as needed for moderate pain 45 tablet 0     citalopram (CELEXA) 20 MG tablet Take 1 tablet (20 mg) by mouth every evening 90 tablet 3     prochlorperazine (COMPAZINE) 10 MG tablet Take 1 tablet (10 mg) by mouth every 6 hours as needed for nausea or vomiting (Breakthrough Nausea/Vomiting) 30 tablet 0     acetaminophen (TYLENOL) 325 MG tablet Take 2 tablets (650 mg) by mouth every 4 hours as needed for mild pain (mild pain) 100 tablet 0     buPROPion (WELLBUTRIN XL) 300 MG 24 hr tablet Take 300 mg by mouth every morning         IBUPROFEN Take 400 mg by mouth every 6 hours as needed        EXAM:    LMP  (Within Months)    Wt Readings from Last 4 Encounters:   01/04/18 57.5 kg (126 lb 11.2 oz)   12/18/17 57 kg (125 lb 9.6 oz)   12/05/17 56.7 kg (125 lb)   12/04/17 54.9 kg (121 lb)       Vital signs were reviewed.   Patient alert and oriented.   PERRLA. EOMI. No scleral icterus noted. OP without thrush/sores.  Neck exam: No palpable cervical or supraclavicular nodes bilaterally.   Heart: RRR no murmurs noted.   Lungs: clear to auscultation bilaterally.  No crackles or wheezing.   Abd: positive bowel sounds in all four quadrants.  No tenderness to palpation.  No hepatomegaly.   Extremities: No lower extremity edema.   Neuro: grossly intact.   Mood and affect is stable.   Skin has no rashes or lesions on exposed areas. Port is accessed in right chest.     LABS:    1/17/2018 07:24   Sodium 138   Potassium 3.9   Chloride 104   Carbon Dioxide 25   Urea Nitrogen 17   Creatinine 1.02   GFR Estimate 57 (L)   GFR Estimate If Black 68   Calcium 9.2   Anion Gap 8   Albumin 3.6   Protein Total 7.6   Bilirubin Total 0.4   Alkaline Phosphatase 71   ALT 15   AST 25   Glucose 108 (H)   WBC 4.9   Hemoglobin 11.7   Hematocrit 35.8   Platelet Count 329   RBC Count 3.94   MCV 91   MCH 29.7   MCHC 32.7   RDW 13.4   Diff Method Automated Method   % Neutrophils 40.3   % Lymphocytes 45.8   % Monocytes 7.8   % Eosinophils 5.3   % Basophils 0.6   % Immature Granulocytes 0.2   Nucleated RBCs 0   Absolute Neutrophil 2.0   Absolute Lymphocytes 2.2   Absolute Monocytes 0.4   Absolute Eosinophils 0.3   Absolute Basophils 0.0   Abs Immature Granulocytes 0.0   Absolute Nucleated RBC 0.0       ASSESSMENT/PLAN: 52 year old with mRCC s/p 4 cycles of IL-2.  She had a stable CT after 2 cycles, mild disease progression after 4 cycles.  After a short break, CT showed worsening disease and she has started with Opdivo.     1. RCC: Suzi started nivolumab on 12/5/17.  She has tolerated  cycle 1 well.  She is here for cycle 2, day 15 and plan for every 2 weeks. Labs are within treatment parameters, therefore will proceed with cycle 2, day 15.   --1/31 nivolumab infusion  --2/13 Kia, nivolumab  --2/26 or 2/27 re-staging CT  --2/28 Dr Green    2. Cough: Persistent for 6 months.  Likely from lung nodules.  Differential also includes pneumonia, CHF, pneumothorax, however no infectious symptoms, afebrile, no lower extremity edema and no chest pain or shortness of breath at rest.  Vital signs stable with sats of 97% room air.  Continue Tessalon Perles at home.    3. Depression and anxiety: Currently on Celexa, Wellbutrin and benzodiazepine and these medications are prescribed by her primary clinic.   assisting as well.    4. FEN: Creatinine 1.02 within normal limits today.  Has been fluctuating, however no concern at this time.  We will continue to monitor.      Mimi Jha PA-C    The patient was seen in conjunction with Mimi Jha PA-C who served as a scribe for today's visit. I have reviewed and edited the note and agree with the above findings and plan.  BRENDA Reeves PA-C

## 2018-01-17 NOTE — PROGRESS NOTES
Infusion Nursing Note:    Patient presents today for Cycle 2 Day 15 Nivolumab.    Patient met with Molly LU prior to infusion.    Lab Results   Component Value Date    HGB 11.7 01/17/2018     Lab Results   Component Value Date    WBC 4.9 01/17/2018      Lab Results   Component Value Date    ANEU 2.0 01/17/2018     Lab Results   Component Value Date     01/17/2018      Lab Results   Component Value Date     01/17/2018                   Lab Results   Component Value Date    POTASSIUM 3.9 01/17/2018           Lab Results   Component Value Date    MAG 1.8 08/19/2017            Lab Results   Component Value Date    CR 1.02 01/17/2018                   Lab Results   Component Value Date    MUNDO 9.2 01/17/2018                Lab Results   Component Value Date    BILITOTAL 0.4 01/17/2018           Lab Results   Component Value Date    ALBUMIN 3.6 01/17/2018                    Lab Results   Component Value Date    ALT 15 01/17/2018           Lab Results   Component Value Date    AST 25 01/17/2018     Results reviewed, labs MET treatment parameters, ok to proceed with treatment.        Note: N/A.    Intravenous Access:  Implanted Port.    Post Infusion Assessment:  Patient tolerated infusion without incident.  Blood return noted pre and post infusion.  Access discontinued per protocol.    Discharge Plan:   Prescription refills given for ambien.  AVS to patient via [employer].  Patient will return 1/31 for next appointment.   Patient discharged in stable condition accompanied by: self.  Departure Mode: Ambulatory.

## 2018-01-17 NOTE — NURSING NOTE
Chief Complaint   Patient presents with     Port Draw     accessed with power needle, heparin locked, vitals checked

## 2018-01-26 NOTE — IP AVS SNAPSHOT
[address] [address]    Phone:  [phone]                                       After Visit Summary   6/20/2017    Suzi Gonsales    MRN: [id number]           After Visit Summary Signature Page     I have received my discharge instructions, and my questions have been answered. I have discussed any challenges I see with this plan with the nurse or doctor.    ..........................................................................................................................................  Patient/Patient Representative Signature      ..........................................................................................................................................  Patient Representative Print Name and Relationship to Patient    ..................................................               ................................................  Date                                            Time    ..........................................................................................................................................  Reviewed by Signature/Title    ...................................................              ..............................................  Date                                                            Time           stated

## 2018-01-30 RX ORDER — METHYLPREDNISOLONE SODIUM SUCCINATE 125 MG/2ML
125 INJECTION, POWDER, LYOPHILIZED, FOR SOLUTION INTRAMUSCULAR; INTRAVENOUS
Status: CANCELLED
Start: 2018-02-13

## 2018-01-30 RX ORDER — METHYLPREDNISOLONE SODIUM SUCCINATE 125 MG/2ML
125 INJECTION, POWDER, LYOPHILIZED, FOR SOLUTION INTRAMUSCULAR; INTRAVENOUS
Status: CANCELLED
Start: 2018-01-31

## 2018-01-30 RX ORDER — DIPHENHYDRAMINE HYDROCHLORIDE 50 MG/ML
50 INJECTION INTRAMUSCULAR; INTRAVENOUS
Status: CANCELLED
Start: 2018-01-31

## 2018-01-30 RX ORDER — MEPERIDINE HYDROCHLORIDE 25 MG/ML
25 INJECTION INTRAMUSCULAR; INTRAVENOUS; SUBCUTANEOUS EVERY 30 MIN PRN
Status: CANCELLED | OUTPATIENT
Start: 2018-01-31

## 2018-01-30 RX ORDER — EPINEPHRINE 1 MG/ML
0.3 INJECTION, SOLUTION, CONCENTRATE INTRAVENOUS EVERY 5 MIN PRN
Status: CANCELLED | OUTPATIENT
Start: 2018-02-13

## 2018-01-30 RX ORDER — HEPARIN SODIUM (PORCINE) LOCK FLUSH IV SOLN 100 UNIT/ML 100 UNIT/ML
5 SOLUTION INTRAVENOUS EVERY 8 HOURS
Status: CANCELLED
Start: 2018-02-13

## 2018-01-30 RX ORDER — LORAZEPAM 2 MG/ML
0.5 INJECTION INTRAMUSCULAR EVERY 4 HOURS PRN
Status: CANCELLED
Start: 2018-01-31

## 2018-01-30 RX ORDER — DIPHENHYDRAMINE HYDROCHLORIDE 50 MG/ML
50 INJECTION INTRAMUSCULAR; INTRAVENOUS
Status: CANCELLED
Start: 2018-02-13

## 2018-01-30 RX ORDER — EPINEPHRINE 1 MG/ML
0.3 INJECTION, SOLUTION, CONCENTRATE INTRAVENOUS EVERY 5 MIN PRN
Status: CANCELLED | OUTPATIENT
Start: 2018-01-31

## 2018-01-30 RX ORDER — ALBUTEROL SULFATE 90 UG/1
1-2 AEROSOL, METERED RESPIRATORY (INHALATION)
Status: CANCELLED
Start: 2018-02-13

## 2018-01-30 RX ORDER — ALBUTEROL SULFATE 90 UG/1
1-2 AEROSOL, METERED RESPIRATORY (INHALATION)
Status: CANCELLED
Start: 2018-01-31

## 2018-01-30 RX ORDER — MEPERIDINE HYDROCHLORIDE 25 MG/ML
25 INJECTION INTRAMUSCULAR; INTRAVENOUS; SUBCUTANEOUS EVERY 30 MIN PRN
Status: CANCELLED | OUTPATIENT
Start: 2018-02-13

## 2018-01-30 RX ORDER — ALBUTEROL SULFATE 0.83 MG/ML
2.5 SOLUTION RESPIRATORY (INHALATION)
Status: CANCELLED | OUTPATIENT
Start: 2018-01-31

## 2018-01-30 RX ORDER — SODIUM CHLORIDE 9 MG/ML
1000 INJECTION, SOLUTION INTRAVENOUS CONTINUOUS PRN
Status: CANCELLED
Start: 2018-01-31

## 2018-01-30 RX ORDER — ALBUTEROL SULFATE 0.83 MG/ML
2.5 SOLUTION RESPIRATORY (INHALATION)
Status: CANCELLED | OUTPATIENT
Start: 2018-02-13

## 2018-01-30 RX ORDER — LORAZEPAM 2 MG/ML
0.5 INJECTION INTRAMUSCULAR EVERY 4 HOURS PRN
Status: CANCELLED
Start: 2018-02-13

## 2018-01-30 RX ORDER — HEPARIN SODIUM (PORCINE) LOCK FLUSH IV SOLN 100 UNIT/ML 100 UNIT/ML
5 SOLUTION INTRAVENOUS EVERY 8 HOURS
Status: CANCELLED
Start: 2018-01-31

## 2018-01-30 RX ORDER — EPINEPHRINE 0.3 MG/.3ML
0.3 INJECTION SUBCUTANEOUS EVERY 5 MIN PRN
Status: CANCELLED | OUTPATIENT
Start: 2018-02-13

## 2018-01-30 RX ORDER — EPINEPHRINE 0.3 MG/.3ML
0.3 INJECTION SUBCUTANEOUS EVERY 5 MIN PRN
Status: CANCELLED | OUTPATIENT
Start: 2018-01-31

## 2018-01-30 RX ORDER — SODIUM CHLORIDE 9 MG/ML
1000 INJECTION, SOLUTION INTRAVENOUS CONTINUOUS PRN
Status: CANCELLED
Start: 2018-02-13

## 2018-01-31 ENCOUNTER — INFUSION THERAPY VISIT (OUTPATIENT)
Dept: ONCOLOGY | Facility: CLINIC | Age: 53
End: 2018-01-31
Attending: INTERNAL MEDICINE
Payer: COMMERCIAL

## 2018-01-31 VITALS
BODY MASS INDEX: 23.01 KG/M2 | DIASTOLIC BLOOD PRESSURE: 62 MMHG | OXYGEN SATURATION: 96 % | RESPIRATION RATE: 16 BRPM | HEART RATE: 78 BPM | SYSTOLIC BLOOD PRESSURE: 93 MMHG | WEIGHT: 125.8 LBS | TEMPERATURE: 98 F

## 2018-01-31 DIAGNOSIS — C64.1 MALIGNANT NEOPLASM OF RIGHT KIDNEY (H): Primary | ICD-10-CM

## 2018-01-31 LAB
ALBUMIN SERPL-MCNC: 3.7 G/DL (ref 3.4–5)
ALP SERPL-CCNC: 77 U/L (ref 40–150)
ALT SERPL W P-5'-P-CCNC: 15 U/L (ref 0–50)
ANION GAP SERPL CALCULATED.3IONS-SCNC: 7 MMOL/L (ref 3–14)
AST SERPL W P-5'-P-CCNC: 24 U/L (ref 0–45)
BASOPHILS # BLD AUTO: 0 10E9/L (ref 0–0.2)
BASOPHILS NFR BLD AUTO: 0.7 %
BILIRUB SERPL-MCNC: 0.3 MG/DL (ref 0.2–1.3)
BUN SERPL-MCNC: 18 MG/DL (ref 7–30)
CALCIUM SERPL-MCNC: 9.2 MG/DL (ref 8.5–10.1)
CHLORIDE SERPL-SCNC: 102 MMOL/L (ref 94–109)
CO2 SERPL-SCNC: 26 MMOL/L (ref 20–32)
CREAT SERPL-MCNC: 0.91 MG/DL (ref 0.52–1.04)
DIFFERENTIAL METHOD BLD: NORMAL
EOSINOPHIL # BLD AUTO: 0.2 10E9/L (ref 0–0.7)
EOSINOPHIL NFR BLD AUTO: 5.2 %
ERYTHROCYTE [DISTWIDTH] IN BLOOD BY AUTOMATED COUNT: 13.2 % (ref 10–15)
GFR SERPL CREATININE-BSD FRML MDRD: 65 ML/MIN/1.7M2
GLUCOSE SERPL-MCNC: 95 MG/DL (ref 70–99)
HCT VFR BLD AUTO: 35.9 % (ref 35–47)
HGB BLD-MCNC: 12.1 G/DL (ref 11.7–15.7)
IMM GRANULOCYTES # BLD: 0 10E9/L (ref 0–0.4)
IMM GRANULOCYTES NFR BLD: 0.2 %
LYMPHOCYTES # BLD AUTO: 1.7 10E9/L (ref 0.8–5.3)
LYMPHOCYTES NFR BLD AUTO: 40.3 %
MCH RBC QN AUTO: 30.5 PG (ref 26.5–33)
MCHC RBC AUTO-ENTMCNC: 33.7 G/DL (ref 31.5–36.5)
MCV RBC AUTO: 90 FL (ref 78–100)
MONOCYTES # BLD AUTO: 0.3 10E9/L (ref 0–1.3)
MONOCYTES NFR BLD AUTO: 7 %
NEUTROPHILS # BLD AUTO: 2 10E9/L (ref 1.6–8.3)
NEUTROPHILS NFR BLD AUTO: 46.6 %
NRBC # BLD AUTO: 0 10*3/UL
NRBC BLD AUTO-RTO: 0 /100
PLATELET # BLD AUTO: 357 10E9/L (ref 150–450)
POTASSIUM SERPL-SCNC: 4 MMOL/L (ref 3.4–5.3)
PROT SERPL-MCNC: 7.6 G/DL (ref 6.8–8.8)
RBC # BLD AUTO: 3.97 10E12/L (ref 3.8–5.2)
SODIUM SERPL-SCNC: 135 MMOL/L (ref 133–144)
TSH SERPL DL<=0.005 MIU/L-ACNC: 0.93 MU/L (ref 0.4–4)
WBC # BLD AUTO: 4.3 10E9/L (ref 4–11)

## 2018-01-31 PROCEDURE — 80053 COMPREHEN METABOLIC PANEL: CPT | Performed by: INTERNAL MEDICINE

## 2018-01-31 PROCEDURE — 25000128 H RX IP 250 OP 636: Mod: ZF | Performed by: PHYSICIAN ASSISTANT

## 2018-01-31 PROCEDURE — 25000128 H RX IP 250 OP 636: Mod: ZF | Performed by: INTERNAL MEDICINE

## 2018-01-31 PROCEDURE — 84443 ASSAY THYROID STIM HORMONE: CPT | Performed by: INTERNAL MEDICINE

## 2018-01-31 PROCEDURE — 85025 COMPLETE CBC W/AUTO DIFF WBC: CPT | Performed by: INTERNAL MEDICINE

## 2018-01-31 PROCEDURE — 96413 CHEMO IV INFUSION 1 HR: CPT

## 2018-01-31 RX ORDER — HEPARIN SODIUM (PORCINE) LOCK FLUSH IV SOLN 100 UNIT/ML 100 UNIT/ML
5 SOLUTION INTRAVENOUS EVERY 8 HOURS
Status: DISCONTINUED | OUTPATIENT
Start: 2018-01-31 | End: 2018-01-31 | Stop reason: HOSPADM

## 2018-01-31 RX ADMIN — SODIUM CHLORIDE, PRESERVATIVE FREE 5 ML: 5 INJECTION INTRAVENOUS at 12:10

## 2018-01-31 RX ADMIN — SODIUM CHLORIDE, PRESERVATIVE FREE 5 ML: 5 INJECTION INTRAVENOUS at 14:40

## 2018-01-31 RX ADMIN — SODIUM CHLORIDE 240 MG: 900 INJECTION, SOLUTION INTRAVENOUS at 13:39

## 2018-01-31 ASSESSMENT — PAIN SCALES - GENERAL: PAINLEVEL: NO PAIN (0)

## 2018-01-31 NOTE — PROGRESS NOTES
Infusion Nursing Note:  Suzi Gonsales presents for D1C3 Nivolumab      Note: Patient is feeling good today. She continues to have a nagging, unproductive cough but it has improved with the tessalon capsules. She denies nausea, pain, SOB, changes in bowel/bladder, numbness and tingling.     Treatment Conditions:  Lab Results   Component Value Date    HGB 12.1 01/31/2018     Lab Results   Component Value Date    WBC 4.3 01/31/2018      Lab Results   Component Value Date    ANEU 2.0 01/31/2018     Lab Results   Component Value Date     01/31/2018      Lab Results   Component Value Date     01/31/2018                   Lab Results   Component Value Date    POTASSIUM 4.0 01/31/2018           Lab Results   Component Value Date    MAG 1.8 08/19/2017            Lab Results   Component Value Date    CR 0.91 01/31/2018                   Lab Results   Component Value Date    MUNDO 9.2 01/31/2018                Lab Results   Component Value Date    BILITOTAL 0.3 01/31/2018           Lab Results   Component Value Date    ALBUMIN 3.7 01/31/2018                    Lab Results   Component Value Date    ALT 15 01/31/2018           Lab Results   Component Value Date    AST 24 01/31/2018       Results reviewed, labs MET treatment parameters, ok to proceed with treatment.    Intravenous Access:  Implanted Port.    Post Infusion Assessment:  Patient tolerated infusion without incident.  Site patent and intact, free from redness, edema or discomfort.  Access discontinued per protocol.    Discharge Plan:   Patient declined prescription refills.  Patient and/or family verbalized understanding of discharge instructions and all questions answered.  Copy of AVS reviewed with patient and/or family.  Patient will return 2/13 for next appointment.  Patient discharged in stable condition accompanied by: self.    Debbi Martinez RN

## 2018-01-31 NOTE — NURSING NOTE
Chief Complaint   Patient presents with     Port Draw     port accessed with power needle heparin locked, vitals checked

## 2018-01-31 NOTE — PATIENT INSTRUCTIONS
Contact Numbers  [facility] Emergency Nurse Triage: [phone]  After Hours Nurse Line:  [phone]     Please call the [facility] Triage line if you experience a temperature greater than or equal to 100.5, shaking chills, have uncontrolled nausea, vomiting and/or diarrhea, dizziness, shortness of breath, chest pain, bleeding, unexplained bruising, or if you have any other new/concerning symptoms, questions or concerns.      If it is after hours, weekends, or holidays, please call either the after hours nurse line listed above.      If you are having any concerning symptoms or wish to speak to a provider before your next infusion visit, please call your care coordinator or triage to notify them so we can adequately serve you.      If you need a refill on a narcotic prescription or other medication, please call triage before your infusion appointment.         January 2018 Sunday Monday Tuesday Wednesday Thursday Friday Saturday        1     2     3     4     [facility] D[facility]    2:00 PM   (15 min.)    [facility]   [facility] Lab Draw     [facility] ONC INFUSION 120    2:30 PM   (120 min.)    ONCOLOGY INFUSION   [facility] 5     6       7     8     9     10     11     12     13       14     15     16     17     [facility] DRAW    7:15 AM   (15 min.)    [facility] DRAW   [facility] Lab Draw     [facility] RETURN    7:35 AM   (50 min.)   Molly Max PA-C   [facility]     [facility] ONC INFUSION 120    9:00 AM   (120 min.)    ONCOLOGY INFUSION   [facility] 18     19     20       21     22     23     24     25     26     27       28     29     30     31     [facility] DRAW   12:00 PM   (15 min.)    [facility] DRAW   [facility] Lab Draw     [facility] INFUSION 120   12:30 PM   (120 min.)    ONCOLOGY INFUSION   [facility]                           February 2018 Sunday Monday Tuesday Wednesday Thursday  Friday Saturday                       1     2     3       4     5     6     7     8     9     10       11     12     13     [facility] DRAW   12:00 PM   (15 min.)    [facility] DRAW   [facility] Lab Draw     [facility] RETURN   12:15 PM   (50 min.)   Day Ren PA-C   [facility]    1:30 PM   (120 min.)   [facility] INFUSION   [facility] 14     15     16     17       18     19     20     21     22     23     24       25     26     27     CT CHEST/ABDOMEN/PELVIS W   12:25 PM   (20 min.)   UCCT2   [facility] CT 28     [facility] DRAW   12:00 PM   (15 min.)    [facility] DRAW   [facility] Lab Draw     P RETURN   12:15 PM   (30 min.)   Henri Green MD   [facility]                             Lab Results:  Recent Results (from the past 12 hour(s))   Comprehensive metabolic panel    Collection Time: 01/31/18 12:17 PM   Result Value Ref Range    Sodium 135 133 - 144 mmol/L    Potassium 4.0 3.4 - 5.3 mmol/L    Chloride 102 94 - 109 mmol/L    Carbon Dioxide 26 20 - 32 mmol/L    Anion Gap 7 3 - 14 mmol/L    Glucose 95 70 - 99 mg/dL    Urea Nitrogen 18 7 - 30 mg/dL    Creatinine 0.91 0.52 - 1.04 mg/dL    GFR Estimate 65 >60 mL/min/1.7m2    GFR Estimate If Black 78 >60 mL/min/1.7m2    Calcium 9.2 8.5 - 10.1 mg/dL    Bilirubin Total 0.3 0.2 - 1.3 mg/dL    Albumin 3.7 3.4 - 5.0 g/dL    Protein Total 7.6 6.8 - 8.8 g/dL    Alkaline Phosphatase 77 40 - 150 U/L    ALT 15 0 - 50 U/L    AST 24 0 - 45 U/L   TSH with free T4 reflex    Collection Time: 01/31/18 12:17 PM   Result Value Ref Range    TSH 0.93 0.40 - 4.00 mU/L   CBC with platelets differential    Collection Time: 01/31/18 12:17 PM   Result Value Ref Range    WBC 4.3 4.0 - 11.0 10e9/L    RBC Count 3.97 3.8 - 5.2 10e12/L    Hemoglobin 12.1 11.7 - 15.7 g/dL    Hematocrit 35.9 35.0 - 47.0 %    MCV 90 78 - 100 fl    MCH 30.5 26.5 - 33.0 pg     MCHC 33.7 31.5 - 36.5 g/dL    RDW 13.2 10.0 - 15.0 %    Platelet Count 357 150 - 450 10e9/L    Diff Method Automated Method     % Neutrophils 46.6 %    % Lymphocytes 40.3 %    % Monocytes 7.0 %    % Eosinophils 5.2 %    % Basophils 0.7 %    % Immature Granulocytes 0.2 %    Nucleated RBCs 0 0 /100    Absolute Neutrophil 2.0 1.6 - 8.3 10e9/L    Absolute Lymphocytes 1.7 0.8 - 5.3 10e9/L    Absolute Monocytes 0.3 0.0 - 1.3 10e9/L    Absolute Eosinophils 0.2 0.0 - 0.7 10e9/L    Absolute Basophils 0.0 0.0 - 0.2 10e9/L    Abs Immature Granulocytes 0.0 0 - 0.4 10e9/L    Absolute Nucleated RBC 0.0

## 2018-01-31 NOTE — MR AVS SNAPSHOT
After Visit Summary   1/31/2018    Suzi Gonsales    MRN: [id number]           Patient Information     Date Of Birth          1965        Visit Information        Provider Department      1/31/2018 12:30 PM  29 ATC;  ONCOLOGY INFUSION [facility]        Today's Diagnoses     Malignant neoplasm of right kidney (H)    -  1      Care Instructions    Contact Numbers  [facility] Nurse Triage: [phone]  After Hours Nurse Line:  [phone]     Please call the [facility] Triage line if you experience a temperature greater than or equal to 100.5, shaking chills, have uncontrolled nausea, vomiting and/or diarrhea, dizziness, shortness of breath, chest pain, bleeding, unexplained bruising, or if you have any other new/concerning symptoms, questions or concerns.      If it is after hours, weekends, or holidays, please call either the after hours nurse line listed above.      If you are having any concerning symptoms or wish to speak to a provider before your next infusion visit, please call your care coordinator or triage to notify them so we can adequately serve you.      If you need a refill on a narcotic prescription or other medication, please call triage before your infusion appointment.         January 2018 Sunday Monday Tuesday Wednesday Thursday Friday Saturday        1     2     3     4     [facility] LAB DRAW    2:00 PM   (15 min.)    [facility] LAB DRAW   [facility] Lab Draw     [facility] ONC INFUSION 120    2:30 PM   (120 min.)    ONCOLOGY INFUSION   [facility] 5     6       7     8     9     10     11     12     13       14     15     16     17     [facility] LAB DRAW    7:15 AM   (15 min.)    [facility] LAB DRAW   [facility] Lab Draw     P RETURN    7:35 AM   (50 min.)   Molly Max PA-C   [facility] ONC INFUSION 120    9:00 AM   (120 min.)    ONCOLOGY INFUSION   [facility]  [facility] 18     19     20       21     22     23     24     25     26     27       28     29     30     31     [facility] D[facility]   12:00 PM   (15 min.)   [facility]   [facility] Lab Draw     [facility] ONC INFUSION 120   12:30 PM   (120 min.)   [facility] ONCOLOGY INFUSION   [facility]                           February 2018 Sunday Monday Tuesday Wednesday Thursday Friday Saturday                       1     2     3       4     5     6     7     8     9     10       11     12     13     [facility] LAB DRAW   12:00 PM   (15 min.)   [facility]     [facility] RETURN   12:15 PM   (50 min.)   Day Ren PA-C   [facility]     [facility] ONC INFUSION 120    1:30 PM   (120 min.)   [facility] ONCOLOGY INFUSION   [facility] 14     15     16     17       18     19     20     21     22     23     24       25     26     27     CT CHEST/ABDOMEN/PELVIS W   12:25 PM   (20 min.)   UCCT2   [facility] CT 28     [facility] LAB DRAW   12:00 PM   (15 min.)    [facility] LAB DRAW   [facility] Lab Draw     [facility] RETURN   12:15 PM   (30 min.)   Henri Green MD   [facility]                             Lab Results:  Recent Results (from the past 12 hour(s))   Comprehensive metabolic panel    Collection Time: 01/31/18 12:17 PM   Result Value Ref Range    Sodium 135 133 - 144 mmol/L    Potassium 4.0 3.4 - 5.3 mmol/L    Chloride 102 94 - 109 mmol/L    Carbon Dioxide 26 20 - 32 mmol/L    Anion Gap 7 3 - 14 mmol/L    Glucose 95 70 - 99 mg/dL    Urea Nitrogen 18 7 - 30 mg/dL    Creatinine 0.91 0.52 - 1.04 mg/dL    GFR Estimate 65 >60 mL/min/1.7m2    GFR Estimate If Black 78 >60 mL/min/1.7m2    Calcium 9.2 8.5 - 10.1 mg/dL    Bilirubin Total 0.3 0.2 - 1.3 mg/dL    Albumin 3.7 3.4 - 5.0 g/dL    Protein Total 7.6 6.8 - 8.8 g/dL    Alkaline Phosphatase 77 40 - 150 U/L    ALT 15 0 - 50 U/L    AST 24 0 - 45 U/L   TSH with  free T4 reflex    Collection Time: 01/31/18 12:17 PM   Result Value Ref Range    TSH 0.93 0.40 - 4.00 mU/L   CBC with platelets differential    Collection Time: 01/31/18 12:17 PM   Result Value Ref Range    WBC 4.3 4.0 - 11.0 10e9/L    RBC Count 3.97 3.8 - 5.2 10e12/L    Hemoglobin 12.1 11.7 - 15.7 g/dL    Hematocrit 35.9 35.0 - 47.0 %    MCV 90 78 - 100 fl    MCH 30.5 26.5 - 33.0 pg    MCHC 33.7 31.5 - 36.5 g/dL    RDW 13.2 10.0 - 15.0 %    Platelet Count 357 150 - 450 10e9/L    Diff Method Automated Method     % Neutrophils 46.6 %    % Lymphocytes 40.3 %    % Monocytes 7.0 %    % Eosinophils 5.2 %    % Basophils 0.7 %    % Immature Granulocytes 0.2 %    Nucleated RBCs 0 0 /100    Absolute Neutrophil 2.0 1.6 - 8.3 10e9/L    Absolute Lymphocytes 1.7 0.8 - 5.3 10e9/L    Absolute Monocytes 0.3 0.0 - 1.3 10e9/L    Absolute Eosinophils 0.2 0.0 - 0.7 10e9/L    Absolute Basophils 0.0 0.0 - 0.2 10e9/L    Abs Immature Granulocytes 0.0 0 - 0.4 10e9/L    Absolute Nucleated RBC 0.0                Follow-ups after your visit        Your next 10 appointments already scheduled     Feb 13, 2018 12:00 PM CST   [facility] Lab Draw with [facility] LAB DRAW   [facility] Lab Draw ([facility])    [address] [place] [address]  [address]  [facility] [address] [phone]            Feb 13, 2018 12:30 PM CST   (Arrive by 12:15 PM)   Return Visit with Day Ren PA-C   [facility])    [address] [place] [address]  [address]  [facility] [address] [phone]            Feb 13, 2018  1:30 PM CST   Infusion 120 with  ONCOLOGY INFUSION, UC 22 ATC   [facility])    [address]  [address]  [facility] [address]   [phone]            Feb 27, 2018 12:40 PM CST   (Arrive by 12:25 PM)   CT CHEST/ABDOMEN/PELVIS W CONTRAST with UCCT2   [facility] CT ([facility])    [address]  [address]  [facility] [address] [phone]           Please bring any scans or X-rays taken at other hospitals, if similar tests were done. Also bring a list of your medicines, including vitamins, minerals and over-the-counter drugs. It is safest to leave personal items at home.  Be sure to tell your doctor:   If you have any allergies.   If there s any chance you are pregnant.   If you are breastfeeding.   If you have any special needs.  You may have contrast for this exam. To prepare:   Do not eat or drink for 2 hours before your exam. If you need to take medicine, you may take it with small sips of water. (We may ask you to take liquid medicine as well.)   The day before your exam, drink extra fluids at least six 8-ounce glasses (unless your doctor tells you to restrict your fluids).  Patients over 70 or patients with diabetes or kidney problems:   If you haven t had a blood test (creatinine test) within the last 30 days, go to your clinic or Diagnostic Imaging Department for this test.  If you have diabetes:   If your kidney function is normal, continue taking your metformin (Avandamet, Glucophage, Glucovance, Metaglip) on the day of your exam.   If your kidney function is abnormal, wait 48 hours before restarting this medicine.  You will have oral contrast for this exam:   You will drink the contrast at home. Get this from your clinic or Diagnostic Imaging Department. Please follow the directions given.  Please wear loose clothing, such as a sweat suit or jogging clothes. Avoid snaps, zippers and other metal. We may ask you to undress and put on a hospital gown.  If you have any questions, please call the Imaging Department where you will have your exam.            Feb 28, 2018 12:00 PM [facility]   [facility] Lab Draw with  [facility] LAB DRAW   [facility] Lab Draw ([facility])    [address]  [address]  [facility] [address]    [phone]            Feb 28, 2018 12:30 PM CST   (Arrive by 12:15 PM)   Return Visit with Henri Green MD   [facility] ([facility])    [address]  [address]  [facility] [place] [address] [phone]              Who to contact     If you have questions or need follow up information about today's clinic visit or your schedule please contact [facility] directly at [phone].  Normal or non-critical lab and imaging results will be communicated to you by [employer], letter or phone within 4 business days after the clinic has received the results. If you do not hear from us within 7 days, please contact the clinic through [employer] or phone. If you have a critical or abnormal lab result, we will notify you by phone as soon as possible.  Submit refill requests through [employer] or call your pharmacy and they will forward the refill request to us. Please allow 3 business days for your refill to be completed.          Additional Information About Your Visit        [employer] Information     [employer] gives you secure access to your electronic health record. If you see a primary care provider, you can also send messages to your care team and make appointments. If you have questions, please call your primary care clinic.  If you do not have a primary care provider, please call [phone] and they will assist you.        Care EveryWhere ID     This is your Care EveryWhere ID. This could be used by other organizations to access your [place] medical records  [id number]        Your Vitals Were     Pulse Temperature Respirations Last Period Pulse Oximetry BMI (Body Mass Index)    78 98  F (36.7  C) 16 12/20/2013 96% 23.01 kg/m2       Blood Pressure from Last 3 Encounters:   01/31/18 93/62   01/17/18 100/54   01/04/18 105/64    Weight from Last 3 Encounters:   01/31/18 57.1 kg (125 lb 12.8 oz)   01/17/18 57.2 kg (126 lb)   01/04/18 57.5 kg (126 lb  11.2 oz)              We Performed the Following     CBC with platelets differential     Comprehensive metabolic panel     TSH with free T4 reflex        Primary Care Provider Office Phone # Fax #    ARIEL Carter [facility] [phone] [address] [address]        Equal Access to Services     ROSAURA INTERIANO : Hadii aad ku hadasho Soomaali, waaxda luqadaha, qaybta kaalmada adeegyada, waxay idiin hayaan aderohit kelloggbarrypercy oswald . So [facility] [phone].    ATENCIÓN: Si habla español, tiene a chacon disposición servicios gratuitos de asistencia lingüística. Ll[facility] [phone].    We comply with applicable federal civil rights laws and [place] laws. We do not discriminate on the basis of race, color, national origin, age, disability, sex, sexual orientation, or gender identity.            Thank you!     Thank you for choosing [facility]  for your care. Our goal is always to provide you with excellent care. Hearing back from our patients is one way we can continue to improve our services. Please take a few minutes to complete the written survey that you may receive in the mail after your visit with us. Thank you!             Your Updated Medication List - Protect others around you: Learn how to safely use, store and throw away your medicines at www.disposemymeds.org.          This list is accurate as of 1/31/18  2:52 PM.  Always use your most recent med list.                   Brand Name Dispense Instructions for use Diagnosis    acetaminophen 325 MG tablet    TYLENOL    100 tablet    Take 2 tablets (650 mg) by mouth every 4 hours as needed for mild pain (mild pain)    Malignant neoplasm of right kidney (H)       acetaminophen-codeine 300-30 MG per tablet    TYLENOL #3    45 tablet    Take 1 tablet by mouth every 6 hours as needed for moderate pain    Metastatic renal cell carcinoma to lung, right (H)       ALPRAZolam 0.5 MG tablet    XANAX    60 tablet    Take 1  tablet (0.5 mg) by mouth 3 times daily as needed for anxiety    Renal cell carcinoma, unspecified laterality (H), Mass of upper lobe of left lung       benzonatate 100 MG capsule    TESSALON    60 capsule    Take 1 capsule (100 mg) by mouth 3 times daily as needed for cough    Metastatic renal cell carcinoma to lung, right (H)       buPROPion 300 MG 24 hr tablet    WELLBUTRIN XL     Take 300 mg by mouth every morning        citalopram 20 MG tablet    celeXA    90 tablet    Take 1 tablet (20 mg) by mouth every evening    Insomnia, unspecified type       clonazePAM 0.5 MG tablet    klonoPIN    20 tablet    TAKE 0.5-1 TABLETS BY MOUTH 2 TIMES DAILY AS NEEDED FOR ANXIETY    Panic attack       IBUPROFEN      Take 400 mg by mouth every 6 hours as needed        lidocaine-prilocaine cream    EMLA    60 g    Apply 1 hour prior to port access.    Malignant neoplasm of right kidney (H)       LORazepam 0.5 MG tablet    ATIVAN    30 tablet    Take 1 tablet (0.5 mg) by mouth every 4 hours as needed (Anxiety, Nausea/Vomiting or Sleep)    Malignant neoplasm of right kidney (H)       prochlorperazine 10 MG tablet    COMPAZINE    30 tablet    Take 1 tablet (10 mg) by mouth every 6 hours as needed (Nausea/Vomiting)    Malignant neoplasm of right kidney (H)       zolpidem 5 MG tablet    AMBIEN    30 tablet    Take 1 tablet (5 mg) by mouth nightly as needed for sleep    Sleep disorder

## 2018-02-12 ENCOUNTER — MYC MEDICAL ADVICE (OUTPATIENT)
Dept: FAMILY MEDICINE | Facility: CLINIC | Age: 53
End: 2018-02-12

## 2018-02-12 DIAGNOSIS — F41.0 PANIC ATTACK: ICD-10-CM

## 2018-02-12 ASSESSMENT — ENCOUNTER SYMPTOMS
FEVER: 0
WHEEZING: 0
HALLUCINATIONS: 0
MUSCLE CRAMPS: 0
JOINT SWELLING: 0
INSOMNIA: 0
SNORES LOUDLY: 0
FATIGUE: 1
PANIC: 0
BACK PAIN: 0
NECK PAIN: 1
ARTHRALGIAS: 0
STIFFNESS: 1
WEIGHT GAIN: 0
DECREASED CONCENTRATION: 1
DYSPNEA ON EXERTION: 1
COUGH DISTURBING SLEEP: 1
INCREASED ENERGY: 1
SPUTUM PRODUCTION: 0
DEPRESSION: 1
DECREASED APPETITE: 0
COUGH: 1
POSTURAL DYSPNEA: 0
NERVOUS/ANXIOUS: 1
SHORTNESS OF BREATH: 1
POLYPHAGIA: 0
WEIGHT LOSS: 0
NIGHT SWEATS: 0
POLYDIPSIA: 0
ALTERED TEMPERATURE REGULATION: 1
HEMOPTYSIS: 0
MUSCLE WEAKNESS: 0
MYALGIAS: 1
CHILLS: 0

## 2018-02-12 NOTE — TELEPHONE ENCOUNTER
Patient transferred care to  office on 10/3/2017.     See TE's for reference from that date and beyond.

## 2018-02-12 NOTE — TELEPHONE ENCOUNTER
Molly Adame review request for Clonazepam. Read my chart message . Pt states she was requesting refill but no record of it.  You saw her last Oct 2017.  Dee Vences RN

## 2018-02-13 ENCOUNTER — APPOINTMENT (OUTPATIENT)
Dept: LAB | Facility: CLINIC | Age: 53
End: 2018-02-13
Attending: PHYSICIAN ASSISTANT
Payer: COMMERCIAL

## 2018-02-13 ENCOUNTER — ONCOLOGY VISIT (OUTPATIENT)
Dept: ONCOLOGY | Facility: CLINIC | Age: 53
End: 2018-02-13
Attending: PHYSICIAN ASSISTANT
Payer: COMMERCIAL

## 2018-02-13 VITALS
WEIGHT: 126.8 LBS | BODY MASS INDEX: 23.34 KG/M2 | RESPIRATION RATE: 16 BRPM | HEART RATE: 88 BPM | DIASTOLIC BLOOD PRESSURE: 62 MMHG | HEIGHT: 62 IN | TEMPERATURE: 98.1 F | OXYGEN SATURATION: 95 % | SYSTOLIC BLOOD PRESSURE: 106 MMHG

## 2018-02-13 DIAGNOSIS — G44.209 TENSION HEADACHE: ICD-10-CM

## 2018-02-13 DIAGNOSIS — C64.9 METASTATIC RENAL CELL CARCINOMA, UNSPECIFIED LATERALITY (H): Primary | ICD-10-CM

## 2018-02-13 DIAGNOSIS — C64.1 MALIGNANT NEOPLASM OF RIGHT KIDNEY (H): Primary | ICD-10-CM

## 2018-02-13 LAB
ALBUMIN SERPL-MCNC: 3.8 G/DL (ref 3.4–5)
ALP SERPL-CCNC: 86 U/L (ref 40–150)
ALT SERPL W P-5'-P-CCNC: 17 U/L (ref 0–50)
ANION GAP SERPL CALCULATED.3IONS-SCNC: 7 MMOL/L (ref 3–14)
AST SERPL W P-5'-P-CCNC: 24 U/L (ref 0–45)
BASOPHILS # BLD AUTO: 0 10E9/L (ref 0–0.2)
BASOPHILS NFR BLD AUTO: 0.6 %
BILIRUB SERPL-MCNC: 0.3 MG/DL (ref 0.2–1.3)
BUN SERPL-MCNC: 19 MG/DL (ref 7–30)
CALCIUM SERPL-MCNC: 9.6 MG/DL (ref 8.5–10.1)
CHLORIDE SERPL-SCNC: 101 MMOL/L (ref 94–109)
CO2 SERPL-SCNC: 27 MMOL/L (ref 20–32)
CREAT SERPL-MCNC: 1.05 MG/DL (ref 0.52–1.04)
DIFFERENTIAL METHOD BLD: NORMAL
EOSINOPHIL # BLD AUTO: 0.2 10E9/L (ref 0–0.7)
EOSINOPHIL NFR BLD AUTO: 4 %
ERYTHROCYTE [DISTWIDTH] IN BLOOD BY AUTOMATED COUNT: 13.2 % (ref 10–15)
GFR SERPL CREATININE-BSD FRML MDRD: 55 ML/MIN/1.7M2
GLUCOSE SERPL-MCNC: 124 MG/DL (ref 70–99)
HCT VFR BLD AUTO: 37.9 % (ref 35–47)
HGB BLD-MCNC: 12.4 G/DL (ref 11.7–15.7)
IMM GRANULOCYTES # BLD: 0 10E9/L (ref 0–0.4)
IMM GRANULOCYTES NFR BLD: 0.4 %
LYMPHOCYTES # BLD AUTO: 1.8 10E9/L (ref 0.8–5.3)
LYMPHOCYTES NFR BLD AUTO: 35.4 %
MCH RBC QN AUTO: 29.9 PG (ref 26.5–33)
MCHC RBC AUTO-ENTMCNC: 32.7 G/DL (ref 31.5–36.5)
MCV RBC AUTO: 91 FL (ref 78–100)
MONOCYTES # BLD AUTO: 0.3 10E9/L (ref 0–1.3)
MONOCYTES NFR BLD AUTO: 6.5 %
NEUTROPHILS # BLD AUTO: 2.7 10E9/L (ref 1.6–8.3)
NEUTROPHILS NFR BLD AUTO: 53.1 %
NRBC # BLD AUTO: 0 10*3/UL
NRBC BLD AUTO-RTO: 0 /100
PLATELET # BLD AUTO: 384 10E9/L (ref 150–450)
POTASSIUM SERPL-SCNC: 4 MMOL/L (ref 3.4–5.3)
PROT SERPL-MCNC: 8 G/DL (ref 6.8–8.8)
RBC # BLD AUTO: 4.15 10E12/L (ref 3.8–5.2)
SODIUM SERPL-SCNC: 136 MMOL/L (ref 133–144)
WBC # BLD AUTO: 5.1 10E9/L (ref 4–11)

## 2018-02-13 PROCEDURE — 85025 COMPLETE CBC W/AUTO DIFF WBC: CPT | Performed by: PHYSICIAN ASSISTANT

## 2018-02-13 PROCEDURE — 96413 CHEMO IV INFUSION 1 HR: CPT

## 2018-02-13 PROCEDURE — 25000128 H RX IP 250 OP 636: Mod: ZF | Performed by: PHYSICIAN ASSISTANT

## 2018-02-13 PROCEDURE — 80053 COMPREHEN METABOLIC PANEL: CPT | Performed by: PHYSICIAN ASSISTANT

## 2018-02-13 PROCEDURE — G0463 HOSPITAL OUTPT CLINIC VISIT: HCPCS | Mod: ZF

## 2018-02-13 PROCEDURE — 99214 OFFICE O/P EST MOD 30 MIN: CPT | Mod: ZP | Performed by: PHYSICIAN ASSISTANT

## 2018-02-13 RX ORDER — CLONAZEPAM 0.5 MG/1
TABLET ORAL
Qty: 20 TABLET | Refills: 0 | Status: SHIPPED | OUTPATIENT
Start: 2018-02-13 | End: 2018-01-01

## 2018-02-13 RX ORDER — HEPARIN SODIUM (PORCINE) LOCK FLUSH IV SOLN 100 UNIT/ML 100 UNIT/ML
5 SOLUTION INTRAVENOUS ONCE
Status: COMPLETED | OUTPATIENT
Start: 2018-02-13 | End: 2018-02-13

## 2018-02-13 RX ORDER — HEPARIN SODIUM (PORCINE) LOCK FLUSH IV SOLN 100 UNIT/ML 100 UNIT/ML
5 SOLUTION INTRAVENOUS EVERY 8 HOURS
Status: DISCONTINUED | OUTPATIENT
Start: 2018-02-13 | End: 2018-02-13 | Stop reason: HOSPADM

## 2018-02-13 RX ADMIN — SODIUM CHLORIDE 240 MG: 900 INJECTION, SOLUTION INTRAVENOUS at 14:16

## 2018-02-13 RX ADMIN — SODIUM CHLORIDE, PRESERVATIVE FREE 5 ML: 5 INJECTION INTRAVENOUS at 12:41

## 2018-02-13 RX ADMIN — SODIUM CHLORIDE, PRESERVATIVE FREE 5 ML: 5 INJECTION INTRAVENOUS at 15:14

## 2018-02-13 ASSESSMENT — PAIN SCALES - GENERAL: PAINLEVEL: NO PAIN (1)

## 2018-02-13 NOTE — MR AVS SNAPSHOT
After Visit Summary   2/13/2018    Szui Gonsales    MRN: [id number]           Patient Information     Date Of Birth          1965        Visit Information        Provider Department      2/13/2018 1:30 PM  22 ATC;  ONCOLOGY INFUSION [facility]        Today's Diagnoses     Malignant neoplasm of right kidney (H)    -  1      Care Instructions    Contact Numbers    [facility] Main Line: [phone]  [facility] Triage:  [phone]    Call triage with chills and/or temperature greater than or equal to 100.5, uncontrolled nausea/vomiting, diarrhea, constipation, dizziness, shortness of breath, chest pain, bleeding, unexplained bruising, or any new/concerning symptoms, questions/concerns.     If you are having any concerning symptoms or wish to speak to a provider before your next infusion visit, please call your care coordinator or triage to notify them so we can adequately serve you.       After Hours: [phone]    If after hours, weekends, or holidays, call main hospital  and ask for Oncology doctor on call.           February 2018 Sunday Monday Tuesday Wednesday Thursday Friday Saturday                       1     2     3       4     5     6     7     8     9     10       11     12     13     [facility] D[facility]   12:00 PM   (15 min.)   [facility]   [facility] Lab Draw     [facility] RETURN   12:15 PM   (50 min.)   Day Ren PA-C   [facility] ONC INFUSION 120    1:30 PM   (120 min.)    ONCOLOGY INFUSION   [facility] 14     MR BRAIN WWO    3:45 PM   (60 min.)   UUMR1   [facility] [place], [facility] 15     16     17       18     19     20     21     22     23     24       25     26     27     CT CHEST/ABDOMEN/PELVIS W   12:25 PM   (20 min.)   UCCT2   [facility] CT 28     [facility] DRAW   12:00 PM   (15 min.)    [facility] DRAW   [facility] Lab Draw     [facility] RETURN   12:15 PM   (30 min.)   Norma  Henri Gaston MD   [facility]                           March 2018 Sunday Monday Tuesday Wednesday Thursday Friday Saturday                       1     2     3       4     5     6     7     8     9     10       11     12     13     14     15     16     17       18     19     20     21     22     23     24       25     26     27     28     29     30     31                  Lab Results:  Recent Results (from the past 12 hour(s))   Comprehensive metabolic panel    Collection Time: 02/13/18 12:47 PM   Result Value Ref Range    Sodium 136 133 - 144 mmol/L    Potassium 4.0 3.4 - 5.3 mmol/L    Chloride 101 94 - 109 mmol/L    Carbon Dioxide 27 20 - 32 mmol/L    Anion Gap 7 3 - 14 mmol/L    Glucose 124 (H) 70 - 99 mg/dL    Urea Nitrogen 19 7 - 30 mg/dL    Creatinine 1.05 (H) 0.52 - 1.04 mg/dL    GFR Estimate 55 (L) >60 mL/min/1.7m2    GFR Estimate If Black 66 >60 mL/min/1.7m2    Calcium 9.6 8.5 - 10.1 mg/dL    Bilirubin Total 0.3 0.2 - 1.3 mg/dL    Albumin 3.8 3.4 - 5.0 g/dL    Protein Total 8.0 6.8 - 8.8 g/dL    Alkaline Phosphatase 86 40 - 150 U/L    ALT 17 0 - 50 U/L    AST 24 0 - 45 U/L   CBC with platelets differential    Collection Time: 02/13/18 12:47 PM   Result Value Ref Range    WBC 5.1 4.0 - 11.0 10e9/L    RBC Count 4.15 3.8 - 5.2 10e12/L    Hemoglobin 12.4 11.7 - 15.7 g/dL    Hematocrit 37.9 35.0 - 47.0 %    MCV 91 78 - 100 fl    MCH 29.9 26.5 - 33.0 pg    MCHC 32.7 31.5 - 36.5 g/dL    RDW 13.2 10.0 - 15.0 %    Platelet Count 384 150 - 450 10e9/L    Diff Method Automated Method     % Neutrophils 53.1 %    % Lymphocytes 35.4 %    % Monocytes 6.5 %    % Eosinophils 4.0 %    % Basophils 0.6 %    % Immature Granulocytes 0.4 %    Nucleated RBCs 0 0 /100    Absolute Neutrophil 2.7 1.6 - 8.3 10e9/L    Absolute Lymphocytes 1.8 0.8 - 5.3 10e9/L    Absolute Monocytes 0.3 0.0 - 1.3 10e9/L    Absolute Eosinophils 0.2 0.0 - 0.7 10e9/L    Absolute Basophils 0.0 0.0 - 0.2 10e9/L    Abs Immature  Granulocytes 0.0 0 - 0.4 10e9/L    Absolute Nucleated RBC 0.0                Follow-ups after your visit        Your next 10 appointments already scheduled     Feb 14, 2018  4:00 PM CST   (Arrive by 3:45 PM)   MR BRAIN W/O & W CONTRAST with UUMR1   [facility], [place], MRI ([facility])    [address] [id number] [phone]           Take your medicines as usual, unless your doctor tells you not to. Bring a list of your current medicines to your exam (including vitamins, minerals and over-the-counter drugs).  You may or may not receive intravenous (IV) contrast for this exam pending the discretion of the Radiologist.  You do not need to do anything special to prepare.  The MRI machine uses a strong magnet. Please wear clothes without metal (snaps, zippers). A sweatsuit works well, or we may give you a hospital gown.  Please remove any body piercings and hair extensions before you arrive. You will also remove watches, jewelry, hairpins, wallets, dentures, partial dental plates and hearing aids. You may wear contact lenses, and you may be able to wear your rings. We have a safe place to keep your personal items, but it is safer to leave them at home.  **IMPORTANT** THE INSTRUCTIONS BELOW ARE ONLY FOR THOSE PATIENTS WHO HAVE BEEN PRESCRIBED SEDATION OR GENERAL ANESTHESIA DURING THEIR MRI PROCEDURE:  IF YOUR DOCTOR PRESCRIBED ORAL SEDATION (take medicine to help you relax during your exam):   You must get the medicine from your doctor (oral medication) before you arrive. Bring the medicine to the exam. Do not take it at home. You ll be told when to take it upon arriving for your exam.   Arrive one hour early. Bring someone who can take you home after the test. Your medicine will make you sleepy. After the exam, you may not drive, take a bus or take a taxi by yourself.  IF YOUR DOCTOR PRESCRIBED IV SEDATION:   Arrive one hour early. Bring someone  who can take you home after the test. Your medicine will make you sleepy. After the exam, you may not drive, take a bus or take a taxi by yourself.   No eating 6 hours before your exam. You may have clear liquids up until 4 hours before your exam. (Clear liquids include water, clear tea, black coffee and fruit juice without pulp.)  IF YOUR DOCTOR PRESCRIBED ANESTHESIA (be asleep for your exam):   Arrive 1 1/2 hours early. Bring someone who can take you home after the test. You may not drive, take a bus or take a taxi by yourself.   No eating 8 hours before your exam. You may have clear liquids up until 4 hours before your exam. (Clear liquids include water, clear tea, black coffee and fruit juice without pulp.)   You will spend four to five hours in the recovery room.  Please call the Imaging Department at your exam site with any questions.            Feb 27, 2018 12:40 PM CST   (Arrive by 12:25 PM)   CT CHEST/ABDOMEN/PELVIS W CONTRAST with UCCT2   [facility] ([facility])    [address] [place] [id number] [phone]           Please bring any scans or X-rays taken at other hospitals, if similar tests were done. Also bring a list of your medicines, including vitamins, minerals and over-the-counter drugs. It is safest to leave personal items at home.  Be sure to tell your doctor:   If you have any allergies.   If there s any chance you are pregnant.   If you are breastfeeding.  You may have contrast for this exam. To prepare:   Do not eat or drink for 2 hours before your exam. If you need to take medicine, you may take it with small sips of water. (We may ask you to take liquid medicine as well.)   The day before your exam, drink extra fluids at least six 8-ounce glasses (unless your doctor tells you to restrict your fluids).   You will be given instructions on how to drink the contrast.  Patients over 70 or patients with diabetes or kidney  problems:   If you haven t had a blood test (creatinine test) within the last 30 days, the Cardiologist/Radiologist may require you to get this test prior to your exam.  If you have diabetes:   Continue to take your metformin medication on the day of your exam  Please wear loose clothing, such as a sweat suit or jogging clothes. Avoid snaps, zippers and other metal. We may ask you to undress and put on a hospital gown.  If you have any questions, please call the Imaging Department where you will have your exam.            Feb 28, 2018 12:00 PM CST   [facility] Draw with  [facility] DRAW   [facility] Lab Draw ([facility])    [address] [place] [address]  [address]  [facility] [place] [id number] [phone]            Feb 28, 2018 12:30 PM CST   (Arrive by 12:15 PM)   Return Visit with Henri Green MD   [facility] ([facility])    [address] [place] [address]  [address]  [facility] [place] [id number] [phone]              Future tests that were ordered for you today     Open Future Orders        Priority Expected Expires Ordered    MRI Brain w & w/o contrast Routine  5/14/2018 2/13/2018            Who to contact     If you have questions or need follow up information about today's clinic visit or your schedule please contact [facility] directly at [phone].  Normal or non-critical lab and imaging results will be communicated to you by MyChart, letter or phone within 4 business days after the clinic has received the results. If you do not hear from us within 7 days, please contact the clinic through MyChart or phone. If you have a critical or abnormal lab result, we will notify you by phone as soon as possible.  Submit refill requests through [facility] or call your pharmacy and they will forward the refill request to us. Please allow 3 business days for your refill to be completed.          Additional Information About Your  Visit        [facility] Information     [facility] gives you secure access to your electronic health record. If you see a primary care provider, you can also send messages to your care team and make appointments. If you have questions, please call your primary care clinic.  If you do not have a primary care provider, please call [phone] and they will assist you.        Care EveryWhere ID     This is your Care EveryWhere ID. This could be used by other organizations to access your [facility] medical records  [id number]        Your Vitals Were     Last Period                   12/20/2013            Blood Pressure from Last 3 Encounters:   02/13/18 106/62   01/31/18 93/62   01/17/18 100/54    Weight from Last 3 Encounters:   02/13/18 57.5 kg (126 lb 12.8 oz)   01/31/18 57.1 kg (125 lb 12.8 oz)   01/17/18 57.2 kg (126 lb)              We Performed the Following     CBC with platelets differential     Comprehensive metabolic panel        Primary Care Provider Office Phone # Fax #    Molly ARIEL Platt [facility] [phone] [address] [address]        Equal Access to Services     [facility] : Hadii aad ku hadasho Sovarunali, waaxda luqadaha, qaybta kaalmada adeegyada, josemanuel oswald . So [facility] [phone].    ATENCIÓN: Si habla español, tiene a chacon disposición servicios gratuitos de asistencia lingüística. [facility] [phone].    We comply with applicable federal civil rights laws and [place] laws. We do not discriminate on the basis of race, color, national origin, age, disability, sex, sexual orientation, or gender identity.            Thank you!     Thank you for choosing [facility]  for your care. Our goal is always to provide you with excellent care. Hearing back from our patients is one way we can continue to improve our services. Please take a few minutes to complete the written survey that you may receive in the mail after  your visit with us. Thank you!             Your Updated Medication List - Protect others around you: Learn how to safely use, store and throw away your medicines at www.disposemymeds.org.          This list is accurate as of 2/13/18  3:37 PM.  Always use your most recent med list.                   Brand Name Dispense Instructions for use Diagnosis    acetaminophen 325 MG tablet    TYLENOL    100 tablet    Take 2 tablets (650 mg) by mouth every 4 hours as needed for mild pain (mild pain)    Malignant neoplasm of right kidney (H)       acetaminophen-codeine 300-30 MG per tablet    TYLENOL #3    45 tablet    Take 1 tablet by mouth every 6 hours as needed for moderate pain    Metastatic renal cell carcinoma to lung, right (H)       ALPRAZolam 0.5 MG tablet    XANAX    60 tablet    Take 1 tablet (0.5 mg) by mouth 3 times daily as needed for anxiety    Renal cell carcinoma, unspecified laterality (H), Mass of upper lobe of left lung       benzonatate 100 MG capsule    TESSALON    60 capsule    Take 1 capsule (100 mg) by mouth 3 times daily as needed for cough    Metastatic renal cell carcinoma to lung, right (H)       buPROPion 300 MG 24 hr tablet    WELLBUTRIN XL     Take 300 mg by mouth every morning        citalopram 20 MG tablet    celeXA    90 tablet    Take 1 tablet (20 mg) by mouth every evening    Insomnia, unspecified type       clonazePAM 0.5 MG tablet    klonoPIN    20 tablet    TAKE 0.5-1 TABLETS BY MOUTH 2 TIMES DAILY AS NEEDED FOR ANXIETY    Panic attack       IBUPROFEN      Take 400 mg by mouth every 6 hours as needed        lidocaine-prilocaine cream    EMLA    60 g    Apply 1 hour prior to port access.    Malignant neoplasm of right kidney (H)       LORazepam 0.5 MG tablet    ATIVAN    30 tablet    Take 1 tablet (0.5 mg) by mouth every 4 hours as needed (Anxiety, Nausea/Vomiting or Sleep)    Malignant neoplasm of right kidney (H)       prochlorperazine 10 MG tablet    COMPAZINE    30 tablet    Take 1  tablet (10 mg) by mouth every 6 hours as needed (Nausea/Vomiting)    Malignant neoplasm of right kidney (H)       zolpidem 5 MG tablet    AMBIEN    30 tablet    Take 1 tablet (5 mg) by mouth nightly as needed for sleep    Sleep disorder

## 2018-02-13 NOTE — MR AVS SNAPSHOT
After Visit Summary   2/13/2018    Suzi Gonsales    MRN: [id number]           Patient Information     Date Of Birth          1965        Visit Information        Provider Department      2/13/2018 12:30 PM Day Ren PA-C [facility]        Today's Diagnoses     Metastatic renal cell carcinoma, unspecified laterality (H)    -  1    Tension headache           Follow-ups after your visit        Your next 10 appointments already scheduled     Feb 14, 2018  4:00 PM CST   (Arrive by 3:45 PM)   MR BRAIN W/O & W CONTRAST with UUMR1   [facility] [place], MRI ([facility])    [address] [facility] [id number] [phone]           Take your medicines as usual, unless your doctor tells you not to. Bring a list of your current medicines to your exam (including vitamins, minerals and over-the-counter drugs).  You may or may not receive intravenous (IV) contrast for this exam pending the discretion of the Radiologist.  You do not need to do anything special to prepare.  The MRI machine uses a strong magnet. Please wear clothes without metal (snaps, zippers). A sweatsuit works well, or we may give you a hospital gown.  Please remove any body piercings and hair extensions before you arrive. You will also remove watches, jewelry, hairpins, wallets, dentures, partial dental plates and hearing aids. You may wear contact lenses, and you may be able to wear your rings. We have a safe place to keep your personal items, but it is safer to leave them at home.  **IMPORTANT** THE INSTRUCTIONS BELOW ARE ONLY FOR THOSE PATIENTS WHO HAVE BEEN PRESCRIBED SEDATION OR GENERAL ANESTHESIA DURING THEIR MRI PROCEDURE:  IF YOUR DOCTOR PRESCRIBED ORAL SEDATION (take medicine to help you relax during your exam):   You must get the medicine from your doctor (oral medication) before you arrive. Bring the medicine to the exam. Do not take it at  home. You ll be told when to take it upon arriving for your exam.   Arrive one hour early. Bring someone who can take you home after the test. Your medicine will make you sleepy. After the exam, you may not drive, take a bus or take a taxi by yourself.  IF YOUR DOCTOR PRESCRIBED IV SEDATION:   Arrive one hour early. Bring someone who can take you home after the test. Your medicine will make you sleepy. After the exam, you may not drive, take a bus or take a taxi by yourself.   No eating 6 hours before your exam. You may have clear liquids up until 4 hours before your exam. (Clear liquids include water, clear tea, black coffee and fruit juice without pulp.)  IF YOUR DOCTOR PRESCRIBED ANESTHESIA (be asleep for your exam):   Arrive 1 1/2 hours early. Bring someone who can take you home after the test. You may not drive, take a bus or take a taxi by yourself.   No eating 8 hours before your exam. You may have clear liquids up until 4 hours before your exam. (Clear liquids include water, clear tea, black coffee and fruit juice without pulp.)   You will spend four to five hours in the recovery room.  Please call the Imaging Department at your exam site with any questions.            Feb 27, 2018 12:40 PM CST   (Arrive by 12:25 PM)   CT CHEST/ABDOMEN/PELVIS W CONTRAST with UCCT2   [facility] ([facility])    [address] [address] [phone]           Please bring any scans or X-rays taken at other hospitals, if similar tests were done. Also bring a list of your medicines, including vitamins, minerals and over-the-counter drugs. It is safest to leave personal items at home.  Be sure to tell your doctor:   If you have any allergies.   If there s any chance you are pregnant.   If you are breastfeeding.  You may have contrast for this exam. To prepare:   Do not eat or drink for 2 hours before your exam. If you need to take medicine, you may take  it with small sips of water. (We may ask you to take liquid medicine as well.)   The day before your exam, drink extra fluids at least six 8-ounce glasses (unless your doctor tells you to restrict your fluids).   You will be given instructions on how to drink the contrast.  Patients over 70 or patients with diabetes or kidney problems:   If you haven t had a blood test (creatinine test) within the last 30 days, the Cardiologist/Radiologist may require you to get this test prior to your exam.  If you have diabetes:   Continue to take your metformin medication on the day of your exam  Please wear loose clothing, such as a sweat suit or jogging clothes. Avoid snaps, zippers and other metal. We may ask you to undress and put on a hospital gown.  If you have any questions, please call the Imaging Department where you will have your exam.            Feb 28, 2018 12:00 PM CST   [facility] Lab Draw with  [facility] LAB DRAW   [facility] Lab Draw ([facility])    [address] [place] [address]  [address] [place] [id number] [phone]            Feb 28, 2018 12:30 PM CST   (Arrive by 12:15 PM)   Return Visit with Henri Green MD   [facility] ([facility])    [address] [place] [address]  [address] [place] [id number] [phone]              Future tests that were ordered for you today     Open Future Orders        Priority Expected Expires Ordered    MRI Brain w & w/o contrast Routine  5/14/2018 2/13/2018            Who to contact     If you have questions or need follow up information about today's clinic visit or your schedule please contact [facility] directly at [phone].  Normal or non-critical lab and imaging results will be communicated to you by MyChart, letter or phone within 4 business days after the clinic has received the results. If you do not hear from us within 7 days, please contact the clinic through  "MyChart or phone. If you have a critical or abnormal lab result, we will notify you by phone as soon as possible.  Submit refill requests through [employer] or call your pharmacy and they will forward the refill request to us. Please allow 3 business days for your refill to be completed.          Additional Information About Your Visit        [employer] Information     [employer] gives you secure access to your electronic health record. If you see a primary care provider, you can also send messages to your care team and make appointments. If you have questions, please call your primary care clinic.  If you do not have a primary care provider, please call [phone] and they will assist you.        Care EveryWhere ID     This is your Care EveryWhere ID. This could be used by other organizations to access your [facility] medical records  [id number]        Your Vitals Were     Pulse Temperature Respirations Height Last Period Pulse Oximetry    88 98.1  F (36.7  C) (Oral) 16 1.575 m (5' 2.01\") 12/20/2013 95%    BMI (Body Mass Index)                   23.19 kg/m2            Blood Pressure from Last 3 Encounters:   02/13/18 106/62   01/31/18 93/62   01/17/18 100/54    Weight from Last 3 Encounters:   02/13/18 57.5 kg (126 lb 12.8 oz)   01/31/18 57.1 kg (125 lb 12.8 oz)   01/17/18 57.2 kg (126 lb)               Primary Care Provider Office Phone # Fax #    Molly Foote ARIEL Adame [facility] [phone] [address] [facility] [address]        Equal Access to Services     [facility]: Hadii jimmie fang hadashloki Sovanessa, waaxda luqadaha, qaybta kaalmada josemanuel tilley . So [facility] [phone].    ATENCIÓN: Si habla español, tiene a chacon disposición servicios gratuitos de asistencia lingüística. Llame al [phone].    We comply with applicable federal civil rights laws and [place] laws. We do not discriminate on the basis of race, color, national origin, age, disability, sex, " sexual orientation, or gender identity.            Thank you!     Thank you for choosing [facility]  for your care. Our goal is always to provide you with excellent care. Hearing back from our patients is one way we can continue to improve our services. Please take a few minutes to complete the written survey that you may receive in the mail after your visit with us. Thank you!             Your Updated Medication List - Protect others around you: Learn how to safely use, store and throw away your medicines at www.disposemymeds.org.          This list is accurate as of 2/13/18  8:50 PM.  Always use your most recent med list.                   Brand Name Dispense Instructions for use Diagnosis    acetaminophen 325 MG tablet    TYLENOL    100 tablet    Take 2 tablets (650 mg) by mouth every 4 hours as needed for mild pain (mild pain)    Malignant neoplasm of right kidney (H)       acetaminophen-codeine 300-30 MG per tablet    TYLENOL #3    45 tablet    Take 1 tablet by mouth every 6 hours as needed for moderate pain    Metastatic renal cell carcinoma to lung, right (H)       ALPRAZolam 0.5 MG tablet    XANAX    60 tablet    Take 1 tablet (0.5 mg) by mouth 3 times daily as needed for anxiety    Renal cell carcinoma, unspecified laterality (H), Mass of upper lobe of left lung       benzonatate 100 MG capsule    TESSALON    60 capsule    Take 1 capsule (100 mg) by mouth 3 times daily as needed for cough    Metastatic renal cell carcinoma to lung, right (H)       buPROPion 300 MG 24 hr tablet    WELLBUTRIN XL     Take 300 mg by mouth every morning        citalopram 20 MG tablet    celeXA    90 tablet    Take 1 tablet (20 mg) by mouth every evening    Insomnia, unspecified type       clonazePAM 0.5 MG tablet    klonoPIN    20 tablet    TAKE 0.5-1 TABLETS BY MOUTH 2 TIMES DAILY AS NEEDED FOR ANXIETY    Panic attack       IBUPROFEN      Take 400 mg by mouth every 6 hours as needed         lidocaine-prilocaine cream    EMLA    60 g    Apply 1 hour prior to port access.    Malignant neoplasm of right kidney (H)       LORazepam 0.5 MG tablet    ATIVAN    30 tablet    Take 1 tablet (0.5 mg) by mouth every 4 hours as needed (Anxiety, Nausea/Vomiting or Sleep)    Malignant neoplasm of right kidney (H)       prochlorperazine 10 MG tablet    COMPAZINE    30 tablet    Take 1 tablet (10 mg) by mouth every 6 hours as needed (Nausea/Vomiting)    Malignant neoplasm of right kidney (H)       zolpidem 5 MG tablet    AMBIEN    30 tablet    Take 1 tablet (5 mg) by mouth nightly as needed for sleep    Sleep disorder

## 2018-02-13 NOTE — NURSING NOTE
"Oncology Rooming Note    February 13, 2018 1:08 PM   Suzi Gonsales is a 52 year old female who presents for:    Chief Complaint   Patient presents with     Port Draw     Labs drawn from port by RN. Line flushed with saline and heparin. Vs taken and pt checked in for appt     Oncology Clinic Visit     f/u Kidney ca     Initial Vitals: /62 (BP Location: Right arm, Cuff Size: Adult Regular)  Pulse 88  Temp 98.1  F (36.7  C) (Oral)  Resp 16  Ht 1.575 m (5' 2.01\")  Wt 57.5 kg (126 lb 12.8 oz)  LMP 12/20/2013  SpO2 95%  BMI 23.19 kg/m2 Estimated body mass index is 23.19 kg/(m^2) as calculated from the following:    Height as of this encounter: 1.575 m (5' 2.01\").    Weight as of this encounter: 57.5 kg (126 lb 12.8 oz). Body surface area is 1.59 meters squared.  No Pain (0) Comment: Data Unavailable   Patient's last menstrual period was 12/20/2013.  Allergies reviewed: Yes  Medications reviewed: Yes    Medications: Medication refills not needed today.  Pharmacy name entered into [employer]    [id number] - [place], [place] - [address] AT [facility] [address]  [facility] - USE FOR MAILING ONLY - [place]    Clinical concerns: none Day was NOT notified.    10 minutes for nursing intake (face to face time)     SMITHA BASHIR LPN            "

## 2018-02-13 NOTE — PROGRESS NOTES
Infusion Nursing Note:  Suzi Gonsales presents today for cycle 3, day 15 Nivolumab.    Patient seen by provider today: Yes: TABITHA Redding   present during visit today: Not Applicable.    Intravenous Access:  Implanted Port.    Treatment Conditions:  Lab Results   Component Value Date    HGB 12.4 02/13/2018     Lab Results   Component Value Date    WBC 5.1 02/13/2018      Lab Results   Component Value Date    ANEU 2.7 02/13/2018     Lab Results   Component Value Date     02/13/2018      Lab Results   Component Value Date     02/13/2018                   Lab Results   Component Value Date    POTASSIUM 4.0 02/13/2018       Lab Results   Component Value Date    CR 1.05 02/13/2018                   Lab Results   Component Value Date    MUNDO 9.6 02/13/2018                Lab Results   Component Value Date    BILITOTAL 0.3 02/13/2018           Lab Results   Component Value Date    ALBUMIN 3.8 02/13/2018                    Lab Results   Component Value Date    ALT 17 02/13/2018           Lab Results   Component Value Date    AST 24 02/13/2018       Results reviewed, labs MET treatment parameters, ok to proceed with treatment.    Post Infusion Assessment:  Patient tolerated infusion without incident.  Blood return noted pre and post infusion.  Site patent and intact, free from redness, edema or discomfort.  No evidence of extravasations.  Access discontinued per protocol.    Discharge Plan:   Patient declined prescription refills.  Discharge instructions reviewed with: Patient.  Patient and/or family verbalized understanding of discharge instructions and all questions answered.  Copy of AVS reviewed with patient and/or family.  Patient will return 2/28/2018 for next appointment.  Patient discharged in stable condition accompanied by: self.  Departure Mode: Ambulatory.    Ced Gould RN

## 2018-02-14 ENCOUNTER — HOSPITAL ENCOUNTER (OUTPATIENT)
Dept: MRI IMAGING | Facility: CLINIC | Age: 53
Discharge: HOME | End: 2018-02-14
Attending: PHYSICIAN ASSISTANT | Admitting: PHYSICIAN ASSISTANT
Payer: COMMERCIAL

## 2018-02-14 DIAGNOSIS — G44.209 TENSION HEADACHE: ICD-10-CM

## 2018-02-14 DIAGNOSIS — C64.9 METASTATIC RENAL CELL CARCINOMA, UNSPECIFIED LATERALITY (H): ICD-10-CM

## 2018-02-14 LAB — RADIOLOGIST FLAGS: ABNORMAL

## 2018-02-14 PROCEDURE — A9585 GADOBUTROL INJECTION: HCPCS | Performed by: RADIOLOGY

## 2018-02-14 PROCEDURE — 25000128 H RX IP 250 OP 636: Performed by: RADIOLOGY

## 2018-02-14 PROCEDURE — 70553 MRI BRAIN STEM W/O & W/DYE: CPT

## 2018-02-14 RX ORDER — GADOBUTROL 604.72 MG/ML
7.5 INJECTION INTRAVENOUS ONCE
Status: COMPLETED | OUTPATIENT
Start: 2018-02-14 | End: 2018-02-14

## 2018-02-14 RX ADMIN — GADOBUTROL 7.5 ML: 604.72 INJECTION INTRAVENOUS at 16:05

## 2018-02-14 NOTE — PROGRESS NOTES
"[facility]  FOLLOW-UP VISIT NOTE  Date of visit: Feb 13, 2018       REASON FOR VISIT: [facility] here for assessment currently on Opdivo    HPI: Suzi presented to ED with hematuria and right flank pain thinking she had a renal stone in December 2014. She was worked up with a CT abd/pelvis which suggested a renal mass. She was referred to Dr. Max Zelaya in [facility]. 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 Per charts tumor resection had negative margins and it was staged at pT2bN0.    Her staging work up was negative except for pulmonary nodules. She has been followed every 6 months with scans. CT CAP on 5/11/17 showed enlarging hilar LAD, enlarging pulmonary nodules, adrenal nodules and a pancreatic body mass. Biopsies of hilar LN, adrenal nodule and pancreatic mass were + for RCC. She decided to pursue IL-2, of which she received 4 cycles, last on 8/15/17. CT CAP on 9/13/17 showed generally stable disease. CT CAP on 11/22/17 showed disease progression.  She is here today for nivolumab cycle 2 day 15.    INTERVAL HISTORY: Suzi is doing well today.  She has constant fatigue, however no changes recently.  She has had a persistent cough for the last 6 months and Tessalon Perles seems to help her symptoms. She admits to some mild dyspnea on exertion, however not short of breath at rest. She denies fever chills, sputum, hemoptysis or lower extremity edema.  Appetite has been lower but she is pushing 3 meals a day and 64 ounces of fluids per day.    She denies any pain.  She has occasional nausea, but not requiring any medications and resolves on its own.  Denies vomiting, diarrhea or constipation.  Denies bleeding.  She admits to having daily headaches the last 2-3 weeks.  She had one occasion where she had a right sided HA that was severe, but daily she is having  \"tension\" like frontal headache.  She " was taking Ibuprofen 3-4 tabs 2-3 times a day, but felt she was taking too much, so has cut back on this.  The ibuprofen helps but the HA seem daily.  No other sinusitis symptoms.   Denies rashes, mouth sores, peripheral neuropathy.  Anxiety depression have been well controlled on Klonopin and Wellbutrin.  She has no other concerns or questions at this time.      Current Outpatient Prescriptions   Medication Sig Dispense Refill     zolpidem (AMBIEN) 5 MG tablet Take 1 tablet (5 mg) by mouth nightly as needed for sleep 30 tablet 1     ALPRAZolam (XANAX) 0.5 MG tablet Take 1 tablet (0.5 mg) by mouth 3 times daily as needed for anxiety 60 tablet 0     lidocaine-prilocaine (EMLA) cream Apply 1 hour prior to port access. 60 g 1     LORazepam (ATIVAN) 0.5 MG tablet Take 1 tablet (0.5 mg) by mouth every 4 hours as needed (Anxiety, Nausea/Vomiting or Sleep) 30 tablet 2     benzonatate (TESSALON) 100 MG capsule Take 1 capsule (100 mg) by mouth 3 times daily as needed for cough 60 capsule 0     acetaminophen-codeine (TYLENOL #3) 300-30 MG per tablet Take 1 tablet by mouth every 6 hours as needed for moderate pain 45 tablet 0     citalopram (CELEXA) 20 MG tablet Take 1 tablet (20 mg) by mouth every evening 90 tablet 3     buPROPion (WELLBUTRIN XL) 300 MG 24 hr tablet Take 300 mg by mouth every morning        IBUPROFEN Take 400 mg by mouth every 6 hours as needed        clonazePAM (KLONOPIN) 0.5 MG tablet TAKE 0.5-1 TABLETS BY MOUTH 2 TIMES DAILY AS NEEDED FOR ANXIETY 20 tablet 0     prochlorperazine (COMPAZINE) 10 MG tablet Take 1 tablet (10 mg) by mouth every 6 hours as needed (Nausea/Vomiting) (Patient not taking: Reported on 2/13/2018) 30 tablet 2     acetaminophen (TYLENOL) 325 MG tablet Take 2 tablets (650 mg) by mouth every 4 hours as needed for mild pain (mild pain) (Patient not taking: Reported on 2/13/2018) 100 tablet 0     EXAM:    /62 (BP Location: Right arm, Cuff Size: Adult Regular)  Pulse 88  Temp 98.1  F  "(36.7  C) (Oral)  Resp 16  Ht 1.575 m (5' 2.01\")  Wt 57.5 kg (126 lb 12.8 oz)  LMP 12/20/2013  SpO2 95%  BMI 23.19 kg/m2    Wt Readings from Last 4 Encounters:   02/13/18 57.5 kg (126 lb 12.8 oz)   01/31/18 57.1 kg (125 lb 12.8 oz)   01/17/18 57.2 kg (126 lb)   01/04/18 57.5 kg (126 lb 11.2 oz)       Vital signs were reviewed.   Patient alert and oriented.   PERRLA. EOMI. No scleral icterus noted. OP without thrush/sores.  Neck exam: No palpable cervical nodes bilaterally. Left Supraclav 1 cm mass palpable  Heart: RRR no murmurs noted.   Lungs: clear to auscultation bilaterally.  No crackles or wheezing.   Abd: positive bowel sounds in all four quadrants.  No tenderness to palpation.  No hepatomegaly.   Extremities: No lower extremity edema.   Neuro: II-XII CN intact. Strength 5/5 upper and lower extremities   Mood and affect is stable.   Skin has no rashes or lesions on exposed areas. Port is accessed in right chest.     LABS:      2/13/2018 12:47   Sodium 136   Potassium 4.0   Chloride 101   Carbon Dioxide 27   Urea Nitrogen 19   Creatinine 1.05 (H)   GFR Estimate 55 (L)   GFR Estimate If Black 66   Calcium 9.6   Anion Gap 7   Albumin 3.8   Protein Total 8.0   Bilirubin Total 0.3   Alkaline Phosphatase 86   ALT 17   AST 24   Glucose 124 (H)   WBC 5.1   Hemoglobin 12.4   Hematocrit 37.9   Platelet Count 384   RBC Count 4.15   MCV 91     ASSESSMENT/PLAN: 52 year old with mRCC s/p 4 cycles of IL-2.  She had a stable CT after 2 cycles, mild disease progression after 4 cycles.  After a short break, CT showed worsening disease and she has started with Opdivo.     1. RCC: Suzi started nivolumab on 12/5/17.  She is tolerating with no s/s of immunotoxicity. Proceed with Opdivo, CT scan end of Feb and to see Dr Green.    2. Cough: Persistent for 6 months, she feels its better.  Likely from left sided lung mass.  Vital signs stable with sats of 97% room air.  Continue Tessalon Perles at home.    3. Depression and " anxiety: Currently on Celexa, Wellbutrin and benzodiazepine and these medications are prescribed by her primary clinic.   assisting as well. Pollo has been traveling and has a adult version of make a wish trip with her family coming up in March as well to [place].    4. FEN: Creatinine 1.02 within normal limits today.  Has been fluctuating, however no concern at this time.  We will continue to monitor.    5. Headaches: no neuro symptoms, but she typically doesn't have HA and has been having daily frontal HA for 2-3 weeks now with one episode of a severe R sided HA.  Does have a stiff neck and getting some chiropractor adjustments.  Will get a brain MRI to rule out RCC.     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  CAlled Pollo and started her on DEx.  Discussed with Dr Palencia and Pollo, she would like to pursue GK.  RElayed messaged to GK team.  LGR

## 2018-02-15 ENCOUNTER — PRE VISIT (OUTPATIENT)
Dept: RADIATION ONCOLOGY | Facility: CLINIC | Age: 53
End: 2018-02-15

## 2018-02-15 DIAGNOSIS — C79.31 METASTASIS TO BRAIN (H): Primary | ICD-10-CM

## 2018-02-15 DIAGNOSIS — C79.31 BRAIN METASTASIS: Primary | ICD-10-CM

## 2018-02-15 RX ORDER — DEXAMETHASONE 4 MG/1
TABLET ORAL
Qty: 20 TABLET | Refills: 0 | Status: ON HOLD | OUTPATIENT
Start: 2018-02-15 | End: 2018-03-08

## 2018-02-15 NOTE — TELEPHONE ENCOUNTER
"Date: 2/15/2018   Age: 52 year old  Ethnicity:     Sex: female  : 1965   Lives In: [place], [place]      Diagnosis: Renal Cancer    Prior radiation therapy:   Site Treated: at  Facility: at  Dates: at  Dose: at    Site Treated: at  Facility: ar  Dates: at  Dose: at    Prior chemotherapy:   See Below      Pain at time of consult, management plan if yes:  Does pt have a living will:  Is Pt Pregnant:  Does Pt have any implanted cardiac devices:    Doctors to \"cc\":  Dr. Green,   RN time with patient:     Pt was educated on Gamma Knife Procedure         Review Since Diagnosis:    2014; to ED with hematuria and right flank pain, CT suggested renal mass,     14; right open radical nephrectomy, showed clear cell renal cell cancer, grade 3 of 4, negative margins     Staging workup showed pulmonary nodules     Followed with scans    17; CT, enlarging hilar lymph, enlarging pulmonary nodules, adrenal nodules and pancreatic mass, biopsies of three sites showed renal cell cancer     8/15/17; completed 4 cycles of IL2    17; CT stable disease     17; CT Chest/Abd/Pelvis, left upper lobe mass increased, left hilar and supraclavicular lymph nodes enlarged, left adrenal gland increased, increase size left renal lesions, increase size right iliac chain lymph node and increase pancreatic met    17; Started Opdivo     18; pt saw Kia Ren, pt stated daily headaches for about 2-3 weeks -ordered Brain MRI    18; Brain MRI, mass 2.1 x 1.7 x 1.7 cm left cerebellum    Kia Ren discussed with Dr. Palencia, stated could resect quite easily but pt prefers Gamma Knife    Chief Complaint: at consult               "

## 2018-02-16 DIAGNOSIS — C64.9 METASTATIC RENAL CELL CARCINOMA TO LUNG, RIGHT (H): ICD-10-CM

## 2018-02-16 DIAGNOSIS — Z86.73 HISTORY OF STROKE: Primary | ICD-10-CM

## 2018-02-16 DIAGNOSIS — C78.01 METASTATIC RENAL CELL CARCINOMA TO LUNG, RIGHT (H): ICD-10-CM

## 2018-02-16 RX ORDER — ATORVASTATIN CALCIUM 20 MG/1
20 TABLET, FILM COATED ORAL DAILY
Qty: 30 TABLET | Refills: 3 | Status: SHIPPED | OUTPATIENT
Start: 2018-02-16 | End: 2018-01-01

## 2018-02-19 ENCOUNTER — OFFICE VISIT (OUTPATIENT)
Dept: RADIATION ONCOLOGY | Facility: CLINIC | Age: 53
End: 2018-02-19
Attending: RADIOLOGY
Payer: COMMERCIAL

## 2018-02-19 VITALS
DIASTOLIC BLOOD PRESSURE: 47 MMHG | HEIGHT: 62 IN | RESPIRATION RATE: 16 BRPM | OXYGEN SATURATION: 97 % | BODY MASS INDEX: 22.82 KG/M2 | HEART RATE: 58 BPM | WEIGHT: 124 LBS | SYSTOLIC BLOOD PRESSURE: 103 MMHG

## 2018-02-19 DIAGNOSIS — G93.6 CEREBRAL EDEMA (H): Primary | ICD-10-CM

## 2018-02-19 PROCEDURE — 77470 SPECIAL RADIATION TREATMENT: CPT | Performed by: RADIOLOGY

## 2018-02-19 PROCEDURE — G0463 HOSPITAL OUTPT CLINIC VISIT: HCPCS | Mod: 25 | Performed by: RADIOLOGY

## 2018-02-19 RX ORDER — OMEPRAZOLE 40 MG/1
40 CAPSULE, DELAYED RELEASE ORAL DAILY
Qty: 30 CAPSULE | Refills: 1 | Status: SHIPPED | OUTPATIENT
Start: 2018-02-19 | End: 2018-01-01

## 2018-02-19 RX ORDER — HYDROCODONE BITARTRATE AND ACETAMINOPHEN 5; 325 MG/1; MG/1
1 TABLET ORAL EVERY 6 HOURS PRN
Qty: 18 TABLET | Refills: 0 | Status: SHIPPED | OUTPATIENT
Start: 2018-02-19 | End: 2018-03-02

## 2018-02-19 ASSESSMENT — ENCOUNTER SYMPTOMS
FEVER: 0
DEPRESSION: 1
BLOOD IN STOOL: 0
BLURRED VISION: 0
COUGH: 1
DOUBLE VISION: 0
NAUSEA: 1
VOMITING: 0
SHORTNESS OF BREATH: 1
MUSCULOSKELETAL NEGATIVE: 1
CONSTIPATION: 0
SEIZURES: 0
PHOTOPHOBIA: 0
DIARRHEA: 0
WEIGHT LOSS: 0

## 2018-02-19 NOTE — MR AVS SNAPSHOT
After Visit Summary   2/19/2018    Suzi Gonsales    MRN: [id number]           Patient Information     Date Of Birth          1965        Visit Information        Provider Department      2/19/2018 11:30 AM Huyen Cuellar MD [facility], [place], Radiation Oncology        Today's Diagnoses     Cerebral edema (H)    -  1       Follow-ups after your visit        Your next 10 appointments already scheduled     Feb 26, 2018  2:00 PM CST   (Arrive by 1:45 PM)   Return Visit with Quintin Palencia MD   [facility] ([facility])    [address]  [address]  [place] [address]   [phone]            Feb 27, 2018  5:30 AM CST   GAMMA TREATMENT with Huyen Cuellar MD, Quintin Palencia MD   [facility] [place], Radiation Oncology ([facility])    [address] [address]   [phone]            Feb 27, 2018  7:00 AM CST   (Arrive by 6:45 AM)   MR BRAIN W CONTRAST with UUMR1   [facility] [place], MRI ([facility] [place])    [address] [address]   [phone]           Take your medicines as usual, unless your doctor tells you not to. Bring a list of your current medicines to your exam (including vitamins, minerals and over-the-counter drugs).  You may or may not receive intravenous (IV) contrast for this exam pending the discretion of the Radiologist.  You do not need to do anything special to prepare.  The MRI machine uses a strong magnet. Please wear clothes without metal (snaps, zippers). A sweatsuit works well, or we may give you a hospital gown.  Please remove any body piercings and hair extensions before you arrive. You will also remove watches, jewelry, hairpins, wallets, dentures, partial dental plates and hearing aids. You may wear contact lenses, and you may be able to wear your rings. We have a safe place to keep your personal items, but it is safer to  leave them at home.  **IMPORTANT** THE INSTRUCTIONS BELOW ARE ONLY FOR THOSE PATIENTS WHO HAVE BEEN PRESCRIBED SEDATION OR GENERAL ANESTHESIA DURING THEIR MRI PROCEDURE:  IF YOUR DOCTOR PRESCRIBED ORAL SEDATION (take medicine to help you relax during your exam):   You must get the medicine from your doctor (oral medication) before you arrive. Bring the medicine to the exam. Do not take it at home. You ll be told when to take it upon arriving for your exam.   Arrive one hour early. Bring someone who can take you home after the test. Your medicine will make you sleepy. After the exam, you may not drive, take a bus or take a taxi by yourself.  IF YOUR DOCTOR PRESCRIBED IV SEDATION:   Arrive one hour early. Bring someone who can take you home after the test. Your medicine will make you sleepy. After the exam, you may not drive, take a bus or take a taxi by yourself.   No eating 6 hours before your exam. You may have clear liquids up until 4 hours before your exam. (Clear liquids include water, clear tea, black coffee and fruit juice without pulp.)  IF YOUR DOCTOR PRESCRIBED ANESTHESIA (be asleep for your exam):   Arrive 1 1/2 hours early. Bring someone who can take you home after the test. You may not drive, take a bus or take a taxi by yourself.   No eating 8 hours before your exam. You may have clear liquids up until 4 hours before your exam. (Clear liquids include water, clear tea, black coffee and fruit juice without pulp.)   You will spend four to five hours in the recovery room.  Please call the Imaging Department at your exam site with any questions.            Mar 02, 2018  9:00 AM CST   (Arrive by 8:45 AM)   CT CHEST/ABDOMEN/PELVIS W CONTRAST with RSCCCT1   [facility] [place] ([facility])    [address]  [place] [address] [phone]           Please bring any scans or X-rays taken at other hospitals, if similar tests were done. Also bring a  list of your medicines, including vitamins, minerals and over-the-counter drugs. It is safest to leave personal items at home.  Be sure to tell your doctor:   If you have any allergies.   If there s any chance you are pregnant.   If you are breastfeeding.  You may have contrast for this exam. To prepare:   Do not eat or drink for 2 hours before your exam. If you need to take medicine, you may take it with small sips of water. (We may ask you to take liquid medicine as well.)   The day before your exam, drink extra fluids at least six 8-ounce glasses (unless your doctor tells you to restrict your fluids).   You will be given instructions on how to drink the contrast.  Patients over 70 or patients with diabetes or kidney problems:   If you haven t had a blood test (creatinine test) within the last 30 days, the Cardiologist/Radiologist may require you to get this test prior to your exam.  If you have diabetes:   Continue to take your metformin medication on the day of your exam  Please wear loose clothing, such as a sweat suit or jogging clothes. Avoid snaps, zippers and other metal. We may ask you to undress and put on a hospital gown.  If you have any questions, please call the Imaging Department where you will have your exam.            Mar 02, 2018  9:30 AM CST   (Arrive by 9:15 AM)   CTA ANGIOGRAM HEAD NECK with RSCCCT1   [facility] ([facility])    [address]  [place] [facility] [place] [id number] [phone]           Please bring any scans or X-rays taken at other hospitals, if similar tests were done. Also bring a list of your medicines, including vitamins, minerals and over-the-counter drugs. It is safest to leave personal items at home.  Be sure to tell your doctor:   If you have any allergies.   If there s any chance you are pregnant.   If you are breastfeeding.    If you have diabetes as your medication may need to be adjusted for this exam.  You will  have contrast for this exam. To prepare:   Do not eat or drink for 2 hours before your exam. If you need to take medicine, you may take it with small sips of water. (We may ask you to take liquid medicine as well.)   The day before your exam, drink extra fluids at least six 8-ounce glasses (unless your doctor tells you to restrict your fluids).  Patients over 70 or patients with diabetes or kidney problems:   If you haven t had a blood test (creatinine test) within the last 30 days, the Cardiologist/Radiologist may require you to get this test prior to your exam.  Please wear loose clothing, such as a sweat suit or jogging clothes. Avoid snaps, zippers and other metal. We may ask you to undress and put on a hospital gown.  If you have any questions, please call the Imaging Department where you will have your exam.            Mar 07, 2018  9:45 AM CST   [facility] Lab Draw with  [facility] LAB DRAW   [facility] ([facility])    [address] [place] [address]  [address] [place] [id number] [phone]            Mar 07, 2018 10:30 AM CST   (Arrive by 10:15 AM)   Return Visit with Henri Green MD   [facility] ([facility])    [address] [place] [address]  [address] [place] [id number] [phone]            Mar 14, 2018 12:50 PM CDT   (Arrive by 12:35 PM)   New Patient Visit with Jake Urrutia MD   [facility] ([facility])    [address] [place] [address]  3rd Floor  [facility] [id number] [phone]              Who to contact     Please call your clinic at [phone] to:    Ask questions about your health    Make or cancel appointments    Discuss your medicines    Learn about your test results    Speak to your doctor            Additional Information About Your Visit        MyChart Information     [employer] gives you secure access to your electronic health record. If you see a primary care  "provider, you can also send messages to your care team and make appointments. If you have questions, please call your primary care clinic.  If you do not have a primary care provider, please call [phone] and they will assist you.      [employer] is an electronic gateway that provides easy, online access to your medical records. With [employer], you can request a clinic appointment, read your test results, renew a prescription or communicate with your care team.     To access your existing account, please contact your [facility] or call [phone] for assistance.        Care EveryWhere ID     This is your Care EveryWhere ID. This could be used by other organizations to access your [place] medical records  [id number]        Your Vitals Were     Pulse Respirations Height Last Period Pulse Oximetry BMI (Body Mass Index)    58 16 1.575 m (5' 2\") 12/20/2013 97% 22.68 kg/m2       Blood Pressure from Last 3 Encounters:   02/19/18 103/47   02/13/18 106/62   01/31/18 93/62    Weight from Last 3 Encounters:   02/19/18 56.2 kg (124 lb)   02/13/18 57.5 kg (126 lb 12.8 oz)   01/31/18 57.1 kg (125 lb 12.8 oz)              Today, you had the following     No orders found for display         Today's Medication Changes          These changes are accurate as of 2/19/18 11:59 PM.  If you have any questions, ask your nurse or doctor.               Start taking these medicines.        Dose/Directions    HYDROcodone-acetaminophen 5-325 MG per tablet   Commonly known as:  NORCO   Used for:  Cerebral edema (H)   Started by:  Huyen Cuellar MD        Dose:  1 tablet   Take 1 tablet by mouth every 6 hours as needed for moderate to severe pain   Quantity:  18 tablet   Refills:  0       omeprazole 40 MG capsule   Commonly known as:  priLOSEC   Used for:  Cerebral edema (H)   Started by:  Huyen Cuellar MD        Dose:  40 mg   Take 1 capsule (40 mg) by mouth daily   Quantity:  30 capsule "   Refills:  1            Where to get your medicines      These medications were sent to [id number] - [place], [place] - [address] AT [facility]  [address], [place] [address]    Hours:  24-hours Phone:  [phone]     omeprazole 40 MG capsule         Some of these will need a paper prescription and others can be bought over the counter.  Ask your nurse if you have questions.     Bring a paper prescription for each of these medications     HYDROcodone-acetaminophen 5-325 MG per tablet                Primary Care Provider Office Phone # Fax #    Molly Adame, APRCARLOS [facility] [phone] [address] [address]        Equal Access to Services     ROSAURA INTERIANO : Odilon billyo Soomaali, waaxda luqadaha, qaybta kaalmada adeegyada, josemanuel oswald . So [facility] [phone].    ATENCIÓN: Si habla español, tiene a chacon disposición servicios gratuitos de asistencia lingüística. Llame al [phone].    We comply with applicable federal civil rights laws and [place] laws. We do not discriminate on the basis of race, color, national origin, age, disability, sex, sexual orientation, or gender identity.            Thank you!     Thank you for choosing [facility], RADIATION ONCOLOGY  for your care. Our goal is always to provide you with excellent care. Hearing back from our patients is one way we can continue to improve our services. Please take a few minutes to complete the written survey that you may receive in the mail after your visit with us. Thank you!             Your Updated Medication List - Protect others around you: Learn how to safely use, store and throw away your medicines at www.disposemymeds.org.          This list is accurate as of 2/19/18 11:59 PM.  Always use your most recent med list.                   Brand Name Dispense Instructions for use Diagnosis    acetaminophen 325 MG tablet     TYLENOL    100 tablet    Take 2 tablets (650 mg) by mouth every 4 hours as needed for mild pain (mild pain)    Malignant neoplasm of right kidney (H)       acetaminophen-codeine 300-30 MG per tablet    TYLENOL #3    45 tablet    Take 1 tablet by mouth every 6 hours as needed for moderate pain    Metastatic renal cell carcinoma to lung, right (H)       ALPRAZolam 0.5 MG tablet    XANAX    60 tablet    Take 1 tablet (0.5 mg) by mouth 3 times daily as needed for anxiety    Renal cell carcinoma, unspecified laterality (H), Mass of upper lobe of left lung       atorvastatin 20 MG tablet    LIPITOR    30 tablet    Take 1 tablet (20 mg) by mouth daily    History of stroke       benzonatate 100 MG capsule    TESSALON    60 capsule    Take 1 capsule (100 mg) by mouth 3 times daily as needed for cough    Metastatic renal cell carcinoma to lung, right (H)       buPROPion 300 MG 24 hr tablet    WELLBUTRIN XL     Take 300 mg by mouth every morning        citalopram 20 MG tablet    celeXA    90 tablet    Take 1 tablet (20 mg) by mouth every evening    Insomnia, unspecified type       clonazePAM 0.5 MG tablet    klonoPIN    20 tablet    TAKE 0.5-1 TABLETS BY MOUTH 2 TIMES DAILY AS NEEDED FOR ANXIETY    Panic attack       dexamethasone 4 MG tablet    DECADRON    20 tablet    Take 6 mg PO BID x 3 days. Then 4 mg PO BID x 3 days. Then 2 mg PO BID for 3 days. Then 2 mg once daily x 3 days, then done.    Brain metastasis (H)       HYDROcodone-acetaminophen 5-325 MG per tablet    NORCO    18 tablet    Take 1 tablet by mouth every 6 hours as needed for moderate to severe pain    Cerebral edema (H)       IBUPROFEN      Take 400 mg by mouth every 6 hours as needed        lidocaine-prilocaine cream    EMLA    60 g    Apply 1 hour prior to port access.    Malignant neoplasm of right kidney (H)       LORazepam 0.5 MG tablet    ATIVAN    30 tablet    Take 1 tablet (0.5 mg) by mouth every 4 hours as needed (Anxiety, Nausea/Vomiting or Sleep)     Malignant neoplasm of right kidney (H)       omeprazole 40 MG capsule    priLOSEC    30 capsule    Take 1 capsule (40 mg) by mouth daily    Cerebral edema (H)       prochlorperazine 10 MG tablet    COMPAZINE    30 tablet    Take 1 tablet (10 mg) by mouth every 6 hours as needed (Nausea/Vomiting)    Malignant neoplasm of right kidney (H)
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
## 2018-02-19 NOTE — PROGRESS NOTES
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 Green    HISTORY OF PRESENT ILLNESS:   Ms. Gonsales is a 52 year old female with a history of renal cell carcinoma.  She was originally diagnosed after she presented with gross hematuria and right flank pain in .  CT scan completed at that time suggested a right renal mass.  She went on to have a right open radical nephrectomy on 2014 with pathology showing grade 3 of 4 renal clear cell carcinoma.  Staging workup at that time showed a number of pulmonary nodules.  These were followed with serial CT scans and remained stable for several years.     In May, 2017 a CT scan of the chest abdomen pelvis showed number findings concerning for metastatic disease including  enlarging necrotic left hilar masslike lymph nodes associated with left upper lobe and apical nodularity with masses in interlobular septal thickening concerning for metastasis with lymphangitic spread.  There is also a slightly enlarging right lower lobe spiculated pulmonary nodule, new peripherally enhancing left adrenal nodules, and a new pancreatic body peripherally enhancing lesion, all highly suspicious for metastasis.    She subsequently completed 4 cycles of IL-2 and a CT scan on 2017 showed stable disease.  A repeat CT chest abdomen pelvis on 2017 showed increase in size of a previously seen left upper lobe mass, left hilar and supraclavicular lymph nodes, left renal  lesions, a right iliac chain lymph node, and pancreatic metastasis.  She was therefore started on systemic treatment with Opdivo on 2017.  She has continued on Opdivo since that time.      The patient was seen in medical oncology on 2018 which time she complained of daily headaches over the past 2-3 weeks.  A brain MRI was therefore ordered which showed a single enhancing mass in the left cerebellar hemisphere measuring 2.1 x 1.7 x 1.7 cm with associated vasogenic edema concerning for metastatic disease.  Also noted was an acute focal lacunar infarct in the superior posterior right cerebellar hemisphere.  The patient was therefore referred to our department for consideration of gamma knife SRS to this mass.    On exam today, the patient informs us that she currently has a 4/10 frontal headache associated with nausea and lightheadedness.  She is currently on 4 mg dexamethasone 3 times daily which was decreased earlier today from 6 mg 3 times daily previously.  She reports that her symptoms are more severe today after decreasing dexamethasone.  She says that her headaches are daily and typically mild, however she notes that it is currently somewhat more severe than usual.  She has also noticed some mild blurry vision and her balance is slightly off.  She also reports some slight shortness of breath, cough, and fatigue.  A complete review of systems is otherwise unremarkable.    PAST MEDICAL HISTORY:   Past Medical History:   Diagnosis Date     Anxiety      Former tobacco use      History of kidney cancer     Clear cell     Mass of left lung     Upper lobe     Solitary kidney     S/P right nephrectomy due to kidney cancer     Past Surgical History:   Procedure Laterality Date     C/SECTION, LOW TRANSVERSE  ,     , Low Transverse     ENDOSCOPIC ULTRASOUND UPPER GASTROINTESTINAL TRACT (GI) N/A 2017    Procedure: ENDOSCOPIC ULTRASOUND, ESOPHAGOSCOPY / UPPER GASTROINTESTINAL TRACT (GI);   Esophagogastroduodenoscopy, Endoscopic Ultrasound with Fine Needle Biopsies;  Surgeon: Asad Velez MD;  Location: [facility] OR     INSERT PORT VASCULAR ACCESS N/A 12/4/2017    Procedure: INSERT PORT VASCULAR ACCESS;  Chest Port Placement-Right;  Surgeon: Melanie Cevallos PA-C;  [facility] OR     open radical nephrectomy Right 12/31/14     PICC INSERTION Left 07/31/2017    5fr DL BioFlo PICC, 40cm (4cm external) in the L basilic vein w/ tip in the low SVC     PICC INSERTION Right 08/15/2017    5fr DL BioFlo PICC, 36cm (1cm external) in the R basilic vein w/ tip in the low SVC          CHEMOTHERAPY HISTORY: See HPI     PAST RADIATION THERAPY HISTORY: None     MEDICATIONS:   Current Outpatient Prescriptions   Medication Sig Dispense Refill     dexamethasone (DECADRON) 4 MG tablet Take 6 mg PO BID x 3 days. Then 4 mg PO BID x 3 days. Then 2 mg PO BID for 3 days. Then 2 mg once daily x 3 days, then done. 20 tablet 0     clonazePAM (KLONOPIN) 0.5 MG tablet TAKE 0.5-1 TABLETS BY MOUTH 2 TIMES DAILY AS NEEDED FOR ANXIETY 20 tablet 0     zolpidem (AMBIEN) 5 MG tablet Take 1 tablet (5 mg) by mouth nightly as needed for sleep 30 tablet 1     ALPRAZolam (XANAX) 0.5 MG tablet Take 1 tablet (0.5 mg) by mouth 3 times daily as needed for anxiety 60 tablet 0     lidocaine-prilocaine (EMLA) cream Apply 1 hour prior to port access. 60 g 1     LORazepam (ATIVAN) 0.5 MG tablet Take 1 tablet (0.5 mg) by mouth every 4 hours as needed (Anxiety, Nausea/Vomiting or Sleep) 30 tablet 2     prochlorperazine (COMPAZINE) 10 MG tablet Take 1 tablet (10 mg) by mouth every 6 hours as needed (Nausea/Vomiting) 30 tablet 2     benzonatate (TESSALON) 100 MG capsule Take 1 capsule (100 mg) by mouth 3 times daily as needed for cough 60 capsule 0     acetaminophen-codeine (TYLENOL #3) 300-30 MG per tablet Take 1 tablet by mouth every 6 hours as needed for moderate pain 45 tablet 0     citalopram (CELEXA) 20 MG tablet Take 1 tablet (20 mg) by mouth  "every evening 90 tablet 3     acetaminophen (TYLENOL) 325 MG tablet Take 2 tablets (650 mg) by mouth every 4 hours as needed for mild pain (mild pain) 100 tablet 0     buPROPion (WELLBUTRIN XL) 300 MG 24 hr tablet Take 300 mg by mouth every morning        atorvastatin (LIPITOR) 20 MG tablet Take 1 tablet (20 mg) by mouth daily (Patient not taking: Reported on 2/19/2018) 30 tablet 3     IBUPROFEN Take 400 mg by mouth every 6 hours as needed          ALLERGIES:  No Known Allergies.    SOCIAL HISTORY:   Social History     Social History     Marital status:      Spouse name: Tunde     Number of children: 2     Years of education: N/A     Occupational History           Social History Main Topics     Smoking status: Former Smoker     Packs/day: 0.50     Years: 20.00     Types: Cigarettes     Quit date: 1/1/2004     Smokeless tobacco: Never Used     Alcohol use Yes      Comment: Occasional, social     Drug use: No     Sexual activity: Not Currently     Partners: Male     Other Topics Concern     Not on file     Social History Narrative         FAMILY HISTORY:   family history includes Chronic Obstructive Pulmonary Disease in her father; Hyperlipidemia in her brother and brother; Hypertension in her father. There is no history of CANCER.    REVIEW OF SYMPTOMS:  A full 14-point review of systems was performed.     PHYSICAL EXAMINATION:    /47  Pulse 58  Resp 16  Ht 1.575 m (5' 2\")  Wt 56.2 kg (124 lb)  LMP 12/20/2013  SpO2 97%  BMI 22.68 kg/m2  General: Alert, oriented, NAD  Skin: No rashes or lesions appreciated  HEENT: NCAT, EOMI, PERRL  Neck: full ROM  Breasts: Deferred  Heart: WWP  Lungs: Breathing comfortably on RA  Abd: Nondistended  /Rectal: Deferred  Ext: Atraumatic, grossly intact strength  Neuro: CNs grossly intact, normal gait, 5/5 strength in all extremities, sensation grossly intact.       ASSESSMENT: Metastatic renal cell carcinoma with a single symptomatic left " cerebellar metastasis.     RECOMMENDATIONS:  We recommend surgical excision followed by post-op gamma knife SRS to the surgical cavity for the treatment of Ms. Gonsales's symptomatic left cerebellar metastasis.   The alternative would be to proceed to GK, but she remains quite symptomatic on  6 mg  Tid of Decadron.     Given the patient's significant cerebral edema seen on MRI as well as her high steroid requirements and persistent neurologic symptoms, we feel that she would be best served by undergoing craniotomy with resection of her left cerebellar metastasis.      We discussed that while gamma knife SRS can be used to treat brain metastases nonsurgically, surgical excision can often more quickly address neurologic symptoms associated with a metastasis.  Given that she had an increase of symptoms after decreasing her dexamethasone from 6 mg 3 times daily to 4 mg 3 times daily, we feel that she is at risk for protracted and potentially increasingly severe neurologic symptoms following gamma knife SRS without surgical resection.  We therefore discussed her case with Dr. Palencia who agrees that surgical excision is appropriate.   The patient is planning on leaving for vacation later this week and she will therefore meet with Dr. Palencia early next week.  In the meantime, we recommended that she continue to take 6 mg dexamethasone 3 times daily.  She was also given a prescription for Prilosec for GI prophylaxis while on dexamethasone and Vicodin for severe pain.  We will plan to see her back in a department following surgical resection and will plan on treating the surgical cavity with gamma knife SRS once she has healed from her surgery.    Thank you for allowing us to participate in this patient's care.  Please feel free to call with any questions or concerns.       The patient was seen and discussed with staff, Dr. Cuellar.    Chalo Cordoba MD  Resident, PGY-3  Department of Radiation Oncology  [facility]  [place]  [phone]      I was personally present with the resident during the history and exam.  I   discussed the case with the resident and agree with the findings and plan of care as documented in the resident's note. The risks and benefits of radiotherapy were discussed with the patient.  All questions were answered.  Please do not hesitate to call me if you have questions or concerns    Huyen Cuellar MD  Pager  [phone]  [facility]   [phone]  [facility]    CC  Patient Care Team:  Molly Adame APRN CNP as PCP - General (Nurse Practitioner)  Alea Cummings APRN CNS (Clinical Nurse Specialist)  Henri Green MD as MD (Oncology)  Molly Mendiola, RN as Nurse Coordinator (Oncology)  NADINE ESPAÑA

## 2018-02-20 ENCOUNTER — TELEPHONE (OUTPATIENT)
Dept: FAMILY MEDICINE | Facility: CLINIC | Age: 53
End: 2018-02-20

## 2018-02-20 DIAGNOSIS — G47.9 SLEEP DISORDER: ICD-10-CM

## 2018-02-20 RX ORDER — ZOLPIDEM TARTRATE 5 MG/1
5 TABLET ORAL
Qty: 30 TABLET | Refills: 1 | Status: SHIPPED | OUTPATIENT
Start: 2018-02-20 | End: 2018-01-01

## 2018-02-20 NOTE — TELEPHONE ENCOUNTER
Merit nurse from [facility] called stating patient needs a refill on Ativan today as she is leaving town and would Molly Adame be comfortable refilling?    Merit states in OV notes it says patients primary is handling Ativan, Jeff is confused and not sure if patient has another PCP possibly    Writer explained after looking patient chart Molly Adame hasn't prescribed Ativan ever and Dr Green has which jeff is calling from his office     Please advise

## 2018-02-20 NOTE — TELEPHONE ENCOUNTER
Molly did sign the rx for Ambien. It was faxed to [facility] [address]     Pt notified and also that Molly willing to do her refills but it must be here at FP clinic or oncology but not both as these are controlled substances. I also encouraged her to make appt with Molly when back from her trip or another provider here at  clinic to establish PCP  She will decide what to do when back from her trip.     I did let EULOGIO Madera and EULOGIO Dudley  know at oncology clinic about this as well.,     Yolanda Licea, RN, BSN

## 2018-02-20 NOTE — TELEPHONE ENCOUNTER
Looks like lorazepam (ativan) was last signed on 12/5/17 by oncology. This was for #30 with 2 refills. She got this on 12/.5/ 17 and then 12/28/17 from [facility] pharmacy. ([phone]) but none since then so she may have a refill on hand.      On 2/12/18 S Wendi wrote rx for clonazepam (Klonipin) for #20 and if that taken at maximum allowed it would last 10 days. It was faxed on 2/13 to [facility]. Pt did pick this up from [facility] on 2/13 per [place] . However pt states she hasn't even tried it yet.     She is leaving today on 5 day trip to [place] in the late afternoon.  She states she needs refill on ambien (Zolpidem) to help her sleep.     I did explain to pt that she should not be taking both clonazepam and lorazepam at the same time.     She is going to call [facility] pharmacy and ask if she has a refill on hand for lorazepam and if so she will refill that. Otherwise she will try the clonazepam which she has picked up but not tried yet. She did not know they were similar medications.     (Attempted to reach EULOGIO Dudley at oncology clinic, unable. Left message  to call back. )    She is asking if Molly OCHOA would refill her ambien.     I advised pt make appt to see Molly in clinic when she gets back from trip as she has only seen her once and she has lots of things she is going through now.         Yolanda Licea RN

## 2018-02-26 ENCOUNTER — TELEPHONE (OUTPATIENT)
Dept: RADIATION ONCOLOGY | Facility: CLINIC | Age: 53
End: 2018-02-26

## 2018-02-26 DIAGNOSIS — C79.31 BRAIN METASTASIS: Primary | ICD-10-CM

## 2018-02-27 ENCOUNTER — ANESTHESIA EVENT (OUTPATIENT)
Dept: SURGERY | Facility: CLINIC | Age: 53
DRG: 026 | End: 2018-02-27
Payer: COMMERCIAL

## 2018-02-27 DIAGNOSIS — C79.31 BRAIN METASTASIS: ICD-10-CM

## 2018-02-27 DIAGNOSIS — C64.9 METASTATIC RENAL CELL CARCINOMA, UNSPECIFIED LATERALITY (H): Primary | ICD-10-CM

## 2018-02-27 DIAGNOSIS — C79.31 METASTASIS TO BRAIN (H): Primary | ICD-10-CM

## 2018-02-28 DIAGNOSIS — C64.9 METASTATIC RENAL CELL CARCINOMA TO LUNG, RIGHT (H): ICD-10-CM

## 2018-02-28 DIAGNOSIS — C78.01 METASTATIC RENAL CELL CARCINOMA TO LUNG, RIGHT (H): ICD-10-CM

## 2018-02-28 ASSESSMENT — ENCOUNTER SYMPTOMS
DEPRESSION: 0
MUSCLE WEAKNESS: 0
FEVER: 0
NUMBNESS: 0
HALLUCINATIONS: 0
STIFFNESS: 1
COUGH: 1
DIZZINESS: 1
ABDOMINAL PAIN: 0
JAUNDICE: 0
DISTURBANCES IN COORDINATION: 1
WEIGHT GAIN: 1
BLOATING: 0
TINGLING: 0
MUSCLE WEAKNESS: 0
SNORES LOUDLY: 0
POLYDIPSIA: 0
DECREASED CONCENTRATION: 0
PARALYSIS: 0
NIGHT SWEATS: 0
SHORTNESS OF BREATH: 1
PANIC: 1
WHEEZING: 0
DECREASED APPETITE: 0
WEAKNESS: 1
WHEEZING: 0
INCREASED ENERGY: 0
FEVER: 0
ARTHRALGIAS: 0
ABDOMINAL PAIN: 0
WEIGHT GAIN: 1
SEIZURES: 0
BLOATING: 0
POLYDIPSIA: 0
NERVOUS/ANXIOUS: 1
POLYPHAGIA: 1
CHILLS: 0
LOSS OF CONSCIOUSNESS: 0
SPEECH CHANGE: 0
TREMORS: 1
POLYPHAGIA: 1
FATIGUE: 1
SHORTNESS OF BREATH: 1
VOMITING: 0
ALTERED TEMPERATURE REGULATION: 1
HEARTBURN: 1
BLOOD IN STOOL: 0
DIARRHEA: 0
WEAKNESS: 1
MYALGIAS: 1
PARALYSIS: 0
DIZZINESS: 1
ALTERED TEMPERATURE REGULATION: 1
COUGH DISTURBING SLEEP: 0
FATIGUE: 1
BACK PAIN: 0
MUSCLE CRAMPS: 0
COUGH: 1
NIGHT SWEATS: 0
NECK PAIN: 1
MEMORY LOSS: 0
DECREASED CONCENTRATION: 0
INSOMNIA: 1
LOSS OF CONSCIOUSNESS: 0
SNORES LOUDLY: 0
STIFFNESS: 1
BOWEL INCONTINENCE: 0
HEMOPTYSIS: 0
SEIZURES: 0
BLOOD IN STOOL: 0
HEADACHES: 1
SPUTUM PRODUCTION: 0
JAUNDICE: 0
DECREASED APPETITE: 0
RECTAL PAIN: 0
DISTURBANCES IN COORDINATION: 1
POSTURAL DYSPNEA: 0
PANIC: 1
CONSTIPATION: 0
JOINT SWELLING: 0
CONSTIPATION: 0
POSTURAL DYSPNEA: 0
MEMORY LOSS: 0
BOWEL INCONTINENCE: 0
HEADACHES: 1
COUGH DISTURBING SLEEP: 0
HEMOPTYSIS: 0
SPUTUM PRODUCTION: 0
NUMBNESS: 0
WEIGHT LOSS: 0
TREMORS: 1
INSOMNIA: 1
NAUSEA: 0
NAUSEA: 0
CHILLS: 0
MYALGIAS: 1
DYSPNEA ON EXERTION: 0
DYSPNEA ON EXERTION: 0
SPEECH CHANGE: 0
HEARTBURN: 1
JOINT SWELLING: 0
DEPRESSION: 0
TINGLING: 0
INCREASED ENERGY: 0
DIARRHEA: 0
RECTAL PAIN: 0
NERVOUS/ANXIOUS: 1
WEIGHT LOSS: 0
MUSCLE CRAMPS: 0
BACK PAIN: 0
HALLUCINATIONS: 0
NECK PAIN: 1
ARTHRALGIAS: 0
VOMITING: 0

## 2018-03-01 ENCOUNTER — MYC MEDICAL ADVICE (OUTPATIENT)
Dept: ONCOLOGY | Facility: CLINIC | Age: 53
End: 2018-03-01

## 2018-03-01 ENCOUNTER — ALLIED HEALTH/NURSE VISIT (OUTPATIENT)
Dept: SURGERY | Facility: CLINIC | Age: 53
End: 2018-03-01
Payer: COMMERCIAL

## 2018-03-01 ENCOUNTER — OFFICE VISIT (OUTPATIENT)
Dept: SURGERY | Facility: CLINIC | Age: 53
End: 2018-03-01
Payer: COMMERCIAL

## 2018-03-01 VITALS
DIASTOLIC BLOOD PRESSURE: 67 MMHG | BODY MASS INDEX: 23.92 KG/M2 | TEMPERATURE: 98.4 F | OXYGEN SATURATION: 97 % | HEIGHT: 62 IN | HEART RATE: 72 BPM | RESPIRATION RATE: 16 BRPM | SYSTOLIC BLOOD PRESSURE: 103 MMHG | WEIGHT: 130 LBS

## 2018-03-01 DIAGNOSIS — C79.31 BRAIN METASTASES: ICD-10-CM

## 2018-03-01 DIAGNOSIS — Z01.818 PRE-OP EVALUATION: ICD-10-CM

## 2018-03-01 DIAGNOSIS — Z01.818 PRE-OP EVALUATION: Primary | ICD-10-CM

## 2018-03-01 DIAGNOSIS — C64.1 MALIGNANT NEOPLASM OF RIGHT KIDNEY (H): ICD-10-CM

## 2018-03-01 LAB
ALBUMIN SERPL-MCNC: 3.2 G/DL (ref 3.4–5)
ALP SERPL-CCNC: 71 U/L (ref 40–150)
ALT SERPL W P-5'-P-CCNC: 21 U/L (ref 0–50)
ANION GAP SERPL CALCULATED.3IONS-SCNC: 7 MMOL/L (ref 3–14)
APTT PPP: 25 SEC (ref 22–37)
AST SERPL W P-5'-P-CCNC: 18 U/L (ref 0–45)
BASOPHILS # BLD AUTO: 0 10E9/L (ref 0–0.2)
BASOPHILS NFR BLD AUTO: 0.2 %
BILIRUB SERPL-MCNC: 0.2 MG/DL (ref 0.2–1.3)
BUN SERPL-MCNC: 22 MG/DL (ref 7–30)
CALCIUM SERPL-MCNC: 9.5 MG/DL (ref 8.5–10.1)
CHLORIDE SERPL-SCNC: 101 MMOL/L (ref 94–109)
CO2 SERPL-SCNC: 27 MMOL/L (ref 20–32)
CREAT SERPL-MCNC: 0.84 MG/DL (ref 0.52–1.04)
DIFFERENTIAL METHOD BLD: ABNORMAL
EOSINOPHIL # BLD AUTO: 0 10E9/L (ref 0–0.7)
EOSINOPHIL NFR BLD AUTO: 0.3 %
ERYTHROCYTE [DISTWIDTH] IN BLOOD BY AUTOMATED COUNT: 14.2 % (ref 10–15)
GFR SERPL CREATININE-BSD FRML MDRD: 71 ML/MIN/1.7M2
GLUCOSE SERPL-MCNC: 96 MG/DL (ref 70–99)
HCT VFR BLD AUTO: 35.1 % (ref 35–47)
HGB BLD-MCNC: 11.8 G/DL (ref 11.7–15.7)
IMM GRANULOCYTES # BLD: 0.3 10E9/L (ref 0–0.4)
IMM GRANULOCYTES NFR BLD: 2 %
LYMPHOCYTES # BLD AUTO: 2.4 10E9/L (ref 0.8–5.3)
LYMPHOCYTES NFR BLD AUTO: 16.9 %
MCH RBC QN AUTO: 30.9 PG (ref 26.5–33)
MCHC RBC AUTO-ENTMCNC: 33.6 G/DL (ref 31.5–36.5)
MCV RBC AUTO: 92 FL (ref 78–100)
MONOCYTES # BLD AUTO: 0.5 10E9/L (ref 0–1.3)
MONOCYTES NFR BLD AUTO: 3.1 %
NEUTROPHILS # BLD AUTO: 11.1 10E9/L (ref 1.6–8.3)
NEUTROPHILS NFR BLD AUTO: 77.5 %
NRBC # BLD AUTO: 0 10*3/UL
NRBC BLD AUTO-RTO: 0 /100
PLATELET # BLD AUTO: 384 10E9/L (ref 150–450)
POTASSIUM SERPL-SCNC: 4.5 MMOL/L (ref 3.4–5.3)
PROT SERPL-MCNC: 7.2 G/DL (ref 6.8–8.8)
RBC # BLD AUTO: 3.82 10E12/L (ref 3.8–5.2)
SODIUM SERPL-SCNC: 134 MMOL/L (ref 133–144)
WBC # BLD AUTO: 14.3 10E9/L (ref 4–11)

## 2018-03-01 PROCEDURE — 25000128 H RX IP 250 OP 636

## 2018-03-01 PROCEDURE — 85730 THROMBOPLASTIN TIME PARTIAL: CPT | Performed by: PHYSICIAN ASSISTANT

## 2018-03-01 PROCEDURE — 86850 RBC ANTIBODY SCREEN: CPT | Performed by: PHYSICIAN ASSISTANT

## 2018-03-01 PROCEDURE — 80053 COMPREHEN METABOLIC PANEL: CPT | Performed by: PHYSICIAN ASSISTANT

## 2018-03-01 PROCEDURE — 86901 BLOOD TYPING SEROLOGIC RH(D): CPT | Performed by: PHYSICIAN ASSISTANT

## 2018-03-01 PROCEDURE — 86900 BLOOD TYPING SEROLOGIC ABO: CPT | Performed by: PHYSICIAN ASSISTANT

## 2018-03-01 PROCEDURE — 85025 COMPLETE CBC W/AUTO DIFF WBC: CPT | Performed by: PHYSICIAN ASSISTANT

## 2018-03-01 PROCEDURE — 86923 COMPATIBILITY TEST ELECTRIC: CPT | Performed by: PHYSICIAN ASSISTANT

## 2018-03-01 RX ORDER — CALCIUM CARBONATE 500(1250)
1 TABLET ORAL
COMMUNITY
End: 2018-01-01

## 2018-03-01 RX ORDER — HEPARIN SODIUM (PORCINE) LOCK FLUSH IV SOLN 100 UNIT/ML 100 UNIT/ML
5 SOLUTION INTRAVENOUS ONCE
Status: COMPLETED | OUTPATIENT
Start: 2018-03-01 | End: 2018-03-01

## 2018-03-01 RX ORDER — MULTIPLE VITAMINS W/ MINERALS TAB 9MG-400MCG
1 TAB ORAL
COMMUNITY
End: 2018-01-01

## 2018-03-01 RX ADMIN — SODIUM CHLORIDE, PRESERVATIVE FREE 5 ML: 5 INJECTION INTRAVENOUS at 10:29

## 2018-03-01 ASSESSMENT — LIFESTYLE VARIABLES: TOBACCO_USE: 1

## 2018-03-01 NOTE — MR AVS SNAPSHOT
After Visit Summary   3/1/2018    Suzi Gonsales    MRN: [id number]           Patient Information     Date Of Birth          1965        Visit Information        Provider Department      3/1/2018 10:00 AM Anesthesiologist, [facility]        Care Instructions    Preparing for Your Surgery      Name:  Suzi Gonsales   MRN:  [id number]   :  1965   Today's Date:  3/1/2018     Arriving for surgery:  Surgery date:  3/6/18  Arrival time:  09:40 a.m.  Please come to:       [facility] [address]  [address] [place]  [address]    -   parking is available in front of the hospital from 5:15 am to 8:00 pm    -  Stop at the Information Desk in the lobby    -   Inform the information person that you are here for surgery. An escort to 3c will be provided. If you would not like an escort, please proceed to 3C on the 3rd floor. [phone]     What can I eat or drink?  -  You may have solid food or milk products until 8 hours prior to your surgery  03:00 a.m.  -  You may have water, apple juice or 7up/Sprite until 2 hours prior to your surgery  9:40 a.m.    Which medicines can I take?  -  Do NOT take these medications in the morning, the day of surgery:         -  Please take these medications the day of surgery:     All your usual morning medicine     How do I prepare myself?  -  Take two showers: one the night before surgery; and one the morning of surgery.         Use Scrubcare or Hibiclens to wash from neck down.  You may use your own shampoo and conditioner. No other hair products.   -  Do NOT use lotion, powder, deodorant, or antiperspirant the day of your surgery.  -  Do NOT wear any makeup, fingernail polish or jewelry.  -Do not bring your own medications to the hospital, except for inhalers and eye drops.  -  Bring your ID and insurance card.    Questions or Concerns:  If you have questions or concerns, please call  the  Preoperative Assessment Center, Monday-Friday 7AM-7PM:  [phone]      AFTER YOUR SURGERY  Breathing exercises   Breathing exercises help you recover faster. Take deep breaths and let the air out slowly. This will:     Help you wake up after surgery.    Help prevent complications like pneumonia.  Preventing complications will help you go home sooner.   We may give you a breathing device (incentive spirometer) to encourage you to breathe deeply.   Nausea and vomiting   You may feel sick to your stomach after surgery; if so, let your nurse know.    Pain control:  After surgery, you may have pain. Our goal is to help you manage your pain. Pain medicine will help you feel comfortable enough to do activities that will help you heal.  These activities may include breathing exercises, walking and physical therapy.   To help your health care team treat your pain we will ask: 1) If you have pain  2) where it is located 3) describe your pain in your words  Methods of pain control include medications given by mouth, vein or by nerve block for some surgeries.  We may give you a pain control pump that will:  1) Deliver the medicine through a tube placed in your vein  2) Control the amount of medicine you receive  3) Allow you to push a button to deliver a dose of pain medicine  Sequential Compression Device (SCD) or Pneumo Boots:  You may need to wear SCD S on your legs or feet. These are wraps connected to a machine that pumps in air and releases it. The repeated pumping helps prevent blood clots from forming.           Follow-ups after your visit        Your next 10 appointments already scheduled     Mar 01, 2018 10:00 AM CST   (Arrive by 9:45 AM)   PAC Anesthesia Consult with  Pac Anesthesiologist   [facility] ([facility])    [address] [place] [id number] [phone]            Mar 01, 2018 10:15 AM CST   LAB with  LAB   [facility]  Lab ([facility])    [address]  1st Floor  [facility] [place] [address] [phone]           Please do not eat 10-12 hours before your appointment if you are coming in fasting for labs on lipids, cholesterol, or glucose (sugar). This does not apply to pregnant women. Water, hot tea and black coffee (with nothing added) are okay. Do not drink other fluids, diet soda or chew gum.            Mar 02, 2018  9:00 AM CST   (Arrive by 8:45 AM)   CT CHEST/ABDOMEN/PELVIS W CONTRAST with RSCCCT1   [facility] [place] ([facility])    [address]  [facility] [address] [phone]           Please bring any scans or X-rays taken at other hospitals, if similar tests were done. Also bring a list of your medicines, including vitamins, minerals and over-the-counter drugs. It is safest to leave personal items at home.  Be sure to tell your doctor:   If you have any allergies.   If there s any chance you are pregnant.   If you are breastfeeding.  You may have contrast for this exam. To prepare:   Do not eat or drink for 2 hours before your exam. If you need to take medicine, you may take it with small sips of water. (We may ask you to take liquid medicine as well.)   The day before your exam, drink extra fluids at least six 8-ounce glasses (unless your doctor tells you to restrict your fluids).   You will be given instructions on how to drink the contrast.  Patients over 70 or patients with diabetes or kidney problems:   If you haven t had a blood test (creatinine test) within the last 30 days, the Cardiologist/Radiologist may require you to get this test prior to your exam.  If you have diabetes:   Continue to take your metformin medication on the day of your exam  Please wear loose clothing, such as a sweat suit or jogging clothes. Avoid snaps, zippers and other metal. We may ask you to undress and put on a hospital gown.  If you have  any questions, please call the Imaging Department where you will have your exam.            Mar 02, 2018  9:30 AM CST   (Arrive by 9:15 AM)   CTA ANGIOGRAM HEAD NECK with RSCCCT1   [facility] ([facility])    [address]  [facility] [id number] [phone]           Please bring any scans or X-rays taken at other hospitals, if similar tests were done. Also bring a list of your medicines, including vitamins, minerals and over-the-counter drugs. It is safest to leave personal items at home.  Be sure to tell your doctor:   If you have any allergies.   If there s any chance you are pregnant.   If you are breastfeeding.    If you have diabetes as your medication may need to be adjusted for this exam.  You will have contrast for this exam. To prepare:   Do not eat or drink for 2 hours before your exam. If you need to take medicine, you may take it with small sips of water. (We may ask you to take liquid medicine as well.)   The day before your exam, drink extra fluids at least six 8-ounce glasses (unless your doctor tells you to restrict your fluids).  Patients over 70 or patients with diabetes or kidney problems:   If you haven t had a blood test (creatinine test) within the last 30 days, the Cardiologist/Radiologist may require you to get this test prior to your exam.  Please wear loose clothing, such as a sweat suit or jogging clothes. Avoid snaps, zippers and other metal. We may ask you to undress and put on a hospital gown.  If you have any questions, please call the Imaging Department where you will have your exam.            Mar 05, 2018  4:00 PM CST   (Arrive by 3:45 PM)   Return Visit with Quintin Palencia MD   [facility] ([facility])    [address]  [address]  [facility] [address] [phone]            Mar 06, 2018  9:20 AM CST   CT HEAD W/O CONTRAST with UUCT4   [facility],  CT ([facility])    [address] [id number] [phone]           Please bring any scans or X-rays taken at other hospitals, if similar tests were done. Also bring a list of your medicines, including vitamins, minerals and over-the-counter drugs. It is safest to leave personal items at home.  Be sure to tell your doctor:   If you have any allergies.   If there s any chance you are pregnant.   If you are breastfeeding.  You do not need to do anything special to prepare for this exam.  Please wear loose clothing, such as a sweat suit or jogging clothes. Avoid snaps, zippers and other metal. We may ask you to undress and put on a hospital gown.            Mar 06, 2018 10:00 AM CST   (Arrive by 9:45 AM)   MR BRAIN W CONTRAST with UUMR2   [facility] [place], MRI ([facility])    [address] [place] [id number] [phone]           Take your medicines as usual, unless your doctor tells you not to. Bring a list of your current medicines to your exam (including vitamins, minerals and over-the-counter drugs).  You may or may not receive intravenous (IV) contrast for this exam pending the discretion of the Radiologist.  You do not need to do anything special to prepare.  The MRI machine uses a strong magnet. Please wear clothes without metal (snaps, zippers). A sweatsuit works well, or we may give you a hospital gown.  Please remove any body piercings and hair extensions before you arrive. You will also remove watches, jewelry, hairpins, wallets, dentures, partial dental plates and hearing aids. You may wear contact lenses, and you may be able to wear your rings. We have a safe place to keep your personal items, but it is safer to leave them at home.  **IMPORTANT** THE INSTRUCTIONS BELOW ARE ONLY FOR THOSE PATIENTS WHO HAVE BEEN PRESCRIBED SEDATION OR GENERAL ANESTHESIA DURING THEIR MRI  PROCEDURE:  IF YOUR DOCTOR PRESCRIBED ORAL SEDATION (take medicine to help you relax during your exam):   You must get the medicine from your doctor (oral medication) before you arrive. Bring the medicine to the exam. Do not take it at home. You ll be told when to take it upon arriving for your exam.   Arrive one hour early. Bring someone who can take you home after the test. Your medicine will make you sleepy. After the exam, you may not drive, take a bus or take a taxi by yourself.  IF YOUR DOCTOR PRESCRIBED IV SEDATION:   Arrive one hour early. Bring someone who can take you home after the test. Your medicine will make you sleepy. After the exam, you may not drive, take a bus or take a taxi by yourself.   No eating 6 hours before your exam. You may have clear liquids up until 4 hours before your exam. (Clear liquids include water, clear tea, black coffee and fruit juice without pulp.)  IF YOUR DOCTOR PRESCRIBED ANESTHESIA (be asleep for your exam):   Arrive 1 1/2 hours early. Bring someone who can take you home after the test. You may not drive, take a bus or take a taxi by yourself.   No eating 8 hours before your exam. You may have clear liquids up until 4 hours before your exam. (Clear liquids include water, clear tea, black coffee and fruit juice without pulp.)   You will spend four to five hours in the recovery room.  Please call the Imaging Department at your exam site with any questions.            Mar 06, 2018   Procedure with Quintin Palencia MD   [facility] [place], Same Day Surgery (--)    [address] [address] [phone]            Mar 07, 2018  9:45 AM CST   Masonic Lab Draw with  MASONIC LAB DRAW   [facility] Masonic Lab Draw ([facility])    [address]  [address]  [facility] [id number] [phone]            Mar 07, 2018 10:30 AM CST   (Arrive by 10:15 AM)   Return Visit with Henri Green MD   [facility] ([facility])    [address]  [address]  [facility] [id number] [phone]              Future tests that were ordered for you today     Open Future Orders        Priority Expected Expires Ordered    ABO/Rh type and screen Routine 3/1/2018 3/31/2018 3/1/2018    Partial thromboplastin time Routine 3/1/2018 3/31/2018 3/1/2018            Who to contact     Please call your clinic at [phone] to:    Ask questions about your health    Make or cancel appointments    Discuss your medicines    Learn about your test results    Speak to your doctor            Additional Information About Your Visit        [employer] Information     [employer] gives you secure access to your electronic health record. If you see a primary care provider, you can also send messages to your care team and make appointments. If you have questions, please call your primary care clinic.  If you do not have a primary care provider, please call [phone] and they will assist you.      [employer] is an electronic gateway that provides easy, online access to your medical records. With [employer], you can request a clinic appointment, read your test results, renew a prescription or communicate with your care team.     To access your existing account, please contact your [facility] or call [phone] for assistance.        Care EveryWhere ID     This is your Care EveryWhere ID. This could be used by other organizations to access your [place] medical records  [id number]        Your Vitals Were     Last Period                   12/20/2013            Blood Pressure from Last 3 Encounters:   03/01/18 103/67   02/19/18 103/47   02/13/18 106/62    Weight from Last 3 Encounters:   03/01/18 59 kg (130 lb)   02/19/18 56.2 kg (124 lb)   02/13/18 57.5 kg (126 lb 12.8 oz)              Today, you had the following     No orders found for display         Today's Medication Changes          These changes are  accurate as of 3/1/18  9:32 AM.  If you have any questions, ask your nurse or doctor.               These medicines have changed or have updated prescriptions.        Dose/Directions    ALPRAZolam 0.5 MG tablet   Commonly known as:  XANAX   This may have changed:  when to take this   Used for:  Renal cell carcinoma, unspecified laterality (H), Mass of upper lobe of left lung        Dose:  0.5 mg   Take 1 tablet (0.5 mg) by mouth 3 times daily as needed for anxiety   Quantity:  60 tablet   Refills:  0       atorvastatin 20 MG tablet   Commonly known as:  LIPITOR   This may have changed:  when to take this   Used for:  History of stroke        Dose:  20 mg   Take 1 tablet (20 mg) by mouth daily   Quantity:  30 tablet   Refills:  3       citalopram 20 MG tablet   Commonly known as:  celeXA   This may have changed:  when to take this   Used for:  Insomnia, unspecified type        Dose:  20 mg   Take 1 tablet (20 mg) by mouth every evening   Quantity:  90 tablet   Refills:  3       clonazePAM 0.5 MG tablet   Commonly known as:  klonoPIN   This may have changed:    - when to take this  - reasons to take this  - additional instructions   Used for:  Panic attack        TAKE 0.5-1 TABLETS BY MOUTH 2 TIMES DAILY AS NEEDED FOR ANXIETY   Quantity:  20 tablet   Refills:  0       dexamethasone 4 MG tablet   Commonly known as:  DECADRON   This may have changed:    - how much to take  - how to take this  - when to take this  - additional instructions   Used for:  Brain metastasis (H)        Take 6 mg PO BID x 3 days. Then 4 mg PO BID x 3 days. Then 2 mg PO BID for 3 days. Then 2 mg once daily x 3 days, then done.   Quantity:  20 tablet   Refills:  0       * LORAZEPAM PO   This may have changed:  Another medication with the same name was changed. Make sure you understand how and when to take each.   Used for:  Pre-op evaluation, Brain metastases (H)        Take by mouth as needed for anxiety   Refills:  0       * LORazepam 0.5 MG  tablet   Commonly known as:  ATIVAN   This may have changed:  when to take this   Used for:  Malignant neoplasm of right kidney (H)        Dose:  0.5 mg   Take 1 tablet (0.5 mg) by mouth every 4 hours as needed (Anxiety, Nausea/Vomiting or Sleep)   Quantity:  30 tablet   Refills:  2       omeprazole 40 MG capsule   Commonly known as:  priLOSEC   This may have changed:    - when to take this  - reasons to take this   Used for:  Cerebral edema (H)        Dose:  40 mg   Take 1 capsule (40 mg) by mouth daily   Quantity:  30 capsule   Refills:  1       prochlorperazine 10 MG tablet   Commonly known as:  COMPAZINE   This may have changed:  when to take this   Used for:  Malignant neoplasm of right kidney (H)        Dose:  10 mg   Take 1 tablet (10 mg) by mouth every 6 hours as needed (Nausea/Vomiting)   Quantity:  30 tablet   Refills:  2       zolpidem 5 MG tablet   Commonly known as:  AMBIEN   This may have changed:  when to take this   Used for:  Sleep disorder        Dose:  5 mg   Take 1 tablet (5 mg) by mouth nightly as needed for sleep   Quantity:  30 tablet   Refills:  1       * Notice:  This list has 2 medication(s) that are the same as other medications prescribed for you. Read the directions carefully, and ask your doctor or other care provider to review them with you.             Primary Care Provider Office Phone # Fax #    Molly ARIEL [facility] [phone] [address] [address]        Equal Access to Services     ROSAURA INTERIANO AH: Hadii jimmie billyo Sovanessa, waaxda luqadaha, qaybta kaalmada adeegyada, waxay yasmine plata. So [facility] [phone].    ATENCIÓN: Si habla español, tiene a chacon disposición servicios gratuitos de asistencia lingüística. Llame al [phone].    We comply with applicable federal civil rights laws and [place] laws. We do not discriminate on the basis of race, color, national origin, age, disability, sex, sexual  orientation, or gender identity.            Thank you!     Thank you for choosing [facility]  for your care. Our goal is always to provide you with excellent care. Hearing back from our patients is one way we can continue to improve our services. Please take a few minutes to complete the written survey that you may receive in the mail after your visit with us. Thank you!             Your Updated Medication List - Protect others around you: Learn how to safely use, store and throw away your medicines at www.disposemymeds.org.          This list is accurate as of 3/1/18  9:32 AM.  Always use your most recent med list.                   Brand Name Dispense Instructions for use Diagnosis    acetaminophen-codeine 300-30 MG per tablet    TYLENOL #3    45 tablet    Take 1 tablet by mouth every 6 hours as needed for moderate pain    Metastatic renal cell carcinoma to lung, right (H)       ALPRAZolam 0.5 MG tablet    XANAX    60 tablet    Take 1 tablet (0.5 mg) by mouth 3 times daily as needed for anxiety    Renal cell carcinoma, unspecified laterality (H), Mass of upper lobe of left lung       ASPIRIN PO      Take 81 mg by mouth every morning    Pre-op evaluation, Brain metastases (H)       atorvastatin 20 MG tablet    LIPITOR    30 tablet    Take 1 tablet (20 mg) by mouth daily    History of stroke       buPROPion 300 MG 24 hr tablet    WELLBUTRIN XL     Take 300 mg by mouth every morning        calcium carbonate 1250 MG tablet    OS-MUNDO 500 mg [place]. Ca     Take 1 tablet by mouth daily (with lunch)    Pre-op evaluation, Brain metastases (H)       citalopram 20 MG tablet    celeXA    90 tablet    Take 1 tablet (20 mg) by mouth every evening    Insomnia, unspecified type       clonazePAM 0.5 MG tablet    klonoPIN    20 tablet    TAKE 0.5-1 TABLETS BY MOUTH 2 TIMES DAILY AS NEEDED FOR ANXIETY    Panic attack       dexamethasone 4 MG tablet    DECADRON    20 tablet    Take 6 mg PO BID x 3 days.  Then 4 mg PO BID x 3 days. Then 2 mg PO BID for 3 days. Then 2 mg once daily x 3 days, then done.    Brain metastasis (H)       HYDROcodone-acetaminophen 5-325 MG per tablet    NORCO    18 tablet    Take 1 tablet by mouth every 6 hours as needed for moderate to severe pain    Cerebral edema (H)       lidocaine-prilocaine cream    EMLA    60 g    Apply 1 hour prior to port access.    Malignant neoplasm of right kidney (H)       * LORAZEPAM PO      Take by mouth as needed for anxiety    Pre-op evaluation, Brain metastases (H)       * LORazepam 0.5 MG tablet    ATIVAN    30 tablet    Take 1 tablet (0.5 mg) by mouth every 4 hours as needed (Anxiety, Nausea/Vomiting or Sleep)    Malignant neoplasm of right kidney (H)       MAGNESIUM PO      Take by mouth At Bedtime    Pre-op evaluation, Brain metastases (H)       Multi-vitamin Tabs tablet      Take 1 tablet by mouth daily (with lunch)    Pre-op evaluation, Brain metastases (H)       OMEGA 3 PO      Take by mouth daily (with lunch)    Pre-op evaluation, Brain metastases (H)       omeprazole 40 MG capsule    priLOSEC    30 capsule    Take 1 capsule (40 mg) by mouth daily    Cerebral edema (H)       PROBIOTIC PO      Take by mouth daily (with lunch)    Pre-op evaluation, Brain metastases (H)       prochlorperazine 10 MG tablet    COMPAZINE    30 tablet    Take 1 tablet (10 mg) by mouth every 6 hours as needed (Nausea/Vomiting)    Malignant neoplasm of right kidney (H)       sodium chloride 0.9 % SOLN 100 mL with nivolumab 100 MG/10ML SOLN      Inject into the vein every 14 days    Pre-op evaluation, Brain metastases (H)       SUPER B COMPLEX/VITAMIN C PO      Take by mouth daily (with lunch)    Pre-op evaluation, Brain metastases (H)       TURMERIC PO      Take by mouth daily (with lunch)    Pre-op evaluation, Brain metastases (H)       VITAMIN B 12 PO      Take by mouth daily (with lunch)    Pre-op evaluation, Brain metastases (H)       VITAMIN C PO      Take by mouth  daily (with lunch)    Pre-op evaluation, Brain metastases (H)       VITAMIN D PO      Take by mouth daily (with lunch)    Pre-op evaluation, Brain metastases (H)       zolpidem 5 MG tablet    AMBIEN    30 tablet    Take 1 tablet (5 mg) by mouth nightly as needed for sleep    Sleep disorder       * Notice:  This list has 2 medication(s) that are the same as other medications prescribed for you. Read the directions carefully, and ask your doctor or other care provider to review them with you.

## 2018-03-01 NOTE — H&P
"  Pre-Operative H & P     CC:  Preoperative exam to assess for increased cardiopulmonary risk while undergoing surgery and anesthesia.    Date of Encounter: 3/1/2018  Primary Care Physician:  Molly Adame    Reason for visit:     Pre-op evaluation  Brain metastases (H)    HPI  Suzi Gonsales is a 52 year old female who presents for pre-operative H & P in preparation for  Left Stealth Assisted Craniotomy And Tumor Resection Latex Free on 3/6/18 by Dr Palencia in treatment of brain met.  Surgery at [facility].     History of RCC diagnosed 2014.  She had right radical nephrectomy.  Follow up CT 2017 demonstrated a number of findings concerning for metastatic disease.  She completed 4 cycles of IL-2.  Further CTs demonstrated metastasis and started on Opdivo 12/2017 every 2 weeks which she is currently taking.      Over the past few weeks, she has had a mild \"annoying\" headache but it became severe 10/10 like a gun shot while working as a  some time in February.  No recurrence. MRI ordered and demonstrated brain mass and acute lacunar infarct.  Has some short term memory lass and some balance issues.  Started on Dexamethasone and feeling better but gained 5#.  Uses Tylenol #3 1-2 per day for headaches.      Has significant anxiety with panic attacks.  Has 3 different Rx's for BZD's and rotates them \"to use up\" but only takes one per day.     No problems with anesthesia.     History is obtained from the patient and electronic health record.     Past Medical History  Past Medical History:   Diagnosis Date     Adrenal nodule (H)      Anxiety      Brain mass      Depression      Former tobacco use      Lacunar infarct, acute (H) 02/2018     Mass of left lung     Upper lobe     Renal cell carcinoma (H)     Clear cell     Solitary kidney     S/P right nephrectomy due to kidney cancer       Past Surgical History  Past Surgical History:   Procedure Laterality Date     " C/SECTION, LOW TRANSVERSE  2000    , Low Transverse     ENDOSCOPIC ULTRASOUND UPPER GASTROINTESTINAL TRACT (GI) N/A 2017    Procedure: ENDOSCOPIC ULTRASOUND, ESOPHAGOSCOPY / UPPER GASTROINTESTINAL TRACT (GI);  Esophagogastroduodenoscopy, Endoscopic Ultrasound with Fine Needle Biopsies;  Surgeon: Asad Velez MD;  Location: [facility] OR     INSERT PORT VASCULAR ACCESS N/A 2017    Procedure: INSERT PORT VASCULAR ACCESS;  Chest Port Placement-Right;  Surgeon: Melanie Cevallos PA-C;  Location: [facility] OR     open radical nephrectomy Right 14     PICC INSERTION Left 2017    5fr DL BioFlo PICC, 40cm (4cm external) in the L basilic vein w/ tip in the low SVC     PICC INSERTION Right 08/15/2017    5fr DL BioFlo PICC, 36cm (1cm external) in the R basilic vein w/ tip in the low SVC       Hx of Blood transfusions/reactions: Yes.  No reaction     Hx of abnormal bleeding or anti-platelet use: ASA stopped 18    Menstrual history: Patient's last menstrual period was 2013.:     Steroid use in the last year: Daily steroids    Personal or FH with difficulty with Anesthesia:  no    Prior to Admission Medications  Current Outpatient Prescriptions   Medication Sig Dispense Refill     ASPIRIN PO Take 81 mg by mouth every morning       Probiotic Product (PROBIOTIC PO) Take by mouth daily (with lunch)       Cholecalciferol (VITAMIN D PO) Take by mouth daily (with lunch)       Omega-3 Fatty Acids (OMEGA 3 PO) Take by mouth daily (with lunch)       Cyanocobalamin (VITAMIN B 12 PO) Take by mouth daily (with lunch)       Ascorbic Acid (VITAMIN C PO) Take by mouth daily (with lunch)       multivitamin, therapeutic with minerals (MULTI-VITAMIN) TABS tablet Take 1 tablet by mouth daily (with lunch)       MAGNESIUM PO Take by mouth At Bedtime       calcium carbonate (OS-MUNDO 500 MG [place]. CA) 1250 MG tablet Take 1 tablet by mouth daily (with lunch)       B Complex-C (SUPER B COMPLEX/VITAMIN C PO) Take  by mouth daily (with lunch)       TURMERIC PO Take by mouth daily (with lunch)       sodium chloride 0.9 % SOLN 100 mL with nivolumab 100 MG/10ML SOLN Inject into the vein every 14 days       LORAZEPAM PO Take by mouth as needed for anxiety       zolpidem (AMBIEN) 5 MG tablet Take 1 tablet (5 mg) by mouth nightly as needed for sleep (Patient taking differently: Take 5 mg by mouth At Bedtime ) 30 tablet 1     omeprazole (PRILOSEC) 40 MG capsule Take 1 capsule (40 mg) by mouth daily (Patient taking differently: Take 40 mg by mouth as needed ) 30 capsule 1     atorvastatin (LIPITOR) 20 MG tablet Take 1 tablet (20 mg) by mouth daily (Patient taking differently: Take 20 mg by mouth daily (with lunch) ) 30 tablet 3     dexamethasone (DECADRON) 4 MG tablet Take 6 mg PO BID x 3 days. Then 4 mg PO BID x 3 days. Then 2 mg PO BID for 3 days. Then 2 mg once daily x 3 days, then done. (Patient taking differently: Take 4 mg by mouth every morning ) 20 tablet 0     clonazePAM (KLONOPIN) 0.5 MG tablet TAKE 0.5-1 TABLETS BY MOUTH 2 TIMES DAILY AS NEEDED FOR ANXIETY (Patient taking differently: as needed TAKE 0.5-1 TABLETS BY MOUTH 2 TIMES DAILY AS NEEDED FOR ANXIETY) 20 tablet 0     ALPRAZolam (XANAX) 0.5 MG tablet Take 1 tablet (0.5 mg) by mouth 3 times daily as needed for anxiety (Patient taking differently: Take 0.5 mg by mouth as needed for anxiety ) 60 tablet 0     lidocaine-prilocaine (EMLA) cream Apply 1 hour prior to port access. 60 g 1     LORazepam (ATIVAN) 0.5 MG tablet Take 1 tablet (0.5 mg) by mouth every 4 hours as needed (Anxiety, Nausea/Vomiting or Sleep) (Patient taking differently: Take 0.5 mg by mouth as needed (Anxiety, Nausea/Vomiting or Sleep) ) 30 tablet 2     prochlorperazine (COMPAZINE) 10 MG tablet Take 1 tablet (10 mg) by mouth every 6 hours as needed (Nausea/Vomiting) (Patient taking differently: Take 10 mg by mouth as needed (Nausea/Vomiting) ) 30 tablet 2     acetaminophen-codeine (TYLENOL #3) 300-30 MG  per tablet Take 1 tablet by mouth every 6 hours as needed for moderate pain 45 tablet 0     citalopram (CELEXA) 20 MG tablet Take 1 tablet (20 mg) by mouth every evening (Patient taking differently: Take 20 mg by mouth At Bedtime ) 90 tablet 3     buPROPion (WELLBUTRIN XL) 300 MG 24 hr tablet Take 300 mg by mouth every morning        HYDROcodone-acetaminophen (NORCO) 5-325 MG per tablet Take 1 tablet by mouth every 6 hours as needed for moderate to severe pain 18 tablet 0       Allergies  No Known Allergies    Social History  Social History     Social History     Marital status:      Spouse name: Tunde     Number of children: 2     Years of education: N/A     Occupational History           Social History Main Topics     Smoking status: Former Smoker     Packs/day: 0.50     Years: 20.00     Types: Cigarettes     Quit date: 1/1/2004     Smokeless tobacco: Never Used     Alcohol use Yes      Comment: 1-2 drinks couple times per week     Drug use: No     Sexual activity: Not Currently     Partners: Male     Other Topics Concern     Not on file     Social History Narrative    .   for [employer].     2 sons.           Family History  Family History   Problem Relation Age of Onset     Hypertension Father      Chronic Obstructive Pulmonary Disease Father      Hyperlipidemia Brother      Hyperlipidemia Brother      CANCER No family hx of        ROS/MED HX  The complete review of systems is negative other than noted in the HPI or here.     ENT/Pulmonary:  - neg pulmonary ROS   (+)tobacco use, Past use 0.5 x 20 years packs/day  , . .    Neurologic:  - neg neurologic ROS   (+)CVA date: 2/14/18 (lacunar stroke found incidently) without deficits    Cardiovascular:  - neg cardiovascular ROS   (+) hypertension----. Taking blood thinners Pt has received instructions: Instructions Given to patient: STOP ASA last dose 2/28/18.     (-) CAD, syncope and dyslipidemia   METS/Exercise  "Tolerance: Comment: Active as a .  Lifts weights.  >4 METS   Hematologic:     (+) History of Transfusion no previous transfusion reaction -     (-) history of blood clots and anemia   Musculoskeletal:  - neg musculoskeletal ROS       GI/Hepatic: Comment: Has PPI available if needed.  No GERD.     (+) GERD (Rarely. Denies symptoms recently) Asymptomatic on medication,       Renal/Genitourinary:     (+) chronic renal disease (solitary kidney), Other Renal/ Genitourinary, Right nephrectomy      Endo:  - neg endo ROS   (+) Chronic steroid usage for Date most recently used: Started on Dexamethasone 2/14/18,.      Psychiatric:     (+) psychiatric history anxiety and depression (Panic attacks)      Infectious Disease:  - neg infectious disease ROS       Malignancy:   (+) Malignancy History of Other  Other CA Metastatic kindey CA s/p nephrectomy. Lung mass, brain mass, adrenal nodules, pancreatic mass status post Surgery         Other:    (+) No chance of pregnancy C-spine cleared: N/A, no H/O Chronic Pain,no other significant disability               Temp: 98.4  F (36.9  C) Temp src: Oral BP: 103/67 Pulse: 72   Resp: 16 SpO2: 97 %         130 lbs 0 oz  5' 2\"   Body mass index is 23.78 kg/(m^2).       Physical Exam  Constitutional: Awake, alert, cooperative, no apparent distress, and appears stated age.  Eyes: Pupils equal, round and reactive to light, extra ocular muscles intact, sclera clear, conjunctiva normal.  HENT: Normocephalic, oral pharynx with moist mucus membranes, good dentition. No goiter appreciated.   Respiratory: Clear to auscultation bilaterally, no crackles or wheezing.  Cardiovascular: Regular rate and rhythm, normal S1 and S2, and no murmur noted.  Carotids +2, no bruits. No edema. Palpable pulses to radial  DP and PT arteries.   GI: Normal bowel sounds, soft, non-distended, non-tender, no masses palpated, no hepatosplenomegaly.    Lymph/Hematologic: No cervical lymphadenopathy and no " supraclavicular lymphadenopathy.  Genitourinary:  NA  Skin: Warm and dry.  No rashes at anticipated surgical site.   Musculoskeletal: Full ROM of neck. There is no redness, warmth, or swelling of the joints. Gross motor strength is normal.    Neurologic: Awake, alert, oriented to name, place and time. Cranial nerves II-XII are intact. Gait is normal.  No pronator drift.  Finger to nose and heel to shin smooth and intact.  Speech clear.    Neuropsychiatric: Calm, cooperative. Normal affect.     Labs: (personally reviewed)  2/13/2018     INR 0.98  PTT 25    Sodium: 136  Potassium: 4.0  Chloride: 101  Carbon Dioxide: 27  Urea Nitrogen: 19  Creatinine: 1.05 (H)  GFR Estimate: 55 (L)  GFR Estimate If Black: 66  Calcium: 9.6  Anion Gap: 7  Albumin: 3.8  Protein Total: 8.0  Bilirubin Total: 0.3  Alkaline Phosphatase: 86  ALT: 17  AST: 24  Glucose: 124 (H)    WBC: 5.1  Hemoglobin: 12.4  Hematocrit: 37.9  Platelet Count: 384  RBC Count: 4.15  MCV: 91    MRI:   There is a heterogeneous slightly lobulated mass in the left  cerebellar hemisphere, with marked vasogenic edema, associated with  marked enhancement, measuring 2.1 x 1.7 x 1.7 cm on postcontrast  images. Susceptibility artifact in the intratumoral vascular branches.  There is mild mass effect on the left posterior aspect of the fourth  ventricle there is no upward herniation through the incisura.     There is an acute focal lacunar infarct in the right superior  posterior cerebellar hemisphere.      Outside records reviewed from: na    ASSESSMENT and PLAN  Suzi Gonsales is a 52 year old female scheduled to undergo  Left Stealth Assisted Craniotomy And Tumor Resection Latex Free on 3/6/18 by Dr Palencia in treatment of brain met.  PAC referral for risk assessment and optimization for anesthesia with comorbid conditions of:    History of renal cell cancer s/p right nephrectomy.  Presented with headaches earlier this month.  Found to have metastasis including brain met with  edema.  Started on Dexamethasone.    **right chest port - access if able.  Needs EMLA cream    Pre-operative considerations:  1.  Cardiac:  Functional status very good.  Active as a . 0.9%  risk of major adverse cardiac event.  Risk of MACE < 1%. METS>4.  No further cardiac evaluation needed per 2014 ACC/AHA guidelines for non-cardiac surgery.  2.  Pulm:  Airway feasible.  SURINDER risk:  Low.  Prior smoker.   3.  GI:  Risk of PONV score = 3.  If 3 or > anti-emetic intervention recommended (with 2 or more meds).    4.  Anxiety with panic disorder, depression.  Has 3 different BZD's but only uses 1 daily (rotates them).    5.  Neuro:  Acute lacunar infarct found incidentally 2/14/18.  Has follow up with neuro.  Last dose of ASA 2/28/18.  Instructed to not take anymore ASA and avoid NSAIDS for surgery.   6.  Onc:  Renal cell cancer with mets.  On Immunotherapy every 2 weeks.   7.  DVT proph recommended.     Patient is optimized and is acceptable candidate for the proposed procedure.  No further diagnostic evaluation is needed.   For complete medication and diet instructions for surgery, please refer to the AVS completed by nursing.  Advised to stop ASA.       Patient was discussed with Dr Mcconnell.    Kelly Medina PA-C  Preoperative Assessment Center  [facility]  Clinic and [facility] Center  Phone: [phone]  Fax: [phone]

## 2018-03-01 NOTE — NURSING NOTE
Chief Complaint   Patient presents with     Labs Only     labs drawn via port by RN     LMP 12/20/2013    Port accessed by RN. Labs collected and sent. Line flushed with NS & Heparin. Pt tolerated well.    Estee Hampton RN

## 2018-03-01 NOTE — PATIENT INSTRUCTIONS
Preparing for Your Surgery      Name:  Suzi Gonsales   MRN:  [id number]   :  1965   Today's Date:  3/1/2018     Arriving for surgery:  Surgery date:  3/6/18  Arrival time:  09:40 a.m.  Please come to:       [facility] [address]  [address] [place]  [address]    -   parking is available in front of the hospital from 5:15 am to 8:00 pm    -  Stop at the Information Desk in the lobby    -   Inform the information person that you are here for surgery. An escort to 3c will be provided. If you would not like an escort, please proceed to 3C on the 3rd floor. [phone]     What can I eat or drink?  -  You may have solid food or milk products until 8 hours prior to your surgery  03:00 a.m.  -  You may have water, apple juice or 7up/Sprite until 2 hours prior to your surgery  9:40 a.m.    Which medicines can I take?  -  Do NOT take these medications in the morning, the day of surgery:         -  Please take these medications the day of surgery:     All your usual morning medicine     How do I prepare myself?  -  Take two showers: one the night before surgery; and one the morning of surgery.         Use Scrubcare or Hibiclens to wash from neck down.  You may use your own shampoo and conditioner. No other hair products.   -  Do NOT use lotion, powder, deodorant, or antiperspirant the day of your surgery.  -  Do NOT wear any makeup, fingernail polish or jewelry.  -Do not bring your own medications to the hospital, except for inhalers and eye drops.  -  Bring your ID and insurance card.    Questions or Concerns:  If you have questions or concerns, please call the  Preoperative Assessment Center, Monday-Friday 7AM-7PM:  [phone]      AFTER YOUR SURGERY  Breathing exercises   Breathing exercises help you recover faster. Take deep breaths and let the air out slowly. This will:     Help you wake up after surgery.    Help prevent complications like pneumonia.  Preventing  complications will help you go home sooner.   We may give you a breathing device (incentive spirometer) to encourage you to breathe deeply.   Nausea and vomiting   You may feel sick to your stomach after surgery; if so, let your nurse know.    Pain control:  After surgery, you may have pain. Our goal is to help you manage your pain. Pain medicine will help you feel comfortable enough to do activities that will help you heal.  These activities may include breathing exercises, walking and physical therapy.   To help your health care team treat your pain we will ask: 1) If you have pain  2) where it is located 3) describe your pain in your words  Methods of pain control include medications given by mouth, vein or by nerve block for some surgeries.  We may give you a pain control pump that will:  1) Deliver the medicine through a tube placed in your vein  2) Control the amount of medicine you receive  3) Allow you to push a button to deliver a dose of pain medicine  Sequential Compression Device (SCD) or Pneumo Boots:  You may need to wear SCD S on your legs or feet. These are wraps connected to a machine that pumps in air and releases it. The repeated pumping helps prevent blood clots from forming.

## 2018-03-01 NOTE — PATIENT INSTRUCTIONS
Preparing for Your Surgery      Name:  Suzi Gonsales   MRN:  [id number]   :  1965   Today's Date:  3/1/2018     Arriving for surgery:  Surgery date:  3-6-18  Arrival time:  09:40 a.m.  Please come to:       [facility] [address]  [address] [place]  [address]    -   parking is available in front of the hospital from 5:15 am to 8:00 pm    -  Stop at the Information Desk in the lobby    -   Inform the information person that you are here for surgery. An escort to 3c will be provided. If you would not like an escort, please proceed to 3C on the 3rd floor. [phone]     What can I eat or drink?  -  You may have solid food or milk products until 8 hours prior to your surgery  03: 00 a.m.  -  You may have water, apple juice or 7up/Sprite until 2 hours prior to your surgery 09:40 a.m.    Which medicines can I take?  -  Do NOT take these medications in the morning, the day of surgery:      -  Please take these medications the day of surgery:    May take all your usual morning medications    How do I prepare myself?  -  Take two showers: one the night before surgery; and one the morning of surgery.         Use Scrubcare or Hibiclens to wash from neck down.  You may use your own shampoo and conditioner. No other hair products.   -  Do NOT use lotion, powder, deodorant, or antiperspirant the day of your surgery.  -  Do NOT wear any makeup, fingernail polish or jewelry.  -  Bring your ID and insurance card.--Do not bring your own medications to the hospital, except for inhalers and eye drops.      Questions or Concerns:  If you have questions or concerns, please call the  Preoperative Assessment Center, Monday-Friday 7AM-7PM:  [phone]          AFTER YOUR SURGERY  Breathing exercises   Breathing exercises help you recover faster. Take deep breaths and let the air out slowly. This will:     Help you wake up after surgery.    Help prevent complications like pneumonia.   Preventing complications will help you go home sooner.   We may give you a breathing device (incentive spirometer) to encourage you to breathe deeply.   Nausea and vomiting   You may feel sick to your stomach after surgery; if so, let your nurse know.    Pain control:  After surgery, you may have pain. Our goal is to help you manage your pain. Pain medicine will help you feel comfortable enough to do activities that will help you heal.  These activities may include breathing exercises, walking and physical therapy.   To help your health care team treat your pain we will ask: 1) If you have pain  2) where it is located 3) describe your pain in your words  Methods of pain control include medications given by mouth, vein or by nerve block for some surgeries.  We may give you a pain control pump that will:  1) Deliver the medicine through a tube placed in your vein  2) Control the amount of medicine you receive  3) Allow you to push a button to deliver a dose of pain medicine  Sequential Compression Device (SCD) or Pneumo Boots:  You may need to wear SCD S on your legs or feet. These are wraps connected to a machine that pumps in air and releases it. The repeated pumping helps prevent blood clots from forming.

## 2018-03-01 NOTE — ANESTHESIA PREPROCEDURE EVALUATION
Anesthesia Evaluation     . Pt has had prior anesthetic. Type: General and MAC    No history of anesthetic complications          ROS/MED HX    ENT/Pulmonary:  - neg pulmonary ROS   (+)tobacco use, Past use 0.5 x 20 years packs/day  , . .    Neurologic:  - neg neurologic ROS   (+)CVA date: 2/14/18 (lacunar stroke found incidently) without deficits    Cardiovascular:  - neg cardiovascular ROS   (+) hypertension----. Taking blood thinners Pt has received instructions: Instructions Given to patient: STOP ASA last dose 2/28/18. . . . :. . Previous cardiac testing Echodate:6/2017results:date: results:ECG reviewed date:8/2017 results: date: results:         (-) CAD, syncope and dyslipidemia   METS/Exercise Tolerance: Comment: Active as a .  Lifts weights.  >4 METS   Hematologic:     (+) History of Transfusion no previous transfusion reaction -     (-) history of blood clots and anemia   Musculoskeletal:  - neg musculoskeletal ROS       GI/Hepatic: Comment: Has PPI available if needed.  No GERD.     (+) GERD (Rarely. Denies symptoms recently) Asymptomatic on medication,       Renal/Genitourinary:     (+) chronic renal disease (solitary kidney), Other Renal/ Genitourinary, Right nephrectomy      Endo:  - neg endo ROS   (+) Chronic steroid usage for Date most recently used: Started on Dexamethasone 2/14/18,.      Psychiatric:     (+) psychiatric history anxiety and depression (Panic attacks)      Infectious Disease:  - neg infectious disease ROS       Malignancy:   (+) Malignancy History of Other  Other CA Metastatic kindey CA s/p nephrectomy. Lung mass, brain mass, adrenal nodules, pancreatic mass status post Surgery         Other:    (+) No chance of pregnancy C-spine cleared: N/A, no H/O Chronic Pain,no other significant disability                  Procedure: Procedure(s):  Left Stealth Assisted Craniotomy And Tumor Resection Latex Free - Wound Class:     HPI: Suzi Gonsales is a 52 year old female who  presents for the above procedure with Dr. Palencia.    PMHx/PSHx:  Past Medical History:   Diagnosis Date     Adrenal nodule (H)      Anxiety      Brain mass      Depression      Former tobacco use      Lacunar infarct, acute (H) 2018     Mass of left lung     Upper lobe     Renal cell carcinoma (H)     Clear cell     Solitary kidney     S/P right nephrectomy due to kidney cancer       Past Surgical History:   Procedure Laterality Date     C/SECTION, LOW TRANSVERSE  ,     , Low Transverse     ENDOSCOPIC ULTRASOUND UPPER GASTROINTESTINAL TRACT (GI) N/A 2017    Procedure: ENDOSCOPIC ULTRASOUND, ESOPHAGOSCOPY / UPPER GASTROINTESTINAL TRACT (GI);  Esophagogastroduodenoscopy, Endoscopic Ultrasound with Fine Needle Biopsies;  Surgeon: Asad Velez MD;  [facility]     INSERT PORT VASCULAR ACCESS N/A 2017    Procedure: INSERT PORT VASCULAR ACCESS;  Chest Port Placement-Right;  Surgeon: Melanie Cevallos PA-C;  [facility]     open radical nephrectomy Right 14     PICC INSERTION Left 2017    5fr DL BioFlo PICC, 40cm (4cm external) in the L basilic vein w/ tip in the low SVC     PICC INSERTION Right 08/15/2017    5fr DL BioFlo PICC, 36cm (1cm external) in the R basilic vein w/ tip in the low SVC         No current facility-administered medications on file prior to encounter.   Current Outpatient Prescriptions on File Prior to Encounter:  zolpidem (AMBIEN) 5 MG tablet Take 1 tablet (5 mg) by mouth nightly as needed for sleep (Patient not taking: Reported on 3/5/2018)   omeprazole (PRILOSEC) 40 MG capsule Take 1 capsule (40 mg) by mouth daily (Patient not taking: Reported on 3/5/2018)   atorvastatin (LIPITOR) 20 MG tablet Take 1 tablet (20 mg) by mouth daily (Patient not taking: Reported on 3/5/2018)   dexamethasone (DECADRON) 4 MG tablet Take 6 mg PO BID x 3 days. Then 4 mg PO BID x 3 days. Then 2 mg PO BID for 3 days. Then 2 mg once daily x 3 days, then done. (Patient  taking differently: Take 4 mg by mouth every morning )   clonazePAM (KLONOPIN) 0.5 MG tablet TAKE 0.5-1 TABLETS BY MOUTH 2 TIMES DAILY AS NEEDED FOR ANXIETY (Patient not taking: Reported on 3/5/2018)   ALPRAZolam (XANAX) 0.5 MG tablet Take 1 tablet (0.5 mg) by mouth 3 times daily as needed for anxiety (Patient not taking: Reported on 3/5/2018)   lidocaine-prilocaine (EMLA) cream Apply 1 hour prior to port access. (Patient not taking: Reported on 3/5/2018)   LORazepam (ATIVAN) 0.5 MG tablet Take 1 tablet (0.5 mg) by mouth every 4 hours as needed (Anxiety, Nausea/Vomiting or Sleep) (Patient not taking: Reported on 3/5/2018)   prochlorperazine (COMPAZINE) 10 MG tablet Take 1 tablet (10 mg) by mouth every 6 hours as needed (Nausea/Vomiting) (Patient not taking: Reported on 3/5/2018)   acetaminophen-codeine (TYLENOL #3) 300-30 MG per tablet Take 1 tablet by mouth every 6 hours as needed for moderate pain   citalopram (CELEXA) 20 MG tablet Take 1 tablet (20 mg) by mouth every evening (Patient taking differently: Take 20 mg by mouth At Bedtime )   buPROPion (WELLBUTRIN XL) 300 MG 24 hr tablet Take 300 mg by mouth every morning        Social Hx:   Social History   Substance Use Topics     Smoking status: Former Smoker     Packs/day: 0.50     Years: 20.00     Types: Cigarettes     Quit date: 1/1/2004     Smokeless tobacco: Never Used     Alcohol use Yes      Comment: 1-2 drinks couple times per week       Allergies: No Known Allergies      NPO Status: Per ASA Guidelines    Labs:    Blood Bank:  Lab Results   Component Value Date    ABO O 03/01/2018    RH Pos 03/01/2018    AS Neg 03/01/2018     BMP:  Recent Labs   Lab Test  03/01/18   1025   NA  134   POTASSIUM  4.5   CHLORIDE  101   CO2  27   BUN  22   CR  0.84   GLC  96   MUNDO  9.5     CBC:   Recent Labs   Lab Test  03/01/18   1025   WBC  14.3*   RBC  3.82   HGB  11.8   HCT  35.1   MCV  92   MCH  30.9   MCHC  33.6   RDW  14.2   PLT  384     Coags:  Recent Labs   Lab Test   03/01/18   1025  12/04/17   0905   INR   --   0.98   PTT  25   --            Physical Exam  Normal systems: dental    Airway   Mallampati: III  TM distance: >3 FB  Neck ROM: full    Dental     Cardiovascular   Rhythm and rate: regular and normal  (+) murmur   (-) carotid bruit is not present and no peripheral edema    Pulmonary    breath sounds clear to auscultation    Other findings:   2/13/2018     INR 0.98  PTT 25    Sodium: 136  Potassium: 4.0  Chloride: 101  Carbon Dioxide: 27  Urea Nitrogen: 19  Creatinine: 1.05 (H)  GFR Estimate: 55 (L)  GFR Estimate If Black: 66  Calcium: 9.6  Anion Gap: 7  Albumin: 3.8  Protein Total: 8.0  Bilirubin Total: 0.3  Alkaline Phosphatase: 86  ALT: 17  AST: 24  Glucose: 124 (H)    WBC: 5.1  Hemoglobin: 12.4  Hematocrit: 37.9  Platelet Count: 384  RBC Count: 4.15  MCV: 91    There is a heterogeneous slightly lobulated mass in the left  cerebellar hemisphere, with marked vasogenic edema, associated with  marked enhancement, measuring 2.1 x 1.7 x 1.7 cm on postcontrast  images. Susceptibility artifact in the intratumoral vascular branches.  There is mild mass effect on the left posterior aspect of the fourth  ventricle there is no upward herniation through the incisura.     There is an acute focal lacunar infarct in the right superior  posterior cerebellar hemisphere.           PAC Discussion and Assessment    ASA Classification: 3  Case is suitable for: [place]  Anesthetic techniques and relevant risks discussed: GA  Invasive monitoring and risk discussed: Yes  Types:   Possibility and Risk of blood transfusion discussed: Yes  NPO instructions given:   Additional anesthetic preparation and risks discussed:   Needs early admission to pre-op area:   Other:     PAC Resident/NP Anesthesia Assessment:  Scheduled for Left Stealth Assisted Craniotomy And Tumor Resection Latex Free on 3/6/18 by Dr Palencia in treatment of brain met.  PAC referral for risk assessment and optimization for  anesthesia with comorbid conditions of:    History of renal cell cancer s/p right nephrectomy.  Presented with headaches earlier this month.  Found to have metastasis including brain met with edema.  Started on Dexamethasone.    **right chest port - access if able.  Needs EMLA cream    Pre-operative considerations:  1.  Cardiac:  Functional status very good.  Active as a . 0.9%  risk of major adverse cardiac event.  Risk of MACE < 1%. METS>4.  No further cardiac evaluation needed per 2014 ACC/AHA guidelines for non-cardiac surgery.  2.  Pulm:  Airway feasible.  SURINDER risk:  Low.  Prior smoker.   3.  GI:  Risk of PONV score = 3.  If 3 or > anti-emetic intervention recommended (with 2 or more meds).    4.  Anxiety with panic disorder, depression.  Has 3 different BZD's but only uses 1 daily (rotates them).    5.  Neuro:  Acute lacunar infarct found incidentally 2/14/18.  Has follow up with neuro.  Last dose of ASA 2/28/18.   6.  Onc:  Renal cell cancer with mets.  On Immunotherapy every 2 weeks.   7.  DVT proph recommended.     Patient is optimized and is acceptable candidate for the proposed procedure.  No further diagnostic evaluation is needed.     Discussed with Dr. Mcconnell. See recommendations below.           Reviewed and Signed by PAC Mid-Level Provider/Resident  Mid-Level Provider/Resident: Kelly Medina PA-C  Date: 3/1/18  Time: 0930    Attending Anesthesiologist Anesthesia Assessment:  52 year old for craniotomy and tumor resection of xena metastasis from renal cell cancer S/P nephrectomy. Patient has no significant cardiac or pulmonary disease.    Patient/case discussed with MEME. No need to see patient. Patient is appropriate for the planned procedure without further work-up or medical management.      Reviewed and Signed by PAC Anesthesiologist  Anesthesiologist: any  Date: 3/1/2018  Time:   Pass/Fail: Pass  Disposition:     PAC Pharmacist Assessment:        Pharmacist:   Date:    Time:      Anesthesia Plan      History & Physical Review  History and physical reviewed and following examination; no interval change.    ASA Status:  3 .    NPO Status:  > 8 hours    Plan for General and ETT with Intravenous induction. Maintenance will be Other.    PONV prophylaxis:  Ondansetron (or other 5HT-3)  Additional equipment: Videolaryngoscope, 2nd IV and Arterial Line      Postoperative Care  Postoperative pain management:  IV analgesics.      Consents  Anesthetic plan, risks, benefits and alternatives discussed with:  Patient and Spouse..          - ASA 3  - GETA with standard ASA monitors, IV induction, balanced anesthetic  - PIVx2 and arterial line   - Antibiotics per surgery  - PONV prophylaxis  - Pain management with Fentanyl/dilaudid boluses    Adry Martin MD CA-1                     .

## 2018-03-02 ENCOUNTER — HOSPITAL ENCOUNTER (OUTPATIENT)
Dept: CT IMAGING | Facility: CLINIC | Age: 53
Discharge: HOME | End: 2018-03-02
Attending: NURSE PRACTITIONER | Admitting: NURSE PRACTITIONER
Payer: COMMERCIAL

## 2018-03-02 ENCOUNTER — HOSPITAL ENCOUNTER (OUTPATIENT)
Dept: CT IMAGING | Facility: CLINIC | Age: 53
End: 2018-03-02
Attending: PHYSICIAN ASSISTANT
Payer: COMMERCIAL

## 2018-03-02 DIAGNOSIS — C78.01 METASTATIC RENAL CELL CARCINOMA TO LUNG, RIGHT (H): ICD-10-CM

## 2018-03-02 DIAGNOSIS — C64.1 MALIGNANT NEOPLASM OF RIGHT KIDNEY (H): ICD-10-CM

## 2018-03-02 DIAGNOSIS — G93.6 CEREBRAL EDEMA (H): ICD-10-CM

## 2018-03-02 DIAGNOSIS — C64.9 METASTATIC RENAL CELL CARCINOMA TO LUNG, RIGHT (H): ICD-10-CM

## 2018-03-02 DIAGNOSIS — Z86.73 HISTORY OF STROKE: ICD-10-CM

## 2018-03-02 PROCEDURE — 25000128 H RX IP 250 OP 636: Performed by: RADIOLOGY

## 2018-03-02 PROCEDURE — 70498 CT ANGIOGRAPHY NECK: CPT

## 2018-03-02 PROCEDURE — 74177 CT ABD & PELVIS W/CONTRAST: CPT

## 2018-03-02 RX ORDER — IOPAMIDOL 755 MG/ML
500 INJECTION, SOLUTION INTRAVASCULAR ONCE
Status: COMPLETED | OUTPATIENT
Start: 2018-03-02 | End: 2018-03-02

## 2018-03-02 RX ORDER — HEPARIN SODIUM (PORCINE) LOCK FLUSH IV SOLN 100 UNIT/ML 100 UNIT/ML
SOLUTION INTRAVENOUS
Status: DISPENSED
Start: 2018-03-02 | End: 2018-03-02

## 2018-03-02 RX ORDER — HEPARIN SODIUM (PORCINE) LOCK FLUSH IV SOLN 100 UNIT/ML 100 UNIT/ML
5 SOLUTION INTRAVENOUS ONCE
Status: COMPLETED | OUTPATIENT
Start: 2018-03-02 | End: 2018-03-02

## 2018-03-02 RX ORDER — HYDROCODONE BITARTRATE AND ACETAMINOPHEN 5; 325 MG/1; MG/1
1 TABLET ORAL EVERY 6 HOURS PRN
Qty: 40 TABLET | Refills: 0 | Status: ON HOLD | OUTPATIENT
Start: 2018-03-02 | End: 2018-03-08

## 2018-03-02 RX ADMIN — SODIUM CHLORIDE 80 ML: 9 INJECTION, SOLUTION INTRAVENOUS at 09:11

## 2018-03-02 RX ADMIN — IOPAMIDOL 70 ML: 755 INJECTION, SOLUTION INTRAVENOUS at 09:11

## 2018-03-02 RX ADMIN — SODIUM CHLORIDE, PRESERVATIVE FREE 5 ML: 5 INJECTION INTRAVENOUS at 09:20

## 2018-03-02 NOTE — TELEPHONE ENCOUNTER
Patient is seen by Dr. Green and TABITHA Redding at [facility]. She stopped in at the [facility] today because she was at [facility] for imaging. She is requesting a refill for Norco. She last received a refill on 2/19 for a quantity of 18 tabs by Dr. Cuellar. Patient states she is unable to get a refill from Dr. Cuellar before the weekend. Patient is also requesting a refill of Tylenol with Codeine. Paged TABITHA Redding to see if she will ok the refill for Norco and Tylenol with Codeine. Per Kia, ok to refill [place] quantity 40 tabs but not to refill the Tylenol with Codeine. She should stick with the [place] for pain. Rx printed and it was signed by Miguel Angel Hernandez NP at the [facility]. Script given to patient.     EULOGIO Caicedo

## 2018-03-05 ENCOUNTER — OFFICE VISIT (OUTPATIENT)
Dept: NEUROSURGERY | Facility: CLINIC | Age: 53
End: 2018-03-05
Attending: NEUROLOGICAL SURGERY
Payer: COMMERCIAL

## 2018-03-05 ENCOUNTER — CARE COORDINATION (OUTPATIENT)
Dept: ONCOLOGY | Facility: CLINIC | Age: 53
End: 2018-03-05

## 2018-03-05 VITALS
BODY MASS INDEX: 24.37 KG/M2 | HEART RATE: 73 BPM | OXYGEN SATURATION: 94 % | WEIGHT: 132.4 LBS | RESPIRATION RATE: 16 BRPM | HEIGHT: 62 IN | DIASTOLIC BLOOD PRESSURE: 56 MMHG | TEMPERATURE: 97.3 F | SYSTOLIC BLOOD PRESSURE: 104 MMHG

## 2018-03-05 DIAGNOSIS — C79.31 BRAIN METASTASIS: Primary | ICD-10-CM

## 2018-03-05 PROCEDURE — G0463 HOSPITAL OUTPT CLINIC VISIT: HCPCS

## 2018-03-05 ASSESSMENT — PAIN SCALES - GENERAL: PAINLEVEL: MODERATE PAIN (5)

## 2018-03-05 NOTE — PROGRESS NOTES
Neurosurgery Clinic Note    REASON FOR VISIT:   Left cerebellar mass; discuss surgery    HISTORY OF PRESENT ILLNESS:  Ms. Gonsales is a 52 year old woman who works as a . She has a history of renal cell carcinoma diagnosed in . She underwent right radical nephrectomy and completed 4 cycles of IL-2. She was most recently started on Opdivo (2017). Over the past few weeks, she has been complaining of increasing headaches. An MR brain revealed a mass in the left cerebellum, likely metastatic. She was started on decadron which has improved her symptoms. She also reports some balance issues and short term memory loss. She was referred to [facility] neurosurgery for discussion of surgery. Of note, patient had also a small infarct in the right cerebellum. She denies any other weakness, tingling, or numbness. No nausea/vomiting, fever, chills,  headaches.     PAST MEDICAL HISTORY:  Past Medical History:   Diagnosis Date     Adrenal nodule (H)      Anxiety      Brain mass      Depression      Former tobacco use      Lacunar infarct, acute (H) 2018     Mass of left lung     Upper lobe     Renal cell carcinoma (H)     Clear cell     Solitary kidney     S/P right nephrectomy due to kidney cancer     PAST SURGICAL HISTORY:  Past Surgical History:   Procedure Laterality Date     C/SECTION, LOW TRANSVERSE  ,     , Low Transverse     ENDOSCOPIC ULTRASOUND UPPER GASTROINTESTINAL TRACT (GI) N/A 2017    Procedure: ENDOSCOPIC ULTRASOUND, ESOPHAGOSCOPY / UPPER GASTROINTESTINAL TRACT (GI);  Esophagogastroduodenoscopy, Endoscopic Ultrasound with Fine Needle Biopsies;  Surgeon: Asad Velez MD;  Location: [facility] OR     INSERT PORT VASCULAR ACCESS N/A 2017    Procedure: INSERT PORT VASCULAR ACCESS;  Chest Port Placement-Right;  Surgeon: Melanie Cevallos PA-C;  [facility]     open radical nephrectomy Right 14     PICC INSERTION Left 2017    5fr DL BioFlo PICC, 40cm  (4cm external) in the L basilic vein w/ tip in the low SVC     PICC INSERTION Right 08/15/2017    5fr DL BioFlo PICC, 36cm (1cm external) in the R basilic vein w/ tip in the low SVC     ROS:    Answers for HPI/ROS submitted by the patient on 2/28/2018   General Symptoms: Yes  Skin Symptoms: No  HENT Symptoms: No  EYE SYMPTOMS: No  HEART SYMPTOMS: No  LUNG SYMPTOMS: Yes  INTESTINAL SYMPTOMS: Yes  URINARY SYMPTOMS: No  GYNECOLOGIC SYMPTOMS: No  BREAST SYMPTOMS: No  SKELETAL SYMPTOMS: Yes  BLOOD SYMPTOMS: No  NERVOUS SYSTEM SYMPTOMS: Yes  MENTAL HEALTH SYMPTOMS: Yes  Fever: No  Loss of appetite: No  Weight loss: No  Weight gain: Yes  Fatigue: Yes  Night sweats: No  Chills: No  Increased stress: Yes  Excessive hunger: Yes  Excessive thirst: No  Feeling hot or cold when others believe the temperature is normal: Yes  Loss of height: No  Post-operative complications: No  Surgical site pain: No  Hallucinations: No  Change in or Loss of Energy: No  Hyperactivity: No  Confusion: No  Cough: Yes  Sputum or phlegm: No  Coughing up blood: No  Difficulty breating or shortness of breath: Yes  Snoring: No  Wheezing: No  Difficulty breathing on exertion: No  Nighttime Cough: No  Difficulty breathing when lying flat: No  Heart burn or indigestion: Yes  Nausea: No  Vomiting: No  Abdominal pain: No  Bloating: No  Constipation: No  Diarrhea: No  Blood in stool: No  Black stools: No  Rectal or Anal pain: No  Fecal incontinence: No  Yellowing of skin or eyes: No  Vomit with blood: No  Change in stools: No  Back pain: No  Muscle aches: Yes  Neck pain: Yes  Swollen joints: No  Joint pain: No  Bone pain: No  Muscle cramps: No  Muscle weakness: No  Joint stiffness: Yes  Bone fracture: No  Trouble with coordination: Yes  Dizziness or trouble with balance: Yes  Fainting or black-out spells: No  Memory loss: No  Headache: Yes  Seizures: No  Speech problems: No  Tingling: No  Tremor: Yes  Weakness: Yes  Difficulty walking: No  Paralysis:  No  Numbness: No  Nervous or Anxious: Yes  Depression: No  Trouble sleeping: Yes  Trouble thinking or concentrating: No  Mood changes: Yes  Panic attacks: Yes    PHYSICAL EXAM:  Alert and oriented to person, place, and time. NAD.   PERRL, EOMI. Face symmetric. Tongue midline.   Uvula midline and palate elevated symmetrically.   Strong eye closure, jaw clench, and cheek puff.  Trapezii and SCM muscles 5/5 bilaterally.  No pronator drift.   BUE and BLE 5/5 throughout.   Reflexes 2+ throughout.   Sensation intact and symmetric to light touch throughout.   Normal FNF, normal HTS test. Gait is normal.     IMAGES:  MRI brain w/wo contrast (2/14/18) reveals a contrast enhancing mass (measuring 2.1cm) in the left cerebellum with vasogenic edema and mass effect on the brainstem.     ASSESSMENT:  Ms. Gonsales is a 52 year old woman with metastatic renal cell carcinoma. Dr. Palencia evaluated the patient and had an extensive discussion about the surgery. Surgery will be a left suboccipital craniotomy and tumor resection with stealth. We discussed risks of surgery, these include bleeding, infection, wound complications, balance issues, injury to important structures (brainstem). Patient and her family verbalized understanding and would like to proceed with surgery. All of their questions were answered.    PLAN:     Left stealth assisted craniotomy and tumor resection    The patient was discussed with neurosurgery faculty, Dr. Palencia.    Kareem Olmstead MD, MS  Neurosurgery PGY-1      I saw the patient with the resident.  I have reviewed and edited the resident note and agree with the plan of care.      Quintin Palencia MD

## 2018-03-05 NOTE — NURSING NOTE
"Oncology Rooming Note    March 5, 2018 3:50 PM   Suzi Gonsales is a 52 year old female who presents for:    Chief Complaint   Patient presents with     Oncology Clinic Visit     Return: Brain metastasis      Initial Vitals: /56 (BP Location: Left arm, Patient Position: Sitting, Cuff Size: Adult Regular)  Pulse 73  Temp 97.3  F (36.3  C) (Oral)  Resp 16  Ht 1.575 m (5' 2\")  Wt 60.1 kg (132 lb 6.4 oz)  LMP 12/20/2013  SpO2 94%  BMI 24.22 kg/m2 Estimated body mass index is 24.22 kg/(m^2) as calculated from the following:    Height as of this encounter: 1.575 m (5' 2\").    Weight as of this encounter: 60.1 kg (132 lb 6.4 oz). Body surface area is 1.62 meters squared.  Moderate Pain (5) Comment: Data Unavailable   Patient's last menstrual period was 12/20/2013.  Allergies reviewed: Yes  Medications reviewed: No    Medications: Medication refills not needed today.  Pharmacy name entered into [employer]:    [id number] - [place], [place] - [address] AT [facility] [address]  [facility] USE FOR MAILING ONLY - [place], [place]    Clinical concerns: No concerns.     5 minutes for nursing intake (face to face time)     Kiana Beyer CMA              "

## 2018-03-05 NOTE — LETTER
3/5/2018       RE: Suzi Gonsales  [address] [place] [address]     Dear Colleague,    Thank you for referring your patient, Suzi Gonsales, to the [facility]. Please see a copy of my visit note below.        Neurosurgery Clinic Note    REASON FOR VISIT:   Left cerebellar mass; discuss surgery    HISTORY OF PRESENT ILLNESS:  Ms. Gonsales is a 52 year old woman who works as a . She has a history of renal cell carcinoma diagnosed in . She underwent right radical nephrectomy and completed 4 cycles of IL-2. She was most recently started on Opdivo (2017). Over the past few weeks, she has been complaining of increasing headaches. An MR brain revealed a mass in the left cerebellum, likely metastatic. She was started on decadron which has improved her symptoms. She also reports some balance issues and short term memory loss. She was referred to [facility] neurosurgery for discussion of surgery. Of note, patient had also a small infarct in the right cerebellum. She denies any other weakness, tingling, or numbness. No nausea/vomiting, fever, chills,  headaches.     PAST MEDICAL HISTORY:  Past Medical History:   Diagnosis Date     Adrenal nodule (H)      Anxiety      Brain mass      Depression      Former tobacco use      Lacunar infarct, acute (H) 2018     Mass of left lung     Upper lobe     Renal cell carcinoma (H)     Clear cell     Solitary kidney     S/P right nephrectomy due to kidney cancer     PAST SURGICAL HISTORY:  Past Surgical History:   Procedure Laterality Date     C/SECTION, LOW TRANSVERSE  ,     , Low Transverse     ENDOSCOPIC ULTRASOUND UPPER GASTROINTESTINAL TRACT (GI) N/A 2017    Procedure: ENDOSCOPIC ULTRASOUND, ESOPHAGOSCOPY / UPPER GASTROINTESTINAL TRACT (GI);  Esophagogastroduodenoscopy, Endoscopic Ultrasound with Fine Needle Biopsies;  Surgeon: Asad Velez MD;  Location: [facility] OR     INSERT PORT VASCULAR ACCESS N/A  12/4/2017    Procedure: INSERT PORT VASCULAR ACCESS;  Chest Port Placement-Right;  Surgeon: Melanie Cevallos PA-C;  [facility]     open radical nephrectomy Right 12/31/14     PICC INSERTION Left 07/31/2017    5fr DL BioFlo PICC, 40cm (4cm external) in the L basilic vein w/ tip in the low SVC     PICC INSERTION Right 08/15/2017    5fr DL BioFlo PICC, 36cm (1cm external) in the R basilic vein w/ tip in the low SVC     ROS:    PHYSICAL EXAM:  Alert and oriented to person, place, and time. NAD.   PERRL, EOMI. Face symmetric. Tongue midline.   Uvula midline and palate elevated symmetrically.   Strong eye closure, jaw clench, and cheek puff.  Trapezii and SCM muscles 5/5 bilaterally.  No pronator drift.   BUE and BLE 5/5 throughout.   Reflexes 2+ throughout.   Sensation intact and symmetric to light touch throughout.   Normal FNF, normal HTS test. Gait is normal.     IMAGES:  MRI brain w/wo contrast (2/14/18) reveals a contrast enhancing mass (measuring 2.1cm) in the left cerebellum with vasogenic edema and mass effect on the brainstem.     ASSESSMENT:  Ms. Gonsales is a 52 year old woman with metastatic renal cell carcinoma. Dr. Palencia evaluated the patient and had an extensive discussion about the surgery. Surgery will be a left suboccipital craniotomy and tumor resection with stealth. We discussed risks of surgery, these include bleeding, infection, wound complications, balance issues, injury to important structures (brainstem). Patient and her family verbalized understanding and would like to proceed with surgery. All of their questions were answered.    PLAN:     Left stealth assisted craniotomy and tumor resection    The patient was discussed with neurosurgery faculty, Dr. Palencia.    Kareem Olmstead MD, MS  Neurosurgery PGY-1      I saw the patient with the resident.  I have reviewed and edited the resident note and agree with the plan of care.      Quintin Palencia MD

## 2018-03-05 NOTE — PROGRESS NOTES
Met with patient and family after consultation appt with Dr. Palencia.  Pt  verbalizes understanding of Dr. Palencia's plan of care and denies questions or barriers to care today.    Household includes:    Introduced self and role of RN Care Coordinator at [facility].  Provided my contact information.    Answered question regarding recovery period, and post-op instructions.     Verbal and writtien instructions provided re:     Pre-operative instructions/routine and requirements to include:  Surgical procedure, need for History and Physical Preoperative Assessment Center appointment, NPO prior to surgery, showering instructions, medications to avoid,  post-operative expectations, pain control, follow-up after surgery and contact information for questions or concerns prior to surgery.        Time was provided for patient to ask questions and they were answered to her stated satisfaction and understanding.

## 2018-03-05 NOTE — MR AVS SNAPSHOT
After Visit Summary   3/5/2018    Suzi Gonsales    MRN: [id number]           Patient Information     Date Of Birth          1965        Visit Information        Provider Department      3/5/2018 4:00 PM Quintin Palencia MD [facility]        Today's Diagnoses     Brain metastasis (H)    -  1       Follow-ups after your visit        Your next 10 appointments already scheduled     Mar 14, 2018 12:50 PM CDT   (Arrive by 12:35 PM)   New Patient Visit with Jake Urrutia MD   [facility] ([facility])    [address]  [address] [facility] [address]   [phone]            Apr 05, 2018  5:30 AM CDT   GAMMA TREATMENT with Huyen Cuellar MD   [facility] [place], Radiation Oncology ([facility])    [address] [place] [address]   [phone]            Apr 05, 2018  6:30 AM CDT   GAMMA TREATMENT with Itz King MD   [facility] [place], Radiation Oncology ([facility])    [address] [place] [address]   [phone]            Apr 05, 2018  7:00 AM CDT   (Arrive by 6:45 AM)   MR BRAIN W CONTRAST with UUMR1   [facility] [place], MRI ([facility])    [address] [address]   [phone]           Take your medicines as usual, unless your doctor tells you not to. Bring a list of your current medicines to your exam (including vitamins, minerals and over-the-counter drugs).  You may or may not receive intravenous (IV) contrast for this exam pending the discretion of the Radiologist.  You do not need to do anything special to prepare.  The MRI machine uses a strong magnet. Please wear clothes without metal (snaps, zippers). A sweatsuit works well, or we may give you a hospital gown.  Please remove any body piercings and hair extensions before you arrive. You will also remove watches, jewelry, hairpins, wallets, dentures, partial dental plates  and hearing aids. You may wear contact lenses, and you may be able to wear your rings. We have a safe place to keep your personal items, but it is safer to leave them at home.  **IMPORTANT** THE INSTRUCTIONS BELOW ARE ONLY FOR THOSE PATIENTS WHO HAVE BEEN PRESCRIBED SEDATION OR GENERAL ANESTHESIA DURING THEIR MRI PROCEDURE:  IF YOUR DOCTOR PRESCRIBED ORAL SEDATION (take medicine to help you relax during your exam):   You must get the medicine from your doctor (oral medication) before you arrive. Bring the medicine to the exam. Do not take it at home. You ll be told when to take it upon arriving for your exam.   Arrive one hour early. Bring someone who can take you home after the test. Your medicine will make you sleepy. After the exam, you may not drive, take a bus or take a taxi by yourself.  IF YOUR DOCTOR PRESCRIBED IV SEDATION:   Arrive one hour early. Bring someone who can take you home after the test. Your medicine will make you sleepy. After the exam, you may not drive, take a bus or take a taxi by yourself.   No eating 6 hours before your exam. You may have clear liquids up until 4 hours before your exam. (Clear liquids include water, clear tea, black coffee and fruit juice without pulp.)  IF YOUR DOCTOR PRESCRIBED ANESTHESIA (be asleep for your exam):   Arrive 1 1/2 hours early. Bring someone who can take you home after the test. You may not drive, take a bus or take a taxi by yourself.   No eating 8 hours before your exam. You may have clear liquids up until 4 hours before your exam. (Clear liquids include water, clear tea, black coffee and fruit juice without pulp.)   You will spend four to five hours in the recovery room.  Please call the Imaging Department at your exam site with any questions.              Who to contact     If you have questions or need follow up information about today's clinic visit or your schedule please contact [facility] directly at [phone].  Normal or  "non-critical lab and imaging results will be communicated to you by MyChart, letter or phone within 4 business days after the clinic has received the results. If you do not hear from us within 7 days, please contact the clinic through [email or web address] or phone. If you have a critical or abnormal lab result, we will notify you by phone as soon as possible.  Submit refill requests through [email or web address] or call your pharmacy and they will forward the refill request to us. Please allow 3 business days for your refill to be completed.          Additional Information About Your Visit        [email or web address] Information     [email or web address] gives you secure access to your electronic health record. If you see a primary care provider, you can also send messages to your care team and make appointments. If you have questions, please call your primary care clinic.  If you do not have a primary care provider, please call [phone] and they will assist you.        Care EveryWhere ID     This is your Care EveryWhere ID. This could be used by other organizations to access your [facility] medical records  [id number]        Your Vitals Were     Pulse Temperature Respirations Height Last Period Pulse Oximetry    73 97.3  F (36.3  C) (Oral) 16 1.575 m (5' 2\") 12/20/2013 94%    BMI (Body Mass Index)                   24.22 kg/m2            Blood Pressure from Last 3 Encounters:   03/08/18 92/41   03/05/18 104/56   03/01/18 103/67    Weight from Last 3 Encounters:   03/06/18 58.9 kg (129 lb 13.6 oz)   03/05/18 60.1 kg (132 lb 6.4 oz)   03/01/18 59 kg (130 lb)              Today, you had the following     No orders found for display         Today's Medication Changes          These changes are accurate as of 3/5/18 11:59 PM.  If you have any questions, ask your nurse or doctor.               These medicines have changed or have updated prescriptions.        Dose/Directions    citalopram 20 MG tablet   Commonly known as:  celeXA   This may have changed:  when to take " this   Used for:  Insomnia, unspecified type        Dose:  20 mg   Take 1 tablet (20 mg) by mouth every evening   Quantity:  90 tablet   Refills:  3                Primary Care Provider Office Phone # Fax #    ARIEL Carter YAMEL [phone] [address] [address]        Equal Access to Services     [facility] JAYDON : Hadii aad ku hadasho Soomaali, waaxda luqadaha, qaybta kaalmada adeegyada, waxnanci lewisin hayaan aderohit kelloggbarrypercy oswald . So [facility] [phone].    ATENCIÓN: Si habla español, tiene a chacon disposición servicios gratuitos de asistencia lingüística. Llame al [phone].    We comply with applicable federal civil rights laws and [place] laws. We do not discriminate on the basis of race, color, national origin, age, disability, sex, sexual orientation, or gender identity.            Thank you!     Thank you for choosing [facility]  for your care. Our goal is always to provide you with excellent care. Hearing back from our patients is one way we can continue to improve our services. Please take a few minutes to complete the written survey that you may receive in the mail after your visit with us. Thank you!             Your Updated Medication List - Protect others around you: Learn how to safely use, store and throw away your medicines at www.disposemymeds.org.          This list is accurate as of 3/5/18 11:59 PM.  Always use your most recent med list.                   Brand Name Dispense Instructions for use Diagnosis    ALPRAZolam 0.5 MG tablet    XANAX    60 tablet    Take 1 tablet (0.5 mg) by mouth 3 times daily as needed for anxiety    Renal cell carcinoma, unspecified laterality (H), Mass of upper lobe of left lung       aspirin 81 MG chewable tablet   Start taking on:  3/19/2018     36 tablet    Take 1 tablet (81 mg) by mouth every morning    Pre-op evaluation, Brain metastases (H)       atorvastatin 20 MG tablet    LIPITOR    30 tablet     Take 1 tablet (20 mg) by mouth daily    History of stroke       buPROPion 300 MG 24 hr tablet    WELLBUTRIN XL     Take 300 mg by mouth every morning        calcium carbonate 1250 MG tablet    OS-MUNDO 500 mg Lumbee. Ca     Take 1 tablet by mouth daily (with lunch)    Pre-op evaluation, Brain metastases (H)       citalopram 20 MG tablet    celeXA    90 tablet    Take 1 tablet (20 mg) by mouth every evening    Insomnia, unspecified type       clonazePAM 0.5 MG tablet    klonoPIN    20 tablet    TAKE 0.5-1 TABLETS BY MOUTH 2 TIMES DAILY AS NEEDED FOR ANXIETY    Panic attack       * dexamethasone 4 MG tablet    DECADRON    6 tablet    Take 1 tablet (4 mg) by mouth every 8 hours for 2 days    S/P craniotomy       * dexamethasone 2 MG tablet    DECADRON    9 tablet    Take 1 tablet (2 mg) by mouth every 8 hours for 3 days    S/P craniotomy       * dexamethasone 2 MG tablet   Start taking on:  3/12/2018    DECADRON    6 tablet    Take 1 tablet (2 mg) by mouth every 12 hours for 3 days    S/P craniotomy       * dexamethasone 2 MG tablet   Start taking on:  3/15/2018    DECADRON    3 tablet    Take 1 tablet (2 mg) by mouth daily for 3 days    S/P craniotomy       lidocaine-prilocaine cream    EMLA    60 g    Apply 1 hour prior to port access.    Malignant neoplasm of right kidney (H)       * LORAZEPAM PO      Take by mouth as needed for anxiety    Pre-op evaluation, Brain metastases (H)       * LORazepam 0.5 MG tablet    ATIVAN    30 tablet    Take 1 tablet (0.5 mg) by mouth every 4 hours as needed (Anxiety, Nausea/Vomiting or Sleep)    Malignant neoplasm of right kidney (H)       MAGNESIUM PO      Take by mouth At Bedtime    Pre-op evaluation, Brain metastases (H)       methocarbamol 500 MG tablet    ROBAXIN    30 tablet    Take 2 tablets (1,000 mg) by mouth every 6 hours as needed for muscle spasms    S/P craniotomy       Multi-vitamin Tabs tablet      Take 1 tablet by mouth daily (with lunch)    Pre-op evaluation, Brain  metastases (H)       OMEGA 3 PO      Take by mouth daily (with lunch)    Pre-op evaluation, Brain metastases (H)       omeprazole 40 MG capsule    priLOSEC    30 capsule    Take 1 capsule (40 mg) by mouth daily    Cerebral edema (H)       oxyCODONE IR 5 MG tablet    ROXICODONE    60 tablet    Take 1-2 tablets (5-10 mg) by mouth every 4 hours as needed for other (pain control or improvement in physical function. Hold dose for analgesic side effects.)    S/P craniotomy       PROBIOTIC PO      Take by mouth daily (with lunch)    Pre-op evaluation, Brain metastases (H)       prochlorperazine 10 MG tablet    COMPAZINE    30 tablet    Take 1 tablet (10 mg) by mouth every 6 hours as needed (Nausea/Vomiting)    Malignant neoplasm of right kidney (H)       senna-docusate 8.6-50 MG per tablet    SENOKOT-S;PERICOLACE    30 tablet    Take 1 tablet by mouth 2 times daily    S/P craniotomy       sodium chloride 0.9 % SOLN 100 mL with nivolumab 100 MG/10ML SOLN      Inject into the vein every 14 days    Pre-op evaluation, Brain metastases (H)       SUPER B COMPLEX/VITAMIN C PO      Take by mouth daily (with lunch)    Pre-op evaluation, Brain metastases (H)       TURMERIC PO      Take by mouth daily (with lunch)    Pre-op evaluation, Brain metastases (H)       VITAMIN B 12 PO      Take by mouth daily (with lunch)    Pre-op evaluation, Brain metastases (H)       VITAMIN C PO      Take by mouth daily (with lunch)    Pre-op evaluation, Brain metastases (H)       VITAMIN D PO      Take by mouth daily (with lunch)    Pre-op evaluation, Brain metastases (H)       zolpidem 5 MG tablet    AMBIEN    30 tablet    Take 1 tablet (5 mg) by mouth nightly as needed for sleep    Sleep disorder       * Notice:  This list has 6 medication(s) that are the same as other medications prescribed for you. Read the directions carefully, and ask your doctor or other care provider to review them with you.

## 2018-03-06 ENCOUNTER — ANESTHESIA (OUTPATIENT)
Dept: SURGERY | Facility: CLINIC | Age: 53
DRG: 026 | End: 2018-03-06
Payer: COMMERCIAL

## 2018-03-06 ENCOUNTER — HOSPITAL ENCOUNTER (OUTPATIENT)
Dept: CT IMAGING | Facility: CLINIC | Age: 53
DRG: 026 | End: 2018-03-06
Attending: NEUROLOGICAL SURGERY
Payer: COMMERCIAL

## 2018-03-06 ENCOUNTER — HOSPITAL ENCOUNTER (INPATIENT)
Facility: CLINIC | Age: 53
LOS: 2 days | Discharge: HOME | DRG: 026 | End: 2018-03-08
Attending: NEUROLOGICAL SURGERY | Admitting: NEUROLOGICAL SURGERY
Payer: COMMERCIAL

## 2018-03-06 ENCOUNTER — HOSPITAL ENCOUNTER (OUTPATIENT)
Dept: MRI IMAGING | Facility: CLINIC | Age: 53
DRG: 026 | End: 2018-03-06
Attending: NEUROLOGICAL SURGERY
Payer: COMMERCIAL

## 2018-03-06 DIAGNOSIS — Z98.890 S/P CRANIOTOMY: Primary | ICD-10-CM

## 2018-03-06 DIAGNOSIS — C64.9 METASTATIC RENAL CELL CARCINOMA, UNSPECIFIED LATERALITY (H): ICD-10-CM

## 2018-03-06 DIAGNOSIS — Z01.818 PRE-OP EVALUATION: ICD-10-CM

## 2018-03-06 DIAGNOSIS — C79.31 BRAIN METASTASIS: ICD-10-CM

## 2018-03-06 DIAGNOSIS — C79.31 BRAIN METASTASES: ICD-10-CM

## 2018-03-06 LAB
ABO + RH BLD: NORMAL
ABO + RH BLD: NORMAL
BASE DEFICIT BLDA-SCNC: 1.4 MMOL/L
BASE DEFICIT BLDA-SCNC: 1.6 MMOL/L
BLD GP AB SCN SERPL QL: NORMAL
BLD PROD TYP BPU: NORMAL
BLD UNIT ID BPU: 0
BLD UNIT ID BPU: 0
BLOOD BANK CMNT PATIENT-IMP: NORMAL
BLOOD BANK CMNT PATIENT-IMP: NORMAL
BLOOD PRODUCT CODE: NORMAL
BLOOD PRODUCT CODE: NORMAL
BPU ID: NORMAL
BPU ID: NORMAL
CA-I BLD-MCNC: 4.8 MG/DL (ref 4.4–5.2)
CA-I BLD-MCNC: 5 MG/DL (ref 4.4–5.2)
GLUCOSE BLD-MCNC: 125 MG/DL (ref 70–99)
GLUCOSE BLD-MCNC: 129 MG/DL (ref 70–99)
GLUCOSE BLDC GLUCOMTR-MCNC: 97 MG/DL (ref 70–99)
HCO3 BLD-SCNC: 21 MMOL/L (ref 21–28)
HCO3 BLD-SCNC: 22 MMOL/L (ref 21–28)
HGB BLD-MCNC: 11 G/DL (ref 11.7–15.7)
HGB BLD-MCNC: 11 G/DL (ref 11.7–15.7)
NUM BPU REQUESTED: 2
O2/TOTAL GAS SETTING VFR VENT: 43 %
O2/TOTAL GAS SETTING VFR VENT: 48 %
PCO2 BLD: 29 MM HG (ref 35–45)
PCO2 BLD: 31 MM HG (ref 35–45)
PH BLD: 7.45 PH (ref 7.35–7.45)
PH BLD: 7.47 PH (ref 7.35–7.45)
PO2 BLD: 131 MM HG (ref 80–105)
PO2 BLD: 248 MM HG (ref 80–105)
POTASSIUM BLD-SCNC: 4.2 MMOL/L (ref 3.4–5.3)
POTASSIUM BLD-SCNC: 4.5 MMOL/L (ref 3.4–5.3)
SODIUM BLD-SCNC: 136 MMOL/L (ref 133–144)
SODIUM BLD-SCNC: 138 MMOL/L (ref 133–144)
SPECIMEN EXP DATE BLD: NORMAL
TRANSFUSION STATUS PATIENT QL: NORMAL

## 2018-03-06 PROCEDURE — 88307 TISSUE EXAM BY PATHOLOGIST: CPT | Performed by: NEUROLOGICAL SURGERY

## 2018-03-06 PROCEDURE — 25000125 ZZHC RX 250: Performed by: NEUROLOGICAL SURGERY

## 2018-03-06 PROCEDURE — 71000014 ZZH RECOVERY PHASE 1 LEVEL 2 FIRST HR: Performed by: NEUROLOGICAL SURGERY

## 2018-03-06 PROCEDURE — 25000128 H RX IP 250 OP 636: Performed by: ANESTHESIOLOGY

## 2018-03-06 PROCEDURE — 27810169 ZZH OR IMPLANT GENERAL: Performed by: NEUROLOGICAL SURGERY

## 2018-03-06 PROCEDURE — 27210794 ZZH OR GENERAL SUPPLY STERILE: Performed by: NEUROLOGICAL SURGERY

## 2018-03-06 PROCEDURE — 27210995 ZZH RX 272: Performed by: NEUROLOGICAL SURGERY

## 2018-03-06 PROCEDURE — 40000014 ZZH STATISTIC ARTERIAL MONITORING DAILY

## 2018-03-06 PROCEDURE — 25000132 ZZH RX MED GY IP 250 OP 250 PS 637: Performed by: NEUROLOGICAL SURGERY

## 2018-03-06 PROCEDURE — C1763 CONN TISS, NON-HUMAN: HCPCS | Performed by: NEUROLOGICAL SURGERY

## 2018-03-06 PROCEDURE — 88342 IMHCHEM/IMCYTCHM 1ST ANTB: CPT | Performed by: NEUROLOGICAL SURGERY

## 2018-03-06 PROCEDURE — 36000074 ZZH SURGERY LEVEL 6 1ST 30 MIN - UMMC: Performed by: NEUROLOGICAL SURGERY

## 2018-03-06 PROCEDURE — C9399 UNCLASSIFIED DRUGS OR BIOLOG: HCPCS | Performed by: NURSE ANESTHETIST, CERTIFIED REGISTERED

## 2018-03-06 PROCEDURE — 70552 MRI BRAIN STEM W/DYE: CPT

## 2018-03-06 PROCEDURE — 25000128 H RX IP 250 OP 636: Performed by: NEUROLOGICAL SURGERY

## 2018-03-06 PROCEDURE — 25000566 ZZH SEVOFLURANE, EA 15 MIN: Performed by: NEUROLOGICAL SURGERY

## 2018-03-06 PROCEDURE — C1713 ANCHOR/SCREW BN/BN,TIS/BN: HCPCS | Performed by: NEUROLOGICAL SURGERY

## 2018-03-06 PROCEDURE — 25000128 H RX IP 250 OP 636: Performed by: STUDENT IN AN ORGANIZED HEALTH CARE EDUCATION/TRAINING PROGRAM

## 2018-03-06 PROCEDURE — 20000004 ZZH R&B ICU UMMC

## 2018-03-06 PROCEDURE — 00U20KZ SUPPLEMENT DURA MATER WITH NONAUTOLOGOUS TISSUE SUBSTITUTE, OPEN APPROACH: ICD-10-PCS | Performed by: NEUROLOGICAL SURGERY

## 2018-03-06 PROCEDURE — A9585 GADOBUTROL INJECTION: HCPCS | Performed by: NEUROLOGICAL SURGERY

## 2018-03-06 PROCEDURE — 70450 CT HEAD/BRAIN W/O DYE: CPT

## 2018-03-06 PROCEDURE — 40000275 ZZH STATISTIC RCP TIME EA 10 MIN

## 2018-03-06 PROCEDURE — 37000008 ZZH ANESTHESIA TECHNICAL FEE, 1ST 30 MIN: Performed by: NEUROLOGICAL SURGERY

## 2018-03-06 PROCEDURE — 00000146 ZZHCL STATISTIC GLUCOSE BY METER IP

## 2018-03-06 PROCEDURE — 37000009 ZZH ANESTHESIA TECHNICAL FEE, EACH ADDTL 15 MIN: Performed by: NEUROLOGICAL SURGERY

## 2018-03-06 PROCEDURE — 88341 IMHCHEM/IMCYTCHM EA ADD ANTB: CPT | Performed by: NEUROLOGICAL SURGERY

## 2018-03-06 PROCEDURE — 84295 ASSAY OF SERUM SODIUM: CPT | Performed by: NEUROLOGICAL SURGERY

## 2018-03-06 PROCEDURE — 40000170 ZZH STATISTIC PRE-PROCEDURE ASSESSMENT II: Performed by: NEUROLOGICAL SURGERY

## 2018-03-06 PROCEDURE — 00BC0ZZ EXCISION OF CEREBELLUM, OPEN APPROACH: ICD-10-PCS | Performed by: NEUROLOGICAL SURGERY

## 2018-03-06 PROCEDURE — 25000128 H RX IP 250 OP 636: Performed by: NURSE ANESTHETIST, CERTIFIED REGISTERED

## 2018-03-06 PROCEDURE — 82947 ASSAY GLUCOSE BLOOD QUANT: CPT | Performed by: NEUROLOGICAL SURGERY

## 2018-03-06 PROCEDURE — 84132 ASSAY OF SERUM POTASSIUM: CPT | Performed by: NEUROLOGICAL SURGERY

## 2018-03-06 PROCEDURE — 25000125 ZZHC RX 250: Performed by: NURSE ANESTHETIST, CERTIFIED REGISTERED

## 2018-03-06 PROCEDURE — 82330 ASSAY OF CALCIUM: CPT | Performed by: NEUROLOGICAL SURGERY

## 2018-03-06 PROCEDURE — 36000076 ZZH SURGERY LEVEL 6 EA 15 ADDTL MIN - UMMC: Performed by: NEUROLOGICAL SURGERY

## 2018-03-06 PROCEDURE — 82803 BLOOD GASES ANY COMBINATION: CPT | Performed by: NEUROLOGICAL SURGERY

## 2018-03-06 DEVICE — IMP PLATE SYN STR DOG BONE LOW PROFILE 2H TI 421.502: Type: IMPLANTABLE DEVICE | Site: SKULL | Status: FUNCTIONAL

## 2018-03-06 DEVICE — IMP SCR SYN MATRIX LOW PRO 1.5X04MM SELF DRILL 04.503.104.01: Type: IMPLANTABLE DEVICE | Site: SKULL | Status: FUNCTIONAL

## 2018-03-06 DEVICE — GRAFT DURAGEN 2X2" ID220: Type: IMPLANTABLE DEVICE | Site: CRANIAL | Status: FUNCTIONAL

## 2018-03-06 DEVICE — IMP BUR HOLE COVER 17MM LOW PROFILE TI 421.527: Type: IMPLANTABLE DEVICE | Site: SKULL | Status: FUNCTIONAL

## 2018-03-06 RX ORDER — NALOXONE HYDROCHLORIDE 0.4 MG/ML
.1-.4 INJECTION, SOLUTION INTRAMUSCULAR; INTRAVENOUS; SUBCUTANEOUS
Status: DISCONTINUED | OUTPATIENT
Start: 2018-03-06 | End: 2018-03-06

## 2018-03-06 RX ORDER — METOCLOPRAMIDE HYDROCHLORIDE 5 MG/ML
10 INJECTION INTRAMUSCULAR; INTRAVENOUS EVERY 6 HOURS PRN
Status: DISCONTINUED | OUTPATIENT
Start: 2018-03-06 | End: 2018-03-08 | Stop reason: HOSPADM

## 2018-03-06 RX ORDER — ACETAMINOPHEN 325 MG/1
975 TABLET ORAL EVERY 8 HOURS
Status: DISCONTINUED | OUTPATIENT
Start: 2018-03-06 | End: 2018-03-08 | Stop reason: HOSPADM

## 2018-03-06 RX ORDER — ONDANSETRON 4 MG/1
4 TABLET, ORALLY DISINTEGRATING ORAL EVERY 30 MIN PRN
Status: DISCONTINUED | OUTPATIENT
Start: 2018-03-06 | End: 2018-03-06 | Stop reason: HOSPADM

## 2018-03-06 RX ORDER — BUPROPION HYDROCHLORIDE 150 MG/1
300 TABLET ORAL EVERY MORNING
Status: DISCONTINUED | OUTPATIENT
Start: 2018-03-07 | End: 2018-03-08 | Stop reason: HOSPADM

## 2018-03-06 RX ORDER — AMOXICILLIN 250 MG
2 CAPSULE ORAL 2 TIMES DAILY
Status: DISCONTINUED | OUTPATIENT
Start: 2018-03-06 | End: 2018-03-08 | Stop reason: HOSPADM

## 2018-03-06 RX ORDER — LIDOCAINE 40 MG/G
CREAM TOPICAL
Status: DISCONTINUED | OUTPATIENT
Start: 2018-03-06 | End: 2018-03-08 | Stop reason: HOSPADM

## 2018-03-06 RX ORDER — LABETALOL HYDROCHLORIDE 5 MG/ML
10 INJECTION, SOLUTION INTRAVENOUS
Status: DISCONTINUED | OUTPATIENT
Start: 2018-03-06 | End: 2018-03-06 | Stop reason: HOSPADM

## 2018-03-06 RX ORDER — FENTANYL CITRATE 50 UG/ML
25-50 INJECTION, SOLUTION INTRAMUSCULAR; INTRAVENOUS
Status: DISCONTINUED | OUTPATIENT
Start: 2018-03-06 | End: 2018-03-06 | Stop reason: HOSPADM

## 2018-03-06 RX ORDER — HYDROXYZINE HYDROCHLORIDE 25 MG/1
25 TABLET, FILM COATED ORAL EVERY 6 HOURS PRN
Status: DISCONTINUED | OUTPATIENT
Start: 2018-03-06 | End: 2018-03-08 | Stop reason: HOSPADM

## 2018-03-06 RX ORDER — AMOXICILLIN 250 MG
1 CAPSULE ORAL 2 TIMES DAILY
Status: DISCONTINUED | OUTPATIENT
Start: 2018-03-06 | End: 2018-03-08 | Stop reason: HOSPADM

## 2018-03-06 RX ORDER — MEPERIDINE HYDROCHLORIDE 50 MG/ML
12.5 INJECTION INTRAMUSCULAR; INTRAVENOUS; SUBCUTANEOUS EVERY 5 MIN PRN
Status: DISCONTINUED | OUTPATIENT
Start: 2018-03-06 | End: 2018-03-06 | Stop reason: HOSPADM

## 2018-03-06 RX ORDER — SODIUM CHLORIDE, SODIUM LACTATE, POTASSIUM CHLORIDE, CALCIUM CHLORIDE 600; 310; 30; 20 MG/100ML; MG/100ML; MG/100ML; MG/100ML
INJECTION, SOLUTION INTRAVENOUS CONTINUOUS PRN
Status: DISCONTINUED | OUTPATIENT
Start: 2018-03-06 | End: 2018-03-06

## 2018-03-06 RX ORDER — ONDANSETRON 2 MG/ML
INJECTION INTRAMUSCULAR; INTRAVENOUS PRN
Status: DISCONTINUED | OUTPATIENT
Start: 2018-03-06 | End: 2018-03-06

## 2018-03-06 RX ORDER — HYDROMORPHONE HYDROCHLORIDE 1 MG/ML
.3-.5 INJECTION, SOLUTION INTRAMUSCULAR; INTRAVENOUS; SUBCUTANEOUS
Status: DISCONTINUED | OUTPATIENT
Start: 2018-03-06 | End: 2018-03-07

## 2018-03-06 RX ORDER — OXYCODONE HYDROCHLORIDE 5 MG/1
5-10 TABLET ORAL
Status: DISCONTINUED | OUTPATIENT
Start: 2018-03-06 | End: 2018-03-07

## 2018-03-06 RX ORDER — ASCORBIC ACID 500 MG
500 TABLET ORAL
Status: DISCONTINUED | OUTPATIENT
Start: 2018-03-07 | End: 2018-03-08 | Stop reason: HOSPADM

## 2018-03-06 RX ORDER — ONDANSETRON 2 MG/ML
4 INJECTION INTRAMUSCULAR; INTRAVENOUS EVERY 30 MIN PRN
Status: DISCONTINUED | OUTPATIENT
Start: 2018-03-06 | End: 2018-03-06 | Stop reason: HOSPADM

## 2018-03-06 RX ORDER — ONDANSETRON 2 MG/ML
4 INJECTION INTRAMUSCULAR; INTRAVENOUS EVERY 6 HOURS PRN
Status: DISCONTINUED | OUTPATIENT
Start: 2018-03-06 | End: 2018-03-08 | Stop reason: HOSPADM

## 2018-03-06 RX ORDER — BUPIVACAINE HYDROCHLORIDE AND EPINEPHRINE 5; 5 MG/ML; UG/ML
INJECTION, SOLUTION PERINEURAL PRN
Status: DISCONTINUED | OUTPATIENT
Start: 2018-03-06 | End: 2018-03-06 | Stop reason: HOSPADM

## 2018-03-06 RX ORDER — METOCLOPRAMIDE 5 MG/1
10 TABLET ORAL EVERY 6 HOURS PRN
Status: DISCONTINUED | OUTPATIENT
Start: 2018-03-06 | End: 2018-03-08 | Stop reason: HOSPADM

## 2018-03-06 RX ORDER — FENTANYL CITRATE 50 UG/ML
INJECTION, SOLUTION INTRAMUSCULAR; INTRAVENOUS PRN
Status: DISCONTINUED | OUTPATIENT
Start: 2018-03-06 | End: 2018-03-06

## 2018-03-06 RX ORDER — ALPRAZOLAM 0.25 MG
0.5 TABLET ORAL 3 TIMES DAILY PRN
Status: DISCONTINUED | OUTPATIENT
Start: 2018-03-06 | End: 2018-03-07

## 2018-03-06 RX ORDER — DEXAMETHASONE 4 MG/1
4 TABLET ORAL EVERY MORNING
Status: DISCONTINUED | OUTPATIENT
Start: 2018-03-07 | End: 2018-03-06

## 2018-03-06 RX ORDER — METHOCARBAMOL 500 MG/1
1000 TABLET, FILM COATED ORAL EVERY 6 HOURS PRN
Status: DISCONTINUED | OUTPATIENT
Start: 2018-03-06 | End: 2018-03-08 | Stop reason: HOSPADM

## 2018-03-06 RX ORDER — PROPOFOL 10 MG/ML
INJECTION, EMULSION INTRAVENOUS PRN
Status: DISCONTINUED | OUTPATIENT
Start: 2018-03-06 | End: 2018-03-06

## 2018-03-06 RX ORDER — LIDOCAINE 4 G/G
2 PATCH TOPICAL
Status: DISCONTINUED | OUTPATIENT
Start: 2018-03-06 | End: 2018-03-08 | Stop reason: HOSPADM

## 2018-03-06 RX ORDER — SODIUM CHLORIDE 9 MG/ML
INJECTION, SOLUTION INTRAVENOUS CONTINUOUS
Status: DISCONTINUED | OUTPATIENT
Start: 2018-03-06 | End: 2018-03-08 | Stop reason: HOSPADM

## 2018-03-06 RX ORDER — HYDRALAZINE HYDROCHLORIDE 20 MG/ML
10-20 INJECTION INTRAMUSCULAR; INTRAVENOUS EVERY 30 MIN PRN
Status: DISCONTINUED | OUTPATIENT
Start: 2018-03-06 | End: 2018-03-07

## 2018-03-06 RX ORDER — HYDRALAZINE HYDROCHLORIDE 20 MG/ML
2.5-5 INJECTION INTRAMUSCULAR; INTRAVENOUS EVERY 10 MIN PRN
Status: DISCONTINUED | OUTPATIENT
Start: 2018-03-06 | End: 2018-03-06 | Stop reason: HOSPADM

## 2018-03-06 RX ORDER — CALCIUM CARBONATE 500(1250)
1250 TABLET ORAL
Status: DISCONTINUED | OUTPATIENT
Start: 2018-03-07 | End: 2018-03-08 | Stop reason: HOSPADM

## 2018-03-06 RX ORDER — ATORVASTATIN CALCIUM 20 MG/1
20 TABLET, FILM COATED ORAL DAILY
Status: DISCONTINUED | OUTPATIENT
Start: 2018-03-06 | End: 2018-03-08 | Stop reason: HOSPADM

## 2018-03-06 RX ORDER — DEXAMETHASONE SODIUM PHOSPHATE 4 MG/ML
INJECTION, SOLUTION INTRA-ARTICULAR; INTRALESIONAL; INTRAMUSCULAR; INTRAVENOUS; SOFT TISSUE PRN
Status: DISCONTINUED | OUTPATIENT
Start: 2018-03-06 | End: 2018-03-06

## 2018-03-06 RX ORDER — CLONAZEPAM 0.5 MG/1
.5-1 TABLET ORAL 2 TIMES DAILY
Status: DISCONTINUED | OUTPATIENT
Start: 2018-03-06 | End: 2018-03-08 | Stop reason: HOSPADM

## 2018-03-06 RX ORDER — ESMOLOL HYDROCHLORIDE 10 MG/ML
INJECTION INTRAVENOUS PRN
Status: DISCONTINUED | OUTPATIENT
Start: 2018-03-06 | End: 2018-03-06

## 2018-03-06 RX ORDER — LIDOCAINE HYDROCHLORIDE 20 MG/ML
INJECTION, SOLUTION INFILTRATION; PERINEURAL PRN
Status: DISCONTINUED | OUTPATIENT
Start: 2018-03-06 | End: 2018-03-06

## 2018-03-06 RX ORDER — SODIUM CHLORIDE 9 MG/ML
INJECTION, SOLUTION INTRAVENOUS CONTINUOUS PRN
Status: DISCONTINUED | OUTPATIENT
Start: 2018-03-06 | End: 2018-03-06

## 2018-03-06 RX ORDER — GADOBUTROL 604.72 MG/ML
7.5 INJECTION INTRAVENOUS ONCE
Status: COMPLETED | OUTPATIENT
Start: 2018-03-06 | End: 2018-03-06

## 2018-03-06 RX ORDER — ZOLPIDEM TARTRATE 5 MG/1
5 TABLET ORAL
Status: DISCONTINUED | OUTPATIENT
Start: 2018-03-06 | End: 2018-03-08 | Stop reason: HOSPADM

## 2018-03-06 RX ORDER — CEFAZOLIN SODIUM 1 G/3ML
1 INJECTION, POWDER, FOR SOLUTION INTRAMUSCULAR; INTRAVENOUS SEE ADMIN INSTRUCTIONS
Status: DISCONTINUED | OUTPATIENT
Start: 2018-03-06 | End: 2018-03-06 | Stop reason: HOSPADM

## 2018-03-06 RX ORDER — ONDANSETRON 4 MG/1
4 TABLET, ORALLY DISINTEGRATING ORAL EVERY 6 HOURS PRN
Status: DISCONTINUED | OUTPATIENT
Start: 2018-03-06 | End: 2018-03-08 | Stop reason: HOSPADM

## 2018-03-06 RX ORDER — NALOXONE HYDROCHLORIDE 0.4 MG/ML
.1-.4 INJECTION, SOLUTION INTRAMUSCULAR; INTRAVENOUS; SUBCUTANEOUS
Status: DISCONTINUED | OUTPATIENT
Start: 2018-03-06 | End: 2018-03-08 | Stop reason: HOSPADM

## 2018-03-06 RX ORDER — CEFAZOLIN SODIUM 2 G/100ML
2 INJECTION, SOLUTION INTRAVENOUS
Status: COMPLETED | OUTPATIENT
Start: 2018-03-06 | End: 2018-03-06

## 2018-03-06 RX ORDER — SODIUM CHLORIDE, SODIUM LACTATE, POTASSIUM CHLORIDE, CALCIUM CHLORIDE 600; 310; 30; 20 MG/100ML; MG/100ML; MG/100ML; MG/100ML
INJECTION, SOLUTION INTRAVENOUS CONTINUOUS
Status: DISCONTINUED | OUTPATIENT
Start: 2018-03-06 | End: 2018-03-06 | Stop reason: HOSPADM

## 2018-03-06 RX ORDER — LABETALOL HYDROCHLORIDE 5 MG/ML
10-40 INJECTION, SOLUTION INTRAVENOUS EVERY 10 MIN PRN
Status: DISCONTINUED | OUTPATIENT
Start: 2018-03-06 | End: 2018-03-07

## 2018-03-06 RX ORDER — ACETAMINOPHEN 325 MG/1
650 TABLET ORAL EVERY 4 HOURS PRN
Status: DISCONTINUED | OUTPATIENT
Start: 2018-03-09 | End: 2018-03-08 | Stop reason: HOSPADM

## 2018-03-06 RX ORDER — PROCHLORPERAZINE MALEATE 5 MG
10 TABLET ORAL EVERY 6 HOURS PRN
Status: DISCONTINUED | OUTPATIENT
Start: 2018-03-06 | End: 2018-03-06

## 2018-03-06 RX ORDER — LORAZEPAM 0.5 MG/1
0.5 TABLET ORAL EVERY 4 HOURS PRN
Status: DISCONTINUED | OUTPATIENT
Start: 2018-03-06 | End: 2018-03-08 | Stop reason: HOSPADM

## 2018-03-06 RX ORDER — CITALOPRAM HYDROBROMIDE 20 MG/1
20 TABLET ORAL AT BEDTIME
Status: DISCONTINUED | OUTPATIENT
Start: 2018-03-06 | End: 2018-03-08 | Stop reason: HOSPADM

## 2018-03-06 RX ORDER — DEXAMETHASONE SODIUM PHOSPHATE 4 MG/ML
4 INJECTION, SOLUTION INTRA-ARTICULAR; INTRALESIONAL; INTRAMUSCULAR; INTRAVENOUS; SOFT TISSUE EVERY 8 HOURS
Status: DISCONTINUED | OUTPATIENT
Start: 2018-03-06 | End: 2018-03-07

## 2018-03-06 RX ORDER — PROCHLORPERAZINE MALEATE 10 MG
10 TABLET ORAL EVERY 6 HOURS PRN
Status: DISCONTINUED | OUTPATIENT
Start: 2018-03-06 | End: 2018-03-08 | Stop reason: HOSPADM

## 2018-03-06 RX ADMIN — SENNOSIDES AND DOCUSATE SODIUM 1 TABLET: 8.6; 5 TABLET ORAL at 20:00

## 2018-03-06 RX ADMIN — PROPOFOL 30 MG: 10 INJECTION, EMULSION INTRAVENOUS at 13:00

## 2018-03-06 RX ADMIN — GADOBUTROL 6 ML: 604.72 INJECTION INTRAVENOUS at 10:41

## 2018-03-06 RX ADMIN — SODIUM CHLORIDE, POTASSIUM CHLORIDE, SODIUM LACTATE AND CALCIUM CHLORIDE: 600; 310; 30; 20 INJECTION, SOLUTION INTRAVENOUS at 12:48

## 2018-03-06 RX ADMIN — FENTANYL CITRATE 50 MCG: 50 INJECTION, SOLUTION INTRAMUSCULAR; INTRAVENOUS at 17:06

## 2018-03-06 RX ADMIN — FENTANYL CITRATE 100 MCG: 50 INJECTION, SOLUTION INTRAMUSCULAR; INTRAVENOUS at 12:48

## 2018-03-06 RX ADMIN — CITALOPRAM HYDROBROMIDE 20 MG: 20 TABLET ORAL at 21:18

## 2018-03-06 RX ADMIN — OXYCODONE HYDROCHLORIDE 10 MG: 5 TABLET ORAL at 20:16

## 2018-03-06 RX ADMIN — FENTANYL CITRATE 50 MCG: 50 INJECTION, SOLUTION INTRAMUSCULAR; INTRAVENOUS at 16:50

## 2018-03-06 RX ADMIN — ROCURONIUM BROMIDE 30 MG: 10 INJECTION INTRAVENOUS at 14:10

## 2018-03-06 RX ADMIN — ACETAMINOPHEN 975 MG: 325 TABLET, FILM COATED ORAL at 20:00

## 2018-03-06 RX ADMIN — Medication 0.3 MG: at 21:19

## 2018-03-06 RX ADMIN — FENTANYL CITRATE 100 MCG: 50 INJECTION, SOLUTION INTRAMUSCULAR; INTRAVENOUS at 13:05

## 2018-03-06 RX ADMIN — ESMOLOL HYDROCHLORIDE 10 MG: 10 INJECTION, SOLUTION INTRAVENOUS at 16:39

## 2018-03-06 RX ADMIN — FENTANYL CITRATE 100 MCG: 50 INJECTION, SOLUTION INTRAMUSCULAR; INTRAVENOUS at 12:59

## 2018-03-06 RX ADMIN — PROPOFOL 150 MG: 10 INJECTION, EMULSION INTRAVENOUS at 12:48

## 2018-03-06 RX ADMIN — PROPOFOL 20 MG: 10 INJECTION, EMULSION INTRAVENOUS at 12:53

## 2018-03-06 RX ADMIN — ROCURONIUM BROMIDE 10 MG: 10 INJECTION INTRAVENOUS at 15:43

## 2018-03-06 RX ADMIN — CEFAZOLIN SODIUM 2 G: 2 INJECTION, SOLUTION INTRAVENOUS at 13:26

## 2018-03-06 RX ADMIN — ROCURONIUM BROMIDE 20 MG: 10 INJECTION INTRAVENOUS at 15:06

## 2018-03-06 RX ADMIN — DEXAMETHASONE SODIUM PHOSPHATE 10 MG: 4 INJECTION, SOLUTION INTRA-ARTICULAR; INTRALESIONAL; INTRAMUSCULAR; INTRAVENOUS; SOFT TISSUE at 13:34

## 2018-03-06 RX ADMIN — CEFAZOLIN SODIUM 1 G: 2 INJECTION, SOLUTION INTRAVENOUS at 15:30

## 2018-03-06 RX ADMIN — FENTANYL CITRATE 100 MCG: 50 INJECTION, SOLUTION INTRAMUSCULAR; INTRAVENOUS at 12:55

## 2018-03-06 RX ADMIN — ATORVASTATIN CALCIUM 20 MG: 20 TABLET, FILM COATED ORAL at 20:00

## 2018-03-06 RX ADMIN — LIDOCAINE 2 PATCH: 560 PATCH PERCUTANEOUS; TOPICAL; TRANSDERMAL at 20:00

## 2018-03-06 RX ADMIN — ONDANSETRON 4 MG: 2 INJECTION INTRAMUSCULAR; INTRAVENOUS at 15:57

## 2018-03-06 RX ADMIN — HYDROMORPHONE HYDROCHLORIDE 0.5 MG: 1 INJECTION, SOLUTION INTRAMUSCULAR; INTRAVENOUS; SUBCUTANEOUS at 17:27

## 2018-03-06 RX ADMIN — FENTANYL CITRATE 50 MCG: 50 INJECTION, SOLUTION INTRAMUSCULAR; INTRAVENOUS at 16:44

## 2018-03-06 RX ADMIN — OMEPRAZOLE 40 MG: 20 CAPSULE, DELAYED RELEASE ORAL at 20:00

## 2018-03-06 RX ADMIN — HYDROMORPHONE HYDROCHLORIDE 0.5 MG: 1 INJECTION, SOLUTION INTRAMUSCULAR; INTRAVENOUS; SUBCUTANEOUS at 17:55

## 2018-03-06 RX ADMIN — DEXAMETHASONE SODIUM PHOSPHATE 4 MG: 4 INJECTION, SOLUTION INTRA-ARTICULAR; INTRALESIONAL; INTRAMUSCULAR; INTRAVENOUS; SOFT TISSUE at 20:00

## 2018-03-06 RX ADMIN — ZOLPIDEM TARTRATE 5 MG: 5 TABLET, FILM COATED ORAL at 21:18

## 2018-03-06 RX ADMIN — FENTANYL CITRATE 100 MCG: 50 INJECTION, SOLUTION INTRAMUSCULAR; INTRAVENOUS at 14:08

## 2018-03-06 RX ADMIN — ROCURONIUM BROMIDE 50 MG: 10 INJECTION INTRAVENOUS at 12:48

## 2018-03-06 RX ADMIN — SODIUM CHLORIDE: 9 INJECTION, SOLUTION INTRAVENOUS at 17:42

## 2018-03-06 RX ADMIN — SODIUM CHLORIDE: 9 INJECTION, SOLUTION INTRAVENOUS at 14:30

## 2018-03-06 RX ADMIN — SODIUM CHLORIDE, POTASSIUM CHLORIDE, SODIUM LACTATE AND CALCIUM CHLORIDE: 600; 310; 30; 20 INJECTION, SOLUTION INTRAVENOUS at 14:09

## 2018-03-06 RX ADMIN — CLONAZEPAM 0.5 MG: 0.5 TABLET ORAL at 20:00

## 2018-03-06 RX ADMIN — OXYCODONE HYDROCHLORIDE 10 MG: 5 TABLET ORAL at 17:30

## 2018-03-06 RX ADMIN — SUGAMMADEX 200 MG: 100 INJECTION, SOLUTION INTRAVENOUS at 16:35

## 2018-03-06 RX ADMIN — PHENYLEPHRINE HYDROCHLORIDE 50 MCG: 10 INJECTION, SOLUTION INTRAMUSCULAR; INTRAVENOUS; SUBCUTANEOUS at 16:02

## 2018-03-06 RX ADMIN — PHENYLEPHRINE HYDROCHLORIDE 50 MCG: 10 INJECTION, SOLUTION INTRAMUSCULAR; INTRAVENOUS; SUBCUTANEOUS at 16:06

## 2018-03-06 NOTE — ANESTHESIA PROCEDURE NOTES
Arterial Line Procedure Note  Staff:     Anesthesiologist:  PAMELA MANDEL    Resident/CRNA:  ABIODUN LEON    Arterial line performed by resident/CRNA in presence of a teaching physician    Location: In OR After Induction  Procedure Start/Stop Times:     patient identified, IV checked, site marked, risks and benefits discussed, informed consent, monitors and equipment checked, pre-op evaluation and at physician/surgeon's request      Correct Patient: Yes      Correct Position: Yes      Correct Site: Yes      Correct Procedure: Yes      Correct Laterality:  Yes    Site Marked:  Yes  Line Placement:     Procedure:  Arterial Line    Insertion Site:  Radial    Insertion laterality:  Right    Skin Prep: Chloraprep      Patient Prep: sterile gloves, hat and hand hygiene      Local skin infiltration:  None    Ultrasound Guided?: No      Catheter size:  20 gauge, 12 cm    Cath secured with: suture      Dressing:  Tegaderm    Complications:  None obvious    Arterial waveform: Yes      IBP within 10% of NIBP: Yes

## 2018-03-06 NOTE — ANESTHESIA CARE TRANSFER NOTE
Patient: Suzi Gonsales    Procedure(s):  Left Stealth Assisted Posterior fossa Craniotomy And Tumor Resection  - Wound Class: I-Clean    Diagnosis: Brain Metastasis   Diagnosis Additional Information: No value filed.    Anesthesia Type:   No value filed.     Note:  Airway :Face Mask  Patient transferred to:PACU  Comments: Supine. Oropharynx suctioned. spont resp.  Extubated. Exchanging well. To PACU.  Report given to RN at bedside. Pt awake and conversing appropriately.  Moves all 4 extremities to verbal command. Handoff Report: Identifed the Patient, Identified the Reponsible Provider, Reviewed the pertinent medical history, Discussed the surgical course, Reviewed Intra-OP anesthesia mangement and issues during anesthesia, Set expectations for post-procedure period and Allowed opportunity for questions and acknowledgement of understanding      Vitals: (Last set prior to Anesthesia Care Transfer)    CRNA VITALS  3/6/2018 1612 - 3/6/2018 1656      3/6/2018             Pulse: 98    ART BP: 123/62    ART Mean: 89    SpO2: 95 %    Resp Rate (observed): (!)  4                Electronically Signed By: ARIEL Mchugh CRNA  March 6, 2018  4:56 PM

## 2018-03-06 NOTE — IP AVS SNAPSHOT
[address] [address]    Phone:  [phone]                                       After Visit Summary   3/6/2018    Suzi Gonsales    MRN: [id number]           After Visit Summary Signature Page     I have received my discharge instructions, and my questions have been answered. I have discussed any challenges I see with this plan with the nurse or doctor.    ..........................................................................................................................................  Patient/Patient Representative Signature      ..........................................................................................................................................  Patient Representative Print Name and Relationship to Patient    ..................................................               ................................................  Date                                            Time    ..........................................................................................................................................  Reviewed by Signature/Title    ...................................................              ..............................................  Date                                                            Time

## 2018-03-06 NOTE — ANESTHESIA POSTPROCEDURE EVALUATION
Patient: Suzi Gonsales    Procedure(s):  Left Stealth Assisted Posterior fossa Craniotomy And Tumor Resection  - Wound Class: I-Clean    Diagnosis:Brain Metastasis   Diagnosis Additional Information: No value filed.    Anesthesia Type:  General, ETT    Note:  Anesthesia Post Evaluation    Patient location during evaluation: PACU  Patient participation: Able to fully participate in evaluation  Level of consciousness: awake and alert  Pain management: adequate  Airway patency: patent  Cardiovascular status: acceptable  Respiratory status: acceptable  Hydration status: acceptable  PONV: none     Anesthetic complications: None          Last vitals:  Vitals:    03/06/18 1715 03/06/18 1730 03/06/18 1745   BP: 113/59 97/71 119/46   Pulse:      Resp: 13 16    Temp:      SpO2: 94% 95% 96%         Electronically Signed By: Itz Simms MD  March 6, 2018  5:58 PM

## 2018-03-06 NOTE — IP AVS SNAPSHOT
MRN:[id number]                      After Visit Summary   3/6/2018    Suzi Gonsales    MRN: [id number]           Thank you!     Thank you for choosing [facility] for your care. Our goal is always to provide you with excellent care. Hearing back from our patients is one way we can continue to improve our services. Please take a few minutes to complete the written survey that you may receive in the mail after you visit with us. Thank you!        Patient Information     Date Of Birth          1965        Designated Caregiver       Most Recent Value    Caregiver    Will someone help with your care after discharge? yes    Name of designated caregiver Tunde Gonsales    Phone number of caregiver [phone]    Caregiver address [address] [place] [address]      About your hospital stay     You were admitted on:  March 6, 2018 You last received care in the:  [address] [facility]    You were discharged on:  March 8, 2018        Reason for your hospital stay       S/P craniotomy for tumor resection                  Who to Call     For medical emergencies, please call 911.  For non-urgent questions about your medical care, please call your primary care provider or clinic, [phone]  For questions related to your surgery, please call your surgery clinic        Attending Provider     Provider Specialty    Quintin Palencia MD Neurosurgery       Primary Care Provider Office Phone # Fax #    Molly ARIEL Platt [facility] [phone]      After Care Instructions     Activity       Your activity upon discharge:   Do not do any bending, twisting, strenuous exercise, or heavy lifting (greater than 10 pounds) for 4-6 weeks. Be careful and ask for assistance when walking or going up and down stairs. Avoid any activities that could result in trauma to the surgical wound. Do not drive within 3 months of having your last seizure or while using narcotics or other sedating medications, such as  sleep aids, muscle relaxants, etc.            Diet       Follow this diet upon discharge: Orders Placed This Encounter      Advance Diet as Tolerated: Regular Diet Adult; Regular Diet Adult            Discharge Instructions       You underwent surgery to have a brain tumor removed by Quintin Palencia MD   - If you have not received the results of the pathology (diagnosis) at the time of discharge, our office will call you with these results as soon as they become available, or we will discuss them with you during your clinic visit, whichever is first.     - If you are on a steroid medication (decadron or dexamethasone) please follow the detailed instructions with the prescription to slowly decrease the amount you are taking.       - You will have follow up scheduled with the physician assistants and/or nurse practitioners in our clinic 2 weeks after your surgery.  If you live far away, you may see your primary care doctor for a wound check at 2 weeks.     - If you have not heard from our clinic about your follow up visit by 3-4 days following your discharge, please call our clinic at [phone] to schedule an appointment with the Neurosurgery teams.     After discharge, your activity restrictions are:   -We encourage short frequent walks, increasing as tolerated.  - No driving until you are seen in clinic and cleared by your neurosurgeon.  If you have had a seizure, you may not drive for at least 3 months according to [place] law.    - No strenuous activity.  - No lifting more than 10 pounds until you are seen in clinic (a gallon of milk weighs approximately 8 pounds)    Wound cares after surgery  - You are ok to shower, but do not soak your incisions. Pat them dry if they get damp.   - Avoid coloring your hair or permanent styling until cleared by your surgeon  - No baths, hot tubs or pools for 4-6 weeks after surgery.   - No strenuous activity.  - No lifting more than 10 pounds until you are seen in clinic (a  gallon of milk weighs approximately 8 pounds)  - You are ok to shower, but do not soak your incisions. Pat them dry if they get damp.   - Avoid coloring your hair or permanent styling until cleared by your surgeon  - No baths, hot tubs or pools for 4-6 weeks after surgery.       Call if you have any of the followin. Temperature greater than 101.5 F.   2. Any redness, swelling or discharge from the wound.   3. Any new weakness, numbness or altered mental status.  4. Worsening pain that is not improving with the pain medications you were prescribed.     Call [phone] or after 5:00 pm or on weekends call [phone] and ask for the neurosurgery resident on call. Thank You.            Wound care and dressings       Instructions to care for your wound at home:   You should remove your dressings and bandages on post-operative day #2. You should then keep the wound undressed and open to air. You are allowed to take showers and get the wound wet starting on post-operative day #3 but you may not scrub or soak the wound or keep it submerged under water. If you do happen to get the wound wet, be sure to pat dry it rather than scrubbing it with a towel.                  Follow-up Appointments     Adult [facility] Follow-up and recommended labs and tests       Follow up with Janeth Bueno PA-C in 2 weeks for wound check/suture removal    Follow up with Dr Quintin Palencia on 18 as scheduled for Gamma Knife procedure    Follow up with Primary Oncologist as scheduled    Appointments on [place] and/or [facility] (with [facility] or [facility] provider or service). Call [phone] if you haven't heard regarding these appointments within 7 days of discharge.                  Your next 10 appointments already scheduled     Mar 14, 2018 12:50 PM CDT   (Arrive by 12:35 PM)   New Patient Visit with Jake Urrutia MD   [facility] Neurology ([facility])    [address] [place] [address]  [address]   [phone]            Apr 05, 2018  5:30 AM CDT   GAMMA TREATMENT with Huyen Cuellar MD   [facility] Radiation Oncology ([facility])    [address] [facility] [address]   [phone]            Apr 05, 2018  6:30 AM CDT   GAMMA TREATMENT with Itz King MD   [facility] Radiation Oncology ([facility])    [address] [place] [address]   [phone]            Apr 05, 2018  7:00 AM CDT   (Arrive by 6:45 AM)   MR BRAIN W CONTRAST with UUMR1   [facility], MRI ([facility], [facility])    [address] [facility] [address]   [phone]           Take your medicines as usual, unless your doctor tells you not to. Bring a list of your current medicines to your exam (including vitamins, minerals and over-the-counter drugs).  You may or may not receive intravenous (IV) contrast for this exam pending the discretion of the Radiologist.  You do not need to do anything special to prepare.  The MRI machine uses a strong magnet. Please wear clothes without metal (snaps, zippers). A sweatsuit works well, or we may give you a hospital gown.  Please remove any body piercings and hair extensions before you arrive. You will also remove watches, jewelry, hairpins, wallets, dentures, partial dental plates and hearing aids. You may wear contact lenses, and you may be able to wear your rings. We have a safe place to keep your personal items, but it is safer to leave them at home.  **IMPORTANT** THE INSTRUCTIONS BELOW ARE ONLY FOR THOSE PATIENTS WHO HAVE BEEN PRESCRIBED SEDATION OR GENERAL ANESTHESIA DURING THEIR MRI PROCEDURE:  IF YOUR DOCTOR PRESCRIBED ORAL SEDATION (take medicine to help you relax during your exam):   You must get the medicine from your doctor (oral medication) before you arrive. Bring the medicine to the exam. Do not take it at home. You ll be told when to take it upon arriving for your exam.   Arrive  one hour early. Bring someone who can take you home after the test. Your medicine will make you sleepy. After the exam, you may not drive, take a bus or take a taxi by yourself.  IF YOUR DOCTOR PRESCRIBED IV SEDATION:   Arrive one hour early. Bring someone who can take you home after the test. Your medicine will make you sleepy. After the exam, you may not drive, take a bus or take a taxi by yourself.   No eating 6 hours before your exam. You may have clear liquids up until 4 hours before your exam. (Clear liquids include water, clear tea, black coffee and fruit juice without pulp.)  IF YOUR DOCTOR PRESCRIBED ANESTHESIA (be asleep for your exam):   Arrive 1 1/2 hours early. Bring someone who can take you home after the test. You may not drive, take a bus or take a taxi by yourself.   No eating 8 hours before your exam. You may have clear liquids up until 4 hours before your exam. (Clear liquids include water, clear tea, black coffee and fruit juice without pulp.)   You will spend four to five hours in the recovery room.  Please call the Imaging Department at your exam site with any questions.              Additional Information     If you use hormonal birth control (such as the pill, patch, ring or implants): You'll need a second form of birth control for 7 days (condoms, a diaphragm or contraceptive foam). While in the hospital, you received a medicine called Bridion. Your normal birth control will not work as well for a week after taking this medicine.          Pending Results     Date and Time Order Name Status Description    3/7/2018 0751 Surgical pathology exam In process             Statement of Approval     Ordered          03/08/18 0917  I have reviewed and agree with all the recommendations and orders detailed in this document.  EFFECTIVE NOW     Approved and electronically signed by:  Molly Rahman APRN CNP             Admission Information     Date & Time Provider Department Dept. Phone     "3/6/2018 Quintin Palencia MD [address] [facility] [phone]      Your Vitals Were     Blood Pressure Pulse Temperature Respirations Height Weight    92/41 74 98.3  F (36.8  C) (Oral) 16 1.575 m (5' 2\") 58.9 kg (129 lb 13.6 oz)    Last Period Pulse Oximetry BMI (Body Mass Index)             12/20/2013 95% 23.75 kg/m2         [employer] Information     [facility] gives you secure access to your electronic health record. If you see a primary care provider, you can also send messages to your care team and make appointments. If you have questions, please call your primary care clinic.  If you do not have a primary care provider, please call [phone] and they will assist you.        Care EveryWhere ID     This is your Care EveryWhere ID. This could be used by other organizations to access your [facility] medical records  [id number]        Equal Access to Services     ROSAURA INTERIANO AH: Odilon Santana, alexis robles, michelle kaalmajodi tilley, josemanuel oswald . So [facility] [phone].    ATENCIÓN: Si habla español, tiene a chacon disposición servicios gratuitos de asistencia lingüística. Llame al [phone].    We comply with applicable federal civil rights laws and [place] laws. We do not discriminate on the basis of race, color, national origin, age, disability, sex, sexual orientation, or gender identity.               Review of your medicines      START taking        Dose / Directions    methocarbamol 500 MG tablet   Commonly known as:  ROBAXIN   Used for:  S/P craniotomy        Dose:  1000 mg   Take 2 tablets (1,000 mg) by mouth every 6 hours as needed for muscle spasms   Quantity:  30 tablet   Refills:  0       oxyCODONE IR 5 MG tablet   Commonly known as:  ROXICODONE   Used for:  S/P craniotomy        Dose:  5-10 mg   Take 1-2 tablets (5-10 mg) by mouth every 4 hours as needed for other (pain control or improvement in physical function. Hold dose for analgesic side " effects.)   Quantity:  60 tablet   Refills:  0       senna-docusate 8.6-50 MG per tablet   Commonly known as:  SENOKOT-S;PERICOLACE   Used for:  S/P craniotomy        Dose:  1 tablet   Take 1 tablet by mouth 2 times daily   Quantity:  30 tablet   Refills:  0         CONTINUE these medicines which may have CHANGED, or have new prescriptions. If we are uncertain of the size of tablets/capsules you have at home, strength may be listed as something that might have changed.        Dose / Directions    aspirin 81 MG chewable tablet   This may have changed:  medication strength   Used for:  Pre-op evaluation, Brain metastases (H)        Dose:  81 mg   Start taking on:  3/19/2018   Take 1 tablet (81 mg) by mouth every morning   Quantity:  36 tablet   Refills:  0       citalopram 20 MG tablet   Commonly known as:  celeXA   This may have changed:  when to take this   Used for:  Insomnia, unspecified type        Dose:  20 mg   Take 1 tablet (20 mg) by mouth every evening   Quantity:  90 tablet   Refills:  3       * dexamethasone 4 MG tablet   Commonly known as:  DECADRON   This may have changed:    - how much to take  - how to take this  - when to take this  - additional instructions   Used for:  S/P craniotomy        Dose:  4 mg   Take 1 tablet (4 mg) by mouth every 8 hours for 2 days   Quantity:  6 tablet   Refills:  0       * dexamethasone 2 MG tablet   Commonly known as:  DECADRON   This may have changed:  You were already taking a medication with the same name, and this prescription was added. Make sure you understand how and when to take each.   Used for:  S/P craniotomy        Dose:  2 mg   Start taking on:  3/9/2018   Take 1 tablet (2 mg) by mouth every 8 hours for 3 days   Quantity:  9 tablet   Refills:  0       * dexamethasone 2 MG tablet   Commonly known as:  DECADRON   This may have changed:  You were already taking a medication with the same name, and this prescription was added. Make sure you understand how and  when to take each.   Used for:  S/P craniotomy        Dose:  2 mg   Start taking on:  3/12/2018   Take 1 tablet (2 mg) by mouth every 12 hours for 3 days   Quantity:  6 tablet   Refills:  0       * dexamethasone 2 MG tablet   Commonly known as:  DECADRON   This may have changed:  You were already taking a medication with the same name, and this prescription was added. Make sure you understand how and when to take each.   Used for:  S/P craniotomy        Dose:  2 mg   Start taking on:  3/15/2018   Take 1 tablet (2 mg) by mouth daily for 3 days   Quantity:  3 tablet   Refills:  0       * Notice:  This list has 4 medication(s) that are the same as other medications prescribed for you. Read the directions carefully, and ask your doctor or other care provider to review them with you.      CONTINUE these medicines which have NOT CHANGED        Dose / Directions    ALPRAZolam 0.5 MG tablet   Commonly known as:  XANAX   Used for:  Renal cell carcinoma, unspecified laterality (H), Mass of upper lobe of left lung        Dose:  0.5 mg   Take 1 tablet (0.5 mg) by mouth 3 times daily as needed for anxiety   Quantity:  60 tablet   Refills:  0       atorvastatin 20 MG tablet   Commonly known as:  LIPITOR   Used for:  History of stroke        Dose:  20 mg   Take 1 tablet (20 mg) by mouth daily   Quantity:  30 tablet   Refills:  3       buPROPion 300 MG 24 hr tablet   Commonly known as:  WELLBUTRIN XL        Dose:  300 mg   Take 300 mg by mouth every morning   Refills:  0       calcium carbonate 1250 MG tablet   Commonly known as:  OS-MUNDO 500 mg [place]   Used for:  Pre-op evaluation, Brain metastases (H)        Dose:  1 tablet   Take 1 tablet by mouth daily (with lunch)   Refills:  0       clonazePAM 0.5 MG tablet   Commonly known as:  klonoPIN   Used for:  Panic attack        TAKE 0.5-1 TABLETS BY MOUTH 2 TIMES DAILY AS NEEDED FOR ANXIETY   Quantity:  20 tablet   Refills:  0       lidocaine-prilocaine cream   Commonly known as:   EMLA   Used for:  Malignant neoplasm of right kidney (H)        Apply 1 hour prior to port access.   Quantity:  60 g   Refills:  1       * LORAZEPAM PO   Used for:  Pre-op evaluation, Brain metastases (H)        Take by mouth as needed for anxiety   Refills:  0       * LORazepam 0.5 MG tablet   Commonly known as:  ATIVAN   Used for:  Malignant neoplasm of right kidney (H)        Dose:  0.5 mg   Take 1 tablet (0.5 mg) by mouth every 4 hours as needed (Anxiety, Nausea/Vomiting or Sleep)   Quantity:  30 tablet   Refills:  2       MAGNESIUM PO   Used for:  Pre-op evaluation, Brain metastases (H)        Take by mouth At Bedtime   Refills:  0       Multi-vitamin Tabs tablet   Used for:  Pre-op evaluation, Brain metastases (H)        Dose:  1 tablet   Take 1 tablet by mouth daily (with lunch)   Refills:  0       OMEGA 3 PO   Used for:  Pre-op evaluation, Brain metastases (H)        Take by mouth daily (with lunch)   Refills:  0       omeprazole 40 MG capsule   Commonly known as:  priLOSEC   Used for:  Cerebral edema (H)        Dose:  40 mg   Take 1 capsule (40 mg) by mouth daily   Quantity:  30 capsule   Refills:  1       PROBIOTIC PO   Used for:  Pre-op evaluation, Brain metastases (H)        Take by mouth daily (with lunch)   Refills:  0       prochlorperazine 10 MG tablet   Commonly known as:  COMPAZINE   Used for:  Malignant neoplasm of right kidney (H)        Dose:  10 mg   Take 1 tablet (10 mg) by mouth every 6 hours as needed (Nausea/Vomiting)   Quantity:  30 tablet   Refills:  2       sodium chloride 0.9 % SOLN 100 mL with nivolumab 100 MG/10ML SOLN   Used for:  Pre-op evaluation, Brain metastases (H)        Inject into the vein every 14 days   Refills:  0       SUPER B COMPLEX/VITAMIN C PO   Used for:  Pre-op evaluation, Brain metastases (H)        Take by mouth daily (with lunch)   Refills:  0       TURMERIC PO   Used for:  Pre-op evaluation, Brain metastases (H)        Take by mouth daily (with lunch)   Refills:   0       VITAMIN B 12 PO   Used for:  Pre-op evaluation, Brain metastases (H)        Take by mouth daily (with lunch)   Refills:  0       VITAMIN C PO   Used for:  Pre-op evaluation, Brain metastases (H)        Take by mouth daily (with lunch)   Refills:  0       VITAMIN D PO   Used for:  Pre-op evaluation, Brain metastases (H)        Take by mouth daily (with lunch)   Refills:  0       zolpidem 5 MG tablet   Commonly known as:  AMBIEN   Used for:  Sleep disorder        Dose:  5 mg   Take 1 tablet (5 mg) by mouth nightly as needed for sleep   Quantity:  30 tablet   Refills:  1       * Notice:  This list has 2 medication(s) that are the same as other medications prescribed for you. Read the directions carefully, and ask your doctor or other care provider to review them with you.      STOP taking     acetaminophen-codeine 300-30 MG per tablet   Commonly known as:  TYLENOL #3           HYDROcodone-acetaminophen 5-325 MG per tablet   Commonly known as:  NORCO                Where to get your medicines      These medications were sent to [facility] [id number] - [place], [place] - [address] AT [facility]  [address], [place] [address]    Hours:  24-hours Phone:  [phone]     dexamethasone 2 MG tablet    dexamethasone 2 MG tablet    dexamethasone 2 MG tablet    dexamethasone 4 MG tablet    methocarbamol 500 MG tablet    senna-docusate 8.6-50 MG per tablet         Some of these will need a paper prescription and others can be bought over the counter. Ask your nurse if you have questions.     Bring a paper prescription for each of these medications     oxyCODONE IR 5 MG tablet                Protect others around you: Learn how to safely use, store and throw away your medicines at www.disposemymeds.org.        Information about OPIOIDS     PRESCRIPTION OPIOIDS: WHAT YOU NEED TO KNOW    Prescription opioids can be used to help relieve moderate to severe pain and are often  prescribed following a surgery or injury, or for certain health conditions. These medications can be an important part of treatment but also come with serious risks. It is important to work with your health care provider to make sure you are getting the safest, most effective care.    WHAT ARE THE RISKS AND SIDE EFFECTS OF OPIOID USE?  Prescription opioids carry serious risks of addiction and overdose, especially with prolonged use. An opioid overdose, often marked by slowed breathing can cause sudden death. The use of prescription opioids can have a number of side effects as well, even when taken as directed:      Tolerance - meaning you might need to take more of a medication for the same pain relief    Physical dependence - meaning you have symptoms of withdrawal when a medication is stopped    Increased sensitivity to pain    Constipation    Nausea, vomiting, and dry mouth    Sleepiness and dizziness    Confusion    Depression    Low levels of testosterone that can result in lower sex drive, energy, and strength    Itching and sweating    RISKS ARE GREATER WITH:    History of drug misuse, substance use disorder, or overdose    Mental health conditions (such as depression or anxiety)    Sleep apnea    Older age (65 years or older)    Pregnancy    Avoid alcohol while taking prescription opioids.   Also, unless specifically advised by your health care provider, medications to avoid include:    Benzodiazepines (such as Xanax or Valium)    Muscle relaxants (such as Soma or Flexeril)    Hypnotics (such as Ambien or Lunesta)    Other prescription opioids    KNOW YOUR OPTIONS:  Talk to your health care provider about ways to manage your pain that do not involve prescription opioids. Some of these options may actually work better and have fewer risks and side effects:    Pain relievers such as acetaminophen, ibuprofen, and naproxen    Some medications that are also used for depression or seizures    Physical therapy and  exercise    Cognitive behavioral therapy, a psychological, goal-directed approach, in which patients learn how to modify physical, behavioral, and emotional triggers of pain and stress    IF YOU ARE PRESCRIBED OPIOIDS FOR PAIN:    Never take opioids in greater amounts or more often than prescribed    Follow up with your primary health care provider and work together to create a plan on how to manage your pain.    Talk about ways to help manage your pain that do not involve prescription opioids    Talk about all concerns and side effects    Help prevent misuse and abuse    Never sell or share prescription opioids    Never use another person's prescription opioids    Store prescription opioids in a secure place and out of reach of others (this may include visitors, children, friends, and family)    Visit www.cdc.gov/drugoverdose to learn about risks of opioid abuse and overdose    If you believe you may be struggling with addiction, tell your health care provider and ask for guidance or call [facility]'s National Helpline at 7-890-039-HELP    LEARN MORE / www.cdc.gov/drugoverdose/prescribing/guideline.html    Safely dispose of unused prescription opioids: Find your local drug take-back programs and more information about the importance of safe disposal at www.doseofreality.[place].gov             Medication List: This is a list of all your medications and when to take them. Check marks below indicate your daily home schedule. Keep this list as a reference.      Medications           Morning Afternoon Evening Bedtime As Needed    ALPRAZolam 0.5 MG tablet   Commonly known as:  XANAX   Take 1 tablet (0.5 mg) by mouth 3 times daily as needed for anxiety   Last time this was given:  0.5 mg on 3/7/2018  5:54 AM                                aspirin 81 MG chewable tablet   Take 1 tablet (81 mg) by mouth every morning   Start taking on:  3/19/2018                                atorvastatin 20 MG tablet   Commonly known as:  LIPITOR    Take 1 tablet (20 mg) by mouth daily   Last time this was given:  20 mg on 3/8/2018  8:39 AM                                buPROPion 300 MG 24 hr tablet   Commonly known as:  WELLBUTRIN XL   Take 300 mg by mouth every morning   Last time this was given:  300 mg on 3/8/2018  8:39 AM                                calcium carbonate 1250 MG tablet   Commonly known as:  OS-MUNDO 500 mg [place]. Ca   Take 1 tablet by mouth daily (with lunch)   Last time this was given:  1,250 mg on 3/7/2018 10:40 AM                                citalopram 20 MG tablet   Commonly known as:  celeXA   Take 1 tablet (20 mg) by mouth every evening   Last time this was given:  20 mg on 3/7/2018 10:09 PM                                clonazePAM 0.5 MG tablet   Commonly known as:  klonoPIN   TAKE 0.5-1 TABLETS BY MOUTH 2 TIMES DAILY AS NEEDED FOR ANXIETY   Last time this was given:  1 mg on 3/8/2018  8:43 AM                                * dexamethasone 4 MG tablet   Commonly known as:  DECADRON   Take 1 tablet (4 mg) by mouth every 8 hours for 2 days   Last time this was given:  4 mg on 3/8/2018  6:42 AM                                * dexamethasone 2 MG tablet   Commonly known as:  DECADRON   Take 1 tablet (2 mg) by mouth every 8 hours for 3 days   Start taking on:  3/9/2018   Last time this was given:  4 mg on 3/8/2018  6:42 AM                                * dexamethasone 2 MG tablet   Commonly known as:  DECADRON   Take 1 tablet (2 mg) by mouth every 12 hours for 3 days   Start taking on:  3/12/2018   Last time this was given:  4 mg on 3/8/2018  6:42 AM                                * dexamethasone 2 MG tablet   Commonly known as:  DECADRON   Take 1 tablet (2 mg) by mouth daily for 3 days   Start taking on:  3/15/2018   Last time this was given:  4 mg on 3/8/2018  6:42 AM                                lidocaine-prilocaine cream   Commonly known as:  EMLA   Apply 1 hour prior to port access.                                * LORAZEPAM  PO   Take by mouth as needed for anxiety                                * LORazepam 0.5 MG tablet   Commonly known as:  ATIVAN   Take 1 tablet (0.5 mg) by mouth every 4 hours as needed (Anxiety, Nausea/Vomiting or Sleep)                                MAGNESIUM PO   Take by mouth At Bedtime                                methocarbamol 500 MG tablet   Commonly known as:  ROBAXIN   Take 2 tablets (1,000 mg) by mouth every 6 hours as needed for muscle spasms   Last time this was given:  1,000 mg on 3/8/2018  6:41 AM                                Multi-vitamin Tabs tablet   Take 1 tablet by mouth daily (with lunch)                                OMEGA 3 PO   Take by mouth daily (with lunch)                                omeprazole 40 MG capsule   Commonly known as:  priLOSEC   Take 1 capsule (40 mg) by mouth daily   Last time this was given:  40 mg on 3/8/2018  8:39 AM                                oxyCODONE IR 5 MG tablet   Commonly known as:  ROXICODONE   Take 1-2 tablets (5-10 mg) by mouth every 4 hours as needed for other (pain control or improvement in physical function. Hold dose for analgesic side effects.)   Last time this was given:  10 mg on 3/8/2018  8:43 AM                                PROBIOTIC PO   Take by mouth daily (with lunch)                                prochlorperazine 10 MG tablet   Commonly known as:  COMPAZINE   Take 1 tablet (10 mg) by mouth every 6 hours as needed (Nausea/Vomiting)                                senna-docusate 8.6-50 MG per tablet   Commonly known as:  SENOKOT-S;PERICOLACE   Take 1 tablet by mouth 2 times daily   Last time this was given:  2 tablets on 3/8/2018  8:39 AM                                sodium chloride 0.9 % SOLN 100 mL with nivolumab 100 MG/10ML SOLN   Inject into the vein every 14 days                                SUPER B COMPLEX/VITAMIN C PO   Take by mouth daily (with lunch)                                TURMERIC PO   Take by mouth daily (with lunch)                                 VITAMIN B 12 PO   Take by mouth daily (with lunch)                                VITAMIN C PO   Take by mouth daily (with lunch)   Last time this was given:  500 mg on 3/7/2018 10:40 AM                                VITAMIN D PO   Take by mouth daily (with lunch)                                zolpidem 5 MG tablet   Commonly known as:  AMBIEN   Take 1 tablet (5 mg) by mouth nightly as needed for sleep   Last time this was given:  5 mg on 3/7/2018  8:48 PM                                * Notice:  This list has 6 medication(s) that are the same as other medications prescribed for you. Read the directions carefully, and ask your doctor or other care provider to review them with you.

## 2018-03-06 NOTE — BRIEF OP NOTE
[facility], [place]    Brief Operative Note    Pre-operative diagnosis: Brain Metastasis   Post-operative diagnosis Brain Metastasis  Procedure: Procedure(s):  Left Stealth Assisted Posterior fossa Craniotomy And Tumor Resection  - Wound Class: I-Clean  Surgeon: Surgeon(s) and Role:     * Quintin Palencia MD - Primary     * Marshall Castro MD - Resident - Assisting     * Juan C Strickland MD - Resident - Assisting  Anesthesia: General   Estimated blood loss: 150 cc  Fluids: 1200 cc crystalloid  UOP: 1100 cc  Drains: None  Specimens:   ID Type Source Tests Collected by Time Destination   1 : Left Cerebellar Tumor, history of Renal Cell Cancer Tissue Brain OR DOCUMENTATION ONLY Quintin Palencia MD 3/6/2018  2:57 PM    A : Left Cerebellar Tumor, history of Renal Cell Cancer Tissue Brain SURGICAL PATHOLOGY EXAM Quintin Palencia MD 3/6/2018  3:17 PM      Findings:   None.  Complications: None.  Implants: Synthes Titanium Cranial Plating System

## 2018-03-07 ENCOUNTER — APPOINTMENT (OUTPATIENT)
Dept: PHYSICAL THERAPY | Facility: CLINIC | Age: 53
DRG: 026 | End: 2018-03-07
Attending: NEUROLOGICAL SURGERY
Payer: COMMERCIAL

## 2018-03-07 ENCOUNTER — APPOINTMENT (OUTPATIENT)
Dept: MRI IMAGING | Facility: CLINIC | Age: 53
DRG: 026 | End: 2018-03-07
Attending: NEUROLOGICAL SURGERY
Payer: COMMERCIAL

## 2018-03-07 LAB
ANION GAP SERPL CALCULATED.3IONS-SCNC: 9 MMOL/L (ref 3–14)
APTT PPP: 25 SEC (ref 22–37)
BUN SERPL-MCNC: 22 MG/DL (ref 7–30)
CA-I BLD-MCNC: 4.6 MG/DL (ref 4.4–5.2)
CALCIUM SERPL-MCNC: 8.3 MG/DL (ref 8.5–10.1)
CHLORIDE SERPL-SCNC: 105 MMOL/L (ref 94–109)
CO2 SERPL-SCNC: 24 MMOL/L (ref 20–32)
CREAT SERPL-MCNC: 0.83 MG/DL (ref 0.52–1.04)
ERYTHROCYTE [DISTWIDTH] IN BLOOD BY AUTOMATED COUNT: 15.2 % (ref 10–15)
FIBRINOGEN PPP-MCNC: 290 MG/DL (ref 200–420)
GFR SERPL CREATININE-BSD FRML MDRD: 72 ML/MIN/1.7M2
GLUCOSE SERPL-MCNC: 154 MG/DL (ref 70–99)
HCT VFR BLD AUTO: 31.2 % (ref 35–47)
HGB BLD-MCNC: 9.9 G/DL (ref 11.7–15.7)
INR PPP: 1 (ref 0.86–1.14)
MAGNESIUM SERPL-MCNC: 2.2 MG/DL (ref 1.6–2.3)
MCH RBC QN AUTO: 29.8 PG (ref 26.5–33)
MCHC RBC AUTO-ENTMCNC: 31.7 G/DL (ref 31.5–36.5)
MCV RBC AUTO: 94 FL (ref 78–100)
PLATELET # BLD AUTO: 318 10E9/L (ref 150–450)
POTASSIUM BLD-SCNC: 4.2 MMOL/L (ref 3.4–5.3)
POTASSIUM SERPL-SCNC: 4.4 MMOL/L (ref 3.4–5.3)
RBC # BLD AUTO: 3.32 10E12/L (ref 3.8–5.2)
SODIUM SERPL-SCNC: 138 MMOL/L (ref 133–144)
WBC # BLD AUTO: 12.4 10E9/L (ref 4–11)

## 2018-03-07 PROCEDURE — 25000132 ZZH RX MED GY IP 250 OP 250 PS 637: Performed by: NEUROLOGICAL SURGERY

## 2018-03-07 PROCEDURE — 85027 COMPLETE CBC AUTOMATED: CPT | Performed by: NEUROLOGICAL SURGERY

## 2018-03-07 PROCEDURE — 40000014 ZZH STATISTIC ARTERIAL MONITORING DAILY

## 2018-03-07 PROCEDURE — 70553 MRI BRAIN STEM W/O & W/DYE: CPT

## 2018-03-07 PROCEDURE — 12000001 ZZH R&B MED SURG/OB UMMC

## 2018-03-07 PROCEDURE — 80048 BASIC METABOLIC PNL TOTAL CA: CPT | Performed by: NEUROLOGICAL SURGERY

## 2018-03-07 PROCEDURE — 25000128 H RX IP 250 OP 636: Performed by: STUDENT IN AN ORGANIZED HEALTH CARE EDUCATION/TRAINING PROGRAM

## 2018-03-07 PROCEDURE — 85610 PROTHROMBIN TIME: CPT | Performed by: NEUROLOGICAL SURGERY

## 2018-03-07 PROCEDURE — 25000128 H RX IP 250 OP 636: Performed by: NEUROLOGICAL SURGERY

## 2018-03-07 PROCEDURE — 85730 THROMBOPLASTIN TIME PARTIAL: CPT | Performed by: NEUROLOGICAL SURGERY

## 2018-03-07 PROCEDURE — 85384 FIBRINOGEN ACTIVITY: CPT | Performed by: NEUROLOGICAL SURGERY

## 2018-03-07 PROCEDURE — 83735 ASSAY OF MAGNESIUM: CPT | Performed by: NEUROLOGICAL SURGERY

## 2018-03-07 PROCEDURE — 97530 THERAPEUTIC ACTIVITIES: CPT | Mod: GP

## 2018-03-07 PROCEDURE — A9585 GADOBUTROL INJECTION: HCPCS | Performed by: NEUROLOGICAL SURGERY

## 2018-03-07 PROCEDURE — 82330 ASSAY OF CALCIUM: CPT | Performed by: NEUROLOGICAL SURGERY

## 2018-03-07 PROCEDURE — 25000131 ZZH RX MED GY IP 250 OP 636 PS 637: Performed by: NURSE PRACTITIONER

## 2018-03-07 PROCEDURE — 40000193 ZZH STATISTIC PT WARD VISIT

## 2018-03-07 PROCEDURE — 25000132 ZZH RX MED GY IP 250 OP 250 PS 637: Performed by: STUDENT IN AN ORGANIZED HEALTH CARE EDUCATION/TRAINING PROGRAM

## 2018-03-07 PROCEDURE — 40000275 ZZH STATISTIC RCP TIME EA 10 MIN

## 2018-03-07 PROCEDURE — 97116 GAIT TRAINING THERAPY: CPT | Mod: GP

## 2018-03-07 PROCEDURE — 36415 COLL VENOUS BLD VENIPUNCTURE: CPT | Performed by: NEUROLOGICAL SURGERY

## 2018-03-07 PROCEDURE — 84132 ASSAY OF SERUM POTASSIUM: CPT | Performed by: NEUROLOGICAL SURGERY

## 2018-03-07 PROCEDURE — 97161 PT EVAL LOW COMPLEX 20 MIN: CPT | Mod: GP

## 2018-03-07 RX ORDER — DIPHENHYDRAMINE HCL 25 MG
50 CAPSULE ORAL
Status: COMPLETED | OUTPATIENT
Start: 2018-03-07 | End: 2018-03-07

## 2018-03-07 RX ORDER — DEXAMETHASONE 1 MG
2 TABLET ORAL EVERY 8 HOURS SCHEDULED
Status: DISCONTINUED | OUTPATIENT
Start: 2018-03-09 | End: 2018-03-08 | Stop reason: HOSPADM

## 2018-03-07 RX ORDER — DEXAMETHASONE 1 MG
2 TABLET ORAL DAILY
Status: DISCONTINUED | OUTPATIENT
Start: 2018-03-15 | End: 2018-03-08 | Stop reason: HOSPADM

## 2018-03-07 RX ORDER — DEXAMETHASONE 4 MG/1
4 TABLET ORAL EVERY 8 HOURS SCHEDULED
Status: DISCONTINUED | OUTPATIENT
Start: 2018-03-07 | End: 2018-03-08 | Stop reason: HOSPADM

## 2018-03-07 RX ORDER — HYDRALAZINE HYDROCHLORIDE 20 MG/ML
10-20 INJECTION INTRAMUSCULAR; INTRAVENOUS EVERY 30 MIN PRN
Status: DISCONTINUED | OUTPATIENT
Start: 2018-03-07 | End: 2018-03-08 | Stop reason: HOSPADM

## 2018-03-07 RX ORDER — LABETALOL HYDROCHLORIDE 5 MG/ML
10-40 INJECTION, SOLUTION INTRAVENOUS EVERY 10 MIN PRN
Status: DISCONTINUED | OUTPATIENT
Start: 2018-03-07 | End: 2018-03-08 | Stop reason: HOSPADM

## 2018-03-07 RX ORDER — GADOBUTROL 604.72 MG/ML
7.5 INJECTION INTRAVENOUS ONCE
Status: COMPLETED | OUTPATIENT
Start: 2018-03-07 | End: 2018-03-07

## 2018-03-07 RX ORDER — DEXAMETHASONE 1 MG
2 TABLET ORAL EVERY 12 HOURS SCHEDULED
Status: DISCONTINUED | OUTPATIENT
Start: 2018-03-12 | End: 2018-03-08 | Stop reason: HOSPADM

## 2018-03-07 RX ORDER — OXYCODONE HYDROCHLORIDE 5 MG/1
5-10 TABLET ORAL EVERY 4 HOURS PRN
Status: DISCONTINUED | OUTPATIENT
Start: 2018-03-07 | End: 2018-03-08 | Stop reason: HOSPADM

## 2018-03-07 RX ADMIN — ATORVASTATIN CALCIUM 20 MG: 20 TABLET, FILM COATED ORAL at 07:59

## 2018-03-07 RX ADMIN — DEXAMETHASONE 4 MG: 4 TABLET ORAL at 22:09

## 2018-03-07 RX ADMIN — OXYCODONE HYDROCHLORIDE 10 MG: 5 TABLET ORAL at 00:31

## 2018-03-07 RX ADMIN — DEXAMETHASONE 4 MG: 4 TABLET ORAL at 16:30

## 2018-03-07 RX ADMIN — OMEPRAZOLE 40 MG: 20 CAPSULE, DELAYED RELEASE ORAL at 07:58

## 2018-03-07 RX ADMIN — SODIUM CHLORIDE: 9 INJECTION, SOLUTION INTRAVENOUS at 12:02

## 2018-03-07 RX ADMIN — Medication 0.3 MG: at 12:26

## 2018-03-07 RX ADMIN — OXYCODONE HYDROCHLORIDE 10 MG: 5 TABLET ORAL at 18:06

## 2018-03-07 RX ADMIN — B-COMPLEX W/ C & FOLIC ACID TAB 1 TABLET: TAB at 10:40

## 2018-03-07 RX ADMIN — Medication 0.3 MG: at 00:31

## 2018-03-07 RX ADMIN — OXYCODONE HYDROCHLORIDE 10 MG: 5 TABLET ORAL at 10:39

## 2018-03-07 RX ADMIN — SODIUM CHLORIDE 1000 ML: 9 INJECTION, SOLUTION INTRAVENOUS at 09:51

## 2018-03-07 RX ADMIN — DEXAMETHASONE SODIUM PHOSPHATE 4 MG: 4 INJECTION, SOLUTION INTRA-ARTICULAR; INTRALESIONAL; INTRAMUSCULAR; INTRAVENOUS; SOFT TISSUE at 10:39

## 2018-03-07 RX ADMIN — DIPHENHYDRAMINE HYDROCHLORIDE 50 MG: 25 CAPSULE ORAL at 20:48

## 2018-03-07 RX ADMIN — DEXAMETHASONE SODIUM PHOSPHATE 4 MG: 4 INJECTION, SOLUTION INTRA-ARTICULAR; INTRALESIONAL; INTRAMUSCULAR; INTRAVENOUS; SOFT TISSUE at 03:54

## 2018-03-07 RX ADMIN — LIDOCAINE 2 PATCH: 560 PATCH PERCUTANEOUS; TOPICAL; TRANSDERMAL at 20:12

## 2018-03-07 RX ADMIN — ACETAMINOPHEN 975 MG: 325 TABLET, FILM COATED ORAL at 19:05

## 2018-03-07 RX ADMIN — OXYCODONE HYDROCHLORIDE 10 MG: 5 TABLET ORAL at 22:08

## 2018-03-07 RX ADMIN — OXYCODONE HYDROCHLORIDE AND ACETAMINOPHEN 500 MG: 500 TABLET ORAL at 10:40

## 2018-03-07 RX ADMIN — SENNOSIDES AND DOCUSATE SODIUM 1 TABLET: 8.6; 5 TABLET ORAL at 19:06

## 2018-03-07 RX ADMIN — CALCIUM 1250 MG: 500 TABLET ORAL at 10:40

## 2018-03-07 RX ADMIN — OXYCODONE HYDROCHLORIDE 10 MG: 5 TABLET ORAL at 07:59

## 2018-03-07 RX ADMIN — SODIUM CHLORIDE 1000 ML: 9 INJECTION, SOLUTION INTRAVENOUS at 07:00

## 2018-03-07 RX ADMIN — ACETAMINOPHEN 975 MG: 325 TABLET, FILM COATED ORAL at 10:40

## 2018-03-07 RX ADMIN — SENNOSIDES AND DOCUSATE SODIUM 1 TABLET: 8.6; 5 TABLET ORAL at 07:59

## 2018-03-07 RX ADMIN — CITALOPRAM HYDROBROMIDE 20 MG: 20 TABLET ORAL at 22:09

## 2018-03-07 RX ADMIN — ZOLPIDEM TARTRATE 5 MG: 5 TABLET, FILM COATED ORAL at 20:48

## 2018-03-07 RX ADMIN — SODIUM CHLORIDE: 9 INJECTION, SOLUTION INTRAVENOUS at 08:14

## 2018-03-07 RX ADMIN — ALPRAZOLAM 0.5 MG: 0.25 TABLET ORAL at 05:54

## 2018-03-07 RX ADMIN — ACETAMINOPHEN 975 MG: 325 TABLET, FILM COATED ORAL at 05:50

## 2018-03-07 RX ADMIN — METHOCARBAMOL 1000 MG: 500 TABLET ORAL at 15:00

## 2018-03-07 RX ADMIN — ONDANSETRON 4 MG: 2 INJECTION INTRAMUSCULAR; INTRAVENOUS at 15:44

## 2018-03-07 RX ADMIN — OXYCODONE HYDROCHLORIDE 10 MG: 5 TABLET ORAL at 14:02

## 2018-03-07 RX ADMIN — GADOBUTROL 6.5 ML: 604.72 INJECTION INTRAVENOUS at 11:38

## 2018-03-07 RX ADMIN — CLONAZEPAM 1 MG: 0.5 TABLET ORAL at 19:05

## 2018-03-07 RX ADMIN — CLONAZEPAM 0.5 MG: 0.5 TABLET ORAL at 07:59

## 2018-03-07 RX ADMIN — METHOCARBAMOL 1000 MG: 500 TABLET ORAL at 23:59

## 2018-03-07 RX ADMIN — BUPROPION HYDROCHLORIDE 300 MG: 300 TABLET, FILM COATED, EXTENDED RELEASE ORAL at 07:59

## 2018-03-07 ASSESSMENT — PAIN DESCRIPTION - DESCRIPTORS
DESCRIPTORS: ACHING
DESCRIPTORS: ACHING;SORE
DESCRIPTORS: ACHING
DESCRIPTORS: OTHER (COMMENT);DISCOMFORT
DESCRIPTORS: ACHING
DESCRIPTORS: DISCOMFORT

## 2018-03-07 NOTE — OP NOTE
Procedure Date: 03/06/2018      PREOPERATIVE DIAGNOSIS:    1. Left cerebellar mass.   2. History of renal cell carcinoma.      POSTOPERATIVE DIAGNOSIS:    1. Left cerebellar mass.   2. History of renal cell carcinoma.      OPERATION PERFORMED:   1.  Left cerebellar craniotomy for resection of left cerebellar mass.   2.  Use of frameless stereotaxy.   3.  Use of operating microscope.      ATTENDING SURGEON:  Quintin Palencia MD.      RESIDENT SURGEONS:   1.  Marshall Castro MD.   2.  Juan C Strickland MD, PhD.      ANESTHESIA:  General endotracheal.      ESTIMATED BLOOD LOSS:  150 mL.      SPECIMENS:  The tumor tissue was sent for permanent pathology.      INDICATIONS FOR OPERATION:  Ms. Suzi Gonsales is a 52-year-old female with a past medical history significant for renal cell carcinoma that was diagnosed in 2014, for which she underwent a right radical nephrectomy.  Over the past few weeks, she has been having headaches, and an MRI of the brain revealed that she has a contrast-enhancing mass in the left cerebellum.  Given her symptomatology and the findings of the solitary lesion, surgical intervention was offered.  Consent for the above-named operation therefore obtained from the patient after the risks, benefits and alternatives were thoroughly explained.      DESCRIPTION OF OPERATION:  The patient was brought to the operating theater, where general endotracheal anesthesia was induced and all appropriate IV access was obtained.  A Foster catheter and arterial line were placed.  The patient's head was secured in the Cox frame via 3 points of fixation.  She was then turned prone on gel rolls on the operating table, and the Cox was secured to the operating table via its adapter with the patient's head turned to the left such that the left cerebellar hemisphere would be at the highest point.  Her arms were tucked to the side and all pressure points were appropriately padded.  The K[employer] neuronavigation was then  appropriately registered.  Neuronavigation was then used to identify our entry point for resection of the tumor, and then a linear incision was marked and infiltrated with roughly 6 mL of 0.5% Marcaine with 1:200,000 of epinephrine.  Her head was then prepped and draped in a standard sterile fashion.  Perioperative antibiotics were administered and sequential compression devices were applied.      After conducting the appropriate timeout, a #10 blade scalpel was used to make a skin incision.  Subcutaneous dissection was carried out using monopolar cautery.  We used the periosteal elevator to expose the cranium.  Cerebellar retractors were placed to facilitate visualization.  We used the neuronavigation to plan our craniotomy.  We then placed 2 bur holes over the transverse sinus.  This would be the superiormost aspect of our craniotomy.  We used a dental instrument to strip the dura vigorously and then used the B1 with a footplate to turn the craniotomy flap.      The operating microscope, which had already been sterilely draped, was then brought into the field.  Epidural hemostasis was achieved using bipolar cautery.  We opened the dura in a curvilinear fashion with the base situated towards the transverse sinus.  It was tacked back using 4-0 Nurolon sutures.  The tumor was essentially emanating from the surface of the left cerebellar hemisphere.  We used a combination of bipolar cautery and a #1 Penfield to dissect around it circumferentially and then removed it in an en bloc fashion.  The tumor was sent for permanent pathology.  Meticulous hemostasis was then achieved using bipolar cautery in the emptied tumor cavity as well as throughout the cerebellar surface.  Once we were happy with our resection and hemostasis, we turned our attention to closing.     Due to the fact that a portion of the dura had been torn during the turning of the craniotomy flap, we placed a small 2 x 2 piece of Duragen in an inlay fashion  underneath the dural edges and then reapproximated the dura primarily, roughly 2/3 of the way, using 4-0 Nurolon suture.  The parts of the dura that could not be closed primarily were reapproximated to the bony edge by tacking them up to the bone in order to close any gaps overlying the Duragen along that surface.  The wound was copiously irrigated using antibiotic-impregnated saline.  Hemostasis was again meticulously achieved using bipolar cautery.  The bone flap was secured to the patient's native skull using the Synthes cranial plating system.  The fascia of the muscle was closed using 2-0 Vicryl suture in a simple interrupted fashion, followed by 2-0 Vicryl suture in an inverted interrupted fashion for the dermal layer and then 3-0 nylon in a simple running fashion for the skin.  The wound was then cleaned with wet and dry sponges, followed by ChloraPrep, and then a sterile dressing was applied.  The drapes were then taken down.  The Cox was detached from the operating table, after which the patient was turned back supine on her [facility].  The Cox was then removed and the patient was easily extubated prior to being taken to the postanesthesia care unit for ongoing recovery.      Instrument, needle and sponge counts were correct x 2 at the end of the operation.      Dr. Nadine España, neurosurgery staff, was present and scrubbed for all critical portions of the operation and immediately available for its entirety.      I, Dr. Nadine España, was present for the key portions of the procedure and immediately available for the entire operation.       NADINE ESPAÑA MD       As dictated by RUSS ALFARO MD            D: 2018   T: 2018   MT: ANGEL      Name:     MICKEY AMEZCUA   MRN:      [id number]        Account:        [id number]   :      1965           Procedure Date: 2018      Document: [id number]

## 2018-03-07 NOTE — PROGRESS NOTES
03/07/18 0954   Quick Adds   Type of Visit Initial PT Evaluation       Present no   Language English   Living Environment   Lives With spouse;child(janine), dependent   Living Arrangements house   Home Accessibility bed and bath are not on the first floor;stairs within home   Number of Stairs to Enter Home 0   Number of Stairs Within Home 14   Stair Railings at Home inside, present at both sides   Transportation Available car;family or friend will provide   Living Environment Comment Pt lives with spouse and two children, pt's brother is visiting.  Assistance will be available 24/7.   Self-Care   Dominant Hand right   Usual Activity Tolerance good   Current Activity Tolerance fair   Functional Level Prior   Ambulation 0-->independent   Transferring 0-->independent   Toileting 0-->independent   Bathing 0-->independent   Dressing 0-->independent   Eating 0-->independent   Communication 0-->understands/communicates without difficulty   Swallowing 0-->swallows foods/liquids without difficulty   Cognition 0 - no cognition issues reported   Prior Functional Level Comment Previously IND with all functional mobility and ADLs   General Information   Onset of Illness/Injury or Date of Surgery - Date 03/06/18   Referring Physician Juan C Strickland MD   Patient/Family Goals Statement Return home   Pertinent History of Current Problem (include personal factors and/or comorbidities that impact the POC) 53 yo female POD#1 from left occipital craniotomy for tumor resection   Precautions/Limitations other (see comments)  (craniotomy precautions)   General Info Comments Activity: up with assist   Cognitive Status Examination   Orientation orientation to person, place and time   Level of Consciousness alert   Follows Commands and Answers Questions 100% of the time   Personal Safety and Judgment intact   Memory intact   Pain Assessment   Patient Currently in Pain Yes, see Vital Sign flowsheet   Posture    Posture  "Not impaired   Range of Motion (ROM)   ROM Comment B LE WNL/WFL   Strength   Strength Comments B LE > 3/5 secondary to functional mobility   Bed Mobility   Bed Mobility Comments SBA/supervision   Transfer Skills   Transfer Comments CGA/SBA   Gait   Gait Comments FWW + CGA   Balance   Balance Comments Mild balance deficits noted with gait   Sensory Examination   Sensory Perception no deficits were identified   Coordination   Coordination no deficits were identified   Clinical Impression   Criteria for Skilled Therapeutic Intervention yes, treatment indicated   PT Diagnosis Impaired functional mobility   Influenced by the following impairments Impaired balance, strength, activity tolerance   Functional limitations due to impairments Impaired bed mobility, tranfsers, gait, stairs   Clinical Presentation Stable/Uncomplicated   Clinical Presentation Rationale Clinical judgement   Clinical Decision Making (Complexity) Low complexity   Therapy Frequency` daily   Predicted Duration of Therapy Intervention (days/wks) 3/9/18   Anticipated Equipment Needs at Discharge shower chair;walker   Anticipated Discharge Disposition Home with Outpatient Therapy;Home with Assist   Risk & Benefits of therapy have been explained Yes   Patient, Family & other staff in agreement with plan of care Yes   [facility] [employer] TM \"6 Clicks\"   2016, Trustees of [facility], under license to [employer].  All rights reserved.   6 Clicks Short Forms Basic Mobility Inpatient Short Form   [facility] [employer]  \"6 Clicks\" V.2 Basic Mobility Inpatient Short Form   1. Turning from your back to your side while in a flat bed without using bedrails? 4 - None   2. Moving from lying on your back to sitting on the side of a flat bed without using bedrails? 4 - None   3. Moving to and from a bed to a chair (including a wheelchair)? 3 - A Little   4. Standing up from a chair using your arms (e.g., wheelchair, or bedside chair)? 3 - A Little   5. " To walk in hospital room? 3 - A Little   6. Climbing 3-5 steps with a railing? 3 - A Little   Basic Mobility Raw Score (Score out of 24.Lower scores equate to lower levels of function) 20   Total Evaluation Time   Total Evaluation Time (Minutes) 10

## 2018-03-07 NOTE — PROGRESS NOTES
Neurosurgery Progress Note    Name: Suzi Gonsales  Age/Sex: 52 F  Rm: [id number]  MRN: [id number]  Staff: Talha  Date of Admission: 3/6/2018  Primary Service: Neurosurgery  Consulting Services: ALTHEA    HPI: Renal cell carcinoma dx in 2014 s/p R nephrectomy. Has had worsening headaches over the past few weeks. MRI revealed mass in the L cerebellum.      Daily events/Procedures:   3/6: Left Stealth Assisted occipital craniotomy for tumor resection. No frozen sent.   3/7: transfer out of the ICU.  Post-operative MRI    Imaging:  3/7: MRI brain pending    Examination:  AOx3, CN II-XII intact, strength 5/5 throughout, no pronator drift, sensation intact to light touch    Assessment/Plan of care: 51 yo female POD#1 from left occipital craniotomy for tumor resection.  Doing well post-operatively.     NEURO:   - Post-Operative Pain Control  - MRI brain on 3/7/2018  - Neuro Examination Q4 HR  - Added to tumor board  - Steroids  - F/U finalized pathology.     CV:  - Goal SBP < 140 mmHg    PULM:   - Incentive Spirometry Q 1 HR     GI/FEN:  - Advance Diet As Tolerated  - GI Prophylaxis Not Indicated  - Electrolyte Replacement Protocol  - Bowel Regimen w/ Senna and Miralax    RENAL/:   - D/C Foster POD #1  - Continue 0.9 NaCl 100 mL/hour, wean off    HEME/ID:  - Plts > 100k, hgb > 8, INR > 1.5   - DVT Prophylaxis: SCDs to BLE    ENDO:   - No Issues    DISPO:  Anticipate D/C Home 3/7 or 3/8, otherwise transfer to floor  Barriers: evaluation by PTOT  Activity: Up in Chair TID; Ambulate w/ Assist  PT: Evaluation Pending  OT: Evaluation Pending    -----------------------------------  Kareem Olmstead MD, MS  Neurosurgery PGY-1

## 2018-03-07 NOTE — PLAN OF CARE
Problem: Patient Care Overview  Goal: Plan of Care/Patient Progress Review  PT / 4AB -     Discharge Planner PT   Patient plan for discharge: home with assist  Current status: PT evaluation completed, treatment indicated. Educated on craniotomy precautions and implications for functional mobility/ADLs.  Performed bed mobility + SBA and transferred sit->stand + CGA.  Amb with HHA + CGA and  FWW + CGA demonstrating improved gait stability with use of FWW.  Barriers to return to prior living situation: medical clearance, higher level balance, dizziness, stairs  Recommendations for discharge: home with assist + OP PT  Rationale for recommendations: Pt has 24/7 assistance at home and is currently performing functional mobility + SBA/CGA.  Pt would benefit from continued skilled PT to progress balance and activity tolerance to return to PLOF.  Recommended use of shower chair and FWW - pt plans to obtain independently.        Entered by: Desi Bazzi 03/07/2018 4:06 PM

## 2018-03-07 NOTE — PLAN OF CARE
Problem: Pain, Acute (Adult)  Goal: Identify Related Risk Factors and Signs and Symptoms  Related risk factors and signs and symptoms are identified upon initiation of Human Response Clinical Practice Guideline (CPG).  Outcome: No Change  Pt transferred from  at 1450. POD #1 left occipital craniotomy for tumor resection. VSS ex hypotensive. (MD michel aware). Pt c/o head pain relieved w/ Oxycodone and Robaxin. Up w/ SBA. Voiding spontaneously. Posterior head incision w/ primipore, drainage marked w/ slight extension. Regular diet. PIV infusing NS at 100mL/hr. Plan for discharge tomorrow if pain under control. Continue to monitor and follow POC.

## 2018-03-07 NOTE — PLAN OF CARE
Problem: Patient Care Overview  Goal: Plan of Care/Patient Progress Review  OT 4A: Orders received and acknowledged. Per interdisciplinary collaboration and chart review, pt is SBA for ADLs and will have assistance at home. No acute OT needs, will DEFER.

## 2018-03-07 NOTE — PROGRESS NOTES
Patient arrived from PACU @ approx 1830. A&O x 4, follows commands appropriately. Neurologically intact. HR 90s and normotensive. 2 L NC. Foster in place. Drainage on head incision dressing marked with serosang drainage. Will continue to monitor.

## 2018-03-07 NOTE — PLAN OF CARE
Problem: Patient Care Overview  Goal: Plan of Care/Patient Progress Review  Outcome: Improving  N: Pt is alert and oriented with all neuro checks. No concerns. Dressing marked and no changes.   R: Lungs are clear and diminished in bases. On 2L NC satting high 90s. Still on Capno.  C: Pt in NSR in 70s. BP soft this AM. MD paged. Pt claims that she runs lower with blood pressures.   GI/: Foster draining clear yellow urine and in a regular diet.   A-line has been pulled.

## 2018-03-07 NOTE — PLAN OF CARE
Problem: Patient Care Overview  Goal: Plan of Care/Patient Progress Review  D/I: Patient on unit 4A Surgical/Neuro ICU  Neuro: A&O x4, follows commands appropriately. PERRLA, equal strength in all extremities.   CV: SR 60s-80s, BP soft this am, 2 L boluses given. Currently normotensive and hemodynamically stable  Pulm: LS clear, 2 L with capnography  GI: Regular diet, no BM this shift.  : Voids spontaneously in bathroom  Gtts: NS @ 100  Pain: Oxy 10 mg given X3 and dilaudid 0.3 mg given x1.   Skin: Dressing on L posterior head, dressing marked.     See flow sheets for further interventions and assessments.  A: Stable  P: Transferred to 6A via bed

## 2018-03-08 ENCOUNTER — APPOINTMENT (OUTPATIENT)
Dept: PHYSICAL THERAPY | Facility: CLINIC | Age: 53
DRG: 026 | End: 2018-03-08
Attending: NEUROLOGICAL SURGERY
Payer: COMMERCIAL

## 2018-03-08 VITALS
RESPIRATION RATE: 16 BRPM | WEIGHT: 129.85 LBS | DIASTOLIC BLOOD PRESSURE: 41 MMHG | OXYGEN SATURATION: 95 % | TEMPERATURE: 98.3 F | HEART RATE: 74 BPM | BODY MASS INDEX: 23.9 KG/M2 | HEIGHT: 62 IN | SYSTOLIC BLOOD PRESSURE: 92 MMHG

## 2018-03-08 PROCEDURE — 25000128 H RX IP 250 OP 636: Performed by: NURSE PRACTITIONER

## 2018-03-08 PROCEDURE — 25000131 ZZH RX MED GY IP 250 OP 636 PS 637: Performed by: NURSE PRACTITIONER

## 2018-03-08 PROCEDURE — 40000193 ZZH STATISTIC PT WARD VISIT

## 2018-03-08 PROCEDURE — 97116 GAIT TRAINING THERAPY: CPT | Mod: GP

## 2018-03-08 PROCEDURE — 97530 THERAPEUTIC ACTIVITIES: CPT | Mod: GP

## 2018-03-08 PROCEDURE — 25000132 ZZH RX MED GY IP 250 OP 250 PS 637: Performed by: NEUROLOGICAL SURGERY

## 2018-03-08 PROCEDURE — 25000132 ZZH RX MED GY IP 250 OP 250 PS 637: Performed by: STUDENT IN AN ORGANIZED HEALTH CARE EDUCATION/TRAINING PROGRAM

## 2018-03-08 RX ORDER — AMOXICILLIN 250 MG
1 CAPSULE ORAL 2 TIMES DAILY
Qty: 30 TABLET | Refills: 0 | Status: SHIPPED | OUTPATIENT
Start: 2018-03-08 | End: 2018-01-01

## 2018-03-08 RX ORDER — HEPARIN SODIUM (PORCINE) LOCK FLUSH IV SOLN 100 UNIT/ML 100 UNIT/ML
5 SOLUTION INTRAVENOUS
Status: DISCONTINUED | OUTPATIENT
Start: 2018-03-08 | End: 2018-03-08 | Stop reason: HOSPADM

## 2018-03-08 RX ORDER — DEXAMETHASONE 2 MG/1
2 TABLET ORAL DAILY
Qty: 3 TABLET | Refills: 0 | Status: SHIPPED | OUTPATIENT
Start: 2018-03-15 | End: 2018-01-01

## 2018-03-08 RX ORDER — ASPIRIN 81 MG/1
81 TABLET, CHEWABLE ORAL EVERY MORNING
Qty: 36 TABLET | COMMUNITY
Start: 2018-01-01 | End: 2018-01-01

## 2018-03-08 RX ORDER — HEPARIN SODIUM,PORCINE 10 UNIT/ML
5-10 VIAL (ML) INTRAVENOUS EVERY 24 HOURS
Status: DISCONTINUED | OUTPATIENT
Start: 2018-03-08 | End: 2018-03-08 | Stop reason: HOSPADM

## 2018-03-08 RX ORDER — DEXAMETHASONE 4 MG/1
4 TABLET ORAL EVERY 8 HOURS
Qty: 6 TABLET | Refills: 0 | Status: SHIPPED | OUTPATIENT
Start: 2018-03-08 | End: 2018-03-10

## 2018-03-08 RX ORDER — METHOCARBAMOL 500 MG/1
1000 TABLET, FILM COATED ORAL EVERY 6 HOURS PRN
Qty: 30 TABLET | Refills: 0 | Status: SHIPPED | OUTPATIENT
Start: 2018-03-08 | End: 2018-03-16

## 2018-03-08 RX ORDER — DEXAMETHASONE 2 MG/1
2 TABLET ORAL EVERY 12 HOURS
Qty: 6 TABLET | Refills: 0 | Status: SHIPPED | OUTPATIENT
Start: 2018-03-12 | End: 2018-03-15

## 2018-03-08 RX ORDER — DEXAMETHASONE 2 MG/1
2 TABLET ORAL EVERY 8 HOURS
Qty: 9 TABLET | Refills: 0 | Status: SHIPPED | OUTPATIENT
Start: 2018-03-09 | End: 2018-03-12

## 2018-03-08 RX ORDER — HEPARIN SODIUM,PORCINE 10 UNIT/ML
5-10 VIAL (ML) INTRAVENOUS
Status: DISCONTINUED | OUTPATIENT
Start: 2018-03-08 | End: 2018-03-08 | Stop reason: HOSPADM

## 2018-03-08 RX ORDER — OXYCODONE HYDROCHLORIDE 5 MG/1
5-10 TABLET ORAL EVERY 4 HOURS PRN
Qty: 60 TABLET | Refills: 0 | Status: SHIPPED | OUTPATIENT
Start: 2018-03-08 | End: 2018-03-16

## 2018-03-08 RX ADMIN — CLONAZEPAM 1 MG: 0.5 TABLET ORAL at 08:43

## 2018-03-08 RX ADMIN — OXYCODONE HYDROCHLORIDE 10 MG: 5 TABLET ORAL at 02:35

## 2018-03-08 RX ADMIN — ACETAMINOPHEN 975 MG: 325 TABLET, FILM COATED ORAL at 10:13

## 2018-03-08 RX ADMIN — BUPROPION HYDROCHLORIDE 300 MG: 300 TABLET, FILM COATED, EXTENDED RELEASE ORAL at 08:39

## 2018-03-08 RX ADMIN — ACETAMINOPHEN 975 MG: 325 TABLET, FILM COATED ORAL at 02:35

## 2018-03-08 RX ADMIN — DEXAMETHASONE 4 MG: 4 TABLET ORAL at 06:42

## 2018-03-08 RX ADMIN — SENNOSIDES AND DOCUSATE SODIUM 2 TABLET: 8.6; 5 TABLET ORAL at 08:39

## 2018-03-08 RX ADMIN — OMEPRAZOLE 40 MG: 20 CAPSULE, DELAYED RELEASE ORAL at 08:39

## 2018-03-08 RX ADMIN — SODIUM CHLORIDE, PRESERVATIVE FREE 5 ML: 5 INJECTION INTRAVENOUS at 10:12

## 2018-03-08 RX ADMIN — ATORVASTATIN CALCIUM 20 MG: 20 TABLET, FILM COATED ORAL at 08:39

## 2018-03-08 RX ADMIN — METHOCARBAMOL 1000 MG: 500 TABLET ORAL at 06:41

## 2018-03-08 RX ADMIN — OXYCODONE HYDROCHLORIDE 10 MG: 5 TABLET ORAL at 08:43

## 2018-03-08 ASSESSMENT — VISUAL ACUITY: OU: GLASSES;BASELINE

## 2018-03-08 NOTE — PLAN OF CARE
Problem: Pain, Acute (Adult)  Goal: Identify Related Risk Factors and Signs and Symptoms  Related risk factors and signs and symptoms are identified upon initiation of Human Response Clinical Practice Guideline (CPG).   Outcome: Adequate for Discharge Date Met: 03/08/18  POD #2 left occipital craniotomy for tumor resection. VSS ex hypotensive. (MD michel aware). Pt c/o head pain relieved w/ Oxycodone, Tylenol, and Robaxin. Neuros intact. Posterior head incision w/ sutures, CDI. Up w/ SBA, ambulated around unit 2x. Voiding spontaneously. Regular diet. PIV removed. Discharge instructions given to patient, , and brother at bedside. Patient transported to front door via wheelchair.

## 2018-03-08 NOTE — PLAN OF CARE
Problem: Patient Care Overview  Goal: Plan of Care/Patient Progress Review  Discharge Planner PT   Patient plan for discharge: Home w/ OP PT   Current status: Plan for DC to home. Pt engaged in gait traiing 125' w/o AD, CGA provided, gross instability. Declined stair trial. Pt required cues for craniotomy precautions, lifting restriction. Pt has all DME needs met at home and assist. Discussed PT POC. Pt to use FWW in home for all mobility 2/2 balance impairments. No dizziness during session this day.   Barriers to return to prior living situation: Impaired balance, endurance  Recommendations for discharge: Home w/ 24/7 assist, use of FWW, OP PT  Rationale for recommendations: OP PT to improve above impairments          Entered by: Michell Glaser 03/08/2018 11:31 AM

## 2018-03-08 NOTE — PLAN OF CARE
Problem: Patient Care Overview  Goal: Plan of Care/Patient Progress Review  Outcome: No Change    Pt transferred from  right before evening shift. POD#1 L occipital crani for tumor resection. VSS ex hypotensive, MDs aware. Pain managed w/ oxy and Robaxin. Up w/ SBA, steady on feet. Voiding spont in BR. Posterior head incision covered w/ primipore, drainage marked w/ slight extension. Regular diet,  brought in dinner. PIV infusing NS at 100mL/hr. Plan for DC home tomorrow. Continue POC.

## 2018-03-08 NOTE — PLAN OF CARE
Problem: Patient Care Overview  Goal: Plan of Care/Patient Progress Review  Outcome: Improving  POD#2 from left occipital craniotomy for tumor resection. VSS ex soft BP's. A&Ox4. Neuros intact. C/o pain given scheduled Tylenol, PRN Robaxin and PRN oxycodone with relief. Head incision with primapore, small drainage present-no change. Denies nausea. Regular diet, tolerating well. Up with SBA assist. Possible d/c home today. Continue to monitor and follow POC.

## 2018-03-08 NOTE — DISCHARGE SUMMARY
[facility] Discharge Summary and Instructions    Suzi Gonsales MRN# [id number]   Age: 52 year old YOB: 1965     Date of Admission:  3/6/2018  Date of Discharge::  3/8/2018  Admitting Physician:  Quintin Palencia MD  Discharge Physician:  Quintin Palencia MD          Admission Diagnoses:   Brain tumor (H) [D49.6]          Discharge Diagnosis:   Brain tumor (H) [D49.6]          Procedures:   3/6/2018  1.  Left cerebellar craniotomy for resection of left cerebellar mass.   2.  Use of frameless stereotaxy.   3.  Use of operating microscope.            Brief History of Illness:   Ms. Suzi Gonsales is a 52-year-old female with a past medical history significant for renal cell carcinoma that was diagnosed in 2014, for which she underwent a right radical nephrectomy.  Over the past few weeks, she has been having headaches, and an MRI of the brain revealed that she has a contrast-enhancing mass in the left cerebellum.  Given her symptomatology and the findings of the solitary lesion, surgical intervention was offered.  Consent for the above-named operation therefore obtained from the patient after the risks, benefits and alternatives were thoroughly explained.            Hospital Course:   HPI: Renal cell carcinoma dx in 2014 s/p R nephrectomy. Has had worsening headaches over the past few weeks. MRI revealed mass in the L cerebellum.       Daily events/Procedures:   3/6: Left Stealth Assisted occipital craniotomy for tumor resection. No frozen sent.   3/7: Transferred out of the ICU.  Post-operative MRI.  Frontal headache and complaints of stiff neck.    3/8:  Transferred to general neurosurgical floor.  Denies HA, vision changes, tolerating diet, voiding, and ambulating safely.  Wishes to DC home.      Imaging:  3/7: MRI brain: stable post-surgical changes, good resection, no bleed  3/8:  Post op MRI reveals near gross total resection, small infarct medial to resection bed      Examination:  AOx3,  CN II-XII intact, strength 5/5 throughout, no pronator drift, sensation intact to light touch     Assessment/Plan of care: 53 yo female POD#2 from left occipital craniotomy for tumor resection.  Doing well post-operatively.      NEURO:   - Post-Operative Pain Control  - MRI brain on 3/7/2018  - Neuro Examination Q 1 HR  - Added to tumor board  - Steroids  - F/U finalized pathology.      CV:  - Goal SBP < 140 mmHg     PULM:   - Incentive Spirometry Q 1 HR      GI/FEN:  - Advance Diet As Tolerated  - GI Prophylaxis Not Indicated  - Electrolyte Replacement Protocol  - Bowel Regimen w/ Senna and Miralax     RENAL/:   - D/C Foster POD #1  - Continue 0.9 NaCl 100 mL/hour     HEME/ID:  - Plts > 100k, hgb > 8, INR > 1.5   - DVT Prophylaxis: SCDs to BLE     ENDO:   - No Issues     DISPO:  Anticipate D/C Home 3/7 or 3/8  Barriers: None  Activity: Up in Chair TID; Ambulate w/ Assist  PT: HWA, OP PT  OT: deferred          Discharge Medications:     Current Discharge Medication List      START taking these medications    Details   oxyCODONE IR (ROXICODONE) 5 MG tablet Take 1-2 tablets (5-10 mg) by mouth every 4 hours as needed for other (pain control or improvement in physical function. Hold dose for analgesic side effects.)  Qty: 60 tablet, Refills: 0    Associated Diagnoses: S/P craniotomy      senna-docusate (SENOKOT-S;PERICOLACE) 8.6-50 MG per tablet Take 1 tablet by mouth 2 times daily  Qty: 30 tablet, Refills: 0    Associated Diagnoses: S/P craniotomy      methocarbamol (ROBAXIN) 500 MG tablet Take 2 tablets (1,000 mg) by mouth every 6 hours as needed for muscle spasms  Qty: 30 tablet, Refills: 0    Associated Diagnoses: S/P craniotomy         CONTINUE these medications which have CHANGED    Details   aspirin 81 MG chewable tablet Take 1 tablet (81 mg) by mouth every morning  Qty: 36 tablet    Comments: Ok to restart Monday, 3/19/18  Associated Diagnoses: Pre-op evaluation; Brain metastases (H)      !! dexamethasone  (DECADRON) 4 MG tablet Take 1 tablet (4 mg) by mouth every 8 hours for 2 days  Qty: 6 tablet, Refills: 0    Associated Diagnoses: S/P craniotomy      !! dexamethasone (DECADRON) 2 MG tablet Take 1 tablet (2 mg) by mouth every 8 hours for 3 days  Qty: 9 tablet, Refills: 0    Associated Diagnoses: S/P craniotomy      !! dexamethasone (DECADRON) 2 MG tablet Take 1 tablet (2 mg) by mouth every 12 hours for 3 days  Qty: 6 tablet, Refills: 0    Associated Diagnoses: S/P craniotomy      !! dexamethasone (DECADRON) 2 MG tablet Take 1 tablet (2 mg) by mouth daily for 3 days  Qty: 3 tablet, Refills: 0    Associated Diagnoses: S/P craniotomy       !! - Potential duplicate medications found. Please discuss with provider.      CONTINUE these medications which have NOT CHANGED    Details   Probiotic Product (PROBIOTIC PO) Take by mouth daily (with lunch)    Associated Diagnoses: Pre-op evaluation; Brain metastases (H)      Cholecalciferol (VITAMIN D PO) Take by mouth daily (with lunch)    Associated Diagnoses: Pre-op evaluation; Brain metastases (H)      Omega-3 Fatty Acids (OMEGA 3 PO) Take by mouth daily (with lunch)    Associated Diagnoses: Pre-op evaluation; Brain metastases (H)      Cyanocobalamin (VITAMIN B 12 PO) Take by mouth daily (with lunch)    Associated Diagnoses: Pre-op evaluation; Brain metastases (H)      Ascorbic Acid (VITAMIN C PO) Take by mouth daily (with lunch)    Associated Diagnoses: Pre-op evaluation; Brain metastases (H)      multivitamin, therapeutic with minerals (MULTI-VITAMIN) TABS tablet Take 1 tablet by mouth daily (with lunch)    Associated Diagnoses: Pre-op evaluation; Brain metastases (H)      MAGNESIUM PO Take by mouth At Bedtime    Associated Diagnoses: Pre-op evaluation; Brain metastases (H)      calcium carbonate (OS-MUNDO 500 MG Lime. CA) 1250 MG tablet Take 1 tablet by mouth daily (with lunch)    Associated Diagnoses: Pre-op evaluation; Brain metastases (H)      B Complex-C (SUPER B  COMPLEX/VITAMIN C PO) Take by mouth daily (with lunch)    Associated Diagnoses: Pre-op evaluation; Brain metastases (H)      TURMERIC PO Take by mouth daily (with lunch)    Associated Diagnoses: Pre-op evaluation; Brain metastases (H)      zolpidem (AMBIEN) 5 MG tablet Take 1 tablet (5 mg) by mouth nightly as needed for sleep  Qty: 30 tablet, Refills: 1    Associated Diagnoses: Sleep disorder      omeprazole (PRILOSEC) 40 MG capsule Take 1 capsule (40 mg) by mouth daily  Qty: 30 capsule, Refills: 1    Associated Diagnoses: Cerebral edema (H)      atorvastatin (LIPITOR) 20 MG tablet Take 1 tablet (20 mg) by mouth daily  Qty: 30 tablet, Refills: 3    Associated Diagnoses: History of stroke      clonazePAM (KLONOPIN) 0.5 MG tablet TAKE 0.5-1 TABLETS BY MOUTH 2 TIMES DAILY AS NEEDED FOR ANXIETY  Qty: 20 tablet, Refills: 0    Associated Diagnoses: Panic attack      ALPRAZolam (XANAX) 0.5 MG tablet Take 1 tablet (0.5 mg) by mouth 3 times daily as needed for anxiety  Qty: 60 tablet, Refills: 0    Associated Diagnoses: Renal cell carcinoma, unspecified laterality (H); Mass of upper lobe of left lung      !! LORazepam (ATIVAN) 0.5 MG tablet Take 1 tablet (0.5 mg) by mouth every 4 hours as needed (Anxiety, Nausea/Vomiting or Sleep)  Qty: 30 tablet, Refills: 2    Associated Diagnoses: Malignant neoplasm of right kidney (H)      prochlorperazine (COMPAZINE) 10 MG tablet Take 1 tablet (10 mg) by mouth every 6 hours as needed (Nausea/Vomiting)  Qty: 30 tablet, Refills: 2    Associated Diagnoses: Malignant neoplasm of right kidney (H)      citalopram (CELEXA) 20 MG tablet Take 1 tablet (20 mg) by mouth every evening  Qty: 90 tablet, Refills: 3    Associated Diagnoses: Insomnia, unspecified type      buPROPion (WELLBUTRIN XL) 300 MG 24 hr tablet Take 300 mg by mouth every morning       sodium chloride 0.9 % SOLN 100 mL with nivolumab 100 MG/10ML SOLN Inject into the vein every 14 days    Associated Diagnoses: Pre-op evaluation;  "Brain metastases (H)      !! LORAZEPAM PO Take by mouth as needed for anxiety    Associated Diagnoses: Pre-op evaluation; Brain metastases (H)      lidocaine-prilocaine (EMLA) cream Apply 1 hour prior to port access.  Qty: 60 g, Refills: 1    Associated Diagnoses: Malignant neoplasm of right kidney (H)       !! - Potential duplicate medications found. Please discuss with provider.      STOP taking these medications       HYDROcodone-acetaminophen (NORCO) 5-325 MG per tablet Comments:   Reason for Stopping:         acetaminophen-codeine (TYLENOL #3) 300-30 MG per tablet Comments:   Reason for Stopping:              Exam:   Physical Exam  BP 92/41  Pulse 74  Temp 98.3  F (36.8  C) (Oral)  Resp 16  Ht 1.575 m (5' 2\")  Wt 58.9 kg (129 lb 13.6 oz)  LMP 12/20/2013  SpO2 95%  BMI 23.75 kg/m2  General: Appears comfortable, NAD  Wound: Incision, clean, dry, intact without strikethrough.  Closed with nylon sutures.   Neurologic Exam:  - AOx3.  - Follows commands.  - Speech fluent, spontaneous. No aphasia or dysarthria.  - No gaze preference. No apparent hemineglect.  - PERRL, EOMI.  - Face symmetric with sensation intact to light touch.  - Palate elevates symmetrically, uvula midline, tongue protrudes midline.  - Trapezii and sternocleidomastoid muscles 5/5 bilaterally.  - No pronator drift.  Motor: Normal bulk/tone; no tremor, rigidity, or bradykinesia.   Right:  Deltoid 5/5, tricep 5/5, bicep 5/5, wrist flexor 5/5, wrist extensor 5/5, finger intrinsic 5/5  Left:  Deltoid 5/5, tricep 5/5, bicep 5/5, wrist flexor 5/5, wrist extensor 5/5, finger intrinsic 5/5  Right: Iliopsoas 5/5, quadricep 5/5, hamstring 5/5, tibialis anterior 5/5, gastrocnemius 5/5, EHL 5/5  Left:  Iliopsoas 5/5, quadricep 5/5, hamstring 5/5, tibialis anterior 5/5, gastrocnemius 5/5, EHL 5/5    Sensation intact in bilateral L4-S1 dermatones            Discharge Instructions and Follow-Up:   Discharge diet: Regular   Discharge activity: You may " advance activity as tolerated. No strenuous exercise or heay lifting greater than 10 lbs for 4 weeks or until seen and cleared in clinic.   Discharge follow-up: Follow-up with Janeth Bueno PA-C in 2 weeks     Wound care: Ok to shower,however no scrubbing of the wound and no soaking of the wound, meaning no bathtubs or swimming pools. Pat dry only. Leave wound open to air.  Sutures are not absorbable and need to be removed in 2 weeks. If patient still at rehab by this time, the sutures may be removed by the rehab physician if he or she considers that the wound has healed completely.     Please call if you have:  1. increased pain, redness, drainage, swelling at your incision  2. fevers > 101.5 F degrees  3. with any questions or concerns.  You may reach the Neurosurgery clinic at [phone] during regular work hours. ER at [phone].    and ask for the Neurosurgery Resident on call at [phone], during off hours or weekends.         Discharge Disposition:   Discharged to home

## 2018-03-08 NOTE — PROGRESS NOTES
Name: Suzi Gonsales  Age/Sex: 52 F  Rm: 6210-02  MRN: [id number]  Staff: Talha  Date of Admission: 3/6/2018  Primary Service: Neurosurgery  Consulting Services: ALTHEA    HPI: Renal cell carcinoma dx in 2014 s/p R nephrectomy. Has had worsening headaches over the past few weeks. MRI revealed mass in the L cerebellum.      Daily events/Procedures:   3/6: Left Stealth Assisted occipital craniotomy for tumor resection. No frozen sent.   3/7: Transferred out of the ICU.  Post-operative MRI.  Frontal headache and complaints of stiff neck.      Imaging:  3/7: MRI brain: stable post-surgical changes, good resection, no bleed    Examination:  AOx3, CN II-XII intact, strength 5/5 throughout, no pronator drift, sensation intact to light touch    Assessment/Plan of care: 53 yo female POD#2 from left occipital craniotomy for tumor resection.  Doing well post-operatively.     NEURO:   - Post-Operative Pain Control  - MRI brain on 3/7/2018  - Neuro Examination Q 1 HR  - Added to tumor board  - Steroids  - F/U finalized pathology.     CV:  - Goal SBP < 140 mmHg    PULM:   - Incentive Spirometry Q 1 HR     GI/FEN:  - Advance Diet As Tolerated  - GI Prophylaxis Not Indicated  - Electrolyte Replacement Protocol  - Bowel Regimen w/ Senna and Miralax    RENAL/:   - D/C Foster POD #1  - Continue 0.9 NaCl 100 mL/hour    HEME/ID:  - Plts > 100k, hgb > 8, INR > 1.5   - DVT Prophylaxis: SCDs to BLE    ENDO:   - No Issues    DISPO:  Anticipate D/C Home 3/7 or 3/8  Barriers: None  Activity: Up in Chair TID; Ambulate w/ Assist  PT: HWA, OP PT  OT: deferred    Contact the neurosurgery resident on call with questions.    Dial * * [phone]: Enter [phone] when prompted.   Juan C Strickland MD, PhD  Neurosurgery PGY-3

## 2018-03-09 ENCOUNTER — CARE COORDINATION (OUTPATIENT)
Dept: ONCOLOGY | Facility: CLINIC | Age: 53
End: 2018-03-09

## 2018-03-09 ENCOUNTER — TELEPHONE (OUTPATIENT)
Dept: FAMILY MEDICINE | Facility: CLINIC | Age: 53
End: 2018-03-09

## 2018-03-09 ENCOUNTER — MYC MEDICAL ADVICE (OUTPATIENT)
Dept: FAMILY MEDICINE | Facility: CLINIC | Age: 53
End: 2018-03-09

## 2018-03-09 DIAGNOSIS — G47.00 INSOMNIA, UNSPECIFIED TYPE: Primary | ICD-10-CM

## 2018-03-09 LAB — COPATH REPORT: NORMAL

## 2018-03-09 RX ORDER — ZOLPIDEM TARTRATE 5 MG/1
5 TABLET ORAL
Qty: 7 TABLET | Refills: 0 | Status: SHIPPED | OUTPATIENT
Start: 2018-03-09 | End: 2018-01-01

## 2018-03-09 NOTE — PROGRESS NOTES
RN Post Op Care Coordination Note     Spoke with patient.    Support  Patient able to care for self independently/with assistance of caregiver    Health Status  Fever/chills: Patient denies any fever or chills.  Nausea/vomiting: Patient denies nausea or vomiting.  Eating/drinking: Patient is able to eat and drink without any complaints.  Bowel: Patient reports having a normal bowel movement.  Urinary: Patient reports voiding normally.  Drains: N/A  Incisions: Patient denies any signs and symptoms of infection.  Pain:   Patient reports pain is being managed/controlled with prescribed analgesics: Patient reports pain is managed with Tylenol and Oxycodone as needed.    Activity/Restrictions  Patient reports following restrictions outlined by surgeon:  Advance activity as tolerated. No strenuous exercise or heay lifting greater than 10 lbs for 4 weeks or until seen and cleared in clinic.     Wound Care   Patient verbalized understanding of wound care instructions as outlined by surgeon:  Ok to shower,however no scrubbing of the wound and no soaking of the wound, meaning no bathtubs or swimming pools. Pat dry only. Leave wound open to air.  Sutures are not absorbable and need to be removed in 2 weeks. If patient still at rehab by this time, the sutures may be removed by the rehab physician if he or she considers that the wound has healed completely.     Pathology reviewed with patient: No:     Post-op Clinic Appointment for suture removal and office visit with Dr. Palencia:   Patient verbalized understanding of date/time/location of first post-op appt    Whom and When to Call  Patient verbalized understanding of when to call and/or seek medical care as outlined by her surgeon:  1. increased pain, redness, drainage, swelling at the incision  2. fevers > 101.5 F degrees  3. with any questions or concerns.  You may reach the [facility] Neuro-oncology Clinic Nurse Line at [phone] during regular work hours.    and ask  for the Neurosurgery Resident on call at [phone], during off-hours or weekends.      All of patient's questions were answered to her stated satisfaction including reviewing restrictions and wound care. She will call this office if she has any further questions or concerns.

## 2018-03-12 NOTE — TELEPHONE ENCOUNTER
ED / Discharge Outreach Protocol    Patient Contact    Attempt # 2    Was call answered?  No.  Left message on voicemail with information to call me back.    Michelle Arredondo RN

## 2018-03-14 ENCOUNTER — OFFICE VISIT (OUTPATIENT)
Dept: NEUROLOGY | Facility: CLINIC | Age: 53
End: 2018-03-14
Payer: COMMERCIAL

## 2018-03-14 VITALS
HEART RATE: 83 BPM | BODY MASS INDEX: 24.29 KG/M2 | DIASTOLIC BLOOD PRESSURE: 59 MMHG | WEIGHT: 132 LBS | SYSTOLIC BLOOD PRESSURE: 93 MMHG | HEIGHT: 62 IN

## 2018-03-14 DIAGNOSIS — Z86.73 HISTORY OF STROKE: Primary | ICD-10-CM

## 2018-03-14 RX ORDER — DEXAMETHASONE 4 MG/1
TABLET ORAL
Refills: 0 | COMMUNITY
Start: 2018-03-08 | End: 2018-01-01

## 2018-03-14 RX ORDER — HYDROCODONE BITARTRATE AND ACETAMINOPHEN 5; 325 MG/1; MG/1
TABLET ORAL
Refills: 0 | COMMUNITY
Start: 2018-03-02 | End: 2018-01-01

## 2018-03-14 NOTE — LETTER
3/14/2018       RE: Suzi Gonsales  [address] [place] [address]     Dear Colleague,    Thank you for referring your patient, Suzi Gonsales, to the [facility] NEUROLOGY at [facility]. Please see a copy of my visit note below.      DEPARTMENT OF NEUROLOGY    Patient Name:  Suzi Gonsales  MRN:  [id number]    :  1965  Date of Clinic Visit:  2018  Primary Care Provider:  Molly Adame    CHIEF COMPLAINT: MRI abnormality     HISTORY OF PRESENT ILLNESS:  Suzi Gonsales is a 52 year old woman, history of renal cell carcinoma stage IV (diagnosis in  status post right radical nephrectomy and 4 cycles of IL-2, recently started on Opdivo in 2017), who presents for an MRI abnormality showing a right cerebellar stroke.  She was complained by her  who helps provide the history.  They state the following:    Patient states that roughly 1-1-1/2 months ago, she was on a flight when she noted acute onset of a right-sided headache.  Headache was sharp and pulsating in nature.  It persisted throughout the flight.  There was no associated blurred vision, double vision, or other noted changes.  She states she is not a headache person so to have a headache of this nature was quite surprising.  Accompanying this was the fact that she has had headaches over the preceding 1 month.  He is described as tension-like, near daily, nonprogressive, and do not keep her from performing her activities of daily living.    These headaches let her to seek the opinion of her oncologist and an MRI was ordered.  A right cerebellar infarct as well as a left cerebellar mass is noted on the result.  She was then sent to neurosurgery and had excision of the mass on 2018.  She presents to clinic today for further opinion on the noted ischemic stroke.    The patient denies any periods of weakness, numbness, dysmetria, dysarthria, or other acute change.  No nausea, vomiting,  blurred vision, double vision.  She states she was told that the initial headache while on the airplane could have been the stroke itself, however she attributes this more to the mass.    Patient states she is largely doing well since excision of the mass one week ago.  No new issues are endorsed at this time.  She denies any history of early strokes in the family.  She denies a history of diabetes, hypertension, hyperlipidemia, smoking, obesity, or difficulties with sleep such as excessive snoring.    Patient has followed up with her oncologist over the MRI result. She states she was placed on aspirin and atorvastatin though she has not yet started them as she wanted to be seen in the neurology clinic first.     REVIEW OF SYSTEMS:  A 10 point review of symptoms was negative except as indicated above in the HPI or in the patient self-reported answers located at the end of this note.    ALLERGIES:  No Known Allergies  MEDICATIONS:  Current Outpatient Prescriptions   Medication Sig Dispense Refill     HYDROcodone-acetaminophen (NORCO) 5-325 MG per tablet TK 1 T PO Q 6 HOURS PRF MODERATE/SEVERE PAIN  0     dexamethasone (DECADRON) 4 MG tablet   0     zolpidem (AMBIEN) 5 MG tablet Take 1 tablet (5 mg) by mouth nightly as needed for sleep 7 tablet 0     oxyCODONE IR (ROXICODONE) 5 MG tablet Take 1-2 tablets (5-10 mg) by mouth every 4 hours as needed for other (pain control or improvement in physical function. Hold dose for analgesic side effects.) 60 tablet 0     [START ON 3/19/2018] aspirin 81 MG chewable tablet Take 1 tablet (81 mg) by mouth every morning 36 tablet      dexamethasone (DECADRON) 2 MG tablet Take 1 tablet (2 mg) by mouth every 12 hours for 3 days 6 tablet 0     [START ON 3/15/2018] dexamethasone (DECADRON) 2 MG tablet Take 1 tablet (2 mg) by mouth daily for 3 days 3 tablet 0     senna-docusate (SENOKOT-S;PERICOLACE) 8.6-50 MG per tablet Take 1 tablet by mouth 2 times daily 30 tablet 0     methocarbamol  (ROBAXIN) 500 MG tablet Take 2 tablets (1,000 mg) by mouth every 6 hours as needed for muscle spasms 30 tablet 0     Probiotic Product (PROBIOTIC PO) Take by mouth daily (with lunch)       Cholecalciferol (VITAMIN D PO) Take by mouth daily (with lunch)       Omega-3 Fatty Acids (OMEGA 3 PO) Take by mouth daily (with lunch)       Cyanocobalamin (VITAMIN B 12 PO) Take by mouth daily (with lunch)       Ascorbic Acid (VITAMIN C PO) Take by mouth daily (with lunch)       multivitamin, therapeutic with minerals (MULTI-VITAMIN) TABS tablet Take 1 tablet by mouth daily (with lunch)       MAGNESIUM PO Take by mouth At Bedtime       calcium carbonate (OS-MUNDO 500 MG [place]. [place]) 1250 MG tablet Take 1 tablet by mouth daily (with lunch)       B Complex-C (SUPER B COMPLEX/VITAMIN C PO) Take by mouth daily (with lunch)       TURMERIC PO Take by mouth daily (with lunch)       sodium chloride 0.9 % SOLN 100 mL with nivolumab 100 MG/10ML SOLN Inject into the vein every 14 days       LORAZEPAM PO Take by mouth as needed for anxiety       zolpidem (AMBIEN) 5 MG tablet Take 1 tablet (5 mg) by mouth nightly as needed for sleep 30 tablet 1     omeprazole (PRILOSEC) 40 MG capsule Take 1 capsule (40 mg) by mouth daily 30 capsule 1     atorvastatin (LIPITOR) 20 MG tablet Take 1 tablet (20 mg) by mouth daily 30 tablet 3     clonazePAM (KLONOPIN) 0.5 MG tablet TAKE 0.5-1 TABLETS BY MOUTH 2 TIMES DAILY AS NEEDED FOR ANXIETY 20 tablet 0     ALPRAZolam (XANAX) 0.5 MG tablet Take 1 tablet (0.5 mg) by mouth 3 times daily as needed for anxiety 60 tablet 0     lidocaine-prilocaine (EMLA) cream Apply 1 hour prior to port access. (Patient not taking: Reported on 3/5/2018) 60 g 1     LORazepam (ATIVAN) 0.5 MG tablet Take 1 tablet (0.5 mg) by mouth every 4 hours as needed (Anxiety, Nausea/Vomiting or Sleep) 30 tablet 2     prochlorperazine (COMPAZINE) 10 MG tablet Take 1 tablet (10 mg) by mouth every 6 hours as needed (Nausea/Vomiting) 30 tablet 2      citalopram (CELEXA) 20 MG tablet Take 1 tablet (20 mg) by mouth every evening (Patient taking differently: Take 20 mg by mouth At Bedtime ) 90 tablet 3     buPROPion (WELLBUTRIN XL) 300 MG 24 hr tablet Take 300 mg by mouth every morning        PAST MEDICAL HISTORY:  Past Medical History:   Diagnosis Date     Adrenal nodule (H)      Anxiety      Brain mass      Depression      Former tobacco use      Lacunar infarct, acute (H) 2018     Mass of left lung     Upper lobe     Renal cell carcinoma (H)     Clear cell     Solitary kidney     S/P right nephrectomy due to kidney cancer     PAST SURGICAL HISTORY:  Past Surgical History:   Procedure Laterality Date     C/SECTION, LOW TRANSVERSE  ,     , Low Transverse     ENDOSCOPIC ULTRASOUND UPPER GASTROINTESTINAL TRACT (GI) N/A 2017    Procedure: ENDOSCOPIC ULTRASOUND, ESOPHAGOSCOPY / UPPER GASTROINTESTINAL TRACT (GI);  Esophagogastroduodenoscopy, Endoscopic Ultrasound with Fine Needle Biopsies;  Surgeon: Asad Velez MD;  Location: [facility] OR     INSERT PORT VASCULAR ACCESS N/A 2017    Procedure: INSERT PORT VASCULAR ACCESS;  Chest Port Placement-Right;  Surgeon: Melanie Cevallos PA-C;  Location: [facility] OR     open radical nephrectomy Right 14     OPTICAL TRACKING SYSTEM CRANIOTOMY, EXCISE TUMOR, COMBINED Left 3/6/2018    Procedure: COMBINED OPTICAL TRACKING SYSTEM CRANIOTOMY, EXCISE TUMOR;  Left Stealth Assisted Posterior fossa Craniotomy And Tumor Resection ;  Surgeon: Quintin Palencia MD;  Location: [facility] OR     PICC INSERTION Left 2017    5fr DL BioFlo PICC, 40cm (4cm external) in the L basilic vein w/ tip in the low SVC     PICC INSERTION Right 08/15/2017    5fr DL BioFlo PICC, 36cm (1cm external) in the R basilic vein w/ tip in the low SVC     SOCIAL HISTORY:  Social History     Social History     Marital status:      Spouse name: Tunde     Number of children: 2     Years of education: N/A     Occupational History  "          Social History Main Topics     Smoking status: Former Smoker     Packs/day: 0.50     Years: 20.00     Types: Cigarettes     Quit date: 1/1/2004     Smokeless tobacco: Never Used     Alcohol use Yes      Comment: 1-2 drinks couple times per week     Drug use: No     Sexual activity: Not Currently     Partners: Male     Other Topics Concern     Not on file     Social History Narrative    .   for [employer].     2 sons.         FAMILY HISTORY:  Family History   Problem Relation Age of Onset     Hypertension Father      Chronic Obstructive Pulmonary Disease Father      Hyperlipidemia Brother      Hyperlipidemia Brother      CANCER No family hx of          PHYSICAL EXAMINATION:    Vitals:   BP 93/59  Pulse 83  Ht 1.575 m (5' 2\")  Wt 59.9 kg (132 lb)  LMP 12/20/2013  BMI 24.14 kg/m2    -General: Woman sitting comfortably on the chair. No acute distress    -HEENT: No skin discolorations noted, no carotid bruits     -Chest: RRR without murmurs or bruits     -Abdomen: Positive bowel sounds, soft, non-tender, no organomegaly    -Musculoskeletal: No abnormalities noted     -Neurological:     --MS: Patient is alert, attentive, and oriented. Speech is clear and fluid. Names normally. Registration, recall and remote memory intact. Mental math normal.     --CNs: Visual fields are full to confrontation. Normal fundoscopic exam with no visualized vascular changes. Pupils are briskly reactive to light. Visual fields full. Ocular motility full without nystagmus, facial sensation intact, muscles of mastication and facial expression normal, hearing intact, gag and palate elevation normal, sternomastoid and trapezius function normal, tongue motions normal     --Motor: Normal muscle tone and bulk. 5/5 muscle strength bilaterally in upper and lower extremities.     --Reflexes: Brisk reflexes at knees and biceps and ankles. Plantar responses flexor bilaterally    --Sensory: Light touch, " vibration and PP intact bilaterally in upper and lower extremities     --Coordination: Rapidly alternating movements of fingers intact. Heel-shin and finger-nose-finger is intact. Negative Romberg.     --Gait: Stands with feet normally spaced. Gait is steady.       INVESTIGATIONS:   All available and relevant labs, imaging, and other procedures were reviewed with the patient at this visit.       IMPRESSION AND RECOMMENDATIONS:  52-year-old woman who presents today following MRI that showed a right cerebellar infarct.  Patient does have stage IV renal cell carcinoma with previous left cerebellar mass that was recently excised 1 week ago.  By history, the right cerebellar infarct appears to have been clinically silent; she could note no periods of weakness, numbness, or any clear dysmetria that we would expect with a larger cerebellar infarct.  Examination today was nonfocal with no noted cerebellar signs.  She was previously placed on aspirin and atorvastatin but did note that she was waiting to stop these medications until she was seen in neurology clinic.  She has had today otherwise MRI and CT/CTA imaging which have confirmed the infarct show no clear pathology in the vessels of the head and neck.  Etiology of the stroke is likely related to hypercoagulable state due to her active cancer.  However, following chart review, we would ask the patient to undergo testing for her A1c, lipids, and do an echo to ensure that there is no other hidden source for the stroke.  Following negative, we may consider a hypercoagulable workup, however at this time he does not appear that any result would change our current management.  We will ask her to complete this testing and then return to clinic in 2 months time for results and any reconsideration of further needs as far as evaluation and ongoing treatment.  Patient and her  registered her understanding and agreement with this plan.  We look forward to seeing them back  in clinic.    This note was completed using dragon software.    Patient was seen and discussed with Dr. Neeraj Urrutia MD  [facility] Department of Neurology PGY3  Pager: [phone]    Neurology Attending Note:    I have seen and examined the patient with the resident.  I have reviewed the labs and imaging results available.  I agree with the assessment and plan.    Neeraj Hager MD

## 2018-03-14 NOTE — PATIENT INSTRUCTIONS
It was a pleasure to see you in neurology clinic today.     After discussing your recent MRI findings, we decided upon the following plan.    - Go to lab for blood draw for A1c and lipids at your earliest convenience.  - Make appointment to have an ultrasound of your heart  - From the neurology clinic perspective, you may start your previously prescribed aspirin and atorvastatin. Please check with your neurosurgery team about any aspirin restrictions.     Please return to clinic in about 2 months after completion of the above tests to discuss the results and any potential next steps in your care.     Please call into clinic in the interim if you have any questions or concerns.     Jake Urrutia MD  Neurology resident

## 2018-03-14 NOTE — MR AVS SNAPSHOT
After Visit Summary   3/14/2018    Suzi Gonsales    MRN: [id number]           Patient Information     Date Of Birth          1965        Visit Information        Provider Department      3/14/2018 12:50 PM Jake Urrutia MD [facility] Neurology        Today's Diagnoses     History of stroke    -  1      Care Instructions    It was a pleasure to see you in neurology clinic today.     After discussing your recent MRI findings, we decided upon the following plan.    - Go to lab for blood draw for A1c and lipids at your earliest convenience.  - Make appointment to have an ultrasound of your heart  - From the neurology clinic perspective, you may start your previously prescribed aspirin and atorvastatin. Please check with your neurosurgery team about any aspirin restrictions.     Please return to clinic in about 2 months after completion of the above tests to discuss the results and any potential next steps in your care.     Please call into clinic in the interim if you have any questions or concerns.     Jake Urrutia MD  Neurology resident          Follow-ups after your visit        Follow-up notes from your care team     Return in about 2 months (around 5/14/2018) for Lab and procedure results.      Your next 10 appointments already scheduled     Mar 21, 2018  3:30 PM CDT   (Arrive by 3:15 PM)   [facility] ([facility])    [address]  [address] [facility] [address]   [phone]            Apr 05, 2018  5:30 AM CDT   GAMMA TREATMENT with Huyen Cuellar MD   [facility], Radiation Oncology ([facility])    [address] [address]   [phone]            Apr 05, 2018  6:30 AM CDT   GAMMA TREATMENT with Itz King MD   [facility], Radiation Oncology ([facility])    [address] [address]   [phone]            Apr 05, 2018  7:00 AM  CDT   (Arrive by 6:45 AM)   MR BRAIN W CONTRAST with UUMR1   [facility] [place], MRI ([facility] [place])    [address] [address] [phone]           Take your medicines as usual, unless your doctor tells you not to. Bring a list of your current medicines to your exam (including vitamins, minerals and over-the-counter drugs).  You may or may not receive intravenous (IV) contrast for this exam pending the discretion of the Radiologist.  You do not need to do anything special to prepare.  The MRI machine uses a strong magnet. Please wear clothes without metal (snaps, zippers). A sweatsuit works well, or we may give you a hospital gown.  Please remove any body piercings and hair extensions before you arrive. You will also remove watches, jewelry, hairpins, wallets, dentures, partial dental plates and hearing aids. You may wear contact lenses, and you may be able to wear your rings. We have a safe place to keep your personal items, but it is safer to leave them at home.  **IMPORTANT** THE INSTRUCTIONS BELOW ARE ONLY FOR THOSE PATIENTS WHO HAVE BEEN PRESCRIBED SEDATION OR GENERAL ANESTHESIA DURING THEIR MRI PROCEDURE:  IF YOUR DOCTOR PRESCRIBED ORAL SEDATION (take medicine to help you relax during your exam):   You must get the medicine from your doctor (oral medication) before you arrive. Bring the medicine to the exam. Do not take it at home. You ll be told when to take it upon arriving for your exam.   Arrive one hour early. Bring someone who can take you home after the test. Your medicine will make you sleepy. After the exam, you may not drive, take a bus or take a taxi by yourself.  IF YOUR DOCTOR PRESCRIBED IV SEDATION:   Arrive one hour early. Bring someone who can take you home after the test. Your medicine will make you sleepy. After the exam, you may not drive, take a bus or take a taxi by yourself.   No eating 6 hours before your exam. You  may have clear liquids up until 4 hours before your exam. (Clear liquids include water, clear tea, black coffee and fruit juice without pulp.)  IF YOUR DOCTOR PRESCRIBED ANESTHESIA (be asleep for your exam):   Arrive 1 1/2 hours early. Bring someone who can take you home after the test. You may not drive, take a bus or take a taxi by yourself.   No eating 8 hours before your exam. You may have clear liquids up until 4 hours before your exam. (Clear liquids include water, clear tea, black coffee and fruit juice without pulp.)   You will spend four to five hours in the recovery room.  Please call the Imaging Department at your exam site with any questions.              Future tests that were ordered for you today     Open Future Orders        Priority Expected Expires Ordered    Hemoglobin A1c Routine  3/14/2019 3/14/2018    Lipid Profile (Chol, Trig, HDL, LDL calc) Routine  3/14/2019 3/14/2018    INR Routine  3/14/2019 3/14/2018    Echocardiogram Complete Routine  3/14/2019 3/14/2018            Who to contact     Please call your clinic at [phone] to:    Ask questions about your health    Make or cancel appointments    Discuss your medicines    Learn about your test results    Speak to your doctor            Additional Information About Your Visit        [employer] Information     [employer] gives you secure access to your electronic health record. If you see a primary care provider, you can also send messages to your care team and make appointments. If you have questions, please call your primary care clinic.  If you do not have a primary care provider, please call [phone] and they will assist you.      [employer] is an electronic gateway that provides easy, online access to your medical records. With [employer], you can request a clinic appointment, read your test results, renew a prescription or communicate with your care team.     To access your existing account, please contact your [facility] or call [phone] for assistance.        Care EveryWhere ID     This is your Care EveryWhere ID. This could be used by other organizations to access your [facility] medical records  [id number]        Your Vitals Were     Pulse Height Last Period BMI (Body Mass Index)          83 1.575 m (5' 2\") 12/20/2013 24.14 kg/m2         Blood Pressure from Last 3 Encounters:   03/14/18 93/59   03/08/18 92/41   03/05/18 104/56    Weight from Last 3 Encounters:   03/14/18 59.9 kg (132 lb)   03/06/18 58.9 kg (129 lb 13.6 oz)   03/05/18 60.1 kg (132 lb 6.4 oz)                 Today's Medication Changes          These changes are accurate as of 3/14/18 11:59 PM.  If you have any questions, ask your nurse or doctor.               These medicines have changed or have updated prescriptions.        Dose/Directions    citalopram 20 MG tablet   Commonly known as:  celeXA   This may have changed:  when to take this   Used for:  Insomnia, unspecified type        Dose:  20 mg   Take 1 tablet (20 mg) by mouth every evening   Quantity:  90 tablet   Refills:  3                Primary Care Provider Office Phone # Fax #    Molly NicholeARIEL [facility] [phone] [address] [address]        Equal Access to Services     ROSAURA INTERIANO AH: Odilon garnett Sovanessa, waaxda luqadaha, qaybta kaalmajosemanuel pool. So [facility] [phone].    ATENCIÓN: Si habla español, tiene a chacon disposición servicios gratuitos de asistencia lingüística. Mario al [phone].    We comply with applicable federal civil rights laws and [place] laws. We do not discriminate on the basis of race, color, national origin, age, disability, sex, sexual orientation, or gender identity.            Thank you!     Thank you for choosing [facility] NEUROLOGY  for your care. Our goal is always to provide you with excellent care. Hearing back from our patients is one way we can " continue to improve our services. Please take a few minutes to complete the written survey that you may receive in the mail after your visit with us. Thank you!             Your Updated Medication List - Protect others around you: Learn how to safely use, store and throw away your medicines at www.disposemymeds.org.          This list is accurate as of 3/14/18 11:59 PM.  Always use your most recent med list.                   Brand Name Dispense Instructions for use Diagnosis    ALPRAZolam 0.5 MG tablet    XANAX    60 tablet    Take 1 tablet (0.5 mg) by mouth 3 times daily as needed for anxiety    Renal cell carcinoma, unspecified laterality (H), Mass of upper lobe of left lung       aspirin 81 MG chewable tablet   Start taking on:  3/19/2018     36 tablet    Take 1 tablet (81 mg) by mouth every morning    Pre-op evaluation, Brain metastases (H)       atorvastatin 20 MG tablet    LIPITOR    30 tablet    Take 1 tablet (20 mg) by mouth daily    History of stroke       buPROPion 300 MG 24 hr tablet    WELLBUTRIN XL     Take 300 mg by mouth every morning        calcium carbonate 1250 MG tablet    OS-MUNDO 500 mg [place]. Ca     Take 1 tablet by mouth daily (with lunch)    Pre-op evaluation, Brain metastases (H)       citalopram 20 MG tablet    celeXA    90 tablet    Take 1 tablet (20 mg) by mouth every evening    Insomnia, unspecified type       clonazePAM 0.5 MG tablet    klonoPIN    20 tablet    TAKE 0.5-1 TABLETS BY MOUTH 2 TIMES DAILY AS NEEDED FOR ANXIETY    Panic attack       * dexamethasone 4 MG tablet    DECADRON    6 tablet    Take 1 tablet (4 mg) by mouth every 8 hours for 2 days    S/P craniotomy       * dexamethasone 4 MG tablet    DECADRON          * dexamethasone 2 MG tablet    DECADRON    9 tablet    Take 1 tablet (2 mg) by mouth every 8 hours for 3 days    S/P craniotomy       * dexamethasone 2 MG tablet    DECADRON    6 tablet    Take 1 tablet (2 mg) by mouth every 12 hours for 3 days    S/P craniotomy        * dexamethasone 2 MG tablet    DECADRON    3 tablet    Take 1 tablet (2 mg) by mouth daily for 3 days    S/P craniotomy       HYDROcodone-acetaminophen 5-325 MG per tablet    NORCO     TK 1 T PO Q 6 HOURS PRF MODERATE/SEVERE PAIN        lidocaine-prilocaine cream    EMLA    60 g    Apply 1 hour prior to port access.    Malignant neoplasm of right kidney (H)       * LORAZEPAM PO      Take by mouth as needed for anxiety    Pre-op evaluation, Brain metastases (H)       * LORazepam 0.5 MG tablet    ATIVAN    30 tablet    Take 1 tablet (0.5 mg) by mouth every 4 hours as needed (Anxiety, Nausea/Vomiting or Sleep)    Malignant neoplasm of right kidney (H)       MAGNESIUM PO      Take by mouth At Bedtime    Pre-op evaluation, Brain metastases (H)       methocarbamol 500 MG tablet    ROBAXIN    30 tablet    Take 2 tablets (1,000 mg) by mouth every 6 hours as needed for muscle spasms    S/P craniotomy       Multi-vitamin Tabs tablet      Take 1 tablet by mouth daily (with lunch)    Pre-op evaluation, Brain metastases (H)       OMEGA 3 PO      Take by mouth daily (with lunch)    Pre-op evaluation, Brain metastases (H)       omeprazole 40 MG capsule    priLOSEC    30 capsule    Take 1 capsule (40 mg) by mouth daily    Cerebral edema (H)       oxyCODONE IR 5 MG tablet    ROXICODONE    60 tablet    Take 1-2 tablets (5-10 mg) by mouth every 4 hours as needed for other (pain control or improvement in physical function. Hold dose for analgesic side effects.)    S/P craniotomy       PROBIOTIC PO      Take by mouth daily (with lunch)    Pre-op evaluation, Brain metastases (H)       prochlorperazine 10 MG tablet    COMPAZINE    30 tablet    Take 1 tablet (10 mg) by mouth every 6 hours as needed (Nausea/Vomiting)    Malignant neoplasm of right kidney (H)       senna-docusate 8.6-50 MG per tablet    SENOKOT-S;PERICOLACE    30 tablet    Take 1 tablet by mouth 2 times daily    S/P craniotomy       sodium chloride 0.9 % SOLN 100 mL with  nivolumab 100 MG/10ML SOLN      Inject into the vein every 14 days    Pre-op evaluation, Brain metastases (H)       SUPER B COMPLEX/VITAMIN C PO      Take by mouth daily (with lunch)    Pre-op evaluation, Brain metastases (H)       TURMERIC PO      Take by mouth daily (with lunch)    Pre-op evaluation, Brain metastases (H)       VITAMIN B 12 PO      Take by mouth daily (with lunch)    Pre-op evaluation, Brain metastases (H)       VITAMIN C PO      Take by mouth daily (with lunch)    Pre-op evaluation, Brain metastases (H)       VITAMIN D PO      Take by mouth daily (with lunch)    Pre-op evaluation, Brain metastases (H)       * zolpidem 5 MG tablet    AMBIEN    30 tablet    Take 1 tablet (5 mg) by mouth nightly as needed for sleep    Sleep disorder       * zolpidem 5 MG tablet    AMBIEN    7 tablet    Take 1 tablet (5 mg) by mouth nightly as needed for sleep    Insomnia, unspecified type       * Notice:  This list has 9 medication(s) that are the same as other medications prescribed for you. Read the directions carefully, and ask your doctor or other care provider to review them with you.

## 2018-03-14 NOTE — PROGRESS NOTES
DEPARTMENT OF NEUROLOGY    Patient Name:  Suzi Gonsales  MRN:  [id number]    :  1965  Date of Clinic Visit:  2018  Primary Care Provider:  Molly Adame    CHIEF COMPLAINT: MRI abnormality     HISTORY OF PRESENT ILLNESS:  Suzi Gonsales is a 52 year old woman, history of renal cell carcinoma stage IV (diagnosis in  status post right radical nephrectomy and 4 cycles of IL-2, recently started on Opdivo in 2017), who presents for an MRI abnormality showing a right cerebellar stroke.  She was complained by her  who helps provide the history.  They state the following:    Patient states that roughly 1-1-1/2 months ago, she was on a flight when she noted acute onset of a right-sided headache.  Headache was sharp and pulsating in nature.  It persisted throughout the flight.  There was no associated blurred vision, double vision, or other noted changes.  She states she is not a headache person so to have a headache of this nature was quite surprising.  Accompanying this was the fact that she has had headaches over the preceding 1 month.  He is described as tension-like, near daily, nonprogressive, and do not keep her from performing her activities of daily living.    These headaches let her to seek the opinion of her oncologist and an MRI was ordered.  A right cerebellar infarct as well as a left cerebellar mass is noted on the result.  She was then sent to neurosurgery and had excision of the mass on 2018.  She presents to clinic today for further opinion on the noted ischemic stroke.    The patient denies any periods of weakness, numbness, dysmetria, dysarthria, or other acute change.  No nausea, vomiting, blurred vision, double vision.  She states she was told that the initial headache while on the airplane could have been the stroke itself, however she attributes this more to the mass.    Patient states she is largely doing well since excision of the mass one week ago.   No new issues are endorsed at this time.  She denies any history of early strokes in the family.  She denies a history of diabetes, hypertension, hyperlipidemia, smoking, obesity, or difficulties with sleep such as excessive snoring.    Patient has followed up with her oncologist over the MRI result. She states she was placed on aspirin and atorvastatin though she has not yet started them as she wanted to be seen in the neurology clinic first.     REVIEW OF SYSTEMS:  A 10 point review of symptoms was negative except as indicated above in the HPI or in the patient self-reported answers located at the end of this note.    ALLERGIES:  No Known Allergies  MEDICATIONS:  Current Outpatient Prescriptions   Medication Sig Dispense Refill     HYDROcodone-acetaminophen (NORCO) 5-325 MG per tablet TK 1 T PO Q 6 HOURS PRF MODERATE/SEVERE PAIN  0     dexamethasone (DECADRON) 4 MG tablet   0     zolpidem (AMBIEN) 5 MG tablet Take 1 tablet (5 mg) by mouth nightly as needed for sleep 7 tablet 0     oxyCODONE IR (ROXICODONE) 5 MG tablet Take 1-2 tablets (5-10 mg) by mouth every 4 hours as needed for other (pain control or improvement in physical function. Hold dose for analgesic side effects.) 60 tablet 0     [START ON 3/19/2018] aspirin 81 MG chewable tablet Take 1 tablet (81 mg) by mouth every morning 36 tablet      dexamethasone (DECADRON) 2 MG tablet Take 1 tablet (2 mg) by mouth every 12 hours for 3 days 6 tablet 0     [START ON 3/15/2018] dexamethasone (DECADRON) 2 MG tablet Take 1 tablet (2 mg) by mouth daily for 3 days 3 tablet 0     senna-docusate (SENOKOT-S;PERICOLACE) 8.6-50 MG per tablet Take 1 tablet by mouth 2 times daily 30 tablet 0     methocarbamol (ROBAXIN) 500 MG tablet Take 2 tablets (1,000 mg) by mouth every 6 hours as needed for muscle spasms 30 tablet 0     Probiotic Product (PROBIOTIC PO) Take by mouth daily (with lunch)       Cholecalciferol (VITAMIN D PO) Take by mouth daily (with lunch)       Omega-3 Fatty  Acids (OMEGA 3 PO) Take by mouth daily (with lunch)       Cyanocobalamin (VITAMIN B 12 PO) Take by mouth daily (with lunch)       Ascorbic Acid (VITAMIN C PO) Take by mouth daily (with lunch)       multivitamin, therapeutic with minerals (MULTI-VITAMIN) TABS tablet Take 1 tablet by mouth daily (with lunch)       MAGNESIUM PO Take by mouth At Bedtime       calcium carbonate (OS-MUNDO 500 MG [place]. [place]) 1250 MG tablet Take 1 tablet by mouth daily (with lunch)       B Complex-C (SUPER B COMPLEX/VITAMIN C PO) Take by mouth daily (with lunch)       TURMERIC PO Take by mouth daily (with lunch)       sodium chloride 0.9 % SOLN 100 mL with nivolumab 100 MG/10ML SOLN Inject into the vein every 14 days       LORAZEPAM PO Take by mouth as needed for anxiety       zolpidem (AMBIEN) 5 MG tablet Take 1 tablet (5 mg) by mouth nightly as needed for sleep 30 tablet 1     omeprazole (PRILOSEC) 40 MG capsule Take 1 capsule (40 mg) by mouth daily 30 capsule 1     atorvastatin (LIPITOR) 20 MG tablet Take 1 tablet (20 mg) by mouth daily 30 tablet 3     clonazePAM (KLONOPIN) 0.5 MG tablet TAKE 0.5-1 TABLETS BY MOUTH 2 TIMES DAILY AS NEEDED FOR ANXIETY 20 tablet 0     ALPRAZolam (XANAX) 0.5 MG tablet Take 1 tablet (0.5 mg) by mouth 3 times daily as needed for anxiety 60 tablet 0     lidocaine-prilocaine (EMLA) cream Apply 1 hour prior to port access. (Patient not taking: Reported on 3/5/2018) 60 g 1     LORazepam (ATIVAN) 0.5 MG tablet Take 1 tablet (0.5 mg) by mouth every 4 hours as needed (Anxiety, Nausea/Vomiting or Sleep) 30 tablet 2     prochlorperazine (COMPAZINE) 10 MG tablet Take 1 tablet (10 mg) by mouth every 6 hours as needed (Nausea/Vomiting) 30 tablet 2     citalopram (CELEXA) 20 MG tablet Take 1 tablet (20 mg) by mouth every evening (Patient taking differently: Take 20 mg by mouth At Bedtime ) 90 tablet 3     buPROPion (WELLBUTRIN XL) 300 MG 24 hr tablet Take 300 mg by mouth every morning        PAST MEDICAL HISTORY:  Past  Medical History:   Diagnosis Date     Adrenal nodule (H)      Anxiety      Brain mass      Depression      Former tobacco use      Lacunar infarct, acute (H) 2018     Mass of left lung     Upper lobe     Renal cell carcinoma (H)     Clear cell     Solitary kidney     S/P right nephrectomy due to kidney cancer     PAST SURGICAL HISTORY:  Past Surgical History:   Procedure Laterality Date     C/SECTION, LOW TRANSVERSE  ,     , Low Transverse     ENDOSCOPIC ULTRASOUND UPPER GASTROINTESTINAL TRACT (GI) N/A 2017    Procedure: ENDOSCOPIC ULTRASOUND, ESOPHAGOSCOPY / UPPER GASTROINTESTINAL TRACT (GI);  Esophagogastroduodenoscopy, Endoscopic Ultrasound with Fine Needle Biopsies;  Surgeon: Asad Velez MD;  Location: [facility] OR     INSERT PORT VASCULAR ACCESS N/A 2017    Procedure: INSERT PORT VASCULAR ACCESS;  Chest Port Placement-Right;  Surgeon: Melanie Cevallos PA-C;  Location: [facility]     open radical nephrectomy Right 14     OPTICAL TRACKING SYSTEM CRANIOTOMY, EXCISE TUMOR, COMBINED Left 3/6/2018    Procedure: COMBINED OPTICAL TRACKING SYSTEM CRANIOTOMY, EXCISE TUMOR;  Left Stealth Assisted Posterior fossa Craniotomy And Tumor Resection ;  Surgeon: Quintin Palencia MD;  Location: [facility] OR     PICC INSERTION Left 2017    5fr DL BioFlo PICC, 40cm (4cm external) in the L basilic vein w/ tip in the low SVC     PICC INSERTION Right 08/15/2017    5fr DL BioFlo PICC, 36cm (1cm external) in the R basilic vein w/ tip in the low SVC     SOCIAL HISTORY:  Social History     Social History     Marital status:      Spouse name: Tunde     Number of children: 2     Years of education: N/A     Occupational History           Social History Main Topics     Smoking status: Former Smoker     Packs/day: 0.50     Years: 20.00     Types: Cigarettes     Quit date: 2004     Smokeless tobacco: Never Used     Alcohol use Yes      Comment: 1-2 drinks couple times per week      "Drug use: No     Sexual activity: Not Currently     Partners: Male     Other Topics Concern     Not on file     Social History Narrative    .   for [employer].     2 sons.         FAMILY HISTORY:  Family History   Problem Relation Age of Onset     Hypertension Father      Chronic Obstructive Pulmonary Disease Father      Hyperlipidemia Brother      Hyperlipidemia Brother      CANCER No family hx of          PHYSICAL EXAMINATION:    Vitals:   BP 93/59  Pulse 83  Ht 1.575 m (5' 2\")  Wt 59.9 kg (132 lb)  LMP 12/20/2013  BMI 24.14 kg/m2    -General: Woman sitting comfortably on the chair. No acute distress    -HEENT: No skin discolorations noted, no carotid bruits     -Chest: RRR without murmurs or bruits     -Abdomen: Positive bowel sounds, soft, non-tender, no organomegaly    -Musculoskeletal: No abnormalities noted     -Neurological:     --MS: Patient is alert, attentive, and oriented. Speech is clear and fluid. Names normally. Registration, recall and remote memory intact. Mental math normal.     --CNs: Visual fields are full to confrontation. Normal fundoscopic exam with no visualized vascular changes. Pupils are briskly reactive to light. Visual fields full. Ocular motility full without nystagmus, facial sensation intact, muscles of mastication and facial expression normal, hearing intact, gag and palate elevation normal, sternomastoid and trapezius function normal, tongue motions normal     --Motor: Normal muscle tone and bulk. 5/5 muscle strength bilaterally in upper and lower extremities.     --Reflexes: Brisk reflexes at knees and biceps and ankles. Plantar responses flexor bilaterally    --Sensory: Light touch, vibration and PP intact bilaterally in upper and lower extremities     --Coordination: Rapidly alternating movements of fingers intact. Heel-shin and finger-nose-finger is intact. Negative Romberg.     --Gait: Stands with feet normally spaced. Gait is steady. "       INVESTIGATIONS:   All available and relevant labs, imaging, and other procedures were reviewed with the patient at this visit.       IMPRESSION AND RECOMMENDATIONS:  52-year-old woman who presents today following MRI that showed a right cerebellar infarct.  Patient does have stage IV renal cell carcinoma with previous left cerebellar mass that was recently excised 1 week ago.  By history, the right cerebellar infarct appears to have been clinically silent; she could note no periods of weakness, numbness, or any clear dysmetria that we would expect with a larger cerebellar infarct.  Examination today was nonfocal with no noted cerebellar signs.  She was previously placed on aspirin and atorvastatin but did note that she was waiting to stop these medications until she was seen in neurology clinic.  She has had today otherwise MRI and CT/CTA imaging which have confirmed the infarct show no clear pathology in the vessels of the head and neck.  Etiology of the stroke is likely related to hypercoagulable state due to her active cancer.  However, following chart review, we would ask the patient to undergo testing for her A1c, lipids, and do an echo to ensure that there is no other hidden source for the stroke.  Following negative, we may consider a hypercoagulable workup, however at this time he does not appear that any result would change our current management.  We will ask her to complete this testing and then return to clinic in 2 months time for results and any reconsideration of further needs as far as evaluation and ongoing treatment.  Patient and her  registered her understanding and agreement with this plan.  We look forward to seeing them back in clinic.    This note was completed using dragon software.    Patient was seen and discussed with Dr. Neeraj Cruz-Gomez Urrutia MD  [facility] Department of Neurology PGY3  Pager: [phone]      Answers for HPI/ROS submitted by the  patient on 2/28/2018   General Symptoms: Yes  Skin Symptoms: No  HENT Symptoms: No  EYE SYMPTOMS: No  HEART SYMPTOMS: No  LUNG SYMPTOMS: Yes  INTESTINAL SYMPTOMS: Yes  URINARY SYMPTOMS: No  GYNECOLOGIC SYMPTOMS: No  BREAST SYMPTOMS: No  SKELETAL SYMPTOMS: Yes  BLOOD SYMPTOMS: No  NERVOUS SYSTEM SYMPTOMS: Yes  MENTAL HEALTH SYMPTOMS: Yes  Fever: No  Loss of appetite: No  Weight loss: No  Weight gain: Yes  Fatigue: Yes  Night sweats: No  Chills: No  Increased stress: Yes  Excessive hunger: Yes  Excessive thirst: No  Feeling hot or cold when others believe the temperature is normal: Yes  Loss of height: No  Post-operative complications: No  Surgical site pain: No  Hallucinations: No  Change in or Loss of Energy: No  Hyperactivity: No  Confusion: No  Cough: Yes  Sputum or phlegm: No  Coughing up blood: No  Difficulty breating or shortness of breath: Yes  Snoring: No  Wheezing: No  Difficulty breathing on exertion: No  Nighttime Cough: No  Difficulty breathing when lying flat: No  Heart burn or indigestion: Yes  Nausea: No  Vomiting: No  Abdominal pain: No  Bloating: No  Constipation: No  Diarrhea: No  Blood in stool: No  Black stools: No  Rectal or Anal pain: No  Fecal incontinence: No  Yellowing of skin or eyes: No  Vomit with blood: No  Change in stools: No  Back pain: No  Muscle aches: Yes  Neck pain: Yes  Swollen joints: No  Joint pain: No  Bone pain: No  Muscle cramps: No  Muscle weakness: No  Joint stiffness: Yes  Bone fracture: No  Trouble with coordination: Yes  Dizziness or trouble with balance: Yes  Fainting or black-out spells: No  Memory loss: No  Headache: Yes  Seizures: No  Speech problems: No  Tingling: No  Tremor: Yes  Weakness: Yes  Difficulty walking: No  Paralysis: No  Numbness: No  Nervous or Anxious: Yes  Depression: No  Trouble sleeping: Yes  Trouble thinking or concentrating: No  Mood changes: Yes  Panic attacks: Yes            Neurology Attending Note:    I have seen and examined the patient with  the resident.  I have reviewed the labs and imaging results available.  I agree with the assessment and plan.    Neeraj Hager MD

## 2018-03-16 ENCOUNTER — CARE COORDINATION (OUTPATIENT)
Dept: ONCOLOGY | Facility: CLINIC | Age: 53
End: 2018-03-16

## 2018-03-16 DIAGNOSIS — Z98.890 S/P CRANIOTOMY: ICD-10-CM

## 2018-03-16 RX ORDER — METHOCARBAMOL 500 MG/1
1000 TABLET, FILM COATED ORAL EVERY 6 HOURS PRN
Qty: 30 TABLET | Refills: 0 | Status: SHIPPED | OUTPATIENT
Start: 2018-03-16 | End: 2018-01-01

## 2018-03-16 RX ORDER — OXYCODONE HYDROCHLORIDE 5 MG/1
5-10 TABLET ORAL EVERY 4 HOURS PRN
Qty: 40 TABLET | Refills: 0 | Status: SHIPPED | OUTPATIENT
Start: 2018-03-16 | End: 2018-01-01

## 2018-03-16 NOTE — PROGRESS NOTES
Patient sent [facility] message requesting medication refill for oxycodone and robaxin. Called patient to follow up on symptoms. States she has completed dex taper and is now much more tired. States she feels she has been doing too much. Discussed that she is still recovering from surgery and needs to continue to pace herself. Reinforced using ice and tylenol, ibuprofen for pain control along with narcotics. Patient states she is tapering off the narcotics and Per Dr. Palencia ok to refill oxycodon with #40. Kia Ren PA-C agreed to sign in Dr. Palencia's absence. Spoke with patient's  and informed rx's are ready. He requested rx be taken to [facility]. This writer will hand deliver rx to pharmacy.

## 2018-03-21 NOTE — LETTER
3/21/2018       RE: Suzi Gonsales  [address]  [place] [address]     Dear Colleague,    Thank you for referring your patient, Suzi Gonsales, to the [facility] NEUROSURGERY at [facility]. Please see a copy of my visit note below.    NEUROSURGERY PROGRESS NOTE    REASON FOR VISIT: Postop wound check and suture removal.    Procedure Date: 03/06/2018   PREOPERATIVE DIAGNOSIS:    1. Left cerebellar mass.   2. History of renal cell carcinoma.   OPERATION PERFORMED:   1.  Left cerebellar craniotomy for resection of left cerebellar mass.   2.  Use of frameless stereotaxy.   3.  Use of operating microscope.     ATTENDING SURGEON:  Quintin Palencia MD.     HPI:   This is a 52-year-old female with a past medical history significant for renal cell carcinoma that was diagnosed in 2014, for which she underwent a right radical nephrectomy.   She developed headaches in recent months and an MRI of the brain revealed that she has a contrast-enhancing mass in the left cerebellum.  Given her symptomatology and the findings of the solitary lesion, she consented for the above-named operation without incident on 3/6/2018. The final pathology confirmed metastatic renal cell carcinoma. The patient recovered well and was discharged home on POD 3 in good condition.  Since then she presents for routine wound check and suture removal.    The patient denies headache, fever, chills, nausea, vomiting, nuchal rigidity, redness, swelling and drainage from incision. She still takes narcotics for post surgical pain, but is trying to wean off the medication. She is scheduled  with Dr. King for radiosurgery to further treat the residual lesion in 4/2018. She has no concerns and questions today.    STATUS REPORT  Patient Supplied Answers To the [facility] Pain Questionnaire  [facility] Pain -  Patient Entered Questionnaire/Answers 3/13/2018   What number best describes your pain right now:  0 = No pain  to  10 = Worst pain  imaginable 5   How would you describe the pain? cramping, sharp, cutting, dull, aching, throbbing, pressure   Which of the following improve or reduce your pain?  lying down, medication, relaxation   What number best describes your average pain for the past week:  0 = No pain  to  10 = Worst pain imaginable 8   What number best describes your LOWEST pain in past 24 hours:  0 = No pain  to  10 = Worst pain imaginable 3   What number best describes your WORST pain in past 24 hours:  0 = No pain  to  10 = Worst pain imaginable 5   When is your pain worst? AM   What non-medicine treatments have you already had for your pain? none   Have you tried treating your pain with medication?  Yes   Are you currently taking medications for your pain? Yes       PATHOLOGY:    CURRENT MEDICATIONS:    Current Outpatient Prescriptions:      oxyCODONE IR (ROXICODONE) 5 MG tablet, Take 1-2 tablets (5-10 mg) by mouth every 4 hours as needed for other (pain control or improvement in physical function. Hold dose for analgesic side effects.), Disp: 40 tablet, Rfl: 0     methocarbamol (ROBAXIN) 500 MG tablet, Take 2 tablets (1,000 mg) by mouth every 6 hours as needed for muscle spasms, Disp: 30 tablet, Rfl: 0     HYDROcodone-acetaminophen (NORCO) 5-325 MG per tablet, TK 1 T PO Q 6 HOURS PRF MODERATE/SEVERE PAIN, Disp: , Rfl: 0     dexamethasone (DECADRON) 4 MG tablet, , Disp: , Rfl: 0     zolpidem (AMBIEN) 5 MG tablet, Take 1 tablet (5 mg) by mouth nightly as needed for sleep, Disp: 7 tablet, Rfl: 0     aspirin 81 MG chewable tablet, Take 1 tablet (81 mg) by mouth every morning, Disp: 36 tablet, Rfl:      senna-docusate (SENOKOT-S;PERICOLACE) 8.6-50 MG per tablet, Take 1 tablet by mouth 2 times daily, Disp: 30 tablet, Rfl: 0     Probiotic Product (PROBIOTIC PO), Take by mouth daily (with lunch), Disp: , Rfl:      Cholecalciferol (VITAMIN D PO), Take by mouth daily (with lunch), Disp: , Rfl:      Omega-3 Fatty Acids (OMEGA 3 PO), Take by mouth  daily (with lunch), Disp: , Rfl:      Cyanocobalamin (VITAMIN B 12 PO), Take by mouth daily (with lunch), Disp: , Rfl:      Ascorbic Acid (VITAMIN C PO), Take by mouth daily (with lunch), Disp: , Rfl:      multivitamin, therapeutic with minerals (MULTI-VITAMIN) TABS tablet, Take 1 tablet by mouth daily (with lunch), Disp: , Rfl:      MAGNESIUM PO, Take by mouth At Bedtime, Disp: , Rfl:      calcium carbonate (OS-MUNDO 500 MG [place]. [place]) 1250 MG tablet, Take 1 tablet by mouth daily (with lunch), Disp: , Rfl:      B Complex-C (SUPER B COMPLEX/VITAMIN C PO), Take by mouth daily (with lunch), Disp: , Rfl:      TURMERIC PO, Take by mouth daily (with lunch), Disp: , Rfl:      sodium chloride 0.9 % SOLN 100 mL with nivolumab 100 MG/10ML SOLN, Inject into the vein every 14 days, Disp: , Rfl:      LORAZEPAM PO, Take by mouth as needed for anxiety, Disp: , Rfl:      zolpidem (AMBIEN) 5 MG tablet, Take 1 tablet (5 mg) by mouth nightly as needed for sleep, Disp: 30 tablet, Rfl: 1     omeprazole (PRILOSEC) 40 MG capsule, Take 1 capsule (40 mg) by mouth daily, Disp: 30 capsule, Rfl: 1     atorvastatin (LIPITOR) 20 MG tablet, Take 1 tablet (20 mg) by mouth daily, Disp: 30 tablet, Rfl: 3     clonazePAM (KLONOPIN) 0.5 MG tablet, TAKE 0.5-1 TABLETS BY MOUTH 2 TIMES DAILY AS NEEDED FOR ANXIETY, Disp: 20 tablet, Rfl: 0     ALPRAZolam (XANAX) 0.5 MG tablet, Take 1 tablet (0.5 mg) by mouth 3 times daily as needed for anxiety, Disp: 60 tablet, Rfl: 0     lidocaine-prilocaine (EMLA) cream, Apply 1 hour prior to port access. (Patient not taking: Reported on 3/5/2018), Disp: 60 g, Rfl: 1     LORazepam (ATIVAN) 0.5 MG tablet, Take 1 tablet (0.5 mg) by mouth every 4 hours as needed (Anxiety, Nausea/Vomiting or Sleep), Disp: 30 tablet, Rfl: 2     prochlorperazine (COMPAZINE) 10 MG tablet, Take 1 tablet (10 mg) by mouth every 6 hours as needed (Nausea/Vomiting), Disp: 30 tablet, Rfl: 2     citalopram (CELEXA) 20 MG tablet, Take 1 tablet (20 mg) by  "mouth every evening (Patient taking differently: Take 20 mg by mouth At Bedtime ), Disp: 90 tablet, Rfl: 3     buPROPion (WELLBUTRIN XL) 300 MG 24 hr tablet, Take 300 mg by mouth every morning , Disp: , Rfl:       ALLERGIES:  No Known Allergies      FOCUS EXAMINATION:  /52  Pulse 76  Ht 1.575 m (5' 2\")  Wt 58.1 kg (128 lb)  LMP 12/20/2013  BMI 23.41 kg/m2  Well developed well nourished female found seated comfortably in exam chair.  No apparent distress. She is accompanied by her .  She is A&O X3.  Mood and affect WNL. Language and fund of knowledge intact.   She has a nicely healed incision.  I prepped the wound with chloroprep and cleanly removed nylon sutures/staples without difficulty.      ASSESSMENT:  1. Metastatic renal cell carcinoma to brain (H)    2. Surgery follow-up    3. Visit for suture removal    Suzi Gonsales is doing reasonably well. The wound is healthy without evidence of infection.       PLAN:  We discussed wound care.  *  Keep it open to air   *  May resume shampoo with mild soap/shampoo including the incision after 48 hours. No hair conditioners, hair treatments or skin cream for two more weeks.  *  May swim or bathe when all scabbing is gone from the incision.  *  Call our services if you develop fever, chills, redness, swelling, and/or drainage from incision.  We discussed medication.    *  Continue to wean off oxycodone and take Tylenol PRN for post surgical pain.  We discussed activities and return to work.  *  We recommend she return to normal activities as able.  *  She can return to driving if: She is off narcotic;  the vehicle is equipped with blind spot mirrors.   We discussed follow up  *  All the patient's questions have been answered and they demonstrate good understanding of the above.    *  Proceed with radiosurgery scheduled and followup instruction per Dr. King after that.   *  The patient has our contact information and is aware that she should call if she has " questions comments or concerns.     We appreciate the opportunity to be of service in the care of this pleasant patient.  Please do call if there is anything more we can do      Again, thank you for allowing me to participate in the care of your patient.      Sincerely,    ARIEL Burgos CNP

## 2018-03-21 NOTE — MR AVS SNAPSHOT
After Visit Summary   3/21/2018    Suzi Gonsales    MRN: [id number]           Patient Information     Date Of Birth          1965        Visit Information        Provider Department      3/21/2018 3:30 PM Jagruti Bunch APRN [facility]        Today's Diagnoses     Metastatic renal cell carcinoma to brain (H)    -  1    Surgery follow-up        Visit for suture removal          Care Instructions    1. May resume shampooing hair after 48 hours. No hair treatment (coloring and perming) for 30 days from the day of surgery.    2. Proceed with radiosurgery with Dr. HEIDE King as schedule and followup per instruction after the treatment.    3. Call [phone] for concerns and questions regarding post surgical care.          Follow-ups after your visit        Your next 10 appointments already scheduled     Apr 05, 2018  5:30 AM CDT   GAMMA TREATMENT with Huyen Cuellar MD   [facility], Radiation Oncology ([facility])    [address] [place] [id number]   [phone]            Apr 05, 2018  6:30 AM CDT   GAMMA TREATMENT with Itz King MD   [facility], Radiation Oncology ([facility])    [address] [place] [id number]   [phone]            Apr 05, 2018  7:00 AM CDT   (Arrive by 6:45 AM)   MR BRAIN W CONTRAST with UUMR1   [facility], MRI ([facility])    [address] [address] [facility] [address]   [phone]           Take your medicines as usual, unless your doctor tells you not to. Bring a list of your current medicines to your exam (including vitamins, minerals and over-the-counter drugs).  You may or may not receive intravenous (IV) contrast for this exam pending the discretion of the Radiologist.  You do not need to do anything special to prepare.  The MRI machine uses a strong magnet. Please wear clothes without metal (snaps, zippers). A sweatsuit works well, or we may give  you a hospital gown.  Please remove any body piercings and hair extensions before you arrive. You will also remove watches, jewelry, hairpins, wallets, dentures, partial dental plates and hearing aids. You may wear contact lenses, and you may be able to wear your rings. We have a safe place to keep your personal items, but it is safer to leave them at home.  **IMPORTANT** THE INSTRUCTIONS BELOW ARE ONLY FOR THOSE PATIENTS WHO HAVE BEEN PRESCRIBED SEDATION OR GENERAL ANESTHESIA DURING THEIR MRI PROCEDURE:  IF YOUR DOCTOR PRESCRIBED ORAL SEDATION (take medicine to help you relax during your exam):   You must get the medicine from your doctor (oral medication) before you arrive. Bring the medicine to the exam. Do not take it at home. You ll be told when to take it upon arriving for your exam.   Arrive one hour early. Bring someone who can take you home after the test. Your medicine will make you sleepy. After the exam, you may not drive, take a bus or take a taxi by yourself.  IF YOUR DOCTOR PRESCRIBED IV SEDATION:   Arrive one hour early. Bring someone who can take you home after the test. Your medicine will make you sleepy. After the exam, you may not drive, take a bus or take a taxi by yourself.   No eating 6 hours before your exam. You may have clear liquids up until 4 hours before your exam. (Clear liquids include water, clear tea, black coffee and fruit juice without pulp.)  IF YOUR DOCTOR PRESCRIBED ANESTHESIA (be asleep for your exam):   Arrive 1 1/2 hours early. Bring someone who can take you home after the test. You may not drive, take a bus or take a taxi by yourself.   No eating 8 hours before your exam. You may have clear liquids up until 4 hours before your exam. (Clear liquids include water, clear tea, black coffee and fruit juice without pulp.)   You will spend four to five hours in the recovery room.  Please call the Imaging Department at your exam site with any questions.              Who to contact   "   Please call your clinic at [phone] to:    Ask questions about your health    Make or cancel appointments    Discuss your medicines    Learn about your test results    Speak to your doctor            Additional Information About Your Visit        [employer] Information     [employer] gives you secure access to your electronic health record. If you see a primary care provider, you can also send messages to your care team and make appointments. If you have questions, please call your primary care clinic.  If you do not have a primary care provider, please call [phone] and they will assist you.      [employer] is an electronic gateway that provides easy, online access to your medical records. With [employer], you can request a clinic appointment, read your test results, renew a prescription or communicate with your care team.     To access your existing account, please contact your [facility] or call [phone] for assistance.        Care EveryWhere ID     This is your Care EveryWhere ID. This could be used by other organizations to access your [place] medical records  [id number]        Your Vitals Were     Pulse Height Last Period BMI (Body Mass Index)          76 1.575 m (5' 2\") 12/20/2013 23.41 kg/m2         Blood Pressure from Last 3 Encounters:   03/21/18 107/52   03/14/18 93/59   03/08/18 92/41    Weight from Last 3 Encounters:   03/21/18 58.1 kg (128 lb)   03/14/18 59.9 kg (132 lb)   03/06/18 58.9 kg (129 lb 13.6 oz)              Today, you had the following     No orders found for display         Today's Medication Changes          These changes are accurate as of 3/21/18 11:59 PM.  If you have any questions, ask your nurse or doctor.               These medicines have changed or have updated prescriptions.        Dose/Directions    citalopram 20 MG tablet   Commonly known as:  celeXA   This may have changed:  when to take this   Used for:  Insomnia, unspecified type        " Dose:  20 mg   Take 1 tablet (20 mg) by mouth every evening   Quantity:  90 tablet   Refills:  3                Primary Care Provider Office Phone # Fax #    ARIEL Carter [facility] [phone] [address] [facility] [address]        Equal Access to Services     ROSAURA INTERIANO : Hadii aad ku hadasho Soomaali, waaxda luqadaha, qaybta kaalmada adeegyada, waxnanci kelloggbarrypercy plata. So [facility] [phone].    ATENCIÓN: Si habla español, tiene a chacon disposición servicios gratuitos de asistencia lingüística. [facility] [phone].    We comply with applicable federal civil rights laws and [place] laws. We do not discriminate on the basis of race, color, national origin, age, disability, sex, sexual orientation, or gender identity.            Thank you!     Thank you for choosing [facility]OS[facility]  for your care. Our goal is always to provide you with excellent care. Hearing back from our patients is one way we can continue to improve our services. Please take a few minutes to complete the written survey that you may receive in the mail after your visit with us. Thank you!             Your Updated Medication List - Protect others around you: Learn how to safely use, store and throw away your medicines at www.disposemymeds.org.          This list is accurate as of 3/21/18 11:59 PM.  Always use your most recent med list.                   Brand Name Dispense Instructions for use Diagnosis    ALPRAZolam 0.5 MG tablet    XANAX    60 tablet    Take 1 tablet (0.5 mg) by mouth 3 times daily as needed for anxiety    Renal cell carcinoma, unspecified laterality (H), Mass of upper lobe of left lung       aspirin 81 MG chewable tablet     36 tablet    Take 1 tablet (81 mg) by mouth every morning    Pre-op evaluation, Brain metastases (H)       atorvastatin 20 MG tablet    LIPITOR    30 tablet    Take 1 tablet (20 mg) by mouth daily    History of stroke       buPROPion 300 MG 24 hr  tablet    WELLBUTRIN XL     Take 300 mg by mouth every morning        calcium carbonate 1250 MG tablet    OS-MUNDO 500 mg [place]. [place]     Take 1 tablet by mouth daily (with lunch)    Pre-op evaluation, Brain metastases (H)       citalopram 20 MG tablet    celeXA    90 tablet    Take 1 tablet (20 mg) by mouth every evening    Insomnia, unspecified type       clonazePAM 0.5 MG tablet    klonoPIN    20 tablet    TAKE 0.5-1 TABLETS BY MOUTH 2 TIMES DAILY AS NEEDED FOR ANXIETY    Panic attack       * dexamethasone 4 MG tablet    DECADRON          * dexamethasone 2 MG tablet    DECADRON    6 tablet    Take 1 tablet (2 mg) by mouth every 12 hours for 3 days    S/P craniotomy       * dexamethasone 2 MG tablet    DECADRON    3 tablet    Take 1 tablet (2 mg) by mouth daily for 3 days    S/P craniotomy       HYDROcodone-acetaminophen 5-325 MG per tablet    NORCO     TK 1 T PO Q 6 HOURS PRF MODERATE/SEVERE PAIN        lidocaine-prilocaine cream    EMLA    60 g    Apply 1 hour prior to port access.    Malignant neoplasm of right kidney (H)       * LORAZEPAM PO      Take by mouth as needed for anxiety    Pre-op evaluation, Brain metastases (H)       * LORazepam 0.5 MG tablet    ATIVAN    30 tablet    Take 1 tablet (0.5 mg) by mouth every 4 hours as needed (Anxiety, Nausea/Vomiting or Sleep)    Malignant neoplasm of right kidney (H)       MAGNESIUM PO      Take by mouth At Bedtime    Pre-op evaluation, Brain metastases (H)       methocarbamol 500 MG tablet    ROBAXIN    30 tablet    Take 2 tablets (1,000 mg) by mouth every 6 hours as needed for muscle spasms    S/P craniotomy       Multi-vitamin Tabs tablet      Take 1 tablet by mouth daily (with lunch)    Pre-op evaluation, Brain metastases (H)       OMEGA 3 PO      Take by mouth daily (with lunch)    Pre-op evaluation, Brain metastases (H)       omeprazole 40 MG capsule    priLOSEC    30 capsule    Take 1 capsule (40 mg) by mouth daily    Cerebral edema (H)       oxyCODONE IR 5 MG  tablet    ROXICODONE    40 tablet    Take 1-2 tablets (5-10 mg) by mouth every 4 hours as needed for other (pain control or improvement in physical function. Hold dose for analgesic side effects.)    S/P craniotomy       PROBIOTIC PO      Take by mouth daily (with lunch)    Pre-op evaluation, Brain metastases (H)       prochlorperazine 10 MG tablet    COMPAZINE    30 tablet    Take 1 tablet (10 mg) by mouth every 6 hours as needed (Nausea/Vomiting)    Malignant neoplasm of right kidney (H)       senna-docusate 8.6-50 MG per tablet    SENOKOT-S;PERICOLACE    30 tablet    Take 1 tablet by mouth 2 times daily    S/P craniotomy       sodium chloride 0.9 % SOLN 100 mL with nivolumab 100 MG/10ML SOLN      Inject into the vein every 14 days    Pre-op evaluation, Brain metastases (H)       SUPER B COMPLEX/VITAMIN C PO      Take by mouth daily (with lunch)    Pre-op evaluation, Brain metastases (H)       TURMERIC PO      Take by mouth daily (with lunch)    Pre-op evaluation, Brain metastases (H)       VITAMIN B 12 PO      Take by mouth daily (with lunch)    Pre-op evaluation, Brain metastases (H)       VITAMIN C PO      Take by mouth daily (with lunch)    Pre-op evaluation, Brain metastases (H)       VITAMIN D PO      Take by mouth daily (with lunch)    Pre-op evaluation, Brain metastases (H)       * zolpidem 5 MG tablet    AMBIEN    30 tablet    Take 1 tablet (5 mg) by mouth nightly as needed for sleep    Sleep disorder       * zolpidem 5 MG tablet    AMBIEN    7 tablet    Take 1 tablet (5 mg) by mouth nightly as needed for sleep    Insomnia, unspecified type       * Notice:  This list has 7 medication(s) that are the same as other medications prescribed for you. Read the directions carefully, and ask your doctor or other care provider to review them with you.

## 2018-03-22 NOTE — PATIENT INSTRUCTIONS
1. May resume shampooing hair after 48 hours. No hair treatment (coloring and perming) for 30 days from the day of surgery.    2. Proceed with radiosurgery with Dr. HEIDE King as schedule and followup per instruction after the treatment.    3. Call [phone] for concerns and questions regarding post surgical care.

## 2018-03-22 NOTE — PROGRESS NOTES
NEUROSURGERY PROGRESS NOTE    REASON FOR VISIT: Postop wound check and suture removal.    Procedure Date: 03/06/2018   PREOPERATIVE DIAGNOSIS:    1. Left cerebellar mass.   2. History of renal cell carcinoma.   OPERATION PERFORMED:   1.  Left cerebellar craniotomy for resection of left cerebellar mass.   2.  Use of frameless stereotaxy.   3.  Use of operating microscope.     ATTENDING SURGEON:  Quintin Palencia MD.     HPI:   This is a 52-year-old female with a past medical history significant for renal cell carcinoma that was diagnosed in 2014, for which she underwent a right radical nephrectomy.  She developed headaches in recent months and an MRI of the brain revealed that she has a contrast-enhancing mass in the left cerebellum.  Given her symptomatology and the findings of the solitary lesion, she consented for the above-named operation without incident on 3/6/2018. The final pathology confirmed metastatic renal cell carcinoma. The patient recovered well and was discharged home on POD 3 in good condition.  Since then she presents for routine wound check and suture removal.    The patient denies headache, fever, chills, nausea, vomiting, nuchal rigidity, redness, swelling and drainage from incision. She still takes narcotics for post surgical pain, but is trying to wean off the medication. She is scheduled  with Dr. King for radiosurgery to further treat the residual lesion in 4/2018. She has no concerns and questions today.    STATUS REPORT  Patient Supplied Answers To the [facility] Pain Questionnaire  [facility] Pain -  Patient Entered Questionnaire/Answers 3/13/2018   What number best describes your pain right now:  0 = No pain  to  10 = Worst pain imaginable 5   How would you describe the pain? cramping, sharp, cutting, dull, aching, throbbing, pressure   Which of the following improve or reduce your pain?  lying down, medication, relaxation   What number best describes your average pain for the past week:  0 = No pain  to   10 = Worst pain imaginable 8   What number best describes your LOWEST pain in past 24 hours:  0 = No pain  to  10 = Worst pain imaginable 3   What number best describes your WORST pain in past 24 hours:  0 = No pain  to  10 = Worst pain imaginable 5   When is your pain worst? AM   What non-medicine treatments have you already had for your pain? none   Have you tried treating your pain with medication?  Yes   Are you currently taking medications for your pain? Yes       PATHOLOGY:    CURRENT MEDICATIONS:    Current Outpatient Prescriptions:      oxyCODONE IR (ROXICODONE) 5 MG tablet, Take 1-2 tablets (5-10 mg) by mouth every 4 hours as needed for other (pain control or improvement in physical function. Hold dose for analgesic side effects.), Disp: 40 tablet, Rfl: 0     methocarbamol (ROBAXIN) 500 MG tablet, Take 2 tablets (1,000 mg) by mouth every 6 hours as needed for muscle spasms, Disp: 30 tablet, Rfl: 0     HYDROcodone-acetaminophen (NORCO) 5-325 MG per tablet, TK 1 T PO Q 6 HOURS PRF MODERATE/SEVERE PAIN, Disp: , Rfl: 0     dexamethasone (DECADRON) 4 MG tablet, , Disp: , Rfl: 0     zolpidem (AMBIEN) 5 MG tablet, Take 1 tablet (5 mg) by mouth nightly as needed for sleep, Disp: 7 tablet, Rfl: 0     aspirin 81 MG chewable tablet, Take 1 tablet (81 mg) by mouth every morning, Disp: 36 tablet, Rfl:      senna-docusate (SENOKOT-S;PERICOLACE) 8.6-50 MG per tablet, Take 1 tablet by mouth 2 times daily, Disp: 30 tablet, Rfl: 0     Probiotic Product (PROBIOTIC PO), Take by mouth daily (with lunch), Disp: , Rfl:      Cholecalciferol (VITAMIN D PO), Take by mouth daily (with lunch), Disp: , Rfl:      Omega-3 Fatty Acids (OMEGA 3 PO), Take by mouth daily (with lunch), Disp: , Rfl:      Cyanocobalamin (VITAMIN B 12 PO), Take by mouth daily (with lunch), Disp: , Rfl:      Ascorbic Acid (VITAMIN C PO), Take by mouth daily (with lunch), Disp: , Rfl:      multivitamin, therapeutic with minerals (MULTI-VITAMIN) TABS tablet, Take  1 tablet by mouth daily (with lunch), Disp: , Rfl:      MAGNESIUM PO, Take by mouth At Bedtime, Disp: , Rfl:      calcium carbonate (OS-MUNDO 500 MG [place]) 1250 MG tablet, Take 1 tablet by mouth daily (with lunch), Disp: , Rfl:      B Complex-C (SUPER B COMPLEX/VITAMIN C PO), Take by mouth daily (with lunch), Disp: , Rfl:      TURMERIC PO, Take by mouth daily (with lunch), Disp: , Rfl:      sodium chloride 0.9 % SOLN 100 mL with nivolumab 100 MG/10ML SOLN, Inject into the vein every 14 days, Disp: , Rfl:      LORAZEPAM PO, Take by mouth as needed for anxiety, Disp: , Rfl:      zolpidem (AMBIEN) 5 MG tablet, Take 1 tablet (5 mg) by mouth nightly as needed for sleep, Disp: 30 tablet, Rfl: 1     omeprazole (PRILOSEC) 40 MG capsule, Take 1 capsule (40 mg) by mouth daily, Disp: 30 capsule, Rfl: 1     atorvastatin (LIPITOR) 20 MG tablet, Take 1 tablet (20 mg) by mouth daily, Disp: 30 tablet, Rfl: 3     clonazePAM (KLONOPIN) 0.5 MG tablet, TAKE 0.5-1 TABLETS BY MOUTH 2 TIMES DAILY AS NEEDED FOR ANXIETY, Disp: 20 tablet, Rfl: 0     ALPRAZolam (XANAX) 0.5 MG tablet, Take 1 tablet (0.5 mg) by mouth 3 times daily as needed for anxiety, Disp: 60 tablet, Rfl: 0     lidocaine-prilocaine (EMLA) cream, Apply 1 hour prior to port access. (Patient not taking: Reported on 3/5/2018), Disp: 60 g, Rfl: 1     LORazepam (ATIVAN) 0.5 MG tablet, Take 1 tablet (0.5 mg) by mouth every 4 hours as needed (Anxiety, Nausea/Vomiting or Sleep), Disp: 30 tablet, Rfl: 2     prochlorperazine (COMPAZINE) 10 MG tablet, Take 1 tablet (10 mg) by mouth every 6 hours as needed (Nausea/Vomiting), Disp: 30 tablet, Rfl: 2     citalopram (CELEXA) 20 MG tablet, Take 1 tablet (20 mg) by mouth every evening (Patient taking differently: Take 20 mg by mouth At Bedtime ), Disp: 90 tablet, Rfl: 3     buPROPion (WELLBUTRIN XL) 300 MG 24 hr tablet, Take 300 mg by mouth every morning , Disp: , Rfl:       ALLERGIES:  No Known Allergies      FOCUS EXAMINATION:  /52   "Pulse 76  Ht 1.575 m (5' 2\")  Wt 58.1 kg (128 lb)  LMP 12/20/2013  BMI 23.41 kg/m2  Well developed well nourished female found seated comfortably in exam chair.  No apparent distress. She is accompanied by her .  She is A&O X3.  Mood and affect WNL. Language and fund of knowledge intact.   She has a nicely healed incision.  I prepped the wound with chloroprep and cleanly removed nylon sutures/staples without difficulty.      ASSESSMENT:  1. Metastatic renal cell carcinoma to brain (H)    2. Surgery follow-up    3. Visit for suture removal    Suzi Gonsales is doing reasonably well. The wound is healthy without evidence of infection.       PLAN:  We discussed wound care.  *  Keep it open to air   *  May resume shampoo with mild soap/shampoo including the incision after 48 hours. No hair conditioners, hair treatments or skin cream for two more weeks.  *  May swim or bathe when all scabbing is gone from the incision.  *  Call our services if you develop fever, chills, redness, swelling, and/or drainage from incision.  We discussed medication.    *  Continue to wean off oxycodone and take Tylenol PRN for post surgical pain.  We discussed activities and return to work.  *  We recommend she return to normal activities as able.  *  She can return to driving if: She is off narcotic;  the vehicle is equipped with blind spot mirrors.   We discussed follow up  *  All the patient's questions have been answered and they demonstrate good understanding of the above.    *  Proceed with radiosurgery scheduled and followup instruction per Dr. King after that.   *  The patient has our contact information and is aware that she should call if she has questions comments or concerns.     We appreciate the opportunity to be of service in the care of this pleasant patient.  Please do call if there is anything more we can do    Jagruti Bunch, ANNABEL, APRN, FNP-BC  [facility]   Department of Neurosurgery  Phone: " [phone]  Fax: [phone]

## 2018-04-02 PROBLEM — E83.52 HYPERCALCEMIA: Status: ACTIVE | Noted: 2018-01-01

## 2018-04-02 NOTE — PROGRESS NOTES
Infusion Nursing Note:  Suzi Gonsales presents today for add on infusion of IV fluids and Zometa.    Patient seen by provider today: Yes: TABITHA Redding    Note: N/A.    Intravenous Access:  Implanted Port.    Treatment Conditions:  Lab Results   Component Value Date    HGB 11.3 04/02/2018     Lab Results   Component Value Date    WBC 5.3 04/02/2018      Lab Results   Component Value Date    ANEU 2.7 04/02/2018     Lab Results   Component Value Date     04/02/2018      Lab Results   Component Value Date     04/02/2018                   Lab Results   Component Value Date    POTASSIUM 3.9 04/02/2018           Lab Results   Component Value Date    MAG 2.2 03/07/2018            Lab Results   Component Value Date    CR 1.05 04/02/2018                   Lab Results   Component Value Date    MUNDO 10.7 04/02/2018                Lab Results   Component Value Date    BILITOTAL 0.2 04/02/2018           Lab Results   Component Value Date    ALBUMIN 2.8 04/02/2018                    Lab Results   Component Value Date    ALT 13 04/02/2018           Lab Results   Component Value Date    AST 28 04/02/2018       Post Infusion Assessment:  Patient tolerated infusion without incident.  Blood return noted pre and post infusion.  Site patent and intact, free from redness, edema or discomfort.  No evidence of extravasations.  Access discontinued per protocol.    Zometa: Stopped @ 1830    Discharge Plan:   AVS to patient via [employer].  Patient will return 04/04 for next appointment with provider.   Patient discharged in stable condition accompanied by: .  Departure Mode: Ambulatory.    Lana Brown RN

## 2018-04-02 NOTE — TELEPHONE ENCOUNTER
[facility] Telephone Triage Note    Assessment: Patient called in to triage reporting the following symptoms: .. Patient reports since 3/24-3/25 she has felt very fatigued and nauseous. She was walking around extensively with her family last week and thought this may have caused her fatigue. She also stopped taking her oxycodone and Norco. Stopped oxycodone prior to leaving for her trip on 3/24, unsure of exactly which day. She was taking 2 tabs of oxycodone 5mg and taking it several times per day. Unclear of how much of a taper she did prior to stopping. She restarted the Norco during her trip due to her pain and fatigue. Currently taking BID along with ibuprofen. She is feeling a little better. Taking 1 compazine daily for nausea, this has not been effective in controlling nausea and she has been drinking liquids and drinking smoothies as food still makes her too nauseous. She thinks she had a low grade fever last week as well. A few loose stools last week and has semi-formed stools currently, about 3-4 per day. She is not currently taking any laxatives/stool softeners. In addition the achey pain she feels all over is worse on her left side including left chest pain and L arm pain. She denies any palpitations or cardiac issues. She is SOB with exertion (walking up stairs) and says she had some episodes last week of diaphoresis. She also reported a history of a TIA discovered incidentally on an MRI.     Recommendations: .  David Ren. Awaiting response .     Follow-Up: Patient voiced understanding of advice and/or instructions given.

## 2018-04-02 NOTE — PATIENT INSTRUCTIONS
Contact Numbers  [facility]: [phone]  (Choose Option 3 for triage RN)  After Hours: [phone]    Call triage with chills and/or temperature greater than or equal to 100.5, uncontrolled nausea/vomiting, diarrhea, constipation, dizziness, shortness of breath, chest pain, bleeding, unexplained bruising, or any new/concerning symptoms, questions/concerns.     If after hours, weekends, or holidays, call the main clinic number. Calls will be forwarded to the hospital , please ask for the adult oncology doctor on call.     If you are having any concerning symptoms or wish to speak to a provider before your next infusion visit, please call your care coordinator or triage to notify them so we can adequately serve you.     If you need a refill on a narcotic prescription, please call triage or your care coordinator before your infusion appointment.             April 2018 Sunday Monday Tuesday Wednesday Thursday Friday Saturday   1     2     [facility] LAB DRAW    4:15 PM   (15 min.)   [facility] LAB DRAW   [facility] Lab Draw     [facility] RETURN    4:35 PM   (50 min.)   Day Ren PA-C   [facility] ONC INFUSION 120    6:00 PM   (120 min.)    ONCOLOGY INFUSION   [facility] 3     4     P RETURN   11:45 AM   (30 min.)   Henri Green MD   [facility] 5     [facility] GAMMA TREATMENT    5:30 AM   (60 min.)   Huyen Cuellar MD   [facility] [place], Radiation Oncology     P GAMMA TREATMENT    6:30 AM   (60 min.)   Itz King MD   [facility], Radiation Oncology     MR BRAIN W    6:45 AM   (30 min.)   UUMR1   [facility], MRI     PROCEDURE - 2 HR    7:30 AM   (120 min.)   U2A ROOM 7   Unit 2A [facility] [place] 6     7       8     9     10     11     12     13     14       15     16     17     18     19     20     21       22     23     24     25     26     27     28       29     30                                          May 2018   [place] Monday Tuesday Wednesday Thursday Friday Saturday             1     2     3     4     5       6     7     8     9     10     11     12       13     14     15     16     17     18     19       20     21     22     23     24     25     26       27     28     29     30     31                            Lab Results:  Recent Results (from the past 12 hour(s))   CBC with platelets differential    Collection Time: 04/02/18  4:42 PM   Result Value Ref Range    WBC 5.3 4.0 - 11.0 10e9/L    RBC Count 3.67 (L) 3.8 - 5.2 10e12/L    Hemoglobin 11.3 (L) 11.7 - 15.7 g/dL    Hematocrit 34.3 (L) 35.0 - 47.0 %    MCV 94 78 - 100 fl    MCH 30.8 26.5 - 33.0 pg    MCHC 32.9 31.5 - 36.5 g/dL    RDW 14.4 10.0 - 15.0 %    Platelet Count 538 (H) 150 - 450 10e9/L    Diff Method Automated Method     % Neutrophils 51.1 %    % Lymphocytes 34.0 %    % Monocytes 10.2 %    % Eosinophils 2.5 %    % Basophils 0.9 %    % Immature Granulocytes 1.3 %    Nucleated RBCs 0 0 /100    Absolute Neutrophil 2.7 1.6 - 8.3 10e9/L    Absolute Lymphocytes 1.8 0.8 - 5.3 10e9/L    Absolute Monocytes 0.5 0.0 - 1.3 10e9/L    Absolute Eosinophils 0.1 0.0 - 0.7 10e9/L    Absolute Basophils 0.1 0.0 - 0.2 10e9/L    Abs Immature Granulocytes 0.1 0 - 0.4 10e9/L    Absolute Nucleated RBC 0.0    Comprehensive metabolic panel    Collection Time: 04/02/18  4:42 PM   Result Value Ref Range    Sodium 134 133 - 144 mmol/L    Potassium 3.9 3.4 - 5.3 mmol/L    Chloride 101 94 - 109 mmol/L    Carbon Dioxide 26 20 - 32 mmol/L    Anion Gap 7 3 - 14 mmol/L    Glucose 108 (H) 70 - 99 mg/dL    Urea Nitrogen 14 7 - 30 mg/dL    Creatinine 1.05 (H) 0.52 - 1.04 mg/dL    GFR Estimate 55 (L) >60 mL/min/1.7m2    GFR Estimate If Black 66 >60 mL/min/1.7m2    Calcium 10.7 (H) 8.5 - 10.1 mg/dL    Bilirubin Total 0.2 0.2 - 1.3 mg/dL    Albumin 2.8 (L) 3.4 - 5.0 g/dL    Protein Total 7.2 6.8 - 8.8 g/dL    Alkaline Phosphatase 74 40 - 150 U/L    ALT 13 0 - 50 U/L     AST 28 0 - 45 U/L   Troponin I    Collection Time: 04/02/18  4:42 PM   Result Value Ref Range    Troponin I ES <0.015 0.000 - 0.045 ug/L

## 2018-04-02 NOTE — NURSING NOTE
"Chief Complaint   Patient presents with     Oncology Clinic Visit     Return visit related to Kidney Cancer     Port Draw     Labs drawn from port by RN. Line flushed with saline and heparin. Vs taken and pt checked in for appt     Port accessed with 20g 3/4\" gripper needle by RN, labs collected, line flushed with saline and heparin.  Vitals taken. Pt checked in for appointment(s).    Analy Hart RN  "

## 2018-04-02 NOTE — MR AVS SNAPSHOT
After Visit Summary   4/2/2018    Suzi Gonsales    MRN: [id number]           Patient Information     Date Of Birth          1965        Visit Information        Provider Department      4/2/2018 4:50 PM Day Ren PA-C [facility]        Today's Diagnoses     Renal cell carcinoma, right (H)        Chest pain        Hypercalcemia           Follow-ups after your visit        Your next 10 appointments already scheduled     Apr 03, 2018 11:00 AM CDT   (Arrive by 10:45 AM)   CT CHEST/ABDOMEN/PELVIS W CONTRAST with UCCT2   [facility] CT ([facility])    [address] [address] [phone]           Please bring any scans or X-rays taken at other hospitals, if similar tests were done. Also bring a list of your medicines, including vitamins, minerals and over-the-counter drugs. It is safest to leave personal items at home.  Be sure to tell your doctor:   If you have any allergies.   If there s any chance you are pregnant.   If you are breastfeeding.  You may have contrast for this exam. To prepare:   Do not eat or drink for 2 hours before your exam. If you need to take medicine, you may take it with small sips of water. (We may ask you to take liquid medicine as well.)   The day before your exam, drink extra fluids at least six 8-ounce glasses (unless your doctor tells you to restrict your fluids).   You will be given instructions on how to drink the contrast.  Patients over 70 or patients with diabetes or kidney problems:   If you haven t had a blood test (creatinine test) within the last 30 days, the Cardiologist/Radiologist may require you to get this test prior to your exam.  If you have diabetes:   Continue to take your metformin medication on the day of your exam  Please wear loose clothing, such as a sweat suit or jogging clothes. Avoid snaps, zippers and other metal. We may ask you to undress and  put on a hospital gown.  If you have any questions, please call the Imaging Department where you will have your exam.            Apr 04, 2018 11:30 AM CDT   [facility] Lab Draw with  [facility] LAB DRAW   [facility] Lab Draw ([facility])    [address]  [address]  [facility] [address]   [phone]            Apr 04, 2018 12:00 PM CDT   (Arrive by 11:45 AM)   Return Visit with Henri Green MD   [facility] ([facility])    [address]  [address]  [facility] [address]   [phone]            Apr 05, 2018  5:30 AM CDT   GAMMA TREATMENT with Huyen Cuellar MD   [facility], Radiation Oncology ([facility])    [address] [address]   [phone]            Apr 05, 2018  6:30 AM CDT   GAMMA TREATMENT with Itz King MD   [facility] Radiation Oncology ([facility])    [address] [address]   [phone]            Apr 05, 2018  7:00 AM CDT   (Arrive by 6:45 AM)   MR BRAIN W CONTRAST with UUMR1   [facility], MRI ([facility])    [address] [address]   [phone]           Take your medicines as usual, unless your doctor tells you not to. Bring a list of your current medicines to your exam (including vitamins, minerals and over-the-counter drugs).  You may or may not receive intravenous (IV) contrast for this exam pending the discretion of the Radiologist.  You do not need to do anything special to prepare.  The MRI machine uses a strong magnet. Please wear clothes without metal (snaps, zippers). A sweatsuit works well, or we may give you a hospital gown.  Please remove any body piercings and hair extensions before you arrive. You will also remove watches, jewelry, hairpins, wallets, dentures, partial dental plates and hearing aids. You may wear contact lenses, and you may  be able to wear your rings. We have a safe place to keep your personal items, but it is safer to leave them at home.  **IMPORTANT** THE INSTRUCTIONS BELOW ARE ONLY FOR THOSE PATIENTS WHO HAVE BEEN PRESCRIBED SEDATION OR GENERAL ANESTHESIA DURING THEIR MRI PROCEDURE:  IF YOUR DOCTOR PRESCRIBED ORAL SEDATION (take medicine to help you relax during your exam):   You must get the medicine from your doctor (oral medication) before you arrive. Bring the medicine to the exam. Do not take it at home. You ll be told when to take it upon arriving for your exam.   Arrive one hour early. Bring someone who can take you home after the test. Your medicine will make you sleepy. After the exam, you may not drive, take a bus or take a taxi by yourself.  IF YOUR DOCTOR PRESCRIBED IV SEDATION:   Arrive one hour early. Bring someone who can take you home after the test. Your medicine will make you sleepy. After the exam, you may not drive, take a bus or take a taxi by yourself.   No eating 6 hours before your exam. You may have clear liquids up until 4 hours before your exam. (Clear liquids include water, clear tea, black coffee and fruit juice without pulp.)  IF YOUR DOCTOR PRESCRIBED ANESTHESIA (be asleep for your exam):   Arrive 1 1/2 hours early. Bring someone who can take you home after the test. You may not drive, take a bus or take a taxi by yourself.   No eating 8 hours before your exam. You may have clear liquids up until 4 hours before your exam. (Clear liquids include water, clear tea, black coffee and fruit juice without pulp.)   You will spend four to five hours in the recovery room.  Please call the Imaging Department at your exam site with any questions.            Apr 05, 2018  7:30 AM CDT   Procedure 2 hour with U2A ROOM 7   Unit 2A [facility] ([facility] [place])    [address] [address]                 Future tests that were ordered for you today     Open Future  "Orders        Priority Expected Expires Ordered    Comprehensive metabolic panel Routine 4/4/2018 4/4/2018 4/2/2018    CT Chest/Abdomen/Pelvis w Contrast Routine  4/2/2019 4/2/2018            Who to contact     If you have questions or need follow up information about today's clinic visit or your schedule please contact [employer] [facility] directly at [phone].  Normal or non-critical lab and imaging results will be communicated to you by goBaltohart, letter or phone within 4 business days after the clinic has received the results. If you do not hear from us within 7 days, please contact the clinic through [employer] or phone. If you have a critical or abnormal lab result, we will notify you by phone as soon as possible.  Submit refill requests through [employer] or call your pharmacy and they will forward the refill request to us. Please allow 3 business days for your refill to be completed.          Additional Information About Your Visit        [email or web address] Information     [employer] gives you secure access to your electronic health record. If you see a primary care provider, you can also send messages to your care team and make appointments. If you have questions, please call your primary care clinic.  If you do not have a primary care provider, please call [phone] and they will assist you.        Care EveryWhere ID     This is your Care EveryWhere ID. This could be used by other organizations to access your [place] medical records  [id number]        Your Vitals Were     Pulse Temperature Respirations Height Last Period Pulse Oximetry    106 98  F (36.7  C) (Oral) 16 1.575 m (5' 2.01\") 12/20/2013 93%    BMI (Body Mass Index)                   23.24 kg/m2            Blood Pressure from Last 3 Encounters:   04/02/18 99/58   03/21/18 107/52   03/14/18 93/59    Weight from Last 3 Encounters:   04/02/18 57.7 kg (127 lb 1.6 oz)   03/21/18 58.1 kg (128 lb)   03/14/18 59.9 kg (132 lb)              We Performed the " Following     CBC with platelets differential     Comprehensive metabolic panel     Troponin I          Today's Medication Changes          These changes are accurate as of 4/2/18  6:49 PM.  If you have any questions, ask your nurse or doctor.               These medicines have changed or have updated prescriptions.        Dose/Directions    citalopram 20 MG tablet   Commonly known as:  celeXA   This may have changed:  when to take this   Used for:  Insomnia, unspecified type        Dose:  20 mg   Take 1 tablet (20 mg) by mouth every evening   Quantity:  90 tablet   Refills:  3       HYDROcodone-acetaminophen 5-325 MG per tablet   Commonly known as:  NORCO   This may have changed:  See the new instructions.   Used for:  Renal cell carcinoma, right (H), Chest pain   Changed by:  Day Ren PA-C        Dose:  1 tablet   Take 1 tablet by mouth every 6 hours as needed for severe pain   Quantity:  60 tablet   Refills:  0            Where to get your medicines      Some of these will need a paper prescription and others can be bought over the counter.  Ask your nurse if you have questions.     Bring a paper prescription for each of these medications     HYDROcodone-acetaminophen 5-325 MG per tablet                Primary Care Provider Office Phone # Fax #    Molly Foote Adame, APRN [facility] [phone] [address] [phone]        Equal Access to Services     ROSAURA INTERIANO AH: Odilon billyo Sovanessa, waaxda luqadaha, qaybta kaalmada adeegyada, josemanuel plata. So [facility] [phone].    ATENCIÓN: Si habla español, tiene a chacon disposición servicios gratuitos de asistencia lingüística. Llame al [phone].    We comply with applicable federal civil rights laws and [place] laws. We do not discriminate on the basis of race, color, national origin, age, disability, sex, sexual orientation, or gender identity.            Thank you!     Thank you for  Count includes the [facility]  for your care. Our goal is always to provide you with excellent care. Hearing back from our patients is one way we can continue to improve our services. Please take a few minutes to complete the written survey that you may receive in the mail after your visit with us. Thank you!             Your Updated Medication List - Protect others around you: Learn how to safely use, store and throw away your medicines at www.disposemymeds.org.          This list is accurate as of 4/2/18  6:49 PM.  Always use your most recent med list.                   Brand Name Dispense Instructions for use Diagnosis    ALPRAZolam 0.5 MG tablet    XANAX    60 tablet    Take 1 tablet (0.5 mg) by mouth 3 times daily as needed for anxiety    Renal cell carcinoma, unspecified laterality (H), Mass of upper lobe of left lung       aspirin 81 MG chewable tablet     36 tablet    Take 1 tablet (81 mg) by mouth every morning    Pre-op evaluation, Brain metastases (H)       atorvastatin 20 MG tablet    LIPITOR    30 tablet    Take 1 tablet (20 mg) by mouth daily    History of stroke       buPROPion 300 MG 24 hr tablet    WELLBUTRIN XL     Take 300 mg by mouth every morning        calcium carbonate 1250 MG tablet    OS-MUNDO 500 mg [place]. Ca     Take 1 tablet by mouth daily (with lunch)    Pre-op evaluation, Brain metastases (H)       citalopram 20 MG tablet    celeXA    90 tablet    Take 1 tablet (20 mg) by mouth every evening    Insomnia, unspecified type       clonazePAM 0.5 MG tablet    klonoPIN    20 tablet    TAKE 0.5-1 TABLETS BY MOUTH 2 TIMES DAILY AS NEEDED FOR ANXIETY    Panic attack       HYDROcodone-acetaminophen 5-325 MG per tablet    NORCO    60 tablet    Take 1 tablet by mouth every 6 hours as needed for severe pain    Renal cell carcinoma, right (H), Chest pain       lidocaine-prilocaine cream    EMLA    60 g    Apply 1 hour prior to port access.    Malignant neoplasm of right kidney (H)        LORazepam 0.5 MG tablet    ATIVAN    30 tablet    Take 1 tablet (0.5 mg) by mouth every 4 hours as needed (Anxiety, Nausea/Vomiting or Sleep)    Malignant neoplasm of right kidney (H)       MAGNESIUM PO      Take by mouth At Bedtime    Pre-op evaluation, Brain metastases (H)       methocarbamol 500 MG tablet    ROBAXIN    30 tablet    Take 2 tablets (1,000 mg) by mouth every 6 hours as needed for muscle spasms    S/P craniotomy       Multi-vitamin Tabs tablet      Take 1 tablet by mouth daily (with lunch)    Pre-op evaluation, Brain metastases (H)       OMEGA 3 PO      Take by mouth daily (with lunch)    Pre-op evaluation, Brain metastases (H)       omeprazole 40 MG capsule    priLOSEC    30 capsule    Take 1 capsule (40 mg) by mouth daily    Cerebral edema (H)       PROBIOTIC PO      Take by mouth daily (with lunch)    Pre-op evaluation, Brain metastases (H)       prochlorperazine 10 MG tablet    COMPAZINE    30 tablet    Take 1 tablet (10 mg) by mouth every 6 hours as needed (Nausea/Vomiting)    Malignant neoplasm of right kidney (H)       sodium chloride 0.9 % SOLN 100 mL with nivolumab 100 MG/10ML SOLN      Inject into the vein every 14 days    Pre-op evaluation, Brain metastases (H)       SUPER B COMPLEX/VITAMIN C PO      Take by mouth daily (with lunch)    Pre-op evaluation, Brain metastases (H)       TURMERIC PO      Take by mouth daily (with lunch)    Pre-op evaluation, Brain metastases (H)       VITAMIN B 12 PO      Take by mouth daily (with lunch)    Pre-op evaluation, Brain metastases (H)       VITAMIN C PO      Take by mouth daily (with lunch)    Pre-op evaluation, Brain metastases (H)       VITAMIN D PO      Take by mouth daily (with lunch)    Pre-op evaluation, Brain metastases (H)       zolpidem 5 MG tablet    AMBIEN    30 tablet    Take 1 tablet (5 mg) by mouth nightly as needed for sleep    Sleep disorder

## 2018-04-02 NOTE — PROGRESS NOTES
[facility]  FOLLOW-UP VISIT NOTE  Date of visit: Apr 2, 2018       REASON FOR VISIT: [facility] here for add on for fatigue, weakness and left chest pain    HPI: Suzi presented to ED with hematuria and right flank pain thinking she had a renal stone in December 2014. She was worked up with a CT abd/pelvis which suggested a renal mass. She was referred to [facility]. Max Zelaya [facility]. 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 Per charts tumor resection had negative margins and it was staged at pT2bN0.    Her staging work up was negative except for pulmonary nodules. She has been followed every 6 months with scans. CT CAP on 5/11/17 showed enlarging hilar LAD, enlarging pulmonary nodules, adrenal nodules and a pancreatic body mass. Biopsies of hilar LN, adrenal nodule and pancreatic mass were + for RCC. She decided to pursue IL-2, of which she received 4 cycles, last on 8/15/17. CT CAP on 9/13/17 showed generally stable disease. CT CAP on 11/22/17 showed disease progression.  She completed three months of Opdivo and then was noted to have a cerebellar lesion and underwent a craniotomy on 3/6.     INTERVAL HISTORY: Suzi called in with one week of feeling exhausted, weak and somnolent.  She has no appetite and has been eating less.  She has left sided chest pain that radiates down her arm. Feels like a muscle ache.  She has been taking 2 norco BID with relief.     No f/s/c.  No headache, vision issues, paresthesias or motor dysfunction.  She is breathing well.  BM daily, soft. No  issues. No confusion.  She has been off Dex and oxycodone for over two weeks now.  She stopped both cold turkey, but weeks ago.       Current Outpatient Prescriptions   Medication Sig Dispense Refill     HYDROcodone-acetaminophen (NORCO) 5-325 MG per tablet Take 1 tablet by mouth every 6 hours as needed for severe pain 60 tablet  0     methocarbamol (ROBAXIN) 500 MG tablet Take 2 tablets (1,000 mg) by mouth every 6 hours as needed for muscle spasms 30 tablet 0     aspirin 81 MG chewable tablet Take 1 tablet (81 mg) by mouth every morning 36 tablet      sodium chloride 0.9 % SOLN 100 mL with nivolumab 100 MG/10ML SOLN Inject into the vein every 14 days       zolpidem (AMBIEN) 5 MG tablet Take 1 tablet (5 mg) by mouth nightly as needed for sleep 30 tablet 1     omeprazole (PRILOSEC) 40 MG capsule Take 1 capsule (40 mg) by mouth daily 30 capsule 1     clonazePAM (KLONOPIN) 0.5 MG tablet TAKE 0.5-1 TABLETS BY MOUTH 2 TIMES DAILY AS NEEDED FOR ANXIETY 20 tablet 0     ALPRAZolam (XANAX) 0.5 MG tablet Take 1 tablet (0.5 mg) by mouth 3 times daily as needed for anxiety 60 tablet 0     lidocaine-prilocaine (EMLA) cream Apply 1 hour prior to port access. 60 g 1     LORazepam (ATIVAN) 0.5 MG tablet Take 1 tablet (0.5 mg) by mouth every 4 hours as needed (Anxiety, Nausea/Vomiting or Sleep) 30 tablet 2     prochlorperazine (COMPAZINE) 10 MG tablet Take 1 tablet (10 mg) by mouth every 6 hours as needed (Nausea/Vomiting) 30 tablet 2     citalopram (CELEXA) 20 MG tablet Take 1 tablet (20 mg) by mouth every evening (Patient taking differently: Take 20 mg by mouth At Bedtime ) 90 tablet 3     buPROPion (WELLBUTRIN XL) 300 MG 24 hr tablet Take 300 mg by mouth every morning        Probiotic Product (PROBIOTIC PO) Take by mouth daily (with lunch)       Cholecalciferol (VITAMIN D PO) Take by mouth daily (with lunch)       Omega-3 Fatty Acids (OMEGA 3 PO) Take by mouth daily (with lunch)       Cyanocobalamin (VITAMIN B 12 PO) Take by mouth daily (with lunch)       Ascorbic Acid (VITAMIN C PO) Take by mouth daily (with lunch)       multivitamin, therapeutic with minerals (MULTI-VITAMIN) TABS tablet Take 1 tablet by mouth daily (with lunch)       MAGNESIUM PO Take by mouth At Bedtime       calcium carbonate (OS-MUNDO 500 MG [place]. [place]) 1250 MG tablet Take 1 tablet by  "mouth daily (with lunch)       B Complex-C (SUPER B COMPLEX/VITAMIN C PO) Take by mouth daily (with lunch)       TURMERIC PO Take by mouth daily (with lunch)       atorvastatin (LIPITOR) 20 MG tablet Take 1 tablet (20 mg) by mouth daily (Patient not taking: Reported on 4/2/2018) 30 tablet 3     EXAM:    BP 99/58 (BP Location: Right arm, Cuff Size: Adult Small)  Pulse 106  Temp 98  F (36.7  C) (Oral)  Resp 16  Ht 1.575 m (5' 2.01\")  Wt 57.7 kg (127 lb 1.6 oz)  LMP 12/20/2013  SpO2 93%  BMI 23.24 kg/m2    Wt Readings from Last 4 Encounters:   04/02/18 57.7 kg (127 lb 1.6 oz)   03/21/18 58.1 kg (128 lb)   03/14/18 59.9 kg (132 lb)   03/06/18 58.9 kg (129 lb 13.6 oz)       Vital signs were reviewed.   Patient alert and oriented.   PERRLA. EOMI. No scleral icterus noted. OP without thrush/sores.  Neck exam: No palpable cervical nodes bilaterally. Large supraclavicular mass 3 x 3 cm  Heart: RRR no murmurs noted.   Lungs: clear to auscultation bilaterally.  Crackles in the lower left lung  Abd: positive bowel sounds in all four quadrants.  No tenderness to palpation.  No hepatomegaly.   Extremities: No lower extremity edema.   Neuro: II-XII CN intact. Strength 5/5 upper and lower extremities   Mood and affect is stable.   Skin has no rashes or lesions on exposed areas. Port is in right chest.     LABS:      4/2/2018 16:42   WBC 5.3   Hemoglobin 11.3 (L)   Hematocrit 34.3 (L)   Platelet Count 538 (H)   RBC Count 3.67 (L)   MCV 94      4/2/2018 16:42   Sodium 134   Potassium 3.9   Chloride 101   Carbon Dioxide 26   Urea Nitrogen 14   Creatinine 1.05 (H)   GFR Estimate 55 (L)   GFR Estimate If Black 66   Calcium 10.7 (H)   Anion Gap 7   Albumin 2.8 (L)   Protein Total 7.2   Bilirubin Total 0.2   Alkaline Phosphatase 74   ALT 13   AST 28   Troponin I ES <0.015     ASSESSMENT/PLAN: 52 year old with mRCC s/p 4 cycles of IL-2.  She had a stable CT after 2 cycles, mild disease progression after 4 cycles.  After a short " break, CT showed worsening disease and she has started with Opdivo.     1. RCC: Suzi started nivolumab on 12/5/17 and completed 3 months.  She missed her follow up after her CT scan on 3/5 as she was getting ready for surgery.  At that time her CT showed mild progression. Today I am worried she is failing Opdivo as she only missed one infusion and clearly her calcium is elevated and her left supraclav mass has tripled in size.  We will get a CT CAP and eval her systemic disease on Wednesday.     2. Hypercalcemia; fatigue and somnolence, but no constipation or confusion.  Will give Zometa x 1 and 1 Liter of NS.  Recheck on Wednesday.     3. Left chest pain: not able to reproduce today on exam, I suspect this may be from her left supraclav mass potentially causing brachial plexus compression?  No cervical neck tenderness and motor/strength function equal bilaterally.  Continue norco 2 tabs BID and will assess on CT    4. Brain mets: no neuro symptoms, HA or confusion today.  Off dex. Craniotomy successful on 3/6.  Has GK to the bed on 4/5.      Kia Garcia PA-C

## 2018-04-02 NOTE — MR AVS SNAPSHOT
After Visit Summary   4/2/2018    Suzi Gonsales    MRN: [id number]           Patient Information     Date Of Birth          1965        Visit Information        Provider Department      4/2/2018 6:00 PM  14 ATC;  ONCOLOGY INFUSION [facility]        Today's Diagnoses     Hypercalcemia    -  1      Care Instructions    Contact Numbers  [facility]: [phone]  (Choose Option 3 for triage RN)  After Hours: [phone]    Call triage with chills and/or temperature greater than or equal to 100.5, uncontrolled nausea/vomiting, diarrhea, constipation, dizziness, shortness of breath, chest pain, bleeding, unexplained bruising, or any new/concerning symptoms, questions/concerns.     If after hours, weekends, or holidays, call the main clinic number. Calls will be forwarded to the hospital , please ask for the adult oncology doctor on call.     If you are having any concerning symptoms or wish to speak to a provider before your next infusion visit, please call your care coordinator or triage to notify them so we can adequately serve you.     If you need a refill on a narcotic prescription, please call triage or your care coordinator before your infusion appointment.             April 2018 Sunday Monday Tuesday Wednesday Thursday Friday Saturday   1     2     [facility]    4:15 PM   (15 min.)   [facility] LAB DRAW   [facility] Lab Draw     P RETURN    4:35 PM   (50 min.)   Day Ren PA-C   [facility] ONC INFUSION 120    6:00 PM   (120 min.)    ONCOLOGY INFUSION   [facility] 3     4     [facility] RETURN   11:45 AM   (30 min.)   Henri Green MD   [facility] 5     [facility] GAMMA TREATMENT    5:30 AM   (60 min.)   Huyen Cuellar MD   [facility] [place], Radiation Oncology     [facility] GAMMA TREATMENT    6:30 AM   (60 min.)   Itz King MD   [facility] [place],  Radiation Oncology     MR BRAIN W    6:45 AM   (30 min.)   UUMR1   [facility] [place], MRI     PROCEDURE - 2 HR    7:30 AM   (120 min.)   U2A ROOM 7   Unit 2A [facility] 6     7       8     9     10     11     12     13     14       15     16     17     18     19     20     21       22     23     24     25     26     27     28       29     30                                         May 2018   [place] Monday Tuesday Wednesday Thursday Friday Saturday             1     2     3     4     5       6     7     8     9     10     11     12       13     14     15     16     17     18     19       20     21     22     23     24     25     26       27     28     29     30     31                            Lab Results:  Recent Results (from the past 12 hour(s))   CBC with platelets differential    Collection Time: 04/02/18  4:42 PM   Result Value Ref Range    WBC 5.3 4.0 - 11.0 10e9/L    RBC Count 3.67 (L) 3.8 - 5.2 10e12/L    Hemoglobin 11.3 (L) 11.7 - 15.7 g/dL    Hematocrit 34.3 (L) 35.0 - 47.0 %    MCV 94 78 - 100 fl    MCH 30.8 26.5 - 33.0 pg    MCHC 32.9 31.5 - 36.5 g/dL    RDW 14.4 10.0 - 15.0 %    Platelet Count 538 (H) 150 - 450 10e9/L    Diff Method Automated Method     % Neutrophils 51.1 %    % Lymphocytes 34.0 %    % Monocytes 10.2 %    % Eosinophils 2.5 %    % Basophils 0.9 %    % Immature Granulocytes 1.3 %    Nucleated RBCs 0 0 /100    Absolute Neutrophil 2.7 1.6 - 8.3 10e9/L    Absolute Lymphocytes 1.8 0.8 - 5.3 10e9/L    Absolute Monocytes 0.5 0.0 - 1.3 10e9/L    Absolute Eosinophils 0.1 0.0 - 0.7 10e9/L    Absolute Basophils 0.1 0.0 - 0.2 10e9/L    Abs Immature Granulocytes 0.1 0 - 0.4 10e9/L    Absolute Nucleated RBC 0.0    Comprehensive metabolic panel    Collection Time: 04/02/18  4:42 PM   Result Value Ref Range    Sodium 134 133 - 144 mmol/L    Potassium 3.9 3.4 - 5.3 mmol/L    Chloride 101 94 - 109 mmol/L    Carbon Dioxide 26 20 - 32 mmol/L    Anion Gap 7 3 - 14 mmol/L    Glucose 108 (H) 70 - 99 mg/dL     Urea Nitrogen 14 7 - 30 mg/dL    Creatinine 1.05 (H) 0.52 - 1.04 mg/dL    GFR Estimate 55 (L) >60 mL/min/1.7m2    GFR Estimate If Black 66 >60 mL/min/1.7m2    Calcium 10.7 (H) 8.5 - 10.1 mg/dL    Bilirubin Total 0.2 0.2 - 1.3 mg/dL    Albumin 2.8 (L) 3.4 - 5.0 g/dL    Protein Total 7.2 6.8 - 8.8 g/dL    Alkaline Phosphatase 74 40 - 150 U/L    ALT 13 0 - 50 U/L    AST 28 0 - 45 U/L   Troponin I    Collection Time: 04/02/18  4:42 PM   Result Value Ref Range    Troponin I ES <0.015 0.000 - 0.045 ug/L               Follow-ups after your visit        Your next 10 appointments already scheduled     Apr 03, 2018 11:00 AM CDT   (Arrive by 10:45 AM)   CT CHEST/ABDOMEN/PELVIS W CONTRAST with UCCT2   [facility] ([facility] [place] [facility])    [address] [address] [phone]           Please bring any scans or X-rays taken at other hospitals, if similar tests were done. Also bring a list of your medicines, including vitamins, minerals and over-the-counter drugs. It is safest to leave personal items at home.  Be sure to tell your doctor:   If you have any allergies.   If there s any chance you are pregnant.   If you are breastfeeding.  You may have contrast for this exam. To prepare:   Do not eat or drink for 2 hours before your exam. If you need to take medicine, you may take it with small sips of water. (We may ask you to take liquid medicine as well.)   The day before your exam, drink extra fluids at least six 8-ounce glasses (unless your doctor tells you to restrict your fluids).   You will be given instructions on how to drink the contrast.  Patients over 70 or patients with diabetes or kidney problems:   If you haven t had a blood test (creatinine test) within the last 30 days, the Cardiologist/Radiologist may require you to get this test prior to your exam.  If you have diabetes:   Continue to take your metformin medication on the day of your exam  Please  wear loose clothing, such as a sweat suit or jogging clothes. Avoid snaps, zippers and other metal. We may ask you to undress and put on a hospital gown.  If you have any questions, please call the Imaging Department where you will have your exam.            Apr 04, 2018 11:30 AM CDT   [facility] Lab Draw with  [facility] LAB DRAW   [facility] ([facility])    [address] [place] [address]  [address]  [facility] [address]   [phone]            Apr 04, 2018 12:00 PM CDT   (Arrive by 11:45 AM)   Return Visit with Henri Green MD   [facility] ([facility])    [address]  [address]  [facility] [address]   [phone]            Apr 05, 2018  5:30 AM CDT   GAMMA TREATMENT with Huyen Cuellar MD   [facility] Radiation Oncology ([facility])    [address] [facility] [address]   [phone]            Apr 05, 2018  6:30 AM CDT   GAMMA TREATMENT with Itz King MD   [facility] Radiation Oncology ([facility])    [address] [facility] [address]   [phone]            Apr 05, 2018  7:00 AM CDT   (Arrive by 6:45 AM)   MR BRAIN W CONTRAST with UUMR1   [facility], MRI ([facility])    [address] [facility] [address]   [phone]           Take your medicines as usual, unless your doctor tells you not to. Bring a list of your current medicines to your exam (including vitamins, minerals and over-the-counter drugs).  You may or may not receive intravenous (IV) contrast for this exam pending the discretion of the Radiologist.  You do not need to do anything special to prepare.  The MRI machine uses a strong magnet. Please wear clothes without metal (snaps, zippers). A sweatsuit works well, or we may give you a hospital gown.  Please remove any body piercings and hair extensions before you arrive. You will also remove  watches, jewelry, hairpins, wallets, dentures, partial dental plates and hearing aids. You may wear contact lenses, and you may be able to wear your rings. We have a safe place to keep your personal items, but it is safer to leave them at home.  **IMPORTANT** THE INSTRUCTIONS BELOW ARE ONLY FOR THOSE PATIENTS WHO HAVE BEEN PRESCRIBED SEDATION OR GENERAL ANESTHESIA DURING THEIR MRI PROCEDURE:  IF YOUR DOCTOR PRESCRIBED ORAL SEDATION (take medicine to help you relax during your exam):   You must get the medicine from your doctor (oral medication) before you arrive. Bring the medicine to the exam. Do not take it at home. You ll be told when to take it upon arriving for your exam.   Arrive one hour early. Bring someone who can take you home after the test. Your medicine will make you sleepy. After the exam, you may not drive, take a bus or take a taxi by yourself.  IF YOUR DOCTOR PRESCRIBED IV SEDATION:   Arrive one hour early. Bring someone who can take you home after the test. Your medicine will make you sleepy. After the exam, you may not drive, take a bus or take a taxi by yourself.   No eating 6 hours before your exam. You may have clear liquids up until 4 hours before your exam. (Clear liquids include water, clear tea, black coffee and fruit juice without pulp.)  IF YOUR DOCTOR PRESCRIBED ANESTHESIA (be asleep for your exam):   Arrive 1 1/2 hours early. Bring someone who can take you home after the test. You may not drive, take a bus or take a taxi by yourself.   No eating 8 hours before your exam. You may have clear liquids up until 4 hours before your exam. (Clear liquids include water, clear tea, black coffee and fruit juice without pulp.)   You will spend four to five hours in the recovery room.  Please call the Imaging Department at your exam site with any questions.            Apr 05, 2018  7:30 AM CDT   Procedure 2 hour with U2A ROOM 7   Unit 2A [facility] ([facility] [place]  [facility])    [address] [facility] [address]                 Future tests that were ordered for you today     Open Future Orders        Priority Expected Expires Ordered    Comprehensive metabolic panel Routine 4/4/2018 4/4/2018 4/2/2018    CT Chest/Abdomen/Pelvis w Contrast Routine  4/2/2019 4/2/2018            Who to contact     If you have questions or need follow up information about today's clinic visit or your schedule please contact [facility] directly at [phone].  Normal or non-critical lab and imaging results will be communicated to you by Terranovahart, letter or phone within 4 business days after the clinic has received the results. If you do not hear from us within 7 days, please contact the clinic through Neu[employer]ic[employer] or phone. If you have a critical or abnormal lab result, we will notify you by phone as soon as possible.  Submit refill requests through [employer] or call your pharmacy and they will forward the refill request to us. Please allow 3 business days for your refill to be completed.          Additional Information About Your Visit        Headplay Information     Headplay gives you secure access to your electronic health record. If you see a primary care provider, you can also send messages to your care team and make appointments. If you have questions, please call your primary care clinic.  If you do not have a primary care provider, please call [phone] and they will assist you.        Care EveryWhere ID     This is your Care EveryWhere ID. This could be used by other organizations to access your [place] medical records  [id number]        Your Vitals Were     Last Period                   12/20/2013            Blood Pressure from Last 3 Encounters:   04/02/18 99/58   03/21/18 107/52   03/14/18 93/59    Weight from Last 3 Encounters:   04/02/18 57.7 kg (127 lb 1.6 oz)   03/21/18 58.1 kg (128 lb)   03/14/18 59.9 kg (132 lb)              Today, you had the following      No orders found for display         Today's Medication Changes          These changes are accurate as of 4/2/18  7:01 PM.  If you have any questions, ask your nurse or doctor.               These medicines have changed or have updated prescriptions.        Dose/Directions    citalopram 20 MG tablet   Commonly known as:  celeXA   This may have changed:  when to take this   Used for:  Insomnia, unspecified type        Dose:  20 mg   Take 1 tablet (20 mg) by mouth every evening   Quantity:  90 tablet   Refills:  3       HYDROcodone-acetaminophen 5-325 MG per tablet   Commonly known as:  NORCO   This may have changed:  See the new instructions.   Used for:  Renal cell carcinoma, right (H), Chest pain   Changed by:  Day Ren PA-C        Dose:  1 tablet   Take 1 tablet by mouth every 6 hours as needed for severe pain   Quantity:  60 tablet   Refills:  0            Where to get your medicines      Some of these will need a paper prescription and others can be bought over the counter.  Ask your nurse if you have questions.     Bring a paper prescription for each of these medications     HYDROcodone-acetaminophen 5-325 MG per tablet                Primary Care Provider Office Phone # Fax #    Molly Dary Adame, APRN [facility] [phone] [address] [id number]        Equal Access to Services     ROSAURA INTERIANO : Odilon garnett Sovanessa, waaxda margaret, qaybta kaalmada aderohityada, josemanuel plata. So [facility] [phone].    ATENCIÓN: Si habla español, tiene a chacon disposición servicios gratuitos de asistencia lingüística. Mario al [phone].    We comply with applicable federal civil rights laws and [place] laws. We do not discriminate on the basis of race, color, national origin, age, disability, sex, sexual orientation, or gender identity.            Thank you!     Thank you for choosing [facility]  for your care. Our goal is  always to provide you with excellent care. Hearing back from our patients is one way we can continue to improve our services. Please take a few minutes to complete the written survey that you may receive in the mail after your visit with us. Thank you!             Your Updated Medication List - Protect others around you: Learn how to safely use, store and throw away your medicines at www.disposemymeds.org.          This list is accurate as of 4/2/18  7:01 PM.  Always use your most recent med list.                   Brand Name Dispense Instructions for use Diagnosis    ALPRAZolam 0.5 MG tablet    XANAX    60 tablet    Take 1 tablet (0.5 mg) by mouth 3 times daily as needed for anxiety    Renal cell carcinoma, unspecified laterality (H), Mass of upper lobe of left lung       aspirin 81 MG chewable tablet     36 tablet    Take 1 tablet (81 mg) by mouth every morning    Pre-op evaluation, Brain metastases (H)       atorvastatin 20 MG tablet    LIPITOR    30 tablet    Take 1 tablet (20 mg) by mouth daily    History of stroke       buPROPion 300 MG 24 hr tablet    WELLBUTRIN XL     Take 300 mg by mouth every morning        calcium carbonate 1250 MG tablet    OS-MUNDO 500 mg [place]. Ca     Take 1 tablet by mouth daily (with lunch)    Pre-op evaluation, Brain metastases (H)       citalopram 20 MG tablet    celeXA    90 tablet    Take 1 tablet (20 mg) by mouth every evening    Insomnia, unspecified type       clonazePAM 0.5 MG tablet    klonoPIN    20 tablet    TAKE 0.5-1 TABLETS BY MOUTH 2 TIMES DAILY AS NEEDED FOR ANXIETY    Panic attack       HYDROcodone-acetaminophen 5-325 MG per tablet    NORCO    60 tablet    Take 1 tablet by mouth every 6 hours as needed for severe pain    Renal cell carcinoma, right (H), Chest pain       lidocaine-prilocaine cream    EMLA    60 g    Apply 1 hour prior to port access.    Malignant neoplasm of right kidney (H)       LORazepam 0.5 MG tablet    ATIVAN    30 tablet    Take 1 tablet (0.5 mg)  by mouth every 4 hours as needed (Anxiety, Nausea/Vomiting or Sleep)    Malignant neoplasm of right kidney (H)       MAGNESIUM PO      Take by mouth At Bedtime    Pre-op evaluation, Brain metastases (H)       methocarbamol 500 MG tablet    ROBAXIN    30 tablet    Take 2 tablets (1,000 mg) by mouth every 6 hours as needed for muscle spasms    S/P craniotomy       Multi-vitamin Tabs tablet      Take 1 tablet by mouth daily (with lunch)    Pre-op evaluation, Brain metastases (H)       OMEGA 3 PO      Take by mouth daily (with lunch)    Pre-op evaluation, Brain metastases (H)       omeprazole 40 MG capsule    priLOSEC    30 capsule    Take 1 capsule (40 mg) by mouth daily    Cerebral edema (H)       PROBIOTIC PO      Take by mouth daily (with lunch)    Pre-op evaluation, Brain metastases (H)       prochlorperazine 10 MG tablet    COMPAZINE    30 tablet    Take 1 tablet (10 mg) by mouth every 6 hours as needed (Nausea/Vomiting)    Malignant neoplasm of right kidney (H)       sodium chloride 0.9 % SOLN 100 mL with nivolumab 100 MG/10ML SOLN      Inject into the vein every 14 days    Pre-op evaluation, Brain metastases (H)       SUPER B COMPLEX/VITAMIN C PO      Take by mouth daily (with lunch)    Pre-op evaluation, Brain metastases (H)       TURMERIC PO      Take by mouth daily (with lunch)    Pre-op evaluation, Brain metastases (H)       VITAMIN B 12 PO      Take by mouth daily (with lunch)    Pre-op evaluation, Brain metastases (H)       VITAMIN C PO      Take by mouth daily (with lunch)    Pre-op evaluation, Brain metastases (H)       VITAMIN D PO      Take by mouth daily (with lunch)    Pre-op evaluation, Brain metastases (H)       zolpidem 5 MG tablet    AMBIEN    30 tablet    Take 1 tablet (5 mg) by mouth nightly as needed for sleep    Sleep disorder

## 2018-04-02 NOTE — NURSING NOTE
"Oncology Rooming Note    April 2, 2018 4:52 PM   Suzi Gonsales is a 52 year old female who presents for:    Chief Complaint   Patient presents with     Port Draw     Labs drawn from port by RN. Line flushed with saline and heparin. Vs taken and pt checked in for appt     Oncology Clinic Visit     Return visit related to Kidney Cancer     Initial Vitals: BP 99/58 (BP Location: Right arm, Cuff Size: Adult Small)  Pulse 106  Temp 98  F (36.7  C) (Oral)  Resp 16  Ht 1.575 m (5' 2.01\")  Wt 57.7 kg (127 lb 1.6 oz)  LMP 12/20/2013  SpO2 93%  BMI 23.24 kg/m2 Estimated body mass index is 23.24 kg/(m^2) as calculated from the following:    Height as of this encounter: 1.575 m (5' 2.01\").    Weight as of this encounter: 57.7 kg (127 lb 1.6 oz). Body surface area is 1.59 meters squared.  No Pain (0) Comment: Data Unavailable   Patient's last menstrual period was 12/20/2013.  Allergies reviewed: Yes  Medications reviewed: Yes    Medications: Medication refills not needed today.  Pharmacy name entered into [employer]:    [id number] - [place], [place] - [address] AT [facility] [address]  [facility] - USE FOR MAILING ONLY - [place], [place]    Clinical concerns: No new concerns. Provider was notified.    10 minutes for nursing intake (face to face time)     Lesly Jean-Baptiste LPN            "

## 2018-04-04 PROBLEM — R11.2 NAUSEA WITH VOMITING: Status: ACTIVE | Noted: 2018-01-01

## 2018-04-04 PROBLEM — E86.0 DEHYDRATION: Status: ACTIVE | Noted: 2018-01-01

## 2018-04-04 NOTE — PROGRESS NOTES
Received call from pt's , Tunde. Tunde states he cancelled pt's CT appointment yesterday and appointment with Dr. Green today as pt was not feeling well and had flu-like symptoms. States yesterday she had N/V and was febrile. States her temp was over 100 yesterday but does not know what it was. States she is lethargic. Is unsure if she is febrile today or how she is feeling as he is currently at work.     Discussed with Dr. Green. Scheduled pt at 1200 for labs/visit with Dr. Green and for CT scan at 3:40pm today. Reinforced with Tunde importance of pt being seen today and keeping these appointments as scheduled. Tunde verbalized understanding. Stated he would leave work now to  pt and bring her to clinic.

## 2018-04-04 NOTE — NURSING NOTE
"Oncology Rooming Note    April 4, 2018 12:34 PM   Suzi Gonsales is a 52 year old female who presents for:    Chief Complaint   Patient presents with     Oncology Clinic Visit     Return for Chills , Vomiting, Kidney Ca      Initial Vitals: BP (!) 82/48  Pulse 106  Temp 97.5  F (36.4  C) (Oral)  Resp 18  Wt 59 kg (130 lb 1.1 oz)  LMP 12/20/2013  SpO2 92%  BMI 23.78 kg/m2 Estimated body mass index is 23.78 kg/(m^2) as calculated from the following:    Height as of 4/2/18: 1.575 m (5' 2.01\").    Weight as of this encounter: 59 kg (130 lb 1.1 oz). Body surface area is 1.61 meters squared.  Severe Pain (6) Comment: Data Unavailable   Patient's last menstrual period was 12/20/2013.  Allergies reviewed: Yes  Medications reviewed: Yes    Medications: Medication refills not needed today.  Pharmacy name entered into [employer]:    [id number] - [place], [place] - [address] AT [facility] OF [address]  [facility] - USE FOR MAILING ONLY - [place], [place]    Clinical concerns: Nausea, L shoulder pain , Waek, Tired  Norma  was notified.    8 minutes for nursing intake (face to face time)     Jillian Peralta MA              "

## 2018-04-04 NOTE — DISCHARGE INSTRUCTIONS

## 2018-04-04 NOTE — LETTER
4/4/2018       RE: Suzi Gonsales  [address] [place] [address]     Dear Colleague,    Thank you for referring your patient, Suzi Gonsales, to the [facility]. Please see a copy of my visit note below.    [facility]  FOLLOW-UP VISIT NOTE  Date of visit: Apr 4, 2018       REASON FOR VISIT: mRCC here for add on for fatigue, weakness and left chest pain    HPI: Suzi presented to ED with hematuria and right flank pain thinking she had a renal stone in December 2014. She was worked up with a CT abd/pelvis which suggested a renal mass. She was [facility]. 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 Per charts tumor resection had negative margins and it was staged at pT2bN0.    Her staging work up was negative except for pulmonary nodules. She has been followed every 6 months with scans. CT CAP on 5/11/17 showed enlarging hilar LAD, enlarging pulmonary nodules, adrenal nodules and a pancreatic body mass. Biopsies of hilar LN, adrenal nodule and pancreatic mass were + for RCC. She decided to pursue IL-2, of which she received 4 cycles, last on 8/15/17. CT CAP on 9/13/17 showed generally stable disease. CT CAP on 11/22/17 showed disease progression.  She completed three months of Opdivo and then was noted to have a cerebellar lesion and underwent a craniotomy on 3/6.     INTERVAL HISTORY: Suzi is here with her  and is laying on the bed.     She was refusing to come but it took a lot of phone calls to encourage her to come in. She is feeling poorly. She did not eat for 3 days. She has severe nausea. She has not been sleeping well during night. She has no energy. She feels better to lay in bed in dark.     No f/s/c.  No headache, vision issues, paresthesias or motor dysfunction.  She is breathing well.  BM daily, soft. No  issues. No confusion.   She has been off Dex and oxycodone for over two weeks now.  She stopped both cold turkey, but weeks ago.     Current Outpatient Prescriptions   Medication Sig     HYDROcodone-acetaminophen (NORCO) 5-325 MG per tablet Take 1 tablet by mouth every 6 hours as needed for severe pain     methocarbamol (ROBAXIN) 500 MG tablet Take 2 tablets (1,000 mg) by mouth every 6 hours as needed for muscle spasms     aspirin 81 MG chewable tablet Take 1 tablet (81 mg) by mouth every morning     Probiotic Product (PROBIOTIC PO) Take by mouth daily (with lunch)     Cholecalciferol (VITAMIN D PO) Take by mouth daily (with lunch)     Omega-3 Fatty Acids (OMEGA 3 PO) Take by mouth daily (with lunch)     Cyanocobalamin (VITAMIN B 12 PO) Take by mouth daily (with lunch)     Ascorbic Acid (VITAMIN C PO) Take by mouth daily (with lunch)     multivitamin, therapeutic with minerals (MULTI-VITAMIN) TABS tablet Take 1 tablet by mouth daily (with lunch)     MAGNESIUM PO Take by mouth At Bedtime     calcium carbonate (OS-MUNDO 500 MG [place]. CA) 1250 MG tablet Take 1 tablet by mouth daily (with lunch)     B Complex-C (SUPER B COMPLEX/VITAMIN C PO) Take by mouth daily (with lunch)     TURMERIC PO Take by mouth daily (with lunch)     sodium chloride 0.9 % SOLN 100 mL with nivolumab 100 MG/10ML SOLN Inject into the vein every 14 days     zolpidem (AMBIEN) 5 MG tablet Take 1 tablet (5 mg) by mouth nightly as needed for sleep     omeprazole (PRILOSEC) 40 MG capsule Take 1 capsule (40 mg) by mouth daily     atorvastatin (LIPITOR) 20 MG tablet Take 1 tablet (20 mg) by mouth daily (Patient not taking: Reported on 4/2/2018)     clonazePAM (KLONOPIN) 0.5 MG tablet TAKE 0.5-1 TABLETS BY MOUTH 2 TIMES DAILY AS NEEDED FOR ANXIETY     ALPRAZolam (XANAX) 0.5 MG tablet Take 1 tablet (0.5 mg) by mouth 3 times daily as needed for anxiety     lidocaine-prilocaine (EMLA) cream Apply 1 hour prior to port access.     LORazepam (ATIVAN) 0.5 MG tablet Take 1 tablet (0.5 mg)  by mouth every 4 hours as needed (Anxiety, Nausea/Vomiting or Sleep)     prochlorperazine (COMPAZINE) 10 MG tablet Take 1 tablet (10 mg) by mouth every 6 hours as needed (Nausea/Vomiting)     citalopram (CELEXA) 20 MG tablet Take 1 tablet (20 mg) by mouth every evening (Patient taking differently: Take 20 mg by mouth At Bedtime )     buPROPion (WELLBUTRIN XL) 300 MG 24 hr tablet Take 300 mg by mouth every morning      No current facility-administered medications for this visit.           EXAM:    BP (!) 82/48  Pulse 106  Temp 97.5  F (36.4  C) (Oral)  Resp 18  Wt 59 kg (130 lb 1.1 oz)  LMP 12/20/2013  SpO2 92%  BMI 23.78 kg/m2    Wt Readings from Last 4 Encounters:   04/04/18 59 kg (130 lb 1.1 oz)   04/02/18 57.7 kg (127 lb 1.6 oz)   03/21/18 58.1 kg (128 lb)   03/14/18 59.9 kg (132 lb)       Vital signs were reviewed.   Patient alert and oriented.   PERRLA. EOMI. No scleral icterus noted. OP without thrush/sores.  Neck exam: No palpable cervical nodes bilaterally. Large supraclavicular mass 3 x 3 cm  Heart: RRR no murmurs noted.   Lungs: clear to auscultation bilaterally.  Crackles in the lower left lung  Abd: positive bowel sounds in all four quadrants.  No tenderness to palpation.  No hepatomegaly.   Extremities: No lower extremity edema.   Neuro: II-XII CN intact. Strength 5/5 upper and lower extremities   Mood and affect is stable.   Skin has no rashes or lesions on exposed areas. Port is in right chest.     LABS:   Recent Labs   Lab Test  04/04/18   1236  04/02/18   1642  03/07/18   0742  03/07/18   0400  03/06/18   1517  03/06/18   1443  03/01/18   1025  02/13/18   1247   NA  135  134   --   138  138  136  134  136   POTASSIUM  3.4  3.9  4.2  4.4  4.5  4.2  4.5  4.0   CHLORIDE  103  101   --   105   --    --   101  101   CO2  23  26   --   24   --    --   27  27   ANIONGAP  9  7   --   9   --    --   7  7   BUN  13  14   --   22   --    --   22  19   CR  1.18*  1.05*   --   0.83   --    --   0.84   1.05*   GLC  143*  108*   --   154*  129*  125*  96  124*   MUNDO  9.1  10.7*   --   8.3*   --    --   9.5  9.6     Recent Labs   Lab Test  03/07/18   0400  08/24/17   1520  08/19/17   0535  08/18/17   0328  08/17/17   0557  08/16/17   1550  08/16/17   1241   MAG  2.2   --   1.8  2.3  2.0   --   3.1*   PHOS   --   3.4  2.2*  2.4*  2.3*  2.0*   --      Recent Labs   Lab Test  04/04/18   1236  04/02/18   1642  03/07/18   0400  03/06/18   1517  03/06/18   1443  03/01/18   1025  02/13/18   1247  01/31/18   1217   WBC  2.8*  5.3  12.4*   --    --   14.3*  5.1  4.3   HGB  11.8  11.3*  9.9*  11.0*  11.0*  11.8  12.4  12.1   PLT  317  538*  318   --    --   384  384  357   MCV  94  94  94   --    --   92  91  90   NEUTROPHIL  72.0  51.1   --    --    --   77.5  53.1  46.6     Recent Labs   Lab Test  04/04/18   1236  04/02/18   1642  03/01/18   1025   08/19/17   0535  08/18/17   0328  08/17/17   0557   BILITOTAL  0.2  0.2  0.2   < >  0.2  0.2  0.3   ALKPHOS  64  74  71   < >  60  41  43   ALT  18  13  21   < >  50  32  21   AST  45  28  18   < >  31  31  16   ALBUMIN  2.8*  2.8*  3.2*   < >  2.0*  1.9*  2.2*   LDH   --    --    --    --   214  199  182    < > = values in this interval not displayed.     TSH   Date Value Ref Range Status   01/31/2018 0.93 0.40 - 4.00 mU/L Final   01/04/2018 0.91 0.40 - 4.00 mU/L Final   12/18/2017 1.29 0.40 - 4.00 mU/L Final     No results for input(s): CEA in the last 82167 hours.  Results for orders placed or performed during the hospital encounter of 03/06/18   MR Brain w/o & w Contrast    Narrative    Brain MRI with/without contrast primarily for the purposes of  stereotactic evaluation    History: Post-operative.;     Comparison: CT same day and MRI 2/14/2018    Technique: MR imaging performed with 3-dimensonally acquired axial  T1-weighted sequences performed with intravenous contrast.    Contrast: 6.5mL Gadavist    Findings:   Postsurgical changes of left suboccipital craniotomy with  underlying  resection cavity. 6 mm thickness postoperative fluid collection  subjacent to the craniotomy defect and small pneumocephalus. Fluid and  blood products within the resection cavity. No definite enhancement in  the region of the resection cavity to suggest residual tumor, allowing  for the presence of T1 hyperintense blood products. Stable T2  hyperintense vasogenic edema throughout the left cerebral hemisphere.  Decreased localized mass effect and associated partial effacement of  the fourth ventricle. No hydrocephalus or herniation. New thin rim of  restricted diffusion about the inferior/medial margins of the left  occipital resection cavity.    Small left suboccipital subcutaneous collection overlying the  craniotomy defect. Patent major intracranial vascular flow voids.  Paranasal sinuses and mastoid air cells are clear.      Impression    Impression:   1. Expected postsurgical changes of gross total resection of left  cerebellar hemispheric mass with associated decreased mass effect and  effacement of the fourth ventricle.  2. New thin rim of restricted diffusion about the inferior/medial  margin of the resection cavity.    I have personally reviewed the examination and initial interpretation  and I agree with the findings.    NADINE LUNDBERG MD         ASSESSMENT/PLAN: 52 year old with mRCC s/p 4 cycles of IL-2.  She had a stable CT after 2 cycles, mild disease progression after 4 cycles.  After a short break, CT showed worsening disease and she has started with Opdivo.   ECOG PS 3    1. RCC: Suzi started nivolumab on 12/5/17 and completed 3 months.  She missed her follow up after her CT scan on 3/5 as she was getting ready for surgery.  Suzi is clearly not doing well. She felt miserable and did not want to come in. She has poorly controlled nausea. She has no appetite and notes that she did not eat for the last 3 days. She did not want to come in today but I had to push her to show up for the  visit. I will restage her after this visit as the scans are scheduled for later in the day. She does appear sick enough that we could admit her but she is not inclined for hospital admission. She was quite reluctant for gamma knife procedure tomorrow as she is feeling poorly and it is scheduled for early in the morning. I tried my best to get her a better timing but neurosurgeons only have opening very early in the day.     I have reviewed her case with Dr. Conde who has offered to see her as a curbside consult. I will restart her on short course of dexamethasone to help appetite, nausea and generalized illbeing. I have offered zyprexa hs for nausea. I will give her IV fluids today. I will have her return in 1-2 weels to see Ms. Day Ren. We will get her started on cabozantinib as soon as possible for her rapidly progressing disease.     I again reviewed cabozantinib with her. She was not in the best health to review all information and was laying down on exam chair.       2. Hypercalcemia; fatigue and somnolence, but no constipation or confusion.  Will give Zometa x 1 and 1 Liter of NS.  Recheck on Wednesday.     3. Left chest pain: not able to reproduce today on exam, I suspect this may be from her left supraclav mass potentially causing brachial plexus compression?  No cervical neck tenderness and motor/strength function equal bilaterally.  Continue norco 2 tabs BID and will assess on CT    4. Brain mets: no neuro symptoms, HA or confusion today.   Craniotomy successful on 3/6.  Has GK to the tumor bed tomorrow.      Over 60 min of direct face to face time spent with patient with more than 50% time spent in counseling and coordinating care.        Again, thank you for allowing me to participate in the care of your patient.      Sincerely,    Henri Green MD

## 2018-04-04 NOTE — PROGRESS NOTES
Infusion Nursing Note:  Suzi Gonsales presents today for Iv fluids.    Patient seen by provider today: Yes: Dr. Green   present during visit today: Not Applicable.    Note: Pt here for add on fluids. Her port was accessed by the RN and the 1 liter of fluids were given through the port. Patient tolerated infusion well with no issues or side affects.     Intravenous Access:  Implanted Port.    Treatment Conditions:  Lab Results   Component Value Date    HGB 11.8 04/04/2018     Lab Results   Component Value Date    WBC 2.8 04/04/2018      Lab Results   Component Value Date    ANEU 2.0 04/04/2018     Lab Results   Component Value Date     04/04/2018      Lab Results   Component Value Date     04/04/2018                   Lab Results   Component Value Date    POTASSIUM 3.4 04/04/2018           Lab Results   Component Value Date    MAG 2.2 03/07/2018            Lab Results   Component Value Date    CR 1.18 04/04/2018                   Lab Results   Component Value Date    MUNDO 9.1 04/04/2018                Lab Results   Component Value Date    BILITOTAL 0.2 04/04/2018           Lab Results   Component Value Date    ALBUMIN 2.8 04/04/2018                    Lab Results   Component Value Date    ALT 18 04/04/2018           Lab Results   Component Value Date    AST 45 04/04/2018           Post Infusion Assessment:  Patient tolerated infusion without incident. Patient was NOT de-accessed and no heparin was put in it as she is going down for a CT after infusion. She would like to keep it accessed until tomorrow and she was given a heparin to put in her line after she is completed with the CT.     Discharge Plan:   Patient discharged in stable condition accompanied by: .  Departure Mode: Wheelchair.    SHEEBA AGUILAR RN

## 2018-04-04 NOTE — PROGRESS NOTES
[facility]  FOLLOW-UP VISIT NOTE  Date of visit: Apr 4, 2018       REASON FOR VISIT: [facility] here for add on for fatigue, weakness and left chest pain    HPI: Suzi presented to ED with hematuria and right flank pain thinking she had a renal stone in December 2014. She was worked up with a CT abd/pelvis which suggested a renal mass. She was [facility] to [facility]. 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 Per charts tumor resection had negative margins and it was staged at pT2bN0.    Her staging work up was negative except for pulmonary nodules. She has been followed every 6 months with scans. CT CAP on 5/11/17 showed enlarging hilar LAD, enlarging pulmonary nodules, adrenal nodules and a pancreatic body mass. Biopsies of hilar LN, adrenal nodule and pancreatic mass were + for RCC. She decided to pursue IL-2, of which she received 4 cycles, last on 8/15/17. CT CAP on 9/13/17 showed generally stable disease. CT CAP on 11/22/17 showed disease progression.  She completed three months of Opdivo and then was noted to have a cerebellar lesion and underwent a craniotomy on 3/6.     INTERVAL HISTORY: Suzi is here with her  and is laying on the bed.     She was refusing to come but it took a lot of phone calls to encourage her to come in. She is feeling poorly. She did not eat for 3 days. She has severe nausea. She has not been sleeping well during night. She has no energy. She feels better to lay in bed in dark.     No f/s/c.  No headache, vision issues, paresthesias or motor dysfunction.  She is breathing well.  BM daily, soft. No  issues. No confusion.  She has been off Dex and oxycodone for over two weeks now.  She stopped both cold turkey, but weeks ago.     Current Outpatient Prescriptions   Medication Sig     HYDROcodone-acetaminophen (NORCO) 5-325 MG per tablet Take 1 tablet  by mouth every 6 hours as needed for severe pain     methocarbamol (ROBAXIN) 500 MG tablet Take 2 tablets (1,000 mg) by mouth every 6 hours as needed for muscle spasms     aspirin 81 MG chewable tablet Take 1 tablet (81 mg) by mouth every morning     Probiotic Product (PROBIOTIC PO) Take by mouth daily (with lunch)     Cholecalciferol (VITAMIN D PO) Take by mouth daily (with lunch)     Omega-3 Fatty Acids (OMEGA 3 PO) Take by mouth daily (with lunch)     Cyanocobalamin (VITAMIN B 12 PO) Take by mouth daily (with lunch)     Ascorbic Acid (VITAMIN C PO) Take by mouth daily (with lunch)     multivitamin, therapeutic with minerals (MULTI-VITAMIN) TABS tablet Take 1 tablet by mouth daily (with lunch)     MAGNESIUM PO Take by mouth At Bedtime     calcium carbonate (OS-MUNDO 500 MG [place]. [place]) 1250 MG tablet Take 1 tablet by mouth daily (with lunch)     B Complex-C (SUPER B COMPLEX/VITAMIN C PO) Take by mouth daily (with lunch)     TURMERIC PO Take by mouth daily (with lunch)     sodium chloride 0.9 % SOLN 100 mL with nivolumab 100 MG/10ML SOLN Inject into the vein every 14 days     zolpidem (AMBIEN) 5 MG tablet Take 1 tablet (5 mg) by mouth nightly as needed for sleep     omeprazole (PRILOSEC) 40 MG capsule Take 1 capsule (40 mg) by mouth daily     atorvastatin (LIPITOR) 20 MG tablet Take 1 tablet (20 mg) by mouth daily (Patient not taking: Reported on 4/2/2018)     clonazePAM (KLONOPIN) 0.5 MG tablet TAKE 0.5-1 TABLETS BY MOUTH 2 TIMES DAILY AS NEEDED FOR ANXIETY     ALPRAZolam (XANAX) 0.5 MG tablet Take 1 tablet (0.5 mg) by mouth 3 times daily as needed for anxiety     lidocaine-prilocaine (EMLA) cream Apply 1 hour prior to port access.     LORazepam (ATIVAN) 0.5 MG tablet Take 1 tablet (0.5 mg) by mouth every 4 hours as needed (Anxiety, Nausea/Vomiting or Sleep)     prochlorperazine (COMPAZINE) 10 MG tablet Take 1 tablet (10 mg) by mouth every 6 hours as needed (Nausea/Vomiting)     citalopram (CELEXA) 20 MG tablet Take 1  tablet (20 mg) by mouth every evening (Patient taking differently: Take 20 mg by mouth At Bedtime )     buPROPion (WELLBUTRIN XL) 300 MG 24 hr tablet Take 300 mg by mouth every morning      No current facility-administered medications for this visit.           EXAM:    BP (!) 82/48  Pulse 106  Temp 97.5  F (36.4  C) (Oral)  Resp 18  Wt 59 kg (130 lb 1.1 oz)  LMP 12/20/2013  SpO2 92%  BMI 23.78 kg/m2    Wt Readings from Last 4 Encounters:   04/04/18 59 kg (130 lb 1.1 oz)   04/02/18 57.7 kg (127 lb 1.6 oz)   03/21/18 58.1 kg (128 lb)   03/14/18 59.9 kg (132 lb)       Vital signs were reviewed.   Patient alert and oriented.   PERRLA. EOMI. No scleral icterus noted. OP without thrush/sores.  Neck exam: No palpable cervical nodes bilaterally. Large supraclavicular mass 3 x 3 cm  Heart: RRR no murmurs noted.   Lungs: clear to auscultation bilaterally.  Crackles in the lower left lung  Abd: positive bowel sounds in all four quadrants.  No tenderness to palpation.  No hepatomegaly.   Extremities: No lower extremity edema.   Neuro: II-XII CN intact. Strength 5/5 upper and lower extremities   Mood and affect is stable.   Skin has no rashes or lesions on exposed areas. Port is in right chest.     LABS:   Recent Labs   Lab Test  04/04/18   1236  04/02/18   1642  03/07/18   0742  03/07/18   0400  03/06/18   1517  03/06/18   1443  03/01/18   1025  02/13/18   1247   NA  135  134   --   138  138  136  134  136   POTASSIUM  3.4  3.9  4.2  4.4  4.5  4.2  4.5  4.0   CHLORIDE  103  101   --   105   --    --   101  101   CO2  23  26   --   24   --    --   27  27   ANIONGAP  9  7   --   9   --    --   7  7   BUN  13  14   --   22   --    --   22  19   CR  1.18*  1.05*   --   0.83   --    --   0.84  1.05*   GLC  143*  108*   --   154*  129*  125*  96  124*   MUNDO  9.1  10.7*   --   8.3*   --    --   9.5  9.6     Recent Labs   Lab Test  03/07/18   0400  08/24/17   1520  08/19/17   0535  08/18/17   0328  08/17/17   0557  08/16/17    1550  08/16/17   1241   MAG  2.2   --   1.8  2.3  2.0   --   3.1*   PHOS   --   3.4  2.2*  2.4*  2.3*  2.0*   --      Recent Labs   Lab Test  04/04/18   1236  04/02/18   1642  03/07/18   0400  03/06/18   1517  03/06/18   1443  03/01/18   1025  02/13/18   1247  01/31/18   1217   WBC  2.8*  5.3  12.4*   --    --   14.3*  5.1  4.3   HGB  11.8  11.3*  9.9*  11.0*  11.0*  11.8  12.4  12.1   PLT  317  538*  318   --    --   384  384  357   MCV  94  94  94   --    --   92  91  90   NEUTROPHIL  72.0  51.1   --    --    --   77.5  53.1  46.6     Recent Labs   Lab Test  04/04/18   1236  04/02/18   1642  03/01/18   1025   08/19/17   0535  08/18/17   0328  08/17/17   0557   BILITOTAL  0.2  0.2  0.2   < >  0.2  0.2  0.3   ALKPHOS  64  74  71   < >  60  41  43   ALT  18  13  21   < >  50  32  21   AST  45  28  18   < >  31  31  16   ALBUMIN  2.8*  2.8*  3.2*   < >  2.0*  1.9*  2.2*   LDH   --    --    --    --   214  199  182    < > = values in this interval not displayed.     TSH   Date Value Ref Range Status   01/31/2018 0.93 0.40 - 4.00 mU/L Final   01/04/2018 0.91 0.40 - 4.00 mU/L Final   12/18/2017 1.29 0.40 - 4.00 mU/L Final     No results for input(s): CEA in the last 62995 hours.  Results for orders placed or performed during the hospital encounter of 03/06/18   MR Brain w/o & w Contrast    Narrative    Brain MRI with/without contrast primarily for the purposes of  stereotactic evaluation    History: Post-operative.;     Comparison: CT same day and MRI 2/14/2018    Technique: MR imaging performed with 3-dimensonally acquired axial  T1-weighted sequences performed with intravenous contrast.    Contrast: 6.5mL Gadavist    Findings:   Postsurgical changes of left suboccipital craniotomy with underlying  resection cavity. 6 mm thickness postoperative fluid collection  subjacent to the craniotomy defect and small pneumocephalus. Fluid and  blood products within the resection cavity. No definite enhancement in  the region of  the resection cavity to suggest residual tumor, allowing  for the presence of T1 hyperintense blood products. Stable T2  hyperintense vasogenic edema throughout the left cerebral hemisphere.  Decreased localized mass effect and associated partial effacement of  the fourth ventricle. No hydrocephalus or herniation. New thin rim of  restricted diffusion about the inferior/medial margins of the left  occipital resection cavity.    Small left suboccipital subcutaneous collection overlying the  craniotomy defect. Patent major intracranial vascular flow voids.  Paranasal sinuses and mastoid air cells are clear.      Impression    Impression:   1. Expected postsurgical changes of gross total resection of left  cerebellar hemispheric mass with associated decreased mass effect and  effacement of the fourth ventricle.  2. New thin rim of restricted diffusion about the inferior/medial  margin of the resection cavity.    I have personally reviewed the examination and initial interpretation  and I agree with the findings.    NADINE LUNDBERG MD         ASSESSMENT/PLAN: 52 year old with mRCC s/p 4 cycles of IL-2.  She had a stable CT after 2 cycles, mild disease progression after 4 cycles.  After a short break, CT showed worsening disease and she has started with Opdivo.   ECOG PS 3    1. RCC: Suzi started nivolumab on 12/5/17 and completed 3 months.  She missed her follow up after her CT scan on 3/5 as she was getting ready for surgery.  Suzi is clearly not doing well. She felt miserable and did not want to come in. She has poorly controlled nausea. She has no appetite and notes that she did not eat for the last 3 days. She did not want to come in today but I had to push her to show up for the visit. I will restage her after this visit as the scans are scheduled for later in the day. She does appear sick enough that we could admit her but she is not inclined for hospital admission. She was quite reluctant for gamma knife  procedure tomorrow as she is feeling poorly and it is scheduled for early in the morning. I tried my best to get her a better timing but neurosurgeons only have opening very early in the day.     I have reviewed her case with Dr. Conde who has offered to see her as a curbside consult. I will restart her on short course of dexamethasone to help appetite, nausea and generalized illbeing. I have offered zyprexa hs for nausea. I will give her IV fluids today. I will have her return in 1-2 weels to see Ms. Day Ren. We will get her started on cabozantinib as soon as possible for her rapidly progressing disease.     I again reviewed cabozantinib with her. She was not in the best health to review all information and was laying down on exam chair.       2. Hypercalcemia; fatigue and somnolence, but no constipation or confusion.  Will give Zometa x 1 and 1 Liter of NS.  Recheck on Wednesday.     3. Left chest pain: not able to reproduce today on exam, I suspect this may be from her left supraclav mass potentially causing brachial plexus compression?  No cervical neck tenderness and motor/strength function equal bilaterally.  Continue norco 2 tabs BID and will assess on CT    4. Brain mets: no neuro symptoms, HA or confusion today.   Craniotomy successful on 3/6.  Has GK to the tumor bed tomorrow.      Over 60 min of direct face to face time spent with patient with more than 50% time spent in counseling and coordinating care.

## 2018-04-04 NOTE — NURSING NOTE
Chief Complaint   Patient presents with     Infusion     Pt here for infusion of IVF renal cell carcinoma

## 2018-04-04 NOTE — MR AVS SNAPSHOT
After Visit Summary   4/4/2018    Suzi Gonsales    MRN: [id number]           Patient Information     Date Of Birth          1965        Visit Information        Provider Department      4/4/2018 12:00 PM Henri Green MD [facility]        Today's Diagnoses     Renal cell carcinoma, right (H)        Hypercalcemia        Nausea with vomiting        Dehydration           Follow-ups after your visit        Your next 10 appointments already scheduled     Apr 13, 2018  1:30 PM CDT   EXTERNAL RADIATION TREATMENT with P RAD ONC ELEKTA   Radiation Oncology Clinic ([facility])    [facility]  1st Floor  [address] [address]   [phone]            Apr 14, 2018 10:30 AM CDT   EXTERNAL RADIATION TREATMENT with [facility] RAD ONC ELEKTA   Radiation Oncology Clinic ([facility])    [facility]  1st Floor  [address] [address]   [phone]            Apr 15, 2018 10:30 AM CDT   EXTERNAL RADIATION TREATMENT with [facility] RAD ONC ELEKTA   Radiation Oncology Clinic ([facility])    [facility]  1st Floor  [address] [address]   [phone]            Apr 16, 2018  1:30 PM CDT   EXTERNAL RADIATION TREATMENT with [facility] RAD ONC ELEKTA   Radiation Oncology Clinic ([facility])    [facility]  1st Floor  [address] [address]   [phone]            Apr 16, 2018  2:45 PM CDT   [facility] Draw with  [facility] DRAW   [facility])    [address]  [address]  [facility] [facility] [address] [phone]            Apr 16, 2018  3:10 PM CDT   (Arrive by 2:55 PM)   Return Visit with Day Ren PA-C   [facility] ([facility])    [address]  [address]  [place] [facility]  [address]   [phone]            Jun 05, 2018 11:00 AM CDT   (Arrive by 10:45 AM)   MR BRAIN W/O & W CONTRAST with UUMR2   [facility] [place], MRI ([facility])    [address] [address]   [phone]           Take your medicines as usual, unless your doctor tells you not to. Bring a list of your current medicines to your exam (including vitamins, minerals and over-the-counter drugs).  You may or may not receive intravenous (IV) contrast for this exam pending the discretion of the Radiologist.  You do not need to do anything special to prepare.  The MRI machine uses a strong magnet. Please wear clothes without metal (snaps, zippers). A sweatsuit works well, or we may give you a hospital gown.  Please remove any body piercings and hair extensions before you arrive. You will also remove watches, jewelry, hairpins, wallets, dentures, partial dental plates and hearing aids. You may wear contact lenses, and you may be able to wear your rings. We have a safe place to keep your personal items, but it is safer to leave them at home.  **IMPORTANT** THE INSTRUCTIONS BELOW ARE ONLY FOR THOSE PATIENTS WHO HAVE BEEN PRESCRIBED SEDATION OR GENERAL ANESTHESIA DURING THEIR MRI PROCEDURE:  IF YOUR DOCTOR PRESCRIBED ORAL SEDATION (take medicine to help you relax during your exam):   You must get the medicine from your doctor (oral medication) before you arrive. Bring the medicine to the exam. Do not take it at home. You ll be told when to take it upon arriving for your exam.   Arrive one hour early. Bring someone who can take you home after the test. Your medicine will make you sleepy. After the exam, you may not drive, take a bus or take a taxi by yourself.  IF YOUR DOCTOR PRESCRIBED IV SEDATION:   Arrive one hour early. Bring someone who can take you home after the test. Your medicine will make you sleepy. After the exam, you may not drive, take a bus or  take a taxi by yourself.   No eating 6 hours before your exam. You may have clear liquids up until 4 hours before your exam. (Clear liquids include water, clear tea, black coffee and fruit juice without pulp.)  IF YOUR DOCTOR PRESCRIBED ANESTHESIA (be asleep for your exam):   Arrive 1 1/2 hours early. Bring someone who can take you home after the test. You may not drive, take a bus or take a taxi by yourself.   No eating 8 hours before your exam. You may have clear liquids up until 4 hours before your exam. (Clear liquids include water, clear tea, black coffee and fruit juice without pulp.)   You will spend four to five hours in the recovery room.  Please call the Imaging Department at your exam site with any questions.            Jun 05, 2018  1:00 PM CDT   Return Visit with Huyen Cuellar MD   Radiation Oncology Clinic ([facility])    [facility]  1st Floor  [address] [address] [phone]              Who to contact     If you have questions or need follow up information about today's clinic visit or your schedule please contact [facility] directly at [phone].  Normal or non-critical lab and imaging results will be communicated to you by MyChart, letter or phone within 4 business days after the clinic has received the results. If you do not hear from us within 7 days, please contact the clinic through [employer] or phone. If you have a critical or abnormal lab result, we will notify you by phone as soon as possible.  Submit refill requests through [employer] or call your pharmacy and they will forward the refill request to us. Please allow 3 business days for your refill to be completed.          Additional Information About Your Visit        [employer] Information     [employer] gives you secure access to your electronic health record. If you see a primary care provider, you can also send messages to your care team and make  appointments. If you have questions, please call your primary care clinic.  If you do not have a primary care provider, please call [phone] and they will assist you.        Care EveryWhere ID     This is your Care EveryWhere ID. This could be used by other organizations to access your [facility] medical records  [id number]        Your Vitals Were     Pulse Temperature Respirations Last Period Pulse Oximetry BMI (Body Mass Index)    106 97.5  F (36.4  C) (Oral) 18 12/20/2013 92% 23.78 kg/m2       Blood Pressure from Last 3 Encounters:   04/10/18 94/60   04/09/18 98/63   04/05/18 109/54    Weight from Last 3 Encounters:   04/10/18 57 kg (125 lb 9.6 oz)   04/09/18 57.3 kg (126 lb 4.8 oz)   04/04/18 59 kg (130 lb 1.1 oz)              We Performed the Following     Calcium ionized     CBC with platelets differential     Comprehensive metabolic panel          Today's Medication Changes          These changes are accurate as of 4/4/18 11:59 PM.  If you have any questions, ask your nurse or doctor.               Start taking these medicines.        Dose/Directions    dexamethasone 4 MG tablet   Commonly known as:  DECADRON   Used for:  Renal cell carcinoma, right (H), Hypercalcemia   Started by:  Henri Green MD        Dose:  8 mg   Take 2 tablets (8 mg) by mouth daily (with breakfast)   Quantity:  10 tablet   Refills:  0       OLANZapine 5 MG tablet   Commonly known as:  zyPREXA   Used for:  Renal cell carcinoma, right (H), Hypercalcemia   Started by:  Henri Green MD        Dose:  5 mg   Take 1 tablet (5 mg) by mouth At Bedtime for 5 days   Quantity:  5 tablet   Refills:  0         These medicines have changed or have updated prescriptions.        Dose/Directions    citalopram 20 MG tablet   Commonly known as:  celeXA   This may have changed:  when to take this   Used for:  Insomnia, unspecified type        Dose:  20 mg   Take 1 tablet (20 mg) by mouth every evening   Quantity:  90 tablet   Refills:   3         Stop taking these medicines if you haven't already. Please contact your care team if you have questions.     LORazepam 0.5 MG tablet   Commonly known as:  ATIVAN   Stopped by:  Henir Green MD           prochlorperazine 10 MG tablet   Commonly known as:  COMPAZINE   Stopped by:  Henri Green MD                Where to get your medicines      These medications were sent to [place], [place] - [address]  [address] [address]    Hours:  TRANSPLANT PHONE NUMBER [phone] Phone:  [phone]     dexamethasone 4 MG tablet    OLANZapine 5 MG tablet                Primary Care Provider Office Phone # Fax #    Molly AdameARIEL [facility] [phone] [address] [address]        Equal Access to Services     ROSAURA INTERIANO : Hadii jimmie fang hadasho Soomaali, waaxda luqadaha, qaybta kaalmada adeegyada, josemanuel lewisin haymaryn danuta oswald . So [facility] [phone].    ATENCIÓN: Si habla español, tiene a chacon disposición servicios gratuitos de asistencia lingüística. Mario al [phone].    We comply with applicable federal civil rights laws and [place] laws. We do not discriminate on the basis of race, color, national origin, age, disability, sex, sexual orientation, or gender identity.            Thank you!     Thank you for choosing [facility]  for your care. Our goal is always to provide you with excellent care. Hearing back from our patients is one way we can continue to improve our services. Please take a few minutes to complete the written survey that you may receive in the mail after your visit with us. Thank you!             Your Updated Medication List - Protect others around you: Learn how to safely use, store and throw away your medicines at www.disposemymeds.org.          This list is accurate as of 4/4/18 11:59 PM.  Always use your most recent med  list.                   Brand Name Dispense Instructions for use Diagnosis    ALPRAZolam 0.5 MG tablet    XANAX    60 tablet    Take 1 tablet (0.5 mg) by mouth 3 times daily as needed for anxiety    Renal cell carcinoma, unspecified laterality (H), Mass of upper lobe of left lung       aspirin 81 MG chewable tablet     36 tablet    Take 1 tablet (81 mg) by mouth every morning    Pre-op evaluation, Brain metastases (H)       atorvastatin 20 MG tablet    LIPITOR    30 tablet    Take 1 tablet (20 mg) by mouth daily    History of stroke       buPROPion 300 MG 24 hr tablet    WELLBUTRIN XL     Take 300 mg by mouth every morning        calcium carbonate 1250 MG tablet    OS-MUNDO 500 mg [place]. Ca     Take 1 tablet by mouth daily (with lunch)    Pre-op evaluation, Brain metastases (H)       citalopram 20 MG tablet    celeXA    90 tablet    Take 1 tablet (20 mg) by mouth every evening    Insomnia, unspecified type       clonazePAM 0.5 MG tablet    klonoPIN    20 tablet    TAKE 0.5-1 TABLETS BY MOUTH 2 TIMES DAILY AS NEEDED FOR ANXIETY    Panic attack       dexamethasone 4 MG tablet    DECADRON    10 tablet    Take 2 tablets (8 mg) by mouth daily (with breakfast)    Renal cell carcinoma, right (H), Hypercalcemia       HYDROcodone-acetaminophen 5-325 MG per tablet    NORCO    60 tablet    Take 1 tablet by mouth every 6 hours as needed for severe pain    Renal cell carcinoma, right (H), Chest pain       lidocaine-prilocaine cream    EMLA    60 g    Apply 1 hour prior to port access.    Malignant neoplasm of right kidney (H)       MAGNESIUM PO      Take by mouth At Bedtime    Pre-op evaluation, Brain metastases (H)       methocarbamol 500 MG tablet    ROBAXIN    30 tablet    Take 2 tablets (1,000 mg) by mouth every 6 hours as needed for muscle spasms    S/P craniotomy       Multi-vitamin Tabs tablet      Take 1 tablet by mouth daily (with lunch)    Pre-op evaluation, Brain metastases (H)       OLANZapine 5 MG tablet    zyPREXA     5 tablet    Take 1 tablet (5 mg) by mouth At Bedtime for 5 days    Renal cell carcinoma, right (H), Hypercalcemia       OMEGA 3 PO      Take by mouth daily (with lunch)    Pre-op evaluation, Brain metastases (H)       omeprazole 40 MG capsule    priLOSEC    30 capsule    Take 1 capsule (40 mg) by mouth daily    Cerebral edema (H)       PROBIOTIC PO      Take by mouth daily (with lunch)    Pre-op evaluation, Brain metastases (H)       sodium chloride 0.9 % SOLN 100 mL with nivolumab 100 MG/10ML SOLN      Inject into the vein every 14 days    Pre-op evaluation, Brain metastases (H)       SUPER B COMPLEX/VITAMIN C PO      Take by mouth daily (with lunch)    Pre-op evaluation, Brain metastases (H)       TURMERIC PO      Take by mouth daily (with lunch)    Pre-op evaluation, Brain metastases (H)       VITAMIN B 12 PO      Take by mouth daily (with lunch)    Pre-op evaluation, Brain metastases (H)       VITAMIN C PO      Take by mouth daily (with lunch)    Pre-op evaluation, Brain metastases (H)       VITAMIN D PO      Take by mouth daily (with lunch)    Pre-op evaluation, Brain metastases (H)       zolpidem 5 MG tablet    AMBIEN    30 tablet    Take 1 tablet (5 mg) by mouth nightly as needed for sleep    Sleep disorder

## 2018-04-04 NOTE — MR AVS SNAPSHOT
After Visit Summary   4/4/2018    Suzi Gonsales    MRN: [id number]           Patient Information     Date Of Birth          1965        Visit Information        Provider Department      4/4/2018 2:00 PM UC 7 ATC;  BMT INFUSION [facility] Blood and Marrow Transplant        Today's Diagnoses     Dehydration    -  1    Nausea with vomiting              [facility]  [address] [place] [address]  Phone: [phone]  Clinic Hours:   Monday-Thursday:7am to 7pm   Friday: 7am to 5pm   Weekends and holidays:    8am to noon (in general)  If your fever is 100.5  or greater,   call the clinic.  After hours call the   hospital at [phone] or   [phone]. Ask for the BMT   fellow on-call            Follow-ups after your visit        Your next 10 appointments already scheduled     Apr 05, 2018  5:30 AM CDT   GAMMA TREATMENT with Huyen Cuellar MD   [facility], Radiation Oncology ([facility])    [address] [place] [id number] [phone]            Apr 05, 2018  6:30 AM CDT   GAMMA TREATMENT with Itz King MD   [facility] Radiation Oncology ([facility])    [address] [place] [address]   [phone]            Apr 05, 2018  7:00 AM CDT   (Arrive by 6:45 AM)   MR BRAIN W CONTRAST with UUMR1   [facility], MRI ([facility])    [address] [address] [phone]           Take your medicines as usual, unless your doctor tells you not to. Bring a list of your current medicines to your exam (including vitamins, minerals and over-the-counter drugs).  You may or may not receive intravenous (IV) contrast for this exam pending the discretion of the Radiologist.  You do not need to do anything special to prepare.  The MRI machine uses a strong magnet. Please wear clothes without metal (snaps, zippers). A sweatsuit works well, or we may give you a  [facility] gown.  Please remove any body piercings and hair extensions before you arrive. You will also remove watches, jewelry, hairpins, wallets, dentures, partial dental plates and hearing aids. You may wear contact lenses, and you may be able to wear your rings. We have a safe place to keep your personal items, but it is safer to leave them at home.  **IMPORTANT** THE INSTRUCTIONS BELOW ARE ONLY FOR THOSE PATIENTS WHO HAVE BEEN PRESCRIBED SEDATION OR GENERAL ANESTHESIA DURING THEIR MRI PROCEDURE:  IF YOUR DOCTOR PRESCRIBED ORAL SEDATION (take medicine to help you relax during your exam):   You must get the medicine from your doctor (oral medication) before you arrive. Bring the medicine to the exam. Do not take it at home. You ll be told when to take it upon arriving for your exam.   Arrive one hour early. Bring someone who can take you home after the test. Your medicine will make you sleepy. After the exam, you may not drive, take a bus or take a taxi by yourself.  IF YOUR DOCTOR PRESCRIBED IV SEDATION:   Arrive one hour early. Bring someone who can take you home after the test. Your medicine will make you sleepy. After the exam, you may not drive, take a bus or take a taxi by yourself.   No eating 6 hours before your exam. You may have clear liquids up until 4 hours before your exam. (Clear liquids include water, clear tea, black coffee and fruit juice without pulp.)  IF YOUR DOCTOR PRESCRIBED ANESTHESIA (be asleep for your exam):   Arrive 1 1/2 hours early. Bring someone who can take you home after the test. You may not drive, take a bus or take a taxi by yourself.   No eating 8 hours before your exam. You may have clear liquids up until 4 hours before your exam. (Clear liquids include water, clear tea, black coffee and fruit juice without pulp.)   You will spend four to five hours in the recovery room.  Please call the Imaging Department at your exam site with any questions.            Apr 05, 2018  7:30 AM  CDT   Procedure 2 hour with U2A ROOM 7   Unit 2A [facility] ([facility])    [address]  [facility] [address]               Apr 18, 2018 10:30 AM CDT   [facility] Lab Draw with  [facility] LAB DRAW   [facility] ([facility])    [address]  [address]  [facility] [id number] [phone]            Apr 18, 2018 11:00 AM CDT   (Arrive by 10:45 AM)   Return Visit with Henri Green MD   [facility] ([facility])    [address]  [address]  [facility] [address] [phone]              Who to contact     If you have questions or need follow up information about today's clinic visit or your schedule please contact [facility] BLOOD AND MARROW TRANSPLANT directly at [phone].  Normal or non-critical lab and imaging results will be communicated to you by [employer]MyFitnesshart, letter or phone within 4 business days after the clinic has received the results. If you do not hear from us within 7 days, please contact the clinic through [employer] or phone. If you have a critical or abnormal lab result, we will notify you by phone as soon as possible.  Submit refill requests through [employer] or call your pharmacy and they will forward the refill request to us. Please allow 3 business days for your refill to be completed.          Additional Information About Your Visit        MapMyFitnesshart Information     [employer] gives you secure access to your electronic health record. If you see a primary care provider, you can also send messages to your care team and make appointments. If you have questions, please call your primary care clinic.  If you do not have a primary care provider, please call [phone] and they will assist you.        Care EveryWhere ID     This is your Care EveryWhere ID. This could be used by other organizations to access your [facility] medical records  [id number]         Your Vitals Were     Last Period                   12/20/2013            Blood Pressure from Last 3 Encounters:   04/04/18 (!) 82/48   04/02/18 99/58   03/21/18 107/52    Weight from Last 3 Encounters:   04/04/18 59 kg (130 lb 1.1 oz)   04/02/18 57.7 kg (127 lb 1.6 oz)   03/21/18 58.1 kg (128 lb)              Today, you had the following     No orders found for display         Today's Medication Changes          These changes are accurate as of 4/4/18  4:17 PM.  If you have any questions, ask your nurse or doctor.               Start taking these medicines.        Dose/Directions    dexamethasone 4 MG tablet   Commonly known as:  DECADRON   Used for:  Renal cell carcinoma, right (H), Hypercalcemia   Started by:  Henri Green MD        Dose:  8 mg   Take 2 tablets (8 mg) by mouth daily (with breakfast)   Quantity:  10 tablet   Refills:  0       OLANZapine 5 MG tablet   Commonly known as:  zyPREXA   Used for:  Renal cell carcinoma, right (H), Hypercalcemia   Started by:  Henri Green MD        Dose:  5 mg   Take 1 tablet (5 mg) by mouth At Bedtime for 5 days   Quantity:  5 tablet   Refills:  0         These medicines have changed or have updated prescriptions.        Dose/Directions    citalopram 20 MG tablet   Commonly known as:  celeXA   This may have changed:  when to take this   Used for:  Insomnia, unspecified type        Dose:  20 mg   Take 1 tablet (20 mg) by mouth every evening   Quantity:  90 tablet   Refills:  3         Stop taking these medicines if you haven't already. Please contact your care team if you have questions.     LORazepam 0.5 MG tablet   Commonly known as:  ATIVAN   Stopped by:  Henri Green MD           prochlorperazine 10 MG tablet   Commonly known as:  COMPAZINE   Stopped by:  Henri Green MD                Where to get your medicines      These medications were sent to [phone]  [facility] [address] [address]    Hours:  TRANSPLANT PHONE NUMBER [phone] Phone:  [phone]     dexamethasone 4 MG tablet    OLANZapine 5 MG tablet                Recent Review Flowsheet Data     BMT Recent Results Latest Ref Rng & Units 3/6/2018 3/6/2018 3/6/2018 3/7/2018 3/7/2018 4/2/2018 4/4/2018    WBC 4.0 - 11.0 10e9/L - - - 12.4(H) - 5.3 2.8(L)    Hemoglobin 11.7 - 15.7 g/dL - 11.0(L) 11.0(L) 9.9(L) - 11.3(L) 11.8    Platelet Count 150 - 450 10e9/L - - - 318 - 538(H) 317    Platelets 10:9/L - - - - - - -    Neutrophils (Absolute) 1.6 - 8.3 10e9/L - - - - - 2.7 2.0    INR 0.86 - 1.14 - - - 1.00 - - -    Sodium 133 - 144 mmol/L - 136 138 138 - 134 135    Potassium 3.4 - 5.3 mmol/L - 4.2 4.5 4.4 4.2 3.9 3.4    Chloride 94 - 109 mmol/L - - - 105 - 101 103    Glucose 70 - 99 mg/dL 97 125(H) 129(H) 154(H) - 108(H) 143(H)    Urea Nitrogen 7 - 30 mg/dL - - - 22 - 14 13    Creatinine 0.52 - 1.04 mg/dL - - - 0.83 - 1.05(H) 1.18(H)    Calcium (Total) 8.5 - 10.1 mg/dL - - - 8.3(L) - 10.7(H) 9.1    Protein (Total) 6.8 - 8.8 g/dL - - - - - 7.2 6.9    Albumin 3.4 - 5.0 g/dL - - - - - 2.8(L) 2.8(L)    Alkaline Phosphatase 40 - 150 U/L - - - - - 74 64    AST 0 - 45 U/L - - - - - 28 45    ALT 0 - 50 U/L - - - - - 13 18    MCV 78 - 100 fl - - - 94 - 94 94               Primary Care Provider Office Phone # Fax #    Molly Adame, ARIEL [facility] [phone] [address] [address]        Equal Access to Services     ROSAURA INTERIANO : Hadii aad ku hadashloki Soomaali, waaxda luqadaha, qaybta kaalmada adeegyada, josemanuel underwood haydustin plata. So [facility] [phone].    ATENCIÓN: Si habla español, tiene a chacon disposición servicios gratuitos de asistencia lingüística. Llame al [phone].    We comply with applicable federal civil rights laws and [place] laws. We do not discriminate on the basis of race, color, national origin, age, disability, sex, sexual orientation,  or gender identity.            Thank you!     Thank you for choosing [facility] BLOOD AND MARROW TRANSPLANT  for your care. Our goal is always to provide you with excellent care. Hearing back from our patients is one way we can continue to improve our services. Please take a few minutes to complete the written survey that you may receive in the mail after your visit with us. Thank you!             Your Updated Medication List - Protect others around you: Learn how to safely use, store and throw away your medicines at www.disposemymeds.org.          This list is accurate as of 4/4/18  4:17 PM.  Always use your most recent med list.                   Brand Name Dispense Instructions for use Diagnosis    ALPRAZolam 0.5 MG tablet    XANAX    60 tablet    Take 1 tablet (0.5 mg) by mouth 3 times daily as needed for anxiety    Renal cell carcinoma, unspecified laterality (H), Mass of upper lobe of left lung       aspirin 81 MG chewable tablet     36 tablet    Take 1 tablet (81 mg) by mouth every morning    Pre-op evaluation, Brain metastases (H)       atorvastatin 20 MG tablet    LIPITOR    30 tablet    Take 1 tablet (20 mg) by mouth daily    History of stroke       buPROPion 300 MG 24 hr tablet    WELLBUTRIN XL     Take 300 mg by mouth every morning        calcium carbonate 1250 MG tablet    OS-MUNDO 500 mg [place]. Ca     Take 1 tablet by mouth daily (with lunch)    Pre-op evaluation, Brain metastases (H)       citalopram 20 MG tablet    celeXA    90 tablet    Take 1 tablet (20 mg) by mouth every evening    Insomnia, unspecified type       clonazePAM 0.5 MG tablet    klonoPIN    20 tablet    TAKE 0.5-1 TABLETS BY MOUTH 2 TIMES DAILY AS NEEDED FOR ANXIETY    Panic attack       dexamethasone 4 MG tablet    DECADRON    10 tablet    Take 2 tablets (8 mg) by mouth daily (with breakfast)    Renal cell carcinoma, right (H), Hypercalcemia       HYDROcodone-acetaminophen 5-325 MG per tablet    NORCO    60 tablet    Take 1 tablet by  mouth every 6 hours as needed for severe pain    Renal cell carcinoma, right (H), Chest pain       lidocaine-prilocaine cream    EMLA    60 g    Apply 1 hour prior to port access.    Malignant neoplasm of right kidney (H)       MAGNESIUM PO      Take by mouth At Bedtime    Pre-op evaluation, Brain metastases (H)       methocarbamol 500 MG tablet    ROBAXIN    30 tablet    Take 2 tablets (1,000 mg) by mouth every 6 hours as needed for muscle spasms    S/P craniotomy       Multi-vitamin Tabs tablet      Take 1 tablet by mouth daily (with lunch)    Pre-op evaluation, Brain metastases (H)       OLANZapine 5 MG tablet    zyPREXA    5 tablet    Take 1 tablet (5 mg) by mouth At Bedtime for 5 days    Renal cell carcinoma, right (H), Hypercalcemia       OMEGA 3 PO      Take by mouth daily (with lunch)    Pre-op evaluation, Brain metastases (H)       omeprazole 40 MG capsule    priLOSEC    30 capsule    Take 1 capsule (40 mg) by mouth daily    Cerebral edema (H)       PROBIOTIC PO      Take by mouth daily (with lunch)    Pre-op evaluation, Brain metastases (H)       sodium chloride 0.9 % SOLN 100 mL with nivolumab 100 MG/10ML SOLN      Inject into the vein every 14 days    Pre-op evaluation, Brain metastases (H)       SUPER B COMPLEX/VITAMIN C PO      Take by mouth daily (with lunch)    Pre-op evaluation, Brain metastases (H)       TURMERIC PO      Take by mouth daily (with lunch)    Pre-op evaluation, Brain metastases (H)       VITAMIN B 12 PO      Take by mouth daily (with lunch)    Pre-op evaluation, Brain metastases (H)       VITAMIN C PO      Take by mouth daily (with lunch)    Pre-op evaluation, Brain metastases (H)       VITAMIN D PO      Take by mouth daily (with lunch)    Pre-op evaluation, Brain metastases (H)       zolpidem 5 MG tablet    AMBIEN    30 tablet    Take 1 tablet (5 mg) by mouth nightly as needed for sleep    Sleep disorder

## 2018-04-05 NOTE — LETTER
2018       RE: Suzi Gonsales  [address] [place] [address]     Dear Colleague,    Thank you for referring your patient, Suzi Gonsales, to the [facility], RADIATION ONCOLOGY. Please see a copy of my visit note below.      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  1a lt resection cavity1 shot of 8mm and 3 shots of 14 mm     18 Gy to 50 % isodose        1b lt cerebellar 1 shot 4 mm  22 Gy to 50 %isodose      1c rt cerebellar 1 shot 8 mm  22 Gy to 50 %isodose       1d lt occipital 1 shot 8 mm  22 Gy to 50 %isodose       1e lt frontal 1 shot 4 mm  22 Gy to 50 %isodose               DESCRIPTION OF PROCEDURE:  On 2018the patient was brought to the Gamma Knife suite at [facility].  After sedation and topical anesthetic, the head frame was put on by   Valerie      The patient was then taken to the Department of Radiology where a stereotactic brain MRI was performed.  The patient was admitted to the  2A care area  while treatment planning was completed.      These Images were then sent to the [employer] Gamma Knife computer system where a three dimensional stereotactic plan was generated.  Measurements were again taken to confirm accuracy of head ring placement.  The patient was then treated on the [employer] Gamma Knife.  Treatment involved  the  5  target(s) mentioned above    The [employer] Gamma Plan software was used to create a highly conformal dose distribution using the number and size collimators detailed above.     TREATMENT:    The patient was brought to the Gamma Knife suite. Patient was identified by 2 methods.   A timeout  was performed to confirm the correct patient and correct procedure.   The site was identified by MRI imaging and treatment planning software, which defined the treatment area.     The frame measurements were re-taken and compared to the original measurements.    The treatment was delivered using the Model C [employer] Gamma Knife without complication.   The head frame was removed and the patient was discharged home in stable condition.  The patient tolerated the treatment well and had no complications.      SPECIAL TREATMENT PROCEDURE;  Time consuming treatment planning was performed from the MRI images including 3d reconstruction.  The placement of the [employer] head frame, evaluation of the  stereotactic brain MRI images, electronic image  transfer to General Specific Gamma Knife treatment planning computer were accomplished.   The General Specific Gamma Knife treatment planning software was used to design the optimal treatment plan.   procedures were  done prior to treatment.  Because of the extra time and effort involved in Gamma Knife treatment planning, this represents a  special treatment procedure.     FOLLOW-UP PLANS:  The Gamma Knife Nurse Coordinator will call the patient tomorrow for short-term follow up.    Written discharge instructions were given to the patient.   The patient will have a follow up brain MRI in 2 months ( instead of our usual 3 months ) due to the new lesions seen     The patient will also follow-up me at that time       Huyen Cuellar  [phone] pager      CC  Patient Care Team:  Molly Adame APRN CNP as PCP - General (Nurse Practitioner)  Alea Cummings APRN CNS (Clinical Nurse Specialist)  Henri Green MD as MD (Oncology)  Molly Mendiola RN as Nurse Coordinator (Oncology)  Quintin Palencia MD as MD (Neurosurgery)  Alisa Merritt RN as Nurse Coordinator (Oncology)  Yamilka Hidalgo RN as Nurse Coordinator (Neurology)  Itz King MD

## 2018-04-05 NOTE — MR AVS SNAPSHOT
After Visit Summary   4/5/2018    Suzi Gonsales    MRN: [id number]           Patient Information     Date Of Birth          1965        Visit Information        Provider Department      4/5/2018 6:30 AM Itz King MD [facility], [place], Radiation Oncology        Today's Diagnoses     Metastasis to brain (H)    -  1    Cerebral edema (H)           Follow-ups after your visit        Your next 10 appointments already scheduled     Apr 16, 2018  2:45 PM CDT   [facility]   [facility] Lab Draw ([facility])    [address]  [address]  [facility] [address]   [phone]            Apr 16, 2018  3:10 PM CDT   (Arrive by 2:55 PM)   Return Visit with Day Ren PA-C   [facility] ([facility])    [address] [place] [address]  [address]  [facility] [address]   [phone]            Jun 05, 2018 11:00 AM CDT   (Arrive by 10:45 AM)   MR BRAIN W/O & W CONTRAST with UUMR2   [facility], [place], MRI ([facility])    [address] [address]   [phone]           Take your medicines as usual, unless your doctor tells you not to. Bring a list of your current medicines to your exam (including vitamins, minerals and over-the-counter drugs).  You may or may not receive intravenous (IV) contrast for this exam pending the discretion of the Radiologist.  You do not need to do anything special to prepare.  The MRI machine uses a strong magnet. Please wear clothes without metal (snaps, zippers). A sweatsuit works well, or we may give you a hospital gown.  Please remove any body piercings and hair extensions before you arrive. You will also remove watches, jewelry, hairpins, wallets, dentures, partial dental plates and hearing aids. You may wear contact lenses, and you may be able to wear your rings. We have a safe place to  keep your personal items, but it is safer to leave them at home.  **IMPORTANT** THE INSTRUCTIONS BELOW ARE ONLY FOR THOSE PATIENTS WHO HAVE BEEN PRESCRIBED SEDATION OR GENERAL ANESTHESIA DURING THEIR MRI PROCEDURE:  IF YOUR DOCTOR PRESCRIBED ORAL SEDATION (take medicine to help you relax during your exam):   You must get the medicine from your doctor (oral medication) before you arrive. Bring the medicine to the exam. Do not take it at home. You ll be told when to take it upon arriving for your exam.   Arrive one hour early. Bring someone who can take you home after the test. Your medicine will make you sleepy. After the exam, you may not drive, take a bus or take a taxi by yourself.  IF YOUR DOCTOR PRESCRIBED IV SEDATION:   Arrive one hour early. Bring someone who can take you home after the test. Your medicine will make you sleepy. After the exam, you may not drive, take a bus or take a taxi by yourself.   No eating 6 hours before your exam. You may have clear liquids up until 4 hours before your exam. (Clear liquids include water, clear tea, black coffee and fruit juice without pulp.)  IF YOUR DOCTOR PRESCRIBED ANESTHESIA (be asleep for your exam):   Arrive 1 1/2 hours early. Bring someone who can take you home after the test. You may not drive, take a bus or take a taxi by yourself.   No eating 8 hours before your exam. You may have clear liquids up until 4 hours before your exam. (Clear liquids include water, clear tea, black coffee and fruit juice without pulp.)   You will spend four to five hours in the recovery room.  Please call the Imaging Department at your exam site with any questions.            Jun 05, 2018  1:00 PM CDT   Return Visit with Huyen Cuellar MD   Radiation Oncology Clinic ([facility])    [facility]  1st Floor  [address] [id number] [phone]              Who to contact     Please call your clinic at [phone] to:    Ask  questions about your health    Make or cancel appointments    Discuss your medicines    Learn about your test results    Speak to your doctor            Additional Information About Your Visit        RESAASharDuel Information     [employer] gives you secure access to your electronic health record. If you see a primary care provider, you can also send messages to your care team and make appointments. If you have questions, please call your primary care clinic.  If you do not have a primary care provider, please call [phone] and they will assist you.      [employer] is an electronic gateway that provides easy, online access to your medical records. With [employer], you can request a clinic appointment, read your test results, renew a prescription or communicate with your care team.     To access your existing account, please contact your [facility] or call [phone] for assistance.        Care EveryWhere ID     This is your Care EveryWhere ID. This could be used by other organizations to access your [place] medical records  [id number]        Your Vitals Were     Last Period                   12/20/2013            Blood Pressure from Last 3 Encounters:   04/10/18 94/60   04/09/18 98/63   04/05/18 109/54    Weight from Last 3 Encounters:   04/10/18 57 kg (125 lb 9.6 oz)   04/09/18 57.3 kg (126 lb 4.8 oz)   04/04/18 59 kg (130 lb 1.1 oz)              Today, you had the following     No orders found for display         Today's Medication Changes          These changes are accurate as of 4/5/18 11:59 PM.  If you have any questions, ask your nurse or doctor.               These medicines have changed or have updated prescriptions.        Dose/Directions    citalopram 20 MG tablet   Commonly known as:  celeXA   This may have changed:  when to take this   Used for:  Insomnia, unspecified type        Dose:  20 mg   Take 1 tablet (20 mg) by mouth every evening   Quantity:  90 tablet   Refills:  3                 Primary Care Provider Office Phone # Fax #    ARIEL Carter [facility] [phone] [address] [address]        Equal Access to Services     ROSAURA INTERIANO : Hadii aad ku hadcarolineo Soomaali, waaxda luqadaha, qaybta kaalmada adeegyada, josemanuel debbiein hayaataty kelloggbarrypercy plata. So [facility] [phone].    ATENCIÓN: Si habla español, tiene a chacon disposición servicios gratuitos de asistencia lingüística. Llame al [phone].    We comply with applicable federal civil rights laws and [place] laws. We do not discriminate on the basis of race, color, national origin, age, disability, sex, sexual orientation, or gender identity.            Thank you!     Thank you for choosing [facility], [place], RADIATION ONCOLOGY  for your care. Our goal is always to provide you with excellent care. Hearing back from our patients is one way we can continue to improve our services. Please take a few minutes to complete the written survey that you may receive in the mail after your visit with us. Thank you!             Your Updated Medication List - Protect others around you: Learn how to safely use, store and throw away your medicines at www.disposemymeds.org.          This list is accurate as of 4/5/18 11:59 PM.  Always use your most recent med list.                   Brand Name Dispense Instructions for use Diagnosis    ALPRAZolam 0.5 MG tablet    XANAX    60 tablet    Take 1 tablet (0.5 mg) by mouth 3 times daily as needed for anxiety    Renal cell carcinoma, unspecified laterality (H), Mass of upper lobe of left lung       aspirin 81 MG chewable tablet     36 tablet    Take 1 tablet (81 mg) by mouth every morning    Pre-op evaluation, Brain metastases (H)       atorvastatin 20 MG tablet    LIPITOR    30 tablet    Take 1 tablet (20 mg) by mouth daily    History of stroke       buPROPion 300 MG 24 hr tablet    WELLBUTRIN XL     Take 300 mg by mouth every morning        calcium carbonate  1250 MG tablet    OS-MUNDO 500 mg [place]. [place]     Take 1 tablet by mouth daily (with lunch)    Pre-op evaluation, Brain metastases (H)       citalopram 20 MG tablet    celeXA    90 tablet    Take 1 tablet (20 mg) by mouth every evening    Insomnia, unspecified type       clonazePAM 0.5 MG tablet    klonoPIN    20 tablet    TAKE 0.5-1 TABLETS BY MOUTH 2 TIMES DAILY AS NEEDED FOR ANXIETY    Panic attack       dexamethasone 4 MG tablet    DECADRON    10 tablet    Take 2 tablets (8 mg) by mouth daily (with breakfast)    Renal cell carcinoma, right (H), Hypercalcemia       HYDROcodone-acetaminophen 5-325 MG per tablet    NORCO    60 tablet    Take 1 tablet by mouth every 6 hours as needed for severe pain    Renal cell carcinoma, right (H), Chest pain       lidocaine-prilocaine cream    EMLA    60 g    Apply 1 hour prior to port access.    Malignant neoplasm of right kidney (H)       MAGNESIUM PO      Take by mouth At Bedtime    Pre-op evaluation, Brain metastases (H)       methocarbamol 500 MG tablet    ROBAXIN    30 tablet    Take 2 tablets (1,000 mg) by mouth every 6 hours as needed for muscle spasms    S/P craniotomy       Multi-vitamin Tabs tablet      Take 1 tablet by mouth daily (with lunch)    Pre-op evaluation, Brain metastases (H)       OLANZapine 5 MG tablet    zyPREXA    5 tablet    Take 1 tablet (5 mg) by mouth At Bedtime for 5 days    Renal cell carcinoma, right (H), Hypercalcemia       OMEGA 3 PO      Take by mouth daily (with lunch)    Pre-op evaluation, Brain metastases (H)       omeprazole 40 MG capsule    priLOSEC    30 capsule    Take 1 capsule (40 mg) by mouth daily    Cerebral edema (H)       PROBIOTIC PO      Take by mouth daily (with lunch)    Pre-op evaluation, Brain metastases (H)       sodium chloride 0.9 % SOLN 100 mL with nivolumab 100 MG/10ML SOLN      Inject into the vein every 14 days    Pre-op evaluation, Brain metastases (H)       SUPER B COMPLEX/VITAMIN C PO      Take by mouth daily (with  lunch)    Pre-op evaluation, Brain metastases (H)       TURMERIC PO      Take by mouth daily (with lunch)    Pre-op evaluation, Brain metastases (H)       VITAMIN B 12 PO      Take by mouth daily (with lunch)    Pre-op evaluation, Brain metastases (H)       VITAMIN C PO      Take by mouth daily (with lunch)    Pre-op evaluation, Brain metastases (H)       VITAMIN D PO      Take by mouth daily (with lunch)    Pre-op evaluation, Brain metastases (H)       zolpidem 5 MG tablet    AMBIEN    30 tablet    Take 1 tablet (5 mg) by mouth nightly as needed for sleep    Sleep disorder

## 2018-04-05 NOTE — LETTER
2018       RE: Suzi Gonsales  [address] [place] [address]     Dear Colleague,    Thank you for referring your patient, Suzi Gonsales, to the [facility], [place], RADIATION ONCOLOGY. Please see a copy of my visit note below.    NEUROSURGERY    HISTORY AND PHYSICAL EXAM    Chief Complaint   Patient presents with     Radiation Therapy     GAMMA KNIFE RADIOSURGERY      HISTORY OF PRESENT ILLNESS  Ms. Suzi Gonsales is a 52 year old female who has a past medical history significant for renal cell carcinoma that was diagnosed in  for which she underwent a right radical nephrectomy.  She recently presented with headaches and MRI demonstrated left cerebellar enhancing mass.  She underwent resection of the left cerebellar mass on 3/6/2018.  The final pathology was metastatic renal cell carcinoma.  Patient's postoperative course was uneventful and she was discharged on POD#3.  She was last seen in the neurosurgery clinic on 3/21/2018 for postoperative follow up.  She reported doing well.  She denied headaches, fevers, chills, nausea, vomiting and nuchal rigidity.  She also denied any issues with her wound.  Her case was discussed at the Multidisciplinary tumor board and Gamma Knife Radiosurgery to the resection bed was recommended.  The timing of the treatment was critical and because of Dr. Palencia's availability, I was asked to perform the Gamma Knife Radiosurgery treatment.  Patient has been doing well since the last clinic visit and there have been no changes in her symptoms.    Past Medical History:   Diagnosis Date     Adrenal nodule (H)      Anxiety      Brain mass      Depression      Former tobacco use      Lacunar infarct, acute (H) 2018     Mass of left lung     Upper lobe     Renal cell carcinoma (H)     Clear cell     Solitary kidney     S/P right nephrectomy due to kidney cancer     Past Surgical History:   Procedure Laterality Date     C/SECTION, LOW TRANSVERSE  ,     , Low Transverse      ENDOSCOPIC ULTRASOUND UPPER GASTROINTESTINAL TRACT (GI) N/A 5/23/2017    Procedure: ENDOSCOPIC ULTRASOUND, ESOPHAGOSCOPY / UPPER GASTROINTESTINAL TRACT (GI);  Esophagogastroduodenoscopy, Endoscopic Ultrasound with Fine Needle Biopsies;  Surgeon: Asad Velez MD;  Location: [facility] OR     INSERT PORT VASCULAR ACCESS N/A 12/4/2017    Procedure: INSERT PORT VASCULAR ACCESS;  Chest Port Placement-Right;  Surgeon: Melanie Cevallos PA-C;  Location: [facility] OR     open radical nephrectomy Right 12/31/14     OPTICAL TRACKING SYSTEM CRANIOTOMY, EXCISE TUMOR, COMBINED Left 3/6/2018    Procedure: COMBINED OPTICAL TRACKING SYSTEM CRANIOTOMY, EXCISE TUMOR;  Left Stealth Assisted Posterior fossa Craniotomy And Tumor Resection ;  Surgeon: Quintin Palencia MD;  Location: [facility] OR     PICC INSERTION Left 07/31/2017    5fr DL BioFlo PICC, 40cm (4cm external) in the L basilic vein w/ tip in the low SVC     PICC INSERTION Right 08/15/2017    5fr DL BioFlo PICC, 36cm (1cm external) in the R basilic vein w/ tip in the low SVC     Social History     Social History     Marital status:      Spouse name: Tunde     Number of children: 2     Years of education: N/A     Occupational History           Social History Main Topics     Smoking status: Former Smoker     Packs/day: 0.50     Years: 20.00     Types: Cigarettes     Quit date: 1/1/2004     Smokeless tobacco: Never Used     Alcohol use Yes      Comment: 1-2 drinks couple times per week     Drug use: No     Sexual activity: Not Currently     Partners: Male     Other Topics Concern     Not on file     Social History Narrative    .   for [employer].     2 sons.         Family History   Problem Relation Age of Onset     Hypertension Father      Chronic Obstructive Pulmonary Disease Father      Hyperlipidemia Brother      Hyperlipidemia Brother      CANCER No family hx of      Current Outpatient Prescriptions   Medication     Cabozantinib  S-Malate (CABOMETYX) 60 MG     oxyCODONE-acetaminophen (PERCOCET) 5-325 MG per tablet     OLANZapine (ZYPREXA) 5 MG tablet     dexamethasone (DECADRON) 4 MG tablet     HYDROcodone-acetaminophen (NORCO) 5-325 MG per tablet     methocarbamol (ROBAXIN) 500 MG tablet     aspirin 81 MG chewable tablet     Probiotic Product (PROBIOTIC PO)     Cholecalciferol (VITAMIN D PO)     Omega-3 Fatty Acids (OMEGA 3 PO)     Cyanocobalamin (VITAMIN B 12 PO)     Ascorbic Acid (VITAMIN C PO)     multivitamin, therapeutic with minerals (MULTI-VITAMIN) TABS tablet     MAGNESIUM PO     calcium carbonate (OS-MUNDO 500 MG [place]. [place]) 1250 MG tablet     B Complex-C (SUPER B COMPLEX/VITAMIN C PO)     TURMERIC PO     sodium chloride 0.9 % SOLN 100 mL with nivolumab 100 MG/10ML SOLN     zolpidem (AMBIEN) 5 MG tablet     omeprazole (PRILOSEC) 40 MG capsule     atorvastatin (LIPITOR) 20 MG tablet     clonazePAM (KLONOPIN) 0.5 MG tablet     ALPRAZolam (XANAX) 0.5 MG tablet     lidocaine-prilocaine (EMLA) cream     citalopram (CELEXA) 20 MG tablet     buPROPion (WELLBUTRIN XL) 300 MG 24 hr tablet     No current facility-administered medications for this visit.       No Known Allergies      REVIEW OF SYSTEM  General: POSITIVE for difficulty sleeping, fatigue, weight gain and headaches.  Negative for chills/sweats/fever.  Skin: negative for color changes of the hands or feet in the cold, chronic skin itching, poor scarring/non-healing ulcer, skin rashes or hives, or unusual moles.   Eyes: negative for blurred vision, crossed eyes, double vision, eye infection or vision flashes or halos  Ears/Nose/Throat: negative for bleeding gums, difficulty swallowing, negative for earache, ear discharge or hearing loss.  Respiratory: POSITIVE for shortness of breath and cough.  Negative for asthma, coughing up blood, night sweats tuberculosis, wheezing.  Cardiovascular: negative for lower extremity swelling, chest pain, difficulty breathing at night, irregular heart  beat, pacemaker, varicose vein.  Gastrointestinal: POSITIVE for heartburn.  Negative for black stools, blood in stool, chronic diarrhea, Hepatitis A, B, C, constipation, liver disease, nausea, vomiting.  Genitourinary: negative for difficulty with urination, discharges, urgency, urinary tract infection.  Musculoskeletal: POSITIVE for joint stiffness, neck pain and muscle aches.  Negative for arthritis, joint swelling, osteoporosis.  Neurologic: POSITIVE for weakness, tremor, headaches, dizziness, balance issues.  Negative for numbness of arms or legs, problem with memory, tingling of hands, arms or legs.  Psychiatric: POSITIVE for anxiety, mood changes, panic attacks.  Negative for depression, restlessness  Hematologic/Lymphatic/Immunologic: negative for easy skin bruising, marked fatigue, prolonged bleeding from cuts or tooth extractions, tender glands/lymph nodes.  Endocrine: POSITIVE for excessive caroline, feeling hot/cold.  Negative for excessive thirst, intolerance to warm room, loss of libido, multiple broken bones, thyroid problems, spontaneous nipple discharge.      PHYSICAL EXAM  General: Awake, alert, oriented. Well nourished, well developed, she is somewhat anxious.  HEENT: Head normocephalic, atraumatic. Good range of motion. No palpable thyroid mass.  Heart: Regular rhythm and rate. No murmurs.  Lungs: Clear to auscultation and percussion bilaterally. No ronchi, rales or wheeze.  Abdomen: Soft, non-tender, non-distended. No hepatosplenomegaly.  Extremity: Warm with no clubbing or cyanosis. No lower extremity edema.  Incisions: Left suboccipital area incision healed well.  No fluid collection.  No redness.  No drainage.    Neurological  Awake, alert and oriented to date, time, place and person. Speech fluent.   Pupils equal, round, reactive to light.  Extraocular movement intact.  Visual Fields are full on confrontation.  Hearing is grossly normal to finger rub.   Facial sensation intact.  Face  symmetric.  Tongue midline.  Uvula elevates equally.    Motor: full strength throughout.  Sensation: intact to light touch and pinpoint.  Deep tendon reflexes: 2+ throughout. Negative for clonus. Negative for Carbajal's sign. No dysmetria.      ASSESSMENT  52 year old female with metastatic renal cell carcinoma.  S/p left suboccipital craniotomy for resection of left cerebellar metastatic lesion.    As recommended, patient presents for the gamma knife radiosurgery treatment for the surgical/resection bed.  Please see the associated procedure note for details of the procedure.      PLAN  Gamma Knife Radiosurgery to the resection bed.      ADDENDUM  After the [place] frame placement and planning MRI of the brain, additional lesions were identified.  These findings were discussed with Dr. Cuellar.  These additional lesions will also be treated with Gamma Knife Radiosurgery.    Sincerely,    Itz King MD

## 2018-04-05 NOTE — PROGRESS NOTES
Name: Suzi Gonsales  : 1965 Medical Record #: [id number]  Diagnosis: C79.31 Secondary malignant neoplasm of brain  Date of Treatment: 2018  Referring Physicians: Molly Adame, Tumor Registry    GAMMA KNIFE PROCEDURE NOTE and TREATMENT SUMMARY  Treatment Summary:  Radiation Oncology - Course: 1 Protocol:     Treatment Site Current Dose Modality From To Elapsed Days Fx.  1a lt resection cavity1 shot of 8mm and 3 shots of 14 mm     18 Gy to 50 % isodose        1b lt cerebellar 1 shot 4 mm  22 Gy to 50 %isodose      1c rt cerebellar 1 shot 8 mm  22 Gy to 50 %isodose       1d lt occipital 1 shot 8 mm  22 Gy to 50 %isodose       1e lt frontal 1 shot 4 mm  22 Gy to 50 %isodose               DESCRIPTION OF PROCEDURE:  On 2018the patient was brought to the Gamma Knife suite at [facility].  After sedation and topical anesthetic, the head frame was put on by   Valerie      The patient was then taken to the Department of Radiology where a stereotactic brain MRI was performed.  The patient was admitted to the   care area  while treatment planning was completed.      These Images were then sent to the [employer] Gamma Knife computer system where a three dimensional stereotactic plan was generated.  Measurements were again taken to confirm accuracy of head ring placement.  The patient was then treated on the [place] Gamma Knife.  Treatment involved  the  5  target(s) mentioned above    The [employer] Gamma Plan software was used to create a highly conformal dose distribution using the number and size collimators detailed above.     TREATMENT:    The patient was brought to the Gamma Knife suite. Patient was identified by 2 methods.   A timeout was performed to confirm the correct patient and correct procedure.   The site was identified by MRI imaging and treatment planning software, which defined the treatment area.     The frame measurements were re-taken and compared to  the original measurements.    The treatment was delivered using the Model C [place] Gamma Knife without complication.   The head frame was removed and the patient was discharged home in stable condition.  The patient tolerated the treatment well and had no complications.      SPECIAL TREATMENT PROCEDURE;  Time consuming treatment planning was performed from the MRI images including 3d reconstruction.  The placement of the [place] head frame, evaluation of the  stereotactic brain MRI images, electronic image  transfer to [employer] Gamma Knife treatment planning computer were accomplished.   The [employer] Gamma Knife treatment planning software was used to design the optimal treatment plan.   procedures were  done prior to treatment.  Because of the extra time and effort involved in Gamma Knife treatment planning, this represents a  special treatment procedure.     FOLLOW-UP PLANS:  The Gamma Knife Nurse Coordinator will call the patient tomorrow for short-term follow up.    Written discharge instructions were given to the patient.   The patient will have a follow up brain MRI in 2 months ( instead of our usual 3 months ) due to the new lesions seen     The patient will also follow-up me at that time       Huyen Cuellar  [phone] pager      CC  Patient Care Team:  Molly Adame APRN CNP as PCP - General (Nurse Practitioner)  Alea Cummings APRN CNS (Clinical Nurse Specialist)  Henri Green MD as MD (Oncology)  Molly Mendiola RN as Nurse Coordinator (Oncology)  Nadine España MD as MD (Neurosurgery)  Alisa Merritt RN as Nurse Coordinator (Oncology)  Yamilka Hidalgo RN as Nurse Coordinator (Neurology)  NADINE ESPAÑA MD

## 2018-04-05 NOTE — MR AVS SNAPSHOT
After Visit Summary   4/5/2018    Suzi Gonsales    MRN: [id number]           Patient Information     Date Of Birth          1965        Visit Information        Provider Department      4/5/2018 5:30 AM Huyen Cuellar MD [facility], [place], Radiation Oncology        Today's Diagnoses     Metastasis to brain (H)    -  1       Follow-ups after your visit        Your next 10 appointments already scheduled     Apr 18, 2018 10:30 AM CDT   Masonic Lab Draw with  MASONIC LAB DRAW   [facility] Lab Draw ([facility])    [address]  [address]  [place] [address]   [phone]            Apr 18, 2018 11:00 AM CDT   (Arrive by 10:45 AM)   Return Visit with Henri Green MD   [facility] ([facility])    [address] [place] [address]  [address]  [place] [address]   [phone]            Jun 05, 2018 11:00 AM CDT   (Arrive by 10:45 AM)   MR BRAIN W/O & W CONTRAST with UUMR2   [facility], [place], MRI ([facility], [facility])    [address] [address]   [phone]           Take your medicines as usual, unless your doctor tells you not to. Bring a list of your current medicines to your exam (including vitamins, minerals and over-the-counter drugs).  You may or may not receive intravenous (IV) contrast for this exam pending the discretion of the Radiologist.  You do not need to do anything special to prepare.  The MRI machine uses a strong magnet. Please wear clothes without metal (snaps, zippers). A sweatsuit works well, or we may give you a hospital gown.  Please remove any body piercings and hair extensions before you arrive. You will also remove watches, jewelry, hairpins, wallets, dentures, partial dental plates and hearing aids. You may wear contact lenses, and you may be able to wear your rings. We have a safe place to keep your personal items,  but it is safer to leave them at home.  **IMPORTANT** THE INSTRUCTIONS BELOW ARE ONLY FOR THOSE PATIENTS WHO HAVE BEEN PRESCRIBED SEDATION OR GENERAL ANESTHESIA DURING THEIR MRI PROCEDURE:  IF YOUR DOCTOR PRESCRIBED ORAL SEDATION (take medicine to help you relax during your exam):   You must get the medicine from your doctor (oral medication) before you arrive. Bring the medicine to the exam. Do not take it at home. You ll be told when to take it upon arriving for your exam.   Arrive one hour early. Bring someone who can take you home after the test. Your medicine will make you sleepy. After the exam, you may not drive, take a bus or take a taxi by yourself.  IF YOUR DOCTOR PRESCRIBED IV SEDATION:   Arrive one hour early. Bring someone who can take you home after the test. Your medicine will make you sleepy. After the exam, you may not drive, take a bus or take a taxi by yourself.   No eating 6 hours before your exam. You may have clear liquids up until 4 hours before your exam. (Clear liquids include water, clear tea, black coffee and fruit juice without pulp.)  IF YOUR DOCTOR PRESCRIBED ANESTHESIA (be asleep for your exam):   Arrive 1 1/2 hours early. Bring someone who can take you home after the test. You may not drive, take a bus or take a taxi by yourself.   No eating 8 hours before your exam. You may have clear liquids up until 4 hours before your exam. (Clear liquids include water, clear tea, black coffee and fruit juice without pulp.)   You will spend four to five hours in the recovery room.  Please call the Imaging Department at your exam site with any questions.            Jun 05, 2018  1:00 PM CDT   Return Visit with Huyen Cuellar MD   Radiation Oncology Clinic ([facility])    [facility]  1st Floor  [address] [address] [phone]              Future tests that were ordered for you today     Open Standing Orders        Priority Remaining  Interval Expires Ordered    Oxygen: Nasal cannula Routine 19265/17794 PRN  4/5/2018    If Patient Diabetic: Glucose monitor nursing POCT Routine [id number] PRN 4/6/2018 4/5/2018          Open Future Orders        Priority Expected Expires Ordered    MR Brain w/o & w Contrast Routine 7/7/2018 4/6/2019 4/5/2018            Who to contact     Please call your clinic at [phone] to:    Ask questions about your health    Make or cancel appointments    Discuss your medicines    Learn about your test results    Speak to your doctor            Additional Information About Your Visit        [employer] Information     Month[employer] gives you secure access to your electronic health record. If you see a primary care provider, you can also send messages to your care team and make appointments. If you have questions, please call your primary care clinic.  If you do not have a primary care provider, please call [phone] and they will assist you.      [employer] is an electronic gateway that provides easy, online access to your medical records. With [employer], you can request a clinic appointment, read your test results, renew a prescription or communicate with your care team.     To access your existing account, please contact your [facility] or call [phone] for assistance.        Care EveryWhere ID     This is your Care EveryWhere ID. This could be used by other organizations to access your [place] medical records  [id number]        Your Vitals Were     Pulse Respirations Last Period Pulse Oximetry          117 16 12/20/2013 92%         Blood Pressure from Last 3 Encounters:   04/05/18 109/54   04/05/18 95/45   04/04/18 (!) 82/48    Weight from Last 3 Encounters:   04/04/18 59 kg (130 lb 1.1 oz)   04/02/18 57.7 kg (127 lb 1.6 oz)   03/21/18 58.1 kg (128 lb)              Today, you had the following     No orders found for display         Today's Medication Changes          These changes are  accurate as of 4/5/18 12:37 PM.  If you have any questions, ask your nurse or doctor.               These medicines have changed or have updated prescriptions.        Dose/Directions    citalopram 20 MG tablet   Commonly known as:  celeXA   This may have changed:  when to take this   Used for:  Insomnia, unspecified type        Dose:  20 mg   Take 1 tablet (20 mg) by mouth every evening   Quantity:  90 tablet   Refills:  3                Primary Care Provider Office Phone # Fax #    ARIEL Carter [facility] [phone] [address] [address]        Equal Access to Services     ROSAURA INTERIANO : Hadii jimmie fang hadasho Sovanessa, waaxda luqadaha, qaybta kaalmada aderohityajodi, josemanuel oswald . So [facility] [phone].    ATENCIÓN: Si habla español, tiene a chacon disposición servicios gratuitos de asistencia lingüística. [facility] [phone].    We comply with applicable federal civil rights laws and [place] laws. We do not discriminate on the basis of race, color, national origin, age, disability, sex, sexual orientation, or gender identity.            Thank you!     Thank you for choosing [facility], [place], RADIATION ONCOLOGY  for your care. Our goal is always to provide you with excellent care. Hearing back from our patients is one way we can continue to improve our services. Please take a few minutes to complete the written survey that you may receive in the mail after your visit with us. Thank you!             Your Updated Medication List - Protect others around you: Learn how to safely use, store and throw away your medicines at www.disposemymeds.org.          This list is accurate as of 4/5/18 12:37 PM.  Always use your most recent med list.                   Brand Name Dispense Instructions for use Diagnosis    ALPRAZolam 0.5 MG tablet    XANAX    60 tablet    Take 1 tablet (0.5 mg) by mouth 3 times daily as needed for anxiety    Renal cell carcinoma,  unspecified laterality (H), Mass of upper lobe of left lung       aspirin 81 MG chewable tablet     36 tablet    Take 1 tablet (81 mg) by mouth every morning    Pre-op evaluation, Brain metastases (H)       atorvastatin 20 MG tablet    LIPITOR    30 tablet    Take 1 tablet (20 mg) by mouth daily    History of stroke       buPROPion 300 MG 24 hr tablet    WELLBUTRIN XL     Take 300 mg by mouth every morning        calcium carbonate 1250 MG tablet    OS-MUNDO 500 mg [place]. Ca     Take 1 tablet by mouth daily (with lunch)    Pre-op evaluation, Brain metastases (H)       citalopram 20 MG tablet    celeXA    90 tablet    Take 1 tablet (20 mg) by mouth every evening    Insomnia, unspecified type       clonazePAM 0.5 MG tablet    klonoPIN    20 tablet    TAKE 0.5-1 TABLETS BY MOUTH 2 TIMES DAILY AS NEEDED FOR ANXIETY    Panic attack       dexamethasone 4 MG tablet    DECADRON    10 tablet    Take 2 tablets (8 mg) by mouth daily (with breakfast)    Renal cell carcinoma, right (H), Hypercalcemia       HYDROcodone-acetaminophen 5-325 MG per tablet    NORCO    60 tablet    Take 1 tablet by mouth every 6 hours as needed for severe pain    Renal cell carcinoma, right (H), Chest pain       lidocaine-prilocaine cream    EMLA    60 g    Apply 1 hour prior to port access.    Malignant neoplasm of right kidney (H)       MAGNESIUM PO      Take by mouth At Bedtime    Pre-op evaluation, Brain metastases (H)       methocarbamol 500 MG tablet    ROBAXIN    30 tablet    Take 2 tablets (1,000 mg) by mouth every 6 hours as needed for muscle spasms    S/P craniotomy       Multi-vitamin Tabs tablet      Take 1 tablet by mouth daily (with lunch)    Pre-op evaluation, Brain metastases (H)       OLANZapine 5 MG tablet    zyPREXA    5 tablet    Take 1 tablet (5 mg) by mouth At Bedtime for 5 days    Renal cell carcinoma, right (H), Hypercalcemia       OMEGA 3 PO      Take by mouth daily (with lunch)    Pre-op evaluation, Brain metastases (H)        omeprazole 40 MG capsule    priLOSEC    30 capsule    Take 1 capsule (40 mg) by mouth daily    Cerebral edema (H)       PROBIOTIC PO      Take by mouth daily (with lunch)    Pre-op evaluation, Brain metastases (H)       sodium chloride 0.9 % SOLN 100 mL with nivolumab 100 MG/10ML SOLN      Inject into the vein every 14 days    Pre-op evaluation, Brain metastases (H)       SUPER B COMPLEX/VITAMIN C PO      Take by mouth daily (with lunch)    Pre-op evaluation, Brain metastases (H)       TURMERIC PO      Take by mouth daily (with lunch)    Pre-op evaluation, Brain metastases (H)       VITAMIN B 12 PO      Take by mouth daily (with lunch)    Pre-op evaluation, Brain metastases (H)       VITAMIN C PO      Take by mouth daily (with lunch)    Pre-op evaluation, Brain metastases (H)       VITAMIN D PO      Take by mouth daily (with lunch)    Pre-op evaluation, Brain metastases (H)       zolpidem 5 MG tablet    AMBIEN    30 tablet    Take 1 tablet (5 mg) by mouth nightly as needed for sleep    Sleep disorder

## 2018-04-09 NOTE — MR AVS SNAPSHOT
After Visit Summary   4/9/2018    Suzi Gonsales    MRN: [id number]           Patient Information     Date Of Birth          1965        Visit Information        Provider Department      4/9/2018 4:00 PM Day Ren PA-C [facility]        Today's Diagnoses     Malignant neoplasm of right kidney (H)    -  1    Brain metastasis (H)        Metastatic renal cell carcinoma to lung, right (H)           Follow-ups after your visit        Your next 10 appointments already scheduled     Apr 16, 2018  2:45 PM CDT   Masonic Lab Draw with  PENRITH LAB DRAW   [facility] ([facility])    [address]  [address]  [place] [address]   [phone]            Apr 16, 2018  3:10 PM CDT   (Arrive by 2:55 PM)   Return Visit with BRENDA Betancur [facility] ([facility])    [address]  [address]  [place] [address]   [phone]            Jun 05, 2018 11:00 AM CDT   (Arrive by 10:45 AM)   MR BRAIN W/O & W CONTRAST with UUMR2   [facility] [place], [facility] ([facility])    [address] [address] [phone]           Take your medicines as usual, unless your doctor tells you not to. Bring a list of your current medicines to your exam (including vitamins, minerals and over-the-counter drugs).  You may or may not receive intravenous (IV) contrast for this exam pending the discretion of the Radiologist.  You do not need to do anything special to prepare.  The MRI machine uses a strong magnet. Please wear clothes without metal (snaps, zippers). A sweatsuit works well, or we may give you a hospital gown.  Please remove any body piercings and hair extensions before you arrive. You will also remove watches, jewelry, hairpins, wallets, dentures, partial dental plates and hearing aids. You may wear contact  lenses, and you may be able to wear your rings. We have a safe place to keep your personal items, but it is safer to leave them at home.  **IMPORTANT** THE INSTRUCTIONS BELOW ARE ONLY FOR THOSE PATIENTS WHO HAVE BEEN PRESCRIBED SEDATION OR GENERAL ANESTHESIA DURING THEIR MRI PROCEDURE:  IF YOUR DOCTOR PRESCRIBED ORAL SEDATION (take medicine to help you relax during your exam):   You must get the medicine from your doctor (oral medication) before you arrive. Bring the medicine to the exam. Do not take it at home. You ll be told when to take it upon arriving for your exam.   Arrive one hour early. Bring someone who can take you home after the test. Your medicine will make you sleepy. After the exam, you may not drive, take a bus or take a taxi by yourself.  IF YOUR DOCTOR PRESCRIBED IV SEDATION:   Arrive one hour early. Bring someone who can take you home after the test. Your medicine will make you sleepy. After the exam, you may not drive, take a bus or take a taxi by yourself.   No eating 6 hours before your exam. You may have clear liquids up until 4 hours before your exam. (Clear liquids include water, clear tea, black coffee and fruit juice without pulp.)  IF YOUR DOCTOR PRESCRIBED ANESTHESIA (be asleep for your exam):   Arrive 1 1/2 hours early. Bring someone who can take you home after the test. You may not drive, take a bus or take a taxi by yourself.   No eating 8 hours before your exam. You may have clear liquids up until 4 hours before your exam. (Clear liquids include water, clear tea, black coffee and fruit juice without pulp.)   You will spend four to five hours in the recovery room.  Please call the Imaging Department at your exam site with any questions.            Jun 05, 2018  1:00 PM CDT   Return Visit with Huyen Cuellar MD   Radiation Oncology Clinic ([facility])    [facility]  1st Floor  [address] [address] [phone]               Who to contact     If you have questions or need follow up information about today's clinic visit or your schedule please contact [facility] directly at [phone].  Normal or non-critical lab and imaging results will be communicated to you by Lenethart, letter or phone within 4 business days after the clinic has received the results. If you do not hear from us within 7 days, please contact the clinic through Lenethart or phone. If you have a critical or abnormal lab result, we will notify you by phone as soon as possible.  Submit refill requests through [employer] or call your pharmacy and they will forward the refill request to us. Please allow 3 business days for your refill to be completed.          Additional Information About Your Visit        [employer] Information     [employer] gives you secure access to your electronic health record. If you see a primary care provider, you can also send messages to your care team and make appointments. If you have questions, please call your primary care clinic.  If you do not have a primary care provider, please call [phone] and they will assist you.        Care EveryWhere ID     This is your Care EveryWhere ID. This could be used by other organizations to access your [place] medical records  [id number]        Your Vitals Were     Pulse Temperature Respirations Last Period Pulse Oximetry BMI (Body Mass Index)    89 97.6  F (36.4  C) (Oral) 18 12/20/2013 95% 23.09 kg/m2       Blood Pressure from Last 3 Encounters:   04/09/18 98/63   04/05/18 109/54   04/05/18 98/45    Weight from Last 3 Encounters:   04/09/18 57.3 kg (126 lb 4.8 oz)   04/04/18 59 kg (130 lb 1.1 oz)   04/02/18 57.7 kg (127 lb 1.6 oz)              We Performed the Following     CBC with platelets differential     Comprehensive metabolic panel          Today's Medication Changes          These changes are accurate as of 4/9/18  6:54 PM.  If you have any questions, ask your nurse or doctor.                Start taking these medicines.        Dose/Directions    Cabozantinib S-Malate 60 MG   Commonly known as:  CABOMETYX   Used for:  Brain metastasis (H), Metastatic renal cell carcinoma to lung, right (H), Malignant neoplasm of right kidney (H)   Started by:  Day Ren PA-C        Dose:  60 mg   Take 1 tablet (60 mg) by mouth daily Take on an empty stomach 1 hour before or 2 hours after a meal. Avoid grapefruit and grapefruit juice.   Quantity:  30 tablet   Refills:  0       oxyCODONE-acetaminophen 5-325 MG per tablet   Commonly known as:  PERCOCET   Used for:  Malignant neoplasm of right kidney (H)   Started by:  Day Ren PA-C        Dose:  1 tablet   Take 1 tablet by mouth every 6 hours as needed for severe pain   Quantity:  60 tablet   Refills:  0         These medicines have changed or have updated prescriptions.        Dose/Directions    citalopram 20 MG tablet   Commonly known as:  celeXA   This may have changed:  when to take this   Used for:  Insomnia, unspecified type        Dose:  20 mg   Take 1 tablet (20 mg) by mouth every evening   Quantity:  90 tablet   Refills:  3            Where to get your medicines      Some of these will need a paper prescription and others can be bought over the counter.  Ask your nurse if you have questions.     Bring a paper prescription for each of these medications     oxyCODONE-acetaminophen 5-325 MG per tablet         Call your pharmacy to confirm that your medication is ready for pickup. It may take up to 24 hours for them to receive the prescription. If the prescription is not ready within 3 business days, please contact your clinic or your provider.     We will let you know when these medications are ready. If you don't hear back within 3 business days, please contact us.     Cabozantinib S-Malate 60 MG                Primary Care Provider Office Phone # Fax #    Molly ARIEL Platt [phone] [address]  [facility] [address]        Equal Access to Services     ROSAURA INTERIANO : Hadii aad ku hadcarolineloki Santana, waaxda luswathiadaha, qaybta jakeceejodi tilley, josemanuel plata. So [facility] [place] [phone].    ATENCIÓN: Si habla español, tiene a chacon disposición servicios gratuitos de asistencia lingüística. [facility] [phone].    We comply with applicable federal civil rights laws and [place] laws. We do not discriminate on the basis of race, color, national origin, age, disability, sex, sexual orientation, or gender identity.            Thank you!     Thank you for choosing [facility]  for your care. Our goal is always to provide you with excellent care. Hearing back from our patients is one way we can continue to improve our services. Please take a few minutes to complete the written survey that you may receive in the mail after your visit with us. Thank you!             Your Updated Medication List - Protect others around you: Learn how to safely use, store and throw away your medicines at www.disposemymeds.org.          This list is accurate as of 4/9/18  6:54 PM.  Always use your most recent med list.                   Brand Name Dispense Instructions for use Diagnosis    ALPRAZolam 0.5 MG tablet    XANAX    60 tablet    Take 1 tablet (0.5 mg) by mouth 3 times daily as needed for anxiety    Renal cell carcinoma, unspecified laterality (H), Mass of upper lobe of left lung       aspirin 81 MG chewable tablet     36 tablet    Take 1 tablet (81 mg) by mouth every morning    Pre-op evaluation, Brain metastases (H)       atorvastatin 20 MG tablet    LIPITOR    30 tablet    Take 1 tablet (20 mg) by mouth daily    History of stroke       buPROPion 300 MG 24 hr tablet    WELLBUTRIN XL     Take 300 mg by mouth every morning        Cabozantinib S-Malate 60 MG    CABOMETYX    30 tablet    Take 1 tablet (60 mg) by mouth daily Take on an empty stomach 1 hour before or 2 hours after a  meal. Avoid grapefruit and grapefruit juice.    Brain metastasis (H), Metastatic renal cell carcinoma to lung, right (H), Malignant neoplasm of right kidney (H)       calcium carbonate 1250 MG tablet    OS-MUNDO 500 mg [place]. Ca     Take 1 tablet by mouth daily (with lunch)    Pre-op evaluation, Brain metastases (H)       citalopram 20 MG tablet    celeXA    90 tablet    Take 1 tablet (20 mg) by mouth every evening    Insomnia, unspecified type       clonazePAM 0.5 MG tablet    klonoPIN    20 tablet    TAKE 0.5-1 TABLETS BY MOUTH 2 TIMES DAILY AS NEEDED FOR ANXIETY    Panic attack       dexamethasone 4 MG tablet    DECADRON    10 tablet    Take 2 tablets (8 mg) by mouth daily (with breakfast)    Renal cell carcinoma, right (H), Hypercalcemia       HYDROcodone-acetaminophen 5-325 MG per tablet    NORCO    60 tablet    Take 1 tablet by mouth every 6 hours as needed for severe pain    Renal cell carcinoma, right (H), Chest pain       lidocaine-prilocaine cream    EMLA    60 g    Apply 1 hour prior to port access.    Malignant neoplasm of right kidney (H)       MAGNESIUM PO      Take by mouth At Bedtime    Pre-op evaluation, Brain metastases (H)       methocarbamol 500 MG tablet    ROBAXIN    30 tablet    Take 2 tablets (1,000 mg) by mouth every 6 hours as needed for muscle spasms    S/P craniotomy       Multi-vitamin Tabs tablet      Take 1 tablet by mouth daily (with lunch)    Pre-op evaluation, Brain metastases (H)       OLANZapine 5 MG tablet    zyPREXA    5 tablet    Take 1 tablet (5 mg) by mouth At Bedtime for 5 days    Renal cell carcinoma, right (H), Hypercalcemia       OMEGA 3 PO      Take by mouth daily (with lunch)    Pre-op evaluation, Brain metastases (H)       omeprazole 40 MG capsule    priLOSEC    30 capsule    Take 1 capsule (40 mg) by mouth daily    Cerebral edema (H)       oxyCODONE-acetaminophen 5-325 MG per tablet    PERCOCET    60 tablet    Take 1 tablet by mouth every 6 hours as needed for severe  pain    Malignant neoplasm of right kidney (H)       PROBIOTIC PO      Take by mouth daily (with lunch)    Pre-op evaluation, Brain metastases (H)       sodium chloride 0.9 % SOLN 100 mL with nivolumab 100 MG/10ML SOLN      Inject into the vein every 14 days    Pre-op evaluation, Brain metastases (H)       SUPER B COMPLEX/VITAMIN C PO      Take by mouth daily (with lunch)    Pre-op evaluation, Brain metastases (H)       TURMERIC PO      Take by mouth daily (with lunch)    Pre-op evaluation, Brain metastases (H)       VITAMIN B 12 PO      Take by mouth daily (with lunch)    Pre-op evaluation, Brain metastases (H)       VITAMIN C PO      Take by mouth daily (with lunch)    Pre-op evaluation, Brain metastases (H)       VITAMIN D PO      Take by mouth daily (with lunch)    Pre-op evaluation, Brain metastases (H)       zolpidem 5 MG tablet    AMBIEN    30 tablet    Take 1 tablet (5 mg) by mouth nightly as needed for sleep    Sleep disorder

## 2018-04-09 NOTE — NURSING NOTE
Chief Complaint   Patient presents with     Port Draw     Labs drawn via port by RN. Line flushed and hep locked, de-accessed. VS taken.     Ruby Ray RN

## 2018-04-09 NOTE — NURSING NOTE
"Oncology Rooming Note    April 9, 2018 4:12 PM   Suzi Gonsales is a 52 year old female who presents for:    Chief Complaint   Patient presents with     Port Draw     Labs drawn via port by RN. Line flushed and hep locked, de-accessed. VS taken.     Oncology Clinic Visit     Return for Kidney Ca     Initial Vitals: BP 98/63 (BP Location: Right arm, Patient Position: Sitting, Cuff Size: Adult Regular)  Pulse 89  Temp 97.6  F (36.4  C) (Oral)  Resp 18  Wt 57.3 kg (126 lb 4.8 oz)  LMP 12/20/2013  SpO2 95%  BMI 23.09 kg/m2 Estimated body mass index is 23.09 kg/(m^2) as calculated from the following:    Height as of 4/2/18: 1.575 m (5' 2.01\").    Weight as of this encounter: 57.3 kg (126 lb 4.8 oz). Body surface area is 1.58 meters squared.  Moderate Pain (5) Comment: Data Unavailable   Patient's last menstrual period was 12/20/2013.  Allergies reviewed: Yes  Medications reviewed: Yes    Medications: Medication refills not needed today.  Pharmacy name entered into [employer]:    [id number] - [place], [place] - [address] AT [facility] [address]  [facility] - USE FOR MAILING ONLY - [place], [place]    Clinical concerns: Results  Kia was notified.    7  minutes for nursing intake (face to face time)     Jillian Peralta MA              "

## 2018-04-09 NOTE — TELEPHONE ENCOUNTER
A call was placed to Suzi in follow up to her Gamma Knife treatment on 4/5/18.  Suzi said she thought the headframe placement was very traumatic and she would need more sedation if she did that again but then she thought the treatment was long but went well.  Suzi said she has been drinking a lot of fluids so feels she is not dehydrated, she said she had no appetite until Saturday and then that was improved.  Suzi said she has started the dexamethasone as ordered by Dr. Green, the pin sites are healing fine, Suzi said they are hardly visibel.  Suzi said she has not had severe headaches at all, thinks she did well and is so glad she went thru with it despite not feeling real well going into it.

## 2018-04-09 NOTE — PROGRESS NOTES
[facility] CANCER CLINIC  FOLLOW-UP VISIT NOTE  Date of visit: Apr 9, 2018       [facility]    HPI: Suzi presented to ED with hematuria and right flank pain thinking she had a renal stone in December 2014. She was worked up with a CT abd/pelvis which suggested a renal mass. She was referred to Dr. Max Zelaya in [facility]. 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 Per charts tumor resection had negative margins and it was staged at pT2bN0.    Her staging work up was negative except for pulmonary nodules. She has been followed every 6 months with scans. CT CAP on 5/11/17 showed enlarging hilar LAD, enlarging pulmonary nodules, adrenal nodules and a pancreatic body mass. Biopsies of hilar LN, adrenal nodule and pancreatic mass were + for RCC. She decided to pursue IL-2, of which she received 4 cycles, last on 8/15/17. CT CAP on 9/13/17 showed generally stable disease. CT CAP on 11/22/17 showed disease progression.  She completed three months of Opdivo and then was noted to have a cerebellar lesion and underwent a craniotomy on 3/6.     INTERVAL HISTORY: Last week I saw Suzi as an add on for fatigue and chest pain. She had hypercalcemia and was treated with IVF + Zometa.  She developed nausea and worsening fatigue the next couple days and CT CAP showed worsening disease in the chest.  She went for GK and the planning MRI showed 4 new lesions, thus 5 areas were treated. Dr Green placed her on Dex and she has been feeling better.  Finally this weekend she started to turn the corner and has been eating and drinking and able to function.  She continues to have left anterior chest pain which travels into the arm.     No f/s/c.  No headache, vision issues, paresthesias or motor dysfunction.    She is breathing well, has positional cough.  BM daily, soft. No  issues.     Current Outpatient  Prescriptions   Medication Sig     oxyCODONE-acetaminophen (PERCOCET) 5-325 MG per tablet Take 1 tablet by mouth every 6 hours as needed for severe pain     OLANZapine (ZYPREXA) 5 MG tablet Take 1 tablet (5 mg) by mouth At Bedtime for 5 days     dexamethasone (DECADRON) 4 MG tablet Take 2 tablets (8 mg) by mouth daily (with breakfast)     HYDROcodone-acetaminophen (NORCO) 5-325 MG per tablet Take 1 tablet by mouth every 6 hours as needed for severe pain     methocarbamol (ROBAXIN) 500 MG tablet Take 2 tablets (1,000 mg) by mouth every 6 hours as needed for muscle spasms     aspirin 81 MG chewable tablet Take 1 tablet (81 mg) by mouth every morning     Probiotic Product (PROBIOTIC PO) Take by mouth daily (with lunch)     Cholecalciferol (VITAMIN D PO) Take by mouth daily (with lunch)     Omega-3 Fatty Acids (OMEGA 3 PO) Take by mouth daily (with lunch)     Cyanocobalamin (VITAMIN B 12 PO) Take by mouth daily (with lunch)     Ascorbic Acid (VITAMIN C PO) Take by mouth daily (with lunch)     multivitamin, therapeutic with minerals (MULTI-VITAMIN) TABS tablet Take 1 tablet by mouth daily (with lunch)     MAGNESIUM PO Take by mouth At Bedtime     calcium carbonate (OS-MUNDO 500 MG [place]. CA) 1250 MG tablet Take 1 tablet by mouth daily (with lunch)     B Complex-C (SUPER B COMPLEX/VITAMIN C PO) Take by mouth daily (with lunch)     TURMERIC PO Take by mouth daily (with lunch)     sodium chloride 0.9 % SOLN 100 mL with nivolumab 100 MG/10ML SOLN Inject into the vein every 14 days     zolpidem (AMBIEN) 5 MG tablet Take 1 tablet (5 mg) by mouth nightly as needed for sleep     omeprazole (PRILOSEC) 40 MG capsule Take 1 capsule (40 mg) by mouth daily     atorvastatin (LIPITOR) 20 MG tablet Take 1 tablet (20 mg) by mouth daily     clonazePAM (KLONOPIN) 0.5 MG tablet TAKE 0.5-1 TABLETS BY MOUTH 2 TIMES DAILY AS NEEDED FOR ANXIETY     ALPRAZolam (XANAX) 0.5 MG tablet Take 1 tablet (0.5 mg) by mouth 3 times daily as needed for anxiety      lidocaine-prilocaine (EMLA) cream Apply 1 hour prior to port access.     citalopram (CELEXA) 20 MG tablet Take 1 tablet (20 mg) by mouth every evening (Patient taking differently: Take 20 mg by mouth At Bedtime )     buPROPion (WELLBUTRIN XL) 300 MG 24 hr tablet Take 300 mg by mouth every morning      Cabozantinib S-Malate (CABOMETYX) 60 MG Take 1 tablet (60 mg) by mouth daily Take on an empty stomach 1 hour before or 2 hours after a meal. Avoid grapefruit and grapefruit juice.     Current Facility-Administered Medications   Medication     heparin 100 UNIT/ML injection 5 mL     sodium chloride (PF) 0.9% PF flush 20 mL          EXAM:    BP 98/63 (BP Location: Right arm, Patient Position: Sitting, Cuff Size: Adult Regular)  Pulse 89  Temp 97.6  F (36.4  C) (Oral)  Resp 18  Wt 57.3 kg (126 lb 4.8 oz)  LMP 12/20/2013  SpO2 95%  BMI 23.09 kg/m2    Wt Readings from Last 4 Encounters:   04/09/18 57.3 kg (126 lb 4.8 oz)   04/04/18 59 kg (130 lb 1.1 oz)   04/02/18 57.7 kg (127 lb 1.6 oz)   03/21/18 58.1 kg (128 lb)       Vital signs were reviewed.   Patient alert and oriented.   PERRLA. EOMI. No scleral icterus noted. OP without thrush/sores.  Neck exam: No palpable cervical nodes bilaterally. Large supraclavicular mass 3 x 3 cm  Heart: RRR no murmurs noted.   Lungs: clear to auscultation bilaterally.  Crackles in the lower left lung  Abd: positive bowel sounds in all four quadrants.  No tenderness to palpation.  No hepatomegaly.   Extremities: No lower extremity edema.   Neuro: II-XII CN intact. Strength 5/5 upper and lower extremities   Mood and affect is stable.   Skin has no rashes or lesions on exposed areas. Port is in right chest.     LABS:      4/9/2018 15:53   Sodium 137   Potassium 3.9   Chloride 108   Carbon Dioxide 21   Urea Nitrogen 11   Creatinine 0.98   GFR Estimate 59 (L)   GFR Estimate If Black 71   Calcium 7.9 (L)   Anion Gap 8   Albumin 2.7 (L)   Protein Total 6.4 (L)   Bilirubin Total 0.1 (L)    Alkaline Phosphatase 63   ALT 18   AST 31   Glucose 124 (H)   WBC 5.1   Hemoglobin 9.7 (L)   Hematocrit 30.0 (L)   Platelet Count 474 (H)   RBC Count 3.16 (L)   MCV 95     CT CAP IMPRESSION: 52 year old?with mRCC s/p 4 cycles of IL-2, Started Opdivo  12/5/2017, completed 3 months, there is confirmed progression by  iRecist Criteria (3/2/2018, now 4/4/2018):     1. Enlarging left upper lobe lesions with more prominent left upper  lobe extension and the right pulmonary metastasis  2. Enlarging and more numerous mediastinal lymph nodes   3. Stable left kidney, left adrenal gland and pancreatic lesions  4. Enlarging right iliac lymph node.   5. Enlarging left pleural effusion.    ASSESSMENT/PLAN: 52 year old with mRCC s/p 4 cycles of IL-2.  She had a stable CT after 2 cycles, mild disease progression after 4 cycles.  After a short break, CT showed worsening disease and she has started with Opdivo.   Three months later she had new brain disease and worsening systemic disease.     1. RCC: Suzi started nivolumab on 12/5/17 and completed 3 months.  She missed her follow up after her CT scan on 3/5 as she was getting ready for surgery.  At that time her CT showed mild progression. She clearly has failed immunotherapy as her CT shows significant worsening of her pulmonary burden.  Suprisingly her abdominal disease is stable.      We discussed the plan would be to start Cabozantonib.  This is the new TKI of choice.  I will continue to work on pharmacy coverage.  I feel she may need XRT to her MERARY which is causing significant pain on her anterior shoulder (see below).  I reached out to Dr Romero and she will get her urgently in tomorrow to discuss.  I will see Suzi back in one week to get started.     2. Hypercalcemia; fatigue and somnolence, but no constipation or confusion.  s/p Zometa x 1 and now her symptoms and calcium are normalized.     3. Left chest pain: likely from her enlarging apical mass on the left.  She is having  left arm pain as well.  She has some paresthesias although they are intermittently in her hand and she cannot remember where they occur.  She would like to try Percocet as the Norco is not enough.  Continue with Ibuprofen  400 mg BID as well.  Will wait to hear from XRT regarding palliative therapy.     4. Brain mets: no neuro symptoms, HA or confusion today.   Craniotomy successful on 3/6.  Has GK to the bed on 4/5.  And FOUR new lesions were noted during the planning MRI.  THus, 4 lesions and the tumor bed were done on 4/5.  She is on Dex from Dr Green last week which has helped quite a bit.  We will continue no 4 mg daily until W and then decrease to 2 mg through the weekend and then taper off.      Kia Ren PA-C

## 2018-04-10 NOTE — MR AVS SNAPSHOT
After Visit Summary   4/10/2018    Suzi Gonsales    MRN: [id number]           Patient Information     Date Of Birth          1965        Visit Information        Provider Department      4/10/2018 3:00 PM Romie Flores MD Radiation Oncology Clinic        Today's Diagnoses     Metastatic renal cell carcinoma, unspecified laterality (H)    -  1       Follow-ups after your visit        Your next 10 appointments already scheduled     Apr 10, 2018  3:00 PM CDT   TCT/SIM Suite Visit with Romie Flores MD   Radiation Oncology Clinic ([facility])    [facility]  1st Floor  [address] [facility] [address]   [phone]            Apr 16, 2018  2:45 PM CDT   [facility] Lab Draw with  [facility] LAB DRAW   [facility] Lab Draw ([facility])    [address] [place] [address]  [address]  [facility] [address]   [phone]            Apr 16, 2018  3:10 PM CDT   (Arrive by 2:55 PM)   Return Visit with Day Ren PA-C   [facility] ([facility])    [address] [place] [address]  [address]  [facility] [address]   [phone]            Jun 05, 2018 11:00 AM CDT   (Arrive by 10:45 AM)   MR BRAIN W/O & W CONTRAST with UUMR2   [facility] [place], MRI ([facility] [place])    [address] [facility] [address]   [phone]           Take your medicines as usual, unless your doctor tells you not to. Bring a list of your current medicines to your exam (including vitamins, minerals and over-the-counter drugs).  You may or may not receive intravenous (IV) contrast for this exam pending the discretion of the Radiologist.  You do not need to do anything special to prepare.  The MRI machine uses a strong magnet. Please wear clothes without metal (snaps, zippers). A sweatsuit works well, or we may give you a hospital gown.  Please  remove any body piercings and hair extensions before you arrive. You will also remove watches, jewelry, hairpins, wallets, dentures, partial dental plates and hearing aids. You may wear contact lenses, and you may be able to wear your rings. We have a safe place to keep your personal items, but it is safer to leave them at home.  **IMPORTANT** THE INSTRUCTIONS BELOW ARE ONLY FOR THOSE PATIENTS WHO HAVE BEEN PRESCRIBED SEDATION OR GENERAL ANESTHESIA DURING THEIR MRI PROCEDURE:  IF YOUR DOCTOR PRESCRIBED ORAL SEDATION (take medicine to help you relax during your exam):   You must get the medicine from your doctor (oral medication) before you arrive. Bring the medicine to the exam. Do not take it at home. You ll be told when to take it upon arriving for your exam.   Arrive one hour early. Bring someone who can take you home after the test. Your medicine will make you sleepy. After the exam, you may not drive, take a bus or take a taxi by yourself.  IF YOUR DOCTOR PRESCRIBED IV SEDATION:   Arrive one hour early. Bring someone who can take you home after the test. Your medicine will make you sleepy. After the exam, you may not drive, take a bus or take a taxi by yourself.   No eating 6 hours before your exam. You may have clear liquids up until 4 hours before your exam. (Clear liquids include water, clear tea, black coffee and fruit juice without pulp.)  IF YOUR DOCTOR PRESCRIBED ANESTHESIA (be asleep for your exam):   Arrive 1 1/2 hours early. Bring someone who can take you home after the test. You may not drive, take a bus or take a taxi by yourself.   No eating 8 hours before your exam. You may have clear liquids up until 4 hours before your exam. (Clear liquids include water, clear tea, black coffee and fruit juice without pulp.)   You will spend four to five hours in the recovery room.  Please call the Imaging Department at your exam site with any questions.            Jun 05, 2018  1:00 PM CDT   Return Visit with  Huyen Cuellar MD   Radiation Oncology Clinic ([facility])    [facility]  1st Floor  [address] [facility] [id number] [phone]              Who to contact     Please call your clinic at [phone] to:    Ask questions about your health    Make or cancel appointments    Discuss your medicines    Learn about your test results    Speak to your doctor            Additional Information About Your Visit        [employer] Information     [employer] gives you secure access to your electronic health record. If you see a primary care provider, you can also send messages to your care team and make appointments. If you have questions, please call your primary care clinic.  If you do not have a primary care provider, please call [phone] and they will assist you.      [employer] is an electronic gateway that provides easy, online access to your medical records. With [employer], you can request a clinic appointment, read your test results, renew a prescription or communicate with your care team.     To access your existing account, please contact your [facility] or call [phone] for assistance.        Care EveryWhere ID     This is your Care EveryWhere ID. This could be used by other organizations to access your [place] medical records  [id number]        Your Vitals Were     Last Period                   12/20/2013            Blood Pressure from Last 3 Encounters:   04/09/18 98/63   04/05/18 109/54   04/05/18 98/45    Weight from Last 3 Encounters:   04/09/18 57.3 kg (126 lb 4.8 oz)   04/04/18 59 kg (130 lb 1.1 oz)   04/02/18 57.7 kg (127 lb 1.6 oz)              Today, you had the following     No orders found for display         Today's Medication Changes          These changes are accurate as of 4/10/18  2:25 PM.  If you have any questions, ask your nurse or doctor.               These medicines have changed or have updated prescriptions.         Dose/Directions    citalopram 20 MG tablet   Commonly known as:  celeXA   This may have changed:  when to take this   Used for:  Insomnia, unspecified type        Dose:  20 mg   Take 1 tablet (20 mg) by mouth every evening   Quantity:  90 tablet   Refills:  3                Primary Care Provider Office Phone # Fax #    ARI[facility] [phone] [address] [facility] [address]        Equal Access to Services     ROSAURA INTERIANO : Hadii aad ku hadasho Soomaali, waaxda luqadaha, qaybta kaalmada adeegyada, waxay idiin hayaan adeeg bessarapercy oswald . So [facility] [phone].    ATENCIÓN: Si habla español, tiene a chacon disposición servicios gratuitos de asistencia lingüística. [facility] [phone].    We comply with applicable federal civil rights laws and [place] laws. We do not discriminate on the basis of race, color, national origin, age, disability, sex, sexual orientation, or gender identity.            Thank you!     Thank you for choosing RADIATION ONCOLOGY CLINIC  for your care. Our goal is always to provide you with excellent care. Hearing back from our patients is one way we can continue to improve our services. Please take a few minutes to complete the written survey that you may receive in the mail after your visit with us. Thank you!             Your Updated Medication List - Protect others around you: Learn how to safely use, store and throw away your medicines at www.disposemymeds.org.          This list is accurate as of 4/10/18  2:25 PM.  Always use your most recent med list.                   Brand Name Dispense Instructions for use Diagnosis    ALPRAZolam 0.5 MG tablet    XANAX    60 tablet    Take 1 tablet (0.5 mg) by mouth 3 times daily as needed for anxiety    Renal cell carcinoma, unspecified laterality (H), Mass of upper lobe of left lung       aspirin 81 MG chewable tablet     36 tablet    Take 1 tablet (81 mg) by mouth every morning    Pre-op  evaluation, Brain metastases (H)       atorvastatin 20 MG tablet    LIPITOR    30 tablet    Take 1 tablet (20 mg) by mouth daily    History of stroke       buPROPion 300 MG 24 hr tablet    WELLBUTRIN XL     Take 300 mg by mouth every morning        Cabozantinib S-Malate 60 MG    CABOMETYX    30 tablet    Take 1 tablet (60 mg) by mouth daily Take on an empty stomach 1 hour before or 2 hours after a meal. Avoid grapefruit and grapefruit juice.    Brain metastasis (H), Metastatic renal cell carcinoma to lung, right (H), Malignant neoplasm of right kidney (H)       calcium carbonate 1250 MG tablet    OS-MUNDO 500 mg [place]. [place]     Take 1 tablet by mouth daily (with lunch)    Pre-op evaluation, Brain metastases (H)       citalopram 20 MG tablet    celeXA    90 tablet    Take 1 tablet (20 mg) by mouth every evening    Insomnia, unspecified type       clonazePAM 0.5 MG tablet    klonoPIN    20 tablet    TAKE 0.5-1 TABLETS BY MOUTH 2 TIMES DAILY AS NEEDED FOR ANXIETY    Panic attack       dexamethasone 4 MG tablet    DECADRON    10 tablet    Take 2 tablets (8 mg) by mouth daily (with breakfast)    Renal cell carcinoma, right (H), Hypercalcemia       HYDROcodone-acetaminophen 5-325 MG per tablet    NORCO    60 tablet    Take 1 tablet by mouth every 6 hours as needed for severe pain    Renal cell carcinoma, right (H), Chest pain       lidocaine-prilocaine cream    EMLA    60 g    Apply 1 hour prior to port access.    Malignant neoplasm of right kidney (H)       MAGNESIUM PO      Take by mouth At Bedtime    Pre-op evaluation, Brain metastases (H)       methocarbamol 500 MG tablet    ROBAXIN    30 tablet    Take 2 tablets (1,000 mg) by mouth every 6 hours as needed for muscle spasms    S/P craniotomy       Multi-vitamin Tabs tablet      Take 1 tablet by mouth daily (with lunch)    Pre-op evaluation, Brain metastases (H)       OLANZapine 5 MG tablet    zyPREXA    5 tablet    Take 1 tablet (5 mg) by mouth At Bedtime for 5 days     Renal cell carcinoma, right (H), Hypercalcemia       OMEGA 3 PO      Take by mouth daily (with lunch)    Pre-op evaluation, Brain metastases (H)       omeprazole 40 MG capsule    priLOSEC    30 capsule    Take 1 capsule (40 mg) by mouth daily    Cerebral edema (H)       oxyCODONE-acetaminophen 5-325 MG per tablet    PERCOCET    60 tablet    Take 1 tablet by mouth every 6 hours as needed for severe pain    Malignant neoplasm of right kidney (H)       PROBIOTIC PO      Take by mouth daily (with lunch)    Pre-op evaluation, Brain metastases (H)       sodium chloride 0.9 % SOLN 100 mL with nivolumab 100 MG/10ML SOLN      Inject into the vein every 14 days    Pre-op evaluation, Brain metastases (H)       SUPER B COMPLEX/VITAMIN C PO      Take by mouth daily (with lunch)    Pre-op evaluation, Brain metastases (H)       TURMERIC PO      Take by mouth daily (with lunch)    Pre-op evaluation, Brain metastases (H)       VITAMIN B 12 PO      Take by mouth daily (with lunch)    Pre-op evaluation, Brain metastases (H)       VITAMIN C PO      Take by mouth daily (with lunch)    Pre-op evaluation, Brain metastases (H)       VITAMIN D PO      Take by mouth daily (with lunch)    Pre-op evaluation, Brain metastases (H)       zolpidem 5 MG tablet    AMBIEN    30 tablet    Take 1 tablet (5 mg) by mouth nightly as needed for sleep    Sleep disorder

## 2018-04-10 NOTE — MR AVS SNAPSHOT
After Visit Summary   4/10/2018    Suzi Gonsales    MRN: [id number]           Patient Information     Date Of Birth          1965        Visit Information        Provider Department      4/10/2018 1:30 PM Romie Flores MD Radiation Oncology Clinic        Today's Diagnoses     Metastatic renal cell carcinoma to lung, right (H)    -  1       Follow-ups after your visit        Your next 10 appointments already scheduled     Apr 16, 2018  2:45 PM CDT   [facility] Lab Draw with  [facility] LAB DRAW   [facility] Lab Draw ([facility])    [address]  [address]  [facility] [address]   [phone]            Apr 16, 2018  3:10 PM CDT   (Arrive by 2:55 PM)   Return Visit with Day Ren PA-C   [facility] ([facility])    [address] [place] [address]  [address]  [facility] [address]   [phone]            Jun 05, 2018 11:00 AM CDT   (Arrive by 10:45 AM)   MR BRAIN W/O & W CONTRAST with UUMR2   [facility] [place], MRI ([facility])    [address] [address]   [phone]           Take your medicines as usual, unless your doctor tells you not to. Bring a list of your current medicines to your exam (including vitamins, minerals and over-the-counter drugs).  You may or may not receive intravenous (IV) contrast for this exam pending the discretion of the Radiologist.  You do not need to do anything special to prepare.  The MRI machine uses a strong magnet. Please wear clothes without metal (snaps, zippers). A sweatsuit works well, or we may give you a hospital gown.  Please remove any body piercings and hair extensions before you arrive. You will also remove watches, jewelry, hairpins, wallets, dentures, partial dental plates and hearing aids. You may wear contact lenses, and you may be able to wear your rings. We have a safe place to  keep your personal items, but it is safer to leave them at home.  **IMPORTANT** THE INSTRUCTIONS BELOW ARE ONLY FOR THOSE PATIENTS WHO HAVE BEEN PRESCRIBED SEDATION OR GENERAL ANESTHESIA DURING THEIR MRI PROCEDURE:  IF YOUR DOCTOR PRESCRIBED ORAL SEDATION (take medicine to help you relax during your exam):   You must get the medicine from your doctor (oral medication) before you arrive. Bring the medicine to the exam. Do not take it at home. You ll be told when to take it upon arriving for your exam.   Arrive one hour early. Bring someone who can take you home after the test. Your medicine will make you sleepy. After the exam, you may not drive, take a bus or take a taxi by yourself.  IF YOUR DOCTOR PRESCRIBED IV SEDATION:   Arrive one hour early. Bring someone who can take you home after the test. Your medicine will make you sleepy. After the exam, you may not drive, take a bus or take a taxi by yourself.   No eating 6 hours before your exam. You may have clear liquids up until 4 hours before your exam. (Clear liquids include water, clear tea, black coffee and fruit juice without pulp.)  IF YOUR DOCTOR PRESCRIBED ANESTHESIA (be asleep for your exam):   Arrive 1 1/2 hours early. Bring someone who can take you home after the test. You may not drive, take a bus or take a taxi by yourself.   No eating 8 hours before your exam. You may have clear liquids up until 4 hours before your exam. (Clear liquids include water, clear tea, black coffee and fruit juice without pulp.)   You will spend four to five hours in the recovery room.  Please call the Imaging Department at your exam site with any questions.            Jun 05, 2018  1:00 PM CDT   Return Visit with Huyen Cuellar MD   Radiation Oncology Clinic ([facility])    [facility]  1st Floor  [address] [facility] [id number] [phone]              Who to contact     Please call your clinic at [phone] to:    Ask  questions about your health    Make or cancel appointments    Discuss your medicines    Learn about your test results    Speak to your doctor            Additional Information About Your Visit        [email or web address] Information     [employer] gives you secure access to your electronic health record. If you see a primary care provider, you can also send messages to your care team and make appointments. If you have questions, please call your primary care clinic.  If you do not have a primary care provider, please call [phone] and they will assist you.      [employer] is an electronic gateway that provides easy, online access to your medical records. With [employer], you can request a clinic appointment, read your test results, renew a prescription or communicate with your care team.     To access your existing account, please contact your [facility] or call [phone] for assistance.        Care EveryWhere ID     This is your Care EveryWhere ID. This could be used by other organizations to access your [facility] medical records  [id number]        Your Vitals Were     Last Period BMI (Body Mass Index)                12/20/2013 22.97 kg/m2           Blood Pressure from Last 3 Encounters:   04/10/18 94/60   04/09/18 98/63   04/05/18 109/54    Weight from Last 3 Encounters:   04/10/18 57 kg (125 lb 9.6 oz)   04/09/18 57.3 kg (126 lb 4.8 oz)   04/04/18 59 kg (130 lb 1.1 oz)              Today, you had the following     No orders found for display         Today's Medication Changes          These changes are accurate as of 4/10/18  3:55 PM.  If you have any questions, ask your nurse or doctor.               These medicines have changed or have updated prescriptions.        Dose/Directions    citalopram 20 MG tablet   Commonly known as:  celeXA   This may have changed:  when to take this   Used for:  Insomnia, unspecified type        Dose:  20 mg   Take 1 tablet (20 mg) by mouth every evening    Quantity:  90 tablet   Refills:  3                Primary Care Provider Office Phone # Fax #    ARIEL [facility] [phone] [address] [facility] [address]        Equal Access to Services     ROSAURA INTERIANO : Hadii aad ku hadcarolineo Soomaali, waaxda luqadaha, qaybta kaalmada adeegyada, josemanuel yasmine itztaty kelloggbarrypercy oswald . So [facility] [phone].    ATENCIÓN: Si habla español, tiene a chacon disposición servicios gratuitos de asistencia lingüística. Llame al [phone].    We comply with applicable federal civil rights laws and [place] laws. We do not discriminate on the basis of race, color, national origin, age, disability, sex, sexual orientation, or gender identity.            Thank you!     Thank you for choosing [facility]  for your care. Our goal is always to provide you with excellent care. Hearing back from our patients is one way we can continue to improve our services. Please take a few minutes to complete the written survey that you may receive in the mail after your visit with us. Thank you!             Your Updated Medication List - Protect others around you: Learn how to safely use, store and throw away your medicines at www.disposemymeds.org.          This list is accurate as of 4/10/18  3:55 PM.  Always use your most recent med list.                   Brand Name Dispense Instructions for use Diagnosis    ALPRAZolam 0.5 MG tablet    XANAX    60 tablet    Take 1 tablet (0.5 mg) by mouth 3 times daily as needed for anxiety    Renal cell carcinoma, unspecified laterality (H), Mass of upper lobe of left lung       aspirin 81 MG chewable tablet     36 tablet    Take 1 tablet (81 mg) by mouth every morning    Pre-op evaluation, Brain metastases (H)       atorvastatin 20 MG tablet    LIPITOR    30 tablet    Take 1 tablet (20 mg) by mouth daily    History of stroke       buPROPion 300 MG 24 hr tablet    WELLBUTRIN XL     Take 300 mg by mouth every  morning        Cabozantinib S-Malate 60 MG    CABOMETYX    30 tablet    Take 1 tablet (60 mg) by mouth daily Take on an empty stomach 1 hour before or 2 hours after a meal. Avoid grapefruit and grapefruit juice.    Brain metastasis (H), Metastatic renal cell carcinoma to lung, right (H), Malignant neoplasm of right kidney (H)       calcium carbonate 1250 MG tablet    OS-MUNDO 500 mg [place]. [place]     Take 1 tablet by mouth daily (with lunch)    Pre-op evaluation, Brain metastases (H)       citalopram 20 MG tablet    celeXA    90 tablet    Take 1 tablet (20 mg) by mouth every evening    Insomnia, unspecified type       clonazePAM 0.5 MG tablet    klonoPIN    20 tablet    TAKE 0.5-1 TABLETS BY MOUTH 2 TIMES DAILY AS NEEDED FOR ANXIETY    Panic attack       dexamethasone 4 MG tablet    DECADRON    10 tablet    Take 2 tablets (8 mg) by mouth daily (with breakfast)    Renal cell carcinoma, right (H), Hypercalcemia       HYDROcodone-acetaminophen 5-325 MG per tablet    NORCO    60 tablet    Take 1 tablet by mouth every 6 hours as needed for severe pain    Renal cell carcinoma, right (H), Chest pain       lidocaine-prilocaine cream    EMLA    60 g    Apply 1 hour prior to port access.    Malignant neoplasm of right kidney (H)       MAGNESIUM PO      Take by mouth At Bedtime    Pre-op evaluation, Brain metastases (H)       methocarbamol 500 MG tablet    ROBAXIN    30 tablet    Take 2 tablets (1,000 mg) by mouth every 6 hours as needed for muscle spasms    S/P craniotomy       Multi-vitamin Tabs tablet      Take 1 tablet by mouth daily (with lunch)    Pre-op evaluation, Brain metastases (H)       OLANZapine 5 MG tablet    zyPREXA    5 tablet    Take 1 tablet (5 mg) by mouth At Bedtime for 5 days    Renal cell carcinoma, right (H), Hypercalcemia       OMEGA 3 PO      Take by mouth daily (with lunch)    Pre-op evaluation, Brain metastases (H)       omeprazole 40 MG capsule    priLOSEC    30 capsule    Take 1 capsule (40 mg) by mouth  daily    Cerebral edema (H)       oxyCODONE-acetaminophen 5-325 MG per tablet    PERCOCET    60 tablet    Take 1 tablet by mouth every 6 hours as needed for severe pain    Malignant neoplasm of right kidney (H)       PROBIOTIC PO      Take by mouth daily (with lunch)    Pre-op evaluation, Brain metastases (H)       sodium chloride 0.9 % SOLN 100 mL with nivolumab 100 MG/10ML SOLN      Inject into the vein every 14 days    Pre-op evaluation, Brain metastases (H)       SUPER B COMPLEX/VITAMIN C PO      Take by mouth daily (with lunch)    Pre-op evaluation, Brain metastases (H)       TURMERIC PO      Take by mouth daily (with lunch)    Pre-op evaluation, Brain metastases (H)       VITAMIN B 12 PO      Take by mouth daily (with lunch)    Pre-op evaluation, Brain metastases (H)       VITAMIN C PO      Take by mouth daily (with lunch)    Pre-op evaluation, Brain metastases (H)       VITAMIN D PO      Take by mouth daily (with lunch)    Pre-op evaluation, Brain metastases (H)       zolpidem 5 MG tablet    AMBIEN    30 tablet    Take 1 tablet (5 mg) by mouth nightly as needed for sleep    Sleep disorder

## 2018-04-10 NOTE — PROGRESS NOTES
HPI    INITIAL PATIENT ASSESSMENT    Diagnosis: RCC    Prior radiation therapy: Gamma Knife    Prior chemotherapy: See records    Prior hormonal therapy:No    Pain Eval:  Current history of pain associated with this visit:   Intensity: 8/10  Current: aching  Location: Left shoulder and arm  Treatment: Percocet    Psychosocial  Living arrangements: Lives  and family  Fall Risk: independent   referral needs: Not needed    Advanced Directive: No  Implantable Cardiac Device? No    Nurse face-to-face time: Level 5:  over 15 min face to face time  Review of Systems   Constitutional: Positive for malaise/fatigue. Negative for chills, diaphoresis, fever and weight loss.   HENT: Negative for congestion, ear discharge, ear pain, hearing loss, nosebleeds, sinus pain, sore throat and tinnitus.    Eyes: Negative for blurred vision, double vision, photophobia, pain, discharge and redness.   Respiratory: Positive for cough and shortness of breath. Negative for hemoptysis, sputum production, wheezing and stridor.    Cardiovascular: Negative for chest pain, palpitations, orthopnea, claudication, leg swelling and PND.   Gastrointestinal: Positive for constipation, heartburn, nausea and vomiting. Negative for abdominal pain, blood in stool, diarrhea and melena.   Genitourinary: Negative for dysuria, flank pain, frequency, hematuria and urgency.   Musculoskeletal: Positive for back pain, joint pain and neck pain. Negative for falls and myalgias.   Skin: Negative for itching and rash.   Neurological: Positive for tingling, weakness and headaches. Negative for dizziness, tremors, sensory change, speech change, focal weakness, seizures and loss of consciousness.        Left arm and finger numbness   Endo/Heme/Allergies: Negative for environmental allergies and polydipsia. Does not bruise/bleed easily.   Psychiatric/Behavioral: Positive for depression. Negative for hallucinations, memory loss, substance abuse and  suicidal ideas. The patient is nervous/anxious. The patient does not have insomnia.

## 2018-04-10 NOTE — PROGRESS NOTES
"Oral Chemotherapy Monitoring Program  [facility]    Primary Oncologist: Dr. Green  Primary Oncology Clinic: [facility]  Cancer Diagnosis: RCC    Drug: Cabometyx  Start Date: ASAP  Expected duration of therapy: Until disease progression or unacceptable toxicity    Drug Interaction Assessment: None    Subjective/Objective:  Suzi Gonsales is a 52 year old female contacted by phone for an initial visit for oral chemotherapy education.  See A/P below.    No flowsheet data found.    Vitals:  BP:   BP Readings from Last 1 Encounters:   04/09/18 98/63     Wt Readings from Last 1 Encounters:   04/09/18 57.3 kg (126 lb 4.8 oz)     Estimated body surface area is 1.58 meters squared as calculated from the following:    Height as of 4/2/18: 1.575 m (5' 2.01\").    Weight as of 4/9/18: 57.3 kg (126 lb 4.8 oz).      Labs:  Lab Results   Component Value Date     04/09/2018      Lab Results   Component Value Date    POTASSIUM 3.9 04/09/2018     Lab Results   Component Value Date    MUNDO 7.9 04/09/2018     Lab Results   Component Value Date    MAG 2.2 03/07/2018     Lab Results   Component Value Date    PHOS 3.4 08/24/2017     Lab Results   Component Value Date    ALBUMIN 2.7 04/09/2018     Lab Results   Component Value Date    BUN 11 04/09/2018     Lab Results   Component Value Date    CR 0.98 04/09/2018       Lab Results   Component Value Date    AST 31 04/09/2018     Lab Results   Component Value Date    ALT 18 04/09/2018     Lab Results   Component Value Date    BILITOTAL 0.1 04/09/2018       Lab Results   Component Value Date    WBC 5.1 04/09/2018     Lab Results   Component Value Date    HGB 9.7 04/09/2018     Lab Results   Component Value Date     04/09/2018     Lab Results   Component Value Date    ANEU 3.9 04/09/2018         Assessment/Plan:  Patient is appropriate to start therapy.    Basic chemotherapy teaching was reviewed with the patient including indication, start date of therapy, dose, administration, adverse " effects, missed doses, food and drug interactions, monitoring, side effect management, office contact information, and safe handling. Written materials were provided and all questions answered.    The patient did inform me that based on her conversation with Day and Dr. Green, she is to start at 40mg and increase to 60mg if tolerated. Orders are signed and were released by Day for 60mg daily. I sent an inbasket to both to clarify dosing and update orders. Liaison informed they needed the dose to begin insurance process. We will follow up once we hear back from providers. Please call patient and inform her of dose once clarified.    Follow-Up:  1 week from start     Sue Nelson, PharmD, BCPS, BCOP  Hematology/Oncology Clinical Pharmacist  [facility]  [email or web address]

## 2018-04-10 NOTE — PROGRESS NOTES
Department of Radiation Oncology  [facility]  [address] [place] [address]  [phone]       Consultation Note    Name: Suzi Gonsales MRN: [id number]   : 1965   Date of Service: 4/10/2018  Referring: Dr. Green / medical oncology     Reason for consultation: Consideration of palliative radiotherapy for treatment of a metastatic renal cell carcinoma with systematic involvement of the left upper lobe     History of Present Illness   Ms. Gonsales is a 52 year old female with a metastatic renal cell carcinoma arising from the right kidney initially diagnosed in 2014 after she presents with right flank pain and hematuria.  Her initial staging studies demonstrated only metastatic disease confined to the lungs and, following evidence of disease progression after several serial imaging studies, began systemic therapy with IL-2, completing a total of 4 cycles in 2017. She was found once again to have disease progression on restaging studies and 2017 and completed 3 months of operative oh after which she was noted to have a symptomatic left cerebellar lesion.    Ms. Gonsales underwent a left craniotomy and tumor resection on 3/6/2018 followed by Gamma Knife radiosurgery on 2018 to the surgical resection cavity as well as four other small, incidentally discovered lesions. She was scheduled to begin systemic therapy with cabozantinib however, earlier this week, presented with worsening left shoulder and upper extremity pain and was referred to radiation oncology clinic for consideration of palliative treatment to this site.     On exam, Ms. Gonsales describes moderate to severe pain within the left upper extremity which she describes as a shooting-type pain radiating from her left shoulder down to her fingertips. She rates this as a 7/10 in severity and has been progressively worsening over the past 1-2 weeks. Outside of her pain, which is her current most troublesome  symptom, she describes ongoing fatigue, mild dyspnea and a non-productive cough with her remaining ROS otherwise mostly unremarkable.    Past Medical History:     Renal cell carcinoma    CVA    Anxiety    Past Surgical History:     Right open radical nephrectomy    Low transverse     Craniotomy and tumor excision    Chemotherapy History:  See HPI    Radiation History:  2018 Gamma Knife SRS    Left resection cavity: 18 Gy to the 50% isodose line    Left cerebellum: 22 Gy to the 50% isodose line    Right cerebellum: 22 Gy to the 50% isodose line    Left occipital: 22 Gy to the 60% isodose line    Left frontal: 22 Gy to the 80% isodose line    Pregnant: No  Implanted Cardiac Devices: No    Medications:  Current Outpatient Prescriptions   Medication Sig Dispense Refill     Cabozantinib S-Malate (CABOMETYX) 60 MG Take 1 tablet (60 mg) by mouth daily Take on an empty stomach 1 hour before or 2 hours after a meal. Avoid grapefruit and grapefruit juice. 30 tablet 0     oxyCODONE-acetaminophen (PERCOCET) 5-325 MG per tablet Take 1 tablet by mouth every 6 hours as needed for severe pain 60 tablet 0     OLANZapine (ZYPREXA) 5 MG tablet Take 1 tablet (5 mg) by mouth At Bedtime for 5 days 5 tablet 0     dexamethasone (DECADRON) 4 MG tablet Take 2 tablets (8 mg) by mouth daily (with breakfast) 10 tablet 0     HYDROcodone-acetaminophen (NORCO) 5-325 MG per tablet Take 1 tablet by mouth every 6 hours as needed for severe pain 60 tablet 0     methocarbamol (ROBAXIN) 500 MG tablet Take 2 tablets (1,000 mg) by mouth every 6 hours as needed for muscle spasms 30 tablet 0     aspirin 81 MG chewable tablet Take 1 tablet (81 mg) by mouth every morning 36 tablet      Probiotic Product (PROBIOTIC PO) Take by mouth daily (with lunch)       Cholecalciferol (VITAMIN D PO) Take by mouth daily (with lunch)       Omega-3 Fatty Acids (OMEGA 3 PO) Take by mouth daily (with lunch)       Cyanocobalamin (VITAMIN B 12 PO) Take by mouth  daily (with lunch)       Ascorbic Acid (VITAMIN C PO) Take by mouth daily (with lunch)       multivitamin, therapeutic with minerals (MULTI-VITAMIN) TABS tablet Take 1 tablet by mouth daily (with lunch)       MAGNESIUM PO Take by mouth At Bedtime       calcium carbonate (OS-MUNDO 500 MG [place]. [place]) 1250 MG tablet Take 1 tablet by mouth daily (with lunch)       B Complex-C (SUPER B COMPLEX/VITAMIN C PO) Take by mouth daily (with lunch)       TURMERIC PO Take by mouth daily (with lunch)       sodium chloride 0.9 % SOLN 100 mL with nivolumab 100 MG/10ML SOLN Inject into the vein every 14 days       zolpidem (AMBIEN) 5 MG tablet Take 1 tablet (5 mg) by mouth nightly as needed for sleep 30 tablet 1     omeprazole (PRILOSEC) 40 MG capsule Take 1 capsule (40 mg) by mouth daily 30 capsule 1     atorvastatin (LIPITOR) 20 MG tablet Take 1 tablet (20 mg) by mouth daily 30 tablet 3     clonazePAM (KLONOPIN) 0.5 MG tablet TAKE 0.5-1 TABLETS BY MOUTH 2 TIMES DAILY AS NEEDED FOR ANXIETY 20 tablet 0     ALPRAZolam (XANAX) 0.5 MG tablet Take 1 tablet (0.5 mg) by mouth 3 times daily as needed for anxiety 60 tablet 0     lidocaine-prilocaine (EMLA) cream Apply 1 hour prior to port access. 60 g 1     citalopram (CELEXA) 20 MG tablet Take 1 tablet (20 mg) by mouth every evening (Patient taking differently: Take 20 mg by mouth At Bedtime ) 90 tablet 3     buPROPion (WELLBUTRIN XL) 300 MG 24 hr tablet Take 300 mg by mouth every morning         Allergies:    NKDA    Social History:  Tobacco: Former smoker. 0.5 packs per day ×10 years. Quit 1/2004  Alcohol: 1-2 drinks per week  Lives in [place], [place]    Family History:    Reviewed and noncontributory    Review of Systems   A 10-point review of systems was performed. Pertinent findings are noted in the HPI.    Physical Exam   ECOG Status: 1    Vitals:  B/P: 94/60  Weight: 57 kg  Pain: 7/10    Gen: Alert, in NAD  Head: NC/AT  Neck: Full ROM. Supple. There is an approximately 3 cm palpable mass  within the left supraclavicular region. No additional palpable cervical adenopathy bilaterally.  Pulm: Diminished breath sounds within the left upper lobe. No wheezing, stridor or respiratory distress.  CV: RRR. No m/r/g. Extremities are warm and well-perfused.  Musculoskeletal: Normal bulk and tone  Skin: Normal color and turgor  Neuro: A/Ox3, normal gait    Imaging/Path/Labs   Imaging: Reviewed    Path: Reviewed    Labs: Reviewed    Assessment    Ms. Gonsales is a 52 year old female with a metastatic renal cell carcinoma with symptomatic disease progression involving the left upper lobe and supraclavicular region.    Plan   I had a long discussion with Ms. Gonsales regarding consideration of palliative treatment to the side. In brief, I recommend proceeding with a short-course (20 Gy/5 fractions) of radiotherapy to the left upper lobe pulmonary disease as well as the left supraclavicular metastasis with the goal of improved local disease control and possible alleviation of her presenting symptoms. I reviewed the potential acute and late-term radiation-induced side effects associated with thoracic and low cervical radiotherapy and answered her questions to her stated satisfaction. Informed consent was obtained today in clinic.    Following exam, a CT-simulation session was performed for radiotherapy planning purposes with her treatment to tentatively commence on Thursday, 4/12/2018. In the interim, she was provided with my contact information and instructed to call or return to our clinic at any time should she have further questions or concerns.    Romie Flores MD/PhD  Dept of Radiation Oncology  [facility]

## 2018-04-10 NOTE — PROGRESS NOTES
Radiation Therapy Patient Education    Person involved with teaching: Patient    Patient educational needs for self management of treatment-related side effects assessment completed.  [facility] Patient Ed tab contains Patient Learning Assessment    Education Materials Given  Radiation Therapy and You    Educational Topics Discussed  Side effects expected, Pain management, Skin care and When to call MD/RN    Response To Teaching  Verbalizes understanding    GYN Only  Vaginal Dilator-given and educated: N/A    Referrals sent: None    Chemotherapy?  No

## 2018-04-10 NOTE — PROGRESS NOTES
NEUROSURGERY    HISTORY AND PHYSICAL EXAM    Chief Complaint   Patient presents with     Radiation Therapy     GAMMA KNIFE RADIOSURGERY      HISTORY OF PRESENT ILLNESS  Ms. Suzi Gonsales is a 52 year old female who has a past medical history significant for renal cell carcinoma that was diagnosed in  for which she underwent a right radical nephrectomy.  She recently presented with headaches and MRI demonstrated left cerebellar enhancing mass.  She underwent resection of the left cerebellar mass on 3/6/2018.  The final pathology was metastatic renal cell carcinoma.  Patient's postoperative course was uneventful and she was discharged on POD#3.  She was last seen in the neurosurgery clinic on 3/21/2018 for postoperative follow up.  She reported doing well.  She denied headaches, fevers, chills, nausea, vomiting and nuchal rigidity.  She also denied any issues with her wound.  Her case was discussed at the Multidisciplinary tumor board and Gamma Knife Radiosurgery to the resection bed was recommended.  The timing of the treatment was critical and because of Dr. Palencia's availability, I was asked to perform the Gamma Knife Radiosurgery treatment.  Patient has been doing well since the last clinic visit and there have been no changes in her symptoms.    Past Medical History:   Diagnosis Date     Adrenal nodule (H)      Anxiety      Brain mass      Depression      Former tobacco use      Lacunar infarct, acute (H) 2018     Mass of left lung     Upper lobe     Renal cell carcinoma (H)     Clear cell     Solitary kidney     S/P right nephrectomy due to kidney cancer     Past Surgical History:   Procedure Laterality Date     C/SECTION, LOW TRANSVERSE  ,     , Low Transverse     ENDOSCOPIC ULTRASOUND UPPER GASTROINTESTINAL TRACT (GI) N/A 2017    Procedure: ENDOSCOPIC ULTRASOUND, ESOPHAGOSCOPY / UPPER GASTROINTESTINAL TRACT (GI);  Esophagogastroduodenoscopy, Endoscopic Ultrasound with Fine Needle  Biopsies;  Surgeon: Asad Velez MD;  Location: [facility] OR     INSERT PORT VASCULAR ACCESS N/A 12/4/2017    Procedure: INSERT PORT VASCULAR ACCESS;  Chest Port Placement-Right;  Surgeon: Melanie Cevallos PA-C;  Location: [facility] OR     open radical nephrectomy Right 12/31/14     OPTICAL TRACKING SYSTEM CRANIOTOMY, EXCISE TUMOR, COMBINED Left 3/6/2018    Procedure: COMBINED OPTICAL TRACKING SYSTEM CRANIOTOMY, EXCISE TUMOR;  Left Stealth Assisted Posterior fossa Craniotomy And Tumor Resection ;  Surgeon: Quintin Palencia MD;  Location: [facility] OR     PICC INSERTION Left 07/31/2017    5fr DL BioFlo PICC, 40cm (4cm external) in the L basilic vein w/ tip in the low SVC     PICC INSERTION Right 08/15/2017    5fr DL BioFlo PICC, 36cm (1cm external) in the R basilic vein w/ tip in the low SVC     Social History     Social History     Marital status:      Spouse name: Tunde     Number of children: 2     Years of education: N/A     Occupational History           Social History Main Topics     Smoking status: Former Smoker     Packs/day: 0.50     Years: 20.00     Types: Cigarettes     Quit date: 1/1/2004     Smokeless tobacco: Never Used     Alcohol use Yes      Comment: 1-2 drinks couple times per week     Drug use: No     Sexual activity: Not Currently     Partners: Male     Other Topics Concern     Not on file     Social History Narrative    .   for [employer].     2 sons.         Family History   Problem Relation Age of Onset     Hypertension Father      Chronic Obstructive Pulmonary Disease Father      Hyperlipidemia Brother      Hyperlipidemia Brother      CANCER No family hx of      Current Outpatient Prescriptions   Medication     Cabozantinib S-Malate (CABOMETYX) 60 MG     oxyCODONE-acetaminophen (PERCOCET) 5-325 MG per tablet     OLANZapine (ZYPREXA) 5 MG tablet     dexamethasone (DECADRON) 4 MG tablet     HYDROcodone-acetaminophen (NORCO) 5-325 MG per tablet      methocarbamol (ROBAXIN) 500 MG tablet     aspirin 81 MG chewable tablet     Probiotic Product (PROBIOTIC PO)     Cholecalciferol (VITAMIN D PO)     Omega-3 Fatty Acids (OMEGA 3 PO)     Cyanocobalamin (VITAMIN B 12 PO)     Ascorbic Acid (VITAMIN C PO)     multivitamin, therapeutic with minerals (MULTI-VITAMIN) TABS tablet     MAGNESIUM PO     calcium carbonate (OS-MUNDO 500 MG [place]. CA) 1250 MG tablet     B Complex-C (SUPER B COMPLEX/VITAMIN C PO)     TURMERIC PO     sodium chloride 0.9 % SOLN 100 mL with nivolumab 100 MG/10ML SOLN     zolpidem (AMBIEN) 5 MG tablet     omeprazole (PRILOSEC) 40 MG capsule     atorvastatin (LIPITOR) 20 MG tablet     clonazePAM (KLONOPIN) 0.5 MG tablet     ALPRAZolam (XANAX) 0.5 MG tablet     lidocaine-prilocaine (EMLA) cream     citalopram (CELEXA) 20 MG tablet     buPROPion (WELLBUTRIN XL) 300 MG 24 hr tablet     No current facility-administered medications for this visit.       No Known Allergies      REVIEW OF SYSTEM  General: POSITIVE for difficulty sleeping, fatigue, weight gain and headaches.  Negative for chills/sweats/fever.  Skin: negative for color changes of the hands or feet in the cold, chronic skin itching, poor scarring/non-healing ulcer, skin rashes or hives, or unusual moles.   Eyes: negative for blurred vision, crossed eyes, double vision, eye infection or vision flashes or halos  Ears/Nose/Throat: negative for bleeding gums, difficulty swallowing, negative for earache, ear discharge or hearing loss.  Respiratory: POSITIVE for shortness of breath and cough.  Negative for asthma, coughing up blood, night sweats tuberculosis, wheezing.  Cardiovascular: negative for lower extremity swelling, chest pain, difficulty breathing at night, irregular heart beat, pacemaker, varicose vein.  Gastrointestinal: POSITIVE for heartburn.  Negative for black stools, blood in stool, chronic diarrhea, Hepatitis A, B, C, constipation, liver disease, nausea, vomiting.  Genitourinary: negative  for difficulty with urination, discharges, urgency, urinary tract infection.  Musculoskeletal: POSITIVE for joint stiffness, neck pain and muscle aches.  Negative for arthritis, joint swelling, osteoporosis.  Neurologic: POSITIVE for weakness, tremor, headaches, dizziness, balance issues.  Negative for numbness of arms or legs, problem with memory, tingling of hands, arms or legs.  Psychiatric: POSITIVE for anxiety, mood changes, panic attacks.  Negative for depression, restlessness  Hematologic/Lymphatic/Immunologic: negative for easy skin bruising, marked fatigue, prolonged bleeding from cuts or tooth extractions, tender glands/lymph nodes.  Endocrine: POSITIVE for excessive caroline, feeling hot/cold.  Negative for excessive thirst, intolerance to warm room, loss of libido, multiple broken bones, thyroid problems, spontaneous nipple discharge.      PHYSICAL EXAM  General: Awake, alert, oriented. Well nourished, well developed, she is somewhat anxious.  HEENT: Head normocephalic, atraumatic. Good range of motion. No palpable thyroid mass.  Heart: Regular rhythm and rate. No murmurs.  Lungs: Clear to auscultation and percussion bilaterally. No ronchi, rales or wheeze.  Abdomen: Soft, non-tender, non-distended. No hepatosplenomegaly.  Extremity: Warm with no clubbing or cyanosis. No lower extremity edema.  Incisions: Left suboccipital area incision healed well.  No fluid collection.  No redness.  No drainage.    Neurological  Awake, alert and oriented to date, time, place and person. Speech fluent.   Pupils equal, round, reactive to light.  Extraocular movement intact.  Visual Fields are full on confrontation.  Hearing is grossly normal to finger rub.   Facial sensation intact.  Face symmetric.  Tongue midline.  Uvula elevates equally.    Motor: full strength throughout.  Sensation: intact to light touch and pinpoint.  Deep tendon reflexes: 2+ throughout. Negative for clonus. Negative for Carbajal's sign. No  dysmetria.      ASSESSMENT  52 year old female with metastatic renal cell carcinoma.  S/p left suboccipital craniotomy for resection of left cerebellar metastatic lesion.    As recommended, patient presents for the gamma knife radiosurgery treatment for the surgical/resection bed.  Please see the associated procedure note for details of the procedure.      PLAN  Gamma Knife Radiosurgery to the resection bed.      ADDENDUM  After the [place] frame placement and planning MRI of the brain, additional lesions were identified.  These findings were discussed with Dr. Cuellar.  These additional lesions will also be treated with Gamma Knife Radiosurgery.

## 2018-04-10 NOTE — LETTER
4/10/2018      RE: Suzi Gonsales  [address]  [place] [address]         HPI    INITIAL PATIENT ASSESSMENT    Diagnosis: RCC    Prior radiation therapy: Gamma Knife    Prior chemotherapy: See records    Prior hormonal therapy:No    Pain Eval:  Current history of pain associated with this visit:   Intensity: 8/10  Current: aching  Location: Left shoulder and arm  Treatment: Percocet    Psychosocial  Living arrangements: Lives  and family  Fall Risk: independent   referral needs: Not needed    Advanced Directive: No  Implantable Cardiac Device? No    Nurse face-to-face time: Level 5:  over 15 min face to face time  Review of Systems   Constitutional: Positive for malaise/fatigue. Negative for chills, diaphoresis, fever and weight loss.   HENT: Negative for congestion, ear discharge, ear pain, hearing loss, nosebleeds, sinus pain, sore throat and tinnitus.    Eyes: Negative for blurred vision, double vision, photophobia, pain, discharge and redness.   Respiratory: Positive for cough and shortness of breath. Negative for hemoptysis, sputum production, wheezing and stridor.    Cardiovascular: Negative for chest pain, palpitations, orthopnea, claudication, leg swelling and PND.   Gastrointestinal: Positive for constipation, heartburn, nausea and vomiting. Negative for abdominal pain, blood in stool, diarrhea and melena.   Genitourinary: Negative for dysuria, flank pain, frequency, hematuria and urgency.   Musculoskeletal: Positive for back pain, joint pain and neck pain. Negative for falls and myalgias.   Skin: Negative for itching and rash.   Neurological: Positive for tingling, weakness and headaches. Negative for dizziness, tremors, sensory change, speech change, focal weakness, seizures and loss of consciousness.        Left arm and finger numbness   Endo/Heme/Allergies: Negative for environmental allergies and polydipsia. Does not bruise/bleed easily.   Psychiatric/Behavioral: Positive for  depression. Negative for hallucinations, memory loss, substance abuse and suicidal ideas. The patient is nervous/anxious. The patient does not have insomnia.                     Department of Radiation Oncology  [facility]  [address] [place] [address]  [phone]       Consultation Note    Name: Suzi Gonsales MRN: [id number]   : 1965   Date of Service: 4/10/2018  Referring: Dr. Green / medical oncology     Reason for consultation: Consideration of palliative radiotherapy for treatment of a metastatic renal cell carcinoma with systematic involvement of the left upper lobe     History of Present Illness   Ms. Gonsales is a 52 year old female with a metastatic renal cell carcinoma arising from the right kidney initially diagnosed in 2014 after she presents with right flank pain and hematuria.  Her initial staging studies demonstrated only metastatic disease confined to the lungs and, following evidence of disease progression after several serial imaging studies, began systemic therapy with IL-2, completing a total of 4 cycles in 2017. She was found once again to have disease progression on restaging studies and 2017 and completed 3 months of operative oh after which she was noted to have a symptomatic left cerebellar lesion.    Ms. Gonsales underwent a left craniotomy and tumor resection on 3/6/2018 followed by Gamma Knife radiosurgery on 2018 to the surgical resection cavity as well as four other small, incidentally discovered lesions. She was scheduled to begin systemic therapy with cabozantinib however, earlier this week, presented with worsening left shoulder and upper extremity pain and was referred to radiation oncology clinic for consideration of palliative treatment to this site.     On exam, Ms. Gonsales describes moderate to severe pain within the left upper extremity which she describes as a shooting-type pain radiating from her left shoulder down to her  fingertips. She rates this as a 7/10 in severity and has been progressively worsening over the past 1-2 weeks. Outside of her pain, which is her current most troublesome symptom, she describes ongoing fatigue, mild dyspnea and a non-productive cough with her remaining ROS otherwise mostly unremarkable.    Past Medical History:     Renal cell carcinoma    CVA    Anxiety    Past Surgical History:     Right open radical nephrectomy    Low transverse     Craniotomy and tumor excision    Chemotherapy History:  See HPI    Radiation History:  2018 Gamma Knife SRS    Left resection cavity: 18 Gy to the 50% isodose line    Left cerebellum: 22 Gy to the 50% isodose line    Right cerebellum: 22 Gy to the 50% isodose line    Left occipital: 22 Gy to the 60% isodose line    Left frontal: 22 Gy to the 80% isodose line    Pregnant: No  Implanted Cardiac Devices: No    Medications:  Current Outpatient Prescriptions   Medication Sig Dispense Refill     Cabozantinib S-Malate (CABOMETYX) 60 MG Take 1 tablet (60 mg) by mouth daily Take on an empty stomach 1 hour before or 2 hours after a meal. Avoid grapefruit and grapefruit juice. 30 tablet 0     oxyCODONE-acetaminophen (PERCOCET) 5-325 MG per tablet Take 1 tablet by mouth every 6 hours as needed for severe pain 60 tablet 0     OLANZapine (ZYPREXA) 5 MG tablet Take 1 tablet (5 mg) by mouth At Bedtime for 5 days 5 tablet 0     dexamethasone (DECADRON) 4 MG tablet Take 2 tablets (8 mg) by mouth daily (with breakfast) 10 tablet 0     HYDROcodone-acetaminophen (NORCO) 5-325 MG per tablet Take 1 tablet by mouth every 6 hours as needed for severe pain 60 tablet 0     methocarbamol (ROBAXIN) 500 MG tablet Take 2 tablets (1,000 mg) by mouth every 6 hours as needed for muscle spasms 30 tablet 0     aspirin 81 MG chewable tablet Take 1 tablet (81 mg) by mouth every morning 36 tablet      Probiotic Product (PROBIOTIC PO) Take by mouth daily (with lunch)       Cholecalciferol  (VITAMIN D PO) Take by mouth daily (with lunch)       Omega-3 Fatty Acids (OMEGA 3 PO) Take by mouth daily (with lunch)       Cyanocobalamin (VITAMIN B 12 PO) Take by mouth daily (with lunch)       Ascorbic Acid (VITAMIN C PO) Take by mouth daily (with lunch)       multivitamin, therapeutic with minerals (MULTI-VITAMIN) TABS tablet Take 1 tablet by mouth daily (with lunch)       MAGNESIUM PO Take by mouth At Bedtime       calcium carbonate (OS-MUNDO 500 MG [place]. CA) 1250 MG tablet Take 1 tablet by mouth daily (with lunch)       B Complex-C (SUPER B COMPLEX/VITAMIN C PO) Take by mouth daily (with lunch)       TURMERIC PO Take by mouth daily (with lunch)       sodium chloride 0.9 % SOLN 100 mL with nivolumab 100 MG/10ML SOLN Inject into the vein every 14 days       zolpidem (AMBIEN) 5 MG tablet Take 1 tablet (5 mg) by mouth nightly as needed for sleep 30 tablet 1     omeprazole (PRILOSEC) 40 MG capsule Take 1 capsule (40 mg) by mouth daily 30 capsule 1     atorvastatin (LIPITOR) 20 MG tablet Take 1 tablet (20 mg) by mouth daily 30 tablet 3     clonazePAM (KLONOPIN) 0.5 MG tablet TAKE 0.5-1 TABLETS BY MOUTH 2 TIMES DAILY AS NEEDED FOR ANXIETY 20 tablet 0     ALPRAZolam (XANAX) 0.5 MG tablet Take 1 tablet (0.5 mg) by mouth 3 times daily as needed for anxiety 60 tablet 0     lidocaine-prilocaine (EMLA) cream Apply 1 hour prior to port access. 60 g 1     citalopram (CELEXA) 20 MG tablet Take 1 tablet (20 mg) by mouth every evening (Patient taking differently: Take 20 mg by mouth At Bedtime ) 90 tablet 3     buPROPion (WELLBUTRIN XL) 300 MG 24 hr tablet Take 300 mg by mouth every morning         Allergies:    NKDA    Social History:  Tobacco: Former smoker. 0.5 packs per day ×10 years. Quit 1/2004  Alcohol: 1-2 drinks per week  Lives in [place], [place]    Family History:    Reviewed and noncontributory    Review of Systems   A 10-point review of systems was performed. Pertinent findings are noted in the HPI.    Physical Exam    ECOG Status: 1    Vitals:  B/P: 94/60  Weight: 57 kg  Pain: 7/10    Gen: Alert, in NAD  Head: NC/AT  Neck: Full ROM. Supple. There is an approximately 3 cm palpable mass within the left supraclavicular region. No additional palpable cervical adenopathy bilaterally.  Pulm: Diminished breath sounds within the left upper lobe. No wheezing, stridor or respiratory distress.  CV: RRR. No m/r/g. Extremities are warm and well-perfused.  Musculoskeletal: Normal bulk and tone  Skin: Normal color and turgor  Neuro: A/Ox3, normal gait    Imaging/Path/Labs   Imaging: Reviewed    Path: Reviewed    Labs: Reviewed    Assessment    Ms. Gonsales is a 52 year old female with a metastatic renal cell carcinoma with symptomatic disease progression involving the left upper lobe and supraclavicular region.    Plan   I had a long discussion with Ms. Gonsales regarding consideration of palliative treatment to the side. In brief, I recommend proceeding with a short-course (20 Gy/5 fractions) of radiotherapy to the left upper lobe pulmonary disease as well as the left supraclavicular metastasis with the goal of improved local disease control and possible alleviation of her presenting symptoms. I reviewed the potential acute and late-term radiation-induced side effects associated with thoracic and low cervical radiotherapy and answered her questions to her stated satisfaction. Informed consent was obtained today in clinic.    Following exam, a CT-simulation session was performed for radiotherapy planning purposes with her treatment to tentatively commence on Thursday, 4/12/2018. In the interim, she was provided with my contact information and instructed to call or return to our clinic at any time should she have further questions or concerns.    Romie Flores MD/PhD  Dept of Radiation Oncology  [facility]

## 2018-04-13 PROBLEM — R63.5 ABNORMAL WEIGHT GAIN: Status: ACTIVE | Noted: 2018-01-01

## 2018-04-13 NOTE — PROGRESS NOTES
Nursing Note  Suzi Gonsales presents today to [facility] for:   Chief Complaint   Patient presents with     Infusion     IVFs     During today's [facility] appointment, orders from Bharati Huffman NP were completed.  Frequency: once    Note: Bharati Huffman NP at bedside to assess patient today.     Progress note:  Patient identification verified by name and date of birth.  Assessment completed.  Vitals recorded in Doc Flowsheets.  Patient was provided with education regarding infusion and possible side effects.  Patient verbalized understanding.      needed: No  Premedications: were not ordered.  Infusion Rates: infusion given over approximately 2 hours. Aloxi given IV push over 2 minutes  Approximate Infusion length: 2 hours.   Labs: were drawn prior to appointment on 4/13 in [facility] prior to arrival. Copy of labs given to patient.   Vascular access: port accessed prior to appointment at [facility] on 4/13 in [facility] lab.  Treatment Conditions: non applicable  Patient tolerated infusion: well.    Discharge Plan:   Follow up plan of care with: ongoing infusions at [facility].  Discharge instructions were reviewed with patient.  Patient/representative verbalized understanding of discharge instructions and all questions answered.  Patient discharged from [facility] in stable condition.    Danita Larsen RN    Administrations This Visit     0.9% sodium chloride BOLUS     Admin Date Action Dose Route Administered By             04/13/2018 New Bag 1000 mL Intravenous Danita Larsen RN                    palonosetron (ALOXI) injection 0.25 mg     Admin Date Action Dose Route Administered By             04/13/2018 Given 0.25 mg Intravenous Danita Larsen RN                          /42 (BP Location: Left arm)  Pulse 90  LMP 12/20/2013

## 2018-04-13 NOTE — MR AVS SNAPSHOT
After Visit Summary   4/13/2018    Suzi Gonsales    MRN: [id number]           Patient Information     Date Of Birth          1965        Visit Information        Provider Department      4/13/2018 3:30 PM Bharati Huffman APRN CNP [facility]        Today's Diagnoses     Fatigue, unspecified type    -  1    Metastatic renal cell carcinoma to lung, right (H)        Brain metastasis (H)        Abnormal weight gain        Dehydration        Nausea and vomiting, intractability of vomiting not specified, unspecified vomiting type        Renal cell carcinoma, right (H)        Hypercalcemia           Follow-ups after your visit        Your next 10 appointments already scheduled     Apr 14, 2018 10:30 AM CDT   EXTERNAL RADIATION TREATMENT with [facility] RAD ONC ELEKTA   Radiation Oncology Clinic ([facility])    [facility]  1st Floor  [address]  [facility] [address]   [phone]            Apr 15, 2018 10:30 AM CDT   EXTERNAL RADIATION TREATMENT with [facility] RAD ONC ELEKTA   Radiation Oncology Clinic ([facility])    [facility]  1st Floor  [address]  [facility] [address]   [phone]            Apr 16, 2018  1:30 PM CDT   EXTERNAL RADIATION TREATMENT with [facility] RAD ONC ELEKTA   Radiation Oncology Clinic ([facility])    [facility]  1st Floor  [address]  [facility] [address]   [phone]            Apr 16, 2018  2:45 PM CDT   [facility] Draw with  [facility] DRAW   [facility] Lab Draw ([facility])    [address]  [address]  [facility] [id number] [phone]            Apr 16, 2018  3:10 PM CDT   (Arrive by 2:55 PM)   Return Visit with Day Ren PA-C   [facility] ([facility])    [address]  [address]  [facility] [id number] [phone]             Jun 05, 2018 11:00 AM CDT   (Arrive by 10:45 AM)   MR BRAIN W/O & W CONTRAST with UUMR2   [facility] MRI ([facility] [place] [place])    [address] [address] [phone]           Take your medicines as usual, unless your doctor tells you not to. Bring a list of your current medicines to your exam (including vitamins, minerals and over-the-counter drugs).  You may or may not receive intravenous (IV) contrast for this exam pending the discretion of the Radiologist.  You do not need to do anything special to prepare.  The MRI machine uses a strong magnet. Please wear clothes without metal (snaps, zippers). A sweatsuit works well, or we may give you a hospital gown.  Please remove any body piercings and hair extensions before you arrive. You will also remove watches, jewelry, hairpins, wallets, dentures, partial dental plates and hearing aids. You may wear contact lenses, and you may be able to wear your rings. We have a safe place to keep your personal items, but it is safer to leave them at home.  **IMPORTANT** THE INSTRUCTIONS BELOW ARE ONLY FOR THOSE PATIENTS WHO HAVE BEEN PRESCRIBED SEDATION OR GENERAL ANESTHESIA DURING THEIR MRI PROCEDURE:  IF YOUR DOCTOR PRESCRIBED ORAL SEDATION (take medicine to help you relax during your exam):   You must get the medicine from your doctor (oral medication) before you arrive. Bring the medicine to the exam. Do not take it at home. You ll be told when to take it upon arriving for your exam.   Arrive one hour early. Bring someone who can take you home after the test. Your medicine will make you sleepy. After the exam, you may not drive, take a bus or take a taxi by yourself.  IF YOUR DOCTOR PRESCRIBED IV SEDATION:   Arrive one hour early. Bring someone who can take you home after the test. Your medicine will make you sleepy. After the exam, you may not drive, take a bus or take a taxi by yourself.   No  eating 6 hours before your exam. You may have clear liquids up until 4 hours before your exam. (Clear liquids include water, clear tea, black coffee and fruit juice without pulp.)  IF YOUR DOCTOR PRESCRIBED ANESTHESIA (be asleep for your exam):   Arrive 1 1/2 hours early. Bring someone who can take you home after the test. You may not drive, take a bus or take a taxi by yourself.   No eating 8 hours before your exam. You may have clear liquids up until 4 hours before your exam. (Clear liquids include water, clear tea, black coffee and fruit juice without pulp.)   You will spend four to five hours in the recovery room.  Please call the Imaging Department at your exam site with any questions.            Jun 05, 2018  1:00 PM CDT   Return Visit with Huyen Cuellar MD   Radiation Oncology Clinic ([facility])    [facility]  1st Floor  [address] [address] [phone]              Who to contact     If you have questions or need follow up information about today's clinic visit or your schedule please contact [facility] directly at [phone].  Normal or non-critical lab and imaging results will be communicated to you by MyChart, letter or phone within 4 business days after the clinic has received the results. If you do not hear from us within 7 days, please contact the clinic through [employer] or phone. If you have a critical or abnormal lab result, we will notify you by phone as soon as possible.  Submit refill requests through [employer] or call your pharmacy and they will forward the refill request to us. Please allow 3 business days for your refill to be completed.          Additional Information About Your Visit        [employer] Information     [employer] gives you secure access to your electronic health record. If you see a primary care provider, you can also send messages to your care team and make appointments. If you have questions,  please call your primary care clinic.  If you do not have a primary care provider, please call [phone] and they will assist you.        Care EveryWhere ID     This is your Care EveryWhere ID. This could be used by other organizations to access your [facility] medical records  [id number]        Your Vitals Were     Pulse Temperature Respirations Last Period Pulse Oximetry BMI (Body Mass Index)    100 97.8  F (36.6  C) (Oral) 18 12/20/2013 96% 22.69 kg/m2       Blood Pressure from Last 3 Encounters:   04/13/18 109/42   04/13/18 109/43   04/10/18 94/60    Weight from Last 3 Encounters:   04/13/18 56.3 kg (124 lb 1.6 oz)   04/10/18 57 kg (125 lb 9.6 oz)   04/09/18 57.3 kg (126 lb 4.8 oz)              We Performed the Following     *CBC with platelets differential     Calcium ionized     Comprehensive metabolic panel     TSH with free T4 reflex          Today's Medication Changes          These changes are accurate as of 4/13/18  5:22 PM.  If you have any questions, ask your nurse or doctor.               These medicines have changed or have updated prescriptions.        Dose/Directions    citalopram 20 MG tablet   Commonly known as:  celeXA   This may have changed:  when to take this   Used for:  Insomnia, unspecified type        Dose:  20 mg   Take 1 tablet (20 mg) by mouth every evening   Quantity:  90 tablet   Refills:  3       dexamethasone 4 MG tablet   Commonly known as:  DECADRON   This may have changed:    - how much to take  - how to take this  - when to take this  - additional instructions   Used for:  Renal cell carcinoma, right (H), Hypercalcemia   Changed by:  Bharati Huffman, APRN CNP        Take 4 mg with breakfast and lunch x 3 days, then decrease to 4 mg daily with breakfast   Quantity:  30 tablet   Refills:  0            Where to get your medicines      These medications were sent to [id number] - [place], [place] - [address] AT [facility]  [address],  [place] [address]    Hours:  24-hours Phone:  [phone]     dexamethasone 4 MG tablet                Primary Care Provider Office Phone # Fax #    ARIEL Carter [facility] [phone] [address] [place]  [facility] [address]        Equal Access to Services     ROSAURA INTERIANO : Hadii aad ku hadasho Soomaali, waaxda luqadaha, qaybta kaalmada adeegyada, waxay idiin hayaan aderohit khbarrysh laandrew . So [facility] [phone].    ATENCIÓN: Si habla español, tiene a chacon disposición servicios gratuitos de asistencia lingüística. Mario al [phone].    We comply with applicable federal civil rights laws and [place] laws. We do not discriminate on the basis of race, color, national origin, age, disability, sex, sexual orientation, or gender identity.            Thank you!     Thank you for choosing [facility]  for your care. Our goal is always to provide you with excellent care. Hearing back from our patients is one way we can continue to improve our services. Please take a few minutes to complete the written survey that you may receive in the mail after your visit with us. Thank you!             Your Updated Medication List - Protect others around you: Learn how to safely use, store and throw away your medicines at www.disposemymeds.org.          This list is accurate as of 4/13/18  5:22 PM.  Always use your most recent med list.                   Brand Name Dispense Instructions for use Diagnosis    ALPRAZolam 0.5 MG tablet    XANAX    60 tablet    Take 1 tablet (0.5 mg) by mouth 3 times daily as needed for anxiety    Renal cell carcinoma, unspecified laterality (H), Mass of upper lobe of left lung       aspirin 81 MG chewable tablet     36 tablet    Take 1 tablet (81 mg) by mouth every morning    Pre-op evaluation, Brain metastases (H)       atorvastatin 20 MG tablet    LIPITOR    30 tablet    Take 1 tablet (20 mg) by mouth daily    History of stroke       buPROPion 300 MG  24 hr tablet    WELLBUTRIN XL     Take 300 mg by mouth every morning        * Cabozantinib S-Malate 60 MG    CABOMETYX    30 tablet    Take 1 tablet (60 mg) by mouth daily Take on an empty stomach 1 hour before or 2 hours after a meal. Avoid grapefruit and grapefruit juice.    Brain metastasis (H), Metastatic renal cell carcinoma to lung, right (H), Malignant neoplasm of right kidney (H)       * Cabozantinib S-Malate 40 MG    CABOMETYX    30 tablet    Take 1 tablet (40 mg) by mouth daily Take on an empty stomach 1 hour before or 2 hours after a meal. Avoid grapefruit and grapefruit juice.    Brain metastasis (H), Metastatic renal cell carcinoma to lung, right (H), Malignant neoplasm of right kidney (H)       calcium carbonate 1250 MG tablet    OS-MUNDO 500 mg [place]. Ca     Take 1 tablet by mouth daily (with lunch)    Pre-op evaluation, Brain metastases (H)       citalopram 20 MG tablet    celeXA    90 tablet    Take 1 tablet (20 mg) by mouth every evening    Insomnia, unspecified type       clonazePAM 0.5 MG tablet    klonoPIN    20 tablet    TAKE 0.5-1 TABLETS BY MOUTH 2 TIMES DAILY AS NEEDED FOR ANXIETY    Panic attack       dexamethasone 4 MG tablet    DECADRON    30 tablet    Take 4 mg with breakfast and lunch x 3 days, then decrease to 4 mg daily with breakfast    Renal cell carcinoma, right (H), Hypercalcemia       HYDROcodone-acetaminophen 5-325 MG per tablet    NORCO    60 tablet    Take 1 tablet by mouth every 6 hours as needed for severe pain    Renal cell carcinoma, right (H), Chest pain       lidocaine-prilocaine cream    EMLA    60 g    Apply 1 hour prior to port access.    Malignant neoplasm of right kidney (H)       MAGNESIUM PO      Take by mouth At Bedtime    Pre-op evaluation, Brain metastases (H)       methocarbamol 500 MG tablet    ROBAXIN    30 tablet    Take 2 tablets (1,000 mg) by mouth every 6 hours as needed for muscle spasms    S/P craniotomy       Multi-vitamin Tabs tablet      Take 1 tablet  by mouth daily (with lunch)    Pre-op evaluation, Brain metastases (H)       OLANZapine 5 MG tablet    zyPREXA    5 tablet    Take 1 tablet (5 mg) by mouth At Bedtime for 5 days    Renal cell carcinoma, right (H), Hypercalcemia       OMEGA 3 PO      Take by mouth daily (with lunch)    Pre-op evaluation, Brain metastases (H)       omeprazole 40 MG capsule    priLOSEC    30 capsule    Take 1 capsule (40 mg) by mouth daily    Cerebral edema (H)       oxyCODONE-acetaminophen 5-325 MG per tablet    PERCOCET    60 tablet    Take 1 tablet by mouth every 6 hours as needed for severe pain    Malignant neoplasm of right kidney (H)       PROBIOTIC PO      Take by mouth daily (with lunch)    Pre-op evaluation, Brain metastases (H)       sodium chloride 0.9 % SOLN 100 mL with nivolumab 100 MG/10ML SOLN      Inject into the vein every 14 days    Pre-op evaluation, Brain metastases (H)       SUPER B COMPLEX/VITAMIN C PO      Take by mouth daily (with lunch)    Pre-op evaluation, Brain metastases (H)       TURMERIC PO      Take by mouth daily (with lunch)    Pre-op evaluation, Brain metastases (H)       VITAMIN B 12 PO      Take by mouth daily (with lunch)    Pre-op evaluation, Brain metastases (H)       VITAMIN C PO      Take by mouth daily (with lunch)    Pre-op evaluation, Brain metastases (H)       VITAMIN D PO      Take by mouth daily (with lunch)    Pre-op evaluation, Brain metastases (H)       zolpidem 5 MG tablet    AMBIEN    30 tablet    Take 1 tablet (5 mg) by mouth nightly as needed for sleep    Sleep disorder       * Notice:  This list has 2 medication(s) that are the same as other medications prescribed for you. Read the directions carefully, and ask your doctor or other care provider to review them with you.

## 2018-04-13 NOTE — PROGRESS NOTES
Reason for Visit: seen on an add-on basis for nausea and weakness    Oncology HPI:   Suzi presented to ED with hematuria and right flank pain thinking she had a renal stone in December 2014. She was worked up with a CT abd/pelvis which suggested a renal mass. She was referred to Dr. aMx Zelaya in [facility]. 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 Per charts tumor resection had negative margins and it was staged at pT2bN0.     Her staging work up was negative except for pulmonary nodules. She has been followed every 6 months with scans. CT CAP on 5/11/17 showed enlarging hilar LAD, enlarging pulmonary nodules, adrenal nodules and a pancreatic body mass. Biopsies of hilar LN, adrenal nodule and pancreatic mass were + for RCC. She decided to pursue IL-2, of which she received 4 cycles, last on 8/15/17. CT CAP on 9/13/17 showed generally stable disease. CT CAP on 11/22/17 showed disease progression.  She completed three months of Opdivo and then was noted to have a cerebellar lesion and underwent a craniotomy on 3/6/18. She underwent gamma knife to lesions in the left resection cavity, left cerebellum, right cerebellum, left occipital and left frontal lesions on 4/5/18. She was also found to have progression systemically and was started on Cabozantonib on 4/9/18. She was doing poorly with nausea, weakness, hypercalcemia at her last visit. She was treated with zometa, IVF, dexamethasone. She met with [facility] to discuss palliative radiation to the undergoing XRT to the MERARY and left supraclavicular metastasis with the goal of improved local disease control and alleviation of pain. She initiated a short-course (20 Gy/5 fractions) yesterday.     Interval history: Suzi has been feeling very tired and weak. This all started about 3 weeks ago. She hasn't felt significantly improved with steroids or IVF, but notes that she didn't follow  directions on the steroids, she took 4 mg for 1-2 days then went down to 2 mg for a couple days then off. Has occasional nausea, controlled with compazine. Appetite has not been good. Feels full when drinking fluids, takes only about 4 cups/day. Hasn't had much to eat in several days. Had a few almonds and pretzels today. Vomited once after zometa was infused, but otherwise no vomiting. Has increased bloating at times. Was constipated, no BM x 4 days, but took a laxative and had diarrhea stools. Bowels returning to normal now. No focal weakness, vision changes, headache. Has generalized weakness, feels the need to lay down. Feels sad, like she isn't worth anything to her family. Is , has 2 sons age 16 and 18. She notes her family is supportive and also very sad with her. Has a history of anxiety/depression with previous good control on Celexa and Wellbutrin. Takes either xanax or klonopin for acute anxiety, but hasn't felt the need for that much. Has pain in the left shoulder and left supraclavicular area, taking hydrocodone-APAP with some relief. Pain radiates through the left arm/hand. Urination is normal. No bleeding,bruising, rash, edema. No fevers/chills. No vision/hearing/speech changes.    Current Outpatient Prescriptions   Medication Sig Dispense Refill     Cabozantinib S-Malate (CABOMETYX) 40 MG Take 1 tablet (40 mg) by mouth daily Take on an empty stomach 1 hour before or 2 hours after a meal. Avoid grapefruit and grapefruit juice. 30 tablet 0     Cabozantinib S-Malate (CABOMETYX) 60 MG Take 1 tablet (60 mg) by mouth daily Take on an empty stomach 1 hour before or 2 hours after a meal. Avoid grapefruit and grapefruit juice. 30 tablet 0     oxyCODONE-acetaminophen (PERCOCET) 5-325 MG per tablet Take 1 tablet by mouth every 6 hours as needed for severe pain 60 tablet 0     OLANZapine (ZYPREXA) 5 MG tablet Take 1 tablet (5 mg) by mouth At Bedtime for 5 days 5 tablet 0     dexamethasone (DECADRON) 4 MG  tablet Take 2 tablets (8 mg) by mouth daily (with breakfast) 10 tablet 0     HYDROcodone-acetaminophen (NORCO) 5-325 MG per tablet Take 1 tablet by mouth every 6 hours as needed for severe pain 60 tablet 0     methocarbamol (ROBAXIN) 500 MG tablet Take 2 tablets (1,000 mg) by mouth every 6 hours as needed for muscle spasms 30 tablet 0     aspirin 81 MG chewable tablet Take 1 tablet (81 mg) by mouth every morning 36 tablet      Probiotic Product (PROBIOTIC PO) Take by mouth daily (with lunch)       Cholecalciferol (VITAMIN D PO) Take by mouth daily (with lunch)       Omega-3 Fatty Acids (OMEGA 3 PO) Take by mouth daily (with lunch)       Cyanocobalamin (VITAMIN B 12 PO) Take by mouth daily (with lunch)       Ascorbic Acid (VITAMIN C PO) Take by mouth daily (with lunch)       multivitamin, therapeutic with minerals (MULTI-VITAMIN) TABS tablet Take 1 tablet by mouth daily (with lunch)       MAGNESIUM PO Take by mouth At Bedtime       calcium carbonate (OS-MUNDO 500 MG [place]. [place]) 1250 MG tablet Take 1 tablet by mouth daily (with lunch)       B Complex-C (SUPER B COMPLEX/VITAMIN C PO) Take by mouth daily (with lunch)       TURMERIC PO Take by mouth daily (with lunch)       sodium chloride 0.9 % SOLN 100 mL with nivolumab 100 MG/10ML SOLN Inject into the vein every 14 days       zolpidem (AMBIEN) 5 MG tablet Take 1 tablet (5 mg) by mouth nightly as needed for sleep 30 tablet 1     omeprazole (PRILOSEC) 40 MG capsule Take 1 capsule (40 mg) by mouth daily 30 capsule 1     atorvastatin (LIPITOR) 20 MG tablet Take 1 tablet (20 mg) by mouth daily 30 tablet 3     clonazePAM (KLONOPIN) 0.5 MG tablet TAKE 0.5-1 TABLETS BY MOUTH 2 TIMES DAILY AS NEEDED FOR ANXIETY 20 tablet 0     ALPRAZolam (XANAX) 0.5 MG tablet Take 1 tablet (0.5 mg) by mouth 3 times daily as needed for anxiety 60 tablet 0     lidocaine-prilocaine (EMLA) cream Apply 1 hour prior to port access. 60 g 1     citalopram (CELEXA) 20 MG tablet Take 1 tablet (20 mg) by  mouth every evening (Patient taking differently: Take 20 mg by mouth At Bedtime ) 90 tablet 3     buPROPion (WELLBUTRIN XL) 300 MG 24 hr tablet Take 300 mg by mouth every morning           No Known Allergies      Exam: alert, appears tired, tearful at times. Blood pressure (!) 84/54, pulse 94, temperature 97.8  F (36.6  C), temperature source Oral, resp. rate 18, weight 56.3 kg (124 lb 1.6 oz), last menstrual period 12/20/2013, SpO2 96 %, not currently breastfeeding.  Wt Readings from Last 4 Encounters:   04/13/18 56.3 kg (124 lb 1.6 oz)   04/10/18 57 kg (125 lb 9.6 oz)   04/09/18 57.3 kg (126 lb 4.8 oz)   04/04/18 59 kg (130 lb 1.1 oz)     Oropharynx is moist, no focal lesion. No icterus. PERRL. Neck supple, tender left supraclavicular mass measures about 2 cm. Lungs:CTA. Heart:RRR, no murmur or rub. Abdomen: soft, nontender, BS active. No masses or organomegaly. Extremities: warm, no edema. Speech is clear. CN wnl.    Labs:   Results for MICKEY AMEZCUA (MRN [id number]) as of 4/13/2018 16:48   Ref. Range 4/13/2018 14:53   Sodium Latest Ref Range: 133 - 144 mmol/L 136   Potassium Latest Ref Range: 3.4 - 5.3 mmol/L 3.9   Chloride Latest Ref Range: 94 - 109 mmol/L 105   Carbon Dioxide Latest Ref Range: 20 - 32 mmol/L 20   Urea Nitrogen Latest Ref Range: 7 - 30 mg/dL 13   Creatinine Latest Ref Range: 0.52 - 1.04 mg/dL 1.03   GFR Estimate Latest Ref Range: >60 mL/min/1.7m2 56 (L)   GFR Estimate If Black Latest Ref Range: >60 mL/min/1.7m2 68   Calcium Latest Ref Range: 8.5 - 10.1 mg/dL 8.4 (L)   Anion Gap Latest Ref Range: 3 - 14 mmol/L 12   Albumin Latest Ref Range: 3.4 - 5.0 g/dL 2.8 (L)   Protein Total Latest Ref Range: 6.8 - 8.8 g/dL 6.7 (L)   Bilirubin Total Latest Ref Range: 0.2 - 1.3 mg/dL 0.3   Alkaline Phosphatase Latest Ref Range: 40 - 150 U/L 69   ALT Latest Ref Range: 0 - 50 U/L 17   AST Latest Ref Range: 0 - 45 U/L 26   Calcium Ionized Latest Ref Range: 4.4 - 5.2 mg/dL 4.6   Glucose Latest Ref Range: 70 - 99  mg/dL 116 (H)   WBC Latest Ref Range: 4.0 - 11.0 10e9/L 7.8   Hemoglobin Latest Ref Range: 11.7 - 15.7 g/dL 10.7 (L)   Hematocrit Latest Ref Range: 35.0 - 47.0 % 33.0 (L)   Platelet Count Latest Ref Range: 150 - 450 10e9/L 554 (H)   RBC Count Latest Ref Range: 3.8 - 5.2 10e12/L 3.50 (L)   MCV Latest Ref Range: 78 - 100 fl 94   MCH Latest Ref Range: 26.5 - 33.0 pg 30.6   MCHC Latest Ref Range: 31.5 - 36.5 g/dL 32.4   RDW Latest Ref Range: 10.0 - 15.0 % 15.9 (H)   Diff Method Unknown Manual Differential   % Neutrophils Latest Units: % 80.4   % Lymphocytes Latest Units: % 10.6   % Monocytes Latest Units: % 2.7   % Eosinophils Latest Units: % 1.8   % Basophils Latest Units: % 1.8   % Myelocytes Latest Units: % 1.8   % Promyelocytes Latest Units: % 0.9   Absolute Neutrophil Latest Ref Range: 1.6 - 8.3 10e9/L 6.3   Absolute Lymphocytes Latest Ref Range: 0.8 - 5.3 10e9/L 0.8   Absolute Monocytes Latest Ref Range: 0.0 - 1.3 10e9/L 0.2   Absolute Eosinophils Latest Ref Range: 0.0 - 0.7 10e9/L 0.1   Absolute Basophils Latest Ref Range: 0.0 - 0.2 10e9/L 0.1   Absolute Myelocytes Latest Ref Range: 0 10e9/L 0.1 (H)   Absolute Promyelocytes Latest Ref Range: 0 10e9/L 0.1 (H)   Anisocytosis Unknown Slight   Platelet Estimate Unknown Confirming automa...       Impression/plan:    Profound fatigue/weakness in the setting of progressive metastatic RCC, recent hx of hypercalcemia of malignancy, dehydration    1. Weakness/fatigue, likely multifactorial, volume depletion, cancer progression, sub-optimal nutrition, increased stress/depression  -discussed potential causes, recommended we give IVF, optimize nutrition and resume a trial of dexamethasone 4mg bid x 3 days, then 4 mg daily, then taper after reviewing with Kia Ren PA-C on Monday  -will add on TSH to labs today as she could have immunotoxicities from nivolumab impacting her thyroid functions    2. FEN:   -volume depleted: was quite hypotensive upon arriving to clinic and  improved with 1 L NS, will give an additional liter today (total 2 liters)  -anorexia/malnutrition: reviewed potential causes and treatments. Would like to resume dexamethasone as above. Discussed use or marinol, but not optimal choice as it may increase fatigue  -discussed nutrition/hydration strategies. She will add more protein snacks, shakes through out the day, goal of 6 small meals/day. Discussed pushing oral hydration--needs 64 ounces of fluid    3. Depression/anxiety:  -on celexa and wellbutrin with previously good control, but increased depression with news of progression  -she has been referred to counseling and noted she has a future appointment. I don't see that scheduled here. Will f/u at future appointment  -reviewed that she should not be using klonipin and xanax concurrently (she is not doing that currently)    4. L shoulder/neck pain r/t tumor  -on palliative XRT to MERARY and L supraclavicular area, started a 5 day course on 4/12/18    5. Brain mets: s/p resection of L craniotomy on 3/6/17, s/p gamma knife to resection bed and 4 other small lesions identified  -will need repeat MRI in about 3 months    6. Metastatic RCC: progression on nivolumab, planning to initiate cabozantinib next week  -she is very worried about side effects and intolerance. I reviewed potential side effects of hypertension, N/V, diarrhea, fatigue, decreased appetite, skin rash, hand/foot toxicity. She thinks she will be getting a lower starting dose.I'm not clear on that, but she will review with Kia on Monday    7. Nausea: mild, controlled with compazine. Was given aloxi today  -reviewed that she should take may take antiacid while on dexamethasone to prevent gastritis

## 2018-04-13 NOTE — NURSING NOTE
Chief Complaint   Patient presents with     Port Draw     Labs drawn via port by RN. Line flushed and hep locked. VS taken. Hypotensive, weak, nauseous, dizzy. Provider informed. Other VS stable.     Ruby Ray RN

## 2018-04-13 NOTE — TELEPHONE ENCOUNTER
"Pt calls asking to be added on this afternoon for symptoms and fluids. She reports feeling very weak, fatigued, tired, no appetite, intermittent nausea without vomiting, body aches. She was started on dex and is now on the taper but she doesn't feel she ever got the full effect of the dex. She went back up to 4 mg today as DR Green told her she could do. Pt was seen 4/9 with similar symptoms. \"I thought I'd feel better by now but just don't.\"  She will see Bharati this afternoon with labs and infusion.   "

## 2018-04-13 NOTE — LETTER
4/13/2018       RE: Suzi Gonsales  [address] [place] [address]     Dear Colleague,    Thank you for referring your patient, uSzi Gonsales, to the [facility]. Please see a copy of my visit note below.    Reason for Visit: seen on an add-on basis for nausea and weakness    Oncology HPI:   Suzi presented to ED with hematuria and right flank pain thinking she had a renal stone in December 2014. She was worked up with a CT abd/pelvis which suggested a renal mass. She was referred to Dr. Max Zelaya in [facility]. 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 Per charts tumor resection had negative margins and it was staged at pT2bN0.     Her staging work up was negative except for pulmonary nodules. She has been followed every 6 months with scans. CT CAP on 5/11/17 showed enlarging hilar LAD, enlarging pulmonary nodules, adrenal nodules and a pancreatic body mass. Biopsies of hilar LN, adrenal nodule and pancreatic mass were + for RCC. She decided to pursue IL-2, of which she received 4 cycles, last on 8/15/17. CT CAP on 9/13/17 showed generally stable disease. CT CAP on 11/22/17 showed disease progression.  She completed three months of Opdivo and then was noted to have a cerebellar lesion and underwent a craniotomy on 3/6/18. She underwent gamma knife to lesions in the left resection cavity, left cerebellum, right cerebellum, left occipital and left frontal lesions on 4/5/18. She was also found to have progression systemically and was started on Cabozantonib on 4/9/18. She was doing poorly with nausea, weakness, hypercalcemia at her last visit. She was treated with zometa, IVF, dexamethasone. She met with [facility] to discuss palliative radiation to the undergoing XRT to the MERARY and left supraclavicular metastasis with the goal of improved local disease control and alleviation of pain. She initiated a short-course  (20 Gy/5 fractions) yesterday.     Interval history: Suzi has been feeling very tired and weak. This all started about 3 weeks ago. She hasn't felt significantly improved with steroids or IVF, but notes that she didn't follow directions on the steroids, she took 4 mg for 1-2 days then went down to 2 mg for a couple days then off. Has occasional nausea, controlled with compazine. Appetite has not been good. Feels full when drinking fluids, takes only about 4 cups/day. Hasn't had much to eat in several days. Had a few almonds and pretzels today. Vomited once after zometa was infused, but otherwise no vomiting. Has increased bloating at times. Was constipated, no BM x 4 days, but took a laxative and had diarrhea stools. Bowels returning to normal now. No focal weakness, vision changes, headache. Has generalized weakness, feels the need to lay down. Feels sad, like she isn't worth anything to her family. Is , has 2 sons age 16 and 18. She notes her family is supportive and also very sad with her. Has a history of anxiety/depression with previous good control on Celexa and Wellbutrin. Takes either xanax or klonopin for acute anxiety, but hasn't felt the need for that much. Has pain in the left shoulder and left supraclavicular area, taking hydrocodone-APAP with some relief. Pain radiates through the left arm/hand. Urination is normal. No bleeding,bruising, rash, edema. No fevers/chills. No vision/hearing/speech changes.    Current Outpatient Prescriptions   Medication Sig Dispense Refill     Cabozantinib S-Malate (CABOMETYX) 40 MG Take 1 tablet (40 mg) by mouth daily Take on an empty stomach 1 hour before or 2 hours after a meal. Avoid grapefruit and grapefruit juice. 30 tablet 0     Cabozantinib S-Malate (CABOMETYX) 60 MG Take 1 tablet (60 mg) by mouth daily Take on an empty stomach 1 hour before or 2 hours after a meal. Avoid grapefruit and grapefruit juice. 30 tablet 0     oxyCODONE-acetaminophen (PERCOCET)  5-325 MG per tablet Take 1 tablet by mouth every 6 hours as needed for severe pain 60 tablet 0     OLANZapine (ZYPREXA) 5 MG tablet Take 1 tablet (5 mg) by mouth At Bedtime for 5 days 5 tablet 0     dexamethasone (DECADRON) 4 MG tablet Take 2 tablets (8 mg) by mouth daily (with breakfast) 10 tablet 0     HYDROcodone-acetaminophen (NORCO) 5-325 MG per tablet Take 1 tablet by mouth every 6 hours as needed for severe pain 60 tablet 0     methocarbamol (ROBAXIN) 500 MG tablet Take 2 tablets (1,000 mg) by mouth every 6 hours as needed for muscle spasms 30 tablet 0     aspirin 81 MG chewable tablet Take 1 tablet (81 mg) by mouth every morning 36 tablet      Probiotic Product (PROBIOTIC PO) Take by mouth daily (with lunch)       Cholecalciferol (VITAMIN D PO) Take by mouth daily (with lunch)       Omega-3 Fatty Acids (OMEGA 3 PO) Take by mouth daily (with lunch)       Cyanocobalamin (VITAMIN B 12 PO) Take by mouth daily (with lunch)       Ascorbic Acid (VITAMIN C PO) Take by mouth daily (with lunch)       multivitamin, therapeutic with minerals (MULTI-VITAMIN) TABS tablet Take 1 tablet by mouth daily (with lunch)       MAGNESIUM PO Take by mouth At Bedtime       calcium carbonate (OS-MUNDO 500 MG [place]. CA) 1250 MG tablet Take 1 tablet by mouth daily (with lunch)       B Complex-C (SUPER B COMPLEX/VITAMIN C PO) Take by mouth daily (with lunch)       TURMERIC PO Take by mouth daily (with lunch)       sodium chloride 0.9 % SOLN 100 mL with nivolumab 100 MG/10ML SOLN Inject into the vein every 14 days       zolpidem (AMBIEN) 5 MG tablet Take 1 tablet (5 mg) by mouth nightly as needed for sleep 30 tablet 1     omeprazole (PRILOSEC) 40 MG capsule Take 1 capsule (40 mg) by mouth daily 30 capsule 1     atorvastatin (LIPITOR) 20 MG tablet Take 1 tablet (20 mg) by mouth daily 30 tablet 3     clonazePAM (KLONOPIN) 0.5 MG tablet TAKE 0.5-1 TABLETS BY MOUTH 2 TIMES DAILY AS NEEDED FOR ANXIETY 20 tablet 0     ALPRAZolam (XANAX) 0.5 MG  tablet Take 1 tablet (0.5 mg) by mouth 3 times daily as needed for anxiety 60 tablet 0     lidocaine-prilocaine (EMLA) cream Apply 1 hour prior to port access. 60 g 1     citalopram (CELEXA) 20 MG tablet Take 1 tablet (20 mg) by mouth every evening (Patient taking differently: Take 20 mg by mouth At Bedtime ) 90 tablet 3     buPROPion (WELLBUTRIN XL) 300 MG 24 hr tablet Take 300 mg by mouth every morning           No Known Allergies      Exam: alert, appears tired, tearful at times. Blood pressure (!) 84/54, pulse 94, temperature 97.8  F (36.6  C), temperature source Oral, resp. rate 18, weight 56.3 kg (124 lb 1.6 oz), last menstrual period 12/20/2013, SpO2 96 %, not currently breastfeeding.  Wt Readings from Last 4 Encounters:   04/13/18 56.3 kg (124 lb 1.6 oz)   04/10/18 57 kg (125 lb 9.6 oz)   04/09/18 57.3 kg (126 lb 4.8 oz)   04/04/18 59 kg (130 lb 1.1 oz)     Oropharynx is moist, no focal lesion. No icterus. PERRL. Neck supple, tender left supraclavicular mass measures about 2 cm. Lungs:CTA. Heart:RRR, no murmur or rub. Abdomen: soft, nontender, BS active. No masses or organomegaly. Extremities: warm, no edema. Speech is clear. CN wnl.    Labs:   Results for MICKEY AMEZCUA (MRN [id number]) as of 4/13/2018 16:48   Ref. Range 4/13/2018 14:53   Sodium Latest Ref Range: 133 - 144 mmol/L 136   Potassium Latest Ref Range: 3.4 - 5.3 mmol/L 3.9   Chloride Latest Ref Range: 94 - 109 mmol/L 105   Carbon Dioxide Latest Ref Range: 20 - 32 mmol/L 20   Urea Nitrogen Latest Ref Range: 7 - 30 mg/dL 13   Creatinine Latest Ref Range: 0.52 - 1.04 mg/dL 1.03   GFR Estimate Latest Ref Range: >60 mL/min/1.7m2 56 (L)   GFR Estimate If Black Latest Ref Range: >60 mL/min/1.7m2 68   Calcium Latest Ref Range: 8.5 - 10.1 mg/dL 8.4 (L)   Anion Gap Latest Ref Range: 3 - 14 mmol/L 12   Albumin Latest Ref Range: 3.4 - 5.0 g/dL 2.8 (L)   Protein Total Latest Ref Range: 6.8 - 8.8 g/dL 6.7 (L)   Bilirubin Total Latest Ref Range: 0.2 - 1.3 mg/dL  0.3   Alkaline Phosphatase Latest Ref Range: 40 - 150 U/L 69   ALT Latest Ref Range: 0 - 50 U/L 17   AST Latest Ref Range: 0 - 45 U/L 26   Calcium Ionized Latest Ref Range: 4.4 - 5.2 mg/dL 4.6   Glucose Latest Ref Range: 70 - 99 mg/dL 116 (H)   WBC Latest Ref Range: 4.0 - 11.0 10e9/L 7.8   Hemoglobin Latest Ref Range: 11.7 - 15.7 g/dL 10.7 (L)   Hematocrit Latest Ref Range: 35.0 - 47.0 % 33.0 (L)   Platelet Count Latest Ref Range: 150 - 450 10e9/L 554 (H)   RBC Count Latest Ref Range: 3.8 - 5.2 10e12/L 3.50 (L)   MCV Latest Ref Range: 78 - 100 fl 94   MCH Latest Ref Range: 26.5 - 33.0 pg 30.6   MCHC Latest Ref Range: 31.5 - 36.5 g/dL 32.4   RDW Latest Ref Range: 10.0 - 15.0 % 15.9 (H)   Diff Method Unknown Manual Differential   % Neutrophils Latest Units: % 80.4   % Lymphocytes Latest Units: % 10.6   % Monocytes Latest Units: % 2.7   % Eosinophils Latest Units: % 1.8   % Basophils Latest Units: % 1.8   % Myelocytes Latest Units: % 1.8   % Promyelocytes Latest Units: % 0.9   Absolute Neutrophil Latest Ref Range: 1.6 - 8.3 10e9/L 6.3   Absolute Lymphocytes Latest Ref Range: 0.8 - 5.3 10e9/L 0.8   Absolute Monocytes Latest Ref Range: 0.0 - 1.3 10e9/L 0.2   Absolute Eosinophils Latest Ref Range: 0.0 - 0.7 10e9/L 0.1   Absolute Basophils Latest Ref Range: 0.0 - 0.2 10e9/L 0.1   Absolute Myelocytes Latest Ref Range: 0 10e9/L 0.1 (H)   Absolute Promyelocytes Latest Ref Range: 0 10e9/L 0.1 (H)   Anisocytosis Unknown Slight   Platelet Estimate Unknown Confirming automa...       Impression/plan:    Profound fatigue/weakness in the setting of progressive metastatic RCC, recent hx of hypercalcemia of malignancy, dehydration    1. Weakness/fatigue, likely multifactorial, volume depletion, cancer progression, sub-optimal nutrition, increased stress/depression  -discussed potential causes, recommended we give IVF, optimize nutrition and resume a trial of dexamethasone 4mg bid x 3 days, then 4 mg daily, then taper after reviewing  with Kia Ren PA-C on Monday  -will add on TSH to labs today as she could have immunotoxicities from nivolumab impacting her thyroid functions    2. FEN:   -volume depleted: was quite hypotensive upon arriving to clinic and improved with 1 L NS, will give an additional liter today (total 2 liters)  -anorexia/malnutrition: reviewed potential causes and treatments. Would like to resume dexamethasone as above. Discussed use or marinol, but not optimal choice as it may increase fatigue  -discussed nutrition/hydration strategies. She will add more protein snacks, shakes through out the day, goal of 6 small meals/day. Discussed pushing oral hydration--needs 64 ounces of fluid    3. Depression/anxiety:  -on celexa and wellbutrin with previously good control, but increased depression with news of progression  -she has been referred to counseling and noted she has a future appointment. I don't see that scheduled here. Will f/u at future appointment  -reviewed that she should not be using klonipin and xanax concurrently (she is not doing that currently)    4. L shoulder/neck pain r/t tumor  -on palliative XRT to MERARY and L supraclavicular area, started a 5 day course on 4/12/18    5. Brain mets: s/p resection of L craniotomy on 3/6/17, s/p gamma knife to resection bed and 4 other small lesions identified  -will need repeat MRI in about 3 months    6. Metastatic RCC: progression on nivolumab, planning to initiate cabozantinib next week  -she is very worried about side effects and intolerance. I reviewed potential side effects of hypertension, N/V, diarrhea, fatigue, decreased appetite, skin rash, hand/foot toxicity. She thinks she will be getting a lower starting dose.I'm not clear on that, but she will review with Kia on Monday    7. Nausea: mild, controlled with compazine. Was given aloxi today  -reviewed that she should take may take antiacid while on dexamethasone to prevent gastritis      Again, thank you for  allowing me to participate in the care of your patient.      Sincerely,    ARIEL Fay CNP

## 2018-04-13 NOTE — PATIENT INSTRUCTIONS
Dear Suzi Gonsales    Thank you for choosing [facility] ([facility]) for your infusion.  The following information is a summary of our appointment as well as important reminders.          Additional information: you received 1 liter of normal saline and Aloxi 0.25 mg through your IV today.     We look forward in seeing you on your next appointment here at [facility].  Please don t hesitate to call us at [phone] to reschedule any of your appointments or to speak with one of the [facility] registered nurses.  It was a pleasure taking care of you today.    Sincerely,    [facility]  [address] [place] [address]  [address]  Phone:  [phone]

## 2018-04-13 NOTE — MR AVS SNAPSHOT
After Visit Summary   4/13/2018    Suzi Gonsales    MRN: [id number]           Patient Information     Date Of Birth          1965        Visit Information        Provider Department      4/13/2018 4:00 PM UC 48 ATC; UC SPEC INFUSION [facility] Specialty and Procedure        Today's Diagnoses     Abnormal weight gain    -  1    Dehydration        Nausea and vomiting, intractability of vomiting not specified, unspecified vomiting type          Care Instructions    Dear Suzi Gonsales    Thank you for choosing [facility] Specialty Infusion [facility] Procedure Center ([facility]) for your infusion.  The following information is a summary of our appointment as well as important reminders.          Additional information: you received 1 liter of normal saline and Aloxi 0.25 mg through your IV today.     We look forward in seeing you on your next appointment here at [facility].  Please don t hesitate to call us at [phone] to reschedule any of your appointments or to speak with one of the [facility] registered nurses.  It was a pleasure taking care of you today.    Sincerely,    [facility]  Specialty Infusion & Procedure Center  [address] [place] [address]  [address]  Phone:  [phone]          Follow-ups after your visit        Your next 10 appointments already scheduled     Apr 14, 2018 10:30 AM CDT   EXTERNAL RADIATION TREATMENT with UMP RAD ONC ELEKTA   Radiation Oncology Clinic ([facility])    [facility]  1st Floor  [address] [address] [facility] [id number] [phone]            Apr 15, 2018 10:30 AM CDT   EXTERNAL RADIATION TREATMENT with UMP RAD ONC ELEKTA   Radiation Oncology Clinic ([facility])    [facility]  1st Floor  [address] [facility] [id number] [phone]            Apr 16, 2018  1:30 PM CDT   EXTERNAL RADIATION TREATMENT with UMP RAD ONC  ELEKTA   Radiation Oncology Clinic ([facility])    [facility]  1st Floor  [address] [facility] [address]   [phone]            Apr 16, 2018  2:45 PM CDT   [facility] Lab Draw with  [facility] LAB DRAW   [facility])    [address]  [address]  [facility] [address]   [phone]            Apr 16, 2018  3:10 PM CDT   (Arrive by 2:55 PM)   Return Visit with BRENDA Betancur [facility] ([facility])    [address]  [address]  [facility] [address]   [phone]            Jun 05, 2018 11:00 AM CDT   (Arrive by 10:45 AM)   MR BRAIN W/O & W CONTRAST with UUMR2   [facility] MRI ([facility])    [address] [facility] [address]   [phone]           Take your medicines as usual, unless your doctor tells you not to. Bring a list of your current medicines to your exam (including vitamins, minerals and over-the-counter drugs).  You may or may not receive intravenous (IV) contrast for this exam pending the discretion of the Radiologist.  You do not need to do anything special to prepare.  The MRI machine uses a strong magnet. Please wear clothes without metal (snaps, zippers). A sweatsuit works well, or we may give you a hospital gown.  Please remove any body piercings and hair extensions before you arrive. You will also remove watches, jewelry, hairpins, wallets, dentures, partial dental plates and hearing aids. You may wear contact lenses, and you may be able to wear your rings. We have a safe place to keep your personal items, but it is safer to leave them at home.  **IMPORTANT** THE INSTRUCTIONS BELOW ARE ONLY FOR THOSE PATIENTS WHO HAVE BEEN PRESCRIBED SEDATION OR GENERAL ANESTHESIA DURING THEIR MRI PROCEDURE:  IF YOUR DOCTOR PRESCRIBED ORAL SEDATION (take medicine to help  you relax during your exam):   You must get the medicine from your doctor (oral medication) before you arrive. Bring the medicine to the exam. Do not take it at home. You ll be told when to take it upon arriving for your exam.   Arrive one hour early. Bring someone who can take you home after the test. Your medicine will make you sleepy. After the exam, you may not drive, take a bus or take a taxi by yourself.  IF YOUR DOCTOR PRESCRIBED IV SEDATION:   Arrive one hour early. Bring someone who can take you home after the test. Your medicine will make you sleepy. After the exam, you may not drive, take a bus or take a taxi by yourself.   No eating 6 hours before your exam. You may have clear liquids up until 4 hours before your exam. (Clear liquids include water, clear tea, black coffee and fruit juice without pulp.)  IF YOUR DOCTOR PRESCRIBED ANESTHESIA (be asleep for your exam):   Arrive 1 1/2 hours early. Bring someone who can take you home after the test. You may not drive, take a bus or take a taxi by yourself.   No eating 8 hours before your exam. You may have clear liquids up until 4 hours before your exam. (Clear liquids include water, clear tea, black coffee and fruit juice without pulp.)   You will spend four to five hours in the recovery room.  Please call the Imaging Department at your exam site with any questions.            Jun 05, 2018  1:00 PM CDT   Return Visit with Huyen Cuellar MD   Radiation Oncology Clinic ([facility])    [facility]  1st Floor  [address] [facility] [id number] [phone]              Who to contact     If you have questions or need follow up information about today's clinic visit or your schedule please contact [facility] SPECIALTY AND PROCEDURE directly at [phone].  Normal or non-critical lab and imaging results will be communicated to you by MyChart, letter or phone within 4 business days after  the clinic has received the results. If you do not hear from us within 7 days, please contact the clinic through Eved or phone. If you have a critical or abnormal lab result, we will notify you by phone as soon as possible.  Submit refill requests through Eved or call your pharmacy and they will forward the refill request to us. Please allow 3 business days for your refill to be completed.          Additional Information About Your Visit        Eved Information     Eved gives you secure access to your electronic health record. If you see a primary care provider, you can also send messages to your care team and make appointments. If you have questions, please call your primary care clinic.  If you do not have a primary care provider, please call [phone] and they will assist you.        Care EveryWhere ID     This is your Care EveryWhere ID. This could be used by other organizations to access your [facility] medical records  [id number]        Your Vitals Were     Pulse Last Period                102 12/20/2013           Blood Pressure from Last 3 Encounters:   04/13/18 122/50   04/13/18 109/43   04/10/18 94/60    Weight from Last 3 Encounters:   04/13/18 56.3 kg (124 lb 1.6 oz)   04/10/18 57 kg (125 lb 9.6 oz)   04/09/18 57.3 kg (126 lb 4.8 oz)              Today, you had the following     No orders found for display         Today's Medication Changes          These changes are accurate as of 4/13/18  5:47 PM.  If you have any questions, ask your nurse or doctor.               These medicines have changed or have updated prescriptions.        Dose/Directions    citalopram 20 MG tablet   Commonly known as:  celeXA   This may have changed:  when to take this   Used for:  Insomnia, unspecified type        Dose:  20 mg   Take 1 tablet (20 mg) by mouth every evening   Quantity:  90 tablet   Refills:  3       dexamethasone 4 MG tablet   Commonly known as:  DECADRON   This may have changed:    - how much to  take  - how to take this  - when to take this  - additional instructions   Used for:  Renal cell carcinoma, right (H), Hypercalcemia   Changed by:  Bharati Huffman APRN CNP        Take 4 mg with breakfast and lunch x 3 days, then decrease to 4 mg daily with breakfast   Quantity:  30 tablet   Refills:  0            Where to get your medicines      These medications were sent to [id number] - [place], [place] - [address] AT [facility]  [address], [place] [address]    Hours:  24-hours Phone:  [phone]     dexamethasone 4 MG tablet                Primary Care Provider Office Phone # Fax #    Molly ARIEL Platt [phone] [address] [facility] [address]        Equal Access to Services     ROSAURA INTERIANO : Odilon Santana, waaxda luqjose, qaybta kaalmajodi tilley, josemanuel oswald . [facility] [phone].    ATENCIÓN: Si habla español, tiene a chacon disposición servicios gratuitos de asistencia lingüística. [facility] [phone].    We comply with applicable federal civil rights laws and [place] laws. We do not discriminate on the basis of race, color, national origin, age, disability, sex, sexual orientation, or gender identity.            Thank you!     Thank you for choosing [facility] SPECIALTY AND PROCEDURE  for your care. Our goal is always to provide you with excellent care. Hearing back from our patients is one way we can continue to improve our services. Please take a few minutes to complete the written survey that you may receive in the mail after your visit with us. Thank you!             Your Updated Medication List - Protect others around you: Learn how to safely use, store and throw away your medicines at www.disposemymeds.org.          This list is accurate as of 4/13/18  5:47 PM.  Always use your most recent med list.                   Brand Name  Dispense Instructions for use Diagnosis    ALPRAZolam 0.5 MG tablet    XANAX    60 tablet    Take 1 tablet (0.5 mg) by mouth 3 times daily as needed for anxiety    Renal cell carcinoma, unspecified laterality (H), Mass of upper lobe of left lung       aspirin 81 MG chewable tablet     36 tablet    Take 1 tablet (81 mg) by mouth every morning    Pre-op evaluation, Brain metastases (H)       atorvastatin 20 MG tablet    LIPITOR    30 tablet    Take 1 tablet (20 mg) by mouth daily    History of stroke       buPROPion 300 MG 24 hr tablet    WELLBUTRIN XL     Take 300 mg by mouth every morning        * Cabozantinib S-Malate 60 MG    CABOMETYX    30 tablet    Take 1 tablet (60 mg) by mouth daily Take on an empty stomach 1 hour before or 2 hours after a meal. Avoid grapefruit and grapefruit juice.    Brain metastasis (H), Metastatic renal cell carcinoma to lung, right (H), Malignant neoplasm of right kidney (H)       * Cabozantinib S-Malate 40 MG    CABOMETYX    30 tablet    Take 1 tablet (40 mg) by mouth daily Take on an empty stomach 1 hour before or 2 hours after a meal. Avoid grapefruit and grapefruit juice.    Brain metastasis (H), Metastatic renal cell carcinoma to lung, right (H), Malignant neoplasm of right kidney (H)       calcium carbonate 1250 MG tablet    OS-MUNDO 500 mg [place]. Ca     Take 1 tablet by mouth daily (with lunch)    Pre-op evaluation, Brain metastases (H)       citalopram 20 MG tablet    celeXA    90 tablet    Take 1 tablet (20 mg) by mouth every evening    Insomnia, unspecified type       clonazePAM 0.5 MG tablet    klonoPIN    20 tablet    TAKE 0.5-1 TABLETS BY MOUTH 2 TIMES DAILY AS NEEDED FOR ANXIETY    Panic attack       dexamethasone 4 MG tablet    DECADRON    30 tablet    Take 4 mg with breakfast and lunch x 3 days, then decrease to 4 mg daily with breakfast    Renal cell carcinoma, right (H), Hypercalcemia       HYDROcodone-acetaminophen 5-325 MG per tablet    NORCO    60 tablet    Take 1  tablet by mouth every 6 hours as needed for severe pain    Renal cell carcinoma, right (H), Chest pain       lidocaine-prilocaine cream    EMLA    60 g    Apply 1 hour prior to port access.    Malignant neoplasm of right kidney (H)       MAGNESIUM PO      Take by mouth At Bedtime    Pre-op evaluation, Brain metastases (H)       methocarbamol 500 MG tablet    ROBAXIN    30 tablet    Take 2 tablets (1,000 mg) by mouth every 6 hours as needed for muscle spasms    S/P craniotomy       Multi-vitamin Tabs tablet      Take 1 tablet by mouth daily (with lunch)    Pre-op evaluation, Brain metastases (H)       OLANZapine 5 MG tablet    zyPREXA    5 tablet    Take 1 tablet (5 mg) by mouth At Bedtime for 5 days    Renal cell carcinoma, right (H), Hypercalcemia       OMEGA 3 PO      Take by mouth daily (with lunch)    Pre-op evaluation, Brain metastases (H)       omeprazole 40 MG capsule    priLOSEC    30 capsule    Take 1 capsule (40 mg) by mouth daily    Cerebral edema (H)       oxyCODONE-acetaminophen 5-325 MG per tablet    PERCOCET    60 tablet    Take 1 tablet by mouth every 6 hours as needed for severe pain    Malignant neoplasm of right kidney (H)       PROBIOTIC PO      Take by mouth daily (with lunch)    Pre-op evaluation, Brain metastases (H)       sodium chloride 0.9 % SOLN 100 mL with nivolumab 100 MG/10ML SOLN      Inject into the vein every 14 days    Pre-op evaluation, Brain metastases (H)       SUPER B COMPLEX/VITAMIN C PO      Take by mouth daily (with lunch)    Pre-op evaluation, Brain metastases (H)       TURMERIC PO      Take by mouth daily (with lunch)    Pre-op evaluation, Brain metastases (H)       VITAMIN B 12 PO      Take by mouth daily (with lunch)    Pre-op evaluation, Brain metastases (H)       VITAMIN C PO      Take by mouth daily (with lunch)    Pre-op evaluation, Brain metastases (H)       VITAMIN D PO      Take by mouth daily (with lunch)    Pre-op evaluation, Brain metastases (H)       zolpidem 5  MG tablet    AMBIEN    30 tablet    Take 1 tablet (5 mg) by mouth nightly as needed for sleep    Sleep disorder       * Notice:  This list has 2 medication(s) that are the same as other medications prescribed for you. Read the directions carefully, and ask your doctor or other care provider to review them with you.

## 2018-04-16 NOTE — PROGRESS NOTES
RADIATION ONCOLOGY WEEKLY ON TREATMENT VISIT   Encounter Date: 2018    Patient Name: Suzi Gonsales  MRN: [id number]  : 1965     Disease and Stage: Metastatic renal cell carcinoma   Treatment Site: Right supraclavicular region and right upper lobe  Current Dose/Planned Total Dose: 2000 / 2000 cGy  Daily Fraction Size: 400 cGy/day, 5 times/week  Concurrent Chemotherapy: No    Subjective: Ms. Gnosales presents to clinic today for her weekly on-treatment visit. She has tolerated palliative radiotherapy very well and reports a significant improvement in her arm pain, specifically stating that she has been able to sleep through the night the past 2-3 nights without waking from pain. She additionally denies any pain today on exam. She denies any chest pain, dyspnea, nausea/vomiting or abdominal pain and her remaining ROS are unchanged from her previous visit last week.     ROS:   Nutrition Alteration  Diet Type: Patient's Preference  Nutrition Note: Decreased appetite    Skin  Skin Reaction: No changes      Psychosocial  Pyschosocial Note: Fatigued     Pain Assessment  0-10 Pain Scale: 0    Objective:   General: Alert, oriented and in NAD  HEENT: MMM. No visible oral cavity lesions.   Cardiac: Extremities are warm and well-perfused.  Respiratory: No wheezing, stridor or respiratory distress.  Skin: No erythema    Treatment-related toxicities (CTCAE v4.0):  None    Assessment:    Ms. Gonsales is a 52 year old female with a metastatic renal cell carcinoma with symptomatic metastases involving the right upper lobe and supraclavicular region. She completes a course of palliative radiotherapy today with a marked improvement in her presenting symptoms of right upper extremity pain.    Plan:   1. Complete radiotherapy today  2. Follow-up in radiation oncology clinic as-needed  3. Follow-up with medical oncology as scheduled for ongoing disease management    Mosaiq chart and setup information reviewed  IGRT images  reviewed    Medication Review  Med list reviewed with patient?: Yes    Romie Flores MD/PhD  Department of Radiation Oncology  [facility]

## 2018-04-16 NOTE — NURSING NOTE
Chief Complaint   Patient presents with     Port Draw     pt running late so only labs collected and sent from port, line flushed with Ns and heparin after and de-accessed.      Sara Martin

## 2018-04-16 NOTE — NURSING NOTE
"Oncology Rooming Note    April 16, 2018 4:04 PM   Suzi Gonsales is a 52 year old female who presents for:    Chief Complaint   Patient presents with     Oncology Clinic Visit     Return visit related to Kidney Cancer     Initial Vitals: BP 93/54 (BP Location: Left arm, Patient Position: Sitting, Cuff Size: Adult Regular)  Pulse 89  Temp 99.3  F (37.4  C) (Tympanic)  Resp 18  Ht 1.575 m (5' 2.01\")  Wt 57.1 kg (125 lb 12.8 oz)  LMP 12/20/2013  SpO2 96%  BMI 23 kg/m2 Estimated body mass index is 23 kg/(m^2) as calculated from the following:    Height as of this encounter: 1.575 m (5' 2.01\").    Weight as of this encounter: 57.1 kg (125 lb 12.8 oz). Body surface area is 1.58 meters squared.  No Pain (0) Comment: Data Unavailable   Patient's last menstrual period was 12/20/2013.  Allergies reviewed: Yes  Medications reviewed: Yes    Medications: Medication refills not needed today.  Pharmacy name entered into [employer]:    [id number] - [place], [place] - [address] AT [facility]  [facility] - USE FOR MAILING ONLY - [facility] [place], [place] - [address] [place] [address]    Clinical concerns: No new concerns. Provider was notified.    10 minutes for nursing intake (face to face time)     Lesly Jean-Baptiste LPN            "

## 2018-04-16 NOTE — PROGRESS NOTES
[facility]  FOLLOW-UP VISIT NOTE  Date of visit: Apr 16, 2018       REASON FOR VISIT: mRCC here for follow up    HPI: Suzi presented to ED with hematuria and right flank pain thinking she had a renal stone in December 2014. She was worked up with a CT abd/pelvis which suggested a renal mass. She was referred to Dr. Max Zelaya in [facility]. 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 Per charts tumor resection had negative margins and it was staged at pT2bN0.    Her staging work up was negative except for pulmonary nodules. She has been followed every 6 months with scans. CT CAP on 5/11/17 showed enlarging hilar LAD, enlarging pulmonary nodules, adrenal nodules and a pancreatic body mass. Biopsies of hilar LN, adrenal nodule and pancreatic mass were + for RCC. She decided to pursue IL-2, of which she received 4 cycles, last on 8/15/17. CT CAP on 9/13/17 showed generally stable disease. CT CAP on 11/22/17 showed disease progression.  She completed three months of Opdivo and then was noted to have a cerebellar lesion and underwent a craniotomy on 3/6.     INTERVAL HISTORY: Suzi has had a rough couple weeks with hypercalcemia and was treated with IVF + Zometa,  CT CAP showed worsening disease in the chest, and GK (and the planning MRI showed 4 new lesions, thus 5 areas were treated). Dr Green placed her on Dex and she has been feeling better but tapered too quickly and was see on Friday 4/13 with IVF and put back on Dex.  She did get through daily radiation x 5 days and her left shoulder pain is getting better.  Today she is really tired as she had a busy weekend with radiation, but is overall feeling her pain is better, her appetite is better and she emotionally is doing better.     No f/s/c.  No headache, vision issues, paresthesias or motor dysfunction.    She is breathing well, has positional cough.   BM daily, soft. No  issues.     Current Outpatient Prescriptions   Medication Sig     ondansetron (ZOFRAN) 8 MG tablet Take 1 tablet (8 mg) by mouth every 8 hours as needed for nausea     dexamethasone (DECADRON) 4 MG tablet Take 4 mg with breakfast and lunch x 3 days, then decrease to 4 mg daily with breakfast     Cabozantinib S-Malate (CABOMETYX) 40 MG Take 1 tablet (40 mg) by mouth daily Take on an empty stomach 1 hour before or 2 hours after a meal. Avoid grapefruit and grapefruit juice.     oxyCODONE-acetaminophen (PERCOCET) 5-325 MG per tablet Take 1 tablet by mouth every 6 hours as needed for severe pain     HYDROcodone-acetaminophen (NORCO) 5-325 MG per tablet Take 1 tablet by mouth every 6 hours as needed for severe pain     methocarbamol (ROBAXIN) 500 MG tablet Take 2 tablets (1,000 mg) by mouth every 6 hours as needed for muscle spasms     aspirin 81 MG chewable tablet Take 1 tablet (81 mg) by mouth every morning     Probiotic Product (PROBIOTIC PO) Take by mouth daily (with lunch)     Cholecalciferol (VITAMIN D PO) Take by mouth daily (with lunch)     Omega-3 Fatty Acids (OMEGA 3 PO) Take by mouth daily (with lunch)     Cyanocobalamin (VITAMIN B 12 PO) Take by mouth daily (with lunch)     Ascorbic Acid (VITAMIN C PO) Take by mouth daily (with lunch)     multivitamin, therapeutic with minerals (MULTI-VITAMIN) TABS tablet Take 1 tablet by mouth daily (with lunch)     MAGNESIUM PO Take by mouth At Bedtime     calcium carbonate (OS-MUNDO 500 MG [place]. [place]) 1250 MG tablet Take 1 tablet by mouth daily (with lunch)     B Complex-C (SUPER B COMPLEX/VITAMIN C PO) Take by mouth daily (with lunch)     TURMERIC PO Take by mouth daily (with lunch)     sodium chloride 0.9 % SOLN 100 mL with nivolumab 100 MG/10ML SOLN Inject into the vein every 14 days     zolpidem (AMBIEN) 5 MG tablet Take 1 tablet (5 mg) by mouth nightly as needed for sleep     omeprazole (PRILOSEC) 40 MG capsule Take 1 capsule (40 mg) by mouth daily  "    atorvastatin (LIPITOR) 20 MG tablet Take 1 tablet (20 mg) by mouth daily     clonazePAM (KLONOPIN) 0.5 MG tablet TAKE 0.5-1 TABLETS BY MOUTH 2 TIMES DAILY AS NEEDED FOR ANXIETY     ALPRAZolam (XANAX) 0.5 MG tablet Take 1 tablet (0.5 mg) by mouth 3 times daily as needed for anxiety     lidocaine-prilocaine (EMLA) cream Apply 1 hour prior to port access.     citalopram (CELEXA) 20 MG tablet Take 1 tablet (20 mg) by mouth every evening (Patient taking differently: Take 20 mg by mouth At Bedtime )     buPROPion (WELLBUTRIN XL) 300 MG 24 hr tablet Take 300 mg by mouth every morning      [DISCONTINUED] Cabozantinib S-Malate (CABOMETYX) 60 MG Take 1 tablet (60 mg) by mouth daily Take on an empty stomach 1 hour before or 2 hours after a meal. Avoid grapefruit and grapefruit juice.     OLANZapine (ZYPREXA) 5 MG tablet Take 1 tablet (5 mg) by mouth At Bedtime for 5 days     No current facility-administered medications for this visit.      Facility-Administered Medications Ordered in Other Visits   Medication     heparin 100 UNIT/ML injection 5 mL     sodium chloride (PF) 0.9% PF flush 20 mL          EXAM:    BP 93/54 (BP Location: Left arm, Patient Position: Sitting, Cuff Size: Adult Regular)  Pulse 89  Temp 99.3  F (37.4  C) (Tympanic)  Resp 18  Ht 1.575 m (5' 2.01\")  Wt 57.1 kg (125 lb 12.8 oz)  LMP 12/20/2013  SpO2 96%  BMI 23 kg/m2    Wt Readings from Last 4 Encounters:   04/16/18 57.1 kg (125 lb 12.8 oz)   04/13/18 56.3 kg (124 lb 1.6 oz)   04/10/18 57 kg (125 lb 9.6 oz)   04/09/18 57.3 kg (126 lb 4.8 oz)       No full exam today as we discussed POC  Appeared well today all together     LABS:      4/16/2018 15:57   Sodium 137   Potassium 4.0   Chloride 107   Carbon Dioxide 20   Urea Nitrogen 12   Creatinine 0.88   GFR Estimate 67   GFR Estimate If Black 81   Calcium 7.9 (L)   Anion Gap 11   Albumin 2.7 (L)   Protein Total 6.4 (L)   Bilirubin Total 0.1 (L)   Alkaline Phosphatase 66   ALT 14   AST 20   Glucose " 159 (H)   WBC 9.1   Hemoglobin 10.0 (L)   Hematocrit 31.1 (L)   Platelet Count 478 (H)   RBC Count 3.27 (L)   MCV 95         ASSESSMENT/PLAN: 52 year old with mRCC s/p 4 cycles of IL-2.  She had a stable CT after 2 cycles, mild disease progression after 4 cycles.  After a short break, CT showed worsening disease and she has started with Opdivo.   Three months later she had new brain disease and worsening systemic disease.     1. RCC: Suzi started nivolumab on 12/5/17 and completed 3 months.  She missed her follow up after her CT scan on 3/5 as she was getting ready for brain surgery.  At that time her CT showed mild progression. She clearly has failed immunotherapy as her CT on 4/4 showed significant worsening of her pulmonary burden.  Suprisingly her abdominal disease is stable.      We discussed the plan would be to start Cabozantonib.  This is the new TKI of choice. Today was the last day of XRT so she can started this week.  She was very leery of the 60 mg, thus we are starting at the 40 mg daily for better tolerability.  We discussed HTN, diarrhea, HFS, fatigue and appetite changes.  We will watch her closely with labs and clinically.     2. FEN-  Hypercalcemia; treated x 1 with Zometa and improving  Hypoalbuminemia: working harder on oral intake and 64 oz fluids daily    3. Left chest pain/arm pain: likely from her enlarging apical mass on the left.  She started palliative XRT to the apical mass and today is the 5th dose.  She has had quite a bit of relief from the XRT which is encouraging.     4. Brain mets: no neuro symptoms, HA or confusion today.   Craniotomy successful on 3/6.  Has GK to the bed on 4/5.  And FOUR new lesions were noted during the planning MRI.  THus, 4 lesions and the tumor bed were done on 4/5.  She is on Dex again- 4 mg BID.  Today she will reduce to 4 mg in the AM and 2 mg in the PM this week, and then weekend drop to 4 mg in the AM and next week 2 mg in the AM until I see her next.   This was written out for her.   -next brain MRI in June 2018    5. Nausea: intermittent queasiness , has been taking compazine, but will try zofran.  Did start her PPI with the Dex.    Kia Ren PA-C

## 2018-04-16 NOTE — MR AVS SNAPSHOT
After Visit Summary   4/16/2018    Suzi Gonsales    MRN: [id number]           Patient Information     Date Of Birth          1965        Visit Information        Provider Department      4/16/2018 3:00 PM Romie Flores MD [facility]        Today's Diagnoses     Metastatic renal cell carcinoma to lung, right (H)    -  1       Follow-ups after your visit        Your next 10 appointments already scheduled     Apr 30, 2018  2:30 PM CDT   [facility] Draw with  [facility] LAB DRAW   [facility] Lab Draw ([facility])    [address]  [address]  [facility] [address]   [phone]            Apr 30, 2018  3:10 PM CDT   (Arrive by 2:55 PM)   Return Visit with Day Ren PA-C   [facility] ([facility])    [address] [place] [address]  [address]  [facility] [address]   [phone]            Jun 05, 2018 11:00 AM CDT   (Arrive by 10:45 AM)   MR BRAIN W/O & W CONTRAST with UUMR2   [facility] [place], MRI ([facility])    [address] [address]   [phone]           Take your medicines as usual, unless your doctor tells you not to. Bring a list of your current medicines to your exam (including vitamins, minerals and over-the-counter drugs).  You may or may not receive intravenous (IV) contrast for this exam pending the discretion of the Radiologist.  You do not need to do anything special to prepare.  The MRI machine uses a strong magnet. Please wear clothes without metal (snaps, zippers). A sweatsuit works well, or we may give you a hospital gown.  Please remove any body piercings and hair extensions before you arrive. You will also remove watches, jewelry, hairpins, wallets, dentures, partial dental plates and hearing aids. You may wear contact lenses, and you may be able to wear your rings. We have a safe place to  keep your personal items, but it is safer to leave them at home.  **IMPORTANT** THE INSTRUCTIONS BELOW ARE ONLY FOR THOSE PATIENTS WHO HAVE BEEN PRESCRIBED SEDATION OR GENERAL ANESTHESIA DURING THEIR MRI PROCEDURE:  IF YOUR DOCTOR PRESCRIBED ORAL SEDATION (take medicine to help you relax during your exam):   You must get the medicine from your doctor (oral medication) before you arrive. Bring the medicine to the exam. Do not take it at home. You ll be told when to take it upon arriving for your exam.   Arrive one hour early. Bring someone who can take you home after the test. Your medicine will make you sleepy. After the exam, you may not drive, take a bus or take a taxi by yourself.  IF YOUR DOCTOR PRESCRIBED IV SEDATION:   Arrive one hour early. Bring someone who can take you home after the test. Your medicine will make you sleepy. After the exam, you may not drive, take a bus or take a taxi by yourself.   No eating 6 hours before your exam. You may have clear liquids up until 4 hours before your exam. (Clear liquids include water, clear tea, black coffee and fruit juice without pulp.)  IF YOUR DOCTOR PRESCRIBED ANESTHESIA (be asleep for your exam):   Arrive 1 1/2 hours early. Bring someone who can take you home after the test. You may not drive, take a bus or take a taxi by yourself.   No eating 8 hours before your exam. You may have clear liquids up until 4 hours before your exam. (Clear liquids include water, clear tea, black coffee and fruit juice without pulp.)   You will spend four to five hours in the recovery room.  Please call the Imaging Department at your exam site with any questions.            Jun 05, 2018  1:00 PM CDT   Return Visit with Huyen Cuellar MD   [facility] ([facility])    [facility]  1st Floor  [address] [id number] [phone]              Who to contact     Please call your clinic at [phone] to:    Ask  questions about your health    Make or cancel appointments    Discuss your medicines    Learn about your test results    Speak to your doctor            Additional Information About Your Visit        [employer] Information     [employer] gives you secure access to your electronic health record. If you see a primary care provider, you can also send messages to your care team and make appointments. If you have questions, please call your primary care clinic.  If you do not have a primary care provider, please call [phone] and they will assist you.      [employer] is an electronic gateway that provides easy, online access to your medical records. With [employer], you can request a clinic appointment, read your test results, renew a prescription or communicate with your care team.     To access your existing account, please contact your [facility] or call [phone] for assistance.        Care EveryWhere ID     This is your Care EveryWhere ID. This could be used by other organizations to access your [facility] medical records  [id number]        Your Vitals Were     Last Period                   12/20/2013            Blood Pressure from Last 3 Encounters:   04/16/18 93/54   04/13/18 122/50   04/13/18 109/43    Weight from Last 3 Encounters:   04/16/18 57.1 kg (125 lb 12.8 oz)   04/13/18 56.3 kg (124 lb 1.6 oz)   04/10/18 57 kg (125 lb 9.6 oz)              Today, you had the following     No orders found for display         Today's Medication Changes          These changes are accurate as of 4/16/18  6:00 PM.  If you have any questions, ask your nurse or doctor.               Start taking these medicines.        Dose/Directions    ondansetron 8 MG tablet   Commonly known as:  ZOFRAN   Used for:  Nausea   Started by:  Day Ren PA-C        Dose:  8 mg   Take 1 tablet (8 mg) by mouth every 8 hours as needed for nausea   Quantity:  30 tablet   Refills:  3         These medicines have  changed or have updated prescriptions.        Dose/Directions    Cabozantinib S-Malate 40 MG   Commonly known as:  CABOMETYX   This may have changed:  Another medication with the same name was removed. Continue taking this medication, and follow the directions you see here.   Used for:  Brain metastasis (H), Metastatic renal cell carcinoma to lung, right (H), Malignant neoplasm of right kidney (H)   Changed by:  Nadia Thornton [facility]        Dose:  40 mg   Take 1 tablet (40 mg) by mouth daily Take on an empty stomach 1 hour before or 2 hours after a meal. Avoid grapefruit and grapefruit juice.   Quantity:  30 tablet   Refills:  0       citalopram 20 MG tablet   Commonly known as:  celeXA   This may have changed:  when to take this   Used for:  Insomnia, unspecified type        Dose:  20 mg   Take 1 tablet (20 mg) by mouth every evening   Quantity:  90 tablet   Refills:  3            Where to get your medicines      These medications were sent to [id number] - [place], [place] - [address] AT [facility] [address]  [address], [place] [address]    Hours:  24-hours Phone:  [phone]     ondansetron 8 MG tablet                Primary Care Provider Office Phone # Fax #    Molly Foote ARIEL Adame [facility] [phone] [address] [address]        Equal Access to Services     ROSAURA INTERIANO AH: Hadii jimmie billyo Sovanessa, waaxda luqadaha, qaybta kaalmada aderohityajodi, josemanuel oswald . So [facility] [phone].    ATENCIÓN: Si habla español, tiene a chacon disposición servicios gratuitos de asistencia lingüística. Mario al [phone].    We comply with applicable federal civil rights laws and [place] laws. We do not discriminate on the basis of race, color, national origin, age, disability, sex, sexual orientation, or gender identity.            Thank you!     Thank you for choosing [facility]  for your care. Our goal  is always to provide you with excellent care. Hearing back from our patients is one way we can continue to improve our services. Please take a few minutes to complete the written survey that you may receive in the mail after your visit with us. Thank you!             Your Updated Medication List - Protect others around you: Learn how to safely use, store and throw away your medicines at www.disposemymeds.org.          This list is accurate as of 4/16/18  6:00 PM.  Always use your most recent med list.                   Brand Name Dispense Instructions for use Diagnosis    ALPRAZolam 0.5 MG tablet    XANAX    60 tablet    Take 1 tablet (0.5 mg) by mouth 3 times daily as needed for anxiety    Renal cell carcinoma, unspecified laterality (H), Mass of upper lobe of left lung       aspirin 81 MG chewable tablet     36 tablet    Take 1 tablet (81 mg) by mouth every morning    Pre-op evaluation, Brain metastases (H)       atorvastatin 20 MG tablet    LIPITOR    30 tablet    Take 1 tablet (20 mg) by mouth daily    History of stroke       buPROPion 300 MG 24 hr tablet    WELLBUTRIN XL     Take 300 mg by mouth every morning        Cabozantinib S-Malate 40 MG    CABOMETYX    30 tablet    Take 1 tablet (40 mg) by mouth daily Take on an empty stomach 1 hour before or 2 hours after a meal. Avoid grapefruit and grapefruit juice.    Brain metastasis (H), Metastatic renal cell carcinoma to lung, right (H), Malignant neoplasm of right kidney (H)       calcium carbonate 1250 MG tablet    OS-MUNDO 500 mg [place]. [place]     Take 1 tablet by mouth daily (with lunch)    Pre-op evaluation, Brain metastases (H)       citalopram 20 MG tablet    celeXA    90 tablet    Take 1 tablet (20 mg) by mouth every evening    Insomnia, unspecified type       clonazePAM 0.5 MG tablet    klonoPIN    20 tablet    TAKE 0.5-1 TABLETS BY MOUTH 2 TIMES DAILY AS NEEDED FOR ANXIETY    Panic attack       dexamethasone 4 MG tablet    DECADRON    30 tablet    Take 4 mg  with breakfast and lunch x 3 days, then decrease to 4 mg daily with breakfast    Renal cell carcinoma, right (H), Hypercalcemia       HYDROcodone-acetaminophen 5-325 MG per tablet    NORCO    60 tablet    Take 1 tablet by mouth every 6 hours as needed for severe pain    Renal cell carcinoma, right (H), Chest pain       lidocaine-prilocaine cream    EMLA    60 g    Apply 1 hour prior to port access.    Malignant neoplasm of right kidney (H)       MAGNESIUM PO      Take by mouth At Bedtime    Pre-op evaluation, Brain metastases (H)       methocarbamol 500 MG tablet    ROBAXIN    30 tablet    Take 2 tablets (1,000 mg) by mouth every 6 hours as needed for muscle spasms    S/P craniotomy       Multi-vitamin Tabs tablet      Take 1 tablet by mouth daily (with lunch)    Pre-op evaluation, Brain metastases (H)       OLANZapine 5 MG tablet    zyPREXA    5 tablet    Take 1 tablet (5 mg) by mouth At Bedtime for 5 days    Renal cell carcinoma, right (H), Hypercalcemia       OMEGA 3 PO      Take by mouth daily (with lunch)    Pre-op evaluation, Brain metastases (H)       omeprazole 40 MG capsule    priLOSEC    30 capsule    Take 1 capsule (40 mg) by mouth daily    Cerebral edema (H)       ondansetron 8 MG tablet    ZOFRAN    30 tablet    Take 1 tablet (8 mg) by mouth every 8 hours as needed for nausea    Nausea       oxyCODONE-acetaminophen 5-325 MG per tablet    PERCOCET    60 tablet    Take 1 tablet by mouth every 6 hours as needed for severe pain    Malignant neoplasm of right kidney (H)       PROBIOTIC PO      Take by mouth daily (with lunch)    Pre-op evaluation, Brain metastases (H)       sodium chloride 0.9 % SOLN 100 mL with nivolumab 100 MG/10ML SOLN      Inject into the vein every 14 days    Pre-op evaluation, Brain metastases (H)       SUPER B COMPLEX/VITAMIN C PO      Take by mouth daily (with lunch)    Pre-op evaluation, Brain metastases (H)       TURMERIC PO      Take by mouth daily (with lunch)    Pre-op  evaluation, Brain metastases (H)       VITAMIN B 12 PO      Take by mouth daily (with lunch)    Pre-op evaluation, Brain metastases (H)       VITAMIN C PO      Take by mouth daily (with lunch)    Pre-op evaluation, Brain metastases (H)       VITAMIN D PO      Take by mouth daily (with lunch)    Pre-op evaluation, Brain metastases (H)       zolpidem 5 MG tablet    AMBIEN    30 tablet    Take 1 tablet (5 mg) by mouth nightly as needed for sleep    Sleep disorder

## 2018-04-16 NOTE — PATIENT INSTRUCTIONS
Starting today- cut down to 4 mg in AM and 2 mg in PM 4/16-4/20.  This weekend cut back to 4 mg just in the AM.  On 4/26 cut back to 2 mg in the AM only until I see you next.

## 2018-04-16 NOTE — MR AVS SNAPSHOT
After Visit Summary   4/16/2018    Suzi Gonsales    MRN: [id number]           Patient Information     Date Of Birth          1965        Visit Information        Provider Department      4/16/2018 3:10 PM Day Ren PA-C [facility]        Today's Diagnoses     Nausea    -  1      Care Instructions    Starting today- cut down to 4 mg in AM and 2 mg in PM 4/16-4/20.  This weekend cut back to 4 mg just in the AM.  On 4/26 cut back to 2 mg in the AM only until I see you next.            Follow-ups after your visit        Your next 10 appointments already scheduled     Apr 30, 2018  2:30 PM CDT   [facility] Draw with  [facility]   [facility] Lab Draw ([facility])    [address] [place] [address]  [address] [place] [address]   [phone]            Apr 30, 2018  3:10 PM CDT   (Arrive by 2:55 PM)   Return Visit with BRENDA Betancur [facility] ([facility])    [address] [place] [address]  [address] [place] [address]   [phone]            Jun 05, 2018 11:00 AM CDT   (Arrive by 10:45 AM)   MR BRAIN W/O & W CONTRAST with UUMR2   [facility], [place], [facility] ([facility], [facility])    [address] [facility] [address]   [phone]           Take your medicines as usual, unless your doctor tells you not to. Bring a list of your current medicines to your exam (including vitamins, minerals and over-the-counter drugs).  You may or may not receive intravenous (IV) contrast for this exam pending the discretion of the Radiologist.  You do not need to do anything special to prepare.  The MRI machine uses a strong magnet. Please wear clothes without metal (snaps, zippers). A sweatsuit works well, or we may give you a hospital gown.  Please remove any body piercings and hair extensions before you arrive. You will also remove watches, jewelry,  hairpins, wallets, dentures, partial dental plates and hearing aids. You may wear contact lenses, and you may be able to wear your rings. We have a safe place to keep your personal items, but it is safer to leave them at home.  **IMPORTANT** THE INSTRUCTIONS BELOW ARE ONLY FOR THOSE PATIENTS WHO HAVE BEEN PRESCRIBED SEDATION OR GENERAL ANESTHESIA DURING THEIR MRI PROCEDURE:  IF YOUR DOCTOR PRESCRIBED ORAL SEDATION (take medicine to help you relax during your exam):   You must get the medicine from your doctor (oral medication) before you arrive. Bring the medicine to the exam. Do not take it at home. You ll be told when to take it upon arriving for your exam.   Arrive one hour early. Bring someone who can take you home after the test. Your medicine will make you sleepy. After the exam, you may not drive, take a bus or take a taxi by yourself.  IF YOUR DOCTOR PRESCRIBED IV SEDATION:   Arrive one hour early. Bring someone who can take you home after the test. Your medicine will make you sleepy. After the exam, you may not drive, take a bus or take a taxi by yourself.   No eating 6 hours before your exam. You may have clear liquids up until 4 hours before your exam. (Clear liquids include water, clear tea, black coffee and fruit juice without pulp.)  IF YOUR DOCTOR PRESCRIBED ANESTHESIA (be asleep for your exam):   Arrive 1 1/2 hours early. Bring someone who can take you home after the test. You may not drive, take a bus or take a taxi by yourself.   No eating 8 hours before your exam. You may have clear liquids up until 4 hours before your exam. (Clear liquids include water, clear tea, black coffee and fruit juice without pulp.)   You will spend four to five hours in the recovery room.  Please call the Imaging Department at your exam site with any questions.            Jun 05, 2018  1:00 PM CDT   Return Visit with Huyen Cuellar MD   Radiation Oncology Clinic ([facility])    [facility]  "Ctr  1st Floor  [address] [facility] [address]   [phone]              Who to contact     If you have questions or need follow up information about today's clinic visit or your schedule please contact [facility] directly at [phone].  Normal or non-critical lab and imaging results will be communicated to you by MyChart, letter or phone within 4 business days after the clinic has received the results. If you do not hear from us within 7 days, please contact the clinic through [employer] or phone. If you have a critical or abnormal lab result, we will notify you by phone as soon as possible.  Submit refill requests through [employer] or call your pharmacy and they will forward the refill request to us. Please allow 3 business days for your refill to be completed.          Additional Information About Your Visit        MyChart Information     [employer] gives you secure access to your electronic health record. If you see a primary care provider, you can also send messages to your care team and make appointments. If you have questions, please call your primary care clinic.  If you do not have a primary care provider, please call [phone] and they will assist you.        Care EveryWhere ID     This is your Care EveryWhere ID. This could be used by other organizations to access your [facility] medical records  [id number]        Your Vitals Were     Pulse Temperature Respirations Height Last Period Pulse Oximetry    89 99.3  F (37.4  C) (Tympanic) 18 1.575 m (5' 2.01\") 12/20/2013 96%    BMI (Body Mass Index)                   23 kg/m2            Blood Pressure from Last 3 Encounters:   04/16/18 93/54   04/13/18 122/50   04/13/18 109/43    Weight from Last 3 Encounters:   04/16/18 57.1 kg (125 lb 12.8 oz)   04/13/18 56.3 kg (124 lb 1.6 oz)   04/10/18 57 kg (125 lb 9.6 oz)              Today, you had the following     No orders found for display         Today's Medication Changes    "       These changes are accurate as of 4/16/18  5:00 PM.  If you have any questions, ask your nurse or doctor.               Start taking these medicines.        Dose/Directions    ondansetron 8 MG tablet   Commonly known as:  ZOFRAN   Used for:  Nausea   Started by:  Day Ren PA-C        Dose:  8 mg   Take 1 tablet (8 mg) by mouth every 8 hours as needed for nausea   Quantity:  30 tablet   Refills:  3         These medicines have changed or have updated prescriptions.        Dose/Directions    Cabozantinib S-Malate 40 MG   Commonly known as:  CABOMETYX   This may have changed:  Another medication with the same name was removed. Continue taking this medication, and follow the directions you see here.   Used for:  Brain metastasis (H), Metastatic renal cell carcinoma to lung, right (H), Malignant neoplasm of right kidney (H)   Changed by:  Nadia Thornton RP        Dose:  40 mg   Take 1 tablet (40 mg) by mouth daily Take on an empty stomach 1 hour before or 2 hours after a meal. Avoid grapefruit and grapefruit juice.   Quantity:  30 tablet   Refills:  0       citalopram 20 MG tablet   Commonly known as:  celeXA   This may have changed:  when to take this   Used for:  Insomnia, unspecified type        Dose:  20 mg   Take 1 tablet (20 mg) by mouth every evening   Quantity:  90 tablet   Refills:  3            Where to get your medicines      These medications were sent to [id number] - [place], [place] - [address] AT [facility]  [address]  [facility] [address], [place] [address]    Hours:  24-hours Phone:  [phone]     ondansetron 8 MG tablet                Primary Care Provider Office Phone # Fax #    Molly ARIEL [facility] [phone] [address] [facility] [address]        Equal Access to Services     ROSAURA INTERIANO AH: Odilon billyo Sovarunali, waaxda luqadaha, qaybta kaalmada adeegyada, josemanuel plata. So  [facility] [phone].    ATENCIÓN: Si maranda olmedo, tiene a chacon disposición servicios gratuitos de asistencia lingüística. Mario mitchell [phone].    We comply with applicable federal civil rights laws and [place] laws. We do not discriminate on the basis of race, color, national origin, age, disability, sex, sexual orientation, or gender identity.            Thank you!     Thank you for choosing [facility]  for your care. Our goal is always to provide you with excellent care. Hearing back from our patients is one way we can continue to improve our services. Please take a few minutes to complete the written survey that you may receive in the mail after your visit with us. Thank you!             Your Updated Medication List - Protect others around you: Learn how to safely use, store and throw away your medicines at www.disposemymeds.org.          This list is accurate as of 4/16/18  5:00 PM.  Always use your most recent med list.                   Brand Name Dispense Instructions for use Diagnosis    ALPRAZolam 0.5 MG tablet    XANAX    60 tablet    Take 1 tablet (0.5 mg) by mouth 3 times daily as needed for anxiety    Renal cell carcinoma, unspecified laterality (H), Mass of upper lobe of left lung       aspirin 81 MG chewable tablet     36 tablet    Take 1 tablet (81 mg) by mouth every morning    Pre-op evaluation, Brain metastases (H)       atorvastatin 20 MG tablet    LIPITOR    30 tablet    Take 1 tablet (20 mg) by mouth daily    History of stroke       buPROPion 300 MG 24 hr tablet    WELLBUTRIN XL     Take 300 mg by mouth every morning        Cabozantinib S-Malate 40 MG    CABOMETYX    30 tablet    Take 1 tablet (40 mg) by mouth daily Take on an empty stomach 1 hour before or 2 hours after a meal. Avoid grapefruit and grapefruit juice.    Brain metastasis (H), Metastatic renal cell carcinoma to lung, right (H), Malignant neoplasm of right kidney (H)       calcium carbonate 1250 MG tablet     OS-MUNDO 500 mg [place]. Ca     Take 1 tablet by mouth daily (with lunch)    Pre-op evaluation, Brain metastases (H)       citalopram 20 MG tablet    celeXA    90 tablet    Take 1 tablet (20 mg) by mouth every evening    Insomnia, unspecified type       clonazePAM 0.5 MG tablet    klonoPIN    20 tablet    TAKE 0.5-1 TABLETS BY MOUTH 2 TIMES DAILY AS NEEDED FOR ANXIETY    Panic attack       dexamethasone 4 MG tablet    DECADRON    30 tablet    Take 4 mg with breakfast and lunch x 3 days, then decrease to 4 mg daily with breakfast    Renal cell carcinoma, right (H), Hypercalcemia       HYDROcodone-acetaminophen 5-325 MG per tablet    NORCO    60 tablet    Take 1 tablet by mouth every 6 hours as needed for severe pain    Renal cell carcinoma, right (H), Chest pain       lidocaine-prilocaine cream    EMLA    60 g    Apply 1 hour prior to port access.    Malignant neoplasm of right kidney (H)       MAGNESIUM PO      Take by mouth At Bedtime    Pre-op evaluation, Brain metastases (H)       methocarbamol 500 MG tablet    ROBAXIN    30 tablet    Take 2 tablets (1,000 mg) by mouth every 6 hours as needed for muscle spasms    S/P craniotomy       Multi-vitamin Tabs tablet      Take 1 tablet by mouth daily (with lunch)    Pre-op evaluation, Brain metastases (H)       OLANZapine 5 MG tablet    zyPREXA    5 tablet    Take 1 tablet (5 mg) by mouth At Bedtime for 5 days    Renal cell carcinoma, right (H), Hypercalcemia       OMEGA 3 PO      Take by mouth daily (with lunch)    Pre-op evaluation, Brain metastases (H)       omeprazole 40 MG capsule    priLOSEC    30 capsule    Take 1 capsule (40 mg) by mouth daily    Cerebral edema (H)       ondansetron 8 MG tablet    ZOFRAN    30 tablet    Take 1 tablet (8 mg) by mouth every 8 hours as needed for nausea    Nausea       oxyCODONE-acetaminophen 5-325 MG per tablet    PERCOCET    60 tablet    Take 1 tablet by mouth every 6 hours as needed for severe pain    Malignant neoplasm of right  kidney (H)       PROBIOTIC PO      Take by mouth daily (with lunch)    Pre-op evaluation, Brain metastases (H)       sodium chloride 0.9 % SOLN 100 mL with nivolumab 100 MG/10ML SOLN      Inject into the vein every 14 days    Pre-op evaluation, Brain metastases (H)       SUPER B COMPLEX/VITAMIN C PO      Take by mouth daily (with lunch)    Pre-op evaluation, Brain metastases (H)       TURMERIC PO      Take by mouth daily (with lunch)    Pre-op evaluation, Brain metastases (H)       VITAMIN B 12 PO      Take by mouth daily (with lunch)    Pre-op evaluation, Brain metastases (H)       VITAMIN C PO      Take by mouth daily (with lunch)    Pre-op evaluation, Brain metastases (H)       VITAMIN D PO      Take by mouth daily (with lunch)    Pre-op evaluation, Brain metastases (H)       zolpidem 5 MG tablet    AMBIEN    30 tablet    Take 1 tablet (5 mg) by mouth nightly as needed for sleep    Sleep disorder

## 2018-04-20 NOTE — TELEPHONE ENCOUNTER
Controlled Substance Refill Request for zolpidem (AMBIEN) 5 MG tablet  Problem List Complete:  No     PROVIDER TO CONSIDER COMPLETION OF PROBLEM LIST AND OVERVIEW/CONTROLLED SUBSTANCE AGREEMENT    Last Written Prescription Date:  2/20/18  Last Fill Quantity: 30 TABLET,   # refills: 1    Last Office Visit with EDEL primary care provider: 10/03/2017 WITH TESSEI Fields Office visit:     Controlled substance agreement on file: No.     Processing:  Fax Rx to [facility] [place] pharmacy   checked in past 6 months?  Yes 03/22/2018

## 2018-04-20 NOTE — TELEPHONE ENCOUNTER
Reason for Call:  Medication or medication refill:    Do you use a [facility]?  Name of the pharmacy and phone number for the current request:  [facility] on [address] - [phone]    Name of the medication requested: zolpidem (AMBIEN) 5 MG tablet    Other request: Pt calling for status of refill request.    Can we leave a detailed message on this number? YES    Phone number patient can be reached at: Home number on file [phone] (home)    Best Time: Any Time    Call taken on 4/20/2018 at 12:11 PM by Suzi Sarmiento

## 2018-04-22 NOTE — PROCEDURES
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     Treatment Summary:  Treatment Site Total Dose Modality From  To  Elapsed Days  Fx.  Lt neck and lung 2,000 cGy 6X/10X   4/12/2018 2018 4    5          Dose per Fraction: 400 cGy                 COMMENTS:                      Ms. Amezcua is a 52 year old female with a metastatic renal cell carcinoma initially diagnosed in 2014 after she presented with right flank pain and hematuria. She has previously received Gamma Knife SRS to a surgical resection cavity and 4 additional small intracranial metastases on 2018.      She was scheduled to begin systemic therapy with cabozantinib due to disease progression in early 2018 but was first referred for palliative radiotherapy by her medical oncology team due to worsening left shoulder and left upper extremity pain.      Ms. Amezcua received a short course of palliative radiotherapy to the left supraclavicular region, left upper thorax and perispinal region as described above. She was treated to a total dose of 20 Gy delivered in 5 daily fractions and reported a significant improvement in her pain by the completion of therapy. She did not require any unplanned breaks in therapy.     Toxicities (CTCAE v5.0):  None     PAIN MANAGEMENT:   Continue oxycodone-acetaminophen 5-325 mg q6h as needed for moderate to severe pain     FOLLOW UP PLAN:  1. Follow-up in radiation oncology clinic with Dr. Cuellar on 2018 with surveillance MRI  brain  2. Follow-up with medical oncology as scheduled for ongoing disease management     Resident Physician: Chalo Gruber M.D.   Staff Physician: Romie Flores M.D.  Physicist: Luis Astudillo, PhD     CC:  MD Molly Griffin APRN CNP                                       Radiation Oncology:  [facility] [address] [place] [address]

## 2018-04-23 NOTE — TELEPHONE ENCOUNTER
Per last note 3/9/18 yes this will at least require an e-visit.    Shiva Trinidad MD  Family Medicine Physician

## 2018-04-23 NOTE — TELEPHONE ENCOUNTER
Dr Bartlett  Please see pt response- and advise on refill    I have no idea what an e visit is.     I m seen multiple times per month at the [facility], which shares your portal, as I am a stage four renal cell carcinoma patient. It s pretty clear what my situation is and not a surprise that I need this medication to sleep.     It just shouldn t be this hard.     Ok to fill her ambien    Thanks!     Roberta Herrera RN

## 2018-04-23 NOTE — TELEPHONE ENCOUNTER
Duplicate requests before 72 hours -discussed refill policy via alternate mychart    Juana Sharpe RN

## 2018-04-23 NOTE — TELEPHONE ENCOUNTER
Having seen her note about being stage 4 cancer patient with multiple visits per month, makes me wonder if they are not willing to fill this for her. This may be an example in which Molly might handle this differently but I can only go by what is in the chart. One option might be to reach out to the cancer team and ask them if they are comfortable prescribing this.     I will sign #10 to facilitate this care until Molly returns. She will need to connect with either Molly or cancer team going forward.    Script in RN basket.    Shiva Trinidad MD  Family Medicine Physician

## 2018-04-23 NOTE — TELEPHONE ENCOUNTER
Molly Adame review:  Regarding her Ambien RX. We filled # 10 but wondering if her cnacer care team will do this for her otherwise what is your expectation for her as far as checking in with you.  Every 6 months or sooner?    E visits OK?  Dee Vences RN

## 2018-04-23 NOTE — TELEPHONE ENCOUNTER
Note sent to pt on how to do the evisit   but also asked if Dr Bartlett would refill d/t pt being seen so frequently at [facility] d/t cancer metastasis  Roberta Herrera RN

## 2018-04-24 NOTE — TELEPHONE ENCOUNTER
Central Prior Authorization Team   Phone: [phone]      PA Initiation    Medication: ondansetron (ZOFRAN) 8 MG tablet - initiated  Insurance Company: [employer] - Phone [phone] Fax [phone]  Pharmacy Filling the Rx: [id number] - [place], [place] - [address] AT [facility] [address]  Filling Pharmacy Phone: [phone]  Start Date: 4/24/2018

## 2018-04-24 NOTE — TELEPHONE ENCOUNTER
Central Prior Authorization Team   Phone: [phone]      Prior Authorization Approval    Authorization Effective Date: 3/25/2018  Authorization Expiration Date: 4/24/2019  Medication: ondansetron (ZOFRAN) 8 MG tablet - initiated  Approved Dose/Quantity: 30 for 10 days  Reference #: [id number]   Insurance Company: [employer] - Phone [phone] Fax [phone]  Expected CoPay: to soon until tomorrow pharmacy said they would put notes in to fill for full quantity      Which Pharmacy is filling the prescription (Not needed for infusion/clinic administered): [id number] - [place], [place] - [address] AT [facility] [address]  Pharmacy Notified: Yes  Patient Notified: Yes

## 2018-04-26 NOTE — ORAL ONC MGMT
Oral Chemotherapy Monitoring Program    Primary Oncologist: Dr. Green   Primary Oncology Clinic: [facility]  Cancer Diagnosis: RCC    Therapy History:  Started Cabometyx 40mg on 4/19    Lab Monitoring Plan  C1D1+   CMP, CBC, urine random protein, BP C2D1+ Call, BP, CMP, CBC, urine random protein  C3D1+ Call, BP, CMP, CBC, urine random protein C4D1+ Call, BP, CMP, CBC, urine random protein C5D1+ Call, BP, CMP, CBC, urine random protein C6D1+ Call, BP, CMP, CBC, urine random protein   C1D8+  C2D8+  C3D8+  C4D8+  C5D8+  C6D8+    C1D15+ Call, BP C2D15+  C3D15+  C4D15+  C5D15+  C6D15+    C1D22+  C2D22+  C3D22+  C4D22+  C5D22+  C6D22+    CBC monthly  CMP monthly  Urine random protein monthly  Check BP Q2 weeks for the first month, then monthly     Subjective/Objective:  Suzi Gonsales is a 52 year old female contacted by phone for a follow-up visit for oral chemotherapy.  Suzi reports so far so good with Cabometyx. She states acid reflux has been her main issue, not relating to the Cabometyx. Omeprazole has helped with the acid reflux but her esophagus is still raw. She is finding she has to avoid citrus because it irritates her throat. She also has a decrease in appetite, she says she can go a day without eating. I suggested drinking protein shakes, high calorie foods and keeping snacks with her and consciously eating snacks throughout the whole day.  She had some diarrhea yesterday but was relieved because she was previously on opioids and was constipated.   Suzi's blood pressure was 103/72 while talking to her, her BP usually runs low. She pushes fluids throughout the day. She does feel tired after moving around, she does occasionally get lightheaded and dizzy.   Suzi reports she can start to feel her energy come back after the tough last few months.      ORAL CHEMOTHERAPY 4/26/2018   Drug Name Cabometyx (cabozantinib)   Current Dosage 40mg   Current Schedule Daily   Cycle Details Continuous   Start Date of Last Cycle  "4/19/2018   Adherence Assessment Adherent   Adverse Effects Other (see note for details)   Other (see note for details) Loss of appetite   Pharmacist intervention? Yes   Intervention(s) Patient education   Home BPs all BPs<140/90       Last PHQ-2 Score on record:   PHQ-2 ( 1999 [employer]) 2/19/2018 6/16/2017   Q1: Little interest or pleasure in doing things (No Data) 0   Q2: Feeling down, depressed or hopeless - 0   PHQ-2 Score - 0     Vitals:  BP:   BP Readings from Last 1 Encounters:   04/16/18 93/54     Wt Readings from Last 1 Encounters:   04/16/18 57.1 kg (125 lb 12.8 oz)     Estimated body surface area is 1.58 meters squared as calculated from the following:    Height as of 4/16/18: 1.575 m (5' 2.01\").    Weight as of 4/16/18: 57.1 kg (125 lb 12.8 oz).    Labs:  _  Result Component Current Result Ref Range   Sodium 137 (4/16/2018) 133 - 144 mmol/L     _  Result Component Current Result Ref Range   Potassium 4.0 (4/16/2018) 3.4 - 5.3 mmol/L     _  Result Component Current Result Ref Range   Calcium 7.9 (L) (4/16/2018) 8.5 - 10.1 mg/dL     No results found for Mag within last 30 days.     No results found for Phos within last 30 days.     _  Result Component Current Result Ref Range   Albumin 2.7 (L) (4/16/2018) 3.4 - 5.0 g/dL     _  Result Component Current Result Ref Range   Urea Nitrogen 12 (4/16/2018) 7 - 30 mg/dL     _  Result Component Current Result Ref Range   Creatinine 0.88 (4/16/2018) 0.52 - 1.04 mg/dL       _  Result Component Current Result Ref Range   AST 20 (4/16/2018) 0 - 45 U/L     _  Result Component Current Result Ref Range   ALT 14 (4/16/2018) 0 - 50 U/L     _  Result Component Current Result Ref Range   Bilirubin Total 0.1 (L) (4/16/2018) 0.2 - 1.3 mg/dL       _  Result Component Current Result Ref Range   WBC 9.1 (4/16/2018) 4.0 - 11.0 10e9/L     _  Result Component Current Result Ref Range   Hemoglobin 10.0 (L) (4/16/2018) 11.7 - 15.7 g/dL     _  Result Component Current Result Ref Range "   Platelet Count 478 (H) (4/16/2018) 150 - 450 10e9/L     _  Result Component Current Result Ref Range   Absolute Neutrophil 8.2 (4/16/2018) 1.6 - 8.3 10e9/L     Assessment:  Suzi is tolerating the start of Cabometyx well.     Plan:  Continue Cabometyx   Try to eat more snacks and calorie dense foods    Follow-Up:  Review 4/30 appointment with Kia.    Refill Due:  5/9    Laila Merritt, PharmD  [facility]  [phone]

## 2018-04-30 NOTE — NURSING NOTE
"Oncology Rooming Note    April 30, 2018 3:11 PM   Suzi Gonsales is a 52 year old female who presents for:    Chief Complaint   Patient presents with     Port Draw     labs drawn via port by RN     Oncology Clinic Visit     Return visit related to Kidney Cancer     Initial Vitals: /52 (BP Location: Right arm, Patient Position: Chair, Cuff Size: Adult Regular)  Pulse 98  Temp 98.1  F (36.7  C) (Oral)  Resp 18  Ht 1.575 m (5' 2.01\")  Wt 57.1 kg (125 lb 12.8 oz)  LMP 12/20/2013  SpO2 94%  BMI 23 kg/m2 Estimated body mass index is 23 kg/(m^2) as calculated from the following:    Height as of this encounter: 1.575 m (5' 2.01\").    Weight as of this encounter: 57.1 kg (125 lb 12.8 oz). Body surface area is 1.58 meters squared.  No Pain (0) Comment: Data Unavailable   Patient's last menstrual period was 12/20/2013.  Allergies reviewed: Yes  Medications reviewed: Yes    Medications: MEDICATION REFILLS NEEDED TODAY. Provider was notified.  Pharmacy name entered into [employer]:    [id number] - [place], [place] - [address] AT [facility] [employer] [facility] [employer]  [facility] - USE FOR MAILING ONLY - [facility] - [place], [place] - [address] [place] [address]    Clinical concerns: Refill needed. Provider was notified.    10 minutes for nursing intake (face to face time)     Lesly Jean-Baptiste LPN            "

## 2018-04-30 NOTE — MR AVS SNAPSHOT
After Visit Summary   4/30/2018    Suzi Gonsales    MRN: [id number]           Patient Information     Date Of Birth          1965        Visit Information        Provider Department      4/30/2018 3:10 PM Day Ren PA-C [facility]        Today's Diagnoses     Malignant neoplasm of right kidney (H)        Sleep disorder           Follow-ups after your visit        Your next 10 appointments already scheduled     May 14, 2018  2:30 PM CDT   M[facility]ic Lab Draw with  MASONIC LAB DRAW   [facility] Lab Draw ([facility])    [address] [place] [address]  [address]  [facility] [address]   [phone]            May 14, 2018  3:10 PM CDT   (Arrive by 2:55 PM)   Return Visit with BRENDA Betancur [facility] ([facility])    [address] [place] [address]  [address]  [facility] [address]   [phone]            Jun 05, 2018 11:00 AM CDT   MR BRAIN W/O & W CONTRAST with UUMR2   [facility] [place], [facility] ([facility], [facility])    [address] [address]   [phone]           Take your medicines as usual, unless your doctor tells you not to. Bring a list of your current medicines to your exam (including vitamins, minerals and over-the-counter drugs).  You may or may not receive intravenous (IV) contrast for this exam pending the discretion of the Radiologist.  You do not need to do anything special to prepare.  The MRI machine uses a strong magnet. Please wear clothes without metal (snaps, zippers). A sweatsuit works well, or we may give you a hospital gown.  Please remove any body piercings and hair extensions before you arrive. You will also remove watches, jewelry, hairpins, wallets, dentures, partial dental plates and hearing aids. You may wear contact lenses, and you may be able to wear your rings. We have a safe place to keep your personal  items, but it is safer to leave them at home.  **IMPORTANT** THE INSTRUCTIONS BELOW ARE ONLY FOR THOSE PATIENTS WHO HAVE BEEN PRESCRIBED SEDATION OR GENERAL ANESTHESIA DURING THEIR MRI PROCEDURE:  IF YOUR DOCTOR PRESCRIBED ORAL SEDATION (take medicine to help you relax during your exam):   You must get the medicine from your doctor (oral medication) before you arrive. Bring the medicine to the exam. Do not take it at home. You ll be told when to take it upon arriving for your exam.   Arrive one hour early. Bring someone who can take you home after the test. Your medicine will make you sleepy. After the exam, you may not drive, take a bus or take a taxi by yourself.  IF YOUR DOCTOR PRESCRIBED IV SEDATION:   Arrive one hour early. Bring someone who can take you home after the test. Your medicine will make you sleepy. After the exam, you may not drive, take a bus or take a taxi by yourself.   No eating 6 hours before your exam. You may have clear liquids up until 4 hours before your exam. (Clear liquids include water, clear tea, black coffee and fruit juice without pulp.)  IF YOUR DOCTOR PRESCRIBED ANESTHESIA (be asleep for your exam):   Arrive 1 1/2 hours early. Bring someone who can take you home after the test. You may not drive, take a bus or take a taxi by yourself.   No eating 8 hours before your exam. You may have clear liquids up until 4 hours before your exam. (Clear liquids include water, clear tea, black coffee and fruit juice without pulp.)   You will spend four to five hours in the recovery room.  Please call the Imaging Department at your exam site with any questions.            Jun 05, 2018 12:20 PM CDT   CT CHEST/ABDOMEN/PELVIS W CONTRAST with UCCT2   [facility] ([facility])    [address]  1st Floor  [facility] [address] [phone]           Please bring any scans or X-rays taken at other hospitals, if similar tests were done. Also bring a list  of your medicines, including vitamins, minerals and over-the-counter drugs. It is safest to leave personal items at home.  Be sure to tell your doctor:   If you have any allergies.   If there s any chance you are pregnant.   If you are breastfeeding.  How to prepare:   Do not eat or drink for 2 hours before your exam. If you need to take medicine, you may take it with small sips of water. (We may ask you to take liquid medicine as well.)   Please wear loose clothing, such as a sweat suit or jogging clothes. Avoid snaps, zippers and other metal. We may ask you to undress and put on a hospital gown.  Please arrive 30 minutes early for your CT. Once in the department you might be asked to drink water 15-20 minutes prior to your exam.  If indicated you may be asked to drink an oral contrast in advance of your CT.  If this is the case, the imaging team will let you know or be in contact with you prior to your appointment  Patients over 70 or patients with diabetes or kidney problems:   If you haven t had a blood test (creatinine test) within the last 30 days, the Cardiologist/Radiologist may require you to get this test prior to your exam.  If you have diabetes:   Continue to take your metformin medication on the day of your exam  If you have any questions, please call the Imaging Department where you will have your exam.            Jun 05, 2018  1:00 PM CDT   Return Visit with Huyen Cuellar MD   Radiation Oncology Clinic ([facility])    [facility]  1st Floor  [address] [facility] [address]   [phone]            Jun 06, 2018  3:45 PM CDT   M[facility]ic Lab Draw with  Language Learning Class LAB DRAW   [facility] ([facility])    [address]  [address]  [facility] [address]   [phone]            Jun 06, 2018  4:30 PM CDT   (Arrive by 4:15 PM)   Return Visit with Henri Green MD   [facility] ([facility])    [address]  [address]  [facility] [address] [phone]              Future tests that were ordered for you today     Open Standing Orders        Priority Remaining Interval Expires Ordered    Comprehensive metabolic panel Routine 25/25 every 2-4 weeks 12/1/2018 5/1/2018    CBC with platelets differential Routine 25/25 every 2-4 weeks 12/1/2018 5/1/2018          Open Future Orders        Priority Expected Expires Ordered    CT Chest/Abdomen/Pelvis w Contrast Routine  11/26/2018 4/30/2018            Who to contact     If you have questions or need follow up information about today's clinic visit or your schedule please contact [facility] directly at [phone].  Normal or non-critical lab and imaging results will be communicated to you by OurStayhart, letter or phone within 4 business days after the clinic has received the results. If you do not hear from us within 7 days, please contact the clinic through [employer] or phone. If you have a critical or abnormal lab result, we will notify you by phone as soon as possible.  Submit refill requests through DecisionDesk or call your pharmacy and they will forward the refill request to us. Please allow 3 business days for your refill to be completed.          Additional Information About Your Visit        DecisionDesk Information     Dec[employer]k gives you secure access to your electronic health record. If you see a primary care provider, you can also send messages to your care team and make appointments. If you have questions, please call your primary care clinic.  If you do not have a primary care provider, please call [phone] and they will assist you.        Care EveryWhere ID     This is your Care EveryWhere ID. This could be used by other organizations to access your [facility] medical records  [id number]        Your Vitals Were     Pulse Temperature Respirations Height Last Period Pulse Oximetry    98 98.1  F  "(36.7  C) (Oral) 18 1.575 m (5' 2.01\") 12/20/2013 94%    BMI (Body Mass Index)                   23 kg/m2            Blood Pressure from Last 3 Encounters:   04/30/18 116/52   04/16/18 93/54   04/13/18 122/50    Weight from Last 3 Encounters:   04/30/18 57.1 kg (125 lb 12.8 oz)   04/16/18 57.1 kg (125 lb 12.8 oz)   04/13/18 56.3 kg (124 lb 1.6 oz)              We Performed the Following     CBC with platelets differential     Comprehensive metabolic panel          Today's Medication Changes          These changes are accurate as of 4/30/18 11:59 PM.  If you have any questions, ask your nurse or doctor.               These medicines have changed or have updated prescriptions.        Dose/Directions    citalopram 20 MG tablet   Commonly known as:  celeXA   This may have changed:  when to take this   Used for:  Insomnia, unspecified type        Dose:  20 mg   Take 1 tablet (20 mg) by mouth every evening   Quantity:  90 tablet   Refills:  3            Where to get your medicines      Some of these will need a paper prescription and others can be bought over the counter.  Ask your nurse if you have questions.     Bring a paper prescription for each of these medications     zolpidem 5 MG tablet                Primary Care Provider Office Phone # Fax #    Molly ARIEL Platt [facility] [phone] [address] [id number]        Equal Access to Services     ROSAURA INTERIANO AH: Hadii jimmie billyo Sovanessa, waaxda luqadaha, qaybta kaalmada adeegyada, waxay yasmine plata. So [facility] [phone].    ATENCIÓN: Si habla español, tiene a chacon disposición servicios gratuitos de asistencia lingüística. Llame al [phone].    We comply with applicable federal civil rights laws and [place] laws. We do not discriminate on the basis of race, color, national origin, age, disability, sex, sexual orientation, or gender identity.            Thank you!     Thank you for choosing [facility] " [facility]  for your care. Our goal is always to provide you with excellent care. Hearing back from our patients is one way we can continue to improve our services. Please take a few minutes to complete the written survey that you may receive in the mail after your visit with us. Thank you!             Your Updated Medication List - Protect others around you: Learn how to safely use, store and throw away your medicines at www.disposemymeds.org.          This list is accurate as of 4/30/18 11:59 PM.  Always use your most recent med list.                   Brand Name Dispense Instructions for use Diagnosis    ALPRAZolam 0.5 MG tablet    XANAX    60 tablet    Take 1 tablet (0.5 mg) by mouth 3 times daily as needed for anxiety    Renal cell carcinoma, unspecified laterality (H), Mass of upper lobe of left lung       aspirin 81 MG chewable tablet     36 tablet    Take 1 tablet (81 mg) by mouth every morning    Pre-op evaluation, Brain metastases (H)       buPROPion 300 MG 24 hr tablet    WELLBUTRIN XL     Take 300 mg by mouth every morning        Cabozantinib S-Malate 40 MG    CABOMETYX    30 tablet    Take 1 tablet (40 mg) by mouth daily Take on an empty stomach 1 hour before or 2 hours after a meal. Avoid grapefruit and grapefruit juice.    Brain metastasis (H), Metastatic renal cell carcinoma to lung, right (H), Malignant neoplasm of right kidney (H)       calcium carbonate 500 tablet    OS-MUNDO 500 mg [place]. [place]     Take 1 tablet by mouth daily (with lunch)    Pre-op evaluation, Brain metastases (H)       citalopram 20 MG tablet    celeXA    90 tablet    Take 1 tablet (20 mg) by mouth every evening    Insomnia, unspecified type       MAGNESIUM PO      Take by mouth At Bedtime    Pre-op evaluation, Brain metastases (H)       Multi-vitamin Tabs tablet      Take 1 tablet by mouth daily (with lunch)    Pre-op evaluation, Brain metastases (H)       OMEGA 3 PO      Take by mouth daily (with lunch)    Pre-op  evaluation, Brain metastases (H)       omeprazole 40 MG capsule    priLOSEC    30 capsule    Take 1 capsule (40 mg) by mouth daily    Cerebral edema (H)       ondansetron 8 MG tablet    ZOFRAN    30 tablet    Take 1 tablet (8 mg) by mouth every 8 hours as needed for nausea    Nausea       PROBIOTIC PO      Take by mouth daily (with lunch)    Pre-op evaluation, Brain metastases (H)       SUPER B COMPLEX/VITAMIN C PO      Take by mouth daily (with lunch)    Pre-op evaluation, Brain metastases (H)       TURMERIC PO      Take by mouth daily (with lunch)    Pre-op evaluation, Brain metastases (H)       VITAMIN B 12 PO      Take by mouth daily (with lunch)    Pre-op evaluation, Brain metastases (H)       VITAMIN C PO      Take by mouth daily (with lunch)    Pre-op evaluation, Brain metastases (H)       VITAMIN D PO      Take by mouth daily (with lunch)    Pre-op evaluation, Brain metastases (H)       zolpidem 5 MG tablet    AMBIEN    30 tablet    Take 1 tablet (5 mg) by mouth nightly as needed for sleep    Sleep disorder

## 2018-04-30 NOTE — NURSING NOTE
Chief Complaint   Patient presents with     Port Draw     labs drawn via port by RN     /52 (BP Location: Right arm, Patient Position: Chair, Cuff Size: Adult Regular)  Pulse 98  Temp 98.1  F (36.7  C) (Oral)  Wt 57.1 kg (125 lb 12.8 oz)  LMP 12/20/2013  SpO2 94%  BMI 23 kg/m2    Vitals taken. Port accessed by RN. Labs collected and sent. Line flushed with NS & Heparin. Pt tolerated well. Pt checked in for next appointment.    Estee Hampton, RN

## 2018-05-01 NOTE — PROGRESS NOTES
[facility]CER CLINIC  FOLLOW-UP VISIT NOTE  Date of visit: Apr 30, 2018       REASON FOR VISIT: mRCC here for follow up    HPI: Suzi presented to ED with hematuria and right flank pain thinking she had a renal stone in December 2014. She was worked up with a CT abd/pelvis which suggested a renal mass. She was referred to Dr. Max Zelaya in [facility]. 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 Per charts tumor resection had negative margins and it was staged at pT2bN0.    Her staging work up was negative except for pulmonary nodules. She has been followed every 6 months with scans. CT CAP on 5/11/17 showed enlarging hilar LAD, enlarging pulmonary nodules, adrenal nodules and a pancreatic body mass. Biopsies of hilar LN, adrenal nodule and pancreatic mass were + for RCC.     ONCOLOGY THERAPY:   6/6/17-8/15/18-- IL-2, of which she received 4 cycles   11/22/17 CT CAP showed disease progression  She completed three months of Opdivo and then 12/5/17-2/13/18- 3 months of Opdivo  3/6/18- cerebellar lesion s/p craniotomy and GK 4/5 to tumor bed and 4 new lesions  4/12/18- 5 fractions of XRT to left supraclav/MERARY  4/19/18- Cabo 40 mg     INTERVAL HISTORY: Suzi has had a rough couple weeks with hypercalcemia and was treated with IVF + Zometa,  CT CAP showed worsening disease in the chest, and GK (and the planning MRI showed 4 new lesions, thus 5 areas were treated). She then developed severe left arm pain from an enlarging MERARY lesion. She underwent 5 fractions and today is absolutely pain free. She is not taking any analgesics and so grateful this is better.  She still doesn't have much appetite, but nausea is resolved and she is only taking compazine prn.  Her BM are slow, but miralax is helping.  Her biggest issue is ongoing fatigue.  She is not sure if this is from the Cabo or from XRT.  Mood is slightly down, but  she feels totally engaged and well when she is resting-she isn't sleepy- just moving around, running errands is very fatigue.  Some JIMÉNEZ with stairs.     No f/s/c.  No headache, vision issues, paresthesias or motor dysfunction.        Current Outpatient Prescriptions   Medication Sig     ALPRAZolam (XANAX) 0.5 MG tablet Take 1 tablet (0.5 mg) by mouth 3 times daily as needed for anxiety     Ascorbic Acid (VITAMIN C PO) Take by mouth daily (with lunch)     aspirin 81 MG chewable tablet Take 1 tablet (81 mg) by mouth every morning     B Complex-C (SUPER B COMPLEX/VITAMIN C PO) Take by mouth daily (with lunch)     buPROPion (WELLBUTRIN XL) 300 MG 24 hr tablet Take 300 mg by mouth every morning      Cabozantinib S-Malate (CABOMETYX) 40 MG Take 1 tablet (40 mg) by mouth daily Take on an empty stomach 1 hour before or 2 hours after a meal. Avoid grapefruit and grapefruit juice.     calcium carbonate (OS-MUNDO 500 MG [place]. [place]) 1250 MG tablet Take 1 tablet by mouth daily (with lunch)     Cholecalciferol (VITAMIN D PO) Take by mouth daily (with lunch)     citalopram (CELEXA) 20 MG tablet Take 1 tablet (20 mg) by mouth every evening (Patient taking differently: Take 20 mg by mouth At Bedtime )     Cyanocobalamin (VITAMIN B 12 PO) Take by mouth daily (with lunch)     MAGNESIUM PO Take by mouth At Bedtime     multivitamin, therapeutic with minerals (MULTI-VITAMIN) TABS tablet Take 1 tablet by mouth daily (with lunch)     Omega-3 Fatty Acids (OMEGA 3 PO) Take by mouth daily (with lunch)     omeprazole (PRILOSEC) 40 MG capsule Take 1 capsule (40 mg) by mouth daily     ondansetron (ZOFRAN) 8 MG tablet Take 1 tablet (8 mg) by mouth every 8 hours as needed for nausea     Probiotic Product (PROBIOTIC PO) Take by mouth daily (with lunch)     TURMERIC PO Take by mouth daily (with lunch)     zolpidem (AMBIEN) 5 MG tablet Take 1 tablet (5 mg) by mouth nightly as needed for sleep     No current facility-administered medications for this  "visit.           EXAM:    /52 (BP Location: Right arm, Patient Position: Chair, Cuff Size: Adult Regular)  Pulse 98  Temp 98.1  F (36.7  C) (Oral)  Resp 18  Ht 1.575 m (5' 2.01\")  Wt 57.1 kg (125 lb 12.8 oz)  LMP 12/20/2013  SpO2 94%  BMI 23 kg/m2    Wt Readings from Last 4 Encounters:   04/30/18 57.1 kg (125 lb 12.8 oz)   04/16/18 57.1 kg (125 lb 12.8 oz)   04/13/18 56.3 kg (124 lb 1.6 oz)   04/10/18 57 kg (125 lb 9.6 oz)     Vital signs were reviewed.   Patient alert and oriented x3.   PERRLA. EOMI. No scleral icterus noted. OP without thrush/sores.  Neck exam: No palpable cervical nodes.  Left supraclav conglomerate was 4 x 4- today is still \"full\" but no hard nodes noted  Heart: RRR no murmurs noted.   Lungs: Left lung has decreased breathsounds at left base with dullness to percussion 1/3 lower lung.  Right lung clear  Abd: positive bowel sounds in all four quadrants.  No tenderness to palpation.  No hepatomegaly.   Extremities: No lower extremity edema.   Neuro: grossly intact.   Mood and affect is stable.       LABS:      4/13/2018 14:53 4/16/2018 15:57 4/30/2018 14:28   Sodium 136 137 138   Potassium 3.9 4.0 3.7   Chloride 105 107 106   Carbon Dioxide 20 20 23   Urea Nitrogen 13 12 6 (L)   Creatinine 1.03 0.88 0.71   GFR Estimate 56 (L) 67 86   GFR Estimate If Black 68 81 >90   Calcium 8.4 (L) 7.9 (L) 8.3 (L)   Anion Gap 12 11 9   Albumin 2.8 (L) 2.7 (L) 2.6 (L)   Protein Total 6.7 (L) 6.4 (L) 6.6 (L)   Bilirubin Total 0.3 0.1 (L) 0.1 (L)   Alkaline Phosphatase 69 66 89   ALT 17 14 37   AST 26 20 44   Calcium Ionized 4.6     TSH 1.02     Glucose 116 (H) 159 (H) 129 (H)   WBC 7.8 9.1 4.7   Hemoglobin 10.7 (L) 10.0 (L) 11.8   Hematocrit 33.0 (L) 31.1 (L) 36.5   Platelet Count 554 (H) 478 (H) 426   RBC Count 3.50 (L) 3.27 (L) 4.06   MCV 94 95 90       ASSESSMENT/PLAN: 52 year old with mRCC s/p 4 cycles of IL-2.  She had a stable CT after 2 cycles, mild disease progression after 4 cycles.  After a " short break, CT showed worsening disease and she has started with Opdivo.   Three months later she had new brain disease and worsening systemic disease and started Cabo on 4/19.     1. RCC: Suzi started Cabo 40 mg daily on 4/19.  She has been taking for 2 weeks.  Her main symptoms are fatigue and ongoing anorexia.  The anorexia preceeded the Cabo.  Her weight is stable and she is eating small amounts 3-4 x daily.  For now, we agreed to watch this as she doesn't want an appetite stimulant and her weight is stable. No HTN, diarrhea, HFS thus far.  Her fatigue has been ongoing, I think some today related to XRT.  Most of the Cabo toxicity tends to start in the ~4 week, but the 40 mg dose is typically tolerated fairly well.  I will keep a close eye on her and bring her back with labs in 2 weeks, CT CAP in 4 weeks (after 6 weeks on Cabo) to see Dr Green.    2. FEN-  Hypercalcemia; treated x 1 with Zometa (4/2/18) and stable  Hypoalbuminemia/Anorexia: working harder on oral intake and 64 oz fluids daily, weight stable    3. Left chest pain/arm pain: likely from her enlarging apical mass on the left.  She started palliative XRT to the apical mass 4/12.  Her Supraclav mass is soft and notably smaller.  Arm pain is GONE. She is no longer taking any analgesics.  Great news!    4. Brain mets: no neuro symptoms, HA or confusion today.   Craniotomy successful on 3/6.  Has GK to the bed on 4/5.  And FOUR new lesions were noted during the planning MRI.  THus, 4 lesions and the tumor bed were done on 4/5.  She is no longer taking Dexamethasone.   -next brain MRI in June 2018    5. Nausea: intermittent queasiness has resolved now with compazine prn and daily PPI    6. Fatigue: multifactorial- Suzi has been fatigued for the last 2 months.  She is very alert and not napping, more of a body-fatigue.  I believe this is from XRT and will likely be getting better in the next week.  She was on multiple doses of Dex, thus could be low on  cortisol- but no dizziness or nausea. She actually had a few good times this weekend and was out running errands and out to lunch.  Will keep monitoring.     7. Mood: has been low with multiple medical issues in the last couple months, but has huge support group and having friends/family drop by daily to keep her company. Continue with celexa, wellbutrin.  Taking Xanax rarely.  Ambien at night.     8. Left sided pleural effusion: discussed- no JIMÉNEZ with normal activity, only with stairs, thus could be from deconditioning.  No orthopnea.  Monitor for now.  Discussed urgent drain if develops shortness of breath.     Kia Ren PA-C

## 2018-05-03 NOTE — TELEPHONE ENCOUNTER
[facility] Telephone Triage Note    Assessment: Patient called in to triage reporting the following symptoms: mouth/throat sores.    Recommendations: Discussed with Bharati Huffman NP.  OK for MMW and salt/soda rinses.  Order sent to pt's preferred pharmacy..    Follow-Up: Instructed patient to seek care immediately for worsening symptoms, including: fever, chest pain, shortness of breath, dizziness. Patient voiced understanding of advice and/or instructions given.

## 2018-05-11 NOTE — TELEPHONE ENCOUNTER
Pt called in to triage reporting her cough has increasingly worsened. She is sob a lot of the time and she feels the fluid in her left lung requires draining at this point. She is taking benzonatate once a day. She is not coughing up any sputum but does cough to the point of almost vomiting and has tears in her eyes. Denies any chest pain or tightness. Denies any fevers, chills, hemoptysis, sinus or uri symptoms. Pt is asking for cough syrup and refill of benzonatate to last her until her apt with [facility] on Monday.    Discussed with K[facility]: ok to refill tessalon pearls, instruct to take 3 times daily and start america-tussin 1-2 teaspoons q6hs prn. Order chest x-ray prior to apt on Monday. Rx called to [facility]. Pt scheduled for chest xray at 2 pm and informed on how to take medications. Pt verbalized understanding.

## 2018-05-14 NOTE — PROGRESS NOTES
[facility]  FOLLOW-UP VISIT NOTE  Date of visit: May 14, 2018       REASON FOR VISIT: mRCC here for follow up    HPI: Suzi presented to ED with hematuria and right flank pain thinking she had a renal stone in December 2014. She was worked up with a CT abd/pelvis which suggested a renal mass. She was referred to Dr. Max Zelaya in [facility]. 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 Per charts tumor resection had negative margins and it was staged at pT2bN0.    Her staging work up was negative except for pulmonary nodules. She has been followed every 6 months with scans. CT CAP on 5/11/17 showed enlarging hilar LAD, enlarging pulmonary nodules, adrenal nodules and a pancreatic body mass. Biopsies of hilar LN, adrenal nodule and pancreatic mass were + for RCC.     ONCOLOGY THERAPY:   6/6/17-8/15/18-- IL-2, of which she received 4 cycles   11/22/17 CT CAP showed disease progression  She completed three months of Opdivo and then 12/5/17-2/13/18- 3 months of Opdivo  3/6/18- cerebellar lesion s/p craniotomy   4/5 GK to tumor bed and 4 new lesions  4/12/18- 5 fractions of XRT to left supraclav/MERARY  4/19/18- Cabo 40 mg     INTERVAL HISTORY: Suzi has had a rough last month with hypercalcemia (4/2)and was treated with IVF + Zometa,  CT CAP showed worsening disease in the chest (4/4), and GK (and the planning MRI showed 4 new lesions, thus 5 areas were treated-4/5/18). She then developed severe left arm pain from an enlarging MERARY lesion. She underwent 5 fractions (4/12) and since has been absolutely pain free. She is not taking any analgesics and so grateful this is better.      She continues to have a low appetite and is forcing herself to eat, but nausea is resolved and she is only taking compazine prn.  Her BM are slow, but miralax is helping.  She continues to have ongoing fatigue.  She is not sure if  this is from the Cabo or from XRT.  Mood is slightly down, but she feels totally engaged and well when she is resting-she isn't sleepy- just moving around, running errands is very fatigue. She does feel this last week she was able to get some 3-4 hour intervals of time where she could leave the house and do things with her family.  She has noticed worsening JIMÉNEZ with stairs and walking in general.     No f/s/c.  Occasional mild frontal headache, no vision issues, paresthesias or motor dysfunction.        Current Outpatient Prescriptions   Medication Sig     ALPRAZolam (XANAX) 0.5 MG tablet Take 1 tablet (0.5 mg) by mouth 3 times daily as needed for anxiety     aspirin 81 MG chewable tablet Take 1 tablet (81 mg) by mouth every morning     B Complex-C (SUPER B COMPLEX/VITAMIN C PO) Take by mouth daily (with lunch)     benzonatate (TESSALON) 100 MG capsule Take 1 capsule (100 mg) by mouth 3 times daily as needed for cough     buPROPion (WELLBUTRIN XL) 300 MG 24 hr tablet Take 300 mg by mouth every morning      Cabozantinib S-Malate (CABOMETYX) 40 MG Take 1 tablet (40 mg) by mouth daily Take on an empty stomach 1 hour before or 2 hours after a meal. Avoid grapefruit and grapefruit juice.     Cabozantinib S-Malate (CABOMETYX) 40 MG Take 1 tablet (40 mg) by mouth daily Take on an empty stomach 1 hour before or 2 hours after a meal. Avoid grapefruit and grapefruit juice.     Cholecalciferol (VITAMIN D PO) Take by mouth daily (with lunch)     citalopram (CELEXA) 20 MG tablet Take 1 tablet (20 mg) by mouth every evening (Patient taking differently: Take 20 mg by mouth At Bedtime )     Cyanocobalamin (VITAMIN B 12 PO) Take by mouth daily (with lunch)     guaiFENesin-codeine (CHERATUSSIN AC) 100-10 MG/5ML SOLN solution Take 5-10 mLs by mouth every 6 hours as needed for cough     magic mouthwash (ENTER INGREDIENTS IN COMMENTS) suspension Swish and spit 5-10 mLs four times daily as needed for mouth sores     MAGNESIUM PO Take by  "mouth At Bedtime     multivitamin, therapeutic with minerals (MULTI-VITAMIN) TABS tablet Take 1 tablet by mouth daily (with lunch)     Omega-3 Fatty Acids (OMEGA 3 PO) Take by mouth daily (with lunch)     omeprazole (PRILOSEC) 40 MG capsule Take 1 capsule (40 mg) by mouth daily     ondansetron (ZOFRAN) 8 MG tablet Take 1 tablet (8 mg) by mouth every 8 hours as needed for nausea     Probiotic Product (PROBIOTIC PO) Take by mouth daily (with lunch)     TURMERIC PO Take by mouth daily (with lunch)     zolpidem (AMBIEN) 5 MG tablet Take 1 tablet (5 mg) by mouth nightly as needed for sleep     Ascorbic Acid (VITAMIN C PO) Take by mouth daily (with lunch)     calcium carbonate (OS-MUNDO 500 MG [place]. [place]) 1250 MG tablet Take 1 tablet by mouth daily (with lunch)     No current facility-administered medications for this visit.           EXAM:    /55 (BP Location: Left arm, Patient Position: Chair, Cuff Size: Adult Regular)  Pulse 96  Temp 97.3  F (36.3  C) (Oral)  Wt 54.4 kg (120 lb)  LMP 12/20/2013  SpO2 98%  BMI 21.94 kg/m2    Wt Readings from Last 4 Encounters:   05/14/18 54.4 kg (120 lb)   04/30/18 57.1 kg (125 lb 12.8 oz)   04/16/18 57.1 kg (125 lb 12.8 oz)   04/13/18 56.3 kg (124 lb 1.6 oz)     Vital signs were reviewed.   Patient alert and oriented x3.   PERRLA. EOMI. No scleral icterus noted. OP without thrush/sores.  Neck exam: No palpable cervical nodes.  Left supraclav conglomerate was 4 x 4- today is still \"full\" but no hard nodes noted  Heart: RRR no murmurs noted.   Lungs: Left lung has decreased breathsounds at left base with dullness to percussion 1/2 lower lung.  Right lung clear  Abd: positive bowel sounds in all four quadrants.  No tenderness to palpation.  No hepatomegaly.   Extremities: No lower extremity edema.   Neuro: grossly intact.   Mood and affect is stable.       LABS:      4/16/2018 15:57 4/30/2018 14:28 5/14/2018 14:19   Sodium 137 138 134   Potassium 4.0 3.7 3.7   Chloride 107 106 102 "   Carbon Dioxide 20 23 26   Urea Nitrogen 12 6 (L) 6 (L)   Creatinine 0.88 0.71 0.79   GFR Estimate 67 86 76   GFR Estimate If Black 81 >90 >90   Calcium 7.9 (L) 8.3 (L) 8.6   Anion Gap 11 9 6   Albumin 2.7 (L) 2.6 (L) 2.8 (L)   Protein Total 6.4 (L) 6.6 (L) 7.0   Bilirubin Total 0.1 (L) 0.1 (L) 0.2   Alkaline Phosphatase 66 89 99   ALT 14 37 54 (H)   AST 20 44 59 (H)   Glucose 159 (H) 129 (H) 102 (H)   WBC 9.1 4.7 4.0   Hemoglobin 10.0 (L) 11.8 12.0   Hematocrit 31.1 (L) 36.5 35.9   Platelet Count 478 (H) 426 448   RBC Count 3.27 (L) 4.06 4.08   MCV 95 90 88       ASSESSMENT/PLAN: 52 year old with mRCC s/p 4 cycles of IL-2.  She had a stable CT after 2 cycles, mild disease progression after 4 cycles.  After a short break, CT showed worsening disease and she has started with Opdivo.   Three months later she had new brain disease and worsening systemic disease and started Cabo on 4/19.     1. RCC: Suzi started Cabo 40 mg daily on 4/19.  She has been taking for 3.5 weeks.  Her main symptoms are fatigue and ongoing anorexia.  The anorexia preceeded the Cabo.  Her weight has dropped for the first time, which is new.   No HTN, diarrhea, HFS thus far.  Her fatigue has been ongoing, I think some today related to XRT and it seems this last week this was better, she was able to get 3-4 hours of time up and off the couch, but then would be very tired afterwards.  Most of the Cabo toxicity tends to start in the ~4 week, but the 40 mg dose is typically tolerated fairly well.  Our plan today is to start Cannabis for anorexia and tap her lung this week and if no better- we will move up her CT scans  --CT CAP in 3 weeks (after 7 weeks on Cabo) to see Dr Green.    2. FEN-  Hypercalcemia; treated x 1 with Zometa (4/2/18) and stable  Hypoalbuminemia/Anorexia: working harder on oral intake and 64 oz fluids daily, weight dropped for the first time.  I registered her for medical cannabis    3. Left chest pain/arm pain: likely from her  enlarging apical mass on the left.  She started palliative XRT to the apical mass 4/12.  Her Supraclav mass is soft and notably smaller.  Arm pain is GONE. She is no longer taking any analgesics.  Great news!    4. Brain mets: no neuro symptoms or confusion today.   Craniotomy successful on 3/6.  Has GK to the bed on 4/5.  And FOUR new lesions were noted during the planning MRI.  THus, 4 lesions and the tumor bed were done on 4/5.  She is no longer taking Dexamethasone.  Occ mild frontal HA.    -next brain MRI in June 2018    5. Nausea: intermittent queasiness has resolved now with compazine prn and daily PPI    6. Fatigue: multifactorial- Suzi has been fatigued for the last 2 months.  She is very alert and not napping, more of a body-fatigue.  I believe this is from XRT and will likely be getting better in the next week.  She was on multiple doses of Dex, thus could be low on cortisol- but no dizziness or nausea. This last week she was able to have about 4 active hours before retiring to the bed.  I encouraged her to try and sit with her family for dinner and stay downstairs with the family until after dinner.      7. Mood: has been low with multiple medical issues in the last couple months, but has huge support group and having friends/family drop by daily to keep her company. Continue with celexa, wellbutrin.  Taking Xanax rarely.  Ambien at night.     8. Left sided pleural effusion: discussed- her X-ray today and the worsening JIMÉNEZ.  I walked her in the halls and her O2 stayed in the 93 range -was 96 at rest- but she was only able to walk up and down the halls twice before she became to fatigued.  HR went up to 120 from 95 baseline.  Discussed with IR- will get a tap on Wednesday.     Final plan: tap on Wednesday and if she is not feeling like she is improving- we may need to move her scans up a week or so (brain and CT CAP).  She will message me later this week for update. PETER Ren PA-C

## 2018-05-14 NOTE — NURSING NOTE
"Oncology Rooming Note    May 14, 2018 2:54 PM   Suzi Gonsales is a 52 year old female who presents for:    Chief Complaint   Patient presents with     Port Draw     labs drawn via port by RN     Oncology Clinic Visit     Return for Kidney Ca      Initial Vitals: /55 (BP Location: Left arm, Patient Position: Chair, Cuff Size: Adult Regular)  Pulse 96  Temp 97.3  F (36.3  C) (Oral)  Wt 54.4 kg (120 lb)  LMP 12/20/2013  SpO2 98%  BMI 21.94 kg/m2 Estimated body mass index is 21.94 kg/(m^2) as calculated from the following:    Height as of 4/30/18: 1.575 m (5' 2.01\").    Weight as of this encounter: 54.4 kg (120 lb). Body surface area is 1.54 meters squared.  No Pain (0) Comment: Data Unavailable   Patient's last menstrual period was 12/20/2013.  Allergies reviewed: Yes  Medications reviewed: Yes    Medications: Medication refills not needed today.  Pharmacy name entered into [employer]:    [id number] - [place], [place] - [address] AT [facility] [address]  [facility] - USE FOR MAILING ONLY - [facility] - [place], [place] - [address] [place] [address]    Clinical concerns: results    6 was notified.    6 minutes for nursing intake (face to face time)     Jillian Peralta MA              "

## 2018-05-14 NOTE — MR AVS SNAPSHOT
After Visit Summary   5/14/2018    Suzi Gonsales    MRN: [id number]           Patient Information     Date Of Birth          1965        Visit Information        Provider Department      5/14/2018 3:10 PM Day Ren PA-C [facility]        Today's Diagnoses     Pleural effusion    -  1    Malignant neoplasm of right kidney (H)           Follow-ups after your visit        Your next 10 appointments already scheduled     May 16, 2018 12:30 PM CDT   Procedure 3.5 hour with U2A ROOM 10   Unit 2A [facility] ([facility])    [address] [address]               May 16, 2018  2:00 PM CDT   IR THORACENTESIS with UUIR1   [facility], [place], Interventional Radiology ([facility])    [address] [address] [phone]           1. Laboratory test are to be obtained by your doctor prior to the exam (Hgb/Hct, platelet count, INR) 2. Someone will need to drive you to and from the hospital if you feel you may need sedation for the procedure. 3. Bring a list of all drugs you are taking; include supplements and over-the-counter medications. 4. Wear comfortable clothes and leave your valuables at home. 5. If you are or may be pregnant, be sure to contact your doctor or a Radiology nurse prior to the day of the exam. 6. If you have diabetes, check with your doctor or a Radiology nurse to see if your insulin needs to be adjusted for the exam. 7. If you are taking Coumadin (to thin your blood) please contact your doctor or a Radiology nurse at least 3 days before the exam for special instructions (only need the INR to be <2.0). 8. The day before your exam you may eat your regular diet. Drink no alcoholic beverages for 24 hours prior to the exam. 9. Do not eat any solid food or milk products for 6 hours prior to the exam. You may drink clear liquids until 2  hours prior to the exam. Clear liquids include the following: water, Jell-O, clear broth, apple juice or any noncarbonated drink that you can see through (no pop!) 10. The morning of the exam you may brush your teeth and take medications as directed with a sip of water. 11. Tell the Radiology nurse if you have any allergies. 12. If the tube needs to remain in place you will remain in the hospital until the tube can be removed 13. If the tube is removed immediately you may leave the hospital following your exam. However, if you received sedation you will need to stay 1-2 hours. You may be asked to stay longer and have a chest x-ray sometime after the procedure. 14. If you received sedation, you cannot drive until morning, and an adult must be with you until then. If you live more than one hour away you should stay in the [place] area overnight.            Jun 05, 2018 11:00 AM CDT   MR BRAIN W/O & W CONTRAST with UUMR2   [facility], [place], [facility] ([facility], [facility])    [address] [address] [phone]           Take your medicines as usual, unless your doctor tells you not to. Bring a list of your current medicines to your exam (including vitamins, minerals and over-the-counter drugs).  You may or may not receive intravenous (IV) contrast for this exam pending the discretion of the Radiologist.  You do not need to do anything special to prepare.  The MRI machine uses a strong magnet. Please wear clothes without metal (snaps, zippers). A sweatsuit works well, or we may give you a hospital gown.  Please remove any body piercings and hair extensions before you arrive. You will also remove watches, jewelry, hairpins, wallets, dentures, partial dental plates and hearing aids. You may wear contact lenses, and you may be able to wear your rings. We have a safe place to keep your personal items, but it is safer to leave them at home.  **IMPORTANT** THE  INSTRUCTIONS BELOW ARE ONLY FOR THOSE PATIENTS WHO HAVE BEEN PRESCRIBED SEDATION OR GENERAL ANESTHESIA DURING THEIR MRI PROCEDURE:  IF YOUR DOCTOR PRESCRIBED ORAL SEDATION (take medicine to help you relax during your exam):   You must get the medicine from your doctor (oral medication) before you arrive. Bring the medicine to the exam. Do not take it at home. You ll be told when to take it upon arriving for your exam.   Arrive one hour early. Bring someone who can take you home after the test. Your medicine will make you sleepy. After the exam, you may not drive, take a bus or take a taxi by yourself.  IF YOUR DOCTOR PRESCRIBED IV SEDATION:   Arrive one hour early. Bring someone who can take you home after the test. Your medicine will make you sleepy. After the exam, you may not drive, take a bus or take a taxi by yourself.   No eating 6 hours before your exam. You may have clear liquids up until 4 hours before your exam. (Clear liquids include water, clear tea, black coffee and fruit juice without pulp.)  IF YOUR DOCTOR PRESCRIBED ANESTHESIA (be asleep for your exam):   Arrive 1 1/2 hours early. Bring someone who can take you home after the test. You may not drive, take a bus or take a taxi by yourself.   No eating 8 hours before your exam. You may have clear liquids up until 4 hours before your exam. (Clear liquids include water, clear tea, black coffee and fruit juice without pulp.)   You will spend four to five hours in the recovery room.  Please call the Imaging Department at your exam site with any questions.            Jun 05, 2018 12:20 PM CDT   CT CHEST/ABDOMEN/PELVIS W CONTRAST with UCCT2   [facility] CT ([facility])    [address] [address] [phone]           Please bring any scans or X-rays taken at other hospitals, if similar tests were done. Also bring a list of your medicines, including vitamins, minerals and  over-the-counter drugs. It is safest to leave personal items at home.  Be sure to tell your doctor:   If you have any allergies.   If there s any chance you are pregnant.   If you are breastfeeding.  How to prepare:   Do not eat or drink for 2 hours before your exam. If you need to take medicine, you may take it with small sips of water. (We may ask you to take liquid medicine as well.)   Please wear loose clothing, such as a sweat suit or jogging clothes. Avoid snaps, zippers and other metal. We may ask you to undress and put on a hospital gown.  Please arrive 30 minutes early for your CT. Once in the department you might be asked to drink water 15-20 minutes prior to your exam.  If indicated you may be asked to drink an oral contrast in advance of your CT.  If this is the case, the imaging team will let you know or be in contact with you prior to your appointment  Patients over 70 or patients with diabetes or kidney problems:   If you haven t had a blood test (creatinine test) within the last 30 days, the Cardiologist/Radiologist may require you to get this test prior to your exam.  If you have diabetes:   Continue to take your metformin medication on the day of your exam  If you have any questions, please call the Imaging Department where you will have your exam.            Jun 05, 2018  1:00 PM CDT   Return Visit with Huyen Cuellar MD   Radiation Oncology Clinic ([facility])    [facility]  1st Floor  [address] [address]   [phone]            Jun 06, 2018  3:45 PM CDT   [employer] Lab Draw with  [employer] LAB DRAW   [facility] Lab Draw ([facility])    [address]  [address]  [facility] [address]   [phone]            Jun 06, 2018  4:30 PM CDT   (Arrive by 4:15 PM)   Return Visit with Henri Green MD   [facility] Cancer Clinic ([facility])    [address]  [address]  [facility] [id number] [phone]              Future tests that were ordered for you today     Open Future Orders        Priority Expected Expires Ordered    IR Thoracentesis Routine  5/14/2019 5/14/2018            Who to contact     If you have questions or need follow up information about today's clinic visit or your schedule please contact [facility] directly at [phone].  Normal or non-critical lab and imaging results will be communicated to you by LocBoxhart, letter or phone within 4 business days after the clinic has received the results. If you do not hear from us within 7 days, please contact the clinic through [employer] or phone. If you have a critical or abnormal lab result, we will notify you by phone as soon as possible.  Submit refill requests through [employer] or call your pharmacy and they will forward the refill request to us. Please allow 3 business days for your refill to be completed.          Additional Information About Your Visit        LocBoxharIntroNiche Information     [employer] gives you secure access to your electronic health record. If you see a primary care provider, you can also send messages to your care team and make appointments. If you have questions, please call your primary care clinic.  If you do not have a primary care provider, please call [phone] and they will assist you.        Care EveryWhere ID     This is your Care EveryWhere ID. This could be used by other organizations to access your [facility] medical records  [id number]        Your Vitals Were     Pulse Temperature Last Period Pulse Oximetry BMI (Body Mass Index)       96 97.3  F (36.3  C) (Oral) 12/20/2013 98% 21.94 kg/m2        Blood Pressure from Last 3 Encounters:   05/14/18 106/55   04/30/18 116/52   04/16/18 93/54    Weight from Last 3 Encounters:   05/14/18 54.4 kg (120 lb)   04/30/18 57.1 kg (125 lb 12.8 oz)   04/16/18 57.1 kg (125 lb 12.8 oz)              We Performed the  Following     CBC with platelets differential     Comprehensive metabolic panel          Today's Medication Changes          These changes are accurate as of 5/14/18  6:24 PM.  If you have any questions, ask your nurse or doctor.               These medicines have changed or have updated prescriptions.        Dose/Directions    citalopram 20 MG tablet   Commonly known as:  celeXA   This may have changed:  when to take this   Used for:  Insomnia, unspecified type        Dose:  20 mg   Take 1 tablet (20 mg) by mouth every evening   Quantity:  90 tablet   Refills:  3                Primary Care Provider Office Phone # Fax #    Molly Adame, APRCARLOS [facility] [phone] [address] [address]        Equal Access to Services     ROSAURA INTERIANO : Odilon Santana, alexis robles, michelle tilley, josemanuel plata. So [facility] [phone].    ATENCIÓN: Si habla español, tiene a chacon disposición servicios gratuitos de asistencia lingüística. Llame al [phone].    We comply with applicable federal civil rights laws and [place] laws. We do not discriminate on the basis of race, color, national origin, age, disability, sex, sexual orientation, or gender identity.            Thank you!     Thank you for choosing [facility]  for your care. Our goal is always to provide you with excellent care. Hearing back from our patients is one way we can continue to improve our services. Please take a few minutes to complete the written survey that you may receive in the mail after your visit with us. Thank you!             Your Updated Medication List - Protect others around you: Learn how to safely use, store and throw away your medicines at www.disposemymeds.org.          This list is accurate as of 5/14/18  6:24 PM.  Always use your most recent med list.                   Brand Name Dispense Instructions for use Diagnosis     ALPRAZolam 0.5 MG tablet    XANAX    60 tablet    Take 1 tablet (0.5 mg) by mouth 3 times daily as needed for anxiety    Renal cell carcinoma, unspecified laterality (H), Mass of upper lobe of left lung       aspirin 81 MG chewable tablet     36 tablet    Take 1 tablet (81 mg) by mouth every morning    Pre-op evaluation, Brain metastases (H)       benzonatate 100 MG capsule    TESSALON    42 capsule    Take 1 capsule (100 mg) by mouth 3 times daily as needed for cough    Cough       buPROPion 300 MG 24 hr tablet    WELLBUTRIN XL     Take 300 mg by mouth every morning        * Cabozantinib S-Malate 40 MG    CABOMETYX    30 tablet    Take 1 tablet (40 mg) by mouth daily Take on an empty stomach 1 hour before or 2 hours after a meal. Avoid grapefruit and grapefruit juice.    Brain metastasis (H), Metastatic renal cell carcinoma to lung, right (H), Malignant neoplasm of right kidney (H)       * Cabozantinib S-Malate 40 MG    CABOMETYX    30 tablet    Take 1 tablet (40 mg) by mouth daily Take on an empty stomach 1 hour before or 2 hours after a meal. Avoid grapefruit and grapefruit juice.    Brain metastasis (H), Metastatic renal cell carcinoma to lung, right (H), Malignant neoplasm of right kidney (H)       calcium carbonate 500 MG tablet    OS-MUNDO 500 mg [place]. Ca     Take 1 tablet by mouth daily (with lunch)    Pre-op evaluation, Brain metastases (H)       citalopram 20 MG tablet    celeXA    90 tablet    Take 1 tablet (20 mg) by mouth every evening    Insomnia, unspecified type       guaiFENesin-codeine 100-10 MG/5ML Soln solution    CHERATUSSIN AC    180 mL    Take 5-10 mLs by mouth every 6 hours as needed for cough    Cough       magic mouthwash suspension    ENTER INGREDIENTS IN COMMENTS    500 mL    Swish and spit 5-10 mLs four times daily as needed for mouth sores    Metastatic renal cell carcinoma to lung, right (H)       MAGNESIUM PO      Take by mouth At Bedtime    Pre-op evaluation, Brain metastases (H)        Multi-vitamin Tabs tablet      Take 1 tablet by mouth daily (with lunch)    Pre-op evaluation, Brain metastases (H)       OMEGA 3 PO      Take by mouth daily (with lunch)    Pre-op evaluation, Brain metastases (H)       omeprazole 40 MG capsule    priLOSEC    30 capsule    Take 1 capsule (40 mg) by mouth daily    Cerebral edema (H)       ondansetron 8 MG tablet    ZOFRAN    30 tablet    Take 1 tablet (8 mg) by mouth every 8 hours as needed for nausea    Nausea       PROBIOTIC PO      Take by mouth daily (with lunch)    Pre-op evaluation, Brain metastases (H)       SUPER B COMPLEX/VITAMIN C PO      Take by mouth daily (with lunch)    Pre-op evaluation, Brain metastases (H)       TURMERIC PO      Take by mouth daily (with lunch)    Pre-op evaluation, Brain metastases (H)       VITAMIN B 12 PO      Take by mouth daily (with lunch)    Pre-op evaluation, Brain metastases (H)       VITAMIN C PO      Take by mouth daily (with lunch)    Pre-op evaluation, Brain metastases (H)       VITAMIN D PO      Take by mouth daily (with lunch)    Pre-op evaluation, Brain metastases (H)       zolpidem 5 MG tablet    AMBIEN    30 tablet    Take 1 tablet (5 mg) by mouth nightly as needed for sleep    Sleep disorder       * Notice:  This list has 2 medication(s) that are the same as other medications prescribed for you. Read the directions carefully, and ask your doctor or other care provider to review them with you.

## 2018-05-14 NOTE — NURSING NOTE
Chief Complaint   Patient presents with     Port Draw     labs drawn via port by RN     /55 (BP Location: Left arm, Patient Position: Chair, Cuff Size: Adult Regular)  Pulse 96  Temp 97.3  F (36.3  C) (Oral)  Wt 54.4 kg (120 lb)  LMP 12/20/2013  SpO2 98%  BMI 21.94 kg/m2    Vitals taken. Port accessed by RN. Labs collected and sent. Line flushed with NS & Heparin. Pt tolerated well. Pt checked in for next appointment.    Estee Hampton RN

## 2018-05-16 NOTE — IP AVS SNAPSHOT
[facility], [place], Interventional Radiology    [address] [facility] [address]    Phone:  [phone]                                       After Visit Summary   5/16/2018    Suzi Gonsales    MRN: [id number]           After Visit Summary Signature Page     I have received my discharge instructions, and my questions have been answered. I have discussed any challenges I see with this plan with the nurse or doctor.    ..........................................................................................................................................  Patient/Patient Representative Signature      ..........................................................................................................................................  Patient Representative Print Name and Relationship to Patient    ..................................................               ................................................  Date                                            Time    ..........................................................................................................................................  Reviewed by Signature/Title    ...................................................              ..............................................  Date                                                            Time

## 2018-05-16 NOTE — IP AVS SNAPSHOT
MRN:[id number]                      After Visit Summary   5/16/2018    Suzi Gonsales    MRN: [id number]           Visit Information        Department      5/16/2018 12:30 PM [facility], [place], Interventional Radiology          Review of your medicines      UNREVIEWED medicines. Ask your doctor about these medicines        Dose / Directions    ALPRAZolam 0.5 MG tablet   Commonly known as:  XANAX   Used for:  Renal cell carcinoma, unspecified laterality (H), Mass of upper lobe of left lung        Dose:  0.5 mg   Take 1 tablet (0.5 mg) by mouth 3 times daily as needed for anxiety   Quantity:  60 tablet   Refills:  0       aspirin 81 MG chewable tablet   Used for:  Pre-op evaluation, Brain metastases (H)        Dose:  81 mg   Take 1 tablet (81 mg) by mouth every morning   Quantity:  36 tablet   Refills:  0       benzonatate 100 MG capsule   Commonly known as:  TESSALON   Used for:  Cough        Dose:  100 mg   Take 1 capsule (100 mg) by mouth 3 times daily as needed for cough   Quantity:  42 capsule   Refills:  0       buPROPion 300 MG 24 hr tablet   Commonly known as:  WELLBUTRIN XL        Dose:  300 mg   Take 300 mg by mouth every morning   Refills:  0       * Cabozantinib S-Malate 40 MG   Commonly known as:  CABOMETYX   Used for:  Brain metastasis (H), Metastatic renal cell carcinoma to lung, right (H), Malignant neoplasm of right kidney (H)        Dose:  40 mg   Take 1 tablet (40 mg) by mouth daily Take on an empty stomach 1 hour before or 2 hours after a meal. Avoid grapefruit and grapefruit juice.   Quantity:  30 tablet   Refills:  0       * Cabozantinib S-Malate 40 MG   Commonly known as:  CABOMETYX   Used for:  Brain metastasis (H), Metastatic renal cell carcinoma to lung, right (H), Malignant neoplasm of right kidney (H)        Dose:  40 mg   Take 1 tablet (40 mg) by mouth daily Take on an empty stomach 1 hour before or 2 hours after a meal. Avoid grapefruit and grapefruit juice.   Quantity:  30  tablet   Refills:  0       calcium carbonate 500 MG tablet   Commonly known as:  OS-MUNDO 500 mg [place]. Ca   Used for:  Pre-op evaluation, Brain metastases (H)        Dose:  1 tablet   Take 1 tablet by mouth daily (with lunch)   Refills:  0       citalopram 20 MG tablet   Commonly known as:  celeXA   Used for:  Insomnia, unspecified type        Dose:  20 mg   Take 1 tablet (20 mg) by mouth every evening   Quantity:  90 tablet   Refills:  3       guaiFENesin-codeine 100-10 MG/5ML Soln solution   Commonly known as:  CHERATUSSIN AC   Used for:  Cough        Dose:  1-2 tsp.   Take 5-10 mLs by mouth every 6 hours as needed for cough   Quantity:  180 mL   Refills:  0       magic mouthwash suspension   Commonly known as:  ENTER INGREDIENTS IN COMMENTS   Used for:  Metastatic renal cell carcinoma to lung, right (H)        Swish and spit 5-10 mLs four times daily as needed for mouth sores   Quantity:  500 mL   Refills:  1       MAGNESIUM PO   Used for:  Pre-op evaluation, Brain metastases (H)        Take by mouth At Bedtime   Refills:  0       Multi-vitamin Tabs tablet   Used for:  Pre-op evaluation, Brain metastases (H)        Dose:  1 tablet   Take 1 tablet by mouth daily (with lunch)   Refills:  0       OMEGA 3 PO   Used for:  Pre-op evaluation, Brain metastases (H)        Take by mouth daily (with lunch)   Refills:  0       omeprazole 40 MG capsule   Commonly known as:  priLOSEC   Used for:  Cerebral edema (H)        Dose:  40 mg   Take 1 capsule (40 mg) by mouth daily   Quantity:  30 capsule   Refills:  3       ondansetron 8 MG tablet   Commonly known as:  ZOFRAN   Used for:  Nausea        Dose:  8 mg   Take 1 tablet (8 mg) by mouth every 8 hours as needed for nausea   Quantity:  30 tablet   Refills:  3       PROBIOTIC PO   Used for:  Pre-op evaluation, Brain metastases (H)        Take by mouth daily (with lunch)   Refills:  0       SUPER B COMPLEX/VITAMIN C PO   Used for:  Pre-op evaluation, Brain metastases (H)         Take by mouth daily (with lunch)   Refills:  0       TURMERIC PO   Used for:  Pre-op evaluation, Brain metastases (H)        Take by mouth daily (with lunch)   Refills:  0       VITAMIN B 12 PO   Used for:  Pre-op evaluation, Brain metastases (H)        Take by mouth daily (with lunch)   Refills:  0       VITAMIN C PO   Used for:  Pre-op evaluation, Brain metastases (H)        Take by mouth daily (with lunch)   Refills:  0       VITAMIN D PO   Used for:  Pre-op evaluation, Brain metastases (H)        Take by mouth daily (with lunch)   Refills:  0       zolpidem 5 MG tablet   Commonly known as:  AMBIEN   Used for:  Sleep disorder        Dose:  5 mg   Take 1 tablet (5 mg) by mouth nightly as needed for sleep   Quantity:  30 tablet   Refills:  3       * Notice:  This list has 2 medication(s) that are the same as other medications prescribed for you. Read the directions carefully, and ask your doctor or other care provider to review them with you.             Protect others around you: Learn how to safely use, store and throw away your medicines at www.disposemymeds.org.         Follow-ups after your visit        Your next 10 appointments already scheduled     Jun 05, 2018 11:00 AM CDT   MR BRAIN W/O & W CONTRAST with UUMR2   [facility], [place], [facility] ([facility], [facility])    [address] [address] [phone]           Take your medicines as usual, unless your doctor tells you not to. Bring a list of your current medicines to your exam (including vitamins, minerals and over-the-counter drugs).  You may or may not receive intravenous (IV) contrast for this exam pending the discretion of the Radiologist.  You do not need to do anything special to prepare.  The MRI machine uses a strong magnet. Please wear clothes without metal (snaps, zippers). A sweatsuit works well, or we may give you a hospital gown.  Please remove any body piercings and hair extensions  before you arrive. You will also remove watches, jewelry, hairpins, wallets, dentures, partial dental plates and hearing aids. You may wear contact lenses, and you may be able to wear your rings. We have a safe place to keep your personal items, but it is safer to leave them at home.  **IMPORTANT** THE INSTRUCTIONS BELOW ARE ONLY FOR THOSE PATIENTS WHO HAVE BEEN PRESCRIBED SEDATION OR GENERAL ANESTHESIA DURING THEIR MRI PROCEDURE:  IF YOUR DOCTOR PRESCRIBED ORAL SEDATION (take medicine to help you relax during your exam):   You must get the medicine from your doctor (oral medication) before you arrive. Bring the medicine to the exam. Do not take it at home. You ll be told when to take it upon arriving for your exam.   Arrive one hour early. Bring someone who can take you home after the test. Your medicine will make you sleepy. After the exam, you may not drive, take a bus or take a taxi by yourself.  IF YOUR DOCTOR PRESCRIBED IV SEDATION:   Arrive one hour early. Bring someone who can take you home after the test. Your medicine will make you sleepy. After the exam, you may not drive, take a bus or take a taxi by yourself.   No eating 6 hours before your exam. You may have clear liquids up until 4 hours before your exam. (Clear liquids include water, clear tea, black coffee and fruit juice without pulp.)  IF YOUR DOCTOR PRESCRIBED ANESTHESIA (be asleep for your exam):   Arrive 1 1/2 hours early. Bring someone who can take you home after the test. You may not drive, take a bus or take a taxi by yourself.   No eating 8 hours before your exam. You may have clear liquids up until 4 hours before your exam. (Clear liquids include water, clear tea, black coffee and fruit juice without pulp.)   You will spend four to five hours in the recovery room.  Please call the Imaging Department at your exam site with any questions.            Jun 05, 2018 12:20 PM CDT   CT CHEST/ABDOMEN/PELVIS W CONTRAST with UCCT2    [facility] ([facility])    [address]  1st Floor  [place] [address]   [phone]           Please bring any scans or X-rays taken at other hospitals, if similar tests were done. Also bring a list of your medicines, including vitamins, minerals and over-the-counter drugs. It is safest to leave personal items at home.  Be sure to tell your doctor:   If you have any allergies.   If there s any chance you are pregnant.   If you are breastfeeding.  How to prepare:   Do not eat or drink for 2 hours before your exam. If you need to take medicine, you may take it with small sips of water. (We may ask you to take liquid medicine as well.)   Please wear loose clothing, such as a sweat suit or jogging clothes. Avoid snaps, zippers and other metal. We may ask you to undress and put on a hospital gown.  Please arrive 30 minutes early for your CT. Once in the department you might be asked to drink water 15-20 minutes prior to your exam.  If indicated you may be asked to drink an oral contrast in advance of your CT.  If this is the case, the imaging team will let you know or be in contact with you prior to your appointment  Patients over 70 or patients with diabetes or kidney problems:   If you haven t had a blood test (creatinine test) within the last 30 days, the Cardiologist/Radiologist may require you to get this test prior to your exam.  If you have diabetes:   Continue to take your metformin medication on the day of your exam  If you have any questions, please call the Imaging Department where you will have your exam.            Jun 05, 2018  1:00 PM CDT   Return Visit with Huyen Cuellar MD   Radiation Oncology Clinic ([facility])    [facility]  1st Floor  [address] [address]   [phone]            Jun 06, 2018  3:45 PM CDT   [facility] Lab Draw with  MAS[facility]IC LAB DRAW   [facility] Draw ([facility])    [address]  [address]  [place] [address]   [phone]            Jun 06, 2018  4:30 PM CDT   (Arrive by 4:15 PM)   Return Visit with Henri Green MD   [facility] ([facility])    [address]  [address]  [place] [address]   [phone]               Care Instructions        Further instructions from your care team         [facility],   Interventional Radiology Discharge Instructions Following Thoracentesis        AFTER YOU GO HOME:  ? Resume previous diet and medications  ? Limit strenuous physical activity such as lifting, straining, or exercising for 48 hours.  You may resume normal activity in 24 hours  ? Change gauze at the puncture site as needed to keep site dry.  Leaking of additional fluid is not uncommon during the first 24-48 hours    CALL THE PHYSICIAN:  ? If you develop a fever, shortness of breath, chest pain, cough up blood, excessive bleeding from the thoracentesis site, severe lightheadedness, or fainting call you doctor immediately or come to the closest Emergency Department.  Do not drive yourself.   ? If you have questions or concerns regarding your procedure call the radiologist.      [facility] INTERVENTIONAL RADIOLOGY DEPARTMENT  Procedure Physician: ***  TABITHA Cook                              Date of procedure: May 16, 2018  Telephone Numbers: [phone]       Monday-Friday 8:00 am to 4:30 pm                                                   [phone]   After 4:30 pm Monday - Friday, Ask for the Interventional Radiologist on call.  Someone is on call 24 hrs/day  [facility] toll free number: [phone]    Monday-Friday 8:00 am to 4:30 pm  [facility] Emergency Dept: [phone]  _             Additional Information About Your Visit        [employer] Information     [email or web address] gives you secure access to your electronic health record. If you see a primary care provider, you can  also send messages to your care team and make appointments. If you have questions, please call your primary care clinic.  If you do not have a primary care provider, please call [phone] and they will assist you.        Care EveryWhere ID     This is your Care EveryWhere ID. This could be used by other organizations to access your [facility] medical records  [id number]        Your Vitals Were     Blood Pressure Pulse Temperature Respirations Last Period Pulse Oximetry    100/50 95 98  F (36.7  C) (Oral) 20 12/20/2013 95%       Primary Care Provider Office Phone # Fax #    ARIEL Carter [facility] [phone]      Equal Access to Services     ROSAURA INTERIANO : Hadii jimmie Santana, wajamie robles, qakaur kaalmada jarek, josemanuel oswald . So [facility] [phone].    ATENCIÓN: Si habla español, tiene a chacon disposición servicios gratuitos de asistencia lingüística. Llame al [phone].    We comply with applicable federal civil rights laws and [place] laws. We do not discriminate on the basis of race, color, national origin, age, disability, sex, sexual orientation, or gender identity.            Thank you!     Thank you for choosing [facility] for your care. Our goal is always to provide you with excellent care. Hearing back from our patients is one way we can continue to improve our services. Please take a few minutes to complete the written survey that you may receive in the mail after you visit with us. Thank you!             Medication List: This is a list of all your medications and when to take them. Check marks below indicate your daily home schedule. Keep this list as a reference.      Medications           Morning Afternoon Evening Bedtime As Needed    ALPRAZolam 0.5 MG tablet   Commonly known as:  XANAX   Take 1 tablet (0.5 mg) by mouth 3 times daily as needed for anxiety                                aspirin 81 MG chewable tablet   Take 1 tablet (81  mg) by mouth every morning                                benzonatate 100 MG capsule   Commonly known as:  TESSALON   Take 1 capsule (100 mg) by mouth 3 times daily as needed for cough                                buPROPion 300 MG 24 hr tablet   Commonly known as:  WELLBUTRIN XL   Take 300 mg by mouth every morning                                * Cabozantinib S-Malate 40 MG   Commonly known as:  CABOMETYX   Take 1 tablet (40 mg) by mouth daily Take on an empty stomach 1 hour before or 2 hours after a meal. Avoid grapefruit and grapefruit juice.                                * Cabozantinib S-Malate 40 MG   Commonly known as:  CABOMETYX   Take 1 tablet (40 mg) by mouth daily Take on an empty stomach 1 hour before or 2 hours after a meal. Avoid grapefruit and grapefruit juice.                                calcium carbonate 500 MG tablet   Commonly known as:  OS-MUNDO 500 mg [place]. Ca   Take 1 tablet by mouth daily (with lunch)                                citalopram 20 MG tablet   Commonly known as:  celeXA   Take 1 tablet (20 mg) by mouth every evening                                guaiFENesin-codeine 100-10 MG/5ML Soln solution   Commonly known as:  CHERATUSSIN AC   Take 5-10 mLs by mouth every 6 hours as needed for cough                                magic mouthwash suspension   Commonly known as:  ENTER INGREDIENTS IN COMMENTS   Swish and spit 5-10 mLs four times daily as needed for mouth sores                                MAGNESIUM PO   Take by mouth At Bedtime                                Multi-vitamin Tabs tablet   Take 1 tablet by mouth daily (with lunch)                                OMEGA 3 PO   Take by mouth daily (with lunch)                                omeprazole 40 MG capsule   Commonly known as:  priLOSEC   Take 1 capsule (40 mg) by mouth daily                                ondansetron 8 MG tablet   Commonly known as:  ZOFRAN   Take 1 tablet (8 mg) by mouth every 8 hours as needed for  nausea                                PROBIOTIC PO   Take by mouth daily (with lunch)                                SUPER B COMPLEX/VITAMIN C PO   Take by mouth daily (with lunch)                                TURMERIC PO   Take by mouth daily (with lunch)                                VITAMIN B 12 PO   Take by mouth daily (with lunch)                                VITAMIN C PO   Take by mouth daily (with lunch)                                VITAMIN D PO   Take by mouth daily (with lunch)                                zolpidem 5 MG tablet   Commonly known as:  AMBIEN   Take 1 tablet (5 mg) by mouth nightly as needed for sleep                                * Notice:  This list has 2 medication(s) that are the same as other medications prescribed for you. Read the directions carefully, and ask your doctor or other care provider to review them with you.

## 2018-05-16 NOTE — PROGRESS NOTES
Pt arrived to 2A with  for thoracentesis. VSS, pt c/o L lung pain. Port accessed, ISTAT INR 1.2. Awaiting consent. Continue to monitor

## 2018-05-16 NOTE — PROGRESS NOTES
Pt returned to 2A s/p L thoracentesis. VSS, pt c/o pain, unchanged from preprocedure. L back site CDI. LM on 's phone. Continue to monitor

## 2018-05-16 NOTE — PROCEDURES
Interventional Radiology Brief Post Procedure Note    Procedure: IR THORACENTESIS    Proceduralist: Joe Weinstein PA-C    Assistant: None    Time Out: Prior to the start of the procedure and with procedural staff participation, I verbally confirmed the patient s identity using two indicators, relevant allergies, that the procedure was appropriate and matched the consent or emergent situation, and that the correct equipment/implants were available. Immediately prior to starting the procedure I conducted the Time Out with the procedural staff and re-confirmed the patient s name, procedure, and site/side. (The Joint Commission universal protocol was followed.)  Yes    Sedation: None. Local Anesthestic used    Findings: Completed ultrasound-guided right diagnostic and therapeutic thoracentesis. A total of 900 mL clear pea green fluid drained from the right pleural space. No immediate complication.    Estimated Blood Loss: None    Fluoroscopy Time: None    Specimens: None    Complications: 1. None     Condition: Stable    Plan: Follow up per primary team. Return to IR as needed.    Comments: See dictated procedure note for full details.    Joe Weinstein PA-C  Interventional Radiology  [phone]

## 2018-05-16 NOTE — PROGRESS NOTES
Pt reports is feeling better since returning from [facility]; has eaten saltine crackers and water, tolerated well, declined any other food or drink. After walking site remains flat and dry; pt reports still has same pain in left front lower chest, requested tylenol; verbal order gotten from Juanito Delatorre MD. Pt up walking, steady. Declined to void. Port de-accessed with heparin. Discharge instructions reviewed and copy to pt. DC to home with family. Plans to connect with primary for future scheduling.

## 2018-05-16 NOTE — PROGRESS NOTES
Patient Name: Suzi Gonsales  Medical Record Number: [id number]  Today's Date: 5/16/2018    Procedure: Left Thoracentesis, 900 ml yellow fluid removed  Proceduralist: YULISSA Weinstein PA-C    Sedation start time: N/A    Procedure start time: 1430  Puncture time: 1432  Procedure end time: 1445    Report given to: LEON RN  : NA    Other Notes: Pt arrived to IR room 2 from . Pt denies any questions or concerns regarding procedure. Pt positioned supine and monitored per protocol. Pt tolerated procedure without any noted complications. Pt transferred back to .    Tricia Mccurdy RN

## 2018-05-16 NOTE — DISCHARGE INSTRUCTIONS
[facility],   Interventional Radiology Discharge Instructions Following Thoracentesis        AFTER YOU GO HOME:  ? Resume previous diet and medications  ? Limit strenuous physical activity such as lifting, straining, or exercising for 48 hours.  You may resume normal activity in 24 hours  ? Change gauze at the puncture site as needed to keep site dry.  Leaking of additional fluid is not uncommon during the first 24-48 hours    CALL THE PHYSICIAN:  ? If you develop a fever, shortness of breath, chest pain, cough up blood, excessive bleeding from the thoracentesis site, severe lightheadedness, or fainting call you doctor immediately or come to the closest Emergency Department.  Do not drive yourself.   ? If you have questions or concerns regarding your procedure call the radiologist.      [facility] INTERVENTIONAL RADIOLOGY DEPARTMENT  Procedure Physician: ***  TABITHA Cook                              Date of procedure: May 16, 2018  Telephone Numbers: [phone]       Monday-Friday 8:00 am to 4:30 pm                                                   [phone]   After 4:30 pm Monday - Friday, Ask for the Interventional Radiologist on call.  Someone is on call 24 hrs/day  [facility] toll free number: [phone]    Monday-Friday 8:00 am to 4:30 pm  [facility] Emergency Dept: [phone]  _

## 2018-05-21 NOTE — TELEPHONE ENCOUNTER
I called Suzi to address her My Chart.  The thoracentesis went well- she is breathing easier.  She is still fatigued, but feels that she is getting in 4 good hours a day now (last week was 3) so she is slowly making improvements.  She is starting to push herself to walk daily now and this is helping. No new concerning s/s to warrant moving up her brain MRI or CT CAP.  She will keep me posted.   Kia Ren PA-C

## 2018-06-05 NOTE — NURSING NOTE
FOLLOW-UP VISIT    Patient Name: Suzi Gonsales      : 1965     Age: 52 year old        ______________________________________________________________________________     Chief Complaint   Patient presents with     Cancer     Radiation oncology folllow-up     /70  Pulse 87  Wt 54 kg (119 lb)  LMP 2013  BMI 21.76 kg/m2   Left neck and lung 2000 cGy completed on 18  Gamma knife radiosurgery 2018    Pain  Current history of pain associated with this visit:   Intensity: 6/10  Current: aching  Location: headache occasionally  Treatment: none    Labs  Other Labs: No    Imaging  MRI: done today and CT    Other Appointments:     MD Name: Dr Green Appointment Date: tomorrow   MD Name: Appointment Date:   MD Name: Appointment Date:   Other Appointment Notes:     Residual Radiation side effect:  Very fatigued since radiation therapy, says she is feeling stronger just this week.    Additional Instructions:     Nurse face-to-face time: Level 2:  5 min face to face time

## 2018-06-05 NOTE — PROGRESS NOTES
Department of Therapeutic Radiology--Radiation Oncology                   [place] [address]  [address] [place]  [address]  Office:  [phone]  Fax:  [phone]   Radiation Oncology Clinic  [address] [place] [address]  Phone:  [phone]  Fax:  [phone]     RE: Suzi Gonsales : 1965   MRN: [id number] CONG: 2018     OUTPATIENT VISIT NOTE       DIAGNOSIS: brain metastasis  AREAS TREATED: brain  And rt supraclav and rt upper lobe  DOSE: 2000 in 5 ebt      TYPES OF RADIATION GIVEN: GK and external beam     INTERVAL SINCE COMPLETION OF RADIATION THERAPY:3 mo from gk ( 18)  1a lt resection cavity            1 shot of 8mm and 3 shots of 14 mm     18 Gy to 50%                                                                                                                                                                               1b lt cerebellar                    1 shot 4 mm  22 Gy to 50 %isodose                                                                                      1c rt cerebellar                    1 shot 8 mm  22 Gy to 50 %isodose                                                                                                             1d lt occipital                      1 shot 8 mm  22 Gy to 50 %isodose                                                                                                             1e lt frontal                          1 shot 4 mm  22 Gy to 50 %isodose    SUBJECTIVE: Doing really well. No headaches.  No other symptoms other than fatigue.         OBJECTIVE:   /70  Pulse 87  Wt 54 kg (119 lb)  LMP 2013  BMI 21.76 kg/m2  Engaged and alert.  CN intact grossly. No pain     MRI today   treated metastases:  In the posterior left cerebellum, there is a decreasing focus of  intraparenchymal enhancement in the location of prior stereotactic  radiosurgery, compatible with treated metastases or local  positive  treatment response. Measures 1.5 x 2.3 cm (series 12, image 5),  decreased in size since comparison study 4/5/2018 when it measured  approximately 1.8 x 2.4 cm.      ASSESSMENT : No evidence of further mets.  Treated lesions are either smaller or resolved.     RECOMMENDATION : RTC with follow up MRI in 3 months.      Addendum:  CT of CAP from today has findings of possible impending fracture of the left femoral neck    Will call Kia Shaw who is seeing her tomorrow to evaluate.      Thank you for allowing me to participate in this patients  care.   Please do not heistate to call if you have questions   Huyen Peralta Chair in Radiation Oncology   Professor and Head  Department of Therapeutic Radiology -Radiation Oncology  [facility]    Phone:[facility] [phone]/ SARAH JOHNSON [phone]/[facility]  [phone]  PAGER   [phone]

## 2018-06-05 NOTE — LETTER
2018       RE: Suzi Gonsales  [address] [place] [address]     Dear Colleague,    Thank you for referring your patient, Suzi Gonsales, to the RADIATION ONCOLOGY CLINIC. Please see a copy of my visit note below.    Department of Therapeutic Radiology--Radiation Oncology                   [place] [address]  [address] [place]  [address]  Office:  [phone]  Fax:  [phone]   Radiation Oncology Clinic  [address] [place] [address]  Phone:  [phone]  Fax:  [phone]     RE: Suzi Gonsales : 1965   MRN: [id number] CONG: 2018     OUTPATIENT VISIT NOTE       DIAGNOSIS: brain metastasis  AREAS TREATED: brain  And rt supraclav and rt upper lobe  DOSE: 2000 in 5 ebt      TYPES OF RADIATION GIVEN: GK and external beam     INTERVAL SINCE COMPLETION OF RADIATION THERAPY:3 mo from gk ( 18)  1a lt resection cavity            1 shot of 8mm and 3 shots of 14 mm     18 Gy to 50%                                                                                                                                                                               1b lt cerebellar                    1 shot 4 mm  22 Gy to 50 %isodose                                                                                      1c rt cerebellar                    1 shot 8 mm  22 Gy to 50 %isodose                                                                                                             1d lt occipital                      1 shot 8 mm  22 Gy to 50 %isodose                                                                                                             1e lt frontal                          1 shot 4 mm  22 Gy to 50 %isodose    SUBJECTIVE: Doing really well. No headaches.  No other symptoms other than fatigue.         OBJECTIVE:   /70  Pulse 87  Wt 54 kg (119 lb)  LMP 2013  BMI 21.76 kg/m2  Engaged and alert.  CN intact grossly. No pain     MRI today    treated metastases:  In the posterior left cerebellum, there is a decreasing focus of  intraparenchymal enhancement in the location of prior stereotactic  radiosurgery, compatible with treated metastases or local positive  treatment response. Measures 1.5 x 2.3 cm (series 12, image 5),  decreased in size since comparison study 4/5/2018 when it measured  approximately 1.8 x 2.4 cm.      ASSESSMENT : No evidence of further mets.  Treated lesions are either smaller or resolved.     RECOMMENDATION : RTC with follow up MRI in 3 months.      Addendum:  CT of CAP from today has findings of possible impending fracture of the left femoral neck    Will call Kia Shaw who is seeing her tomorrow to evaluate.      Thank you for allowing me to participate in this patients  care.   Please do not heistate to call if you have questions   Huyen Peralta Chair in Radiation Oncology   Professor and Head  Department of Therapeutic Radiology -Radiation Oncology  [facility]    Phone:[facility] [phone]/ SARAH JOHNSON [phone]/[facility]  [phone]  PAGER   [phone]          Again, thank you for allowing me to participate in the care of your patient.      Sincerely,    Huyen Cuellar MD

## 2018-06-05 NOTE — DISCHARGE INSTRUCTIONS

## 2018-06-05 NOTE — MR AVS SNAPSHOT
After Visit Summary   6/5/2018    Suzi Gonsales    MRN: [id number]           Patient Information     Date Of Birth          1965        Visit Information        Provider Department      6/5/2018 1:00 PM Huyen Cuellar MD Radiation Oncology Clinic        Today's Diagnoses     Brain metastasis (H)    -  1       Follow-ups after your visit        Your next 10 appointments already scheduled     Jun 06, 2018  3:45 PM CDT   [facility] Draw with  [facility] DRAW   [facility] ([facility])    [address] [facility] [address]  [address]  [facility] [address]   [phone]            Jun 06, 2018  4:30 PM CDT   (Arrive by 4:15 PM)   Return Visit with Henri Green MD   [facility] ([facility])    [address] [place] [address]  [address]  [facility] [address]   [phone]            Sep 06, 2018 12:00 PM CDT   MR BRAIN W/O & W CONTRAST with UUMR1   [facility] [place]    [address] [facility] [address]   [phone]           Take your medicines as usual, unless your doctor tells you not to. Bring a list of your current medicines to your exam (including vitamins, minerals and over-the-counter drugs).  You may or may not receive intravenous (IV) contrast for this exam pending the discretion of the Radiologist.  You do not need to do anything special to prepare.  The MRI machine uses a strong magnet. Please wear clothes without metal (snaps, zippers). A sweatsuit works well, or we may give you a hospital gown.  Please remove any body piercings and hair extensions before you arrive. You will also remove watches, jewelry, hairpins, wallets, dentures, partial dental plates and hearing aids. You may wear contact lenses, and you may be able to wear your rings. We have a safe place to keep your personal items, but it is safer to leave them at  home.  **IMPORTANT** THE INSTRUCTIONS BELOW ARE ONLY FOR THOSE PATIENTS WHO HAVE BEEN PRESCRIBED SEDATION OR GENERAL ANESTHESIA DURING THEIR MRI PROCEDURE:  IF YOUR DOCTOR PRESCRIBED ORAL SEDATION (take medicine to help you relax during your exam):   You must get the medicine from your doctor (oral medication) before you arrive. Bring the medicine to the exam. Do not take it at home. You ll be told when to take it upon arriving for your exam.   Arrive one hour early. Bring someone who can take you home after the test. Your medicine will make you sleepy. After the exam, you may not drive, take a bus or take a taxi by yourself.  IF YOUR DOCTOR PRESCRIBED IV SEDATION:   Arrive one hour early. Bring someone who can take you home after the test. Your medicine will make you sleepy. After the exam, you may not drive, take a bus or take a taxi by yourself.   No eating 6 hours before your exam. You may have clear liquids up until 4 hours before your exam. (Clear liquids include water, clear tea, black coffee and fruit juice without pulp.)  IF YOUR DOCTOR PRESCRIBED ANESTHESIA (be asleep for your exam):   Arrive 1 1/2 hours early. Bring someone who can take you home after the test. You may not drive, take a bus or take a taxi by yourself.   No eating 8 hours before your exam. You may have clear liquids up until 4 hours before your exam. (Clear liquids include water, clear tea, black coffee and fruit juice without pulp.)   You will spend four to five hours in the recovery room.  Please call the Imaging Department at your exam site with any questions.            Sep 06, 2018  1:00 PM CDT   Return Visit with Huyen Cuellar MD   Radiation Oncology Clinic ([facility])    [facility]  1st Floor  [address] [facility] [address]   [phone]              Future tests that were ordered for you today     Open Future Orders        Priority Expected Expires Ordered    MRI Brain w &  w/o contrast Routine  6/5/2019 6/5/2018            Who to contact     Please call your clinic at [phone] to:    Ask questions about your health    Make or cancel appointments    Discuss your medicines    Learn about your test results    Speak to your doctor            Additional Information About Your Visit        [employer] Information     [employer] gives you secure access to your electronic health record. If you see a primary care provider, you can also send messages to your care team and make appointments. If you have questions, please call your primary care clinic.  If you do not have a primary care provider, please call [phone] and they will assist you.      [employer] is an electronic gateway that provides easy, online access to your medical records. With [employer], you can request a clinic appointment, read your test results, renew a prescription or communicate with your care team.     To access your existing account, please contact your [facility] or call [phone] for assistance.        Care EveryWhere ID     This is your Care EveryWhere ID. This could be used by other organizations to access your [facility] medical records  [id number]        Your Vitals Were     Pulse Last Period BMI (Body Mass Index)             87 12/20/2013 21.76 kg/m2          Blood Pressure from Last 3 Encounters:   06/05/18 102/70   05/16/18 108/56   05/14/18 106/55    Weight from Last 3 Encounters:   06/05/18 54 kg (119 lb)   05/14/18 54.4 kg (120 lb)   04/30/18 57.1 kg (125 lb 12.8 oz)                 Today's Medication Changes          These changes are accurate as of 6/5/18 11:59 PM.  If you have any questions, ask your nurse or doctor.               These medicines have changed or have updated prescriptions.        Dose/Directions    citalopram 20 MG tablet   Commonly known as:  celeXA   This may have changed:  when to take this   Used for:  Insomnia, unspecified type        Dose:  20 mg   Take  1 tablet (20 mg) by mouth every evening   Quantity:  90 tablet   Refills:  3                Primary Care Provider Office Phone # Fax #    ARIEL Carter [facility] [phone] [address] [facility] [address]        Equal Access to Services     ROSAURA INTERIANO : Hadii jimmie ku hadasho Soomaali, waaxda luqadaha, qaybta kaalmada adeegyada, waxay yasmine kelloggbarrypercy oswald . So [facility] [phone].    ATENCIÓN: Si habla español, tiene a chacon disposición servicios gratuitos de asistencia lingüística. Mario al [phone].    We comply with applicable federal civil rights laws and [place] laws. We do not discriminate on the basis of race, color, national origin, age, disability, sex, sexual orientation, or gender identity.            Thank you!     Thank you for choosing RADIATION ONCOLOGY CLINIC  for your care. Our goal is always to provide you with excellent care. Hearing back from our patients is one way we can continue to improve our services. Please take a few minutes to complete the written survey that you may receive in the mail after your visit with us. Thank you!             Your Updated Medication List - Protect others around you: Learn how to safely use, store and throw away your medicines at www.disposemymeds.org.          This list is accurate as of 6/5/18 11:59 PM.  Always use your most recent med list.                   Brand Name Dispense Instructions for use Diagnosis    ALPRAZolam 0.5 MG tablet    XANAX    60 tablet    Take 1 tablet (0.5 mg) by mouth 3 times daily as needed for anxiety    Renal cell carcinoma, unspecified laterality (H), Mass of upper lobe of left lung       aspirin 81 MG chewable tablet     36 tablet    Take 1 tablet (81 mg) by mouth every morning    Pre-op evaluation, Brain metastases (H)       benzonatate 100 MG capsule    TESSALON    42 capsule    Take 1 capsule (100 mg) by mouth 3 times daily as needed for cough    Cough       buPROPion 300 MG 24  hr tablet    WELLBUTRIN XL     Take 300 mg by mouth every morning        * Cabozantinib S-Malate 40 MG    CABOMETYX    30 tablet    Take 1 tablet (40 mg) by mouth daily Take on an empty stomach 1 hour before or 2 hours after a meal. Avoid grapefruit and grapefruit juice.    Brain metastasis (H), Metastatic renal cell carcinoma to lung, right (H), Malignant neoplasm of right kidney (H)       * Cabozantinib S-Malate 40 MG    CABOMETYX    30 tablet    Take 1 tablet (40 mg) by mouth daily Take on an empty stomach 1 hour before or 2 hours after a meal. Avoid grapefruit and grapefruit juice.    Brain metastasis (H), Metastatic renal cell carcinoma to lung, right (H), Malignant neoplasm of right kidney (H)       * CABOMETYX 40 MG   Generic drug:  Cabozantinib S-Malate     30 tablet    TAKE 1 TABLET DAILY ON AN EMPTY STOMACH 1 HOUR BEFORE OR 2 HOURS AFTER A MEAL, AVOID GRAPEFRUIT AND GRAPEFRUIT JUICE    Malignant neoplasm of right kidney (H), Brain metastasis (H)       calcium carbonate 500 MG tablet    OS-MUNDO 500 mg [place]. Ca     Take 1 tablet by mouth daily (with lunch)    Pre-op evaluation, Brain metastases (H)       citalopram 20 MG tablet    celeXA    90 tablet    Take 1 tablet (20 mg) by mouth every evening    Insomnia, unspecified type       guaiFENesin-codeine 100-10 MG/5ML Soln solution    CHERATUSSIN AC    180 mL    Take 5-10 mLs by mouth every 6 hours as needed for cough    Cough       magic mouthwash suspension    ENTER INGREDIENTS IN COMMENTS    500 mL    Swish and spit 5-10 mLs four times daily as needed for mouth sores    Metastatic renal cell carcinoma to lung, right (H)       MAGNESIUM PO      Take by mouth At Bedtime    Pre-op evaluation, Brain metastases (H)       Multi-vitamin Tabs tablet      Take 1 tablet by mouth daily (with lunch)    Pre-op evaluation, Brain metastases (H)       OMEGA 3 PO      Take by mouth daily (with lunch)    Pre-op evaluation, Brain metastases (H)       omeprazole 40 MG capsule     priLOSEC    30 capsule    Take 1 capsule (40 mg) by mouth daily    Cerebral edema (H)       ondansetron 8 MG tablet    ZOFRAN    30 tablet    Take 1 tablet (8 mg) by mouth every 8 hours as needed for nausea    Nausea       PROBIOTIC PO      Take by mouth daily (with lunch)    Pre-op evaluation, Brain metastases (H)       SUPER B COMPLEX/VITAMIN C PO      Take by mouth daily (with lunch)    Pre-op evaluation, Brain metastases (H)       TURMERIC PO      Take by mouth daily (with lunch)    Pre-op evaluation, Brain metastases (H)       VITAMIN B 12 PO      Take by mouth daily (with lunch)    Pre-op evaluation, Brain metastases (H)       VITAMIN C PO      Take by mouth daily (with lunch)    Pre-op evaluation, Brain metastases (H)       VITAMIN D PO      Take by mouth daily (with lunch)    Pre-op evaluation, Brain metastases (H)       zolpidem 5 MG tablet    AMBIEN    30 tablet    Take 1 tablet (5 mg) by mouth nightly as needed for sleep    Sleep disorder       * Notice:  This list has 3 medication(s) that are the same as other medications prescribed for you. Read the directions carefully, and ask your doctor or other care provider to review them with you.

## 2018-06-06 NOTE — NURSING NOTE
"Oncology Rooming Note    June 6, 2018 4:17 PM   Suzi Gonsales is a 52 year old female who presents for:    Chief Complaint   Patient presents with     Port Draw     labs drawn via port by RN     Oncology Clinic Visit     Return Kidney Ca     Initial Vitals: /65 (BP Location: Right arm, Patient Position: Chair, Cuff Size: Adult Regular)  Pulse 98  Temp 97.7  F (36.5  C) (Oral)  Ht 1.575 m (5' 2\")  Wt 54.2 kg (119 lb 6.4 oz)  LMP 12/20/2013  SpO2 97%  BMI 21.84 kg/m2 Estimated body mass index is 21.84 kg/(m^2) as calculated from the following:    Height as of this encounter: 1.575 m (5' 2\").    Weight as of this encounter: 54.2 kg (119 lb 6.4 oz). Body surface area is 1.54 meters squared.  No Pain (0) Comment: Data Unavailable   Patient's last menstrual period was 12/20/2013.  Allergies reviewed: Yes  Medications reviewed: Yes    Medications: MEDICATION REFILLS NEEDED TODAY. Provider was notified.  Pharmacy name entered into 5app:    [id number] - [place], [place] - [address] AT [facility] OF [employer] [address]  [facility] - USE FOR MAILING ONLY - [facility] [place], [place] - [address] [place] [address]    Clinical concerns: Refill on Xanax;      6 minutes for nursing intake (face to face time)     Isi Ordoñez CMA              "

## 2018-06-06 NOTE — LETTER
6/6/2018       RE: Suzi Gonsales  [address] [place] [address]     Dear Colleague,    Thank you for referring your patient, Suzi Gonsales, to the [facility]. Please see a copy of my visit note below.    [facility]  FOLLOW-UP VISIT NOTE  Date of visit: Jun 6, 2018       REASON FOR VISIT: mRCC here for follow up    HPI: Suzi presented to ED with hematuria and right flank pain thinking she had a renal stone in December 2014. She was worked up with a CT abd/pelvis which suggested a renal mass. She was referred to Dr. Max Zelaya in [facility]. 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 Per charts tumor resection had negative margins and it was staged at pT2bN0.    Her staging work up was negative except for pulmonary nodules. She has been followed every 6 months with scans. CT CAP on 5/11/17 showed enlarging hilar LAD, enlarging pulmonary nodules, adrenal nodules and a pancreatic body mass. Biopsies of hilar LN, adrenal nodule and pancreatic mass were + for RCC.     ONCOLOGY THERAPY:   6/6/17-8/15/18-- IL-2, of which she received 4 cycles   11/22/17 CT CAP showed disease progression  She completed three months of Opdivo and then 12/5/17-2/13/18- 3 months of Opdivo  3/6/18- cerebellar lesion s/p craniotomy and GK 4/5 to tumor bed and 4 new lesions  4/12/18- 5 fractions of XRT to left supraclav/MERARY  4/19/18- Cabo 40 mg     INTERVAL HISTORY: Suzi is being followed for poor risk kidney cancer      She had brain metastasis and marked progression systemically over the last few months. She has received radiation therapy for her brain mets and also chest. She is doing a lot better from when I met her 2 months ago. She has nausea but well controlled. She has marked fatigue, SOB, cough. She has diarrhea on cabozantinib. Her pain remains improved.   No f/s/c.  No headache, vision issues,  paresthesias or motor dysfunction.        Current Outpatient Prescriptions   Medication Sig     ALPRAZolam (XANAX) 0.5 MG tablet Take 1 tablet (0.5 mg) by mouth 3 times daily as needed for anxiety     Ascorbic Acid (VITAMIN C PO) Take by mouth daily (with lunch)     aspirin 81 MG chewable tablet Take 1 tablet (81 mg) by mouth every morning     B Complex-C (SUPER B COMPLEX/VITAMIN C PO) Take by mouth daily (with lunch)     benzonatate (TESSALON) 100 MG capsule Take 1 capsule (100 mg) by mouth 3 times daily as needed for cough     buPROPion (WELLBUTRIN XL) 300 MG 24 hr tablet Take 300 mg by mouth every morning      CABOMETYX 40 MG TAKE 1 TABLET DAILY ON AN EMPTY STOMACH 1 HOUR BEFORE OR 2 HOURS AFTER A MEAL, AVOID GRAPEFRUIT AND GRAPEFRUIT JUICE     calcium carbonate (OS-MUNDO 500 MG [place]. [place]) 1250 MG tablet Take 1 tablet by mouth daily (with lunch)     Cholecalciferol (VITAMIN D PO) Take by mouth daily (with lunch)     citalopram (CELEXA) 20 MG tablet Take 1 tablet (20 mg) by mouth every evening (Patient taking differently: Take 20 mg by mouth At Bedtime )     Cyanocobalamin (VITAMIN B 12 PO) Take by mouth daily (with lunch)     guaiFENesin-codeine (CHERATUSSIN AC) 100-10 MG/5ML SOLN solution Take 5-10 mLs by mouth every 6 hours as needed for cough     magic mouthwash (ENTER INGREDIENTS IN COMMENTS) suspension Swish and spit 5-10 mLs four times daily as needed for mouth sores     MAGNESIUM PO Take by mouth At Bedtime     multivitamin, therapeutic with minerals (MULTI-VITAMIN) TABS tablet Take 1 tablet by mouth daily (with lunch)     Omega-3 Fatty Acids (OMEGA 3 PO) Take by mouth daily (with lunch)     omeprazole (PRILOSEC) 40 MG capsule Take 1 capsule (40 mg) by mouth daily     ondansetron (ZOFRAN) 8 MG tablet Take 1 tablet (8 mg) by mouth every 8 hours as needed for nausea     Probiotic Product (PROBIOTIC PO) Take by mouth daily (with lunch)     TURMERIC PO Take by mouth daily (with lunch)     zolpidem (AMBIEN) 5 MG  "tablet Take 1 tablet (5 mg) by mouth nightly as needed for sleep     Cabozantinib S-Malate (CABOMETYX) 40 MG Take 1 tablet (40 mg) by mouth daily Take on an empty stomach 1 hour before or 2 hours after a meal. Avoid grapefruit and grapefruit juice. (Patient not taking: Reported on 6/6/2018)     [DISCONTINUED] Cabozantinib S-Malate (CABOMETYX) 40 MG Take 1 tablet (40 mg) by mouth daily Take on an empty stomach 1 hour before or 2 hours after a meal. Avoid grapefruit and grapefruit juice. (Patient not taking: Reported on 6/6/2018)     [DISCONTINUED] Cabozantinib S-Malate (CABOMETYX) 40 MG Take 1 tablet (40 mg) by mouth daily Take on an empty stomach 1 hour before or 2 hours after a meal. Avoid grapefruit and grapefruit juice. (Patient not taking: Reported on 6/6/2018)     No current facility-administered medications for this visit.           EXAM:    /65 (BP Location: Right arm, Patient Position: Chair, Cuff Size: Adult Regular)  Pulse 98  Temp 97.7  F (36.5  C) (Oral)  Ht 1.575 m (5' 2\")  Wt 54.2 kg (119 lb 6.4 oz)  LMP 12/20/2013  SpO2 97%  BMI 21.84 kg/m2    Wt Readings from Last 4 Encounters:   06/06/18 54.2 kg (119 lb 6.4 oz)   06/05/18 54 kg (119 lb)   05/14/18 54.4 kg (120 lb)   04/30/18 57.1 kg (125 lb 12.8 oz)     Vital signs were reviewed.   Patient alert and oriented x3.   PERRLA. EOMI. No scleral icterus noted. OP without thrush/sores.  Neck exam: No palpable cervical nodes.  Left supraclav conglomerate was 4 x 4- today is still \"full\" but no hard nodes noted  Heart: RRR no murmurs noted.   Lungs: Left lung has decreased breathsounds at left base with dullness to percussion 1/3 lower lung.  Right lung clear  Abd: positive bowel sounds in all four quadrants.  No tenderness to palpation.  No hepatomegaly.   Extremities: No lower extremity edema.   Neuro: grossly intact.   Mood and affect is stable.       LABS:   Recent Labs   Lab Test  06/06/18   1610  05/14/18   1419  04/30/18   1428  04/16/18   " 1557  04/13/18   1453   NA  138  134  138  137  136   POTASSIUM  3.5  3.7  3.7  4.0  3.9   CHLORIDE  106  102  106  107  105   CO2  21  26  23  20  20   ANIONGAP  10  6  9  11  12   BUN  7  6*  6*  12  13   CR  0.67  0.79  0.71  0.88  1.03   GLC  130*  102*  129*  159*  116*   MUNDO  8.5  8.6  8.3*  7.9*  8.4*     Recent Labs   Lab Test  03/07/18   0400  08/24/17   1520  08/19/17   0535  08/18/17   0328  08/17/17   0557  08/16/17   1550  08/16/17   1241   MAG  2.2   --   1.8  2.3  2.0   --   3.1*   PHOS   --   3.4  2.2*  2.4*  2.3*  2.0*   --      Recent Labs   Lab Test  06/06/18   1610  05/14/18   1419  04/30/18   1428  04/16/18   1557  04/13/18   1453   WBC  3.8*  4.0  4.7  9.1  7.8   HGB  11.8  12.0  11.8  10.0*  10.7*   PLT  447  448  426  478*  554*   MCV  89  88  90  95  94   NEUTROPHIL  64.9  57.1  72.4  89.5  80.4     Recent Labs   Lab Test  06/06/18   1610  05/14/18   1419  04/30/18   1428   08/19/17   0535  08/18/17   0328  08/17/17   0557   BILITOTAL  0.2  0.2  0.1*   < >  0.2  0.2  0.3   ALKPHOS  89  99  89   < >  60  41  43   ALT  34  54*  37   < >  50  32  21   AST  44  59*  44   < >  31  31  16   ALBUMIN  2.8*  2.8*  2.6*   < >  2.0*  1.9*  2.2*   LDH   --    --    --    --   214  199  182    < > = values in this interval not displayed.     TSH   Date Value Ref Range Status   04/13/2018 1.02 0.40 - 4.00 mU/L Final   01/31/2018 0.93 0.40 - 4.00 mU/L Final   01/04/2018 0.91 0.40 - 4.00 mU/L Final     No results for input(s): CEA in the last [id number] hours.  Results for orders placed or performed in visit on 06/05/18   CT Chest/Abdomen/Pelvis w Contrast     Value    Radiologist flags Left femoral head lytic lesion concerning for    Narrative    EXAMINATION: CT CHEST/ABDOMEN/PELVIS W CONTRAST, 6/5/2018 12:57 PM    TECHNIQUE:  Helical CT images from the thoracic inlet through the  symphysis pubis were obtained  with contrast. Contrast dose: Isovue  370  73cc    COMPARISON: 4/4/2018.    HISTORY: mRCC, 6 weeks  scan on new oral therapy; Malignant neoplasm of  right kidney (H)    FINDINGS:    LUNGS: Patchy opacities left upper lobe and lingula, stable. Almost  complete collapse of the left lower lobe however partially  characterized previously described left upper lobe mass appears  decreased in size from the prior study there measuring approximately  2.9 cm in greatest diameter. Patchy opacities in the right lung, not  significantly changed from the prior study. Limited evaluation of  pulmonary nodules.    Chest: Partially visualized thyroid gland with a focus of  calcification in the right lobe. Right IJ Port-A-Cath with its tip  projecting at the superior cavoatrial junction. Central  tracheobronchial tree is patent. Thoracic aorta and main pulmonary  artery have normal diameter. No central pulmonary embolism. Cardiac  size within normal limits. Trace pericardial effusion. Stable small  right pleural effusion. Moderate to large left pleural effusion,  stable. Left retropectoral and left supraclavicular necrotic appearing  lymph nodes, overall decreased in size from the prior study for  example measuring 7 mm and 7 mm respectively, previously measuring 1.4  cm and 1.4 cm, respectively. Left lower neck necrotic appearing lymph  nodes are also decreased in number and size from the prior study.  Scattered necrotic appearing mediastinal lymph nodes measuring up to  10 mm short axis, significantly improved from the prior study  previously measuring up to 40 mm short axis. Improved subcarinal  lymphadenopathy.    Abdomen and pelvis: No suspicious liver lesions. Stable hypodensity  along the falciform ligament could represent focal fat deposition.  Patent hepatic vasculature. No biliary tree dilation. Decompressed  gallbladder. Unremarkable spleen. 8mm and 2.0 cm enhancing lesion in  the pancreatic body with a necrotic component, improved from the prior  study, previously measuring approximately 2.5 cm. Main pancreatic duct  is  not dilated.    Postoperative changes of right adrenalectomy and right nephrectomy.  Almost complete resolution of the previously seen soft tissue density  in the surgical bed. Left adrenal gland metastatic lesions improved  from the prior study the largest measuring 1.2 cm in the medial limb  previously measuring 1.8 cm. Subcentimeter hypodensities in the left  kidney, too small to characterize, stable. No renal stones or  hydronephrosis. Partially distended urinary bladder. Unremarkable  uterus. No adnexal masses. No free fluid. No free air.    No inguinal lymphadenopathy. Improved right external iliac necrotic  lymph node measuring 1.2 cm short axis, previously measuring 0.6 cm  short axis. No new or enlarging abdominal or pelvic lymphadenopathy.  Small sliding hiatal hernia. No dilated loops of bowel. No bowel wall  thickening. The appendix is normal. Moderate colonic stool burden.    Bones: Lytic lesion involving the left femoral head in its posterior  aspect with stable thickening of the cortex. Degenerative changes of  the spine. Degenerative changes of bilateral SI joints and hips.  Stable wedge compression deformity along the superior endplate of the  vertebral body T7. No new or enlarging bone lesions.      Impression    IMPRESSION: In this patient with history of right renal cell carcinoma  status post right adrenalectomy and right nephrectomy there is  positive response to therapy:  1. Improved left lower neck, left pectoral, left axillary, mediastinal  and left external iliac lymphadenopathy.  2. Improved pancreatic body and left adrenal gland metastatic lesions.  3. Stable indeterminate hypodensities in the cortex of the left kidney  suspicious for neoplasia.  4. Stable left femoral head lytic lesion concerning for impending  fracture.  5. Patchy opacities in the lungs, more conspicuous on the right,  stable, likely due to infection.  6. Bilateral pleural effusions, left greater than right with  almost  complete collapse of the left lung limiting evaluation for pulmonary  nodules/masses however previously characterized left upper lobe  pulmonary mass appears decreasing size from the prior study.    [Consider Follow Up: Left femoral head lytic lesion concerning for  impending fracture.]    This report will be copied to the [facility] to ensure a  provider acknowledges the finding.     NIA GUILLORY MD       ASSESSMENT/PLAN: 52 year old with mRCC s/p 4 cycles of IL-2.  She had a stable CT after 2 cycles, mild disease progression after 4 cycles.  After a short break, CT showed worsening disease and she has started with Opdivo.   Three months later she had new brain disease and worsening systemic disease and started Cabo on 4/19.     1. RCC: Suzi started Cabo 40 mg daily on 4/19.    Suzi is again accompanied by her  at this clinic visit.  She is tolerating cabozantinib with some difficulty.  She did have mouth sores in the beginning, which have improved over time.  She continues to have diarrhea off and on, which can be bothersome on some days.  She has used over-the-counter antidiarrheals but does use them consistently.  She has nausea and fatigue with therapy.  She has to take naps during the day.  However, she is still feeling a lot better overall since prior to starting therapy.      I reviewed actual images of her restaging scans with her.  She does have massive left pleural effusion which covers most of her left lung.  This explains most of her shortness of breath, cough and fatigue.  I think she will benefit from thoracentesis.  She was little worried about this procedure as she had a lot of discomfort  the last time, and as recollects a coughing episode towards the end of aspiration.        I also reviewed the lytic lesion in his left femoral head.  I compared it to the previous scans, all the way from diagnosis to the current study.  It appears that her lesion appeared on the  scan in 11/2017.  It was not there previously, but has not changed significantly over the last 6-8 months.  Since it is a new lesion, there is a concern for metastatic disease.  She understands this.  I would suggest that we consider palliative radiation for this lesion to prevent a subsequent fracture.  She is interested and willing to proceed with this.  I would not hold or cabozantinib for radiation.  She was again nervous about the radiation as it had caused significant nausea, vomiting and fatigue when she had her cranial radiation.        I explained to her that radiation to the extremities is very well tolerated and I doubt that she would have any significant difficulty with radiation to her left femur.  I will have her return in 4-6 weeks to see Kia and I will see her in about 2-3 months' time.     2. FEN-  Hypercalcemia; treated x 1 with Zometa (4/2/18) and resolved  Hypoalbuminemia/Anorexia: working harder on oral intake and 64 oz fluids daily, weight stable    3. Left chest pain/arm pain:Resolved post radiation and on current therapy    4. Brain mets: no neuro symptoms, HA or confusion today.   Craniotomy successful on 3/6.  Has GK to the bed on 4/5.  And FOUR new lesions were noted during the planning MRI.  THus, 4 lesions and the tumor bed were done on 4/5.  She is no longer taking Dexamethasone.   -next brain MRI in June 2018    5. Nausea: intermittent queasiness has resolved now with compazine prn and daily PPI    6. Fatigue: multifactorial- Suzi has been fatigued for the last 2 months.  She is very alert and not napping, more of a body-fatigue.     7. Mood: has been low with multiple medical issues in the last couple months, but has huge support group and having friends/family drop by daily to keep her company. Continue with celexa, wellbutrin.  Taking Xanax rarely.  Ambien at night.     8. Left sided pleural effusion:   Recommend thoracentesis as above    Over 60 min of direct face to face time  spent with patient with more than 50% time spent in counseling and coordinating care.      Again, thank you for allowing me to participate in the care of your patient.      Sincerely,    Henri Green MD

## 2018-06-06 NOTE — PROGRESS NOTES
[facility]  FOLLOW-UP VISIT NOTE  Date of visit: Jun 6, 2018       REASON FOR VISIT: mRC here for follow up    HPI: Suzi presented to ED with hematuria and right flank pain thinking she had a renal stone in December 2014. She was worked up with a CT abd/pelvis which suggested a renal mass. She was referred to Dr. Max Zelaya in [facility]. 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 Per charts tumor resection had negative margins and it was staged at pT2bN0.    Her staging work up was negative except for pulmonary nodules. She has been followed every 6 months with scans. CT CAP on 5/11/17 showed enlarging hilar LAD, enlarging pulmonary nodules, adrenal nodules and a pancreatic body mass. Biopsies of hilar LN, adrenal nodule and pancreatic mass were + for RCC.     ONCOLOGY THERAPY:   6/6/17-8/15/18-- IL-2, of which she received 4 cycles   11/22/17 CT CAP showed disease progression  She completed three months of Opdivo and then 12/5/17-2/13/18- 3 months of Opdivo  3/6/18- cerebellar lesion s/p craniotomy and GK 4/5 to tumor bed and 4 new lesions  4/12/18- 5 fractions of XRT to left supraclav/MERARY  4/19/18- Cabo 40 mg     INTERVAL HISTORY: Suzi is being followed for poor risk kidney cancer      She had brain metastasis and marked progression systemically over the last few months. She has received radiation therapy for her brain mets and also chest. She is doing a lot better from when I met her 2 months ago. She has nausea but well controlled. She has marked fatigue, SOB, cough. She has diarrhea on cabozantinib. Her pain remains improved.   No f/s/c.  No headache, vision issues, paresthesias or motor dysfunction.        Current Outpatient Prescriptions   Medication Sig     ALPRAZolam (XANAX) 0.5 MG tablet Take 1 tablet (0.5 mg) by mouth 3 times daily as needed for anxiety     Ascorbic Acid (VITAMIN C PO)  Take by mouth daily (with lunch)     aspirin 81 MG chewable tablet Take 1 tablet (81 mg) by mouth every morning     B Complex-C (SUPER B COMPLEX/VITAMIN C PO) Take by mouth daily (with lunch)     benzonatate (TESSALON) 100 MG capsule Take 1 capsule (100 mg) by mouth 3 times daily as needed for cough     buPROPion (WELLBUTRIN XL) 300 MG 24 hr tablet Take 300 mg by mouth every morning      CABOMETYX 40 MG TAKE 1 TABLET DAILY ON AN EMPTY STOMACH 1 HOUR BEFORE OR 2 HOURS AFTER A MEAL, AVOID GRAPEFRUIT AND GRAPEFRUIT JUICE     calcium carbonate (OS-MUNDO 500 MG Unga. CA) 1250 MG tablet Take 1 tablet by mouth daily (with lunch)     Cholecalciferol (VITAMIN D PO) Take by mouth daily (with lunch)     citalopram (CELEXA) 20 MG tablet Take 1 tablet (20 mg) by mouth every evening (Patient taking differently: Take 20 mg by mouth At Bedtime )     Cyanocobalamin (VITAMIN B 12 PO) Take by mouth daily (with lunch)     guaiFENesin-codeine (CHERATUSSIN AC) 100-10 MG/5ML SOLN solution Take 5-10 mLs by mouth every 6 hours as needed for cough     magic mouthwash (ENTER INGREDIENTS IN COMMENTS) suspension Swish and spit 5-10 mLs four times daily as needed for mouth sores     MAGNESIUM PO Take by mouth At Bedtime     multivitamin, therapeutic with minerals (MULTI-VITAMIN) TABS tablet Take 1 tablet by mouth daily (with lunch)     Omega-3 Fatty Acids (OMEGA 3 PO) Take by mouth daily (with lunch)     omeprazole (PRILOSEC) 40 MG capsule Take 1 capsule (40 mg) by mouth daily     ondansetron (ZOFRAN) 8 MG tablet Take 1 tablet (8 mg) by mouth every 8 hours as needed for nausea     Probiotic Product (PROBIOTIC PO) Take by mouth daily (with lunch)     TURMERIC PO Take by mouth daily (with lunch)     zolpidem (AMBIEN) 5 MG tablet Take 1 tablet (5 mg) by mouth nightly as needed for sleep     Cabozantinib S-Malate (CABOMETYX) 40 MG Take 1 tablet (40 mg) by mouth daily Take on an empty stomach 1 hour before or 2 hours after a meal. Avoid grapefruit and  "grapefruit juice. (Patient not taking: Reported on 6/6/2018)     [DISCONTINUED] Cabozantinib S-Malate (CABOMETYX) 40 MG Take 1 tablet (40 mg) by mouth daily Take on an empty stomach 1 hour before or 2 hours after a meal. Avoid grapefruit and grapefruit juice. (Patient not taking: Reported on 6/6/2018)     [DISCONTINUED] Cabozantinib S-Malate (CABOMETYX) 40 MG Take 1 tablet (40 mg) by mouth daily Take on an empty stomach 1 hour before or 2 hours after a meal. Avoid grapefruit and grapefruit juice. (Patient not taking: Reported on 6/6/2018)     No current facility-administered medications for this visit.           EXAM:    /65 (BP Location: Right arm, Patient Position: Chair, Cuff Size: Adult Regular)  Pulse 98  Temp 97.7  F (36.5  C) (Oral)  Ht 1.575 m (5' 2\")  Wt 54.2 kg (119 lb 6.4 oz)  LMP 12/20/2013  SpO2 97%  BMI 21.84 kg/m2    Wt Readings from Last 4 Encounters:   06/06/18 54.2 kg (119 lb 6.4 oz)   06/05/18 54 kg (119 lb)   05/14/18 54.4 kg (120 lb)   04/30/18 57.1 kg (125 lb 12.8 oz)     Vital signs were reviewed.   Patient alert and oriented x3.   PERRLA. EOMI. No scleral icterus noted. OP without thrush/sores.  Neck exam: No palpable cervical nodes.  Left supraclav conglomerate was 4 x 4- today is still \"full\" but no hard nodes noted  Heart: RRR no murmurs noted.   Lungs: Left lung has decreased breathsounds at left base with dullness to percussion 1/3 lower lung.  Right lung clear  Abd: positive bowel sounds in all four quadrants.  No tenderness to palpation.  No hepatomegaly.   Extremities: No lower extremity edema.   Neuro: grossly intact.   Mood and affect is stable.       LABS:   Recent Labs   Lab Test  06/06/18   1610  05/14/18   1419  04/30/18   1428  04/16/18   1557  04/13/18   1453   NA  138  134  138  137  136   POTASSIUM  3.5  3.7  3.7  4.0  3.9   CHLORIDE  106  102  106  107  105   CO2  21  26  23  20  20   ANIONGAP  10  6  9  11  12   BUN  7  6*  6*  12  13   CR  0.67  0.79  0.71  " 0.88  1.03   GLC  130*  102*  129*  159*  116*   MUNDO  8.5  8.6  8.3*  7.9*  8.4*     Recent Labs   Lab Test  03/07/18   0400  08/24/17   1520  08/19/17   0535  08/18/17   0328  08/17/17   0557  08/16/17   1550  08/16/17   1241   MAG  2.2   --   1.8  2.3  2.0   --   3.1*   PHOS   --   3.4  2.2*  2.4*  2.3*  2.0*   --      Recent Labs   Lab Test  06/06/18   1610  05/14/18   1419  04/30/18   1428  04/16/18   1557  04/13/18   1453   WBC  3.8*  4.0  4.7  9.1  7.8   HGB  11.8  12.0  11.8  10.0*  10.7*   PLT  447  448  426  478*  554*   MCV  89  88  90  95  94   NEUTROPHIL  64.9  57.1  72.4  89.5  80.4     Recent Labs   Lab Test  06/06/18   1610  05/14/18   1419  04/30/18   1428   08/19/17   0535  08/18/17   0328  08/17/17   0557   BILITOTAL  0.2  0.2  0.1*   < >  0.2  0.2  0.3   ALKPHOS  89  99  89   < >  60  41  43   ALT  34  54*  37   < >  50  32  21   AST  44  59*  44   < >  31  31  16   ALBUMIN  2.8*  2.8*  2.6*   < >  2.0*  1.9*  2.2*   LDH   --    --    --    --   214  199  182    < > = values in this interval not displayed.     TSH   Date Value Ref Range Status   04/13/2018 1.02 0.40 - 4.00 mU/L Final   01/31/2018 0.93 0.40 - 4.00 mU/L Final   01/04/2018 0.91 0.40 - 4.00 mU/L Final     No results for input(s): CEA in the last 61717 hours.  Results for orders placed or performed in visit on 06/05/18   CT Chest/Abdomen/Pelvis w Contrast     Value    Radiologist flags Left femoral head lytic lesion concerning for    Narrative    EXAMINATION: CT CHEST/ABDOMEN/PELVIS W CONTRAST, 6/5/2018 12:57 PM    TECHNIQUE:  Helical CT images from the thoracic inlet through the  symphysis pubis were obtained  with contrast. Contrast dose: Isovue  370  73cc    COMPARISON: 4/4/2018.    HISTORY: mRCC, 6 weeks scan on new oral therapy; Malignant neoplasm of  right kidney (H)    FINDINGS:    LUNGS: Patchy opacities left upper lobe and lingula, stable. Almost  complete collapse of the left lower lobe however partially  characterized  previously described left upper lobe mass appears  decreased in size from the prior study there measuring approximately  2.9 cm in greatest diameter. Patchy opacities in the right lung, not  significantly changed from the prior study. Limited evaluation of  pulmonary nodules.    Chest: Partially visualized thyroid gland with a focus of  calcification in the right lobe. Right IJ Port-A-Cath with its tip  projecting at the superior cavoatrial junction. Central  tracheobronchial tree is patent. Thoracic aorta and main pulmonary  artery have normal diameter. No central pulmonary embolism. Cardiac  size within normal limits. Trace pericardial effusion. Stable small  right pleural effusion. Moderate to large left pleural effusion,  stable. Left retropectoral and left supraclavicular necrotic appearing  lymph nodes, overall decreased in size from the prior study for  example measuring 7 mm and 7 mm respectively, previously measuring 1.4  cm and 1.4 cm, respectively. Left lower neck necrotic appearing lymph  nodes are also decreased in number and size from the prior study.  Scattered necrotic appearing mediastinal lymph nodes measuring up to  10 mm short axis, significantly improved from the prior study  previously measuring up to 40 mm short axis. Improved subcarinal  lymphadenopathy.    Abdomen and pelvis: No suspicious liver lesions. Stable hypodensity  along the falciform ligament could represent focal fat deposition.  Patent hepatic vasculature. No biliary tree dilation. Decompressed  gallbladder. Unremarkable spleen. 8mm and 2.0 cm enhancing lesion in  the pancreatic body with a necrotic component, improved from the prior  study, previously measuring approximately 2.5 cm. Main pancreatic duct  is not dilated.    Postoperative changes of right adrenalectomy and right nephrectomy.  Almost complete resolution of the previously seen soft tissue density  in the surgical bed. Left adrenal gland metastatic lesions  improved  from the prior study the largest measuring 1.2 cm in the medial limb  previously measuring 1.8 cm. Subcentimeter hypodensities in the left  kidney, too small to characterize, stable. No renal stones or  hydronephrosis. Partially distended urinary bladder. Unremarkable  uterus. No adnexal masses. No free fluid. No free air.    No inguinal lymphadenopathy. Improved right external iliac necrotic  lymph node measuring 1.2 cm short axis, previously measuring 0.6 cm  short axis. No new or enlarging abdominal or pelvic lymphadenopathy.  Small sliding hiatal hernia. No dilated loops of bowel. No bowel wall  thickening. The appendix is normal. Moderate colonic stool burden.    Bones: Lytic lesion involving the left femoral head in its posterior  aspect with stable thickening of the cortex. Degenerative changes of  the spine. Degenerative changes of bilateral SI joints and hips.  Stable wedge compression deformity along the superior endplate of the  vertebral body T7. No new or enlarging bone lesions.      Impression    IMPRESSION: In this patient with history of right renal cell carcinoma  status post right adrenalectomy and right nephrectomy there is  positive response to therapy:  1. Improved left lower neck, left pectoral, left axillary, mediastinal  and left external iliac lymphadenopathy.  2. Improved pancreatic body and left adrenal gland metastatic lesions.  3. Stable indeterminate hypodensities in the cortex of the left kidney  suspicious for neoplasia.  4. Stable left femoral head lytic lesion concerning for impending  fracture.  5. Patchy opacities in the lungs, more conspicuous on the right,  stable, likely due to infection.  6. Bilateral pleural effusions, left greater than right with almost  complete collapse of the left lung limiting evaluation for pulmonary  nodules/masses however previously characterized left upper lobe  pulmonary mass appears decreasing size from the prior study.    [Consider  Follow Up: Left femoral head lytic lesion concerning for  impending fracture.]    This report will be copied to the [facility] to ensure a  provider acknowledges the finding.     NIA GUILLORY MD       ASSESSMENT/PLAN: 52 year old with mRCC s/p 4 cycles of IL-2.  She had a stable CT after 2 cycles, mild disease progression after 4 cycles.  After a short break, CT showed worsening disease and she has started with Opdivo.   Three months later she had new brain disease and worsening systemic disease and started Cabo on 4/19.     1. RCC: Suzi started Cabo 40 mg daily on 4/19.    Suzi is again accompanied by her  at this clinic visit.  She is tolerating cabozantinib with some difficulty.  She did have mouth sores in the beginning, which have improved over time.  She continues to have diarrhea off and on, which can be bothersome on some days.  She has used over-the-counter antidiarrheals but does use them consistently.  She has nausea and fatigue with therapy.  She has to take naps during the day.  However, she is still feeling a lot better overall since prior to starting therapy.      I reviewed actual images of her restaging scans with her.  She does have massive left pleural effusion which covers most of her left lung.  This explains most of her shortness of breath, cough and fatigue.  I think she will benefit from thoracentesis.  She was little worried about this procedure as she had a lot of discomfort  the last time, and as recollects a coughing episode towards the end of aspiration.        I also reviewed the lytic lesion in his left femoral head.  I compared it to the previous scans, all the way from diagnosis to the current study.  It appears that her lesion appeared on the scan in 11/2017.  It was not there previously, but has not changed significantly over the last 6-8 months.  Since it is a new lesion, there is a concern for metastatic disease.  She understands this.  I would suggest  that we consider palliative radiation for this lesion to prevent a subsequent fracture.  She is interested and willing to proceed with this.  I would not hold or cabozantinib for radiation.  She was again nervous about the radiation as it had caused significant nausea, vomiting and fatigue when she had her cranial radiation.        I explained to her that radiation to the extremities is very well tolerated and I doubt that she would have any significant difficulty with radiation to her left femur.  I will have her return in 4-6 weeks to see Kia and I will see her in about 2-3 months' time.     2. FEN-  Hypercalcemia; treated x 1 with Zometa (4/2/18) and resolved  Hypoalbuminemia/Anorexia: working harder on oral intake and 64 oz fluids daily, weight stable    3. Left chest pain/arm pain:Resolved post radiation and on current therapy    4. Brain mets: no neuro symptoms, HA or confusion today.   Craniotomy successful on 3/6.  Has GK to the bed on 4/5.  And FOUR new lesions were noted during the planning MRI.  THus, 4 lesions and the tumor bed were done on 4/5.  She is no longer taking Dexamethasone.   -next brain MRI in June 2018    5. Nausea: intermittent queasiness has resolved now with compazine prn and daily PPI    6. Fatigue: multifactorial- Suzi has been fatigued for the last 2 months.  She is very alert and not napping, more of a body-fatigue.     7. Mood: has been low with multiple medical issues in the last couple months, but has huge support group and having friends/family drop by daily to keep her company. Continue with celexa, wellbutrin.  Taking Xanax rarely.  Ambien at night.     8. Left sided pleural effusion:   Recommend thoracentesis as above    Over 60 min of direct face to face time spent with patient with more than 50% time spent in counseling and coordinating care.

## 2018-06-06 NOTE — NURSING NOTE
Chief Complaint   Patient presents with     Port Draw     labs drawn via port by RN     /65 (BP Location: Right arm, Patient Position: Chair, Cuff Size: Adult Regular)  Pulse 98  Temp 97.7  F (36.5  C) (Oral)  Wt 54.2 kg (119 lb 6.4 oz)  LMP 12/20/2013  SpO2 97%  BMI 21.83 kg/m2    Vitals taken. Port accessed by RN. Labs collected and sent. Line flushed with NS & Heparin. Pt tolerated well. Pt checked in for next appointment.    Estee Hampton, RN

## 2018-06-06 NOTE — MR AVS SNAPSHOT
After Visit Summary   6/6/2018    Suzi Gonsales    MRN: [id number]           Patient Information     Date Of Birth          1965        Visit Information        Provider Department      6/6/2018 4:30 PM Henri Green MD [facility]        Today's Diagnoses     Recurrent left pleural effusion    -  1    Malignant neoplasm of right kidney (H)        Renal cell carcinoma, unspecified laterality (H)        Mass of upper lobe of left lung           Follow-ups after your visit        Your next 10 appointments already scheduled     Jun 14, 2018  1:00 PM CDT   CONSULT with Huyen Cuellar MD   Radiation Oncology Clinic ([facility])    [facility]  1st Floor  [address] [address]   [phone]            Jun 14, 2018  2:00 PM CDT   TCT/SIM Suite Visit with Huyen Cuellar MD   Radiation Oncology Clinic ([facility])    [facility]  1st Floor  [address] [address]   [phone]            Sep 06, 2018 12:00 PM CDT   MR BRAIN W/O & W CONTRAST with UUMR1   [facility] [place], MRI ([facility]    [address] [address]   [phone]           Take your medicines as usual, unless your doctor tells you not to. Bring a list of your current medicines to your exam (including vitamins, minerals and over-the-counter drugs).  You may or may not receive intravenous (IV) contrast for this exam pending the discretion of the Radiologist.  You do not need to do anything special to prepare.  The MRI machine uses a strong magnet. Please wear clothes without metal (snaps, zippers). A sweatsuit works well, or we may give you a hospital gown.  Please remove any body piercings and hair extensions before you arrive. You will also remove watches, jewelry, hairpins, wallets, dentures, partial dental plates and  hearing aids. You may wear contact lenses, and you may be able to wear your rings. We have a safe place to keep your personal items, but it is safer to leave them at home.  **IMPORTANT** THE INSTRUCTIONS BELOW ARE ONLY FOR THOSE PATIENTS WHO HAVE BEEN PRESCRIBED SEDATION OR GENERAL ANESTHESIA DURING THEIR MRI PROCEDURE:  IF YOUR DOCTOR PRESCRIBED ORAL SEDATION (take medicine to help you relax during your exam):   You must get the medicine from your doctor (oral medication) before you arrive. Bring the medicine to the exam. Do not take it at home. You ll be told when to take it upon arriving for your exam.   Arrive one hour early. Bring someone who can take you home after the test. Your medicine will make you sleepy. After the exam, you may not drive, take a bus or take a taxi by yourself.  IF YOUR DOCTOR PRESCRIBED IV SEDATION:   Arrive one hour early. Bring someone who can take you home after the test. Your medicine will make you sleepy. After the exam, you may not drive, take a bus or take a taxi by yourself.   No eating 6 hours before your exam. You may have clear liquids up until 4 hours before your exam. (Clear liquids include water, clear tea, black coffee and fruit juice without pulp.)  IF YOUR DOCTOR PRESCRIBED ANESTHESIA (be asleep for your exam):   Arrive 1 1/2 hours early. Bring someone who can take you home after the test. You may not drive, take a bus or take a taxi by yourself.   No eating 8 hours before your exam. You may have clear liquids up until 4 hours before your exam. (Clear liquids include water, clear tea, black coffee and fruit juice without pulp.)   You will spend four to five hours in the recovery room.  Please call the Imaging Department at your exam site with any questions.              Future tests that were ordered for you today     Open Standing Orders        Priority Remaining Interval Expires Ordered    US Thoracentesis Routine 25/25 6/6/2019 6/6/2018            Who to contact      "If you have questions or need follow up information about today's clinic visit or your schedule please contact [facility] directly at [phone].  Normal or non-critical lab and imaging results will be communicated to you by [email or web address], letter or phone within 4 business days after the clinic has received the results. If you do not hear from us within 7 days, please contact the clinic through [email or web address] or phone. If you have a critical or abnormal lab result, we will notify you by phone as soon as possible.  Submit refill requests through [employer] or call your pharmacy and they will forward the refill request to us. Please allow 3 business days for your refill to be completed.          Additional Information About Your Visit        [employer] Information     [employer] gives you secure access to your electronic health record. If you see a primary care provider, you can also send messages to your care team and make appointments. If you have questions, please call your primary care clinic.  If you do not have a primary care provider, please call [phone] and they will assist you.        Care EveryWhere ID     This is your Care EveryWhere ID. This could be used by other organizations to access your [facility] medical records  [id number]        Your Vitals Were     Pulse Temperature Height Last Period Pulse Oximetry BMI (Body Mass Index)    98 97.7  F (36.5  C) (Oral) 1.575 m (5' 2\") 12/20/2013 97% 21.84 kg/m2       Blood Pressure from Last 3 Encounters:   06/06/18 110/65   06/05/18 102/70   05/16/18 108/56    Weight from Last 3 Encounters:   06/06/18 54.2 kg (119 lb 6.4 oz)   06/05/18 54 kg (119 lb)   05/14/18 54.4 kg (120 lb)              We Performed the Following     CBC with platelets differential     Comprehensive metabolic panel          Today's Medication Changes          These changes are accurate as of 6/6/18 11:59 PM.  If you have any questions, ask your nurse or doctor.               These " medicines have changed or have updated prescriptions.        Dose/Directions    * CABOMETYX 40 MG   This may have changed:  Another medication with the same name was added. Make sure you understand how and when to take each.   Used for:  Malignant neoplasm of right kidney (H), Brain metastasis (H)   Generic drug:  Cabozantinib S-Malate   Changed by:  Hernan Akins RPH        TAKE 1 TABLET DAILY ON AN EMPTY STOMACH 1 HOUR BEFORE OR 2 HOURS AFTER A MEAL, AVOID GRAPEFRUIT AND GRAPEFRUIT JUICE   Quantity:  30 tablet   Refills:  0       * Cabozantinib S-Malate 40 MG   Commonly known as:  CABOMETYX   This may have changed:  You were already taking a medication with the same name, and this prescription was added. Make sure you understand how and when to take each.   Used for:  Brain metastasis (H), Metastatic renal cell carcinoma to lung, right (H), Malignant neoplasm of right kidney (H)   Changed by:  Hernan Akins RPH        Dose:  40 mg   Take 1 tablet (40 mg) by mouth daily Take on an empty stomach 1 hour before or 2 hours after a meal. Avoid grapefruit and grapefruit juice.   Quantity:  30 tablet   Refills:  0       citalopram 20 MG tablet   Commonly known as:  celeXA   This may have changed:  when to take this   Used for:  Insomnia, unspecified type        Dose:  20 mg   Take 1 tablet (20 mg) by mouth every evening   Quantity:  90 tablet   Refills:  3       * Notice:  This list has 2 medication(s) that are the same as other medications prescribed for you. Read the directions carefully, and ask your doctor or other care provider to review them with you.         Where to get your medicines      These medications were sent to [address] [address]     Phone:  [phone]     Cabozantinib S-Malate 40 MG         Some of these will need a paper prescription and others can be bought over the counter.  Ask your nurse if you have  questions.     Bring a paper prescription for each of these medications     ALPRAZolam 0.5 MG tablet                Primary Care Provider Office Phone # Fax #    [facility] [phone] [address] [address]        Equal Access to Services     ROSAURA INTERIANO : Hadii aad ku hadasho Soomaali, waaxda luqadaha, qaybta kaalmada adeegyada, waxay yasmine haydustin kelloggbarrypercy plata. So [facility] [phone].    ATENCIÓN: Si habla español, tiene a chacon disposición servicios gratuitos de asistencia lingüística. Israame al [phone].    We comply with applicable federal civil rights laws and [place] laws. We do not discriminate on the basis of race, color, national origin, age, disability, sex, sexual orientation, or gender identity.            Thank you!     Thank you for choosing [facility]  for your care. Our goal is always to provide you with excellent care. Hearing back from our patients is one way we can continue to improve our services. Please take a few minutes to complete the written survey that you may receive in the mail after your visit with us. Thank you!             Your Updated Medication List - Protect others around you: Learn how to safely use, store and throw away your medicines at www.disposemymeds.org.          This list is accurate as of 6/6/18 11:59 PM.  Always use your most recent med list.                   Brand Name Dispense Instructions for use Diagnosis    ALPRAZolam 0.5 MG tablet    XANAX    60 tablet    Take 1 tablet (0.5 mg) by mouth 3 times daily as needed for anxiety    Renal cell carcinoma, unspecified laterality (H), Mass of upper lobe of left lung       aspirin 81 MG chewable tablet     36 tablet    Take 1 tablet (81 mg) by mouth every morning    Pre-op evaluation, Brain metastases (H)       benzonatate 100 MG capsule    TESSALON    42 capsule    Take 1 capsule (100 mg) by mouth 3 times daily as needed for cough     Cough       buPROPion 300 MG 24 hr tablet    WELLBUTRIN XL     Take 300 mg by mouth every morning        * CABOMETYX 40 MG   Generic drug:  Cabozantinib S-Malate     30 tablet    TAKE 1 TABLET DAILY ON AN EMPTY STOMACH 1 HOUR BEFORE OR 2 HOURS AFTER A MEAL, AVOID GRAPEFRUIT AND GRAPEFRUIT JUICE    Malignant neoplasm of right kidney (H), Brain metastasis (H)       * Cabozantinib S-Malate 40 MG    CABOMETYX    30 tablet    Take 1 tablet (40 mg) by mouth daily Take on an empty stomach 1 hour before or 2 hours after a meal. Avoid grapefruit and grapefruit juice.    Brain metastasis (H), Metastatic renal cell carcinoma to lung, right (H), Malignant neoplasm of right kidney (H)       calcium carbonate 500 MG tablet    OS-MUNDO 500 mg [place]. Ca     Take 1 tablet by mouth daily (with lunch)    Pre-op evaluation, Brain metastases (H)       citalopram 20 MG tablet    celeXA    90 tablet    Take 1 tablet (20 mg) by mouth every evening    Insomnia, unspecified type       guaiFENesin-codeine 100-10 MG/5ML Soln solution    CHERATUSSIN AC    180 mL    Take 5-10 mLs by mouth every 6 hours as needed for cough    Cough       magic mouthwash suspension    ENTER INGREDIENTS IN COMMENTS    500 mL    Swish and spit 5-10 mLs four times daily as needed for mouth sores    Metastatic renal cell carcinoma to lung, right (H)       MAGNESIUM PO      Take by mouth At Bedtime    Pre-op evaluation, Brain metastases (H)       Multi-vitamin Tabs tablet      Take 1 tablet by mouth daily (with lunch)    Pre-op evaluation, Brain metastases (H)       OMEGA 3 PO      Take by mouth daily (with lunch)    Pre-op evaluation, Brain metastases (H)       omeprazole 40 MG capsule    priLOSEC    30 capsule    Take 1 capsule (40 mg) by mouth daily    Cerebral edema (H)       ondansetron 8 MG tablet    ZOFRAN    30 tablet    Take 1 tablet (8 mg) by mouth every 8 hours as needed for nausea    Nausea       PROBIOTIC PO      Take by mouth daily (with lunch)    Pre-op  evaluation, Brain metastases (H)       SUPER B COMPLEX/VITAMIN C PO      Take by mouth daily (with lunch)    Pre-op evaluation, Brain metastases (H)       TURMERIC PO      Take by mouth daily (with lunch)    Pre-op evaluation, Brain metastases (H)       VITAMIN B 12 PO      Take by mouth daily (with lunch)    Pre-op evaluation, Brain metastases (H)       VITAMIN C PO      Take by mouth daily (with lunch)    Pre-op evaluation, Brain metastases (H)       VITAMIN D PO      Take by mouth daily (with lunch)    Pre-op evaluation, Brain metastases (H)       zolpidem 5 MG tablet    AMBIEN    30 tablet    Take 1 tablet (5 mg) by mouth nightly as needed for sleep    Sleep disorder       * Notice:  This list has 2 medication(s) that are the same as other medications prescribed for you. Read the directions carefully, and ask your doctor or other care provider to review them with you.

## 2018-06-12 NOTE — IP AVS SNAPSHOT
MRN:[id number]                      After Visit Summary   6/12/2018    Suzi Gonsales    MRN: [id number]           Thank you!     Thank you for choosing [facility] for your care. Our goal is always to provide you with excellent care. Hearing back from our patients is one way we can continue to improve our services. Please take a few minutes to complete the written survey that you may receive in the mail after you visit with us. Thank you!        Patient Information     Date Of Birth          1965        About your hospital stay     You were admitted on:  June 12, 2018 [facility]    You were discharged on:  June 12, 2018       Who to Call     For medical emergencies, please call 911.  For non-urgent questions about your medical care, please call your primary care provider or clinic, [phone]  For questions related to your surgery, please call your surgery clinic        Attending Provider     Provider Juanito Betts PA-C Physician Assistant       Primary Care Provider Office Phone # Fax #    Molly Foote ARIEL Adame [facility] [phone]      Your next 10 appointments already scheduled     Jun 14, 2018  1:00 PM CDT   CONSULT with Huyen Cuellar MD   Radiation Oncology Clinic ([facility])    [facility]  1st Floor  [address] [id number] [phone]            Jun 14, 2018  2:00 PM CDT   TCT/SIM Suite Visit with Huyen Cuellar MD   Radiation Oncology Clinic ([facility])    [facility]  1st Floor  [address] [id number] [phone]            Sep 06, 2018 12:00 PM CDT   MR BRAIN W/O & W CONTRAST [facility]   [facility]    [address] [id number] [phone]           Take your medicines as  usual, unless your doctor tells you not to. Bring a list of your current medicines to your exam (including vitamins, minerals and over-the-counter drugs).  You may or may not receive intravenous (IV) contrast for this exam pending the discretion of the Radiologist.  You do not need to do anything special to prepare.  The MRI machine uses a strong magnet. Please wear clothes without metal (snaps, zippers). A sweatsuit works well, or we may give you a hospital gown.  Please remove any body piercings and hair extensions before you arrive. You will also remove watches, jewelry, hairpins, wallets, dentures, partial dental plates and hearing aids. You may wear contact lenses, and you may be able to wear your rings. We have a safe place to keep your personal items, but it is safer to leave them at home.  **IMPORTANT** THE INSTRUCTIONS BELOW ARE ONLY FOR THOSE PATIENTS WHO HAVE BEEN PRESCRIBED SEDATION OR GENERAL ANESTHESIA DURING THEIR MRI PROCEDURE:  IF YOUR DOCTOR PRESCRIBED ORAL SEDATION (take medicine to help you relax during your exam):   You must get the medicine from your doctor (oral medication) before you arrive. Bring the medicine to the exam. Do not take it at home. You ll be told when to take it upon arriving for your exam.   Arrive one hour early. Bring someone who can take you home after the test. Your medicine will make you sleepy. After the exam, you may not drive, take a bus or take a taxi by yourself.  IF YOUR DOCTOR PRESCRIBED IV SEDATION:   Arrive one hour early. Bring someone who can take you home after the test. Your medicine will make you sleepy. After the exam, you may not drive, take a bus or take a taxi by yourself.   No eating 6 hours before your exam. You may have clear liquids up until 4 hours before your exam. (Clear liquids include water, clear tea, black coffee and fruit juice without pulp.)  IF YOUR DOCTOR PRESCRIBED ANESTHESIA (be asleep for your exam):   Arrive 1 1/2 hours early. Bring  someone who can take you home after the test. You may not drive, take a bus or take a taxi by yourself.   No eating 8 hours before your exam. You may have clear liquids up until 4 hours before your exam. (Clear liquids include water, clear tea, black coffee and fruit juice without pulp.)   You will spend four to five hours in the recovery room.  Please call the Imaging Department at your exam site with any questions.              Further instructions from your care team       Discharge Instructions for Paracentesis or Thoracentesis     Paracentesis is a procedure to remove extra fluid from your belly (abdomen). Thoracentesis is a procedure to remove extra fluid from your chest.  This fluid buildup is called ascites. The procedure may have been done to take a sample of the fluid. Or, it may have been done to drain the extra fluid from your abdomen or chest to help make you more comfortable.     Home care    If you have pain after the procedure, your healthcare provider can prescribe or recommend pain medicines. Take these exactly as directed. If you stopped taking other medicines before the procedure, ask your provider when you can start them again.    Avoid strenuous activity for 48 hours.    You will have a small bandage over the puncture site. Adhesive tapes, adhesive strips, or surgical glue may also be used to close the incision. They also help stop bleeding and promote healing. You may take the bandage off in 24-48 hours. Once there is a scab over the site, no bandages are required.    Check the puncture site for the signs of infection listed below.     Follow-up care  Make a follow-up appointment with your healthcare provider as directed. During your follow-up visit, your provider will check your healing. Let your provider know how you are feeling. You can also discuss the cause of your fluid, and if you need any further treatment.    When to seek medical advice:  Call your healthcare provider if you have any  of the following after the procedure:    A fever of 100.4 F (38.0 C) or higher    Trouble breathing    Abdominal pain that is worse than expected    Belly Abdominal pain not caused by having the skin punctured that is worse than expected    Persistent bleeding from the puncture site    More than a small amount of fluid leaking from the puncture site    Swollen belly    Signs of infection at the puncture site. These include increased pain, redness, or swelling, warmth, or bad-smelling drainage.    Feeling dizzy or lightheaded, or fainting     Call our Interventional Radiology (IR) service if:  If you start bleeding from the procedure site.  If you do start to bleed from the site, lie down and hold some pressure on the site.  Our radiology provider can help you decide if you need to return to the hospital.  If you have new or worsening pain related to the procedure.  If you have pronounced swelling at the procedure site.  If you develop persistent nausea or vomiting.  If you develop hives or a rash or any unexplained itching.  If you have a fever of greater than 100.4  F and chills in the first 5 days after procedure.  Any other concerns related to your procedure.      [facility]  Interventional Radiology (IR)  [address]  [address]nd [facility] [place] [address]    Contact Number:  [phone]  (IR control desk)  Monday - Friday 8:00 am - 4:30 pm    After hours for urgent concerns:  [phone]  After 4:30 pm Monday - Friday, Weekends and Holidays.   Ask for Interventional Radiology on-call.  Someone is available 24 hours a day.  [facility] toll free number:  [phone]    Pending Results     Date and Time Order Name Status Description    6/12/2018 0905 IR THORACENTESIS In process             Admission Information     Date & Time Provider Department Dept. Phone    6/12/2018 Juanito Weinstein PA-C [facility] [phone]      Your Vitals Were   "   Blood Pressure Temperature Respirations Height Weight Last Period    99/53 97.9  F (36.6  C) (Oral) 20 1.549 m (5' 1\") 54.4 kg (120 lb) 12/20/2013    Pulse Oximetry BMI (Body Mass Index)                97% 22.67 kg/m2          [facility] Information     [email or web address] gives you secure access to your electronic health record. If you see a primary care provider, you can also send messages to your care team and make appointments. If you have questions, please call your primary care clinic.  If you do not have a primary care provider, please call [phone] and they will assist you.      [email or web address] is an electronic gateway that provides easy, online access to your medical records. With [email or web address], you can request a clinic appointment, read your test results, renew a prescription or communicate with your care team.     To access your existing account, please contact your [facility] or call [phone] for assistance.        Care EveryWhere ID     This is your Care EveryWhere ID. This could be used by other organizations to access your [facility] medical records  [id number]        Equal Access to Services     ROSAURA INTERIANO AH: Hadii jimmie garnett Sovanessa, waaxda lujaneen, qaybta kaaltram tilley, josemanuel palta. So [facility] [phone].    ATENCIÓN: Si habla español, tiene a chacon disposición servicios gratuitos de asistencia lingüística. Mario al [phone].    We comply with applicable federal civil rights laws and [place] laws. We do not discriminate on the basis of race, color, national origin, age, disability, sex, sexual orientation, or gender identity.               Review of your medicines      UNREVIEWED medicines. Ask your doctor about these medicines        Dose / Directions    ALPRAZolam 0.5 MG tablet   Commonly known as:  XANAX   Used for:  Renal cell carcinoma, unspecified laterality (H), Mass of upper lobe of left lung        Dose:  0.5 mg   Take 1 tablet (0.5 " mg) by mouth 3 times daily as needed for anxiety   Quantity:  60 tablet   Refills:  0       aspirin 81 MG chewable tablet   Used for:  Pre-op evaluation, Brain metastases (H)        Dose:  81 mg   Take 1 tablet (81 mg) by mouth every morning   Quantity:  36 tablet   Refills:  0       benzonatate 100 MG capsule   Commonly known as:  TESSALON   Used for:  Cough        Dose:  100 mg   Take 1 capsule (100 mg) by mouth 3 times daily as needed for cough   Quantity:  42 capsule   Refills:  0       buPROPion 300 MG 24 hr tablet   Commonly known as:  WELLBUTRIN XL        Dose:  300 mg   Take 300 mg by mouth every morning   Refills:  0       * CABOMETYX 40 MG   Used for:  Malignant neoplasm of right kidney (H), Brain metastasis (H)   Generic drug:  Cabozantinib S-Malate        TAKE 1 TABLET DAILY ON AN EMPTY STOMACH 1 HOUR BEFORE OR 2 HOURS AFTER A MEAL, AVOID GRAPEFRUIT AND GRAPEFRUIT JUICE   Quantity:  30 tablet   Refills:  0       * Cabozantinib S-Malate 40 MG   Commonly known as:  CABOMETYX   Used for:  Brain metastasis (H), Metastatic renal cell carcinoma to lung, right (H), Malignant neoplasm of right kidney (H)        Dose:  40 mg   Take 1 tablet (40 mg) by mouth daily Take on an empty stomach 1 hour before or 2 hours after a meal. Avoid grapefruit and grapefruit juice.   Quantity:  30 tablet   Refills:  0       calcium carbonate 500 MG tablet   Commonly known as:  OS-MUNDO 500 mg [place]. [place]   Used for:  Pre-op evaluation, Brain metastases (H)        Dose:  1 tablet   Take 1 tablet by mouth daily (with lunch)   Refills:  0       citalopram 20 MG tablet   Commonly known as:  celeXA   Used for:  Insomnia, unspecified type        Dose:  20 mg   Take 1 tablet (20 mg) by mouth every evening   Quantity:  90 tablet   Refills:  3       guaiFENesin-codeine 100-10 MG/5ML Soln solution   Commonly known as:  CHERATUSSIN AC   Used for:  Cough        Dose:  1-2 tsp.   Take 5-10 mLs by mouth every 6 hours as needed for cough   Quantity:   180 mL   Refills:  0       magic mouthwash suspension   Commonly known as:  ENTER INGREDIENTS IN COMMENTS   Used for:  Metastatic renal cell carcinoma to lung, right (H)        Swish and spit 5-10 mLs four times daily as needed for mouth sores   Quantity:  500 mL   Refills:  1       MAGNESIUM PO   Used for:  Pre-op evaluation, Brain metastases (H)        Take by mouth At Bedtime   Refills:  0       Multi-vitamin Tabs tablet   Used for:  Pre-op evaluation, Brain metastases (H)        Dose:  1 tablet   Take 1 tablet by mouth daily (with lunch)   Refills:  0       OMEGA 3 PO   Used for:  Pre-op evaluation, Brain metastases (H)        Take by mouth daily (with lunch)   Refills:  0       omeprazole 40 MG capsule   Commonly known as:  priLOSEC   Used for:  Cerebral edema (H)        Dose:  40 mg   Take 1 capsule (40 mg) by mouth daily   Quantity:  30 capsule   Refills:  3       ondansetron 8 MG tablet   Commonly known as:  ZOFRAN   Used for:  Nausea        Dose:  8 mg   Take 1 tablet (8 mg) by mouth every 8 hours as needed for nausea   Quantity:  30 tablet   Refills:  3       PROBIOTIC PO   Used for:  Pre-op evaluation, Brain metastases (H)        Take by mouth daily (with lunch)   Refills:  0       SUPER B COMPLEX/VITAMIN C PO   Used for:  Pre-op evaluation, Brain metastases (H)        Take by mouth daily (with lunch)   Refills:  0       TURMERIC PO   Used for:  Pre-op evaluation, Brain metastases (H)        Take by mouth daily (with lunch)   Refills:  0       VITAMIN B 12 PO   Used for:  Pre-op evaluation, Brain metastases (H)        Take by mouth daily (with lunch)   Refills:  0       VITAMIN C PO   Used for:  Pre-op evaluation, Brain metastases (H)        Take by mouth daily (with lunch)   Refills:  0       VITAMIN D PO   Used for:  Pre-op evaluation, Brain metastases (H)        Take by mouth daily (with lunch)   Refills:  0       zolpidem 5 MG tablet   Commonly known as:  AMBIEN   Used for:  Sleep disorder         Dose:  5 mg   Take 1 tablet (5 mg) by mouth nightly as needed for sleep   Quantity:  30 tablet   Refills:  3       * Notice:  This list has 2 medication(s) that are the same as other medications prescribed for you. Read the directions carefully, and ask your doctor or other care provider to review them with you.             Protect others around you: Learn how to safely use, store and throw away your medicines at www.disposemymeds.org.             Medication List: This is a list of all your medications and when to take them. Check marks below indicate your daily home schedule. Keep this list as a reference.      Medications           Morning Afternoon Evening Bedtime As Needed    ALPRAZolam 0.5 MG tablet   Commonly known as:  XANAX   Take 1 tablet (0.5 mg) by mouth 3 times daily as needed for anxiety                                aspirin 81 MG chewable tablet   Take 1 tablet (81 mg) by mouth every morning                                benzonatate 100 MG capsule   Commonly known as:  TESSALON   Take 1 capsule (100 mg) by mouth 3 times daily as needed for cough                                buPROPion 300 MG 24 hr tablet   Commonly known as:  WELLBUTRIN XL   Take 300 mg by mouth every morning                                * CABOMETYX 40 MG   TAKE 1 TABLET DAILY ON AN EMPTY STOMACH 1 HOUR BEFORE OR 2 HOURS AFTER A MEAL, AVOID GRAPEFRUIT AND GRAPEFRUIT JUICE   Generic drug:  Cabozantinib S-Malate                                * Cabozantinib S-Malate 40 MG   Commonly known as:  CABOMETYX   Take 1 tablet (40 mg) by mouth daily Take on an empty stomach 1 hour before or 2 hours after a meal. Avoid grapefruit and grapefruit juice.                                calcium carbonate 500 MG tablet   Commonly known as:  OS-MUNDO 500 mg [place]   Take 1 tablet by mouth daily (with lunch)                                citalopram 20 MG tablet   Commonly known as:  celeXA   Take 1 tablet (20 mg) by mouth every evening                                 guaiFENesin-codeine 100-10 MG/5ML Soln solution   Commonly known as:  CHERATUSSIN AC   Take 5-10 mLs by mouth every 6 hours as needed for cough                                magic mouthwash suspension   Commonly known as:  ENTER INGREDIENTS IN COMMENTS   Swish and spit 5-10 mLs four times daily as needed for mouth sores                                MAGNESIUM PO   Take by mouth At Bedtime                                Multi-vitamin Tabs tablet   Take 1 tablet by mouth daily (with lunch)                                OMEGA 3 PO   Take by mouth daily (with lunch)                                omeprazole 40 MG capsule   Commonly known as:  priLOSEC   Take 1 capsule (40 mg) by mouth daily                                ondansetron 8 MG tablet   Commonly known as:  ZOFRAN   Take 1 tablet (8 mg) by mouth every 8 hours as needed for nausea                                PROBIOTIC PO   Take by mouth daily (with lunch)                                SUPER B COMPLEX/VITAMIN C PO   Take by mouth daily (with lunch)                                TURMERIC PO   Take by mouth daily (with lunch)                                VITAMIN B 12 PO   Take by mouth daily (with lunch)                                VITAMIN C PO   Take by mouth daily (with lunch)                                VITAMIN D PO   Take by mouth daily (with lunch)                                zolpidem 5 MG tablet   Commonly known as:  AMBIEN   Take 1 tablet (5 mg) by mouth nightly as needed for sleep                                * Notice:  This list has 2 medication(s) that are the same as other medications prescribed for you. Read the directions carefully, and ask your doctor or other care provider to review them with you.

## 2018-06-12 NOTE — DISCHARGE INSTRUCTIONS
Discharge Instructions for Paracentesis or Thoracentesis     Paracentesis is a procedure to remove extra fluid from your belly (abdomen). Thoracentesis is a procedure to remove extra fluid from your chest.  This fluid buildup is called ascites. The procedure may have been done to take a sample of the fluid. Or, it may have been done to drain the extra fluid from your abdomen or chest to help make you more comfortable.     Home care    If you have pain after the procedure, your healthcare provider can prescribe or recommend pain medicines. Take these exactly as directed. If you stopped taking other medicines before the procedure, ask your provider when you can start them again.    Avoid strenuous activity for 48 hours.    You will have a small bandage over the puncture site. Adhesive tapes, adhesive strips, or surgical glue may also be used to close the incision. They also help stop bleeding and promote healing. You may take the bandage off in 24-48 hours. Once there is a scab over the site, no bandages are required.    Check the puncture site for the signs of infection listed below.     Follow-up care  Make a follow-up appointment with your healthcare provider as directed. During your follow-up visit, your provider will check your healing. Let your provider know how you are feeling. You can also discuss the cause of your fluid, and if you need any further treatment.    When to seek medical advice:  Call your healthcare provider if you have any of the following after the procedure:    A fever of 100.4 F (38.0 C) or higher    Trouble breathing    Abdominal pain that is worse than expected    Belly Abdominal pain not caused by having the skin punctured that is worse than expected    Persistent bleeding from the puncture site    More than a small amount of fluid leaking from the puncture site    Swollen belly    Signs of infection at the puncture site. These include increased pain, redness, or swelling, warmth, or  bad-smelling drainage.    Feeling dizzy or lightheaded, or fainting     Call our Interventional Radiology (IR) service if:  If you start bleeding from the procedure site.  If you do start to bleed from the site, lie down and hold some pressure on the site.  Our radiology provider can help you decide if you need to return to the hospital.  If you have new or worsening pain related to the procedure.  If you have pronounced swelling at the procedure site.  If you develop persistent nausea or vomiting.  If you develop hives or a rash or any unexplained itching.  If you have a fever of greater than 100.4  F and chills in the first 5 days after procedure.  Any other concerns related to your procedure.      [facility]  Interventional Radiology (IR)  [address]  [address] [place] [address]    Contact Number:  [phone]  (IR control desk)  Monday - Friday 8:00 am - 4:30 pm    After hours for urgent concerns:  [phone]  After 4:30 pm Monday - Friday, Weekends and Holidays.   Ask for Interventional Radiology on-call.  Someone is available 24 hours a day.  [facility] toll free number:  [phone]

## 2018-06-12 NOTE — PROCEDURES
Interventional Radiology Brief Post Procedure Note    Procedure: IR THORACENTESIS [UXV0222]    Proceduralist: Joe Weinstein PA-C    Assistant: None    Time Out: Prior to the start of the procedure and with procedural staff participation, I verbally confirmed the patient s identity using two indicators, relevant allergies, that the procedure was appropriate and matched the consent or emergent situation, and that the correct equipment/implants were available. Immediately prior to starting the procedure I conducted the Time Out with the procedural staff and re-confirmed the patient s name, procedure, and site/side. (The Joint Commission universal protocol was followed.)  Yes    Sedation: None. Local Anesthestic used    Findings: Completed ultrasound-guided left therapeutic thoracentesis. A total of 1500 mL clear light green yellow fluid drained from the left pleural space. No immediate complication.    Estimated Blood Loss: Minimal    Fluoroscopy Time: None    Specimens: None    Complications: 1. None     Condition: Stable    Plan: Follow up per primary team. Return to IR as needed.    Comments: See dictated procedure note for full details.    Joe Weinstein PA-C  Interventional Radiology  [phone]

## 2018-06-12 NOTE — IP AVS SNAPSHOT
[facility]    [address] [place] [address]    Phone:  [phone]    Fax:  [phone]                                       After Visit Summary   6/12/2018    Suzi Gonsales    MRN: [id number]           After Visit Summary Signature Page     I have received my discharge instructions, and my questions have been answered. I have discussed any challenges I see with this plan with the nurse or doctor.    ..........................................................................................................................................  Patient/Patient Representative Signature      ..........................................................................................................................................  Patient Representative Print Name and Relationship to Patient    ..................................................               ................................................  Date                                            Time    ..........................................................................................................................................  Reviewed by Signature/Title    ...................................................              ..............................................  Date                                                            Time

## 2018-06-14 NOTE — PROGRESS NOTES
FOLLOW-UP VISIT    Patient Name: Suzi Gonsales      : 1965     Age: 52 year old        ______________________________________________________________________________     Chief Complaint   Patient presents with     Cancer     Pt is here for a consult     /70  Wt 53.5 kg (118 lb)  LMP 2013  BMI 22.3 kg/m2       Pain  Current history of pain associated with this visit:   Intensity: 5/10  Current: aching  Location: Left side  Treatment: Not taking anything    Labs  Other Labs: No    Imaging  CT: 18      Other Appointments:     MD Name: Kia Shaw NP Appointment Date: 07/10   MD Name: Appointment Date:   MD Name: Appointment Date:   Other Appointment Notes:     Residual Radiation side effect: Fatigue     Additional Instructions:     Nurse face-to-face time: Level 5:  over 15 min face to face time

## 2018-06-14 NOTE — MR AVS SNAPSHOT
After Visit Summary   6/14/2018    Suzi Gonsales    MRN: [id number]           Patient Information     Date Of Birth          1965        Visit Information        Provider Department      6/14/2018 1:00 PM Huyen Cuellar MD Radiation Oncology Clinic        Today's Diagnoses     Bone metastasis (H)    -  1       Follow-ups after your visit        Your next 10 appointments already scheduled     Jun 21, 2018  1:00 PM CDT   EXTERNAL RADIATION TREATMENT with [facility] RAD ONC ELEKTA   Radiation Oncology Clinic ([facility])    [facility]  1st Floor  [address] [address]   [phone]            Jun 22, 2018  1:00 PM CDT   EXTERNAL RADIATION TREATMENT with [facility] RAD ONC ELEKTA   Radiation Oncology Clinic ([facility])    [facility]  1st Floor  [address] [address]   [phone]            Jun 25, 2018   Procedure with Juanito Weinstein PA-C   [facility] ([facility])    [address] [place] [address]  5th [facility] [id number] [phone]           Located in the Clinics and Surgery Center at [address] [place] [address].   parking is very convenient and highly recommended.  is a $6 flat rate fee.  Both  and self parkers should enter the main arrival plaza from [facility]; parking attendants will direct you based on your parking preference.            Jun 25, 2018  7:00 AM CDT   (Arrive by 6:00 AM)   IR THORACENTESIS with [email or web address]   [facility] ([facility])    [address] [place] [address]  5th [facility] [address] [phone]           1. Laboratory test are to be obtained by your doctor prior to the exam (Hgb/Hct, platelet count, INR) 2. Someone will need to drive you to and from the hospital if you feel you may need sedation for the procedure. 3. Bring a list of  all drugs you are taking; include supplements and over-the-counter medications. 4. Wear comfortable clothes and leave your valuables at home. 5. If you are or may be pregnant, be sure to contact your doctor or a Radiology nurse prior to the day of the exam. 6. If you have diabetes, check with your doctor or a Radiology nurse to see if your insulin needs to be adjusted for the exam. 7. If you are taking Coumadin (to thin your blood) please contact your doctor or a Radiology nurse at least 3 days before the exam for special instructions (only need the INR to be <2.0). 8. The day before your exam you may eat your regular diet. Drink no alcoholic beverages for 24 hours prior to the exam. 9. Do not eat any solid food or milk products for 6 hours prior to the exam. You may drink clear liquids until 2 hours prior to the exam. Clear liquids include the following: water, Jell-O, clear broth, apple juice or any noncarbonated drink that you can see through (no pop!) 10. The morning of the exam you may brush your teeth and take medications as directed with a sip of water. 11. Tell the Radiology nurse if you have any allergies. 12. If the tube needs to remain in place you will remain in the hospital until the tube can be removed 13. If the tube is removed immediately you may leave the hospital following your exam. However, if you received sedation you will need to stay 1-2 hours. You may be asked to stay longer and have a chest x-ray sometime after the procedure. 14. If you received sedation, you cannot drive until morning, and an adult must be with you until then. If you live more than one hour away you should stay in the [place] area overnight.            Jun 26, 2018 12:00 PM CDT   [facility] Lab Draw with  [facility] LAB DRAW   [facility] ([facility])    [address]  [address]  [facility] [address] [phone]            Jun 26, 2018 12:30 PM CDT   (Arrive by 12:15 PM)    Return Visit with Day Ren PA-C   [facility] ([facility])    [address]  [address]  [facility] [id number] [phone]            Jul 10, 2018  1:30 PM CDT   Return Visit with Huyen Cuellar MD   Radiation Oncology Clinic ([facility])    [facility]  1st Floor  [address] [facility] [address] [phone]            Jul 10, 2018  2:30 PM CDT   Masonic Lab Draw with  [facility] LAB DRAW   [facility]    [address]  [address]  [facility] [id number] [phone]            Jul 10, 2018  3:00 PM CDT   Infusion 60 with UC ONCOLOGY INFUSION, UC 26 ATC   [facility] ([facility])    [address]  [address]  [facility] [id number] [phone]              Who to contact     Please call your clinic at [phone] to:    Ask questions about your health    Make or cancel appointments    Discuss your medicines    Learn about your test results    Speak to your doctor            Additional Information About Your Visit        [employer]sel Information     [employer] gives you secure access to your electronic health record. If you see a primary care provider, you can also send messages to your care team and make appointments. If you have questions, please call your primary care clinic.  If you do not have a primary care provider, please call [phone] and they will assist you.      [employer] is an electronic gateway that provides easy, online access to your medical records. With [employer], you can request a clinic appointment, read your test results, renew a prescription or communicate with your care team.     To access your existing account, please contact your [facility] or call [phone] for assistance.        Care EveryWhere ID     This is your Care  EveryWhere ID. This could be used by other organizations to access your [place] medical records  [id number]        Your Vitals Were     Last Period BMI (Body Mass Index)                12/20/2013 22.3 kg/m2           Blood Pressure from Last 3 Encounters:   06/18/18 106/66   06/14/18 105/70   06/12/18 114/69    Weight from Last 3 Encounters:   06/20/18 54.4 kg (120 lb)   06/18/18 54.4 kg (120 lb)   06/14/18 53.5 kg (118 lb)              Today, you had the following     No orders found for display         Today's Medication Changes          These changes are accurate as of 6/14/18 11:59 PM.  If you have any questions, ask your nurse or doctor.               These medicines have changed or have updated prescriptions.        Dose/Directions    citalopram 20 MG tablet   Commonly known as:  celeXA   This may have changed:  when to take this   Used for:  Insomnia, unspecified type        Dose:  20 mg   Take 1 tablet (20 mg) by mouth every evening   Quantity:  90 tablet   Refills:  3                Primary Care Provider Office Phone # Fax #    Molly Dary Adame, APRN [facility] [phone] [address] [id number]        Equal Access to Services     ROSAURA INTERIANO AH: Odilon Santana, wakylahda margaret, qaybta kaalmada aderohityada, josemanuel plata. So [facility] [phone].    ATENCIÓN: Si habla español, tiene a chacon disposición servicios gratuitos de asistencia lingüística. Llame al [phone].    We comply with applicable federal civil rights laws and [place] laws. We do not discriminate on the basis of race, color, national origin, age, disability, sex, sexual orientation, or gender identity.            Thank you!     Thank you for choosing RADIATION ONCOLOGY CLINIC  for your care. Our goal is always to provide you with excellent care. Hearing back from our patients is one way we can continue to improve our services. Please take a few minutes to complete  the written survey that you may receive in the mail after your visit with us. Thank you!             Your Updated Medication List - Protect others around you: Learn how to safely use, store and throw away your medicines at [email or web address].          This list is accurate as of 6/14/18 11:59 PM.  Always use your most recent med list.                   Brand Name Dispense Instructions for use Diagnosis    ALPRAZolam 0.5 MG tablet    XANAX    60 tablet    Take 1 tablet (0.5 mg) by mouth 3 times daily as needed for anxiety    Renal cell carcinoma, unspecified laterality (H), Mass of upper lobe of left lung       aspirin 81 MG chewable tablet     36 tablet    Take 1 tablet (81 mg) by mouth every morning    Pre-op evaluation, Brain metastases (H)       benzonatate 100 MG capsule    TESSALON    42 capsule    Take 1 capsule (100 mg) by mouth 3 times daily as needed for cough    Cough       buPROPion 300 MG 24 hr tablet    WELLBUTRIN XL     Take 300 mg by mouth every morning        * CABOMETYX 40 MG   Generic drug:  Cabozantinib S-Malate     30 tablet    TAKE 1 TABLET DAILY ON AN EMPTY STOMACH 1 HOUR BEFORE OR 2 HOURS AFTER A MEAL, AVOID GRAPEFRUIT AND GRAPEFRUIT JUICE    Malignant neoplasm of right kidney (H), Brain metastasis (H)       * Cabozantinib S-Malate 40 MG    CABOMETYX    30 tablet    Take 1 tablet (40 mg) by mouth daily Take on an empty stomach 1 hour before or 2 hours after a meal. Avoid grapefruit and grapefruit juice.    Brain metastasis (H), Metastatic renal cell carcinoma to lung, right (H), Malignant neoplasm of right kidney (H)       calcium carbonate 500 MG tablet    OS-MUNDO 500 mg [place]. Ca     Take 1 tablet by mouth daily (with lunch)    Pre-op evaluation, Brain metastases (H)       citalopram 20 MG tablet    celeXA    90 tablet    Take 1 tablet (20 mg) by mouth every evening    Insomnia, unspecified type       guaiFENesin-codeine 100-10 MG/5ML Soln solution    CHERATUSSIN AC    180 mL    Take 5-10  mLs by mouth every 6 hours as needed for cough    Cough       magic mouthwash suspension    ENTER INGREDIENTS IN COMMENTS    500 mL    Swish and spit 5-10 mLs four times daily as needed for mouth sores    Metastatic renal cell carcinoma to lung, right (H)       MAGNESIUM PO      Take by mouth At Bedtime    Pre-op evaluation, Brain metastases (H)       Multi-vitamin Tabs tablet      Take 1 tablet by mouth daily (with lunch)    Pre-op evaluation, Brain metastases (H)       OMEGA 3 PO      Take by mouth daily (with lunch)    Pre-op evaluation, Brain metastases (H)       omeprazole 40 MG capsule    priLOSEC    30 capsule    Take 1 capsule (40 mg) by mouth daily    Cerebral edema (H)       ondansetron 8 MG tablet    ZOFRAN    30 tablet    Take 1 tablet (8 mg) by mouth every 8 hours as needed for nausea    Nausea       PROBIOTIC PO      Take by mouth daily (with lunch)    Pre-op evaluation, Brain metastases (H)       SUPER B COMPLEX/VITAMIN C PO      Take by mouth daily (with lunch)    Pre-op evaluation, Brain metastases (H)       TURMERIC PO      Take by mouth daily (with lunch)    Pre-op evaluation, Brain metastases (H)       VITAMIN B 12 PO      Take by mouth daily (with lunch)    Pre-op evaluation, Brain metastases (H)       VITAMIN C PO      Take by mouth daily (with lunch)    Pre-op evaluation, Brain metastases (H)       VITAMIN D PO      Take by mouth daily (with lunch)    Pre-op evaluation, Brain metastases (H)       zolpidem 5 MG tablet    AMBIEN    30 tablet    Take 1 tablet (5 mg) by mouth nightly as needed for sleep    Sleep disorder       * Notice:  This list has 2 medication(s) that are the same as other medications prescribed for you. Read the directions carefully, and ask your doctor or other care provider to review them with you.

## 2018-06-14 NOTE — PROGRESS NOTES
[facility], [place]  Radiation Oncology Consultation Note  2018    Suzi Gonsales   MRN: [id number]  : 1965     Reason for Consultation: Ms. Gonsales is a 52-year-old woman with a diagnosis of metastatic renal cell carcinoma. She was seen today in consultation with Dr. Cuellar for consideration of palliative radiation therapy to the left femoral head.    Requesting Physician: Dr. Green    HISTORY OF PRESENT ILLNESS:   Ms. Gonsales is a 52-year-old woman with metastatic renal cell carcinoma originally diagnosed in 2014 after presenting with right flank pain and hematuria. 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 Her staging scans at that time demonstrated pulmonary nodules and these were initially observed. However, CT CAP 17 showed enlargement of these nodules and multiple new lesions including in the left adrenal gland and pancreas, and a diagnoses of metastatic renal cell carcinoma was made. The patient was started on systemic therapy under the care of Dr. Green. Her chemotherapy history is detailed below. In brief, she has received 4 cycles of IL-2 followed by disease progression, 3 months of opdivo followed again by progression, and most recently received cabozantinib in April of this year.    The patient has previously received two courses of radiation therapy. On 3/6/18, she underwent a craniotomy for a cerebellar lesion and on 18, received Gamma Knife radiosurgery to the resection cavity along with four other small incidentally discovered lesions. She was scheduled to begin cabozantinib shortly thereafter, however presented with worsening left shoulder and upper extremity due to disease progression in the left upper lobe and supraclavicular region. She received palliative radiation with 20 Gy in 5 fractions and completed this on 18 with marked  symptomatic relief. Unfortunately, she did report profound fatigue following radiotherapy, leading to her being essentially bed-bound for several days; she is not sure how much was due to radiotherapy vs her brain surgery or Gamma Knife SRS.    Ms. Gonsales most recently received cabozantinib on 18, and interval CT 18 demonstrated reduced burden of disease. She saw Dr. Green on 18, and there was noted on CT a lytic lesion in the posterior aspect of the left femoral head, concerning for impending fracture. She was thus referred to our clinic for consideration of palliative radiation therapy. On our visit today, the patient reports being completely asymptomatic from her hip lesion. She has no pain, numbness, tingling, or difficulty walking.     CHEMOTHERAPY HISTORY:    17-8/15/17: IL-2, 4 cycles, followed by disease progression    17-18: opdivo, 3 months, followed by further progression    18: cabozantinib 40 mg    RADIATION THERAPY HISTORY:    18: GK SRS to cerebellar resection cavity and four other small, incidentally seen lesions    18-18: palliative radiation therapy with 20 Gy in 5 fractions to the left upper lobe and supraclavicular region    PREGNANCY RISK: Post-menopausal    IMPLANTED CARDIAC DEVICE: None    PAST MEDICAL HISTORY:   Past Medical History:   Diagnosis Date     Adrenal nodule (H)      Anxiety      Brain mass      Depression      Former tobacco use      Lacunar infarct, acute (H) 2018     Mass of left lung     Upper lobe     Renal cell carcinoma (H) 2014    Clear cell     Solitary kidney     S/P right nephrectomy due to kidney cancer       Past Surgical History:   Procedure Laterality Date     C/SECTION, LOW TRANSVERSE  ,     , Low Transverse     ENDOSCOPIC ULTRASOUND UPPER GASTROINTESTINAL TRACT (GI) N/A 2017    Procedure: ENDOSCOPIC ULTRASOUND, ESOPHAGOSCOPY / UPPER GASTROINTESTINAL TRACT (GI);  Esophagogastroduodenoscopy,  Endoscopic Ultrasound with Fine Needle Biopsies;  Surgeon: Asad Velez MD;  Location: [facility] OR     INSERT PORT VASCULAR ACCESS N/A 12/4/2017    Procedure: INSERT PORT VASCULAR ACCESS;  Chest Port Placement-Right;  Surgeon: Melanie Cevallos PA-C;  [facility]     open radical nephrectomy Right 12/31/14     OPTICAL TRACKING SYSTEM CRANIOTOMY, EXCISE TUMOR, COMBINED Left 3/6/2018    Procedure: COMBINED OPTICAL TRACKING SYSTEM CRANIOTOMY, EXCISE TUMOR;  Left Stealth Assisted Posterior fossa Craniotomy And Tumor Resection ;  Surgeon: Quintin Palencia MD;  Location: [facility] OR     PICC INSERTION Left 07/31/2017    5fr DL BioFlo PICC, 40cm (4cm external) in the L basilic vein w/ tip in the low SVC     PICC INSERTION Right 08/15/2017    5fr DL BioFlo PICC, 36cm (1cm external) in the R basilic vein w/ tip in the low SVC       MEDICATIONS:  Current Outpatient Prescriptions   Medication Sig Dispense Refill     ALPRAZolam (XANAX) 0.5 MG tablet Take 1 tablet (0.5 mg) by mouth 3 times daily as needed for anxiety 60 tablet 0     Ascorbic Acid (VITAMIN C PO) Take by mouth daily (with lunch)       aspirin 81 MG chewable tablet Take 1 tablet (81 mg) by mouth every morning 36 tablet      B Complex-C (SUPER B COMPLEX/VITAMIN C PO) Take by mouth daily (with lunch)       benzonatate (TESSALON) 100 MG capsule Take 1 capsule (100 mg) by mouth 3 times daily as needed for cough 42 capsule 0     buPROPion (WELLBUTRIN XL) 300 MG 24 hr tablet Take 300 mg by mouth every morning        CABOMETYX 40 MG TAKE 1 TABLET DAILY ON AN EMPTY STOMACH 1 HOUR BEFORE OR 2 HOURS AFTER A MEAL, AVOID GRAPEFRUIT AND GRAPEFRUIT JUICE 30 tablet 0     Cabozantinib S-Malate (CABOMETYX) 40 MG Take 1 tablet (40 mg) by mouth daily Take on an empty stomach 1 hour before or 2 hours after a meal. Avoid grapefruit and grapefruit juice. 30 tablet 0     calcium carbonate (OS-MUNDO 500 MG [place]. [place]) 1250 MG tablet Take 1 tablet by mouth daily (with lunch)        Cholecalciferol (VITAMIN D PO) Take by mouth daily (with lunch)       citalopram (CELEXA) 20 MG tablet Take 1 tablet (20 mg) by mouth every evening (Patient taking differently: Take 20 mg by mouth At Bedtime ) 90 tablet 3     Cyanocobalamin (VITAMIN B 12 PO) Take by mouth daily (with lunch)       guaiFENesin-codeine (CHERATUSSIN AC) 100-10 MG/5ML SOLN solution Take 5-10 mLs by mouth every 6 hours as needed for cough 180 mL 0     magic mouthwash (ENTER INGREDIENTS IN COMMENTS) suspension Swish and spit 5-10 mLs four times daily as needed for mouth sores 500 mL 1     MAGNESIUM PO Take by mouth At Bedtime       multivitamin, therapeutic with minerals (MULTI-VITAMIN) TABS tablet Take 1 tablet by mouth daily (with lunch)       Omega-3 Fatty Acids (OMEGA 3 PO) Take by mouth daily (with lunch)       omeprazole (PRILOSEC) 40 MG capsule Take 1 capsule (40 mg) by mouth daily 30 capsule 3     ondansetron (ZOFRAN) 8 MG tablet Take 1 tablet (8 mg) by mouth every 8 hours as needed for nausea 30 tablet 3     Probiotic Product (PROBIOTIC PO) Take by mouth daily (with lunch)       TURMERIC PO Take by mouth daily (with lunch)       zolpidem (AMBIEN) 5 MG tablet Take 1 tablet (5 mg) by mouth nightly as needed for sleep 30 tablet 3       ALLERGIES:   No Known Allergies    SOCIAL HISTORY:  Social History     Social History     Marital status:      Spouse name: Tunde     Number of children: 2     Years of education: N/A     Occupational History           Social History Main Topics     Smoking status: Former Smoker     Packs/day: 0.50     Years: 20.00     Types: Cigarettes     Quit date: 1/1/2004     Smokeless tobacco: Never Used     Alcohol use Yes      Comment: 1-2 drinks couple times per week     Drug use: No     Sexual activity: Not Currently     Partners: Male     Other Topics Concern     Not on file     Social History Narrative    .   for [employer].     2 sons.               FAMILY  HISTORY:   Family History   Problem Relation Age of Onset     Hypertension Father      Chronic Obstructive Pulmonary Disease Father      Hyperlipidemia Brother      Hyperlipidemia Brother      CANCER No family hx of         REVIEW OF SYMPTOMS:  A 12-point review of systems was performed. Pertinent findings are noted in the HPI.    ECOG Status: 0    PHYSICAL EXAMINATION:    VITALS: /70  Wt 53.5 kg (118 lb)  LMP 12/20/2013  BMI 22.3 kg/m2  GEN: Appears well, alert, oriented, and in NAD  HEENT: EOMI, normal conjunctiva, MMM  CV: Good distal perfusion  RESP: Breathing comfortably on room air  SKIN: Normal color and turgor  NEURO: No focal deficits, normal gait  PSYCH: Appropriate mood and affect    ASSESSMENT AND RECOMMENDATIONS:  Ms. Gonsales is a 52-year-old woman with a diagnosis of metastatic renal cell carcinoma. She was seen today in consultation with Dr. Cuellar for consideration of palliative radiation therapy to the left femoral head for a lytic lesion seen on CT 6/5/18. On review of her imaging, this appears to have been present since November 2017 and is perhaps slightly enlarged since then.    While she is currently asymptomatic, we explained radiation therapy can prevent enlargement of the lesion and reduce the risk of subsequent fracture. We also discussed the possibility of orthopedic surgery consultation for possible prophylactic fixation (by our calculation her Mirel's score is 7) and this would certainly be a reasonable option, especially if the patient begins to experience pain. We will plan to deliver 20 Gy in 5 fractions. We explained to Suzi the side effects associated with radiation to the hip including fatigue and mild skin reaction. We advised Suzi to avoid strenuous activity to reduce the risk of fracture.    We thus explained our rationale, as well as the logistics, risks, benefits and alternatives to radiation therapy.  Informed consent form was signed.  We will perform simulation  today and plan to deliver treatment Monday through Friday of next week.    Ms. Gonsales was seen and discussed with staff, Dr. Cuellar.  This note was written and compiled with the assistance of Sanjiv Anderson, medical student.    King Clayton MD PGY-4  Radiation Oncology Resident, [facility]  Phone: [phone]    I was personally present with the resident during the history and exam.  I discussed the case with the resident and agree with the findings and plan of care as documented in the resident's note.    The risks and benefits of radiotherapy were discussed with the patient.  All questions were answered.      Please do not hesitate to call me if you have questions or concerns.    Thank you for allowing me to participate in the care of this patient.     Huyen Cuellar MD  Pager  [phone]  Clinic   [phone]  [facility]    CC  Patient Care Team:  Molly Adame APRN CNP as PCP - General (Nurse Practitioner)  Alea Cummings APRN CNS (Clinical Nurse Specialist)  Henri Green MD as MD (Oncology)  Molly Mendiola, RN as Nurse Coordinator (Oncology)  Nadine sEpaña MD as MD (Neurosurgery)  Alisa Merritt, EULOGIO as Nurse Coordinator (Oncology)  Yamilka Hidalgo, RN as Nurse Coordinator (Neurology)  NADINE ESPAÑA

## 2018-06-14 NOTE — LETTER
2018       RE: Suzi Gonsales  [address] [place] [address]     Dear Colleague,    Thank you for referring your patient, Suzi Gonsales, to the RADIATION ONCOLOGY CLINIC. Please see a copy of my visit note below.    [facility], [place]  Radiation Oncology Consultation Note  2018    Suzi Gonsales   MRN: [id number]  : 1965     Reason for Consultation: Ms. Gonsales is a 52-year-old woman with a diagnosis of metastatic renal cell carcinoma. She was seen today in consultation with Dr. Cuellar for consideration of palliative radiation therapy to the left femoral head.    Requesting Physician: Dr. Green    HISTORY OF PRESENT ILLNESS:   Ms. Gonsales is a 52-year-old woman with metastatic renal cell carcinoma originally diagnosed in 2014 after presenting with right flank pain and hematuria. 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 Her staging scans at that time demonstrated pulmonary nodules and these were initially observed. However, CT CAP 17 showed enlargement of these nodules and multiple new lesions including in the left adrenal gland and pancreas, and a diagnoses of metastatic renal cell carcinoma was made. The patient was started on systemic therapy under the care of Dr. Green. Her chemotherapy history is detailed below. In brief, she has received 4 cycles of IL-2 followed by disease progression, 3 months of opdivo followed again by progression, and most recently received cabozantinib in April of this year.    The patient has previously received two courses of radiation therapy. On 3/6/18, she underwent a craniotomy for a cerebellar lesion and on 18, received Gamma Knife radiosurgery to the resection cavity along with four other small incidentally discovered lesions. She was scheduled to begin cabozantinib shortly thereafter, however presented with  worsening left shoulder and upper extremity due to disease progression in the left upper lobe and supraclavicular region. She received palliative radiation with 20 Gy in 5 fractions and completed this on 4/16/18 with marked symptomatic relief. Unfortunately, she did report profound fatigue following radiotherapy, leading to her being essentially bed-bound for several days; she is not sure how much was due to radiotherapy vs her brain surgery or Gamma Knife SRS.    Ms. Gonsales most recently received cabozantinib on 4/19/18, and interval CT 6/5/18 demonstrated reduced burden of disease. She saw Dr. Green on 6/6/18, and there was noted on CT a lytic lesion in the posterior aspect of the left femoral head, concerning for impending fracture. She was thus referred to our clinic for consideration of palliative radiation therapy. On our visit today, the patient reports being completely asymptomatic from her hip lesion. She has no pain, numbness, tingling, or difficulty walking.     CHEMOTHERAPY HISTORY:    6/6/17-8/15/17: IL-2, 4 cycles, followed by disease progression    12/5/17-2/13/18: opdivo, 3 months, followed by further progression    4/19/18: cabozantinib 40 mg    RADIATION THERAPY HISTORY:    4/5/18: GK SRS to cerebellar resection cavity and four other small, incidentally seen lesions    4/12/18-4/16/18: palliative radiation therapy with 20 Gy in 5 fractions to the left upper lobe and supraclavicular region    PREGNANCY RISK: Post-menopausal    IMPLANTED CARDIAC DEVICE: None    PAST MEDICAL HISTORY:   Past Medical History:   Diagnosis Date     Adrenal nodule (H)      Anxiety      Brain mass      Depression      Former tobacco use      Lacunar infarct, acute (H) 02/2018     Mass of left lung     Upper lobe     Renal cell carcinoma (H) 12/2014    Clear cell     Solitary kidney     S/P right nephrectomy due to kidney cancer       Past Surgical History:   Procedure Laterality Date     C/SECTION, LOW TRANSVERSE  2002, 2000     , Low Transverse     ENDOSCOPIC ULTRASOUND UPPER GASTROINTESTINAL TRACT (GI) N/A 2017    Procedure: ENDOSCOPIC ULTRASOUND, ESOPHAGOSCOPY / UPPER GASTROINTESTINAL TRACT (GI);  Esophagogastroduodenoscopy, Endoscopic Ultrasound with Fine Needle Biopsies;  Surgeon: Asad Velez MD;  Location: [facility]     INSERT PORT VASCULAR ACCESS N/A 2017    Procedure: INSERT PORT VASCULAR ACCESS;  Chest Port Placement-Right;  Surgeon: Melanie Cevallos PA-C;  [facility]     open radical nephrectomy Right 14     OPTICAL TRACKING SYSTEM CRANIOTOMY, EXCISE TUMOR, COMBINED Left 3/6/2018    Procedure: COMBINED OPTICAL TRACKING SYSTEM CRANIOTOMY, EXCISE TUMOR;  Left Stealth Assisted Posterior fossa Craniotomy And Tumor Resection ;  Surgeon: Quintin Palencia MD;  Location: [facility] OR     PICC INSERTION Left 2017    5fr DL BioFlo PICC, 40cm (4cm external) in the L basilic vein w/ tip in the low SVC     PICC INSERTION Right 08/15/2017    5fr DL BioFlo PICC, 36cm (1cm external) in the R basilic vein w/ tip in the low SVC       MEDICATIONS:  Current Outpatient Prescriptions   Medication Sig Dispense Refill     ALPRAZolam (XANAX) 0.5 MG tablet Take 1 tablet (0.5 mg) by mouth 3 times daily as needed for anxiety 60 tablet 0     Ascorbic Acid (VITAMIN C PO) Take by mouth daily (with lunch)       aspirin 81 MG chewable tablet Take 1 tablet (81 mg) by mouth every morning 36 tablet      B Complex-C (SUPER B COMPLEX/VITAMIN C PO) Take by mouth daily (with lunch)       benzonatate (TESSALON) 100 MG capsule Take 1 capsule (100 mg) by mouth 3 times daily as needed for cough 42 capsule 0     buPROPion (WELLBUTRIN XL) 300 MG 24 hr tablet Take 300 mg by mouth every morning        CABOMETYX 40 MG TAKE 1 TABLET DAILY ON AN EMPTY STOMACH 1 HOUR BEFORE OR 2 HOURS AFTER A MEAL, AVOID GRAPEFRUIT AND GRAPEFRUIT JUICE 30 tablet 0     Cabozantinib S-Malate (CABOMETYX) 40 MG Take 1 tablet (40 mg) by mouth daily Take on an  empty stomach 1 hour before or 2 hours after a meal. Avoid grapefruit and grapefruit juice. 30 tablet 0     calcium carbonate (OS-MUNDO 500 MG [place]. [place]) 1250 MG tablet Take 1 tablet by mouth daily (with lunch)       Cholecalciferol (VITAMIN D PO) Take by mouth daily (with lunch)       citalopram (CELEXA) 20 MG tablet Take 1 tablet (20 mg) by mouth every evening (Patient taking differently: Take 20 mg by mouth At Bedtime ) 90 tablet 3     Cyanocobalamin (VITAMIN B 12 PO) Take by mouth daily (with lunch)       guaiFENesin-codeine (CHERATUSSIN AC) 100-10 MG/5ML SOLN solution Take 5-10 mLs by mouth every 6 hours as needed for cough 180 mL 0     magic mouthwash (ENTER INGREDIENTS IN COMMENTS) suspension Swish and spit 5-10 mLs four times daily as needed for mouth sores 500 mL 1     MAGNESIUM PO Take by mouth At Bedtime       multivitamin, therapeutic with minerals (MULTI-VITAMIN) TABS tablet Take 1 tablet by mouth daily (with lunch)       Omega-3 Fatty Acids (OMEGA 3 PO) Take by mouth daily (with lunch)       omeprazole (PRILOSEC) 40 MG capsule Take 1 capsule (40 mg) by mouth daily 30 capsule 3     ondansetron (ZOFRAN) 8 MG tablet Take 1 tablet (8 mg) by mouth every 8 hours as needed for nausea 30 tablet 3     Probiotic Product (PROBIOTIC PO) Take by mouth daily (with lunch)       TURMERIC PO Take by mouth daily (with lunch)       zolpidem (AMBIEN) 5 MG tablet Take 1 tablet (5 mg) by mouth nightly as needed for sleep 30 tablet 3       ALLERGIES:   No Known Allergies    SOCIAL HISTORY:  Social History     Social History     Marital status:      Spouse name: Tunde     Number of children: 2     Years of education: N/A     Occupational History           Social History Main Topics     Smoking status: Former Smoker     Packs/day: 0.50     Years: 20.00     Types: Cigarettes     Quit date: 1/1/2004     Smokeless tobacco: Never Used     Alcohol use Yes      Comment: 1-2 drinks couple times per week     Drug  use: No     Sexual activity: Not Currently     Partners: Male     Other Topics Concern     Not on file     Social History Narrative    .   for "[employer].     2 sons.               FAMILY HISTORY:   Family History   Problem Relation Age of Onset     Hypertension Father      Chronic Obstructive Pulmonary Disease Father      Hyperlipidemia Brother      Hyperlipidemia Brother      CANCER No family hx of         REVIEW OF SYMPTOMS:  A 12-point review of systems was performed. Pertinent findings are noted in the HPI.    ECOG Status: 0    PHYSICAL EXAMINATION:    VITALS: /70  Wt 53.5 kg (118 lb)  LMP 12/20/2013  BMI 22.3 kg/m2  GEN: Appears well, alert, oriented, and in NAD  HEENT: EOMI, normal conjunctiva, MMM  CV: Good distal perfusion  RESP: Breathing comfortably on room air  SKIN: Normal color and turgor  NEURO: No focal deficits, normal gait  PSYCH: Appropriate mood and affect    ASSESSMENT AND RECOMMENDATIONS:  Ms. Gonsales is a 52-year-old woman with a diagnosis of metastatic renal cell carcinoma. She was seen today in consultation with Dr. Cuellar for consideration of palliative radiation therapy to the left femoral head for a lytic lesion seen on CT 6/5/18. On review of her imaging, this appears to have been present since November 2017 and is perhaps slightly enlarged since then.    While she is currently asymptomatic, we explained radiation therapy can prevent enlargement of the lesion and reduce the risk of subsequent fracture. We also discussed the possibility of orthopedic surgery consultation for possible prophylactic fixation (by our calculation her Mirel's score is 7) and this would certainly be a reasonable option, especially if the patient begins to experience pain. We will plan to deliver 20 Gy in 5 fractions. We explained to Suzi the side effects associated with radiation to the hip including fatigue and mild skin reaction. We advised Suzi to avoid strenuous activity to  reduce the risk of fracture.    We thus explained our rationale, as well as the logistics, risks, benefits and alternatives to radiation therapy.  Informed consent form was signed.  We will perform simulation today and plan to deliver treatment Monday through Friday of next week.    Ms. Gonsales was seen and discussed with staff, Dr. Cuellar.  This note was written and compiled with the assistance of Sanjiv Anderson, medical student.    King Clayton MD PGY-4  Radiation Oncology Resident, [facility]  Phone: [phone]    I was personally present with the resident during the history and exam.  I discussed the case with the resident and agree with the findings and plan of care as documented in the resident's note.    The risks and benefits of radiotherapy were discussed with the patient.  All questions were answered.      Please do not hesitate to call me if you have questions or concerns.    Thank you for allowing me to participate in the care of this patient.     Huyen Cuellar MD  Pager  [phone]  Clinic   [phone]  [facility]    CC  Patient Care Team:  Molly Adame APRN CNP as PCP - General (Nurse Practitioner)  Alea Cummings APRN CNS (Clinical Nurse Specialist)  Henri Green MD as MD (Oncology)  Molly Mendiola, RN as Nurse Coordinator (Oncology)  Nadine España MD as MD (Neurosurgery)  Alisa Merritt, EULOGIO as Nurse Coordinator (Oncology)  Yamilka Hidalgo, RN as Nurse Coordinator (Neurology)  NADINE ESPAÑA                   FOLLOW-UP VISIT    Patient Name: Suzi Gonsales      : 1965     Age: 52 year old        ______________________________________________________________________________     Chief Complaint   Patient presents with     Cancer     Pt is here for a consult     /70  Wt 53.5 kg (118 lb)  LMP 2013  BMI 22.3 kg/m2       Pain  Current history of pain associated with this visit:   Intensity: 5/10  Current: aching  Location: Left  side  Treatment: Not taking anything    Labs  Other Labs: No    Imaging  CT: 06/05/18      Other Appointments:     MD Name: Kia Shaw NP Appointment Date: 07/10   MD Name: Appointment Date:   MD Name: Appointment Date:   Other Appointment Notes:     Residual Radiation side effect: Fatigue     Additional Instructions:     Nurse face-to-face time: Level 5:  over 15 min face to face time          Again, thank you for allowing me to participate in the care of your patient.      Sincerely,    Huyen Cuellar MD

## 2018-06-14 NOTE — LETTER
2018      RE: Suzi Gonsales  [address] [place] [address]       [facility], [place]  Radiation Oncology Consultation Note  2018    Suzi Gonsales   MRN: [id number]  : 1965     Reason for Consultation: Ms. Gonsales is a 52-year-old woman with a diagnosis of metastatic renal cell carcinoma. She was seen today in consultation with Dr. Cuellar for consideration of palliative radiation therapy to the left femoral head.    Requesting Physician: Dr. Green    HISTORY OF PRESENT ILLNESS:   Ms. Gonsales is a 52-year-old woman with metastatic renal cell carcinoma originally diagnosed in 2014 after presenting with right flank pain and hematuria. 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 Her staging scans at that time demonstrated pulmonary nodules and these were initially observed. However, CT CAP 17 showed enlargement of these nodules and multiple new lesions including in the left adrenal gland and pancreas, and a diagnoses of metastatic renal cell carcinoma was made. The patient was started on systemic therapy under the care of Dr. Green. Her chemotherapy history is detailed below. In brief, she has received 4 cycles of IL-2 followed by disease progression, 3 months of opdivo followed again by progression, and most recently received cabozantinib in April of this year.    The patient has previously received two courses of radiation therapy. On 3/6/18, she underwent a craniotomy for a cerebellar lesion and on 18, received Gamma Knife radiosurgery to the resection cavity along with four other small incidentally discovered lesions. She was scheduled to begin cabozantinib shortly thereafter, however presented with worsening left shoulder and upper extremity due to disease progression in the left upper lobe and supraclavicular region. She received palliative  radiation with 20 Gy in 5 fractions and completed this on 18 with marked symptomatic relief. Unfortunately, she did report profound fatigue following radiotherapy, leading to her being essentially bed-bound for several days; she is not sure how much was due to radiotherapy vs her brain surgery or Gamma Knife SRS.    Ms. Gonsales most recently received cabozantinib on 18, and interval CT 18 demonstrated reduced burden of disease. She saw Dr. Green on 18, and there was noted on CT a lytic lesion in the posterior aspect of the left femoral head, concerning for impending fracture. She was thus referred to our clinic for consideration of palliative radiation therapy. On our visit today, the patient reports being completely asymptomatic from her hip lesion. She has no pain, numbness, tingling, or difficulty walking.     CHEMOTHERAPY HISTORY:    17-8/15/17: IL-2, 4 cycles, followed by disease progression    17-18: opdivo, 3 months, followed by further progression    18: cabozantinib 40 mg    RADIATION THERAPY HISTORY:    18: GK SRS to cerebellar resection cavity and four other small, incidentally seen lesions    18-18: palliative radiation therapy with 20 Gy in 5 fractions to the left upper lobe and supraclavicular region    PREGNANCY RISK: Post-menopausal    IMPLANTED CARDIAC DEVICE: None    PAST MEDICAL HISTORY:   Past Medical History:   Diagnosis Date     Adrenal nodule (H)      Anxiety      Brain mass      Depression      Former tobacco use      Lacunar infarct, acute (H) 2018     Mass of left lung     Upper lobe     Renal cell carcinoma (H) 2014    Clear cell     Solitary kidney     S/P right nephrectomy due to kidney cancer       Past Surgical History:   Procedure Laterality Date     C/SECTION, LOW TRANSVERSE  ,     , Low Transverse     ENDOSCOPIC ULTRASOUND UPPER GASTROINTESTINAL TRACT (GI) N/A 2017    Procedure: ENDOSCOPIC ULTRASOUND,  ESOPHAGOSCOPY / UPPER GASTROINTESTINAL TRACT (GI);  Esophagogastroduodenoscopy, Endoscopic Ultrasound with Fine Needle Biopsies;  Surgeon: Asad Velez MD;  Location: [facility] OR     INSERT PORT VASCULAR ACCESS N/A 12/4/2017    Procedure: INSERT PORT VASCULAR ACCESS;  Chest Port Placement-Right;  Surgeon: Melanie Cevallos PA-C;  [facility]     open radical nephrectomy Right 12/31/14     OPTICAL TRACKING SYSTEM CRANIOTOMY, EXCISE TUMOR, COMBINED Left 3/6/2018    Procedure: COMBINED OPTICAL TRACKING SYSTEM CRANIOTOMY, EXCISE TUMOR;  Left Stealth Assisted Posterior fossa Craniotomy And Tumor Resection ;  Surgeon: Quintin Palencia MD;  Location: [facility] OR     PICC INSERTION Left 07/31/2017    5fr DL BioFlo PICC, 40cm (4cm external) in the L basilic vein w/ tip in the low SVC     PICC INSERTION Right 08/15/2017    5fr DL BioFlo PICC, 36cm (1cm external) in the R basilic vein w/ tip in the low SVC       MEDICATIONS:  Current Outpatient Prescriptions   Medication Sig Dispense Refill     ALPRAZolam (XANAX) 0.5 MG tablet Take 1 tablet (0.5 mg) by mouth 3 times daily as needed for anxiety 60 tablet 0     Ascorbic Acid (VITAMIN C PO) Take by mouth daily (with lunch)       aspirin 81 MG chewable tablet Take 1 tablet (81 mg) by mouth every morning 36 tablet      B Complex-C (SUPER B COMPLEX/VITAMIN C PO) Take by mouth daily (with lunch)       benzonatate (TESSALON) 100 MG capsule Take 1 capsule (100 mg) by mouth 3 times daily as needed for cough 42 capsule 0     buPROPion (WELLBUTRIN XL) 300 MG 24 hr tablet Take 300 mg by mouth every morning        CABOMETYX 40 MG TAKE 1 TABLET DAILY ON AN EMPTY STOMACH 1 HOUR BEFORE OR 2 HOURS AFTER A MEAL, AVOID GRAPEFRUIT AND GRAPEFRUIT JUICE 30 tablet 0     Cabozantinib S-Malate (CABOMETYX) 40 MG Take 1 tablet (40 mg) by mouth daily Take on an empty stomach 1 hour before or 2 hours after a meal. Avoid grapefruit and grapefruit juice. 30 tablet 0     calcium carbonate (OS-MUNDO 500  MG [place]. [place]) 1250 MG tablet Take 1 tablet by mouth daily (with lunch)       Cholecalciferol (VITAMIN D PO) Take by mouth daily (with lunch)       citalopram (CELEXA) 20 MG tablet Take 1 tablet (20 mg) by mouth every evening (Patient taking differently: Take 20 mg by mouth At Bedtime ) 90 tablet 3     Cyanocobalamin (VITAMIN B 12 PO) Take by mouth daily (with lunch)       guaiFENesin-codeine (CHERATUSSIN AC) 100-10 MG/5ML SOLN solution Take 5-10 mLs by mouth every 6 hours as needed for cough 180 mL 0     magic mouthwash (ENTER INGREDIENTS IN COMMENTS) suspension Swish and spit 5-10 mLs four times daily as needed for mouth sores 500 mL 1     MAGNESIUM PO Take by mouth At Bedtime       multivitamin, therapeutic with minerals (MULTI-VITAMIN) TABS tablet Take 1 tablet by mouth daily (with lunch)       Omega-3 Fatty Acids (OMEGA 3 PO) Take by mouth daily (with lunch)       omeprazole (PRILOSEC) 40 MG capsule Take 1 capsule (40 mg) by mouth daily 30 capsule 3     ondansetron (ZOFRAN) 8 MG tablet Take 1 tablet (8 mg) by mouth every 8 hours as needed for nausea 30 tablet 3     Probiotic Product (PROBIOTIC PO) Take by mouth daily (with lunch)       TURMERIC PO Take by mouth daily (with lunch)       zolpidem (AMBIEN) 5 MG tablet Take 1 tablet (5 mg) by mouth nightly as needed for sleep 30 tablet 3       ALLERGIES:   No Known Allergies    SOCIAL HISTORY:  Social History     Social History     Marital status:      Spouse name: Tunde     Number of children: 2     Years of education: N/A     Occupational History           Social History Main Topics     Smoking status: Former Smoker     Packs/day: 0.50     Years: 20.00     Types: Cigarettes     Quit date: 1/1/2004     Smokeless tobacco: Never Used     Alcohol use Yes      Comment: 1-2 drinks couple times per week     Drug use: No     Sexual activity: Not Currently     Partners: Male     Other Topics Concern     Not on file     Social History Narrative     .   for [employer].     2 sons.               FAMILY HISTORY:   Family History   Problem Relation Age of Onset     Hypertension Father      Chronic Obstructive Pulmonary Disease Father      Hyperlipidemia Brother      Hyperlipidemia Brother      CANCER No family hx of         REVIEW OF SYMPTOMS:  A 12-point review of systems was performed. Pertinent findings are noted in the HPI.    ECOG Status: 0    PHYSICAL EXAMINATION:    VITALS: /70  Wt 53.5 kg (118 lb)  LMP 12/20/2013  BMI 22.3 kg/m2  GEN: Appears well, alert, oriented, and in NAD  HEENT: EOMI, normal conjunctiva, MMM  CV: Good distal perfusion  RESP: Breathing comfortably on room air  SKIN: Normal color and turgor  NEURO: No focal deficits, normal gait  PSYCH: Appropriate mood and affect    ASSESSMENT AND RECOMMENDATIONS:  Ms. Gonsales is a 52-year-old woman with a diagnosis of metastatic renal cell carcinoma. She was seen today in consultation with Dr. Cuellar for consideration of palliative radiation therapy to the left femoral head for a lytic lesion seen on CT 6/5/18. On review of her imaging, this appears to have been present since November 2017 and is perhaps slightly enlarged since then.    While she is currently asymptomatic, we explained radiation therapy can prevent enlargement of the lesion and reduce the risk of subsequent fracture. We also discussed the possibility of orthopedic surgery consultation for possible prophylactic fixation (by our calculation her Mirel's score is 7) and this would certainly be a reasonable option, especially if the patient begins to experience pain. We will plan to deliver 20 Gy in 5 fractions. We explained to Suzi the side effects associated with radiation to the hip including fatigue and mild skin reaction. We advised Suzi to avoid strenuous activity to reduce the risk of fracture.    We thus explained our rationale, as well as the logistics, risks, benefits and alternatives to  radiation therapy.  Informed consent form was signed.  We will perform simulation today and plan to deliver treatment Monday through Friday of next week.    Ms. Gonsales was seen and discussed with staff, Dr. Cuellar.  This note was written and compiled with the assistance of Sanjiv Anderson, medical student.    King Clayton MD PGY-4  Radiation Oncology Resident, [facility]  Phone: [phone]    I was personally present with the resident during the history and exam.  I discussed the case with the resident and agree with the findings and plan of care as documented in the resident's note.    The risks and benefits of radiotherapy were discussed with the patient.  All questions were answered.      Please do not hesitate to call me if you have questions or concerns.    Thank you for allowing me to participate in the care of this patient.     Huyen Cuellar MD  Pager  [phone]  Clinic   [phone]  [facility]    CC  Patient Care Team:  Molly Adame APRN CNP as PCP - General (Nurse Practitioner)  Alea Cummings APRN CNS (Clinical Nurse Specialist)  Henri Green MD as MD (Oncology)  Molly Mendiola, RN as Nurse Coordinator (Oncology)  Quintin Palencia MD as MD (Neurosurgery)  Alisa Merritt, EULOGIO as Nurse Coordinator (Oncology)  Yamilka Hidalgo, RN as Nurse Coordinator (Neurology)               FOLLOW-UP VISIT    Patient Name: Suzi Gonsales      : 1965     Age: 52 year old        ______________________________________________________________________________     Chief Complaint   Patient presents with     Cancer     Pt is here for a consult     /70  Wt 53.5 kg (118 lb)  LMP 2013  BMI 22.3 kg/m2       Pain  Current history of pain associated with this visit:   Intensity: 5/10  Current: aching  Location: Left side  Treatment: Not taking anything    Labs  Other Labs: No    Imaging  CT: 18      Other Appointments:     MD Name: Kia Shaw, NP Appointment  Date: 07/10   MD Name: Appointment Date:   MD Name: Appointment Date:   Other Appointment Notes:     Residual Radiation side effect: Fatigue     Additional Instructions:     Nurse face-to-face time: Level 5:  over 15 min face to face time          Huyen Cuellar MD

## 2018-06-18 NOTE — TELEPHONE ENCOUNTER
[facility] Telephone Triage Note    Assessment: Patient called in to triage reporting the following symptoms: pain in rib cage onset of 6/15 post thoracentesis on 6/12.    Recommendations: Discussed with Kia Burgess PA-C.  Clinic visit  today with the following procedures/labs:  CXR,  no labs,  no infusion.    Follow-Up: Order for above labs/procedures/infusion placed, Message sent to schedulers to add pt on to schedule, Informed patient of appointment times, Instructed patient to seek care immediately for worsening symptoms, including: fever, chest pain, shortness of breath, dizziness. Patient voiced understanding of advice and/or instructions given.

## 2018-06-18 NOTE — PROGRESS NOTES
Oncology/Hematology Visit Note  Jun 18, 2018    Reason for Visit: add on visit for chest pain    History of Present Illness: Suzi presented to ED with hematuria and right flank pain thinking she had a renal stone in December 2014. She was worked up with a CT abd/pelvis which suggested a renal mass. She was referred to Dr. Max Zelaya in [facility]. 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 Per charts tumor resection had negative margins and it was staged at pT2bN0.     Her staging work up was negative except for pulmonary nodules. She has been followed every 6 months with scans. CT CAP on 5/11/17 showed enlarging hilar LAD, enlarging pulmonary nodules, adrenal nodules and a pancreatic body mass. Biopsies of hilar LN, adrenal nodule and pancreatic mass were + for RCC.      ONCOLOGY THERAPY:   6/6/17-8/15/18-- IL-2, of which she received 4 cycles   11/22/17 CT CAP showed disease progression  She completed three months of Opdivo and then 12/5/17-2/13/18- 3 months of Opdivo  3/6/18- cerebellar lesion s/p craniotomy and GK 4/5 to tumor bed and 4 new lesions  4/12/18- 5 fractions of XRT to left supraclav/MERARY  4/19/18- Cabo 40 mg     Interval History:  Suzi had her first thoracentesis about 1 month ago and then a second one on the left last Tuesday. She started having left lower rib pain after the procedure. By Friday, she was having pain on both sides of chest, around lower ribs, under axilla, wrapping around her bck back. She states pain is sharp and feels like a spasm. No cough, dyspnea, pleuritic pain. Waxes and wanes, but no provoking factors. She has pain at rest. Pain wanders in location and is progressively getting worse. She was taking extra strength tylenol once or twice daily, then started Vicodin 1 tablet twice daily to control pain. She takes Ambien and vicodin at night in order to sleep. No fevers, chills. She has not had pain  like this previously. She has mostly only has dyspnea as symptoms of her pleural effusions. No edema or swelling in lower extremities.     She started RT this week for left hip lesion and will finish on Friday. On Cabo with mostly fatigue and diarrhea as side effects. She takes imodium as needed. Diarrhea x 1 yesterday. Remaining ROS negative.    Current Outpatient Prescriptions   Medication Sig Dispense Refill     ALPRAZolam (XANAX) 0.5 MG tablet Take 1 tablet (0.5 mg) by mouth 3 times daily as needed for anxiety 60 tablet 0     Ascorbic Acid (VITAMIN C PO) Take by mouth daily (with lunch)       aspirin 81 MG chewable tablet Take 1 tablet (81 mg) by mouth every morning 36 tablet      B Complex-C (SUPER B COMPLEX/VITAMIN C PO) Take by mouth daily (with lunch)       benzonatate (TESSALON) 100 MG capsule Take 1 capsule (100 mg) by mouth 3 times daily as needed for cough 42 capsule 0     buPROPion (WELLBUTRIN XL) 300 MG 24 hr tablet Take 300 mg by mouth every morning        CABOMETYX 40 MG TAKE 1 TABLET DAILY ON AN EMPTY STOMACH 1 HOUR BEFORE OR 2 HOURS AFTER A MEAL, AVOID GRAPEFRUIT AND GRAPEFRUIT JUICE 30 tablet 0     Cabozantinib S-Malate (CABOMETYX) 40 MG Take 1 tablet (40 mg) by mouth daily Take on an empty stomach 1 hour before or 2 hours after a meal. Avoid grapefruit and grapefruit juice. 30 tablet 0     calcium carbonate (OS-MUNDO 500 MG [place]) 1250 MG tablet Take 1 tablet by mouth daily (with lunch)       Cholecalciferol (VITAMIN D PO) Take by mouth daily (with lunch)       citalopram (CELEXA) 20 MG tablet Take 1 tablet (20 mg) by mouth every evening (Patient taking differently: Take 20 mg by mouth At Bedtime ) 90 tablet 3     Cyanocobalamin (VITAMIN B 12 PO) Take by mouth daily (with lunch)       guaiFENesin-codeine (CHERATUSSIN AC) 100-10 MG/5ML SOLN solution Take 5-10 mLs by mouth every 6 hours as needed for cough 180 mL 0     magic mouthwash (ENTER INGREDIENTS IN COMMENTS) suspension Swish and spit 5-10  "mLs four times daily as needed for mouth sores 500 mL 1     MAGNESIUM PO Take by mouth At Bedtime       multivitamin, therapeutic with minerals (MULTI-VITAMIN) TABS tablet Take 1 tablet by mouth daily (with lunch)       Omega-3 Fatty Acids (OMEGA 3 PO) Take by mouth daily (with lunch)       omeprazole (PRILOSEC) 40 MG capsule Take 1 capsule (40 mg) by mouth daily 30 capsule 3     ondansetron (ZOFRAN) 8 MG tablet Take 1 tablet (8 mg) by mouth every 8 hours as needed for nausea 30 tablet 3     Probiotic Product (PROBIOTIC PO) Take by mouth daily (with lunch)       TURMERIC PO Take by mouth daily (with lunch)       zolpidem (AMBIEN) 5 MG tablet Take 1 tablet (5 mg) by mouth nightly as needed for sleep 30 tablet 3       Physical Examination:  General: The patient is a pleasant female in no acute distress.  /66  Pulse 88  Temp 97.3  F (36.3  C) (Oral)  Resp 16  Ht 1.549 m (5' 1\")  Wt 54.4 kg (120 lb)  LMP 12/20/2013  SpO2 97%  BMI 22.67 kg/m2  Wt Readings from Last 10 Encounters:   06/18/18 54.4 kg (120 lb)   06/14/18 53.5 kg (118 lb)   06/12/18 54.4 kg (120 lb)   06/06/18 54.2 kg (119 lb 6.4 oz)   06/05/18 54 kg (119 lb)   05/14/18 54.4 kg (120 lb)   04/30/18 57.1 kg (125 lb 12.8 oz)   04/16/18 57.1 kg (125 lb 12.8 oz)   04/13/18 56.3 kg (124 lb 1.6 oz)   04/10/18 57 kg (125 lb 9.6 oz)     HEENT: EOMI, PERRL. Sclerae are anicteric. Oral mucosa is pink and moist with no lesions or thrush.   Lymph: No cervical lymphadenopathy. Left palpable supraclavicular BIANCA.   Heart: Regular rate and rhythm.   Lungs: Breathing comfortably on room air. Sats drop to 92% with ambulation and HR low 100s. Dull to percussion on lower half of left posterior fields and decreased BS. Decreased BS at right base with dullness to percussion  Abdomen: Bowel sounds present, soft, nontender with no palpable hepatosplenomegaly or masses.   Extremities: No lower extremity edema noted bilaterally.   Neuro: Cranial nerves II through XII are " grossly intact.  Skin: No rashes, petechiae, or bruising noted on exposed skin.    Laboratory Data:  No results found for this or any previous visit (from the past 24 hour(s)).    Imaging:  EXAMINATION: CT CHEST PULMONARY EMBOLISM W CONTRAST, 6/18/2018 4:04 PM        COMPARISON: CT chest 6/5/2018     HISTORY: Bilateral chest pain, rule out PE, infection, thoracic spine  metastases, atypical chest pain. History of RCC and recurrent left  pleural effusion.     TECHNIQUE: Volumetric helical acquisition of CT images of the chest  from the lung apices to the kidneys were acquired after the  administration of 51 mL of Isovue-370 IV contrast. Three-dimensional  (3D) post-processed angiographic images were reconstructed, archived  to PACS and used in interpretation of this study.     FINDINGS: Left hilar and left upper lobe infiltrative hypodense soft  tissue encasing left upper lobe pulmonary artery branches and bronchi.  Moderate to large left pleural effusion. Near total collapse of the  left lower lobe, unchanged since prior. Mild diffuse mosaic  attenuation of the right lung. Right basilar subsegmental atelectasis.  No pulmonary embolism. No cardiomegaly. No pericardial effusion. No  pneumothorax. Mediastinal adenopathy.     Upper abdomen: Partially visualized postsurgical changes of right  nephrectomy and right adrenalectomy.     Bone and soft tissue: Mild to moderate compression deformity of the  mid thoracic vertebra.         IMPRESSION:   1.  No pulmonary embolism demonstrated.  2. Left hilar and left upper lobe infiltrative hypodense soft tissue  encasing left upper lobe pulmonary artery branches and bronchi highly  suspicious for malignant process.  3. Near complete left lower lobe atelectasis.  4. Large loculated pleural effusion, which malignant pleural effusion  cannot be excluded.    Assessment and Plan:  52 year old with mRCC s/p 4 cycles of IL-2.  She had a stable CT after 2 cycles, mild disease progression  after 4 cycles.  After a short break, CT showed worsening disease and she has started with Opdivo.   Three months later she had new brain disease and worsening systemic disease and started Cabo on 4/19.     Chest Pain: Started on lower left lateral ribs after thoracentesis on 6/12 and then progressed to bilateral around both sides of lower thorax, up to axilla and radiating around to back. Pain is constant but waxes/wanes in intensity. Denies pleuritic pain, dyspnea, cough, fever, rigors. CT as above. No PE, but large, loculated left pleural effusion and left hilar and upper lobe infiltrative hypodense soft tissue encasing MERARY pulmonary artery branches and bronchi, highly suspicious for malignant process.  --Will schedule repeat left thoracentesis in IR. Suzi would like to wait until next week after completion of RT  --Will order labs for pleural effusion  --Will have her follow up with Kia Ren PA-C next week.  --Rx Oxycodone 5-10mg po q4h prn for pain sent to pharmacy    RCC: She started Cabo 40 mg daily on 4/19. No mouth sores currently, but does have some fatigue and diarrhea for which she is using imodium prn.    Brain mets: no neuro symptoms, HA or confusion today.   Craniotomy successful on 3/6.  Has GK to the bed on 4/5.  And FOUR new lesions were noted during the planning MRI.  THus, 4 lesions and the tumor bed were done on 4/5.  She is no longer taking Dexamethasone.   -next brain MRI in June 2018    Left femoral head lytic lesion: No pain. Seen on CT 6/5/18, although on review of previous imaging had been present since November 2017, but perhaps slightly enlarged. Currently undergoing palliative RT with plan for 5 fractions, started today.    --She will need dental clearance and Zometa. This was not discussed during today's visit.    Sandie Jalloh PA-C  [facility]  [address] [place] [address] [phone]

## 2018-06-18 NOTE — MR AVS SNAPSHOT
After Visit Summary   6/18/2018    Suzi Gonsales    MRN: [id number]           Patient Information     Date Of Birth          1965        Visit Information        Provider Department      6/18/2018 2:00 PM Sandie Jalloh PA [facility]        Today's Diagnoses     Metastatic renal cell carcinoma to lung, right (H)    -  1    Atypical chest pain        Recurrent left pleural effusion           Follow-ups after your visit        Your next 10 appointments already scheduled     Jun 19, 2018  1:00 PM CDT   EXTERNAL RADIATION TREATMENT with [facility] RAD ONC ELEKTA   Radiation Oncology Clinic ([facility])    [facility]  1st Floor  [address] [address]   [phone]            Jun 19, 2018  1:15 PM CDT   ON TREATMENT VISIT with Huyen Cuellar MD   Radiation Oncology Clinic ([facility])    [facility]  1st Floor  [address] [address]   [phone]            Jun 20, 2018  1:00 PM CDT   EXTERNAL RADIATION TREATMENT with [facility] RAD ONC ELEKTA   Radiation Oncology Clinic ([facility])    [facility]  1st Floor  [address] [address]   [phone]            Jun 21, 2018  1:00 PM CDT   EXTERNAL RADIATION TREATMENT with [facility] RAD ONC ELEKTA   Radiation Oncology Clinic ([facility])    [facility]  1st Floor  [address] [address]   [phone]            Jun 22, 2018  1:00 PM CDT   EXTERNAL RADIATION TREATMENT with [facility] RAD ONC ELEKTA   Radiation Oncology Clinic ([facility])    [facility]  1st Floor  [address] [address]   [phone]            Jul 10, 2018  1:30 PM CDT   [facility] Lab Draw with  [facility] LAB DRAW   [facility] Masonic Lab Draw ([facility])    [address]  [address]  [facility] [place] [address]   [phone]            Jul 10, 2018  2:10 PM CDT   (Arrive by 1:55 PM)   Return Visit with Day Ren PA-C   formerly [facility]    [address]  [address]  [place] [place] [address]   [phone]            Jul 10, 2018  3:00 PM CDT   Infusion 60 with UC ONCOLOGY INFUSION, UC 26 ATC   formerly [facility])    [address]  [address]  [facility] [place] [address]   [phone]            Sep 06, 2018 12:00 PM CDT   MR BRAIN W/O & W CONTRAST with UUMR1   [facility] [place] ([facility])    [address] [address]   [phone]           Take your medicines as usual, unless your doctor tells you not to. Bring a list of your current medicines to your exam (including vitamins, minerals and over-the-counter drugs).  You may or may not receive intravenous (IV) contrast for this exam pending the discretion of the Radiologist.  You do not need to do anything special to prepare.  The MRI machine uses a strong magnet. Please wear clothes without metal (snaps, zippers). A sweatsuit works well, or we may give you a hospital gown.  Please remove any body piercings and hair extensions before you arrive. You will also remove watches, jewelry, hairpins, wallets, dentures, partial dental plates and hearing aids. You may wear contact lenses, and you may be able to wear your rings. We have a safe place to keep your personal items, but it is safer to leave them at home.  **IMPORTANT** THE INSTRUCTIONS BELOW ARE ONLY FOR THOSE PATIENTS WHO HAVE BEEN PRESCRIBED SEDATION OR GENERAL ANESTHESIA DURING THEIR MRI PROCEDURE:  IF YOUR DOCTOR PRESCRIBED ORAL SEDATION (take medicine to help you relax during your exam):   You must get the medicine from your doctor (oral medication) before you arrive. Bring the medicine to  the exam. Do not take it at home. You ll be told when to take it upon arriving for your exam.   Arrive one hour early. Bring someone who can take you home after the test. Your medicine will make you sleepy. After the exam, you may not drive, take a bus or take a taxi by yourself.  IF YOUR DOCTOR PRESCRIBED IV SEDATION:   Arrive one hour early. Bring someone who can take you home after the test. Your medicine will make you sleepy. After the exam, you may not drive, take a bus or take a taxi by yourself.   No eating 6 hours before your exam. You may have clear liquids up until 4 hours before your exam. (Clear liquids include water, clear tea, black coffee and fruit juice without pulp.)  IF YOUR DOCTOR PRESCRIBED ANESTHESIA (be asleep for your exam):   Arrive 1 1/2 hours early. Bring someone who can take you home after the test. You may not drive, take a bus or take a taxi by yourself.   No eating 8 hours before your exam. You may have clear liquids up until 4 hours before your exam. (Clear liquids include water, clear tea, black coffee and fruit juice without pulp.)   You will spend four to five hours in the recovery room.  Please call the Imaging Department at your exam site with any questions.              Future tests that were ordered for you today     Open Future Orders        Priority Expected Expires Ordered    Protein fluid Routine  6/18/2019 6/18/2018    Lactate dehydrogenase fluid Routine  6/18/2019 6/18/2018    Fluid Culture Aerobic Bacterial Routine  6/18/2019 6/18/2018    Gram stain Routine  6/18/2019 6/18/2018    pH fluid Routine  6/18/2019 6/18/2018    Glucose fluid Routine  6/18/2019 6/18/2018    IR Thoracentesis Routine  6/18/2019 6/18/2018            Who to contact     If you have questions or need follow up information about today's clinic visit or your schedule please contact [facility] directly at [phone].  Normal or non-critical lab and imaging results will be  "communicated to you by [employer], letter or phone within 4 business days after the clinic has received the results. If you do not hear from us within 7 days, please contact the clinic through [employer] or phone. If you have a critical or abnormal lab result, we will notify you by phone as soon as possible.  Submit refill requests through [employer] or call your pharmacy and they will forward the refill request to us. Please allow 3 business days for your refill to be completed.          Additional Information About Your Visit        [employer] Information     [employer] gives you secure access to your electronic health record. If you see a primary care provider, you can also send messages to your care team and make appointments. If you have questions, please call your primary care clinic.  If you do not have a primary care provider, please call [phone] and they will assist you.        Care EveryWhere ID     This is your Care EveryWhere ID. This could be used by other organizations to access your [place] medical records  [id number]        Your Vitals Were     Pulse Temperature Respirations Height Last Period Pulse Oximetry    88 97.3  F (36.3  C) (Oral) 16 1.549 m (5' 1\") 12/20/2013 97%    BMI (Body Mass Index)                   22.67 kg/m2            Blood Pressure from Last 3 Encounters:   06/18/18 106/66   06/14/18 105/70   06/12/18 114/69    Weight from Last 3 Encounters:   06/18/18 54.4 kg (120 lb)   06/14/18 53.5 kg (118 lb)   06/12/18 54.4 kg (120 lb)              We Performed the Following     Cytology non gyn          Today's Medication Changes          These changes are accurate as of 6/18/18 11:59 PM.  If you have any questions, ask your nurse or doctor.               Start taking these medicines.        Dose/Directions    oxyCODONE 5 MG capsule   Commonly known as:  OXY-IR   Used for:  Metastatic renal cell carcinoma to lung, right (H)   Started by:  Sandie Jalloh PA        Dose:  1-2 capsule   Take 1-2 " capsules (5-10 mg) by mouth every 4 hours as needed for moderate to severe pain   Quantity:  30 capsule   Refills:  0         These medicines have changed or have updated prescriptions.        Dose/Directions    citalopram 20 MG tablet   Commonly known as:  celeXA   This may have changed:  when to take this   Used for:  Insomnia, unspecified type        Dose:  20 mg   Take 1 tablet (20 mg) by mouth every evening   Quantity:  90 tablet   Refills:  3            Where to get your medicines      Some of these will need a paper prescription and others can be bought over the counter.  Ask your nurse if you have questions.     Bring a paper prescription for each of these medications     oxyCODONE 5 MG capsule               Information about OPIOIDS     PRESCRIPTION OPIOIDS: WHAT YOU NEED TO KNOW   We gave you an opioid (narcotic) pain medicine. It is important to manage your pain, but opioids are not always the best choice. You should first try all the other options your care team gave you. Take this medicine for as short a time (and as few doses) as possible.     These medicines have risks:    DO NOT drive when on new or higher doses of pain medicine. These medicines can affect your alertness and reaction times, and you could be arrested for driving under the influence (DUI). If you need to use opioids long-term, talk to your care team about driving.    DO NOT operate heave machinery    DO NOT do any other dangerous activities while taking these medicines.     DO NOT drink any alcohol while taking these medicines.      If the opioid prescribed includes acetaminophen, DO NOT take with any other medicines that contain acetaminophen. Read all labels carefully. Look for the word  acetaminophen  or  Tylenol.  Ask your pharmacist if you have questions or are unsure.    You can get addicted to pain medicines, especially if you have a history of addiction (chemical, alcohol or substance dependence). Talk to your care team about  ways to reduce this risk.    Store your pills in a secure place, locked if possible. We will not replace any lost or stolen medicine. If you don t finish your medicine, please throw away (dispose) as directed by your pharmacist. The [place] Pollution Control Agency has more information about safe disposal: https://www.pca.[facility].[place]/living-green/managing-unwanted-medications.     All opioids tend to cause constipation. Drink plenty of water and eat foods that have a lot of fiber, such as fruits, vegetables, prune juice, apple juice and high-fiber cereal. Take a laxative (Miralax, milk of magnesia, Colace, Senna) if you don t move your bowels at least every other day.          Primary Care Provider Office Phone # Fax #    Molly NicholeARIEL Bailye [phone] [address] [address]        Equal Access to Services     ROSAURA INTERIANO : Hadii aad ku hadasho Sovanessa, waaxda luqadaha, qaybta kaalmada aderohityada, josemanuel oswald . So [facility] [phone].    ATENCIÓN: Si habla español, tiene a chacon disposición servicios gratuitos de asistencia lingüística. Mario al [phone].    We comply with applicable federal civil rights laws and [place] laws. We do not discriminate on the basis of race, color, national origin, age, disability, sex, sexual orientation, or gender identity.            Thank you!     Thank you for choosing [facility]  for your care. Our goal is always to provide you with excellent care. Hearing back from our patients is one way we can continue to improve our services. Please take a few minutes to complete the written survey that you may receive in the mail after your visit with us. Thank you!             Your Updated Medication List - Protect others around you: Learn how to safely use, store and throw away your medicines at www.disposemymeds.org.          This list is accurate as of 6/18/18 11:59 PM.  Always use your  most recent med list.                   Brand Name Dispense Instructions for use Diagnosis    ALPRAZolam 0.5 MG tablet    XANAX    60 tablet    Take 1 tablet (0.5 mg) by mouth 3 times daily as needed for anxiety    Renal cell carcinoma, unspecified laterality (H), Mass of upper lobe of left lung       aspirin 81 MG chewable tablet     36 tablet    Take 1 tablet (81 mg) by mouth every morning    Pre-op evaluation, Brain metastases (H)       benzonatate 100 MG capsule    TESSALON    42 capsule    Take 1 capsule (100 mg) by mouth 3 times daily as needed for cough    Cough       buPROPion 300 MG 24 hr tablet    WELLBUTRIN XL     Take 300 mg by mouth every morning        * CABOMETYX 40 MG   Generic drug:  Cabozantinib S-Malate     30 tablet    TAKE 1 TABLET DAILY ON AN EMPTY STOMACH 1 HOUR BEFORE OR 2 HOURS AFTER A MEAL, AVOID GRAPEFRUIT AND GRAPEFRUIT JUICE    Malignant neoplasm of right kidney (H), Brain metastasis (H)       * Cabozantinib S-Malate 40 MG    CABOMETYX    30 tablet    Take 1 tablet (40 mg) by mouth daily Take on an empty stomach 1 hour before or 2 hours after a meal. Avoid grapefruit and grapefruit juice.    Brain metastasis (H), Metastatic renal cell carcinoma to lung, right (H), Malignant neoplasm of right kidney (H)       calcium carbonate 500 MG tablet    OS-MUNDO 500 mg [place]. Ca     Take 1 tablet by mouth daily (with lunch)    Pre-op evaluation, Brain metastases (H)       citalopram 20 MG tablet    celeXA    90 tablet    Take 1 tablet (20 mg) by mouth every evening    Insomnia, unspecified type       guaiFENesin-codeine 100-10 MG/5ML Soln solution    CHERATUSSIN AC    180 mL    Take 5-10 mLs by mouth every 6 hours as needed for cough    Cough       magic mouthwash suspension    ENTER INGREDIENTS IN COMMENTS    500 mL    Swish and spit 5-10 mLs four times daily as needed for mouth sores    Metastatic renal cell carcinoma to lung, right (H)       MAGNESIUM PO      Take by mouth At Bedtime    Pre-op  evaluation, Brain metastases (H)       Multi-vitamin Tabs tablet      Take 1 tablet by mouth daily (with lunch)    Pre-op evaluation, Brain metastases (H)       OMEGA 3 PO      Take by mouth daily (with lunch)    Pre-op evaluation, Brain metastases (H)       omeprazole 40 MG capsule    priLOSEC    30 capsule    Take 1 capsule (40 mg) by mouth daily    Cerebral edema (H)       ondansetron 8 MG tablet    ZOFRAN    30 tablet    Take 1 tablet (8 mg) by mouth every 8 hours as needed for nausea    Nausea       oxyCODONE 5 MG capsule    OXY-IR    30 capsule    Take 1-2 capsules (5-10 mg) by mouth every 4 hours as needed for moderate to severe pain    Metastatic renal cell carcinoma to lung, right (H)       PROBIOTIC PO      Take by mouth daily (with lunch)    Pre-op evaluation, Brain metastases (H)       SUPER B COMPLEX/VITAMIN C PO      Take by mouth daily (with lunch)    Pre-op evaluation, Brain metastases (H)       TURMERIC PO      Take by mouth daily (with lunch)    Pre-op evaluation, Brain metastases (H)       VITAMIN B 12 PO      Take by mouth daily (with lunch)    Pre-op evaluation, Brain metastases (H)       VITAMIN C PO      Take by mouth daily (with lunch)    Pre-op evaluation, Brain metastases (H)       VITAMIN D PO      Take by mouth daily (with lunch)    Pre-op evaluation, Brain metastases (H)       zolpidem 5 MG tablet    AMBIEN    30 tablet    Take 1 tablet (5 mg) by mouth nightly as needed for sleep    Sleep disorder       * Notice:  This list has 2 medication(s) that are the same as other medications prescribed for you. Read the directions carefully, and ask your doctor or other care provider to review them with you.

## 2018-06-18 NOTE — DISCHARGE INSTRUCTIONS

## 2018-06-18 NOTE — NURSING NOTE
"Oncology Rooming Note    June 18, 2018 2:27 PM   Suzi Gonsales is a 52 year old female who presents for:    Chief Complaint   Patient presents with     Oncology Clinic Visit     Kidney Cancer     Initial Vitals: /66  Pulse 88  Temp 97.3  F (36.3  C) (Oral)  Resp 16  Ht 1.549 m (5' 1\")  Wt 54.4 kg (120 lb)  LMP 12/20/2013  SpO2 97%  BMI 22.67 kg/m2 Estimated body mass index is 22.67 kg/(m^2) as calculated from the following:    Height as of this encounter: 1.549 m (5' 1\").    Weight as of this encounter: 54.4 kg (120 lb). Body surface area is 1.53 meters squared.  Extreme Pain (8) Comment: Chest   Patient's last menstrual period was 12/20/2013.  Allergies reviewed: Yes  Medications reviewed: Yes    Medications: Medication refills not needed today.  Pharmacy name entered into [employer]:    [id number] - [place], [place] - [address] AT [facility] [address]  [facility] USE FOR MAILING ONLY - [place], [place]  [facility] - [place], [place] - [address] [place] [address]    Clinical concerns: No New Concerns    5 minutes for nursing intake (face to face time)     MICA Aguero      "

## 2018-06-18 NOTE — LETTER
6/18/2018      RE: Suzi Gonsales  [address] [place] [address]       Oncology/Hematology Visit Note  Jun 18, 2018    Reason for Visit: add on visit for chest pain    History of Present Illness: Suzi presented to ED with hematuria and right flank pain thinking she had a renal stone in December 2014. She was worked up with a CT abd/pelvis which suggested a renal mass. She was referred to Dr. Max Zelaya [facility]. 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 Per charts tumor resection had negative margins and it was staged at pT2bN0.     Her staging work up was negative except for pulmonary nodules. She has been followed every 6 months with scans. CT CAP on 5/11/17 showed enlarging hilar LAD, enlarging pulmonary nodules, adrenal nodules and a pancreatic body mass. Biopsies of hilar LN, adrenal nodule and pancreatic mass were + for RCC.      ONCOLOGY THERAPY:   6/6/17-8/15/18-- IL-2, of which she received 4 cycles   11/22/17 CT CAP showed disease progression  She completed three months of Opdivo and then 12/5/17-2/13/18- 3 months of Opdivo  3/6/18- cerebellar lesion s/p craniotomy and GK 4/5 to tumor bed and 4 new lesions  4/12/18- 5 fractions of XRT to left supraclav/MERARY  4/19/18- Cabo 40 mg     Interval History:  Suzi had her first thoracentesis about 1 month ago and then a second one on the left last Tuesday. She started having left lower rib pain after the procedure. By Friday, she was having pain on both sides of chest, around lower ribs, under axilla, wrapping around her bck back. She states pain is sharp and feels like a spasm. No cough, dyspnea, pleuritic pain. Waxes and wanes, but no provoking factors. She has pain at rest. Pain wanders in location and is progressively getting worse. She was taking extra strength tylenol once or twice daily, then started Vicodin 1 tablet twice daily to control pain. She takes Ambien  and vicodin at night in order to sleep. No fevers, chills. She has not had pain like this previously. She has mostly only has dyspnea as symptoms of her pleural effusions. No edema or swelling in lower extremities.     She started RT this week for left hip lesion and will finish on Friday. On Cabo with mostly fatigue and diarrhea as side effects. She takes imodium as needed. Diarrhea x 1 yesterday. Remaining ROS negative.    Current Outpatient Prescriptions   Medication Sig Dispense Refill     ALPRAZolam (XANAX) 0.5 MG tablet Take 1 tablet (0.5 mg) by mouth 3 times daily as needed for anxiety 60 tablet 0     Ascorbic Acid (VITAMIN C PO) Take by mouth daily (with lunch)       aspirin 81 MG chewable tablet Take 1 tablet (81 mg) by mouth every morning 36 tablet      B Complex-C (SUPER B COMPLEX/VITAMIN C PO) Take by mouth daily (with lunch)       benzonatate (TESSALON) 100 MG capsule Take 1 capsule (100 mg) by mouth 3 times daily as needed for cough 42 capsule 0     buPROPion (WELLBUTRIN XL) 300 MG 24 hr tablet Take 300 mg by mouth every morning        CABOMETYX 40 MG TAKE 1 TABLET DAILY ON AN EMPTY STOMACH 1 HOUR BEFORE OR 2 HOURS AFTER A MEAL, AVOID GRAPEFRUIT AND GRAPEFRUIT JUICE 30 tablet 0     Cabozantinib S-Malate (CABOMETYX) 40 MG Take 1 tablet (40 mg) by mouth daily Take on an empty stomach 1 hour before or 2 hours after a meal. Avoid grapefruit and grapefruit juice. 30 tablet 0     calcium carbonate (OS-MUNDO 500 MG [place]. [place]) 1250 MG tablet Take 1 tablet by mouth daily (with lunch)       Cholecalciferol (VITAMIN D PO) Take by mouth daily (with lunch)       citalopram (CELEXA) 20 MG tablet Take 1 tablet (20 mg) by mouth every evening (Patient taking differently: Take 20 mg by mouth At Bedtime ) 90 tablet 3     Cyanocobalamin (VITAMIN B 12 PO) Take by mouth daily (with lunch)       guaiFENesin-codeine (CHERATUSSIN AC) 100-10 MG/5ML SOLN solution Take 5-10 mLs by mouth every 6 hours as needed for cough 180 mL 0      "magic mouthwash (ENTER INGREDIENTS IN COMMENTS) suspension Swish and spit 5-10 mLs four times daily as needed for mouth sores 500 mL 1     MAGNESIUM PO Take by mouth At Bedtime       multivitamin, therapeutic with minerals (MULTI-VITAMIN) TABS tablet Take 1 tablet by mouth daily (with lunch)       Omega-3 Fatty Acids (OMEGA 3 PO) Take by mouth daily (with lunch)       omeprazole (PRILOSEC) 40 MG capsule Take 1 capsule (40 mg) by mouth daily 30 capsule 3     ondansetron (ZOFRAN) 8 MG tablet Take 1 tablet (8 mg) by mouth every 8 hours as needed for nausea 30 tablet 3     Probiotic Product (PROBIOTIC PO) Take by mouth daily (with lunch)       TURMERIC PO Take by mouth daily (with lunch)       zolpidem (AMBIEN) 5 MG tablet Take 1 tablet (5 mg) by mouth nightly as needed for sleep 30 tablet 3       Physical Examination:  General: The patient is a pleasant female in no acute distress.  /66  Pulse 88  Temp 97.3  F (36.3  C) (Oral)  Resp 16  Ht 1.549 m (5' 1\")  Wt 54.4 kg (120 lb)  LMP 12/20/2013  SpO2 97%  BMI 22.67 kg/m2  Wt Readings from Last 10 Encounters:   06/18/18 54.4 kg (120 lb)   06/14/18 53.5 kg (118 lb)   06/12/18 54.4 kg (120 lb)   06/06/18 54.2 kg (119 lb 6.4 oz)   06/05/18 54 kg (119 lb)   05/14/18 54.4 kg (120 lb)   04/30/18 57.1 kg (125 lb 12.8 oz)   04/16/18 57.1 kg (125 lb 12.8 oz)   04/13/18 56.3 kg (124 lb 1.6 oz)   04/10/18 57 kg (125 lb 9.6 oz)     HEENT: EOMI, PERRL. Sclerae are anicteric. Oral mucosa is pink and moist with no lesions or thrush.   Lymph: No cervical lymphadenopathy. Left palpable supraclavicular BIANCA.   Heart: Regular rate and rhythm.   Lungs: Breathing comfortably on room air. Sats drop to 92% with ambulation and HR low 100s. Dull to percussion on lower half of left posterior fields and decreased BS. Decreased BS at right base with dullness to percussion  Abdomen: Bowel sounds present, soft, nontender with no palpable hepatosplenomegaly or masses.   Extremities: No " lower extremity edema noted bilaterally.   Neuro: Cranial nerves II through XII are grossly intact.  Skin: No rashes, petechiae, or bruising noted on exposed skin.    Laboratory Data:  No results found for this or any previous visit (from the past 24 hour(s)).    Imaging:  EXAMINATION: CT CHEST PULMONARY EMBOLISM W CONTRAST, 6/18/2018 4:04 PM        COMPARISON: CT chest 6/5/2018     HISTORY: Bilateral chest pain, rule out PE, infection, thoracic spine  metastases, atypical chest pain. History of RCC and recurrent left  pleural effusion.     TECHNIQUE: Volumetric helical acquisition of CT images of the chest  from the lung apices to the kidneys were acquired after the  administration of 51 mL of Isovue-370 IV contrast. Three-dimensional  (3D) post-processed angiographic images were reconstructed, archived  to PACS and used in interpretation of this study.     FINDINGS: Left hilar and left upper lobe infiltrative hypodense soft  tissue encasing left upper lobe pulmonary artery branches and bronchi.  Moderate to large left pleural effusion. Near total collapse of the  left lower lobe, unchanged since prior. Mild diffuse mosaic  attenuation of the right lung. Right basilar subsegmental atelectasis.  No pulmonary embolism. No cardiomegaly. No pericardial effusion. No  pneumothorax. Mediastinal adenopathy.     Upper abdomen: Partially visualized postsurgical changes of right  nephrectomy and right adrenalectomy.     Bone and soft tissue: Mild to moderate compression deformity of the  mid thoracic vertebra.         IMPRESSION:   1.  No pulmonary embolism demonstrated.  2. Left hilar and left upper lobe infiltrative hypodense soft tissue  encasing left upper lobe pulmonary artery branches and bronchi highly  suspicious for malignant process.  3. Near complete left lower lobe atelectasis.  4. Large loculated pleural effusion, which malignant pleural effusion  cannot be excluded.    Assessment and Plan:  52 year old with mRCC  s/p 4 cycles of IL-2.  She had a stable CT after 2 cycles, mild disease progression after 4 cycles.  After a short break, CT showed worsening disease and she has started with Opdivo.   Three months later she had new brain disease and worsening systemic disease and started Cabo on 4/19.     Chest Pain: Started on lower left lateral ribs after thoracentesis on 6/12 and then progressed to bilateral around both sides of lower thorax, up to axilla and radiating around to back. Pain is constant but waxes/wanes in intensity. Denies pleuritic pain, dyspnea, cough, fever, rigors. CT as above. No PE, but large, loculated left pleural effusion and left hilar and upper lobe infiltrative hypodense soft tissue encasing MERARY pulmonary artery branches and bronchi, highly suspicious for malignant process.  --Will schedule repeat left thoracentesis in IR. Suzi would like to wait until next week after completion of RT  --Will order labs for pleural effusion  --Will have her follow up with Kia Ren PA-C next week.  --Rx Oxycodone 5-10mg po q4h prn for pain sent to pharmacy    RCC: She started Cabo 40 mg daily on 4/19. No mouth sores currently, but does have some fatigue and diarrhea for which she is using imodium prn.    Brain mets: no neuro symptoms, HA or confusion today.   Craniotomy successful on 3/6.  Has GK to the bed on 4/5.  And FOUR new lesions were noted during the planning MRI.  THus, 4 lesions and the tumor bed were done on 4/5.  She is no longer taking Dexamethasone.   -next brain MRI in June 2018    Left femoral head lytic lesion: No pain. Seen on CT 6/5/18, although on review of previous imaging had been present since November 2017, but perhaps slightly enlarged. Currently undergoing palliative RT with plan for 5 fractions, started today.    --She will need dental clearance and Zometa. This was not discussed during today's visit.    Sandie Jalloh PA-C  [facility]  [address] [place]  [address]  [phone]      Sandie Jalloh, PA

## 2018-06-20 NOTE — MR AVS SNAPSHOT
After Visit Summary   6/20/2018    Suzi Gonsales    MRN: [id number]           Patient Information     Date Of Birth          1965        Visit Information        Provider Department      6/20/2018 1:30 PM Kristi Romero MD Radiation Oncology Clinic        Today's Diagnoses     Bone metastasis (H)    -  1       Follow-ups after your visit        Your next 10 appointments already scheduled     Jun 21, 2018  1:00 PM CDT   EXTERNAL RADIATION TREATMENT with [facility] RAD ONC ELEKTA   Radiation Oncology Clinic ([facility])    [facility]  1st Floor  [address] [facility] [address]   [phone]            Jun 22, 2018  1:00 PM CDT   EXTERNAL RADIATION TREATMENT with [facility] RAD ONC ELEKTA   Radiation Oncology Clinic ([facility])    [facility]  1st Floor  [address] [facility] [address]   [phone]            Jun 25, 2018   Procedure with Juanito Weinstein PA-C   [facility] ([facility])    [address] [place] [address]  5th [facility] [id number] [phone]           Located in the Clinics and Surgery Center at [address] [place] [address].   parking is very convenient and highly recommended.  is a $6 flat rate fee.  Both  and self parkers should enter the main arrival plaza from [facility]; parking attendants will direct you based on your parking preference.            Jun 25, 2018  7:00 AM CDT   (Arrive by 6:00 AM)   IR THORACENTESIS with [id number]   [facility] ([facility])    [address] [place] [address] [phone]           1. Laboratory test are to be obtained by your doctor prior to the exam (Hgb/Hct, platelet count, INR) 2. Someone will need to drive you to and from the hospital if you feel you may need sedation for the procedure. 3. Bring a list of all  drugs you are taking; include supplements and over-the-counter medications. 4. Wear comfortable clothes and leave your valuables at home. 5. If you are or may be pregnant, be sure to contact your doctor or a Radiology nurse prior to the day of the exam. 6. If you have diabetes, check with your doctor or a Radiology nurse to see if your insulin needs to be adjusted for the exam. 7. If you are taking Coumadin (to thin your blood) please contact your doctor or a Radiology nurse at least 3 days before the exam for special instructions (only need the INR to be <2.0). 8. The day before your exam you may eat your regular diet. Drink no alcoholic beverages for 24 hours prior to the exam. 9. Do not eat any solid food or milk products for 6 hours prior to the exam. You may drink clear liquids until 2 hours prior to the exam. Clear liquids include the following: water, Jell-O, clear broth, apple juice or any noncarbonated drink that you can see through (no pop!) 10. The morning of the exam you may brush your teeth and take medications as directed with a sip of water. 11. Tell the Radiology nurse if you have any allergies. 12. If the tube needs to remain in place you will remain in the hospital until the tube can be removed 13. If the tube is removed immediately you may leave the hospital following your exam. However, if you received sedation you will need to stay 1-2 hours. You may be asked to stay longer and have a chest x-ray sometime after the procedure. 14. If you received sedation, you cannot drive until morning, and an adult must be with you until then. If you live more than one hour away you should stay in the [place] area overnight.            Jun 26, 2018 12:00 PM CDT   [employer] Lab Draw with  [facility] LAB DRAW   [facility] ([facility])    [address] [facility]  [address]  [facility] [address] [phone]            Jun 26, 2018 12:30 PM CDT   (Arrive by 12:15 PM)    Return Visit with Day Ren PA-C   [facility] ([facility])    [address]  [address]  [facility] [id number] [phone]            Jul 10, 2018  1:30 PM CDT   Return Visit with Huyen Cuellar MD   Radiation Oncology Clinic ([facility])    [facility]  1st Floor  [address] [facility] [address] [phone]            Jul 10, 2018  2:30 PM CDT   [facility] Lab Draw with  [facility] LAB DRAW   [facility]    [address]  [address]  [facility] [id number] [phone]            Jul 10, 2018  3:00 PM CDT   Infusion 60 with UC ONCOLOGY INFUSION, UC 26 ATC   [facility] ([facility])    [address]  [address]  [facility] [id number] [phone]              Who to contact     Please call your clinic at [phone] to:    Ask questions about your health    Make or cancel appointments    Discuss your medicines    Learn about your test results    Speak to your doctor            Additional Information About Your Visit        InVision Information     [employer] gives you secure access to your electronic health record. If you see a primary care provider, you can also send messages to your care team and make appointments. If you have questions, please call your primary care clinic.  If you do not have a primary care provider, please call [phone] and they will assist you.      In[employer]ision is an electronic gateway that provides easy, online access to your medical records. With In[employer]ision, you can request a clinic appointment, read your test results, renew a prescription or communicate with your care team.     To access your existing account, please contact your [facility] or call [phone] for assistance.        Care EveryWhere ID     This is your Care  EveryWhere ID. This could be used by other organizations to access your [place] medical records  [id number]        Your Vitals Were     Last Period BMI (Body Mass Index)                12/20/2013 22.67 kg/m2           Blood Pressure from Last 3 Encounters:   06/18/18 106/66   06/14/18 105/70   06/12/18 114/69    Weight from Last 3 Encounters:   06/20/18 54.4 kg (120 lb)   06/18/18 54.4 kg (120 lb)   06/14/18 53.5 kg (118 lb)              Today, you had the following     No orders found for display         Today's Medication Changes          These changes are accurate as of 6/20/18  3:34 PM.  If you have any questions, ask your nurse or doctor.               These medicines have changed or have updated prescriptions.        Dose/Directions    citalopram 20 MG tablet   Commonly known as:  celeXA   This may have changed:  when to take this   Used for:  Insomnia, unspecified type        Dose:  20 mg   Take 1 tablet (20 mg) by mouth every evening   Quantity:  90 tablet   Refills:  3                Primary Care Provider Office Phone # Fax #    Molly Dary Adame, APRN [facility] [phone] [address] [id number]        Equal Access to Services     ROSAURA INTERIANO AH: Odilon Santana, wakylahda margaret, qaybta kaalmada aderohityada, josemanuel plata. So [facility] [phone].    ATENCIÓN: Si habla español, tiene a chacon disposición servicios gratuitos de asistencia lingüística. Llame al [phone].    We comply with applicable federal civil rights laws and [place] laws. We do not discriminate on the basis of race, color, national origin, age, disability, sex, sexual orientation, or gender identity.            Thank you!     Thank you for choosing RADIATION ONCOLOGY CLINIC  for your care. Our goal is always to provide you with excellent care. Hearing back from our patients is one way we can continue to improve our services. Please take a few minutes to complete  the written survey that you may receive in the mail after your visit with us. Thank you!             Your Updated Medication List - Protect others around you: Learn how to safely use, store and throw away your medicines at [email or web address].          This list is accurate as of 6/20/18  3:34 PM.  Always use your most recent med list.                   Brand Name Dispense Instructions for use Diagnosis    ALPRAZolam 0.5 MG tablet    XANAX    60 tablet    Take 1 tablet (0.5 mg) by mouth 3 times daily as needed for anxiety    Renal cell carcinoma, unspecified laterality (H), Mass of upper lobe of left lung       aspirin 81 MG chewable tablet     36 tablet    Take 1 tablet (81 mg) by mouth every morning    Pre-op evaluation, Brain metastases (H)       benzonatate 100 MG capsule    TESSALON    42 capsule    Take 1 capsule (100 mg) by mouth 3 times daily as needed for cough    Cough       buPROPion 300 MG 24 hr tablet    WELLBUTRIN XL     Take 300 mg by mouth every morning        * CABOMETYX 40 MG   Generic drug:  Cabozantinib S-Malate     30 tablet    TAKE 1 TABLET DAILY ON AN EMPTY STOMACH 1 HOUR BEFORE OR 2 HOURS AFTER A MEAL, AVOID GRAPEFRUIT AND GRAPEFRUIT JUICE    Malignant neoplasm of right kidney (H), Brain metastasis (H)       * Cabozantinib S-Malate 40 MG    CABOMETYX    30 tablet    Take 1 tablet (40 mg) by mouth daily Take on an empty stomach 1 hour before or 2 hours after a meal. Avoid grapefruit and grapefruit juice.    Brain metastasis (H), Metastatic renal cell carcinoma to lung, right (H), Malignant neoplasm of right kidney (H)       calcium carbonate 500 MG tablet    OS-MUNDO 500 mg [place]. [place]     Take 1 tablet by mouth daily (with lunch)    Pre-op evaluation, Brain metastases (H)       citalopram 20 MG tablet    celeXA    90 tablet    Take 1 tablet (20 mg) by mouth every evening    Insomnia, unspecified type       guaiFENesin-codeine 100-10 MG/5ML Soln solution    CHERATUSSIN AC    180 mL    Take 5-10  mLs by mouth every 6 hours as needed for cough    Cough       magic mouthwash suspension    ENTER INGREDIENTS IN COMMENTS    500 mL    Swish and spit 5-10 mLs four times daily as needed for mouth sores    Metastatic renal cell carcinoma to lung, right (H)       MAGNESIUM PO      Take by mouth At Bedtime    Pre-op evaluation, Brain metastases (H)       Multi-vitamin Tabs tablet      Take 1 tablet by mouth daily (with lunch)    Pre-op evaluation, Brain metastases (H)       OMEGA 3 PO      Take by mouth daily (with lunch)    Pre-op evaluation, Brain metastases (H)       omeprazole 40 MG capsule    priLOSEC    30 capsule    Take 1 capsule (40 mg) by mouth daily    Cerebral edema (H)       ondansetron 8 MG tablet    ZOFRAN    30 tablet    Take 1 tablet (8 mg) by mouth every 8 hours as needed for nausea    Nausea       oxyCODONE 5 MG capsule    OXY-IR    30 capsule    Take 1-2 capsules (5-10 mg) by mouth every 4 hours as needed for moderate to severe pain    Metastatic renal cell carcinoma to lung, right (H)       PROBIOTIC PO      Take by mouth daily (with lunch)    Pre-op evaluation, Brain metastases (H)       SUPER B COMPLEX/VITAMIN C PO      Take by mouth daily (with lunch)    Pre-op evaluation, Brain metastases (H)       TURMERIC PO      Take by mouth daily (with lunch)    Pre-op evaluation, Brain metastases (H)       VITAMIN B 12 PO      Take by mouth daily (with lunch)    Pre-op evaluation, Brain metastases (H)       VITAMIN C PO      Take by mouth daily (with lunch)    Pre-op evaluation, Brain metastases (H)       VITAMIN D PO      Take by mouth daily (with lunch)    Pre-op evaluation, Brain metastases (H)       zolpidem 5 MG tablet    AMBIEN    30 tablet    Take 1 tablet (5 mg) by mouth nightly as needed for sleep    Sleep disorder       * Notice:  This list has 2 medication(s) that are the same as other medications prescribed for you. Read the directions carefully, and ask your doctor or other care provider to  review them with you.

## 2018-06-20 NOTE — LETTER
2018       RE: Suzi Gonsales  [address] [place] [address]     Dear Colleague,    Thank you for referring your patient, Suzi Gonsales, to the RADIATION ONCOLOGY CLINIC. Please see a copy of my visit note below.    [facility]  SPECIALIZING IN BREAKTHROUGHS  Radiation Oncology    On Treatment Visit Note      Suzi Gonsales      Date: 2018   MRN: [id number]   : 1965  Diagnosis: renal cell ca      Reason for Visit:  On Radiation Treatment Visit     Treatment Summary to Date  Treatment Site: l femur Current Dose: 1200/2000cGy cGy Fractions: 3/5fx           Subjective:   Tolerating treatment well.  Continues to deny pain in the femur.  Does take occasional oxycodone for pulmonary effusion.  Expecting thoracentesis.      Nursing ROS:      Cardiovascular  Cardio/Resp Note: scheduled for thoracentesis       Pain Assessment  0-10 Pain Scale: 0  Pain Note: takes oxycodone on occasion      Objective:   Wt 54.4 kg (120 lb)  LMP 2013  BMI 22.67 kg/m2  Gen: Appears well, in no acute distress  Skin: No erythema    Labs:  CBC RESULTS:   Recent Labs   Lab Test  18   1610   WBC  3.8*   RBC  4.10   HGB  11.8   HCT  36.6   MCV  89   MCH  28.8   MCHC  32.2   RDW  17.8*   PLT  447     ELECTROLYTES:  Recent Labs   Lab Test  18   1610   NA  138   POTASSIUM  3.5   CHLORIDE  106   MUNDO  8.5   CO2  21   BUN  7   CR  0.67   GLC  130*       Assessment:    Tolerating radiation therapy well.  All questions and concerns addressed.    Toxicities:  Dermatitis: Grade 0: No toxicity    Plan:   1. Continue current therapy.    2. FU with Dr. Cuellar on 7/10.      Mosaiq chart and setup information reviewed  Ports checked         Educational Topic Discussed  Education Instructions: reviewed        Kristi Romero MD/PhD  [phone] clinic  Pager [phone]    Please do not send letter to referring physician.      Again, thank you for allowing me to participate in the care of your  patient.      Sincerely,    Kristi Romero MD

## 2018-06-20 NOTE — LETTER
Date:June 21, 2018      Provider requested that no letter be sent. Do not send.       [facility] Information

## 2018-06-20 NOTE — PROGRESS NOTES
[facility] PHYSICIANS  SPECIALIZING IN BREAKTHROUGHS  Radiation Oncology    On Treatment Visit Note      Suzi Gonsales      Date: 2018   MRN: [id number]   : 1965  Diagnosis: renal cell ca      Reason for Visit:  On Radiation Treatment Visit     Treatment Summary to Date  Treatment Site: l femur Current Dose: 1200/2000cGy cGy Fractions: 3/5fx           Subjective:   Tolerating treatment well.  Continues to deny pain in the femur.  Does take occasional oxycodone for pulmonary effusion.  Expecting thoracentesis.      Nursing ROS:      Cardiovascular  Cardio/Resp Note: scheduled for thoracentesis       Pain Assessment  0-10 Pain Scale: 0  Pain Note: takes oxycodone on occasion      Objective:   Wt 54.4 kg (120 lb)  LMP 2013  BMI 22.67 kg/m2  Gen: Appears well, in no acute distress  Skin: No erythema    Labs:  CBC RESULTS:   Recent Labs   Lab Test  18   1610   WBC  3.8*   RBC  4.10   HGB  11.8   HCT  36.6   MCV  89   MCH  28.8   MCHC  32.2   RDW  17.8*   PLT  447     ELECTROLYTES:  Recent Labs   Lab Test  18   1610   NA  138   POTASSIUM  3.5   CHLORIDE  106   MUNDO  8.5   CO2  21   BUN  7   CR  0.67   GLC  130*       Assessment:    Tolerating radiation therapy well.  All questions and concerns addressed.    Toxicities:  Dermatitis: Grade 0: No toxicity    Plan:   1. Continue current therapy.    2. FU with Dr. Cuellar on 7/10.      Mosaiq chart and setup information reviewed  Ports checked         Educational Topic Discussed  Education Instructions: reviewed        Kristi Romero MD/PhD  [phone] clinic  Pager [phone]    Please do not send letter to referring physician.

## 2018-06-25 NOTE — DISCHARGE INSTRUCTIONS
Discharge Instructions for Paracentesis or Thoracentesis     Paracentesis is a procedure to remove extra fluid from your belly (abdomen). Thoracentesis is a procedure to remove extra fluid from your chest.  This fluid buildup is called ascites. The procedure may have been done to take a sample of the fluid. Or, it may have been done to drain the extra fluid from your abdomen or chest to help make you more comfortable.     Home care    If you have pain after the procedure, your healthcare provider can prescribe or recommend pain medicines. Take these exactly as directed. If you stopped taking other medicines before the procedure, ask your provider when you can start them again.    Avoid strenuous activity for 48 hours.    You will have a small bandage over the puncture site. Adhesive tapes, adhesive strips, or surgical glue may also be used to close the incision. They also help stop bleeding and promote healing. You may take the bandage off in 24-48 hours. Once there is a scab over the site, no bandages are required.    Check the puncture site for the signs of infection listed below.     Follow-up care  Make a follow-up appointment with your healthcare provider as directed. During your follow-up visit, your provider will check your healing. Let your provider know how you are feeling. You can also discuss the cause of your fluid, and if you need any further treatment.    When to seek medical advice:  Call your healthcare provider if you have any of the following after the procedure:    A fever of 100.4 F (38.0 C) or higher    Trouble breathing    Abdominal pain that is worse than expected    Belly Abdominal pain not caused by having the skin punctured that is worse than expected    Persistent bleeding from the puncture site    More than a small amount of fluid leaking from the puncture site    Swollen belly    Signs of infection at the puncture site. These include increased pain, redness, or swelling, warmth, or  bad-smelling drainage.    Feeling dizzy or lightheaded, or fainting     Call our Interventional Radiology (IR) service if:  If you start bleeding from the procedure site.  If you do start to bleed from the site, lie down and hold some pressure on the site.  Our radiology provider can help you decide if you need to return to the hospital.  If you have new or worsening pain related to the procedure.  If you have pronounced swelling at the procedure site.  If you develop persistent nausea or vomiting.  If you develop hives or a rash or any unexplained itching.  If you have a fever of greater than 100.4  F and chills in the first 5 days after procedure.  Any other concerns related to your procedure.      [facility]  Interventional Radiology (IR)  [address]  [facility] [place] [address]    Contact Number:  [phone]  (IR control desk)  Monday - Friday 8:00 am - 4:30 pm    After hours for urgent concerns:  [phone]  After 4:30 pm Monday - Friday, Weekends and Holidays.   Ask for Interventional Radiology on-call.  Someone is available 24 hours a day.  [facility] toll free number:  [phone]

## 2018-06-25 NOTE — PROCEDURES
Interventional Radiology Brief Post Procedure Note    Procedure: IR THORACENTESIS [[id number]]    Proceduralist: Joe Weinstein PA-C    Assistant: None    Time Out: Prior to the start of the procedure and with procedural staff participation, I verbally confirmed the patient s identity using two indicators, relevant allergies, that the procedure was appropriate and matched the consent or emergent situation, and that the correct equipment/implants were available. Immediately prior to starting the procedure I conducted the Time Out with the procedural staff and re-confirmed the patient s name, procedure, and site/side. (The Joint Commission universal protocol was followed.)  Yes    Sedation: None. Local Anesthestic used    Findings: Completed ultrasound-guided left diagnostic and therapeutic thoracentesis. A total of 1350 mL clear ernesto fluid drained from the left pleural space. A sample of pleural fluid was sent to lab for analysis as requested. No immediate complication.    Estimated Blood Loss: None    Fluoroscopy Time: None    Specimens: Fluid and/or tissue for laboratory analysis and culture    Complications: 1. None     Condition: Stable    Plan: Follow up per primary team. Return to IR as needed.    Comments: See dictated procedure note for full details.    Joe Weinstein PA-C  Interventional Radiology  [phone]

## 2018-06-25 NOTE — TELEPHONE ENCOUNTER
FUTURE VISIT INFORMATION      FUTURE VISIT INFORMATION:    Date: 6/26/18    Time: 0900    Location: ORTHO  REFERRAL INFORMATION:    Referring provider:  DR CHAPMAN    Referring providers clinic:  ONCOLOGY    Reason for visit/diagnosis  L HIP       RECORDS STATUS        ALL RECORDS INTERNAL

## 2018-06-25 NOTE — IP AVS SNAPSHOT
[facility]    [address] [place] [id number]    Phone:  [phone]    Fax:  [phone]                                       After Visit Summary   6/25/2018    Suzi Gonsales    MRN: [id number]           After Visit Summary Signature Page     I have received my discharge instructions, and my questions have been answered. I have discussed any challenges I see with this plan with the nurse or doctor.    ..........................................................................................................................................  Patient/Patient Representative Signature      ..........................................................................................................................................  Patient Representative Print Name and Relationship to Patient    ..................................................               ................................................  Date                                            Time    ..........................................................................................................................................  Reviewed by Signature/Title    ...................................................              ..............................................  Date                                                            Time

## 2018-06-25 NOTE — IP AVS SNAPSHOT
MRN:[id number]                      After Visit Summary   6/25/2018    Suzi Gonsales    MRN: [id number]           Thank you!     Thank you for choosing [facility] for your care. Our goal is always to provide you with excellent care. Hearing back from our patients is one way we can continue to improve our services. Please take a few minutes to complete the written survey that you may receive in the mail after you visit with us. Thank you!        Patient Information     Date Of Birth          1965        About your hospital stay     You were admitted on:  June 25, 2018 You last received care in the:  [facility]    You were discharged on:  June 25, 2018       Who to Call     For medical emergencies, please call 911.  For non-urgent questions about your medical care, please call your primary care provider or clinic, [phone]  For questions related to your surgery, please call your surgery clinic        Attending Provider     Provider Juanito Betts PA-C Physician Assistant       Primary Care Provider Office Phone # Fax #    Molly NicholeARIEL [facility] [phone]      Your next 10 appointments already scheduled     Jun 26, 2018  9:00 AM CDT   (Arrive by 8:45 AM)   NEW TUMOR VISIT with Randy Dunn MD   [facility] ([facility])    [address]  4th Floor  [facility] [place] [facility] [address] [phone]            Jun 26, 2018 12:00 PM CDT   Masonic Lab Draw with  MAS[facility]IC LAB DRAW   [facility] ([facility]    [address]  [address]  [facility] [id number] [phone]            Jun 26, 2018 12:30 PM CDT   (Arrive by 12:15 PM)   Return Visit with Day Ren PA-C   [facility] ([facility] [place])    [address]  [address]  [facility] [place] [id number] [phone]            Jul 10,  2018  1:30 PM CDT   Return Visit with Huyen Cuellar MD   Radiation Oncology Clinic ([facility])    [facility]  1st Floor  [address] [address]   [phone]            Jul 10, 2018  2:30 PM CDT   Masonic Lab Draw with  MASONIC LAB DRAW   [facility] Lab Draw ([facility])    [address]  [address]  [place] [facility] [address]   [phone]            Jul 10, 2018  3:00 PM CDT   Infusion 60 with  ONCOLOGY INFUSION, UC 26 ATC   [facility] ([facility])    [address]  [address]  [facility] [address]   [phone]            Sep 06, 2018 12:00 PM CDT   MR BRAIN W/O & W CONTRAST with UUMR1   [facility] [place] MRI ([facility] [place])    [address] [address]   [phone]           Take your medicines as usual, unless your doctor tells you not to. Bring a list of your current medicines to your exam (including vitamins, minerals and over-the-counter drugs).  You may or may not receive intravenous (IV) contrast for this exam pending the discretion of the Radiologist.  You do not need to do anything special to prepare.  The MRI machine uses a strong magnet. Please wear clothes without metal (snaps, zippers). A sweatsuit works well, or we may give you a hospital gown.  Please remove any body piercings and hair extensions before you arrive. You will also remove watches, jewelry, hairpins, wallets, dentures, partial dental plates and hearing aids. You may wear contact lenses, and you may be able to wear your rings. We have a safe place to keep your personal items, but it is safer to leave them at home.  **IMPORTANT** THE INSTRUCTIONS BELOW ARE ONLY FOR THOSE PATIENTS WHO HAVE BEEN PRESCRIBED SEDATION OR GENERAL ANESTHESIA DURING THEIR MRI PROCEDURE:  IF YOUR DOCTOR PRESCRIBED ORAL SEDATION (take  medicine to help you relax during your exam):   You must get the medicine from your doctor (oral medication) before you arrive. Bring the medicine to the exam. Do not take it at home. You ll be told when to take it upon arriving for your exam.   Arrive one hour early. Bring someone who can take you home after the test. Your medicine will make you sleepy. After the exam, you may not drive, take a bus or take a taxi by yourself.  IF YOUR DOCTOR PRESCRIBED IV SEDATION:   Arrive one hour early. Bring someone who can take you home after the test. Your medicine will make you sleepy. After the exam, you may not drive, take a bus or take a taxi by yourself.   No eating 6 hours before your exam. You may have clear liquids up until 4 hours before your exam. (Clear liquids include water, clear tea, black coffee and fruit juice without pulp.)  IF YOUR DOCTOR PRESCRIBED ANESTHESIA (be asleep for your exam):   Arrive 1 1/2 hours early. Bring someone who can take you home after the test. You may not drive, take a bus or take a taxi by yourself.   No eating 8 hours before your exam. You may have clear liquids up until 4 hours before your exam. (Clear liquids include water, clear tea, black coffee and fruit juice without pulp.)   You will spend four to five hours in the recovery room.  Please call the Imaging Department at your exam site with any questions.            Sep 06, 2018  1:00 PM CDT   Return Visit with Huyen Cuellar MD   Radiation Oncology Clinic ([facility])    [facility]  1st Floor  [address] [address]   [phone]              Further instructions from your care team       Discharge Instructions for Paracentesis or Thoracentesis     Paracentesis is a procedure to remove extra fluid from your belly (abdomen). Thoracentesis is a procedure to remove extra fluid from your chest.  This fluid buildup is called ascites. The procedure may have been done to  take a sample of the fluid. Or, it may have been done to drain the extra fluid from your abdomen or chest to help make you more comfortable.     Home care    If you have pain after the procedure, your healthcare provider can prescribe or recommend pain medicines. Take these exactly as directed. If you stopped taking other medicines before the procedure, ask your provider when you can start them again.    Avoid strenuous activity for 48 hours.    You will have a small bandage over the puncture site. Adhesive tapes, adhesive strips, or surgical glue may also be used to close the incision. They also help stop bleeding and promote healing. You may take the bandage off in 24-48 hours. Once there is a scab over the site, no bandages are required.    Check the puncture site for the signs of infection listed below.     Follow-up care  Make a follow-up appointment with your healthcare provider as directed. During your follow-up visit, your provider will check your healing. Let your provider know how you are feeling. You can also discuss the cause of your fluid, and if you need any further treatment.    When to seek medical advice:  Call your healthcare provider if you have any of the following after the procedure:    A fever of 100.4 F (38.0 C) or higher    Trouble breathing    Abdominal pain that is worse than expected    Belly Abdominal pain not caused by having the skin punctured that is worse than expected    Persistent bleeding from the puncture site    More than a small amount of fluid leaking from the puncture site    Swollen belly    Signs of infection at the puncture site. These include increased pain, redness, or swelling, warmth, or bad-smelling drainage.    Feeling dizzy or lightheaded, or fainting     Call our Interventional Radiology (IR) service if:  If you start bleeding from the procedure site.  If you do start to bleed from the site, lie down and hold some pressure on the site.  Our radiology provider can  "help you decide if you need to return to the hospital.  If you have new or worsening pain related to the procedure.  If you have pronounced swelling at the procedure site.  If you develop persistent nausea or vomiting.  If you develop hives or a rash or any unexplained itching.  If you have a fever of greater than 100.4  F and chills in the first 5 days after procedure.  Any other concerns related to your procedure.      [facility]  Interventional Radiology (IR)  [address]  2nd Floor, [facility] [place] [address]    Contact Number:  [phone]  (IR control desk)  Monday - Friday 8:00 am - 4:30 pm    After hours for urgent concerns:  [phone]  After 4:30 pm Monday - Friday, Weekends and Holidays.   Ask for Interventional Radiology on-call.  Someone is available 24 hours a day.  [facility] toll free number:  [phone]    Pending Results     Date and Time Order Name Status Description    6/25/2018 0644 IR THORACENTESIS In process             Admission Information     Date & Time Provider Department Dept. Phone    6/25/2018 Juanito Weinstein PA-C [facility] [phone]      Your Vitals Were     Blood Pressure Pulse Temperature Respirations Height Weight    113/62 94 98.5  F (36.9  C) (Oral) 16 1.549 m (5' 1\") 54.4 kg (120 lb)    Last Period Pulse Oximetry BMI (Body Mass Index)             12/20/2013 95% 22.67 kg/m2         [employer] Information     [employer] gives you secure access to your electronic health record. If you see a primary care provider, you can also send messages to your care team and make appointments. If you have questions, please call your primary care clinic.  If you do not have a primary care provider, please call [phone] and they will assist you.      [employer] is an electronic gateway that provides easy, online access to your medical records. With [employer], you can request a clinic appointment, read your test results, " renew a prescription or communicate with your care team.     To access your existing account, please contact your [facility] or call [phone] for assistance.        Care EveryWhere ID     This is your Care EveryWhere ID. This could be used by other organizations to access your [facility] medical records  [id number]        Equal Access to Services     ROSAURA INTERIANO : Odilon garnett Sovarunali, waaxda luqadaha, qaybta kaalmada jarek, josemanuel plata. So [facility] [phone].    ATENCIÓN: Si habla español, tiene a chacon disposición servicios gratuitos de asistencia lingüística. Llame al [phone].    We comply with applicable federal civil rights laws and [place] laws. We do not discriminate on the basis of race, color, national origin, age, disability, sex, sexual orientation, or gender identity.               Review of your medicines      UNREVIEWED medicines. Ask your doctor about these medicines        Dose / Directions    ALPRAZolam 0.5 MG tablet   Commonly known as:  XANAX   Used for:  Renal cell carcinoma, unspecified laterality (H), Mass of upper lobe of left lung        Dose:  0.5 mg   Take 1 tablet (0.5 mg) by mouth 3 times daily as needed for anxiety   Quantity:  60 tablet   Refills:  0       aspirin 81 MG chewable tablet   Used for:  Pre-op evaluation, Brain metastases (H)        Dose:  81 mg   Take 1 tablet (81 mg) by mouth every morning   Quantity:  36 tablet   Refills:  0       benzonatate 100 MG capsule   Commonly known as:  TESSALON   Used for:  Cough        Dose:  100 mg   Take 1 capsule (100 mg) by mouth 3 times daily as needed for cough   Quantity:  42 capsule   Refills:  0       buPROPion 300 MG 24 hr tablet   Commonly known as:  WELLBUTRIN XL        Dose:  300 mg   Take 300 mg by mouth every morning   Refills:  0       * CABOMETYX 40 MG   Used for:  Malignant neoplasm of right kidney (H), Brain metastasis (H)   Generic drug:   Cabozantinib S-Malate        TAKE 1 TABLET DAILY ON AN EMPTY STOMACH 1 HOUR BEFORE OR 2 HOURS AFTER A MEAL, AVOID GRAPEFRUIT AND GRAPEFRUIT JUICE   Quantity:  30 tablet   Refills:  0       * Cabozantinib S-Malate 40 MG   Commonly known as:  CABOMETYX   Used for:  Brain metastasis (H), Metastatic renal cell carcinoma to lung, right (H), Malignant neoplasm of right kidney (H)        Dose:  40 mg   Take 1 tablet (40 mg) by mouth daily Take on an empty stomach 1 hour before or 2 hours after a meal. Avoid grapefruit and grapefruit juice.   Quantity:  30 tablet   Refills:  0       calcium carbonate 500 MG tablet   Commonly known as:  OS-MUNDO 500 mg [place]. Ca   Used for:  Pre-op evaluation, Brain metastases (H)        Dose:  1 tablet   Take 1 tablet by mouth daily (with lunch)   Refills:  0       citalopram 20 MG tablet   Commonly known as:  celeXA   Used for:  Insomnia, unspecified type        Dose:  20 mg   Take 1 tablet (20 mg) by mouth every evening   Quantity:  90 tablet   Refills:  3       guaiFENesin-codeine 100-10 MG/5ML Soln solution   Commonly known as:  CHERATUSSIN AC   Used for:  Cough        Dose:  1-2 tsp.   Take 5-10 mLs by mouth every 6 hours as needed for cough   Quantity:  180 mL   Refills:  0       magic mouthwash suspension   Commonly known as:  ENTER INGREDIENTS IN COMMENTS   Used for:  Metastatic renal cell carcinoma to lung, right (H)        Swish and spit 5-10 mLs four times daily as needed for mouth sores   Quantity:  500 mL   Refills:  1       MAGNESIUM PO   Used for:  Pre-op evaluation, Brain metastases (H)        Take by mouth At Bedtime   Refills:  0       Multi-vitamin Tabs tablet   Used for:  Pre-op evaluation, Brain metastases (H)        Dose:  1 tablet   Take 1 tablet by mouth daily (with lunch)   Refills:  0       OMEGA 3 PO   Used for:  Pre-op evaluation, Brain metastases (H)        Take by mouth daily (with lunch)   Refills:  0       omeprazole 40 MG capsule   Commonly known as:  priLOSEC    Used for:  Cerebral edema (H)        Dose:  40 mg   Take 1 capsule (40 mg) by mouth daily   Quantity:  30 capsule   Refills:  3       ondansetron 8 MG tablet   Commonly known as:  ZOFRAN   Used for:  Nausea        Dose:  8 mg   Take 1 tablet (8 mg) by mouth every 8 hours as needed for nausea   Quantity:  30 tablet   Refills:  3       oxyCODONE 5 MG capsule   Commonly known as:  OXY-IR   Used for:  Metastatic renal cell carcinoma to lung, right (H)        Dose:  1-2 capsule   Take 1-2 capsules (5-10 mg) by mouth every 4 hours as needed for moderate to severe pain   Quantity:  30 capsule   Refills:  0       PROBIOTIC PO   Used for:  Pre-op evaluation, Brain metastases (H)        Take by mouth daily (with lunch)   Refills:  0       SUPER B COMPLEX/VITAMIN C PO   Used for:  Pre-op evaluation, Brain metastases (H)        Take by mouth daily (with lunch)   Refills:  0       TURMERIC PO   Used for:  Pre-op evaluation, Brain metastases (H)        Take by mouth daily (with lunch)   Refills:  0       VITAMIN B 12 PO   Used for:  Pre-op evaluation, Brain metastases (H)        Take by mouth daily (with lunch)   Refills:  0       VITAMIN C PO   Used for:  Pre-op evaluation, Brain metastases (H)        Take by mouth daily (with lunch)   Refills:  0       VITAMIN D PO   Used for:  Pre-op evaluation, Brain metastases (H)        Take by mouth daily (with lunch)   Refills:  0       zolpidem 5 MG tablet   Commonly known as:  AMBIEN   Used for:  Sleep disorder        Dose:  5 mg   Take 1 tablet (5 mg) by mouth nightly as needed for sleep   Quantity:  30 tablet   Refills:  3       * Notice:  This list has 2 medication(s) that are the same as other medications prescribed for you. Read the directions carefully, and ask your doctor or other care provider to review them with you.             Protect others around you: Learn how to safely use, store and throw away your medicines at www.disposemymeds.org.             Medication List: This  is a list of all your medications and when to take them. Check marks below indicate your daily home schedule. Keep this list as a reference.      Medications           Morning Afternoon Evening Bedtime As Needed    ALPRAZolam 0.5 MG tablet   Commonly known as:  XANAX   Take 1 tablet (0.5 mg) by mouth 3 times daily as needed for anxiety                                aspirin 81 MG chewable tablet   Take 1 tablet (81 mg) by mouth every morning                                benzonatate 100 MG capsule   Commonly known as:  TESSALON   Take 1 capsule (100 mg) by mouth 3 times daily as needed for cough                                buPROPion 300 MG 24 hr tablet   Commonly known as:  WELLBUTRIN XL   Take 300 mg by mouth every morning                                * CABOMETYX 40 MG   TAKE 1 TABLET DAILY ON AN EMPTY STOMACH 1 HOUR BEFORE OR 2 HOURS AFTER A MEAL, AVOID GRAPEFRUIT AND GRAPEFRUIT JUICE   Generic drug:  Cabozantinib S-Malate                                * Cabozantinib S-Malate 40 MG   Commonly known as:  CABOMETYX   Take 1 tablet (40 mg) by mouth daily Take on an empty stomach 1 hour before or 2 hours after a meal. Avoid grapefruit and grapefruit juice.                                calcium carbonate 500 MG tablet   Commonly known as:  OS-MUNDO 500 mg [place]. Ca   Take 1 tablet by mouth daily (with lunch)                                citalopram 20 MG tablet   Commonly known as:  celeXA   Take 1 tablet (20 mg) by mouth every evening                                guaiFENesin-codeine 100-10 MG/5ML Soln solution   Commonly known as:  CHERATUSSIN AC   Take 5-10 mLs by mouth every 6 hours as needed for cough                                magic mouthwash suspension   Commonly known as:  ENTER INGREDIENTS IN COMMENTS   Swish and spit 5-10 mLs four times daily as needed for mouth sores                                MAGNESIUM PO   Take by mouth At Bedtime                                Multi-vitamin Tabs tablet    Take 1 tablet by mouth daily (with lunch)                                OMEGA 3 PO   Take by mouth daily (with lunch)                                omeprazole 40 MG capsule   Commonly known as:  priLOSEC   Take 1 capsule (40 mg) by mouth daily                                ondansetron 8 MG tablet   Commonly known as:  ZOFRAN   Take 1 tablet (8 mg) by mouth every 8 hours as needed for nausea                                oxyCODONE 5 MG capsule   Commonly known as:  OXY-IR   Take 1-2 capsules (5-10 mg) by mouth every 4 hours as needed for moderate to severe pain                                PROBIOTIC PO   Take by mouth daily (with lunch)                                SUPER B COMPLEX/VITAMIN C PO   Take by mouth daily (with lunch)                                TURMERIC PO   Take by mouth daily (with lunch)                                VITAMIN B 12 PO   Take by mouth daily (with lunch)                                VITAMIN C PO   Take by mouth daily (with lunch)                                VITAMIN D PO   Take by mouth daily (with lunch)                                zolpidem 5 MG tablet   Commonly known as:  AMBIEN   Take 1 tablet (5 mg) by mouth nightly as needed for sleep                                * Notice:  This list has 2 medication(s) that are the same as other medications prescribed for you. Read the directions carefully, and ask your doctor or other care provider to review them with you.

## 2018-06-26 NOTE — NURSING NOTE
"Oncology Rooming Note    June 26, 2018 1:01 PM   Suzi Gonsales is a 53 year old female who presents for:    Chief Complaint   Patient presents with     Port Draw     Labs drawn from port by RN. Line flushed with saline and heparin. Vs taken and pt checked in for appt     Oncology Clinic Visit     Kidney CA; f/u     Initial Vitals: /67 (BP Location: Right arm, Cuff Size: Adult Regular)  Pulse 92  Temp 98.3  F (36.8  C) (Oral)  Resp 16  Wt 53.4 kg (117 lb 12.8 oz)  LMP 12/20/2013  SpO2 95%  BMI 22.26 kg/m2 Estimated body mass index is 22.26 kg/(m^2) as calculated from the following:    Height as of 6/25/18: 1.549 m (5' 1\").    Weight as of this encounter: 53.4 kg (117 lb 12.8 oz). Body surface area is 1.52 meters squared.  No Pain (0) Comment: Data Unavailable   Patient's last menstrual period was 12/20/2013.  Allergies reviewed: Yes  Medications reviewed: Yes    Medications: Medication refills not needed today.  Pharmacy name entered into [facility]:    [id number] - [place], [place] - [address] AT [facility] [address]  [facility] USE FOR MAILING ONLY [facility] [place] [place] - [address] [place] [address]    Clinical concerns: Patient states there are no new concerns to discuss with provider.  Kia was not notified.       8 minutes for nursing intake (face to face time)     Dilia Faria CMA              "

## 2018-06-26 NOTE — Clinical Note
6/26/2018       RE: Suzi Gonsales  [address] [place] [address]     Dear Colleague,    Thank you for referring your patient, Suzi Gonsales, to the [facility]. Please see a copy of my visit note below.    Oncology/Hematology Visit Note  Jun 26, 2018    Reason for Visit: mRCC, routine followup    History of Present Illness: Suzi presented to ED with hematuria and right flank pain thinking she had a renal stone in December 2014. She was worked up with a CT abd/pelvis which suggested a renal mass. She was referred to Dr. Max Zelaya in [facility]. 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 Per charts tumor resection had negative margins and it was staged at pT2bN0.     Her staging work up was negative except for pulmonary nodules. She has been followed every 6 months with scans. CT CAP on 5/11/17 showed enlarging hilar LAD, enlarging pulmonary nodules, adrenal nodules and a pancreatic body mass. Biopsies of hilar LN, adrenal nodule and pancreatic mass were + for RCC.      ONCOLOGY THERAPY:   6/6/17-8/15/18-- IL-2, of which she received 4 cycles   11/22/17 CT CAP showed disease progression  She completed three months of Opdivo and then 12/5/17-2/13/18- 3 months of Opdivo  3/6/18- cerebellar lesion s/p craniotomy and GK 4/5 to tumor bed and 4 new lesions  4/12/18- 5 fractions of XRT to left supraclav/MERARY  4/19/18- Cabo 40 mg     Interval History:  Suzi is doing well today for the first time in a long time.  The chest pain she presented with over a week ago resolved on its own.  She has required 3 thoracentesis which go smoothly.  She doesn't get JIMÉNEZ, but does feel pressure on the left side.  They took out over a liter yesterday on her birthday and she is feeling well.  Appetite has recovered and she is out and socializing.  She mostly does sitting, but has been able to walk around the block slowly.  Occ diarrhea,  but not an issue.  No left arm pain. No headaches or vision issues.     Current Outpatient Prescriptions   Medication Sig Dispense Refill     ALPRAZolam (XANAX) 0.5 MG tablet Take 1 tablet (0.5 mg) by mouth 3 times daily as needed for anxiety 60 tablet 0     buPROPion (WELLBUTRIN XL) 300 MG 24 hr tablet Take 300 mg by mouth every morning        Cabozantinib S-Malate (CABOMETYX) 40 MG Take 1 tablet (40 mg) by mouth daily Take on an empty stomach 1 hour before or 2 hours after a meal. Avoid grapefruit and grapefruit juice. 30 tablet 0     citalopram (CELEXA) 20 MG tablet Take 1 tablet (20 mg) by mouth every evening (Patient taking differently: Take 20 mg by mouth At Bedtime ) 90 tablet 3     omeprazole (PRILOSEC) 40 MG capsule Take 1 capsule (40 mg) by mouth daily 30 capsule 3     zolpidem (AMBIEN) 5 MG tablet Take 1 tablet (5 mg) by mouth nightly as needed for sleep 30 tablet 3     Ascorbic Acid (VITAMIN C PO) Take by mouth daily (with lunch)       aspirin 81 MG chewable tablet Take 1 tablet (81 mg) by mouth every morning 36 tablet      B Complex-C (SUPER B COMPLEX/VITAMIN C PO) Take by mouth daily (with lunch)       benzonatate (TESSALON) 100 MG capsule Take 1 capsule (100 mg) by mouth 3 times daily as needed for cough (Patient not taking: Reported on 6/26/2018) 42 capsule 0     CABOMETYX 40 MG TAKE 1 TABLET DAILY ON AN EMPTY STOMACH 1 HOUR BEFORE OR 2 HOURS AFTER A MEAL, AVOID GRAPEFRUIT AND GRAPEFRUIT JUICE (Patient not taking: Reported on 6/26/2018) 30 tablet 0     calcium carbonate (OS-MUNDO 500 MG [place]. [place]) 1250 MG tablet Take 1 tablet by mouth daily (with lunch)       Cholecalciferol (VITAMIN D PO) Take by mouth daily (with lunch)       Cyanocobalamin (VITAMIN B 12 PO) Take by mouth daily (with lunch)       guaiFENesin-codeine (CHERATUSSIN AC) 100-10 MG/5ML SOLN solution Take 5-10 mLs by mouth every 6 hours as needed for cough (Patient not taking: Reported on 6/26/2018) 180 mL 0     magic mouthwash (ENTER  INGREDIENTS IN COMMENTS) suspension Swish and spit 5-10 mLs four times daily as needed for mouth sores (Patient not taking: Reported on 6/26/2018) 500 mL 1     MAGNESIUM PO Take by mouth At Bedtime       multivitamin, therapeutic with minerals (MULTI-VITAMIN) TABS tablet Take 1 tablet by mouth daily (with lunch)       Omega-3 Fatty Acids (OMEGA 3 PO) Take by mouth daily (with lunch)       ondansetron (ZOFRAN) 8 MG tablet Take 1 tablet (8 mg) by mouth every 8 hours as needed for nausea (Patient not taking: Reported on 6/26/2018) 30 tablet 3     oxyCODONE (OXY-IR) 5 MG capsule Take 1-2 capsules (5-10 mg) by mouth every 4 hours as needed for moderate to severe pain (Patient not taking: Reported on 6/26/2018) 30 capsule 0     Probiotic Product (PROBIOTIC PO) Take by mouth daily (with lunch)       TURMERIC PO Take by mouth daily (with lunch)         Physical Examination:  General: The patient is a pleasant female in no acute distress.  /67 (BP Location: Right arm, Cuff Size: Adult Regular)  Pulse 92  Temp 98.3  F (36.8  C) (Oral)  Resp 16  Wt 53.4 kg (117 lb 12.8 oz)  LMP 12/20/2013  SpO2 95%  BMI 22.26 kg/m2  Wt Readings from Last 10 Encounters:   06/26/18 53.4 kg (117 lb 12.8 oz)   06/25/18 54.4 kg (120 lb)   06/20/18 54.4 kg (120 lb)   06/18/18 54.4 kg (120 lb)   06/14/18 53.5 kg (118 lb)   06/12/18 54.4 kg (120 lb)   06/06/18 54.2 kg (119 lb 6.4 oz)   06/05/18 54 kg (119 lb)   05/14/18 54.4 kg (120 lb)   04/30/18 57.1 kg (125 lb 12.8 oz)     HEENT: EOMI, PERRL. Sclerae are anicteric. Oral mucosa is pink and moist with no lesions or thrush.   Lymph: No cervical lymphadenopathy.  NO palpable supraclavicular LAD on left.   Heart: Regular rate and rhythm.   Lungs: Breathing comfortably on room air.  Decreased BS at left base with dullness to percussion  Abdomen: Bowel sounds present, soft, nontender with no palpable hepatosplenomegaly or masses.   Extremities: No lower extremity edema noted bilaterally.    Neuro: Cranial nerves II through XII are grossly intact.  Skin: No rashes, petechiae, or bruising noted on exposed skin.    Laboratory Data:     6/26/2018 12:51   Sodium 138   Potassium 3.7   Chloride 106   Carbon Dioxide 23   Urea Nitrogen 5 (L)   Creatinine 0.68   GFR Estimate >90   GFR Estimate If Black >90   Calcium 8.6   Anion Gap 9   Albumin 2.6 (L)   Protein Total 6.6 (L)   Bilirubin Total 0.3   Alkaline Phosphatase 83   ALT 39   AST 44   Glucose 100 (H)   WBC 3.7 (L)   Hemoglobin 12.7   Hematocrit 39.7   Platelet Count 403   RBC Count 4.33   MCV 92       Assessment and Plan:  52 year old with mRCC s/p 4 cycles of IL-2.  She had a stable CT after 2 cycles, mild disease progression after 4 cycles.  After a short break, CT showed worsening disease and she has started with Opdivo.   Three months later she had new brain disease and worsening systemic disease and started Cabo on 4/19.     RCC: She started Cabo 40 mg daily on 4/19. No mouth sores currently, but does have some fatigue and diarrhea for which she is using imodium prn.  OVerall, this is the best I have seen Suzi in months.  CT CAP on 6/5 showed improved disease.     Chest Pain: resolved actually prior to the thoracentesis. PE was negative.         Brain mets: no neuro symptoms, HA or confusion today.   Craniotomy successful on 3/6.  Has GK to the bed on 4/5.  And FOUR new lesions were noted during the planning MRI.  THus, 4 lesions and the tumor bed were done on 4/5.  She is no longer taking Dexamethasone.   -next brain MRI in June 2018    Left femoral head lytic lesion: No pain. Seen on CT 6/5/18, although on review of previous imaging had been present since November 2017, but perhaps slightly enlarged. Currently undergoing palliative RT with plan for 5 fractions, started today.    --She will need dental clearance and Zometa. This was not discussed during today's visit.     BRENDA  [facility]  [address] [place]  [address]  [phone]      Again, thank you for allowing me to participate in the care of your patient.      Sincerely,    Day Ren PA-C

## 2018-06-26 NOTE — NURSING NOTE
Patient was given Xgeva in right abdominal tisse. Patient tolerated well. See VENKATA Ordoñez CMA ([facility])

## 2018-06-26 NOTE — MR AVS SNAPSHOT
After Visit Summary   6/26/2018    Suzi Gonsales    MRN: [id number]           Patient Information     Date Of Birth          1965        Visit Information        Provider Department      6/26/2018 12:30 PM Day Ren PA-C [facility]        Today's Diagnoses     Malignant neoplasm of right kidney (H)    -  1    Bone metastasis (H)        Hypercalcemia        Pleural effusion           Follow-ups after your visit        Your next 10 appointments already scheduled     Jul 10, 2018  1:30 PM CDT   Return Visit with Huyen Cuellar MD   Radiation Oncology Clinic ([facility])    [facility]  1st Floor  [address] [address]   [phone]            Jul 10, 2018  2:30 PM CDT   Masonic Lab Draw with  MASONIC LAB DRAW   [facility] Masonic Lab Draw ([facility])    [address] [place] [address]  [address]  [facility] [address]   [phone]            Jul 10, 2018  3:00 PM CDT   Infusion 60 with  ONCOLOGY INFUSION, UC 26 ATC   [facility] ([facility])    [address] [place] [address]  [address]  [facility] [address]   [phone]            Jul 24, 2018 12:00 PM CDT   Masonic Lab Draw with  MASONIC LAB DRAW   [facility] Masonic Lab Draw ([facility])    [address] [place] [address]  [address]  [facility] [address]   [phone]            Jul 24, 2018 12:30 PM CDT   (Arrive by 12:15 PM)   Return Visit with Day Ren PA-C   [facility] ([facility])    [address] [place] [address]  [address]  [facility] [address]   [phone]            Aug 22, 2018 11:30 AM CDT   Masonic Lab Draw with  MASONIC LAB DRAW   [facility] Masonic Lab Draw ([facility])    [address] [place] [address]  [address]  [facility] [address]   [phone]            Aug 22, 2018 12:00 PM CDT    (Arrive by 11:45 AM)   Return Visit with Henri Green MD   [facility] ([facility])    [address]  [address]  [facility] [address] [phone]            Sep 06, 2018 12:00 PM CDT   MR BRAIN W/O & W CONTRAST with UUMR1   [facility] [place], MRI ([facility])    [address] [facility] [id number] [phone]           Take your medicines as usual, unless your doctor tells you not to. Bring a list of your current medicines to your exam (including vitamins, minerals and over-the-counter drugs).  You may or may not receive intravenous (IV) contrast for this exam pending the discretion of the Radiologist.  You do not need to do anything special to prepare.  The MRI machine uses a strong magnet. Please wear clothes without metal (snaps, zippers). A sweatsuit works well, or we may give you a hospital gown.  Please remove any body piercings and hair extensions before you arrive. You will also remove watches, jewelry, hairpins, wallets, dentures, partial dental plates and hearing aids. You may wear contact lenses, and you may be able to wear your rings. We have a safe place to keep your personal items, but it is safer to leave them at home.  **IMPORTANT** THE INSTRUCTIONS BELOW ARE ONLY FOR THOSE PATIENTS WHO HAVE BEEN PRESCRIBED SEDATION OR GENERAL ANESTHESIA DURING THEIR MRI PROCEDURE:  IF YOUR DOCTOR PRESCRIBED ORAL SEDATION (take medicine to help you relax during your exam):   You must get the medicine from your doctor (oral medication) before you arrive. Bring the medicine to the exam. Do not take it at home. You ll be told when to take it upon arriving for your exam.   Arrive one hour early. Bring someone who can take you home after the test. Your medicine will make you sleepy. After the exam, you may not drive, take a bus or take a taxi by yourself.  IF YOUR DOCTOR PRESCRIBED IV  SEDATION:   Arrive one hour early. Bring someone who can take you home after the test. Your medicine will make you sleepy. After the exam, you may not drive, take a bus or take a taxi by yourself.   No eating 6 hours before your exam. You may have clear liquids up until 4 hours before your exam. (Clear liquids include water, clear tea, black coffee and fruit juice without pulp.)  IF YOUR DOCTOR PRESCRIBED ANESTHESIA (be asleep for your exam):   Arrive 1 1/2 hours early. Bring someone who can take you home after the test. You may not drive, take a bus or take a taxi by yourself.   No eating 8 hours before your exam. You may have clear liquids up until 4 hours before your exam. (Clear liquids include water, clear tea, black coffee and fruit juice without pulp.)   You will spend four to five hours in the recovery room.  Please call the Imaging Department at your exam site with any questions.            Sep 06, 2018  1:00 PM CDT   Return Visit with Huyen Cuellar MD   Radiation Oncology Clinic ([facility])    [facility]  1st Floor  [address] [address]   [phone]              Who to contact     If you have questions or need follow up information about today's clinic visit or your schedule please contact [facility] directly at [phone].  Normal or non-critical lab and imaging results will be communicated to you by MyChart, letter or phone within 4 business days after the clinic has received the results. If you do not hear from us within 7 days, please contact the clinic through MyChart or phone. If you have a critical or abnormal lab result, we will notify you by phone as soon as possible.  Submit refill requests through [employer] or call your pharmacy and they will forward the refill request to us. Please allow 3 business days for your refill to be completed.          Additional Information About Your Visit        [employer]  Information     [email or web address] gives you secure access to your electronic health record. If you see a primary care provider, you can also send messages to your care team and make appointments. If you have questions, please call your primary care clinic.  If you do not have a primary care provider, please call [phone] and they will assist you.        Care EveryWhere ID     This is your Care EveryWhere ID. This could be used by other organizations to access your [facility] medical records  [id number]        Your Vitals Were     Pulse Temperature Respirations Last Period Pulse Oximetry BMI (Body Mass Index)    92 98.3  F (36.8  C) (Oral) 16 12/20/2013 95% 22.26 kg/m2       Blood Pressure from Last 3 Encounters:   06/26/18 100/67   06/25/18 113/62   06/18/18 106/66    Weight from Last 3 Encounters:   06/26/18 53.4 kg (117 lb 12.8 oz)   06/25/18 54.4 kg (120 lb)   06/20/18 54.4 kg (120 lb)              We Performed the Following     CBC with platelets differential     Chylomicron Screen Body Fluid     Comprehensive metabolic panel          Today's Medication Changes          These changes are accurate as of 6/26/18 11:59 PM.  If you have any questions, ask your nurse or doctor.               These medicines have changed or have updated prescriptions.        Dose/Directions    citalopram 20 MG tablet   Commonly known as:  celeXA   This may have changed:  when to take this   Used for:  Insomnia, unspecified type        Dose:  20 mg   Take 1 tablet (20 mg) by mouth every evening   Quantity:  90 tablet   Refills:  3                Primary Care Provider Office Phone # Fax #    Molly ARIEL Platt [facility] [phone] [address] [address]        Equal Access to Services     [facility] JAYDON : Odilon Santana, alexis robles, josemanuel johnston. So [facility] [phone].    ATENCIÓN: Si habla español, tiene a chacon disposición servicios  jostin de asistencia lingüística. Mario mitchell [phone].    We comply with applicable federal civil rights laws and [place] laws. We do not discriminate on the basis of race, color, national origin, age, disability, sex, sexual orientation, or gender identity.            Thank you!     Thank you for choosing [facility]CER [facility]  for your care. Our goal is always to provide you with excellent care. Hearing back from our patients is one way we can continue to improve our services. Please take a few minutes to complete the written survey that you may receive in the mail after your visit with us. Thank you!             Your Updated Medication List - Protect others around you: Learn how to safely use, store and throw away your medicines at www.disposemymeds.org.          This list is accurate as of 6/26/18 11:59 PM.  Always use your most recent med list.                   Brand Name Dispense Instructions for use Diagnosis    ALPRAZolam 0.5 MG tablet    XANAX    60 tablet    Take 1 tablet (0.5 mg) by mouth 3 times daily as needed for anxiety    Renal cell carcinoma, unspecified laterality (H), Mass of upper lobe of left lung       aspirin 81 MG chewable tablet     36 tablet    Take 1 tablet (81 mg) by mouth every morning    Pre-op evaluation, Brain metastases (H)       benzonatate 100 MG capsule    TESSALON    42 capsule    Take 1 capsule (100 mg) by mouth 3 times daily as needed for cough    Cough       buPROPion 300 MG 24 hr tablet    WELLBUTRIN XL     Take 300 mg by mouth every morning        * CABOMETYX 40 MG   Generic drug:  Cabozantinib S-Malate     30 tablet    TAKE 1 TABLET DAILY ON AN EMPTY STOMACH 1 HOUR BEFORE OR 2 HOURS AFTER A MEAL, AVOID GRAPEFRUIT AND GRAPEFRUIT JUICE    Malignant neoplasm of right kidney (H), Brain metastasis (H)       * Cabozantinib S-Malate 40 MG    CABOMETYX    30 tablet    Take 1 tablet (40 mg) by mouth daily Take on an empty stomach 1 hour before or 2 hours after a  meal. Avoid grapefruit and grapefruit juice.    Brain metastasis (H), Metastatic renal cell carcinoma to lung, right (H), Malignant neoplasm of right kidney (H)       calcium carbonate 500 MG tablet    OS-MUNDO 500 mg [place]. [place]     Take 1 tablet by mouth daily (with lunch)    Pre-op evaluation, Brain metastases (H)       citalopram 20 MG tablet    celeXA    90 tablet    Take 1 tablet (20 mg) by mouth every evening    Insomnia, unspecified type       guaiFENesin-codeine 100-10 MG/5ML Soln solution    CHERATUSSIN AC    180 mL    Take 5-10 mLs by mouth every 6 hours as needed for cough    Cough       magic mouthwash suspension    ENTER INGREDIENTS IN COMMENTS    500 mL    Swish and spit 5-10 mLs four times daily as needed for mouth sores    Metastatic renal cell carcinoma to lung, right (H)       MAGNESIUM PO      Take by mouth At Bedtime    Pre-op evaluation, Brain metastases (H)       Multi-vitamin Tabs tablet      Take 1 tablet by mouth daily (with lunch)    Pre-op evaluation, Brain metastases (H)       OMEGA 3 PO      Take by mouth daily (with lunch)    Pre-op evaluation, Brain metastases (H)       omeprazole 40 MG capsule    priLOSEC    30 capsule    Take 1 capsule (40 mg) by mouth daily    Cerebral edema (H)       ondansetron 8 MG tablet    ZOFRAN    30 tablet    Take 1 tablet (8 mg) by mouth every 8 hours as needed for nausea    Nausea       oxyCODONE 5 MG capsule    OXY-IR    30 capsule    Take 1-2 capsules (5-10 mg) by mouth every 4 hours as needed for moderate to severe pain    Metastatic renal cell carcinoma to lung, right (H)       PROBIOTIC PO      Take by mouth daily (with lunch)    Pre-op evaluation, Brain metastases (H)       SUPER B COMPLEX/VITAMIN C PO      Take by mouth daily (with lunch)    Pre-op evaluation, Brain metastases (H)       TURMERIC PO      Take by mouth daily (with lunch)    Pre-op evaluation, Brain metastases (H)       VITAMIN B 12 PO      Take by mouth daily (with lunch)    Pre-op  evaluation, Brain metastases (H)       VITAMIN C PO      Take by mouth daily (with lunch)    Pre-op evaluation, Brain metastases (H)       VITAMIN D PO      Take by mouth daily (with lunch)    Pre-op evaluation, Brain metastases (H)       zolpidem 5 MG tablet    AMBIEN    30 tablet    Take 1 tablet (5 mg) by mouth nightly as needed for sleep    Sleep disorder       * Notice:  This list has 2 medication(s) that are the same as other medications prescribed for you. Read the directions carefully, and ask your doctor or other care provider to review them with you.

## 2018-06-27 NOTE — PROGRESS NOTES
Oncology/Hematology Visit Note  Jun 26, 2018    Reason for Visit: [facility], routine followup    History of Present Illness: Suzi presented to ED with hematuria and right flank pain thinking she had a renal stone in December 2014. She was worked up with a CT abd/pelvis which suggested a renal mass. She was referred to Dr. Max Zelaya in [facility]. 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 Per charts tumor resection had negative margins and it was staged at pT2bN0.     Her staging work up was negative except for pulmonary nodules. She has been followed every 6 months with scans. CT CAP on 5/11/17 showed enlarging hilar LAD, enlarging pulmonary nodules, adrenal nodules and a pancreatic body mass. Biopsies of hilar LN, adrenal nodule and pancreatic mass were + for RCC.      ONCOLOGY THERAPY:   6/6/17-8/15/18-- IL-2, of which she received 4 cycles   11/22/17 CT CAP showed disease progression  She completed three months of Opdivo and then 12/5/17-2/13/18- 3 months of Opdivo  3/6/18- cerebellar lesion s/p craniotomy and GK 4/5 to tumor bed and 4 new lesions  4/12/18- 5 fractions of XRT to left supraclav/MERARY  4/19/18- Cabo 40 mg     Interval History:  Suzi is doing well today for the first time in a long time.  The chest pain she presented with over a week ago resolved on its own.  She has required 3 thoracentesis which go smoothly.  She doesn't get JIMÉNEZ, but does feel pressure on the left side.  They took out over a liter yesterday on her birthday and she is feeling well.  Appetite has recovered and she is out and socializing.  She mostly does sitting, but has been able to walk around the block slowly.  Occ diarrhea, but not an issue.  No left arm pain. No headaches or vision issues.     Current Outpatient Prescriptions   Medication Sig Dispense Refill     ALPRAZolam (XANAX) 0.5 MG tablet Take 1 tablet (0.5 mg) by mouth 3 times daily as needed  for anxiety 60 tablet 0     buPROPion (WELLBUTRIN XL) 300 MG 24 hr tablet Take 300 mg by mouth every morning        Cabozantinib S-Malate (CABOMETYX) 40 MG Take 1 tablet (40 mg) by mouth daily Take on an empty stomach 1 hour before or 2 hours after a meal. Avoid grapefruit and grapefruit juice. 30 tablet 0     citalopram (CELEXA) 20 MG tablet Take 1 tablet (20 mg) by mouth every evening (Patient taking differently: Take 20 mg by mouth At Bedtime ) 90 tablet 3     omeprazole (PRILOSEC) 40 MG capsule Take 1 capsule (40 mg) by mouth daily 30 capsule 3     zolpidem (AMBIEN) 5 MG tablet Take 1 tablet (5 mg) by mouth nightly as needed for sleep 30 tablet 3     Ascorbic Acid (VITAMIN C PO) Take by mouth daily (with lunch)       aspirin 81 MG chewable tablet Take 1 tablet (81 mg) by mouth every morning 36 tablet      B Complex-C (SUPER B COMPLEX/VITAMIN C PO) Take by mouth daily (with lunch)       benzonatate (TESSALON) 100 MG capsule Take 1 capsule (100 mg) by mouth 3 times daily as needed for cough (Patient not taking: Reported on 6/26/2018) 42 capsule 0     CABOMETYX 40 MG TAKE 1 TABLET DAILY ON AN EMPTY STOMACH 1 HOUR BEFORE OR 2 HOURS AFTER A MEAL, AVOID GRAPEFRUIT AND GRAPEFRUIT JUICE (Patient not taking: Reported on 6/26/2018) 30 tablet 0     calcium carbonate (OS-MUNDO 500 MG [place]. [place]) 1250 MG tablet Take 1 tablet by mouth daily (with lunch)       Cholecalciferol (VITAMIN D PO) Take by mouth daily (with lunch)       Cyanocobalamin (VITAMIN B 12 PO) Take by mouth daily (with lunch)       guaiFENesin-codeine (CHERATUSSIN AC) 100-10 MG/5ML SOLN solution Take 5-10 mLs by mouth every 6 hours as needed for cough (Patient not taking: Reported on 6/26/2018) 180 mL 0     magic mouthwash (ENTER INGREDIENTS IN COMMENTS) suspension Swish and spit 5-10 mLs four times daily as needed for mouth sores (Patient not taking: Reported on 6/26/2018) 500 mL 1     MAGNESIUM PO Take by mouth At Bedtime       multivitamin, therapeutic with  minerals (MULTI-VITAMIN) TABS tablet Take 1 tablet by mouth daily (with lunch)       Omega-3 Fatty Acids (OMEGA 3 PO) Take by mouth daily (with lunch)       ondansetron (ZOFRAN) 8 MG tablet Take 1 tablet (8 mg) by mouth every 8 hours as needed for nausea (Patient not taking: Reported on 6/26/2018) 30 tablet 3     oxyCODONE (OXY-IR) 5 MG capsule Take 1-2 capsules (5-10 mg) by mouth every 4 hours as needed for moderate to severe pain (Patient not taking: Reported on 6/26/2018) 30 capsule 0     Probiotic Product (PROBIOTIC PO) Take by mouth daily (with lunch)       TURMERIC PO Take by mouth daily (with lunch)         Physical Examination:  General: The patient is a pleasant female in no acute distress.  /67 (BP Location: Right arm, Cuff Size: Adult Regular)  Pulse 92  Temp 98.3  F (36.8  C) (Oral)  Resp 16  Wt 53.4 kg (117 lb 12.8 oz)  LMP 12/20/2013  SpO2 95%  BMI 22.26 kg/m2  Wt Readings from Last 10 Encounters:   06/26/18 53.4 kg (117 lb 12.8 oz)   06/25/18 54.4 kg (120 lb)   06/20/18 54.4 kg (120 lb)   06/18/18 54.4 kg (120 lb)   06/14/18 53.5 kg (118 lb)   06/12/18 54.4 kg (120 lb)   06/06/18 54.2 kg (119 lb 6.4 oz)   06/05/18 54 kg (119 lb)   05/14/18 54.4 kg (120 lb)   04/30/18 57.1 kg (125 lb 12.8 oz)     HEENT: EOMI, PERRL. Sclerae are anicteric. Oral mucosa is pink and moist with no lesions or thrush.   Lymph: No cervical lymphadenopathy.  NO palpable supraclavicular LAD on left.   Heart: Regular rate and rhythm.   Lungs: Breathing comfortably on room air.  Decreased BS at left base with dullness to percussion  Abdomen: Bowel sounds present, soft, nontender with no palpable hepatosplenomegaly or masses.   Extremities: No lower extremity edema noted bilaterally.   Neuro: Cranial nerves II through XII are grossly intact.  Skin: No rashes, petechiae, or bruising noted on exposed skin.    Laboratory Data:     6/26/2018 12:51   Sodium 138   Potassium 3.7   Chloride 106   Carbon Dioxide 23   Urea  Nitrogen 5 (L)   Creatinine 0.68   GFR Estimate >90   GFR Estimate If Black >90   Calcium 8.6   Anion Gap 9   Albumin 2.6 (L)   Protein Total 6.6 (L)   Bilirubin Total 0.3   Alkaline Phosphatase 83   ALT 39   AST 44   Glucose 100 (H)   WBC 3.7 (L)   Hemoglobin 12.7   Hematocrit 39.7   Platelet Count 403   RBC Count 4.33   MCV 92       Assessment and Plan:  52 year old with mRCC s/p 4 cycles of IL-2.  She had a stable CT after 2 cycles, mild disease progression after 4 cycles.  After a short break, CT showed worsening disease and she has started with Opdivo.   Three months later she had new brain disease and worsening systemic disease and started Cabo on 4/19.     RCC: She started Cabo 40 mg daily on 4/19. No mouth sores currently, but does have some fatigue and diarrhea for which she is using imodium prn.  OVerall, this is the best I have seen Suzi in months.  CT CAP on 6/5 showed improved disease.  I want to give Suzi some time off as this spring has been rough for her.  I will see her back in 4 weeks and CT CAP in 8 weeks with Dr Green.     Pleural effusion: chest pain resolved actually prior to the thoracentesis. PE was negative. Standing order for Suzi to get drained prn. Cytology negative? Will add on chylomicron testing.     Brain mets: no neuro symptoms, HA or confusion today.   Craniotomy successful on 3/6.  Has GK to the bed on 4/5 and FOUR new lesions were noted during the planning MRI.  6/5/18 brain MRI stable.  Repeat 3 months    Left femoral head lytic lesion: No pain. Seen on CT 6/5/18, although on review of previous imaging had been present since November 2017. Completed palliative RT with plan for 5 fractions.  -f/up with ortho just for discussion, however we would prefer to hold off on any ppx surgery  -Xgeva today and monthly    Kia Ren PA-C  [facility] [place] [place] [address] [phone]

## 2018-07-09 NOTE — MR AVS SNAPSHOT
After Visit Summary   7/9/2018    Suzi Gonsales    MRN: [id number]           Patient Information     Date Of Birth          1965        Visit Information        Provider Department      7/9/2018 10:00 PM Huyen Cuellar MD [facility]         Follow-ups after your visit        Your next 10 appointments already scheduled     Aug 22, 2018 11:30 AM CDT   [facility] Lab Draw with  [facility] DRAW   [facility] Draw ([facility])    [address] [place] [address]  [address]  [facility] [address]   [phone]            Aug 22, 2018 12:00 PM CDT   (Arrive by 11:45 AM)   Return Visit with Henri Green MD   [facility] ([facility])    [address] [place] [address]  [address]  [facility] [address]   [phone]            Sep 06, 2018 12:00 PM CDT   MR BRAIN W/O & W CONTRAST with UUMR1   [facility] [place] ([facility])    [address] [facility] [address]   [phone]           Take your medicines as usual, unless your doctor tells you not to. Bring a list of your current medicines to your exam (including vitamins, minerals and over-the-counter drugs).  You may or may not receive intravenous (IV) contrast for this exam pending the discretion of the Radiologist.  You do not need to do anything special to prepare.  The MRI machine uses a strong magnet. Please wear clothes without metal (snaps, zippers). A sweatsuit works well, or we may give you a hospital gown.  Please remove any body piercings and hair extensions before you arrive. You will also remove watches, jewelry, hairpins, wallets, dentures, partial dental plates and hearing aids. You may wear contact lenses, and you may be able to wear your rings. We have a safe place to keep your personal items, but it is safer to leave them at home.  **IMPORTANT** THE INSTRUCTIONS BELOW ARE ONLY FOR  THOSE PATIENTS WHO HAVE BEEN PRESCRIBED SEDATION OR GENERAL ANESTHESIA DURING THEIR MRI PROCEDURE:  IF YOUR DOCTOR PRESCRIBED ORAL SEDATION (take medicine to help you relax during your exam):   You must get the medicine from your doctor (oral medication) before you arrive. Bring the medicine to the exam. Do not take it at home. You ll be told when to take it upon arriving for your exam.   Arrive one hour early. Bring someone who can take you home after the test. Your medicine will make you sleepy. After the exam, you may not drive, take a bus or take a taxi by yourself.  IF YOUR DOCTOR PRESCRIBED IV SEDATION:   Arrive one hour early. Bring someone who can take you home after the test. Your medicine will make you sleepy. After the exam, you may not drive, take a bus or take a taxi by yourself.   No eating 6 hours before your exam. You may have clear liquids up until 4 hours before your exam. (Clear liquids include water, clear tea, black coffee and fruit juice without pulp.)  IF YOUR DOCTOR PRESCRIBED ANESTHESIA (be asleep for your exam):   Arrive 1 1/2 hours early. Bring someone who can take you home after the test. You may not drive, take a bus or take a taxi by yourself.   No eating 8 hours before your exam. You may have clear liquids up until 4 hours before your exam. (Clear liquids include water, clear tea, black coffee and fruit juice without pulp.)   You will spend four to five hours in the recovery room.  Please call the Imaging Department at your exam site with any questions.            Sep 06, 2018  1:00 PM CDT   Return Visit with Huyen Cuellar MD   Radiation Oncology Clinic ([facility])    [facility]  1st Floor  [address] [facility] [address]   [phone]              Future tests that were ordered for you today     Open Future Orders        Priority Expected Expires Ordered    CT Chest/Abdomen/Pelvis w Contrast Routine  7/24/2019 7/24/2018             Who to contact     Please call your clinic at [phone] to:    Ask questions about your health    Make or cancel appointments    Discuss your medicines    Learn about your test results    Speak to your doctor            Additional Information About Your Visit        Bracket[employer]Gate[employer]ocket Information     Preact gives you secure access to your electronic health record. If you see a primary care provider, you can also send messages to your care team and make appointments. If you have questions, please call your primary care clinic.  If you do not have a primary care provider, please call [phone] and they will assist you.      Preact is an electronic gateway that provides easy, online access to your medical records. With Pre[employer], you can request a clinic appointment, read your test results, renew a prescription or communicate with your care team.     To access your existing account, please contact your [facility] or call [phone] for assistance.        Care EveryWhere ID     This is your Care EveryWhere ID. This could be used by other organizations to access your [place] medical records  [id number]        Your Vitals Were     Last Period                   12/20/2013            Blood Pressure from Last 3 Encounters:   No data found for BP    Weight from Last 3 Encounters:   No data found for Wt              Today, you had the following     No orders found for display         Today's Medication Changes          These changes are accurate as of 7/9/18 11:59 PM.  If you have any questions, ask your nurse or doctor.               These medicines have changed or have updated prescriptions.        Dose/Directions    citalopram 20 MG tablet   Commonly known as:  celeXA   This may have changed:  when to take this   Used for:  Insomnia, unspecified type        Dose:  20 mg   Take 1 tablet (20 mg) by mouth every evening   Quantity:  90 tablet   Refills:  3                Primary Care Provider  Office Phone # Fax #    ARIEL Carter [phone] [address] [facility] [address]        Equal Access to Services     ROSAURA INTERIANO : Hadii aad ku hadcarolineo Sovarunali, waaxda luqadaha, qaybta kaalmada adeegyada, josemanuel edmond anirohit marin darek plata. So [facility] [phone].    ATENCIÓN: Si habla español, tiene a chacon disposición servicios gratuitos de asistencia lingüística. Llame al [phone].    We comply with applicable federal civil rights laws and [place] laws. We do not discriminate on the basis of race, color, national origin, age, disability, sex, sexual orientation, or gender identity.            Thank you!     Thank you for choosing [facility]  for your care. Our goal is always to provide you with excellent care. Hearing back from our patients is one way we can continue to improve our services. Please take a few minutes to complete the written survey that you may receive in the mail after your visit with us. Thank you!             Your Updated Medication List - Protect others around you: Learn how to safely use, store and throw away your medicines at www.disposemymeds.org.          This list is accurate as of 7/9/18 11:59 PM.  Always use your most recent med list.                   Brand Name Dispense Instructions for use Diagnosis    ALPRAZolam 0.5 MG tablet    XANAX    60 tablet    Take 1 tablet (0.5 mg) by mouth 3 times daily as needed for anxiety    Renal cell carcinoma, unspecified laterality (H), Mass of upper lobe of left lung       aspirin 81 MG chewable tablet     36 tablet    Take 1 tablet (81 mg) by mouth every morning    Pre-op evaluation, Brain metastases (H)       benzonatate 100 MG capsule    TESSALON    42 capsule    Take 1 capsule (100 mg) by mouth 3 times daily as needed for cough    Cough       buPROPion 300 MG 24 hr tablet    WELLBUTRIN XL     Take 300 mg by mouth every morning        Cabozantinib S-Malate 40 MG    CABOMETYX     30 tablet    Take 1 tablet (40 mg) by mouth daily Take on an empty stomach 1 hour before or 2 hours after a meal. Avoid grapefruit and grapefruit juice.    Brain metastasis (H), Metastatic renal cell carcinoma to lung, right (H), Malignant neoplasm of right kidney (H)       calcium carbonate 500 MG tablet    OS-MUNDO 500 mg [place]. [place]     Take 1 tablet by mouth daily (with lunch)    Pre-op evaluation, Brain metastases (H)       citalopram 20 MG tablet    celeXA    90 tablet    Take 1 tablet (20 mg) by mouth every evening    Insomnia, unspecified type       MAGNESIUM PO      Take by mouth At Bedtime    Pre-op evaluation, Brain metastases (H)       Multi-vitamin Tabs tablet      Take 1 tablet by mouth daily (with lunch)    Pre-op evaluation, Brain metastases (H)       OMEGA 3 PO      Take by mouth daily (with lunch)    Pre-op evaluation, Brain metastases (H)       omeprazole 40 MG capsule    priLOSEC    30 capsule    Take 1 capsule (40 mg) by mouth daily    Cerebral edema (H)       ondansetron 8 MG tablet    ZOFRAN    30 tablet    Take 1 tablet (8 mg) by mouth every 8 hours as needed for nausea    Nausea       oxyCODONE 5 MG capsule    OXY-IR    30 capsule    Take 1-2 capsules (5-10 mg) by mouth every 4 hours as needed for moderate to severe pain    Metastatic renal cell carcinoma to lung, right (H)       PROBIOTIC PO      Take by mouth daily (with lunch)    Pre-op evaluation, Brain metastases (H)       SUPER B COMPLEX/VITAMIN C PO      Take by mouth daily (with lunch)    Pre-op evaluation, Brain metastases (H)       TURMERIC PO      Take by mouth daily (with lunch)    Pre-op evaluation, Brain metastases (H)       VITAMIN B 12 PO      Take by mouth daily (with lunch)    Pre-op evaluation, Brain metastases (H)       VITAMIN C PO      Take by mouth daily (with lunch)    Pre-op evaluation, Brain metastases (H)       VITAMIN D PO      Take by mouth daily (with lunch)    Pre-op evaluation, Brain metastases (H)        zolpidem 5 MG tablet    AMBIEN    30 tablet    Take 1 tablet (5 mg) by mouth nightly as needed for sleep    Sleep disorder

## 2018-07-12 NOTE — ORAL ONC MGMT
Oral Chemotherapy Monitoring Program     Primary Oncologist: Dr. Green                Primary Oncology Clinic: [facility]  Cancer Diagnosis: RCC     Therapy History:  Started Cabometyx 40mg on 4/19     Lab Monitoring Plan  C1D1+   CMP, CBC, urine random protein, BP C2D1+ Call, BP, CMP, CBC, urine random protein  C3D1+ Call, BP, CMP, CBC, urine random protein C4D1+ Call, BP, CMP, CBC, urine random protein C5D1+ Call, BP, CMP, CBC, urine random protein C6D1+ Call, BP, CMP, CBC, urine random protein   C1D8+   C2D8+   C3D8+   C4D8+   C5D8+   C6D8+     C1D15+ Call, BP C2D15+   C3D15+   C4D15+   C5D15+   C6D15+     C1D22+   C2D22+   C3D22+   C4D22+   C5D22+   C6D22+     CBC monthly  CMP monthly  Urine random protein monthly  Check BP Q2 weeks for the first month, then monthly      Subjective/Objective:  Suzi Gonsales is a 53 year old female contacted by phone for a follow-up visit for oral chemotherapy.  Suzi said things are going great for her with Cabometyx. She said she has been taking Imodium as needed for diarrhea. She has had 2-5 loose stools per day, but most of them are not watery. Occaisionally she does have some explosive diarrhea. She has been taking Imodium according to package instructions. She said she has some fatigue but finds that rest relieves it. She also walks for around and hour per day to help keep her energy up. She also swims and does house work. She said her appetite is not great but she feels she is getting adequate nutrition eating smaller meals and snacks. She thanked me for the call and care.    ORAL CHEMOTHERAPY 4/26/2018 7/12/2018   Drug Name Cabometyx (cabozantinib) Cabometyx (cabozantinib)   Current Dosage 40mg 40mg   Current Schedule Daily Daily   Cycle Details Continuous Continuous   Start Date of Last Cycle 4/19/2018 6/22/2018   Doses missed in last 2 weeks - 0   Adherence Assessment Adherent Adherent   Adverse Effects Other (see note for details) Diarrhea;Fatigue   Diarrhea - Grade 1  "  Pharmacist Intervention(diarrhea) - Yes   Intervention(s) - Patient education   Fatigue - Grade 1   Pharmacist Intervention(fatigue) - Yes   Intervention(s) - Patient education   Other (see note for details) Loss of appetite -   Pharmacist intervention? Yes -   Intervention(s) Patient education -   Home BPs all BPs<140/90 -   Any new drug interactions? - No   Is the dose as ordered appropriate for the patient? - Yes       Vitals:  BP:   BP Readings from Last 1 Encounters:   06/26/18 100/67     Wt Readings from Last 1 Encounters:   06/26/18 53.4 kg (117 lb 12.8 oz)     Estimated body surface area is 1.52 meters squared as calculated from the following:    Height as of 6/25/18: 1.549 m (5' 1\").    Weight as of 6/26/18: 53.4 kg (117 lb 12.8 oz).    Labs:  _  Result Component Current Result Ref Range   Sodium 138 (6/26/2018) 133 - 144 mmol/L     _  Result Component Current Result Ref Range   Potassium 3.7 (6/26/2018) 3.4 - 5.3 mmol/L     _  Result Component Current Result Ref Range   Calcium 8.6 (6/26/2018) 8.5 - 10.1 mg/dL     No results found for Mag within last 30 days.     No results found for Phos within last 30 days.     _  Result Component Current Result Ref Range   Albumin 2.6 (L) (6/26/2018) 3.4 - 5.0 g/dL     _  Result Component Current Result Ref Range   Urea Nitrogen 5 (L) (6/26/2018) 7 - 30 mg/dL     _  Result Component Current Result Ref Range   Creatinine 0.68 (6/26/2018) 0.52 - 1.04 mg/dL       _  Result Component Current Result Ref Range   AST 44 (6/26/2018) 0 - 45 U/L     _  Result Component Current Result Ref Range   ALT 39 (6/26/2018) 0 - 50 U/L     _  Result Component Current Result Ref Range   Bilirubin Total 0.3 (6/26/2018) 0.2 - 1.3 mg/dL       _  Result Component Current Result Ref Range   WBC 3.7 (L) (6/26/2018) 4.0 - 11.0 10e9/L     _  Result Component Current Result Ref Range   Hemoglobin 12.7 (6/26/2018) 11.7 - 15.7 g/dL     _  Result Component Current Result Ref Range   Platelet Count 403 " (6/26/2018) 150 - 450 10e9/L     _  Result Component Current Result Ref Range   Absolute Neutrophil 1.8 (6/26/2018) 1.6 - 8.3 10e9/L     Assessment:  Suzi is doing well and happy with Cabometyx. She might benefit from some interventions to improve bowel function.    Plan:  Suzi will start taking a prophylactic dose of Imodium (two tablets at noon every day), and continue to use it as needed if she has any loose watery stools later in the day. She will also try Activia yogurt to see if it helps improve bowel function. She will call with interim concerns. Next office visit is scheduled for 7/24/2018    Follow-Up:  Pending next office visit.    Refill Due:  Now, the order has been released (7/6/2018) Suzi will call her pharmacy to schedule delivery.    Horace SantosD  [facility]  [phone]  July 12, 2018

## 2018-07-16 NOTE — MR AVS SNAPSHOT
After Visit Summary   7/16/2018    Suzi Gonsales    MRN: [id number]           Patient Information     Date Of Birth          1965        Visit Information        Provider Department      7/16/2018 10:00 PM Huyen Cuellar MD Radiation Oncology Clinic         Follow-ups after your visit        Your next 10 appointments already scheduled     Jul 24, 2018 12:00 PM CDT   [facility] Lab Draw with  [facility] LAB DRAW   [facility] ([facility])    [address] [place] [address]  [address]  [facility] [address]   [phone]            Jul 24, 2018 12:30 PM CDT   (Arrive by 12:15 PM)   Return Visit with Day Ren PA-C   [facility] ([facility])    [address] [place] [address]  [address] [place] [address]   [phone]            Aug 22, 2018 11:30 AM CDT   Masonic Lab Draw with  MASONIC LAB DRAW   [facility] Mason[facility] Lab Draw ([facility])    [address] [place] [address]  [address] [place] [address]   [phone]            Aug 22, 2018 12:00 PM CDT   (Arrive by 11:45 AM)   Return Visit with Henri Green MD   [facility] ([facility])    [address] [place] [address]  [address] [place] [address]   [phone]            Sep 06, 2018 12:00 PM CDT   MR BRAIN W/O & W CONTRAST with UUMR1   [facility] MRI ([facility] [place])    [address] [facility] [address]   [phone]           Take your medicines as usual, unless your doctor tells you not to. Bring a list of your current medicines to your exam (including vitamins, minerals and over-the-counter drugs).  You may or may not receive intravenous (IV) contrast for this exam pending the discretion of the Radiologist.  You do not need to do anything special to prepare.  The MRI machine uses a strong magnet. Please wear clothes  without metal (snaps, zippers). A sweatsuit works well, or we may give you a hospital gown.  Please remove any body piercings and hair extensions before you arrive. You will also remove watches, jewelry, hairpins, wallets, dentures, partial dental plates and hearing aids. You may wear contact lenses, and you may be able to wear your rings. We have a safe place to keep your personal items, but it is safer to leave them at home.  **IMPORTANT** THE INSTRUCTIONS BELOW ARE ONLY FOR THOSE PATIENTS WHO HAVE BEEN PRESCRIBED SEDATION OR GENERAL ANESTHESIA DURING THEIR MRI PROCEDURE:  IF YOUR DOCTOR PRESCRIBED ORAL SEDATION (take medicine to help you relax during your exam):   You must get the medicine from your doctor (oral medication) before you arrive. Bring the medicine to the exam. Do not take it at home. You ll be told when to take it upon arriving for your exam.   Arrive one hour early. Bring someone who can take you home after the test. Your medicine will make you sleepy. After the exam, you may not drive, take a bus or take a taxi by yourself.  IF YOUR DOCTOR PRESCRIBED IV SEDATION:   Arrive one hour early. Bring someone who can take you home after the test. Your medicine will make you sleepy. After the exam, you may not drive, take a bus or take a taxi by yourself.   No eating 6 hours before your exam. You may have clear liquids up until 4 hours before your exam. (Clear liquids include water, clear tea, black coffee and fruit juice without pulp.)  IF YOUR DOCTOR PRESCRIBED ANESTHESIA (be asleep for your exam):   Arrive 1 1/2 hours early. Bring someone who can take you home after the test. You may not drive, take a bus or take a taxi by yourself.   No eating 8 hours before your exam. You may have clear liquids up until 4 hours before your exam. (Clear liquids include water, clear tea, black coffee and fruit juice without pulp.)   You will spend four to five hours in the recovery room.  Please call the Imaging  Department at your exam site with any questions.            Sep 06, 2018  1:00 PM CDT   Return Visit with Huyen Cuellar MD   Radiation Oncology Clinic ([facility])    [facility]  1st Floor  [address] [facility] [address] [phone]              Who to contact     Please call your clinic at [phone] to:    Ask questions about your health    Make or cancel appointments    Discuss your medicines    Learn about your test results    Speak to your doctor            Additional Information About Your Visit        [employer] Events Information     [employer] gives you secure access to your electronic health record. If you see a primary care provider, you can also send messages to your care team and make appointments. If you have questions, please call your primary care clinic.  If you do not have a primary care provider, please call [phone] and they will assist you.      [employer] is an electronic gateway that provides easy, online access to your medical records. With [employer], you can request a clinic appointment, read your test results, renew a prescription or communicate with your care team.     To access your existing account, please contact your [facility] or call [phone] for assistance.        Care EveryWhere ID     This is your Care EveryWhere ID. This could be used by other organizations to access your [facility] medical records  [id number]        Your Vitals Were     Last Period                   12/20/2013            Blood Pressure from Last 3 Encounters:   No data found for BP    Weight from Last 3 Encounters:   No data found for Wt              Today, you had the following     No orders found for display         Today's Medication Changes          These changes are accurate as of 7/16/18 10:00 PM.  If you have any questions, ask your nurse or doctor.               These medicines have changed or have updated prescriptions.         Dose/Directions    citalopram 20 MG tablet   Commonly known as:  celeXA   This may have changed:  when to take this   Used for:  Insomnia, unspecified type        Dose:  20 mg   Take 1 tablet (20 mg) by mouth every evening   Quantity:  90 tablet   Refills:  3                Primary Care Provider Office Phone # Fax #    ARIEL Carter [facility] [phone] [address] [facility] [address]        Equal Access to Services     ROSAURA INTERIANO : Hadii aad ku hadasho Soomaali, waaxda luqadaha, qaybta kaalmada adeegyada, waxay idiin hayaan adeeg tania oswald . So [facility] [phone].    ATENCIÓN: Si habla español, tiene a chacon disposición servicios gratuitos de asistencia lingüística. [facility] [phone].    We comply with applicable federal civil rights laws and [place] laws. We do not discriminate on the basis of race, color, national origin, age, disability, sex, sexual orientation, or gender identity.            Thank you!     Thank you for choosing RADIATION ONCOLOGY CLINIC  for your care. Our goal is always to provide you with excellent care. Hearing back from our patients is one way we can continue to improve our services. Please take a few minutes to complete the written survey that you may receive in the mail after your visit with us. Thank you!             Your Updated Medication List - Protect others around you: Learn how to safely use, store and throw away your medicines at www.disposemymeds.org.          This list is accurate as of 7/16/18 10:00 PM.  Always use your most recent med list.                   Brand Name Dispense Instructions for use Diagnosis    ALPRAZolam 0.5 MG tablet    XANAX    60 tablet    Take 1 tablet (0.5 mg) by mouth 3 times daily as needed for anxiety    Renal cell carcinoma, unspecified laterality (H), Mass of upper lobe of left lung       aspirin 81 MG chewable tablet     36 tablet    Take 1 tablet (81 mg) by mouth every morning    Pre-op evaluation,  Brain metastases (H)       benzonatate 100 MG capsule    TESSALON    42 capsule    Take 1 capsule (100 mg) by mouth 3 times daily as needed for cough    Cough       buPROPion 300 MG 24 hr tablet    WELLBUTRIN XL     Take 300 mg by mouth every morning        Cabozantinib S-Malate 40 MG    CABOMETYX    30 tablet    Take 1 tablet (40 mg) by mouth daily Take on an empty stomach 1 hour before or 2 hours after a meal. Avoid grapefruit and grapefruit juice.    Brain metastasis (H), Metastatic renal cell carcinoma to lung, right (H), Malignant neoplasm of right kidney (H)       calcium carbonate 500 MG tablet    OS-MUNDO 500 mg Sycuan. Ca     Take 1 tablet by mouth daily (with lunch)    Pre-op evaluation, Brain metastases (H)       citalopram 20 MG tablet    celeXA    90 tablet    Take 1 tablet (20 mg) by mouth every evening    Insomnia, unspecified type       guaiFENesin-codeine 100-10 MG/5ML Soln solution    CHERATUSSIN AC    180 mL    Take 5-10 mLs by mouth every 6 hours as needed for cough    Cough       magic mouthwash suspension    ENTER INGREDIENTS IN COMMENTS    500 mL    Swish and spit 5-10 mLs four times daily as needed for mouth sores    Metastatic renal cell carcinoma to lung, right (H)       MAGNESIUM PO      Take by mouth At Bedtime    Pre-op evaluation, Brain metastases (H)       Multi-vitamin Tabs tablet      Take 1 tablet by mouth daily (with lunch)    Pre-op evaluation, Brain metastases (H)       OMEGA 3 PO      Take by mouth daily (with lunch)    Pre-op evaluation, Brain metastases (H)       omeprazole 40 MG capsule    priLOSEC    30 capsule    Take 1 capsule (40 mg) by mouth daily    Cerebral edema (H)       ondansetron 8 MG tablet    ZOFRAN    30 tablet    Take 1 tablet (8 mg) by mouth every 8 hours as needed for nausea    Nausea       oxyCODONE 5 MG capsule    OXY-IR    30 capsule    Take 1-2 capsules (5-10 mg) by mouth every 4 hours as needed for moderate to severe pain    Metastatic renal cell carcinoma  to lung, right (H)       PROBIOTIC PO      Take by mouth daily (with lunch)    Pre-op evaluation, Brain metastases (H)       SUPER B COMPLEX/VITAMIN C PO      Take by mouth daily (with lunch)    Pre-op evaluation, Brain metastases (H)       TURMERIC PO      Take by mouth daily (with lunch)    Pre-op evaluation, Brain metastases (H)       VITAMIN B 12 PO      Take by mouth daily (with lunch)    Pre-op evaluation, Brain metastases (H)       VITAMIN C PO      Take by mouth daily (with lunch)    Pre-op evaluation, Brain metastases (H)       VITAMIN D PO      Take by mouth daily (with lunch)    Pre-op evaluation, Brain metastases (H)       zolpidem 5 MG tablet    AMBIEN    30 tablet    Take 1 tablet (5 mg) by mouth nightly as needed for sleep    Sleep disorder

## 2018-07-17 NOTE — PROCEDURES
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        Follow up staging revealed a large lytic hip lesion.  The decision was made to offer palliative radiotherapy.        Treatment Summary:  Radiation Oncology - Course: 3 Protocol:   Treatment Site Current Dose Modality From To Elapsed Days Fx.  3 Lt Femur  2,000 cGy 18 X  6/18/2018  2018   4  5                Dose per Fraction:     400   Total Dose:    2000          COMMENTS:                      (Acute Toxicity Profile by CTC v4.0)     PAIN MANAGEMENT:              She had no pain until the last day of treatment.  She was advised to not   weight bear and    let us know if the pain did not respond to Tylenol      She will follow up with Med Onc            FOLLOW UP PLAN:          with med onc, mri in 3 months post GK and prn                   Staff Physician: Huyen Cuellar M.D.  Physicist:  Shelley South PhD     Quintin Palencia MD  [facility]                                  Radiation Oncology:  [facility] [address] [place] [address]

## 2018-07-24 NOTE — NURSING NOTE
"Oncology Rooming Note    July 24, 2018 1:23 PM   Suzi Gonsales is a 53 year old female who presents for:    Chief Complaint   Patient presents with     Port Draw     labs drawn from port by rn.  vs taken     Oncology Clinic Visit     Return for Darci Cisneros      Initial Vitals: /71 (BP Location: Right arm, Patient Position: Sitting, Cuff Size: Adult Regular)  Pulse 79  Temp 97.9  F (36.6  C) (Oral)  Resp 16  Wt 54.1 kg (119 lb 4.8 oz)  LMP 12/20/2013  SpO2 98%  BMI 22.54 kg/m2 Estimated body mass index is 22.54 kg/(m^2) as calculated from the following:    Height as of 6/25/18: 1.549 m (5' 1\").    Weight as of this encounter: 54.1 kg (119 lb 4.8 oz). Body surface area is 1.53 meters squared.  No Pain (0) Comment: Data Unavailable   Patient's last menstrual period was 12/20/2013.  Allergies reviewed: Yes  Medications reviewed: Yes          Medications: Medication refills not needed today.  Pharmacy name entered into [employer] Voiceboard:    [id number] - [place], [place] - [address] AT [facility] [address]  [facility] - USE FOR MAILING ONLY - [place], [facility] - [place], [place] - [address] [place] [address]    Clinical concerns: results  Kia was notified.    6  minutes for nursing intake (face to face time)     Jillian Peralta MA              "

## 2018-07-24 NOTE — MR AVS SNAPSHOT
After Visit Summary   7/24/2018    Suzi Gonsales    MRN: [id number]           Patient Information     Date Of Birth          1965        Visit Information        Provider Department      7/24/2018 12:30 PM Day Ren PA-C [facility]        Today's Diagnoses     Diarrhea, unspecified type    -  1    Malignant neoplasm of right kidney (H)        Hypercalcemia        Bone metastasis (H)           Follow-ups after your visit        Your next 10 appointments already scheduled     Aug 20, 2018 11:20 AM CDT   CT CHEST/ABDOMEN/PELVIS W CONTRAST with UCCT2   [facility] ([facility])    [address]  1st Floor  [place] [address] [phone]           Please bring any scans or X-rays taken at other hospitals, if similar tests were done. Also bring a list of your medicines, including vitamins, minerals and over-the-counter drugs. It is safest to leave personal items at home.  Be sure to tell your doctor:   If you have any allergies.   If there s any chance you are pregnant.   If you are breastfeeding.  How to prepare:   Do not eat or drink for 2 hours before your exam. If you need to take medicine, you may take it with small sips of water. (We may ask you to take liquid medicine as well.)   Please wear loose clothing, such as a sweat suit or jogging clothes. Avoid snaps, zippers and other metal. We may ask you to undress and put on a hospital gown.  Please arrive 30 minutes early for your CT. Once in the department you might be asked to drink water 15-20 minutes prior to your exam.  If indicated you may be asked to drink an oral contrast in advance of your CT.  If this is the case, the imaging team will let you know or be in contact with you prior to your appointment  Patients over 70 or patients with diabetes or kidney problems:   If you haven t had a blood test (creatinine test) within the last 30 days, the  Cardiologist/Radiologist may require you to get this test prior to your exam.  If you have diabetes:   Continue to take your metformin medication on the day of your exam  If you have any questions, please call the Imaging Department where you will have your exam.            Aug 22, 2018 11:30 AM CDT   [facility] Draw with  [facility] DRAW   [facility] ([facility])    [address]  [address]  [place] [address]   [phone]            Aug 22, 2018 12:00 PM CDT   (Arrive by 11:45 AM)   Return Visit with Henri Green MD   [facility] ([facility])    [address]  [address]  [place] [address]   [phone]            Sep 06, 2018 12:00 PM CDT   MR BRAIN W/O & W CONTRAST with UUMR1   [facility] [address], [facility] ([facility], [facility])    [address] [address]   [phone]           Take your medicines as usual, unless your doctor tells you not to. Bring a list of your current medicines to your exam (including vitamins, minerals and over-the-counter drugs).  You may or may not receive intravenous (IV) contrast for this exam pending the discretion of the Radiologist.  You do not need to do anything special to prepare.  The MRI machine uses a strong magnet. Please wear clothes without metal (snaps, zippers). A sweatsuit works well, or we may give you a hospital gown.  Please remove any body piercings and hair extensions before you arrive. You will also remove watches, jewelry, hairpins, wallets, dentures, partial dental plates and hearing aids. You may wear contact lenses, and you may be able to wear your rings. We have a safe place to keep your personal items, but it is safer to leave them at home.  **IMPORTANT** THE INSTRUCTIONS BELOW ARE ONLY FOR THOSE PATIENTS WHO HAVE BEEN PRESCRIBED SEDATION OR GENERAL ANESTHESIA DURING THEIR MRI  PROCEDURE:  IF YOUR DOCTOR PRESCRIBED ORAL SEDATION (take medicine to help you relax during your exam):   You must get the medicine from your doctor (oral medication) before you arrive. Bring the medicine to the exam. Do not take it at home. You ll be told when to take it upon arriving for your exam.   Arrive one hour early. Bring someone who can take you home after the test. Your medicine will make you sleepy. After the exam, you may not drive, take a bus or take a taxi by yourself.  IF YOUR DOCTOR PRESCRIBED IV SEDATION:   Arrive one hour early. Bring someone who can take you home after the test. Your medicine will make you sleepy. After the exam, you may not drive, take a bus or take a taxi by yourself.   No eating 6 hours before your exam. You may have clear liquids up until 4 hours before your exam. (Clear liquids include water, clear tea, black coffee and fruit juice without pulp.)  IF YOUR DOCTOR PRESCRIBED ANESTHESIA (be asleep for your exam):   Arrive 1 1/2 hours early. Bring someone who can take you home after the test. You may not drive, take a bus or take a taxi by yourself.   No eating 8 hours before your exam. You may have clear liquids up until 4 hours before your exam. (Clear liquids include water, clear tea, black coffee and fruit juice without pulp.)   You will spend four to five hours in the recovery room.  Please call the Imaging Department at your exam site with any questions.            Sep 06, 2018  1:00 PM CDT   Return Visit with Huyen Cuellar MD   Radiation Oncology Clinic ([facility])    [facility]  1st Floor  [address] [id number] [phone]              Who to contact     If you have questions or need follow up information about today's clinic visit or your schedule please contact [facility] directly at [phone].  Normal or non-critical lab and imaging results will be communicated to you by  MyChart, letter or phone within 4 business days after the clinic has received the results. If you do not hear from us within 7 days, please contact the clinic through [employer] or phone. If you have a critical or abnormal lab result, we will notify you by phone as soon as possible.  Submit refill requests through [employer] or call your pharmacy and they will forward the refill request to us. Please allow 3 business days for your refill to be completed.          Additional Information About Your Visit        [employer] Information     [employer] gives you secure access to your electronic health record. If you see a primary care provider, you can also send messages to your care team and make appointments. If you have questions, please call your primary care clinic.  If you do not have a primary care provider, please call [phone] and they will assist you.        Care EveryWhere ID     This is your Care EveryWhere ID. This could be used by other organizations to access your [place] medical records  [id number]        Your Vitals Were     Pulse Temperature Respirations Last Period Pulse Oximetry BMI (Body Mass Index)    79 97.9  F (36.6  C) (Oral) 16 12/20/2013 98% 22.54 kg/m2       Blood Pressure from Last 3 Encounters:   07/24/18 125/71   06/26/18 100/67   06/25/18 113/62    Weight from Last 3 Encounters:   07/24/18 54.1 kg (119 lb 4.8 oz)   06/26/18 53.4 kg (117 lb 12.8 oz)   06/25/18 54.4 kg (120 lb)              We Performed the Following     CBC with platelets differential     Comprehensive metabolic panel          Today's Medication Changes          These changes are accurate as of 7/24/18 11:59 PM.  If you have any questions, ask your nurse or doctor.               Start taking these medicines.        Dose/Directions    diphenoxylate-atropine 2.5-0.025 MG per tablet   Commonly known as:  LOMOTIL   Used for:  Diarrhea, unspecified type   Started by:  Day Ren PA-C        Dose:  1-2 tablet   Take 1-2  tablets by mouth 4 times daily as needed for diarrhea   Quantity:  100 tablet   Refills:  1       Potassium Chloride ER 20 MEQ Tbcr   Used for:  Diarrhea, unspecified type, Malignant neoplasm of right kidney (H)   Started by:  Day Ren PA-C        Dose:  20 mEq   Take 1 tablet (20 mEq) by mouth daily   Quantity:  14 tablet   Refills:  0         These medicines have changed or have updated prescriptions.        Dose/Directions    citalopram 20 MG tablet   Commonly known as:  celeXA   This may have changed:  when to take this   Used for:  Insomnia, unspecified type        Dose:  20 mg   Take 1 tablet (20 mg) by mouth every evening   Quantity:  90 tablet   Refills:  3            Where to get your medicines      Some of these will need a paper prescription and others can be bought over the counter.  Ask your nurse if you have questions.     Bring a paper prescription for each of these medications     diphenoxylate-atropine 2.5-0.025 MG per tablet    Potassium Chloride ER 20 MEQ Tbcr                Primary Care Provider Office Phone # Fax #    Molly ARIEL Platt [facility] [phone] [phone]        Equal Access to Services     MARI INTERIANO : Hadii jimmie Santana, waaxda margaret, qaybta jeff tilley, josemanuel oswald . So [facility] [phone].    ATENCIÓN: Si habla español, tiene a chacon disposición servicios gratuitos de asistencia lingüística. [facility] [phone].    We comply with applicable federal civil rights laws and [place] laws. We do not discriminate on the basis of race, color, national origin, age, disability, sex, sexual orientation, or gender identity.            Thank you!     Thank you for choosing [facility]  for your care. Our goal is always to provide you with excellent care. Hearing back from our patients is one way we can continue to improve our services. Please take a few minutes  to complete the written survey that you may receive in the mail after your visit with us. Thank you!             Your Updated Medication List - Protect others around you: Learn how to safely use, store and throw away your medicines at www.disposemymeds.org.          This list is accurate as of 7/24/18 11:59 PM.  Always use your most recent med list.                   Brand Name Dispense Instructions for use Diagnosis    ALPRAZolam 0.5 MG tablet    XANAX    60 tablet    Take 1 tablet (0.5 mg) by mouth 3 times daily as needed for anxiety    Renal cell carcinoma, unspecified laterality (H), Mass of upper lobe of left lung       aspirin 81 MG chewable tablet     36 tablet    Take 1 tablet (81 mg) by mouth every morning    Pre-op evaluation, Brain metastases (H)       benzonatate 100 MG capsule    TESSALON    42 capsule    Take 1 capsule (100 mg) by mouth 3 times daily as needed for cough    Cough       buPROPion 300 MG 24 hr tablet    WELLBUTRIN XL     Take 300 mg by mouth every morning        Cabozantinib S-Malate 40 MG    CABOMETYX    30 tablet    Take 1 tablet (40 mg) by mouth daily Take on an empty stomach 1 hour before or 2 hours after a meal. Avoid grapefruit and grapefruit juice.    Brain metastasis (H), Metastatic renal cell carcinoma to lung, right (H), Malignant neoplasm of right kidney (H)       calcium carbonate 500 MG tablet    OS-MUNDO 500 mg [place]. [place]     Take 1 tablet by mouth daily (with lunch)    Pre-op evaluation, Brain metastases (H)       citalopram 20 MG tablet    celeXA    90 tablet    Take 1 tablet (20 mg) by mouth every evening    Insomnia, unspecified type       diphenoxylate-atropine 2.5-0.025 MG per tablet    LOMOTIL    100 tablet    Take 1-2 tablets by mouth 4 times daily as needed for diarrhea    Diarrhea, unspecified type       MAGNESIUM PO      Take by mouth At Bedtime    Pre-op evaluation, Brain metastases (H)       Multi-vitamin Tabs tablet      Take 1 tablet by mouth daily (with lunch)     Pre-op evaluation, Brain metastases (H)       OMEGA 3 PO      Take by mouth daily (with lunch)    Pre-op evaluation, Brain metastases (H)       omeprazole 40 MG capsule    priLOSEC    30 capsule    Take 1 capsule (40 mg) by mouth daily    Cerebral edema (H)       ondansetron 8 MG tablet    ZOFRAN    30 tablet    Take 1 tablet (8 mg) by mouth every 8 hours as needed for nausea    Nausea       oxyCODONE 5 MG capsule    OXY-IR    30 capsule    Take 1-2 capsules (5-10 mg) by mouth every 4 hours as needed for moderate to severe pain    Metastatic renal cell carcinoma to lung, right (H)       Potassium Chloride ER 20 MEQ Tbcr     14 tablet    Take 1 tablet (20 mEq) by mouth daily    Diarrhea, unspecified type, Malignant neoplasm of right kidney (H)       PROBIOTIC PO      Take by mouth daily (with lunch)    Pre-op evaluation, Brain metastases (H)       SUPER B COMPLEX/VITAMIN C PO      Take by mouth daily (with lunch)    Pre-op evaluation, Brain metastases (H)       TURMERIC PO      Take by mouth daily (with lunch)    Pre-op evaluation, Brain metastases (H)       VITAMIN B 12 PO      Take by mouth daily (with lunch)    Pre-op evaluation, Brain metastases (H)       VITAMIN C PO      Take by mouth daily (with lunch)    Pre-op evaluation, Brain metastases (H)       VITAMIN D PO      Take by mouth daily (with lunch)    Pre-op evaluation, Brain metastases (H)       zolpidem 5 MG tablet    AMBIEN    30 tablet    Take 1 tablet (5 mg) by mouth nightly as needed for sleep    Sleep disorder

## 2018-07-24 NOTE — NURSING NOTE
"Chief Complaint   Patient presents with     Port Draw     labs drawn from port by rn.  vs taken     Port accessed with 20 gauge 3/4\" gripper needle and labs drawn by rn.  Port flushed with NS and heparin then de-accessed.  Pt tolerated well.  VS taken.  Pt checked in for next appt.  Mendy Burns RN      "

## 2018-07-24 NOTE — PROGRESS NOTES
Oncology/Hematology Visit Note  Jul 24, 2018    Reason for Visit: [facility], routine followup    History of Present Illness: Suzi presented to ED with hematuria and right flank pain thinking she had a renal stone in December 2014. She was worked up with a CT abd/pelvis which suggested a renal mass. She was referred to Dr. Max Zelaya in [facility]. 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 Per charts tumor resection had negative margins and it was staged at pT2bN0.     Her staging work up was negative except for pulmonary nodules. She has been followed every 6 months with scans. CT CAP on 5/11/17 showed enlarging hilar LAD, enlarging pulmonary nodules, adrenal nodules and a pancreatic body mass. Biopsies of hilar LN, adrenal nodule and pancreatic mass were + for RCC.      ONCOLOGY THERAPY:   6/6/17-8/15/18-- IL-2, of which she received 4 cycles   11/22/17 CT CAP showed disease progression  She completed three months of Opdivo and then 12/5/17-2/13/18- 3 months of Opdivo  3/6/18- cerebellar lesion s/p craniotomy and GK 4/5 to tumor bed and 4 new lesions  4/12/18- 5 fractions of XRT to left supraclav/MERARY  4/19/18- Cabo 40 mg     Interval History:  Suzi continues to do well today.  This summer she has been traveling and going out for meals and feeling pretty well.  She sleeps for about 10 hours a night and is usually up for 12-14 hours during the day.  Sometimes takes a cat nap during the day.  She denies left sided chest pain and no JIMÉNEZ.  Occ cough, hasn't bene concerned about her effusion.   No left arm pain. No headaches or vision issues. Main concern today is diarrhea- within the last week has been increased to 6-10 episodes a day with cramping.  Has been taking 2 imodium 2-3 times a day with continues discomfort.     Current Outpatient Prescriptions   Medication Sig Dispense Refill     ALPRAZolam (XANAX) 0.5 MG tablet Take 1 tablet (0.5  mg) by mouth 3 times daily as needed for anxiety 60 tablet 0     Ascorbic Acid (VITAMIN C PO) Take by mouth daily (with lunch)       aspirin 81 MG chewable tablet Take 1 tablet (81 mg) by mouth every morning 36 tablet      B Complex-C (SUPER B COMPLEX/VITAMIN C PO) Take by mouth daily (with lunch)       benzonatate (TESSALON) 100 MG capsule Take 1 capsule (100 mg) by mouth 3 times daily as needed for cough 42 capsule 0     buPROPion (WELLBUTRIN XL) 300 MG 24 hr tablet Take 300 mg by mouth every morning        Cabozantinib S-Malate (CABOMETYX) 40 MG Take 1 tablet (40 mg) by mouth daily Take on an empty stomach 1 hour before or 2 hours after a meal. Avoid grapefruit and grapefruit juice. 30 tablet 0     calcium carbonate (OS-MUNDO 500 MG [place]. [place]) 1250 MG tablet Take 1 tablet by mouth daily (with lunch)       Cholecalciferol (VITAMIN D PO) Take by mouth daily (with lunch)       citalopram (CELEXA) 20 MG tablet Take 1 tablet (20 mg) by mouth every evening (Patient taking differently: Take 20 mg by mouth At Bedtime ) 90 tablet 3     Cyanocobalamin (VITAMIN B 12 PO) Take by mouth daily (with lunch)       diphenoxylate-atropine (LOMOTIL) 2.5-0.025 MG per tablet Take 1-2 tablets by mouth 4 times daily as needed for diarrhea 100 tablet 1     MAGNESIUM PO Take by mouth At Bedtime       multivitamin, therapeutic with minerals (MULTI-VITAMIN) TABS tablet Take 1 tablet by mouth daily (with lunch)       Omega-3 Fatty Acids (OMEGA 3 PO) Take by mouth daily (with lunch)       omeprazole (PRILOSEC) 40 MG capsule Take 1 capsule (40 mg) by mouth daily 30 capsule 3     ondansetron (ZOFRAN) 8 MG tablet Take 1 tablet (8 mg) by mouth every 8 hours as needed for nausea 30 tablet 3     oxyCODONE (OXY-IR) 5 MG capsule Take 1-2 capsules (5-10 mg) by mouth every 4 hours as needed for moderate to severe pain 30 capsule 0     Potassium Chloride ER 20 MEQ TBCR Take 1 tablet (20 mEq) by mouth daily 14 tablet 0     Probiotic Product (PROBIOTIC PO)  Take by mouth daily (with lunch)       TURMERIC PO Take by mouth daily (with lunch)       zolpidem (AMBIEN) 5 MG tablet Take 1 tablet (5 mg) by mouth nightly as needed for sleep 30 tablet 3       Physical Examination:  General: The patient is a pleasant female in no acute distress.  /71 (BP Location: Right arm, Patient Position: Sitting, Cuff Size: Adult Regular)  Pulse 79  Temp 97.9  F (36.6  C) (Oral)  Resp 16  Wt 54.1 kg (119 lb 4.8 oz)  LMP 12/20/2013  SpO2 98%  BMI 22.54 kg/m2  Wt Readings from Last 10 Encounters:   07/24/18 54.1 kg (119 lb 4.8 oz)   06/26/18 53.4 kg (117 lb 12.8 oz)   06/25/18 54.4 kg (120 lb)   06/20/18 54.4 kg (120 lb)   06/18/18 54.4 kg (120 lb)   06/14/18 53.5 kg (118 lb)   06/12/18 54.4 kg (120 lb)   06/06/18 54.2 kg (119 lb 6.4 oz)   06/05/18 54 kg (119 lb)   05/14/18 54.4 kg (120 lb)     HEENT: EOMI, PERRL. Sclerae are anicteric. Oral mucosa is pink and moist with no lesions or thrush.   Lymph: No cervical lymphadenopathy.  NO palpable supraclavicular LAD on left.   Heart: Regular rate and rhythm.   Lungs: Breathing comfortably on room air.  Decreased BS at left base with dullness to percussion  Abdomen: Bowel sounds present, soft, nontender with no palpable hepatosplenomegaly or masses.   Extremities: No lower extremity edema noted bilaterally.   Neuro: Cranial nerves II through XII are grossly intact.  Skin: No rashes, petechiae, or bruising noted on exposed skin.    Laboratory Data:     6/26/2018 12:51 7/24/2018 13:12   Sodium 138 137   Potassium 3.7 3.3 (L)   Chloride 106 105   Carbon Dioxide 23 22   Urea Nitrogen 5 (L) 8   Creatinine 0.68 0.72   GFR Estimate >90 85   GFR Estimate If Black >90 >90   Calcium 8.6 8.9   Anion Gap 9 9   Albumin 2.6 (L) 3.1 (L)   Protein Total 6.6 (L) 7.0   Bilirubin Total 0.3 0.3   Alkaline Phosphatase 83 78   ALT 39 52 (H)   AST 44 51 (H)   Glucose 100 (H) 174 (H)   WBC 3.7 (L) 5.3   Hemoglobin 12.7 12.9   Hematocrit 39.7 39.4   Platelet  Count 403 337   RBC Count 4.33 4.31   MCV 92 91     Assessment and Plan:  52 year old with mRCC s/p 4 cycles of IL-2.  She had a stable CT after 2 cycles, mild disease progression after 4 cycles.  After a short break, CT showed worsening disease and she has started with Opdivo.   Three months later she had new brain disease and worsening systemic disease and started Cabo on 4/19.     RCC: She started Cabo 40 mg daily on 4/19. CT CAP on 6/5 showed improved disease. Tolerating chemo well with diarrhea which has been getting worse in the last couple weeks, leaving house less and getting up int he middle of the night with diarrhea.  I gave her a script for lomotil, but asked her to take 5 days off the Cabo since she is having 10 + stools on some days.  She should feel better fairly quickly and then can re-start.   -5 day Cabo break to get a better handle on diarrhea.  20 mEq daily x 7  -CT CAP in a month and to see Dr Green    Pleural effusion: small today, no need for tap.  Monitor.     Brain mets: no neuro symptoms, HA or confusion today.   Craniotomy successful on 3/6.  Has GK to the bed on 4/5 and FOUR new lesions were noted during the planning MRI.  6/5/18 brain MRI stable.  Repeat 3 months-- set up with Dr Jovel in early September.     Left femoral head lytic lesion: No pain. Seen on CT 6/5/18, although on review of previous imaging had been present since November 2017. Completed palliative RT with plan for 5 fractions.  -f/up with ortho just for discussion, however we would prefer to hold off on any ppx surgery  -Xgeva today and monthly (received today)    Kia Ren PA-C  [facility]  [address] [place] [address] [phone]

## 2018-07-25 NOTE — PROCEDURES
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  Treatment Summary:  Radiation Oncology - Course: 3 Protocol:   Treatment Site Dose Modality From To Days Fx.  3 Lt Femur  2,000 cGy 18 X  6/18/2018  2018   4  5     Dose per Fraction:  400 cGy/fx      Total Dose: 2000 cGy             COMMENTS:                      Ms. Amezcua is a 52-year-old woman with a diagnosis of metastatic renal cell carcinoma. She was seen on   18 in consultation with Dr. Cuellar for consideration of palliative radiation therapy to the left femoral   head for a lytic lesion seen on CT 18. On review of her imaging, this appeared to have been present since   2017 and was perhaps slightly enlarged since then. We recommended palliative radiotherapy to the   left femoral head, which was completed as detailed above.     She tolerated radiotherapy well; her only complaint was development of mild-moderate pain with ambulation   associated with the left hip. She also had an episode of chest pain associated with a thoracentesis procedure, for   which she was scheduled for a repeat left thoracentesis. Her pain was managed by medical oncology.     PAIN MANAGEMENT:  Per her primary medical oncology team, on oxycodone 5-10mg PO q4h PRN at the   completion of radiotherapy.                            FOLLOW UP PLAN: She does not need scheduled follow-up with our clinic, but may return to clinic PRN.                     Resident Physician:     King Clayton M.D.   Staff Physician: Huyen Cuellar M.D.  Physicist: Aliya Almazan, PhD     CC: MD Randy Coyle MD Juliet Tatsumi, PA Lucia Reinhardt, PA                                 Radiation Oncology:  [facility] [address] [place] [address]

## 2018-08-06 NOTE — TELEPHONE ENCOUNTER
----- Message from Day Ren PA-C sent at 8/6/2018  1:16 PM CDT -----  Regarding: RE: Cabometyx toxicity   PA-    Can you call her- she needs C diff and enteric stool cultures, her diarrhea should have resolved quickly after I gave her a treatment break the week before.      I can see her with labs this week or Norma, but we should have her bring in stool cultures today.     marychuy  ----- Message -----     From: Henri Green MD     Sent: 8/6/2018  12:22 PM       To: Day Ren PA-C, Lola Adam, #  Subject: RE: Cabometyx toxicity                           Thank you, Either myself or Marychuy will follow up with her sooner.    Henri  ----- Message -----     From: Lola Adam     Sent: 8/6/2018  11:21 AM       To: Henri Green MD, Molly Mendiola RN, #  Subject: Cabometyx toxicity                               Hi Dr. rGeen,    I spoke with Suzi this morning and wanted to fill you in on how she is doing after her treatment hold. Suzi reported she continued to have diarrhea during the hold and she restarted on 8/2/18. Since restarting she reports having 3-4 bad days (~10 stools/day) per week.     Lomotil is not working. I recommended she start taking 2 tablets of imodium preemptively in the morning and then 1 tablet for every loose stool (max 8 tablets). In addition, she is having significant abdominal cramps that are only relieved with Vicodin (she takes 1 tablet daily, sometimes 2 tablets prn). I will check in with her next week, but I thought this was concerning. She is not seen until 8/22/18.    Lola Adam   Pharmacy Intern  [facility]   [phone]

## 2018-08-06 NOTE — TELEPHONE ENCOUNTER
I called pt's cell phone & home phone and left a detailed message on her cell. Per Kia Ren PA-C, to further investigate pt's ongoing diarrhea and abdominal cramping/ pain, pt should come by the clinic today to submit stool samples for testing (C diff, enteric stool cultures) and draw blood labs (Cbc, Cmp). If pt unable to submit samples at clinic, she may  the lab supplies to collect stool samples at home. Pt should also see MEME or MD this week, Kia is willing to add-on patient on Wed 8/8 at 12pm but need pt to call back to Triage line to discuss & confirm this plan.

## 2018-08-06 NOTE — TELEPHONE ENCOUNTER
"Oral Chemotherapy Monitoring Program    Primary Oncologist: Dr. Green  Primary Oncology Clinic: [facility]  Cancer Diagnosis: RCC    Therapy: Cabometyx 40 mg (1 x 40 mg) every day continuously   Start Date: 4/19/2018    Lab Monitoring Plan  CBC, CMP monthly  Subjective/Objective:  Suzi Gonsales is a 53 year old female contacted by phone for a follow-up visit for oral chemotherapy. Suzi states she continues to struggle with diarrhea even after holding therapy from 7/25/18-8/1/18. The lomotil does not seem to help. When she was taking imodium she only took up to 6 tablets. She was told previously that taking 2 imodium tablets preemptively in the morning can help and she reports that does seem to work. She has \"some good days and some bad days\"; a good day would be ~3-4 loose stools and a bad day would be ~10 loose stools. She has about 3-4 \"bad days\" a week. Another concern she has are the stomach cramps, described as \"tightening around her ribs and it is constant once it begins\". The pain does not go away until she take 1 vicodin tablet. She reports having to take vicodin once daily and sometimes twice daily as it is the only thing to relieve the pain; acetaminophen did not cut it. She reports no changes to her diet and she is staying well hydrated.     ORAL CHEMOTHERAPY 4/26/2018 7/12/2018   Drug Name Cabometyx (cabozantinib) Cabometyx (cabozantinib)   Current Dosage 40mg 40mg   Current Schedule Daily Daily   Cycle Details Continuous Continuous   Start Date of Last Cycle 4/19/2018 6/22/2018   Doses missed in last 2 weeks - 0   Adherence Assessment Adherent Adherent   Adverse Effects Other (see note for details) Diarrhea;Fatigue   Diarrhea - Grade 1   Pharmacist Intervention(diarrhea) - Yes   Intervention(s) - Patient education   Fatigue - Grade 1   Pharmacist Intervention(fatigue) - Yes   Intervention(s) - Patient education   Other (see note for details) Loss of appetite -   Pharmacist intervention? Yes - "   Intervention(s) Patient education -   Home BPs all BPs<140/90 -   Any new drug interactions? - No   Is the dose as ordered appropriate for the patient? - Yes       Assessment:  Suzi is having significant diarrhea likely secondary to the cabometyx therapy. The lomotil doesn't seem to help. She is not taking the maximum recommended dose of imodium. I recommended she start taking 2 tablets of imodium daily in the morning, and for each loose stool take 1 additional tablet; Up to 8 tablets daily. The max dose is 16 mg (8 tablets). She may require a dose reduction per UpToDate as the diarrhea seems to be at a Grade 3 and did not seem to resolve even during the hold.     Plan:  -Continue cabometyx therapy  -Message Norma and nurse coordinator regarding side effects  -Start schedule imodium as described and increase dose to max if needed    Follow-Up:  Call Suzi on 8/13/18 to check in on her side effects and if the imodium is helping.    Lola Adam   Pharmacy Intern  [facility]   [phone]

## 2018-08-13 NOTE — ORAL ONC MGMT
Oral Chemotherapy Monitoring Program    Contacted patient to follow-up on stool culture testing for C. Diff and labs. Voicemail was left on patient's cell by care team notifying her that they wanted her to submit stool samples and have labs drawn. Patient was out of town when the voicemail was left, so she was unable to come into the clinic for testing. Suzi is returning 8/13 and will be able to come in for labs and stool culture on 8/14. She reports that there has not been much improvement with her diarrhea, but she has continued to take Imodium and stay hydrated in the meantime.    Marielle Lopes  Pharmacy Intern  [facility]  [phone]

## 2018-08-14 NOTE — NURSING NOTE
"Chief Complaint   Patient presents with     Port Draw     labs drawn via port by RN.      Port accessed with 20g 3/4\" gripper needle and labs drawn by RN.  Port flushed with saline and heparin.  Port de-accessed.    Inez Kahn RN    "

## 2018-08-15 NOTE — TELEPHONE ENCOUNTER
----- Message -----     From: Rodolfo Espinosa, RN     Sent: 8/15/2018  10:21 AM       To: Henri Green MD, Molly Mendiola, RN, *  Subject: RE: Stool culture and labs                       Hi all, I spoke to Suzi, her diarrhea has slowed down today & yesterday (no stools). She is still on the oral Cabo and is drinking lots of fluids daily (I told her to drink more fluids w/electrolytes due to serum K 3.2 yesterday). She is eatings small meals/ grazing (BRAT diet) but has a low appetite. She gets abdominal cramping w/ stools but none today or yesterday.    Dr Green she doesn't feel she needs to be seen this week by provider but knows to call Triage if things worsen, etc. She'll keep appts for CT on Monday followed by appt with you on Wed.    Pa      ----- Message -----     From: Henri Green MD     Sent: 8/15/2018   9:43 AM       To: Rodolfo Espinosa RN, Molly Mendiola, RN  Subject: RE: Stool culture and labs                       Yes    ----- Message -----     From: Rodolfo Espinosa RN     Sent: 8/13/2018   7:01 PM       To: Henri Green MD, Molly Mendiola, RN  Subject: RE: Stool culture and labs                       You could see her 6pm on what day? Wed 8/15?

## 2018-08-20 NOTE — DISCHARGE INSTRUCTIONS

## 2018-08-22 NOTE — PROGRESS NOTES
Oncology/Hematology Visit Note  Aug 22, 2018    Reason for Visit: [facility], routine followup    History of Present Illness: Suzi presented to ED with hematuria and right flank pain thinking she had a renal stone in December 2014. She was worked up with a CT abd/pelvis which suggested a renal mass. She was referred to Dr. Max Zelaya in [facility]. 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 Per charts tumor resection had negative margins and it was staged at pT2bN0.     Her staging work up was negative except for pulmonary nodules. She has been followed every 6 months with scans. CT CAP on 5/11/17 showed enlarging hilar LAD, enlarging pulmonary nodules, adrenal nodules and a pancreatic body mass. Biopsies of hilar LN, adrenal nodule and pancreatic mass were + for RCC.      ONCOLOGY THERAPY:   6/6/17-8/15/18-- IL-2, of which she received 4 cycles   11/22/17 CT CAP showed disease progression  She completed three months of Opdivo and then 12/5/17-2/13/18- 3 months of Opdivo  3/6/18- cerebellar lesion s/p craniotomy and GK 4/5 to tumor bed and 4 new lesions  4/12/18- 5 fractions of XRT to left supraclav/MERARY  4/19/18- Cabo 40 mg     Interval History:  Suzi continues to do well today.  This summer she has been traveling and going out for meals and feeling pretty well.  She sleeps for about 10 hours a night and is usually up for 12-14 hours during the day.  Sometimes takes a cat nap during the day.  She denies left sided chest pain and no JIMÉNEZ.  Occ cough, hasn't bene concerned about her effusion.   No left arm pain. No headaches or vision issues. Main concern today is diarrhea- within the last week has been increased to 6-10 episodes a day with cramping.  Has been taking 2 imodium 2-3 times a day with continues discomfort.     Current Outpatient Prescriptions   Medication Sig Dispense Refill     ALPRAZolam (XANAX) 0.5 MG tablet Take 1 tablet (0.5  mg) by mouth 3 times daily as needed for anxiety 60 tablet 0     Ascorbic Acid (VITAMIN C PO) Take by mouth daily (with lunch)       B Complex-C (SUPER B COMPLEX/VITAMIN C PO) Take by mouth daily (with lunch)       buPROPion (WELLBUTRIN XL) 300 MG 24 hr tablet Take 300 mg by mouth every morning        Cabozantinib S-Malate (CABOMETYX) 40 MG Take 1 tablet (40 mg) by mouth daily Take on an empty stomach 1 hour before or 2 hours after a meal. Avoid grapefruit and grapefruit juice. 30 tablet 0     calcium carbonate (OS-MUNDO 500 MG [place]) 1250 MG tablet Take 1 tablet by mouth daily (with lunch)       Cholecalciferol (VITAMIN D PO) Take by mouth daily (with lunch)       citalopram (CELEXA) 20 MG tablet Take 1 tablet (20 mg) by mouth every evening (Patient taking differently: Take 20 mg by mouth At Bedtime ) 90 tablet 3     Cyanocobalamin (VITAMIN B 12 PO) Take by mouth daily (with lunch)       diphenoxylate-atropine (LOMOTIL) 2.5-0.025 MG per tablet Take 1-2 tablets by mouth 4 times daily as needed for diarrhea 100 tablet 1     HYDROcodone-acetaminophen (NORCO) 5-325 MG per tablet Take 1 tablet by mouth every 6 hours as needed for severe pain       MAGNESIUM PO Take by mouth At Bedtime       multivitamin, therapeutic with minerals (MULTI-VITAMIN) TABS tablet Take 1 tablet by mouth daily (with lunch)       Omega-3 Fatty Acids (OMEGA 3 PO) Take by mouth daily (with lunch)       omeprazole (PRILOSEC) 40 MG capsule Take 1 capsule (40 mg) by mouth daily 30 capsule 3     Potassium Chloride ER 20 MEQ TBCR Take 1 tablet (20 mEq) by mouth daily 14 tablet 0     Probiotic Product (PROBIOTIC PO) Take by mouth daily (with lunch)       TURMERIC PO Take by mouth daily (with lunch)       zolpidem (AMBIEN) 5 MG tablet Take 1 tablet (5 mg) by mouth nightly as needed for sleep 30 tablet 3     aspirin 81 MG chewable tablet Take 1 tablet (81 mg) by mouth every morning 36 tablet      benzonatate (TESSALON) 100 MG capsule Take 1 capsule (100  "mg) by mouth 3 times daily as needed for cough (Patient not taking: Reported on 8/22/2018) 42 capsule 0     Cabozantinib S-Malate (CABOMETYX) 40 MG Take 1 tablet (40 mg) by mouth daily Take on an empty stomach 1 hour before or 2 hours after a meal. Avoid grapefruit and grapefruit juice. (Patient not taking: Reported on 8/22/2018) 30 tablet 0     ondansetron (ZOFRAN) 8 MG tablet Take 1 tablet (8 mg) by mouth every 8 hours as needed for nausea (Patient not taking: Reported on 8/22/2018) 30 tablet 3     oxyCODONE (OXY-IR) 5 MG capsule Take 1-2 capsules (5-10 mg) by mouth every 4 hours as needed for moderate to severe pain (Patient not taking: Reported on 8/22/2018) 30 capsule 0       Physical Examination:  General: The patient is a pleasant female in no acute distress.  /69 (BP Location: Right arm, Patient Position: Chair, Cuff Size: Adult Regular)  Pulse 98  Temp 97.6  F (36.4  C) (Oral)  Ht 1.549 m (5' 1\")  Wt 52.9 kg (116 lb 9.6 oz)  LMP 12/20/2013  SpO2 94%  BMI 22.03 kg/m2  Wt Readings from Last 10 Encounters:   08/22/18 52.9 kg (116 lb 9.6 oz)   07/24/18 54.1 kg (119 lb 4.8 oz)   06/26/18 53.4 kg (117 lb 12.8 oz)   06/25/18 54.4 kg (120 lb)   06/20/18 54.4 kg (120 lb)   06/18/18 54.4 kg (120 lb)   06/14/18 53.5 kg (118 lb)   06/12/18 54.4 kg (120 lb)   06/06/18 54.2 kg (119 lb 6.4 oz)   06/05/18 54 kg (119 lb)     HEENT: EOMI, PERRL. Sclerae are anicteric. Oral mucosa is pink and moist with no lesions or thrush.   Lymph: No cervical lymphadenopathy.  NO palpable supraclavicular LAD on left.   Heart: Regular rate and rhythm.   Lungs: Breathing comfortably on room air.  Decreased BS at left base with dullness to percussion  Abdomen: Bowel sounds present, soft, nontender with no palpable hepatosplenomegaly or masses.   Extremities: No lower extremity edema noted bilaterally.   Neuro: Cranial nerves II through XII are grossly intact.  Skin: No rashes, petechiae, or bruising noted on exposed " skin.    Laboratory Data:  Recent Labs   Lab Test  08/22/18   1253  08/14/18   1450  07/24/18   1312  06/26/18   1251  06/06/18   1610   NA  138  137  137  138  138   POTASSIUM  3.9  3.2*  3.3*  3.7  3.5   CHLORIDE  106  104  105  106  106   CO2  23  22  22  23  21   ANIONGAP  9  11  9  9  10   BUN  12  8  8  5*  7   CR  0.90  0.83  0.72  0.68  0.67   GLC  145*  101*  174*  100*  130*   MUNDO  9.0  8.3*  8.9  8.6  8.5     Recent Labs   Lab Test  03/07/18   0400  08/24/17   1520  08/19/17   0535  08/18/17   0328  08/17/17   0557  08/16/17   1550  08/16/17   1241   MAG  2.2   --   1.8  2.3  2.0   --   3.1*   PHOS   --   3.4  2.2*  2.4*  2.3*  2.0*   --      Recent Labs   Lab Test  08/22/18   1253  08/14/18   1450  07/24/18   1312  06/26/18   1251  06/06/18   1610   WBC  4.7  4.0  5.3  3.7*  3.8*   HGB  13.7  12.0  12.9  12.7  11.8   PLT  322  367  337  403  447   MCV  95  96  91  92  89   NEUTROPHIL  43.8  42.4  38.3  49.1  64.9     Recent Labs   Lab Test  08/22/18   1253  08/14/18   1450  07/24/18   1312   08/19/17   0535  08/18/17   0328  08/17/17   0557   BILITOTAL  0.4  0.2  0.3   < >  0.2  0.2  0.3   ALKPHOS  77  75  78   < >  60  41  43   ALT  39  44  52*   < >  50  32  21   AST  40  43  51*   < >  31  31  16   ALBUMIN  3.6  3.2*  3.1*   < >  2.0*  1.9*  2.2*   LDH   --    --    --    --   214  199  182    < > = values in this interval not displayed.     TSH   Date Value Ref Range Status   04/13/2018 1.02 0.40 - 4.00 mU/L Final   01/31/2018 0.93 0.40 - 4.00 mU/L Final   01/04/2018 0.91 0.40 - 4.00 mU/L Final       Results for orders placed or performed in visit on 08/20/18   CT Chest/Abdomen/Pelvis w Contrast     Value    Radiologist flags Left femoral head lytic lesion concerning for    Narrative    EXAMINATION: CT CHEST/ABDOMEN/PELVIS W CONTRAST, 8/20/2018 12:07 PM    TECHNIQUE:  Helical CT images from the thoracic inlet through the  symphysis pubis were obtained  with contrast. Contrast dose: Isovue  370  73cc    COMPARISON: CT exam 6/18/2018; 6/5/2018.    HISTORY: mRCC, routine restage after 3 months of oral therapy;  Malignant neoplasm of right kidney (H)    FINDINGS:    LUNGS: Left apex fibrotic changes. Atelectasis of the left lower lobe.  Patchy groundglass opacities with residual consolidation in the left  upper lobe and lingula, improved from the prior study. Mild  interlobular septal thickening in both lungs. Minimal dependent  atelectasis in the right lower lobe. Stable bilateral pulmonary  nodules. No new or enlarging pulmonary nodules.    Chest: Calcified focus in the right lobe of the thyroid gland.  Right-sided chest wall Port-A-Cath with its tip at the superior  cavoatrial junction. Central tracheobronchial tree is patent. Thoracic  aorta main pulmonary artery have normal diameter. Central pulmonary  embolism. Cardiac size within normal limits. No pericardial effusions.  Minimal fluid in the superior aortic recess. No right pleural  effusion. Moderate to large layering left pleural effusion. No  pneumothorax. Prominent mediastinal and left axillary lymph nodes  measuring up to 14 mm short axis, stable. Improved soft tissue density  in the left hilum encasing the left upper lobe pulmonary artery  branches and bronchi.    Abdomen and pelvis: No arterially enhancing liver lesions. No  suspicious liver lesions. Patent hepatic vasculature. No biliary tree  dilation. Partially distended gallbladder.    Postoperative changes off right nephrectomy. No evidence for local  recurrence in the surgical bed.    Rim-enhancing lesions in the pancreatic body, the largest measuring  1.6 cm, stable from the prior study when measured with similar  technique and location. Main pancreatic duct is not dilated. Stable  hypodense focus at the pancreatic tail. No new or enlarging pancreatic  lesions.    The spleen is not enlarged. Prior right adrenalectomy. 10 mm left  adrenal gland enhancing nodule, stable. Multiple  subcentimeter  cortical hypodensities in the left kidney, stable, too small to  characterize. No hydronephrosis or renal stones. Partially distended  urinary bladder. Unremarkable uterus. No free fluid or free air. No  adnexal masses.    Decompressed stomach. No dilated loops of bowel. No bowel wall  thickening. The appendix is normal. No abdominal aortic aneurysm.  Minimal atherosclerotic calcifications of the abdominal aorta.  Subcentimeter retroperitoneal lymph nodes, not enlarged by size  criteria, stable. Subcentimeter mesenteric lymph nodes, not enlarged  by size criteria, stable. 11 mm right external iliac heterogeneously  enhancing lymph node, stable. No new or enlarging pelvic  lymphadenopathy. No inguinal lymphadenopathy.    Bones: Enthesopathic changes off the pelvis. Wedge compression  deformity of the vertebral body T7, stable. Stable lytic lesion at the  left femoral head with associated cortical thinning. No convincing new  bone lesions.      Impression    IMPRESSION: In this patient with history of metastatic renal cell  carcinoma:  1. Postoperative changes of right nephrectomy and right adrenalectomy  with no evidence for local recurrence at the surgical bed in the  present study.  2. Metastatic lesions in the pancreas and left adrenal gland, stable.  3. Moderate to large left pleural effusion with overlying atelectasis.  4. Patchy groundglass opacities with residual consolidation in the  left upper lobe and lingula, improved from the prior study likely  resolving inflammatory/infectious process. Mild pulmonary edema.  5. Improved soft tissue density in the left hilum, as described above.  Stable pulmonary nodules. No new or enlarging pulmonary nodules.  6. Stable metastatic bone lesions including a left femoral had lytic  lesion concerning for impending pathologic fracture.  7. Stable left lower neck, left pectoral, left axillary end  mediastinal lymphadenopathy. Stable right external  iliac  heterogeneously enhancing lymph node suspicious for metastasis.    [Consider Follow Up: Left femoral head lytic lesion concerning for  impending pathologic fracture.]    This report will be copied to the [facility] to ensure a  provider acknowledges the finding.     NIA GUILLORY MD       Assessment and Plan:  52 year old with mRCC s/p 4 cycles of IL-2.  She had a stable CT after 2 cycles, mild disease progression after 4 cycles.  After a short break, CT showed worsening disease and she has started with Opdivo.   Three months later she had new brain disease and worsening systemic disease and started Cabo on 4/19.     RCC: She started Cabo 40 mg daily on 4/19. CT CAP on 6/5 showed improved disease.   She has been struggling a little bit tolerating this therapy.   She is being seen with labs and restaging scans and does have continued treatment response which has her excited. Her pleural effusion has improved from the past but she continues to have moderate to large pleural effusion. On direct questioning she admits to having SOB and agreed to have a therapeutic thoracentesis done.     Pleural effusion:   Moderate to large effusion  Will get thoracentesis done     Brain mets: no neuro symptoms, HA or confusion.   Craniotomy successful on 3/6.  Has GK to the bed on 4/5 and FOUR new lesions were noted during the planning MRI.  6/5/18 brain MRI stable.  Repeat 3 months-- set up with Dr Jovel in early September.     Left femoral head lytic lesion:   Present since November 2017. I reviewed serial imaging with her. She has completed palliative RT with plan for 5 fractions.  - I would recommend follow up in orthopedics. I am little concerned that if she ends up having a fracture at the site, we might end up needing emergent and more invasive surgery.   -Xgeva today and monthly (received today)    Over 45 min of direct face to face time spent with patient with more than 50% time spent in  counseling and coordinating care.

## 2018-08-22 NOTE — NURSING NOTE
Patient was given Xgeva in right abdominal area. Patient tolerated well. See VENKATA Ordoñez CMA ([facility])

## 2018-08-22 NOTE — NURSING NOTE
"Oncology Rooming Note    August 22, 2018 1:17 PM   Suzi Gonsales is a 53 year old female who presents for:    Chief Complaint   Patient presents with     Port Draw     labs drawn via port by RN     Oncology Clinic Visit     Return Kidney Ca     Initial Vitals: /69 (BP Location: Right arm, Patient Position: Chair, Cuff Size: Adult Regular)  Pulse 98  Temp 97.6  F (36.4  C) (Oral)  Ht 1.549 m (5' 1\")  Wt 52.9 kg (116 lb 9.6 oz)  LMP 12/20/2013  SpO2 94%  BMI 22.03 kg/m2 Estimated body mass index is 22.03 kg/(m^2) as calculated from the following:    Height as of this encounter: 1.549 m (5' 1\").    Weight as of this encounter: 52.9 kg (116 lb 9.6 oz). Body surface area is 1.51 meters squared.  No Pain (0) Comment: Data Unavailable   Patient's last menstrual period was 12/20/2013.  Allergies reviewed: Yes  Medications reviewed: Yes    Medications: MEDICATION REFILLS NEEDED TODAY. Provider was notified.  Pharmacy name entered into [facility]:    [id number] - [place], [place] - [address] AT [facility] [address]  [facility] - USE FOR MAILING ONLY - [facility] - [place], [place] - [address] [place] [address]    Clinical concerns: Refill on Xanax, Ambien and Norco;     6 minutes for nursing intake (face to face time)     Isi Ordoñez CMA              "

## 2018-08-22 NOTE — LETTER
8/22/2018       RE: Suzi Gonsales  [address] [place] [address]     Dear Colleague,    Thank you for referring your patient, Suzi Gonsales, to the [facility]. Please see a copy of my visit note below.    Oncology/Hematology Visit Note  Aug 22, 2018    Reason for Visit: mRCC, routine followup    History of Present Illness: Suzi presented to ED with hematuria and right flank pain thinking she had a renal stone in December 2014. She was worked up with a CT abd/pelvis which suggested a renal mass. She was referred to Dr. Max Zelaya in [facility]. 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 Per charts tumor resection had negative margins and it was staged at pT2bN0.     Her staging work up was negative except for pulmonary nodules. She has been followed every 6 months with scans. CT CAP on 5/11/17 showed enlarging hilar LAD, enlarging pulmonary nodules, adrenal nodules and a pancreatic body mass. Biopsies of hilar LN, adrenal nodule and pancreatic mass were + for RCC.      ONCOLOGY THERAPY:   6/6/17-8/15/18-- IL-2, of which she received 4 cycles   11/22/17 CT CAP showed disease progression  She completed three months of Opdivo and then 12/5/17-2/13/18- 3 months of Opdivo  3/6/18- cerebellar lesion s/p craniotomy and GK 4/5 to tumor bed and 4 new lesions  4/12/18- 5 fractions of XRT to left supraclav/MERARY  4/19/18- Cabo 40 mg     Interval History:  Suzi continues to do well today.  This summer she has been traveling and going out for meals and feeling pretty well.  She sleeps for about 10 hours a night and is usually up for 12-14 hours during the day.  Sometimes takes a cat nap during the day.  She denies left sided chest pain and no JIMÉNEZ.  Occ cough, hasn't bene concerned about her effusion.   No left arm pain. No headaches or vision issues. Main concern today is diarrhea- within the last week has been increased to  6-10 episodes a day with cramping.  Has been taking 2 imodium 2-3 times a day with continues discomfort.     Current Outpatient Prescriptions   Medication Sig Dispense Refill     ALPRAZolam (XANAX) 0.5 MG tablet Take 1 tablet (0.5 mg) by mouth 3 times daily as needed for anxiety 60 tablet 0     Ascorbic Acid (VITAMIN C PO) Take by mouth daily (with lunch)       B Complex-C (SUPER B COMPLEX/VITAMIN C PO) Take by mouth daily (with lunch)       buPROPion (WELLBUTRIN XL) 300 MG 24 hr tablet Take 300 mg by mouth every morning        Cabozantinib S-Malate (CABOMETYX) 40 MG Take 1 tablet (40 mg) by mouth daily Take on an empty stomach 1 hour before or 2 hours after a meal. Avoid grapefruit and grapefruit juice. 30 tablet 0     calcium carbonate (OS-MUNDO 500 MG [place]. [place]) 1250 MG tablet Take 1 tablet by mouth daily (with lunch)       Cholecalciferol (VITAMIN D PO) Take by mouth daily (with lunch)       citalopram (CELEXA) 20 MG tablet Take 1 tablet (20 mg) by mouth every evening (Patient taking differently: Take 20 mg by mouth At Bedtime ) 90 tablet 3     Cyanocobalamin (VITAMIN B 12 PO) Take by mouth daily (with lunch)       diphenoxylate-atropine (LOMOTIL) 2.5-0.025 MG per tablet Take 1-2 tablets by mouth 4 times daily as needed for diarrhea 100 tablet 1     HYDROcodone-acetaminophen (NORCO) 5-325 MG per tablet Take 1 tablet by mouth every 6 hours as needed for severe pain       MAGNESIUM PO Take by mouth At Bedtime       multivitamin, therapeutic with minerals (MULTI-VITAMIN) TABS tablet Take 1 tablet by mouth daily (with lunch)       Omega-3 Fatty Acids (OMEGA 3 PO) Take by mouth daily (with lunch)       omeprazole (PRILOSEC) 40 MG capsule Take 1 capsule (40 mg) by mouth daily 30 capsule 3     Potassium Chloride ER 20 MEQ TBCR Take 1 tablet (20 mEq) by mouth daily 14 tablet 0     Probiotic Product (PROBIOTIC PO) Take by mouth daily (with lunch)       TURMERIC PO Take by mouth daily (with lunch)       zolpidem (AMBIEN) 5  "MG tablet Take 1 tablet (5 mg) by mouth nightly as needed for sleep 30 tablet 3     aspirin 81 MG chewable tablet Take 1 tablet (81 mg) by mouth every morning 36 tablet      benzonatate (TESSALON) 100 MG capsule Take 1 capsule (100 mg) by mouth 3 times daily as needed for cough (Patient not taking: Reported on 8/22/2018) 42 capsule 0     Cabozantinib S-Malate (CABOMETYX) 40 MG Take 1 tablet (40 mg) by mouth daily Take on an empty stomach 1 hour before or 2 hours after a meal. Avoid grapefruit and grapefruit juice. (Patient not taking: Reported on 8/22/2018) 30 tablet 0     ondansetron (ZOFRAN) 8 MG tablet Take 1 tablet (8 mg) by mouth every 8 hours as needed for nausea (Patient not taking: Reported on 8/22/2018) 30 tablet 3     oxyCODONE (OXY-IR) 5 MG capsule Take 1-2 capsules (5-10 mg) by mouth every 4 hours as needed for moderate to severe pain (Patient not taking: Reported on 8/22/2018) 30 capsule 0       Physical Examination:  General: The patient is a pleasant female in no acute distress.  /69 (BP Location: Right arm, Patient Position: Chair, Cuff Size: Adult Regular)  Pulse 98  Temp 97.6  F (36.4  C) (Oral)  Ht 1.549 m (5' 1\")  Wt 52.9 kg (116 lb 9.6 oz)  LMP 12/20/2013  SpO2 94%  BMI 22.03 kg/m2  Wt Readings from Last 10 Encounters:   08/22/18 52.9 kg (116 lb 9.6 oz)   07/24/18 54.1 kg (119 lb 4.8 oz)   06/26/18 53.4 kg (117 lb 12.8 oz)   06/25/18 54.4 kg (120 lb)   06/20/18 54.4 kg (120 lb)   06/18/18 54.4 kg (120 lb)   06/14/18 53.5 kg (118 lb)   06/12/18 54.4 kg (120 lb)   06/06/18 54.2 kg (119 lb 6.4 oz)   06/05/18 54 kg (119 lb)     HEENT: EOMI, PERRL. Sclerae are anicteric. Oral mucosa is pink and moist with no lesions or thrush.   Lymph: No cervical lymphadenopathy.  NO palpable supraclavicular LAD on left.   Heart: Regular rate and rhythm.   Lungs: Breathing comfortably on room air.  Decreased BS at left base with dullness to percussion  Abdomen: Bowel sounds present, soft, nontender with " no palpable hepatosplenomegaly or masses.   Extremities: No lower extremity edema noted bilaterally.   Neuro: Cranial nerves II through XII are grossly intact.  Skin: No rashes, petechiae, or bruising noted on exposed skin.    Laboratory Data:  Recent Labs   Lab Test  08/22/18   1253  08/14/18   1450  07/24/18   1312  06/26/18   1251  06/06/18   1610   NA  138  137  137  138  138   POTASSIUM  3.9  3.2*  3.3*  3.7  3.5   CHLORIDE  106  104  105  106  106   CO2  23  22  22  23  21   ANIONGAP  9  11  9  9  10   BUN  12  8  8  5*  7   CR  0.90  0.83  0.72  0.68  0.67   GLC  145*  101*  174*  100*  130*   MUNDO  9.0  8.3*  8.9  8.6  8.5     Recent Labs   Lab Test  03/07/18   0400  08/24/17   1520  08/19/17   0535  08/18/17   0328  08/17/17   0557  08/16/17   1550  08/16/17   1241   MAG  2.2   --   1.8  2.3  2.0   --   3.1*   PHOS   --   3.4  2.2*  2.4*  2.3*  2.0*   --      Recent Labs   Lab Test  08/22/18   1253  08/14/18   1450  07/24/18   1312  06/26/18   1251  06/06/18   1610   WBC  4.7  4.0  5.3  3.7*  3.8*   HGB  13.7  12.0  12.9  12.7  11.8   PLT  322  367  337  403  447   MCV  95  96  91  92  89   NEUTROPHIL  43.8  42.4  38.3  49.1  64.9     Recent Labs   Lab Test  08/22/18   1253  08/14/18   1450  07/24/18   1312   08/19/17   0535  08/18/17   0328  08/17/17   0557   BILITOTAL  0.4  0.2  0.3   < >  0.2  0.2  0.3   ALKPHOS  77  75  78   < >  60  41  43   ALT  39  44  52*   < >  50  32  21   AST  40  43  51*   < >  31  31  16   ALBUMIN  3.6  3.2*  3.1*   < >  2.0*  1.9*  2.2*   LDH   --    --    --    --   214  199  182    < > = values in this interval not displayed.     TSH   Date Value Ref Range Status   04/13/2018 1.02 0.40 - 4.00 mU/L Final   01/31/2018 0.93 0.40 - 4.00 mU/L Final   01/04/2018 0.91 0.40 - 4.00 mU/L Final       Results for orders placed or performed in visit on 08/20/18   CT Chest/Abdomen/Pelvis w Contrast     Value    Radiologist flags Left femoral head lytic lesion concerning for    Narrative     EXAMINATION: CT CHEST/ABDOMEN/PELVIS W CONTRAST, 8/20/2018 12:07 PM    TECHNIQUE:  Helical CT images from the thoracic inlet through the  symphysis pubis were obtained  with contrast. Contrast dose: Isovue  370 73cc    COMPARISON: CT exam 6/18/2018; 6/5/2018.    HISTORY: mRCC, routine restage after 3 months of oral therapy;  Malignant neoplasm of right kidney (H)    FINDINGS:    LUNGS: Left apex fibrotic changes. Atelectasis of the left lower lobe.  Patchy groundglass opacities with residual consolidation in the left  upper lobe and lingula, improved from the prior study. Mild  interlobular septal thickening in both lungs. Minimal dependent  atelectasis in the right lower lobe. Stable bilateral pulmonary  nodules. No new or enlarging pulmonary nodules.    Chest: Calcified focus in the right lobe of the thyroid gland.  Right-sided chest wall Port-A-Cath with its tip at the superior  cavoatrial junction. Central tracheobronchial tree is patent. Thoracic  aorta main pulmonary artery have normal diameter. Central pulmonary  embolism. Cardiac size within normal limits. No pericardial effusions.  Minimal fluid in the superior aortic recess. No right pleural  effusion. Moderate to large layering left pleural effusion. No  pneumothorax. Prominent mediastinal and left axillary lymph nodes  measuring up to 14 mm short axis, stable. Improved soft tissue density  in the left hilum encasing the left upper lobe pulmonary artery  branches and bronchi.    Abdomen and pelvis: No arterially enhancing liver lesions. No  suspicious liver lesions. Patent hepatic vasculature. No biliary tree  dilation. Partially distended gallbladder.    Postoperative changes off right nephrectomy. No evidence for local  recurrence in the surgical bed.    Rim-enhancing lesions in the pancreatic body, the largest measuring  1.6 cm, stable from the prior study when measured with similar  technique and location. Main pancreatic duct is not dilated.  Stable  hypodense focus at the pancreatic tail. No new or enlarging pancreatic  lesions.    The spleen is not enlarged. Prior right adrenalectomy. 10 mm left  adrenal gland enhancing nodule, stable. Multiple subcentimeter  cortical hypodensities in the left kidney, stable, too small to  characterize. No hydronephrosis or renal stones. Partially distended  urinary bladder. Unremarkable uterus. No free fluid or free air. No  adnexal masses.    Decompressed stomach. No dilated loops of bowel. No bowel wall  thickening. The appendix is normal. No abdominal aortic aneurysm.  Minimal atherosclerotic calcifications of the abdominal aorta.  Subcentimeter retroperitoneal lymph nodes, not enlarged by size  criteria, stable. Subcentimeter mesenteric lymph nodes, not enlarged  by size criteria, stable. 11 mm right external iliac heterogeneously  enhancing lymph node, stable. No new or enlarging pelvic  lymphadenopathy. No inguinal lymphadenopathy.    Bones: Enthesopathic changes off the pelvis. Wedge compression  deformity of the vertebral body T7, stable. Stable lytic lesion at the  left femoral head with associated cortical thinning. No convincing new  bone lesions.      Impression    IMPRESSION: In this patient with history of metastatic renal cell  carcinoma:  1. Postoperative changes of right nephrectomy and right adrenalectomy  with no evidence for local recurrence at the surgical bed in the  present study.  2. Metastatic lesions in the pancreas and left adrenal gland, stable.  3. Moderate to large left pleural effusion with overlying atelectasis.  4. Patchy groundglass opacities with residual consolidation in the  left upper lobe and lingula, improved from the prior study likely  resolving inflammatory/infectious process. Mild pulmonary edema.  5. Improved soft tissue density in the left hilum, as described above.  Stable pulmonary nodules. No new or enlarging pulmonary nodules.  6. Stable metastatic bone lesions  including a left femoral had lytic  lesion concerning for impending pathologic fracture.  7. Stable left lower neck, left pectoral, left axillary end  mediastinal lymphadenopathy. Stable right external iliac  heterogeneously enhancing lymph node suspicious for metastasis.    [Consider Follow Up: Left femoral head lytic lesion concerning for  impending pathologic fracture.]    This report will be copied to the [facility] to ensure a  provider acknowledges the finding.     NIA GUILLORY MD       Assessment and Plan:  52 year old with mRCC s/p 4 cycles of IL-2.  She had a stable CT after 2 cycles, mild disease progression after 4 cycles.  After a short break, CT showed worsening disease and she has started with Opdivo.   Three months later she had new brain disease and worsening systemic disease and started Cabo on 4/19.     RCC: She started Cabo 40 mg daily on 4/19. CT CAP on 6/5 showed improved disease.   She has been struggling a little bit tolerating this therapy.   She is being seen with labs and restaging scans and does have continued treatment response which has her excited. Her pleural effusion has improved from the past but she continues to have moderate to large pleural effusion. On direct questioning she admits to having SOB and agreed to have a therapeutic thoracentesis done.     Pleural effusion:   Moderate to large effusion  Will get thoracentesis done     Brain mets: no neuro symptoms, HA or confusion.   Craniotomy successful on 3/6.  Has GK to the bed on 4/5 and FOUR new lesions were noted during the planning MRI.  6/5/18 brain MRI stable.  Repeat 3 months-- set up with Dr Jovel in early September.     Left femoral head lytic lesion:   Present since November 2017. I reviewed serial imaging with her. She has completed palliative RT with plan for 5 fractions.  - I would recommend follow up in orthopedics. I am little concerned that if she ends up having a fracture at the site, we  might end up needing emergent and more invasive surgery.   -Xgeva today and monthly (received today)    Over 45 min of direct face to face time spent with patient with more than 50% time spent in counseling and coordinating care.        Again, thank you for allowing me to participate in the care of your patient.      Sincerely,    Henri Green MD

## 2018-08-22 NOTE — MR AVS SNAPSHOT
After Visit Summary   8/22/2018    Suzi Gonsales    MRN: [id number]           Patient Information     Date Of Birth          1965        Visit Information        Provider Department      8/22/2018 12:00 PM Henri Green MD [facility]        Today's Diagnoses     Malignant neoplasm of right kidney (H)    -  1    Bone metastasis (H)        Hypercalcemia        Renal cell carcinoma, unspecified laterality (H)        Mass of upper lobe of left lung        Sleep disorder           Follow-ups after your visit        Your next 10 appointments already scheduled     Sep 06, 2018 12:00 PM CDT   MR BRAIN W/O & W CONTRAST with UUMR1   [facility], [place], MRI ([facility], [facility])    [address] [facility] [address] [phone]           Take your medicines as usual, unless your doctor tells you not to. Bring a list of your current medicines to your exam (including vitamins, minerals and over-the-counter drugs).  You may or may not receive intravenous (IV) contrast for this exam pending the discretion of the Radiologist.  You do not need to do anything special to prepare.  The MRI machine uses a strong magnet. Please wear clothes without metal (snaps, zippers). A sweatsuit works well, or we may give you a hospital gown.  Please remove any body piercings and hair extensions before you arrive. You will also remove watches, jewelry, hairpins, wallets, dentures, partial dental plates and hearing aids. You may wear contact lenses, and you may be able to wear your rings. We have a safe place to keep your personal items, but it is safer to leave them at home.  **IMPORTANT** THE INSTRUCTIONS BELOW ARE ONLY FOR THOSE PATIENTS WHO HAVE BEEN PRESCRIBED SEDATION OR GENERAL ANESTHESIA DURING THEIR MRI PROCEDURE:  IF YOUR DOCTOR PRESCRIBED ORAL SEDATION (take medicine to help you relax during your exam):   You must get the medicine from your  doctor (oral medication) before you arrive. Bring the medicine to the exam. Do not take it at home. You ll be told when to take it upon arriving for your exam.   Arrive one hour early. Bring someone who can take you home after the test. Your medicine will make you sleepy. After the exam, you may not drive, take a bus or take a taxi by yourself.  IF YOUR DOCTOR PRESCRIBED IV SEDATION:   Arrive one hour early. Bring someone who can take you home after the test. Your medicine will make you sleepy. After the exam, you may not drive, take a bus or take a taxi by yourself.   No eating 6 hours before your exam. You may have clear liquids up until 4 hours before your exam. (Clear liquids include water, clear tea, black coffee and fruit juice without pulp.)  IF YOUR DOCTOR PRESCRIBED ANESTHESIA (be asleep for your exam):   Arrive 1 1/2 hours early. Bring someone who can take you home after the test. You may not drive, take a bus or take a taxi by yourself.   No eating 8 hours before your exam. You may have clear liquids up until 4 hours before your exam. (Clear liquids include water, clear tea, black coffee and fruit juice without pulp.)   You will spend four to five hours in the recovery room.  Please call the Imaging Department at your exam site with any questions.            Sep 06, 2018  1:00 PM CDT   Return Visit with Huyen Cuellar MD   Radiation Oncology Clinic ([facility])    [facility]  1st Floor  [address] [facility] [id number] [phone]            Oct 01, 2018   Procedure with Randy Dunn MD   [facility], [place], Same Day Surgery (--)    [address] [place] [address] [phone]              Who to contact     If you have questions or need follow up information about today's clinic visit or your schedule please contact [facility] directly at [phone].  Normal or non-critical lab and imaging results will be  "communicated to you by [employer], letter or phone within 4 business days after the clinic has received the results. If you do not hear from us within 7 days, please contact the clinic through [employer] or phone. If you have a critical or abnormal lab result, we will notify you by phone as soon as possible.  Submit refill requests through [employer] or call your pharmacy and they will forward the refill request to us. Please allow 3 business days for your refill to be completed.          Additional Information About Your Visit        [employer] Information     [employer] gives you secure access to your electronic health record. If you see a primary care provider, you can also send messages to your care team and make appointments. If you have questions, please call your primary care clinic.  If you do not have a primary care provider, please call [phone] and they will assist you.        Care EveryWhere ID     This is your Care EveryWhere ID. This could be used by other organizations to access your [place] medical records  [id number]        Your Vitals Were     Pulse Temperature Height Last Period Pulse Oximetry BMI (Body Mass Index)    98 97.6  F (36.4  C) (Oral) 1.549 m (5' 1\") 12/20/2013 94% 22.03 kg/m2       Blood Pressure from Last 3 Encounters:   08/22/18 105/69   07/24/18 125/71   06/26/18 100/67    Weight from Last 3 Encounters:   08/28/18 52.9 kg (116 lb 9.6 oz)   08/22/18 52.9 kg (116 lb 9.6 oz)   07/24/18 54.1 kg (119 lb 4.8 oz)              We Performed the Following     CBC with platelets differential     Comprehensive metabolic panel          Today's Medication Changes          These changes are accurate as of 8/22/18 11:59 PM.  If you have any questions, ask your nurse or doctor.               These medicines have changed or have updated prescriptions.        Dose/Directions    citalopram 20 MG tablet   Commonly known as:  celeXA   This may have changed:  when to take this   Used for:  Insomnia, unspecified " type        Dose:  20 mg   Take 1 tablet (20 mg) by mouth every evening   Quantity:  90 tablet   Refills:  3            Where to get your medicines      Some of these will need a paper prescription and others can be bought over the counter.  Ask your nurse if you have questions.     Bring a paper prescription for each of these medications     ALPRAZolam 0.5 MG tablet    HYDROcodone-acetaminophen 5-325 MG per tablet    zolpidem 5 MG tablet               Information about OPIOIDS     PRESCRIPTION OPIOIDS: WHAT YOU NEED TO KNOW   We gave you an opioid (narcotic) pain medicine. It is important to manage your pain, but opioids are not always the best choice. You should first try all the other options your care team gave you. Take this medicine for as short a time (and as few doses) as possible.    Some activities can increase your pain, such as bandage changes or therapy sessions. It may help to take your pain medicine 30 to 60 minutes before these activities. Reduce your stress by getting enough sleep, working on hobbies you enjoy and practicing relaxation or meditation. Talk to your care team about ways to manage your pain beyond prescription opioids.    These medicines have risks:    DO NOT drive when on new or higher doses of pain medicine. These medicines can affect your alertness and reaction times, and you could be arrested for driving under the influence (DUI). If you need to use opioids long-term, talk to your care team about driving.    DO NOT operate heavy machinery    DO NOT do any other dangerous activities while taking these medicines.    DO NOT drink any alcohol while taking these medicines.     If the opioid prescribed includes acetaminophen, DO NOT take with any other medicines that contain acetaminophen. Read all labels carefully. Look for the word  acetaminophen  or  Tylenol.  Ask your pharmacist if you have questions or are unsure.    You can get addicted to pain medicines, especially if you have a  history of addiction (chemical, alcohol or substance dependence). Talk to your care team about ways to reduce this risk.    All opioids tend to cause constipation. Drink plenty of water and eat foods that have a lot of fiber, such as fruits, vegetables, prune juice, apple juice and high-fiber cereal. Take a laxative (Miralax, milk of magnesia, Colace, Senna) if you don t move your bowels at least every other day. Other side effects include upset stomach, sleepiness, dizziness, throwing up, tolerance (needing more of the medicine to have the same effect), physical dependence and slowed breathing.    Store your pills in a secure place, locked if possible. We will not replace any lost or stolen medicine. If you don t finish your medicine, please throw away (dispose) as directed by your pharmacist. The [place] Pollution [employer] has more information about safe disposal: [email or web address]         Primary Care Provider Office Phone # Fax #    Molly ARIEL [facility] [phone] [address] [phone]        Equal Access to Services     ROSAURA INTERIANO : Hadii jimmie billyo Sovanessa, waaxda luqadaha, qaybta kaalmada jarek, josemanuel oswald . So [facility] [phone].    ATENCIÓN: Si habla español, tiene a chacon disposición servicios gratuitos de asistencia lingüística. Llame al [phone].    We comply with applicable federal civil rights laws and [place] laws. We do not discriminate on the basis of race, color, national origin, age, disability, sex, sexual orientation, or gender identity.            Thank you!     Thank you for choosing [facility]  for your care. Our goal is always to provide you with excellent care. Hearing back from our patients is one way we can continue to improve our services. Please take a few minutes to complete the written survey that you may receive  in the mail after your visit with us. Thank you!             Your Updated Medication List - Protect others around you: Learn how to safely use, store and throw away your medicines at www.disposemymeds.org.          This list is accurate as of 8/22/18 11:59 PM.  Always use your most recent med list.                   Brand Name Dispense Instructions for use Diagnosis    ALPRAZolam 0.5 MG tablet    XANAX    60 tablet    Take 1 tablet (0.5 mg) by mouth 3 times daily as needed for anxiety    Renal cell carcinoma, unspecified laterality (H), Mass of upper lobe of left lung       aspirin 81 MG chewable tablet     36 tablet    Take 1 tablet (81 mg) by mouth every morning    Pre-op evaluation, Brain metastases (H)       benzonatate 100 MG capsule    TESSALON    42 capsule    Take 1 capsule (100 mg) by mouth 3 times daily as needed for cough    Cough       buPROPion 300 MG 24 hr tablet    WELLBUTRIN XL     Take 300 mg by mouth every morning        * Cabozantinib S-Malate 40 MG    CABOMETYX    30 tablet    Take 1 tablet (40 mg) by mouth daily Take on an empty stomach 1 hour before or 2 hours after a meal. Avoid grapefruit and grapefruit juice.    Brain metastasis (H), Metastatic renal cell carcinoma to lung, right (H), Malignant neoplasm of right kidney (H)       * Cabozantinib S-Malate 40 MG    CABOMETYX    30 tablet    Take 1 tablet (40 mg) by mouth daily Take on an empty stomach 1 hour before or 2 hours after a meal. Avoid grapefruit and grapefruit juice.    Brain metastasis (H), Metastatic renal cell carcinoma to lung, right (H), Malignant neoplasm of right kidney (H)       calcium carbonate 500 mg {elemental} 500 MG tablet    OS-MUNDO     Take 1 tablet by mouth daily (with lunch)    Pre-op evaluation, Brain metastases (H)       citalopram 20 MG tablet    celeXA    90 tablet    Take 1 tablet (20 mg) by mouth every evening    Insomnia, unspecified type       diphenoxylate-atropine 2.5-0.025 MG per tablet    LOMOTIL    100  tablet    Take 1-2 tablets by mouth 4 times daily as needed for diarrhea    Diarrhea, unspecified type       HYDROcodone-acetaminophen 5-325 MG per tablet    NORCO    30 tablet    Take 1 tablet by mouth every 6 hours as needed for severe pain    Malignant neoplasm of right kidney (H)       MAGNESIUM PO      Take by mouth At Bedtime    Pre-op evaluation, Brain metastases (H)       Multi-vitamin Tabs tablet      Take 1 tablet by mouth daily (with lunch)    Pre-op evaluation, Brain metastases (H)       OMEGA 3 PO      Take by mouth daily (with lunch)    Pre-op evaluation, Brain metastases (H)       omeprazole 40 MG capsule    priLOSEC    30 capsule    Take 1 capsule (40 mg) by mouth daily    Cerebral edema (H)       ondansetron 8 MG tablet    ZOFRAN    30 tablet    Take 1 tablet (8 mg) by mouth every 8 hours as needed for nausea    Nausea       oxyCODONE 5 MG capsule    OXY-IR    30 capsule    Take 1-2 capsules (5-10 mg) by mouth every 4 hours as needed for moderate to severe pain    Metastatic renal cell carcinoma to lung, right (H)       Potassium Chloride ER 20 MEQ Tbcr     14 tablet    Take 1 tablet (20 mEq) by mouth daily    Diarrhea, unspecified type, Malignant neoplasm of right kidney (H)       PROBIOTIC PO      Take by mouth daily (with lunch)    Pre-op evaluation, Brain metastases (H)       SUPER B COMPLEX/VITAMIN C PO      Take by mouth daily (with lunch)    Pre-op evaluation, Brain metastases (H)       TURMERIC PO      Take by mouth daily (with lunch)    Pre-op evaluation, Brain metastases (H)       VITAMIN B 12 PO      Take by mouth daily (with lunch)    Pre-op evaluation, Brain metastases (H)       VITAMIN C PO      Take by mouth daily (with lunch)    Pre-op evaluation, Brain metastases (H)       VITAMIN D PO      Take by mouth daily (with lunch)    Pre-op evaluation, Brain metastases (H)       zolpidem 5 MG tablet    AMBIEN    30 tablet    Take 1 tablet (5 mg) by mouth nightly as needed for sleep    Sleep  disorder       * Notice:  This list has 2 medication(s) that are the same as other medications prescribed for you. Read the directions carefully, and ask your doctor or other care provider to review them with you.

## 2018-08-22 NOTE — NURSING NOTE
Chief Complaint   Patient presents with     Port Draw     labs drawn via port by RN     /69 (BP Location: Right arm, Patient Position: Chair, Cuff Size: Adult Regular)  Pulse 98  Temp 97.6  F (36.4  C) (Oral)  Wt 52.9 kg (116 lb 9.6 oz)  LMP 12/20/2013  SpO2 94%  BMI 22.03 kg/m2    Vitals taken. Port accessed by RN. Labs collected and sent. Line flushed with NS & Heparin. Pt tolerated well. Pt checked in for next appointment.    Estee Hampton, RN

## 2018-08-24 NOTE — TELEPHONE ENCOUNTER
FUTURE VISIT INFORMATION      FUTURE VISIT INFORMATION:    Date: 8/28/18    Time: 1630    Location: ORTHO  REFERRAL INFORMATION:    Referring provider:  DR CHAPMAN    Referring providers clinic:  ONCOLOGY    Reason for visit/diagnosis  LEFT HIP      RECORDS STATUS      RECORDS IN CHART

## 2018-08-28 NOTE — LETTER
8/28/2018   RE: Suzi Gonsales  [address] [place] [address]     Dear Colleague,    Thank you for referring your patient, Suzi Gonsales, to the [facility] at [facility]. Please see a copy of my visit note below.    Clinic consult note    Date of visit: 8/28/2018    Chief complaint: left hip radiolucent lesion    HPI:  There is typically is a 53-year-old female who presents clinic today for evaluation of her left hip.  Patient has renal cell carcinoma and referred to us due to a radiolucent lesion found on CT scan.  Developed hip pain in June received radiation therapy to the left hip.  Following radiation therapy of pain in the hip improved.  Patient notes no pain at this time.  Continue weight training activities as tolerated.  She will by her oncologist to hold off any surgery until chemotherapy regimen will stay stable, at this point she has a stable.  Patient know that she knew about the lesion in November 2017, but they are falling.  Patient noted that when she became more active in June 2018 she noted the pain 1 week.  That same week she started radiation therapy for her left hip.  She noted that after radiation therapy the pain in her hip improved quite a bit.  This time she has no pain with activities no pain with weightbearing.  She is not interested in doing something that would help prevent a fracture but would not interfere with her life.  She has been given a prognosis of 2 years at this time.    Past medical history:  Metastatic renal cell carcinoma  Hypercalcemia    Patient's intake form was reviewed for family history, social history and review of systems.    Physical examination:  Alert and oriented ×3, no acute distress  Nonlabored breathing  Exam focused on left lower extremity.  Neurovascular intact distally.  No pain with internal/external rotation of the hip.  No pain with standing the pain with percussion to the bottom the  foot.    Imaging:  MRI CT scan and x-rays were reviewed.  There is a radiolucent lesion in the superior aspect of the femoral neck.  No fracture dislocations noted at this time.    Impression:  53-year-old female with metastatic renal cell carcinoma, with a radiolucent lesion in her left femoral neck.    Plan:  We discussed with the patient the fact that she did have some mechanical symptoms with this prior to her radiation therapy, it is possible that she might sustain a pathologic fracture some point in future.  We discussed the best way to treat his would be with a sliding hip screw.  To give her stability, as well as a quick procedure to get her back to her life.  Patient will follow-up for surgical fixation.    Patient was discussed seen with Dr. Dunn who agrees with this and plan as above    Hernan Suggs MD  Orthopaedic resident    Diagnosis: Metastatic renal cell cancer, widespread, involving left femoral head    Treatment: Multiple treatments including radiation to the left femoral head.    Suzi was seen today in consultation at Dr. Cuellar's request for 25 minutes.  Greater than 15 minutes was spent answering questions coordinating her care.    In summary a taking a careful history I believe she did have an impending fracture prior radiation treatments.  This is based both on their increase activity just prior to his symptoms as well as her response to radiation.  Based on this I recommend recommend in the form of surgical stabilization.  After assessing her imaging studies I believe the hip screw would be the best.  We could potentially correct some of the tumor out through the screw portal site and could potentially inserts segment as well to aid in filling the defect.    Risk and benefits were discussed the patient understands these would like to proceed.  She will need to be seen in the preanesthesia clinic and be treated on the [facility] as an inpatient.    Again, thank you for allowing me  to participate in the care of your patient.      Sincerely,  Randy Dunn MD

## 2018-08-28 NOTE — PROGRESS NOTES
Clinic consult note    Date of visit: 8/28/2018    Chief complaint: left hip radiolucent lesion    HPI:  There is typically is a 53-year-old female who presents clinic today for evaluation of her left hip.  Patient has renal cell carcinoma and referred to us due to a radiolucent lesion found on CT scan.  Developed hip pain in June received radiation therapy to the left hip.  Following radiation therapy of pain in the hip improved.  Patient notes no pain at this time.  Continue weight training activities as tolerated.  She will by her oncologist to hold off any surgery until chemotherapy regimen will stay stable, at this point she has a stable.  Patient know that she knew about the lesion in November 2017, but they are falling.  Patient noted that when she became more active in June 2018 she noted the pain 1 week.  That same week she started radiation therapy for her left hip.  She noted that after radiation therapy the pain in her hip improved quite a bit.  This time she has no pain with activities no pain with weightbearing.  She is not interested in doing something that would help prevent a fracture but would not interfere with her life.  She has been given a prognosis of 2 years at this time.    Past medical history:  Metastatic renal cell carcinoma  Hypercalcemia    Patient's intake form was reviewed for family history, social history and review of systems.    Physical examination:  Alert and oriented ×3, no acute distress  Nonlabored breathing  Exam focused on left lower extremity.  Neurovascular intact distally.  No pain with internal/external rotation of the hip.  No pain with standing the pain with percussion to the bottom the foot.    Imaging:  MRI CT scan and x-rays were reviewed.  There is a radiolucent lesion in the superior aspect of the femoral neck.  No fracture dislocations noted at this time.    Impression:  53-year-old female with metastatic renal cell carcinoma, with a radiolucent lesion in her  left femoral neck.    Plan:  We discussed with the patient the fact that she did have some mechanical symptoms with this prior to her radiation therapy, it is possible that she might sustain a pathologic fracture some point in future.  We discussed the best way to treat his would be with a sliding hip screw.  To give her stability, as well as a quick procedure to get her back to her life.  Patient will follow-up for surgical fixation.    Patient was discussed seen with Dr. Dunn who agrees with this and plan as above    Hernan Suggs MD  Orthopaedic resident

## 2018-08-28 NOTE — PROGRESS NOTES
Diagnosis: Metastatic renal cell cancer, widespread, involving left femoral head    Treatment: Multiple treatments including radiation to the left femoral head.    Suzi was seen today in consultation at Dr. Cuellar's request for 25 minutes.  Greater than 15 minutes was spent answering questions coordinating her care.    In summary a taking a careful history I believe she did have an impending fracture prior radiation treatments.  This is based both on their increase activity just prior to his symptoms as well as her response to radiation.  Based on this I recommend recommend in the form of surgical stabilization.  After assessing her imaging studies I believe the hip screw would be the best.  We could potentially correct some of the tumor out through the screw portal site and could potentially inserts segment as well to aid in filling the defect.    Risk and benefits were discussed the patient understands these would like to proceed.  She will need to be seen in the preanesthesia clinic and be treated on the [facility] as an inpatient.

## 2018-08-28 NOTE — NURSING NOTE
Teaching Flowsheet   Relevant Diagnosis: Metastatic renal cell cancer to left hip  Teaching Topic: Pre-op:  Placement of left hip screw, possible cementation     Person(s) involved in teaching:   Patient and      Motivation Level:  Asks Questions: Yes  Eager to Learn: Yes  Cooperative: NA  Receptive (willing/able to accept information): Yes  Any cultural factors/Christianity beliefs that may influence understanding or compliance? NA       Patient and Family demonstrates understanding of the following:  Reason for the appointment, diagnosis and treatment plan: Yes  Knowledge of proper use of medications and conditions for which they are ordered (with special attention to potential side effects or drug interactions): Yes  Which situations necessitate calling provider and whom to contact: Yes       Teaching Concerns Addressed:        Proper use and care of  (medical equip, care aids, etc.): NA  Nutritional needs and diet plan: Yes  Pain management techniques: Yes  Wound Care: Yes  How and/when to access community resources: NA     Instructional Materials Used/Given: Surgery folder     Time spent with patient: 15 minutes.

## 2018-08-28 NOTE — MR AVS SNAPSHOT
After Visit Summary   8/28/2018    Suzi Gonsales    MRN: [id number]           Patient Information     Date Of Birth          1965        Visit Information        Provider Department      8/28/2018 4:30 PM Randy Dunn MD [facility]        Today's Diagnoses     Metastatic bone tumor (H)    -  1       Follow-ups after your visit        Your next 10 appointments already scheduled     Sep 06, 2018 12:00 PM CDT   MR BRAIN W/O & W CONTRAST with UUMR1   [facility], [place], [facility] ([facility], [facility])    [address] [id number] [phone]           Take your medicines as usual, unless your doctor tells you not to. Bring a list of your current medicines to your exam (including vitamins, minerals and over-the-counter drugs).  You may or may not receive intravenous (IV) contrast for this exam pending the discretion of the Radiologist.  You do not need to do anything special to prepare.  The MRI machine uses a strong magnet. Please wear clothes without metal (snaps, zippers). A sweatsuit works well, or we may give you a hospital gown.  Please remove any body piercings and hair extensions before you arrive. You will also remove watches, jewelry, hairpins, wallets, dentures, partial dental plates and hearing aids. You may wear contact lenses, and you may be able to wear your rings. We have a safe place to keep your personal items, but it is safer to leave them at home.  **IMPORTANT** THE INSTRUCTIONS BELOW ARE ONLY FOR THOSE PATIENTS WHO HAVE BEEN PRESCRIBED SEDATION OR GENERAL ANESTHESIA DURING THEIR MRI PROCEDURE:  IF YOUR DOCTOR PRESCRIBED ORAL SEDATION (take medicine to help you relax during your exam):   You must get the medicine from your doctor (oral medication) before you arrive. Bring the medicine to the exam. Do not take it at home. You ll be told when to take it upon arriving for your exam.   Arrive one hour early.  Bring someone who can take you home after the test. Your medicine will make you sleepy. After the exam, you may not drive, take a bus or take a taxi by yourself.  IF YOUR DOCTOR PRESCRIBED IV SEDATION:   Arrive one hour early. Bring someone who can take you home after the test. Your medicine will make you sleepy. After the exam, you may not drive, take a bus or take a taxi by yourself.   No eating 6 hours before your exam. You may have clear liquids up until 4 hours before your exam. (Clear liquids include water, clear tea, black coffee and fruit juice without pulp.)  IF YOUR DOCTOR PRESCRIBED ANESTHESIA (be asleep for your exam):   Arrive 1 1/2 hours early. Bring someone who can take you home after the test. You may not drive, take a bus or take a taxi by yourself.   No eating 8 hours before your exam. You may have clear liquids up until 4 hours before your exam. (Clear liquids include water, clear tea, black coffee and fruit juice without pulp.)   You will spend four to five hours in the recovery room.  Please call the Imaging Department at your exam site with any questions.            Sep 06, 2018  1:00 PM CDT   Return Visit with Huyen Cuellar MD   Radiation Oncology Clinic ([facility])    [facility]  1st Floor  [address] [id number] [phone]            Oct 01, 2018   Procedure with Randy Dunn MD   [facility], [place], Same Day Surgery (--)    [address] [id number] [phone]              Who to contact     Please call your clinic at [phone] to:    Ask questions about your health    Make or cancel appointments    Discuss your medicines    Learn about your test results    Speak to your doctor            Additional Information About Your Visit        [employer] Information     [employer] gives you secure access to your electronic health record. If you see a primary care provider, you can also send messages to your care  "team and make appointments. If you have questions, please call your primary care clinic.  If you do not have a primary care provider, please call [phone] and they will assist you.      [employer] is an electronic gateway that provides easy, online access to your medical records. With [employer], you can request a clinic appointment, read your test results, renew a prescription or communicate with your care team.     To access your existing account, please contact your [facility] or call [phone] for assistance.        Care EveryWhere ID     This is your Care EveryWhere ID. This could be used by other organizations to access your [facility] medical records  [id number]        Your Vitals Were     Height Last Period BMI (Body Mass Index)             1.854 m (6' 1\") 12/20/2013 15.38 kg/m2          Blood Pressure from Last 3 Encounters:   No data found for BP    Weight from Last 3 Encounters:   No data found for Wt              Today, you had the following     No orders found for display       Primary Care Provider Office Phone # Fax #    Molly ARIEL [facility] [phone] [address] [address]        Equal Access to Services     ROSAURA INTERIANO AH: Hadii aad ku hadasho Soomaali, waaxda luqadaha, qaybta kaalmada adeegyada, josemanuel underwood haydustin oswald . So [facility] [phone].    ATENCIÓN: Si habla español, tiene a chacon disposición servicios gratuitos de asistencia lingüística. Llame al [phone].    We comply with applicable federal civil rights laws and [place] laws. We do not discriminate on the basis of race, color, national origin, age, disability, sex, sexual orientation, or gender identity.            Thank you!     Thank you for choosing [facility]  for your care. Our goal is always to provide you with excellent care. Hearing back from our patients is one way we can continue to improve our services. Please take " a few minutes to complete the written survey that you may receive in the mail after your visit with us. Thank you!             Your Updated Medication List - Protect others around you: Learn how to safely use, store and throw away your medicines at www.disposemymeds.org.          This list is accurate as of 8/28/18 11:59 PM.  Always use your most recent med list.                   Brand Name Dispense Instructions for use Diagnosis    ALPRAZolam 0.5 MG tablet    XANAX    60 tablet    Take 1 tablet (0.5 mg) by mouth 3 times daily as needed for anxiety    Renal cell carcinoma, unspecified laterality (H), Mass of upper lobe of left lung       aspirin 81 MG chewable tablet     36 tablet    Take 1 tablet (81 mg) by mouth every morning    Pre-op evaluation, Brain metastases (H)       benzonatate 100 MG capsule    TESSALON    42 capsule    Take 1 capsule (100 mg) by mouth 3 times daily as needed for cough    Cough       buPROPion 300 MG 24 hr tablet    WELLBUTRIN XL     Take 300 mg by mouth every morning        * Cabozantinib S-Malate 40 MG    CABOMETYX    30 tablet    Take 1 tablet (40 mg) by mouth daily Take on an empty stomach 1 hour before or 2 hours after a meal. Avoid grapefruit and grapefruit juice.    Brain metastasis (H), Metastatic renal cell carcinoma to lung, right (H), Malignant neoplasm of right kidney (H)       * Cabozantinib S-Malate 40 MG    CABOMETYX    30 tablet    Take 1 tablet (40 mg) by mouth daily Take on an empty stomach 1 hour before or 2 hours after a meal. Avoid grapefruit and grapefruit juice.    Brain metastasis (H), Metastatic renal cell carcinoma to lung, right (H), Malignant neoplasm of right kidney (H)       calcium carbonate 500 mg {elemental} 500 MG tablet    OS-MUNDO     Take 1 tablet by mouth daily (with lunch)    Pre-op evaluation, Brain metastases (H)       citalopram 20 MG tablet    celeXA    90 tablet    Take 1 tablet (20 mg) by mouth every evening    Insomnia, unspecified type        diphenoxylate-atropine 2.5-0.025 MG per tablet    LOMOTIL    100 tablet    Take 1-2 tablets by mouth 4 times daily as needed for diarrhea    Diarrhea, unspecified type       HYDROcodone-acetaminophen 5-325 MG per tablet    NORCO    30 tablet    Take 1 tablet by mouth every 6 hours as needed for severe pain    Malignant neoplasm of right kidney (H)       MAGNESIUM PO      Take by mouth At Bedtime    Pre-op evaluation, Brain metastases (H)       Multi-vitamin Tabs tablet      Take 1 tablet by mouth daily (with lunch)    Pre-op evaluation, Brain metastases (H)       OMEGA 3 PO      Take by mouth daily (with lunch)    Pre-op evaluation, Brain metastases (H)       omeprazole 40 MG capsule    priLOSEC    30 capsule    Take 1 capsule (40 mg) by mouth daily    Cerebral edema (H)       ondansetron 8 MG tablet    ZOFRAN    30 tablet    Take 1 tablet (8 mg) by mouth every 8 hours as needed for nausea    Nausea       oxyCODONE 5 MG capsule    OXY-IR    30 capsule    Take 1-2 capsules (5-10 mg) by mouth every 4 hours as needed for moderate to severe pain    Metastatic renal cell carcinoma to lung, right (H)       Potassium Chloride ER 20 MEQ Tbcr     14 tablet    Take 1 tablet (20 mEq) by mouth daily    Diarrhea, unspecified type, Malignant neoplasm of right kidney (H)       PROBIOTIC PO      Take by mouth daily (with lunch)    Pre-op evaluation, Brain metastases (H)       SUPER B COMPLEX/VITAMIN C PO      Take by mouth daily (with lunch)    Pre-op evaluation, Brain metastases (H)       TURMERIC PO      Take by mouth daily (with lunch)    Pre-op evaluation, Brain metastases (H)       VITAMIN B 12 PO      Take by mouth daily (with lunch)    Pre-op evaluation, Brain metastases (H)       VITAMIN C PO      Take by mouth daily (with lunch)    Pre-op evaluation, Brain metastases (H)       VITAMIN D PO      Take by mouth daily (with lunch)    Pre-op evaluation, Brain metastases (H)       zolpidem 5 MG tablet    AMBIEN    30 tablet     Take 1 tablet (5 mg) by mouth nightly as needed for sleep    Sleep disorder       * Notice:  This list has 2 medication(s) that are the same as other medications prescribed for you. Read the directions carefully, and ask your doctor or other care provider to review them with you.

## 2018-08-28 NOTE — NURSING NOTE
"Chief Complaint   Patient presents with     Consult     Pt. states that she is here today for Metastatic Renal Cell of the Left Hip with impending FX. She states that she was DX: Renal Cell Dec. 2014. Right Side Nephrectomy. Pt. states that she has had Immunotherapy 3X along with Brain Surgery for Metastasis March 6th and also radiation. She had radiation to her sternum, clavicle, and chest. She states that she was bedbound for 2 months post upper ext. radiation.  Referring:  BEN CHAPMAN        53 year old  1965    Ht 1.854 m (6' 1\")  Wt 52.9 kg (116 lb 9.6 oz)  LMP 12/20/2013  BMI 15.38 kg/m2             [employer] [address] - [place], [place] - [facility] [address] [facility] [address] [employer] [address]  [employer] - USE FOR MAILING ONLY - [place], [place]  [facility] - [place], [place] - [address] [place] [address]    No Known Allergies  Current Outpatient Prescriptions   Medication     ALPRAZolam (XANAX) 0.5 MG tablet     Ascorbic Acid (VITAMIN C PO)     aspirin 81 MG chewable tablet     B Complex-C (SUPER B COMPLEX/VITAMIN C PO)     benzonatate (TESSALON) 100 MG capsule     buPROPion (WELLBUTRIN XL) 300 MG 24 hr tablet     Cabozantinib S-Malate (CABOMETYX) 40 MG     Cabozantinib S-Malate (CABOMETYX) 40 MG     calcium carbonate (OS-MUNDO 500 MG [place]. [place]) 1250 MG tablet     Cholecalciferol (VITAMIN D PO)     citalopram (CELEXA) 20 MG tablet     Cyanocobalamin (VITAMIN B 12 PO)     diphenoxylate-atropine (LOMOTIL) 2.5-0.025 MG per tablet     HYDROcodone-acetaminophen (NORCO) 5-325 MG per tablet     MAGNESIUM PO     multivitamin, therapeutic with minerals (MULTI-VITAMIN) TABS tablet     Omega-3 Fatty Acids (OMEGA 3 PO)     omeprazole (PRILOSEC) 40 MG capsule     ondansetron (ZOFRAN) 8 MG tablet     oxyCODONE (OXY-IR) 5 MG capsule     Potassium Chloride ER 20 MEQ TBCR     Probiotic Product (PROBIOTIC PO)     TURMERIC PO     zolpidem (AMBIEN) 5 MG tablet     No current facility-administered medications for this " visit.

## 2018-09-06 NOTE — PROGRESS NOTES
FOLLOW-UP VISIT    Patient Name: Suzi Gonsales      : 1965     Age: 53 year old        ______________________________________________________________________________     Chief Complaint   Patient presents with     Cancer     Pt is here for a follow:Gamma knife radiosurgery brain mets 18, Left supraclavicular nodes and lung 2000 cGy completed on 18, Left femur 2000 cGy completed on 18     LMP 2013       Pain  Denies    Labs  Other Labs: No    Imaging  MRI: today      Other Appointments:     MD Name:  Appointment Date:    MD Name: Appointment Date:   MD Name: Appointment Date:   Other Appointment Notes:     Residual Radiation side effect: Pt denies any side effects     Additional Instructions:     Nurse face-to-face time: Level 3:  10 min face to face time

## 2018-09-06 NOTE — LETTER
2018       RE: Suzi Gonsales  [address] [place] [address]     Dear Colleague,    Thank you for referring your patient, Suzi Gonsales, to the RADIATION ONCOLOGY CLINIC. Please see a copy of my visit note below.    Radiation Oncology Follow-up Visit  2018    PATIENT NAME: Suzi Gonsales  MRN: [id number]   : 1965     DISEASE TREATED: Stage IV renal cell carcinoma    RADIATION THERAPY DELIVERED:   Course 1: GK SRS   A: Left resection cavity 16 Gy to the 50% IDL    B: Lt Cerebellar 22 Gy to the 50% IDL    C: Rt Cerebellar 22 Gy to the 90% IDL    D: Lt Occipital 22 Gy to the 60% IDL    E: Lt Frontal 22 Gy to the 80% IDL      2 Lt Lung 2000 cGy in 5 fx                 GRACE                            AP                           PA          3 Lt femur: 2000 cGy in 5 fractions via 18 MV photons, AP/PA               AP                             PA      INTERVAL SINCE COMPLETION OF RADIATION THERAPY:  ~ 1 month    HPI:   Ms. Gonsales is a 53-year-old woman with metastatic renal cell carcinoma originally diagnosed in 2014 after presenting with right flank pain and hematuria. 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 Her staging scans at that time demonstrated pulmonary nodules and these were initially observed. However, CT CAP 17 showed enlargement of these nodules and multiple new lesions including in the left adrenal gland and pancreas, and a diagnoses of metastatic renal cell carcinoma was made. The patient was started on systemic therapy under the care of Dr. Green. Her chemotherapy history is detailed below. In brief, she has received 4 cycles of IL-2 followed by disease progression, 3 months of opdivo followed again by progression, and most recently received cabozantinib in 2018     The patient has previously received two courses of radiation therapy. On 3/6/18, she  underwent a craniotomy for a cerebellar lesion and on 4/5/18, received Gamma Knife radiosurgery to the resection cavity along with four other small incidentally discovered lesions. She was scheduled to begin cabozantinib shortly thereafter, however presented with worsening left shoulder and upper extremity due to disease progression in the left upper lobe and supraclavicular region. She received palliative radiation with 20 Gy in 5 fractions and completed this on 4/16/18 with marked symptomatic relief. Unfortunately, she did report profound fatigue following radiotherapy, leading to her being essentially bed-bound for several days; she is not sure how much was due to radiotherapy vs her brain surgery or Gamma Knife SRS.     Ms. Gonsales most recently received cabozantinib on 4/19/18, and interval CT 6/5/18 demonstrated reduced burden of disease. She saw Dr. Green on 6/6/18, and there was noted on CT a lytic lesion in the posterior aspect of the left femoral head, concerning for impending fracture.  As such she underwent a course of radiotherapy to a dose of 20 Gy in 5 fractions as detailed above.     Most recently, Ms. Gonsales had a CT surveillance scan with a CT chest abdomen pelvis on 8/20/2018.  This study continued to demonstrate widely metastatic disease stable since prior comparison CT on 6/18/2018.  There continues to be a lytic lesion in the left femoral head concerning for impending pathologic fracture visualized on this study.    SUBJECTIVE:   On interview today Ms. Gonsales reports that she is in no pain.  She is having a planned fixation of her left femoral neck.     PHYSICAL EXAM:  Vital Signs: LMP 12/20/2013  Gen: Alert, in NAD  Eyes: EOMI, sclera anicteric  Pulm: Breathing comfortably on room air  CV: Well-perfused, no cyanosis  Abdominal: Soft, nondistended  Skin: Normal color and turgor  Neurologic/Psych/MSK: A/Ox3, normal gait    LABS AND IMAGING:  Reviewed.    IMPRESSION:   Ms. Gonsales is a 53 year old  female with stage IV renal cell carcinoma who is undergone multiple courses of palliative radiotherapy to the brain, left chest, and left femur.  She presents today in 1 month follow-up from her last course of palliative radiotherapy with prophylactic intent to prevent pathologic fracture.  Her most recent CT on 2018 continues to show a lytic lesion within her left femoral neck concerning for pathologic fracture.  She continues to be asymptomatic in regards to this lesion.     PLAN:   1. RTC in 3 months with MRI brain  2. Continued medical oncology follow-up  3. Body imaging for surveillance as directed by medical oncology      I saw the patient with the resident.  I agree with the resident note and plan of care.      Huyen Cuellar MD  [phone]  CC  Patient Care Team:  Molly Adame APRN CNP as PCP - General (Nurse Practitioner)  Alea Cummings APRN CNS (Clinical Nurse Specialist)  Henri Green MD as MD (Oncology)  Molly Mendiola, RN as Nurse Coordinator (Oncology)  Quintin Palencia MD as MD (Neurosurgery)  Alisa Merritt, EULOGIO as Nurse Coordinator (Oncology)  Yamilka Hidalgo, RN as Nurse Coordinator (Neurology)          FOLLOW-UP VISIT    Patient Name: Suzi Gonsales      : 1965     Age: 53 year old        ______________________________________________________________________________     Chief Complaint   Patient presents with     Cancer     Pt is here for a follow:Gamma knife radiosurgery brain mets 18, Left supraclavicular nodes and lung 2000 cGy completed on 18, Left femur 2000 cGy completed on 18     LMP 2013       Pain  Denies    Labs  Other Labs: No    Imaging  MRI: today      Other Appointments:     MD Name:  Appointment Date:    MD Name: Appointment Date:   MD Name: Appointment Date:   Other Appointment Notes:     Residual Radiation side effect: Pt denies any side effects     Additional Instructions:     Nurse face-to-face time: Level 3:   10 min face to face time          Again, thank you for allowing me to participate in the care of your patient.      Sincerely,    Huyen Cuellar MD

## 2018-09-06 NOTE — MR AVS SNAPSHOT
After Visit Summary   9/6/2018    Suzi Gonsales    MRN: [id number]           Patient Information     Date Of Birth          1965        Visit Information        Provider Department      9/6/2018 1:00 PM Huyen Cuellar MD Rad[facility]        Today's Diagnoses     Renal cell carcinoma, right (H)    -  1    Brain metastasis (H)          Care Instructions    Mri and see me in 3 mo            Follow-ups after your visit        Your next 10 appointments already scheduled     Sep 10, 2018  7:00 AM CDT   (Arrive by 5:00 AM)   IR THORACENTESIS with [id number]   [facility] ([facility])    [address] [address] [phone]           Please wear loose clothing, such as a sweat suit or jogging clothes. Avoid snaps, zippers and other metal. We may ask you to undress and put on a hospital gown.  Please bring any scans or X-rays taken at other hospitals, if similar tests were done. Also bring a list of your medicines, including vitamins, minerals and over-the-counter drugs. It is safest to leave personal items at home.  Tell your doctor in advance:   If you are or may be pregnant.   If you are taking Coumadin (or any other blood thinners) 5 days prior to the exam for any special instructions.   If you are diabetic to determine if your insulin needs have to be adjusted for the exam.  Your doctor will obtain necessary laboratory tests prior to the exam (creatinine, Hgb/Hct, platelet count, and INR).  If you have any questions, please call the Imaging Department where you will have your exam. Directions, parking instructions, and other information are available on our website, [email or web address].            Sep 10, 2018   Procedure with Romie Eduardo PA-C   [facility] ([facility])    [address] [address] [phone]            Located in the Clinics and Surgery Center at [address] [facility] [address].   parking is very convenient and highly recommended.  is a $6 flat rate fee.  Both  and self parkers should enter the main arrival plaza from [facility]; parking attendants will direct you based on your parking preference.            Oct 01, 2018   Procedure with Randy Dunn MD   [facility] [place], Same Day Surgery (--)    [address]  [place] [address]   [phone]            Dec 06, 2018 12:00 PM CST   MR BRAIN W/O & W CONTRAST with UUMR1   [facility] [place], MRI ([facility])    [address] [facility] [id number] [phone]           Take your medicines as usual, unless your doctor tells you not to. Bring a list of your current medicines to your exam (including vitamins, minerals and over-the-counter drugs).  You may or may not receive intravenous (IV) contrast for this exam pending the discretion of the Radiologist.  You do not need to do anything special to prepare.  The MRI machine uses a strong magnet. Please wear clothes without metal (snaps, zippers). A sweatsuit works well, or we may give you a hospital gown.  Please remove any body piercings and hair extensions before you arrive. You will also remove watches, jewelry, hairpins, wallets, dentures, partial dental plates and hearing aids. You may wear contact lenses, and you may be able to wear your rings. We have a safe place to keep your personal items, but it is safer to leave them at home.  **IMPORTANT** THE INSTRUCTIONS BELOW ARE ONLY FOR THOSE PATIENTS WHO HAVE BEEN PRESCRIBED SEDATION OR GENERAL ANESTHESIA DURING THEIR MRI PROCEDURE:  IF YOUR DOCTOR PRESCRIBED ORAL SEDATION (take medicine to help you relax during your exam):   You must get the medicine from your doctor (oral medication) before you arrive. Bring the medicine to the exam. Do not take it at home.  You ll be told when to take it upon arriving for your exam.   Arrive one hour early. Bring someone who can take you home after the test. Your medicine will make you sleepy. After the exam, you may not drive, take a bus or take a taxi by yourself.  IF YOUR DOCTOR PRESCRIBED IV SEDATION:   Arrive one hour early. Bring someone who can take you home after the test. Your medicine will make you sleepy. After the exam, you may not drive, take a bus or take a taxi by yourself.   No eating 6 hours before your exam. You may have clear liquids up until 4 hours before your exam. (Clear liquids include water, clear tea, black coffee and fruit juice without pulp.)  IF YOUR DOCTOR PRESCRIBED ANESTHESIA (be asleep for your exam):   Arrive 1 1/2 hours early. Bring someone who can take you home after the test. You may not drive, take a bus or take a taxi by yourself.   No eating 8 hours before your exam. You may have clear liquids up until 4 hours before your exam. (Clear liquids include water, clear tea, black coffee and fruit juice without pulp.)   You will spend four to five hours in the recovery room.  Please call the Imaging Department at your exam site with any questions.            Dec 06, 2018  1:00 PM CST   Return Visit with Huyen Cuellar MD   Radiation Oncology Clinic ([facility])    [facility]  1st Floor  [address] [facility] [address]   [phone]              Future tests that were ordered for you today     Open Future Orders        Priority Expected Expires Ordered    MRI Brain w & w/o contrast Routine  9/6/2019 9/6/2018            Who to contact     Please call your clinic at [phone] to:    Ask questions about your health    Make or cancel appointments    Discuss your medicines    Learn about your test results    Speak to your doctor            Additional Information About Your Visit        [employer] Information     [employer] gives you secure access to your  electronic health record. If you see a primary care provider, you can also send messages to your care team and make appointments. If you have questions, please call your primary care clinic.  If you do not have a primary care provider, please call [phone] and they will assist you.      [employer] is an electronic gateway that provides easy, online access to your medical records. With [employer], you can request a clinic appointment, read your test results, renew a prescription or communicate with your care team.     To access your existing account, please contact your [facility] or call [phone] for assistance.        Care EveryWhere ID     This is your Care EveryWhere ID. This could be used by other organizations to access your [facility] medical records  [id number]        Your Vitals Were     Last Period BMI (Body Mass Index)                12/20/2013 15.33 kg/m2           Blood Pressure from Last 3 Encounters:   09/06/18 116/57   08/22/18 105/69   07/24/18 125/71    Weight from Last 3 Encounters:   09/06/18 52.7 kg (116 lb 3.2 oz)   08/28/18 52.9 kg (116 lb 9.6 oz)   08/22/18 52.9 kg (116 lb 9.6 oz)               Primary Care Provider Office Phone # Fax #    Molly Foote ARIEL Adame [facility] [phone] [address] [facility] [address]        Equal Access to Services     ROSAURA INTERIANO AH: Hadii jimmie ku hadasho Soomaali, waaxda luqadaha, qaybta kaalmada adeegyada, josemanuel oswald . So [facility] [phone].    ATENCIÓN: Si habla español, tiene a chacon disposición servicios gratuitos de asistencia lingüística. Llame al [phone].    We comply with applicable federal civil rights laws and [place] laws. We do not discriminate on the basis of race, color, national origin, age, disability, sex, sexual orientation, or gender identity.            Thank you!     Thank you for choosing [facility]  for your care. Our goal is always  to provide you with excellent care. Hearing back from our patients is one way we can continue to improve our services. Please take a few minutes to complete the written survey that you may receive in the mail after your visit with us. Thank you!             Your Updated Medication List - Protect others around you: Learn how to safely use, store and throw away your medicines at www.disposemymeds.org.          This list is accurate as of 9/6/18 11:59 PM.  Always use your most recent med list.                   Brand Name Dispense Instructions for use Diagnosis    ALPRAZolam 0.5 MG tablet    XANAX    60 tablet    Take 1 tablet (0.5 mg) by mouth 3 times daily as needed for anxiety    Renal cell carcinoma, unspecified laterality (H), Mass of upper lobe of left lung       aspirin 81 MG chewable tablet     36 tablet    Take 1 tablet (81 mg) by mouth every morning    Pre-op evaluation, Brain metastases (H)       benzonatate 100 MG capsule    TESSALON    42 capsule    Take 1 capsule (100 mg) by mouth 3 times daily as needed for cough    Cough       buPROPion 300 MG 24 hr tablet    WELLBUTRIN XL     Take 300 mg by mouth every morning        * Cabozantinib S-Malate 40 MG    CABOMETYX    30 tablet    Take 1 tablet (40 mg) by mouth daily Take on an empty stomach 1 hour before or 2 hours after a meal. Avoid grapefruit and grapefruit juice.    Brain metastasis (H), Metastatic renal cell carcinoma to lung, right (H), Malignant neoplasm of right kidney (H)       * Cabozantinib S-Malate 40 MG    CABOMETYX    30 tablet    Take 1 tablet (40 mg) by mouth daily Take on an empty stomach 1 hour before or 2 hours after a meal. Avoid grapefruit and grapefruit juice.    Brain metastasis (H), Metastatic renal cell carcinoma to lung, right (H), Malignant neoplasm of right kidney (H)       * Cabozantinib S-Malate 40 MG    CABOMETYX    30 tablet    Take 1 tablet (40 mg) by mouth daily Take on an empty stomach 1 hour before or 2 hours after a  meal. Avoid grapefruit and grapefruit juice.    Brain metastasis (H), Metastatic renal cell carcinoma to lung, right (H), Malignant neoplasm of right kidney (H)       calcium carbonate 500 mg {elemental} 500 MG tablet    OS-MUNDO     Take 1 tablet by mouth daily (with lunch)    Pre-op evaluation, Brain metastases (H)       citalopram 20 MG tablet    celeXA    90 tablet    Take 1 tablet (20 mg) by mouth every evening    Insomnia, unspecified type       diphenoxylate-atropine 2.5-0.025 MG per tablet    LOMOTIL    100 tablet    Take 1-2 tablets by mouth 4 times daily as needed for diarrhea    Diarrhea, unspecified type       HYDROcodone-acetaminophen 5-325 MG per tablet    NORCO    30 tablet    Take 1 tablet by mouth every 6 hours as needed for severe pain    Malignant neoplasm of right kidney (H)       MAGNESIUM PO      Take by mouth At Bedtime    Pre-op evaluation, Brain metastases (H)       Multi-vitamin Tabs tablet      Take 1 tablet by mouth daily (with lunch)    Pre-op evaluation, Brain metastases (H)       OMEGA 3 PO      Take by mouth daily (with lunch)    Pre-op evaluation, Brain metastases (H)       omeprazole 40 MG capsule    priLOSEC    30 capsule    Take 1 capsule (40 mg) by mouth daily    Cerebral edema (H)       ondansetron 8 MG tablet    ZOFRAN    30 tablet    Take 1 tablet (8 mg) by mouth every 8 hours as needed for nausea    Nausea       oxyCODONE 5 MG capsule    OXY-IR    30 capsule    Take 1-2 capsules (5-10 mg) by mouth every 4 hours as needed for moderate to severe pain    Metastatic renal cell carcinoma to lung, right (H)       Potassium Chloride ER 20 MEQ Tbcr     14 tablet    Take 1 tablet (20 mEq) by mouth daily    Diarrhea, unspecified type, Malignant neoplasm of right kidney (H)       PROBIOTIC PO      Take by mouth daily (with lunch)    Pre-op evaluation, Brain metastases (H)       SUPER B COMPLEX/VITAMIN C PO      Take by mouth daily (with lunch)    Pre-op evaluation, Brain metastases (H)        TURMERIC PO      Take by mouth daily (with lunch)    Pre-op evaluation, Brain metastases (H)       VITAMIN B 12 PO      Take by mouth daily (with lunch)    Pre-op evaluation, Brain metastases (H)       VITAMIN C PO      Take by mouth daily (with lunch)    Pre-op evaluation, Brain metastases (H)       VITAMIN D PO      Take by mouth daily (with lunch)    Pre-op evaluation, Brain metastases (H)       zolpidem 5 MG tablet    AMBIEN    30 tablet    Take 1 tablet (5 mg) by mouth nightly as needed for sleep    Sleep disorder       * Notice:  This list has 3 medication(s) that are the same as other medications prescribed for you. Read the directions carefully, and ask your doctor or other care provider to review them with you.

## 2018-09-06 NOTE — PROGRESS NOTES
Radiation Oncology Follow-up Visit  2018    PATIENT NAME: Suzi Gonsales  MRN: [id number]   : 1965     DISEASE TREATED: Stage IV renal cell carcinoma    RADIATION THERAPY DELIVERED:   Course 1: GK SRS   A: Left resection cavity 16 Gy to the 50% IDL    B: Lt Cerebellar 22 Gy to the 50% IDL    C: Rt Cerebellar 22 Gy to the 90% IDL    D: Lt Occipital 22 Gy to the 60% IDL    E: Lt Frontal 22 Gy to the 80% IDL      2 Lt Lung 2000 cGy in 5 fx                 GRACE                            AP                           PA          3 Lt femur: 2000 cGy in 5 fractions via 18 MV photons, AP/PA               AP                             PA      INTERVAL SINCE COMPLETION OF RADIATION THERAPY:  ~ 1 month    HPI:   Ms. Gonsales is a 53-year-old woman with metastatic renal cell carcinoma originally diagnosed in 2014 after presenting with right flank pain and hematuria. 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 Her staging scans at that time demonstrated pulmonary nodules and these were initially observed. However, CT CAP 17 showed enlargement of these nodules and multiple new lesions including in the left adrenal gland and pancreas, and a diagnoses of metastatic renal cell carcinoma was made. The patient was started on systemic therapy under the care of Dr. Green. Her chemotherapy history is detailed below. In brief, she has received 4 cycles of IL-2 followed by disease progression, 3 months of opdivo followed again by progression, and most recently received cabozantinib in 2018     The patient has previously received two courses of radiation therapy. On 3/6/18, she underwent a craniotomy for a cerebellar lesion and on 18, received Gamma Knife radiosurgery to the resection cavity along with four other small incidentally discovered lesions. She was scheduled to begin cabozantinib shortly  thereafter, however presented with worsening left shoulder and upper extremity due to disease progression in the left upper lobe and supraclavicular region. She received palliative radiation with 20 Gy in 5 fractions and completed this on 4/16/18 with marked symptomatic relief. Unfortunately, she did report profound fatigue following radiotherapy, leading to her being essentially bed-bound for several days; she is not sure how much was due to radiotherapy vs her brain surgery or Gamma Knife SRS.     Ms. Gonsales most recently received cabozantinib on 4/19/18, and interval CT 6/5/18 demonstrated reduced burden of disease. She saw Dr. Green on 6/6/18, and there was noted on CT a lytic lesion in the posterior aspect of the left femoral head, concerning for impending fracture.  As such she underwent a course of radiotherapy to a dose of 20 Gy in 5 fractions as detailed above.     Most recently, Ms. Gonsales had a CT surveillance scan with a CT chest abdomen pelvis on 8/20/2018.  This study continued to demonstrate widely metastatic disease stable since prior comparison CT on 6/18/2018.  There continues to be a lytic lesion in the left femoral head concerning for impending pathologic fracture visualized on this study.    SUBJECTIVE:   On interview today Ms. Gonsales reports that she is in no pain.  She is having a planned fixation of her left femoral neck.     PHYSICAL EXAM:  Vital Signs: LMP 12/20/2013  Gen: Alert, in NAD  Eyes: EOMI, sclera anicteric  Pulm: Breathing comfortably on room air  CV: Well-perfused, no cyanosis  Abdominal: Soft, nondistended  Skin: Normal color and turgor  Neurologic/Psych/MSK: A/Ox3, normal gait    LABS AND IMAGING:  Reviewed.    IMPRESSION:   Ms. Gonsales is a 53 year old female with stage IV renal cell carcinoma who is undergone multiple courses of palliative radiotherapy to the brain, left chest, and left femur.  She presents today in 1 month follow-up from her last course of palliative  radiotherapy with prophylactic intent to prevent pathologic fracture.  Her most recent CT on 8/20/2018 continues to show a lytic lesion within her left femoral neck concerning for pathologic fracture.  She continues to be asymptomatic in regards to this lesion.     PLAN:   1. RTC in 3 months with MRI brain  2. Continued medical oncology follow-up  3. Body imaging for surveillance as directed by medical oncology      I saw the patient with the resident.  I agree with the resident note and plan of care.      Huyen Cuellar MD  [phone]  CC  Patient Care Team:  Molly Adame APRN CNP as PCP - General (Nurse Practitioner)  Alea Cummings APRN CNS (Clinical Nurse Specialist)  Henri Green MD as MD (Oncology)  Molly Mendiola, RN as Nurse Coordinator (Oncology)  Quintin Palencia MD as MD (Neurosurgery)  Alisa Merritt, EULOGIO as Nurse Coordinator (Oncology)  Yamilka Hidalgo, RN as Nurse Coordinator (Neurology)

## 2018-09-10 NOTE — DISCHARGE INSTRUCTIONS
Discharge Instructions for Paracentesis or Thoracentesis     Paracentesis is a procedure to remove extra fluid from your belly (abdomen). Thoracentesis is a procedure to remove extra fluid from your chest.  This fluid buildup is called ascites. The procedure may have been done to take a sample of the fluid. Or, it may have been done to drain the extra fluid from your abdomen or chest to help make you more comfortable.     Home care    If you have pain after the procedure, your healthcare provider can prescribe or recommend pain medicines. Take these exactly as directed. If you stopped taking other medicines before the procedure, ask your provider when you can start them again.    Avoid strenuous activity for 48 hours.    You will have a small bandage over the puncture site. Adhesive tapes, adhesive strips, or surgical glue may also be used to close the incision. They also help stop bleeding and promote healing. You may take the bandage off in 24-48 hours. Once there is a scab over the site, no bandages are required.    Check the puncture site for the signs of infection listed below.     Follow-up care  Make a follow-up appointment with your healthcare provider as directed. During your follow-up visit, your provider will check your healing. Let your provider know how you are feeling. You can also discuss the cause of your fluid, and if you need any further treatment.    When to seek medical advice:  Call your healthcare provider if you have any of the following after the procedure:    A fever of 100.4 F (38.0 C) or higher    Trouble breathing    Abdominal pain that is worse than expected    Belly Abdominal pain not caused by having the skin punctured that is worse than expected    Persistent bleeding from the puncture site    More than a small amount of fluid leaking from the puncture site    Swollen belly    Signs of infection at the puncture site. These include increased pain, redness, or swelling, warmth, or  bad-smelling drainage.    Feeling dizzy or lightheaded, or fainting     Call our Interventional Radiology (IR) service if:  If you start bleeding from the procedure site.  If you do start to bleed from the site, lie down and hold some pressure on the site.  Our radiology provider can help you decide if you need to return to the hospital.  If you have new or worsening pain related to the procedure.  If you have pronounced swelling at the procedure site.  If you develop persistent nausea or vomiting.  If you develop hives or a rash or any unexplained itching.  If you have a fever of greater than 100.4  F and chills in the first 5 days after procedure.  Any other concerns related to your procedure.      [facility]  Interventional Radiology (IR)  [address] [facility]  [facility] [place] [address]    Contact Number:  [phone]  (IR control desk)  Monday - Friday 8:00 am - 4:30 pm    After hours for urgent concerns:  [phone]  After 4:30 pm Monday - Friday, Weekends and Holidays.   Ask for Interventional Radiology on-call.  Someone is available 24 hours a day.  [facility] toll free number:  [phone]

## 2018-09-10 NOTE — IP AVS SNAPSHOT
[facility]    [address] [place] [address]    Phone:  [phone]    Fax:  [phone]                                       After Visit Summary   9/10/2018    Suzi Gonsales    MRN: [id number]           After Visit Summary Signature Page     I have received my discharge instructions, and my questions have been answered. I have discussed any challenges I see with this plan with the nurse or doctor.    ..........................................................................................................................................  Patient/Patient Representative Signature      ..........................................................................................................................................  Patient Representative Print Name and Relationship to Patient    ..................................................               ................................................  Date                                            Time    ..........................................................................................................................................  Reviewed by Signature/Title    ...................................................              ..............................................  Date                                                            Time          22EPIC Rev 08/18

## 2018-09-10 NOTE — PROCEDURES
Interventional Radiology Brief Post Procedure Note    Procedure:  IR THORACENTESIS [[id number]]    Proceduralist: CUONG Alonso PA-C    Assistant: None    Time Out: Prior to the start of the procedure and with procedural staff participation, I verbally confirmed the patient s identity using two indicators, relevant allergies, that the procedure was appropriate and matched the consent or emergent situation, and that the correct equipment/implants were available. Immediately prior to starting the procedure I conducted the Time Out with the procedural staff and re-confirmed the patient s name, procedure, and site/side. (The Joint Commission universal protocol was followed.)  Yes        Sedation: None. Local Anesthestic used    Findings: Completed ultrasound guided LEFT-sided thoracentesis. A total of 500 mL clear yellow colored fluid drained from the chest. No fluid sample was collected for analysis. No immediate complication.  Dx: Pleural effusion. Jayce.    Estimated Blood Loss: Minimal    Fluoroscopy Time:  minute(s)    SPECIMENS: None    Complications: 1. None     Condition: Stable    Plan: obs w/ VS x 1 hr, which is until 0825 hrs    Comments: See dictated procedure note for full details.    Romie Eduardo PA-C

## 2018-09-10 NOTE — IP AVS SNAPSHOT
MRN:[id number]                      After Visit Summary   9/10/2018    Suzi Gonsales    MRN: [id number]           Thank you!     Thank you for choosing [facility] for your care. Our goal is always to provide you with excellent care. Hearing back from our patients is one way we can continue to improve our services. Please take a few minutes to complete the written survey that you may receive in the mail after you visit with us. Thank you!        Patient Information     Date Of Birth          1965        About your hospital stay     You were admitted on:  September 10, 2018 [facility]    You were discharged on:  September 10, 2018       Who to Call     For medical emergencies, please call 911.  For non-urgent questions about your medical care, please call your primary care provider or clinic, [phone]  For questions related to your surgery, please call your surgery clinic        Attending Provider     Provider Specialty    Romie Eduardo PA-C Radiology       Primary Care Provider Office Phone # Fax #    Molly Foote ARIEL Adame [facility] [phone]      Your next 10 appointments already scheduled     Oct 01, 2018   Procedure with Randy Dunn MD   [facility], [facility], Same Day Surgery (--)    [facility] [id number] [phone]            Dec 06, 2018 12:00 PM CST   MR BRAIN W/O & W CONTRAST with UUMR1   [facility], MRI ([facility])    [address] [facility] [id number] [phone]           Take your medicines as usual, unless your doctor tells you not to. Bring a list of your current medicines to your exam (including vitamins, minerals and over-the-counter drugs).  You may or may not receive intravenous (IV) contrast for this exam pending the discretion of the Radiologist.  You do not need to do anything special to prepare.   The MRI machine uses a strong magnet. Please wear clothes without metal (snaps, zippers). A sweatsuit works well, or we may give you a hospital gown.  Please remove any body piercings and hair extensions before you arrive. You will also remove watches, jewelry, hairpins, wallets, dentures, partial dental plates and hearing aids. You may wear contact lenses, and you may be able to wear your rings. We have a safe place to keep your personal items, but it is safer to leave them at home.  **IMPORTANT** THE INSTRUCTIONS BELOW ARE ONLY FOR THOSE PATIENTS WHO HAVE BEEN PRESCRIBED SEDATION OR GENERAL ANESTHESIA DURING THEIR MRI PROCEDURE:  IF YOUR DOCTOR PRESCRIBED ORAL SEDATION (take medicine to help you relax during your exam):   You must get the medicine from your doctor (oral medication) before you arrive. Bring the medicine to the exam. Do not take it at home. You ll be told when to take it upon arriving for your exam.   Arrive one hour early. Bring someone who can take you home after the test. Your medicine will make you sleepy. After the exam, you may not drive, take a bus or take a taxi by yourself.  IF YOUR DOCTOR PRESCRIBED IV SEDATION:   Arrive one hour early. Bring someone who can take you home after the test. Your medicine will make you sleepy. After the exam, you may not drive, take a bus or take a taxi by yourself.   No eating 6 hours before your exam. You may have clear liquids up until 4 hours before your exam. (Clear liquids include water, clear tea, black coffee and fruit juice without pulp.)  IF YOUR DOCTOR PRESCRIBED ANESTHESIA (be asleep for your exam):   Arrive 1 1/2 hours early. Bring someone who can take you home after the test. You may not drive, take a bus or take a taxi by yourself.   No eating 8 hours before your exam. You may have clear liquids up until 4 hours before your exam. (Clear liquids include water, clear tea, black coffee and fruit juice without pulp.)   You will spend four to five  hours in the recovery room.  Please call the Imaging Department at your exam site with any questions.            Dec 06, 2018  1:00 PM CST   Return Visit with Huyen Cuellar MD   Radiation Oncology Clinic ([facility])    [facility]  1st Floor  [address] [facility] [address]   [phone]              Further instructions from your care team       Discharge Instructions for Paracentesis or Thoracentesis     Paracentesis is a procedure to remove extra fluid from your belly (abdomen). Thoracentesis is a procedure to remove extra fluid from your chest.  This fluid buildup is called ascites. The procedure may have been done to take a sample of the fluid. Or, it may have been done to drain the extra fluid from your abdomen or chest to help make you more comfortable.     Home care    If you have pain after the procedure, your healthcare provider can prescribe or recommend pain medicines. Take these exactly as directed. If you stopped taking other medicines before the procedure, ask your provider when you can start them again.    Avoid strenuous activity for 48 hours.    You will have a small bandage over the puncture site. Adhesive tapes, adhesive strips, or surgical glue may also be used to close the incision. They also help stop bleeding and promote healing. You may take the bandage off in 24-48 hours. Once there is a scab over the site, no bandages are required.    Check the puncture site for the signs of infection listed below.     Follow-up care  Make a follow-up appointment with your healthcare provider as directed. During your follow-up visit, your provider will check your healing. Let your provider know how you are feeling. You can also discuss the cause of your fluid, and if you need any further treatment.    When to seek medical advice:  Call your healthcare provider if you have any of the following after the procedure:    A fever of 100.4 F (38.0 C) or  higher    Trouble breathing    Abdominal pain that is worse than expected    Belly Abdominal pain not caused by having the skin punctured that is worse than expected    Persistent bleeding from the puncture site    More than a small amount of fluid leaking from the puncture site    Swollen belly    Signs of infection at the puncture site. These include increased pain, redness, or swelling, warmth, or bad-smelling drainage.    Feeling dizzy or lightheaded, or fainting     Call our Interventional Radiology (IR) service if:  If you start bleeding from the procedure site.  If you do start to bleed from the site, lie down and hold some pressure on the site.  Our radiology provider can help you decide if you need to return to the hospital.  If you have new or worsening pain related to the procedure.  If you have pronounced swelling at the procedure site.  If you develop persistent nausea or vomiting.  If you develop hives or a rash or any unexplained itching.  If you have a fever of greater than 100.4  F and chills in the first 5 days after procedure.  Any other concerns related to your procedure.      [facility]  Interventional Radiology (IR)  [address] [facility]  2nd [facility] Waiting Room  [place] [id number]    Contact Number:  [phone]  (IR control desk)  Monday - Friday 8:00 am - 4:30 pm    After hours for urgent concerns:  [phone]  After 4:30 pm Monday - Friday, Weekends and Holidays.   Ask for Interventional Radiology on-call.  Someone is available 24 hours a day.  [facility] toll free number:  [phone]    Pending Results     No orders found from 9/8/2018 to 9/11/2018.            Admission Information     Date & Time Provider Department Dept. Phone    9/10/2018 Romie Eduardo PA-C [facility] [phone]      Your Vitals Were     Blood Pressure Pulse Temperature Respirations Height Weight    103/61 72 98.5  F (36.9  C) (Oral) 16 1.549 m  "(5' 1\") 52.8 kg (116 lb 4.8 oz)    Last Period Pulse Oximetry BMI (Body Mass Index)             12/20/2013 98% 21.97 kg/m2         [employer] Information     [employer] gives you secure access to your electronic health record. If you see a primary care provider, you can also send messages to your care team and make appointments. If you have questions, please call your primary care clinic.  If you do not have a primary care provider, please call [phone] and they will assist you.      [employer] is an electronic gateway that provides easy, online access to your medical records. With [employer], you can request a clinic appointment, read your test results, renew a prescription or communicate with your care team.     To access your existing account, please contact your [facility] or call [phone] for assistance.        Care EveryWhere ID     This is your Care EveryWhere ID. This could be used by other organizations to access your [facility] medical records  [id number]        Equal Access to Services     ROSAURA INTERIANO : Hadii jimmie fang hadasho Soomaali, waaxda luqadaha, qaybta kaalmada adeegyada, josemanuel oswald . So [facility] [phone].    ATENCIÓN: Si habla español, tiene a chacon disposición servicios gratuitos de asistencia lingüística. Llame al [phone].    We comply with applicable federal civil rights laws and [place] laws. We do not discriminate on the basis of race, color, national origin, age, disability, sex, sexual orientation, or gender identity.               Review of your medicines      UNREVIEWED medicines. Ask your doctor about these medicines        Dose / Directions    ALPRAZolam 0.5 MG tablet   Commonly known as:  XANAX   Used for:  Renal cell carcinoma, unspecified laterality (H), Mass of upper lobe of left lung        Dose:  0.5 mg   Take 1 tablet (0.5 mg) by mouth 3 times daily as needed for anxiety   Quantity:  60 tablet   Refills:  0       aspirin " 81 MG chewable tablet   Used for:  Pre-op evaluation, Brain metastases (H)        Dose:  81 mg   Take 1 tablet (81 mg) by mouth every morning   Quantity:  36 tablet   Refills:  0       benzonatate 100 MG capsule   Commonly known as:  TESSALON   Used for:  Cough        Dose:  100 mg   Take 1 capsule (100 mg) by mouth 3 times daily as needed for cough   Quantity:  42 capsule   Refills:  0       buPROPion 300 MG 24 hr tablet   Commonly known as:  WELLBUTRIN XL        Dose:  300 mg   Take 300 mg by mouth every morning   Refills:  0       * Cabozantinib S-Malate 40 MG   Commonly known as:  CABOMETYX   Used for:  Brain metastasis (H), Metastatic renal cell carcinoma to lung, right (H), Malignant neoplasm of right kidney (H)        Dose:  40 mg   Take 1 tablet (40 mg) by mouth daily Take on an empty stomach 1 hour before or 2 hours after a meal. Avoid grapefruit and grapefruit juice.   Quantity:  30 tablet   Refills:  0       * Cabozantinib S-Malate 40 MG   Commonly known as:  CABOMETYX   Used for:  Brain metastasis (H), Metastatic renal cell carcinoma to lung, right (H), Malignant neoplasm of right kidney (H)        Dose:  40 mg   Take 1 tablet (40 mg) by mouth daily Take on an empty stomach 1 hour before or 2 hours after a meal. Avoid grapefruit and grapefruit juice.   Quantity:  30 tablet   Refills:  0       * Cabozantinib S-Malate 40 MG   Commonly known as:  CABOMETYX   Used for:  Brain metastasis (H), Metastatic renal cell carcinoma to lung, right (H), Malignant neoplasm of right kidney (H)        Dose:  40 mg   Take 1 tablet (40 mg) by mouth daily Take on an empty stomach 1 hour before or 2 hours after a meal. Avoid grapefruit and grapefruit juice.   Quantity:  30 tablet   Refills:  0       calcium carbonate 500 mg {elemental} 500 MG tablet   Commonly known as:  OS-MUNDO   Used for:  Pre-op evaluation, Brain metastases (H)        Dose:  1 tablet   Take 1 tablet by mouth daily (with lunch)   Refills:  0       citalopram  20 MG tablet   Commonly known as:  celeXA   Used for:  Insomnia, unspecified type        Dose:  20 mg   Take 1 tablet (20 mg) by mouth every evening   Quantity:  90 tablet   Refills:  3       diphenoxylate-atropine 2.5-0.025 MG per tablet   Commonly known as:  LOMOTIL   Used for:  Diarrhea, unspecified type        Dose:  1-2 tablet   Take 1-2 tablets by mouth 4 times daily as needed for diarrhea   Quantity:  100 tablet   Refills:  1       HYDROcodone-acetaminophen 5-325 MG per tablet   Commonly known as:  NORCO   Used for:  Malignant neoplasm of right kidney (H)        Dose:  1 tablet   Take 1 tablet by mouth every 6 hours as needed for severe pain   Quantity:  30 tablet   Refills:  0       MAGNESIUM PO   Used for:  Pre-op evaluation, Brain metastases (H)        Take by mouth At Bedtime   Refills:  0       Multi-vitamin Tabs tablet   Used for:  Pre-op evaluation, Brain metastases (H)        Dose:  1 tablet   Take 1 tablet by mouth daily (with lunch)   Refills:  0       OMEGA 3 PO   Used for:  Pre-op evaluation, Brain metastases (H)        Take by mouth daily (with lunch)   Refills:  0       omeprazole 40 MG capsule   Commonly known as:  priLOSEC   Used for:  Cerebral edema (H)        Dose:  40 mg   Take 1 capsule (40 mg) by mouth daily   Quantity:  30 capsule   Refills:  3       ondansetron 8 MG tablet   Commonly known as:  ZOFRAN   Used for:  Nausea        Dose:  8 mg   Take 1 tablet (8 mg) by mouth every 8 hours as needed for nausea   Quantity:  30 tablet   Refills:  3       oxyCODONE 5 MG capsule   Commonly known as:  OXY-IR   Used for:  Metastatic renal cell carcinoma to lung, right (H)        Dose:  1-2 capsule   Take 1-2 capsules (5-10 mg) by mouth every 4 hours as needed for moderate to severe pain   Quantity:  30 capsule   Refills:  0       Potassium Chloride ER 20 MEQ Tbcr   Used for:  Diarrhea, unspecified type, Malignant neoplasm of right kidney (H)        Dose:  20 mEq   Take 1 tablet (20 mEq) by mouth  daily   Quantity:  14 tablet   Refills:  0       PROBIOTIC PO   Used for:  Pre-op evaluation, Brain metastases (H)        Take by mouth daily (with lunch)   Refills:  0       SUPER B COMPLEX/VITAMIN C PO   Used for:  Pre-op evaluation, Brain metastases (H)        Take by mouth daily (with lunch)   Refills:  0       TURMERIC PO   Used for:  Pre-op evaluation, Brain metastases (H)        Take by mouth daily (with lunch)   Refills:  0       VITAMIN B 12 PO   Used for:  Pre-op evaluation, Brain metastases (H)        Take by mouth daily (with lunch)   Refills:  0       VITAMIN C PO   Used for:  Pre-op evaluation, Brain metastases (H)        Take by mouth daily (with lunch)   Refills:  0       VITAMIN D PO   Used for:  Pre-op evaluation, Brain metastases (H)        Take by mouth daily (with lunch)   Refills:  0       zolpidem 5 MG tablet   Commonly known as:  AMBIEN   Used for:  Sleep disorder        Dose:  5 mg   Take 1 tablet (5 mg) by mouth nightly as needed for sleep   Quantity:  30 tablet   Refills:  3       * Notice:  This list has 3 medication(s) that are the same as other medications prescribed for you. Read the directions carefully, and ask your doctor or other care provider to review them with you.             Protect others around you: Learn how to safely use, store and throw away your medicines at www.disposemymeds.org.             Medication List: This is a list of all your medications and when to take them. Check marks below indicate your daily home schedule. Keep this list as a reference.      Medications           Morning Afternoon Evening Bedtime As Needed    ALPRAZolam 0.5 MG tablet   Commonly known as:  XANAX   Take 1 tablet (0.5 mg) by mouth 3 times daily as needed for anxiety                                aspirin 81 MG chewable tablet   Take 1 tablet (81 mg) by mouth every morning                                benzonatate 100 MG capsule   Commonly known as:  TESSALON   Take 1 capsule (100 mg) by  mouth 3 times daily as needed for cough                                buPROPion 300 MG 24 hr tablet   Commonly known as:  WELLBUTRIN XL   Take 300 mg by mouth every morning                                * Cabozantinib S-Malate 40 MG   Commonly known as:  CABOMETYX   Take 1 tablet (40 mg) by mouth daily Take on an empty stomach 1 hour before or 2 hours after a meal. Avoid grapefruit and grapefruit juice.                                * Cabozantinib S-Malate 40 MG   Commonly known as:  CABOMETYX   Take 1 tablet (40 mg) by mouth daily Take on an empty stomach 1 hour before or 2 hours after a meal. Avoid grapefruit and grapefruit juice.                                * Cabozantinib S-Malate 40 MG   Commonly known as:  CABOMETYX   Take 1 tablet (40 mg) by mouth daily Take on an empty stomach 1 hour before or 2 hours after a meal. Avoid grapefruit and grapefruit juice.                                calcium carbonate 500 mg {elemental} 500 MG tablet   Commonly known as:  OS-MUNDO   Take 1 tablet by mouth daily (with lunch)                                citalopram 20 MG tablet   Commonly known as:  celeXA   Take 1 tablet (20 mg) by mouth every evening                                diphenoxylate-atropine 2.5-0.025 MG per tablet   Commonly known as:  LOMOTIL   Take 1-2 tablets by mouth 4 times daily as needed for diarrhea                                HYDROcodone-acetaminophen 5-325 MG per tablet   Commonly known as:  NORCO   Take 1 tablet by mouth every 6 hours as needed for severe pain                                MAGNESIUM PO   Take by mouth At Bedtime                                Multi-vitamin Tabs tablet   Take 1 tablet by mouth daily (with lunch)                                OMEGA 3 PO   Take by mouth daily (with lunch)                                omeprazole 40 MG capsule   Commonly known as:  priLOSEC   Take 1 capsule (40 mg) by mouth daily                                ondansetron 8 MG tablet   Commonly  known as:  ZOFRAN   Take 1 tablet (8 mg) by mouth every 8 hours as needed for nausea                                oxyCODONE 5 MG capsule   Commonly known as:  OXY-IR   Take 1-2 capsules (5-10 mg) by mouth every 4 hours as needed for moderate to severe pain                                Potassium Chloride ER 20 MEQ Tbcr   Take 1 tablet (20 mEq) by mouth daily                                PROBIOTIC PO   Take by mouth daily (with lunch)                                SUPER B COMPLEX/VITAMIN C PO   Take by mouth daily (with lunch)                                TURMERIC PO   Take by mouth daily (with lunch)                                VITAMIN B 12 PO   Take by mouth daily (with lunch)                                VITAMIN C PO   Take by mouth daily (with lunch)                                VITAMIN D PO   Take by mouth daily (with lunch)                                zolpidem 5 MG tablet   Commonly known as:  AMBIEN   Take 1 tablet (5 mg) by mouth nightly as needed for sleep                                * Notice:  This list has 3 medication(s) that are the same as other medications prescribed for you. Read the directions carefully, and ask your doctor or other care provider to review them with you.

## 2018-09-11 NOTE — PROCEDURES
PATIENT NAME: MICKEY AMEZCUA  YOB: 1965  MRN:   [id number]  ACCOUNT:  [id number]      DATE OF PROCEDURE:  04/05/2018      PREOPERATIVE DIAGNOSIS:     1.  Metastatic renal cell carcinoma.  2.  Left cerebellar metastasis.  3.  S/p left suboccipital craniotomy for resection of tumor, 3/6/2018.  4.  New multiple metastatic lesions found on MRI brain 4/5/2018.    POSTOPERATIVE DIAGNOSIS:     1.  Metastatic renal cell carcinoma.  2.  Left cerebellar metastasis.  3.  S/p left suboccipital craniotomy for resection of tumor, 3/6/2018.  4.  New multiple metastatic lesions found on MRI brain 4/5/2018.    PROCEDURES PERFORMED:    1.  Placement of LeRealtimeBoard stereotactic head frame.    2.  Stereotactic MRI of the brain.    3.  Generation of stereotactic treatment plan.    4.  Gamma knife treatment of the five areas.              A.  Left Resection cavity              B.  Left Cerebellar              C.  Right Cerebellar   D.  Left Occipital   F.  Left Frontal    ATTENDING SURGEON:  Itz King MD, PhD    RADIATION ONCOLOGIST:  Huyen Cuellar MD    RADIATION PHYSICIST:  Shelley South MD    ASSISTANT:    1.  Kareem Olmstead MD.   2.  Shiva Saha MD.     ANESTHESIA:  Local anesthetic.      INDICATIONS FOR PROCEDURE:  Ms. Mickey Amezcua is a 52 year old female who has a past medical history significant for renal cell carcinoma that was diagnosed in 2014 for which she underwent a right radical nephrectomy.  She recently presented with headaches and MRI demonstrated left cerebellar enhancing mass.  She underwent resection of the left cerebellar mass on 3/6/2018.  The final pathology was metastatic renal cell carcinoma.  Patient's postoperative course was uneventful and she was discharged on POD#3.  She was last seen in the neurosurgery clinic on 3/21/2018 for postoperative follow up.  She reported doing well.  She denied headaches, fevers, chills, nausea, vomiting and nuchal rigidity.  She also denied any  issues with her wound.  Her case was discussed at the Multidisciplinary tumor board and Gamma Knife Radiosurgery to the resection bed was recommended.  The timing of the treatment was critical and because of Dr. Palencia's availability, I was asked to perform the Gamma Knife Radiosurgery treatment.  Patient has been doing well since the last clinic visit and there have been no changes in her symptoms.  Risks, benefits and alternative therapies were discussed with the patient and with her agreement, she is brought to the Gamma Knife Radiosurgery unit for the treatment.  Of note, after the placement of the Leksell frame and MRI of the brain, additional lesions, as listed above, were found and were treated as well.    DESCRIPTION OF PROCEDURE:  After obtaining informed consent, the patient was brought to the Gamma Knife suite and correctly identified.  Time out was performed confirming the patient s identity, procedure to be performed and the site and side of the procedure.  Oral Ativan was administered for the head frame placement.  Due to the location of the resection bed being left cerebellar region, following had to be considered.  One is the location of the incision and the craniotomy with respect to the pin location for the Leksell frame.  Second is the relative location of the treatment area with respect to the Leksell frame.  Therefore, the plan was to rotate the Leksell frame to the right or clockwise and shifted to the left so that the posterior left pin would miss the incision and the treatment area would become more central.  Subsequently, the Leksell stereotactic head frame was secured to the patient's head using 4 pins.  Intended pin sites were prepared in the usual sterile fashion and infiltrated with local anesthetic.  Care was taken not to over tighten the pins and therefore avoid torqueing the frame.  With the planned adjustment as stated above, the left posterior pin location was away from the well  healed surgical incision.   Accurate and secure placement of the frame was verified.  Skull geometry measurements were obtained and verified and a fiducial localizer box affixed to the head frame.    The patient was then transferred to the MRI scanner and a stereotactic MRI of the brain was obtained.  MRI images were transferred to the gamma knife treatment planning station and 3-dimensional stereotactic treatment plan was generated.  The current MRI images identified areas of increased enhancement around resection bed in the left cerebellum.  Also there were four more metastatic lesions were identified.  One was in the left cerebellum, second was in the right cerebellum, third was in the left occipital lobe and the fourth was in the left frontal lobe.  These new findings were discussed with Dr. Cuellar and the decision was made to include these lesions in the treatment plan.    Target 1 in the left resection cavity in the left cerebellum was identified and outlined 3 dimensionally.  We created a treatment plan that incorporated 99% of the lesion within the 50% isodose line.     Target 2 in the left cerebellum, named left cerebellar, was identified and outlined 3 dimensionally.  We created a treatment plan that incorporated 99% of the lesion within the 50% isodose line.     Target 3 in the right cerebellum, named right cerebellar, was identified and outlined 3 dimensionally.  We created a treatment plan that incorporated 99% of the lesion within the 50% isodose line.    Target 4 in the left occipital lobe, named left occipital, was identified and outlined 3 dimensionally.  We created a treatment plan that incorporated 99% of the lesion within the 60% isodose line.     Target 5 in the left frontal lobe, named left frontal, was identified and outlined 3 dimensionally.  We created a treatment plan that incorporated 99% of the lesion within the 80% isodose line.    Skull geometry measurements were again obtained and  verified.  The patient placed on the gamma knife couch and frame set to the appropriate X, Y and Z coordinates.  X, Y and Z coordinates were verified and the gamma knife treatment carried out as follows.      Target 1 in the left resection cavity in the left cerebellum was treated to 18 Gy at the 50% isodose line.  One shot using 8-mm collimator and three shots using 14-mm collimator were used to a treatment volume of 4.83 mL.       Target 2 in the left cerebellum, named left cerebellar, was treated to 22 Gy at the 50% isodose line.  One shot using 4-mm collimator was used to a treatment volume of 0.11  mL.      Target 3 in the right cerebellum, named right cerebellar, was treated to 22 Gy at the 50% isodose line.  One shot using 8-mm collimator was used to a treatment volume of 0.21 mL.    Target 4 in the left occipital lobe, named left occipital, was treated to 22 Gy at the 60% isodose line.  One shot using 8-mm collimator was used to a treatment volume of 0.47 mL.      Target 5 in the left frontal lobe, named left frontal, was treated to 22 Gy at the 80% isodose line.  One shot using 4-mm collimator was used to a treatment volume of 0.03 mL.    At the completion of treatment, the patient was taken out of the treatment unit and the stereotactic head frame removed.  Pin sites were inspected for integrity.  A sterile dressing was applied and the patient was given discharge instructions and a plan for follow-up.  No immediate complications were noted.        Dr. King was present for the key neurosurgical portions of the procedure and immediately available throughout the entire procedure.      Itz King MD, PhD   Detail Level: Zone

## 2018-09-12 NOTE — ORAL ONC MGMT
Oral Chemotherapy Monitoring Program     Placed call to patient in follow up of Cabometyx (Cabozantinib) therapy.   Left message requesting call back.   No drug names were mentioned.    Thank you for the opportunity to participate in the care of the above patient,  Itz Costello, PharmD  Hematology / Oncology Clinical Pharmacist  [facility] and [facility]   [phone]

## 2018-09-17 NOTE — ORAL ONC MGMT
Oral Chemotherapy Monitoring Program   Placed call to patient in follow up of Cabometyx (Cabozantinib) therapy.   Left message requesting call back.   No drug names were mentioned.    Blaine Huber, PharmD, MS  Hematology/Oncology Clinical Pharmacist  [facility]  [facility]

## 2018-09-26 NOTE — ORAL ONC MGMT
Oral Chemotherapy Monitoring Program      Placed call to patient in follow up of Cabometyx (Cabozantinib) therapy.   Left message requesting call back.   No drug names were mentioned.     Thank you for the opportunity to participate in the care of the above patient,  Itz Costello, PharmD  Hematology / Oncology Clinical Pharmacist  [facility] and [facility]   [phone]

## 2018-09-27 NOTE — PATIENT INSTRUCTIONS
Preparing for Your Surgery      Name:  Suzi Gonsales   MRN:  [id number]   :  1965   Today's Date:  2018     Arriving for surgery:  Left hip ORIF   Surgery date:  10/01/2018  Arrival time:  9:30 am  Please come to:     [facility] [address]  [address] [place]  [address]    - parking is available in front of [facility] from 5:15AM to 8:00PM. If you prefer, park your car in the Green Lot.    -Proceed to the 3rd floor, check in at the Adult Surgery Waiting Lounge. [phone]    If an escort is needed stop at the Information Desk in the lobby. Inform the information person that you are here for surgery. An escort to the Adult Surgery Waiting Lounge will be provided.        What can I eat or drink?  -  You may have solid food or milk products until 8 hours prior to your surgery. ( 4 am)   -  You may have water, apple juice or 7up/Sprite until 2 hours prior to your surgery. ( until 9:30 am arrival time)    Which medicines can I take?         Stop Aspirin, vitamins and supplements one week prior to surgery.       Hold Ibuprofen and Naproxen for 24 hours prior to surgery.        -  Please take these medications the day of surgery:     Alprazolam if needed      Bupropion      Hydrocodone if needed      Omeprazole            How do I prepare myself?  -  Take two showers: one the night before surgery; and one the morning of surgery.         Use Scrubcare or Hibiclens to wash from neck down.  You may use your own shampoo and conditioner. No other hair products.   -  Do NOT use lotion, powder, deodorant, or antiperspirant the day of your surgery.  -  Do NOT wear any makeup, fingernail polish or jewelry.  - Do not bring your own medications to the hospital, except for inhalers and eye drops.  -  Bring your ID and insurance card.    Questions or Concerns:  -If you have questions or concerns regarding the day of surgery, please call [phone].     -For  questions after surgery please call your surgeons office.           AFTER YOUR SURGERY  Breathing exercises   Breathing exercises help you recover faster. Take deep breaths and let the air out slowly. This will:     Help you wake up after surgery.    Help prevent complications like pneumonia.  Preventing complications will help you go home sooner.   We may give you a breathing device (incentive spirometer) to encourage you to breathe deeply.   Nausea and vomiting   You may feel sick to your stomach after surgery; if so, let your nurse know.    Pain control:  After surgery, you may have pain. Our goal is to help you manage your pain. Pain medicine will help you feel comfortable enough to do activities that will help you heal.  These activities may include breathing exercises, walking and physical therapy.   To help your health care team treat your pain we will ask: 1) If you have pain  2) where it is located 3) describe your pain in your words  Methods of pain control include medications given by mouth, vein or by nerve block for some surgeries.  We may give you a pain control pump that will:  1) Deliver the medicine through a tube placed in your vein  2) Control the amount of medicine you receive  3) Allow you to push a button to deliver a dose of pain medicine  Sequential Compression Device (SCD) or Pneumo Boots:  You may need to wear SCD S on your legs or feet. These are wraps connected to a machine that pumps in air and releases it. The repeated pumping helps prevent blood clots from forming.

## 2018-09-27 NOTE — H&P
Pre-Operative H & P     CC:  Preoperative exam to assess for increased cardiopulmonary risk while undergoing surgery and anesthesia.    Date of Encounter: 2018  Primary Care Physician:  Molly Adame    Reason for visit:  Preop general physical exam  Lytic lesion      HPI  Suzi Gonsales is a 53 year old female who presents for pre-operative H & P in preparation for Hip Screw Left Hip, Possible Cementation on 10/1/2018 by Dr. Dunn in treatment of lytic lesion on left femoral head  at [facility].     History is obtained from the patient.   Pt has metastatic renal cancer. She was diagnosed 2014 and had a radical nephrectomy 2014. She also has multiple lung nodules and a lesion on the left adrenal gland and pancreas. She had a craniotomy for cerebellar lesion 2018. Lastly, a new lytic lesion was found on the left femoral head. She is cared for by Dr. Green.    Previous anesthesia without complications  1) Cardiac: No known cardiac diagnosis or symptoms. EKG 2017 NSR. Echocardiogram 20178 shows EF of 60-65%  2) Pulmonary: Smoked 0.5 PPD for 20 years, quit . Recent thoracentesis 9/10/2018 and she states she feels well.  3) Neuro: lacunar infarct 2018, incidental find and no residual effects.      Past Medical History  Past Medical History:   Diagnosis Date     Adrenal nodule (H)      Anxiety      Brain mass      Depression      Former tobacco use      Lacunar infarct, acute (H) 2018     Mass of left lung     Upper lobe     Renal cell carcinoma (H) 2014    Clear cell     Solitary kidney     S/P right nephrectomy due to kidney cancer       Past Surgical History  Past Surgical History:   Procedure Laterality Date     C/SECTION, LOW TRANSVERSE  ,     , Low Transverse     ENDOSCOPIC ULTRASOUND UPPER GASTROINTESTINAL TRACT (GI) N/A 2017    Procedure: ENDOSCOPIC ULTRASOUND, ESOPHAGOSCOPY / UPPER GASTROINTESTINAL TRACT  (GI);  Esophagogastroduodenoscopy, Endoscopic Ultrasound with Fine Needle Biopsies;  Surgeon: Asad Velez MD;  Location: [facility] OR     INSERT PORT VASCULAR ACCESS N/A 12/4/2017    Procedure: INSERT PORT VASCULAR ACCESS;  Chest Port Placement-Right;  Surgeon: Melanie Cevallos PA-C;  Location: [facility]     open radical nephrectomy Right 12/31/14     OPTICAL TRACKING SYSTEM CRANIOTOMY, EXCISE TUMOR, COMBINED Left 3/6/2018    Procedure: COMBINED OPTICAL TRACKING SYSTEM CRANIOTOMY, EXCISE TUMOR;  Left Stealth Assisted Posterior fossa Craniotomy And Tumor Resection ;  Surgeon: Quintin Palencia MD;  Location: [facility] OR     PICC INSERTION Left 07/31/2017    5fr DL BioFlo PICC, 40cm (4cm external) in the L basilic vein w/ tip in the low SVC     PICC INSERTION Right 08/15/2017    5fr DL BioFlo PICC, 36cm (1cm external) in the R basilic vein w/ tip in the low SVC     THORACENTESIS Left 6/12/2018    Procedure: THORACENTESIS;  Left Thoracentesis ;  Surgeon: Juanito Weinstein PA-C;  [facility]: [facility]     THORACENTESIS Left 6/25/2018    Procedure: THORACENTESIS;  Thoracentesis;  Surgeon: Juanito Weinstein PA-C;  Location: [facility]     THORACENTESIS Left 9/10/2018    Procedure: THORACENTESIS;  Left Thoracentesis;  Surgeon: Romie Eduardo PA-C;  Location: [facility]       Hx of Blood transfusions/reactions: none    Hx of abnormal bleeding or anti-platelet use: none    Menstrual history: Patient's last menstrual period was 12/20/2013.:     Steroid use in the last year: none    Personal or FH with difficulty with Anesthesia:  None        Prior to Admission Medications  Current Outpatient Prescriptions   Medication Sig Dispense Refill     ALPRAZolam (XANAX) 0.5 MG tablet Take 1 tablet (0.5 mg) by mouth 3 times daily as needed for anxiety 60 tablet 0     buPROPion (WELLBUTRIN XL) 300 MG 24 hr tablet Take 300 mg by mouth every morning        Cabozantinib S-Malate (CABOMETYX) 40 MG Take 1 tablet (40 mg) by mouth daily Take  on an empty stomach 1 hour before or 2 hours after a meal. Avoid grapefruit and grapefruit juice. 30 tablet 0     Cabozantinib S-Malate (CABOMETYX) 40 MG Take 1 tablet (40 mg) by mouth daily Take on an empty stomach 1 hour before or 2 hours after a meal. Avoid grapefruit and grapefruit juice. 30 tablet 0     Cabozantinib S-Malate (CABOMETYX) 40 MG Take 1 tablet (40 mg) by mouth daily Take on an empty stomach 1 hour before or 2 hours after a meal. Avoid grapefruit and grapefruit juice. 30 tablet 0     citalopram (CELEXA) 20 MG tablet Take 1 tablet (20 mg) by mouth every evening 90 tablet 3     diphenoxylate-atropine (LOMOTIL) 2.5-0.025 MG per tablet Take 1-2 tablets by mouth 4 times daily as needed for diarrhea 100 tablet 1     HYDROcodone-acetaminophen (NORCO) 5-325 MG per tablet Take 1 tablet by mouth every 6 hours as needed for severe pain 30 tablet 0     omeprazole (PRILOSEC) 40 MG capsule Take 1 capsule (40 mg) by mouth daily 30 capsule 3     ondansetron (ZOFRAN) 8 MG tablet Take 1 tablet (8 mg) by mouth every 8 hours as needed for nausea 30 tablet 3     Probiotic Product (PROBIOTIC PO) Take by mouth daily (with lunch)       zolpidem (AMBIEN) 5 MG tablet Take 1 tablet (5 mg) by mouth nightly as needed for sleep 30 tablet 3       Allergies  No Known Allergies    Social History  Social History     Social History     Marital status:      Spouse name: Tunde     Number of children: 2     Years of education: N/A     Occupational History           Social History Main Topics     Smoking status: Former Smoker     Packs/day: 0.50     Years: 20.00     Types: Cigarettes     Quit date: 1/1/2004     Smokeless tobacco: Never Used     Alcohol use Yes      Comment: 1-2 drinks couple times per week     Drug use: No     Sexual activity: Not Currently     Partners: Male     Other Topics Concern     Not on file     Social History Narrative    .   for [employer].     2 sons.            Family History  Family History   Problem Relation Age of Onset     Hypertension Father      Chronic Obstructive Pulmonary Disease Father      Hyperlipidemia Brother      Hyperlipidemia Brother      Cancer No family hx of        Anesthesia Evaluation     . Pt has had prior anesthetic. Type: General and MAC    No history of anesthetic complications          ROS/MED HX    ENT/Pulmonary:  - neg pulmonary ROS   (+)tobacco use, Past use 0.5 x 20 years, qit 2004 packs/day  , . .    Neurologic:  - neg neurologic ROS   (+)CVA date: 2/14/18 (lacunar stroke found incidently) without deficits    Cardiovascular:  - neg cardiovascular ROS   (+) ----. Taking blood thinners Pt has received instructions: . . . :. . Previous cardiac testing Echodate:6/2017results:date: results:ECG reviewed date:8/2017 results: date: results:         (-) CAD, syncope and dyslipidemia   METS/Exercise Tolerance: Comment: Active as a .  Lifts weights.  >4 METS   Hematologic:     (+) History of Transfusion no previous transfusion reaction -     (-) history of blood clots and anemia   Musculoskeletal:   (+) , , other musculoskeletal- lytic lesion femoral head      GI/Hepatic: Comment: Has PPI available if needed.  No GERD.     (+) GERD (Rarely. Denies symptoms recently) Asymptomatic on medication,       Renal/Genitourinary:     (+) chronic renal disease (solitary kidney), Other Renal/ Genitourinary, Right nephrectomy      Endo:  - neg endo ROS   (+) Obesity, .      Psychiatric:     (+) psychiatric history anxiety and depression (Panic attacks)      Infectious Disease:  - neg infectious disease ROS       Malignancy:   (+) Malignancy History of Other  Other CA Metastatic kindey CA s/p nephrectomy. Lung mass, brain mass, adrenal nodules, pancreatic mass status post Surgery         Other:    (+) No chance of pregnancy C-spine cleared: N/A, no H/O Chronic Pain,no other significant disability              Physical Exam  Normal systems:  cardiovascular, pulmonary and dental    Airway   Mallampati: II  TM distance: >3 FB  Neck ROM: full    Dental   Normal    Cardiovascular   Rhythm and rate: regular and normal      Pulmonary    breath sounds clear to auscultation        The complete review of systems is negative other than noted in the HPI or here.   Temp: 98.4  F (36.9  C) Temp src: Oral BP: 119/64 Pulse: 88     SpO2: 98 %         119 lbs 8 oz  Data Unavailable   Body mass index is 22.58 kg/(m^2).       Physical Exam  Constitutional: Awake, alert, cooperative, no apparent distress, and appears stated age.  Eyes: Pupils equal, round and reactive to light, extra ocular muscles intact, sclera clear, conjunctiva normal.  HENT: Normocephalic, oral pharynx with moist mucus membranes, good dentition. No goiter appreciated.   Respiratory: Clear to auscultation bilaterally, no crackles or wheezing.  Cardiovascular: Regular rate and rhythm, normal S1 and S2, and no murmur noted.  Carotids +2, no bruits. No edema. Palpable pulses to radial  DP and PT arteries.   GI: Normal bowel sounds, soft, non-distended, non-tender, no masses palpated, no hepatosplenomegaly.    Lymph/Hematologic: No cervical lymphadenopathy and no supraclavicular lymphadenopathy.  Genitourinary:  Deferred   Skin: Warm and dry.  No rashes at anticipated surgical site.   Musculoskeletal: Full ROM of neck. There is no redness, warmth, or swelling of the joints. Gross motor strength is normal.    Neurologic: Awake, alert, oriented to name, place and time. Cranial nerves II-XII are grossly intact. Gait is normal.   Neuropsychiatric: Calm, cooperative. Normal affect.     Labs: (personally reviewed)  Results for MICKEY AMEZCUA (MRN [id number]) as of 9/30/2018 15:07   Ref. Range 9/27/2018 17:24 9/27/2018 17:48 9/30/2018 11:39   Sodium Latest Ref Range: 133 - 144 mmol/L 142  141   Potassium Latest Ref Range: 3.4 - 5.3 mmol/L 3.0 (L)  4.0   Chloride Latest Ref Range: 94 - 109 mmol/L 107  109   Carbon  Dioxide Latest Ref Range: 20 - 32 mmol/L 27  22   Urea Nitrogen Latest Ref Range: 7 - 30 mg/dL 6 (L)  7   Creatinine Latest Ref Range: 0.52 - 1.04 mg/dL 0.78  0.82   GFR Estimate Latest Ref Range: >60 mL/min/1.7m2 77  72   GFR Estimate If Black Latest Ref Range: >60 mL/min/1.7m2 >90  88   Calcium Latest Ref Range: 8.5 - 10.1 mg/dL 7.9 (L)  8.7   Anion Gap Latest Ref Range: 3 - 14 mmol/L 8  9   Albumin Latest Ref Range: 3.4 - 5.0 g/dL   2.8 (L)   Protein Total Latest Ref Range: 6.8 - 8.8 g/dL   6.2 (L)   Bilirubin Total Latest Ref Range: 0.2 - 1.3 mg/dL   0.2   Alkaline Phosphatase Latest Ref Range: 40 - 150 U/L   62   ALT Latest Ref Range: 0 - 50 U/L   45   AST Latest Ref Range: 0 - 45 U/L   40   Hemoglobin A1C Latest Ref Range: 0 - 5.6 % 6.1 (H)     Glucose Latest Ref Range: 70 - 99 mg/dL 117 (H)  146 (H)   WBC Latest Ref Range: 4.0 - 11.0 10e9/L 4.2  4.4   Hemoglobin Latest Ref Range: 11.7 - 15.7 g/dL 11.6 (L)  12.1   Hematocrit Latest Ref Range: 35.0 - 47.0 % 34.1 (L)  37.4   Platelet Count Latest Ref Range: 150 - 450 10e9/L 417  312   RBC Count Latest Ref Range: 3.8 - 5.2 10e12/L 3.52 (L)  3.78 (L)   MCV Latest Ref Range: 78 - 100 fl 97  99   MCH Latest Ref Range: 26.5 - 33.0 pg 33.0  32.0   MCHC Latest Ref Range: 31.5 - 36.5 g/dL 34.0  32.4   RDW Latest Ref Range: 10.0 - 15.0 % 17.4 (H)  17.7 (H)   Diff Method Unknown   Automated Method   % Neutrophils Latest Units: %   50.1   % Lymphocytes Latest Units: %   21.5   % Monocytes Latest Units: %   6.2   % Eosinophils Latest Units: %   21.3   % Basophils Latest Units: %   0.7   % Immature Granulocytes Latest Units: %   0.2   Nucleated RBCs Latest Ref Range: 0 /100   0   Absolute Neutrophil Latest Ref Range: 1.6 - 8.3 10e9/L   2.2   Absolute Lymphocytes Latest Ref Range: 0.8 - 5.3 10e9/L   0.9   Absolute Monocytes Latest Ref Range: 0.0 - 1.3 10e9/L   0.3   Absolute Eosinophils Latest Ref Range: 0.0 - 0.7 10e9/L   0.9 (H)   Absolute Basophils Latest Ref Range: 0.0 -  0.2 10e9/L   0.0   Abs Immature Granulocytes Latest Ref Range: 0 - 0.4 10e9/L   0.0   Absolute Nucleated RBC Unknown   0.0   INR Latest Ref Range: 0.86 - 1.14  1.04     ABO Unknown O     RH(D) Unknown Pos     Antibody Screen Unknown Neg     Test Valid Only At Latest Units:     [facility]     Specimen Expires Unknown 09/30/2018     Blood Bank Comment Unknown preadm form recd....     Color Urine Unknown  Straw    Appearance Urine Unknown  Clear    Glucose Urine Latest Ref Range: NEG^Negative mg/dL  Negative    Bilirubin Urine Latest Ref Range: NEG^Negative   Negative    Ketones Urine Latest Ref Range: NEG^Negative mg/dL  Negative    Specific Gravity Urine Latest Ref Range: 1.003 - 1.035   1.003    pH Urine Latest Ref Range: 5.0 - 7.0 pH  7.0    Protein Albumin Urine Latest Ref Range: NEG^Negative mg/dL  Negative    Urobilinogen mg/dL Latest Ref Range: 0.0 - 2.0 mg/dL  0.0    Nitrite Urine Latest Ref Range: NEG^Negative   Negative    Blood Urine Latest Ref Range: NEG^Negative   Negative    Leukocyte Esterase Urine Latest Ref Range: NEG^Negative   Negative    Source Unknown  Clean catch urine    WBC Urine Latest Ref Range: 0 - 5 /HPF  0    RBC Urine Latest Ref Range: 0 - 2 /HPF  1    Squamous Epithelial /HPF Urine Latest Ref Range: 0 - 1 /HPF  1      Her K was 3, I supplemented with 20 MEQ for 4 days.        Most Recent [facility] Pulmonary Function Testing    FVC-Pred   Date Value Ref Range Status   04/19/2017 3.09 L      FVC-Pre   Date Value Ref Range Status   04/19/2017 2.89 L      FVC-%Pred-Pre   Date Value Ref Range Status   04/19/2017 93 %      FEV1-Pre   Date Value Ref Range Status   04/19/2017 2.38 L      FEV1-%Pred-Pre   Date Value Ref Range Status   04/19/2017 95 %      FEV1FVC-Pred   Date Value Ref Range Status   04/19/2017 81 %      FEV1FVC-Pre   Date Value Ref Range Status   04/19/2017 82 %      No results found for: 20029  FEFMax-Pred   Date Value Ref Range Status   04/19/2017 6.25 L/sec       FEFMax-Pre   Date Value Ref Range Status   2017 6.47 L/sec      FEFMax-%Pred-Pre   Date Value Ref Range Status   2017 103 %      ExpTime-Pre   Date Value Ref Range Status   2017 6.29 sec      FIFMax-Pre   Date Value Ref Range Status   2017 3.66 L/sec      FEV1FEV6-Pred   Date Value Ref Range Status   2017 82 %      FEV1FEV6-Pre   Date Value Ref Range Status   2017 83 %          EKG: Personally reviewed but formal cardiology read [id number] SR      Echocardiogram 2017  Interpretation Summary  Normal left ventricular size, thickness, and function. Global and regional  left ventricular function is normal with an EF of 60-65%.  Normal right ventricular size and function.  No significant valvular pathology.  No pericardial effusion.     No prior study for comparison.  _____________________________________________________________________________  __        Left Ventricle  Left ventricular size is normal. Left ventricular wall thickness is normal.  Global and regional left ventricular function is normal with an EF of 60-65%.  Normal left ventricular filling for age. No regional wall motion abnormalities  are seen.     Right Ventricle  The right ventricle is normal size. Global right ventricular function is  normal.     Atria  The left atrium appears normal. Mild right atrial enlargement is present.  There was no shunt at the atrial septal level as assessed by color Doppler at  rest and with Valsalva maneuver.     Mitral Valve  The mitral valve is normal. Trace mitral insufficiency is present.        Aortic Valve  Aortic valve is normal in structure and function. Mild aortic valve sclerosis  is present.     Tricuspid Valve  The tricuspid valve is normal. Trace tricuspid insufficiency is present. Right  ventricular systolic pressure is 12mmHg above the right atrial pressure.     Pulmonic Valve  The pulmonic valve is normal. Trace pulmonic insufficiency is present.      Vessels  The aorta root is normal. The inferior vena cava is normal. The pulmonary  artery is normal.     Pericardium  No pericardial effusion is present.          Outside records reviewed from: care everywhere      ASSESSMENT and PLAN  Suzi Gonsales is a 53 year old female scheduled to undergo Hip Screw Left Hip, Possible Cementation on 10/1/2018 by Dr. Dunn in treatment of lytic lesion on left femoral head. She has the following specific operative considerations:   - RCRI : Intermediate serious cardiac risks.  0.9% risk of major adverse cardiac event.   - Anesthesia considerations:  Refer to PAC assessment in anesthesia records  - VTE risk: 3%  - SURINDER # of risks 2/8 = low risk  - Post-op delirium risk: low risk  - Risk of PONV score = 2.  If > 2, anti-emetic intervention recommended.         I spent 30 minutes with patient, greater than 50% educating on preop meds, counseling on anesthesia and coordinating care for hip surgery    Patient was discussed with Dr Kaplan.    ARIEL Geiger CNS  Preoperative Assessment Center  [facility]  Phone: [phone]  Fax: [phone]

## 2018-09-27 NOTE — OR NURSING
Pt was not able to get in for her pre-op exam. I called PAC and made her an appt for her pre-op H&P today at 4 pm. Pt notified and given address: [facility] at  [address] [place] [address]. Go to PAC, 4J at 4 pm: Provider: Melanie Vidal. Pt verbalized understanding.

## 2018-09-27 NOTE — PROGRESS NOTES
Patient here for lab draw via port a cath. Labs obtained. Accessed without issue. Pt will follow up as directed.

## 2018-09-27 NOTE — ANESTHESIA PREPROCEDURE EVALUATION
Anesthesia Evaluation     . Pt has had prior anesthetic. Type: General and MAC    No history of anesthetic complications          ROS/MED HX    ENT/Pulmonary:  - neg pulmonary ROS   (+)tobacco use, Past use 0.5 x 20 years, qit 2004 packs/day  , . .    Neurologic:  - neg neurologic ROS   (+)CVA date: 2/14/18 (lacunar stroke found incidently) without deficits    Cardiovascular:  - neg cardiovascular ROS   (+) ----. Taking blood thinners Pt has received instructions: . . . :. . Previous cardiac testing Echodate:6/2017results:date: results:ECG reviewed date:8/2017 results:SR date: results:         (-) CAD, syncope and dyslipidemia   METS/Exercise Tolerance: Comment: Active as a .  Lifts weights.  >4 METS   Hematologic:     (+) History of Transfusion no previous transfusion reaction -     (-) history of blood clots and anemia   Musculoskeletal:   (+) , , other musculoskeletal- lytic lesion femoral head      GI/Hepatic: Comment: Has PPI available if needed.  No GERD.     (+) GERD (Rarely. Denies symptoms recently) Asymptomatic on medication,       Renal/Genitourinary:     (+) chronic renal disease (solitary kidney), Other Renal/ Genitourinary, Right nephrectomy      Endo:  - neg endo ROS   (+) Obesity, .      Psychiatric:     (+) psychiatric history anxiety and depression (Panic attacks)      Infectious Disease:  - neg infectious disease ROS       Malignancy:   (+) Malignancy History of Other  Other CA Metastatic kindey CA s/p nephrectomy. Lung mass, brain mass, adrenal nodules, pancreatic mass status post Surgery         Other:    (+) No chance of pregnancy C-spine cleared: N/A, no H/O Chronic Pain,no other significant disability                    Physical Exam  Normal systems: cardiovascular, pulmonary and dental    Airway   Mallampati: II  TM distance: >3 FB  Neck ROM: full    Dental     Cardiovascular   Rhythm and rate: regular and normal      Pulmonary    breath sounds clear to  auscultation    Other findings:   Echocardiogram 6/1/2017    Interpretation Summary  Normal left ventricular size, thickness, and function. Global and regional  left ventricular function is normal with an EF of 60-65%.  Normal right ventricular size and function.  No significant valvular pathology.  No pericardial effusion.     No prior study for comparison.  _____________________________________________________________________________  __        Left Ventricle  Left ventricular size is normal. Left ventricular wall thickness is normal.  Global and regional left ventricular function is normal with an EF of 60-65%.  Normal left ventricular filling for age. No regional wall motion abnormalities  are seen.     Right Ventricle  The right ventricle is normal size. Global right ventricular function is  normal.     Atria  The left atrium appears normal. Mild right atrial enlargement is present.  There was no shunt at the atrial septal level as assessed by color Doppler at  rest and with Valsalva maneuver.     Mitral Valve  The mitral valve is normal. Trace mitral insufficiency is present.        Aortic Valve  Aortic valve is normal in structure and function. Mild aortic valve sclerosis  is present.     Tricuspid Valve  The tricuspid valve is normal. Trace tricuspid insufficiency is present. Right  ventricular systolic pressure is 12mmHg above the right atrial pressure.     Pulmonic Valve  The pulmonic valve is normal. Trace pulmonic insufficiency is present.     Vessels  The aorta root is normal. The inferior vena cava is normal. The pulmonary  artery is normal.     Pericardium  No pericardial effusion is present.          Most Recent [facility] Pulmonary Function Testing    FVC-Pred       Date                     Value               Ref Range           Status                04/19/2017               3.09                L                                    ----------  FVC-Pre       Date                     Value                Ref Range           Status                04/19/2017               2.89                L                                    ----------  FVC-%Pred-Pre       Date                     Value               Ref Range           Status                04/19/2017               93                  %                                    ----------  FEV1-Pre       Date                     Value               Ref Range           Status                04/19/2017               2.38                L                                    ----------  FEV1-%Pred-Pre       Date                     Value               Ref Range           Status                04/19/2017               95                  %                                    ----------  FEV1FVC-Pred       Date                     Value               Ref Range           Status                04/19/2017               81                  %                                    ----------  FEV1FVC-Pre       Date                     Value               Ref Range           Status                04/19/2017               82                  %                                    ----------  No results found for: 20029  FEFMax-Pred       Date                     Value               Ref Range           Status                04/19/2017               6.25                L/sec                                ----------  FEFMax-Pre       Date                     Value               Ref Range           Status                04/19/2017               6.47                L/sec                                ----------  FEFMax-%Pred-Pre       Date                     Value               Ref Range           Status                04/19/2017               103                 %                                    ----------  ExpTime-Pre       Date                     Value               Ref Range           Status                04/19/2017               6.29                sec                                   ----------  FIFMax-Pre       Date                     Value               Ref Range           Status                04/19/2017               3.66                L/sec                                ----------  FEV1FEV6-Pred       Date                     Value               Ref Range           Status                04/19/2017               82                  %                                    ----------  FEV1FEV6-Pre       Date                     Value               Ref Range           Status                04/19/2017               83                  %                                    ----------  No results found for: 20055          Results for MICKEY AMEZCUA (MRN [id number]) as of 9/30/2018 15:07    Hemoglobin A1C: 6.1 (H)    INR: 1.04  ABO: O  RH(D): Pos  Antibody Screen: Neg  Test Valid Only At: [facility].  Specimen Expires: 09/30/2018  Blood Bank Comment: preadm form recd....    9/27/2018 17:48  Color Urine: Straw  Appearance Urine: Clear  Glucose Urine: Negative  Bilirubin Urine: Negative  Ketones Urine: Negative  Specific Gravity Urine: 1.003  pH Urine: 7.0  Protein Albumin Urine: Negative  Urobilinogen mg/dL: 0.0  Nitrite Urine: Negative  Blood Urine: Negative  Leukocyte Esterase Urine: Negative  Source: Clean catch urine  WBC Urine: 0  RBC Urine: 1  Squamous Epithelial /HPF Urine: 1    9/30/2018 11:39  Sodium: 141  Potassium: 4.0  Chloride: 109  Carbon Dioxide: 22  Urea Nitrogen: 7  Creatinine: 0.82  GFR Estimate: 72  GFR Estimate If Black: 88  Calcium: 8.7  Anion Gap: 9  Albumin: 2.8 (L)  Protein Total: 6.2 (L)  Bilirubin Total: 0.2  Alkaline Phosphatase: 62  ALT: 45  AST: 40  Glucose: 146 (H)  WBC: 4.4  Hemoglobin: 12.1  Hematocrit: 37.4  Platelet Count: 312  RBC Count: 3.78 (L)  MCV: 99  MCH: 32.0  MCHC: 32.4  RDW: 17.7 (H)  Diff Method: Automated Method  % Neutrophils: 50.1  % Lymphocytes: 21.5  % Monocytes: 6.2  % Eosinophils: 21.3  % Basophils: 0.7  % Immature Granulocytes:  0.2  Nucleated RBCs: 0  Absolute Neutrophil: 2.2  Absolute Lymphocytes: 0.9  Absolute Monocytes: 0.3  Absolute Eosinophils: 0.9 (H)  Absolute Basophils: 0.0  Abs Immature Granulocytes: 0.0  Absolute Nucleated RBC: 0.0             PAC Discussion and Assessment    ASA Classification: 3  Case is suitable for: [place]  Anesthetic techniques and relevant risks discussed: GA  Invasive monitoring and risk discussed: Yes  Types:   Possibility and Risk of blood transfusion discussed: Yes  NPO instructions given:   Additional anesthetic preparation and risks discussed:   Needs early admission to pre-op area:   Other:     PAC Resident/NP Anesthesia Assessment:  Suzi Gonsales is a 54 yo female scheduled for Hip Screw Left Hip, Possible Cementation on 10/1/2018 by Dr. Dunn in treatment of lytic lesion on left femoral head     Pt has metastatic renal cancer. She was diagnosed 12/2014 and had a radical nephrectomy 12/31/2014. She also has multiple lung nodules and a lesion on the left adrenal gland and pancreas. She had a craniotomy for cerebellar lesion 4/2018. Lastly, a new lytic lesion was found on the left femoral head. She is cared for by Dr. Green.     Previous anesthesia without complications    1) Cardiac: No known cardiac diagnosis or symptoms. EKG 8/18/2017 NSR. Echocardiogram 6/1/20178 shows EF of 60-65%  2) Pulmonary: Smoked 0.5 PPD for 20 years, quit 2004. Recent thoracentesis 9/10/2018 and she states she feels well.  3) Neuro: lacunar infarct 2/2018, incidental find and no residual effects.      Her K was 3.0, I supplemented her for 4 days and recheck was 4      Reviewed and Signed by PAC Mid-Level Provider/Resident  Mid-Level Provider/Resident: ARIEL Gabriel  Date: 9/27/2018  Time: 1730    Attending Anesthesiologist Anesthesia Assessment:        Anesthesiologist:   Date:   Time:   Pass/Fail:   Disposition:     PAC Pharmacist Assessment:        Pharmacist:   Date:   Time:      Anesthesia Plan      History &  Physical Review  History and physical reviewed and following examination; no interval change.    ASA Status:  3 .    NPO Status:  > 8 hours    Plan for General and ETT with Intravenous induction.   PONV prophylaxis:  Ondansetron (or other 5HT-3) and Dexamethasone or Solumedrol  Additional equipment: Videolaryngoscope, 2nd IV and Arterial Line      Postoperative Care  Postoperative pain management:  IV analgesics, Multi-modal analgesia and Peripheral nerve block (Single Shot).  Plan for postoperative opioid use.    Consents  Anesthetic plan, risks, benefits and alternatives discussed with:  Patient and Spouse.  Use of blood products discussed: Yes.   Use of blood products discussed with Patient.  Consented to blood products.  .                          .

## 2018-09-27 NOTE — MR AVS SNAPSHOT
After Visit Summary   2018    Suzi Gonsales    MRN: [id number]           Patient Information     Date Of Birth          1965        Visit Information        Provider Department      2018 4:00 PM Lavon Vidal APRN [facility]        Today's Diagnoses     Preop general physical exam    -  1      Care Instructions    Preparing for Your Surgery      Name:  Suzi Gonsales   MRN:  [id number]   :  1965   Today's Date:  2018     Arriving for surgery:  Left hip ORIF   Surgery date:  10/01/2018  Arrival time:  9:30 am  Please come to:     [facility] [address]  [address] [place]  [address]    - parking is available in front of [facility] from 5:15AM to 8:00PM. If you prefer, park your car in the Green Lot.    -Proceed to the 3rd floor, check in at the Adult Surgery Waiting Lounge. [phone]    If an escort is needed stop at the Information Desk in the lobby. Inform the information person that you are here for surgery. An escort to the Adult Surgery Waiting Lounge will be provided.        What can I eat or drink?  -  You may have solid food or milk products until 8 hours prior to your surgery. ( 4 am)   -  You may have water, apple juice or 7up/Sprite until 2 hours prior to your surgery. ( until 9:30 am arrival time)    Which medicines can I take?         Stop Aspirin, vitamins and supplements one week prior to surgery.       Hold Ibuprofen and Naproxen for 24 hours prior to surgery.        -  Please take these medications the day of surgery:     Alprazolam if needed      Bupropion      Hydrocodone if needed      Omeprazole            How do I prepare myself?  -  Take two showers: one the night before surgery; and one the morning of surgery.         Use Scrubcare or Hibiclens to wash from neck down.  You may use your own shampoo and conditioner. No other hair products.   -  Do NOT  use lotion, powder, deodorant, or antiperspirant the day of your surgery.  -  Do NOT wear any makeup, fingernail polish or jewelry.  - Do not bring your own medications to the hospital, except for inhalers and eye drops.  -  Bring your ID and insurance card.    Questions or Concerns:  -If you have questions or concerns regarding the day of surgery, please call [phone].     -For questions after surgery please call your surgeons office.           AFTER YOUR SURGERY  Breathing exercises   Breathing exercises help you recover faster. Take deep breaths and let the air out slowly. This will:     Help you wake up after surgery.    Help prevent complications like pneumonia.  Preventing complications will help you go home sooner.   We may give you a breathing device (incentive spirometer) to encourage you to breathe deeply.   Nausea and vomiting   You may feel sick to your stomach after surgery; if so, let your nurse know.    Pain control:  After surgery, you may have pain. Our goal is to help you manage your pain. Pain medicine will help you feel comfortable enough to do activities that will help you heal.  These activities may include breathing exercises, walking and physical therapy.   To help your health care team treat your pain we will ask: 1) If you have pain  2) where it is located 3) describe your pain in your words  Methods of pain control include medications given by mouth, vein or by nerve block for some surgeries.  We may give you a pain control pump that will:  1) Deliver the medicine through a tube placed in your vein  2) Control the amount of medicine you receive  3) Allow you to push a button to deliver a dose of pain medicine  Sequential Compression Device (SCD) or Pneumo Boots:  You may need to wear SCD S on your legs or feet. These are wraps connected to a machine that pumps in air and releases it. The repeated pumping helps prevent blood clots from forming.           Follow-ups after your visit         Your next 10 appointments already scheduled     Sep 27, 2018  5:15 PM CDT   [facility] Draw with  [facility] DRAW   [facility] Lab Draw ([facility])    [address]  [address]  [facility] [id number] [phone]            Oct 01, 2018   Procedure with Randy Dunn MD   [facility] [place], Same Day Surgery (--)    [address]  [place] [address]   [phone]            Dec 06, 2018 12:00 PM CST   MR BRAIN W/O & W CONTRAST with UUMR1   [facility] [place], MRI ([facility])    [address] [id number] [phone]           How do I prepare for my exam? (Food and drink instructions) **If you will be receiving sedation or general anesthesia, please see special notes below.**  How do I prepare for my exam? (Other instructions) Take your medicines as usual, unless your doctor tells you not to. You may or may not receive intravenous (IV) contrast for this exam pending the discretion of the Radiologist.  You do not need to do anything special to prepare.  **If you will be receiving sedation or general anesthesia, please see special notes below.**  What should I wear: The MRI machine uses a strong magnet. Please wear clothes without metal (snaps, zippers). A sweatsuit works well, or we may give you a hospital gown. Please remove any body piercings and hair extensions before you arrive. You will also remove watches, jewelry, hairpins, wallets, dentures, partial dental plates and hearing aids. You may wear contact lenses, and you may be able to wear your rings. We have a safe place to keep your personal items, but it is safer to leave them at home.  How long does the exam take: Most tests take 30 to 60 minutes.  HOWEVER, IF YOUR DOCTOR PRESCRIBES ANESTHESIA please plan on spending four to five hours in the recovery room.  What should I bring:  Bring a list of your current medicines to your  exam (including vitamins, minerals and over-the-counter drugs).  Do I need a :  **If you will be receiving sedation or general anesthesia, please see special notes below.**  What should I do after the exam: No Restrictions, You may resume normal activities.  What is this test: MRI (magnetic resonance imaging) uses a strong magnet and radio waves to look inside the body. An MRA (magnetic resonance angiogram) does the same thing, but it lets us look at your blood vessels. A computer turns the radio waves into pictures showing cross sections of the body, much like slices of bread. This helps us see any problems more clearly. You may receive fluid (called  contrast ) before or during your scan. The fluid helps us see the pictures better. We give the fluid through an IV (small needle in your arm).  Who should I call with questions:  Please call the Imaging Department at your exam site with any questions. Directions, parking instructions, and other information is available on our website, [email or web address].  How do I prepare if I m having sedation or anesthesia? **IMPORTANT** THE INSTRUCTIONS BELOW ARE ONLY FOR THOSE PATIENTS WHO HAVE BEEN TOLD THEY WILL RECEIVE SEDATION OR GENERAL ANESTHESIA DURING THEIR MRI PROCEDURE:  IF YOU WILL RECEIVE SEDATION (take medicine to help you relax during your exam): You must get the medicine from your doctor before you arrive. Bring the medicine to the exam. Do not take it at home. Arrive one hour early. Bring someone who can take you home after the test. Your medicine will make you sleepy. After the exam, you may not drive, take a bus or take a taxi by yourself. No eating 8 hours before your exam. You may have clear liquids up until 4 hours before your exam. (Clear liquids include water, clear tea, black coffee and fruit juice without pulp.)  IF YOU WILL RECEIVE ANESTHESIA (be asleep for your exam): Arrive 1 1/2 hours early. Bring someone who can take you home after the test.  You may not drive, take a bus or take a taxi by yourself. No eating 8 hours before your exam. You may have clear liquids up until 4 hours before your exam. (Clear liquids include water, clear tea, black coffee and fruit juice without pulp.)            Dec 06, 2018  1:00 PM CST   Return Visit with Huyen Cuellar MD   Radiation Oncology Clinic ([facility])    [facility]  1st Floor  [address] [id number] [phone]              Future tests that were ordered for you today     Open Future Orders        Priority Expected Expires Ordered    ABO/Rh type and screen Routine 9/27/2018 10/27/2018 9/27/2018    Basic metabolic panel Routine 9/27/2018 10/27/2018 9/27/2018    CBC with platelets Routine 9/27/2018 10/27/2018 9/27/2018    INR Routine 9/27/2018 10/27/2018 9/27/2018    Hemoglobin A1c Routine 9/27/2018 10/27/2018 9/27/2018    UA reflex to Microscopic and Culture Routine 9/27/2018 10/27/2018 9/27/2018            Who to contact     Please call your clinic at [phone] to:    Ask questions about your health    Make or cancel appointments    Discuss your medicines    Learn about your test results    Speak to your doctor            Additional Information About Your Visit        Yibailin Information     Yibailin gives you secure access to your electronic health record. If you see a primary care provider, you can also send messages to your care team and make appointments. If you have questions, please call your primary care clinic.  If you do not have a primary care provider, please call [phone] and they will assist you.      Yibailin is an electronic gateway that provides easy, online access to your medical records. With Yibailin, you can request a clinic appointment, read your test results, renew a prescription or communicate with your care team.     To access your existing account, please contact your [facility] or call  [phone] for assistance.        Care EveryWhere ID     This is your Care EveryWhere ID. This could be used by other organizations to access your [place] medical records  [id number]        Your Vitals Were     Pulse Temperature Last Period Pulse Oximetry BMI (Body Mass Index)       88 98.4  F (36.9  C) (Oral) 12/20/2013 98% 22.58 kg/m2        Blood Pressure from Last 3 Encounters:   09/27/18 119/64   09/10/18 97/57   09/06/18 116/57    Weight from Last 3 Encounters:   09/27/18 54.2 kg (119 lb 8 oz)   09/10/18 52.8 kg (116 lb 4.8 oz)   09/06/18 52.7 kg (116 lb 3.2 oz)               Primary Care Provider Office Phone # Fax #    Molly Adame ARIEL [facility] [phone] [address] [address]        Equal Access to Services     ROSAURA INTERIANO : Hadii jimmie ku hadasho Soomaali, waaxda luqadaha, qaybta kaalmada adeegyada, josemanuel oswald . So [facility] [phone].    ATENCIÓN: Si habla español, tiene a chacon disposición servicios gratuitos de asistencia lingüística. Llame al [phone].    We comply with applicable federal civil rights laws and [place] laws. We do not discriminate on the basis of race, color, national origin, age, disability, sex, sexual orientation, or gender identity.            Thank you!     Thank you for choosing [facility]  for your care. Our goal is always to provide you with excellent care. Hearing back from our patients is one way we can continue to improve our services. Please take a few minutes to complete the written survey that you may receive in the mail after your visit with us. Thank you!             Your Updated Medication List - Protect others around you: Learn how to safely use, store and throw away your medicines at www.disposemymeds.org.          This list is accurate as of 9/27/18  4:59 PM.  Always use your most recent med list.                   Brand Name Dispense Instructions for use  Diagnosis    ALPRAZolam 0.5 MG tablet    XANAX    60 tablet    Take 1 tablet (0.5 mg) by mouth 3 times daily as needed for anxiety    Renal cell carcinoma, unspecified laterality (H), Mass of upper lobe of left lung       buPROPion 300 MG 24 hr tablet    WELLBUTRIN XL     Take 300 mg by mouth every morning        * Cabozantinib S-Malate 40 MG    CABOMETYX    30 tablet    Take 1 tablet (40 mg) by mouth daily Take on an empty stomach 1 hour before or 2 hours after a meal. Avoid grapefruit and grapefruit juice.    Brain metastasis (H), Metastatic renal cell carcinoma to lung, right (H), Malignant neoplasm of right kidney (H)       * Cabozantinib S-Malate 40 MG    CABOMETYX    30 tablet    Take 1 tablet (40 mg) by mouth daily Take on an empty stomach 1 hour before or 2 hours after a meal. Avoid grapefruit and grapefruit juice.    Brain metastasis (H), Metastatic renal cell carcinoma to lung, right (H), Malignant neoplasm of right kidney (H)       * Cabozantinib S-Malate 40 MG    CABOMETYX    30 tablet    Take 1 tablet (40 mg) by mouth daily Take on an empty stomach 1 hour before or 2 hours after a meal. Avoid grapefruit and grapefruit juice.    Brain metastasis (H), Metastatic renal cell carcinoma to lung, right (H), Malignant neoplasm of right kidney (H)       citalopram 20 MG tablet    celeXA    90 tablet    Take 1 tablet (20 mg) by mouth every evening    Insomnia, unspecified type       diphenoxylate-atropine 2.5-0.025 MG per tablet    LOMOTIL    100 tablet    Take 1-2 tablets by mouth 4 times daily as needed for diarrhea    Diarrhea, unspecified type       HYDROcodone-acetaminophen 5-325 MG per tablet    NORCO    30 tablet    Take 1 tablet by mouth every 6 hours as needed for severe pain    Malignant neoplasm of right kidney (H)       omeprazole 40 MG capsule    priLOSEC    30 capsule    Take 1 capsule (40 mg) by mouth daily    Cerebral edema (H)       ondansetron 8 MG tablet    ZOFRAN    30 tablet    Take 1 tablet  (8 mg) by mouth every 8 hours as needed for nausea    Nausea       PROBIOTIC PO      Take by mouth daily (with lunch)    Pre-op evaluation, Brain metastases (H)       zolpidem 5 MG tablet    AMBIEN    30 tablet    Take 1 tablet (5 mg) by mouth nightly as needed for sleep    Sleep disorder       * Notice:  This list has 3 medication(s) that are the same as other medications prescribed for you. Read the directions carefully, and ask your doctor or other care provider to review them with you.

## 2018-09-28 NOTE — ORAL ONC MGMT
Call placed to Suzi as she was wondering if she should be holding Cabometyx prior to her Ortho surgery.  Dr. Green stated she should begin holding today and then continue to hold for 2 weeks.  Upon calling Suzi she stated she had already taking the dose for today and I told her to start holding the medication for tomorrow.  She understood and thanked me for call.    Nadia Patrick, Pharm.D., [facility]  [phone]  09/28/18

## 2018-09-30 NOTE — NURSING NOTE
"Chief Complaint   Patient presents with     Port Draw     port accessed and labs drawn by rn.       Port accessed with 20g 3/4\" gripper needle and labs drawn by RN.  Port flushed with saline and heparin.  Port de-accessed.    Inez Kahn RN    "

## 2018-10-01 PROBLEM — C64.9 RENAL CELL CANCER (H): Status: ACTIVE | Noted: 2018-01-01

## 2018-10-01 NOTE — OR NURSING
"Pt awake but sleepy on arrival.  Asking for \"pain medication for pain in left hip\".  CRNA giving pain medication.  Breath sounds with absent breath sounds in left lung.  Decrease breath sounds in right lung.  Pt moves all fours.  Dressing dry and intact.  "

## 2018-10-01 NOTE — OR NURSING
PACU to Inpatient Nursing Handoff    Patient Suzi Gonsales is a 53 year old female who speaks English.   Procedure Procedure(s):  Hip Screw Left Hip - Wound Class: I-Clean   Surgeon(s) Primary: Randy Dunn MD  Resident - Assisting: Juanito Jimenez MD     No Known Allergies    Isolation  [unfilled]     Past Medical History   has a past medical history of Adrenal nodule (H); Anxiety; Brain mass; Depression; Former tobacco use; Lacunar infarct, acute (02/2018); Mass of left lung; Renal cell carcinoma (H) (12/2014); and Solitary kidney.    Anesthesia General   Dermatome Level     Preop Meds Not applicable   Nerve block Not applicable   Intraop Meds fentanyl (Sublimaze): 250 mcg total  ketamine (Ketalar): 20 mg given  ketorolac (Toradol): last given at 1258  ondansetron (Zofran): last given at 1258   Local Meds Yes   Antibiotics cefazolin (Ancef) - last given at 1215     Pain Patient Currently in Pain: yes  Comfort: tolerable with discomfort   PACU meds  hydromorphone (Dilaudid): 1.5 mg (total dose) last given at 1528   ondansetron (Zofran): 4 mg (total dose) last given at 1453    PCA / epidural No   Capnography Respiratory Monitoring (EtCO2): 33 mmHg   Telemetry ECG Rhythm: Sinus rhythm   Inpatient Telemetry Monitor Ordered? No        Labs Glucose Lab Results   Component Value Date     09/30/2018       Hgb Lab Results   Component Value Date    HGB 12.1 09/30/2018       INR Lab Results   Component Value Date    INR 1.04 09/27/2018      PACU Imaging Not applicable     Wound/Incision Incision/Surgical Site 12/04/17 Right Chest (Active)   Number of days:301       Incision/Surgical Site 03/06/18 Posterior;Left Head (Active)   Number of days:209       Incision/Surgical Site 06/12/18 Left Back (Active)   Number of days:111       Incision/Surgical Site 10/01/18 Left Hip (Active)   Incision Assessment [facility] 10/1/2018  3:01 PM   Amber-Incision Assessment UT 10/1/2018  3:01 PM   Closure BRITTA 10/1/2018   3:01 PM   Incision Drainage Amount UTV 10/1/2018  3:01 PM   Dressing Intervention Clean, dry, intact 10/1/2018  3:01 PM   Number of days:0      CMS Peripheral Neurovascular WDL: WDL (10/01/18 1419)  All Extremities Temperature: warm (10/01/18 0956)  All Extremities Color: pale (10/01/18 1419)  All Extremities Sensation: no numbness;no tingling (10/01/18 1419)  LLE Temperature: warm (10/01/18 1419)  LLE Color: pale (10/01/18 1419)  LLE Sensation: no numbness;no tingling (10/01/18 1419)  RLE Temperature: warm (10/01/18 1419)  RLE Color: no discoloration (10/01/18 1419)  RLE Sensation: no numbness;no tingling (10/01/18 1419)      Equipment Not applicable   Other LDA       IV Access Peripheral IV 03/06/18 Right Hand (Active)   Number of days:209       Peripheral IV 10/01/18 Left Lower forearm (Active)   Site Assessment WDL 10/1/2018  2:19 PM   Line Status Infusing 10/1/2018  2:19 PM   Phlebitis Scale 0-->no symptoms 10/1/2018  2:19 PM   Infiltration Scale 0 10/1/2018  2:19 PM   Infiltration Site Treatment Method  None 10/1/2018  2:19 PM   Extravasation? No 10/1/2018  2:19 PM   Dressing Intervention New dressing  10/1/2018 11:05 AM   Number of days:0       Port A Cath Single 03/06/18 Right Chest wall (Active)   Access Date 09/30/18 9/30/2018 11:00 AM   Access Attempts 1 9/30/2018 11:00 AM   Gauge/Length Noncoring 90 degree bend;20 gauge;3/4 inch 9/30/2018 11:00 AM   Site Assessment WDL 9/30/2018 11:00 AM   Line Status Blood return noted;Heparin locked 9/30/2018 11:00 AM   Dressing Intervention Gauze 9/30/2018 11:00 AM   De-Access Date 09/30/18 9/30/2018 11:00 AM   Number of days:209       Vascular Access Port Access/Assessment - double lumen (Active)   Number of days:      Blood Products Not applicable EBL 20   mL   Intake/Output Date 10/01/18 0700 - 10/02/18 0659   Shift 4624-0993 7805-3704 3125-6979 24 Hour Total   I  N  T  A  K  E   I.V. 1050   1050    Shift Total  (mL/kg) 1050  (19.59)   1050  (19.59)    O  U  T  P  U  T   Blood 20   20    Shift Total  (mL/kg) 20  (0.37)   20  (0.37)   Weight (kg) 53.6 53.6 53.6 53.6        Drains / Foster     Time of void PreOp Void Prior to Procedure: 1030 (10/01/18 1107)    PostOp      Diapered? No   Bladder Scan     PO    tolerating sips     Vitals    B/P: 96/60  T: 98.3  F (36.8  C)    Temp src: Oral  P:       Heart Rate: 86 (10/01/18 1500)     R: 23  O2:  SpO2: 98 %    O2 Device: Nasal cannula (10/01/18 1445)    Oxygen Delivery: 1 LPM (10/01/18 1445)         Family/support present significant other   Patient belongings Patient Belongings: clothing;shoes;tote  Disposition of Belongings: Locker   Patient transported on cart   DC meds/scripts (obs/outpt) Not applicable   Inpatient Pain Meds Released? Yes       Special needs/considerations None      Tasks needing completion None       [place] RN  [facility]COM [id number]

## 2018-10-01 NOTE — OR NURSING
"Pt asking \"to keep on the oxygen as it makes me breath better\".  Oxygen saturations 98% on 1 liter nasal canula.  Breath sounds still absent in left lung heard anterior chest.  ETCo2 33.  Pt does not appear labored with breathing.  Respiratory rate about 20.  "

## 2018-10-01 NOTE — IP AVS SNAPSHOT
MRN:[id number]                      After Visit Summary   10/1/2018    Suzi Gonsales    MRN: [id number]           Thank you!     Thank you for choosing [facility] for your care. Our goal is always to provide you with excellent care. Hearing back from our patients is one way we can continue to improve our services. Please take a few minutes to complete the written survey that you may receive in the mail after you visit with us. Thank you!        Patient Information     Date Of Birth          1965        Designated Caregiver       Most Recent Value    Caregiver    Will someone help with your care after discharge? yes    Name of designated caregiver gray Griffiths osmel upton    Phone number of caregiver [phone]    Caregiver address        About your hospital stay     You were admitted on:  October 1, 2018 You last received care in the:   8A    You were discharged on:  October 4, 2018        Reason for your hospital stay       Curettage of bone metastasis and plate fixation                  Who to Call     For medical emergencies, please call 911.  For non-urgent questions about your medical care, please call your primary care provider or clinic, [phone]  For questions related to your surgery, please call your surgery clinic        Attending Provider     Provider Randy Meng MD Orthopedics       Primary Care Provider Office Phone # Fax #    Molly Foote ARIEL Adame [facility] [phone]      After Care Instructions     Activity       Your activity upon discharge: weightbearing as tolerated; activity as tolerated.            Diet       Follow this diet upon discharge: regular            Discharge Instructions       -Wound Care: Your incision was closed with sutures underneath the skin. You have a brown/tan rubber-like dressing applied to your surgical site, you may shower over this. Remove it 5-7 days after surgery. You may replace this with gauze and tape.   Change the bandages every other day and keep wound clean and dry. You may note strands of suture from each end of your incision - this is normal and is a knotless type of suture that can be trimmed at its base around 2 weeks from your surgery, otherwise it may be left to fall off on its own. Water may run over incision but no scrubbing. Do not submerge wound in water (pool, spa, sauna, lake, bathtub, etc.) for at least 4 weeks.     -Pain control: Take medication as prescribed, but only as often as necessary. Do not drive or drink alcohol while you are taking pain medication. Remember that Tylenol can be used for pain relief as well, as you wean off the narcotics. Do not exceed 4,000 mg of tylenol in 24 hours.     -Activity: Weight bear as tolerated left lower extremity    -DVT prophylaxis: Lovenox for 4 weeks    -If: Pain in your surgical area persists or worsens in the first few days after surgery. Excessive redness or drainage of cloudy or bloody material from the wounds (clear red tinted fluid and some mild drainage should be expected). Drainage of any kind > one week after surgery should be reported to the doctor. You have a temperature elevation greater than 101.5  You have pain, swelling or redness in your calf. You have numbness or weakness in your leg or foot. Call our clinic ([phone] during regular office hours, or [phone] for the on-call resident MD for questions - RESIDENT DOES NOT HAVE ABILITY TO PRESCRIBE NARCOTICS)     -Call your primary doctor or seek medical care if you have any of the following: temperature greater than 101.4, chest pain, increased shortness of breath, nausea or vomiting.            Discharge Instructions       Call primary MSD and oncologist to see when they want to see you back.  Call primary MD if diarrhea persists.  [facility] with RN - start 10/5 - check clinically, bowel status, orthostatic BP, review meds - update primary MD.                  Follow-up Appointments      Adult [facility] Follow-up and recommended labs and tests       Follow up with Dr. Dunn at 2 weeks for a wound check     Appointments on [place] and/or [facility] (with [facility] or [facility] provider or service). Call [phone] if you haven't heard regarding these appointments within 7 days of discharge.                  Your next 10 appointments already scheduled     Oct 16, 2018  4:15 PM CDT   (Arrive by 4:00 PM)   Return Visit with Randy Dunn MD   [facility] ([facility])    [address] [id number] [phone]            Dec 06, 2018 12:00 PM CST   MR BRAIN W/O & W CONTRAST with UUMR1   [facility], [place], [facility])    [address] [id number] [phone]           How do I prepare for my exam? (Food and drink instructions) **If you will be receiving sedation or general anesthesia, please see special notes below.**  How do I prepare for my exam? (Other instructions) Take your medicines as usual, unless your doctor tells you not to. You may or may not receive intravenous (IV) contrast for this exam pending the discretion of the Radiologist.  You do not need to do anything special to prepare.  **If you will be receiving sedation or general anesthesia, please see special notes below.**  What should I wear: The MRI machine uses a strong magnet. Please wear clothes without metal (snaps, zippers). A sweatsuit works well, or we may give you a hospital gown. Please remove any body piercings and hair extensions before you arrive. You will also remove watches, jewelry, hairpins, wallets, dentures, partial dental plates and hearing aids. You may wear contact lenses, and you may be able to wear your rings. We have a safe place to keep your personal items, but it is safer to leave them at home.  How long does the exam take: Most tests take 30 to 60 minutes.   HOWEVER, IF YOUR DOCTOR PRESCRIBES ANESTHESIA please plan on spending four to five hours in the recovery room.  What should I bring:  Bring a list of your current medicines to your exam (including vitamins, minerals and over-the-counter drugs).  Do I need a :  **If you will be receiving sedation or general anesthesia, please see special notes below.**  What should I do after the exam: No Restrictions, You may resume normal activities.  What is this test: MRI (magnetic resonance imaging) uses a strong magnet and radio waves to look inside the body. An MRA (magnetic resonance angiogram) does the same thing, but it lets us look at your blood vessels. A computer turns the radio waves into pictures showing cross sections of the body, much like slices of bread. This helps us see any problems more clearly. You may receive fluid (called  contrast ) before or during your scan. The fluid helps us see the pictures better. We give the fluid through an IV (small needle in your arm).  Who should I call with questions:  Please call the Imaging Department at your exam site with any questions. Directions, parking instructions, and other information is available on our website, [email or web address].  How do I prepare if I m having sedation or anesthesia? **IMPORTANT** THE INSTRUCTIONS BELOW ARE ONLY FOR THOSE PATIENTS WHO HAVE BEEN TOLD THEY WILL RECEIVE SEDATION OR GENERAL ANESTHESIA DURING THEIR MRI PROCEDURE:  IF YOU WILL RECEIVE SEDATION (take medicine to help you relax during your exam): You must get the medicine from your doctor before you arrive. Bring the medicine to the exam. Do not take it at home. Arrive one hour early. Bring someone who can take you home after the test. Your medicine will make you sleepy. After the exam, you may not drive, take a bus or take a taxi by yourself. No eating 8 hours before your exam. You may have clear liquids up until 4 hours before your exam. (Clear liquids include water, clear tea,  "black coffee and fruit juice without pulp.)  IF YOU WILL RECEIVE ANESTHESIA (be asleep for your exam): Arrive 1 1/2 hours early. Bring someone who can take you home after the test. You may not drive, take a bus or take a taxi by yourself. No eating 8 hours before your exam. You may have clear liquids up until 4 hours before your exam. (Clear liquids include water, clear tea, black coffee and fruit juice without pulp.)            Dec 06, 2018  1:00 PM CST   Return Visit with Huyen Cuellar MD   Radiation Oncology Clinic ([facility])    [facility]  1st Floor  [address] [facility] [address]   [phone]              Additional Information     If you use hormonal birth control (such as the pill, patch, ring or implants): You'll need a second form of birth control for 7 days (condoms, a diaphragm or contraceptive foam). While in the hospital, you received a medicine called Bridion. Your normal birth control will not work as well for a week after taking this medicine.          Pending Results     No orders found from 9/29/2018 to 10/2/2018.            Statement of Approval     Ordered          10/04/18 1109  I have reviewed and agree with all the recommendations and orders detailed in this document.  EFFECTIVE NOW     Approved and electronically signed by:  Randy Dunn MD           10/04/18 2400  I have reviewed and agree with all the recommendations and orders detailed in this document.     Approved and electronically signed by:  Roberto Mcallister MD             Admission Information     Date & Time Provider Department Dept. Phone    10/1/2018 Randy Dunn MD UR 8A [phone]      Your Vitals Were     Blood Pressure Pulse Temperature Respirations Height Weight    97/44 (BP Location: Left arm) 105 97.3  F (36.3  C) (Oral) 16 1.549 m (5' 1\") 53.6 kg (118 lb 2.7 oz)    Last Period Pulse Oximetry BMI (Body Mass Index)             12/20/2013 94% " 22.33 kg/m2         [employer] Information     [employer] gives you secure access to your electronic health record. If you see a primary care provider, you can also send messages to your care team and make appointments. If you have questions, please call your primary care clinic.  If you do not have a primary care provider, please call [phone] and they will assist you.        Care EveryWhere ID     This is your Care EveryWhere ID. This could be used by other organizations to access your [facility] medical records  [id number]        Equal Access to Services     ROSAURA INTERIANO : Hadii jimmie billyo Soomaali, waaxda luqadaha, qaybta kaalmada jarek, josemanuel oswald . So [facility] [phone].    ATENCIÓN: Si habla español, tiene a chacon disposición servicios gratuitos de asistencia lingüística. Llame al [phone].    We comply with applicable federal civil rights laws and [place] laws. We do not discriminate on the basis of race, color, national origin, age, disability, sex, sexual orientation, or gender identity.               Review of your medicines      START taking        Dose / Directions    acetaminophen 325 MG tablet   Commonly known as:  TYLENOL   Used for:  Bone metastasis (H)        Dose:  975 mg   Take 3 tablets (975 mg) by mouth every 6 hours   Quantity:  100 tablet   Refills:  1       enoxaparin 40 MG/0.4ML injection   Commonly known as:  LOVENOX   Used for:  Bone metastasis (H)        Dose:  40 mg   Inject 0.4 mLs (40 mg) Subcutaneous every 24 hours for 26 days   Quantity:  10.4 mL   Refills:  0       oxyCODONE IR 5 MG tablet   Commonly known as:  ROXICODONE   Used for:  Bone metastasis (H)        Dose:  5-10 mg   Take 1-2 tablets (5-10 mg) by mouth every 4 hours as needed for moderate to severe pain   Quantity:  60 tablet   Refills:  0       polyethylene glycol Packet   Commonly known as:  MIRALAX/GLYCOLAX        Dose:  17 g   Take 17 g by mouth daily as needed for constipation    Quantity:  7 packet   Refills:  0       sennosides 8.6 MG tablet   Commonly known as:  SENOKOT   Used for:  Bone metastasis (H)        Dose:  1-2 tablet   Take 1-2 tablets by mouth 2 times daily as needed for constipation (no stool in 24 hrs)   Quantity:  60 each   Refills:  1         CONTINUE these medicines which have NOT CHANGED        Dose / Directions    ALPRAZolam 0.5 MG tablet   Commonly known as:  XANAX   Used for:  Renal cell carcinoma, unspecified laterality (H), Mass of upper lobe of left lung        Dose:  0.5 mg   Take 1 tablet (0.5 mg) by mouth 3 times daily as needed for anxiety   Quantity:  60 tablet   Refills:  0       buPROPion 300 MG 24 hr tablet   Commonly known as:  WELLBUTRIN XL        Dose:  300 mg   Take 300 mg by mouth every morning   Refills:  0       citalopram 20 MG tablet   Commonly known as:  celeXA   Used for:  Insomnia, unspecified type        Dose:  20 mg   Take 1 tablet (20 mg) by mouth every evening   Quantity:  90 tablet   Refills:  3       diphenoxylate-atropine 2.5-0.025 MG per tablet   Commonly known as:  LOMOTIL   Used for:  Diarrhea, unspecified type        Dose:  1-2 tablet   Take 1-2 tablets by mouth 4 times daily as needed for diarrhea   Quantity:  100 tablet   Refills:  1       omeprazole 40 MG capsule   Commonly known as:  priLOSEC   Used for:  Cerebral edema (H)        Dose:  40 mg   Take 1 capsule (40 mg) by mouth daily   Quantity:  30 capsule   Refills:  3       ondansetron 8 MG tablet   Commonly known as:  ZOFRAN   Used for:  Nausea        Dose:  8 mg   Take 1 tablet (8 mg) by mouth every 8 hours as needed for nausea   Quantity:  30 tablet   Refills:  3       PROBIOTIC PO   Used for:  Pre-op evaluation, Brain metastases (H)        Take by mouth daily (with lunch)   Refills:  0       zolpidem 5 MG tablet   Commonly known as:  AMBIEN   Used for:  Sleep disorder        Dose:  5 mg   Take 1 tablet (5 mg) by mouth nightly as needed for sleep   Quantity:  30 tablet    Refills:  3         STOP taking     Cabozantinib S-Malate 40 MG   Commonly known as:  CABOMETYX           HYDROcodone-acetaminophen 5-325 MG per tablet   Commonly known as:  NORCO                Where to get your medicines      These medications were sent to [facility] [place], [place] - [address]  [address], [facility] [address]     Phone:  [phone]     acetaminophen 325 MG tablet    enoxaparin 40 MG/0.4ML injection         Some of these will need a paper prescription and others can be bought over the counter. Ask your nurse if you have questions.     Bring a paper prescription for each of these medications     oxyCODONE IR 5 MG tablet                Protect others around you: Learn how to safely use, store and throw away your medicines at www.disposemymeds.org.        Information about OPIOIDS     PRESCRIPTION OPIOIDS: WHAT YOU NEED TO KNOW   We gave you an opioid (narcotic) pain medicine. It is important to manage your pain, but opioids are not always the best choice. You should first try all the other options your care team gave you. Take this medicine for as short a time (and as few doses) as possible.    Some activities can increase your pain, such as bandage changes or therapy sessions. It may help to take your pain medicine 30 to 60 minutes before these activities. Reduce your stress by getting enough sleep, working on hobbies you enjoy and practicing relaxation or meditation. Talk to your care team about ways to manage your pain beyond prescription opioids.    These medicines have risks:    DO NOT drive when on new or higher doses of pain medicine. These medicines can affect your alertness and reaction times, and you could be arrested for driving under the influence (DUI). If you need to use opioids long-term, talk to your care team about driving.    DO NOT operate heavy machinery    DO NOT do any other dangerous activities while taking these medicines.    DO NOT  drink any alcohol while taking these medicines.     If the opioid prescribed includes acetaminophen, DO NOT take with any other medicines that contain acetaminophen. Read all labels carefully. Look for the word  acetaminophen  or  Tylenol.  Ask your pharmacist if you have questions or are unsure.    You can get addicted to pain medicines, especially if you have a history of addiction (chemical, alcohol or substance dependence). Talk to your care team about ways to reduce this risk.    All opioids tend to cause constipation. Drink plenty of water and eat foods that have a lot of fiber, such as fruits, vegetables, prune juice, apple juice and high-fiber cereal. Take a laxative (Miralax, milk of magnesia, Colace, Senna) if you don t move your bowels at least every other day. Other side effects include upset stomach, sleepiness, dizziness, throwing up, tolerance (needing more of the medicine to have the same effect), physical dependence and slowed breathing.    Store your pills in a secure place, locked if possible. We will not replace any lost or stolen medicine. If you don t finish your medicine, please throw away (dispose) as directed by your pharmacist. The [place] Pollution Control Agency has more information about safe disposal: https://www.pca.[facility].mn.us/living-green/managing-unwanted-medications             Medication List: This is a list of all your medications and when to take them. Check marks below indicate your daily home schedule. Keep this list as a reference.      Medications           Morning Afternoon Evening Bedtime As Needed    acetaminophen 325 MG tablet   Commonly known as:  TYLENOL   Take 3 tablets (975 mg) by mouth every 6 hours   Last time this was given:  975 mg on 10/4/2018  1:36 PM                                ALPRAZolam 0.5 MG tablet   Commonly known as:  XANAX   Take 1 tablet (0.5 mg) by mouth 3 times daily as needed for anxiety   Last time this was given:  0.5 mg on 10/3/2018  7:40 PM                                 buPROPion 300 MG 24 hr tablet   Commonly known as:  WELLBUTRIN XL   Take 300 mg by mouth every morning   Last time this was given:  300 mg on 10/4/2018  8:28 AM                                citalopram 20 MG tablet   Commonly known as:  celeXA   Take 1 tablet (20 mg) by mouth every evening   Last time this was given:  20 mg on 10/3/2018  7:35 PM                                diphenoxylate-atropine 2.5-0.025 MG per tablet   Commonly known as:  LOMOTIL   Take 1-2 tablets by mouth 4 times daily as needed for diarrhea   Last time this was given:  2 tablets on 10/4/2018  1:36 PM                                enoxaparin 40 MG/0.4ML injection   Commonly known as:  LOVENOX   Inject 0.4 mLs (40 mg) Subcutaneous every 24 hours for 26 days   Last time this was given:  40 mg on 10/4/2018  8:27 AM                                omeprazole 40 MG capsule   Commonly known as:  priLOSEC   Take 1 capsule (40 mg) by mouth daily   Last time this was given:  40 mg on 10/4/2018  8:27 AM                                ondansetron 8 MG tablet   Commonly known as:  ZOFRAN   Take 1 tablet (8 mg) by mouth every 8 hours as needed for nausea   Last time this was given:  8 mg on 10/3/2018  9:25 AM                                oxyCODONE IR 5 MG tablet   Commonly known as:  ROXICODONE   Take 1-2 tablets (5-10 mg) by mouth every 4 hours as needed for moderate to severe pain   Last time this was given:  5 mg on 10/4/2018 12:21 PM                                polyethylene glycol Packet   Commonly known as:  MIRALAX/GLYCOLAX   Take 17 g by mouth daily as needed for constipation   Last time this was given:  17 g on 10/3/2018  2:14 PM                                PROBIOTIC PO   Take by mouth daily (with lunch)                                sennosides 8.6 MG tablet   Commonly known as:  SENOKOT   Take 1-2 tablets by mouth 2 times daily as needed for constipation (no stool in 24 hrs)                                 zolpidem 5 MG tablet   Commonly known as:  AMBIEN   Take 1 tablet (5 mg) by mouth nightly as needed for sleep   Last time this was given:  10 mg on 10/3/2018  8:43 PM

## 2018-10-01 NOTE — OR NURSING
"MDA Dr Ochoad here to check pt and sign out to floor.  Pt stating \"wish to go to the floor and hope I can go home tomorrow.\".  "

## 2018-10-01 NOTE — ANESTHESIA POSTPROCEDURE EVALUATION
Patient: Suzi Gonsales    Procedure(s):  Hip Screw Left Hip - Wound Class: I-Clean    Diagnosis:Metastatic Bone Tumor  Diagnosis Additional Information: No value filed.    Anesthesia Type:  General, ETT    Note:  Anesthesia Post Evaluation    Patient location during evaluation: PACU and Bedside  Patient participation: Able to fully participate in evaluation  Level of consciousness: awake and alert  Pain management: adequate  Airway patency: patent  Cardiovascular status: acceptable  Respiratory status: acceptable  Hydration status: balanced  PONV: none     Anesthetic complications: None          Last vitals:  Vitals:    10/01/18 1530 10/01/18 1600 10/01/18 1611   BP: 95/48     Resp: 21     Temp:      SpO2: 98% 98% 98%         Electronically Signed By: Suzi Gallo MD  October 1, 2018  4:13 PM

## 2018-10-01 NOTE — OR NURSING
"Pt awake and alert.   at bedside.  Tolerates PO ice chips and sips.  CMS intact.  Pt stating \"comfortable with pain now\".  Meets discharge criteria.  "

## 2018-10-01 NOTE — BRIEF OP NOTE
[facility], [place]    Brief Operative Note    Pre-operative diagnosis: Metastatic Bone Tumor left femoral neck   Post-operative diagnosis Same   Procedure: Procedure(s):  Hip Screw Left Hip - Wound Class: I-Clean  Surgeon: Surgeon(s) and Role:     * Randy Dunn MD - Primary     * Juanito Jimenez MD - Resident - Assisting  Anesthesia: General   Estimated blood loss: Less than 50 ml  Drains: None  Specimens:   ID Type Source Tests Collected by Time Destination   A : Left Femoral Head Tissue Hip, Left SURGICAL PATHOLOGY EXAM Randy Dunn MD 10/1/2018  1:30 PM      Findings:   no significant bleeding, reamed hole for cephalomedulary screw irrigated.  Complications: None.  Implants: Synthes DHS, 140 degree; 2 hole side plate; 90 mm screw      Ortho Primary  Activity: Up with assist until independent.  Weight bearing status: WBAT   Pain management: Transition from IV to PO as tolerated. (percocet). Toradol.    Antibiotics: Ancef x 24 hours.  Diet: Begin with clear fluids and progress diet as tolerated.   DVT prophylaxis: pending input from oncology; mechanical while in the hospital  Imaging: none.  Labs: Hgb POD#1.  Bracing/Splinting: none  Dressings: Keep clean, dry and intact x 7 days.   Drains: none   Physical Therapy/Occupational Therapy: Eval and treat.  Cultures: none   Consults: hospitalist .  Follow-up: Clinic with Dr. Dunn in 2 weeks; no x-rays needed.   Disposition: Pending progress with therapies, pain control on orals, and medical stability, anticipate discharge to home on POD #1-2.    Juanito Jimenez MD  Orthopaedic Surgery, PGY-4  Pager: [phone]

## 2018-10-01 NOTE — IP AVS SNAPSHOT
UR 8A    [address]    [facility] [address]    Phone:  [phone]                                       After Visit Summary   10/1/2018    Suzi Gonsales    MRN: [id number]           After Visit Summary Signature Page     I have received my discharge instructions, and my questions have been answered. I have discussed any challenges I see with this plan with the nurse or doctor.    ..........................................................................................................................................  Patient/Patient Representative Signature      ..........................................................................................................................................  Patient Representative Print Name and Relationship to Patient    ..................................................               ................................................  Date                                   Time    ..........................................................................................................................................  Reviewed by Signature/Title    ...................................................              ..............................................  Date                                               Time          22EPIC Rev 08/18

## 2018-10-01 NOTE — OP NOTE
Preop diagnosis: Metastatic renal cell cancer left femoral head  Postoperative diagnosis: Same  Procedure performed: Prophylactic and internal fixation using a hip screw left hip    Surgeon: Oumar Dunn and Juanito Jimenez    Estimated blood loss: 20 cc    Pathology submitted: Tumor left femoral head    Operative findings.  With the assistance of C arm the lesion was curetted and aggressively irrigated and aspirated.  There was no clear evidence of gross residual tumor.    Patient was interviewed in the preoperative area risk and benefits have been reviewed surgical site was marked with my initials and line of intended incision.  Preoperative brief had been performed.  She was taken the operating room received a general anesthetic in the supine position with the left hip bumped, C-arm visualization was confirmed in the left lower extremity was prepped and draped sterilely.  Surgical timeout was performed.    4 inch incision was made over the lateral aspect of the left hip.  Dissection was taken down through skin and subcutaneous tissue IT band and the superficial fascia of the vastus lateralis muscle.  Muscle was then cleared off of the femur.  The standard fashion a guidepin was placed with an attempt to locate the hip screw ultimately in the metastatic lesion which is located in the superior posterior aspect of the head.  Having placed the patient in optimal location was overdrilled.  To ED.  Through the hip screw portal hole we curetted the lesion in the head irrigated copiously and aspirated.  There was some abnormal tissue but no evidence of obvious gross residual tumor.    We then placed a 90 mm hip compression screw with a 140 degree angle sideplate and 2 side screws.  The wound was irrigated closed with fascial subcutaneous and skin layers.  Patient tolerated the procedure well.  Should be admitted at least overnight for observation and pain control.

## 2018-10-01 NOTE — ANESTHESIA CARE TRANSFER NOTE
Patient: Suzi Gonsales    Procedure(s):  Hip Screw Left Hip - Wound Class: I-Clean    Diagnosis: Metastatic Bone Tumor  Diagnosis Additional Information: No value filed.    Anesthesia Type:   General, ETT     Note:  Airway :Face Mask  Patient transferred to:PACU  Comments: To PACU with 02, Spont RR. Monitors applied, VSS, PIV/airway patent, Report to RN all questions/concerns answered.   Handoff Report: Identifed the Patient, Identified the Reponsible Provider, Reviewed the pertinent medical history, Discussed the surgical course, Reviewed Intra-OP anesthesia mangement and issues during anesthesia, Set expectations for post-procedure period and Allowed opportunity for questions and acknowledgement of understanding      Vitals: (Last set prior to Anesthesia Care Transfer)    CRNA VITALS  10/1/2018 1343 - 10/1/2018 1420      10/1/2018             Pulse: 98    Temp: 36.7  C (98.1  F)    SpO2: 99 %    Resp Rate (observed): 12                Electronically Signed By: ARIEL Yen CRNA  October 1, 2018  2:20 PM

## 2018-10-02 NOTE — PROGRESS NOTES
10/02/18 1106   Quick Adds   Type of Visit Initial PT Evaluation       Present no   Language English   Living Environment   Lives With spouse;child(janine), adult   Living Arrangements house   Home Accessibility stairs to enter home;stairs within home;stairs (1 railing present)   Number of Stairs to Enter Home 4   Number of Stairs Within Home 16   Stair Railings at Home inside, present on left side;outside, present on left side   Transportation Available family or friend will provide   Self-Care   Dominant Hand right   Usual Activity Tolerance good   Current Activity Tolerance fair   Regular Exercise no   Equipment Currently Used at Home cane, straight  (also owns a walker)   Functional Level Prior   Ambulation 1-->assistive equipment   Transferring 1-->assistive equipment   Toileting 0-->independent   Bathing 0-->independent   Dressing 0-->independent   Eating 0-->independent   Communication 0-->understands/communicates without difficulty   Swallowing 0-->swallows foods/liquids without difficulty   Cognition 0 - no cognition issues reported   Fall history within last six months no   Which of the above functional risks had a recent onset or change? ambulation;transferring   General Information   Onset of Illness/Injury or Date of Surgery - Date 10/01/18   Referring Physician Randy Dunn MD   Patient/Family Goals Statement Did not endorse   Pertinent History of Current Problem (include personal factors and/or comorbidities that impact the POC) 69 year old female with renal cell carcinoma now s/p sliding hip screw for a metastatic lesion in the femoral neck   Precautions/Limitations fall precautions   Weight-Bearing Status - LUE full weight-bearing   Weight-Bearing Status - RUE full weight-bearing   Weight-Bearing Status - LLE weight-bearing as tolerated   Weight-Bearing Status - RLE full weight-bearing   General Observations Supine in bed upon arrival, needed max encouragement   General  Info Comments IV   Cognitive Status Examination   Orientation orientation to person, place and time   Level of Consciousness alert   Follows Commands and Answers Questions 100% of the time;able to follow multistep instructions   Personal Safety and Judgment intact   Memory intact   Pain Assessment   Patient Currently in Pain Yes, see Vital Sign flowsheet   Integumentary/Edema   Integumentary/Edema Comments Patient with normal post-surgical swelling present   Posture    Posture Not impaired   Range of Motion (ROM)   ROM Comment Did not formally assess, demonstrastes functional ROM to LEs with mobility   Strength   Strength Comments Did not formally assess, demonstrates functional strength with mobility   Bed Mobility   Bed Mobility Comments Completes supine<>sit transfer with SBA   Transfer Skills   Transfer Comments Completes sit<>stand transfer with SBA and use of walker   Gait   Gait Comments Tolerated short distance ambulation in room with SBA using walker   Balance   Balance Comments Independent sitting balance, SBA for standing balance with UE support from walker   Sensory Examination   Sensory Perception no deficits were identified   General Therapy Interventions   Planned Therapy Interventions bed mobility training;gait training;ROM;strengthening;transfer training;risk factor education;home program guidelines;progressive activity/exercise   Clinical Impression   Criteria for Skilled Therapeutic Intervention yes, treatment indicated   PT Diagnosis impaired functional mobility   Influenced by the following impairments increased post-op pain, decreased L LE strength and ROM   Functional limitations due to impairments impaired bed mobility, transfers and ambulation   Clinical Presentation Stable/Uncomplicated   Clinical Presentation Rationale 69 year old female with renal cell carcinoma now s/p sliding hip screw for a metastatic lesion in the femoral neck. Mobilizing well   Clinical Decision Making (Complexity)  "Low complexity   Therapy Frequency` 2 times/day   Predicted Duration of Therapy Intervention (days/wks) 2 days   Anticipated Equipment Needs at Discharge (has cane and walker)   Anticipated Discharge Disposition Home with Assist   Risk & Benefits of therapy have been explained Yes   Patient, Family & other staff in agreement with plan of care Yes   [facility] TM \"6 Clicks\"   2016, Trustees of [facility], under license to [employer].  All rights reserved.   6 Clicks Short Forms Basic Mobility Inpatient Short Form   [facility]  \"6 Clicks\" V.2 Basic Mobility Inpatient Short Form   1. Turning from your back to your side while in a flat bed without using bedrails? 4 - None   2. Moving from lying on your back to sitting on the side of a flat bed without using bedrails? 4 - None   3. Moving to and from a bed to a chair (including a wheelchair)? 4 - None   4. Standing up from a chair using your arms (e.g., wheelchair, or bedside chair)? 4 - None   5. To walk in hospital room? 4 - None   6. Climbing 3-5 steps with a railing? 3 - A Little   Basic Mobility Raw Score (Score out of 24.Lower scores equate to lower levels of function) 23   Total Evaluation Time   Total Evaluation Time (Minutes) 8     "

## 2018-10-02 NOTE — PLAN OF CARE
"Problem: Surgery Nonspecified (Adult)  Goal: Signs and Symptoms of Listed Potential Problems Will be Absent, Minimized or Managed (Surgery Nonspecified)  Signs and symptoms of listed potential problems will be absent, minimized or managed by discharge/transition of care (reference Surgery Nonspecified (Adult) CPG).  Outcome: Improving    VS: BP 99/55  Pulse 76  Temp 95.4  F (35.2  C) (Axillary)  Resp 16  Ht 1.549 m (5' 1\")  Wt 53.6 kg (118 lb 2.7 oz)  LMP 12/20/2013  SpO2 95%  BMI 22.33 kg/m2   Hypotensive, bolus infusing.   O2: RA, refused capno, education provided, cont pulse ox on.   Output: Voiding in bathroom, retaining, voided 100 ml twice with last PVR of 287 at 2149.   Last BM: 9/30/18.   Activity: Ax1 with walker, WBAT.   Skin: Incision.   Pain: Reporting pain in left hip managed with percocet 2 tabs given at 1821. Pt requesting IV dilaudid and any other pain meds we can give her but her BP is low and moonlighter wants her bolus to infuse prior to more meds.   CMS: Intact.   Dressing: CDI.   Diet: Regular, nausea, compazine given at 2139.   LDA: PIV right arm infusing LR bolus. Port a cath not accessed.   Equipment: IV pole, PCDs, IS, pillows, call light within reach.   Plan: Continue to monitor.   Additional Info:            "

## 2018-10-02 NOTE — PROGRESS NOTES
"CLINICAL NUTRITION SERVICES - ASSESSMENT NOTE     Nutrition Prescription    RECOMMENDATIONS FOR MDs/PROVIDERS TO ORDER:  -Consider daily MVI with minerals to help ensure micronutrient needs met with decreased po intakes.      Malnutrition Status:    Unable to determine due to incomplete nutrition status validation.      Recommendations already ordered by Registered Dietitian (RD):  -Entered order allowing supplements prn with meals, Boost Plus, Boost Breeze, Magic Cup or other per pt preference.        Future/Additional Recommendations:  -Encourage intakes of tid meals including protein sources.  If po intakes are decreased recommend use of nutrition supplements or scheduled snacks.  -Monitor po intakes and weight trends.   -Follow up to see pt as able to check po and obtain nutrition hx.     REASON FOR ASSESSMENT  Suzi Gonsales is a/an 53 year old female assessed by the dietitian for Admission Nutrition Risk Screen for reduced oral intake over the last month    NUTRITION HISTORY  Per chart review, with renal cell carcinoma now s/p sliding hip screw for a metastatic lesion in the femoral neck.    Unable to obtain nutrition hx today.    CURRENT NUTRITION ORDERS  Diet: Regular  Intake/Tolerance: Per nursing notes, tolerating diet with mild nausea.  Per review of selections in [facility], breakfast this am was first meal ordered since admission.  No lunch currently ordered.  Pt sleeping at time of RD visit with \"do not disturb\" sign on door.      LABS  Labs reviewed    MEDICATIONS  Medications reviewed  LR at 50 mL/hr  Zofran, Compazine, Reglan prn    ANTHROPOMETRICS  Height: 154.9 cm (5' 1\")  Most Recent Weight (10/1): 53.6 kg (118 lb 2.7 oz)    IBW: 105 lbs  BMI: Normal BMI  Weight History:  Per chart review, weight appears stable.  Unable to obtain weight hx today.  Wt Readings from Last 10 Encounters:   10/01/18 53.6 kg (118 lb 2.7 oz)   09/27/18 54.2 kg (119 lb 8 oz)   09/10/18 52.8 kg (116 lb 4.8 oz)   09/06/18 " 52.7 kg (116 lb 3.2 oz)   08/28/18 52.9 kg (116 lb 9.6 oz)   08/22/18 52.9 kg (116 lb 9.6 oz)   07/24/18 54.1 kg (119 lb 4.8 oz)   06/26/18 53.4 kg (117 lb 12.8 oz)   06/25/18 54.4 kg (120 lb)   06/20/18 54.4 kg (120 lb)       Dosing Weight: 54 kg (current weight)    ASSESSED NUTRITION NEEDS  Estimated Energy Needs: 6040-9289+ kcals/day (25 - 30+ kcals/kg)  Justification: Maintenance/increased needs  Estimated Protein Needs: 65-81 grams protein/day (1.2 - 1.5 grams of pro/kg)  Justification: Increased needs  Estimated Fluid Needs:  (1 mL/kcal)   Justification: Maintenance    PHYSICAL FINDINGS  See malnutrition section below.  No edema    MALNUTRITION  % Intake: Unable to assess  % Weight Loss: None noted  Subcutaneous Fat Loss: Unable to assess  Muscle Loss: Unable to assess  Fluid Accumulation/Edema: None noted  Malnutrition Diagnosis: Unable to determine due to incomplete nutrition status validation (pt unavailable at time of RD visit).      NUTRITION DIAGNOSIS  Predicted suboptimal nutrient intakes related to (+) nutrition risk screen for reduced oral intake over the last month and now post op status.    INTERVENTIONS  Implementation  Nutrition Education: Unable today since pt not available at time of RD visit.      Goals  Patient to consume % of nutritionally adequate meal trays TID, or the equivalent with supplements/snacks.     Monitoring/Evaluation  Progress toward goals will be monitored and evaluated per protocol.    Aileen Feliz RD, LD

## 2018-10-02 NOTE — PLAN OF CARE
Problem: Surgery Nonspecified (Adult)  Goal: Signs and Symptoms of Listed Potential Problems Will be Absent, Minimized or Managed (Surgery Nonspecified)  Signs and symptoms of listed potential problems will be absent, minimized or managed by discharge/transition of care (reference Surgery Nonspecified (Adult) CPG).   Outcome: Improving    VS:     VSS on RA   Output:     Voids using or commode bathroom, PVR of 25ml  BM 9/30 and not passing gas   Activity:     UP with SBA/A1 using walker to,    Skin: WDL ex incision scars   Pain:     PRN percocet given e4h  IV dilaudid 0.3 given 1x this shift  Ice applied as requested  Alternative therapies offered   Neuro/CMS:     no numbness or no tingling  Ambien given for sleep   Dressing:     CDI   Diet:     Regular diet, tolerated well, no N/V   LDA:     IV SL and patent   Equipment:     PCD, IV pole, walker, commode   Plan:     Continue plan of care    Additional Info:

## 2018-10-02 NOTE — PLAN OF CARE
Pt has sybil up to BR with walker Tolerates walking with walker well. She has been medicated q 3 hrs for pain with some relief. She c/o not getting enough rest. Trying to plan some rest periods. BP was low now 90/31. Cont to monitor pain and Bp .

## 2018-10-02 NOTE — PLAN OF CARE
"Problem: Patient Care Overview  Goal: Plan of Care/Patient Progress Review  Discharge Planner PT   Patient plan for discharge: Home with assist  Current status: PT evaluation complete and treatment initiated. Needed max encouragement to participate. Complaining of a lot of pain, discrepancies with medications, low BP and being \"very tired\". Is agreeable if therapist did not disturb again later today. Completes supine<>sit transfer with SBA. Completes sit<>stand transfer and short distance ambulation in room with SBA and use of walker, steady throughout. Educated on need to complete stairs prior to returning home- agreeable.   Barriers to return to prior living situation: safety, independence, pain, activity tolerance, stairs  Recommendations for discharge: Home with assist  Rationale for recommendations: Mobilizing appropriately       Entered by: Charo Cobos 10/02/2018 12:57 PM           "

## 2018-10-02 NOTE — PLAN OF CARE
Problem: Patient Care Overview  Goal: Plan of Care/Patient Progress Review  OT: Cancel.  Orders received and appreciated.  Per discussion with PT, pt not wanting to be disturbed this pm, willing to work with PT in the am.  Per PT, pt likely with OT needs that can be addressed tomorrow.  Will reschedule evaluation for 10/3.

## 2018-10-02 NOTE — PROGRESS NOTES
"Orthopedics Daily Progress Note    S: no acute events overnight, pain control not adequate overnight, patient with some hypotension overnight limiting ability to get IV opiates, tolerating diet with mild nausea, voiding, no numbness/tinglings, chest pain/shortness of breath, or fever/chills.     O: BP (!) 85/60 (BP Location: Left arm)  Pulse 76  Temp 97.3  F (36.3  C) (Oral)  Resp 16  Ht 1.549 m (5' 1\")  Wt 53.6 kg (118 lb 2.7 oz)  LMP 12/20/2013  SpO2 92%  BMI 22.33 kg/m2   No acute distress  Non-labored respirations  LLE: dressing c,d,i, 5/5 df/pf/ehl/fhl, able to fire quads, sensation intact in deep peroneal, superficial peroneal, tibial, sural, saphenous nerve distributions, 2+ DP pulse.    Labs:  HGB 10.2   Cr. 0.73    A/P:   Patient is a 69 year old female with renal cell carcinoma now s/p sliding hip screw for a metastatic lesion in the femoral neck    -Ortho primary   -WB: WBAT  -Activity: with assist and assistive device until independent   -DVT ppx: will start  mg, will change if recs from oncology differ.   -Abx: perioperative antibiotics   -PT/OT  -Pain: will adjusted percocet to oxy and tyl to increase dose of oxy; IV meds as able with BP; no toradol given previous nephrectomy   -Diet: ADAT  -Dressings: aquacel until POD#6-7  -Drain: none   -Hypotension: IV fluid bolus per medicine   -Labs: HGB and BMP  -Follow up: 2 weeks with Dr. Dunn, no xrays   -Dispo: home in 2-3 days when pain controlled     Juanito Jimenez MD   Orthopedic Surgery; PGY-4  Pager: [phone]    For any questions regarding this patient please page me prior to paging the ortho resident on call.     "

## 2018-10-03 NOTE — PLAN OF CARE
"Problem: Surgery Nonspecified (Adult)  Goal: Signs and Symptoms of Listed Potential Problems Will be Absent, Minimized or Managed (Surgery Nonspecified)  Signs and symptoms of listed potential problems will be absent, minimized or managed by discharge/transition of care (reference Surgery Nonspecified (Adult) CPG).   Outcome: No Change    VS: Temp: 95.5  F (35.3  C) Temp src: Oral BP: 104/49   Heart Rate: 100 Resp: 16      O2: SpO2: 100 % O2 Device: None (Room air)   Output: Urine output adequate    Last BM: 10/1   Activity: Up with SBA and walker   Skin: Intact - incision    Pain: Managed with 10mg Oxycodone q 3hours, scheduled Tylenol   CMS: Intact - denies numbness or tingling   Dressing: CDI   Diet: Regular diet, poor appetite. Encouraged patient to eat, stated her  will bring food at some point today. Nausea reported, relieved with 8mg Zofran   LDA: No current IV access. Pt has port-a-cath that is not accessed, but can be if needed.   Equipment: PCDs, walker   Plan: TBD - encourage food and fluid intake    Additional Info: Pt states she is very tired and feels a little forgetful. RN educated patient on pain medications, using Atarax as well and trying 5mg Oxycodone to make patient not as drowsy. Pt states \"But I want to sleep all day\"             "

## 2018-10-03 NOTE — PLAN OF CARE
"Problem: Patient Care Overview  Goal: Plan of Care/Patient Progress Review  Discharge Planner PT   Patient plan for discharge: Home with assist  Current status: Needs encouragement to participate. Completes supine to sit transfer with Erika on this date however was able to complete yesterday without assist. Completes sit<>stand transfer with supervision and use of walker and ambulates in marie with supervision and use of walker, safe throughout. Becomes frustrated with therapist when therapist asks if patient could try to sit in a chair and educates on benefits and purpose. Ultimately did end in a chair but states \"I'm just telling you I'm weak!\". Nurse notified. Did not appear overtly confused during session but does appear to become agitated easily and with continued complaints despite sister in laws reassurance.  Barriers to return to prior living situation: stairs (anticipate patient could physically complete on this date if she was agreeable)  Recommendations for discharge: Home with assist  Rationale for recommendations: Mobilizing well       Entered by: Charo Cobos 10/03/2018 1:58 PM           "

## 2018-10-03 NOTE — PROGRESS NOTES
"Orthopedics Daily Progress Note    S: no acute events overnight, pain control better overnight; tolerating diet, voiding, passing flatus, no numbness/tinglings, chest pain/shortness of breath, or fever/chills.     O: /41 (BP Location: Left arm)  Pulse 76  Temp 97.4  F (36.3  C) (Oral)  Resp 16  Ht 1.549 m (5' 1\")  Wt 53.6 kg (118 lb 2.7 oz)  LMP 12/20/2013  SpO2 94%  BMI 22.33 kg/m2   No acute distress  Non-labored respirations  LLE: dressing not visualized this morning, 5/5 df/pf/ehl/fhl, able to fire quads, sensation intact in deep peroneal, superficial peroneal, tibial, sural, saphenous nerve distributions, 2+ DP pulse.    Labs:  HGB 10.5  Cr. 0.73    A/P:   Patient is a 69 year old female with renal cell carcinoma now s/p sliding hip screw for a metastatic lesion in the femoral neck    -Ortho primary   -WB: WBAT  -Activity: with assist and assistive device until independent   -DVT ppx: will use lovenox for 4 weeks after discussion with oncology  -Abx: perioperative antibiotics   -PT/OT  -Pain: oxycodone PRN with scheduled tylenol   -Diet: ADAT  -Dressings: aquacel until POD#6-7  -Hypotension: improved   -Labs: complete   -Follow up: 2 weeks with Dr. Dunn, no xrays   -Dispo: home likely tomorrow     Juanito Jimenez MD   Orthopedic Surgery; PGY-4  Pager: [phone]    For any questions regarding this patient please page me prior to paging the ortho resident on call.     "

## 2018-10-03 NOTE — PROGRESS NOTES
10/03/18 1100   Quick Adds   Type of Visit Initial Occupational Therapy Evaluation   Living Environment   Lives With spouse;child(janine), adult   Living Arrangements house   Home Accessibility tub/shower is not walk in;stairs within home   Number of Stairs to Enter Home 4   Number of Stairs Within Home 16   Transportation Available family or friend will provide   Living Environment Comment patient lives with  and 2 teenage sons who can assist as needed. Standard-low toilets, tub/shower with grab bars.    Self-Care   Dominant Hand right   Usual Activity Tolerance good   Current Activity Tolerance fair   Regular Exercise no   Equipment Currently Used at Home cane, straight  (owns walker)   Functional Level Prior   Ambulation 1-->assistive equipment   Transferring 1-->assistive equipment   Toileting 0-->independent   Bathing 0-->independent   Dressing 0-->independent   Eating 0-->independent   Communication 0-->understands/communicates without difficulty   Swallowing 0-->swallows foods/liquids without difficulty   Cognition 0 - no cognition issues reported   Fall history within last six months no   General Information   Onset of Illness/Injury or Date of Surgery - Date 10/01/18   Referring Physician Dr. Dunn   Patient/Family Goals Statement None, in agreement with OT related goals.    Additional Occupational Profile Info/Pertinent History of Current Problem 69 year old female with renal cell carcinoma now s/p L sliding hip screw for a metastatic lesion in the femoral neck   Precautions/Limitations fall precautions   Weight-Bearing Status - LLE weight-bearing as tolerated   Cognitive Status Examination   Orientation orientation to person, place and time   Level of Consciousness alert  (however endorsed being extremely tired)   Able to Follow Commands WNL/WFL   Personal Safety (Cognitive) WNL/WFL   Memory impaired   Cognitive Comment Per patient she had brain tumor removed in March and has some memory deficits,  "but \"nothing major\".   Visual Perception   Visual Perception No deficits were identified   Sensory Examination   Sensory Quick Adds No deficits were identified   Pain Assessment   Patient Currently in Pain Yes, see Vital Sign flowsheet   Range of Motion (ROM)   ROM Comment B UE's WFL   Strength   Strength Comments UE strength WFL   Muscle Tone Assessment   Muscle Tone Quick Adds No deficits were identified   Coordination   Upper Extremity Coordination No deficits were identified   Mobility   Bed Mobility Comments Supine>sit with SBA, sit>supine with min A for L LE   Transfer Skill: Bed to Chair/Chair to Bed   Level of [place]: Bed to Chair stand-by assist   Physical Assist/Nonphysical Assist: Bed to Chair set-up required   Weight-Bearing Restrictions weight-bearing as tolerated   Assistive Device - Transfer Skill Bed to Chair Chair to Bed Rehab Eval rolling walker   Transfer Skill: Sit to Stand   Level of [place]: Sit/Stand stand-by assist   Physical Assist/Nonphysical Assist: Sit/Stand set-up required   Transfer Skill: Sit to Stand weight-bearing as tolerated   Assistive Device for Transfer: Sit/Stand rolling walker   Transfer Skill: Toilet Transfer   Level of [place]: Toilet stand-by assist   Physical Assist/Nonphysical Assist: Toilet set-up required;verbal cues   Weight-Bearing Restrictions: Toilet weight-bearing as tolerated   Assistive Device rolling walker;seat riser   Upper Body Dressing   Level of [place]: Dress Upper Body stand-by assist   Physical Assist/Nonphysical Assist: Dress Upper Body set-up required   Lower Body Dressing   Level of [place]: Dress Lower Body stand-by assist  (per patient she has changed clothes twice by herself)   Toileting   Level of [place]: Toilet stand-by assist   Physical Assist/Nonphysical Assist: Toilet set-up required   Grooming   Level of [place]: Grooming independent   Eating/Self Feeding   Level of [place]: Eating independent " "  Activities of Daily Living Analysis   Impairments Contributing to Impaired Activities of Daily Living balance impaired;pain;ROM decreased;strength decreased;cognition impaired   General Therapy Interventions   Planned Therapy Interventions ADL retraining;transfer training   Clinical Impression   Criteria for Skilled Therapeutic Interventions Met yes, treatment indicated   OT Diagnosis Decreased functional mobility and ADLs   Influenced by the following impairments pain, decreased strength and ROM L LE   Assessment of Occupational Performance 3-5 Performance Deficits   Identified Performance Deficits dressing, bathing, toileting, home mgmt   Clinical Decision Making (Complexity) Low complexity   Therapy Frequency daily   Predicted Duration of Therapy Intervention (days/wks) 3 days   Anticipated Equipment Needs at Discharge reacher;raised toilet seat;shower chair  (TBD)   Anticipated Discharge Disposition Home with Assist   Risks and Benefits of Treatment have been explained. Yes   Patient, Family & other staff in agreement with plan of care Yes   [facility] TM \"6 Clicks\"   2016, Trustees of [facility], under license to [employer].  All rights reserved.   6 Clicks Short Forms Daily Activity Inpatient Short Form   [facility]  \"6 Clicks\" Daily Activity Inpatient Short Form   1. Putting on and taking off regular lower body clothing? 3 - A Little   2. Bathing (including washing, rinsing, drying)? 3 - A Little   3. Toileting, which includes using toilet, bedpan or urinal? 4 - None   4. Putting on and taking off regular upper body clothing? 4 - None   5. Taking care of personal grooming such as brushing teeth? 4 - None   6. Eating meals? 4 - None   Daily Activity Raw Score (Score out of 24.Lower scores equate to lower levels of function) 22   Total Evaluation Time   Total Evaluation Time (Minutes) 8     "

## 2018-10-03 NOTE — DISCHARGE SUMMARY
ORTHOPEDIC SURGERY DISCHARGE SUMMARY    Suzi Gonsales [id number] 10/1/2018  9:42 AM  53 year old female  10/2/2018    Date of Admission: 10/1/2018  Date of Discharge: 10/4/2018  Disposition: home  Primary care physician: Molly Adame  Orthopaedic physician provider(s): Dr. Mona MD, Orthopedic Surgery    ADMISSION DIAGNOSIS:  Metastatic renal cell carcinoma     HPI:  In summary a taking a careful history I believe she did have an impending fracture prior radiation treatments.  This is based both on their increase activity just prior to his symptoms as well as her response to radiation.  Based on this I recommend recommend in the form of surgical stabilization.  After assessing her imaging studies I believe the hip screw would be the best.  We could potentially correct some of the tumor out through the screw portal site and could potentially inserts segment as well to aid in filling the defect.    DISCHARGE DIAGNOSIS:  Metastatic renal cell carcinoma     SURGERY:  Bone lesion curettage with internal fixation on 10/1     HOSPITAL COURSE:  Surgery was uncomplicated. The patient has done well post-operatively. Medicine consult was requested for co-management. she has received routine nursing cares and is medically stable. Vital signs are stable. she is tolerating a regular diet without GI distress/nausea or vomiting; and voiding spontaneously. she has met PT/OT goals for safe mobility. Pain is controlled on oral medications which will be available at home. she will be given stool softeners to use while taking pain medications to help prevent constipation. she has a safe discharge plan to home.     DVT prophylaxis:  Lovenox daily for 4 weeks  Antibiotics:  Ancef given periop and 24 hours postop.   Activity:   Ad ta with assistive device  Follow up:   Wound check in the orthopedic clinic in 2 weeks    Other discharge orders and instructions as below.    ORTHOPEDIC EXAM:  Vitals:    B/P: 96/41, T: 95.6, P: 76, R:  18  Physical Exam:  Please see the progress note from the day of discharge.   Pertinent exam findings followed during admission: none     LABS:    Recent Labs  Lab 10/04/18  0629 10/03/18  0522 10/02/18  0619 09/30/18  1139   WBC 5.4  --   --  4.4   HGB 10.2* 10.5* 10.2* 12.1     --   --  312       Recent Labs  Lab 10/04/18  0629 10/02/18  0619 09/30/18  1139    142 141   POTASSIUM 3.9 4.0 4.0   CHLORIDE 103 106 109   CO2 30 31 22   BUN 7 6* 7   CR 0.72 0.73 0.82   GLC 98 92 146*     No lab results found in last 7 days.    ALLERGIES:   No Known Allergies    PENDING TESTS RESULTS:  None     SUB-SPECIALTY CONSULTANT RECOMMENDATIONS:  Medicine was consulted for medical co-management. Assessment and plan are as follows:  PLAN:  1)  Clinically stable for discharge  home.  2)  Meds, orders reviewed.  Continue lower dose oxycodone 5 mg.  Bowel meds prn,  Lovenox.  3)  [facility] with RN visit 10/5.  Contact oncology and PMD for follow up.     PLANNED DISCHARGE ORDERS, RECOMMENDATIONS, AND FOLLOWUP:    Discharge Procedure Orders  Physical Therapy Referral   Standing Status: Future  Standing Exp. Date: 10/04/19   Referral Type: Rehab Therapy Physical Therapy     Reason for your hospital stay   Order Comments: Curettage of bone metastasis and plate fixation     Adult [facility] Follow-up and recommended labs and tests   Order Comments: Follow up with Dr. Dunn at 2 weeks for a wound check     Appointments on [place] and/or [facility] (with [facility] or [facility] provider or service). Call [phone] if you haven't heard regarding these appointments within 7 days of discharge.     Activity   Order Comments: Your activity upon discharge: weightbearing as tolerated; activity as tolerated.   Order Specific Question Answer Comments   Is discharge order? Yes      Discharge Instructions   Order Comments: -Wound Care: Your incision was closed with sutures underneath the skin. You have a brown/tan rubber-like dressing applied to  your surgical site, you may shower over this. Remove it 5-7 days after surgery. You may replace this with gauze and tape.  Change the bandages every other day and keep wound clean and dry. You may note strands of suture from each end of your incision - this is normal and is a knotless type of suture that can be trimmed at its base around 2 weeks from your surgery, otherwise it may be left to fall off on its own. Water may run over incision but no scrubbing. Do not submerge wound in water (pool, spa, sauna, lake, bathtub, etc.) for at least 4 weeks.     -Pain control: Take medication as prescribed, but only as often as necessary. Do not drive or drink alcohol while you are taking pain medication. Remember that Tylenol can be used for pain relief as well, as you wean off the narcotics. Do not exceed 4,000 mg of tylenol in 24 hours.     -Activity: Weight bear as tolerated left lower extremity    -DVT prophylaxis: Lovenox for 4 weeks    -If: Pain in your surgical area persists or worsens in the first few days after surgery. Excessive redness or drainage of cloudy or bloody material from the wounds (clear red tinted fluid and some mild drainage should be expected). Drainage of any kind > one week after surgery should be reported to the doctor. You have a temperature elevation greater than 101.5  You have pain, swelling or redness in your calf. You have numbness or weakness in your leg or foot. Call our clinic ([phone] during regular office hours, or [phone] for the on-call resident MD for questions - RESIDENT DOES NOT HAVE ABILITY TO PRESCRIBE NARCOTICS)     -Call your primary doctor or seek medical care if you have any of the following: temperature greater than 101.4, chest pain, increased shortness of breath, nausea or vomiting.     Discharge Instructions   Order Comments: Call primary MSD and oncologist to see when they want to see you back.  Call primary MD if diarrhea persists.  [facility] with RN - start 10/5 -  check clinically, bowel status, orthostatic BP, review meds - update primary MD.  Attempt to use lower 5mg oxycodone dose.     Diet   Order Comments: Follow this diet upon discharge: regular   Order Specific Question Answer Comments   Is discharge order? Yes           Review of your medicines      START taking       Dose / Directions    acetaminophen 325 MG tablet   Commonly known as:  TYLENOL   Used for:  Bone metastasis (H)        Dose:  975 mg   Take 3 tablets (975 mg) by mouth every 6 hours   Quantity:  100 tablet   Refills:  1       enoxaparin 40 MG/0.4ML injection   Commonly known as:  LOVENOX   Used for:  Bone metastasis (H)        Dose:  40 mg   Inject 0.4 mLs (40 mg) Subcutaneous every 24 hours for 26 days   Quantity:  10.4 mL   Refills:  0       oxyCODONE IR 5 MG tablet   Commonly known as:  ROXICODONE   Used for:  Bone metastasis (H)        Dose:  5-10 mg   Take 1-2 tablets (5-10 mg) by mouth every 4 hours as needed for moderate to severe pain   Quantity:  60 tablet   Refills:  0       polyethylene glycol Packet   Commonly known as:  MIRALAX/GLYCOLAX        Dose:  17 g   Take 17 g by mouth daily as needed for constipation   Quantity:  7 packet   Refills:  0       sennosides 8.6 MG tablet   Commonly known as:  SENOKOT   Used for:  Bone metastasis (H)        Dose:  1-2 tablet   Take 1-2 tablets by mouth 2 times daily as needed for constipation (no stool in 24 hrs)   Quantity:  60 each   Refills:  1         CONTINUE these medicines which have NOT CHANGED       Dose / Directions    ALPRAZolam 0.5 MG tablet   Commonly known as:  XANAX   Used for:  Renal cell carcinoma, unspecified laterality (H), Mass of upper lobe of left lung        Dose:  0.5 mg   Take 1 tablet (0.5 mg) by mouth 3 times daily as needed for anxiety   Quantity:  60 tablet   Refills:  0       buPROPion 300 MG 24 hr tablet   Commonly known as:  WELLBUTRIN XL        Dose:  300 mg   Take 300 mg by mouth every morning   Refills:  0        citalopram 20 MG tablet   Commonly known as:  celeXA   Used for:  Insomnia, unspecified type        Dose:  20 mg   Take 1 tablet (20 mg) by mouth every evening   Quantity:  90 tablet   Refills:  3       diphenoxylate-atropine 2.5-0.025 MG per tablet   Commonly known as:  LOMOTIL   Used for:  Diarrhea, unspecified type        Dose:  1-2 tablet   Take 1-2 tablets by mouth 4 times daily as needed for diarrhea   Quantity:  100 tablet   Refills:  1       omeprazole 40 MG capsule   Commonly known as:  priLOSEC   Used for:  Cerebral edema (H)        Dose:  40 mg   Take 1 capsule (40 mg) by mouth daily   Quantity:  30 capsule   Refills:  3       ondansetron 8 MG tablet   Commonly known as:  ZOFRAN   Used for:  Nausea        Dose:  8 mg   Take 1 tablet (8 mg) by mouth every 8 hours as needed for nausea   Quantity:  30 tablet   Refills:  3       PROBIOTIC PO   Used for:  Pre-op evaluation, Brain metastases (H)        Take by mouth daily (with lunch)   Refills:  0       zolpidem 5 MG tablet   Commonly known as:  AMBIEN   Used for:  Sleep disorder        Dose:  5 mg   Take 1 tablet (5 mg) by mouth nightly as needed for sleep   Quantity:  30 tablet   Refills:  3         STOP taking          Cabozantinib S-Malate 40 MG   Commonly known as:  CABOMETYX           HYDROcodone-acetaminophen 5-325 MG per tablet   Commonly known as:  NORCO                Where to get your medicines      These medications were sent to [facility] [place], [place] - [address]  [address] [facility] [address], [facility] [address]     Phone:  [phone]      acetaminophen 325 MG tablet     enoxaparin 40 MG/0.4ML injection         Some of these will need a paper prescription and others can be bought over the counter. Ask your nurse if you have questions.     Bring a paper prescription for each of these medications      oxyCODONE IR 5 MG tablet               Juanito Jimenez MD  Orthopedic Surgery, PGY-4  Pager: [phone]

## 2018-10-03 NOTE — PROGRESS NOTES
VS: Stable, BP is better this shift   O2: Pt is on RA   Output: Adequate output   Last BM:    Activity: As tolerated, ambulated to bathroom and back to bed   Skin: Left hip incision, CDI   Pain: Pain issues, has Dilaudid IV, Oxycodone and Tylenol for pain   CMS: intact   Dressing: CDI   Diet: regular   LDA: PIV patent   Equipment: Walker, IV pole   Plan: To give pain med when it is due   Additional Info:

## 2018-10-03 NOTE — PLAN OF CARE
Problem: Patient Care Overview  Goal: Plan of Care/Patient Progress Review  Discharge Planner OT   Patient plan for discharge: home  Current status: Patient reports being extremely tired, but agreeable to getting up to bathroom. Supine>sit with SBA. Sit>stand and ambulation to/from bathroom using FWW with SBA. Completed toilet transfer using BSC as overlay with SBA. Patient declined further activity due to fatigue. Initiated discussion on potential DME for home.   Barriers to return to prior living situation: none anticipated  Recommendations for discharge: home with assist from family   Rationale for recommendations: will continue with daily OT to maximize safety and IND with functional mobility and ADLs, anticipate patient will meet OT goals 10/4       Entered by: JAKE COOPER HELD 10/03/2018 11:05 AM

## 2018-10-03 NOTE — PROGRESS NOTES
"[facility] Progress Note            Interval History:   Record reviewed including IM consult.  Seen earlier with RN.  Increase pain during night.  Improved pain control this AM with increase oxycodone 10 mg, scheduled tylenol, IV dilaudid.  Fluid bolus for hypotension with serial BP 76/35, 85/60.  Baseline 90's systolic range.  Up to BR with mild LH.   No CP, SOB, mild cough.  Using IS.  Mild nausea, no emesis, occas reflux, no abd pain or distention.  Passing flatus.  Last BM 2 days ago.  Voiding OK.          Medications:   All medications reviewed today          Physical Exam:   Blood pressure 96/41, pulse 76, temperature 95.6  F (35.3  C), temperature source Oral, resp. rate 18, height 1.549 m (5' 1\"), weight 53.6 kg (118 lb 2.7 oz), last menstrual period 12/20/2013, SpO2 100 %, not currently breastfeeding.    Intake/Output Summary (Last 24 hours) at 10/02/18 1940  Last data filed at 10/02/18 1922   Gross per 24 hour   Intake              120 ml   Output             1500 ml   Net            -1380 ml       General:  Alert but tired.  Appropriate.  No distress.  No  O2.     Heent:      Neck:    Skin:    Chest:  clear    Cardiac:  Reg without gallop, murmur.  No JVD.     Abdomen:  Non distended, soft, non-tender.  BS normal.     Extremities:  Perfused.  No edema, calf, posterior thigh tenderness to suggest DVT.     Neuro:            Data:     Results for orders placed or performed during the hospital encounter of 10/01/18 (from the past 24 hour(s))   Hemoglobin   Result Value Ref Range    Hemoglobin 10.2 (L) 11.7 - 15.7 g/dL   Basic metabolic panel   Result Value Ref Range    Sodium 142 133 - 144 mmol/L    Potassium 4.0 3.4 - 5.3 mmol/L    Chloride 106 94 - 109 mmol/L    Carbon Dioxide 31 20 - 32 mmol/L    Anion Gap 5 3 - 14 mmol/L    Glucose 92 70 - 99 mg/dL    Urea Nitrogen 6 (L) 7 - 30 mg/dL    Creatinine 0.73 0.52 - 1.04 mg/dL    GFR Estimate 82 >60 mL/min/1.7m2    GFR Estimate If Black " >90 >60 mL/min/1.7m2    Calcium 7.6 (L) 8.5 - 10.1 mg/dL            Assessment and Plan:   1)  S/p Prophylactic and internal fixation using a hip screw left hip.  Indication metastatic renal cell cancer left femoral head.  Improved, adequate pain control when seen.  2)  Acute blood loss anemia, adequate.  3)  Metastatic renal cell cancer with metastatic disease to the lungs are adrenal gland lungs.  S/P nephrectomy, chemoradiation.  Chemo presently on hold.  4)  Depression, anxiety on continued Celexa, Wellbutrin and Xanax.  5)  GERD on PPI.   6)  Solitary kidney.  S/P right nephrectomy due to kidney cancer.  7)  Hypotension post op 2nd to blood/volume loss, opiates.  Echocardiogram 6/2017 Normal left ventricular size, thickness, and function. Global and regional left ventricular function is normal with an EF of 60-65%. Back to baseline by history (preop /64)..     PLAN:  1)  Review meds, orders.  2)  Adjust IV fluids.  3)  Same opiates.  4)  IS, mobilize, bowel meds, increase PPI, lovenox.  4)  Trend labs.  Monitor clinically.   Disposition Plan   Expected discharge in 2-3 days to prior living arrangement once mobilizing safely with stable labs.     Entered: Roberto Mcallister 10/02/2018, 7:40 PM              Attestation:  I have reviewed today's vital signs, notes, medications, labs and imaging.     Roberto Mcallister MD

## 2018-10-04 NOTE — PLAN OF CARE
Problem: Patient Care Overview  Goal: Plan of Care/Patient Progress Review  Outcome: Improving    VS: WDL   O2: Sats maintained on RA   Output: Up to BR x1 assist   Last BM: Several loose stools overnight   Activity: Up with walker   Skin: Intact except incisions   Pain: Managed with oxycodone and scheduled tylenol   CMS: intact   Dressing: Aquacel   Diet: Regular   LDA: Port dressing change due 10/10/18   Equipment: Walker   Plan:    Additional Info:  Patient was found at 0405 walking around room. Patient stated she could not find her call light. Patient walks with steady gait. Linens on bed changed, pj pants retrieved for patient. Bed alarm on for the rest of the night. Call light within reach.

## 2018-10-04 NOTE — PLAN OF CARE
Problem: Patient Care Overview  Goal: Plan of Care/Patient Progress Review  Discharge Planner OT   Patient plan for discharge: home  Current status: Patient requesting assist with bed mobility needing overall min A for trunk and L LE. Patient ambulated to/from tub room using FWW with SBA.  Patient educated on safe tub transfer techniques, unable to complete transfer fully due to pain, therapist educated on alternative options. Patient declined need to review LE dressing, reports no concerns and will have assist as needed.   Barriers to return to prior living situation: pain  Recommendations for discharge: home with assist  Rationale for recommendations: Patient is doing well and close to meeting OT goals, limited by pain this am.        Entered by: JAKE WISDOM 10/04/2018 2:44 PM     Occupational Therapy Discharge Summary    Reason for therapy discharge:    Discharged to home.    Progress towards therapy goal(s). See goals on Care Plan in [facility] electronic health record for goal details.  Most goals met, all goals addressed. patient will need assist with tub transfer    Therapy recommendation(s):    home with assist

## 2018-10-04 NOTE — PROGRESS NOTES
"[facility] Internal Medicine Progress Note            Interval History:   Record reviewed.  Seen with RN.  In general doing well.  Residual fatigue.  Tolerable level of pain control with oxycodone 5 mg.   No CP, SOB, cough.  No recurrent nausea today.  No noted delirium.   Ambulated in marie without LH.   No reflux, abd pain.  Passing flatus.  Diarrhea last night attributed to laxatives.  Treated with lomotil. Has had chemo associated diarrhea as well.  No recent ABs of significance.    Voiding OK.  SBP 90's earlier.  No dizziness with upright posture.           Medications:   All medications reviewed today          Physical Exam:   Blood pressure 97/44, pulse 105, temperature 97.3  F (36.3  C), temperature source Oral, resp. rate 16, height 1.549 m (5' 1\"), weight 53.6 kg (118 lb 2.7 oz), last menstrual period 12/20/2013, SpO2 94 %, not currently breastfeeding.    Intake/Output Summary (Last 24 hours) at 10/02/18 1940  Last data filed at 10/02/18 1922   Gross per 24 hour   Intake              120 ml   Output             1500 ml   Net            -1380 ml       General:  Alert.  Appropriate.  No distress.   No  O2.  Recheck /44 sitting.  106/54 standing.     Heent:      Neck:    Skin:    Chest:  clear    Cardiac:  Reg without gallop, murmur.  No JVD.     Abdomen:  Non distended, soft, non-tender.  BS normal.     Extremities:  Perfused.  No edema, calf, posterior thigh tenderness to suggest DVT.     Neuro:            Data:     Results for orders placed or performed during the hospital encounter of 10/01/18 (from the past 24 hour(s))   Basic metabolic panel   Result Value Ref Range    Sodium 136 133 - 144 mmol/L    Potassium 3.9 3.4 - 5.3 mmol/L    Chloride 103 94 - 109 mmol/L    Carbon Dioxide 30 20 - 32 mmol/L    Anion Gap 3 3 - 14 mmol/L    Glucose 98 70 - 99 mg/dL    Urea Nitrogen 7 7 - 30 mg/dL    Creatinine 0.72 0.52 - 1.04 mg/dL    GFR Estimate 84 >60 mL/min/1.7m2    GFR Estimate If Black >90 >60 " mL/min/1.7m2    Calcium 7.2 (L) 8.5 - 10.1 mg/dL   CBC with platelets   Result Value Ref Range    WBC 5.4 4.0 - 11.0 10e9/L    RBC Count 3.14 (L) 3.8 - 5.2 10e12/L    Hemoglobin 10.2 (L) 11.7 - 15.7 g/dL    Hematocrit 31.4 (L) 35.0 - 47.0 %     78 - 100 fl    MCH 32.5 26.5 - 33.0 pg    MCHC 32.5 31.5 - 36.5 g/dL    RDW 17.1 (H) 10.0 - 15.0 %    Platelet Count 286 150 - 450 10e9/L     Last Comprehensive Metabolic Panel:  Sodium   Date Value Ref Range Status   10/04/2018 136 133 - 144 mmol/L Final     Potassium   Date Value Ref Range Status   10/04/2018 3.9 3.4 - 5.3 mmol/L Final     Chloride   Date Value Ref Range Status   10/04/2018 103 94 - 109 mmol/L Final     Carbon Dioxide   Date Value Ref Range Status   10/04/2018 30 20 - 32 mmol/L Final     Anion Gap   Date Value Ref Range Status   10/04/2018 3 3 - 14 mmol/L Final     Glucose   Date Value Ref Range Status   10/04/2018 98 70 - 99 mg/dL Final     Urea Nitrogen   Date Value Ref Range Status   10/04/2018 7 7 - 30 mg/dL Final     Creatinine   Date Value Ref Range Status   10/04/2018 0.72 0.52 - 1.04 mg/dL Final     GFR Estimate   Date Value Ref Range Status   10/04/2018 84 >60 mL/min/1.7m2 Final     Comment:     Non  GFR Calc     Calcium   Date Value Ref Range Status   10/04/2018 7.2 (L) 8.5 - 10.1 mg/dL Final              Assessment and Plan:   1)  S/p Prophylactic internal fixation using a hip screw left hip.  Indication metastatic renal cell cancer left femoral head.  Pain control adequate on reduced oxycodone.   2)  Acute blood loss anemia, adequate.  Stable.  3)  Metastatic renal cell cancer with metastatic disease to the lungs are adrenal gland, lungs.  S/P nephrectomy, chemoradiation.  Chemo presently on hold.  4)  Depression, anxiety on continued Celexa, Wellbutrin and Xanax.  5)  GERD on PPI.   6)  Solitary kidney.  S/P right nephrectomy due to kidney cancer.  7)  Hypotension post op 2nd to blood/volume loss, opiates.  Echocardiogram  6/2017 Normal left ventricular size, thickness, and function. Global and regional left ventricular function is normal with an EF of 60-65%. Back to baseline by history (preop /64).  8)  Constipation.  Resolved.  Likely laxative associated diarrhea.  Decreased after lomotil.  Has imodium at home.   9)  Concern regarding mild opiate associated delirium.  Mentally clear with dose reduction.     PLAN:  1)  Clinically stable for discharge  home.  2)  Meds, orders reviewed.  Continue lower dose oxycodone 5 mg.  Bowel meds prn,  Lovenox.  3)  [facility] with RN visit 10/5.  Contact oncology and PMD for follow up.   Call primary MSD and oncologist to see when they want to see you back. Call primary MD if diarrhea persists. [facility] with RN - start 10/5 - check clinically, bowel status, orthostatic BP, review meds - update primary MD. Attempt to use lower 5mg oxycodone dose.  Disposition Plan   Expected discharge today.      Entered: Roberto Mcallister 10/04/2018, 2:11 PM              Attestation:  I have reviewed today's vital signs, notes, medications, labs and imaging.     Roberto Mcallister MD

## 2018-10-04 NOTE — PLAN OF CARE
Problem: Surgery Nonspecified (Adult)  Goal: Signs and Symptoms of Listed Potential Problems Will be Absent, Minimized or Managed (Surgery Nonspecified)  Signs and symptoms of listed potential problems will be absent, minimized or managed by discharge/transition of care (reference Surgery Nonspecified (Adult) CPG).   Outcome: Improving  10/3  15:00-23:30  VS: Soft BP on day shift  BP @ 18:19 was 117/51,  BP re-check @ 20:39 was 134/57   O2 sats 97% on room air  Few expiratory wheezes @ 16:00 bilateral upper lobes, also audible without ascultation, resolved without intervention.  Pt encouraged to sit more upright when drinking.   Output: Voiding spontaneously without difficulty.  Ambulated to bathroom x 3 this shift voiding 200-300 ml clear light yellow urine   Bowels/BM BS hypoactive, fluids encouraged.  Declined suppository, did take senokot-s this evening.   Did have a formed med BM this evening (10/3)   Activity Assist of 1 with walker  Independent getting out of bed, needs some assistance with left leg getting into bed   Skin: Intact except for surgical incision left hip   Pain: Managed with prn oxycodone @ 19:40.  Pt reported that she did not become nauseated with the oxycodone, and it did help with the pain.   Pt does forget to call and ask for pain meds.   CMS: Intact, denies numbness/tingling   Dressing: aquacel dressing left hip CDI   Diet: Regular  Poor appetite.  Family did bring in a scone and smoothie for her.  She did eat these.   LDA: PAC right chest.  Accessed this shift, good blood return, continuous  0.9% NS infusing @ 75 ml/hr.   Equipment: IV pump and pole  Walker   Plan: Discharge to prior living arrangements when ready   Additional Info: Pt has been alert, but minor forgetfulness.  Needs reminders to wait for assistance.

## 2018-10-04 NOTE — PROGRESS NOTES
"[facility] Internal Medicine Progress Note            Interval History:   Record reviewed.  Seen earlier with RN.  Irritable, alleging care issues with staff.  Indication of confusion, inconsistencies in concerns.  Indication that pain manageable.  Indicates she was told IV dilaudid administered when it actually wasn't. Staff attempt with opiate reduction with oxycodone dose reduction to 5 mg.  Up to BR with mild LH.  No CP, SOB, cough.  Mild nausea.  Unclear if it relates to oxycodone.  No emesis.  Reduced appetite.  No reflux, abd pain.  Passing flatus.  BM 3 days ago.  Voiding OK.  Wondering about timing of discharge.          Medications:   All medications reviewed today          Physical Exam:   Blood pressure 117/51, pulse 76, temperature 96.6  F (35.9  C), temperature source Oral, resp. rate 16, height 1.549 m (5' 1\"), weight 53.6 kg (118 lb 2.7 oz), last menstrual period 12/20/2013, SpO2 96 %, not currently breastfeeding.    Intake/Output Summary (Last 24 hours) at 10/02/18 1940  Last data filed at 10/02/18 1922   Gross per 24 hour   Intake              120 ml   Output             1500 ml   Net            -1380 ml       General:  Alert.  Irritable.  Suggestion of confusion regarding care details.   No distress.  No  O2.     Heent:      Neck:    Skin:    Chest:  clear    Cardiac:  Reg without gallop, murmur.  No JVD.     Abdomen:  Non distended, soft, non-tender.  BS normal.     Extremities:  Perfused.  No edema, calf, posterior thigh tenderness to suggest DVT.     Neuro:            Data:     Results for orders placed or performed during the hospital encounter of 10/01/18 (from the past 24 hour(s))   Hemoglobin   Result Value Ref Range    Hemoglobin 10.5 (L) 11.7 - 15.7 g/dL     Last Comprehensive Metabolic Panel:  Sodium   Date Value Ref Range Status   10/02/2018 142 133 - 144 mmol/L Final     Potassium   Date Value Ref Range Status   10/02/2018 4.0 3.4 - 5.3 mmol/L Final     Chloride   Date Value Ref " Range Status   10/02/2018 106 94 - 109 mmol/L Final     Carbon Dioxide   Date Value Ref Range Status   10/02/2018 31 20 - 32 mmol/L Final     Anion Gap   Date Value Ref Range Status   10/02/2018 5 3 - 14 mmol/L Final     Glucose   Date Value Ref Range Status   10/02/2018 92 70 - 99 mg/dL Final     Urea Nitrogen   Date Value Ref Range Status   10/02/2018 6 (L) 7 - 30 mg/dL Final     Creatinine   Date Value Ref Range Status   10/02/2018 0.73 0.52 - 1.04 mg/dL Final     GFR Estimate   Date Value Ref Range Status   10/02/2018 82 >60 mL/min/1.7m2 Final     Comment:     Non  GFR Calc     Calcium   Date Value Ref Range Status   10/02/2018 7.6 (L) 8.5 - 10.1 mg/dL Final              Assessment and Plan:   1)  S/p Prophylactic internal fixation using a hip screw left hip.  Indication metastatic renal cell cancer left femoral head.  Pain control appears tolerable.  2)  Acute blood loss anemia, adequate.  Stable.  3)  Metastatic renal cell cancer with metastatic disease to the lungs are adrenal gland, lungs.  S/P nephrectomy, chemoradiation.  Chemo presently on hold.  4)  Depression, anxiety on continued Celexa, Wellbutrin and Xanax.  5)  GERD on PPI.   6)  Solitary kidney.  S/P right nephrectomy due to kidney cancer.  7)  Hypotension post op 2nd to blood/volume loss, opiates.  Echocardiogram 6/2017 Normal left ventricular size, thickness, and function. Global and regional left ventricular function is normal with an EF of 60-65%. Back to baseline by history (preop /64).  8)  Constipation.  9)  Concern regarding mild opiate associated delirium.      PLAN:  1)  Adjust IV fluids with limited po.  Add Boost plus.  2)  Same opiates (reviewed with RN).  Continued attempt at dose reduction.  Keep IV dilaudid available for breakthrough pain.  Scheduled tylenol.  3)  IS, mobilize, bowel meds with supp later if no BM, miralax, increased PPI, lovenox.  4)  Trend labs.  Monitor clinically.   Disposition Plan    Expected discharge in 1-2 days to prior living arrangement once mobilizing safely with adequate pain control and mentally clear..     Entered: Roberto Mcallister 10/03/2018, 7:28 PM              Attestation:  I have reviewed today's vital signs, notes, medications, labs and imaging.     Roberto Mcallister MD

## 2018-10-04 NOTE — PLAN OF CARE
Problem: Surgery Nonspecified (Adult)  Goal: Signs and Symptoms of Listed Potential Problems Will be Absent, Minimized or Managed (Surgery Nonspecified)  Signs and symptoms of listed potential problems will be absent, minimized or managed by discharge/transition of care (reference Surgery Nonspecified (Adult) CPG).   Outcome: Improving    VS: Temp: 97.3  F (36.3  C) Temp src: Oral BP: 97/44 Pulse: 105 Heart Rate: 103 Resp: 16   O2: SpO2: 94 % O2 Device: None (Room air)     Output: Urine output adequate    Last BM: 10/4 - multiple loose stools today, lomotil given    Activity: Up with SBA, pt has set off bed alarm forgetting to call x2 today   Skin: Intact - incision    Pain: Managed with 5mg Oxycodone, scheduled Tylenol   CMS: Intact - denies numbness or tingling   Dressing: CDI   Diet: Regular diet, poor appetite    LDA: Port a cath heparin locked per pt request - pt states she is taking plenty of PO liquids   Equipment: Pt refusing PCDs, bed alarm on, walker   Plan: TBD - possible home today   Additional Info:

## 2018-10-04 NOTE — PLAN OF CARE
Problem: Patient Care Overview  Goal: Plan of Care/Patient Progress Review  Physical Therapy Discharge Summary    Reason for therapy discharge:    Discharged to home with outpatient therapy.    Progress towards therapy goal(s). See goals on Care Plan in [facility] electronic health record for goal details.  Goals partially met.  Barriers to achieving goals:   discharge from facility.    Therapy recommendation(s):    Continued therapy is recommended.  Rationale/Recommendations:  Pt will discharge to home with family to assist. Recommend out-pt PT follow-up when endurance improves to address further strengthening activity..

## 2018-10-04 NOTE — PROGRESS NOTES
Pt. discharged at 1450 to home. Pt. was accompanied by  and son, and left with personal belongings. Prior to discharge, PIV was removed. Pt. received complete discharge paperwork and medications as filled by discharge pharmacy. Pt. was given times of last dose for all discharge medications in writing on discharge medication sheets.  Discharge teaching included medication, pain management, activity restrictions, dressing changes, and signs and symptoms of infection. Pt. had no further questions at the time of discharge and no unmet needs were identified. Pt left without transport, according to staff at nursing station at the time she pushed her walker aside and stormed off the unit because transport was not here.

## 2018-10-04 NOTE — PROGRESS NOTES
"Orthopedics Daily Progress Note    S: no acute events overnight, pain control adequate; tolerating diet, voiding, passing flatus, no numbness/tinglings, chest pain/shortness of breath, or fever/chills. Patient asking about discharge home today    O: /55 (BP Location: Right arm)  Pulse 105  Temp 97  F (36.1  C) (Oral)  Resp 18  Ht 1.549 m (5' 1\")  Wt 53.6 kg (118 lb 2.7 oz)  LMP 12/20/2013  SpO2 95%  BMI 22.33 kg/m2   No acute distress  Non-labored respirations  LLE: dressing not visualized this morning, 5/5 df/pf/ehl/fhl, able to fire quads, sensation intact in deep peroneal, superficial peroneal, tibial, sural, saphenous nerve distributions, 2+ DP pulse.    Labs:  No new labs    A/P:   Patient is a 69 year old female with renal cell carcinoma now s/p sliding hip screw for a metastatic lesion in the femoral neck    -Ortho primary   -WB: WBAT  -Activity: with assist and assistive device until independent   -DVT ppx: lovenox for 4 weeks after discussion with oncology  -Abx: perioperative antibiotics   -PT/OT  -Pain: oxycodone PRN with scheduled tylenol   -Diet: ADAT  -Dressings: aquacel until POD#6-7  -Hypotension: improved   -Labs: complete   -Follow up: 2 weeks with Dr. Dunn, no xrays   -Dispo: home likely today     Juanito Jimenez MD   Orthopedic Surgery; PGY-4  Pager: [phone]    For any questions regarding this patient please page me prior to paging the ortho resident on call.     "

## 2018-10-04 NOTE — PLAN OF CARE
Problem: Patient Care Overview  Goal: Plan of Care/Patient Progress Review  Discharge Planner PT   Patient plan for discharge: home with assist and possible out-pt PT  Current status: Pt demonstrated independent transfers and stairs climbing today. Reviewed car transfers x 2.  Pt's session interrupted by episode of diarrhea.  She feels like diarrhea issue can be better controlled at home.    Barriers to return to prior living situation: from PT standpoint, none  Recommendations for discharge: home with family to assist  Rationale for recommendations: consider out-pt PT when pt's endurance has improved to further address strengthening and endurance.        Entered by: Tonia Saldaña 10/04/2018 1:22 PM

## 2018-10-16 NOTE — NURSING NOTE
Suzi Gonsales      1.  Has the patient received the information for the influenza vaccine? YES    2.  Does the patient have any of the following contraindications?     Allergy to eggs? No     Allergic reaction to previous influenza vaccines? No     Any other problems to previous influenza vaccines? No     Paralyzed by Guillain-[place] syndrome? No     Currently pregnant? NO     Current moderate or severe illness? No     Allergy to contact lens solution? No    3.  The vaccine has been administered in the usual fashion and the patient was instructed to wait 20 minutes before leaving the building in the event of an allergic reaction: YES    Vaccination given by Emma Moody LPN.  Recorded by SMITHA BASHIR    Shingrix vaccine given in left Deltoid, tolerated well.  Emma Moody LPN

## 2018-10-16 NOTE — NURSING NOTE
"Oncology Rooming Note    October 16, 2018 2:32 PM   Suzi Gonsales is a 53 year old female who presents for:    Chief Complaint   Patient presents with     Port Draw     labs drawn via port by RN     RECHECK     ONC Kidney Ca 6 wk f/up      Initial Vitals: /45 (BP Location: Right arm, Patient Position: Chair, Cuff Size: Adult Regular)  Pulse 83  Temp 98.4  F (36.9  C) (Oral)  Ht 1.549 m (5' 0.98\")  Wt 53.4 kg (117 lb 11.2 oz)  LMP 12/20/2013  SpO2 96%  BMI 22.25 kg/m2 Estimated body mass index is 22.25 kg/(m^2) as calculated from the following:    Height as of this encounter: 1.549 m (5' 0.98\").    Weight as of this encounter: 53.4 kg (117 lb 11.2 oz). Body surface area is 1.52 meters squared.  Mild Pain (3) Comment: Data Unavailable   Patient's last menstrual period was 12/20/2013.  Allergies reviewed: Yes  Medications reviewed: Yes    Medications: Medication refills not needed today.  Pharmacy name entered into [employer]:    [id number] - [place], [place] - [address] AT [facility] [address]  [facility] USE FOR [facility] ONLY - [facility] [place] [place] - [address] [place] [address]    Clinical concerns: none      6 minutes for nursing intake (face to face time)     Kim ADRIANO Alaniz              "

## 2018-10-16 NOTE — LETTER
10/16/2018       RE: Suzi Gonsales  [address] [place] [address]     Dear Colleague,    Thank you for referring your patient, Suzi Gonsales, to the [facility]. Please see a copy of my visit note below.    Diagnosis: Metastatic renal cell cancer, widespread, involving left femoral head     Treatment: Multiple treatments including radiation to the left femoral head.      Suzi seen today for wound inspection.  Wound is healed her sutures were removed.  She is instructed to advance to activities as tolerated.  She is currently using a cane.  She can advance to walking without the cane when she feels capable.  I showed her some exercises for quad strengthening hip abduction strengthening.  She understands these and perform these.    I did review with her that her biopsy was negative which by my interpretation indicates that her prior treatments had eliminated gross disease from the area of ostial lysis.    We will see her back as needed based on her future staging studies to the medical oncology team.    Again, thank you for allowing me to participate in the care of your patient.      Sincerely,    Randy Dunn MD

## 2018-10-16 NOTE — NURSING NOTE
Chief Complaint   Patient presents with     Port Draw     labs drawn via port by RN     /45 (BP Location: Right arm, Patient Position: Chair, Cuff Size: Adult Regular)  Pulse 83  Temp 98.4  F (36.9  C) (Oral)  Wt 53.4 kg (117 lb 11.2 oz)  LMP 12/20/2013  SpO2 96%  BMI 22.24 kg/m2    Vitals taken. Port accessed by RN. Labs collected and sent. Line flushed with NS & Heparin. Pt tolerated well. Pt checked in for next appointment.    Estee Hampton, RN

## 2018-10-16 NOTE — NURSING NOTE
"Chief Complaint   Patient presents with     Surgical Followup     2wk POP F/u ORIF Left Hip Screw DOS: 10/1/2018       53 year old  1965    Ht 1.549 m (5' 0.98\")  Wt 53.4 kg (117 lb 11.2 oz)  LMP 12/20/2013  BMI 22.25 kg/m2            Pain Assessment  Patient Currently in Pain: Yes  Primary Pain Location:  (thigh)  Pain Orientation: Left  Alleviating Factors:  (Pt. reports that she only takes her pain med. at bedtime and hasn't been awoken from her sleep since the weekend, so she believes things are improving. )            [id number] - [place], [place] - [address] AT [facility] [employer] [address]  [facility] USE FOR [facility] ONLY - [place], [place] - [address] [place] [address]    No Known Allergies  Current Outpatient Prescriptions   Medication     acetaminophen (TYLENOL) 325 MG tablet     ALPRAZolam (XANAX) 0.5 MG tablet     buPROPion (WELLBUTRIN XL) 300 MG 24 hr tablet     Cabozantinib S-Malate (CABOMETYX) 40 MG     citalopram (CELEXA) 20 MG tablet     diphenoxylate-atropine (LOMOTIL) 2.5-0.025 MG per tablet     enoxaparin (LOVENOX) 40 MG/0.4ML injection     HYDROcodone-acetaminophen (NORCO) 5-325 MG per tablet     omeprazole (PRILOSEC) 40 MG capsule     ondansetron (ZOFRAN) 8 MG tablet     oxyCODONE IR (ROXICODONE) 5 MG tablet     polyethylene glycol (MIRALAX/GLYCOLAX) Packet     Probiotic Product (PROBIOTIC PO)     sennosides (SENOKOT) 8.6 MG tablet     zolpidem (AMBIEN) 5 MG tablet     No current facility-administered medications for this visit.                        "

## 2018-10-16 NOTE — MR AVS SNAPSHOT
After Visit Summary   10/16/2018    Suzi Gonsales    MRN: [id number]           Patient Information     Date Of Birth          1965        Visit Information        Provider Department      10/16/2018 4:15 PM Randy Dunn MD [facility]        Today's Diagnoses     Bone metastasis (H)    -  1       Follow-ups after your visit        Your next 10 appointments already scheduled     Dec 06, 2018 12:00 PM CST   MR BRAIN W/O & W CONTRAST with UUMR1   [facility], [place], MRI ([facility], [facility])    [address] [address] [phone]           How do I prepare for my exam? (Food and drink instructions) **If you will be receiving sedation or general anesthesia, please see special notes below.**  How do I prepare for my exam? (Other instructions) Take your medicines as usual, unless your doctor tells you not to. You may or may not receive intravenous (IV) contrast for this exam pending the discretion of the Radiologist.  You do not need to do anything special to prepare.  **If you will be receiving sedation or general anesthesia, please see special notes below.**  What should I wear: The MRI machine uses a strong magnet. Please wear clothes without metal (snaps, zippers). A sweatsuit works well, or we may give you a hospital gown. Please remove any body piercings and hair extensions before you arrive. You will also remove watches, jewelry, hairpins, wallets, dentures, partial dental plates and hearing aids. You may wear contact lenses, and you may be able to wear your rings. We have a safe place to keep your personal items, but it is safer to leave them at home.  How long does the exam take: Most tests take 30 to 60 minutes.  HOWEVER, IF YOUR DOCTOR PRESCRIBES ANESTHESIA please plan on spending four to five hours in the recovery room.  What should I bring:  Bring a list of your current medicines to your exam (including  vitamins, minerals and over-the-counter drugs).  Do I need a :  **If you will be receiving sedation or general anesthesia, please see special notes below.**  What should I do after the exam: No Restrictions, You may resume normal activities.  What is this test: MRI (magnetic resonance imaging) uses a strong magnet and radio waves to look inside the body. An MRA (magnetic resonance angiogram) does the same thing, but it lets us look at your blood vessels. A computer turns the radio waves into pictures showing cross sections of the body, much like slices of bread. This helps us see any problems more clearly. You may receive fluid (called  contrast ) before or during your scan. The fluid helps us see the pictures better. We give the fluid through an IV (small needle in your arm).  Who should I call with questions:  Please call the Imaging Department at your exam site with any questions. Directions, parking instructions, and other information is available on our website, [email or web address].  How do I prepare if I m having sedation or anesthesia? **IMPORTANT** THE INSTRUCTIONS BELOW ARE ONLY FOR THOSE PATIENTS WHO HAVE BEEN TOLD THEY WILL RECEIVE SEDATION OR GENERAL ANESTHESIA DURING THEIR MRI PROCEDURE:  IF YOU WILL RECEIVE SEDATION (take medicine to help you relax during your exam): You must get the medicine from your doctor before you arrive. Bring the medicine to the exam. Do not take it at home. Arrive one hour early. Bring someone who can take you home after the test. Your medicine will make you sleepy. After the exam, you may not drive, take a bus or take a taxi by yourself. No eating 8 hours before your exam. You may have clear liquids up until 4 hours before your exam. (Clear liquids include water, clear tea, black coffee and fruit juice without pulp.)  IF YOU WILL RECEIVE ANESTHESIA (be asleep for your exam): Arrive 1 1/2 hours early. Bring someone who can take you home after the test. You may not  "drive, take a bus or take a taxi by yourself. No eating 8 hours before your exam. You may have clear liquids up until 4 hours before your exam. (Clear liquids include water, clear tea, black coffee and fruit juice without pulp.)            Dec 06, 2018  1:00 PM CST   Return Visit with Huyen Cuellar MD   Radiation Oncology Clinic ([facility])    [facility]  1st Floor  [address] [address]   [phone]              Future tests that were ordered for you today     Open Future Orders        Priority Expected Expires Ordered    CT Chest/Abdomen/Pelvis w Contrast Routine  10/16/2019 10/16/2018            Who to contact     Please call your clinic at [phone] to:    Ask questions about your health    Make or cancel appointments    Discuss your medicines    Learn about your test results    Speak to your doctor            Additional Information About Your Visit        [employer]ilaharTugende Information     [employer] gives you secure access to your electronic health record. If you see a primary care provider, you can also send messages to your care team and make appointments. If you have questions, please call your primary care clinic.  If you do not have a primary care provider, please call [phone] and they will assist you.      [employer] is an electronic gateway that provides easy, online access to your medical records. With [employer], you can request a clinic appointment, read your test results, renew a prescription or communicate with your care team.     To access your existing account, please contact your [facility] or call [phone] for assistance.        Care EveryWhere ID     This is your Care EveryWhere ID. This could be used by other organizations to access your [place] medical records  [id number]        Your Vitals Were     Height Last Period BMI (Body Mass Index)             1.549 m (5' 0.98\") 12/20/2013 22.25 kg/m2       "    Blood Pressure from Last 3 Encounters:   10/16/18 101/45   10/04/18 97/44   09/27/18 119/64    Weight from Last 3 Encounters:   10/16/18 53.4 kg (117 lb 11.2 oz)   10/16/18 53.4 kg (117 lb 11.2 oz)   10/01/18 53.6 kg (118 lb 2.7 oz)              Today, you had the following     No orders found for display       Primary Care Provider Office Phone # Fax #    Molly AdameARIEL [phone] [address] [address]        Equal Access to Services     [facility] : Hadii jimmie Santana, wakylahda margaret, qaybta kaalmada jarek, josemanuel oswald . So [facility] [phone].    ATENCIÓN: Si habla español, tiene a chacon disposición servicios gratuitos de asistencia lingüística. [facility] [phone].    We comply with applicable federal civil rights laws and [place] laws. We do not discriminate on the basis of race, color, national origin, age, disability, sex, sexual orientation, or gender identity.            Thank you!     Thank you for choosing [facility]  for your care. Our goal is always to provide you with excellent care. Hearing back from our patients is one way we can continue to improve our services. Please take a few minutes to complete the written survey that you may receive in the mail after your visit with us. Thank you!             Your Updated Medication List - Protect others around you: Learn how to safely use, store and throw away your medicines at www.disposemymeds.org.          This list is accurate as of 10/16/18  7:19 PM.  Always use your most recent med list.                   Brand Name Dispense Instructions for use Diagnosis    acetaminophen 325 MG tablet    TYLENOL    100 tablet    Take 3 tablets (975 mg) by mouth every 6 hours    Bone metastasis (H)       ALPRAZolam 0.5 MG tablet    XANAX    60 tablet    Take 1 tablet (0.5 mg) by mouth 3 times daily as needed for anxiety    Renal cell carcinoma,  unspecified laterality (H), Mass of upper lobe of left lung       buPROPion 300 MG 24 hr tablet    WELLBUTRIN XL     Take 300 mg by mouth every morning        Cabozantinib S-Malate 40 MG    CABOMETYX    30 tablet    Take 1 tablet (40 mg) by mouth daily Take on an empty stomach 1 hour before or 2 hours after a meal. Avoid grapefruit and grapefruit juice.    Brain metastasis (H), Metastatic renal cell carcinoma to lung, right (H), Malignant neoplasm of right kidney (H)       citalopram 20 MG tablet    celeXA    90 tablet    Take 1 tablet (20 mg) by mouth every evening    Insomnia, unspecified type       diphenoxylate-atropine 2.5-0.025 MG per tablet    LOMOTIL    100 tablet    Take 1-2 tablets by mouth 4 times daily as needed for diarrhea    Diarrhea, unspecified type       enoxaparin 40 MG/0.4ML injection    LOVENOX    10.4 mL    Inject 0.4 mLs (40 mg) Subcutaneous every 24 hours for 26 days    Bone metastasis (H)       HYDROcodone-acetaminophen 5-325 MG per tablet    NORCO          omeprazole 40 MG capsule    priLOSEC    30 capsule    Take 1 capsule (40 mg) by mouth daily    Cerebral edema (H)       ondansetron 8 MG tablet    ZOFRAN    30 tablet    Take 1 tablet (8 mg) by mouth every 8 hours as needed for nausea    Nausea       oxyCODONE IR 5 MG tablet    ROXICODONE    60 tablet    Take 1-2 tablets (5-10 mg) by mouth every 4 hours as needed for moderate to severe pain    Bone metastasis (H)       polyethylene glycol Packet    MIRALAX/GLYCOLAX    7 packet    Take 17 g by mouth daily as needed for constipation        PROBIOTIC PO      Take by mouth daily (with lunch)    Pre-op evaluation, Brain metastases (H)       sennosides 8.6 MG tablet    SENOKOT    60 each    Take 1-2 tablets by mouth 2 times daily as needed for constipation (no stool in 24 hrs)    Bone metastasis (H)       zolpidem 5 MG tablet    AMBIEN    30 tablet    Take 1 tablet (5 mg) by mouth nightly as needed for sleep    Sleep disorder

## 2018-10-16 NOTE — MR AVS SNAPSHOT
After Visit Summary   10/16/2018    Suzi Gonsales    MRN: [id number]           Patient Information     Date Of Birth          1965        Visit Information        Provider Department      10/16/2018 2:10 PM Day Ren PA-C [facility]        Today's Diagnoses     Malignant neoplasm of right kidney (H)           Follow-ups after your visit        Your next 10 appointments already scheduled     Dec 06, 2018 12:00 PM CST   MR BRAIN W/O & W CONTRAST with UUMR1   [facility], [place], MRI ([facility], [facility])    [address] [address]   [phone]           How do I prepare for my exam? (Food and drink instructions) **If you will be receiving sedation or general anesthesia, please see special notes below.**  How do I prepare for my exam? (Other instructions) Take your medicines as usual, unless your doctor tells you not to. You may or may not receive intravenous (IV) contrast for this exam pending the discretion of the Radiologist.  You do not need to do anything special to prepare.  **If you will be receiving sedation or general anesthesia, please see special notes below.**  What should I wear: The MRI machine uses a strong magnet. Please wear clothes without metal (snaps, zippers). A sweatsuit works well, or we may give you a hospital gown. Please remove any body piercings and hair extensions before you arrive. You will also remove watches, jewelry, hairpins, wallets, dentures, partial dental plates and hearing aids. You may wear contact lenses, and you may be able to wear your rings. We have a safe place to keep your personal items, but it is safer to leave them at home.  How long does the exam take: Most tests take 30 to 60 minutes.  HOWEVER, IF YOUR DOCTOR PRESCRIBES ANESTHESIA please plan on spending four to five hours in the recovery room.  What should I bring:  Bring a list of your current medicines to  your exam (including vitamins, minerals and over-the-counter drugs).  Do I need a :  **If you will be receiving sedation or general anesthesia, please see special notes below.**  What should I do after the exam: No Restrictions, You may resume normal activities.  What is this test: MRI (magnetic resonance imaging) uses a strong magnet and radio waves to look inside the body. An MRA (magnetic resonance angiogram) does the same thing, but it lets us look at your blood vessels. A computer turns the radio waves into pictures showing cross sections of the body, much like slices of bread. This helps us see any problems more clearly. You may receive fluid (called  contrast ) before or during your scan. The fluid helps us see the pictures better. We give the fluid through an IV (small needle in your arm).  Who should I call with questions:  Please call the Imaging Department at your exam site with any questions. Directions, parking instructions, and other information is available on our website, [facility] [email or web address].  How do I prepare if I m having sedation or anesthesia? **IMPORTANT** THE INSTRUCTIONS BELOW ARE ONLY FOR THOSE PATIENTS WHO HAVE BEEN TOLD THEY WILL RECEIVE SEDATION OR GENERAL ANESTHESIA DURING THEIR MRI PROCEDURE:  IF YOU WILL RECEIVE SEDATION (take medicine to help you relax during your exam): You must get the medicine from your doctor before you arrive. Bring the medicine to the exam. Do not take it at home. Arrive one hour early. Bring someone who can take you home after the test. Your medicine will make you sleepy. After the exam, you may not drive, take a bus or take a taxi by yourself. No eating 8 hours before your exam. You may have clear liquids up until 4 hours before your exam. (Clear liquids include water, clear tea, black coffee and fruit juice without pulp.)  IF YOU WILL RECEIVE ANESTHESIA (be asleep for your exam): Arrive 1 1/2 hours early. Bring someone who can take you home after the  "test. You may not drive, take a bus or take a taxi by yourself. No eating 8 hours before your exam. You may have clear liquids up until 4 hours before your exam. (Clear liquids include water, clear tea, black coffee and fruit juice without pulp.)            Dec 06, 2018  1:00 PM CST   Return Visit with Huyen Cuellar MD   Radiation Oncology Clinic ([facility])    [facility]  1st Floor  [address] [id number] [phone]              Who to contact     If you have questions or need follow up information about today's clinic visit or your schedule please contact [facility] directly at [phone].  Normal or non-critical lab and imaging results will be communicated to you by [employer], letter or phone within 4 business days after the clinic has received the results. If you do not hear from us within 7 days, please contact the clinic through [employer] or phone. If you have a critical or abnormal lab result, we will notify you by phone as soon as possible.  Submit refill requests through [employer] or call your pharmacy and they will forward the refill request to us. Please allow 3 business days for your refill to be completed.          Additional Information About Your Visit        [employer] Information     [employer] gives you secure access to your electronic health record. If you see a primary care provider, you can also send messages to your care team and make appointments. If you have questions, please call your primary care clinic.  If you do not have a primary care provider, please call [phone] and they will assist you.        Care EveryWhere ID     This is your Care EveryWhere ID. This could be used by other organizations to access your [place] medical records  [id number]        Your Vitals Were     Pulse Temperature Height Last Period Pulse Oximetry BMI (Body Mass Index)    83 98.4  F (36.9  C) (Oral) 1.549 m (5' 0.98\") 12/20/2013 " 96% 22.25 kg/m2       Blood Pressure from Last 3 Encounters:   10/16/18 101/45   10/04/18 97/44   09/27/18 119/64    Weight from Last 3 Encounters:   10/16/18 53.4 kg (117 lb 11.2 oz)   10/16/18 53.4 kg (117 lb 11.2 oz)   10/01/18 53.6 kg (118 lb 2.7 oz)              We Performed the Following     CBC with platelets differential     Comprehensive metabolic panel       Information about OPIOIDS     PRESCRIPTION OPIOIDS: WHAT YOU NEED TO KNOW   We gave you an opioid (narcotic) pain medicine. It is important to manage your pain, but opioids are not always the best choice. You should first try all the other options your care team gave you. Take this medicine for as short a time (and as few doses) as possible.    Some activities can increase your pain, such as bandage changes or therapy sessions. It may help to take your pain medicine 30 to 60 minutes before these activities. Reduce your stress by getting enough sleep, working on hobbies you enjoy and practicing relaxation or meditation. Talk to your care team about ways to manage your pain beyond prescription opioids.    These medicines have risks:    DO NOT drive when on new or higher doses of pain medicine. These medicines can affect your alertness and reaction times, and you could be arrested for driving under the influence (DUI). If you need to use opioids long-term, talk to your care team about driving.    DO NOT operate heavy machinery    DO NOT do any other dangerous activities while taking these medicines.    DO NOT drink any alcohol while taking these medicines.     If the opioid prescribed includes acetaminophen, DO NOT take with any other medicines that contain acetaminophen. Read all labels carefully. Look for the word  acetaminophen  or  Tylenol.  Ask your pharmacist if you have questions or are unsure.    You can get addicted to pain medicines, especially if you have a history of addiction (chemical, alcohol or substance dependence). Talk to your care team  about ways to reduce this risk.    All opioids tend to cause constipation. Drink plenty of water and eat foods that have a lot of fiber, such as fruits, vegetables, prune juice, apple juice and high-fiber cereal. Take a laxative (Miralax, milk of magnesia, Colace, Senna) if you don t move your bowels at least every other day. Other side effects include upset stomach, sleepiness, dizziness, throwing up, tolerance (needing more of the medicine to have the same effect), physical dependence and slowed breathing.    Store your pills in a secure place, locked if possible. We will not replace any lost or stolen medicine. If you don t finish your medicine, please throw away (dispose) as directed by your pharmacist. The [place] Pollution Control Agency has more information about safe disposal: https://www.pca.[email or web address].us/living-green/managing-unwanted-medications         Primary Care Provider Office Phone # Fax #    Molly Foote ARIEL Adame [facility] [phone]       [facility] [address]        Equal Access to Services     [facility] : Hadii jimmie garnett Sovanessa, waaxda luqadaha, qaybta kaalmajodi tilley, josemanuel oswald . So [facility] [place] [facility] [phone].    ATENCIÓN: Si habla español, tiene a chacon disposición servicios gratuitos de asistencia lingüística. Mario al [phone].    We comply with applicable federal civil rights laws and [place] laws. We do not discriminate on the basis of race, color, national origin, age, disability, sex, sexual orientation, or gender identity.            Thank you!     Thank you for choosing [facility]  for your care. Our goal is always to provide you with excellent care. Hearing back from our patients is one way we can continue to improve our services. Please take a few minutes to complete the written survey that you may receive in the mail after your visit with us. Thank you!             Your Updated Medication List  - Protect others around you: Learn how to safely use, store and throw away your medicines at www.disposemymeds.org.          This list is accurate as of 10/16/18 11:59 PM.  Always use your most recent med list.                   Brand Name Dispense Instructions for use Diagnosis    acetaminophen 325 MG tablet    TYLENOL    100 tablet    Take 3 tablets (975 mg) by mouth every 6 hours    Bone metastasis (H)       ALPRAZolam 0.5 MG tablet    XANAX    60 tablet    Take 1 tablet (0.5 mg) by mouth 3 times daily as needed for anxiety    Renal cell carcinoma, unspecified laterality (H), Mass of upper lobe of left lung       buPROPion 300 MG 24 hr tablet    WELLBUTRIN XL     Take 300 mg by mouth every morning        Cabozantinib S-Malate 40 MG    CABOMETYX    30 tablet    Take 1 tablet (40 mg) by mouth daily Take on an empty stomach 1 hour before or 2 hours after a meal. Avoid grapefruit and grapefruit juice.    Brain metastasis (H), Metastatic renal cell carcinoma to lung, right (H), Malignant neoplasm of right kidney (H)       citalopram 20 MG tablet    celeXA    90 tablet    Take 1 tablet (20 mg) by mouth every evening    Insomnia, unspecified type       diphenoxylate-atropine 2.5-0.025 MG per tablet    LOMOTIL    100 tablet    Take 1-2 tablets by mouth 4 times daily as needed for diarrhea    Diarrhea, unspecified type       enoxaparin 40 MG/0.4ML injection    LOVENOX    10.4 mL    Inject 0.4 mLs (40 mg) Subcutaneous every 24 hours for 26 days    Bone metastasis (H)       HYDROcodone-acetaminophen 5-325 MG per tablet    NORCO          omeprazole 40 MG capsule    priLOSEC    30 capsule    Take 1 capsule (40 mg) by mouth daily    Cerebral edema (H)       ondansetron 8 MG tablet    ZOFRAN    30 tablet    Take 1 tablet (8 mg) by mouth every 8 hours as needed for nausea    Nausea       oxyCODONE IR 5 MG tablet    ROXICODONE    60 tablet    Take 1-2 tablets (5-10 mg) by mouth every 4 hours as needed for moderate to severe pain     Bone metastasis (H)       polyethylene glycol Packet    MIRALAX/GLYCOLAX    7 packet    Take 17 g by mouth daily as needed for constipation        PROBIOTIC PO      Take by mouth daily (with lunch)    Pre-op evaluation, Brain metastases (H)       sennosides 8.6 MG tablet    SENOKOT    60 each    Take 1-2 tablets by mouth 2 times daily as needed for constipation (no stool in 24 hrs)    Bone metastasis (H)       zolpidem 5 MG tablet    AMBIEN    30 tablet    Take 1 tablet (5 mg) by mouth nightly as needed for sleep    Sleep disorder

## 2018-10-17 NOTE — PROGRESS NOTES
Diagnosis: Metastatic renal cell cancer, widespread, involving left femoral head     Treatment: Multiple treatments including radiation to the left femoral head.      Suzi seen today for wound inspection.  Wound is healed her sutures were removed.  She is instructed to advance to activities as tolerated.  She is currently using a cane.  She can advance to walking without the cane when she feels capable.  I showed her some exercises for quad strengthening hip abduction strengthening.  She understands these and perform these.    I did review with her that her biopsy was negative which by my interpretation indicates that her prior treatments had eliminated gross disease from the area of ostial lysis.    We will see her back as needed based on her future staging studies to the medical oncology team.

## 2018-10-22 NOTE — PROGRESS NOTES
Oncology/Hematology Visit Note  Oct 16, 2018    Reason for Visit: [facility], routine followup    History of Present Illness: Suzi presented to ED with hematuria and right flank pain thinking she had a renal stone in December 2014. She was worked up with a CT abd/pelvis which suggested a renal mass. She was referred to Dr. Max Zelaya in [facility]. 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 Per charts tumor resection had negative margins and it was staged at pT2bN0.     Her staging work up was negative except for pulmonary nodules. She has been followed every 6 months with scans. CT CAP on 5/11/17 showed enlarging hilar LAD, enlarging pulmonary nodules, adrenal nodules and a pancreatic body mass. Biopsies of hilar LN, adrenal nodule and pancreatic mass were + for RCC.      ONCOLOGY THERAPY:   6/6/17-8/15/18-- IL-2, of which she received 4 cycles   11/22/17 CT CAP showed disease progression  She completed three months of Opdivo and then 12/5/17-2/13/18- 3 months of Opdivo  3/6/18- cerebellar lesion s/p craniotomy and GK 4/5 to tumor bed and 4 new lesions  4/12/18- 5 fractions of XRT to left supraclav/MERARY  4/19/18- Cabo 40 mg   6/20/18- Femur radiation  10/1/18- surgical stabilization of the femur       Interval History:  Suzi is doing pretty well today.  She is occasionally taking oxycodone prior to bedtime but otherwise not needing narcotics during the day.  She admits she has forgotten to take her Lovenox injections she is suppose to be taking.  She is leaving the house and has gone out to eat a few times, using a cane now.  Appetite continues to be low, but weight is stable.  She has been off Cabo since the night prior to the surgery and wonders if she can restart.  Still having softer stools, but no diarrhea.  No other major issues.  Its felt good to be not at clinic all the time.     Current Outpatient Prescriptions   Medication Sig  "Dispense Refill     acetaminophen (TYLENOL) 325 MG tablet Take 3 tablets (975 mg) by mouth every 6 hours 100 tablet 1     ALPRAZolam (XANAX) 0.5 MG tablet Take 1 tablet (0.5 mg) by mouth 3 times daily as needed for anxiety 60 tablet 0     buPROPion (WELLBUTRIN XL) 300 MG 24 hr tablet Take 300 mg by mouth every morning        Cabozantinib S-Malate (CABOMETYX) 40 MG Take 1 tablet (40 mg) by mouth daily Take on an empty stomach 1 hour before or 2 hours after a meal. Avoid grapefruit and grapefruit juice. 30 tablet 0     citalopram (CELEXA) 20 MG tablet Take 1 tablet (20 mg) by mouth every evening 90 tablet 3     diphenoxylate-atropine (LOMOTIL) 2.5-0.025 MG per tablet Take 1-2 tablets by mouth 4 times daily as needed for diarrhea 100 tablet 1     enoxaparin (LOVENOX) 40 MG/0.4ML injection Inject 0.4 mLs (40 mg) Subcutaneous every 24 hours for 26 days 10.4 mL 0     HYDROcodone-acetaminophen (NORCO) 5-325 MG per tablet        omeprazole (PRILOSEC) 40 MG capsule Take 1 capsule (40 mg) by mouth daily 30 capsule 3     ondansetron (ZOFRAN) 8 MG tablet Take 1 tablet (8 mg) by mouth every 8 hours as needed for nausea 30 tablet 3     oxyCODONE IR (ROXICODONE) 5 MG tablet Take 1-2 tablets (5-10 mg) by mouth every 4 hours as needed for moderate to severe pain 60 tablet 0     polyethylene glycol (MIRALAX/GLYCOLAX) Packet Take 17 g by mouth daily as needed for constipation 7 packet      Probiotic Product (PROBIOTIC PO) Take by mouth daily (with lunch)       sennosides (SENOKOT) 8.6 MG tablet Take 1-2 tablets by mouth 2 times daily as needed for constipation (no stool in 24 hrs) 60 each 1     zolpidem (AMBIEN) 5 MG tablet Take 1 tablet (5 mg) by mouth nightly as needed for sleep 30 tablet 3       Physical Examination:  General: The patient is a pleasant female in no acute distress.  /45 (BP Location: Right arm, Patient Position: Chair, Cuff Size: Adult Regular)  Pulse 83  Temp 98.4  F (36.9  C) (Oral)  Ht 1.549 m (5' 0.98\") "  Wt 53.4 kg (117 lb 11.2 oz)  LMP 12/20/2013  SpO2 96%  BMI 22.25 kg/m2  Wt Readings from Last 10 Encounters:   10/16/18 53.4 kg (117 lb 11.2 oz)   10/16/18 53.4 kg (117 lb 11.2 oz)   10/01/18 53.6 kg (118 lb 2.7 oz)   09/27/18 54.2 kg (119 lb 8 oz)   09/10/18 52.8 kg (116 lb 4.8 oz)   09/06/18 52.7 kg (116 lb 3.2 oz)   08/28/18 52.9 kg (116 lb 9.6 oz)   08/22/18 52.9 kg (116 lb 9.6 oz)   07/24/18 54.1 kg (119 lb 4.8 oz)   06/26/18 53.4 kg (117 lb 12.8 oz)     HEENT: EOMI, PERRL. Sclerae are anicteric. Oral mucosa is pink and moist with no lesions or thrush.   Lymph: No cervical lymphadenopathy.  NO palpable supraclavicular LAD on left.   Heart: Regular rate and rhythm.   Lungs: Breathing comfortably on room air.  Decreased BS at left base with dullness to percussion  Abdomen: Bowel sounds present, soft, nontender with no palpable hepatosplenomegaly or masses.   Extremities: No lower extremity edema noted bilaterally.   Neuro: Cranial nerves II through XII are grossly intact.  Skin: No rashes, petechiae, or bruising noted on exposed skin.  Left thigh: shows incision is c/d/i    Laboratory Data:     10/16/2018 14:19   Sodium 137   Potassium 4.0   Chloride 106   Carbon Dioxide 24   Urea Nitrogen 11   Creatinine 0.80   GFR Estimate 74   GFR Estimate If Black >90   Calcium 8.6   Anion Gap 7   Albumin 3.1 (L)   Protein Total 7.1   Bilirubin Total 0.2   Alkaline Phosphatase 80   ALT 27   AST 26   Glucose 143 (H)   WBC 4.1   Hemoglobin 11.0 (L)   Hematocrit 33.5 (L)   Platelet Count 490 (H)   RBC Count 3.28 (L)    (H)     Assessment and Plan:  52 year old with mRCC s/p 4 cycles of IL-2.  She had a stable CT after 2 cycles, mild disease progression after 4 cycles.  After a short break, CT showed worsening disease and she has started with Opdivo.   Three months later she had new brain disease and worsening systemic disease and started Cabo on 4/19.     RCC: She started Cabo 40 mg daily on 4/19. CT CAP on 6/5  showed improved disease.   She has been struggling a little bit tolerating this therapy in regards to diarrhea.  She has been off the Cabo for 2 + weeks now since her surgical stabilization on 10/1 of her femur.  Wound looks great today.  She can stay off for the weekend and I encouraged her to re-start Cabo on Monday 10/22.      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   Craniotomy successful on 3/6.  Has GK to the bed on 4/5 and FOUR new lesions were noted during the planning MRI.    -6/5/18 and 9/6/18 brain MRI stable.      Left femoral head lytic lesion:   Present since November 2017. I reviewed serial imaging with her. She has completed palliative RT with plan for 5 fractions.  -10/1/18 had surgical stabilization  -Xgeva resumption next month  -HAS TO do her lovenox preventatively this next month    Diarrhea: secondary to Cabo, Suzi always taking lomotil reactively, discussed taking proactive    Flu shot and Shingrix given today, repeat Shingrix in ~3 months     Over 45 min of direct face to face time spent with patient with more than 50% time spent in counseling and coordinating care.      Kia Ren PA-C

## 2018-10-24 NOTE — TELEPHONE ENCOUNTER
[facility] Call Center    Phone Message    May a detailed message be left on voicemail: yes    Reason for Call: Other: Pt requesting call back to discuss getting a certain type of shot. Pt stated she sees Dr. Green and she cant remember the same of the shot she can now get after her surgery with Dr. Dunn. Pt also would like orders for PT, not functional capcity PT, just regualr PT faxed over to [facility] at [phone]     Action Taken: Message routed to:  [facility] ortho     10/26/18 -  PT orders written and faxed.  Left message for Suzi that it's ok to have another Exgeva injection.

## 2018-10-30 NOTE — ORAL ONC MGMT
Oral Chemotherapy Monitoring Program   Placed call to patient in follow up of Cabometyx (Cabozantinib) therapy.   Left message requesting call back.   No drug names were mentioned.    Blaine Huber, PharmD, MS  Hematology/Oncology Clinical Pharmacist  [facility]  [facility]

## 2018-11-01 NOTE — ORAL ONC MGMT
"Oral Chemotherapy Monitoring Program    Primary Oncologist: Dr. Green  Primary Oncology Clinic: [facility]  Cancer Diagnosis: RCC    Therapy: Cabometyx 40 mg (1 x 40 mg) every day continuously   Start Date: 4/19/2018, resumed 10/22/18     Lab Monitoring Plan  CBC, CMP monthly  Subjective/Objective:  Suzi Gonsales is a 53 year old female contacted by phone for a follow-up visit for oral chemotherapy. Suzi recently restarted therapy on 10/22 after holding for surgery. She reports that she missed 2 doses on 10/29 and 10/30 due to running out of medication. She resumed therapy on 10/31 when she received her refill. She expressed that therapy was going \"pretty good\", but that she has been having several side effects. She reports an upset stomach, fatigue, and shortness of breath with cough. Suzi states that she has had her \"lungs drained\" 4 times in the past and can feel when they \"are ready to be drained again.\" She requested that Kia contact her to discuss a plan. Kia, Dr. Green, and the oral chemo monitoring team have been notified of her symptoms via inbasket. Her upset stomach is episodal and she is currently not taking anything for it. We discussed trying smaller meals throughout the day and setting a goal water intake of 64 ounces. Her appetite increases throughout the day, so she feels she is getting enough nutrition. Suzi reports that she is starting physical therapy tomorrow and thinks that will help with her strength and energy levels. She denies mouth sores, diarrhea, and vomiting. I instructed Suzi to call our team or the nurse triage line if her symptoms worsen. She expressed understanding and thanked me for the call.    ORAL CHEMOTHERAPY 4/26/2018 7/12/2018 8/6/2018 8/6/2018 11/1/2018   Drug Name Cabometyx (cabozantinib) Cabometyx (cabozantinib) Cabometyx (cabozantinib) Cabometyx (cabozantinib) Cabometyx (cabozantinib)   Current Dosage 40mg 40mg 40mg 40mg 40mg   Current Schedule Daily Daily Daily " "Daily Daily   Cycle Details Continuous Continuous Drug on Hold Continuous Continuous   Start Date of Last Cycle 4/19/2018 6/22/2018 7/25/2018 8/2/2018 10/22/2018   Planned next cycle start date - - 8/1/2018 - -   Doses missed in last 2 weeks - 0 - - 2   Adherence Assessment Adherent Adherent - Adherent Adherent   Adverse Effects Other (see note for details) Diarrhea;Fatigue - Diarrhea Fatigue;Other (see note for details)   Diarrhea - Grade 1 - Grade 3 -   Pharmacist Intervention(diarrhea) - Yes - Yes -   Intervention(s) - Patient education - Patient education -   Fatigue - Grade 1 - - -   Pharmacist Intervention(fatigue) - Yes - - -   Intervention(s) - Patient education - - -   Other (see note for details) Loss of appetite - - - -   Pharmacist intervention? Yes - - - -   Intervention(s) Patient education - - - -   Home BPs all BPs<140/90 - - - -   Any new drug interactions? - No - No -   Is the dose as ordered appropriate for the patient? - Yes - Yes -       Vitals:  BP:   BP Readings from Last 1 Encounters:   10/16/18 101/45     Wt Readings from Last 1 Encounters:   10/16/18 53.4 kg (117 lb 11.2 oz)     Estimated body surface area is 1.52 meters squared as calculated from the following:    Height as of 10/16/18: 1.549 m (5' 0.98\").    Weight as of 10/16/18: 53.4 kg (117 lb 11.2 oz).    Labs:  _  Result Component Current Result Ref Range   Sodium 137 (10/16/2018) 133 - 144 mmol/L     _  Result Component Current Result Ref Range   Potassium 4.0 (10/16/2018) 3.4 - 5.3 mmol/L     _  Result Component Current Result Ref Range   Calcium 8.6 (10/16/2018) 8.5 - 10.1 mg/dL     No results found for Mag within last 30 days.     No results found for Phos within last 30 days.     _  Result Component Current Result Ref Range   Albumin 3.1 (L) (10/16/2018) 3.4 - 5.0 g/dL     _  Result Component Current Result Ref Range   Urea Nitrogen 11 (10/16/2018) 7 - 30 mg/dL     _  Result Component Current Result Ref Range   Creatinine 0.80 " (10/16/2018) 0.52 - 1.04 mg/dL       _  Result Component Current Result Ref Range   AST 26 (10/16/2018) 0 - 45 U/L     _  Result Component Current Result Ref Range   ALT 27 (10/16/2018) 0 - 50 U/L     _  Result Component Current Result Ref Range   Bilirubin Total 0.2 (10/16/2018) 0.2 - 1.3 mg/dL       _  Result Component Current Result Ref Range   WBC 4.1 (10/16/2018) 4.0 - 11.0 10e9/L     _  Result Component Current Result Ref Range   Hemoglobin 11.0 (L) (10/16/2018) 11.7 - 15.7 g/dL     _  Result Component Current Result Ref Range   Platelet Count 490 (H) (10/16/2018) 150 - 450 10e9/L     _  Result Component Current Result Ref Range   Absolute Neutrophil 2.5 (10/16/2018) 1.6 - 8.3 10e9/L       Assessment:  Suzi is having shortness of breath and coughing that requires further assessment by her care team. They have been notified via inCUPSet.     Plan:  - Continue Cabometyx therapy  - Contact Dr. Green and Kia regarding shortness of breath and coughing  - Monitor side effects carefully    Follow-Up:  Call Suzi back on 11/8/18 to check in on her side effects.      Marielle Lopes  Pharmacy Intern  [facility]  [phone]

## 2018-11-06 NOTE — BRIEF OP NOTE
Interventional Radiology Brief Post Procedure Note    Procedure: U/S thoracentesis.    Proceduralist: Quintin Marin [facility] [place]    Assistant: None    Time Out: Prior to the start of the procedure and with procedural staff participation, I verbally confirmed the patient s identity using two indicators, relevant allergies, that the procedure was appropriate and matched the consent or emergent situation, and that the correct equipment/implants were available. Immediately prior to starting the procedure I conducted the Time Out with the procedural staff and re-confirmed the patient s name, procedure, and site/side. (The Joint Commission universal protocol was followed.)  Yes        Sedation: None. Local Anesthestic used    Findings: U/S guided left therapeutic thoracentesis, with return of hazy serous fluid.     Estimated Blood Loss: Minimal    Fluoroscopy Time:  minute(s)    SPECIMENS: None    Complications: 1. None     Condition: Stable    Plan: Follow up per primary team.    Comments: See dictated procedure note for full details.    Quintin Marin PA-C

## 2018-11-06 NOTE — IP AVS SNAPSHOT
[facility]    [address] [place] [id number]    Phone:  [phone]    Fax:  [phone]                                       After Visit Summary   11/6/2018    Suzi Gonsales    MRN: [id number]           After Visit Summary Signature Page     I have received my discharge instructions, and my questions have been answered. I have discussed any challenges I see with this plan with the nurse or doctor.    ..........................................................................................................................................  Patient/Patient Representative Signature      ..........................................................................................................................................  Patient Representative Print Name and Relationship to Patient    ..................................................               ................................................  Date                                   Time    ..........................................................................................................................................  Reviewed by Signature/Title    ...................................................              ..............................................  Date                                               Time          22EPIC Rev 08/18

## 2018-11-06 NOTE — DISCHARGE INSTRUCTIONS
Discharge Instructions for Paracentesis or Thoracentesis     Paracentesis is a procedure to remove extra fluid from your belly (abdomen). Thoracentesis is a procedure to remove extra fluid from your chest.  This fluid buildup is called ascites. The procedure may have been done to take a sample of the fluid. Or, it may have been done to drain the extra fluid from your abdomen or chest to help make you more comfortable.     Home care    If you have pain after the procedure, your healthcare provider can prescribe or recommend pain medicines. Take these exactly as directed. If you stopped taking other medicines before the procedure, ask your provider when you can start them again.    Avoid strenuous activity for 48 hours.    You will have a small bandage over the puncture site. Adhesive tapes, adhesive strips, or surgical glue may also be used to close the incision. They also help stop bleeding and promote healing. You may take the bandage off in 24-48 hours. Once there is a scab over the site, no bandages are required.    Check the puncture site for the signs of infection listed below.     Follow-up care  Make a follow-up appointment with your healthcare provider as directed. During your follow-up visit, your provider will check your healing. Let your provider know how you are feeling. You can also discuss the cause of your fluid, and if you need any further treatment.    When to seek medical advice:  Call your healthcare provider if you have any of the following after the procedure:    A fever of 100.4 F (38.0 C) or higher    Trouble breathing    Abdominal pain that is worse than expected    Belly Abdominal pain not caused by having the skin punctured that is worse than expected    Persistent bleeding from the puncture site    More than a small amount of fluid leaking from the puncture site    Swollen belly    Signs of infection at the puncture site. These include increased pain, redness, or swelling, warmth, or  bad-smelling drainage.    Feeling dizzy or lightheaded, or fainting     Call our Interventional Radiology (IR) service if:  If you start bleeding from the procedure site.  If you do start to bleed from the site, lie down and hold some pressure on the site.  Our radiology provider can help you decide if you need to return to the hospital.  If you have new or worsening pain related to the procedure.  If you have pronounced swelling at the procedure site.  If you develop persistent nausea or vomiting.  If you develop hives or a rash or any unexplained itching.  If you have a fever of greater than 100.4  F and chills in the first 5 days after procedure.  Any other concerns related to your procedure.      [facility]  Interventional Radiology (IR)  [address]  [facility] [place] [address]    Contact Number:  [phone]  (IR control desk)  Monday - Friday 8:00 am - 4:30 pm    After hours for urgent concerns:  [phone]  After 4:30 pm Monday - Friday, Weekends and Holidays.   Ask for Interventional Radiology on-call.  Someone is available 24 hours a day.  [facility] toll free number:  [phone]

## 2018-11-06 NOTE — IP AVS SNAPSHOT
MRN:[id number]                      After Visit Summary   11/6/2018    Suzi Gonsales    MRN: [id number]           Thank you!     Thank you for choosing [facility] for your care. Our goal is always to provide you with excellent care. Hearing back from our patients is one way we can continue to improve our services. Please take a few minutes to complete the written survey that you may receive in the mail after you visit with us. Thank you!        Patient Information     Date Of Birth          1965        About your hospital stay     You were admitted on:  November 6, 2018 You last received care in the:  [facility]    You were discharged on:  November 6, 2018       Who to Call     For medical emergencies, please call 911.  For non-urgent questions about your medical care, please call your primary care provider or clinic, [phone]  For questions related to your surgery, please call your surgery clinic        Attending Provider     Provider Quintin Winter PA-C Radiology       Primary Care Provider Office Phone # Fax #    [facility]olly ARIEL [facility] [phone]      Your next 10 appointments already scheduled     Dec 03, 2018 11:20 AM CST   CT CHEST/ABDOMEN/PELVIS W CONTRAST with UCCT2   [facility] ([facility])    [address] [address] [phone]           How do I prepare for my exam? (Food and drink instructions) To prepare: Do not eat or drink for 2 hours before your exam. If you need to take medicine, you may take it with small sips of water. (We may ask you to take liquid medicine as well.)  How do I prepare for my exam? (Other instructions) Please arrive 30 minutes early for your CT.  Once in the department you might be asked to drink water 15-20 minutes prior to your exam.  If indicated you may be asked to drink an oral contrast in advance of your  CT.  If this is the case, the imaging team will let you know or be in contact with you prior to your appointment  Patients over 70 or patients with diabetes or kidney problems: If you haven t had a blood test (creatinine test) within the last 30 days, the Cardiologist/Radiologist may require you to get this test prior to your exam.  If you have diabetes:  Continue to take your metformin medication on the day of your exam  What should I wear: Please wear loose clothing, such as a sweat suit or jogging clothes. Avoid snaps, zippers and other metal. We may ask you to undress and put on a hospital gown.  How long does the exam take: Most scans take less than 20 minutes.  What should I bring: Please bring any scans or X-rays taken at other hospitals, if similar tests were done. Also bring a list of your medicines, including vitamins, minerals and over-the-counter drugs. It is safest to leave personal items at home.  Do I need a : No  is needed.  What do I need to tell my doctor? Be sure to tell your doctor: * If you have any allergies. * If there s any chance you are pregnant. * If you are breastfeeding.  What should I do after the exam: No restrictions, You may resume normal activities.  What is this test: A CT (computed tomography) scan is a series of pictures that allows us to look inside your body. The scanner creates images of the body in cross sections, much like slices of bread. This helps us see any problems more clearly. You may receive contrast (X-ray dye) before or during your scan. You will be asked to drink the contrast.  Who should I call with questions: If you have any questions, please call the Imaging Department where you will have your exam. Directions, parking instructions, and other information is available on our website, [email or web address].            Dec 05, 2018 12:00 PM [facility]   [facility] Lab Draw with  MASONIC LAB DRAW   [facility] Lab Draw ([facility])     [address]  [address]  [place] [address]   [phone]            Dec 05, 2018 12:30 PM CST   (Arrive by 12:15 PM)   Return Visit with Henri Green MD   [facility] ([facility])    [address]  [address]  [place] [address]   [phone]            Dec 06, 2018 12:00 PM CST   MR BRAIN W/O & W CONTRAST with UUMR1   [facility] [place], MRI ([facility], [facility])    [address] [address]   [phone]           How do I prepare for my exam? (Food and drink instructions) **If you will be receiving sedation or general anesthesia, please see special notes below.**  How do I prepare for my exam? (Other instructions) Take your medicines as usual, unless your doctor tells you not to. You may or may not receive intravenous (IV) contrast for this exam pending the discretion of the Radiologist.  You do not need to do anything special to prepare.  **If you will be receiving sedation or general anesthesia, please see special notes below.**  What should I wear: The MRI machine uses a strong magnet. Please wear clothes without metal (snaps, zippers). A sweatsuit works well, or we may give you a hospital gown. Please remove any body piercings and hair extensions before you arrive. You will also remove watches, jewelry, hairpins, wallets, dentures, partial dental plates and hearing aids. You may wear contact lenses, and you may be able to wear your rings. We have a safe place to keep your personal items, but it is safer to leave them at home.  How long does the exam take: Most tests take 30 to 60 minutes.  HOWEVER, IF YOUR DOCTOR PRESCRIBES ANESTHESIA please plan on spending four to five hours in the recovery room.  What should I bring:  Bring a list of your current medicines to your exam (including vitamins, minerals and over-the-counter drugs).  Do I need a :  **If you will  be receiving sedation or general anesthesia, please see special notes below.**  What should I do after the exam: No Restrictions, You may resume normal activities.  What is this test: MRI (magnetic resonance imaging) uses a strong magnet and radio waves to look inside the body. An MRA (magnetic resonance angiogram) does the same thing, but it lets us look at your blood vessels. A computer turns the radio waves into pictures showing cross sections of the body, much like slices of bread. This helps us see any problems more clearly. You may receive fluid (called  contrast ) before or during your scan. The fluid helps us see the pictures better. We give the fluid through an IV (small needle in your arm).  Who should I call with questions:  Please call the Imaging Department at your exam site with any questions. Directions, parking instructions, and other information is available on our website, [email or web address].  How do I prepare if I m having sedation or anesthesia? **IMPORTANT** THE INSTRUCTIONS BELOW ARE ONLY FOR THOSE PATIENTS WHO HAVE BEEN TOLD THEY WILL RECEIVE SEDATION OR GENERAL ANESTHESIA DURING THEIR MRI PROCEDURE:  IF YOU WILL RECEIVE SEDATION (take medicine to help you relax during your exam): You must get the medicine from your doctor before you arrive. Bring the medicine to the exam. Do not take it at home. Arrive one hour early. Bring someone who can take you home after the test. Your medicine will make you sleepy. After the exam, you may not drive, take a bus or take a taxi by yourself. No eating 8 hours before your exam. You may have clear liquids up until 4 hours before your exam. (Clear liquids include water, clear tea, black coffee and fruit juice without pulp.)  IF YOU WILL RECEIVE ANESTHESIA (be asleep for your exam): Arrive 1 1/2 hours early. Bring someone who can take you home after the test. You may not drive, take a bus or take a taxi by yourself. No eating 8 hours before your exam. You may  have clear liquids up until 4 hours before your exam. (Clear liquids include water, clear tea, black coffee and fruit juice without pulp.)            Dec 06, 2018  1:00 PM CST   Return Visit with Huyen Cuellar MD   Radiation Oncology Clinic ([facility])    [facility]  1st Floor  [address] [address] [phone]              Further instructions from your care team       Discharge Instructions for Paracentesis or Thoracentesis     Paracentesis is a procedure to remove extra fluid from your belly (abdomen). Thoracentesis is a procedure to remove extra fluid from your chest.  This fluid buildup is called ascites. The procedure may have been done to take a sample of the fluid. Or, it may have been done to drain the extra fluid from your abdomen or chest to help make you more comfortable.     Home care    If you have pain after the procedure, your healthcare provider can prescribe or recommend pain medicines. Take these exactly as directed. If you stopped taking other medicines before the procedure, ask your provider when you can start them again.    Avoid strenuous activity for 48 hours.    You will have a small bandage over the puncture site. Adhesive tapes, adhesive strips, or surgical glue may also be used to close the incision. They also help stop bleeding and promote healing. You may take the bandage off in 24-48 hours. Once there is a scab over the site, no bandages are required.    Check the puncture site for the signs of infection listed below.     Follow-up care  Make a follow-up appointment with your healthcare provider as directed. During your follow-up visit, your provider will check your healing. Let your provider know how you are feeling. You can also discuss the cause of your fluid, and if you need any further treatment.    When to seek medical advice:  Call your healthcare provider if you have any of the following after the procedure:     A fever of 100.4 F (38.0 C) or higher    Trouble breathing    Abdominal pain that is worse than expected    Belly Abdominal pain not caused by having the skin punctured that is worse than expected    Persistent bleeding from the puncture site    More than a small amount of fluid leaking from the puncture site    Swollen belly    Signs of infection at the puncture site. These include increased pain, redness, or swelling, warmth, or bad-smelling drainage.    Feeling dizzy or lightheaded, or fainting     Call our Interventional Radiology (IR) service if:  If you start bleeding from the procedure site.  If you do start to bleed from the site, lie down and hold some pressure on the site.  Our radiology provider can help you decide if you need to return to the hospital.  If you have new or worsening pain related to the procedure.  If you have pronounced swelling at the procedure site.  If you develop persistent nausea or vomiting.  If you develop hives or a rash or any unexplained itching.  If you have a fever of greater than 100.4  F and chills in the first 5 days after procedure.  Any other concerns related to your procedure.      [facility]  Interventional Radiology (IR)  [address]  [address] [facility] [place] [address]    Contact Number:  [phone]  (IR control desk)  Monday - Friday 8:00 am - 4:30 pm    After hours for urgent concerns:  [phone]  After 4:30 pm Monday - Friday, Weekends and Holidays.   Ask for Interventional Radiology on-call.  Someone is available 24 hours a day.  [facility] toll free number:  [phone]    Pending Results     Date and Time Order Name Status Description    11/6/2018 1225 IR THORACENTESIS In process             Admission Information     Date & Time Provider Department Dept. Phone    11/6/2018 Quintin Marin PA-C [facility] [phone]      Your Vitals Were     Blood Pressure Temperature  "Respirations Height Weight Last Period    111/70 97.8  F (36.6  C) (Oral) 16 1.549 m (5' 0.98\") 53.1 kg (117 lb 1.8 oz) 12/20/2013    Pulse Oximetry BMI (Body Mass Index)                100% 22.14 kg/m2          [employer] Information     [employer] gives you secure access to your electronic health record. If you see a primary care provider, you can also send messages to your care team and make appointments. If you have questions, please call your primary care clinic.  If you do not have a primary care provider, please call [phone] and they will assist you.      [employer] is an electronic gateway that provides easy, online access to your medical records. With [employer], you can request a clinic appointment, read your test results, renew a prescription or communicate with your care team.     To access your existing account, please contact your [facility] or call [phone] for assistance.        Care EveryWhere ID     This is your Care EveryWhere ID. This could be used by other organizations to access your [facility] medical records  [id number]        Equal Access to Services     ROSAURA INTERIANO : Hadyesika Santana, alexis robles, josemanuel johnston. So [facility] [phone].    ATENCIÓN: Si habla español, tiene a chacon disposición servicios gratuitos de asistencia lingüística. Mario al [phone].    We comply with applicable federal civil rights laws and [place] laws. We do not discriminate on the basis of race, color, national origin, age, disability, sex, sexual orientation, or gender identity.               Review of your medicines      UNREVIEWED medicines. Ask your doctor about these medicines        Dose / Directions    acetaminophen 325 MG tablet   Commonly known as:  TYLENOL   Used for:  Bone metastasis (H)        Dose:  975 mg   Take 3 tablets (975 mg) by mouth every 6 hours   Quantity:  100 tablet   Refills:  1       " ALPRAZolam 0.5 MG tablet   Commonly known as:  XANAX   Used for:  Renal cell carcinoma, unspecified laterality (H), Mass of upper lobe of left lung        Dose:  0.5 mg   Take 1 tablet (0.5 mg) by mouth 3 times daily as needed for anxiety   Quantity:  60 tablet   Refills:  0       buPROPion 300 MG 24 hr tablet   Commonly known as:  WELLBUTRIN XL        Dose:  300 mg   Take 300 mg by mouth every morning   Refills:  0       Cabozantinib S-Malate 40 MG   Commonly known as:  CABOMETYX   Used for:  Brain metastasis (H), Metastatic renal cell carcinoma to lung, right (H), Malignant neoplasm of right kidney (H)        Dose:  40 mg   Take 1 tablet (40 mg) by mouth daily Take on an empty stomach 1 hour before or 2 hours after a meal. Avoid grapefruit and grapefruit juice.   Quantity:  30 tablet   Refills:  0       citalopram 20 MG tablet   Commonly known as:  celeXA   Used for:  Insomnia, unspecified type        Dose:  20 mg   Take 1 tablet (20 mg) by mouth every evening   Quantity:  90 tablet   Refills:  3       diphenoxylate-atropine 2.5-0.025 MG per tablet   Commonly known as:  LOMOTIL   Used for:  Diarrhea, unspecified type        Dose:  1-2 tablet   Take 1-2 tablets by mouth 4 times daily as needed for diarrhea   Quantity:  100 tablet   Refills:  1       HYDROcodone-acetaminophen 5-325 MG per tablet   Commonly known as:  NORCO        Refills:  0       omeprazole 40 MG capsule   Commonly known as:  priLOSEC   Used for:  Cerebral edema (H)        Dose:  40 mg   Take 1 capsule (40 mg) by mouth daily   Quantity:  30 capsule   Refills:  3       ondansetron 8 MG tablet   Commonly known as:  ZOFRAN   Used for:  Nausea        Dose:  8 mg   Take 1 tablet (8 mg) by mouth every 8 hours as needed for nausea   Quantity:  30 tablet   Refills:  3       oxyCODONE IR 5 MG tablet   Commonly known as:  ROXICODONE   Used for:  Bone metastasis (H)        Dose:  5-10 mg   Take 1-2 tablets (5-10 mg) by mouth every 4 hours as needed for  moderate to severe pain   Quantity:  60 tablet   Refills:  0       polyethylene glycol Packet   Commonly known as:  MIRALAX/GLYCOLAX        Dose:  17 g   Take 17 g by mouth daily as needed for constipation   Quantity:  7 packet   Refills:  0       PROBIOTIC PO   Used for:  Pre-op evaluation, Brain metastases (H)        Take by mouth daily (with lunch)   Refills:  0       sennosides 8.6 MG tablet   Commonly known as:  SENOKOT   Used for:  Bone metastasis (H)        Dose:  1-2 tablet   Take 1-2 tablets by mouth 2 times daily as needed for constipation (no stool in 24 hrs)   Quantity:  60 each   Refills:  1       zolpidem 5 MG tablet   Commonly known as:  AMBIEN   Used for:  Sleep disorder        Dose:  5 mg   Take 1 tablet (5 mg) by mouth nightly as needed for sleep   Quantity:  30 tablet   Refills:  3                Protect others around you: Learn how to safely use, store and throw away your medicines at www.disposemymeds.org.             Medication List: This is a list of all your medications and when to take them. Check marks below indicate your daily home schedule. Keep this list as a reference.      Medications           Morning Afternoon Evening Bedtime As Needed    acetaminophen 325 MG tablet   Commonly known as:  TYLENOL   Take 3 tablets (975 mg) by mouth every 6 hours                                ALPRAZolam 0.5 MG tablet   Commonly known as:  XANAX   Take 1 tablet (0.5 mg) by mouth 3 times daily as needed for anxiety                                buPROPion 300 MG 24 hr tablet   Commonly known as:  WELLBUTRIN XL   Take 300 mg by mouth every morning                                Cabozantinib S-Malate 40 MG   Commonly known as:  CABOMETYX   Take 1 tablet (40 mg) by mouth daily Take on an empty stomach 1 hour before or 2 hours after a meal. Avoid grapefruit and grapefruit juice.                                citalopram 20 MG tablet   Commonly known as:  celeXA   Take 1 tablet (20 mg) by mouth every evening                                 diphenoxylate-atropine 2.5-0.025 MG per tablet   Commonly known as:  LOMOTIL   Take 1-2 tablets by mouth 4 times daily as needed for diarrhea                                HYDROcodone-acetaminophen 5-325 MG per tablet   Commonly known as:  NORCO                                omeprazole 40 MG capsule   Commonly known as:  priLOSEC   Take 1 capsule (40 mg) by mouth daily                                ondansetron 8 MG tablet   Commonly known as:  ZOFRAN   Take 1 tablet (8 mg) by mouth every 8 hours as needed for nausea                                oxyCODONE IR 5 MG tablet   Commonly known as:  ROXICODONE   Take 1-2 tablets (5-10 mg) by mouth every 4 hours as needed for moderate to severe pain                                polyethylene glycol Packet   Commonly known as:  MIRALAX/GLYCOLAX   Take 17 g by mouth daily as needed for constipation                                PROBIOTIC PO   Take by mouth daily (with lunch)                                sennosides 8.6 MG tablet   Commonly known as:  SENOKOT   Take 1-2 tablets by mouth 2 times daily as needed for constipation (no stool in 24 hrs)                                zolpidem 5 MG tablet   Commonly known as:  AMBIEN   Take 1 tablet (5 mg) by mouth nightly as needed for sleep

## 2018-11-12 NOTE — ORAL ONC MGMT
Oral Chemotherapy Monitoring Program     Placed call to patient in follow up of Cabometyx (Cabozantinib) therapy. Calling in follow to symptoms reported on 11/1 following the restart of cabometyx.     Left message requesting call back.   No drug names were mentioned.    Blaine Huber, PharmD, MS  Hematology/Oncology Clinical Pharmacist  [facility]  [facility]

## 2018-11-13 NOTE — DISCHARGE INSTRUCTIONS

## 2018-11-14 NOTE — MR AVS SNAPSHOT
After Visit Summary   11/14/2018    Suzi Gonsales    MRN: [id number]           Patient Information     Date Of Birth          1965        Visit Information        Provider Department      11/14/2018 2:10 PM Day Ren PA-C [facility]        Today's Diagnoses     Metastatic renal cell carcinoma, unspecified laterality (H)    -  1       Follow-ups after your visit        Your next 10 appointments already scheduled     Dec 06, 2018 12:00 PM CST   MR BRAIN W/O & W CONTRAST with UUMR1   [facility], [place], [facility] ([facility], [facility])    [address] [id number] [phone]           How do I prepare for my exam? (Food and drink instructions) **If you will be receiving sedation or general anesthesia, please see special notes below.**  How do I prepare for my exam? (Other instructions) Take your medicines as usual, unless your doctor tells you not to. You may or may not receive intravenous (IV) contrast for this exam pending the discretion of the Radiologist.  You do not need to do anything special to prepare.  **If you will be receiving sedation or general anesthesia, please see special notes below.**  What should I wear: The MRI machine uses a strong magnet. Please wear clothes without metal (snaps, zippers). A sweatsuit works well, or we may give you a hospital gown. Please remove any body piercings and hair extensions before you arrive. You will also remove watches, jewelry, hairpins, wallets, dentures, partial dental plates and hearing aids. You may wear contact lenses, and you may be able to wear your rings. We have a safe place to keep your personal items, but it is safer to leave them at home.  How long does the exam take: Most tests take 30 to 60 minutes.  HOWEVER, IF YOUR DOCTOR PRESCRIBES ANESTHESIA please plan on spending four to five hours in the recovery room.  What should I bring:  Bring a list of  your current medicines to your exam (including vitamins, minerals and over-the-counter drugs).  Do I need a :  **If you will be receiving sedation or general anesthesia, please see special notes below.**  What should I do after the exam: No Restrictions, You may resume normal activities.  What is this test: MRI (magnetic resonance imaging) uses a strong magnet and radio waves to look inside the body. An MRA (magnetic resonance angiogram) does the same thing, but it lets us look at your blood vessels. A computer turns the radio waves into pictures showing cross sections of the body, much like slices of bread. This helps us see any problems more clearly. You may receive fluid (called  contrast ) before or during your scan. The fluid helps us see the pictures better. We give the fluid through an IV (small needle in your arm).  Who should I call with questions:  Please call the Imaging Department at your exam site with any questions. Directions, parking instructions, and other information is available on our website, [email or web address].  How do I prepare if I m having sedation or anesthesia? **IMPORTANT** THE INSTRUCTIONS BELOW ARE ONLY FOR THOSE PATIENTS WHO HAVE BEEN TOLD THEY WILL RECEIVE SEDATION OR GENERAL ANESTHESIA DURING THEIR MRI PROCEDURE:  IF YOU WILL RECEIVE SEDATION (take medicine to help you relax during your exam): You must get the medicine from your doctor before you arrive. Bring the medicine to the exam. Do not take it at home. Arrive one hour early. Bring someone who can take you home after the test. Your medicine will make you sleepy. After the exam, you may not drive, take a bus or take a taxi by yourself. No eating 8 hours before your exam. You may have clear liquids up until 4 hours before your exam. (Clear liquids include water, clear tea, black coffee and fruit juice without pulp.)  IF YOU WILL RECEIVE ANESTHESIA (be asleep for your exam): Arrive 1 1/2 hours early. Bring someone who can  take you home after the test. You may not drive, take a bus or take a taxi by yourself. No eating 8 hours before your exam. You may have clear liquids up until 4 hours before your exam. (Clear liquids include water, clear tea, black coffee and fruit juice without pulp.)            Dec 06, 2018  1:00 PM CST   Return Visit with Huyen Cuellar MD   Radiation Oncology Clinic ([facility])    [facility]  1st Floor  [address] [address]   [phone]            Dec 12, 2018  3:00 PM CST   CT CHEST/ABDOMEN/PELVIS W CONTRAST with UCCT2   [facility] ([facility])    [address]  1st Floor  [facility] [id number] [phone]           How do I prepare for my exam? (Food and drink instructions) To prepare: Do not eat or drink for 2 hours before your exam. If you need to take medicine, you may take it with small sips of water. (We may ask you to take liquid medicine as well.)  How do I prepare for my exam? (Other instructions) Please arrive 30 minutes early for your CT.  Once in the department you might be asked to drink water 15-20 minutes prior to your exam.  If indicated you may be asked to drink an oral contrast in advance of your CT.  If this is the case, the imaging team will let you know or be in contact with you prior to your appointment  Patients over 70 or patients with diabetes or kidney problems: If you haven t had a blood test (creatinine test) within the last 30 days, the Cardiologist/Radiologist may require you to get this test prior to your exam.  If you have diabetes:  Continue to take your metformin medication on the day of your exam  What should I wear: Please wear loose clothing, such as a sweat suit or jogging clothes. Avoid snaps, zippers and other metal. We may ask you to undress and put on a hospital gown.  How long does the exam take: Most scans take less than 20 minutes.  What should I  bring: Please bring any scans or X-rays taken at other hospitals, if similar tests were done. Also bring a list of your medicines, including vitamins, minerals and over-the-counter drugs. It is safest to leave personal items at home.  Do I need a : No  is needed.  What do I need to tell my doctor? Be sure to tell your doctor: * If you have any allergies. * If there s any chance you are pregnant. * If you are breastfeeding.  What should I do after the exam: No restrictions, You may resume normal activities.  What is this test: A CT (computed tomography) scan is a series of pictures that allows us to look inside your body. The scanner creates images of the body in cross sections, much like slices of bread. This helps us see any problems more clearly. You may receive contrast (X-ray dye) before or during your scan. You will be asked to drink the contrast.  Who should I call with questions: If you have any questions, please call the Imaging Department where you will have your exam. Directions, parking instructions, and other information is available on our website, [email or web address].            Dec 12, 2018  4:15 PM CST   [facility] Lab Draw with  [facility] LAB DRAW   [facility] Draw ([facility])    [address] [place] [address]  [address] [place] [address] [phone]            Dec 12, 2018  5:00 PM CST   (Arrive by 4:45 PM)   Return Visit with Henri Green MD   [facility] ([facility])    [address] [place] [address]  [address]  [facility] [address] [phone]              Who to contact     If you have questions or need follow up information about today's clinic visit or your schedule please contact [facility] directly at [phone].  Normal or non-critical lab and imaging results will be communicated to you by MyChart, letter or phone within 4 business days after the clinic has received the  results. If you do not hear from us within 7 days, please contact the clinic through "[employer]" or phone. If you have a critical or abnormal lab result, we will notify you by phone as soon as possible.  Submit refill requests through "[employer]" or call your pharmacy and they will forward the refill request to us. Please allow 3 business days for your refill to be completed.          Additional Information About Your Visit        [employer] Information     "[employer]" gives you secure access to your electronic health record. If you see a primary care provider, you can also send messages to your care team and make appointments. If you have questions, please call your primary care clinic.  If you do not have a primary care provider, please call [phone] and they will assist you.        Care EveryWhere ID     This is your Care EveryWhere ID. This could be used by other organizations to access your [facility] medical records  [id number]        Your Vitals Were     Pulse Temperature Respirations Height Last Period Pulse Oximetry    86 98  F (36.7  C) (Oral) 16 1.524 m (5') 12/20/2013 98%    BMI (Body Mass Index)                   22.85 kg/m2            Blood Pressure from Last 3 Encounters:   11/14/18 110/70   11/06/18 113/71   10/16/18 101/45    Weight from Last 3 Encounters:   11/14/18 53.1 kg (117 lb)   11/06/18 53.1 kg (117 lb 1.8 oz)   10/16/18 53.4 kg (117 lb 11.2 oz)              We Performed the Following     CBC with platelets differential     Comprehensive metabolic panel     N terminal pro BNP outpatient     T4 free     T4 free     TSH with free T4 reflex        Primary Care Provider Office Phone # Fax #    Molly ARIEL Platt [facility] [phone] [address] [address]        Equal Access to Services     ROSAURA INTERIANO : Odilon Santana, alexis robles, michelle tilley, josemanuel plata. So [facility] [phone].    ATENCIÓN: Ferny low  español, tiene a chacon disposición servicios gratuitos de asistencia lingüística. Mario mitchell [phone].    We comply with applicable federal civil rights laws and [place] laws. We do not discriminate on the basis of race, color, national origin, age, disability, sex, sexual orientation, or gender identity.            Thank you!     Thank you for choosing [facility]  for your care. Our goal is always to provide you with excellent care. Hearing back from our patients is one way we can continue to improve our services. Please take a few minutes to complete the written survey that you may receive in the mail after your visit with us. Thank you!             Your Updated Medication List - Protect others around you: Learn how to safely use, store and throw away your medicines at www.disposemymeds.org.          This list is accurate as of 11/14/18 11:59 PM.  Always use your most recent med list.                   Brand Name Dispense Instructions for use Diagnosis    acetaminophen 325 MG tablet    TYLENOL    100 tablet    Take 3 tablets (975 mg) by mouth every 6 hours    Bone metastasis (H)       ALPRAZolam 0.5 MG tablet    XANAX    60 tablet    Take 1 tablet (0.5 mg) by mouth 3 times daily as needed for anxiety    Renal cell carcinoma, unspecified laterality (H), Mass of upper lobe of left lung       buPROPion 300 MG 24 hr tablet    WELLBUTRIN XL     Take 300 mg by mouth every morning        * Cabozantinib S-Malate 40 MG    CABOMETYX    30 tablet    Take 1 tablet (40 mg) by mouth daily Take on an empty stomach 1 hour before or 2 hours after a meal. Avoid grapefruit and grapefruit juice.    Brain metastasis (H), Metastatic renal cell carcinoma to lung, right (H), Malignant neoplasm of right kidney (H)       * Cabozantinib S-Malate 40 MG    CABOMETYX    30 tablet    Take 1 tablet (40 mg) by mouth daily Take on an empty stomach 1 hour before or 2 hours after a meal. Avoid grapefruit and grapefruit juice.     Brain metastasis (H), Metastatic renal cell carcinoma to lung, right (H), Malignant neoplasm of right kidney (H)       citalopram 20 MG tablet    celeXA    90 tablet    Take 1 tablet (20 mg) by mouth every evening    Insomnia, unspecified type       diphenoxylate-atropine 2.5-0.025 MG per tablet    LOMOTIL    100 tablet    Take 1-2 tablets by mouth 4 times daily as needed for diarrhea    Diarrhea, unspecified type       HYDROcodone-acetaminophen 5-325 MG per tablet    NORCO          omeprazole 40 MG capsule    priLOSEC    30 capsule    Take 1 capsule (40 mg) by mouth daily    Cerebral edema (H)       ondansetron 8 MG tablet    ZOFRAN    30 tablet    Take 1 tablet (8 mg) by mouth every 8 hours as needed for nausea    Nausea       oxyCODONE IR 5 MG tablet    ROXICODONE    60 tablet    Take 1-2 tablets (5-10 mg) by mouth every 4 hours as needed for moderate to severe pain    Bone metastasis (H)       polyethylene glycol Packet    MIRALAX/GLYCOLAX    7 packet    Take 17 g by mouth daily as needed for constipation        PROBIOTIC PO      Take by mouth daily (with lunch)    Pre-op evaluation, Brain metastases (H)       sennosides 8.6 MG tablet    SENOKOT    60 each    Take 1-2 tablets by mouth 2 times daily as needed for constipation (no stool in 24 hrs)    Bone metastasis (H)       zolpidem 5 MG tablet    AMBIEN    30 tablet    Take 1 tablet (5 mg) by mouth nightly as needed for sleep    Sleep disorder       * Notice:  This list has 2 medication(s) that are the same as other medications prescribed for you. Read the directions carefully, and ask your doctor or other care provider to review them with you.

## 2018-11-14 NOTE — NURSING NOTE
Oncology Rooming Note    November 14, 2018 2:22 PM   Suzi Gonsales is a 53 year old female who presents for:    Chief Complaint   Patient presents with     Oncology Clinic Visit     Kidney Cancer     Initial Vitals: Resp 16  Ht 1.524 m (5')  LMP 12/20/2013  BMI 22.87 kg/m2 Estimated body mass index is 22.87 kg/(m^2) as calculated from the following:    Height as of this encounter: 1.524 m (5').    Weight as of 11/6/18: 53.1 kg (117 lb 1.8 oz). Body surface area is 1.5 meters squared.  Moderate Pain (4) Comment: Left Hip   Patient's last menstrual period was 12/20/2013.  Allergies reviewed: Yes  Medications reviewed: Yes    Medications: Medication refills not needed today.  Pharmacy name entered into [employer]:    [id number] - [place], [place] - [address] AT [facility] [address]  [facility] USE FOR MAILING ONLY - [place], [facility] - [place], [place] - [address] [place] [address]    Clinical concerns: No New Concerns    5 minutes for nursing intake (face to face time)     MICA Marcano

## 2018-11-14 NOTE — NURSING NOTE
Provider order received to obtain bloodwork this visit.  Pt's RSC port accessed with single attempt.  Excellent blood return noted.  Labs drawn w/o difficulty.  Pt's port flushed with saline and heparin upon completion of use.  De-accessed prior to discharge to home.  Pt tolerated well.  Bandaid applied to site.

## 2018-11-14 NOTE — LETTER
11/14/2018      RE: Suzi Gonsales  [address] [place] [address]       Oncology/Hematology Visit Note  Nov 14, 2018    Reason for Visit: mRCC, routine followup    History of Present Illness: Suzi presented to ED with hematuria and right flank pain thinking she had a renal stone in December 2014. She was worked up with a CT abd/pelvis which suggested a renal mass. [facility]. 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 Per charts tumor resection had negative margins and it was staged at pT2bN0.     Her staging work up was negative except for pulmonary nodules. She has been followed every 6 months with scans. CT CAP on 5/11/17 showed enlarging hilar LAD, enlarging pulmonary nodules, adrenal nodules and a pancreatic body mass. Biopsies of hilar LN, adrenal nodule and pancreatic mass were + for RCC.      ONCOLOGY THERAPY:   6/6/17-8/15/18-- IL-2, of which she received 4 cycles   11/22/17 CT CAP showed disease progression  She completed three months of Opdivo and then 12/5/17-2/13/18- 3 months of Opdivo  3/6/18- cerebellar lesion s/p craniotomy and GK 4/5 to tumor bed and 4 new lesions  4/12/18- 5 fractions of XRT to left supraclav/MERARY  4/19/18- Cabo 40 mg   6/20/18- Femur radiation  10/1/18- surgical stabilization of the femur   9/30/18-10/22/18 Off Cabo due to femur surgery      Interval History:  Suzi is here today with concerns of JIMÉNEZ.  She had a tap last week and was disappointed that she didn't feel better.  In retrospect, she cannot remember if she did feel better, but it was short lived, or if she didn't feel better at all.  She was surprised they only took out 650 ml as she felt it was more than a liter.  Thus far, since being back on Cabo (roughtly 3 weeks) she has been doing OK.  She noticed mouth sensitivities and decreased appetite, but no nausea or diarrhea. Left hip.leg getting  stronger, almost done with the lovenox.      Current Outpatient Prescriptions   Medication Sig Dispense Refill     acetaminophen (TYLENOL) 325 MG tablet Take 3 tablets (975 mg) by mouth every 6 hours 100 tablet 1     ALPRAZolam (XANAX) 0.5 MG tablet Take 1 tablet (0.5 mg) by mouth 3 times daily as needed for anxiety 60 tablet 0     buPROPion (WELLBUTRIN XL) 300 MG 24 hr tablet Take 300 mg by mouth every morning        Cabozantinib S-Malate (CABOMETYX) 40 MG Take 1 tablet (40 mg) by mouth daily Take on an empty stomach 1 hour before or 2 hours after a meal. Avoid grapefruit and grapefruit juice. 30 tablet 0     Cabozantinib S-Malate (CABOMETYX) 40 MG Take 1 tablet (40 mg) by mouth daily Take on an empty stomach 1 hour before or 2 hours after a meal. Avoid grapefruit and grapefruit juice. 30 tablet 0     citalopram (CELEXA) 20 MG tablet Take 1 tablet (20 mg) by mouth every evening 90 tablet 3     diphenoxylate-atropine (LOMOTIL) 2.5-0.025 MG per tablet Take 1-2 tablets by mouth 4 times daily as needed for diarrhea 100 tablet 1     HYDROcodone-acetaminophen (NORCO) 5-325 MG per tablet        omeprazole (PRILOSEC) 40 MG capsule Take 1 capsule (40 mg) by mouth daily 30 capsule 3     ondansetron (ZOFRAN) 8 MG tablet Take 1 tablet (8 mg) by mouth every 8 hours as needed for nausea 30 tablet 3     oxyCODONE IR (ROXICODONE) 5 MG tablet Take 1-2 tablets (5-10 mg) by mouth every 4 hours as needed for moderate to severe pain 60 tablet 0     polyethylene glycol (MIRALAX/GLYCOLAX) Packet Take 17 g by mouth daily as needed for constipation 7 packet      Probiotic Product (PROBIOTIC PO) Take by mouth daily (with lunch)       sennosides (SENOKOT) 8.6 MG tablet Take 1-2 tablets by mouth 2 times daily as needed for constipation (no stool in 24 hrs) 60 each 1     zolpidem (AMBIEN) 5 MG tablet Take 1 tablet (5 mg) by mouth nightly as needed for sleep 30 tablet 3       Physical Examination:  General: The patient is a pleasant female in  no acute distress.  /70  Pulse 86  Temp 98  F (36.7  C) (Oral)  Resp 16  Ht 1.524 m (5')  Wt 53.1 kg (117 lb)  LMP 12/20/2013  SpO2 98%  BMI 22.85 kg/m2  Wt Readings from Last 10 Encounters:   11/14/18 53.1 kg (117 lb)   11/06/18 53.1 kg (117 lb 1.8 oz)   10/16/18 53.4 kg (117 lb 11.2 oz)   10/16/18 53.4 kg (117 lb 11.2 oz)   10/01/18 53.6 kg (118 lb 2.7 oz)   09/27/18 54.2 kg (119 lb 8 oz)   09/10/18 52.8 kg (116 lb 4.8 oz)   09/06/18 52.7 kg (116 lb 3.2 oz)   08/28/18 52.9 kg (116 lb 9.6 oz)   08/22/18 52.9 kg (116 lb 9.6 oz)     Limited exam today.   Heart: Regular rate and rhythm.   Lungs: Breathing comfortably on room air.  Decreased BS at left base with dullness to percussion      Laboratory Data:     11/14/2018 15:31   Sodium 134   Potassium 4.3   Chloride 105   Carbon Dioxide 23   Urea Nitrogen 10   Creatinine 0.88   GFR Estimate 66   GFR Estimate If Black 80   Calcium 8.3 (L)   Anion Gap 6   Albumin 3.2 (L)   Protein Total 7.0   Bilirubin Total 0.2   Alkaline Phosphatase 80   ALT 31   AST 42   N-Terminal Pro Bnp 36   T4 Free 0.90   TSH 8.91 (H)   Glucose 88   WBC 3.9 (L)   Hemoglobin 12.7   Hematocrit 38.9   Platelet Count 319   RBC Count 3.93   MCV 99     IMPRESSION:   1. No central filling defects to indicate pulmonary embolism.   2. Moderate to large left and small right pleural effusions. With new  interlobular septal thickening with basilar predominance, findings are  favored to be secondary to pulmonary edema. Differentials include  atypical infection/drug reaction.  3. Multiple pulmonary nodules, slightly increased since the prior  examination. Recommend close follow-up.  4. Pancreatic hypodense lesions, not well assessed on this early phase  CT. Left adrenal nodule is stable in size.  5. Increased left axillary, lower cervical and mediastinal  lymphadenopathy, suspicious for progression of disease.  6. Other incidental findings as described in the body of the report.    Assessment and  Plan:  52 year old with mRCC s/p 4 cycles of IL-2.  She had a stable CT after 2 cycles, mild disease progression after 4 cycles.  After a short break, CT showed worsening disease and she has started with Opdivo.   Three months later she had new brain disease and worsening systemic disease and started Cabo on 4/19, since then had regression and stable disease.    JIMÉNEZ: no PE noted on exam.  There is some GGOs, however no URI symptoms and bnp normal today. Her effusions are enlarged L>R and I am not sure if she felt better last week for just a few short days and then the effusion quickly came back? She cannot recall.  Likely her JIMÉNEZ is from the L effusion- we'll tap next week.  If continues to be an issue, will need to consider a pleurex.      RCC: She started Cabo 40 mg daily on 4/19. CT CAP on 6/5 showed improved disease. Suzi was off for 3.5 weeks due to her hip.  I think the mild progression on today's CT likely represents the break she had.  Since the drug was so effective, I think its work continuing on and just doing a short term CT in 4 weeks to make sure she isn't progression.  SHe was OK with the plan.  TSH elevated, T4 OK, repeat next month    Pleural effusion: med- large on exam and CT, will tap on Monday    Brain mets: no neuro symptoms, HA or confusion.   Craniotomy successful on 3/6.  Has GK to the bed on 4/5 and FOUR new lesions were noted during the planning MRI.    -6/5/18 and 9/6/18 brain MRI stable.    -Early December has MRI and Dr Jovel    Left femoral head lytic lesion:   Present since November 2017. 10/1/18 had surgical stabilization.  Healing well, doing PT.    -Xgeva 11/19/18    Diarrhea: none yet, however discussed this will likely ramp up this next couple weeks and to take the lomotil proactive    Health maintenance : had flu shot, repeat Shingrix in Jan-Feb         Kia Ren PA-C

## 2018-11-18 NOTE — PROGRESS NOTES
Oncology/Hematology Visit Note  Nov 14, 2018    Reason for Visit: [facility], routine followup    History of Present Illness: Suzi presented to ED with hematuria and right flank pain thinking she had a renal stone in December 2014. She was worked up with a CT abd/pelvis which suggested a renal mass. She was referred to Dr. Max Zelaya in [facility]. 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 Per charts tumor resection had negative margins and it was staged at pT2bN0.     Her staging work up was negative except for pulmonary nodules. She has been followed every 6 months with scans. CT CAP on 5/11/17 showed enlarging hilar LAD, enlarging pulmonary nodules, adrenal nodules and a pancreatic body mass. Biopsies of hilar LN, adrenal nodule and pancreatic mass were + for RCC.      ONCOLOGY THERAPY:   6/6/17-8/15/18-- IL-2, of which she received 4 cycles   11/22/17 CT CAP showed disease progression  She completed three months of Opdivo and then 12/5/17-2/13/18- 3 months of Opdivo  3/6/18- cerebellar lesion s/p craniotomy and GK 4/5 to tumor bed and 4 new lesions  4/12/18- 5 fractions of XRT to left supraclav/MERARY  4/19/18- Cabo 40 mg   6/20/18- Femur radiation  10/1/18- surgical stabilization of the femur   9/30/18-10/22/18 Off Cabo due to femur surgery      Interval History:  Suzi is here today with concerns of JIMÉNEZ.  She had a tap last week and was disappointed that she didn't feel better.  In retrospect, she cannot remember if she did feel better, but it was short lived, or if she didn't feel better at all.  She was surprised they only took out 650 ml as she felt it was more than a liter.  Thus far, since being back on Cabo (roughtly 3 weeks) she has been doing OK.  She noticed mouth sensitivities and decreased appetite, but no nausea or diarrhea. Left hip.leg getting stronger, almost done with the lovenox.      Current Outpatient Prescriptions    Medication Sig Dispense Refill     acetaminophen (TYLENOL) 325 MG tablet Take 3 tablets (975 mg) by mouth every 6 hours 100 tablet 1     ALPRAZolam (XANAX) 0.5 MG tablet Take 1 tablet (0.5 mg) by mouth 3 times daily as needed for anxiety 60 tablet 0     buPROPion (WELLBUTRIN XL) 300 MG 24 hr tablet Take 300 mg by mouth every morning        Cabozantinib S-Malate (CABOMETYX) 40 MG Take 1 tablet (40 mg) by mouth daily Take on an empty stomach 1 hour before or 2 hours after a meal. Avoid grapefruit and grapefruit juice. 30 tablet 0     Cabozantinib S-Malate (CABOMETYX) 40 MG Take 1 tablet (40 mg) by mouth daily Take on an empty stomach 1 hour before or 2 hours after a meal. Avoid grapefruit and grapefruit juice. 30 tablet 0     citalopram (CELEXA) 20 MG tablet Take 1 tablet (20 mg) by mouth every evening 90 tablet 3     diphenoxylate-atropine (LOMOTIL) 2.5-0.025 MG per tablet Take 1-2 tablets by mouth 4 times daily as needed for diarrhea 100 tablet 1     HYDROcodone-acetaminophen (NORCO) 5-325 MG per tablet        omeprazole (PRILOSEC) 40 MG capsule Take 1 capsule (40 mg) by mouth daily 30 capsule 3     ondansetron (ZOFRAN) 8 MG tablet Take 1 tablet (8 mg) by mouth every 8 hours as needed for nausea 30 tablet 3     oxyCODONE IR (ROXICODONE) 5 MG tablet Take 1-2 tablets (5-10 mg) by mouth every 4 hours as needed for moderate to severe pain 60 tablet 0     polyethylene glycol (MIRALAX/GLYCOLAX) Packet Take 17 g by mouth daily as needed for constipation 7 packet      Probiotic Product (PROBIOTIC PO) Take by mouth daily (with lunch)       sennosides (SENOKOT) 8.6 MG tablet Take 1-2 tablets by mouth 2 times daily as needed for constipation (no stool in 24 hrs) 60 each 1     zolpidem (AMBIEN) 5 MG tablet Take 1 tablet (5 mg) by mouth nightly as needed for sleep 30 tablet 3       Physical Examination:  General: The patient is a pleasant female in no acute distress.  /70  Pulse 86  Temp 98  F (36.7  C) (Oral)  Resp  16  Ht 1.524 m (5')  Wt 53.1 kg (117 lb)  LMP 12/20/2013  SpO2 98%  BMI 22.85 kg/m2  Wt Readings from Last 10 Encounters:   11/14/18 53.1 kg (117 lb)   11/06/18 53.1 kg (117 lb 1.8 oz)   10/16/18 53.4 kg (117 lb 11.2 oz)   10/16/18 53.4 kg (117 lb 11.2 oz)   10/01/18 53.6 kg (118 lb 2.7 oz)   09/27/18 54.2 kg (119 lb 8 oz)   09/10/18 52.8 kg (116 lb 4.8 oz)   09/06/18 52.7 kg (116 lb 3.2 oz)   08/28/18 52.9 kg (116 lb 9.6 oz)   08/22/18 52.9 kg (116 lb 9.6 oz)     Limited exam today.   Heart: Regular rate and rhythm.   Lungs: Breathing comfortably on room air.  Decreased BS at left base with dullness to percussion      Laboratory Data:     11/14/2018 15:31   Sodium 134   Potassium 4.3   Chloride 105   Carbon Dioxide 23   Urea Nitrogen 10   Creatinine 0.88   GFR Estimate 66   GFR Estimate If Black 80   Calcium 8.3 (L)   Anion Gap 6   Albumin 3.2 (L)   Protein Total 7.0   Bilirubin Total 0.2   Alkaline Phosphatase 80   ALT 31   AST 42   N-Terminal Pro Bnp 36   T4 Free 0.90   TSH 8.91 (H)   Glucose 88   WBC 3.9 (L)   Hemoglobin 12.7   Hematocrit 38.9   Platelet Count 319   RBC Count 3.93   MCV 99     IMPRESSION:   1. No central filling defects to indicate pulmonary embolism.   2. Moderate to large left and small right pleural effusions. With new  interlobular septal thickening with basilar predominance, findings are  favored to be secondary to pulmonary edema. Differentials include  atypical infection/drug reaction.  3. Multiple pulmonary nodules, slightly increased since the prior  examination. Recommend close follow-up.  4. Pancreatic hypodense lesions, not well assessed on this early phase  CT. Left adrenal nodule is stable in size.  5. Increased left axillary, lower cervical and mediastinal  lymphadenopathy, suspicious for progression of disease.  6. Other incidental findings as described in the body of the report.    Assessment and Plan:  52 year old with mRCC s/p 4 cycles of IL-2.  She had a stable CT after  2 cycles, mild disease progression after 4 cycles.  After a short break, CT showed worsening disease and she has started with Opdivo.   Three months later she had new brain disease and worsening systemic disease and started Cabo on 4/19, since then had regression and stable disease.    JIMÉNEZ: no PE noted on exam.  There is some GGOs, however no URI symptoms and bnp normal today. Her effusions are enlarged L>R and I am not sure if she felt better last week for just a few short days and then the effusion quickly came back? She cannot recall.  Likely her JIMÉNEZ is from the L effusion- we'll tap next week.  If continues to be an issue, will need to consider a pleurex.      RCC: She started Cabo 40 mg daily on 4/19. CT CAP on 6/5 showed improved disease. Suzi was off for 3.5 weeks due to her hip.  I think the mild progression on today's CT likely represents the break she had.  Since the drug was so effective, I think its work continuing on and just doing a short term CT in 4 weeks to make sure she isn't progression.  SHe was OK with the plan.  TSH elevated, T4 OK, repeat next month    Pleural effusion: med- large on exam and CT, will tap on Monday    Brain mets: no neuro symptoms, HA or confusion.   Craniotomy successful on 3/6.  Has GK to the bed on 4/5 and FOUR new lesions were noted during the planning MRI.    -6/5/18 and 9/6/18 brain MRI stable.    -Early December has MRI and Dr Jovel    Left femoral head lytic lesion:   Present since November 2017. 10/1/18 had surgical stabilization.  Healing well, doing PT.    -Xgeva 11/19/18    Diarrhea: none yet, however discussed this will likely ramp up this next couple weeks and to take the lomotil proactive    Health maintenance : had flu shot, repeat Shingrix in Jan-Feb         Kia Ren PA-C

## 2018-11-20 NOTE — TELEPHONE ENCOUNTER
Oral Chemotherapy Monitoring Program (Left Voicemail)      Primary Oncologist: Dr. Green  Primary Oncology Clinic: [facility]  Cancer Diagnosis: RCC     Attempted to contact patient today for follow up regarding oral chemotherapy, Cabometyx, for normal assessment. No answer. Left voicemail for patient to please call me back at [phone] when able. No patient or medication names were mentioned while leaving this message.    Lola Adam   Pharmacy Intern  [facility]   [phone]

## 2018-11-23 NOTE — IP AVS SNAPSHOT
[facility]    [address] [place] [id number]    Phone:  [phone]    Fax:  [phone]                                       After Visit Summary   11/23/2018    Suzi Gonsales    MRN: [id number]           After Visit Summary Signature Page     I have received my discharge instructions, and my questions have been answered. I have discussed any challenges I see with this plan with the nurse or doctor.    ..........................................................................................................................................  Patient/Patient Representative Signature      ..........................................................................................................................................  Patient Representative Print Name and Relationship to Patient    ..................................................               ................................................  Date                                   Time    ..........................................................................................................................................  Reviewed by Signature/Title    ...................................................              ..............................................  Date                                               Time          22EPIC Rev 08/18

## 2018-11-23 NOTE — IP AVS SNAPSHOT
MRN:[id number]                      After Visit Summary   11/23/2018    Suzi Gonsales    MRN: [id number]           Thank you!     Thank you for choosing [facility] for your care. Our goal is always to provide you with excellent care. Hearing back from our patients is one way we can continue to improve our services. Please take a few minutes to complete the written survey that you may receive in the mail after you visit with us. Thank you!        Patient Information     Date Of Birth          1965        About your hospital stay     You were admitted on:  November 23, 2018 [facility]    You were discharged on:  November 23, 2018       Who to Call     For medical emergencies, please call 911.  For non-urgent questions about your medical care, please call your primary care provider or clinic, [phone]  For questions related to your surgery, please call your surgery clinic        Attending Provider     Provider Melanie Haynes PA-C Physician Assistant       Primary Care Provider Office Phone # Fax #    Molly NicholeARIEL Bailey [facility] [phone]      Your next 10 appointments already scheduled     Dec 06, 2018 12:00 PM CST   MR BRAIN W/O & W CONTRAST with UUMR1   [facility], [facility], [facility] ([facility] [place])    [address] [address] [phone]           How do I prepare for my exam? (Food and drink instructions) **If you will be receiving sedation or general anesthesia, please see special notes below.**  How do I prepare for my exam? (Other instructions) Take your medicines as usual, unless your doctor tells you not to. You may or may not receive intravenous (IV) contrast for this exam pending the discretion of the Radiologist.  You do not need to do anything special to prepare.  **If you will be receiving sedation or general anesthesia, please see  special notes below.**  What should I wear: The MRI machine uses a strong magnet. Please wear clothes without metal (snaps, zippers). A sweatsuit works well, or we may give you a hospital gown. Please remove any body piercings and hair extensions before you arrive. You will also remove watches, jewelry, hairpins, wallets, dentures, partial dental plates and hearing aids. You may wear contact lenses, and you may be able to wear your rings. We have a safe place to keep your personal items, but it is safer to leave them at home.  How long does the exam take: Most tests take 30 to 60 minutes.  HOWEVER, IF YOUR DOCTOR PRESCRIBES ANESTHESIA please plan on spending four to five hours in the recovery room.  What should I bring:  Bring a list of your current medicines to your exam (including vitamins, minerals and over-the-counter drugs).  Do I need a :  **If you will be receiving sedation or general anesthesia, please see special notes below.**  What should I do after the exam: No Restrictions, You may resume normal activities.  What is this test: MRI (magnetic resonance imaging) uses a strong magnet and radio waves to look inside the body. An MRA (magnetic resonance angiogram) does the same thing, but it lets us look at your blood vessels. A computer turns the radio waves into pictures showing cross sections of the body, much like slices of bread. This helps us see any problems more clearly. You may receive fluid (called  contrast ) before or during your scan. The fluid helps us see the pictures better. We give the fluid through an IV (small needle in your arm).  Who should I call with questions:  Please call the Imaging Department at your exam site with any questions. Directions, parking instructions, and other information is available on our website, [email or web address].  How do I prepare if I m having sedation or anesthesia? **IMPORTANT** THE INSTRUCTIONS BELOW ARE ONLY FOR THOSE PATIENTS WHO HAVE BEEN TOLD THEY  WILL RECEIVE SEDATION OR GENERAL ANESTHESIA DURING THEIR MRI PROCEDURE:  IF YOU WILL RECEIVE SEDATION (take medicine to help you relax during your exam): You must get the medicine from your doctor before you arrive. Bring the medicine to the exam. Do not take it at home. Arrive one hour early. Bring someone who can take you home after the test. Your medicine will make you sleepy. After the exam, you may not drive, take a bus or take a taxi by yourself. No eating 8 hours before your exam. You may have clear liquids up until 4 hours before your exam. (Clear liquids include water, clear tea, black coffee and fruit juice without pulp.)  IF YOU WILL RECEIVE ANESTHESIA (be asleep for your exam): Arrive 1 1/2 hours early. Bring someone who can take you home after the test. You may not drive, take a bus or take a taxi by yourself. No eating 8 hours before your exam. You may have clear liquids up until 4 hours before your exam. (Clear liquids include water, clear tea, black coffee and fruit juice without pulp.)            Dec 06, 2018  1:00 PM CST   Return Visit with Huyen Cuellar MD   Radiation Oncology Clinic ([facility])    [facility]  1st Floor  [address] [address] [phone]            Dec 12, 2018  3:00 PM CST   CT CHEST/ABDOMEN/PELVIS W CONTRAST with UCCT2   [facility] ([facility])    [address]  [address] [facility] [address] [phone]           How do I prepare for my exam? (Food and drink instructions) To prepare: Do not eat or drink for 2 hours before your exam. If you need to take medicine, you may take it with small sips of water. (We may ask you to take liquid medicine as well.)  How do I prepare for my exam? (Other instructions) Please arrive 30 minutes early for your CT.  Once in the department you might be asked to drink water 15-20 minutes prior to your exam.  If indicated you  may be asked to drink an oral contrast in advance of your CT.  If this is the case, the imaging team will let you know or be in contact with you prior to your appointment  Patients over 70 or patients with diabetes or kidney problems: If you haven t had a blood test (creatinine test) within the last 30 days, the Cardiologist/Radiologist may require you to get this test prior to your exam.  If you have diabetes:  Continue to take your metformin medication on the day of your exam  What should I wear: Please wear loose clothing, such as a sweat suit or jogging clothes. Avoid snaps, zippers and other metal. We may ask you to undress and put on a hospital gown.  How long does the exam take: Most scans take less than 20 minutes.  What should I bring: Please bring any scans or X-rays taken at other hospitals, if similar tests were done. Also bring a list of your medicines, including vitamins, minerals and over-the-counter drugs. It is safest to leave personal items at home.  Do I need a : No  is needed.  What do I need to tell my doctor? Be sure to tell your doctor: * If you have any allergies. * If there s any chance you are pregnant. * If you are breastfeeding.  What should I do after the exam: No restrictions, You may resume normal activities.  What is this test: A CT (computed tomography) scan is a series of pictures that allows us to look inside your body. The scanner creates images of the body in cross sections, much like slices of bread. This helps us see any problems more clearly. You may receive contrast (X-ray dye) before or during your scan. You will be asked to drink the contrast.  Who should I call with questions: If you have any questions, please call the Imaging Department where you will have your exam. Directions, parking instructions, and other information is available on our website, [email or web address].            Dec 12, 2018  4:15 PM [facility]   Chey Lab Draw with  CHEY LAB DRAW   M  [facility] Draw ([facility])    [address]  [address]  [facility] [address]   [phone]            Dec 12, 2018  5:00 PM CST   (Arrive by 4:45 PM)   Return Visit with Henri Green MD   [facility] ([facility])    [address]  [address]  [facility] [address]   [phone]              Further instructions from your care team       Discharge Instructions for Paracentesis or Thoracentesis     Paracentesis is a procedure to remove extra fluid from your belly (abdomen). Thoracentesis is a procedure to remove extra fluid from your chest.  This fluid buildup is called ascites. The procedure may have been done to take a sample of the fluid. Or, it may have been done to drain the extra fluid from your abdomen or chest to help make you more comfortable.     Home care    If you have pain after the procedure, your healthcare provider can prescribe or recommend pain medicines. Take these exactly as directed. If you stopped taking other medicines before the procedure, ask your provider when you can start them again.    Avoid strenuous activity for 48 hours.    You will have a small bandage over the puncture site. Adhesive tapes, adhesive strips, or surgical glue may also be used to close the incision. They also help stop bleeding and promote healing. You may take the bandage off in 24-48 hours. Once there is a scab over the site, no bandages are required.    Check the puncture site for the signs of infection listed below.     Follow-up care  Make a follow-up appointment with your healthcare provider as directed. During your follow-up visit, your provider will check your healing. Let your provider know how you are feeling. You can also discuss the cause of your fluid, and if you need any further treatment.    When to seek medical advice:  Call your healthcare provider if you have any of the following after the  procedure:    A fever of 100.4 F (38.0 C) or higher    Trouble breathing    Abdominal pain that is worse than expected    Belly Abdominal pain not caused by having the skin punctured that is worse than expected    Persistent bleeding from the puncture site    More than a small amount of fluid leaking from the puncture site    Swollen belly    Signs of infection at the puncture site. These include increased pain, redness, or swelling, warmth, or bad-smelling drainage.    Feeling dizzy or lightheaded, or fainting     Call our Interventional Radiology (IR) service if:  If you start bleeding from the procedure site.  If you do start to bleed from the site, lie down and hold some pressure on the site.  Our radiology provider can help you decide if you need to return to the hospital.  If you have new or worsening pain related to the procedure.  If you have pronounced swelling at the procedure site.  If you develop persistent nausea or vomiting.  If you develop hives or a rash or any unexplained itching.  If you have a fever of greater than 100.4  F and chills in the first 5 days after procedure.  Any other concerns related to your procedure.      [facility]  Interventional Radiology (IR)  [address] [place] [address]    Contact Number:  [phone]  (IR control desk)  Monday - Friday 8:00 am - 4:30 pm    After hours for urgent concerns:  [phone]  After 4:30 pm Monday - Friday, Weekends and Holidays.   Ask for Interventional Radiology on-call.  Someone is available 24 hours a day.  [facility] toll free number:  [phone]    Pending Results     No orders found from 11/21/2018 to 11/24/2018.            Admission Information     Date & Time Provider Department Dept. Phone    11/23/2018 Melanie Cevallos PA-C [facility] [phone]      Your Vitals Were     Blood Pressure Pulse Temperature Respirations Height Weight    104/42  88 98  F (36.7  C) (Temporal) 16 1.524 m (5') 53.1 kg (117 lb)    Last Period Pulse Oximetry BMI (Body Mass Index)             12/20/2013 100% 22.85 kg/m2         [employer] Information     [employer] gives you secure access to your electronic health record. If you see a primary care provider, you can also send messages to your care team and make appointments. If you have questions, please call your primary care clinic.  If you do not have a primary care provider, please call [phone] and they will assist you.      [employer]) is an electronic gateway that provides easy, online access to your medical records. With [employer] you can request a clinic appointment, read your test results, renew a prescription or communicate with your care team.     To access your existing account, please contact your [facility] or call [phone] for assistance.        Care EveryWhere ID     This is your Care EveryWhere ID. This could be used by other organizations to access your [facility] medical records  [id number]        Equal Access to Services     ROSAURA INTERIANO AH: Hadii aad ku hadasho Soomaali, waaxda luqadaha, qaybta kaalmada adepaulina, josemanuel plata. So [facility] [phone].    ATENCIÓN: Si habla español, tiene a chacon disposición servicios gratuitos de asistencia lingüística. Llame al [phone].    We comply with applicable federal civil rights laws and [place] laws. We do not discriminate on the basis of race, color, national origin, age, disability, sex, sexual orientation, or gender identity.               Review of your medicines      UNREVIEWED medicines. Ask your doctor about these medicines        Dose / Directions    acetaminophen 325 MG tablet   Commonly known as:  TYLENOL   Used for:  Bone metastasis (H)        Dose:  975 mg   Take 3 tablets (975 mg) by mouth every 6 hours   Quantity:  100 tablet   Refills:  1       ALPRAZolam 0.5 MG tablet   Commonly known as:  XANAX    Used for:  Renal cell carcinoma, unspecified laterality (H), Mass of upper lobe of left lung        Dose:  0.5 mg   Take 1 tablet (0.5 mg) by mouth 3 times daily as needed for anxiety   Quantity:  60 tablet   Refills:  3       buPROPion 300 MG 24 hr tablet   Commonly known as:  WELLBUTRIN XL        Dose:  300 mg   Take 300 mg by mouth every morning   Refills:  0       * Cabozantinib S-Malate 40 MG   Commonly known as:  CABOMETYX   Used for:  Brain metastasis (H), Metastatic renal cell carcinoma to lung, right (H), Malignant neoplasm of right kidney (H)        Dose:  40 mg   Take 1 tablet (40 mg) by mouth daily Take on an empty stomach 1 hour before or 2 hours after a meal. Avoid grapefruit and grapefruit juice.   Quantity:  30 tablet   Refills:  0       * Cabozantinib S-Malate 40 MG   Commonly known as:  CABOMETYX   Used for:  Brain metastasis (H), Metastatic renal cell carcinoma to lung, right (H), Malignant neoplasm of right kidney (H)        Dose:  40 mg   Take 1 tablet (40 mg) by mouth daily Take on an empty stomach 1 hour before or 2 hours after a meal. Avoid grapefruit and grapefruit juice.   Quantity:  30 tablet   Refills:  0       citalopram 20 MG tablet   Commonly known as:  celeXA   Used for:  Insomnia, unspecified type        Dose:  20 mg   Take 1 tablet (20 mg) by mouth every evening   Quantity:  90 tablet   Refills:  3       diphenoxylate-atropine 2.5-0.025 MG per tablet   Commonly known as:  LOMOTIL   Used for:  Diarrhea, unspecified type        Dose:  1-2 tablet   Take 1-2 tablets by mouth 4 times daily as needed for diarrhea   Quantity:  100 tablet   Refills:  1       HYDROcodone-acetaminophen 5-325 MG tablet   Commonly known as:  NORCO        Refills:  0       omeprazole 40 MG capsule   Commonly known as:  priLOSEC   Used for:  Cerebral edema (H)        Dose:  40 mg   Take 1 capsule (40 mg) by mouth daily   Quantity:  30 capsule   Refills:  3       ondansetron 8 MG tablet   Commonly known as:   ZOFRAN   Used for:  Nausea        Dose:  8 mg   Take 1 tablet (8 mg) by mouth every 8 hours as needed for nausea   Quantity:  30 tablet   Refills:  3       oxyCODONE 5 MG tablet   Commonly known as:  ROXICODONE   Used for:  Bone metastasis (H)        Dose:  5-10 mg   Take 1-2 tablets (5-10 mg) by mouth every 4 hours as needed for moderate to severe pain   Quantity:  60 tablet   Refills:  0       polyethylene glycol Packet   Commonly known as:  MIRALAX/GLYCOLAX        Dose:  17 g   Take 17 g by mouth daily as needed for constipation   Quantity:  7 packet   Refills:  0       PROBIOTIC PO   Used for:  Pre-op evaluation, Brain metastases (H)        Take by mouth daily (with lunch)   Refills:  0       sennosides 8.6 MG tablet   Commonly known as:  SENOKOT   Used for:  Bone metastasis (H)        Dose:  1-2 tablet   Take 1-2 tablets by mouth 2 times daily as needed for constipation (no stool in 24 hrs)   Quantity:  60 each   Refills:  1       zolpidem 5 MG tablet   Commonly known as:  AMBIEN   Used for:  Sleep disorder        Dose:  5 mg   Take 1 tablet (5 mg) by mouth nightly as needed for sleep   Quantity:  30 tablet   Refills:  3       * Notice:  This list has 2 medication(s) that are the same as other medications prescribed for you. Read the directions carefully, and ask your doctor or other care provider to review them with you.             Protect others around you: Learn how to safely use, store and throw away your medicines at www.disposemymeds.org.             Medication List: This is a list of all your medications and when to take them. Check marks below indicate your daily home schedule. Keep this list as a reference.      Medications           Morning Afternoon Evening Bedtime As Needed    acetaminophen 325 MG tablet   Commonly known as:  TYLENOL   Take 3 tablets (975 mg) by mouth every 6 hours                                ALPRAZolam 0.5 MG tablet   Commonly known as:  XANAX   Take 1 tablet (0.5 mg) by mouth  3 times daily as needed for anxiety                                buPROPion 300 MG 24 hr tablet   Commonly known as:  WELLBUTRIN XL   Take 300 mg by mouth every morning                                * Cabozantinib S-Malate 40 MG   Commonly known as:  CABOMETYX   Take 1 tablet (40 mg) by mouth daily Take on an empty stomach 1 hour before or 2 hours after a meal. Avoid grapefruit and grapefruit juice.                                * Cabozantinib S-Malate 40 MG   Commonly known as:  CABOMETYX   Take 1 tablet (40 mg) by mouth daily Take on an empty stomach 1 hour before or 2 hours after a meal. Avoid grapefruit and grapefruit juice.                                citalopram 20 MG tablet   Commonly known as:  celeXA   Take 1 tablet (20 mg) by mouth every evening                                diphenoxylate-atropine 2.5-0.025 MG per tablet   Commonly known as:  LOMOTIL   Take 1-2 tablets by mouth 4 times daily as needed for diarrhea                                HYDROcodone-acetaminophen 5-325 MG tablet   Commonly known as:  NORCO                                omeprazole 40 MG capsule   Commonly known as:  priLOSEC   Take 1 capsule (40 mg) by mouth daily                                ondansetron 8 MG tablet   Commonly known as:  ZOFRAN   Take 1 tablet (8 mg) by mouth every 8 hours as needed for nausea                                oxyCODONE 5 MG tablet   Commonly known as:  ROXICODONE   Take 1-2 tablets (5-10 mg) by mouth every 4 hours as needed for moderate to severe pain                                polyethylene glycol Packet   Commonly known as:  MIRALAX/GLYCOLAX   Take 17 g by mouth daily as needed for constipation                                PROBIOTIC PO   Take by mouth daily (with lunch)                                sennosides 8.6 MG tablet   Commonly known as:  SENOKOT   Take 1-2 tablets by mouth 2 times daily as needed for constipation (no stool in 24 hrs)                                zolpidem 5 MG  tablet   Commonly known as:  AMBIEN   Take 1 tablet (5 mg) by mouth nightly as needed for sleep                                * Notice:  This list has 2 medication(s) that are the same as other medications prescribed for you. Read the directions carefully, and ask your doctor or other care provider to review them with you.

## 2018-11-23 NOTE — PROCEDURES
Interventional Radiology Brief Post Procedure Note    Procedure: Left thoracentesis    Proceduralist: Melanie Bangura PA-C    Assistant: None    Time Out: Prior to the start of the procedure and with procedural staff participation, I verbally confirmed the patient s identity using two indicators, relevant allergies, that the procedure was appropriate and matched the consent or emergent situation, and that the correct equipment/implants were available. Immediately prior to starting the procedure I conducted the Time Out with the procedural staff and re-confirmed the patient s name, procedure, and site/side. (The Joint Commission universal protocol was followed.)  Yes        Sedation: None. Local Anesthestic used    Findings: Completed ultrasound-guided left therapeutic thoracentesis. A total of 450 mL clear light yellow fluid drained from the left pleural space.      Estimated Blood Loss: Minimal    Fluoroscopy Time:  minute(s)    SPECIMENS: None    Complications: 1. None     Condition: Stable    Plan: Follow up per primary team    Comments: See dictated procedure note for full details.    Melanie Cevallos PA-C

## 2018-11-23 NOTE — DISCHARGE INSTRUCTIONS
Discharge Instructions for Paracentesis or Thoracentesis     Paracentesis is a procedure to remove extra fluid from your belly (abdomen). Thoracentesis is a procedure to remove extra fluid from your chest.  This fluid buildup is called ascites. The procedure may have been done to take a sample of the fluid. Or, it may have been done to drain the extra fluid from your abdomen or chest to help make you more comfortable.     Home care    If you have pain after the procedure, your healthcare provider can prescribe or recommend pain medicines. Take these exactly as directed. If you stopped taking other medicines before the procedure, ask your provider when you can start them again.    Avoid strenuous activity for 48 hours.    You will have a small bandage over the puncture site. Adhesive tapes, adhesive strips, or surgical glue may also be used to close the incision. They also help stop bleeding and promote healing. You may take the bandage off in 24-48 hours. Once there is a scab over the site, no bandages are required.    Check the puncture site for the signs of infection listed below.     Follow-up care  Make a follow-up appointment with your healthcare provider as directed. During your follow-up visit, your provider will check your healing. Let your provider know how you are feeling. You can also discuss the cause of your fluid, and if you need any further treatment.    When to seek medical advice:  Call your healthcare provider if you have any of the following after the procedure:    A fever of 100.4 F (38.0 C) or higher    Trouble breathing    Abdominal pain that is worse than expected    Belly Abdominal pain not caused by having the skin punctured that is worse than expected    Persistent bleeding from the puncture site    More than a small amount of fluid leaking from the puncture site    Swollen belly    Signs of infection at the puncture site. These include increased pain, redness, or swelling, warmth, or  bad-smelling drainage.    Feeling dizzy or lightheaded, or fainting     Call our Interventional Radiology (IR) service if:  If you start bleeding from the procedure site.  If you do start to bleed from the site, lie down and hold some pressure on the site.  Our radiology provider can help you decide if you need to return to the hospital.  If you have new or worsening pain related to the procedure.  If you have pronounced swelling at the procedure site.  If you develop persistent nausea or vomiting.  If you develop hives or a rash or any unexplained itching.  If you have a fever of greater than 100.4  F and chills in the first 5 days after procedure.  Any other concerns related to your procedure.      [facility]  Interventional Radiology (IR)  [address]  [facility] [place] [address]    Contact Number:  [phone]  (IR control desk)  Monday - Friday 8:00 am - 4:30 pm    After hours for urgent concerns:  [phone]  After 4:30 pm Monday - Friday, Weekends and Holidays.   Ask for Interventional Radiology on-call.  Someone is available 24 hours a day.  [facility] toll free number:  [phone]

## 2018-11-27 NOTE — TELEPHONE ENCOUNTER
Oral Chemotherapy Monitoring Program    Placed call to patient in follow up of Cabometyx therapy. Concerns noted on 11/1 include nausea, shortness of breath/cough, and fatigue.    Left message to please call back in follow up of therapy. No patient or drug names were mentioned.    Carl Dorado  Pharmacy Intern  Oral Chemotherapy Monitoring Program  [facility]  [phone]

## 2018-12-04 NOTE — TELEPHONE ENCOUNTER
Oral Chemotherapy Monitoring Program    Placed call to patient in follow up of Cabometyx therapy. Wanted to follow up on symptoms of shortness of breath/cough, upset stomach and fatigue noted 11/1.    Left message to please call back in follow up of therapy. No patient or drug names were mentioned.    Kimberlee Cespedes  Student Pharmacist  Oral Chemotherapy Monitoring Program  [facility]  [phone]

## 2018-12-12 NOTE — NURSING NOTE
Oncology Rooming Note    December 12, 2018 4:09 PM   Suzi Gonsales is a 53 year old female who presents for:    Chief Complaint   Patient presents with     Lab Only     labs drawn via piv, saline locked, vitals completed     Oncology Clinic Visit     Kidney Cancer     Initial Vitals: /79   Pulse 80   Temp 97.8  F (36.6  C) (Oral)   Resp 16   Ht 1.524 m (5')   Wt 52.4 kg (115 lb 8 oz)   LMP 12/20/2013   SpO2 95%   BMI 22.56 kg/m   Estimated body mass index is 22.56 kg/m  as calculated from the following:    Height as of this encounter: 1.524 m (5').    Weight as of this encounter: 52.4 kg (115 lb 8 oz). Body surface area is 1.49 meters squared.  Mild Pain (3) Comment: Data Unavailable   Patient's last menstrual period was 12/20/2013.  Allergies reviewed: Yes  Medications reviewed: Yes    Medications: Medication refills not needed today.  Pharmacy name entered into [facility]:    [id number] - [place], [place] - [address] AT [facility] [address]  [facility] - USE FOR MAILING ONLY - [place], [place]  [facility] - [place], [place] - [address] [place] [address]    Clinical concerns: No New Concerns    5 minutes for nursing intake (face to face time)     MICA Marcano

## 2018-12-12 NOTE — NURSING NOTE
Chief Complaint   Patient presents with     Lab Only     labs drawn via piv, saline locked, vitals completed

## 2018-12-12 NOTE — LETTER
12/12/2018       RE: Suzi Gonsales  [address] [place] [address]     Dear Colleague,    Thank you for referring your patient, Suzi Gonsales, to the [facility]. Please see a copy of my visit note below.    Oncology/Hematology Visit Note  Dec 12, 2018    Reason for Visit: mRCC, routine followup    History of Present Illness: Suzi presented to ED with hematuria and right flank pain thinking she had a renal stone in December 2014. She was worked up with a CT abd/pelvis which suggested a renal mass. She was referred to Dr. Max Zelaya in [facility]. 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 Per charts tumor resection had negative margins and it was staged at pT2bN0.     Her staging work up was negative except for pulmonary nodules. She has been followed every 6 months with scans. CT CAP on 5/11/17 showed enlarging hilar LAD, enlarging pulmonary nodules, adrenal nodules and a pancreatic body mass. Biopsies of hilar LN, adrenal nodule and pancreatic mass were + for RCC.      ONCOLOGY THERAPY:   6/6/17-8/15/18-- IL-2, of which she received 4 cycles   11/22/17 CT CAP showed disease progression  She completed three months of Opdivo and then 12/5/17-2/13/18- 3 months of Opdivo  3/6/18- cerebellar lesion s/p craniotomy and GK 4/5 to tumor bed and 4 new lesions  4/12/18- 5 fractions of XRT to left supraclav/MERARY  4/19/18- Cabo 40 mg   6/20/18- Femur radiation  10/1/18- surgical stabilization of the femur   9/30/18-10/22/18 Off Cabo due to femur surgery      Interval History:  Suzi is here today with concerns of JIMÉNEZ.  She had a tap last week and was disappointed that she didn't feel better.  In retrospect, she cannot remember if she did feel better, but it was short lived, or if she didn't feel better at all.  She was surprised they only took out 650 ml as she felt it was more than a liter.  Thus far, since being back on  Escobaro (roughtly 3 weeks) she has been doing OK.  She noticed mouth sensitivities and decreased appetite, but no nausea or diarrhea. Left hip.leg getting stronger, almost done with the lovenox.      Current Outpatient Medications   Medication Sig     acetaminophen (TYLENOL) 325 MG tablet Take 3 tablets (975 mg) by mouth every 6 hours     ALPRAZolam (XANAX) 0.5 MG tablet Take 1 tablet (0.5 mg) by mouth 3 times daily as needed for anxiety     buPROPion (WELLBUTRIN XL) 300 MG 24 hr tablet Take 300 mg by mouth every morning      Cabozantinib S-Malate (CABOMETYX) 40 MG Take 1 tablet (40 mg) by mouth daily Take on an empty stomach 1 hour before or 2 hours after a meal. Avoid grapefruit and grapefruit juice.     Cabozantinib S-Malate (CABOMETYX) 40 MG Take 1 tablet (40 mg) by mouth daily Take on an empty stomach 1 hour before or 2 hours after a meal. Avoid grapefruit and grapefruit juice.     citalopram (CELEXA) 20 MG tablet Take 1 tablet (20 mg) by mouth every evening     diphenoxylate-atropine (LOMOTIL) 2.5-0.025 MG per tablet Take 1-2 tablets by mouth 4 times daily as needed for diarrhea     HYDROcodone-acetaminophen (NORCO) 5-325 MG per tablet      omeprazole (PRILOSEC) 40 MG capsule Take 1 capsule (40 mg) by mouth daily     ondansetron (ZOFRAN) 8 MG tablet Take 1 tablet (8 mg) by mouth every 8 hours as needed for nausea     oxyCODONE IR (ROXICODONE) 5 MG tablet Take 1-2 tablets (5-10 mg) by mouth every 4 hours as needed for moderate to severe pain     polyethylene glycol (MIRALAX/GLYCOLAX) Packet Take 17 g by mouth daily as needed for constipation     Probiotic Product (PROBIOTIC PO) Take by mouth daily (with lunch)     sennosides (SENOKOT) 8.6 MG tablet Take 1-2 tablets by mouth 2 times daily as needed for constipation (no stool in 24 hrs)     zolpidem (AMBIEN) 5 MG tablet Take 1 tablet (5 mg) by mouth nightly as needed for sleep     No current facility-administered medications for this visit.         Physical  Examination:  General: The patient is a pleasant female in no acute distress.  /79   Pulse 80   Temp 97.8  F (36.6  C) (Oral)   Resp 16   Ht 1.524 m (5')   Wt 52.4 kg (115 lb 8 oz)   LMP 12/20/2013   SpO2 95%   BMI 22.56 kg/m     Wt Readings from Last 10 Encounters:   12/12/18 52.4 kg (115 lb 8 oz)   11/23/18 53.1 kg (117 lb 0 oz)   11/14/18 53.1 kg (117 lb)   11/06/18 53.1 kg (117 lb 1.8 oz)   10/16/18 53.4 kg (117 lb 11.2 oz)   10/16/18 53.4 kg (117 lb 11.2 oz)   10/01/18 53.6 kg (118 lb 2.7 oz)   09/27/18 54.2 kg (119 lb 8 oz)   09/10/18 52.8 kg (116 lb 4.8 oz)   09/06/18 52.7 kg (116 lb 3.2 oz)     Limited exam today.   Heart: Regular rate and rhythm.   Lungs: Breathing comfortably on room air.  Decreased BS at left base with dullness to percussion      Laboratory Data:  Recent Labs   Lab Test 12/12/18  1533 11/14/18  1531 10/16/18  1419 10/04/18  0629 10/02/18  0619    134 137 136 142   POTASSIUM 3.4 4.3 4.0 3.9 4.0   CHLORIDE 104 105 106 103 106   CO2 23 23 24 30 31   ANIONGAP 9 6 7 3 5   BUN 8 10 11 7 6*   CR 0.82 0.88 0.80 0.72 0.73   GLC 90 88 143* 98 92   MUNDO 7.7* 8.3* 8.6 7.2* 7.6*     Recent Labs   Lab Test 03/07/18  0400 08/24/17  1520 08/19/17  0535 08/18/17  0328 08/17/17  0557 08/16/17  1550 08/16/17  1241   MAG 2.2  --  1.8 2.3 2.0  --  3.1*   PHOS  --  3.4 2.2* 2.4* 2.3* 2.0*  --      Recent Labs   Lab Test 12/12/18  1533 11/14/18  1531 10/16/18  1419 10/04/18  0629 10/03/18  0522  09/30/18  1139  08/22/18  1253   WBC 3.0* 3.9* 4.1 5.4  --   --  4.4   < > 4.7   HGB 11.1* 12.7 11.0* 10.2* 10.5*   < > 12.1   < > 13.7    319 490* 286  --   --  312   < > 322   MCV 98 99 102* 100  --   --  99   < > 95   NEUTROPHIL 56.8 52.9 59.7  --   --   --  50.1  --  43.8    < > = values in this interval not displayed.     Recent Labs   Lab Test 12/12/18  1533 11/14/18  1531 10/16/18  1419  08/19/17  0535 08/18/17  0328 08/17/17  0557   BILITOTAL 0.3 0.2 0.2   < > 0.2 0.2 0.3   ALKPHOS 85  80 80   < > 60 41 43   ALT 72* 31 27   < > 50 32 21   AST 81* 42 26   < > 31 31 16   ALBUMIN 2.9* 3.2* 3.1*   < > 2.0* 1.9* 2.2*   LDH  --   --   --   --  214 199 182    < > = values in this interval not displayed.     TSH   Date Value Ref Range Status   11/14/2018 8.91 (H) 0.40 - 4.00 mU/L Final   04/13/2018 1.02 0.40 - 4.00 mU/L Final   01/31/2018 0.93 0.40 - 4.00 mU/L Final     No results for input(s): CEA in the last 29833 hours.  Results for orders placed or performed in visit on 12/12/18   CT Chest/Abdomen/Pelvis w Contrast    Narrative    EXAMINATION: CT CHEST/ABDOMEN/PELVIS W CONTRAST, 12/12/2018 3:10 PM    TECHNIQUE:  Helical CT images from the thoracic inlet through the  symphysis pubis were obtained  with contrast. Contrast dose: 72mL  Isovue-[id number]    COMPARISON: 11/13/2018; 8/20/2018.    HISTORY: metastatic kidney cancer, on oral chemo, routine 3 month  follow up; metastatic kidney cancer, on oral chemo, routine 3 month  follow up; Malignant neoplasm of right kidney (H)    FINDINGS:    LUNGS: Atelectatic changes in the left lung, stable. Mild interval  improvement of previously seen left upper lobe/left lingula  groundglass opacities and consolidative changes. Atelectatic changes  in the right lung base. Pulmonary nodules measuring up to 6 mm in the  right lower lobe. No new or enlarging pulmonary nodules.     Chest: Partially visualized thyroid gland with a dominant nodule in  the right lobe. Central tracheobronchial tree is patent. Thoracic  aorta and main pulmonary artery have normal diameter. No central  pulmonary embolism. Right-sided chest wall Port-A-Cath with its tip in  the high right atrium. Cardiac size within normal limits. Small  pericardial effusion comment stable. Small right and moderate left  pleural effusion with overlying atelectasis. No pneumothorax. Multiple  thoracic lymphadenopathy with heterogeneous enhancement for example  measuring 1.2 cm right paratracheal; 2.0 cm subcarinal; 1.5  cm in the  right hilum, not significantly changed from the prior 11/13/2018 CT  exam considering differences in technique. There are also enlarged and  left axillary and pectoral lymph nodes measuring up to 1.3 cm short  axis, stable. There is haziness of the mediastinal fat, unchanged.  Persistent soft tissue density in the left hilum encasing the left  upper lobe pulmonary artery branches and bronchi.    Abdomen and pelvis: Heterogeneous arterial enhancement in hepatic  segment 6 surrounding a subcentimeter hypodensity becoming  isoattenuating in the portal venous phase. Patent hepatic vasculature.  No biliary tree dilation. Unremarkable gallbladder.    Heterogeneous arterial enhancement about the pancreatic body  correlating with location of previously seen pancreatic lesions with  abrupt interruption of a prominent main pancreatic duct, stable. The  distal pancreatic body and tail are mildly atrophic with decreased  enhancement. Scattered subcentimeter hypoattenuating foci measuring up  to 6 mm at the tail, stable. Main pancreatic duct is not dilated. The  spleen is not enlarged. No focal splenic lesions.    Stable 10 mm enhancing left adrenal gland nodule. Postoperative  changes of right adrenalectomy and right nephrectomy. No evidence for  local recurrence in the present study. Subcentimeter cortical  hypodensities in the left kidney, too small to characterize, stable,  favoring renal cysts. No left renal stones or hydronephrosis.  Unremarkable urinary bladder. Heterogeneous enhancement of the uterus.  No adnexal masses. No free fluid. No free air.    No inguinal lymphadenopathy. Scattered pelvic lymph nodes for example  measuring up to 11 mm along the right external iliac chain, stable  with central hypoattenuation. No abdominal aortic aneurysm.  Subcentimeter retroperitoneal and mesenteric lymph nodes, not enlarged  by size criteria, stable.    No dilated loops of bowel. No bowel wall thickening. Moderate  colonic  stool burden.    Bones: Diffuse bone demineralization. Wedge compression deformity off  the vertebral bodies T7, stable. Partially visualized postoperative  changes of ORIF in the left femur. Stable lytic lesions at the left  femoral head with associated cortical thinning.      Impression    IMPRESSION: In this patient with history of metastatic renal cell  carcinoma:. Postoperative changes of right nephrectomy and right  adrenalectomy with no evidence for local recurrence at the surgical  bed.  2. Metastatic lesions in the pancreas and left adrenal gland, stable.  3. Small right and moderate left pleural effusion with overlying  atelectasis.  4. Mild interval improvement of previously seen groundglass opacities  and consolidative changes in the left upper lobe and lingula likely  resolving inflammatory/infectious process. Attention on follow-up  studies.   5. Stable soft tissue density in the left hilum as well as stable  adenopathy in the chest. Stable pulmonary nodules.  6. Stable metastatic bone lesions. New postoperative changes of  partially visualized left ORIF.  7. Heterogeneous arterial enhancement in hepatic segment 6, new from  the prior study, indeterminate. Finding could represent perfusional  changes. Attention on follow-up studies.    NIA GUILLORY MD         Assessment and Plan:  52 year old with mRCC s/p 4 cycles of IL-2.  She had a stable CT after 2 cycles, mild disease progression after 4 cycles.  After a short break, CT showed worsening disease and she has started with Opdivo.   Three months later she had new brain disease and worsening systemic disease and started Cabo on 4/19, since then had regression and stable disease.    JIMÉNEZ:She continues to have JIMÉNEZ. She gets SOB trying to climb stairs. She has moderate left pleural effusion. She has about the same amount of fluid as the last time. She had 6 previous thoracentesis done and was ready to have pleurex catheter placed. She is  planning a trip to [place] where she would love to spend time on a tube floating in the pool She would not be able to soak in water with a pleurex catheter. I did check this with thoracic surgery and they confirmed this. She would like to defer the pleurex till next visit. She has spoken to a family member who had a pleurex catheter but had no difficulty with it.     RCC: She started Cabo 40 mg daily on 4/19. CT CAP on 6/5 showed improved disease. Suzi was off for 3.5 weeks due to her hip.  She is again noticing a range of symptoms on the medication. She is starting to notice mucositis, no taste, tongue feels excoriated, fatigue, poor appetite. Besides this she has SOB as above.   We did review option of dose reduction to 20 mg daily. This would be a much more drastic dose reduction. I would suggest that we try a different way with 20% dose reduction - she can skip the drug every 5th day. This will get the dose intensity lower. If this is not adequate we could try skipping twice a week - Thurs/Sunday or some such schedule. If none of these strategies is adequate we will try 20 mg daily dose.     Pleural effusion: med- large on exam and CT, will schedule thoracentesis soon. She is considering pleurex after her trip to [place]     Brain mets: no neuro symptoms, HA or confusion.   Craniotomy successful on 3/6.  Ha d GK to the bed on 4/5 and FOUR new lesions were noted during the planning MRI.    -6/5/18 and 9/6/18 brain MRI stable.    -Will reschedule her MRI and follow up with Dr. Jovel    Left femoral head lytic lesion:   Present since November 2017. 10/1/18 had surgical stabilization.  Healing well, doing PT.    -Xgeva 11/19/18; will repeat today    Diarrhea: since last few days, h    Health maintenance : had flu shot, repeat Shingrix in Jan-Feb    Over 45 min of direct face to face time spent with patient with more than 50% time spent in counseling and coordinating care.      Again, thank you for allowing me to  participate in the care of your patient.      Sincerely,    Henri Green MD

## 2018-12-12 NOTE — DISCHARGE INSTRUCTIONS

## 2018-12-17 NOTE — PROGRESS NOTES
Patient left RN[facility] a voicemail earlier stating she needs a thoracentesis.  Sent a message to the scheduling team to make an appointment ASAP.  Advised patient to call [facility] back if this doesn't happen soon and we can escalate the request.   Charo Birmingham, RN BSN   [facility]  Nurse Coordinator

## 2018-12-21 NOTE — TELEPHONE ENCOUNTER
[facility] Telephone Triage Note    Assessment: Patient called in to triage reporting the following symptoms: mouth/throat sores. Reports generalized sore/sensative mouth and tongue. 1 sore noted on tongue at this time. Pt reports she is doing salt rinses BID and using PRN Magic Mouthwash. Reporting pain with brushing teeth.    Recommendations: Advised patient increase salt rinses to 5-10x daily and continue to use Magic Mouthwash PRN. Recommended purchasing oral care swabs from pharmacy if toothbrush is too harsh. Advised to contact triage department if unable to eat or drink or if symptoms are worsening.      Follow-Up: Instructed patient to seek care immediately for worsening symptoms, including: fever, chest pain, shortness of breath, dizziness. Patient voiced understanding of advice and/or instructions given.     Helen Knowles RN   [facility] Triage

## 2018-12-21 NOTE — BRIEF OP NOTE
Interventional Radiology Brief Post Procedure Note    Procedure: Ultrasound guided thoracentesis.     Proceduralist[facility] Quintin [facility]    Assistant: None    Time Out: Prior to the start of the procedure and with procedural staff participation, I verbally confirmed the patient s identity using two indicators, relevant allergies, that the procedure was appropriate and matched the consent or emergent situation, and that the correct equipment/implants were available. Immediately prior to starting the procedure I conducted the Time Out with the procedural staff and re-confirmed the patient s name, procedure, and site/side. (The Joint Commission universal protocol was followed.)  Yes        Sedation: None. Local Anesthestic used    Findings: U/S guided left therapeutic thoracentesis. Return of clear yellow/green fluid.    Estimated Blood Loss: Minimal    Fluoroscopy Time:  None.    SPECIMENS: None    Complications: 1. None     Condition: Stable    Plan: Follow up per primary team.    Comments: See dictated procedure note for full details.    Quintin Marin PA-C

## 2018-12-21 NOTE — DISCHARGE INSTRUCTIONS
Discharge Instructions for Paracentesis or Thoracentesis     Paracentesis is a procedure to remove extra fluid from your belly (abdomen). Thoracentesis is a procedure to remove extra fluid from your chest.  This fluid buildup is called ascites. The procedure may have been done to take a sample of the fluid. Or, it may have been done to drain the extra fluid from your abdomen or chest to help make you more comfortable.     Home care    If you have pain after the procedure, your healthcare provider can prescribe or recommend pain medicines. Take these exactly as directed. If you stopped taking other medicines before the procedure, ask your provider when you can start them again.    Avoid strenuous activity for 48 hours.    You will have a small bandage over the puncture site. Adhesive tapes, adhesive strips, or surgical glue may also be used to close the incision. They also help stop bleeding and promote healing. You may take the bandage off in 24-48 hours. Once there is a scab over the site, no bandages are required.    Check the puncture site for the signs of infection listed below.     Follow-up care  Make a follow-up appointment with your healthcare provider as directed. During your follow-up visit, your provider will check your healing. Let your provider know how you are feeling. You can also discuss the cause of your fluid, and if you need any further treatment.    When to seek medical advice:  Call your healthcare provider if you have any of the following after the procedure:    A fever of 100.4 F (38.0 C) or higher    Trouble breathing    Abdominal pain that is worse than expected    Belly Abdominal pain not caused by having the skin punctured that is worse than expected    Persistent bleeding from the puncture site    More than a small amount of fluid leaking from the puncture site    Swollen belly    Signs of infection at the puncture site. These include increased pain, redness, or swelling, warmth, or  bad-smelling drainage.    Feeling dizzy or lightheaded, or fainting     Call our Interventional Radiology (IR) service if:  If you start bleeding from the procedure site.  If you do start to bleed from the site, lie down and hold some pressure on the site.  Our radiology provider can help you decide if you need to return to the hospital.  If you have new or worsening pain related to the procedure.  If you have pronounced swelling at the procedure site.  If you develop persistent nausea or vomiting.  If you develop hives or a rash or any unexplained itching.  If you have a fever of greater than 100.4  F and chills in the first 5 days after procedure.  Any other concerns related to your procedure.      [facility]  Interventional Radiology (IR)  [address]  [address] [facility] [place] [address]    Contact Number:  [phone]  (IR control desk)  Monday - Friday 8:00 am - 4:30 pm    After hours for urgent concerns:  [phone]  After 4:30 pm Monday - Friday, Weekends and Holidays.   Ask for Interventional Radiology on-call.  Someone is available 24 hours a day.  [facility] toll free number:  [phone]

## 2018-12-27 NOTE — ORAL ONC MGMT
"Oral Chemotherapy Monitoring Program    Primary Oncologist: Dr. Green   Primary Oncology Clinic: [facility]   Cancer Diagnosis: RCC    Therapy History:  Cabometyx 40 mg daily  12/12: Reduced to Cabometyx 40 mg 6 days per week    Drug Interaction Assessment: No new drug interactions at this time     Lab Monitoring Plan  Monitoring plan:   C1D1+   CMP, CBC, urine random protein, BP C2D1+ Call, BP, CMP, CBC, urine random protein  C3D1+ Call, BP, CMP, CBC, urine random protein C4D1+ Call, BP, CMP, CBC, urine random protein C5D1+ Call, BP, CMP, CBC, urine random protein C6D1+ Call, BP, CMP, CBC, urine random protein   C1D8+  C2D8+  C3D8+  C4D8+  C5D8+  C6D8+    C1D15+ Call, BP C2D15+  C3D15+  C4D15+  C5D15+  C6D15+    C1D22+  C2D22+  C3D22+  C4D22+  C5D22+  C6D22+      Subjective/Objective:  Suzi Gonsales is a 53 year old female contacted by phone for a follow-up visit for oral chemotherapy. Suzi has been experiencing some side effects from her medication such as mouth sores, fatigue and diarrhea. She also has intermittent episodes of shortness of breath but she says that she is beginning to get used to these symptoms. She is controlling her mouth sores with salt water rinses and magic mouth wash. She says that her mouth sores are not interfering with her normal diet but are very bothersome. She controls her symptoms of diarrhea with loperamide and this seems to work well. Her biggest complaint is that her symptoms of fatigue are constant and not relieved by rest. She says it does not prevent her from doing daily activities but she is always exhausted. She is currently on vacation in [place]. There was talk of reducing her dose to 5 doses weekly. Suzi said that she will contact the Oral Chemotherapy Monitoring Team if she does reduce dose again. Patient mentioned that she would need to receive a \"calcium shot\" at her next appointment with Day Ren. I think she may be referring to her Xgeva injection. She " agrees to contact us with any other questions or concerns.     ORAL CHEMOTHERAPY 4/26/2018 7/12/2018 8/6/2018 8/6/2018 11/1/2018 12/27/2018   Drug Name Cabometyx (cabozantinib) Cabometyx (cabozantinib) Cabometyx (cabozantinib) Cabometyx (cabozantinib) Cabometyx (cabozantinib) Cabometyx (cabozantinib)   Current Dosage 40mg 40mg 40mg 40mg 40mg 40mg   Current Schedule Daily Daily Daily Daily Daily Other   Cycle Details Continuous Continuous Drug on Hold Continuous Continuous Continuous   Start Date of Last Cycle 4/19/2018 6/22/2018 7/25/2018 8/2/2018 10/22/2018 -   Planned next cycle start date - - 8/1/2018 - - -   Doses missed in last 2 weeks - 0 - - 2 1   Adherence Assessment Adherent Adherent - Adherent Adherent Adherent   Adverse Effects Other (see note for details) Diarrhea;Fatigue - Diarrhea Fatigue;Other (see note for details) Diarrhea;Oral mucositis;Fatigue   Diarrhea - Grade 1 - Grade 3 - Grade 1   Pharmacist Intervention(diarrhea) - Yes - Yes - Yes   Intervention(s) - Patient education - Patient education - Patient education   Oral Mucositis - - - - - Grade 2   Pharmacist Intervention(mucositis) - - - - - Yes   Intervention(s) - - - - - Patient education   Fatigue - Grade 1 - - - Grade 2   Pharmacist Intervention(fatigue) - Yes - - - No   Intervention(s) - Patient education - - - -   Other (see note for details) Loss of appetite - - - - -   Pharmacist intervention? Yes - - - - -   Intervention(s) Patient education - - - - -   Home BPs all BPs<140/90 - - - - -   Any new drug interactions? - No - No - No   Is the dose as ordered appropriate for the patient? - Yes - Yes - -       Last PHQ-2 Score on record:   PHQ-2 ( 1999 [employer]) 2/19/2018 6/16/2017   Q1: Little interest or pleasure in doing things (No Data) 0   Q2: Feeling down, depressed or hopeless - 0   PHQ-2 Score - 0     Vitals:  BP:   BP Readings from Last 1 Encounters:   12/21/18 111/76     Wt Readings from Last 1 Encounters:   12/12/18 52.4 kg (115 lb 8  oz)     Estimated body surface area is 1.49 meters squared as calculated from the following:    Height as of 12/12/18: 1.524 m (5').    Weight as of 12/12/18: 52.4 kg (115 lb 8 oz).    Labs:  _  Result Component Current Result Ref Range   Sodium 136 (12/12/2018) 133 - 144 mmol/L     _  Result Component Current Result Ref Range   Potassium 3.4 (12/12/2018) 3.4 - 5.3 mmol/L     _  Result Component Current Result Ref Range   Calcium 7.7 (L) (12/12/2018) 8.5 - 10.1 mg/dL     No results found for Mag within last 30 days.     No results found for Phos within last 30 days.     _  Result Component Current Result Ref Range   Albumin 2.9 (L) (12/12/2018) 3.4 - 5.0 g/dL     _  Result Component Current Result Ref Range   Urea Nitrogen 8 (12/12/2018) 7 - 30 mg/dL     _  Result Component Current Result Ref Range   Creatinine 0.82 (12/12/2018) 0.52 - 1.04 mg/dL       _  Result Component Current Result Ref Range   AST 81 (H) (12/12/2018) 0 - 45 U/L     _  Result Component Current Result Ref Range   ALT 72 (H) (12/12/2018) 0 - 50 U/L     _  Result Component Current Result Ref Range   Bilirubin Total 0.3 (12/12/2018) 0.2 - 1.3 mg/dL       _  Result Component Current Result Ref Range   WBC 3.0 (L) (12/12/2018) 4.0 - 11.0 10e9/L     _  Result Component Current Result Ref Range   Hemoglobin 11.1 (L) (12/12/2018) 11.7 - 15.7 g/dL     _  Result Component Current Result Ref Range   Platelet Count 221 (12/12/2018) 150 - 450 10e9/L     _  Result Component Current Result Ref Range   Absolute Neutrophil 1.7 (12/12/2018) 1.6 - 8.3 10e9/L     Assessment:  Suzi is experiencing some side effects from her oral chemotherapy at this time. She will continue treatment and let us know if she decides to start taking her Cabometyx 5 days weekly rather than 6 days weekly. She seems to be able to control her mouth sores and diarrhea at this time with over the counter medications and non-pharmcological treatments. She will need to determine if she can  tolerate her fatigue and whether she would like to reduce her dose again.    Plan:  Continue taking Cabometyx 5 days weekly. Follow up with Dr. Green and/or Oral Chemotherapy Monitoring team with questions or concerns.      Follow-Up:  1/23 appointment with Day Ren    Refill Due:  Patient was unsure of how much drug she had left on hand, she is on vacation. Suzi will contact us if she needs more drug sooner than 1/23 appointment. Will leave on the refill list for refill date of 01/10/2019.    Kimberlee Cespedes  Student Pharmacist  Oral Chemotherapy Monitoring Program  [facility]  [phone]

## 2019-01-01 ENCOUNTER — PATIENT OUTREACH (OUTPATIENT)
Dept: CARE COORDINATION | Facility: CLINIC | Age: 54
End: 2019-01-01

## 2019-01-01 ENCOUNTER — APPOINTMENT (OUTPATIENT)
Dept: GENERAL RADIOLOGY | Facility: CLINIC | Age: 54
End: 2019-01-01
Attending: INTERNAL MEDICINE
Payer: COMMERCIAL

## 2019-01-01 ENCOUNTER — TELEPHONE (OUTPATIENT)
Dept: PULMONOLOGY | Facility: CLINIC | Age: 54
End: 2019-01-01

## 2019-01-01 ENCOUNTER — OFFICE VISIT (OUTPATIENT)
Dept: RADIATION ONCOLOGY | Facility: CLINIC | Age: 54
End: 2019-01-01
Attending: RADIOLOGY
Payer: COMMERCIAL

## 2019-01-01 ENCOUNTER — HOSPITAL ENCOUNTER (OUTPATIENT)
Dept: GENERAL RADIOLOGY | Facility: CLINIC | Age: 54
End: 2019-01-23
Attending: INTERNAL MEDICINE
Payer: COMMERCIAL

## 2019-01-01 ENCOUNTER — THERAPY VISIT (OUTPATIENT)
Dept: PHYSICAL THERAPY | Facility: CLINIC | Age: 54
End: 2019-01-01
Payer: COMMERCIAL

## 2019-01-01 ENCOUNTER — APPOINTMENT (OUTPATIENT)
Dept: LAB | Facility: CLINIC | Age: 54
End: 2019-01-01
Attending: INTERNAL MEDICINE
Payer: COMMERCIAL

## 2019-01-01 ENCOUNTER — ANESTHESIA (OUTPATIENT)
Dept: SURGERY | Facility: CLINIC | Age: 54
End: 2019-01-01
Payer: COMMERCIAL

## 2019-01-01 ENCOUNTER — HOSPITAL ENCOUNTER (OUTPATIENT)
Facility: CLINIC | Age: 54
Discharge: HOME | End: 2019-01-23
Attending: INTERNAL MEDICINE | Admitting: INTERNAL MEDICINE
Payer: COMMERCIAL

## 2019-01-01 ENCOUNTER — TELEPHONE (OUTPATIENT)
Dept: ONCOLOGY | Facility: CLINIC | Age: 54
End: 2019-01-01

## 2019-01-01 ENCOUNTER — HOSPITAL ENCOUNTER (INPATIENT)
Facility: CLINIC | Age: 54
LOS: 9 days | Discharge: HOSPICE | End: 2019-02-22
Attending: INTERNAL MEDICINE | Admitting: INTERNAL MEDICINE
Payer: COMMERCIAL

## 2019-01-01 ENCOUNTER — APPOINTMENT (OUTPATIENT)
Dept: CARDIOLOGY | Facility: CLINIC | Age: 54
End: 2019-01-01
Attending: INTERNAL MEDICINE
Payer: COMMERCIAL

## 2019-01-01 ENCOUNTER — ONCOLOGY VISIT (OUTPATIENT)
Dept: ONCOLOGY | Facility: CLINIC | Age: 54
End: 2019-01-01
Attending: INTERNAL MEDICINE
Payer: COMMERCIAL

## 2019-01-01 ENCOUNTER — HOSPITAL ENCOUNTER (OUTPATIENT)
Dept: CT IMAGING | Facility: CLINIC | Age: 54
Discharge: HOME | End: 2019-02-06
Attending: INTERNAL MEDICINE | Admitting: INTERNAL MEDICINE
Payer: COMMERCIAL

## 2019-01-01 ENCOUNTER — APPOINTMENT (OUTPATIENT)
Dept: GENERAL RADIOLOGY | Facility: CLINIC | Age: 54
End: 2019-01-01
Attending: PHYSICIAN ASSISTANT
Payer: COMMERCIAL

## 2019-01-01 ENCOUNTER — AMBULATORY - HEALTHEAST (OUTPATIENT)
Dept: OTHER | Facility: CLINIC | Age: 54
End: 2019-01-01

## 2019-01-01 ENCOUNTER — TELEPHONE (OUTPATIENT)
Dept: SURGERY | Facility: CLINIC | Age: 54
End: 2019-01-01

## 2019-01-01 ENCOUNTER — APPOINTMENT (OUTPATIENT)
Dept: CARDIOLOGY | Facility: CLINIC | Age: 54
End: 2019-01-01
Attending: PHYSICIAN ASSISTANT
Payer: COMMERCIAL

## 2019-01-01 ENCOUNTER — APPOINTMENT (OUTPATIENT)
Dept: CT IMAGING | Facility: CLINIC | Age: 54
End: 2019-01-01
Attending: PHYSICIAN ASSISTANT
Payer: COMMERCIAL

## 2019-01-01 ENCOUNTER — DOCUMENTATION ONLY (OUTPATIENT)
Dept: OTHER | Facility: CLINIC | Age: 54
End: 2019-01-01

## 2019-01-01 ENCOUNTER — HOSPITAL ENCOUNTER (OUTPATIENT)
Dept: MRI IMAGING | Facility: CLINIC | Age: 54
Discharge: HOME | End: 2019-01-14
Attending: PHYSICIAN ASSISTANT | Admitting: PHYSICIAN ASSISTANT
Payer: COMMERCIAL

## 2019-01-01 ENCOUNTER — DOCUMENTATION ONLY (OUTPATIENT)
Dept: ONCOLOGY | Facility: CLINIC | Age: 54
End: 2019-01-01

## 2019-01-01 ENCOUNTER — APPOINTMENT (OUTPATIENT)
Dept: OCCUPATIONAL THERAPY | Facility: CLINIC | Age: 54
End: 2019-01-01
Attending: INTERNAL MEDICINE
Payer: COMMERCIAL

## 2019-01-01 ENCOUNTER — HOSPITAL ENCOUNTER (OUTPATIENT)
Facility: CLINIC | Age: 54
Discharge: HOME | End: 2019-02-07
Attending: INTERNAL MEDICINE | Admitting: INTERNAL MEDICINE
Payer: COMMERCIAL

## 2019-01-01 ENCOUNTER — CARE COORDINATION (OUTPATIENT)
Dept: ONCOLOGY | Facility: CLINIC | Age: 54
End: 2019-01-01

## 2019-01-01 ENCOUNTER — ANESTHESIA EVENT (OUTPATIENT)
Dept: SURGERY | Facility: CLINIC | Age: 54
End: 2019-01-01
Payer: COMMERCIAL

## 2019-01-01 VITALS
OXYGEN SATURATION: 93 % | DIASTOLIC BLOOD PRESSURE: 45 MMHG | SYSTOLIC BLOOD PRESSURE: 85 MMHG | RESPIRATION RATE: 20 BRPM | HEART RATE: 88 BPM

## 2019-01-01 VITALS
OXYGEN SATURATION: 93 % | RESPIRATION RATE: 18 BRPM | DIASTOLIC BLOOD PRESSURE: 66 MMHG | HEIGHT: 60 IN | SYSTOLIC BLOOD PRESSURE: 99 MMHG | HEART RATE: 94 BPM | BODY MASS INDEX: 21.75 KG/M2 | WEIGHT: 110.8 LBS | TEMPERATURE: 97 F

## 2019-01-01 VITALS
WEIGHT: 126.76 LBS | HEART RATE: 81 BPM | SYSTOLIC BLOOD PRESSURE: 89 MMHG | TEMPERATURE: 97.5 F | BODY MASS INDEX: 24.76 KG/M2 | RESPIRATION RATE: 14 BRPM | DIASTOLIC BLOOD PRESSURE: 50 MMHG | OXYGEN SATURATION: 99 %

## 2019-01-01 VITALS
HEART RATE: 96 BPM | BODY MASS INDEX: 21.48 KG/M2 | WEIGHT: 110 LBS | SYSTOLIC BLOOD PRESSURE: 115 MMHG | DIASTOLIC BLOOD PRESSURE: 80 MMHG

## 2019-01-01 VITALS
WEIGHT: 108.7 LBS | DIASTOLIC BLOOD PRESSURE: 74 MMHG | RESPIRATION RATE: 16 BRPM | HEART RATE: 86 BPM | SYSTOLIC BLOOD PRESSURE: 121 MMHG | OXYGEN SATURATION: 94 % | TEMPERATURE: 96.9 F | BODY MASS INDEX: 21.34 KG/M2 | HEIGHT: 60 IN

## 2019-01-01 VITALS
RESPIRATION RATE: 18 BRPM | HEART RATE: 85 BPM | SYSTOLIC BLOOD PRESSURE: 134 MMHG | DIASTOLIC BLOOD PRESSURE: 68 MMHG | OXYGEN SATURATION: 96 %

## 2019-01-01 DIAGNOSIS — R53.83 OTHER FATIGUE: ICD-10-CM

## 2019-01-01 DIAGNOSIS — C64.9 METASTATIC RENAL CELL CARCINOMA, UNSPECIFIED LATERALITY (H): Primary | ICD-10-CM

## 2019-01-01 DIAGNOSIS — J90 PLEURAL EFFUSION: Primary | ICD-10-CM

## 2019-01-01 DIAGNOSIS — C64.1 MALIGNANT NEOPLASM OF RIGHT KIDNEY (H): ICD-10-CM

## 2019-01-01 DIAGNOSIS — R11.0 NAUSEA: ICD-10-CM

## 2019-01-01 DIAGNOSIS — J90 RECURRENT LEFT PLEURAL EFFUSION: ICD-10-CM

## 2019-01-01 DIAGNOSIS — C78.01 METASTATIC RENAL CELL CARCINOMA TO LUNG, RIGHT (H): ICD-10-CM

## 2019-01-01 DIAGNOSIS — C79.31 BRAIN METASTASIS: Primary | ICD-10-CM

## 2019-01-01 DIAGNOSIS — C64.9 RENAL CELL CARCINOMA, UNSPECIFIED LATERALITY (H): Primary | ICD-10-CM

## 2019-01-01 DIAGNOSIS — C64.1 RENAL CELL CARCINOMA, RIGHT (H): ICD-10-CM

## 2019-01-01 DIAGNOSIS — M25.552 HIP PAIN, LEFT: Primary | ICD-10-CM

## 2019-01-01 DIAGNOSIS — C79.31 BRAIN METASTASIS: ICD-10-CM

## 2019-01-01 DIAGNOSIS — C64.9 METASTATIC RENAL CELL CARCINOMA TO LUNG, RIGHT (H): ICD-10-CM

## 2019-01-01 DIAGNOSIS — G47.9 SLEEP DISORDER: ICD-10-CM

## 2019-01-01 DIAGNOSIS — J90 RECURRENT LEFT PLEURAL EFFUSION: Primary | ICD-10-CM

## 2019-01-01 DIAGNOSIS — R53.83 OTHER FATIGUE: Primary | ICD-10-CM

## 2019-01-01 DIAGNOSIS — R09.02 HYPOXIA: Primary | ICD-10-CM

## 2019-01-01 LAB
ABO + RH BLD: NORMAL
ACID FAST STN SPEC QL: NORMAL
ADENOSINE DEAMINASE PLR-CCNC: <1.6 U/L (ref 0–9.4)
ALBUMIN FLD-MCNC: 1.8 G/DL
ALBUMIN SERPL-MCNC: 1.7 G/DL (ref 3.4–5)
ALBUMIN SERPL-MCNC: 1.8 G/DL (ref 3.4–5)
ALBUMIN SERPL-MCNC: 2.6 G/DL (ref 3.4–5)
ALBUMIN SERPL-MCNC: 2.9 G/DL (ref 3.4–5)
ALBUMIN UR-MCNC: 30 MG/DL
ALBUMIN UR-MCNC: NEGATIVE MG/DL
ALP SERPL-CCNC: 134 U/L (ref 40–150)
ALP SERPL-CCNC: 145 U/L (ref 40–150)
ALP SERPL-CCNC: 147 U/L (ref 40–150)
ALP SERPL-CCNC: 162 U/L (ref 40–150)
ALT SERPL W P-5'-P-CCNC: 21 U/L (ref 0–50)
ALT SERPL W P-5'-P-CCNC: 27 U/L (ref 0–50)
ALT SERPL W P-5'-P-CCNC: 30 U/L (ref 0–50)
ALT SERPL W P-5'-P-CCNC: 32 U/L (ref 0–50)
AMYLASE FLD-CCNC: 56 U/L
ANION GAP SERPL CALCULATED.3IONS-SCNC: 10 MMOL/L (ref 3–14)
ANION GAP SERPL CALCULATED.3IONS-SCNC: 10 MMOL/L (ref 3–14)
ANION GAP SERPL CALCULATED.3IONS-SCNC: 5 MMOL/L (ref 3–14)
ANION GAP SERPL CALCULATED.3IONS-SCNC: 5 MMOL/L (ref 3–14)
ANION GAP SERPL CALCULATED.3IONS-SCNC: 7 MMOL/L (ref 3–14)
ANION GAP SERPL CALCULATED.3IONS-SCNC: 8 MMOL/L (ref 3–14)
ANION GAP SERPL CALCULATED.3IONS-SCNC: 9 MMOL/L (ref 3–14)
ANISOCYTOSIS BLD QL SMEAR: SLIGHT
ANISOCYTOSIS BLD QL SMEAR: SLIGHT
APPEARANCE FLD: CLEAR
APPEARANCE UR: ABNORMAL
APPEARANCE UR: CLEAR
AST SERPL W P-5'-P-CCNC: 30 U/L (ref 0–45)
AST SERPL W P-5'-P-CCNC: 38 U/L (ref 0–45)
AST SERPL W P-5'-P-CCNC: 46 U/L (ref 0–45)
AST SERPL W P-5'-P-CCNC: 53 U/L (ref 0–45)
BACTERIA SPEC CULT: NO GROWTH
BACTERIA SPEC CULT: NORMAL
BASE DEFICIT BLDA-SCNC: 11 MMOL/L
BASE DEFICIT BLDA-SCNC: 11.4 MMOL/L
BASE DEFICIT BLDA-SCNC: 4.7 MMOL/L
BASE DEFICIT BLDA-SCNC: 9.6 MMOL/L
BASOPHILS # BLD AUTO: 0 10E9/L (ref 0–0.2)
BASOPHILS NFR BLD AUTO: 0 %
BASOPHILS NFR BLD AUTO: 0 %
BASOPHILS NFR BLD AUTO: 0.1 %
BASOPHILS NFR BLD AUTO: 0.2 %
BASOPHILS NFR BLD AUTO: 0.3 %
BASOPHILS NFR BLD AUTO: 0.5 %
BASOPHILS NFR BLD AUTO: 0.6 %
BASOPHILS NFR BLD AUTO: 0.6 %
BASOPHILS NFR BLD AUTO: 0.8 %
BILIRUB SERPL-MCNC: 0.2 MG/DL (ref 0.2–1.3)
BILIRUB UR QL STRIP: NEGATIVE
BILIRUB UR QL STRIP: NEGATIVE
BLD GP AB SCN SERPL QL: NORMAL
BLD PROD TYP BPU: NORMAL
BLD UNIT ID BPU: 0
BLD UNIT ID BPU: 0
BLOOD BANK CMNT PATIENT-IMP: NORMAL
BLOOD PRODUCT CODE: NORMAL
BLOOD PRODUCT CODE: NORMAL
BPU ID: NORMAL
BPU ID: NORMAL
BUN SERPL-MCNC: 12 MG/DL (ref 7–30)
BUN SERPL-MCNC: 13 MG/DL (ref 7–30)
BUN SERPL-MCNC: 14 MG/DL (ref 7–30)
BUN SERPL-MCNC: 14 MG/DL (ref 7–30)
BUN SERPL-MCNC: 15 MG/DL (ref 7–30)
BUN SERPL-MCNC: 18 MG/DL (ref 7–30)
BUN SERPL-MCNC: 5 MG/DL (ref 7–30)
BUN SERPL-MCNC: 6 MG/DL (ref 7–30)
BUN SERPL-MCNC: 7 MG/DL (ref 7–30)
BUN SERPL-MCNC: 7 MG/DL (ref 7–30)
BUN SERPL-MCNC: 8 MG/DL (ref 7–30)
BUN SERPL-MCNC: 8 MG/DL (ref 7–30)
BUN SERPL-MCNC: 9 MG/DL (ref 7–30)
CA-I BLD-MCNC: 4 MG/DL (ref 4.4–5.2)
CALCIUM SERPL-MCNC: 6.4 MG/DL (ref 8.5–10.1)
CALCIUM SERPL-MCNC: 6.5 MG/DL (ref 8.5–10.1)
CALCIUM SERPL-MCNC: 6.6 MG/DL (ref 8.5–10.1)
CALCIUM SERPL-MCNC: 6.7 MG/DL (ref 8.5–10.1)
CALCIUM SERPL-MCNC: 6.7 MG/DL (ref 8.5–10.1)
CALCIUM SERPL-MCNC: 6.8 MG/DL (ref 8.5–10.1)
CALCIUM SERPL-MCNC: 6.9 MG/DL (ref 8.5–10.1)
CALCIUM SERPL-MCNC: 6.9 MG/DL (ref 8.5–10.1)
CALCIUM SERPL-MCNC: 7 MG/DL (ref 8.5–10.1)
CALCIUM SERPL-MCNC: 7.1 MG/DL (ref 8.5–10.1)
CALCIUM SERPL-MCNC: 7.4 MG/DL (ref 8.5–10.1)
CALCIUM SERPL-MCNC: 8 MG/DL (ref 8.5–10.1)
CALCIUM SERPL-MCNC: 8.8 MG/DL (ref 8.5–10.1)
CHLORIDE SERPL-SCNC: 101 MMOL/L (ref 94–109)
CHLORIDE SERPL-SCNC: 103 MMOL/L (ref 94–109)
CHLORIDE SERPL-SCNC: 103 MMOL/L (ref 94–109)
CHLORIDE SERPL-SCNC: 104 MMOL/L (ref 94–109)
CHLORIDE SERPL-SCNC: 105 MMOL/L (ref 94–109)
CHLORIDE SERPL-SCNC: 106 MMOL/L (ref 94–109)
CHLORIDE SERPL-SCNC: 106 MMOL/L (ref 94–109)
CHLORIDE SERPL-SCNC: 108 MMOL/L (ref 94–109)
CHLORIDE SERPL-SCNC: 111 MMOL/L (ref 94–109)
CHLORIDE SERPL-SCNC: 111 MMOL/L (ref 94–109)
CHLORIDE SERPL-SCNC: 112 MMOL/L (ref 94–109)
CHOLEST FLD-MCNC: <50 MG/DL
CHYLO FLD QL: NORMAL
CO2 SERPL-SCNC: 15 MMOL/L (ref 20–32)
CO2 SERPL-SCNC: 17 MMOL/L (ref 20–32)
CO2 SERPL-SCNC: 17 MMOL/L (ref 20–32)
CO2 SERPL-SCNC: 18 MMOL/L (ref 20–32)
CO2 SERPL-SCNC: 19 MMOL/L (ref 20–32)
CO2 SERPL-SCNC: 22 MMOL/L (ref 20–32)
CO2 SERPL-SCNC: 22 MMOL/L (ref 20–32)
CO2 SERPL-SCNC: 23 MMOL/L (ref 20–32)
CO2 SERPL-SCNC: 24 MMOL/L (ref 20–32)
CO2 SERPL-SCNC: 24 MMOL/L (ref 20–32)
CO2 SERPL-SCNC: 25 MMOL/L (ref 20–32)
CO2 SERPL-SCNC: 26 MMOL/L (ref 20–32)
CO2 SERPL-SCNC: 28 MMOL/L (ref 20–32)
COLOR FLD: NORMAL
COLOR UR AUTO: ABNORMAL
COLOR UR AUTO: YELLOW
COPATH REPORT: NORMAL
CORTIS SERPL-MCNC: 13.4 UG/DL (ref 4–22)
CREAT SERPL-MCNC: 0.58 MG/DL (ref 0.52–1.04)
CREAT SERPL-MCNC: 0.59 MG/DL (ref 0.52–1.04)
CREAT SERPL-MCNC: 0.62 MG/DL (ref 0.52–1.04)
CREAT SERPL-MCNC: 0.64 MG/DL (ref 0.52–1.04)
CREAT SERPL-MCNC: 0.64 MG/DL (ref 0.52–1.04)
CREAT SERPL-MCNC: 0.65 MG/DL (ref 0.52–1.04)
CREAT SERPL-MCNC: 0.66 MG/DL (ref 0.52–1.04)
CREAT SERPL-MCNC: 0.73 MG/DL (ref 0.52–1.04)
CREAT SERPL-MCNC: 0.74 MG/DL (ref 0.52–1.04)
CREAT SERPL-MCNC: 0.76 MG/DL (ref 0.52–1.04)
CREAT SERPL-MCNC: 0.77 MG/DL (ref 0.52–1.04)
CREAT SERPL-MCNC: 0.8 MG/DL (ref 0.52–1.04)
CREAT SERPL-MCNC: 0.81 MG/DL (ref 0.52–1.04)
DIFFERENTIAL METHOD BLD: ABNORMAL
EOSINOPHIL # BLD AUTO: 0 10E9/L (ref 0–0.7)
EOSINOPHIL # BLD AUTO: 0.1 10E9/L (ref 0–0.7)
EOSINOPHIL # BLD AUTO: 0.2 10E9/L (ref 0–0.7)
EOSINOPHIL # BLD AUTO: 0.3 10E9/L (ref 0–0.7)
EOSINOPHIL NFR BLD AUTO: 0 %
EOSINOPHIL NFR BLD AUTO: 0.2 %
EOSINOPHIL NFR BLD AUTO: 1.3 %
EOSINOPHIL NFR BLD AUTO: 5.2 %
EOSINOPHIL NFR BLD AUTO: 5.7 %
EOSINOPHIL NFR BLD AUTO: 6.6 %
EOSINOPHIL NFR BLD AUTO: 8.8 %
ERYTHROCYTE [DISTWIDTH] IN BLOOD BY AUTOMATED COUNT: 18 % (ref 10–15)
ERYTHROCYTE [DISTWIDTH] IN BLOOD BY AUTOMATED COUNT: 18.6 % (ref 10–15)
ERYTHROCYTE [DISTWIDTH] IN BLOOD BY AUTOMATED COUNT: 19 % (ref 10–15)
ERYTHROCYTE [DISTWIDTH] IN BLOOD BY AUTOMATED COUNT: 19.1 % (ref 10–15)
ERYTHROCYTE [DISTWIDTH] IN BLOOD BY AUTOMATED COUNT: 19.2 % (ref 10–15)
ERYTHROCYTE [DISTWIDTH] IN BLOOD BY AUTOMATED COUNT: 19.4 % (ref 10–15)
ERYTHROCYTE [DISTWIDTH] IN BLOOD BY AUTOMATED COUNT: 19.5 % (ref 10–15)
ERYTHROCYTE [DISTWIDTH] IN BLOOD BY AUTOMATED COUNT: 19.8 % (ref 10–15)
ERYTHROCYTE [DISTWIDTH] IN BLOOD BY AUTOMATED COUNT: 19.9 % (ref 10–15)
FUNGUS SPEC CULT: NORMAL
GFR SERPL CREATININE-BSD FRML MDRD: 82 ML/MIN/{1.73_M2}
GFR SERPL CREATININE-BSD FRML MDRD: 83 ML/MIN/{1.73_M2}
GFR SERPL CREATININE-BSD FRML MDRD: 87 ML/MIN/{1.73_M2}
GFR SERPL CREATININE-BSD FRML MDRD: 88 ML/MIN/{1.73_M2}
GFR SERPL CREATININE-BSD FRML MDRD: >90 ML/MIN/{1.73_M2}
GLUCOSE BLD-MCNC: 158 MG/DL (ref 70–99)
GLUCOSE BLDC GLUCOMTR-MCNC: 101 MG/DL (ref 70–99)
GLUCOSE BLDC GLUCOMTR-MCNC: 103 MG/DL (ref 70–99)
GLUCOSE BLDC GLUCOMTR-MCNC: 112 MG/DL (ref 70–99)
GLUCOSE BLDC GLUCOMTR-MCNC: 116 MG/DL (ref 70–99)
GLUCOSE BLDC GLUCOMTR-MCNC: 117 MG/DL (ref 70–99)
GLUCOSE BLDC GLUCOMTR-MCNC: 120 MG/DL (ref 70–99)
GLUCOSE BLDC GLUCOMTR-MCNC: 122 MG/DL (ref 70–99)
GLUCOSE BLDC GLUCOMTR-MCNC: 129 MG/DL (ref 70–99)
GLUCOSE BLDC GLUCOMTR-MCNC: 131 MG/DL (ref 70–99)
GLUCOSE BLDC GLUCOMTR-MCNC: 181 MG/DL (ref 70–99)
GLUCOSE BLDC GLUCOMTR-MCNC: 189 MG/DL (ref 70–99)
GLUCOSE BLDC GLUCOMTR-MCNC: 84 MG/DL (ref 70–99)
GLUCOSE BLDC GLUCOMTR-MCNC: 99 MG/DL (ref 70–99)
GLUCOSE FLD-MCNC: 115 MG/DL
GLUCOSE SERPL-MCNC: 108 MG/DL (ref 70–99)
GLUCOSE SERPL-MCNC: 109 MG/DL (ref 70–99)
GLUCOSE SERPL-MCNC: 116 MG/DL (ref 70–99)
GLUCOSE SERPL-MCNC: 119 MG/DL (ref 70–99)
GLUCOSE SERPL-MCNC: 133 MG/DL (ref 70–99)
GLUCOSE SERPL-MCNC: 142 MG/DL (ref 70–99)
GLUCOSE SERPL-MCNC: 150 MG/DL (ref 70–99)
GLUCOSE SERPL-MCNC: 150 MG/DL (ref 70–99)
GLUCOSE SERPL-MCNC: 151 MG/DL (ref 70–99)
GLUCOSE SERPL-MCNC: 171 MG/DL (ref 70–99)
GLUCOSE SERPL-MCNC: 194 MG/DL (ref 70–99)
GLUCOSE SERPL-MCNC: 73 MG/DL (ref 70–99)
GLUCOSE SERPL-MCNC: 80 MG/DL (ref 70–99)
GLUCOSE UR STRIP-MCNC: NEGATIVE MG/DL
GLUCOSE UR STRIP-MCNC: NEGATIVE MG/DL
GRAM STN SPEC: NORMAL
GRAN CASTS #/AREA URNS LPF: 4 /LPF
HBA1C MFR BLD: 5.7 % (ref 0–5.6)
HCO3 BLD-SCNC: 16 MMOL/L (ref 21–28)
HCO3 BLD-SCNC: 16 MMOL/L (ref 21–28)
HCO3 BLD-SCNC: 17 MMOL/L (ref 21–28)
HCO3 BLD-SCNC: 20 MMOL/L (ref 21–28)
HCT VFR BLD AUTO: 27.7 % (ref 35–47)
HCT VFR BLD AUTO: 29.2 % (ref 35–47)
HCT VFR BLD AUTO: 29.3 % (ref 35–47)
HCT VFR BLD AUTO: 30.3 % (ref 35–47)
HCT VFR BLD AUTO: 31 % (ref 35–47)
HCT VFR BLD AUTO: 31.1 % (ref 35–47)
HCT VFR BLD AUTO: 31.3 % (ref 35–47)
HCT VFR BLD AUTO: 31.4 % (ref 35–47)
HCT VFR BLD AUTO: 33.2 % (ref 35–47)
HCT VFR BLD AUTO: 33.9 % (ref 35–47)
HCT VFR BLD AUTO: 34.5 % (ref 35–47)
HCT VFR BLD AUTO: 36.3 % (ref 35–47)
HCT VFR BLD AUTO: 36.4 % (ref 35–47)
HGB BLD-MCNC: 10.1 G/DL (ref 11.7–15.7)
HGB BLD-MCNC: 10.3 G/DL (ref 11.7–15.7)
HGB BLD-MCNC: 10.3 G/DL (ref 11.7–15.7)
HGB BLD-MCNC: 10.7 G/DL (ref 11.7–15.7)
HGB BLD-MCNC: 10.7 G/DL (ref 11.7–15.7)
HGB BLD-MCNC: 11 G/DL (ref 11.7–15.7)
HGB BLD-MCNC: 11.4 G/DL (ref 11.7–15.7)
HGB BLD-MCNC: 11.8 G/DL (ref 11.7–15.7)
HGB BLD-MCNC: 12 G/DL (ref 11.7–15.7)
HGB BLD-MCNC: 8.9 G/DL (ref 11.7–15.7)
HGB BLD-MCNC: 9.5 G/DL (ref 11.7–15.7)
HGB BLD-MCNC: 9.7 G/DL (ref 11.7–15.7)
HGB BLD-MCNC: 9.8 G/DL (ref 11.7–15.7)
HGB BLD-MCNC: 9.9 G/DL (ref 11.7–15.7)
HGB UR QL STRIP: ABNORMAL
HGB UR QL STRIP: NEGATIVE
HYALINE CASTS #/AREA URNS LPF: 3 /LPF (ref 0–2)
IMM GRANULOCYTES # BLD: 0 10E9/L (ref 0–0.4)
IMM GRANULOCYTES # BLD: 0.1 10E9/L (ref 0–0.4)
IMM GRANULOCYTES NFR BLD: 0.3 %
IMM GRANULOCYTES NFR BLD: 0.3 %
IMM GRANULOCYTES NFR BLD: 0.4 %
IMM GRANULOCYTES NFR BLD: 0.6 %
IMM GRANULOCYTES NFR BLD: 0.7 %
IMM GRANULOCYTES NFR BLD: 0.9 %
IMM GRANULOCYTES NFR BLD: 1 %
IMM GRANULOCYTES NFR BLD: 1.1 %
IMM GRANULOCYTES NFR BLD: 1.5 %
INR PPP: 1.17 (ref 0.86–1.14)
INR PPP: 1.31 (ref 0.86–1.14)
INTERPRETATION ECG - MUSE: NORMAL
INTERPRETATION ECG - MUSE: NORMAL
KETONES UR STRIP-MCNC: 10 MG/DL
KETONES UR STRIP-MCNC: NEGATIVE MG/DL
LACTATE BLD-SCNC: 0.8 MMOL/L (ref 0.7–2)
LACTATE BLD-SCNC: 0.8 MMOL/L (ref 0.7–2)
LACTATE BLD-SCNC: 1 MMOL/L (ref 0.7–2)
LACTATE BLD-SCNC: 1.1 MMOL/L (ref 0.7–2)
LACTATE BLD-SCNC: 1.1 MMOL/L (ref 0.7–2)
LACTATE BLD-SCNC: 1.3 MMOL/L (ref 0.7–2)
LACTATE BLD-SCNC: 1.3 MMOL/L (ref 0.7–2)
LACTATE BLD-SCNC: 1.4 MMOL/L (ref 0.7–2)
LACTATE BLD-SCNC: 2.4 MMOL/L (ref 0.7–2)
LDH FLD L TO P-CCNC: 215 U/L
LDH SERPL L TO P-CCNC: 200 U/L (ref 81–234)
LDH SERPL L TO P-CCNC: 240 U/L (ref 81–234)
LEUKOCYTE ESTERASE UR QL STRIP: NEGATIVE
LEUKOCYTE ESTERASE UR QL STRIP: NEGATIVE
LIPASE FLD-CCNC: 172 U/L
LYMPHOCYTES # BLD AUTO: 0.1 10E9/L (ref 0.8–5.3)
LYMPHOCYTES # BLD AUTO: 0.3 10E9/L (ref 0.8–5.3)
LYMPHOCYTES # BLD AUTO: 0.4 10E9/L (ref 0.8–5.3)
LYMPHOCYTES # BLD AUTO: 0.4 10E9/L (ref 0.8–5.3)
LYMPHOCYTES # BLD AUTO: 0.5 10E9/L (ref 0.8–5.3)
LYMPHOCYTES # BLD AUTO: 0.5 10E9/L (ref 0.8–5.3)
LYMPHOCYTES # BLD AUTO: 0.6 10E9/L (ref 0.8–5.3)
LYMPHOCYTES # BLD AUTO: 0.6 10E9/L (ref 0.8–5.3)
LYMPHOCYTES # BLD AUTO: 0.7 10E9/L (ref 0.8–5.3)
LYMPHOCYTES NFR BLD AUTO: 0.9 %
LYMPHOCYTES NFR BLD AUTO: 10.5 %
LYMPHOCYTES NFR BLD AUTO: 15 %
LYMPHOCYTES NFR BLD AUTO: 15.3 %
LYMPHOCYTES NFR BLD AUTO: 19.8 %
LYMPHOCYTES NFR BLD AUTO: 19.8 %
LYMPHOCYTES NFR BLD AUTO: 5.5 %
LYMPHOCYTES NFR BLD AUTO: 5.8 %
LYMPHOCYTES NFR BLD AUTO: 6.9 %
LYMPHOCYTES NFR BLD AUTO: 7.4 %
LYMPHOCYTES NFR BLD AUTO: 9.2 %
LYMPHOCYTES NFR FLD MANUAL: 19 %
Lab: NORMAL
Lab: NORMAL
MACROCYTES BLD QL SMEAR: PRESENT
MACROCYTES BLD QL SMEAR: PRESENT
MAGNESIUM SERPL-MCNC: 1.3 MG/DL (ref 1.6–2.3)
MAGNESIUM SERPL-MCNC: 2.1 MG/DL (ref 1.6–2.3)
MAGNESIUM SERPL-MCNC: 2.5 MG/DL (ref 1.6–2.3)
MAGNESIUM SERPL-MCNC: 2.8 MG/DL (ref 1.6–2.3)
MAGNESIUM SERPL-MCNC: 3.4 MG/DL (ref 1.6–2.3)
MCH RBC QN AUTO: 32.4 PG (ref 26.5–33)
MCH RBC QN AUTO: 32.5 PG (ref 26.5–33)
MCH RBC QN AUTO: 32.5 PG (ref 26.5–33)
MCH RBC QN AUTO: 32.6 PG (ref 26.5–33)
MCH RBC QN AUTO: 32.6 PG (ref 26.5–33)
MCH RBC QN AUTO: 32.7 PG (ref 26.5–33)
MCH RBC QN AUTO: 32.9 PG (ref 26.5–33)
MCH RBC QN AUTO: 32.9 PG (ref 26.5–33)
MCH RBC QN AUTO: 33 PG (ref 26.5–33)
MCH RBC QN AUTO: 33 PG (ref 26.5–33)
MCH RBC QN AUTO: 33.1 PG (ref 26.5–33)
MCH RBC QN AUTO: 33.3 PG (ref 26.5–33)
MCH RBC QN AUTO: 33.3 PG (ref 26.5–33)
MCHC RBC AUTO-ENTMCNC: 31.6 G/DL (ref 31.5–36.5)
MCHC RBC AUTO-ENTMCNC: 31.6 G/DL (ref 31.5–36.5)
MCHC RBC AUTO-ENTMCNC: 32.1 G/DL (ref 31.5–36.5)
MCHC RBC AUTO-ENTMCNC: 32.2 G/DL (ref 31.5–36.5)
MCHC RBC AUTO-ENTMCNC: 32.2 G/DL (ref 31.5–36.5)
MCHC RBC AUTO-ENTMCNC: 32.4 G/DL (ref 31.5–36.5)
MCHC RBC AUTO-ENTMCNC: 32.5 G/DL (ref 31.5–36.5)
MCHC RBC AUTO-ENTMCNC: 32.7 G/DL (ref 31.5–36.5)
MCHC RBC AUTO-ENTMCNC: 32.9 G/DL (ref 31.5–36.5)
MCHC RBC AUTO-ENTMCNC: 33 G/DL (ref 31.5–36.5)
MCHC RBC AUTO-ENTMCNC: 33 G/DL (ref 31.5–36.5)
MCHC RBC AUTO-ENTMCNC: 33.1 G/DL (ref 31.5–36.5)
MCHC RBC AUTO-ENTMCNC: 33.2 G/DL (ref 31.5–36.5)
MCV RBC AUTO: 100 FL (ref 78–100)
MCV RBC AUTO: 100 FL (ref 78–100)
MCV RBC AUTO: 101 FL (ref 78–100)
MCV RBC AUTO: 101 FL (ref 78–100)
MCV RBC AUTO: 102 FL (ref 78–100)
MCV RBC AUTO: 103 FL (ref 78–100)
MCV RBC AUTO: 104 FL (ref 78–100)
MCV RBC AUTO: 105 FL (ref 78–100)
MCV RBC AUTO: 98 FL (ref 78–100)
MCV RBC AUTO: 99 FL (ref 78–100)
MCV RBC AUTO: 99 FL (ref 78–100)
MONOCYTES # BLD AUTO: 0.2 10E9/L (ref 0–1.3)
MONOCYTES # BLD AUTO: 0.3 10E9/L (ref 0–1.3)
MONOCYTES # BLD AUTO: 0.4 10E9/L (ref 0–1.3)
MONOCYTES # BLD AUTO: 0.5 10E9/L (ref 0–1.3)
MONOCYTES # BLD AUTO: 0.5 10E9/L (ref 0–1.3)
MONOCYTES # BLD AUTO: 0.6 10E9/L (ref 0–1.3)
MONOCYTES NFR BLD AUTO: 10.7 %
MONOCYTES NFR BLD AUTO: 11.4 %
MONOCYTES NFR BLD AUTO: 13 %
MONOCYTES NFR BLD AUTO: 3.8 %
MONOCYTES NFR BLD AUTO: 4.6 %
MONOCYTES NFR BLD AUTO: 5.8 %
MONOCYTES NFR BLD AUTO: 6.2 %
MONOCYTES NFR BLD AUTO: 6.9 %
MONOCYTES NFR BLD AUTO: 7.5 %
MONOCYTES NFR BLD AUTO: 8.4 %
MONOCYTES NFR BLD AUTO: 9.3 %
MUCOUS THREADS #/AREA URNS LPF: PRESENT /LPF
NEUTROPHILS # BLD AUTO: 2.1 10E9/L (ref 1.6–8.3)
NEUTROPHILS # BLD AUTO: 2.2 10E9/L (ref 1.6–8.3)
NEUTROPHILS # BLD AUTO: 2.2 10E9/L (ref 1.6–8.3)
NEUTROPHILS # BLD AUTO: 2.6 10E9/L (ref 1.6–8.3)
NEUTROPHILS # BLD AUTO: 4.7 10E9/L (ref 1.6–8.3)
NEUTROPHILS # BLD AUTO: 4.9 10E9/L (ref 1.6–8.3)
NEUTROPHILS # BLD AUTO: 6.2 10E9/L (ref 1.6–8.3)
NEUTROPHILS # BLD AUTO: 6.4 10E9/L (ref 1.6–8.3)
NEUTROPHILS # BLD AUTO: 7.8 10E9/L (ref 1.6–8.3)
NEUTROPHILS NFR BLD AUTO: 59.3 %
NEUTROPHILS NFR BLD AUTO: 63.4 %
NEUTROPHILS NFR BLD AUTO: 65.3 %
NEUTROPHILS NFR BLD AUTO: 66.1 %
NEUTROPHILS NFR BLD AUTO: 79.2 %
NEUTROPHILS NFR BLD AUTO: 83.3 %
NEUTROPHILS NFR BLD AUTO: 84.3 %
NEUTROPHILS NFR BLD AUTO: 86.2 %
NEUTROPHILS NFR BLD AUTO: 87.2 %
NEUTROPHILS NFR BLD AUTO: 89.4 %
NEUTROPHILS NFR BLD AUTO: 94.5 %
NEUTS BAND NFR FLD MANUAL: 3 %
NITRATE UR QL: NEGATIVE
NITRATE UR QL: NEGATIVE
NRBC # BLD AUTO: 0 10*3/UL
NRBC BLD AUTO-RTO: 0 /100
NUM BPU REQUESTED: 2
O2/TOTAL GAS SETTING VFR VENT: 100 %
O2/TOTAL GAS SETTING VFR VENT: 100 %
O2/TOTAL GAS SETTING VFR VENT: 70 %
O2/TOTAL GAS SETTING VFR VENT: ABNORMAL %
OTHER CELLS FLD MANUAL: 78 %
OXYHGB MFR BLD: 94 % (ref 92–100)
PCO2 BLD: 33 MM HG (ref 35–45)
PCO2 BLD: 37 MM HG (ref 35–45)
PCO2 BLD: 39 MM HG (ref 35–45)
PCO2 BLD: 47 MM HG (ref 35–45)
PH BLD: 7.17 PH (ref 7.35–7.45)
PH BLD: 7.21 PH (ref 7.35–7.45)
PH BLD: 7.29 PH (ref 7.35–7.45)
PH BLD: 7.35 PH (ref 7.35–7.45)
PH UR STRIP: 5 PH (ref 5–7)
PH UR STRIP: 5.5 PH (ref 5–7)
PHOSPHATE SERPL-MCNC: 1.6 MG/DL (ref 2.5–4.5)
PHOSPHATE SERPL-MCNC: 1.9 MG/DL (ref 2.5–4.5)
PHOSPHATE SERPL-MCNC: 1.9 MG/DL (ref 2.5–4.5)
PHOSPHATE SERPL-MCNC: 2.2 MG/DL (ref 2.5–4.5)
PHOSPHATE SERPL-MCNC: 2.5 MG/DL (ref 2.5–4.5)
PHOSPHATE SERPL-MCNC: 4.1 MG/DL (ref 2.5–4.5)
PLATELET # BLD AUTO: 256 10E9/L (ref 150–450)
PLATELET # BLD AUTO: 270 10E9/L (ref 150–450)
PLATELET # BLD AUTO: 279 10E9/L (ref 150–450)
PLATELET # BLD AUTO: 288 10E9/L (ref 150–450)
PLATELET # BLD AUTO: 299 10E9/L (ref 150–450)
PLATELET # BLD AUTO: 301 10E9/L (ref 150–450)
PLATELET # BLD AUTO: 314 10E9/L (ref 150–450)
PLATELET # BLD AUTO: 325 10E9/L (ref 150–450)
PLATELET # BLD AUTO: 331 10E9/L (ref 150–450)
PLATELET # BLD AUTO: 335 10E9/L (ref 150–450)
PLATELET # BLD AUTO: 340 10E9/L (ref 150–450)
PLATELET # BLD AUTO: 357 10E9/L (ref 150–450)
PLATELET # BLD AUTO: 468 10E9/L (ref 150–450)
PLATELET # BLD AUTO: 482 10E9/L (ref 150–450)
PLATELET # BLD EST: ABNORMAL 10*3/UL
PO2 BLD: 121 MM HG (ref 80–105)
PO2 BLD: 172 MM HG (ref 80–105)
PO2 BLD: 75 MM HG (ref 80–105)
PO2 BLD: 76 MM HG (ref 80–105)
POTASSIUM BLD-SCNC: 3.9 MMOL/L (ref 3.4–5.3)
POTASSIUM SERPL-SCNC: 3.7 MMOL/L (ref 3.4–5.3)
POTASSIUM SERPL-SCNC: 3.7 MMOL/L (ref 3.4–5.3)
POTASSIUM SERPL-SCNC: 3.8 MMOL/L (ref 3.4–5.3)
POTASSIUM SERPL-SCNC: 4 MMOL/L (ref 3.4–5.3)
POTASSIUM SERPL-SCNC: 4.1 MMOL/L (ref 3.4–5.3)
POTASSIUM SERPL-SCNC: 4.2 MMOL/L (ref 3.4–5.3)
POTASSIUM SERPL-SCNC: 4.5 MMOL/L (ref 3.4–5.3)
POTASSIUM SERPL-SCNC: 4.7 MMOL/L (ref 3.4–5.3)
POTASSIUM SERPL-SCNC: 4.8 MMOL/L (ref 3.4–5.3)
PROT FLD-MCNC: 3.2 G/DL
PROT SERPL-MCNC: 4.6 G/DL (ref 6.8–8.8)
PROT SERPL-MCNC: 5.4 G/DL (ref 6.8–8.8)
PROT SERPL-MCNC: 5.5 G/DL (ref 6.8–8.8)
PROT SERPL-MCNC: 5.5 G/DL (ref 6.8–8.8)
PROT SERPL-MCNC: 6.5 G/DL (ref 6.8–8.8)
PROT SERPL-MCNC: 7.2 G/DL (ref 6.8–8.8)
RBC # BLD AUTO: 2.72 10E12/L (ref 3.8–5.2)
RBC # BLD AUTO: 2.91 10E12/L (ref 3.8–5.2)
RBC # BLD AUTO: 2.98 10E12/L (ref 3.8–5.2)
RBC # BLD AUTO: 3.02 10E12/L (ref 3.8–5.2)
RBC # BLD AUTO: 3.03 10E12/L (ref 3.8–5.2)
RBC # BLD AUTO: 3.06 10E12/L (ref 3.8–5.2)
RBC # BLD AUTO: 3.09 10E12/L (ref 3.8–5.2)
RBC # BLD AUTO: 3.12 10E12/L (ref 3.8–5.2)
RBC # BLD AUTO: 3.24 10E12/L (ref 3.8–5.2)
RBC # BLD AUTO: 3.25 10E12/L (ref 3.8–5.2)
RBC # BLD AUTO: 3.46 10E12/L (ref 3.8–5.2)
RBC # BLD AUTO: 3.57 10E12/L (ref 3.8–5.2)
RBC # BLD AUTO: 3.69 10E12/L (ref 3.8–5.2)
RBC #/AREA URNS AUTO: 3 /HPF (ref 0–2)
RBC #/AREA URNS AUTO: <1 /HPF (ref 0–2)
SODIUM BLD-SCNC: 137 MMOL/L (ref 133–144)
SODIUM SERPL-SCNC: 132 MMOL/L (ref 133–144)
SODIUM SERPL-SCNC: 133 MMOL/L (ref 133–144)
SODIUM SERPL-SCNC: 134 MMOL/L (ref 133–144)
SODIUM SERPL-SCNC: 136 MMOL/L (ref 133–144)
SODIUM SERPL-SCNC: 136 MMOL/L (ref 133–144)
SODIUM SERPL-SCNC: 137 MMOL/L (ref 133–144)
SODIUM SERPL-SCNC: 138 MMOL/L (ref 133–144)
SODIUM UR-SCNC: 74 MMOL/L
SOURCE: ABNORMAL
SOURCE: ABNORMAL
SP GR UR STRIP: 1 (ref 1–1.03)
SP GR UR STRIP: 1.03 (ref 1–1.03)
SPECIMEN EXP DATE BLD: NORMAL
SPECIMEN SOURCE FLD: NORMAL
SPECIMEN SOURCE: NORMAL
SQUAMOUS #/AREA URNS AUTO: <1 /HPF (ref 0–1)
T4 FREE SERPL-MCNC: 0.81 NG/DL (ref 0.76–1.46)
TRANSFUSION STATUS PATIENT QL: NORMAL
TRIGL FLD-MCNC: 22 MG/DL
TSH SERPL DL<=0.005 MIU/L-ACNC: 7.15 MU/L (ref 0.4–4)
UROBILINOGEN UR STRIP-MCNC: NORMAL MG/DL (ref 0–2)
UROBILINOGEN UR STRIP-MCNC: NORMAL MG/DL (ref 0–2)
WBC # BLD AUTO: 3.3 10E9/L (ref 4–11)
WBC # BLD AUTO: 3.3 10E9/L (ref 4–11)
WBC # BLD AUTO: 3.6 10E9/L (ref 4–11)
WBC # BLD AUTO: 4 10E9/L (ref 4–11)
WBC # BLD AUTO: 5.5 10E9/L (ref 4–11)
WBC # BLD AUTO: 5.6 10E9/L (ref 4–11)
WBC # BLD AUTO: 5.9 10E9/L (ref 4–11)
WBC # BLD AUTO: 6.2 10E9/L (ref 4–11)
WBC # BLD AUTO: 6.8 10E9/L (ref 4–11)
WBC # BLD AUTO: 7.1 10E9/L (ref 4–11)
WBC # BLD AUTO: 8.5 10E9/L (ref 4–11)
WBC # BLD AUTO: 9.1 10E9/L (ref 4–11)
WBC # BLD AUTO: 9.2 10E9/L (ref 4–11)
WBC # FLD AUTO: 167 /UL
WBC #/AREA URNS AUTO: 4 /HPF (ref 0–5)
WBC #/AREA URNS AUTO: <1 /HPF (ref 0–5)

## 2019-01-01 PROCEDURE — 36415 COLL VENOUS BLD VENIPUNCTURE: CPT | Performed by: PHYSICIAN ASSISTANT

## 2019-01-01 PROCEDURE — 85610 PROTHROMBIN TIME: CPT | Performed by: STUDENT IN AN ORGANIZED HEALTH CARE EDUCATION/TRAINING PROGRAM

## 2019-01-01 PROCEDURE — 99222 1ST HOSP IP/OBS MODERATE 55: CPT | Mod: GC | Performed by: INTERNAL MEDICINE

## 2019-01-01 PROCEDURE — 25800030 ZZH RX IP 258 OP 636: Performed by: NURSE ANESTHETIST, CERTIFIED REGISTERED

## 2019-01-01 PROCEDURE — 85027 COMPLETE CBC AUTOMATED: CPT | Performed by: STUDENT IN AN ORGANIZED HEALTH CARE EDUCATION/TRAINING PROGRAM

## 2019-01-01 PROCEDURE — 88184 FLOWCYTOMETRY/ TC 1 MARKER: CPT | Performed by: INTERNAL MEDICINE

## 2019-01-01 PROCEDURE — 87206 SMEAR FLUORESCENT/ACID STAI: CPT | Performed by: INTERNAL MEDICINE

## 2019-01-01 PROCEDURE — G0500 MOD SEDAT ENDO SERVICE >5YRS: HCPCS | Performed by: INTERNAL MEDICINE

## 2019-01-01 PROCEDURE — 25000132 ZZH RX MED GY IP 250 OP 250 PS 637: Performed by: STUDENT IN AN ORGANIZED HEALTH CARE EDUCATION/TRAINING PROGRAM

## 2019-01-01 PROCEDURE — 88305 TISSUE EXAM BY PATHOLOGIST: CPT | Performed by: STUDENT IN AN ORGANIZED HEALTH CARE EDUCATION/TRAINING PROGRAM

## 2019-01-01 PROCEDURE — 85049 AUTOMATED PLATELET COUNT: CPT | Performed by: SURGERY

## 2019-01-01 PROCEDURE — 12000001 ZZH R&B MED SURG/OB UMMC

## 2019-01-01 PROCEDURE — 83605 ASSAY OF LACTIC ACID: CPT | Performed by: STUDENT IN AN ORGANIZED HEALTH CARE EDUCATION/TRAINING PROGRAM

## 2019-01-01 PROCEDURE — 87205 SMEAR GRAM STAIN: CPT | Performed by: INTERNAL MEDICINE

## 2019-01-01 PROCEDURE — 25000125 ZZHC RX 250: Performed by: STUDENT IN AN ORGANIZED HEALTH CARE EDUCATION/TRAINING PROGRAM

## 2019-01-01 PROCEDURE — 86901 BLOOD TYPING SEROLOGIC RH(D): CPT | Performed by: STUDENT IN AN ORGANIZED HEALTH CARE EDUCATION/TRAINING PROGRAM

## 2019-01-01 PROCEDURE — 25000132 ZZH RX MED GY IP 250 OP 250 PS 637: Performed by: PHYSICIAN ASSISTANT

## 2019-01-01 PROCEDURE — 71045 X-RAY EXAM CHEST 1 VIEW: CPT | Mod: 76

## 2019-01-01 PROCEDURE — 25000128 H RX IP 250 OP 636: Performed by: SURGERY

## 2019-01-01 PROCEDURE — 99231 SBSQ HOSP IP/OBS SF/LOW 25: CPT | Performed by: INTERNAL MEDICINE

## 2019-01-01 PROCEDURE — 71000017 ZZH RECOVERY PHASE 1 LEVEL 3 EA ADDTL HR: Performed by: STUDENT IN AN ORGANIZED HEALTH CARE EDUCATION/TRAINING PROGRAM

## 2019-01-01 PROCEDURE — 99233 SBSQ HOSP IP/OBS HIGH 50: CPT | Performed by: NURSE PRACTITIONER

## 2019-01-01 PROCEDURE — 25000128 H RX IP 250 OP 636: Performed by: STUDENT IN AN ORGANIZED HEALTH CARE EDUCATION/TRAINING PROGRAM

## 2019-01-01 PROCEDURE — 25000125 ZZHC RX 250: Performed by: NURSE ANESTHETIST, CERTIFIED REGISTERED

## 2019-01-01 PROCEDURE — 0W9D40Z DRAINAGE OF PERICARDIAL CAVITY WITH DRAINAGE DEVICE, PERCUTANEOUS ENDOSCOPIC APPROACH: ICD-10-PCS | Performed by: STUDENT IN AN ORGANIZED HEALTH CARE EDUCATION/TRAINING PROGRAM

## 2019-01-01 PROCEDURE — 20000004 ZZH R&B ICU UMMC

## 2019-01-01 PROCEDURE — 99238 HOSP IP/OBS DSCHRG MGMT 30/<: CPT | Mod: GC | Performed by: SURGERY

## 2019-01-01 PROCEDURE — 25000128 H RX IP 250 OP 636: Performed by: RADIOLOGY

## 2019-01-01 PROCEDURE — 32554 ASPIRATE PLEURA W/O IMAGING: CPT | Performed by: INTERNAL MEDICINE

## 2019-01-01 PROCEDURE — 25800030 ZZH RX IP 258 OP 636: Performed by: STUDENT IN AN ORGANIZED HEALTH CARE EDUCATION/TRAINING PROGRAM

## 2019-01-01 PROCEDURE — 25000128 H RX IP 250 OP 636: Performed by: INTERNAL MEDICINE

## 2019-01-01 PROCEDURE — 25800030 ZZH RX IP 258 OP 636

## 2019-01-01 PROCEDURE — 83605 ASSAY OF LACTIC ACID: CPT | Performed by: INTERNAL MEDICINE

## 2019-01-01 PROCEDURE — 71046 X-RAY EXAM CHEST 2 VIEWS: CPT

## 2019-01-01 PROCEDURE — 82810 BLOOD GASES O2 SAT ONLY: CPT | Performed by: STUDENT IN AN ORGANIZED HEALTH CARE EDUCATION/TRAINING PROGRAM

## 2019-01-01 PROCEDURE — 87070 CULTURE OTHR SPECIMN AEROBIC: CPT | Performed by: INTERNAL MEDICINE

## 2019-01-01 PROCEDURE — 86900 BLOOD TYPING SEROLOGIC ABO: CPT | Performed by: SURGERY

## 2019-01-01 PROCEDURE — 74018 RADEX ABDOMEN 1 VIEW: CPT

## 2019-01-01 PROCEDURE — 84155 ASSAY OF PROTEIN SERUM: CPT | Performed by: INTERNAL MEDICINE

## 2019-01-01 PROCEDURE — 82465 ASSAY BLD/SERUM CHOLESTEROL: CPT | Performed by: INTERNAL MEDICINE

## 2019-01-01 PROCEDURE — 80053 COMPREHEN METABOLIC PANEL: CPT | Performed by: PHYSICIAN ASSISTANT

## 2019-01-01 PROCEDURE — 32550 INSERT PLEURAL CATH: CPT | Performed by: INTERNAL MEDICINE

## 2019-01-01 PROCEDURE — 71260 CT THORAX DX C+: CPT

## 2019-01-01 PROCEDURE — 27210794 ZZH OR GENERAL SUPPLY STERILE: Performed by: STUDENT IN AN ORGANIZED HEALTH CARE EDUCATION/TRAINING PROGRAM

## 2019-01-01 PROCEDURE — 80048 BASIC METABOLIC PNL TOTAL CA: CPT | Performed by: PHYSICIAN ASSISTANT

## 2019-01-01 PROCEDURE — 83036 HEMOGLOBIN GLYCOSYLATED A1C: CPT | Performed by: STUDENT IN AN ORGANIZED HEALTH CARE EDUCATION/TRAINING PROGRAM

## 2019-01-01 PROCEDURE — 82803 BLOOD GASES ANY COMBINATION: CPT | Performed by: ANESTHESIOLOGY

## 2019-01-01 PROCEDURE — 86900 BLOOD TYPING SEROLOGIC ABO: CPT | Performed by: STUDENT IN AN ORGANIZED HEALTH CARE EDUCATION/TRAINING PROGRAM

## 2019-01-01 PROCEDURE — 83735 ASSAY OF MAGNESIUM: CPT | Performed by: PHYSICIAN ASSISTANT

## 2019-01-01 PROCEDURE — 85025 COMPLETE CBC W/AUTO DIFF WBC: CPT | Performed by: PHYSICIAN ASSISTANT

## 2019-01-01 PROCEDURE — 25000128 H RX IP 250 OP 636: Performed by: PHYSICIAN ASSISTANT

## 2019-01-01 PROCEDURE — 93005 ELECTROCARDIOGRAM TRACING: CPT

## 2019-01-01 PROCEDURE — 84100 ASSAY OF PHOSPHORUS: CPT | Performed by: PHYSICIAN ASSISTANT

## 2019-01-01 PROCEDURE — 36000064 ZZH SURGERY LEVEL 4 EA 15 ADDTL MIN - UMMC: Performed by: STUDENT IN AN ORGANIZED HEALTH CARE EDUCATION/TRAINING PROGRAM

## 2019-01-01 PROCEDURE — 25000566 ZZH SEVOFLURANE, EA 15 MIN: Performed by: STUDENT IN AN ORGANIZED HEALTH CARE EDUCATION/TRAINING PROGRAM

## 2019-01-01 PROCEDURE — 25800025 ZZH RX 258: Performed by: SURGERY

## 2019-01-01 PROCEDURE — 40000802 ZZH SITE CHECK

## 2019-01-01 PROCEDURE — 99291 CRITICAL CARE FIRST HOUR: CPT | Mod: GC | Performed by: SURGERY

## 2019-01-01 PROCEDURE — 99223 1ST HOSP IP/OBS HIGH 75: CPT | Performed by: INTERNAL MEDICINE

## 2019-01-01 PROCEDURE — 25000132 ZZH RX MED GY IP 250 OP 250 PS 637: Performed by: NURSE PRACTITIONER

## 2019-01-01 PROCEDURE — 25000128 H RX IP 250 OP 636: Performed by: NURSE PRACTITIONER

## 2019-01-01 PROCEDURE — 40000275 ZZH STATISTIC RCP TIME EA 10 MIN

## 2019-01-01 PROCEDURE — 86901 BLOOD TYPING SEROLOGIC RH(D): CPT | Performed by: SURGERY

## 2019-01-01 PROCEDURE — 40000014 ZZH STATISTIC ARTERIAL MONITORING DAILY

## 2019-01-01 PROCEDURE — 88112 CYTOPATH CELL ENHANCE TECH: CPT | Performed by: STUDENT IN AN ORGANIZED HEALTH CARE EDUCATION/TRAINING PROGRAM

## 2019-01-01 PROCEDURE — 86850 RBC ANTIBODY SCREEN: CPT | Performed by: SURGERY

## 2019-01-01 PROCEDURE — 70553 MRI BRAIN STEM W/O & W/DYE: CPT

## 2019-01-01 PROCEDURE — 93308 TTE F-UP OR LMTD: CPT

## 2019-01-01 PROCEDURE — 36000062 ZZH SURGERY LEVEL 4 1ST 30 MIN - UMMC: Performed by: STUDENT IN AN ORGANIZED HEALTH CARE EDUCATION/TRAINING PROGRAM

## 2019-01-01 PROCEDURE — 84100 ASSAY OF PHOSPHORUS: CPT | Performed by: INTERNAL MEDICINE

## 2019-01-01 PROCEDURE — 0W9940Z DRAINAGE OF RIGHT PLEURAL CAVITY WITH DRAINAGE DEVICE, PERCUTANEOUS ENDOSCOPIC APPROACH: ICD-10-PCS | Performed by: STUDENT IN AN ORGANIZED HEALTH CARE EDUCATION/TRAINING PROGRAM

## 2019-01-01 PROCEDURE — 00000155 ZZHCL STATISTIC H-CELL BLOCK W/STAIN: Performed by: STUDENT IN AN ORGANIZED HEALTH CARE EDUCATION/TRAINING PROGRAM

## 2019-01-01 PROCEDURE — 36592 COLLECT BLOOD FROM PICC: CPT | Performed by: INTERNAL MEDICINE

## 2019-01-01 PROCEDURE — 71045 X-RAY EXAM CHEST 1 VIEW: CPT

## 2019-01-01 PROCEDURE — 97530 THERAPEUTIC ACTIVITIES: CPT | Mod: GO | Performed by: OCCUPATIONAL THERAPIST

## 2019-01-01 PROCEDURE — 84157 ASSAY OF PROTEIN OTHER: CPT | Performed by: INTERNAL MEDICINE

## 2019-01-01 PROCEDURE — 25800025 ZZH RX 258: Performed by: INTERNAL MEDICINE

## 2019-01-01 PROCEDURE — 25000132 ZZH RX MED GY IP 250 OP 250 PS 637: Performed by: INTERNAL MEDICINE

## 2019-01-01 PROCEDURE — 81001 URINALYSIS AUTO W/SCOPE: CPT | Performed by: STUDENT IN AN ORGANIZED HEALTH CARE EDUCATION/TRAINING PROGRAM

## 2019-01-01 PROCEDURE — 86850 RBC ANTIBODY SCREEN: CPT | Performed by: STUDENT IN AN ORGANIZED HEALTH CARE EDUCATION/TRAINING PROGRAM

## 2019-01-01 PROCEDURE — 00000102 ZZHCL STATISTIC CYTO WRIGHT STAIN TC: Performed by: STUDENT IN AN ORGANIZED HEALTH CARE EDUCATION/TRAINING PROGRAM

## 2019-01-01 PROCEDURE — 25000131 ZZH RX MED GY IP 250 OP 636 PS 637: Performed by: PHYSICIAN ASSISTANT

## 2019-01-01 PROCEDURE — 99233 SBSQ HOSP IP/OBS HIGH 50: CPT | Performed by: INTERNAL MEDICINE

## 2019-01-01 PROCEDURE — 82803 BLOOD GASES ANY COMBINATION: CPT | Performed by: NURSE PRACTITIONER

## 2019-01-01 PROCEDURE — 99231 SBSQ HOSP IP/OBS SF/LOW 25: CPT | Mod: 24 | Performed by: THORACIC SURGERY (CARDIOTHORACIC VASCULAR SURGERY)

## 2019-01-01 PROCEDURE — 25500064 ZZH RX 255 OP 636: Performed by: PHYSICIAN ASSISTANT

## 2019-01-01 PROCEDURE — 88112 CYTOPATH CELL ENHANCE TECH: CPT | Performed by: INTERNAL MEDICINE

## 2019-01-01 PROCEDURE — 84132 ASSAY OF SERUM POTASSIUM: CPT | Performed by: STUDENT IN AN ORGANIZED HEALTH CARE EDUCATION/TRAINING PROGRAM

## 2019-01-01 PROCEDURE — 82330 ASSAY OF CALCIUM: CPT | Performed by: STUDENT IN AN ORGANIZED HEALTH CARE EDUCATION/TRAINING PROGRAM

## 2019-01-01 PROCEDURE — 82803 BLOOD GASES ANY COMBINATION: CPT | Performed by: STUDENT IN AN ORGANIZED HEALTH CARE EDUCATION/TRAINING PROGRAM

## 2019-01-01 PROCEDURE — 36592 COLLECT BLOOD FROM PICC: CPT | Performed by: STUDENT IN AN ORGANIZED HEALTH CARE EDUCATION/TRAINING PROGRAM

## 2019-01-01 PROCEDURE — 84295 ASSAY OF SERUM SODIUM: CPT | Performed by: STUDENT IN AN ORGANIZED HEALTH CARE EDUCATION/TRAINING PROGRAM

## 2019-01-01 PROCEDURE — 99153 MOD SED SAME PHYS/QHP EA: CPT | Performed by: INTERNAL MEDICINE

## 2019-01-01 PROCEDURE — 97535 SELF CARE MNGMENT TRAINING: CPT | Mod: GO | Performed by: OCCUPATIONAL THERAPIST

## 2019-01-01 PROCEDURE — 81001 URINALYSIS AUTO W/SCOPE: CPT | Performed by: PHYSICIAN ASSISTANT

## 2019-01-01 PROCEDURE — 99214 OFFICE O/P EST MOD 30 MIN: CPT | Mod: ZP | Performed by: PHYSICIAN ASSISTANT

## 2019-01-01 PROCEDURE — 87040 BLOOD CULTURE FOR BACTERIA: CPT | Performed by: PHYSICIAN ASSISTANT

## 2019-01-01 PROCEDURE — 40000141 ZZH STATISTIC PERIPHERAL IV START W/O US GUIDANCE

## 2019-01-01 PROCEDURE — 83605 ASSAY OF LACTIC ACID: CPT | Performed by: NURSE PRACTITIONER

## 2019-01-01 PROCEDURE — 25000128 H RX IP 250 OP 636: Performed by: PEDIATRICS

## 2019-01-01 PROCEDURE — 83690 ASSAY OF LIPASE: CPT | Performed by: INTERNAL MEDICINE

## 2019-01-01 PROCEDURE — 82565 ASSAY OF CREATININE: CPT | Performed by: SURGERY

## 2019-01-01 PROCEDURE — 00000146 ZZHCL STATISTIC GLUCOSE BY METER IP

## 2019-01-01 PROCEDURE — 87102 FUNGUS ISOLATION CULTURE: CPT | Performed by: INTERNAL MEDICINE

## 2019-01-01 PROCEDURE — 36415 COLL VENOUS BLD VENIPUNCTURE: CPT | Performed by: INTERNAL MEDICINE

## 2019-01-01 PROCEDURE — 84100 ASSAY OF PHOSPHORUS: CPT | Performed by: THORACIC SURGERY (CARDIOTHORACIC VASCULAR SURGERY)

## 2019-01-01 PROCEDURE — 93010 ELECTROCARDIOGRAM REPORT: CPT | Performed by: INTERNAL MEDICINE

## 2019-01-01 PROCEDURE — 25000125 ZZHC RX 250: Performed by: ANESTHESIOLOGY

## 2019-01-01 PROCEDURE — 97165 OT EVAL LOW COMPLEX 30 MIN: CPT | Mod: GO | Performed by: OCCUPATIONAL THERAPIST

## 2019-01-01 PROCEDURE — 80053 COMPREHEN METABOLIC PANEL: CPT | Performed by: STUDENT IN AN ORGANIZED HEALTH CARE EDUCATION/TRAINING PROGRAM

## 2019-01-01 PROCEDURE — 80048 BASIC METABOLIC PNL TOTAL CA: CPT | Performed by: STUDENT IN AN ORGANIZED HEALTH CARE EDUCATION/TRAINING PROGRAM

## 2019-01-01 PROCEDURE — 25000128 H RX IP 250 OP 636: Performed by: NURSE ANESTHETIST, CERTIFIED REGISTERED

## 2019-01-01 PROCEDURE — 85025 COMPLETE CBC W/AUTO DIFF WBC: CPT | Performed by: INTERNAL MEDICINE

## 2019-01-01 PROCEDURE — 83735 ASSAY OF MAGNESIUM: CPT | Performed by: STUDENT IN AN ORGANIZED HEALTH CARE EDUCATION/TRAINING PROGRAM

## 2019-01-01 PROCEDURE — 40000104 ZZH STATISTIC MODERATE SEDATION < 10 MIN: Performed by: INTERNAL MEDICINE

## 2019-01-01 PROCEDURE — 25800030 ZZH RX IP 258 OP 636: Performed by: NURSE PRACTITIONER

## 2019-01-01 PROCEDURE — A9585 GADOBUTROL INJECTION: HCPCS | Performed by: PHYSICIAN ASSISTANT

## 2019-01-01 PROCEDURE — 93306 TTE W/DOPPLER COMPLETE: CPT | Mod: 26 | Performed by: INTERNAL MEDICINE

## 2019-01-01 PROCEDURE — 25000131 ZZH RX MED GY IP 250 OP 636 PS 637: Performed by: STUDENT IN AN ORGANIZED HEALTH CARE EDUCATION/TRAINING PROGRAM

## 2019-01-01 PROCEDURE — 99215 OFFICE O/P EST HI 40 MIN: CPT | Mod: ZP | Performed by: INTERNAL MEDICINE

## 2019-01-01 PROCEDURE — 40001004 ZZHCL STATISTIC FLOW INT 9-15 ABY TC 88188: Performed by: INTERNAL MEDICINE

## 2019-01-01 PROCEDURE — 93306 TTE W/DOPPLER COMPLETE: CPT

## 2019-01-01 PROCEDURE — 83615 LACTATE (LD) (LDH) ENZYME: CPT | Performed by: INTERNAL MEDICINE

## 2019-01-01 PROCEDURE — 25000128 H RX IP 250 OP 636: Mod: ZF | Performed by: INTERNAL MEDICINE

## 2019-01-01 PROCEDURE — 0W993ZZ DRAINAGE OF RIGHT PLEURAL CAVITY, PERCUTANEOUS APPROACH: ICD-10-PCS | Performed by: INTERNAL MEDICINE

## 2019-01-01 PROCEDURE — 84443 ASSAY THYROID STIM HORMONE: CPT | Performed by: PHYSICIAN ASSISTANT

## 2019-01-01 PROCEDURE — 25800030 ZZH RX IP 258 OP 636: Performed by: PHYSICIAN ASSISTANT

## 2019-01-01 PROCEDURE — 88185 FLOWCYTOMETRY/TC ADD-ON: CPT | Performed by: INTERNAL MEDICINE

## 2019-01-01 PROCEDURE — 37000008 ZZH ANESTHESIA TECHNICAL FEE, 1ST 30 MIN: Performed by: STUDENT IN AN ORGANIZED HEALTH CARE EDUCATION/TRAINING PROGRAM

## 2019-01-01 PROCEDURE — 00000155 ZZHCL STATISTIC H-CELL BLOCK W/STAIN: Performed by: INTERNAL MEDICINE

## 2019-01-01 PROCEDURE — 00000102 ZZHCL STATISTIC CYTO WRIGHT STAIN TC: Performed by: INTERNAL MEDICINE

## 2019-01-01 PROCEDURE — 82945 GLUCOSE OTHER FLUID: CPT | Performed by: INTERNAL MEDICINE

## 2019-01-01 PROCEDURE — 40000170 ZZH STATISTIC PRE-PROCEDURE ASSESSMENT II: Performed by: STUDENT IN AN ORGANIZED HEALTH CARE EDUCATION/TRAINING PROGRAM

## 2019-01-01 PROCEDURE — 3E043XZ INTRODUCTION OF VASOPRESSOR INTO CENTRAL VEIN, PERCUTANEOUS APPROACH: ICD-10-PCS | Performed by: INTERNAL MEDICINE

## 2019-01-01 PROCEDURE — 36600 WITHDRAWAL OF ARTERIAL BLOOD: CPT

## 2019-01-01 PROCEDURE — 74177 CT ABD & PELVIS W/CONTRAST: CPT

## 2019-01-01 PROCEDURE — 93308 TTE F-UP OR LMTD: CPT | Mod: 26 | Performed by: INTERNAL MEDICINE

## 2019-01-01 PROCEDURE — 82947 ASSAY GLUCOSE BLOOD QUANT: CPT | Performed by: STUDENT IN AN ORGANIZED HEALTH CARE EDUCATION/TRAINING PROGRAM

## 2019-01-01 PROCEDURE — 82150 ASSAY OF AMYLASE: CPT | Performed by: INTERNAL MEDICINE

## 2019-01-01 PROCEDURE — 40000893 ZZH STATISTIC PT IP EVAL DEFER

## 2019-01-01 PROCEDURE — P9041 ALBUMIN (HUMAN),5%, 50ML: HCPCS | Performed by: NURSE PRACTITIONER

## 2019-01-01 PROCEDURE — 71000016 ZZH RECOVERY PHASE 1 LEVEL 3 FIRST HR: Performed by: STUDENT IN AN ORGANIZED HEALTH CARE EDUCATION/TRAINING PROGRAM

## 2019-01-01 PROCEDURE — 94660 CPAP INITIATION&MGMT: CPT

## 2019-01-01 PROCEDURE — 88305 TISSUE EXAM BY PATHOLOGIST: CPT | Performed by: INTERNAL MEDICINE

## 2019-01-01 PROCEDURE — P9041 ALBUMIN (HUMAN),5%, 50ML: HCPCS | Performed by: SURGERY

## 2019-01-01 PROCEDURE — 99232 SBSQ HOSP IP/OBS MODERATE 35: CPT | Performed by: INTERNAL MEDICINE

## 2019-01-01 PROCEDURE — 86923 COMPATIBILITY TEST ELECTRIC: CPT | Performed by: STUDENT IN AN ORGANIZED HEALTH CARE EDUCATION/TRAINING PROGRAM

## 2019-01-01 PROCEDURE — 93325 DOPPLER ECHO COLOR FLOW MAPG: CPT | Mod: 26 | Performed by: INTERNAL MEDICINE

## 2019-01-01 PROCEDURE — G0463 HOSPITAL OUTPT CLINIC VISIT: HCPCS | Mod: ZF

## 2019-01-01 PROCEDURE — 25000125 ZZHC RX 250: Performed by: RADIOLOGY

## 2019-01-01 PROCEDURE — 99232 SBSQ HOSP IP/OBS MODERATE 35: CPT | Mod: GC | Performed by: INTERNAL MEDICINE

## 2019-01-01 PROCEDURE — 25000125 ZZHC RX 250: Performed by: SURGERY

## 2019-01-01 PROCEDURE — 93321 DOPPLER ECHO F-UP/LMTD STD: CPT | Mod: 26 | Performed by: INTERNAL MEDICINE

## 2019-01-01 PROCEDURE — 87015 SPECIMEN INFECT AGNT CONCNTJ: CPT | Performed by: INTERNAL MEDICINE

## 2019-01-01 PROCEDURE — 25000128 H RX IP 250 OP 636: Mod: ZF | Performed by: PHYSICIAN ASSISTANT

## 2019-01-01 PROCEDURE — 80048 BASIC METABOLIC PNL TOTAL CA: CPT | Performed by: INTERNAL MEDICINE

## 2019-01-01 PROCEDURE — G0463 HOSPITAL OUTPT CLINIC VISIT: HCPCS | Mod: ZF,25

## 2019-01-01 PROCEDURE — 82042 OTHER SOURCE ALBUMIN QUAN EA: CPT | Performed by: INTERNAL MEDICINE

## 2019-01-01 PROCEDURE — G0463 HOSPITAL OUTPT CLINIC VISIT: HCPCS | Mod: 25 | Performed by: RADIOLOGY

## 2019-01-01 PROCEDURE — C9290 INJ, BUPIVACAINE LIPOSOME: HCPCS | Performed by: STUDENT IN AN ORGANIZED HEALTH CARE EDUCATION/TRAINING PROGRAM

## 2019-01-01 PROCEDURE — 84300 ASSAY OF URINE SODIUM: CPT | Performed by: STUDENT IN AN ORGANIZED HEALTH CARE EDUCATION/TRAINING PROGRAM

## 2019-01-01 PROCEDURE — 89051 BODY FLUID CELL COUNT: CPT | Performed by: INTERNAL MEDICINE

## 2019-01-01 PROCEDURE — 36592 COLLECT BLOOD FROM PICC: CPT | Performed by: PHYSICIAN ASSISTANT

## 2019-01-01 PROCEDURE — 84478 ASSAY OF TRIGLYCERIDES: CPT | Performed by: INTERNAL MEDICINE

## 2019-01-01 PROCEDURE — 36591 DRAW BLOOD OFF VENOUS DEVICE: CPT

## 2019-01-01 PROCEDURE — 40000281 ZZH STATISTIC TRANSPORT TIME EA 15 MIN

## 2019-01-01 PROCEDURE — 84439 ASSAY OF FREE THYROXINE: CPT | Performed by: PHYSICIAN ASSISTANT

## 2019-01-01 PROCEDURE — 99223 1ST HOSP IP/OBS HIGH 75: CPT | Performed by: NURSE PRACTITIONER

## 2019-01-01 PROCEDURE — 82533 TOTAL CORTISOL: CPT | Performed by: PHYSICIAN ASSISTANT

## 2019-01-01 PROCEDURE — 25800030 ZZH RX IP 258 OP 636: Performed by: ANESTHESIOLOGY

## 2019-01-01 PROCEDURE — 87116 MYCOBACTERIA CULTURE: CPT | Performed by: INTERNAL MEDICINE

## 2019-01-01 PROCEDURE — C1729 CATH, DRAINAGE: HCPCS | Performed by: STUDENT IN AN ORGANIZED HEALTH CARE EDUCATION/TRAINING PROGRAM

## 2019-01-01 PROCEDURE — 37000009 ZZH ANESTHESIA TECHNICAL FEE, EACH ADDTL 15 MIN: Performed by: STUDENT IN AN ORGANIZED HEALTH CARE EDUCATION/TRAINING PROGRAM

## 2019-01-01 PROCEDURE — 82664 ELECTROPHORETIC TEST: CPT | Performed by: INTERNAL MEDICINE

## 2019-01-01 PROCEDURE — 84311 SPECTROPHOTOMETRY: CPT | Performed by: INTERNAL MEDICINE

## 2019-01-01 PROCEDURE — 40000986 XR CHEST PORT 1 VW

## 2019-01-01 PROCEDURE — 84100 ASSAY OF PHOSPHORUS: CPT | Performed by: STUDENT IN AN ORGANIZED HEALTH CARE EDUCATION/TRAINING PROGRAM

## 2019-01-01 RX ORDER — MIDODRINE HYDROCHLORIDE 5 MG/1
10 TABLET ORAL EVERY 8 HOURS
Status: DISCONTINUED | OUTPATIENT
Start: 2019-01-01 | End: 2019-01-01

## 2019-01-01 RX ORDER — IBUPROFEN 200 MG
200 TABLET ORAL EVERY 6 HOURS PRN
Status: DISCONTINUED | OUTPATIENT
Start: 2019-01-01 | End: 2019-01-01 | Stop reason: HOSPADM

## 2019-01-01 RX ORDER — MIDODRINE HYDROCHLORIDE 10 MG/1
20 TABLET ORAL EVERY 8 HOURS
Qty: 180 TABLET | Refills: 0 | Status: SHIPPED | OUTPATIENT
Start: 2019-01-01 | End: 2019-03-23

## 2019-01-01 RX ORDER — MORPHINE SULFATE 2 MG/ML
2-4 INJECTION, SOLUTION INTRAMUSCULAR; INTRAVENOUS
Status: DISCONTINUED | OUTPATIENT
Start: 2019-01-01 | End: 2019-01-01

## 2019-01-01 RX ORDER — ALPRAZOLAM 0.5 MG
0.5 TABLET ORAL 3 TIMES DAILY PRN
Status: DISCONTINUED | OUTPATIENT
Start: 2019-01-01 | End: 2019-01-01 | Stop reason: HOSPADM

## 2019-01-01 RX ORDER — ACETAMINOPHEN 160 MG/5ML
650 SUSPENSION ORAL EVERY 4 HOURS PRN
Qty: 355 ML | Refills: 11 | Status: SHIPPED | OUTPATIENT
Start: 2019-01-01 | End: 2019-01-01

## 2019-01-01 RX ORDER — HYDROMORPHONE HYDROCHLORIDE 1 MG/ML
.3-.5 INJECTION, SOLUTION INTRAMUSCULAR; INTRAVENOUS; SUBCUTANEOUS
Status: DISCONTINUED | OUTPATIENT
Start: 2019-01-01 | End: 2019-01-01

## 2019-01-01 RX ORDER — OXYCODONE HYDROCHLORIDE 5 MG/1
5-10 TABLET ORAL EVERY 4 HOURS PRN
Status: DISCONTINUED | OUTPATIENT
Start: 2019-01-01 | End: 2019-01-01 | Stop reason: HOSPADM

## 2019-01-01 RX ORDER — GLYCOPYRROLATE 0.2 MG/ML
INJECTION, SOLUTION INTRAMUSCULAR; INTRAVENOUS PRN
Status: DISCONTINUED | OUTPATIENT
Start: 2019-01-01 | End: 2019-01-01

## 2019-01-01 RX ORDER — ONDANSETRON 4 MG/1
8 TABLET, ORALLY DISINTEGRATING ORAL EVERY 8 HOURS PRN
Status: DISCONTINUED | OUTPATIENT
Start: 2019-01-01 | End: 2019-01-01 | Stop reason: HOSPADM

## 2019-01-01 RX ORDER — PSEUDOEPHEDRINE HCL 30 MG
30 TABLET ORAL EVERY 6 HOURS
Status: DISCONTINUED | OUTPATIENT
Start: 2019-01-01 | End: 2019-01-01

## 2019-01-01 RX ORDER — ACETAMINOPHEN 650 MG/1
650 SUPPOSITORY RECTAL EVERY 4 HOURS PRN
Qty: 7 SUPPOSITORY | Refills: 3 | Status: SHIPPED | OUTPATIENT
Start: 2019-01-01 | End: 2019-01-01

## 2019-01-01 RX ORDER — IOPAMIDOL 755 MG/ML
66 INJECTION, SOLUTION INTRAVASCULAR ONCE
Status: COMPLETED | OUTPATIENT
Start: 2019-01-01 | End: 2019-01-01

## 2019-01-01 RX ORDER — ONDANSETRON 2 MG/ML
INJECTION INTRAMUSCULAR; INTRAVENOUS PRN
Status: DISCONTINUED | OUTPATIENT
Start: 2019-01-01 | End: 2019-01-01

## 2019-01-01 RX ORDER — NICOTINE POLACRILEX 4 MG
15-30 LOZENGE BUCCAL
Status: DISCONTINUED | OUTPATIENT
Start: 2019-01-01 | End: 2019-01-01 | Stop reason: HOSPADM

## 2019-01-01 RX ORDER — CEFAZOLIN SODIUM 1 G/3ML
INJECTION, POWDER, FOR SOLUTION INTRAMUSCULAR; INTRAVENOUS PRN
Status: DISCONTINUED | OUTPATIENT
Start: 2019-01-01 | End: 2019-01-01

## 2019-01-01 RX ORDER — NALOXONE HYDROCHLORIDE 0.4 MG/ML
.1-.4 INJECTION, SOLUTION INTRAMUSCULAR; INTRAVENOUS; SUBCUTANEOUS
Status: DISCONTINUED | OUTPATIENT
Start: 2019-01-01 | End: 2019-01-01 | Stop reason: HOSPADM

## 2019-01-01 RX ORDER — ONDANSETRON 4 MG/1
8 TABLET, FILM COATED ORAL EVERY 8 HOURS PRN
Status: DISCONTINUED | OUTPATIENT
Start: 2019-01-01 | End: 2019-01-01 | Stop reason: HOSPADM

## 2019-01-01 RX ORDER — KETAMINE HYDROCHLORIDE 10 MG/ML
INJECTION, SOLUTION INTRAMUSCULAR; INTRAVENOUS PRN
Status: DISCONTINUED | OUTPATIENT
Start: 2019-01-01 | End: 2019-01-01

## 2019-01-01 RX ORDER — ZOLPIDEM TARTRATE 5 MG/1
5 TABLET ORAL
Status: DISCONTINUED | OUTPATIENT
Start: 2019-01-01 | End: 2019-01-01 | Stop reason: HOSPADM

## 2019-01-01 RX ORDER — NOREPINEPHRINE BITARTRATE/D5W 16MG/250ML
0.03-0.4 PLASTIC BAG, INJECTION (ML) INTRAVENOUS CONTINUOUS
Status: DISCONTINUED | OUTPATIENT
Start: 2019-01-01 | End: 2019-01-01

## 2019-01-01 RX ORDER — ALBUMIN, HUMAN INJ 5% 5 %
25 SOLUTION INTRAVENOUS ONCE
Status: COMPLETED | OUTPATIENT
Start: 2019-01-01 | End: 2019-01-01

## 2019-01-01 RX ORDER — DOXYCYCLINE 100 MG/10ML
100 INJECTION, POWDER, LYOPHILIZED, FOR SOLUTION INTRAVENOUS EVERY 12 HOURS
Status: DISCONTINUED | OUTPATIENT
Start: 2019-01-01 | End: 2019-01-01 | Stop reason: CLARIF

## 2019-01-01 RX ORDER — OXYCODONE HCL 5 MG/5 ML
5-10 SOLUTION, ORAL ORAL
Qty: 60 ML | Refills: 0 | Status: SHIPPED | OUTPATIENT
Start: 2019-01-01 | End: 2019-01-01

## 2019-01-01 RX ORDER — SODIUM CHLORIDE, SODIUM LACTATE, POTASSIUM CHLORIDE, CALCIUM CHLORIDE 600; 310; 30; 20 MG/100ML; MG/100ML; MG/100ML; MG/100ML
INJECTION, SOLUTION INTRAVENOUS CONTINUOUS
Status: DISCONTINUED | OUTPATIENT
Start: 2019-01-01 | End: 2019-01-01 | Stop reason: HOSPADM

## 2019-01-01 RX ORDER — BUPIVACAINE HYDROCHLORIDE 2.5 MG/ML
INJECTION, SOLUTION INFILTRATION; PERINEURAL PRN
Status: DISCONTINUED | OUTPATIENT
Start: 2019-01-01 | End: 2019-01-01 | Stop reason: HOSPADM

## 2019-01-01 RX ORDER — PROCHLORPERAZINE MALEATE 5 MG
5-10 TABLET ORAL EVERY 6 HOURS PRN
Status: DISCONTINUED | OUTPATIENT
Start: 2019-01-01 | End: 2019-01-01 | Stop reason: HOSPADM

## 2019-01-01 RX ORDER — CEFAZOLIN SODIUM 1 G/3ML
1 INJECTION, POWDER, FOR SOLUTION INTRAMUSCULAR; INTRAVENOUS SEE ADMIN INSTRUCTIONS
Status: DISCONTINUED | OUTPATIENT
Start: 2019-01-01 | End: 2019-01-01 | Stop reason: HOSPADM

## 2019-01-01 RX ORDER — FENTANYL CITRATE 50 UG/ML
INJECTION, SOLUTION INTRAMUSCULAR; INTRAVENOUS PRN
Status: DISCONTINUED | OUTPATIENT
Start: 2019-01-01 | End: 2019-01-01 | Stop reason: HOSPADM

## 2019-01-01 RX ORDER — HEPARIN SODIUM (PORCINE) LOCK FLUSH IV SOLN 100 UNIT/ML 100 UNIT/ML
5 SOLUTION INTRAVENOUS ONCE
Status: COMPLETED | OUTPATIENT
Start: 2019-01-01 | End: 2019-01-01

## 2019-01-01 RX ORDER — ATROPINE SULFATE 10 MG/ML
1-2 SOLUTION/ DROPS OPHTHALMIC 4 TIMES DAILY
Qty: 1 ML | Refills: 0 | Status: SHIPPED | OUTPATIENT
Start: 2019-01-01 | End: 2019-01-01

## 2019-01-01 RX ORDER — POTASSIUM CHLORIDE 7.45 MG/ML
10 INJECTION INTRAVENOUS
Status: DISCONTINUED | OUTPATIENT
Start: 2019-01-01 | End: 2019-01-01 | Stop reason: HOSPADM

## 2019-01-01 RX ORDER — ONDANSETRON 2 MG/ML
8 INJECTION INTRAMUSCULAR; INTRAVENOUS EVERY 8 HOURS PRN
Status: DISCONTINUED | OUTPATIENT
Start: 2019-01-01 | End: 2019-01-01 | Stop reason: HOSPADM

## 2019-01-01 RX ORDER — OXYCODONE HYDROCHLORIDE 100 MG/5ML
5-10 SOLUTION ORAL EVERY 4 HOURS PRN
Qty: 30 ML | Refills: 0 | Status: SHIPPED | OUTPATIENT
Start: 2019-01-01 | End: 2019-01-01

## 2019-01-01 RX ORDER — GADOBUTROL 604.72 MG/ML
7.5 INJECTION INTRAVENOUS ONCE
Status: COMPLETED | OUTPATIENT
Start: 2019-01-01 | End: 2019-01-01

## 2019-01-01 RX ORDER — LEVOFLOXACIN 750 MG/1
750 TABLET, FILM COATED ORAL DAILY
Status: DISCONTINUED | OUTPATIENT
Start: 2019-01-01 | End: 2019-01-01

## 2019-01-01 RX ORDER — ONDANSETRON 8 MG/1
8 TABLET, FILM COATED ORAL EVERY 8 HOURS PRN
Qty: 30 TABLET | Refills: 3 | Status: ON HOLD | OUTPATIENT
Start: 2019-01-01 | End: 2019-01-01

## 2019-01-01 RX ORDER — POTASSIUM CHLORIDE 29.8 MG/ML
20 INJECTION INTRAVENOUS
Status: DISCONTINUED | OUTPATIENT
Start: 2019-01-01 | End: 2019-01-01 | Stop reason: HOSPADM

## 2019-01-01 RX ORDER — ONDANSETRON 2 MG/ML
4 INJECTION INTRAMUSCULAR; INTRAVENOUS EVERY 30 MIN PRN
Status: DISCONTINUED | OUTPATIENT
Start: 2019-01-01 | End: 2019-01-01 | Stop reason: HOSPADM

## 2019-01-01 RX ORDER — PSEUDOEPHEDRINE HCL 30 MG
30 TABLET ORAL EVERY 4 HOURS
Status: DISCONTINUED | OUTPATIENT
Start: 2019-01-01 | End: 2019-01-01 | Stop reason: HOSPADM

## 2019-01-01 RX ORDER — LORAZEPAM 2 MG/ML
0.5 INJECTION INTRAMUSCULAR EVERY 6 HOURS PRN
Status: COMPLETED | OUTPATIENT
Start: 2019-01-01 | End: 2019-01-01

## 2019-01-01 RX ORDER — ATROPINE SULFATE 10 MG/ML
1-2 SOLUTION/ DROPS OPHTHALMIC 4 TIMES DAILY
Qty: 15 ML | Refills: 1 | Status: SHIPPED | OUTPATIENT
Start: 2019-01-01 | End: 2019-01-01

## 2019-01-01 RX ORDER — SODIUM CHLORIDE, SODIUM LACTATE, POTASSIUM CHLORIDE, CALCIUM CHLORIDE 600; 310; 30; 20 MG/100ML; MG/100ML; MG/100ML; MG/100ML
INJECTION, SOLUTION INTRAVENOUS CONTINUOUS
Status: DISCONTINUED | OUTPATIENT
Start: 2019-01-01 | End: 2019-01-01

## 2019-01-01 RX ORDER — MIDODRINE HYDROCHLORIDE 5 MG/1
5 TABLET ORAL EVERY 8 HOURS
Status: DISCONTINUED | OUTPATIENT
Start: 2019-01-01 | End: 2019-01-01

## 2019-01-01 RX ORDER — AMOXICILLIN 250 MG
1 CAPSULE ORAL 2 TIMES DAILY
Status: DISCONTINUED | OUTPATIENT
Start: 2019-01-01 | End: 2019-01-01 | Stop reason: HOSPADM

## 2019-01-01 RX ORDER — HALOPERIDOL 2 MG/ML
1 SOLUTION ORAL EVERY 6 HOURS PRN
Qty: 30 ML | Refills: 0 | Status: SHIPPED | OUTPATIENT
Start: 2019-01-01 | End: 2019-03-23

## 2019-01-01 RX ORDER — CABOZANTINIB 40 MG/1
TABLET ORAL
Qty: 30 TABLET | Refills: 0 | Status: CANCELLED | OUTPATIENT
Start: 2019-01-01

## 2019-01-01 RX ORDER — FUROSEMIDE 10 MG/ML
20 INJECTION INTRAMUSCULAR; INTRAVENOUS ONCE
Status: COMPLETED | OUTPATIENT
Start: 2019-01-01 | End: 2019-01-01

## 2019-01-01 RX ORDER — DEXTROSE MONOHYDRATE 25 G/50ML
25-50 INJECTION, SOLUTION INTRAVENOUS
Status: DISCONTINUED | OUTPATIENT
Start: 2019-01-01 | End: 2019-01-01 | Stop reason: HOSPADM

## 2019-01-01 RX ORDER — SODIUM CHLORIDE, SODIUM LACTATE, POTASSIUM CHLORIDE, CALCIUM CHLORIDE 600; 310; 30; 20 MG/100ML; MG/100ML; MG/100ML; MG/100ML
INJECTION, SOLUTION INTRAVENOUS CONTINUOUS PRN
Status: DISCONTINUED | OUTPATIENT
Start: 2019-01-01 | End: 2019-01-01

## 2019-01-01 RX ORDER — FENTANYL CITRATE 50 UG/ML
25-50 INJECTION, SOLUTION INTRAMUSCULAR; INTRAVENOUS
Status: DISCONTINUED | OUTPATIENT
Start: 2019-01-01 | End: 2019-01-01 | Stop reason: HOSPADM

## 2019-01-01 RX ORDER — IOPAMIDOL 755 MG/ML
50 INJECTION, SOLUTION INTRAVASCULAR ONCE
Status: COMPLETED | OUTPATIENT
Start: 2019-01-01 | End: 2019-01-01

## 2019-01-01 RX ORDER — CEFAZOLIN SODIUM 2 G/100ML
2 INJECTION, SOLUTION INTRAVENOUS
Status: COMPLETED | OUTPATIENT
Start: 2019-01-01 | End: 2019-01-01

## 2019-01-01 RX ORDER — NOREPINEPHRINE BITARTRATE/D5W 16MG/250ML
.03-.4 PLASTIC BAG, INJECTION (ML) INTRAVENOUS CONTINUOUS
Status: DISCONTINUED | OUTPATIENT
Start: 2019-01-01 | End: 2019-01-01 | Stop reason: HOSPADM

## 2019-01-01 RX ORDER — AMOXICILLIN 250 MG
2 CAPSULE ORAL 2 TIMES DAILY PRN
Status: DISCONTINUED | OUTPATIENT
Start: 2019-01-01 | End: 2019-01-01

## 2019-01-01 RX ORDER — BISACODYL 10 MG
10 SUPPOSITORY, RECTAL RECTAL DAILY PRN
Qty: 7 SUPPOSITORY | Refills: 1 | Status: SHIPPED | OUTPATIENT
Start: 2019-01-01 | End: 2019-03-23

## 2019-01-01 RX ORDER — NALOXONE HYDROCHLORIDE 0.4 MG/ML
.1-.4 INJECTION, SOLUTION INTRAMUSCULAR; INTRAVENOUS; SUBCUTANEOUS
Status: CANCELLED | OUTPATIENT
Start: 2019-01-01

## 2019-01-01 RX ORDER — MAGNESIUM SULFATE HEPTAHYDRATE 40 MG/ML
2 INJECTION, SOLUTION INTRAVENOUS DAILY PRN
Status: DISCONTINUED | OUTPATIENT
Start: 2019-01-01 | End: 2019-01-01 | Stop reason: HOSPADM

## 2019-01-01 RX ORDER — LORAZEPAM 2 MG/ML
0.5 INJECTION INTRAMUSCULAR ONCE
Status: COMPLETED | OUTPATIENT
Start: 2019-01-01 | End: 2019-01-01

## 2019-01-01 RX ORDER — NALOXONE HYDROCHLORIDE 0.4 MG/ML
.1-.4 INJECTION, SOLUTION INTRAMUSCULAR; INTRAVENOUS; SUBCUTANEOUS
Status: DISCONTINUED | OUTPATIENT
Start: 2019-01-01 | End: 2019-01-01

## 2019-01-01 RX ORDER — PSEUDOEPHEDRINE HCL 30 MG
30 TABLET ORAL EVERY 4 HOURS
Qty: 60 TABLET | Refills: 0 | Status: SHIPPED | OUTPATIENT
Start: 2019-01-01 | End: 2019-01-01

## 2019-01-01 RX ORDER — MIDODRINE HYDROCHLORIDE 5 MG/1
20 TABLET ORAL EVERY 8 HOURS
Status: DISCONTINUED | OUTPATIENT
Start: 2019-01-01 | End: 2019-01-01 | Stop reason: HOSPADM

## 2019-01-01 RX ORDER — LORAZEPAM 2 MG/ML
.5-1 INJECTION INTRAMUSCULAR EVERY 6 HOURS PRN
Status: DISCONTINUED | OUTPATIENT
Start: 2019-01-01 | End: 2019-01-01

## 2019-01-01 RX ORDER — CALCIUM CHLORIDE 100 MG/ML
1 INJECTION INTRAVENOUS; INTRAVENTRICULAR ONCE
Status: DISCONTINUED | OUTPATIENT
Start: 2019-01-01 | End: 2019-01-01

## 2019-01-01 RX ORDER — POLYETHYLENE GLYCOL 3350 17 G/17G
17 POWDER, FOR SOLUTION ORAL DAILY PRN
Status: DISCONTINUED | OUTPATIENT
Start: 2019-01-01 | End: 2019-01-01 | Stop reason: HOSPADM

## 2019-01-01 RX ORDER — FENTANYL CITRATE 50 UG/ML
INJECTION, SOLUTION INTRAMUSCULAR; INTRAVENOUS PRN
Status: DISCONTINUED | OUTPATIENT
Start: 2019-01-01 | End: 2019-01-01

## 2019-01-01 RX ORDER — CITALOPRAM HYDROBROMIDE 10 MG/1
20 TABLET ORAL EVERY EVENING
Status: DISCONTINUED | OUTPATIENT
Start: 2019-01-01 | End: 2019-01-01 | Stop reason: HOSPADM

## 2019-01-01 RX ORDER — POLYETHYLENE GLYCOL 3350 17 G/17G
17 POWDER, FOR SOLUTION ORAL DAILY PRN
Qty: 24 PACKET | Refills: 0 | Status: SHIPPED | OUTPATIENT
Start: 2019-01-01 | End: 2019-03-23

## 2019-01-01 RX ORDER — LORAZEPAM 0.5 MG/1
.5-1 TABLET ORAL EVERY 6 HOURS PRN
Status: DISCONTINUED | OUTPATIENT
Start: 2019-01-01 | End: 2019-01-01

## 2019-01-01 RX ORDER — POTASSIUM CL/LIDO/0.9 % NACL 10MEQ/0.1L
10 INTRAVENOUS SOLUTION, PIGGYBACK (ML) INTRAVENOUS
Status: DISCONTINUED | OUTPATIENT
Start: 2019-01-01 | End: 2019-01-01 | Stop reason: HOSPADM

## 2019-01-01 RX ORDER — ONDANSETRON 4 MG/1
4-8 TABLET, ORALLY DISINTEGRATING ORAL EVERY 6 HOURS PRN
Qty: 30 TABLET | Refills: 0 | Status: SHIPPED | OUTPATIENT
Start: 2019-01-01 | End: 2019-01-01

## 2019-01-01 RX ORDER — ONDANSETRON 4 MG/1
4 TABLET, ORALLY DISINTEGRATING ORAL EVERY 30 MIN PRN
Status: DISCONTINUED | OUTPATIENT
Start: 2019-01-01 | End: 2019-01-01 | Stop reason: HOSPADM

## 2019-01-01 RX ORDER — HEPARIN SODIUM (PORCINE) LOCK FLUSH IV SOLN 100 UNIT/ML 100 UNIT/ML
5 SOLUTION INTRAVENOUS EVERY 8 HOURS
Status: DISCONTINUED | OUTPATIENT
Start: 2019-01-01 | End: 2019-01-01 | Stop reason: HOSPADM

## 2019-01-01 RX ORDER — SODIUM CHLORIDE, SODIUM LACTATE, POTASSIUM CHLORIDE, CALCIUM CHLORIDE 600; 310; 30; 20 MG/100ML; MG/100ML; MG/100ML; MG/100ML
INJECTION, SOLUTION INTRAVENOUS
Status: COMPLETED
Start: 2019-01-01 | End: 2019-01-01

## 2019-01-01 RX ORDER — AMOXICILLIN 250 MG
1 CAPSULE ORAL 2 TIMES DAILY
Qty: 60 TABLET | Refills: 0 | Status: SHIPPED | OUTPATIENT
Start: 2019-01-01 | End: 2019-03-23

## 2019-01-01 RX ORDER — ACETAMINOPHEN 325 MG/1
650 TABLET ORAL EVERY 4 HOURS PRN
Status: DISCONTINUED | OUTPATIENT
Start: 2019-01-01 | End: 2019-01-01 | Stop reason: HOSPADM

## 2019-01-01 RX ORDER — POTASSIUM CHLORIDE 1.5 G/1.58G
20-40 POWDER, FOR SOLUTION ORAL
Status: DISCONTINUED | OUTPATIENT
Start: 2019-01-01 | End: 2019-01-01 | Stop reason: HOSPADM

## 2019-01-01 RX ORDER — DEXAMETHASONE SODIUM PHOSPHATE 4 MG/ML
INJECTION, SOLUTION INTRA-ARTICULAR; INTRALESIONAL; INTRAMUSCULAR; INTRAVENOUS; SOFT TISSUE PRN
Status: DISCONTINUED | OUTPATIENT
Start: 2019-01-01 | End: 2019-01-01

## 2019-01-01 RX ORDER — HYDROMORPHONE HYDROCHLORIDE 1 MG/ML
.3-.5 INJECTION, SOLUTION INTRAMUSCULAR; INTRAVENOUS; SUBCUTANEOUS EVERY 5 MIN PRN
Status: DISCONTINUED | OUTPATIENT
Start: 2019-01-01 | End: 2019-01-01 | Stop reason: HOSPADM

## 2019-01-01 RX ORDER — SODIUM CHLORIDE 9 MG/ML
INJECTION, SOLUTION INTRAVENOUS CONTINUOUS
Status: DISCONTINUED | OUTPATIENT
Start: 2019-01-01 | End: 2019-01-01

## 2019-01-01 RX ORDER — PROPOFOL 10 MG/ML
INJECTION, EMULSION INTRAVENOUS PRN
Status: DISCONTINUED | OUTPATIENT
Start: 2019-01-01 | End: 2019-01-01

## 2019-01-01 RX ORDER — BUPROPION HYDROCHLORIDE 300 MG/1
300 TABLET ORAL EVERY MORNING
Status: DISCONTINUED | OUTPATIENT
Start: 2019-01-01 | End: 2019-01-01 | Stop reason: HOSPADM

## 2019-01-01 RX ORDER — PREDNISONE 5 MG/1
25 TABLET ORAL EVERY 12 HOURS
Qty: 70 TABLET | Refills: 0 | Status: SHIPPED | OUTPATIENT
Start: 2019-01-01 | End: 2019-01-01

## 2019-01-01 RX ORDER — DEXMEDETOMIDINE HYDROCHLORIDE 4 UG/ML
0.2-1.2 INJECTION, SOLUTION INTRAVENOUS CONTINUOUS
Status: DISCONTINUED | OUTPATIENT
Start: 2019-01-01 | End: 2019-01-01 | Stop reason: HOSPADM

## 2019-01-01 RX ORDER — MIDODRINE HYDROCHLORIDE 5 MG/1
15 TABLET ORAL EVERY 8 HOURS
Status: DISCONTINUED | OUTPATIENT
Start: 2019-01-01 | End: 2019-01-01

## 2019-01-01 RX ORDER — AMOXICILLIN 250 MG
1 CAPSULE ORAL 2 TIMES DAILY PRN
Status: DISCONTINUED | OUTPATIENT
Start: 2019-01-01 | End: 2019-01-01

## 2019-01-01 RX ORDER — AMOXICILLIN 250 MG
2 CAPSULE ORAL 2 TIMES DAILY
Status: DISCONTINUED | OUTPATIENT
Start: 2019-01-01 | End: 2019-01-01 | Stop reason: HOSPADM

## 2019-01-01 RX ORDER — AMOXICILLIN 250 MG
1 CAPSULE ORAL AT BEDTIME
Status: DISCONTINUED | OUTPATIENT
Start: 2019-01-01 | End: 2019-01-01

## 2019-01-01 RX ORDER — POTASSIUM CHLORIDE 750 MG/1
20-40 TABLET, EXTENDED RELEASE ORAL
Status: DISCONTINUED | OUTPATIENT
Start: 2019-01-01 | End: 2019-01-01 | Stop reason: HOSPADM

## 2019-01-01 RX ORDER — SODIUM BICARBONATE 650 MG/1
650 TABLET ORAL 4 TIMES DAILY
Status: DISCONTINUED | OUTPATIENT
Start: 2019-01-01 | End: 2019-01-01 | Stop reason: HOSPADM

## 2019-01-01 RX ORDER — ALBUMIN, HUMAN INJ 5% 5 %
12.5 SOLUTION INTRAVENOUS ONCE
Status: COMPLETED | OUTPATIENT
Start: 2019-01-01 | End: 2019-01-01

## 2019-01-01 RX ORDER — LORAZEPAM 2 MG/ML
.5-1 CONCENTRATE ORAL EVERY 4 HOURS PRN
Qty: 30 ML | Refills: 0 | Status: SHIPPED | OUTPATIENT
Start: 2019-01-01 | End: 2019-01-01

## 2019-01-01 RX ORDER — MAGNESIUM SULFATE HEPTAHYDRATE 40 MG/ML
4 INJECTION, SOLUTION INTRAVENOUS EVERY 4 HOURS PRN
Status: DISCONTINUED | OUTPATIENT
Start: 2019-01-01 | End: 2019-01-01 | Stop reason: HOSPADM

## 2019-01-01 RX ADMIN — HYDROCORTISONE SODIUM SUCCINATE 50 MG: 100 INJECTION, POWDER, FOR SOLUTION INTRAMUSCULAR; INTRAVENOUS at 05:51

## 2019-01-01 RX ADMIN — SODIUM CHLORIDE, POTASSIUM CHLORIDE, SODIUM LACTATE AND CALCIUM CHLORIDE 500 ML: 600; 310; 30; 20 INJECTION, SOLUTION INTRAVENOUS at 14:13

## 2019-01-01 RX ADMIN — PSEUDOEPHEDRINE HCL 30 MG: 30 TABLET, FILM COATED ORAL at 18:24

## 2019-01-01 RX ADMIN — ALPRAZOLAM 0.5 MG: 0.5 TABLET ORAL at 20:14

## 2019-01-01 RX ADMIN — OXYCODONE HYDROCHLORIDE 5 MG: 5 TABLET ORAL at 16:09

## 2019-01-01 RX ADMIN — ALPRAZOLAM 0.5 MG: 0.5 TABLET ORAL at 16:41

## 2019-01-01 RX ADMIN — Medication 0.5 MG: at 08:48

## 2019-01-01 RX ADMIN — ACETAMINOPHEN 650 MG: 325 TABLET, FILM COATED ORAL at 16:09

## 2019-01-01 RX ADMIN — PSEUDOEPHEDRINE HCL 30 MG: 30 TABLET, FILM COATED ORAL at 00:09

## 2019-01-01 RX ADMIN — HYDROCORTISONE SODIUM SUCCINATE 50 MG: 100 INJECTION, POWDER, FOR SOLUTION INTRAMUSCULAR; INTRAVENOUS at 06:08

## 2019-01-01 RX ADMIN — ALBUMIN HUMAN 25 G: 0.05 INJECTION, SOLUTION INTRAVENOUS at 08:58

## 2019-01-01 RX ADMIN — OXYCODONE HYDROCHLORIDE 5 MG: 5 TABLET ORAL at 13:12

## 2019-01-01 RX ADMIN — ALPRAZOLAM 0.5 MG: 0.5 TABLET ORAL at 20:30

## 2019-01-01 RX ADMIN — OMEPRAZOLE 40 MG: 20 CAPSULE, DELAYED RELEASE ORAL at 07:58

## 2019-01-01 RX ADMIN — CEFAZOLIN 1 G: 1 INJECTION, POWDER, FOR SOLUTION INTRAMUSCULAR; INTRAVENOUS at 10:10

## 2019-01-01 RX ADMIN — Medication 0.5 MG: at 16:41

## 2019-01-01 RX ADMIN — FUROSEMIDE 20 MG: 10 INJECTION, SOLUTION INTRAVENOUS at 12:35

## 2019-01-01 RX ADMIN — Medication 0.13 MCG/KG/MIN: at 20:10

## 2019-01-01 RX ADMIN — SODIUM CHLORIDE: 9 INJECTION, SOLUTION INTRAVENOUS at 20:21

## 2019-01-01 RX ADMIN — ALBUMIN HUMAN 12.5 G: 0.05 INJECTION, SOLUTION INTRAVENOUS at 03:06

## 2019-01-01 RX ADMIN — ONDANSETRON 8 MG: 4 TABLET, ORALLY DISINTEGRATING ORAL at 09:06

## 2019-01-01 RX ADMIN — SODIUM CHLORIDE 500 ML: 9 INJECTION, SOLUTION INTRAVENOUS at 03:31

## 2019-01-01 RX ADMIN — MIDODRINE HYDROCHLORIDE 15 MG: 5 TABLET ORAL at 04:43

## 2019-01-01 RX ADMIN — OMEPRAZOLE 40 MG: 20 CAPSULE, DELAYED RELEASE ORAL at 16:10

## 2019-01-01 RX ADMIN — Medication 5 MG: at 21:30

## 2019-01-01 RX ADMIN — LORAZEPAM 0.5 MG: 2 INJECTION, SOLUTION INTRAMUSCULAR; INTRAVENOUS at 21:53

## 2019-01-01 RX ADMIN — LORAZEPAM 0.5 MG: 2 INJECTION, SOLUTION INTRAMUSCULAR; INTRAVENOUS at 16:14

## 2019-01-01 RX ADMIN — Medication 0.5 MG: at 15:21

## 2019-01-01 RX ADMIN — Medication 0.5 MG: at 19:50

## 2019-01-01 RX ADMIN — Medication 0.5 MG: at 03:28

## 2019-01-01 RX ADMIN — SODIUM BICARBONATE 650 MG TABLET 650 MG: at 07:57

## 2019-01-01 RX ADMIN — MIDODRINE HYDROCHLORIDE 15 MG: 5 TABLET ORAL at 11:02

## 2019-01-01 RX ADMIN — OXYCODONE HYDROCHLORIDE 10 MG: 5 TABLET ORAL at 03:39

## 2019-01-01 RX ADMIN — MIDODRINE HYDROCHLORIDE 15 MG: 5 TABLET ORAL at 20:10

## 2019-01-01 RX ADMIN — OXYCODONE HYDROCHLORIDE 5 MG: 5 TABLET ORAL at 19:24

## 2019-01-01 RX ADMIN — CITALOPRAM HYDROBROMIDE 20 MG: 20 TABLET ORAL at 20:56

## 2019-01-01 RX ADMIN — SODIUM CHLORIDE, POTASSIUM CHLORIDE, SODIUM LACTATE AND CALCIUM CHLORIDE: 600; 310; 30; 20 INJECTION, SOLUTION INTRAVENOUS at 07:45

## 2019-01-01 RX ADMIN — SODIUM BICARBONATE 650 MG TABLET 650 MG: at 19:54

## 2019-01-01 RX ADMIN — SODIUM CHLORIDE, POTASSIUM CHLORIDE, SODIUM LACTATE AND CALCIUM CHLORIDE: 600; 310; 30; 20 INJECTION, SOLUTION INTRAVENOUS at 09:45

## 2019-01-01 RX ADMIN — HEPARIN 5 ML: 100 SYRINGE at 16:54

## 2019-01-01 RX ADMIN — MORPHINE SULFATE 2 MG: 2 INJECTION, SOLUTION INTRAMUSCULAR; INTRAVENOUS at 15:37

## 2019-01-01 RX ADMIN — PSEUDOEPHEDRINE HCL 30 MG: 30 TABLET, FILM COATED ORAL at 23:56

## 2019-01-01 RX ADMIN — ENOXAPARIN SODIUM 40 MG: 40 INJECTION SUBCUTANEOUS at 08:54

## 2019-01-01 RX ADMIN — BUPROPION HYDROCHLORIDE 300 MG: 300 TABLET, FILM COATED, EXTENDED RELEASE ORAL at 07:57

## 2019-01-01 RX ADMIN — OXYCODONE HYDROCHLORIDE 10 MG: 5 TABLET ORAL at 21:14

## 2019-01-01 RX ADMIN — SODIUM BICARBONATE 650 MG TABLET 650 MG: at 20:10

## 2019-01-01 RX ADMIN — CALCIUM GLUCONATE 3 G: 98 INJECTION, SOLUTION INTRAVENOUS at 18:17

## 2019-01-01 RX ADMIN — ALPRAZOLAM 0.5 MG: 0.5 TABLET ORAL at 20:39

## 2019-01-01 RX ADMIN — Medication 5 MG: at 20:56

## 2019-01-01 RX ADMIN — BUPROPION HYDROCHLORIDE 300 MG: 300 TABLET, FILM COATED, EXTENDED RELEASE ORAL at 09:06

## 2019-01-01 RX ADMIN — ONDANSETRON HYDROCHLORIDE 8 MG: 4 TABLET, FILM COATED ORAL at 18:31

## 2019-01-01 RX ADMIN — POTASSIUM PHOSPHATE, MONOBASIC AND POTASSIUM PHOSPHATE, DIBASIC 20 MMOL: 224; 236 INJECTION, SOLUTION INTRAVENOUS at 22:36

## 2019-01-01 RX ADMIN — PREDNISONE 25 MG: 5 TABLET ORAL at 20:49

## 2019-01-01 RX ADMIN — NOREPINEPHRINE BITARTRATE 6.4 MCG: 1 INJECTION INTRAVENOUS at 08:14

## 2019-01-01 RX ADMIN — MIDODRINE HYDROCHLORIDE 20 MG: 5 TABLET ORAL at 12:26

## 2019-01-01 RX ADMIN — POLYETHYLENE GLYCOL 3350 17 G: 17 POWDER, FOR SOLUTION ORAL at 09:06

## 2019-01-01 RX ADMIN — LORAZEPAM 0.5 MG: 2 INJECTION, SOLUTION INTRAMUSCULAR; INTRAVENOUS at 05:00

## 2019-01-01 RX ADMIN — MIDODRINE HYDROCHLORIDE 20 MG: 5 TABLET ORAL at 20:30

## 2019-01-01 RX ADMIN — SODIUM BICARBONATE 650 MG TABLET 650 MG: at 11:11

## 2019-01-01 RX ADMIN — ALPRAZOLAM 0.5 MG: 0.5 TABLET ORAL at 13:08

## 2019-01-01 RX ADMIN — INSULIN ASPART 1 UNITS: 100 INJECTION, SOLUTION INTRAVENOUS; SUBCUTANEOUS at 19:40

## 2019-01-01 RX ADMIN — EPINEPHRINE 20 MCG: 1 INJECTION PARENTERAL at 09:34

## 2019-01-01 RX ADMIN — OMEPRAZOLE 40 MG: 20 CAPSULE, DELAYED RELEASE ORAL at 09:06

## 2019-01-01 RX ADMIN — GLYCOPYRROLATE 0.4 MG: 0.2 INJECTION, SOLUTION INTRAMUSCULAR; INTRAVENOUS at 08:20

## 2019-01-01 RX ADMIN — Medication 0.5 MG: at 19:53

## 2019-01-01 RX ADMIN — HEPARIN 5 ML: 100 SYRINGE at 13:09

## 2019-01-01 RX ADMIN — PSEUDOEPHEDRINE HCL 30 MG: 30 TABLET, FILM COATED ORAL at 04:43

## 2019-01-01 RX ADMIN — ALPRAZOLAM 0.5 MG: 0.5 TABLET ORAL at 05:02

## 2019-01-01 RX ADMIN — HYDROCORTISONE SODIUM SUCCINATE 50 MG: 100 INJECTION, POWDER, FOR SOLUTION INTRAMUSCULAR; INTRAVENOUS at 11:02

## 2019-01-01 RX ADMIN — IOPAMIDOL 50 ML: 755 INJECTION, SOLUTION INTRAVENOUS at 17:38

## 2019-01-01 RX ADMIN — Medication 0.03 MCG/KG/MIN: at 23:45

## 2019-01-01 RX ADMIN — HYDROCORTISONE SODIUM SUCCINATE 50 MG: 100 INJECTION, POWDER, FOR SOLUTION INTRAMUSCULAR; INTRAVENOUS at 17:48

## 2019-01-01 RX ADMIN — PROPOFOL 20 MG: 10 INJECTION, EMULSION INTRAVENOUS at 09:03

## 2019-01-01 RX ADMIN — LEVOFLOXACIN 750 MG: 250 TABLET, FILM COATED ORAL at 19:53

## 2019-01-01 RX ADMIN — Medication 5 MG: at 19:51

## 2019-01-01 RX ADMIN — SODIUM CHLORIDE, POTASSIUM CHLORIDE, SODIUM LACTATE AND CALCIUM CHLORIDE 500 ML: 600; 310; 30; 20 INJECTION, SOLUTION INTRAVENOUS at 22:20

## 2019-01-01 RX ADMIN — PSEUDOEPHEDRINE HCL 30 MG: 30 TABLET, FILM COATED ORAL at 12:26

## 2019-01-01 RX ADMIN — ENOXAPARIN SODIUM 40 MG: 40 INJECTION SUBCUTANEOUS at 08:57

## 2019-01-01 RX ADMIN — PROCHLORPERAZINE MALEATE 5 MG: 5 TABLET, FILM COATED ORAL at 18:48

## 2019-01-01 RX ADMIN — SODIUM BICARBONATE 650 MG TABLET 650 MG: at 11:02

## 2019-01-01 RX ADMIN — PROPOFOL 20 MG: 10 INJECTION, EMULSION INTRAVENOUS at 09:08

## 2019-01-01 RX ADMIN — ALPRAZOLAM 0.5 MG: 0.5 TABLET ORAL at 00:10

## 2019-01-01 RX ADMIN — FENTANYL CITRATE 50 MCG: 50 INJECTION, SOLUTION INTRAMUSCULAR; INTRAVENOUS at 08:19

## 2019-01-01 RX ADMIN — GLYCOPYRROLATE 0.4 MG: 0.2 INJECTION, SOLUTION INTRAMUSCULAR; INTRAVENOUS at 08:30

## 2019-01-01 RX ADMIN — MIDODRINE HYDROCHLORIDE 15 MG: 5 TABLET ORAL at 11:12

## 2019-01-01 RX ADMIN — PSEUDOEPHEDRINE HCL 30 MG: 30 TABLET, FILM COATED ORAL at 20:31

## 2019-01-01 RX ADMIN — MIDODRINE HYDROCHLORIDE 5 MG: 5 TABLET ORAL at 12:04

## 2019-01-01 RX ADMIN — SODIUM BICARBONATE 650 MG TABLET 650 MG: at 16:14

## 2019-01-01 RX ADMIN — EPINEPHRINE 0.05 MCG/KG/MIN: 1 INJECTION PARENTERAL at 10:56

## 2019-01-01 RX ADMIN — OXYCODONE HYDROCHLORIDE 5 MG: 5 TABLET ORAL at 19:22

## 2019-01-01 RX ADMIN — ALPRAZOLAM 0.5 MG: 0.5 TABLET ORAL at 16:07

## 2019-01-01 RX ADMIN — CITALOPRAM HYDROBROMIDE 20 MG: 20 TABLET ORAL at 20:03

## 2019-01-01 RX ADMIN — GADOBUTROL 5 ML: 604.72 INJECTION INTRAVENOUS at 13:54

## 2019-01-01 RX ADMIN — CITALOPRAM HYDROBROMIDE 20 MG: 20 TABLET ORAL at 19:53

## 2019-01-01 RX ADMIN — Medication 5 MG: at 20:10

## 2019-01-01 RX ADMIN — EPINEPHRINE 0.06 MCG/KG/MIN: 1 INJECTION PARENTERAL at 21:27

## 2019-01-01 RX ADMIN — SODIUM CHLORIDE 250 ML: 9 INJECTION, SOLUTION INTRAVENOUS at 02:05

## 2019-01-01 RX ADMIN — OXYCODONE HYDROCHLORIDE 10 MG: 5 TABLET ORAL at 20:49

## 2019-01-01 RX ADMIN — BUPROPION HYDROCHLORIDE 300 MG: 300 TABLET, FILM COATED, EXTENDED RELEASE ORAL at 09:14

## 2019-01-01 RX ADMIN — PSEUDOEPHEDRINE HCL 30 MG: 30 TABLET, FILM COATED ORAL at 20:34

## 2019-01-01 RX ADMIN — OMEPRAZOLE 40 MG: 20 CAPSULE, DELAYED RELEASE ORAL at 08:28

## 2019-01-01 RX ADMIN — PREDNISONE 25 MG: 5 TABLET ORAL at 11:11

## 2019-01-01 RX ADMIN — ENOXAPARIN SODIUM 40 MG: 40 INJECTION SUBCUTANEOUS at 11:12

## 2019-01-01 RX ADMIN — SODIUM BICARBONATE 650 MG TABLET 650 MG: at 20:03

## 2019-01-01 RX ADMIN — MIDODRINE HYDROCHLORIDE 5 MG: 5 TABLET ORAL at 02:43

## 2019-01-01 RX ADMIN — FENTANYL CITRATE 50 MCG: 50 INJECTION, SOLUTION INTRAMUSCULAR; INTRAVENOUS at 07:49

## 2019-01-01 RX ADMIN — MIDODRINE HYDROCHLORIDE 20 MG: 5 TABLET ORAL at 04:18

## 2019-01-01 RX ADMIN — OXYCODONE HYDROCHLORIDE 5 MG: 5 TABLET ORAL at 03:14

## 2019-01-01 RX ADMIN — ALPRAZOLAM 0.5 MG: 0.5 TABLET ORAL at 21:31

## 2019-01-01 RX ADMIN — SODIUM BICARBONATE 650 MG TABLET 650 MG: at 08:55

## 2019-01-01 RX ADMIN — OXYCODONE HYDROCHLORIDE 10 MG: 5 TABLET ORAL at 02:26

## 2019-01-01 RX ADMIN — SUGAMMADEX 100 MG: 100 INJECTION, SOLUTION INTRAVENOUS at 10:17

## 2019-01-01 RX ADMIN — PSEUDOEPHEDRINE HCL 30 MG: 30 TABLET, FILM COATED ORAL at 04:18

## 2019-01-01 RX ADMIN — EPINEPHRINE 10 MCG: 1 INJECTION PARENTERAL at 08:43

## 2019-01-01 RX ADMIN — SODIUM CHLORIDE: 9 INJECTION, SOLUTION INTRAVENOUS at 09:58

## 2019-01-01 RX ADMIN — MIDODRINE HYDROCHLORIDE 5 MG: 5 TABLET ORAL at 04:36

## 2019-01-01 RX ADMIN — CITALOPRAM HYDROBROMIDE 20 MG: 20 TABLET ORAL at 19:22

## 2019-01-01 RX ADMIN — BUPROPION HYDROCHLORIDE 300 MG: 300 TABLET, FILM COATED, EXTENDED RELEASE ORAL at 08:28

## 2019-01-01 RX ADMIN — OXYCODONE HYDROCHLORIDE 10 MG: 5 TABLET ORAL at 16:25

## 2019-01-01 RX ADMIN — ALPRAZOLAM 0.5 MG: 0.5 TABLET ORAL at 14:30

## 2019-01-01 RX ADMIN — ONDANSETRON 8 MG: 2 INJECTION INTRAMUSCULAR; INTRAVENOUS at 19:46

## 2019-01-01 RX ADMIN — Medication 0.3 MG: at 07:28

## 2019-01-01 RX ADMIN — ENOXAPARIN SODIUM 40 MG: 40 INJECTION SUBCUTANEOUS at 08:53

## 2019-01-01 RX ADMIN — SODIUM BICARBONATE 650 MG TABLET 650 MG: at 20:30

## 2019-01-01 RX ADMIN — MIDODRINE HYDROCHLORIDE 15 MG: 5 TABLET ORAL at 04:30

## 2019-01-01 RX ADMIN — HYDROCORTISONE SODIUM SUCCINATE 50 MG: 100 INJECTION, POWDER, FOR SOLUTION INTRAMUSCULAR; INTRAVENOUS at 23:40

## 2019-01-01 RX ADMIN — SODIUM BICARBONATE 650 MG TABLET 650 MG: at 16:25

## 2019-01-01 RX ADMIN — Medication 5 MG: at 00:10

## 2019-01-01 RX ADMIN — OMEPRAZOLE 40 MG: 20 CAPSULE, DELAYED RELEASE ORAL at 07:54

## 2019-01-01 RX ADMIN — Medication 0.5 MG: at 07:03

## 2019-01-01 RX ADMIN — EPINEPHRINE 10 MCG: 1 INJECTION PARENTERAL at 09:03

## 2019-01-01 RX ADMIN — LORAZEPAM 0.5 MG: 2 INJECTION, SOLUTION INTRAMUSCULAR; INTRAVENOUS at 09:05

## 2019-01-01 RX ADMIN — OXYCODONE HYDROCHLORIDE 10 MG: 5 TABLET ORAL at 04:24

## 2019-01-01 RX ADMIN — OXYCODONE HYDROCHLORIDE 5 MG: 5 TABLET ORAL at 16:07

## 2019-01-01 RX ADMIN — PSEUDOEPHEDRINE HCL 30 MG: 30 TABLET, FILM COATED ORAL at 10:56

## 2019-01-01 RX ADMIN — SODIUM BICARBONATE 650 MG TABLET 650 MG: at 13:00

## 2019-01-01 RX ADMIN — SODIUM BICARBONATE 650 MG TABLET 650 MG: at 12:26

## 2019-01-01 RX ADMIN — PREDNISONE 25 MG: 5 TABLET ORAL at 09:42

## 2019-01-01 RX ADMIN — SODIUM CHLORIDE 1000 ML: 9 INJECTION, SOLUTION INTRAVENOUS at 12:09

## 2019-01-01 RX ADMIN — SODIUM BICARBONATE 650 MG TABLET 650 MG: at 15:33

## 2019-01-01 RX ADMIN — SODIUM CHLORIDE: 9 INJECTION, SOLUTION INTRAVENOUS at 03:10

## 2019-01-01 RX ADMIN — Medication 5 MG: at 21:14

## 2019-01-01 RX ADMIN — OMEPRAZOLE 40 MG: 20 CAPSULE, DELAYED RELEASE ORAL at 08:54

## 2019-01-01 RX ADMIN — BUPROPION HYDROCHLORIDE 300 MG: 300 TABLET, FILM COATED, EXTENDED RELEASE ORAL at 08:55

## 2019-01-01 RX ADMIN — OXYCODONE HYDROCHLORIDE 5 MG: 5 TABLET ORAL at 12:26

## 2019-01-01 RX ADMIN — EPINEPHRINE 30 MCG: 1 INJECTION PARENTERAL at 09:25

## 2019-01-01 RX ADMIN — PSEUDOEPHEDRINE HCL 30 MG: 30 TABLET, FILM COATED ORAL at 16:16

## 2019-01-01 RX ADMIN — Medication 0.5 MG: at 14:50

## 2019-01-01 RX ADMIN — BUPROPION HYDROCHLORIDE 300 MG: 300 TABLET, FILM COATED, EXTENDED RELEASE ORAL at 08:36

## 2019-01-01 RX ADMIN — Medication 0.5 MG: at 23:56

## 2019-01-01 RX ADMIN — NOREPINEPHRINE BITARTRATE 6.4 MCG: 1 INJECTION INTRAVENOUS at 08:10

## 2019-01-01 RX ADMIN — OXYCODONE HYDROCHLORIDE 5 MG: 5 TABLET ORAL at 09:14

## 2019-01-01 RX ADMIN — KETAMINE HYDROCHLORIDE 20 MG: 10 INJECTION, SOLUTION INTRAMUSCULAR; INTRAVENOUS at 09:01

## 2019-01-01 RX ADMIN — ALPRAZOLAM 0.5 MG: 0.5 TABLET ORAL at 03:39

## 2019-01-01 RX ADMIN — LEVOFLOXACIN 750 MG: 250 TABLET, FILM COATED ORAL at 23:52

## 2019-01-01 RX ADMIN — CEFAZOLIN SODIUM 2 G: 2 INJECTION, SOLUTION INTRAVENOUS at 08:10

## 2019-01-01 RX ADMIN — PREDNISONE 25 MG: 5 TABLET ORAL at 20:56

## 2019-01-01 RX ADMIN — HYDROCORTISONE SODIUM SUCCINATE 50 MG: 100 INJECTION, POWDER, FOR SOLUTION INTRAMUSCULAR; INTRAVENOUS at 18:27

## 2019-01-01 RX ADMIN — PSEUDOEPHEDRINE HCL 30 MG: 30 TABLET, FILM COATED ORAL at 01:58

## 2019-01-01 RX ADMIN — PSEUDOEPHEDRINE HCL 30 MG: 30 TABLET, FILM COATED ORAL at 08:56

## 2019-01-01 RX ADMIN — Medication 8 MCG: at 09:10

## 2019-01-01 RX ADMIN — CITALOPRAM HYDROBROMIDE 20 MG: 20 TABLET ORAL at 20:10

## 2019-01-01 RX ADMIN — OXYCODONE HYDROCHLORIDE 10 MG: 5 TABLET ORAL at 21:30

## 2019-01-01 RX ADMIN — ALPRAZOLAM 0.5 MG: 0.5 TABLET ORAL at 17:53

## 2019-01-01 RX ADMIN — SODIUM CHLORIDE: 9 INJECTION, SOLUTION INTRAVENOUS at 14:55

## 2019-01-01 RX ADMIN — Medication 5 MG: at 21:21

## 2019-01-01 RX ADMIN — ONDANSETRON 4 MG: 2 INJECTION INTRAMUSCULAR; INTRAVENOUS at 08:03

## 2019-01-01 RX ADMIN — Medication 0.5 MG: at 14:43

## 2019-01-01 RX ADMIN — FUROSEMIDE 20 MG: 10 INJECTION, SOLUTION INTRAVENOUS at 08:09

## 2019-01-01 RX ADMIN — Medication 0.5 MG: at 11:07

## 2019-01-01 RX ADMIN — BUPROPION HYDROCHLORIDE 300 MG: 300 TABLET, FILM COATED, EXTENDED RELEASE ORAL at 07:54

## 2019-01-01 RX ADMIN — Medication 0.7 MCG/KG/HR: at 08:01

## 2019-01-01 RX ADMIN — DEXAMETHASONE SODIUM PHOSPHATE 8 MG: 4 INJECTION, SOLUTION INTRA-ARTICULAR; INTRALESIONAL; INTRAMUSCULAR; INTRAVENOUS; SOFT TISSUE at 09:23

## 2019-01-01 RX ADMIN — SODIUM BICARBONATE 650 MG TABLET 650 MG: at 07:54

## 2019-01-01 RX ADMIN — PROPOFOL 100 MG: 10 INJECTION, EMULSION INTRAVENOUS at 09:16

## 2019-01-01 RX ADMIN — KETAMINE HYDROCHLORIDE 30 MG: 10 INJECTION, SOLUTION INTRAMUSCULAR; INTRAVENOUS at 08:41

## 2019-01-01 RX ADMIN — Medication 0.1 MCG/KG/MIN: at 10:44

## 2019-01-01 RX ADMIN — Medication 10 MEQ: at 19:55

## 2019-01-01 RX ADMIN — EPINEPHRINE 20 MCG: 1 INJECTION PARENTERAL at 09:30

## 2019-01-01 RX ADMIN — CITALOPRAM HYDROBROMIDE 20 MG: 20 TABLET ORAL at 19:42

## 2019-01-01 RX ADMIN — SODIUM CHLORIDE 1000 ML: 9 INJECTION, SOLUTION INTRAVENOUS at 16:08

## 2019-01-01 RX ADMIN — OXYCODONE HYDROCHLORIDE 5 MG: 5 TABLET ORAL at 18:56

## 2019-01-01 RX ADMIN — SODIUM CHLORIDE 500 ML: 9 INJECTION, SOLUTION INTRAVENOUS at 23:20

## 2019-01-01 RX ADMIN — ENOXAPARIN SODIUM 40 MG: 40 INJECTION SUBCUTANEOUS at 09:14

## 2019-01-01 RX ADMIN — SODIUM CHLORIDE, POTASSIUM CHLORIDE, SODIUM LACTATE AND CALCIUM CHLORIDE 500 ML: 600; 310; 30; 20 INJECTION, SOLUTION INTRAVENOUS at 08:55

## 2019-01-01 RX ADMIN — CITALOPRAM HYDROBROMIDE 20 MG: 20 TABLET ORAL at 20:30

## 2019-01-01 RX ADMIN — SODIUM CHLORIDE, PRESERVATIVE FREE 67 ML: 5 INJECTION INTRAVENOUS at 11:02

## 2019-01-01 RX ADMIN — MIDODRINE HYDROCHLORIDE 5 MG: 5 TABLET ORAL at 19:42

## 2019-01-01 RX ADMIN — SODIUM BICARBONATE 650 MG TABLET 650 MG: at 08:28

## 2019-01-01 RX ADMIN — MORPHINE SULFATE 2 MG: 2 INJECTION, SOLUTION INTRAMUSCULAR; INTRAVENOUS at 19:43

## 2019-01-01 RX ADMIN — PREDNISONE 25 MG: 5 TABLET ORAL at 08:58

## 2019-01-01 RX ADMIN — CITALOPRAM HYDROBROMIDE 20 MG: 20 TABLET ORAL at 20:05

## 2019-01-01 RX ADMIN — EPINEPHRINE 30 MCG: 1 INJECTION PARENTERAL at 09:04

## 2019-01-01 RX ADMIN — SODIUM CHLORIDE, POTASSIUM CHLORIDE, SODIUM LACTATE AND CALCIUM CHLORIDE 500 ML: 600; 310; 30; 20 INJECTION, SOLUTION INTRAVENOUS at 06:13

## 2019-01-01 RX ADMIN — FENTANYL CITRATE 100 MCG: 50 INJECTION, SOLUTION INTRAMUSCULAR; INTRAVENOUS at 09:01

## 2019-01-01 RX ADMIN — ENOXAPARIN SODIUM 40 MG: 40 INJECTION SUBCUTANEOUS at 08:28

## 2019-01-01 RX ADMIN — OMEPRAZOLE 40 MG: 20 CAPSULE, DELAYED RELEASE ORAL at 09:14

## 2019-01-01 RX ADMIN — ALPRAZOLAM 0.5 MG: 0.5 TABLET ORAL at 04:36

## 2019-01-01 RX ADMIN — ALPRAZOLAM 0.5 MG: 0.5 TABLET ORAL at 23:56

## 2019-01-01 RX ADMIN — PSEUDOEPHEDRINE HCL 30 MG: 30 TABLET, FILM COATED ORAL at 13:10

## 2019-01-01 RX ADMIN — LEVOFLOXACIN 750 MG: 250 TABLET, FILM COATED ORAL at 20:05

## 2019-01-01 RX ADMIN — Medication 0.5 MG: at 00:58

## 2019-01-01 RX ADMIN — SODIUM CHLORIDE, POTASSIUM CHLORIDE, SODIUM LACTATE AND CALCIUM CHLORIDE 500 ML: 600; 310; 30; 20 INJECTION, SOLUTION INTRAVENOUS at 18:26

## 2019-01-01 RX ADMIN — FENTANYL CITRATE 25 MCG: 50 INJECTION, SOLUTION INTRAMUSCULAR; INTRAVENOUS at 08:30

## 2019-01-01 RX ADMIN — ALPRAZOLAM 0.5 MG: 0.5 TABLET ORAL at 12:56

## 2019-01-01 RX ADMIN — MIDODRINE HYDROCHLORIDE 15 MG: 5 TABLET ORAL at 20:56

## 2019-01-01 RX ADMIN — OXYCODONE HYDROCHLORIDE 10 MG: 5 TABLET ORAL at 08:36

## 2019-01-01 RX ADMIN — MIDODRINE HYDROCHLORIDE 5 MG: 5 TABLET ORAL at 10:46

## 2019-01-01 RX ADMIN — SUGAMMADEX 300 MG: 100 INJECTION, SOLUTION INTRAVENOUS at 10:13

## 2019-01-01 RX ADMIN — IOPAMIDOL 66 ML: 755 INJECTION, SOLUTION INTRAVENOUS at 11:02

## 2019-01-01 RX ADMIN — SODIUM CHLORIDE, POTASSIUM CHLORIDE, SODIUM LACTATE AND CALCIUM CHLORIDE: 600; 310; 30; 20 INJECTION, SOLUTION INTRAVENOUS at 16:40

## 2019-01-01 RX ADMIN — NOREPINEPHRINE BITARTRATE 0.06 MCG/KG/MIN: 1 INJECTION, SOLUTION, CONCENTRATE INTRAVENOUS at 08:18

## 2019-01-01 RX ADMIN — FENTANYL CITRATE 50 MCG: 50 INJECTION, SOLUTION INTRAMUSCULAR; INTRAVENOUS at 09:09

## 2019-01-01 RX ADMIN — ALPRAZOLAM 0.5 MG: 0.5 TABLET ORAL at 11:26

## 2019-01-01 RX ADMIN — PSEUDOEPHEDRINE HCL 30 MG: 30 TABLET, FILM COATED ORAL at 15:33

## 2019-01-01 RX ADMIN — MAGNESIUM SULFATE HEPTAHYDRATE 4 G: 40 INJECTION, SOLUTION INTRAVENOUS at 01:12

## 2019-01-01 RX ADMIN — HYDROCORTISONE SODIUM SUCCINATE 50 MG: 100 INJECTION, POWDER, FOR SOLUTION INTRAMUSCULAR; INTRAVENOUS at 23:45

## 2019-01-01 RX ADMIN — ROCURONIUM BROMIDE 50 MG: 10 INJECTION INTRAVENOUS at 09:16

## 2019-01-01 RX ADMIN — FENTANYL CITRATE 25 MCG: 50 INJECTION, SOLUTION INTRAMUSCULAR; INTRAVENOUS at 08:34

## 2019-01-01 RX ADMIN — SODIUM CHLORIDE, POTASSIUM CHLORIDE, SODIUM LACTATE AND CALCIUM CHLORIDE 500 ML: 600; 310; 30; 20 INJECTION, SOLUTION INTRAVENOUS at 05:28

## 2019-01-01 RX ADMIN — Medication 4 MCG: at 08:34

## 2019-01-01 RX ADMIN — MIDAZOLAM 1 MG: 1 INJECTION INTRAMUSCULAR; INTRAVENOUS at 08:03

## 2019-01-01 ASSESSMENT — MIFFLIN-ST. JEOR
SCORE: 1014.56
SCORE: 1024.09

## 2019-01-01 ASSESSMENT — ACTIVITIES OF DAILY LIVING (ADL)
ADLS_ACUITY_SCORE: 15
ADLS_ACUITY_SCORE: 18
ADLS_ACUITY_SCORE: 15
ADLS_ACUITY_SCORE: 17
ADLS_ACUITY_SCORE: 15
ADLS_ACUITY_SCORE: 16
IADL_COMMENTS: OT: PT WAS IND
ADLS_ACUITY_SCORE: 16
ADLS_ACUITY_SCORE: 17
ADLS_ACUITY_SCORE: 16
ADLS_ACUITY_SCORE: 16
ADLS_ACUITY_SCORE: 12
ADLS_ACUITY_SCORE: 16
ADLS_ACUITY_SCORE: 16
ADLS_ACUITY_SCORE: 18
ADLS_ACUITY_SCORE: 15
ADLS_ACUITY_SCORE: 15
ADLS_ACUITY_SCORE: 16
ADLS_ACUITY_SCORE: 18
ADLS_ACUITY_SCORE: 16
ADLS_ACUITY_SCORE: 16
ADLS_ACUITY_SCORE: 15
ADLS_ACUITY_SCORE: 16
ADLS_ACUITY_SCORE: 15
ADLS_ACUITY_SCORE: 16
ADLS_ACUITY_SCORE: 15
ADLS_ACUITY_SCORE: 16
ADLS_ACUITY_SCORE: 18
ADLS_ACUITY_SCORE: 16
ADLS_ACUITY_SCORE: 15
ADLS_ACUITY_SCORE: 17
ADLS_ACUITY_SCORE: 18
ADLS_ACUITY_SCORE: 18
ADLS_ACUITY_SCORE: 16
ADLS_ACUITY_SCORE: 17
ADLS_ACUITY_SCORE: 16
ADLS_ACUITY_SCORE: 18
ADLS_ACUITY_SCORE: 19
ADLS_ACUITY_SCORE: 17
ADLS_ACUITY_SCORE: 16
ADLS_ACUITY_SCORE: 16

## 2019-01-01 ASSESSMENT — PAIN DESCRIPTION - DESCRIPTORS
DESCRIPTORS: ACHING
DESCRIPTORS: SORE
DESCRIPTORS: ACHING
DESCRIPTORS: SORE
DESCRIPTORS: SORE
DESCRIPTORS: SPASM
DESCRIPTORS: CONSTANT
DESCRIPTORS: SORE
DESCRIPTORS: SHARP

## 2019-01-01 ASSESSMENT — PAIN SCALES - GENERAL
PAINLEVEL: NO PAIN (0)
PAINLEVEL: MILD PAIN (2)

## 2019-01-14 NOTE — LETTER
2019       RE: Suzi Gonsales  [address] [place] [address]     Dear Colleague,    Thank you for referring your patient, Suzi Gonsales, to the RADIATION ONCOLOGY CLINIC. Please see a copy of my visit note below.    Radiation Oncology Follow-up Visit  2018    PATIENT NAME: Suzi Gonsales  MRN: [id number]   : 1965     DISEASE TREATED: Stage IV renal cell carcinoma    RADIATION THERAPY DELIVERED:   Course 1: GK SRS   18   A: Left resection cavity 16 Gy to the 50% IDL  B: Lt Cerebellar 22 Gy to the 50% IDL  C: Rt Cerebellar 22 Gy to the 90% IDL  D: Lt Occipital 22 Gy to the 60% IDL  E: Lt Frontal 22 Gy to the 80% IDL      2 Lt Lung 2000 cGy in 5 fx                 GRACE                            AP                           PA          3 Lt femur: 2000 cGy in 5 fractions via 18 MV photons, AP/PA               AP                             PA      INTERVAL SINCE COMPLETION OF RADIATION THERAPY:   GK 8 mo ago     HPI:   Ms. Gonsales is a 53-year-old woman with metastatic renal cell carcinoma originally diagnosed in 2014 after presenting with right flank pain and hematuria. 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 Her staging scans at that time demonstrated pulmonary nodules and these were initially observed. However, CT CAP 17 showed enlargement of these nodules and multiple new lesions including in the left adrenal gland and pancreas, and a diagnoses of metastatic renal cell carcinoma was made. The patient was started on systemic therapy under the care of Dr. Green. Her chemotherapy history is detailed below. In brief, she has received 4 cycles of IL-2 followed by disease progression, 3 months of opdivo followed again by progression, and most recently received cabozantinib in 2018     The patient has previously received two courses of radiation therapy. On 3/6/18, she  "underwent a craniotomy for a cerebellar lesion and on 4/5/18, received Gamma Knife radiosurgery to the resection cavity along with four other small incidentally discovered lesions.     She was scheduled to begin cabozantinib shortly thereafter, however presented with worsening left shoulder and upper extremity due to disease progression in the left upper lobe and supraclavicular region. She received palliative radiation with 20 Gy in 5 fractions and completed this on 4/16/18 with marked symptomatic relief.     She saw Dr. Green on 6/6/18, and there was noted on CT a lytic lesion in the posterior aspect of the left femoral head, concerning for impending fracture.  As such she underwent a course of radiotherapy to a dose of 20 Gy in 5 fractions as detailed above. She subseqently had a Prophylactic and internal fixation using a hip screw left hip  In October 2018.      Most recently, Ms. Gonsales had a CT surveillance scan with a CT chest abdomen pelvis in December 2018   This study continued to demonstrate widely metastatic disease stable since prior comparison CT.    SUBJECTIVE:   She is here today after having an MRI to evaluate for CNS disease.  She is mild  minimal headaches nd  reports that she is in no pain.   She has fatigue from the cabozantinib.  She has a \"runny nose\" but no fever or chills.   She had  Known pleural effusion that has been tapped.  She has SOB with exertion, but if she stays relatively inactive there is no sob or cough.     PHYSICAL EXAM:  Vital Signs: /80   Pulse 96   Wt 49.9 kg (110 lb)   LMP 12/20/2013   BMI 21.48 kg/m     Gen: Alert, in NAD  Eyes: EOMI, sclera anicteric  Pulm: Breathing comfortably on room air  CV: Well-perfused, no cyanosis  Skin: Normal color and turgor  Neurologic/Psych/MSK: A/Ox3, normal gait    LABS AND IMAGING:  Reviewed.  No evidence of Brain metastasis.     IMPRESSION:   Ms. Gonsales is a 53 year old female with stage IV renal cell carcinoma who is undergone " multiple courses of palliative radiotherapy to the brain, left chest, and left femur.  She presents today for routine follow up post GK.      PLAN:   1. RTC in 3 months with MRI brain  2. Continued medical oncology follow-up  3. Body imaging for surveillance as directed by medical oncology      Huyen Cuellar MD  [phone]  CC  Patient Care Team:  Molly Adame APRN CNP as PCP - General (Nurse Practitioner)  Henri Green MD as MD (Oncology)  Quintin Palencia MD as MD (Neurosurgery)  Alisa Merritt, EULOGIO as Nurse Coordinator (Oncology)  Yamilka Hidalgo, RN as Nurse Coordinator (Neurology)  Randy Dunn MD as MD (Orthopedics)  Day Ren PA-C as Physician Assistant (Oncology)  Charo Birmingham, RN as Nurse Coordinator (Oncology)

## 2019-01-14 NOTE — NURSING NOTE
FOLLOW-UP VISIT    Patient Name: Suzi Gonsales      : 1965     Age: 53 year old        ______________________________________________________________________________     Chief Complaint   Patient presents with     Cancer     follow up:Gamma knife radiosurgery brain mets 18, Left supraclavicular nodes and lung 2000 cGy completed on 18, Left femur 2000 cGy completed on 18     /80   Pulse 96   Wt 49.9 kg (110 lb)   LMP 2013   BMI 21.48 kg/m         Pain  Denies    Labs  Other Labs: No    Imaging  MRI: done today    Other Appointments:     MD Name: Kia HINOJOSA Med Onc Appointment Date: 19 w/ labs   MD Name: Appointment Date:   MD Name: Appointment Date:   Other Appointment Notes:     Residual Radiation side effect: sinus congestion- runny nose,      Additional Instructions:     Nurse face-to-face time: Level 2:  5 min face to face time

## 2019-01-14 NOTE — PROGRESS NOTES
Radiation Oncology Follow-up Visit  2018    PATIENT NAME: Suzi Gonsales  MRN: [id number]   : 1965     DISEASE TREATED: Stage IV renal cell carcinoma    RADIATION THERAPY DELIVERED:   Course 1: GK SRS   18   A: Left resection cavity 16 Gy to the 50% IDL  B: Lt Cerebellar 22 Gy to the 50% IDL  C: Rt Cerebellar 22 Gy to the 90% IDL  D: Lt Occipital 22 Gy to the 60% IDL  E: Lt Frontal 22 Gy to the 80% IDL      2 Lt Lung 2000 cGy in 5 fx                 GRACE                            AP                           PA          3 Lt femur: 2000 cGy in 5 fractions via 18 MV photons, AP/PA               AP                             PA      INTERVAL SINCE COMPLETION OF RADIATION THERAPY:   GK 8 mo ago     HPI:   Ms. Gonsales is a 53-year-old woman with metastatic renal cell carcinoma originally diagnosed in 2014 after presenting with right flank pain and hematuria. 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 Her staging scans at that time demonstrated pulmonary nodules and these were initially observed. However, CT CAP 17 showed enlargement of these nodules and multiple new lesions including in the left adrenal gland and pancreas, and a diagnoses of metastatic renal cell carcinoma was made. The patient was started on systemic therapy under the care of Dr. Green. Her chemotherapy history is detailed below. In brief, she has received 4 cycles of IL-2 followed by disease progression, 3 months of opdivo followed again by progression, and most recently received cabozantinib in 2018     The patient has previously received two courses of radiation therapy. On 3/6/18, she underwent a craniotomy for a cerebellar lesion and on 18, received Gamma Knife radiosurgery to the resection cavity along with four other small incidentally discovered lesions.     She was scheduled to begin cabozantinib  "shortly thereafter, however presented with worsening left shoulder and upper extremity due to disease progression in the left upper lobe and supraclavicular region. She received palliative radiation with 20 Gy in 5 fractions and completed this on 4/16/18 with marked symptomatic relief.     She saw Dr. Green on 6/6/18, and there was noted on CT a lytic lesion in the posterior aspect of the left femoral head, concerning for impending fracture.  As such she underwent a course of radiotherapy to a dose of 20 Gy in 5 fractions as detailed above. She subseqently had a Prophylactic and internal fixation using a hip screw left hip  In October 2018.      Most recently, Ms. Gonsales had a CT surveillance scan with a CT chest abdomen pelvis in December 2018   This study continued to demonstrate widely metastatic disease stable since prior comparison CT.    SUBJECTIVE:   She is here today after having an MRI to evaluate for CNS disease.  She is mild  minimal headaches nd  reports that she is in no pain.   She has fatigue from the cabozantinib.  She has a \"runny nose\" but no fever or chills.   She had  Known pleural effusion that has been tapped.  She has SOB with exertion, but if she stays relatively inactive there is no sob or cough.     PHYSICAL EXAM:  Vital Signs: /80   Pulse 96   Wt 49.9 kg (110 lb)   LMP 12/20/2013   BMI 21.48 kg/m    Gen: Alert, in NAD  Eyes: EOMI, sclera anicteric  Pulm: Breathing comfortably on room air  CV: Well-perfused, no cyanosis  Skin: Normal color and turgor  Neurologic/Psych/MSK: A/Ox3, normal gait    LABS AND IMAGING:  Reviewed.  No evidence of Brain metastasis.     IMPRESSION:   Ms. Gonsales is a 53 year old female with stage IV renal cell carcinoma who is undergone multiple courses of palliative radiotherapy to the brain, left chest, and left femur.  She presents today for routine follow up post GK.      PLAN:   1. RTC in 3 months with MRI brain  2. Continued medical oncology " follow-up  3. Body imaging for surveillance as directed by medical oncology      Huyen Cuellar MD  [phone]  CC  Patient Care Team:  Molly Adame APRN CNP as PCP - General (Nurse Practitioner)  Molly Adame APRN CNP as PCP - Assigned PCP  Henri Green MD as MD (Oncology)  Quintin Palencia MD as MD (Neurosurgery)  Alisa Merritt RN as Nurse Coordinator (Oncology)  Yamilka Hidalgo, RN as Nurse Coordinator (Neurology)  Randy Dunn MD as MD (Orthopedics)  Huyen Cuellar MD as Referring Physician (Radiation Oncology)  Day Ren PA-C as Physician Assistant (Oncology)  Charo Birmingham, RN as Nurse Coordinator (Oncology)

## 2019-01-23 NOTE — NURSING NOTE
Chief Complaint   Patient presents with     Port Draw     labs drawn via port by RN     /74 (BP Location: Left arm, Patient Position: Chair, Cuff Size: Adult Regular)   Pulse 86   Temp 96.9  F (36.1  C) (Oral)   Wt 49.3 kg (108 lb 11.2 oz)   LMP 12/20/2013   SpO2 94%   BMI 21.23 kg/m      Port accessed by RN in lab. Labs collected and sent. Line flushed with NS & Heparin. Pt tolerated well.   Pt checked in for next appointment.    Estee Hampton, RN

## 2019-01-23 NOTE — NURSING NOTE
Oncology Rooming Note    January 23, 2019 1:26 PM   Suzi Gonsales is a 53 year old female who presents for:    Chief Complaint   Patient presents with     Port Draw     labs drawn via port by RN     Oncology Clinic Visit     Kidney Cancer     Initial Vitals: /74 (BP Location: Left arm, Patient Position: Chair, Cuff Size: Adult Regular)   Pulse 86   Temp 96.9  F (36.1  C) (Oral)   Resp 16   Ht 1.524 m (5')   Wt 49.3 kg (108 lb 11.2 oz)   LMP 12/20/2013   SpO2 94%   BMI 21.23 kg/m   Estimated body mass index is 21.23 kg/m  as calculated from the following:    Height as of this encounter: 1.524 m (5').    Weight as of this encounter: 49.3 kg (108 lb 11.2 oz). Body surface area is 1.44 meters squared.  Mild Pain (2) Comment: Data Unavailable   Patient's last menstrual period was 12/20/2013.  Allergies reviewed: Yes  Medications reviewed: Yes    Medications: Medication refills not needed today.  Pharmacy name entered into Social[employer]:    [id number] - [place], [place] - [address] AT [facility] [address]  [facility] USE FOR MAILING ONLY - [place], [facility] - [place], [place] - [address] [place] [address]    Clinical concerns: No New Concerns    5 minutes for nursing intake (face to face time)     MICA Marcano

## 2019-01-23 NOTE — PROCEDURES
INTERVENTIONAL PULMONOLOGY       Procedure(s):      Thoracentesis (left chest)    Indication:  L pleural effusion    Attending of Record:  Miky Sherman MD    Medications:    None    Sedation Time:   None    Time Out:  Performed    The patient's medical record has been reviewed.  The indication for the procedure was reviewed.  The necessary history and physical examination was performed and reviewed.  The risks, benefits and alternatives of the procedure were discussed with the the patient in detail and she had the opportunity to ask questions.  I discussed in particular the potential complications including risks of minor or life-threatening bleeding and/or infection, respiratory failure, pneumothorax, and discomfort.  All questions were answered to the best of my ability.  Verbal and written informed consent was obtained.  The proposed procedure and the patient's identification were verified prior to the procedure by the physician and the nurse, respiratory therapist.    The patient was assessed for the adequacy for the procedure and to receive medications.   Mental Status:  Alert and oriented x 3  Pulmonary:  Decreased breathsound throughout, pleural fluid on ultrasound, moderate in appearance  CV:  RRR, no murmurs or gallops  ASA Grade:  (II)  Mild systemic disease    After clinical evaluation and reviewing the indication, risks, alternatives and benefits of the procedure the patient was deemed to be in satisfactory condition to undergo the procedure.      Immediately before administration of medications the patient was re-assessed for adequacy to receive sedatives including the heart rate, respiratory rate, mental status, oxygen saturation, blood pressure and adequacy of pulmonary ventilation. These same parameters were continuously monitored throughout the procedure.    A Tuberculosis risk assessment was performed:  The patient has no known RISK of Tuberculosis    The procedure was performed in a negative  airflow room: no, not necessary, not a bronch    Maneuvers / Procedure:      Thoracentesis: Once the site was marked using US, A 8F Arrow thoracentesis catheter was used to aspirate fluid. The patient's left chest was prepped in sterile fashion. A total of 5cc 1%lidocaine was used to anesthetize the area. A finder needle was used to aspirate fluid. The tube was then advanced into the pleural space while aspirating pleural fluid. The fluid was yellow in color/consistency. A total of 1100 fluid was removed. Fluid was sent to micro and cytology for analysis     Any disposable equipment was visually inspected and deemed to be intact immediately post procedure.        Recommendations:     -->  Follow up in oncology clinic  -->  If she has persistent shortness of breath, she may need a pulmonary consult.      Miky Sherman MD  Interventional Pulmonary  Department of Pulmonary, Allergy, Critical Care and Sleep Medicine   [facility]    Roberta THAKUR, OCCARLOS  Clinical Nurse Specialist  Department of Thoracic Surgery  Office: [phone]  Email: [email or web address]    Ofelia Prater  Interventional Pulmonology Surgery Scheduler  Office: [phone]  Email: [email or web address]

## 2019-01-23 NOTE — PROGRESS NOTES
"Oncology/Hematology Visit Note  Jan 23, 2019    Reason for Visit: mRC, routine followup    History of Present Illness: Suzi presented to ED with hematuria and right flank pain thinking she had a renal stone in December 2014. She was worked up with a CT abd/pelvis which suggested a renal mass. She was referred to Dr. Max Zelaya in [facility]. 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 Per charts tumor resection had negative margins and it was staged at pT2bN0.     Her staging work up was negative except for pulmonary nodules. She has been followed every 6 months with scans. CT CAP on 5/11/17 showed enlarging hilar LAD, enlarging pulmonary nodules, adrenal nodules and a pancreatic body mass. Biopsies of hilar LN, adrenal nodule and pancreatic mass were + for RCC.      ONCOLOGY THERAPY:   6/6/17-8/15/18-- IL-2, of which she received 4 cycles   11/22/17 CT CAP showed disease progression  She completed three months of Opdivo and then 12/5/17-2/13/18- 3 months of Opdivo  3/6/18- cerebellar lesion s/p craniotomy and GK 4/5 to tumor bed and 4 new lesions  4/12/18- 5 fractions of XRT to left supraclav/MERARY  4/19/18- Cabo 40 mg   6/20/18- Femur radiation  10/1/18- surgical stabilization of the femur   9/30/18-10/22/18 Off Cabo due to femur surgery      Interval History:  Suzi is here today with concerns of JIMÉNEZ.  She called us this morning so we were able to get her in for a thora.  They were able to drain 1100 ml.  She was so short of breath the last few days she could hardly do any activity without \"nearly choking for air\".  She is feeling much better.  In regards to the Cabo- she is having fatigue and limited appetite which is stable.  She has not had nausea or diarrhea.  She has been sticking around home, hardly getting out of the house except 1-2 x a week, feeling a little depressed and withdrawn.  She is eating less and losing weight, but " admits in the last 2 weeks because of her breathing she has not been getting up to eat as much.     Current Outpatient Medications   Medication Sig     acetaminophen (TYLENOL) 325 MG tablet Take 3 tablets (975 mg) by mouth every 6 hours     ALPRAZolam (XANAX) 0.5 MG tablet Take 1 tablet (0.5 mg) by mouth 3 times daily as needed for anxiety     buPROPion (WELLBUTRIN XL) 300 MG 24 hr tablet Take 300 mg by mouth every morning      Cabozantinib S-Malate (CABOMETYX) 40 MG Take 1 tablet (40 mg) by mouth daily Take on an empty stomach 1 hour before or 2 hours after a meal. Avoid grapefruit and grapefruit juice.     Cabozantinib S-Malate (CABOMETYX) 40 MG Take 1 tablet (40 mg) by mouth daily Take on an empty stomach 1 hour before or 2 hours after a meal. Avoid grapefruit and grapefruit juice.     Cabozantinib S-Malate (CABOMETYX) 40 MG Take 1 tablet (40 mg) by mouth daily Take on an empty stomach 1 hour before or 2 hours after a meal. Avoid grapefruit and grapefruit juice.     citalopram (CELEXA) 20 MG tablet Take 1 tablet (20 mg) by mouth every evening     diphenoxylate-atropine (LOMOTIL) 2.5-0.025 MG per tablet Take 1-2 tablets by mouth 4 times daily as needed for diarrhea     HYDROcodone-acetaminophen (NORCO) 5-325 MG per tablet      omeprazole (PRILOSEC) 40 MG capsule Take 1 capsule (40 mg) by mouth daily     ondansetron (ZOFRAN) 8 MG tablet Take 1 tablet (8 mg) by mouth every 8 hours as needed for nausea     oxyCODONE IR (ROXICODONE) 5 MG tablet Take 1-2 tablets (5-10 mg) by mouth every 4 hours as needed for moderate to severe pain     polyethylene glycol (MIRALAX/GLYCOLAX) Packet Take 17 g by mouth daily as needed for constipation     Probiotic Product (PROBIOTIC PO) Take by mouth daily (with lunch)     sennosides (SENOKOT) 8.6 MG tablet Take 1-2 tablets by mouth 2 times daily as needed for constipation (no stool in 24 hrs)     zolpidem (AMBIEN) 5 MG tablet Take 1 tablet (5 mg) by mouth nightly as needed for sleep      zolpidem (AMBIEN) 5 MG tablet TAKE 1 TABLET BY MOUTH AT BEDTIME AS NEEDED FOR SLEEP     No current facility-administered medications for this visit.         Physical Examination:  General: The patient is a pleasant female in no acute distress.  /74 (BP Location: Left arm, Patient Position: Chair, Cuff Size: Adult Regular)   Pulse 86   Temp 96.9  F (36.1  C) (Oral)   Resp 16   Ht 1.524 m (5')   Wt 49.3 kg (108 lb 11.2 oz)   LMP 12/20/2013   SpO2 94%   BMI 21.23 kg/m    Wt Readings from Last 10 Encounters:   01/23/19 49.3 kg (108 lb 11.2 oz)   01/14/19 49.9 kg (110 lb)   12/12/18 52.4 kg (115 lb 8 oz)   11/23/18 53.1 kg (117 lb 0 oz)   11/14/18 53.1 kg (117 lb)   11/06/18 53.1 kg (117 lb 1.8 oz)   10/16/18 53.4 kg (117 lb 11.2 oz)   10/16/18 53.4 kg (117 lb 11.2 oz)   10/01/18 53.6 kg (118 lb 2.7 oz)   09/27/18 54.2 kg (119 lb 8 oz)     Limited exam today.   Heart: Regular rate and rhythm.   Lungs: Breathing comfortably on room air.  Clear breath sounds bilaterally, no dullness to percussion.   No LORIE  Ambulated down hallway and was able to maintain 94%RA down and back of hallway and breathing was stable.       Laboratory Data:   1/23/2019 13:15   Sodium 134   Potassium 4.1   Chloride 103   Carbon Dioxide 23   Urea Nitrogen 8   Creatinine 0.73   GFR Estimate >90   GFR Estimate If Black >90   Calcium 8.8   Anion Gap 8   Albumin 2.9 (L)   Protein Total 7.2   Bilirubin Total 0.2   Alkaline Phosphatase 134   ALT 32   AST 46 (H)   Glucose 80   WBC 3.6 (L)   Hemoglobin 12.0   Hematocrit 36.4   Platelet Count 301   RBC Count 3.69 (L)   MCV 99     Assessment and Plan:  53 year old with mRCC s/p 4 cycles of IL-2.  She had a stable CT after 2 cycles, mild disease progression after 4 cycles.  After a short break, CT showed worsening disease and she has started with Opdivo.   Three months later she had new brain disease and worsening systemic disease and started Cabo on 4/19, since then had regression and stable  disease.    JIMÉNEZ/pleural effusion:She continues to have JIMÉNEZ and after her effusion is drained it slowly comes back which compromises her breathing, then she seems to wait too long to get another drain.  Today was her 8th and she was feeling much better afterwards today.  Discussed with thoracic and Suzi and we should plan to have a pleurex, we will work on this. She also needs more activity, will refer her to Cancer Rehab.       RCC: Suzi continues on Cabo 40 mg daily 6 days out of the week.  She is tolerating this much better- no nausea or diarrhea.  However she has ongoing fatigue (multifactorial).    -CT and Dr Green in 6 weeks    Brain mets: no neuro symptoms, HA or confusion.   Craniotomy successful on 3/6/18.  Had GK to the bed on 4/5/18 and FOUR new lesions were noted during the planning MRI.  6/5/18, 9/6/18, 1/14/19---brain MRI stable.      Left femoral head lytic lesion:   Present since November 2017. 10/1/18 had surgical stabilization.  Overdue for Xgeva    Diarrhea: no currently bothering her    Mood: feeling a little down and withdrawn, discussed counseling with family and more activity during the day.     Health maintenance : had flu shot, repeat Shingrix in Jan-Feb

## 2019-01-23 NOTE — DISCHARGE INSTRUCTIONS
Discharge Instructions for Thoracentesis    Thoracentesis is a procedure that removes extra fluid (pleural effusion) from the pleural space. This space is between the outside surface of the lungs (pleura) and the chest wall. The procedure may be done to take a sample of the fluid for testing to help find the cause. Or it may be done to drain the extra fluid if you are having trouble breathing.  Home care    You may have some pain after the procedure. Your doctor can prescribe or recommend pain medicine for you to take at home, if needed. Take these exactly as directed. If you stopped taking other medicines before the procedure, ask your doctor when you can start them again.    Take it easy for 48 hours after the procedure. Don't do anything active until your doctor says it s OK.    Don't do strenuous activities, such as lifting, until your doctor says it s OK.    You will have a small bandage over the puncture site. You may remove the bandage in 24 hours.    Check the puncture site for the signs of infection listed below.  Follow-up  Make a follow-up appointment with your doctor as directed. During your follow-up visit, your doctor will check your healing. Be sure to let your doctor know how you are feeling.  When to call your doctor  Call your doctor right away if you have any of the following:    Coughing up blood    Chest pain. If chest pain suddenly gets worse, it may be an emergency.    Shortness of breath    Fever of 100.4 F (38 C) or higher, or as directed by your healthcare provider    Pain that doesn't get better after taking pain medicine    Signs of infection at the puncture site. These include increased pain, redness, swelling, or warmth.    Fluid draining from the puncture site   Date Last Reviewed: 10/1/2016    5399-7221 [employer]. [address] [place] [address], [place] [address]. All rights reserved. This information is not intended as a substitute for professional medical care. Always  follow your healthcare professional's instructions.

## 2019-01-25 NOTE — PROGRESS NOTES
01/25/19 1000   Quick Adds   Type of Visit Initial OP PT Evaluation   General Information   Start of Care Date 01/25/19   Referring Physician Day Ren PA-C   Orders Evaluate and Treat as Indicated   Additional Orders fatigue   Order Date 01/23/19   Medical Diagnosis kidney cancer with mets to brain, bone, lungs   Onset of illness/injury or Date of Surgery 07/01/17   Precautions/Limitations fall precautions;immunosuppressed   Pertinent history of current problem (include personal factors and/or comorbidities that impact the POC) kidney cancer diagnosed in 2014, with mets  2016 and met to brain and bone 2018. Lytic lesion of femur with surgical stabilization on 10/1/18, craniotomy on 3/2018. Radiation to brain and chest in 2018. bed bound for 2 months after that.  Recent c/o JIMÉNEZ and hardly leaving house, weight loss of 11# in last 4 months, feels fatigued and somewhat down. Had 1100 ml drained on 1/23/19 and felt better. Takes daily oral chemo, Capo.  L hip hurts. She had 1 PT session for hip.    Prior level of function comment prior to diagnosis: worked full time as , busy socially, walking.    Previous/Current Treatment Physical Therapy   Improvement after PT Mild  (had 1 visit after hip surgery, then couldn't schedule it)   Patient role/Employment history Disabled  (on medical leave from [employer])   [facility]   Home/Community Accessibility Comments steps are difficult   Current Assistive Devices Standard Cane   Patient/Family Goals Statement less fatigue, get stronger   Fall Risk Screen   Fall screen completed by PT   Have you fallen 2 or more times in the past year? No   Have you fallen and had an injury in the past year? No   Is patient a fall risk? No   Abuse Screen (yes response referral indicated)   Feels Unsafe at Home or Work/School no   Feels Threatened by Someone no   Does Anyone Try to Keep You From Having Contact with Others or Doing Things Outside  Your Home? no   Physical Signs of Abuse Present no   System Outcome Measures   Outcome Measures Cancer Rehab   FACIT Fatigue Subscale (score out of 52). The higher the score, the better the QOL. 5   Six Minute Walk (meters). An increase of 70 or more meters indicates statistically significant change. (pt 20 ' late, didn't have time to do)   Pain   Patient currently in pain Yes   Pain location L hip and thigh   Pain rating 0-6/10   Pain description Ache   Vitals Signs   Vital Signs Comments wt 49.3   Cognitive Status Examination   Orientation orientation to person, place and time   Level of Consciousness alert   Follows Commands and Answers Questions 100% of the time   Integumentary   Integumentary Comments port R chest   Range of Motion (ROM)   ROM Comment decreased L hip flexion, extension, IR, decreased B calves   Strength   Strength Comments : r: 38,28, 25#, l: 29,22, 22#, 4/5 B shoulder flexion and abduction, 3/5 trunk flexion, 4/5 L hip extension and abduction   Locomotion   Wheel Chair Mobility Comments took wc from lobby, but walks at home w/o cane   Planned Therapy Interventions   Planned Therapy Interventions balance training;IADL retraining;ROM;strengthening;stretching;neuromuscular re-education;manual therapy   Clinical Impression   Criteria for Skilled Therapeutic Interventions Met yes, treatment indicated   PT Diagnosis B UE weakness, L LE weakness, decreased ROM   Influenced by the following impairments weakness, decreased ROM   Functional limitations due to impairments needs to nap during day, difficult to participate in social activities, see Facit   Clinical Presentation Stable/Uncomplicated   Clinical Presentation Rationale Pt presents with long standing fatigue after many treamtents for cancer and is expected to make good progress.   Clinical Decision Making (Complexity) Low complexity   Therapy Frequency 2 times/Week   Predicted Duration of Therapy Intervention (days/wks) 12 visits   Risk &  Benefits of therapy have been explained Yes   Patient, Family & other staff in agreement with plan of care Yes   Clinical Impression Comments Pt is deconditioned after 4 years of cancer treatment and has not had an exercise program.   Education Assessment   Preferred Learning Style Listening;Reading;Demonstration   Barriers to Learning No barriers   GOALS   PT Eval Goals 1;2;3   Goal 1   Goal Identifier Facit-f   Goal Description Patient will have less fatigue while performing daily activities and mobility as indicated by improved FACIT-F score of 7 or more points   Target Date 04/30/19   Goal 2   Goal Identifier home exercise program   Goal Description In order to improve tolerance for functional mobility, ADLs, and recreational activities outside of physical therapy, patient will demonstrate independence with home program of exercise and lifestyle modification and possible compression.    Target Date 04/30/19   Goal 3   Goal Identifier 6 MWT   Goal Description Patient will have improved tolerance for community ambulation as demonstrated an increase in 6 min walk test of 70 meters.   Target Date 03/15/19   Total Evaluation Time   PT Tod Low Complexity Minutes (70605) 15

## 2019-02-05 NOTE — TELEPHONE ENCOUNTER
"Writer contacted patient at request of Kia Gela after symptomatic text message was received on personal cell phone.     Pt reports she has noticed a decline in her health. Reports SOB and JIMÉNEZ. Reports being congested and having a cold for \"a couple of months\"    Per Kia, patient should have CBC, CMP, and see Dr. Green tomorrow in clinic. If able move CT tomorrow prior to MD visit.     Block placed on 5:40 Dr. Green spot, CT moved to 10:40AM at [facility]. Patient updated. Writer will discuss with RNCC and Dr. Green tomorrow morning.     Addendum 2/6/19 1030: 1:30PM cancellation on Norma's schedule. InBasket routed to  requesting move up lab/MD visit. Left VM informing patient. Dr. Green notified.    Helen Knowles RN   [facility]    "

## 2019-02-06 NOTE — LETTER
2/6/2019       RE: Suzi Gonsales  [address] [place] [address]     Dear Colleague,    Thank you for referring your patient, Suzi Gonsales, to the [facility]. Please see a copy of my visit note below.    Oncology/Hematology Visit Note  Feb 6, 2019    Reason for Visit: mRCC, routine followup    History of Present Illness: Suzi presented to ED with hematuria and right flank pain thinking she had a renal stone in December 2014. She was worked up with a CT abd/pelvis which suggested a renal mass. She was referred to Dr. Max Zelaya in [facility]. 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 Per charts tumor resection had negative margins and it was staged at pT2bN0.     Her staging work up was negative except for pulmonary nodules. She has been followed every 6 months with scans. CT CAP on 5/11/17 showed enlarging hilar LAD, enlarging pulmonary nodules, adrenal nodules and a pancreatic body mass. Biopsies of hilar LN, adrenal nodule and pancreatic mass were + for RCC.      ONCOLOGY THERAPY:   6/6/17-8/15/18-- IL-2, of which she received 4 cycles   11/22/17 CT CAP showed disease progression  She completed three months of Opdivo and then 12/5/17-2/13/18- 3 months of Opdivo  3/6/18- cerebellar lesion s/p craniotomy and GK 4/5 to tumor bed and 4 new lesions  4/12/18- 5 fractions of XRT to left supraclav/MERARY  4/19/18- Cabo 40 mg   6/20/18- Femur radiation  10/1/18- surgical stabilization of the femur   9/30/18-10/22/18 Off Cabo due to femur surgery      Interval History:  Suzi is here today with concerns of JIMÉNEZ.  She reached out to us yesterday with worsening SOB.     She is being seen with restaging scans. She notes that she is SOB with minimal activity. She had to lay on the couch after walking from her car to her living room. She cannot dare to walk around the hospital and had to use the wheel chair. She can feel  palpitations.     She understands that this could be recurrent fluid accumulation and she would need pleurex catheter placement.       Current Outpatient Medications   Medication Sig     acetaminophen (TYLENOL) 325 MG tablet Take 3 tablets (975 mg) by mouth every 6 hours     ALPRAZolam (XANAX) 0.5 MG tablet Take 1 tablet (0.5 mg) by mouth 3 times daily as needed for anxiety     buPROPion (WELLBUTRIN XL) 300 MG 24 hr tablet Take 300 mg by mouth every morning      Cabozantinib S-Malate (CABOMETYX) 40 MG Take 1 tablet (40 mg) by mouth daily Take on an empty stomach 1 hour before or 2 hours after a meal. Avoid grapefruit and grapefruit juice.     citalopram (CELEXA) 20 MG tablet Take 1 tablet (20 mg) by mouth every evening     diphenoxylate-atropine (LOMOTIL) 2.5-0.025 MG per tablet Take 1-2 tablets by mouth 4 times daily as needed for diarrhea     HYDROcodone-acetaminophen (NORCO) 5-325 MG per tablet      omeprazole (PRILOSEC) 40 MG capsule Take 1 capsule (40 mg) by mouth daily     ondansetron (ZOFRAN) 8 MG tablet Take 1 tablet (8 mg) by mouth every 8 hours as needed for nausea     oxyCODONE IR (ROXICODONE) 5 MG tablet Take 1-2 tablets (5-10 mg) by mouth every 4 hours as needed for moderate to severe pain     polyethylene glycol (MIRALAX/GLYCOLAX) Packet Take 17 g by mouth daily as needed for constipation     Probiotic Product (PROBIOTIC PO) Take by mouth daily (with lunch)     sennosides (SENOKOT) 8.6 MG tablet Take 1-2 tablets by mouth 2 times daily as needed for constipation (no stool in 24 hrs)     zolpidem (AMBIEN) 5 MG tablet Take 1 tablet (5 mg) by mouth nightly as needed for sleep     Current Facility-Administered Medications   Medication     heparin 100 UNIT/ML injection 5 mL     sodium chloride (PF) 0.9% PF flush 20 mL        Physical Examination:  General: The patient is a pleasant female in no acute distress.  BP 99/66 (BP Location: Right arm, Patient Position: Sitting, Cuff Size: Adult Regular)   Pulse 94    Temp 97  F (36.1  C) (Oral)   Resp 18   Ht 1.524 m (5')   Wt 50.3 kg (110 lb 12.8 oz)   LMP 12/20/2013   SpO2 93%   BMI 21.64 kg/m     Wt Readings from Last 10 Encounters:   02/06/19 50.3 kg (110 lb 12.8 oz)   01/23/19 49.3 kg (108 lb 11.2 oz)   01/14/19 49.9 kg (110 lb)   12/12/18 52.4 kg (115 lb 8 oz)   11/23/18 53.1 kg (117 lb 0 oz)   11/14/18 53.1 kg (117 lb)   11/06/18 53.1 kg (117 lb 1.8 oz)   10/16/18 53.4 kg (117 lb 11.2 oz)   10/16/18 53.4 kg (117 lb 11.2 oz)   10/01/18 53.6 kg (118 lb 2.7 oz)     Limited exam today.   Heart: Regular rate and rhythm.   Lungs: Breathing comfortably on room air.  Clear breath sounds bilaterally, no dullness to percussion.   No LORIE  Ambulated down hallway and was able to maintain 94%RA down and back of hallway and breathing was stable.       Laboratory Data:  Recent Labs   Lab Test 02/06/19  1700 01/23/19  1315 12/12/18  1533 11/14/18  1531 10/16/18  1419    134 136 134 137   POTASSIUM 3.7 4.1 3.4 4.3 4.0   CHLORIDE 106 103 104 105 106   CO2 22 23 23 23 24   ANIONGAP 8 8 9 6 7   BUN 7 8 8 10 11   CR 0.81 0.73 0.82 0.88 0.80   * 80 90 88 143*   MUNDO 8.0* 8.8 7.7* 8.3* 8.6     Recent Labs   Lab Test 03/07/18  0400 08/24/17  1520 08/19/17  0535 08/18/17  0328 08/17/17  0557 08/16/17  1550 08/16/17  1241   MAG 2.2  --  1.8 2.3 2.0  --  3.1*   PHOS  --  3.4 2.2* 2.4* 2.3* 2.0*  --      Recent Labs   Lab Test 02/06/19  1700 01/23/19  1315 12/12/18  1533 11/14/18  1531 10/16/18  1419   WBC 3.3* 3.6* 3.0* 3.9* 4.1   HGB 11.4* 12.0 11.1* 12.7 11.0*    301 221 319 490*    99 98 99 102*   NEUTROPHIL 63.4 59.3 56.8 52.9 59.7     Recent Labs   Lab Test 02/06/19  1700 01/23/19  1315 12/12/18  1533  08/19/17  0535 08/18/17  0328 08/17/17  0557   BILITOTAL 0.2 0.2 0.3   < > 0.2 0.2 0.3   ALKPHOS 145 134 85   < > 60 41 43   ALT 30 32 72*   < > 50 32 21   AST 53* 46* 81*   < > 31 31 16   ALBUMIN 2.6* 2.9* 2.9*   < > 2.0* 1.9* 2.2*   LDH  --   --   --   --  214  199 182    < > = values in this interval not displayed.     TSH   Date Value Ref Range Status   11/14/2018 8.91 (H) 0.40 - 4.00 mU/L Final   04/13/2018 1.02 0.40 - 4.00 mU/L Final   01/31/2018 0.93 0.40 - 4.00 mU/L Final     No results for input(s): CEA in the last 74023 hours.  Results for orders placed or performed during the hospital encounter of 02/06/19   CT Chest/Abdomen/Pelvis w Contrast    Narrative    EXAMINATION: CT CHEST/ABDOMEN/PELVIS W CONTRAST, 2/6/2019 11:14 AM    TECHNIQUE:  Helical CT images from the lung apices through the  symphysis pubis were obtained with contrast.  Coronal and sagittal  reformatted images were generated at a workstation for further  assessment.    CONTRAST: 66ml isovue 370    COMPARISON: CT on 12/12/2018, 8/20/2018 chest x-ray on 1/23/2018    HISTORY: Kidney cancer, stage IV, unresectable, follow up; RCC with  extensive metastasis; Malignant neoplasm of right kidney (H)    FINDINGS:    Chest:    Right IJ Port-A-Cath tip terminating at the superior right atrium.    When compared to CT 12/12/2018, extensive amorphous enhancing soft  tissue and adenopathy throughout the mediastinum and both taran does  not appear to be convincingly changed. Several prominent and  borderline enlarged right and left axillary lymph nodes are stable to  slightly increased in size. The largest left axillary node measures  1.2 cm in short axis dimension (series 3, image 73). Previously, 1.2  cm. Several adjacent right axillary nodes (series 3, images ),  have increased in size.    Heart size is within normal limits. Moderate-sized pericardial  effusion increased since 12/12/2018. No main or lobar pulmonary  embolus. The aortic arch and main arterial branches are within normal  limits.    Moderate right pleural effusion significantly increased since  12/12/2018. Moderate left-sided pleural effusion stable to slightly  increased compared to 12/12/2018. Increased bilateral atelectasis.  Increase in  paraseptal thickening and subtle diffuse groundglass  opacities with increased peribronchial cuffing and edema. Unchanged 6  mm solid pulmonary nodule in the left lower lobe adjacent to the minor  fissure. Segmental areas of linear fibroatelectasis and associated  volume loss, similar to prior. The trachea and main bronchial tree are  patent.    Abdomen and pelvis:    Postoperative changes of right nephrectomy and right adrenalectomy.  Nephrectomy bed is clear. A few small hypodense lesions involving the  left kidney, too small to accurately characterize, but stable from the  prior. No hydronephrosis and no hydroureter. No suspicious solid left  renal lesion.    Lymph nodes: Slightly increased size of scattered centrally hypodense  and necrotic retroperitoneal lymph nodes, especially pronounced in the  left para-aortic and precaval regions. Portacaval node on series 3,  image 272 measures 1.2 cm in short axis dimension. Previously 1.0 cm.  Conglomerate left para-aortic adenopathy measures 3.2 x 1.1 cm (series  3, image 25). Previously, 2.4 x 1.1 cm. There are numerous prominent,  but nonenlarged mesenteric lymph nodes which are slightly more  conspicuous than on the prior. Stable size and appearance of a right  obturator node (series 3, image 465).    Liver: Previously described heterogeneous arterial enhancement in  hepatic segment 6 is not visualized in this study due to phase timing.  Ill-defined hypodensity in the segment 6 measuring approximately 9 mm,  stable to slightly increase in size compared to prior study (series 3,  image 310). Portal veins appear patent.    Gallbladder: No gallstones. No evidence of acute cholecystitis.  Spleen: Normal size.  Pancreas: No significant change in heterogeneous enhancement of the  pancreas. Unchanged prominent/slightly dilated pancreatic duct.  Unchanged cystic lesion in the tail of the pancreas likely  communicating with the pancreatic duct (series 2, image 283).  No  worrisome nodular or enhancing component.  Adrenal glands: Unchanged 10 mm left adrenal nodule. Right adrenal is  surgically absent.  Bladder / Pelvic organs: Unremarkable.  Bowel: No dilated loops of small bowel or colon.  Peritoneum / Retroperitoneum: No free fluid or air within the abdomen.  Vessels: No infrarenal aortic aneurysm.     Bones and soft tissues: Degenerative changes in the spine. Bones  appear to be osteopenic.  Compression deformity of T7. Stable  postoperative changes of left ORIF. Unchanged lucency about the left  hip hardware at the level of the femoral head. No suspicious bony  lesion.      Impression    IMPRESSION: In this patient with history of metastatic renal cell  carcinoma and subsequent spot right nephrectomy:  1. Minimal if any change in extensive amorphous enhancing soft tissue  throughout the mediastinum and at both taran, presumably metastatic  lymphadenopathy.  2. Slightly increased axillary/retropectoral, retroperitoneal, and  pelvic lymphadenopathy.  3. Poorly defined subcentimeter potential metastasis in the right  hepatic lobe is stable.  4. Moderate pleural effusions, increased on the right and stable to  slightly increased on the left. Small pericardial effusion, stable.  5. Stable side branch type IPMN at the level of the pancreatic tail  without suspicious features on today's exam. This can be reevaluated  at follow-up.    I have personally reviewed the examination and initial interpretation  and I agree with the findings.    JODI CHOE MD         Assessment and Plan:  53 year old with mRCC s/p 4 cycles of IL-2.  She had a stable CT after 2 cycles, mild disease progression after 4 cycles.  After a short break, CT showed worsening disease and she has started with Opdivo.   Three months later she had new brain disease and worsening systemic disease and started Cabo on 4/19, since then had regression and stable disease.    JIMÉNEZ/pleural effusion:   I had a lengthy  discussion with Suzi Gonsales accompanied by her  at this clinic visit.  I reviewed actual images from the restaging scans with her.  Her pleural effusions are whole lot worse than any time in the past.  She now has bilateral pleural effusions with right side having as much fluid as the left.  It is not a surprise that she is short of breath.  She was being seen fairly late in the day and so we have arranged for a PleurX catheter placement tomorrow morning around 10:00 a.m.  I have reviewed her actual images with both thoracic surgeons and Dr. Ni in Interventional Pulmonology.  I have discussed with Dr. Ni and his fellow is going to place the PleurX tomorrow morning.  I explained to her the PleurX placement procedure.  She has bilateral effusions, which would need separate catheters and she would need bilateral tube placements.  She had been quite reluctant for the tube placement this far because she wanted to travel to [place] in soak in the pool over there.  Unfortunately, she will have to wait for now.     I reviewed the option of pleurodesis. This can be done as long as fluid accumulation is not very rapid.     RCC: Suzi continues on Cabo 40 mg daily 6 days out of the week.  She is tolerating this much better- no nausea or diarrhea.  However she has ongoing fatigue (multifactorial).  A lot of this fatigue could be from her moderate bilateral pleural effusions.   I am concerned that enlarging pleural effusions suggest disease progression.   However overall other than her pleural effusions, there has not been a big change in her disease status. There is small disease progression in multiple lymph nodes. She had a very rapidly progressing disease. Since she has been tolerating the current regimen and has relatively stable disease on this, would like to give it more time. I would reassess her in 6 weeks with restaging scans.   I will have her return in 3 weeks to see Ms. Day Ren with labs and  see me in 6 weeks with restaging CT     Brain mets: no neuro symptoms, HA or confusion.   Craniotomy successful on 3/6/18.  Had GK to the bed on 4/5/18 and FOUR new lesions were noted during the planning MRI.  6/5/18, 9/6/18, 1/14/19---brain MRI stable.      Left femoral head lytic lesion:   Present since November 2017. 10/1/18 had surgical stabilization.  Overdue for Xgeva    Diarrhea: no currently bothering her    Mood: feeling a little down and withdrawn, discussed counseling with family and more activity during the day.     Health maintenance : had flu shot, repeat Shingrix in Jan-Feb    Over 45 min of direct face to face time spent with patient with more than 50% time spent in counseling and coordinating care.        Henri Green MD

## 2019-02-06 NOTE — NURSING NOTE
Chief Complaint   Patient presents with     Port Draw     Labs drawn via PORT by RN in lab. Line flushed with saline and heparin. VS taken.      Magda Quintanilla RN

## 2019-02-06 NOTE — NURSING NOTE
Oncology Rooming Note    February 6, 2019 5:09 PM   Suzi Gonsales is a 53 year old female who presents for:    Chief Complaint   Patient presents with     Port Draw     Labs drawn via PORT by RN in lab. Line flushed with saline and heparin. VS taken.      RECHECK     return     Initial Vitals: BP 99/66 (BP Location: Right arm, Patient Position: Sitting, Cuff Size: Adult Regular)   Pulse 94   Temp 97  F (36.1  C) (Oral)   Resp 18   Ht 1.524 m (5')   Wt 50.3 kg (110 lb 12.8 oz)   LMP 12/20/2013   SpO2 93%   BMI 21.64 kg/m   Estimated body mass index is 21.64 kg/m  as calculated from the following:    Height as of this encounter: 1.524 m (5').    Weight as of this encounter: 50.3 kg (110 lb 12.8 oz). Body surface area is 1.46 meters squared.  No Pain (0) Comment: Data Unavailable   Patient's last menstrual period was 12/20/2013.  Allergies reviewed: Yes  Medications reviewed: Yes    Medications: Medication refills not needed today.  Pharmacy name entered into [employer]:    [id number] - [place], [place] - [address] AT [facility] [address]  [facility] USE FOR MAILING ONLY - [place], [place]  [facility] - [place], [place] - [address] [place] [address]    Clinical concerns: none      6 minutes for nursing intake (face to face time)     Kim ADRIANO Alaniz

## 2019-02-07 NOTE — OR NURSING
Pt sats 88-89% on room air after 2nc chest xray. Discussed with MD, per MD advice pt ambulated around unit on RA and post was 90-92%. Per MD okay to discharge to home.

## 2019-02-07 NOTE — DISCHARGE INSTRUCTIONS
Drain your catheter every other day.    We will schedule a thoracentesis to drain your right side next week.  We can follow up with you on how your drainage is going at that time.    [facility]: [phone] - If you need to arrange home care.

## 2019-02-07 NOTE — OR NURSING
Procedure: Thoracentesis /c PleurX Placement - Lt Pleural Space - Medial, axilary line.   Sedation: Local anesthesia, no IV sedation.   Specimens: See procedure documentation for details.  1000 ml clear ernesto pleural fluid removed. PleurX  O2: 2-6 L/min via NC throughout procedure.   Rm air upon transport to Endoscopy post-procedure recovery.  Note: Pt tolerated procedure well.   Transport: Pt transferred to Endo Post-procedure via cart. Siderails elevated anton. Accompanied by RN.  Report:  Bedside report given upon completion of transport to post-procedure room.  Portable XCR      Additional Notes: Pt may need to arrange for home care to manage PleurX.   Learning Center telephone number to be provided with discharge instructions.     [id number]:     Pt dressed - ready for home discharge. Stated that when she is now experiencing marked pain with inhalation. SpO2 .89 on rm air. Pt reluctant to take deep breaths at present.  Dressing and skin around intact without crepitus. Lung sounds unchanged with crackles in Lt base.      Pt returned to bed with cardiac et SpO2 monitoring     Dr. Ni notified with orders rec'd.     Aletha Cervantes RN  [facility]

## 2019-02-07 NOTE — PROCEDURES
INTERVENTIONAL PULMONOLOGY     Procedure(s):    Chest ultrasound and interpretation (left chest)  Tunnelled pleural catheter placement (left chest)    Indication:  Malignant pleural effusion requiring repeated thoracenteses    Attending of Record:  Talat Ni MD    Interventional Pulmonary Fellow   Miky Sherman MD     Trainees Present:   None    Medications:    20 ml 1% lidocaine  0.5 mg versed  100 mcg fentanyl    Sedation Time:   Total sedation time was Choose Duration: 20 minutes of continuous bedside 1:1 monitoring.    Time Out:  Performed    The patient's medical record has been reviewed.  The indication for the procedure was reviewed.  The necessary history and physical examination was performed and reviewed.  The risks, benefits and alternatives of the procedure were discussed with the the patient in detail and she had the opportunity to ask questions.  I discussed in particular the potential complications including risks of minor or life-threatening bleeding and/or infection, respiratory failure, vocal cord trauma / paralysis, pneumothorax, and discomfort. Sedation risks were also discussed including abnormal heart rhythms, low blood pressure, and respiratory failure. All questions were answered to the best of my ability.  Verbal and written informed consent was obtained.  The proposed procedure and the patient's identification were verified prior to the procedure by the physician and the nurse, respiratory therapist.    The patient was assessed for the adequacy for the procedure and to receive medications.   Mental Status:  Alert and oriented x 3  Airway examination:  Class II (Complete visualization of uvula)  Pulmonary:  Bilateral crackles  CV:  RRR, no murmurs or gallops  ASA Grade:  (III)  Severe systemic disease    After clinical evaluation and reviewing the indication, risks, alternatives and benefits of the procedure the patient was deemed to be in satisfactory condition to undergo the  procedure.      Immediately before administration of medications the patient was re-assessed for adequacy to receive sedatives including the heart rate, respiratory rate, mental status, oxygen saturation, blood pressure and adequacy of pulmonary ventilation. These same parameters were continuously monitored throughout the procedure.    A Tuberculosis risk assessment was performed:  The patient has no known RISK of Tuberculosis    The procedure was performed in a negative airflow room: Yes    Maneuvers / Procedure:      Chest ultrasound: The Left side was ultrasounded and demonstrated pleural fluid. The diaphragm, heart and lung tissue were clearly visualized. Other findings include N/A/  Indwelling pleural catheter insertion: Once the site was marked using US, the pleurX catheter kit was used for the procedure. The patient's left chest was prepped in sterile fashion. A total of 20 ml 1%lidocaine used to anesthetize the area. A finder needle was used to aspirate fluid. The wire was advanced using seldinger technique. A 1cm incision was made approximately 5cm anterior to the wire and at the wire site. The tunneling device was used to make a track towards the wire and insert the chest tube. A dilator was used to enlarge the track, then the peel-away catheter was used to insert the chest tube into the pleural space. The catheter was sutured into place using 2.0silk. A sterile dressing was placed. A total of 1000cc was drained     Any disposable equipment was visually inspected and deemed to be intact immediately post procedure.      Relevant Pictures      Recommendations:   -->  Daily drainage  -->  Thoracentesis with Dr. Ni next week  -->  Further plan (frequency of drainage, consideration of R pleural catheter) depends on how she does over the next few weeks.        Miky Sherman MD  Interventional Pulmonary  Department of Pulmonary, Allergy, Critical Care and Sleep Medicine   [facility],  [facility]    Roberta THAKUR, OCN  Clinical Nurse Specialist  Department of Thoracic Surgery  Office: [phone]  Email: [email or web address]    Ofelia Prater  Interventional Pulmonology Surgery Scheduler  Office: [phone]  Email: [email or web address]      I was present with the patient, Suzi Gonsales, for the entire  procedure  and agree with the interpretation and report as documented by Dr. El.   Talat Ni MD

## 2019-02-08 NOTE — TELEPHONE ENCOUNTER
Spoke with patient to schedule procedure with Dr. Talat Ni    Procedure was scheduled on 02/13 in ENDO  Patient will have H&P with: not needed  Patient is aware a / is needed day of surgery.   Surgery letter was sent via [employer], patient has my direct contact information for any further questions.

## 2019-02-08 NOTE — PROGRESS NOTES
Oncology/Hematology Visit Note  Feb 6, 2019    Reason for Visit: [facility], routine followup    History of Present Illness: Suzi presented to ED with hematuria and right flank pain thinking she had a renal stone in December 2014. She was worked up with a CT abd/pelvis which suggested a renal mass. She was referred to Dr. Max Zelaya in [facility]. 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 Per charts tumor resection had negative margins and it was staged at pT2bN0.     Her staging work up was negative except for pulmonary nodules. She has been followed every 6 months with scans. CT CAP on 5/11/17 showed enlarging hilar LAD, enlarging pulmonary nodules, adrenal nodules and a pancreatic body mass. Biopsies of hilar LN, adrenal nodule and pancreatic mass were + for RCC.      ONCOLOGY THERAPY:   6/6/17-8/15/18-- IL-2, of which she received 4 cycles   11/22/17 CT CAP showed disease progression  She completed three months of Opdivo and then 12/5/17-2/13/18- 3 months of Opdivo  3/6/18- cerebellar lesion s/p craniotomy and GK 4/5 to tumor bed and 4 new lesions  4/12/18- 5 fractions of XRT to left supraclav/MERARY  4/19/18- Cabo 40 mg   6/20/18- Femur radiation  10/1/18- surgical stabilization of the femur   9/30/18-10/22/18 Off Cabo due to femur surgery      Interval History:  Suzi is here today with concerns of JIMÉNEZ.  She reached out to us yesterday with worsening SOB.     She is being seen with restaging scans. She notes that she is SOB with minimal activity. She had to lay on the couch after walking from her car to her living room. She cannot dare to walk around the hospital and had to use the wheel chair. She can feel palpitations.     She understands that this could be recurrent fluid accumulation and she would need pleurex catheter placement.       Current Outpatient Medications   Medication Sig     acetaminophen (TYLENOL) 325 MG tablet Take 3  tablets (975 mg) by mouth every 6 hours     ALPRAZolam (XANAX) 0.5 MG tablet Take 1 tablet (0.5 mg) by mouth 3 times daily as needed for anxiety     buPROPion (WELLBUTRIN XL) 300 MG 24 hr tablet Take 300 mg by mouth every morning      Cabozantinib S-Malate (CABOMETYX) 40 MG Take 1 tablet (40 mg) by mouth daily Take on an empty stomach 1 hour before or 2 hours after a meal. Avoid grapefruit and grapefruit juice.     citalopram (CELEXA) 20 MG tablet Take 1 tablet (20 mg) by mouth every evening     diphenoxylate-atropine (LOMOTIL) 2.5-0.025 MG per tablet Take 1-2 tablets by mouth 4 times daily as needed for diarrhea     HYDROcodone-acetaminophen (NORCO) 5-325 MG per tablet      omeprazole (PRILOSEC) 40 MG capsule Take 1 capsule (40 mg) by mouth daily     ondansetron (ZOFRAN) 8 MG tablet Take 1 tablet (8 mg) by mouth every 8 hours as needed for nausea     oxyCODONE IR (ROXICODONE) 5 MG tablet Take 1-2 tablets (5-10 mg) by mouth every 4 hours as needed for moderate to severe pain     polyethylene glycol (MIRALAX/GLYCOLAX) Packet Take 17 g by mouth daily as needed for constipation     Probiotic Product (PROBIOTIC PO) Take by mouth daily (with lunch)     sennosides (SENOKOT) 8.6 MG tablet Take 1-2 tablets by mouth 2 times daily as needed for constipation (no stool in 24 hrs)     zolpidem (AMBIEN) 5 MG tablet Take 1 tablet (5 mg) by mouth nightly as needed for sleep     Current Facility-Administered Medications   Medication     heparin 100 UNIT/ML injection 5 mL     sodium chloride (PF) 0.9% PF flush 20 mL        Physical Examination:  General: The patient is a pleasant female in no acute distress.  BP 99/66 (BP Location: Right arm, Patient Position: Sitting, Cuff Size: Adult Regular)   Pulse 94   Temp 97  F (36.1  C) (Oral)   Resp 18   Ht 1.524 m (5')   Wt 50.3 kg (110 lb 12.8 oz)   LMP 12/20/2013   SpO2 93%   BMI 21.64 kg/m    Wt Readings from Last 10 Encounters:   02/06/19 50.3 kg (110 lb 12.8 oz)   01/23/19 49.3  kg (108 lb 11.2 oz)   01/14/19 49.9 kg (110 lb)   12/12/18 52.4 kg (115 lb 8 oz)   11/23/18 53.1 kg (117 lb 0 oz)   11/14/18 53.1 kg (117 lb)   11/06/18 53.1 kg (117 lb 1.8 oz)   10/16/18 53.4 kg (117 lb 11.2 oz)   10/16/18 53.4 kg (117 lb 11.2 oz)   10/01/18 53.6 kg (118 lb 2.7 oz)     Limited exam today.   Heart: Regular rate and rhythm.   Lungs: Breathing comfortably on room air.  Clear breath sounds bilaterally, no dullness to percussion.   No LORIE  Ambulated down hallway and was able to maintain 94%RA down and back of hallway and breathing was stable.       Laboratory Data:  Recent Labs   Lab Test 02/06/19 1700 01/23/19  1315 12/12/18  1533 11/14/18  1531 10/16/18  1419    134 136 134 137   POTASSIUM 3.7 4.1 3.4 4.3 4.0   CHLORIDE 106 103 104 105 106   CO2 22 23 23 23 24   ANIONGAP 8 8 9 6 7   BUN 7 8 8 10 11   CR 0.81 0.73 0.82 0.88 0.80   * 80 90 88 143*   MUNDO 8.0* 8.8 7.7* 8.3* 8.6     Recent Labs   Lab Test 03/07/18  0400 08/24/17  1520 08/19/17  0535 08/18/17  0328 08/17/17  0557 08/16/17  1550 08/16/17  1241   MAG 2.2  --  1.8 2.3 2.0  --  3.1*   PHOS  --  3.4 2.2* 2.4* 2.3* 2.0*  --      Recent Labs   Lab Test 02/06/19  1700 01/23/19  1315 12/12/18  1533 11/14/18  1531 10/16/18  1419   WBC 3.3* 3.6* 3.0* 3.9* 4.1   HGB 11.4* 12.0 11.1* 12.7 11.0*    301 221 319 490*    99 98 99 102*   NEUTROPHIL 63.4 59.3 56.8 52.9 59.7     Recent Labs   Lab Test 02/06/19  1700 01/23/19  1315 12/12/18  1533  08/19/17  0535 08/18/17  0328 08/17/17  0557   BILITOTAL 0.2 0.2 0.3   < > 0.2 0.2 0.3   ALKPHOS 145 134 85   < > 60 41 43   ALT 30 32 72*   < > 50 32 21   AST 53* 46* 81*   < > 31 31 16   ALBUMIN 2.6* 2.9* 2.9*   < > 2.0* 1.9* 2.2*   LDH  --   --   --   --  214 199 182    < > = values in this interval not displayed.     TSH   Date Value Ref Range Status   11/14/2018 8.91 (H) 0.40 - 4.00 mU/L Final   04/13/2018 1.02 0.40 - 4.00 mU/L Final   01/31/2018 0.93 0.40 - 4.00 mU/L Final     No  results for input(s): CEA in the last [id number] hours.  Results for orders placed or performed during the hospital encounter of 02/06/19   CT Chest/Abdomen/Pelvis w Contrast    Narrative    EXAMINATION: CT CHEST/ABDOMEN/PELVIS W CONTRAST, 2/6/2019 11:14 AM    TECHNIQUE:  Helical CT images from the lung apices through the  symphysis pubis were obtained with contrast.  Coronal and sagittal  reformatted images were generated at a workstation for further  assessment.    CONTRAST: 66ml isovue 370    COMPARISON: CT on 12/12/2018, 8/20/2018 chest x-ray on 1/23/2018    HISTORY: Kidney cancer, stage IV, unresectable, follow up; RCC with  extensive metastasis; Malignant neoplasm of right kidney (H)    FINDINGS:    Chest:    Right IJ Port-A-Cath tip terminating at the superior right atrium.    When compared to CT 12/12/2018, extensive amorphous enhancing soft  tissue and adenopathy throughout the mediastinum and both taran does  not appear to be convincingly changed. Several prominent and  borderline enlarged right and left axillary lymph nodes are stable to  slightly increased in size. The largest left axillary node measures  1.2 cm in short axis dimension (series 3, image 73). Previously, 1.2  cm. Several adjacent right axillary nodes (series 3, images ),  have increased in size.    Heart size is within normal limits. Moderate-sized pericardial  effusion increased since 12/12/2018. No main or lobar pulmonary  embolus. The aortic arch and main arterial branches are within normal  limits.    Moderate right pleural effusion significantly increased since  12/12/2018. Moderate left-sided pleural effusion stable to slightly  increased compared to 12/12/2018. Increased bilateral atelectasis.  Increase in paraseptal thickening and subtle diffuse groundglass  opacities with increased peribronchial cuffing and edema. Unchanged 6  mm solid pulmonary nodule in the left lower lobe adjacent to the minor  fissure. Segmental areas of  linear fibroatelectasis and associated  volume loss, similar to prior. The trachea and main bronchial tree are  patent.    Abdomen and pelvis:    Postoperative changes of right nephrectomy and right adrenalectomy.  Nephrectomy bed is clear. A few small hypodense lesions involving the  left kidney, too small to accurately characterize, but stable from the  prior. No hydronephrosis and no hydroureter. No suspicious solid left  renal lesion.    Lymph nodes: Slightly increased size of scattered centrally hypodense  and necrotic retroperitoneal lymph nodes, especially pronounced in the  left para-aortic and precaval regions. Portacaval node on series 3,  image 272 measures 1.2 cm in short axis dimension. Previously 1.0 cm.  Conglomerate left para-aortic adenopathy measures 3.2 x 1.1 cm (series  3, image 25). Previously, 2.4 x 1.1 cm. There are numerous prominent,  but nonenlarged mesenteric lymph nodes which are slightly more  conspicuous than on the prior. Stable size and appearance of a right  obturator node (series 3, image 465).    Liver: Previously described heterogeneous arterial enhancement in  hepatic segment 6 is not visualized in this study due to phase timing.  Ill-defined hypodensity in the segment 6 measuring approximately 9 mm,  stable to slightly increase in size compared to prior study (series 3,  image 310). Portal veins appear patent.    Gallbladder: No gallstones. No evidence of acute cholecystitis.  Spleen: Normal size.  Pancreas: No significant change in heterogeneous enhancement of the  pancreas. Unchanged prominent/slightly dilated pancreatic duct.  Unchanged cystic lesion in the tail of the pancreas likely  communicating with the pancreatic duct (series 2, image 283). No  worrisome nodular or enhancing component.  Adrenal glands: Unchanged 10 mm left adrenal nodule. Right adrenal is  surgically absent.  Bladder / Pelvic organs: Unremarkable.  Bowel: No dilated loops of small bowel or  colon.  Peritoneum / Retroperitoneum: No free fluid or air within the abdomen.  Vessels: No infrarenal aortic aneurysm.     Bones and soft tissues: Degenerative changes in the spine. Bones  appear to be osteopenic.  Compression deformity of T7. Stable  postoperative changes of left ORIF. Unchanged lucency about the left  hip hardware at the level of the femoral head. No suspicious bony  lesion.      Impression    IMPRESSION: In this patient with history of metastatic renal cell  carcinoma and subsequent spot right nephrectomy:  1. Minimal if any change in extensive amorphous enhancing soft tissue  throughout the mediastinum and at both taran, presumably metastatic  lymphadenopathy.  2. Slightly increased axillary/retropectoral, retroperitoneal, and  pelvic lymphadenopathy.  3. Poorly defined subcentimeter potential metastasis in the right  hepatic lobe is stable.  4. Moderate pleural effusions, increased on the right and stable to  slightly increased on the left. Small pericardial effusion, stable.  5. Stable side branch type IPMN at the level of the pancreatic tail  without suspicious features on today's exam. This can be reevaluated  at follow-up.    I have personally reviewed the examination and initial interpretation  and I agree with the findings.    JODI CHOE MD         Assessment and Plan:  53 year old with mRCC s/p 4 cycles of IL-2.  She had a stable CT after 2 cycles, mild disease progression after 4 cycles.  After a short break, CT showed worsening disease and she has started with Opdivo.   Three months later she had new brain disease and worsening systemic disease and started Cabo on 4/19, since then had regression and stable disease.    JIMÉNEZ/pleural effusion:   I had a lengthy discussion with Suzi Gonsales accompanied by her  at this clinic visit.  I reviewed actual images from the restaging scans with her.  Her pleural effusions are whole lot worse than any time in the past.  She now has  bilateral pleural effusions with right side having as much fluid as the left.  It is not a surprise that she is short of breath.  She was being seen fairly late in the day and so we have arranged for a PleurX catheter placement tomorrow morning around 10:00 a.m.  I have reviewed her actual images with both thoracic surgeons and Dr. Ni in Interventional Pulmonology.  I have discussed with Dr. Ni and his fellow is going to place the PleurX tomorrow morning.  I explained to her the PleurX placement procedure.  She has bilateral effusions, which would need separate catheters and she would need bilateral tube placements.  She had been quite reluctant for the tube placement this far because she wanted to travel to [place] in soak in the pool over there.  Unfortunately, she will have to wait for now.     I reviewed the option of pleurodesis. This can be done as long as fluid accumulation is not very rapid.     RCC: Suzi continues on Cabo 40 mg daily 6 days out of the week.  She is tolerating this much better- no nausea or diarrhea.  However she has ongoing fatigue (multifactorial).  A lot of this fatigue could be from her moderate bilateral pleural effusions.   I am concerned that enlarging pleural effusions suggest disease progression.   However overall other than her pleural effusions, there has not been a big change in her disease status. There is small disease progression in multiple lymph nodes. She had a very rapidly progressing disease. Since she has been tolerating the current regimen and has relatively stable disease on this, would like to give it more time. I would reassess her in 6 weeks with restaging scans.   I will have her return in 3 weeks to see Ms. Day Ren with labs and see me in 6 weeks with restaging CT     Brain mets: no neuro symptoms, HA or confusion.   Craniotomy successful on 3/6/18.  Had GK to the bed on 4/5/18 and FOUR new lesions were noted during the planning MRI.  6/5/18,  9/6/18, 1/14/19---brain MRI stable.      Left femoral head lytic lesion:   Present since November 2017. 10/1/18 had surgical stabilization.  Overdue for Xgeva    Diarrhea: no currently bothering her    Mood: feeling a little down and withdrawn, discussed counseling with family and more activity during the day.     Health maintenance : had flu shot, repeat Shingrix in Jan-Feb    Over 45 min of direct face to face time spent with patient with more than 50% time spent in counseling and coordinating care.

## 2019-02-12 NOTE — PROGRESS NOTES
Patient called [facility] because she was having trouble reaching thoracic clinic via the telephone tree, she had concerns regarding her pain from her PleurX catheter placement last week.  She states has been occassionally be taking an oxycodone for pain control, which has been providing relief, however she didn't expect to have pain this long.  She would like someone from thoracic reach out to her regarding the pain.      Suzi also states she thought she had another appointment this week for the other PleurX catheter to be placed, but she doesn't see the appointment on her mychart.  Writer informed her the appointment is at 12:00pm tomorrow 2/13/19.      Regarding home care- Dr. Sherman had asked if Dr. Green/Samantha could arrange home care for this patient's teach for drainage of her PleurX catheter.  This RNCC had sent 2 urgent messages to [facility] to see if they could teach patient how to use her pleurX catheter, without response, writer will follow up to see where the disconnect happened.  Writer apologized to patient for this confusion.   said they taught patient how to use the device after her procedure, but she was tired and would benefit from additional teaching.     [facility] triage staff has messaged thoracic team to reach out to patient and writer also contacted Dr. Sherman with patient's concerns as well.     Charo Birmingham, RN BSN   [facility]  Nurse Coordinator

## 2019-02-12 NOTE — TELEPHONE ENCOUNTER
Writer sent high-priority InBasket message to Thoracic RNCC pool and Charo Birmingham RNCC with patient's concerns. Writer paged Shanon GEORGE at [phone] requesting Thoracic team address patient's concerns and contact patient.     Helen Knowles RN   [facility] Triage      Addendum: 2/12/19 1600: Writer spoke to ARIEL Lawler. Per Shanon, she has been in contact with patient this afternoon. No further triage needs at this time.

## 2019-02-12 NOTE — TELEPHONE ENCOUNTER
----- Message from Helen Knowles RN sent at 2/12/2019 12:28 PM CST -----  Regarding: Pleurx drain, needing additional thora   Contact: [phone]  Pt had pleurx placed with Dr. Ni. Reports [facility] was supposed to teach her how to use this, but no one contacted her and she had to watch a video and teach herself. Also concerned as she is scheduled to have Pleurx placed on the other side, but she's still having pain from first procedure. Has been trying to contact RN with thoracic unsuccessfully.     Can someone from thoracic please touch base with patient? I also transferred her to her Onc RNLEO Mancilla to touch base. Thank you!     Helen Knowles RN   [facility]

## 2019-02-13 PROBLEM — C64.9 RENAL CELL CARCINOMA (H): Status: ACTIVE | Noted: 2019-01-01

## 2019-02-13 NOTE — PROCEDURES
INTERVENTIONAL PULMONOLOGY     Procedure(s):    Thoracentesis (right chest)    Indication:  Pleural effusion    Attending of Record:  Talat Ni MD    Interventional Pulmonary Fellow   None    Trainees Present:   None    Medications:    50 mcg fentanyl    Sedation Time:   Total sedation time was Choose Duration: 20 minutes of continuous bedside 1:1 monitoring.    Time Out:  Performed    I have reviewed the patient's medical record and indication for the procedure. Physical examination was performed.  The risks, benefits and alternatives of the procedure were discussed with the the patient in detail and she had the opportunity to ask questions.  I discussed in particular the potential complications including risks of minor or life-threatening bleeding and/or infection, respiratory failure, vocal cord trauma / paralysis, pneumothorax, and discomfort. Sedation risks were also discussed including abnormal heart rhythms, low blood pressure, and respiratory failure. All questions were answered to the best of my ability.  Verbal and written informed consent was obtained.  The proposed procedure and the patient's identification were verified prior to the procedure by the physician and the nurse.    The patient was assessed for the adequacy for the procedure and to receive medications.   Mental Status:  Alert and oriented x 3  Airway examination:  Class I (Complete visualization of soft palate)  Pulmonary:  Clear to ausculation bilaterally  CV:  RRR, no murmurs or gallops  ASA Grade:  (III)  Severe systemic disease    After clinical evaluation and reviewing the indication, risks, alternatives and benefits of the procedure the patient was deemed to be in satisfactory condition to undergo the procedure.        A Tuberculosis risk assessment was performed:  The patient has no known RISK of Tuberculosis    The procedure was performed in a negative airflow room: The patient could not be moved to a negative airflow room  because of no risk of TB    Maneuvers / Procedure:      Thoracentesis: Once the site was marked using US, A 8F Arrow thoracentesis catheter was used to aspirate fluid. The patient's right chest was prepped in sterile fashion. A total of 6 cc 1%lidocaine was used to anesthetize the area. A finder needle was used to aspirate fluid. The tube was then advanced into the pleural space while aspirating pleural fluid. The fluid was ernesto in color/consistency. A total of 1150 fluid was removed. Fluid was sent to micro and cytology for analysis     Any disposable equipment was visually inspected and deemed to be intact immediately post procedure.      Relevant Pictures      Recommendations:     -->  CXR, admit to oncology for mild hypotension, hypoxia and generalized weakness      EYAD Ni MD  [phone]

## 2019-02-13 NOTE — PLAN OF CARE
ADMISSION    Received from IR at around 1430H. Pt is supposed to have a same day procedure but having difficulty having her O2 sats up to 92%, pt is hypotensive per report from EULOGIO Archer from IR. Arrived to 7C via strether, weak and up with assist of 1-2. Oriented to room, unit, call light, treatment plan. Hand outs provided per unit protocol. Initial VS taken upon arrival. Received with 500 ml of 1/2 strenght Normal saline, spoke with Shakira Malik. Okayed to fully open the IVF. Pt received total of 1000 ml bolus from IR. Recheck VS before starting the IVF 0.9 at 100 ml/hr per direction from the provider. Pt request for xanax notified Shakira. Currently with the pt right now. Call from CT scan received. Pt will have CT scan of the chest with contrast. Pt have port a cath. Lower right back dressing CDI. Plueryx dressing CDI. PLAN: Continue monitor VS, CT scan this afternoon.

## 2019-02-13 NOTE — LETTER
Transition Communication Hand-off for Care Transitions to Next Level of Care Provider    Name: Suzi Gonsales  : 1965  MRN #: [id number]  Primary Care Provider: Molly Adame     Primary Clinic: [address] [place] [id number]     Reason for Hospitalization:  Hypoxia [R09.02]  Admit Date/Time: 2019 11:52 AM  Discharge Date: 19  Payor Source: Payor: BCBS / Plan: BCBS OUT OF STATE / Product Type: Indemnity /     Readmission Assessment Measure (CELESTE) Risk Score/category: Elevated    Plan of Care Goals/Milestone Events: Patient going home with [facility]   Patient Concerns: Patient nervous about transport and leaving the hospital with all of the care, but [facility] meeting her at home to discuss her plan and contacts for concerns            Reason for Communication Hand-off Referral: Multiple providers/specialties    Discharge Plan: [facility]       Concern for non-adherence with plan of care:   Y/N -No  Discharge Needs Assessment:  Needs      Most Recent Value   Equipment Currently Used at Home  cane, straight          Already enrolled in Tele-monitoring program and name of program:  n/a  Follow-up specialty is recommended: No    Follow-up plan:  No future appointments.    Any outstanding tests or procedures:    Procedures     Future Labs/Procedures    Oxygen Adult     Comments:    New Home Oxygen Order 8 liter(s) by nasal cannula continuously with use of portable tank. Expected treatment length is indefinite (99 months).. Test on conserving device as applicable.    Patients who qualify for home O2 coverage under the CMS guidelines require ABG tests or O2 sat readings obtained closest to, but no earlier than 2 days prior to the discharge, as evidence of the need for home oxygen therapy. Testing must be performed while patient is in the chronic stable state. See notes for O2 sats.    I certify that this patient, Suzi Gonsales has been under my care and that I, or a nurse  practitioner or physician's assistant working with me, had a face-to-face encounter that meets the face-to-face encounter requirements with this patient on 2/22/2019. The patient, Suzi Gonsales was evaluated or treated in whole, or in part, for the following medical condition, which necessitates the use of the ordered oxygen. Treatment Diagnosis: Metastatic renal cell carcinoma    Attending Provider: Fady Chairez DO  Physician signature: See electronic signature associated with these discharge orders  Date of Order: February 22, 2019                Key Recommendations:    ZACHERY Ceuvas, [facility]  ICU 4A & 4C   Ph: [phone]  Pager: [phone]      AVS/Discharge Summary is the source of truth; this is a helpful guide for improved communication of patient story

## 2019-02-13 NOTE — OR NURSING
Pt to be admitted after thoracentesis due to low BP. BP 78/48 to 88/53. 1 liter of D51/2NS given with little change in BP. Other VSS. Portable CXR also done after thoracentesis. Dr Ni in to see pt and . Pt is alert and tolerated some apple juice.Report called to floor nurse on 7C.

## 2019-02-13 NOTE — OR NURSING
Thoracentesis under conscious sedation.  1150cc removed from right lung field.  Pt tolerated procedure.

## 2019-02-13 NOTE — H&P
[facility] [place]    History and Physical  Hematology / Oncology     Date of Admission:  2/13/2019  Date of Service (when I saw the patient): 02/13/19    Assessment & Plan   Suzi Gonsales is a 53 year old female with PMHx significant for metastatic renal cell carcinoma, s/p craniotomy and gamma knife for brain metastasis, and recurrent pleural effusions s/p L PleurX catheter and recent thoracentesis with pulmonary on day of admission who was admitted to the hospital for persistent hypoxia and hypotension.     #Hypotension.  BP in the 70s-80s systolic while receiving thoracentesis in endoscopy today. Gave 1L bolus prior to admitting to heme/onc. Patient notes she has not been eating or drinking much over the past few days because she has been feeling so fatigued. Dehydration could be contributing. Notes some nausea as well, but no vomiting. Has a cough at baseline which she states is unchanged. SOB/JIMÉNEZ which she contributes to existing pleural effusions. No fevers noted.   -Gave additional IVF 1L bolus f/b maintenance IVF to rehydrate.   -Labs pending.   -BCx, UA/UCx ordered to eval for infection with hypotension.   -CXR post thoracentesis with perihilar opacities, bibasilar atelectasis vs consolidation. Await CT to further evaluate for pneumonia. May consider starting Abx if suspicious and/or if febrile.    #Hypoxia.   #SOB/JIMÉNEZ.   #Recurrent pleural effusions. S/p PleurX catheter on L. Recent thora 2/13 on R.  Hypoxia persisted despite outpatient thoracentesis with pulmonary. sats <90%, improved with oxygen administration. Patient explains she has been feeling pretty short of breath when they identified the pleural effusions, and she has had some relief with PleurX although it has caused a lot of pain. Does note some SOB at rest as well as with exertion. Eval as above for infectious source contributing to hypoxia.   -CT PE protocol to r/o pulmonary embolism pending.   -NC to keep O2  >92%.   -Repeat CXR with increased SOB, worsening hypoxia, or worsening cough with recent thoracentesis of R pleural effusion.    #Pain.   Notes pain near PleurX drain site that has been present since it was placed. Per Dr. Ni, this may take a while to resolve. No position or movement makes it worse. Not worse with deep inspiration.   -Oxycodone 5-10 mg q4hrs prn.     #Metastatic RCC.   #Brain mets.  #L femoral head lytic lesion. S/p fixation 11/17.  Diagnosed in 2014. Follows with Dr. Green. S/p radical nephrectomy. Unfortunately in 5/2017 patient was noted to have enlarged hilar LAD, pulmonary nodules, adrenal nodules, and a pancreatic body mass. Was treated with 4 cycles of IL-2 therapy but progressed. Initiated on Opdivo however had progression with a cerebellar metastasis for which she had a craniotomy (3/18) and GK (4/18). Had 4 new lesions on planning MRI which have been stable on follow up imaging. Received XRT to supraclavicular/MERARY. Started Cabo 40 mg daily and has been on it since that time. States she takes this 6x per week. Additionally, patient is s/p rads to femur lesion in 6/2018 and s/p surgical stabilization of the femur 10/2018. Last imaging consistent with stable to mild prog of disease, but per Dr. Green's note, continue Cabo at this time. Recently developed bilateral pleural effusions (See above).   -Continue Cabo once patient brings in home supply.   -Will need hospital follow up.   -Stand by assist for instability.   -PT consult.     #Anxiety/depression.   -Continue PTA Wellbutrin and Celexa.    FEN:  -1L fluid bolus f/b 100 ml/hr MIVF  -PRN lyte replacement  -RDAT    Prophy/Misc:  -VTE: hold tonight with h/o brain mets and recent procedure, may consider tomorrow.  -GI/PUD: PTA omeprazole  -Bowels: prn    Code status: Full    Disposition: Pending improvement of acute issues, likely 1-2 day stay.     Patient discussed with Dr. Chairez.     Shakira Malik PA-C  Hematology/Oncology  Pager  "[phone]    Code Status   Full Code    Primary Care Physician   Molly Adame    Chief Complaint   Admitted from endoscopy with hypoxia and hypotension.    History is obtained from the patient and electronic health record    History of Present Illness   Suzi Gonsales is a 53 year old female with PMHx significant for metastatic renal cell carcinoma, s/p craniotomy and gamma knife for brain metastasis, and recurrent pleural effusions s/p L PleurX catheter and recent thoracentesis with pulmonary on day of admission who was admitted to the hospital for persistent hypoxia and hypotension.   Suzi notes that she has been feeling quite poorly at home over the past \"few\" days. Notes fatigue, SOB/JIMÉNEZ, nausea and pain. Does have a cough but does not feel this has worsened. No fevers, chills or sweats at home. Has felt nauseous but no emesis. No diarrhea, is having regular bowel movements. Pain is localized to around recently placed PleurX drain. Notes mild improvement in SOB after thoracentesis.     Past Medical History    I have reviewed this patient's medical history and updated it with pertinent information if needed.   Past Medical History:   Diagnosis Date     Adrenal nodule (H)      Anxiety      Brain mass      Depression      Former tobacco use      Lacunar infarct, acute 2018     Mass of left lung     Upper lobe     Renal cell carcinoma (H) 2014    Clear cell     Solitary kidney     S/P right nephrectomy due to kidney cancer       Past Surgical History   I have reviewed this patient's surgical history and updated it with pertinent information if needed.  Past Surgical History:   Procedure Laterality Date     C/SECTION, LOW TRANSVERSE  ,     , Low Transverse     ENDOSCOPIC ULTRASOUND UPPER GASTROINTESTINAL TRACT (GI) N/A 2017    Procedure: ENDOSCOPIC ULTRASOUND, ESOPHAGOSCOPY / UPPER GASTROINTESTINAL TRACT (GI);  Esophagogastroduodenoscopy, Endoscopic Ultrasound with Fine Needle Biopsies;  " Surgeon: Asad Velez MD;  Location: [facility] OR     INSERT PORT VASCULAR ACCESS N/A 12/4/2017    Procedure: INSERT PORT VASCULAR ACCESS;  Chest Port Placement-Right;  Surgeon: Melanie Cevallos PA-C;  Location: [facility]     IR THORACENTESIS  12/21/2018     open radical nephrectomy Right 12/31/14     OPEN REDUCTION INTERNAL FIXATION HIP NAILING Left 10/1/2018    Procedure: OPEN REDUCTION INTERNAL FIXATION HIP NAILING;  Left Hip Screw ;  Surgeon: Randy Dunn MD;  Location: [facility] OR     OPTICAL TRACKING SYSTEM CRANIOTOMY, EXCISE TUMOR, COMBINED Left 3/6/2018    Procedure: COMBINED OPTICAL TRACKING SYSTEM CRANIOTOMY, EXCISE TUMOR;  Left Stealth Assisted Posterior fossa Craniotomy And Tumor Resection ;  Surgeon: Quintin Palencia MD;  Location: [facility] OR     PICC INSERTION Left 07/31/2017    5fr DL BioFlo PICC, 40cm (4cm external) in the L basilic vein w/ tip in the low SVC     PICC INSERTION Right 08/15/2017    5fr DL BioFlo PICC, 36cm (1cm external) in the R basilic vein w/ tip in the low SVC     THORACENTESIS Left 6/12/2018    Procedure: THORACENTESIS;  Left Thoracentesis ;  Surgeon: Juanito Weinstein PA-C;  [facility]     THORACENTESIS Left 6/25/2018    Procedure: THORACENTESIS;  Thoracentesis;  Surgeon: Juanito Weinstein PA-C;  [facility]     THORACENTESIS Left 9/10/2018    Procedure: THORACENTESIS;  Left Thoracentesis;  Surgeon: Romie Eduardo PA-C;  [facility]     THORACENTESIS Left 11/6/2018    Procedure: Thoracentesis, Left;  Surgeon: Quintin Marin PA-C;  [facility]     THORACENTESIS Left 11/23/2018    Procedure: Left Therapeutic Thoracentesis;  Surgeon: Melanie Cevallos PA-C;  [facility]     THORACENTESIS Left 12/21/2018    Procedure: Left Thoracentesis;  Surgeon: Quintin Marin PA-C;  [facility]     THORACENTESIS Left 1/23/2019    Procedure: THORACENTESIS;  Surgeon: Miky Sherman MD;  Location: [facility]     THORACENTESIS N/A 2/7/2019     Procedure: Pleurx placement;  Surgeon: Vasiliy Ni MD;  Location: [facility]       Prior to Admission Medications   Prior to Admission Medications   Prescriptions Last Dose Informant Patient Reported? Taking?   ALPRAZolam (XANAX) 0.5 MG tablet   No No   Sig: Take 1 tablet (0.5 mg) by mouth 3 times daily as needed for anxiety   Cabozantinib S-Malate (CABOMETYX) 40 MG   No No   Sig: Take 1 tablet (40 mg) by mouth daily Take on an empty stomach 1 hour before or 2 hours after a meal. Avoid grapefruit and grapefruit juice.   HYDROcodone-acetaminophen (NORCO) 5-325 MG per tablet   Yes No   Probiotic Product (PROBIOTIC PO)   Yes No   Sig: Take by mouth daily (with lunch)   acetaminophen (TYLENOL) 325 MG tablet   No No   Sig: Take 3 tablets (975 mg) by mouth every 6 hours   buPROPion (WELLBUTRIN XL) 300 MG 24 hr tablet   Yes No   Sig: Take 300 mg by mouth every morning    citalopram (CELEXA) 20 MG tablet   No No   Sig: Take 1 tablet (20 mg) by mouth every evening   omeprazole (PRILOSEC) 40 MG capsule   No No   Sig: Take 1 capsule (40 mg) by mouth daily   ondansetron (ZOFRAN) 8 MG tablet   No No   Sig: Take 1 tablet (8 mg) by mouth every 8 hours as needed for nausea   oxyCODONE IR (ROXICODONE) 5 MG tablet   No No   Sig: Take 1-2 tablets (5-10 mg) by mouth every 4 hours as needed for moderate to severe pain   polyethylene glycol (MIRALAX/GLYCOLAX) Packet   Yes No   Sig: Take 17 g by mouth daily as needed for constipation   sennosides (SENOKOT) 8.6 MG tablet   Yes No   Sig: Take 1-2 tablets by mouth 2 times daily as needed for constipation (no stool in 24 hrs)   zolpidem (AMBIEN) 5 MG tablet   No No   Sig: Take 1 tablet (5 mg) by mouth nightly as needed for sleep      Facility-Administered Medications: None     Allergies   No Known Allergies    Social History   I have reviewed this patient's social history and updated it with pertinent information if needed. Suzi Gonsales  reports that she quit smoking about 15 years ago.  Her smoking use included cigarettes. She has a 10.00 pack-year smoking history. she has never used smokeless tobacco. She reports that she drinks alcohol. She reports that she does not use drugs.    Family History   I have reviewed this patient's family history and updated it with pertinent information if needed.   Family History   Problem Relation Age of Onset     Hypertension Father      Chronic Obstructive Pulmonary Disease Father      Hyperlipidemia Brother      Hyperlipidemia Brother      Cancer No family hx of        Review of Systems   The 10 point Review of Systems is negative other than noted in the HPI or here.     Physical Exam   Temp: 98.6  F (37  C) Temp src: Oral BP: 93/54 Pulse: 95 Heart Rate: 91 Resp: 16 SpO2: 94 % O2 Device: Nasal cannula Oxygen Delivery: 2.5 LPM  Vital Signs with Ranges  Temp:  [95.9  F (35.5  C)-98.6  F (37  C)] 98.6  F (37  C)  Pulse:  [] 95  Heart Rate:  [] 91  Resp:  [8-25] 16  BP: (77-94)/(34-66) 93/54  SpO2:  [85 %-96 %] 94 %  0 lbs 0 oz    Constitutional: Pleasant female seen lying in bed, in NAD. Alert and interactive. Appears tired.  HEENT: NCAT, anicteric sclerae, conjunctiva clear. Moist mucous membranes.  Respiratory: Non-labored breathing on 3L NC, good air exchange. R lung sounds coarse throughout. L lung clear to base. No focal crackles or wheezing.  Cardiovascular: Regular rate and rhythm with no murmur, rub or gallop.  GI: Normoactive BS. Abdomen is soft, non-distended, and non-tender to palpation. No rigidity or guarding.  Skin: Warm and dry. No rashes or lesions on exposed surfaces. Skin surrounding PleurX without erythema or induration.  Musculoskeletal: Extremities grossly normal. No tenderness or edema present.   Neurologic: A &O x3, speech normal, answering questions appropriately. Moves all extremities spontaneously. Grossly non-focal.   Neuropsychiatric: Mentation and affect normal/appropriate.  VAD: Port is c/d/i with no erythema, drainage, or  tenderness.    Data   Results for orders placed or performed during the hospital encounter of 02/13/19 (from the past 24 hour(s))   XR Chest Port 1 View    Narrative    Exam: XR CHEST PORT 1 VW, 2/13/2019 12:37 PM    Indication: severe pain at pleurx site    Comparison: X-ray 02/07/2019    Findings:   AP single view of the chest. Tip of the right-sided IJ Port-A-Cath  projects over the cavoatrial junction. Left basilar chest tube in  place. No pneumothorax. Mild worsening small bilateral pleural  effusion. Some pleural effusion tracking along the right major  fissure. Hazy bibasilar opacity, right greater than left.  Cardiomediastinal silhouettes is grossly unchanged. Upper abdomen is  unremarkable.      Impression    Impression:   1. Mild worsening small bilateral pleural effusion with tracking of  fluids along the right major fissure.  2. Streaky bibasilar opacity favors atelectasis. Cannot rule out  superimposed infection  3. Left-sided chest tube in place    I have personally reviewed the examination and initial interpretation  and I agree with the findings.    MISTI WEIR MD   Albumin fluid   Result Value Ref Range    Albumin Fluid Source Pleural fluid     Albumin Fluid 1.8 g/dL   Amylase  fluid   Result Value Ref Range    Amylase Fluid Source Pleural fluid     Amylase Fluid 56 U/L   Cell count with differential fluid   Result Value Ref Range    Body Fluid Analysis Source Pleural fluid     Color Fluid PALE YELLOW     Appearance Fluid Clear     WBC Fluid 167 /uL   Cholesterol fluid   Result Value Ref Range    Cholesterol Fluid Source Pleural fluid     Cholesterol Fluid <50 mg/dL   Fungus Culture, non-blood   Result Value Ref Range    Specimen Description Pleural fluid Right thoracentesis     Culture Micro PENDING    Glucose fluid   Result Value Ref Range    Glucose Fluid Source Pleural fluid     Glucose Fluid 115 mg/dL   Gram stain   Result Value Ref Range    Specimen Description Pleural fluid Right  Thoracentesis     Gram Stain PENDING    Lactate dehydrogenase fluid   Result Value Ref Range    LD Fluid Source Pleural fluid     Lactate Dehydrogenase Fluid 215 U/L   Lipase fluid   Result Value Ref Range    Lipase Fluid Source Pleural fluid     Lipase Fluid 172 U/L   Protein fluid   Result Value Ref Range    Protein Total Fluid Source Pleural fluid     Protein Total Fluid 3.2 g/dL   Triglyceride Fluid   Result Value Ref Range    Triglyceride Fluid Source Pleural fluid     Triglyceride Fluid 22 mg/dL   XR Chest Port 1 View    Narrative    Chest one view portable semiupright    HISTORY: Status post right thoracentesis.    COMPARISON STUDY: 2/13/2019    FINDINGS: Right IJ Port-A-Cath tip in the right atrium. Left basilar  chest tube. Perihilar interstitial opacities are noted. Bibasilar  atelectasis and consolidation. Small bilateral pleural effusions  decreased on the right.      Impression    IMPRESSION: Slight decrease in pulmonary edema.    MISTI WEIR MD   Lactate Dehydrogenase   Result Value Ref Range    Lactate Dehydrogenase 240 (H) 81 - 234 U/L   Protein total   Result Value Ref Range    Protein Total 5.4 (L) 6.8 - 8.8 g/dL   CBC with platelets differential   Result Value Ref Range    WBC 4.0 4.0 - 11.0 10e9/L    RBC Count 3.24 (L) 3.8 - 5.2 10e12/L    Hemoglobin 10.7 (L) 11.7 - 15.7 g/dL    Hematocrit 33.9 (L) 35.0 - 47.0 %     (H) 78 - 100 fl    MCH 33.0 26.5 - 33.0 pg    MCHC 31.6 31.5 - 36.5 g/dL    RDW 19.4 (H) 10.0 - 15.0 %    Platelet Count 314 150 - 450 10e9/L    Diff Method Automated Method     % Neutrophils 65.3 %    % Lymphocytes 15.0 %    % Monocytes 13.0 %    % Eosinophils 5.2 %    % Basophils 0.5 %    % Immature Granulocytes 1.0 %    Nucleated RBCs 0 0 /100    Absolute Neutrophil 2.6 1.6 - 8.3 10e9/L    Absolute Lymphocytes 0.6 (L) 0.8 - 5.3 10e9/L    Absolute Monocytes 0.5 0.0 - 1.3 10e9/L    Absolute Eosinophils 0.2 0.0 - 0.7 10e9/L    Absolute Basophils 0.0 0.0 - 0.2 10e9/L    Abs  Immature Granulocytes 0.0 0 - 0.4 10e9/L    Absolute Nucleated RBC 0.0    Comprehensive metabolic panel   Result Value Ref Range    Sodium 134 133 - 144 mmol/L    Potassium 3.8 3.4 - 5.3 mmol/L    Chloride 105 94 - 109 mmol/L    Carbon Dioxide 22 20 - 32 mmol/L    Anion Gap 7 3 - 14 mmol/L    Glucose 73 70 - 99 mg/dL    Urea Nitrogen 7 7 - 30 mg/dL    Creatinine 0.64 0.52 - 1.04 mg/dL    GFR Estimate >90 >60 mL/min/[1.73_m2]    GFR Estimate If Black >90 >60 mL/min/[1.73_m2]    Calcium 7.4 (L) 8.5 - 10.1 mg/dL    Bilirubin Total 0.2 0.2 - 1.3 mg/dL    Albumin 1.8 (L) 3.4 - 5.0 g/dL    Protein Total 5.5 (L) 6.8 - 8.8 g/dL    Alkaline Phosphatase 162 (H) 40 - 150 U/L    ALT 27 0 - 50 U/L    AST 38 0 - 45 U/L   Magnesium   Result Value Ref Range    Magnesium 2.1 1.6 - 2.3 mg/dL   Phosphorus   Result Value Ref Range    Phosphorus 2.2 (L) 2.5 - 4.5 mg/dL

## 2019-02-14 NOTE — PLAN OF CARE
VSS ex hypotention- improved slightly with bolus then decreased again; MD notified. Pt c/o pain managed with scheduled and PRN medications. Tolerating regular diet; denies N/V. +BM/gas. Pt has not urinated since S. Cath at start of shift; bladder scanned for 142 mL. Up with 1-2 assist. PORT running NS at 100 mL/hr. PLAN: continue POC. Needs UA/UC    Pt ok with bedside report if awake.

## 2019-02-14 NOTE — PLAN OF CARE
Patient remains hypotensive. On 3 LPM oxygen. Patient felt like she couldn't breathe earlier, got some relief with Ativan. PleurX catheter drained, got 800 ml yellow fluid out. Denies pain. Still unable to void, straight cathed for 300 ml. 1L bolus given. Had a small bowel movement today. No appetite. Up with SBA. NPO at midnight for possible procedure tomorrow. Patient going to transfer to a tele floor when bed available.

## 2019-02-14 NOTE — PROGRESS NOTES
[facility], [place]    Hematology / Oncology Progress Note    Date of Admission: 2/13/2019  Hospital Day #: 1   Date of Service (when I saw the patient): 02/14/2019     Assessment & Plan   Suzi Gonsales is a 53 year old female with PMHx significant for metastatic renal cell carcinoma, s/p craniotomy and gamma knife for brain metastasis, and recurrent pleural effusions s/p L PleurX catheter and recent thoracentesis with pulmonary on day of admission who was admitted to the hospital for persistent hypoxia and hypotension.      #Hypotension.  #Pericardial effusion.  BP in the 70s-80s systolic while receiving thoracentesis in endoscopy today. Gave 1L bolus prior to admitting to heme/onc. Patient notes she has not been eating or drinking much over the past few days because she has been feeling so fatigued. Dehydration could be contributing. Notes some nausea as well, but no vomiting. Has a cough at baseline which she states is unchanged. SOB/JIMÉNEZ which she contributes to existing pleural effusions. No fevers noted. Only transient/mild improvement in pressures overall.   -Gave additional IVF 1L bolus f/b maintenance IVF to rehydrate. Received IVF 3L in past 24 hrs + MIVF. Gave 1L today with maintenance.  -BCx NGTD, UA/UCx ordered to eval for infection with hypotension.   -CXR post thoracentesis with perihilar opacities, bibasilar atelectasis vs consolidation. CT below.  -Echo evaluation shows moderate pericardial effusion w/ compression of R ventricle and atrium but without dilation of IVC. No current tamponade but at risk for developing. EF 55-60%.   -Cardiology consult, appreciate recs.   -Consult thorcic surgery for possible pericardial window vs pericardiocentesis.   -EKG with NSR.  -Evaluate cortisol level (random) as patient has adrenal nodule w/ result 13.4.       #Hypoxia.   #SOB/JIMÉNEZ.   #Recurrent pleural effusions. S/p PleurX catheter on L. Recent thora 2/13 on R.  Hypoxia persisted  "despite outpatient thoracentesis with pulmonary. sats <90%, improved with oxygen administration. Patient explains she has been feeling pretty short of breath when they identified the pleural effusions, and she has had some relief with PleurX although it has caused a lot of pain. Does note some SOB at rest as well as with exertion. Eval as above for infectious source contributing to hypoxia.   -CT PE protocol to r/o pulmonary embolism demonstrates NO PE, extenstive mediastinal, hilar, axillary, and upper abd LAD stable/unchanged, stable moderate pericardial effusion, and ongoing interloblular septal thickening w/ patchy ground glass attenuation w/ consolidative patchy opacities in RLL and RML which could be 2/2 pulmonary edema vs infection.  -NC to keep O2 >92%.   -Patient upset this morning and stating \"I cannot breath and I feel like I am dying\". Evaluation with sats ~93%, normal RR, non-labored breathing. Coarse lung sounds bilaterally but present, diminished in bases. Ativan x 1 and imaging as below.  -CXR to re-evaluate lungs s/p IV hydration and increased SOB with increased perihilar pulm edema and lymphangitic carcinomatosis. B/l pleural effusions appear unchanged.  -Per patient report, she drains PleurX catheter daily, order placed for this.   -NO Lasix d/t risk of tamponade.     #Pain.   Notes pain near PleurX drain site that has been present since it was placed. Per Dr. Ni, this may take a while to resolve. No position or movement makes it worse. Not worse with deep inspiration.   -Oxycodone 5-10 mg q4hrs prn.      #Metastatic RCC.   #Brain mets.  #L femoral head lytic lesion. S/p fixation 11/17.  Diagnosed in 2014. Follows with Dr. Green. S/p radical nephrectomy. Unfortunately in 5/2017 patient was noted to have enlarged hilar LAD, pulmonary nodules, adrenal nodules, and a pancreatic body mass. Was treated with 4 cycles of IL-2 therapy but progressed. Initiated on Opdivo however had progression with a " "cerebellar metastasis for which she had a craniotomy (3/18) and GK (4/18). Had 4 new lesions on planning MRI which have been stable on follow up imaging. Received XRT to supraclavicular/MERARY. Started Cabo 40 mg daily and has been on it since that time. States she takes this 6x per week. Additionally, patient is s/p rads to femur lesion in 6/2018 and s/p surgical stabilization of the femur 10/2018. Last imaging consistent with stable to mild prog of disease, but per Dr. Green's note, continue Cabo at this time. Recently developed bilateral pleural effusions (See above).   -Continue Cabo once patient brings in home supply.   -Will need hospital follow up.   -Stand by assist for instability.   -PT consult.      #Anxiety/depression.   -Continue PTA Wellbutrin and Celexa.    #Urinary retention.   Reports difficulty initiating urine stream PTA. Unable to urinate despite IV hydration, bladder scanned for >350 ml and straight catheterized for 580 ml overnight 2/13.   -Bladder scan and straight cath prn.     FEN:  -100 ml/hr MIVF  -PRN lyte replacement  -RDAT     Prophy/Misc:  -VTE: hold tonight with h/o brain mets and recent procedure, may consider tomorrow.  -GI/PUD: PTA omeprazole  -Bowels: prn     Code status: Full     Disposition: Pending improvement of acute issues, likely 1-2 day stay.      Patient discussed with Dr. Chairez.      Shakira Malik PAHAYDE  Hematology/Oncology  Pager #[phone]    Interval History   Patient upset this morning. Did not sleep all night. States she was not allowed to have her Ambien because \"they were concerned with my respiratory rate\". Also endorses feeling like she could not breath which has been worsening. Assured her her saturations were >90% and was receiving enough oxygen. Felt like she wasn't receiving help when she was asked.     Physical Exam   Temp: 98.2  F (36.8  C) Temp src: Axillary BP: (!) 88/47 Pulse: 95 Heart Rate: 98 Resp: 18 SpO2: 92 % O2 Device: Nasal cannula Oxygen Delivery: 2.5 " LPM  There were no vitals filed for this visit.  Vital Signs with Ranges  Temp:  [95.6  F (35.3  C)-98.6  F (37  C)] 98.2  F (36.8  C)  Pulse:  [] 95  Heart Rate:  [] 98  Resp:  [8-25] 18  BP: (77-98)/(30-66) 88/47  SpO2:  [85 %-96 %] 92 %  I/O last 3 completed shifts:  In: 2846.67 [P.O.:240; I.V.:2106.67; IV Piggyback:500]  Out: 1130 [Urine:1130]    Constitutional: Female patient seen lying in bed, in NAD. Visibly upset. Alert and interactive.  HEENT: NCAT, anicteric sclerae, conjunctiva clear. Moist mucous membranes.  Respiratory: Non-labored breathing on 2.5L NC. Bilateral lung sounds coarse with diminished sounds in bases, aerating okay, few scattered crackles.  Cardiovascular: Regular rate and rhythm with no murmur, rub or gallop.  GI: Normoactive BS.  Skin: Warm and dry. No rashes or lesions on exposed surfaces.   Musculoskeletal: Extremities grossly normal. No tenderness or edema present.   Neurologic: A &O x3, speech normal, answering questions appropriately. Moves all extremities spontaneously. Grossly non-focal.   Neuropsychiatric: Mentation and affect normal/appropriate.  VAD: Port is c/d/i with no erythema, drainage, or tenderness.    Medications   Current Facility-Administered Medications   Medication     acetaminophen (TYLENOL) tablet 650 mg     ALPRAZolam (XANAX) tablet 0.5 mg     buPROPion (WELLBUTRIN XL) 24 hr tablet 300 mg     Cabozantinib S-Malate (CABOMETYX) chemotherapy tablet 40 mg     citalopram (celeXA) tablet 20 mg     levofloxacin (LEVAQUIN) tablet 750 mg     Medication Instruction     naloxone (NARCAN) injection 0.1-0.4 mg     omeprazole (priLOSEC) CR capsule 40 mg     ondansetron (ZOFRAN) injection 8 mg    Or     ondansetron (ZOFRAN-ODT) ODT tab 8 mg    Or     ondansetron (ZOFRAN) tablet 8 mg     oxyCODONE (ROXICODONE) tablet 5-10 mg     polyethylene glycol (MIRALAX/GLYCOLAX) Packet 17 g     prochlorperazine (COMPAZINE) tablet 5-10 mg    Or     prochlorperazine (COMPAZINE)  injection 5 mg     senna-docusate (SENOKOT-S/PERICOLACE) 8.6-50 MG per tablet 1 tablet    Or     senna-docusate (SENOKOT-S/PERICOLACE) 8.6-50 MG per tablet 2 tablet     sodium chloride 0.9% infusion     zolpidem (AMBIEN) tablet 5 mg       Data   CBC  Recent Labs   Lab 02/14/19  0716 02/13/19  1559   WBC 3.3* 4.0   RBC 3.03* 3.24*   HGB 10.1* 10.7*   HCT 31.4* 33.9*   * 105*   MCH 33.3* 33.0   MCHC 32.2 31.6   RDW 19.5* 19.4*    314     CMP  Recent Labs   Lab 02/14/19  0716 02/13/19  1559 02/13/19  1324    134  --    POTASSIUM 4.0 3.8  --    CHLORIDE 111* 105  --    CO2 18* 22  --    ANIONGAP 8 7  --    * 73  --    BUN 6* 7  --    CR 0.65 0.64  --    GFRESTIMATED >90 >90  --    GFRESTBLACK >90 >90  --    MUNDO 6.5* 7.4*  --    MAG  --  2.1  --    PHOS  --  2.2*  --    PROTTOTAL 4.6* 5.5* 5.4*   ALBUMIN  --  1.8*  --    BILITOTAL  --  0.2  --    ALKPHOS  --  162*  --    AST  --  38  --    ALT  --  27  --      INRNo lab results found in last 7 days.    Results for orders placed or performed during the hospital encounter of 02/13/19   XR Chest Port 1 View    Narrative    Exam: XR CHEST PORT 1 VW, 2/13/2019 12:37 PM    Indication: severe pain at pleurx site    Comparison: X-ray 02/07/2019    Findings:   AP single view of the chest. Tip of the right-sided IJ Port-A-Cath  projects over the cavoatrial junction. Left basilar chest tube in  place. No pneumothorax. Mild worsening small bilateral pleural  effusion. Some pleural effusion tracking along the right major  fissure. Hazy bibasilar opacity, right greater than left.  Cardiomediastinal silhouettes is grossly unchanged. Upper abdomen is  unremarkable.      Impression    Impression:   1. Mild worsening small bilateral pleural effusion with tracking of  fluids along the right major fissure.  2. Streaky bibasilar opacity favors atelectasis. Cannot rule out  superimposed infection  3. Left-sided chest tube in place    I have personally reviewed the  examination and initial interpretation  and I agree with the findings.    MISTI WEIR MD   XR Chest Port 1 View    Narrative    Chest one view portable semiupright    HISTORY: Status post right thoracentesis.    COMPARISON STUDY: 2/13/2019    FINDINGS: Right IJ Port-A-Cath tip in the right atrium. Left basilar  chest tube. Perihilar interstitial opacities are noted. Bibasilar  atelectasis and consolidation. Small bilateral pleural effusions  decreased on the right.      Impression    IMPRESSION: Slight decrease in pulmonary edema.    MISTI WEIR MD   CT Chest Pulmonary Embolism w Contrast    Narrative    Examination: CT CHEST PULMONARY EMBOLISM W CONTRAST, 2/13/2019 6:09 PM    Comparison: 2/6/2019, 12/12/2018. Chest radiograph same day..    History: hypoxia despite throacentesis, known metastatic RCC with  extensive disease.    Technique: Axial thin slice images from the lung apices to the  diaphragm were obtained following the injection of intravenous  contrast media in the pulmonary arterial phase.     Contrast dose: iopamidol (ISOVUE-370) solution 50 mL    Findings:     Right IJ Port-A-Cath tip is in the high right atrium.     There is adequate contrast bolus in the study.  No pulmonary embolus  noted.   Main pulmonary artery is not dilated. No intracardiac  thrombus. No evidence of right heart strain.    The heart size is normal. Moderate pericardial effusion. Normal  caliber and configuration of the thoracic great vessels.  Aorta and  its major branches are patent. No convincing change in extensive  mediastinal, hilar, and axillary lymphadenopathy. Bibasilar  atelectasis and atelectasis of the right middle lobe.     New left chest tube. Decreased bilateral pleural effusions since  2/6/2019. Associated atelectasis. No pneumothorax.  Continued  interlobular septal thickening and diffuse groundglass opacities with  with patchy consolidative opacities primarily in the right lower and  middle lobes. Trachea  and central airways are clear.    Limited views of the abdomen reveal no acute pathology. Post surgical  changes from right nephrectomy. Upper abdominal adenopathy, stable.  Small unchanged left adrenal nodule. Unchanged cystic structure at the  pancreatic tail, measuring approximately 1 cm.    No suspicious osseous or soft tissue lesions.      Impression    IMPRESSION:   1. No evidence of pulmonary embolus.  2. New left chest tube. Right and left pleural effusions have  decreased when compared to 2/6/2019.  3. Extensive mediastinal, hilar, axillary, and upper abdominal  lymphadenopathy is again seen, similar to the prior.  4. Stable moderate pericardial effusion.  5. Improving aeration; however, ongoing smooth interlobular septal  thickening with patchy areas of groundglass attenuation with more  consolidative patchy opacities in the right lower lobe and right  middle lobe. Findings could represent pulmonary edema or infection,  including atypicals.    I have personally reviewed the examination and initial interpretation  and I agree with the findings.    JODI CHOE MD

## 2019-02-14 NOTE — PROVIDER NOTIFICATION
02/13/19 2249   Vitals   BP (!) 91/38  (sitting up)   BP Location Left arm   BP - Mean 55.7   Oxygen Therapy   SpO2 93 %   O2 Device Nasal cannula   Oxygen Delivery 2 LPM   This RN called by KARYN Estes charge RN to assess patient due to decreased blood pressures and RN being concerned.  Upon assessment, patient alert, oriented and attempting to use the bedpan with the urge to void.  Blood pressures ranging 78/44 to 96/57, patient denies dizziness, light headedness, or chest pain/pressure with sitting and standing.  Patient ambulated to the bathroom without dizziness, and was unable to void.  Bladder scanned for 350ml+ and straight cathed for 580ml.  Per patient, she reports that her SBP runs in the mid to upper 90's at baseline.  Plan for 500ml bolus over two hours, lactic acid draw, and start Levaquin.  Pt instructed to use call light if needing to use the bathroom, or if feeling dizzy, light headed or short of breath.  Plan to recheck blood pressure in 1 hour, handoff to KARYN Baltazar bedside RN.

## 2019-02-14 NOTE — CONSULTS
Cardiology Consult                                                               2019  Suzi Gonsales MRN: [id number]  Age: 53 year old, : 1965        Reason for consult:      Pericardial effusion, concern for tamponade        Assessment and Recommendation:     52 yo with past medical history significant for metastatic renal cell carcinoma, s/p craniotomy and gamma knife for brain metastasis, and recurrent pleural effusions s/p L PleurX catheter and recent thoracentesis yesterday who presented with hypoxia and hypotension, found to have moderate pericardial effusion on echocardiogram today.  Review the echocardiogram shows some RA and RV impaired filling, however IVC is normal size, and there is minimal respiratory tricuspid variation which argues against tamponade physiology at this time.  In fact, review of her CT scan shows a rather stable pericardial effusion from last week, albeit this is not the most sensitive measurement for pericardial effusion and certainly cannot diagnose tamponade.  Further, with history of poor p.o. intake and normal to small IVC despite considerable fluid resuscitation, I question if much of her hypotension is actually due to hypovolemia as opposed to impaired hemodynamics from this effusion.  Unfortunately, review of her x-ray does show some pulmonary edema, however diuretics can worsen tamponade physiology and I would not recommended at this time.  I recommend continued fluid resuscitation, encourage p.o. fluid intake, and we will plan to repeat a limited echocardiogram tomorrow morning to see if there is any change in the effusion or hemodynamics.  Please make her n.p.o. at midnight in case we need to drain this effusion tomorrow.     1. Pericardial Effusion  - Echo today with moderate pericardial effusion without evidence of tamponade however some compression of RA/RV. Normal IVC. Apical effusion diameter 10mm.  -I do not think that the pericardial  effusion is to blame for all of Suzi symptoms. In fact, I suspect her hypotension is more to do with hypovolemia due to poor PO intake  - Agree with IV fluid resuscitation. Encourage PO intake  - CXR/CT with pulmonary edema and b/l effusions. I would avoid diuretics as this can decrease preload and create tamponade  - repeat echocardiogram tomorrow  - make NPO at midnight in the event of requiring pericardiocentesis or pericardial window     Patient discussed with staff attending, Dr. Dunaway and the note reflects our joint plan. Thank you for consulting the cardiovascular services at the [facility]. Please do not hesitate to call with questions or concerns.     Rob Curiel MD    PGY-4, Cardiology Fellow  Pager: [phone]        History of Present Illness:     Ms. Suzi Gonsales is a 52 yo with past medical history significant for metastatic renal cell carcinoma, s/p craniotomy and gamma knife for brain metastasis, and recurrent pleural effusions s/p L PleurX catheter and recent thoracentesis yesterday who presented with hypoxia and hypotension, found to have moderate pericardial effusion on echocardiogram today.  Suzi was diagnosed with renal cell carcinoma in December 2014.  She had a right open radical nephrectomy on December 13, 2014.  Staging workup was negative until a CT on 5/11/2017 showed enlarged hilar lymphadenopathy, enlarged pulmonary nodules, adrenal nodules, and a pancreatic body mass. Biopsies positive for metastatic RCC.  Since this time she is been on multiple chemotherapy agents.  Most recently she was started on [place], however some staging has revealed progression of disease.  She has had recurrent bilateral pleural effusions.  She has a Pleurx catheter in the left chest, with a recent thoracentesis yesterday of the right lung.  She also has a pericardial effusion that was noted on CT in December that was increased on recent imaging earlier this month.    She was in  clinic yesterday for right-sided thoracentesis, where she was noted to be hypotensive and hypoxic.  She states that she has been feeling quite poorly at home for the last several days.  She particularly endorses fatigue and generalized weakness which she states is been going on for many weeks, worse in the past couple weeks.  She also endorses shortness of breath that is worse with exertion, nausea and chest pain over the site of her Pleurx catheter.  She also endorses a cough, however this is not worsened over the past several days.  She denies fevers, chills, diaphoresis, lightheadedness/dizziness, presyncope, palpitations.  She does feel that her shortness of breath improved mildly after thoracentesis.    ROS is negative except as described above.      Past Medical History:     Patient Active Problem List   Diagnosis     Malignant neoplasm of right kidney (H)     Metastatic renal cell carcinoma (H)     Clear cell renal cell carcinoma (H)     Metastatic renal cell carcinoma to lung, right (H)     Panic attack     Brain metastasis (H)     Hypercalcemia     Dehydration     Nausea with vomiting     Abnormal weight gain     Bone metastasis (H)     Renal cell cancer (H)     Renal cell carcinoma (H)         Past Surgical History:      Past Surgical History:   Procedure Laterality Date     C/SECTION, LOW TRANSVERSE  ,     , Low Transverse     ENDOSCOPIC ULTRASOUND UPPER GASTROINTESTINAL TRACT (GI) N/A 2017    Procedure: ENDOSCOPIC ULTRASOUND, ESOPHAGOSCOPY / UPPER GASTROINTESTINAL TRACT (GI);  Esophagogastroduodenoscopy, Endoscopic Ultrasound with Fine Needle Biopsies;  Surgeon: Asad Velez MD;  Location: [facility] OR     INSERT PORT VASCULAR ACCESS N/A 2017    Procedure: INSERT PORT VASCULAR ACCESS;  Chest Port Placement-Right;  Surgeon: Melanie Cevallos PA-C;  Location: [facility]     IR THORACENTESIS  2018     open radical nephrectomy Right 14     OPEN REDUCTION INTERNAL  FIXATION HIP NAILING Left 10/1/2018    Procedure: OPEN REDUCTION INTERNAL FIXATION HIP NAILING;  Left Hip Screw ;  Surgeon: Randy Dunn MD;  Location: [facility] OR     OPTICAL TRACKING SYSTEM CRANIOTOMY, EXCISE TUMOR, COMBINED Left 3/6/2018    Procedure: COMBINED OPTICAL TRACKING SYSTEM CRANIOTOMY, EXCISE TUMOR;  Left Stealth Assisted Posterior fossa Craniotomy And Tumor Resection ;  Surgeon: Quintin Palencia MD;  Location: [facility] OR     PICC INSERTION Left 07/31/2017    5fr DL BioFlo PICC, 40cm (4cm external) in the L basilic vein w/ tip in the low SVC     PICC INSERTION Right 08/15/2017    5fr DL BioFlo PICC, 36cm (1cm external) in the R basilic vein w/ tip in the low SVC     THORACENTESIS Left 6/12/2018    Procedure: THORACENTESIS;  Left Thoracentesis ;  Surgeon: Juanito Weinstein PA-C;  [facility]     THORACENTESIS Left 6/25/2018    Procedure: THORACENTESIS;  Thoracentesis;  Surgeon: Juanito Weinstein PA-C;  [facility]     THORACENTESIS Left 9/10/2018    Procedure: THORACENTESIS;  Left Thoracentesis;  Surgeon: Romie Eduardo PA-C;  [facility]     THORACENTESIS Left 11/6/2018    Procedure: Thoracentesis, Left;  Surgeon: Quintni Marin PA-C;  [facility]     THORACENTESIS Left 11/23/2018    Procedure: Left Therapeutic Thoracentesis;  Surgeon: Melanie Cevallos PA-C;  [facility]     THORACENTESIS Left 12/21/2018    Procedure: Left Thoracentesis;  Surgeon: Quintin Marin PA-C;  [facility]     THORACENTESIS Left 1/23/2019    Procedure: THORACENTESIS;  Surgeon: Miky Sherman MD;  Location: [facility]     THORACENTESIS N/A 2/7/2019    Procedure: Pleurx placement;  Surgeon: Vasiliy Ni MD;  Location: [facility]     THORACENTESIS N/A 2/13/2019    Procedure: Thoracentesis;  Surgeon: Vasiliy Ni MD;  Location: [facility]         Social History:     Social History     Socioeconomic History     Marital status:      Spouse name: Tunde Lamb  of children: 2     Years of education: Not on file     Highest education level: Not on file   Social Needs     Financial resource strain: Not on file     Food insecurity - worry: Not on file     Food insecurity - inability: Not on file     Transportation needs - medical: Not on file     Transportation needs - non-medical: Not on file   Occupational History     Occupation:    Tobacco Use     Smoking status: Former Smoker     Packs/day: 0.50     Years: 20.00     Pack years: 10.00     Types: Cigarettes     Last attempt to quit: 1/1/2004     Years since quitting: 15.1     Smokeless tobacco: Never Used   Substance and Sexual Activity     Alcohol use: Yes     Comment: 1-2 drinks couple times per week     Drug use: No     Sexual activity: Not Currently     Partners: Male   Other Topics Concern     Parent/sibling w/ CABG, MI or angioplasty before 65F 55M? Not Asked   Social History Narrative    .   for [employer].     2 sons.         Family History:     Family History   Problem Relation Age of Onset     Hypertension Father      Chronic Obstructive Pulmonary Disease Father      Hyperlipidemia Brother      Hyperlipidemia Brother      Cancer No family hx of          Allergies:     No Known Allergies      Medications:       No current facility-administered medications on file prior to encounter.   Current Outpatient Medications on File Prior to Encounter:  acetaminophen (TYLENOL) 325 MG tablet Take 3 tablets (975 mg) by mouth every 6 hours   ALPRAZolam (XANAX) 0.5 MG tablet Take 1 tablet (0.5 mg) by mouth 3 times daily as needed for anxiety   buPROPion (WELLBUTRIN XL) 300 MG 24 hr tablet Take 300 mg by mouth every morning    Cabozantinib S-Malate (CABOMETYX) 40 MG Take 1 tablet (40 mg) by mouth daily Take on an empty stomach 1 hour before or 2 hours after a meal. Avoid grapefruit and grapefruit juice.   citalopram (CELEXA) 20 MG tablet Take 1 tablet (20 mg) by mouth every evening    HYDROcodone-acetaminophen (NORCO) 5-325 MG per tablet    omeprazole (PRILOSEC) 40 MG capsule Take 1 capsule (40 mg) by mouth daily   ondansetron (ZOFRAN) 8 MG tablet Take 1 tablet (8 mg) by mouth every 8 hours as needed for nausea   oxyCODONE IR (ROXICODONE) 5 MG tablet Take 1-2 tablets (5-10 mg) by mouth every 4 hours as needed for moderate to severe pain   polyethylene glycol (MIRALAX/GLYCOLAX) Packet Take 17 g by mouth daily as needed for constipation   Probiotic Product (PROBIOTIC PO) Take by mouth daily (with lunch)   sennosides (SENOKOT) 8.6 MG tablet Take 1-2 tablets by mouth 2 times daily as needed for constipation (no stool in 24 hrs)   zolpidem (AMBIEN) 5 MG tablet Take 1 tablet (5 mg) by mouth nightly as needed for sleep           Physical Exam:     B/P: 90/45, T: 98.2, P: 95, R: 18    Wt Readings from Last 4 Encounters:   02/06/19 50.3 kg (110 lb 12.8 oz)   01/23/19 49.3 kg (108 lb 11.2 oz)   01/14/19 49.9 kg (110 lb)   12/12/18 52.4 kg (115 lb 8 oz)         Intake/Output Summary (Last 24 hours) at 2/14/2019 1112  Last data filed at 2/14/2019 0659  Gross per 24 hour   Intake 2846.67 ml   Output 1130 ml   Net 1716.67 ml       Gen: Frail. AA&Ox3, no acute distress  HEENT:AT/ NC, PERRL b/l, EOM grossly intact  PULM/THORAX: Absent breath sounds in lower lobes b/l  CV:RRR, S1 and S2 appreciated, no extra heart sounds, murmurs or rub auscultated. No JVD  ABD: soft, nontender, nondistended. Normoactive bowel sounds x 4, no HSM appreciated.  EXT: No edema, clubbing or cyanosis.  NEURO: CN II-XII intact      Data:     Labs Reviewed on Admission    Troponin Lab Results   Component Value Date    TROPI <0.015 04/02/2018     BMP  Recent Labs   Lab 02/14/19  0716 02/13/19  1559    134   POTASSIUM 4.0 3.8   CHLORIDE 111* 105   MUNDO 6.5* 7.4*   CO2 18* 22   BUN 6* 7   CR 0.65 0.64   * 73     CBC  Recent Labs   Lab 02/14/19  0716 02/13/19  1559   WBC 3.3* 4.0   RBC 3.03* 3.24*   HGB 10.1* 10.7*   HCT 31.4*  33.9*   * 105*   MCH 33.3* 33.0   MCHC 32.2 31.6   RDW 19.5* 19.4*    314     INRNo lab results found in last 7 days.   Hepatic Panel   Lab Results   Component Value Date    AST 38 02/13/2019     Lab Results   Component Value Date    ALT 27 02/13/2019     No results found for: BILICONJ   Lab Results   Component Value Date    BILITOTAL 0.2 02/13/2019     Lab Results   Component Value Date    ALBUMIN 1.8 02/13/2019     Lab Results   Component Value Date    PROTTOTAL 4.6 02/14/2019      Lab Results   Component Value Date    ALKPHOS 162 02/13/2019           Most Recent Imaging:     ECG: Sinus rhythm. No evidence of acute ischemia, nonspecific flat T waves in lateral leads. No electrical alternans.       Echo: 2/14/19  Interpretation Summary  Moderate pericardial effusion with compression of right ventricle and right  atrium but without dilatation of IVC. As such patient not with tamponade but  at increased risk for developing tamponade. Apical diameter of the effusion is  10 mm.      CT PE: 2/13/19  IMPRESSION:   1. No evidence of pulmonary embolus.  2. New left chest tube. Right and left pleural effusions have  decreased when compared to 2/6/2019.  3. Extensive mediastinal, hilar, axillary, and upper abdominal  lymphadenopathy is again seen, similar to the prior.  4. Stable moderate pericardial effusion.  5. Improving aeration; however, ongoing smooth interlobular septal  thickening with patchy areas of groundglass attenuation with more  consolidative patchy opacities in the right lower lobe and right  middle lobe. Findings could represent pulmonary edema or infection,  including atypicals.

## 2019-02-14 NOTE — PROVIDER NOTIFICATION
Notified MD at 0320  Spoke with: Horace Viera   Action: give 250 mL NS bolus, continue to monitor  Comments: B/P 78/47

## 2019-02-14 NOTE — PROGRESS NOTES
"CLINICAL NUTRITION SERVICES - ASSESSMENT NOTE     Nutrition Prescription    RECOMMENDATIONS FOR MDs/PROVIDERS TO ORDER:  Replace low electrolytes as/if needed    Consider appetite stimulant if appropriate    Malnutrition Status:    Non-severe malnutrition in the context of acute illness    Recommendations already ordered by Registered Dietitian (RD):  1. PRN supplements.  Gave patient supplement menu list and went over options.    2. Phos add-on    Future/Additional Recommendations:  Encourage small, frequent meals/snacks/supplements (5-6 per day).  If LOS expected >3 days and appetite remains poor, consider starting calorie counts to help better assess PO intake vs need for additional nutrition intervention.       REASON FOR ASSESSMENT  Suzi Gonsales is a/an 53 year old female assessed by the dietitian for Provider Order - \"consult\"    NUTRITION HISTORY  Obtained information from patient and her .  Pt has had very poor PO intake over the past 2 weeks due to poor appetite, says she is only eating maybe 1 meal per day [eating foods like sandwiches, pasta, yogurt (likes [employer] brand)].  Overall pt has had poor appetite over the past month.  Pt on a regular diet and denies any food allergies/intolerances.  Drinks Boost or Ensure, but not consistently.      PMH includes metastatic renal cell carcinoma s/p craniotomy and gamma knife for brain metastasis and recurrent pleural effusions.      CURRENT NUTRITION ORDERS  Diet: Regular  Intake/Tolerance: drinking a [employer] protein smoothie during visit but hasn't eaten much else today.  Pt feeling nauseous during visit.      LABS  Labs reviewed  - BUN 6 (L), may indicate low protein intake  - Phos 2.2 (L) yesterday    MEDICATIONS  Medications reviewed  - IVF @ 125 mL/hr    ANTHROPOMETRICS  Height: 152.4 cm (5' 0\")  Most Recent Weight: 50.3 kg (110 lb 12.8 oz) on 2/6  IBW: 45.5 kg   BMI: Normal BMI  Weight History: 7 lb wt loss over the past 2-3 months (5.9% wt loss), " pt confirms this  Wt Readings from Last 20 Encounters:   02/06/19 50.3 kg (110 lb 12.8 oz)   01/23/19 49.3 kg (108 lb 11.2 oz)   01/14/19 49.9 kg (110 lb)   12/12/18 52.4 kg (115 lb 8 oz)   11/23/18 53.1 kg (117 lb 0 oz)   11/14/18 53.1 kg (117 lb)   11/06/18 53.1 kg (117 lb 1.8 oz)   10/16/18 53.4 kg (117 lb 11.2 oz)   10/16/18 53.4 kg (117 lb 11.2 oz)   10/01/18 53.6 kg (118 lb 2.7 oz)   09/27/18 54.2 kg (119 lb 8 oz)   09/10/18 52.8 kg (116 lb 4.8 oz)   09/06/18 52.7 kg (116 lb 3.2 oz)   08/28/18 52.9 kg (116 lb 9.6 oz)   08/22/18 52.9 kg (116 lb 9.6 oz)   07/24/18 54.1 kg (119 lb 4.8 oz)   06/26/18 53.4 kg (117 lb 12.8 oz)   06/25/18 54.4 kg (120 lb)   06/20/18 54.4 kg (120 lb)   06/18/18 54.4 kg (120 lb)      Dosing Weight: 50 kg (actual)    ASSESSED NUTRITION NEEDS  Estimated Energy Needs: 0423-4861 kcals/day (25 - 30 kcals/kg)  Justification: Maintenance  Estimated Protein Needs: 60-75 grams protein/day (1.2 - 1.5 grams of pro/kg)  Justification: Hypercatabolism with acute illness  Estimated Fluid Needs: 1349-0262 mL/day (1 mL/kcal)   Justification: Maintenance, or other per provider pending fluid status    PHYSICAL FINDINGS  See malnutrition section below.    MALNUTRITION  % Intake: </= 50% for >/= 5 days (severe)  % Weight Loss: Up to 7.5% in 3 months (non-severe)  Subcutaneous Fat Loss: Unable to assess (did not complete full body assessment as pt was covered in blankets and not feeling well)  Muscle Loss: Unable to assess (did not complete full body assessment as pt was covered in blankets and not feeling well)  Fluid Accumulation/Edema: No extremity edema per provider note today  Malnutrition Diagnosis: Non-severe malnutrition in the context of acute illness    NUTRITION DIAGNOSIS  Inadequate oral intake related to decreased appetite as evidenced by eating only 1 meal per day over the past 2 weeks (and overall decreased the past month) and ~6% wt loss over the past 2-3  months    INTERVENTIONS  Implementation  Nutrition Education: Provided education on role of RD and nutrition POC.  Provided supplement menu list and went over options.     Medical food supplement therapy: see RD note    Goals  Patient to consume % of nutritionally adequate meal trays TID, or the equivalent with supplements/snacks.     Monitoring/Evaluation  Progress toward goals will be monitored and evaluated per protocol.     Kelly Martinez RD, LD  7C RD pager: [phone]

## 2019-02-14 NOTE — CONSULTS
Thoracic Surgery Consultation Note  [facility]    Suzi Gonsales MRN# [id number]   Age: 53 year old YOB: 1965     Date of Admission:  2/13/2019    Reason for consult: Pericardial effusion       Requesting physician: Shakira Malik       Level of consult: Consult, follow and place orders           Assessment:   53 yoF with metastatic renal cell carcinoma, with recurrent bilateral pleural effusions s/p L PleurX catheter and recent bilateral thoracenteses (R yest, L today -- drainage of the pleurX) who presented with hypoxia and hypotension, found to have a moderate pericardial effusion on ECHO today without tamponade physiology.         Recommendations:   Repeat ECHO in AM per cardiology. NPO at midnight for pericardial window. Discussed with patient and Dr. Rhoades.    Coags, type and cross for 2 units ordered.    For symptom management, consider NSAIDS for pericarditis pain.           History of Present Illness:   CC: Shortness of breath, pericardial effusion on ECHO    Obtained from patient and from chart review:    53 yoF with metastatic renal cell carcinoma, s/p craniotomy and gamma knife for brain metastasis, and recurrent pleural effusions s/p L PleurX catheter and recent R thoracentesis who presented with hypoxia and hypotension, found to have moderate pericardial effusion on echocardiogram today.      She also has a pericardial effusion that was noted on CT in December that was increased on recent imaging earlier this month.     She was in clinic yesterday for right-sided thoracentesis, where she was noted to be hypotensive and hypoxic. She was admitted to the hospital for stabilization. She had drainage of the pleurX today. She remains somewhat short of breath and has a new oxygen requirement, also IVF to maintain adequate BP.    Her main complaint is pain across her ribs and under her xiphoid area, worse with a deep breath.   She says both chest wall areas hurt bc of the recent  procedures. She is also not sleeping but when she can, that improves the pain. She could not take ambien last night because of her respiratory status and was miserable all night.     She is recently recovering from a cold. She has a stable cough. She has been feeling poorly at home for the past 2-3 weeks. She does feel that her shortness of breath improved slightly after thoracentesis and drainage of the pleurX.     ROS is negative except as described above.          Past Medical History:     Past Medical History:   Diagnosis Date     Adrenal nodule (H)      Anxiety      Brain mass      Depression      Former tobacco use      Lacunar infarct, acute 2018     Mass of left lung     Upper lobe     Renal cell carcinoma (H) 2014    Clear cell     Solitary kidney     S/P right nephrectomy due to kidney cancer             Past Surgical History:     Past Surgical History:   Procedure Laterality Date     C/SECTION, LOW TRANSVERSE  ,     , Low Transverse     ENDOSCOPIC ULTRASOUND UPPER GASTROINTESTINAL TRACT (GI) N/A 2017    Procedure: ENDOSCOPIC ULTRASOUND, ESOPHAGOSCOPY / UPPER GASTROINTESTINAL TRACT (GI);  Esophagogastroduodenoscopy, Endoscopic Ultrasound with Fine Needle Biopsies;  Surgeon: Asad Velez MD;  [facility]     INSERT PORT VASCULAR ACCESS N/A 2017    Procedure: INSERT PORT VASCULAR ACCESS;  Chest Port Placement-Right;  Surgeon: Melanie Cevallos PA-C;  [facility]     IR THORACENTESIS  2018     open radical nephrectomy Right 14     OPEN REDUCTION INTERNAL FIXATION HIP NAILING Left 10/1/2018    Procedure: OPEN REDUCTION INTERNAL FIXATION HIP NAILING;  Left Hip Screw ;  Surgeon: Randy Dunn MD;  [facility]     OPTICAL TRACKING SYSTEM CRANIOTOMY, EXCISE TUMOR, COMBINED Left 3/6/2018    Procedure: COMBINED OPTICAL TRACKING SYSTEM CRANIOTOMY, EXCISE TUMOR;  Left Stealth Assisted Posterior fossa Craniotomy And Tumor Resection ;  Surgeon:  Quintin Palencia MD;  Location: [facility]     PICC INSERTION Left 07/31/2017    5fr DL BioFlo PICC, 40cm (4cm external) in the L basilic vein w/ tip in the low SVC     PICC INSERTION Right 08/15/2017    5fr DL BioFlo PICC, 36cm (1cm external) in the R basilic vein w/ tip in the low SVC     THORACENTESIS Left 6/12/2018    Procedure: THORACENTESIS;  Left Thoracentesis ;  Surgeon: Juanito Weinstein PA-C;  [facility]     THORACENTESIS Left 6/25/2018    Procedure: THORACENTESIS;  Thoracentesis;  Surgeon: Juanito Weinstein PA-C;  [facility]     THORACENTESIS Left 9/10/2018    Procedure: THORACENTESIS;  Left Thoracentesis;  Surgeon: Romie Eduardo PA-C;  [facility]     THORACENTESIS Left 11/6/2018    Procedure: Thoracentesis, Left;  Surgeon: Quintin Marin PA-C;  [facility]     THORACENTESIS Left 11/23/2018    Procedure: Left Therapeutic Thoracentesis;  Surgeon: Melanie Cevallos PA-C;  [facility]     THORACENTESIS Left 12/21/2018    Procedure: Left Thoracentesis;  Surgeon: Quintin Marin PA-C;  [facility]     THORACENTESIS Left 1/23/2019    Procedure: THORACENTESIS;  Surgeon: Miky Sherman MD;  [facility]     THORACENTESIS N/A 2/7/2019    Procedure: Pleurx placement;  Surgeon: Vasiliy Ni MD;  [facility]     THORACENTESIS N/A 2/13/2019    Procedure: Thoracentesis;  Surgeon: Vasiliy Ni MD;  [facility]             Social History:     Social History     Socioeconomic History     Marital status:      Spouse name: Tunde     Number of children: 2     Years of education: Not on file     Highest education level: Not on file   Social Needs     Financial resource strain: Not on file     Food insecurity - worry: Not on file     Food insecurity - inability: Not on file     Transportation needs - medical: Not on file     Transportation needs - non-medical: Not on file   Occupational History     Occupation:     Tobacco Use     Smoking status: Former Smoker     Packs/day: 0.50     Years: 20.00     Pack years: 10.00     Types: Cigarettes     Last attempt to quit: 1/1/2004     Years since quitting: 15.1     Smokeless tobacco: Never Used   Substance and Sexual Activity     Alcohol use: Yes     Comment: 1-2 drinks couple times per week     Drug use: No     Sexual activity: Not Currently     Partners: Male   Other Topics Concern     Parent/sibling w/ CABG, MI or angioplasty before 65F 55M? Not Asked   Social History Narrative    .   for [employer].     2 sons.             Family History:     Family History   Problem Relation Age of Onset     Hypertension Father      Chronic Obstructive Pulmonary Disease Father      Hyperlipidemia Brother      Hyperlipidemia Brother      Cancer No family hx of              Allergies:    No Known Allergies          Medications:     No current facility-administered medications on file prior to encounter.   Current Outpatient Medications on File Prior to Encounter:  acetaminophen (TYLENOL) 325 MG tablet Take 3 tablets (975 mg) by mouth every 6 hours   ALPRAZolam (XANAX) 0.5 MG tablet Take 1 tablet (0.5 mg) by mouth 3 times daily as needed for anxiety   buPROPion (WELLBUTRIN XL) 300 MG 24 hr tablet Take 300 mg by mouth every morning    Cabozantinib S-Malate (CABOMETYX) 40 MG Take 1 tablet (40 mg) by mouth daily Take on an empty stomach 1 hour before or 2 hours after a meal. Avoid grapefruit and grapefruit juice.   citalopram (CELEXA) 20 MG tablet Take 1 tablet (20 mg) by mouth every evening   HYDROcodone-acetaminophen (NORCO) 5-325 MG per tablet    omeprazole (PRILOSEC) 40 MG capsule Take 1 capsule (40 mg) by mouth daily   ondansetron (ZOFRAN) 8 MG tablet Take 1 tablet (8 mg) by mouth every 8 hours as needed for nausea   oxyCODONE IR (ROXICODONE) 5 MG tablet Take 1-2 tablets (5-10 mg) by mouth every 4 hours as needed for moderate to severe pain   polyethylene glycol  (MIRALAX/GLYCOLAX) Packet Take 17 g by mouth daily as needed for constipation   Probiotic Product (PROBIOTIC PO) Take by mouth daily (with lunch)   sennosides (SENOKOT) 8.6 MG tablet Take 1-2 tablets by mouth 2 times daily as needed for constipation (no stool in 24 hrs)   zolpidem (AMBIEN) 5 MG tablet Take 1 tablet (5 mg) by mouth nightly as needed for sleep             Review of Systems:      All other review of systems negative, except for what is mentioned above        Physical Exam:   BP 90/45 (BP Location: Left arm)   Pulse 95   Temp 98.2  F (36.8  C) (Axillary)   Resp 18   LMP 12/20/2013   SpO2 91%   General: Alert, interactive, NAD, appears fatigued/tired.  Resp: nonlabored, on 2L O2, 90-94% sats, shallow resp, pleurX on L.  Abdomen: Soft, nontender, nondistended. +BS.  No HSM or masses, no rebound or guarding.  Skin: Warm and dry, no jaundice or rash  Neuro: A&Ox3, CN 2-12 intact, SCOTT            Data:   All laboratory data reviewed  All imaging studies reviewed by me. ECHO not available to look at.         Elina Garcia  Cardiothoracic surgery fellow  pgr [phone]

## 2019-02-14 NOTE — PLAN OF CARE
A&Ox4. Hypotensive, slightly improved. 1000 ml NS bolus given. OVSS on 2 LPM nasal cannula. Pain controlled with PRN tylenol and PRN oxycodone. Denies nausea. Port infusing NS at 100 ml/hr. CT with contrast completed. Pt up with SBA to bathroom, pt with slight weakness. Voiding spontaneously with adequate UOP. Still needing urine sample.  +bowel sounds, bm x2. Dressing on R lower back C/D/I. PleurX intact C/D/I. Chemotherapy was due today, pts  brought home supply. MD confirmed pt only needs to take 6 days/week. Pt decided she was going to skip today's dose for the week. Home supply in [facility] [employer], only two left, encouraged pt to order more for home supply. PRN xanax given x1. Continue to monitor.     Addendum: BP 78/44, MD paged. Recheck 84/41. MD ordered lactic acid lab draw and levaquin for possible pneumonia shown from CT scan. Continue to monitor BP.

## 2019-02-15 PROBLEM — R09.02 HYPOXIA: Status: ACTIVE | Noted: 2019-01-01

## 2019-02-15 NOTE — ANESTHESIA POSTPROCEDURE EVALUATION
Anesthesia POST Procedure Evaluation    Patient: Suzi Gnosales   MRN:     [id number] Gender:   female   Age:    53 year old :      1965        Preoperative Diagnosis: pericardial effusion   Procedure(s):  Right VATS Pericardial Window and pleurX/rodney drain placement   Postop Comments: No value filed.       Anesthesia Type:  General    Reportable Event: NO     PAIN: Uncomplicated   Sign Out status: Comfortable, Well controlled pain     PONV: No PONV   Sign Out status:  No Nausea or Vomiting     Neuro/Psych: Uneventful perioperative course   Sign Out Status: Preoperative baseline; Age appropriate mentation     Airway/Resp.: Uneventful perioperative course   Sign Out Status: Non labored breathing, age appropriate RR; Resp. Status within EXPECTED Parameters     CV: Uneventful perioperative course   Sign Out status: OTHER     Blood pressure:  HypOtension (Vasopressors)     Rate/Rhythm: Tachy     Perfusion: Adequate Perfusion Indices     Disposition:   Sign Out in:  ICU  Disposition:  ICU  Recovery Course: Recovery in ICU  Follow-Up: Not required           Last Anesthesia Record Vitals:  CRNA VITALS  2/15/2019 1042 - 2/15/2019 1142      2/15/2019             NIBP:  103/70    ART BP:  120/73    Ht Rate:  117          Last PACU/Preop Vitals:  Vitals:    02/15/19 1230 02/15/19 1245 02/15/19 1300   BP:  104/73    Pulse:  115    Resp: 28 26 24   Temp:   36.7  C (98  F)   SpO2: 97% 97% 97%         Electronically Signed By: Quintin Auguste MD, February 15, 2019, 1:35 PM

## 2019-02-15 NOTE — PLAN OF CARE
VSS on 3 LPM NC- pt has been experiencing soft BP's but stable at 91/55 at 2200. A/O x4. Pt on tele for pericardial effusion. ECHO scheduled for 0700 AM tomorrow and will do procedure at this time (pericardial window). Pt will be NPO at midnight for this- on regular diet now with poor appetite. Port a cath in R chest wall running NS at 125 mL/hr. Pt received NS bolus earlier today for low BP- has been stable since. Pt voided 150 mL on shift. UA sent- see results. Pt experiencing pain around chest area- PRN oxy given. Ambien given for sleeping. Pt is SBA. One BM on shift. Will continue to monitor and follow POC.

## 2019-02-15 NOTE — ANESTHESIA PROCEDURE NOTES
Arterial Line Procedure Note  Staff:     Anesthesiologist:  Quintin Auguste MD    Resident/CRNA:  Nakia Knowles MD    Arterial line performed by resident/CRNA in presence of a teaching physician    Location: In OR Before Induction  Procedure Start/Stop Times:     patient identified, IV checked, site marked, risks and benefits discussed, informed consent, monitors and equipment checked, pre-op evaluation and at physician/surgeon's request      Correct Patient: Yes      Correct Position: Yes      Correct Site: Yes      Correct Procedure: Yes      Correct Laterality:  Yes    Site Marked:  Yes  Line Placement:     Procedure:  Arterial Line    Insertion Site:  Radial    Insertion laterality:  Left    Skin Prep: Chloraprep      Patient Prep: patient draped, mask, sterile gloves, hat and hand hygiene      Local skin infiltration:  1% lidocaine    amount (mL):  1    Ultrasound Guided?: No      Catheter size:  20 gauge, 12 cm    Cath secured with: suture      Dressing:  Tegaderm    Complications:  None obvious    Arterial waveform: Yes      IBP within 10% of NIBP: Yes

## 2019-02-15 NOTE — ANESTHESIA PREPROCEDURE EVALUATION
Anesthesia Pre-Procedure Evaluation    Patient: Suzi Gonsales   MRN:     [id number] Gender:   female   Age:    53 year old :      1965        Preoperative Diagnosis: pericardial effusion   Procedure(s):  Right VATS Pericardial Window     Past Medical History:   Diagnosis Date     Adrenal nodule (H)      Anxiety      Brain mass      Depression      Former tobacco use      Lacunar infarct, acute 2018     Mass of left lung     Upper lobe     Renal cell carcinoma (H) 2014    Clear cell     Solitary kidney     S/P right nephrectomy due to kidney cancer      Past Surgical History:   Procedure Laterality Date     C/SECTION, LOW TRANSVERSE  ,     , Low Transverse     ENDOSCOPIC ULTRASOUND UPPER GASTROINTESTINAL TRACT (GI) N/A 2017    Procedure: ENDOSCOPIC ULTRASOUND, ESOPHAGOSCOPY / UPPER GASTROINTESTINAL TRACT (GI);  Esophagogastroduodenoscopy, Endoscopic Ultrasound with Fine Needle Biopsies;  Surgeon: Asad Velez MD;  [facility]     INSERT PORT VASCULAR ACCESS N/A 2017    Procedure: INSERT PORT VASCULAR ACCESS;  Chest Port Placement-Right;  Surgeon: Melanie Cevallos PA-C;  [facility]     IR THORACENTESIS  2018     open radical nephrectomy Right 14     OPEN REDUCTION INTERNAL FIXATION HIP NAILING Left 10/1/2018    Procedure: OPEN REDUCTION INTERNAL FIXATION HIP NAILING;  Left Hip Screw ;  Surgeon: Randy Dunn MD;  [facility]     OPTICAL TRACKING SYSTEM CRANIOTOMY, EXCISE TUMOR, COMBINED Left 3/6/2018    Procedure: COMBINED OPTICAL TRACKING SYSTEM CRANIOTOMY, EXCISE TUMOR;  Left Stealth Assisted Posterior fossa Craniotomy And Tumor Resection ;  Surgeon: Quintin Palencia MD;  [facility]     PICC INSERTION Left 2017    5fr DL BioFlo PICC, 40cm (4cm external) in the L basilic vein w/ tip in the low SVC     PICC INSERTION Right 08/15/2017    5fr DL BioFlo PICC, 36cm (1cm external) in the R basilic vein w/ tip in the low SVC      THORACENTESIS Left 6/12/2018    Procedure: THORACENTESIS;  Left Thoracentesis ;  Surgeon: Juanito Weinstein PA-C;  [facility]     THORACENTESIS Left 6/25/2018    Procedure: THORACENTESIS;  Thoracentesis;  Surgeon: Juanito Weinstein PA-C;  [facility]     THORACENTESIS Left 9/10/2018    Procedure: THORACENTESIS;  Left Thoracentesis;  Surgeon: Romie Eduardo PA-C;  [facility]     THORACENTESIS Left 11/6/2018    Procedure: Thoracentesis, Left;  Surgeon: Quintin Marin PA-C;  [facility]     THORACENTESIS Left 11/23/2018    Procedure: Left Therapeutic Thoracentesis;  Surgeon: Melanie Cevallos PA-C;  [facility]     THORACENTESIS Left 12/21/2018    Procedure: Left Thoracentesis;  Surgeon: Quintin Marin PA-C;  [facility]     THORACENTESIS Left 1/23/2019    Procedure: THORACENTESIS;  Surgeon: Miky Sherman MD;  [facility]     THORACENTESIS N/A 2/7/2019    Procedure: Pleurx placement;  Surgeon: Vasiliy Ni MD;  [facility]     THORACENTESIS N/A 2/13/2019    Procedure: Thoracentesis;  Surgeon: Vasiliy Ni MD;  [facility]          Anesthesia Evaluation     . Pt has had prior anesthetic. Type: General    No history of anesthetic complications          ROS/MED HX    ENT/Pulmonary: Comment: -CT PE protocol to r/o pulmonary embolism demonstrates NO PE, extenstive mediastinal, hilar, axillary, and upper abd LAD stable/unchanged, stable moderate pericardial effusion, and ongoing interloblular septal thickening w/ patchy ground glass attenuation w/ consolidative patchy opacities in RLL and RML which could be 2/2 pulmonary edema vs infection.    2/13/19 CXR: FINDINGS: Right IJ Port-A-Cath tip in the right atrium. Left basilar chest tube. Perihilar interstitial opacities are noted. Bibasilar  atelectasis and consolidation. Small bilateral pleural effusions decreased on the right.  Slight decrease in pulmonary edema.    (+), . Other  pulmonary disease recurrent pl eff.  L pleurX catheter.  last thoracent, R=2/13. SOB, JIMÉNEZ, hypoxia, on 2.5 L NC.   Tobacco use: quit.   Neurologic: Comment: 3/6/18 crany for metastatic tumor    (+)CVA (lacunar infarct) date: 2/14/18 without deficits    Cardiovascular: Comment: Hypotension.    (+) ----. : . . . :. . Previous cardiac testing Echodate:2/14/19results:Moderate pericardial effusion with compression of right ventricle and right  atrium but without dilatation of IVC. As such patient not with tamponade but  at increased risk for developing tamponade. Apical diameter of the effusion is  10 mm.  _____________________________________________________________________________  __        Left Ventricle  Left ventricular size is normal. Global and regional left ventricular function  is normal with an EF of 55-60%. Left ventricular wall thickness is normal.  Diastolic function not assessed due to tachycardia.date: results: date: results: date: results:          METS/Exercise Tolerance:     Hematologic:     (+) Anemia, History of Transfusion no previous transfusion reaction -      Musculoskeletal:         GI/Hepatic:     (+) GERD       Renal/Genitourinary:      Chronic renal disease:  RCCA S/P R nephrect.   Endo:         Psychiatric:     (+) psychiatric history (panic) anxiety and depression      Infectious Disease:         Malignancy:   (+) Malignancy History of Other  Other CA mets-->lung, brain, adrenal, panc. S/P rad R neph Active status post         Other: Comment: [place]. Alb=1.8  Ca++=6.5   (+) H/O chronic opiod use ,                    [facility] AN PHYSICAL EXAM    Lab Results   Component Value Date    WBC 3.3 (L) 02/14/2019    HGB 10.1 (L) 02/14/2019    HCT 31.4 (L) 02/14/2019     02/14/2019     02/14/2019    POTASSIUM 4.0 02/14/2019    CHLORIDE 111 (H) 02/14/2019    CO2 18 (L) 02/14/2019    BUN 6 (L) 02/14/2019    CR 0.65 02/14/2019     (H) 02/14/2019    MUNDO 6.5 (L) 02/14/2019    PHOS 1.9  (L) 02/14/2019    MAG 2.1 02/13/2019    ALBUMIN 1.8 (L) 02/13/2019    PROTTOTAL 4.6 (L) 02/14/2019    ALT 27 02/13/2019    AST 38 02/13/2019    ALKPHOS 162 (H) 02/13/2019    BILITOTAL 0.2 02/13/2019    PTT 25 03/07/2018    INR 1.16 (H) 11/23/2018    FIBR 290 03/07/2018    TSH 7.15 (H) 02/13/2019    T4 0.81 02/13/2019    HCG Negative 05/23/2017    HCGS Negative 02/06/2017       Preop Vitals  BP Readings from Last 3 Encounters:   02/14/19 91/55   02/07/19 (!) 85/45   02/06/19 99/66    Pulse Readings from Last 3 Encounters:   02/14/19 100   02/07/19 88   02/06/19 94      Resp Readings from Last 3 Encounters:   02/14/19 16   02/07/19 20   02/06/19 18    SpO2 Readings from Last 3 Encounters:   02/14/19 94%   02/07/19 93%   02/06/19 93%      Temp Readings from Last 1 Encounters:   02/14/19 36  C (96.8  F) (Oral)    Ht Readings from Last 1 Encounters:   02/06/19 1.524 m (5')      Wt Readings from Last 1 Encounters:   02/06/19 50.3 kg (110 lb 12.8 oz)    Estimated body mass index is 21.64 kg/m  as calculated from the following:    Height as of 2/6/19: 1.524 m (5').    Weight as of 2/6/19: 50.3 kg (110 lb 12.8 oz).     LDA:  Peripheral IV 03/06/18 Right Hand (Active)   Number of days: 345       Port A Cath Single Right Chest wall (Active)   Site Assessment WDL 2/14/2019  8:30 AM   Line Status Infusing 2/14/2019  8:30 AM   Extravasation? No 2/14/2019  8:30 AM   Dressing Intervention Transparent 2/14/2019  8:30 AM   Number of days:        Vascular Access Port Access/Assessment - double lumen (Active)   Number of days:        Chest Tube Left Pleural PleurX Catheter (Active)   Site Assessment UTV 2/14/2019  4:00 PM   Drainage Description Yellow 2/14/2019 12:02 PM   Dressing Status Normal: Clean, Dry & Intact 2/14/2019  4:00 PM   Dressing Intervention Gauze;Transparent 2/14/2019 12:02 PM   Output (ml) 800 ml 2/14/2019 12:02 PM   Number of days: 7            Assessment:   ASA SCORE: 4    NPO Status: > 6 hours since completed Solid  Foods   Documentation: H&P complete; Preop Testing complete; Consents complete; Consults complete   Proceeding: Proceed without further delay  Tobacco Use:  Active user of Tobacco     Plan:   Anes. Type:  General        Fluid/Blood-Preparation: Albumin   Induction:  IV (Standard); Etomidate        Advanced: Double Lumen ETT   Access/Monitoring: PIV; A-Line; FloTrac   Maintenance: Balanced; Ketamine   Emergence: Procedure Site   Logistics: Observation/Admission     Postop Pain/Sedation Strategy:  Standard-Options: Opioids PRN     PONV Management:  Adult Risk Factors: Female, Postop Opioids     CONSENT: Direct conversation   Plan and risks discussed with: Patient   Blood Products: Consented (ALL Blood Products)       Comments for Plan/Consent:  ? christiano Madrigal MD

## 2019-02-15 NOTE — PROGRESS NOTES
Cross Cover Note:    Called at 0545 to evaluate patient for acute SOB.    Upon arrival pt's VS were: T: AF, HR: 100s, RR: 20, BP: 104/51 (higher than it has been), and requiring 3 L via NC to maintain oxygen saturations.    Patient reports: feeling increasingly SOB and anxious    Physical Exam was remarkable for:  GEN: chronically ill appearing  CV: tachy, but regular  RESP: rales in all lung fields  EXT: WWP, no LE edema  MENTATION: intact, very anxious    Assessment/Concerns:  Likely she has worsening pulmonary edema and effusions. Ordered stat CXR, but transport took her to get her pericardial window before she was able to get XR. Her O2 requirements have only increased by 0.5 L. Her RR is normal. BP actually improved over her stay. Discussed with surgery, she will have her pericardial window at 0745. I don't think she could be any more optimized for her procedure given her improving blood pressure and mild O2 supplementation. After her procedure, she may need diuresis and repeat thoracentesis. This was signed out to the day team.     Electronically signed by:  Cristobal Diaz M.D.   Pager: [phone]  2/15/2019, 5:57 AM

## 2019-02-15 NOTE — H&P
SURGICAL ICU ADMISSION NOTE  2/15/2019    PRIMARY TEAM: Thoracic  PRIMARY PHYSICIAN: Dr Rhoades    REASON FOR CRITICAL CARE ADMISSION: Post operative hypotension requiring pressors   ADMITTING PHYSICIAN: Dr Chung    ASSESSMENT: 53 year old female with metastatic renal cell carcinoma with recurrent bilateral pleural effusions who was admitted on 2/13 with recurrent pleural effusions and persistent hypoxia and hypotension. Found to have recurrent pleural effusions and also pericardial effusion and was therefore taken to the OR on 2/15 by thoracic surgery for pericardial window and drain placement and Right VATS with PleurX catheter placement. She required pressor support and BiPAP for respiratory support and was therefore sent to the SICU for monitoring.    PLAN:   Neuro/ pain/ sedation:  #Metastatic Renal Cell Carcinoma  # H/O anxiety and depression  - Monitor neurological status. Notify the MD for any acute changes in exam.  - Pain: PRN oxycodone and dilaudid  - PTA wellbutrin     Pulmonary care:  # Recurrent pleural effusions  # Bilateral pleurX catheters   - BiPAP, wean as able  - Monitor pleurX catheter output  - ABG on admission - 7.29/33/172/16     Cardiovascular:  #Pericardial Effusion s/p pericardial window and drain placement  #Hypotension    - Monitor hemodynamic status.   - MAP > 65, wean epi as able  - Pericardial drain in place, monitor output     GI care:   - Clear liquid diet and ADAT  - Bowel regimen  - PRN zofran for nausea     Fluids/ Electrolytes/ Nutrition:   - ICU electrolyte replacement protocol  - No indication for parenteral nutrition.  - Nutrition consulted. Appreciate recs     Renal/ Fluid Balance:  # Metastatic renal cell carcinoma  # Hyperchloremic Metabolic acidosis    - Will monitor intake and output.  - Sodium bicarb given     Endocrine:  # Stress hypoglycemia    - Goal < 180     ID/ Antibiotics:  - Perioperative ancef  - Will re-evaluate levaquin started for concern for pneumonia      Heme:     - Hgb 11 post operatively  - Transfuse for Hgb < 7     Prophylaxis:    - Mechanical prophylaxis for DVT.      MSK:    - PT and OT consulted. Appreciate recs.     Lines/ tubes/ drains:  - PleurX catheters bilaterally, pericardial drain, arterial line, PIV, PICC     Disposition:  - Surgical ICU.    Patient seen, findings and plan discussed with surgical ICU staff Dr Sheldon Harvey MD  Orthopaedic Surgery, PGY1  [phone]      - - - - - - - - - - - - - - - - - - - - - - - - - - - - - - - - - - - - - - - - - - - - - - - - - - - - - - - - - - - - - - - - - - - - - - - -     HISTORY PRESENTING ILLNESS: 53 year old female with a history of metastatic renal cell carcinoma with recurrent pleural effusions s/p L pleurX catheter who developed fatigue, shortness of breath, nausea, and increase pain at home in the days to weeks prior to admission. She was admitted on 2/13 with hypoxia and hypotension. She had an echocardiogram on the 14th that showed a pericardial effusion and CT scan showed bilateral pleural effusions. She was taken to the OR with thoracic surgery for a pericardial window and drain placement and right pleurX catheter placement. She was extubated in PACU but required BiPAP to maintain her oxygen saturations and also required pressors to maintain her blood pressure and was therefore admitted to the SICU for hemodynamic and respiratory monitoring.      REVIEW OF SYSTEMS: 10 point ROS neg other than the symptoms noted above in the HPI.    PAST MEDICAL HISTORY:   Past Medical History:   Diagnosis Date     Adrenal nodule (H)      Anxiety      Brain mass      Depression      Former tobacco use      Lacunar infarct, acute 02/2018     Mass of left lung     Upper lobe     Renal cell carcinoma (H) 12/2014    Clear cell     Solitary kidney     S/P right nephrectomy due to kidney cancer       SURGICAL HISTORY:   Past Surgical History:   Procedure Laterality Date     C/SECTION, LOW TRANSVERSE  2002,  2000    , Low Transverse     ENDOSCOPIC ULTRASOUND UPPER GASTROINTESTINAL TRACT (GI) N/A 2017    Procedure: ENDOSCOPIC ULTRASOUND, ESOPHAGOSCOPY / UPPER GASTROINTESTINAL TRACT (GI);  Esophagogastroduodenoscopy, Endoscopic Ultrasound with Fine Needle Biopsies;  Surgeon: Asad Velez MD;  Location: [facility] OR     INSERT PORT VASCULAR ACCESS N/A 2017    Procedure: INSERT PORT VASCULAR ACCESS;  Chest Port Placement-Right;  Surgeon: Melanie Cevallos PA-C;  [facility]: [facility]     IR THORACENTESIS  2018     open radical nephrectomy Right 14     OPEN REDUCTION INTERNAL FIXATION HIP NAILING Left 10/1/2018    Procedure: OPEN REDUCTION INTERNAL FIXATION HIP NAILING;  Left Hip Screw ;  Surgeon: Randy Dunn MD;  Location: [facility] OR     OPTICAL TRACKING SYSTEM CRANIOTOMY, EXCISE TUMOR, COMBINED Left 3/6/2018    Procedure: COMBINED OPTICAL TRACKING SYSTEM CRANIOTOMY, EXCISE TUMOR;  Left Stealth Assisted Posterior fossa Craniotomy And Tumor Resection ;  Surgeon: Quintin Palencia MD;  Location: [facility] OR     PICC INSERTION Left 2017    5fr DL BioFlo PICC, 40cm (4cm external) in the L basilic vein w/ tip in the low SVC     PICC INSERTION Right 08/15/2017    5fr DL BioFlo PICC, 36cm (1cm external) in the R basilic vein w/ tip in the low SVC     THORACENTESIS Left 2018    Procedure: THORACENTESIS;  Left Thoracentesis ;  Surgeon: Juanito Weinstein PA-C;  [facility]     THORACENTESIS Left 2018    Procedure: THORACENTESIS;  Thoracentesis;  Surgeon: Juanito Weinstein PA-C;  [facility]     THORACENTESIS Left 9/10/2018    Procedure: THORACENTESIS;  Left Thoracentesis;  Surgeon: Romie Eduardo PA-C;  [facility]     THORACENTESIS Left 2018    Procedure: Thoracentesis, Left;  Surgeon: Quintin Marin PA-C;  [facility]     THORACENTESIS Left 2018    Procedure: Left Therapeutic Thoracentesis;  Surgeon: Melanie Cevallos PA-C;  [facility]     THORACENTESIS Left 12/21/2018    Procedure: Left Thoracentesis;  Surgeon: Quintin Marin PA-C;  [facility]     THORACENTESIS Left 1/23/2019    Procedure: THORACENTESIS;  Surgeon: Miky Sherman MD;  [facility]: [facility]     THORACENTESIS N/A 2/7/2019    Procedure: Pleurx placement;  Surgeon: Vasiliy Ni MD;  Location: [facility]     THORACENTESIS N/A 2/13/2019    Procedure: Thoracentesis;  Surgeon: Vasiliy Ni MD;  Location: [facility]       SOCIAL HISTORY:   Social History     Socioeconomic History     Marital status:      Spouse name: Tunde     Number of children: 2     Years of education: None     Highest education level: None   Social Needs     Financial resource strain: None     Food insecurity - worry: None     Food insecurity - inability: None     Transportation needs - medical: None     Transportation needs - non-medical: None   Occupational History     Occupation:    Tobacco Use     Smoking status: Former Smoker     Packs/day: 0.50     Years: 20.00     Pack years: 10.00     Types: Cigarettes     Last attempt to quit: 1/1/2004     Years since quitting: 15.1     Smokeless tobacco: Never Used   Substance and Sexual Activity     Alcohol use: Yes     Comment: 1-2 drinks couple times per week     Drug use: No     Sexual activity: Not Currently     Partners: Male   Other Topics Concern     Parent/sibling w/ CABG, MI or angioplasty before 65F 55M? Not Asked   Social History Narrative    .   for [employer].     2 sons.       FAMILY HISTORY: No bleeding/clotting disorders nor problems with anesthesia.     ALLERGIES:    No Known Allergies    MEDICATIONS:    No current facility-administered medications on file prior to encounter.   Current Outpatient Medications on File Prior to Encounter:  acetaminophen (TYLENOL) 325 MG tablet Take 3 tablets (975 mg) by mouth every 6 hours   ALPRAZolam (XANAX) 0.5 MG tablet Take 1 tablet (0.5 mg) by mouth 3  times daily as needed for anxiety   buPROPion (WELLBUTRIN XL) 300 MG 24 hr tablet Take 300 mg by mouth every morning    Cabozantinib S-Malate (CABOMETYX) 40 MG Take 1 tablet (40 mg) by mouth daily Take on an empty stomach 1 hour before or 2 hours after a meal. Avoid grapefruit and grapefruit juice.   citalopram (CELEXA) 20 MG tablet Take 1 tablet (20 mg) by mouth every evening   HYDROcodone-acetaminophen (NORCO) 5-325 MG per tablet    omeprazole (PRILOSEC) 40 MG capsule Take 1 capsule (40 mg) by mouth daily   ondansetron (ZOFRAN) 8 MG tablet Take 1 tablet (8 mg) by mouth every 8 hours as needed for nausea   oxyCODONE IR (ROXICODONE) 5 MG tablet Take 1-2 tablets (5-10 mg) by mouth every 4 hours as needed for moderate to severe pain   polyethylene glycol (MIRALAX/GLYCOLAX) Packet Take 17 g by mouth daily as needed for constipation   Probiotic Product (PROBIOTIC PO) Take by mouth daily (with lunch)   sennosides (SENOKOT) 8.6 MG tablet Take 1-2 tablets by mouth 2 times daily as needed for constipation (no stool in 24 hrs)   zolpidem (AMBIEN) 5 MG tablet Take 1 tablet (5 mg) by mouth nightly as needed for sleep       PHYSICAL EXAMINATION:  Temp:  [95.9  F (35.5  C)-98.4  F (36.9  C)] 98  F (36.7  C)  Pulse:  [100-115] 115  Heart Rate:  [114-122] 114  Resp:  [16-32] 24  BP: ()/(46-73) 104/73  MAP:  [73 mmHg-92 mmHg] 73 mmHg  Arterial Line BP: ()/(60-78) 87/62  FiO2 (%):  [80 %-100 %] 80 %  SpO2:  [92 %-98 %] 97 %    General: Awake, alert, NAD, on face mask for supplemental oxygen maintaining sats  Neuro: A&Ox3, NAD  Resp: Mildly tachypneic, no accessory muscle use  CV: RRR, pericardial friction rub, pleurX x2, pericardial drain  Abdomen: Soft, Non-distended, Non-tender  Extremities: warm and well perfused, no edema    LABS: Reviewed.   Arterial Blood Gases   Recent Labs   Lab 02/15/19  1142 02/15/19  1051   PH 7.21* 7.17*   PCO2 39 47*   PO2 121* 75*   HCO3 16* 17*     Complete Blood Count   Recent Labs   Lab  02/15/19  1051 02/14/19  0716 02/13/19  1559   WBC  --  3.3* 4.0   HGB 11.0* 10.1* 10.7*   PLT  --  256 314     Basic Metabolic Panel  Recent Labs   Lab 02/15/19  1051 02/14/19  0716 02/13/19  1559    137 134   POTASSIUM 3.9 4.0 3.8   CHLORIDE  --  111* 105   CO2  --  18* 22   BUN  --  6* 7   CR  --  0.65 0.64   * 109* 73     Liver Function Tests  Recent Labs   Lab 02/14/19  2236 02/13/19  1559   AST  --  38   ALT  --  27   ALKPHOS  --  162*   BILITOTAL  --  0.2   ALBUMIN  --  1.8*   INR 1.17*  --      Pancreatic Enzymes  No lab results found in last 7 days.  Coagulation Profile  Recent Labs   Lab 02/14/19  2236   INR 1.17*       IMAGING:  Chest CT from 2/13/2019:  IMPRESSION:   1. No evidence of pulmonary embolus.  2. New left chest tube. Right and left pleural effusions have  decreased when compared to 2/6/2019.  3. Extensive mediastinal, hilar, axillary, and upper abdominal  lymphadenopathy is again seen, similar to the prior.  4. Stable moderate pericardial effusion.  5. Improving aeration; however, ongoing smooth interlobular septal  thickening with patchy areas of groundglass attenuation with more  consolidative patchy opacities in the right lower lobe and right  middle lobe. Findings could represent pulmonary edema or infection,  including atypicals.    Echocardiogram from 2/14/2019:  Interpretation Summary  Moderate pericardial effusion with compression of right ventricle and right  atrium but without dilatation of IVC. As such patient not with tamponade but  at increased risk for developing tamponade. Apical diameter of the effusion is  10 mm.

## 2019-02-15 NOTE — PLAN OF CARE
Pt A&O. Anxious. Tachy to 100's at rest 120's with activity. 3L via NC. Significant JIMÉNEZ. MD notified of pt's c/o SOB around 0545, at bedside to assess. Up with SBA. Dressing to Pleurx cath CDI with gauze and tegaderm. Small void x 2. Pt reports bladder fullness, bladder scan of 350. Straight cath attempted x 2 unsuccessfully, will need to be completed prior to procedure this am, pre-op nurse aware. No BM. Pre-op viridiana bath completed by writer this am. NPO since 0000. Right chest port, SL. , Tunde, informed of pt's transport per pt request.

## 2019-02-15 NOTE — OR NURSING
Spoke with Dr. Auguste of anesthesia about possible recheck of ABG, per Dr. Auguste, no need to redraw ABG at this time.

## 2019-02-15 NOTE — ANESTHESIA CARE TRANSFER NOTE
Patient: Suzi Gonsales    Procedure(s):  Right VATS Pericardial Window and pleurX/rodney drain placement    Diagnosis: pericardial effusion  Diagnosis Additional Information: No value filed.    Anesthesia Type:   No value filed.     Note:  Airway :Other (See Comments) (Bipap)  Patient transferred to:PACU  Comments: To PACU on Bipap 12/7, 100%FiO2, O2 sats 95-96%, RR:14-16. Pt awake but groggy, VSS (see Epic), remains on Levophed and Epi infusions, report to RN, care accepted. Bilateral plurex catheters to water seal, patent.Handoff Report: Identifed the Patient, Identified the Reponsible Provider, Reviewed the pertinent medical history, Discussed the surgical course, Reviewed Intra-OP anesthesia mangement and issues during anesthesia, Set expectations for post-procedure period and Allowed opportunity for questions and acknowledgement of understanding      Vitals: (Last set prior to Anesthesia Care Transfer)    CRNA VITALS  2/15/2019 1042 - 2/15/2019 1121      2/15/2019             NIBP:  103/70    ART BP:  120/73    Ht Rate:  117                Electronically Signed By: ARIEL Avina CRNA  February 15, 2019  11:21 AM

## 2019-02-15 NOTE — BRIEF OP NOTE
[facility], [place]    Brief Operative Note    Pre-operative diagnosis: pericardial effusion  Post-operative diagnosis * No post-op diagnosis entered *  Procedure: Procedure(s):  Right VATS Pericardial Window and pleurX/rodney drain placement  Surgeon: Surgeon(s) and Role:     * Shazia Rhoades MD - Primary     * Jackie Giraldo MD - Resident - Assisting  Anesthesia: General   Estimated blood loss: 20 ml  Drains: Rt Pleurex, pericardial rodney drain.    Specimens:   ID Type Source Tests Collected by Time Destination   1 : pericardial fluid Fluid Other CYTOLOGY NON GYN Shazia Rhoades MD 2/15/2019  9:22 AM    A : Pericardial Tissue Tissue Other SURGICAL PATHOLOGY EXAM Shazia Rhoades MD 2/15/2019  9:34 AM      Findings:   Large clear R pleural effusion. Pericardial effusion drained. .  Complications: None.  Implants: None.

## 2019-02-16 NOTE — PROGRESS NOTES
Pt seen this morning.  Still on epinephrine at time of visit.  Pt feeling better. Less short of breath.  Able to sit in chair. Decreased O2 neds. BP a little better this morning.     Appreciate cares by SICU team.  Will continue to follow.  Possible transfer back to oncology when more hemodynamically stable.     Fady Chairez   of Medicine  Division of Hematology, Oncology, and Transplantation

## 2019-02-16 NOTE — PLAN OF CARE
D: Suzi continues to complain of pain,attempting to decrease it with oxycodone 10 mg q 4 hours supplemented by dilaudid. With low sats O2 attempted Bipap 70% for 1 hour, sats decreased again, pulmonary toilet pushed. Low urine output continued as prior shift. Suzi is thirsty and drinking good amounts without difficulty.     Neuro: Alert and oriented. Pupils 3, brisk/equal/tracks. SCOTT strongly, all warm, pale pink, normal pulses all.   VS: Afeb, HR , BP Cuff:79-96/45-60(51-79), Art ( which is dampened and positional): 68-98/54-69(67-76) , RR 14-22.   CV: SR, no ectopy. Murmur.  Pericardial friction rub.   Pulm: NC 5 L sats O2 88-94%, Bipap 70% sats O2 98%. LS clear uppers, diminished lowers. Very weak cough. IS poorly to 400 x 1, usually 250-300 ml.   GI: ABD flat, no flatus noted, BS audible.   : Jacome with small amounts clear ernesto urine. Maintenance IV rate increased to 100 ml/hour now.   Lines/Gtts:  L radial art line, positional/dampened, returns blood. L piv SL. L piv MT  ml/hour.   Skin:   Pale pink. Flank sites covered.   Drains: R pleural 104 ml out sero sang, R pericardial 5 ml out, serosang, L pleurax 140 ml out, serous. All to waterseal, no leaks noted.   Labs: K+, Magnesium,  Kphos replaced earlier, WNL. A1C 5.7   P: Pulmonary toilet, oxygen therapy. Pain med as needed and as tolerated. discontinue jacome. Increase activity. Assist Suzi as able as she optimizes her situation.

## 2019-02-16 NOTE — PLAN OF CARE
Patient had pericardial window with chest tube placement on left side on 2/15. Arrived onto unit around 1430.  Neuro: Patient is alert and oriented. Follows commands. Pupils equal and reactive. At times, anxious.   Respiratory: On 5L nasal cannula, O2 sats have been 97%, respirations are 12-16. LS clear, slightly diminished in the bases. Patient does not complain of shortness of breath. 3 chest tubes, two on R to water seal, one on L to -20 suction. Minimal output.  GI/: Clear liquid diet, just requesting some apple juice and water. Takes pills fine. Minimal UO, MD aware. Per notes, jacome is slightly positional. Hypoactive bowel sounds. Last BM on 2/14.   Vitals/pain: Complains of pleuritic pain, Dilaudid and a dose of Xanax helped greatly to improve pain and anxiety. HR is sinus tach in the low 100s. No ectopy. Afebrile. SBP goal is above 80 with MAPs above 65. On Epinephrine, able to maintain on 0.06 and starting to wean.   Continue to monitor, notify MD with any concerns.

## 2019-02-16 NOTE — PLAN OF CARE
Discharge Planner OT   Patient plan for discharge: not stated  Current status: Pt required max encouragement for OOB activities. Pt mod A bed mobility, min A STS taking a few steps EOB> chair. Hypotension throughout (80s/50s), BP increased w/ movement (90s/60s).   Barriers to return to prior living situation: medical status  Recommendations for discharge: TCU/ARU  Rationale for recommendations: to increase ind in ADLS/IADLS       Entered by: Sveta Hardin 02/16/2019 2:11 PM

## 2019-02-16 NOTE — OP NOTE
Procedure Date: 02/15/2019      PREOPERATIVE DIAGNOSIS:  Malignant pleural and pericardial effusion.      POSTOPERATIVE DIAGNOSIS:  Malignant pleural and pericardial effusion.        PROCEDURE PERFORMED:  Right video-assisted thoracoscopic surgery with pericardial window and right PleurX catheter insertion.      SURGEON:  Shazia Rhoades MD       ASSISTANT:  Lulu Giraldo MD       OPERATIVE FINDINGS:  Moderate-sized pericardial effusion and large right pleural effusion.         OPERATIVE BLOOD LOSS:  30 mL.         DETAILS OF PROCEDURE:  After obtaining informed consent, the patient was brought to the operating room and laid supine on the operating table.  With deep sedation, we positioned her in a lazy lateral decubitus with the right side tilted up.  The right chest was prepped and draped in a sterile manner.  We then proceeded to make an incision in the right sixth intercostal space with local anesthesia.  The camera was inserted to visualize the lung and the pericardial effusion was visualized; however, due to significant respiratory excursions, we decided to proceed with general anesthesia.      After this, another port in the right was placed in the right fifth intercostal space with retraction of the lung, we were able to create a pericardial window by taking a small piece of pericardium using the EndoShears.  This was sent off to pathology.  The pericardial effusion was drained.  A 19-Kittitian Nilson drain was placed in the pericardial cavity and doxycycline was instilled through this.  The pericardial drain was then tunneled out through a separate incision and secured to the skin.  We then proceeded to insert a PleurX catheter through the second incision and this was tunneled out through another separate incision with the cuff lying just under the skin.  The port sites were then closed in layers and the tubes were secured in place.  The patient was then extubated and transferred to the recovery room in stable  condition.         HERMINIA GRACE MD             D: 02/15/2019   T: 2019   MT: GALEN      Name:     MICKEY AMEZCUA   MRN:      [id number]        Account:        [id number]   :      1965           Procedure Date: 02/15/2019      Document: [id number]

## 2019-02-16 NOTE — PROGRESS NOTES
" 02/16/19 1300   Quick Adds   Type of Visit Initial Occupational Therapy Evaluation   Living Environment   Lives With spouse;child(janine), adult   Living Arrangements house   Home Accessibility stairs to enter home;stairs within home   Number of Stairs, Main Entrance four   Stair Railings, Main Entrance railing on left side (ascending)   Number of Stairs, Within Home, Primary (16)   Stair Railings, Within Home, Primary railing on left side (ascending)   Transportation Anticipated family or friend will provide   Self-Care   Usual Activity Tolerance good   Current Activity Tolerance moderate   Regular Exercise No   Equipment Currently Used at Home cane, straight   Activity/Exercise/Self-Care Comment OT: Pt owns a walker   Functional Level   Ambulation 1-->assistive equipment   Transferring 1-->assistive equipment   Toileting 0-->independent   Bathing 0-->independent   Dressing 0-->independent   Eating 0-->independent   Communication 0-->understands/communicates without difficulty   Swallowing 0-->swallows foods/liquids without difficulty   Cognition 0 - no cognition issues reported   Fall history within last six months no   General Information   Onset of Illness/Injury or Date of Surgery - Date 02/13/19   Referring Physician Jackie Giraldo MD   Patient/Family Goals Statement to decrease pain   Additional Occupational Profile Info/Pertinent History of Current Problem per chart pt is a \"53 year old female with metastatic renal cell carcinoma with recurrent bilateral pleural effusions who was admitted on 2/13 with recurrent pleural effusions and persistent hypoxia and hypotension. Found to have recurrent pleural effusions and also pericardial effusion and was therefore taken to the OR on 2/15 by thoracic surgery for pericardial window and drain placement and Right VATS with PleurX catheter placement. She required pressor support and BiPAP for respiratory support and was therefore sent to the SICU for monitoring.\" " "  Precautions/Limitations fall precautions  (right VATS (2weeks precautions))   Weight-Bearing Status - RUE (10# lifting restriction 2/2 VATS for 2 weeks)   Cognitive Status Examination   Orientation orientation to person, place and time   Visual Perception   Visual Perception Wears glasses   Sensory Examination   Sensory Quick Adds No deficits were identified   Strength   Strength Comments OT: RUE NT 2/2 precautions, LUE WFL   Muscle Tone Assessment   Muscle Tone Comments OT: BUE overall deconditioned   Mobility   Bed Mobility Comments OT: mod A   Transfer Skills   Transfer Comments OT: min A   Balance   Balance Comments OT: CGA once standing   Instrumental Activities of Daily Living (IADL)   IADL Comments OT: Pt was ind   Activities of Daily Living Analysis   Impairments Contributing to Impaired Activities of Daily Living balance impaired;fear and anxiety;pain;post surgical precautions;strength decreased   General Therapy Interventions   Planned Therapy Interventions ADL retraining;IADL retraining;balance training;cognition;strengthening;transfer training;home program guidelines;progressive activity/exercise   Clinical Impression   Criteria for Skilled Therapeutic Interventions Met yes, treatment indicated   OT Diagnosis decreased ind in ADLS/IADLS   Influenced by the following impairments generalized weakness and pain   Assessment of Occupational Performance 3-5 Performance Deficits   Identified Performance Deficits decreased ind in ADLS/IADLS   Clinical Decision Making (Complexity) Low complexity   Therapy Frequency daily   Predicted Duration of Therapy Intervention (days/wks) 3/1/19   Anticipated Discharge Disposition Transitional Care Facility;Acute Rehabilitation Facility   Risks and Benefits of Treatment have been explained. Yes   Patient, Family & other staff in agreement with plan of care Yes   [facility] AM-PAC TM \"6 Clicks\"   2016, Trustees of [facility], under license to [employer].  All " "rights reserved.   6 Clicks Short Forms Daily Activity Inpatient Short Form   [facility] AM-PAC  \"6 Clicks\" Daily Activity Inpatient Short Form   1. Putting on and taking off regular lower body clothing? 2 - A Lot   2. Bathing (including washing, rinsing, drying)? 3 - A Little   3. Toileting, which includes using toilet, bedpan or urinal? 2 - A Lot   4. Putting on and taking off regular upper body clothing? 3 - A Little   5. Taking care of personal grooming such as brushing teeth? 3 - A Little   6. Eating meals? 4 - None   Daily Activity Raw Score (Score out of 24.Lower scores equate to lower levels of function) 17   Total Evaluation Time   Total Evaluation Time (Minutes) 5     "

## 2019-02-16 NOTE — PROGRESS NOTES
SURGICAL ICU PROGRESS NOTE  February 16, 2019      CO-MORBIDITIES:   Renal cell carcinoma    ASSESSMENT: 53 year old female with metastatic renal cell carcinoma with recurrent bilateral pleural effusions who was admitted on 2/13 with recurrent pleural effusions and persistent hypoxia and hypotension. Found to have recurrent pleural effusions and also pericardial effusion and was therefore taken to the OR on 2/15 by thoracic surgery for pericardial window and drain placement and Right VATS with PleurX catheter placement. She required pressor support and BiPAP for respiratory support and was therefore sent to the SICU for monitoring.      CHANGES FOR TODAY :  - Decrease MAP goal to 60  - Bowel regimen  - Advance diet   - stop Levaquin   - start Lovenox   - talk to heme/onc regarding cabometyx - OK to resume  encourage PO intake, Bolus if neccessary       PLAN:  PLAN:  Neuro/ pain/ sedation:  #Metastatic Renal Cell Carcinoma  # H/O anxiety and depression  - Monitor neurological status. Notify the MD for any acute changes in exam.  - Pain: PRN oxycodone and dilaudid  - PTA wellbutrin     Pulmonary care:  # Recurrent pleural effusions  # Bilateral pleurX catheters   - Oxymask, maintain SpO2 > 92  - Monitor pleurX catheter output  - ABG on admission - 7.29/33/172/16      Cardiovascular:  #Pericardial Effusion s/p pericardial window and drain placement  #Hypotension    - Monitor hemodynamic status.   - MAP > 60, wean epi as able  - Pericardial drain in place, monitor output     GI care:   - Clear liquid diet and ADAT  - Bowel regimen  - PRN zofran for nausea      Fluids/ Electrolytes/ Nutrition:   - ICU electrolyte replacement protocol  - No indication for parenteral nutrition.  - Nutrition consulted. Appreciate recs     Renal/ Fluid Balance:  # Metastatic renal cell carcinoma  # Hyperchloremic Metabolic acidosis    - Will monitor intake and output.  - Sodium bicarb given      Endocrine:  # Stress hypoglycemia    - Goal  < 180      ID/ Antibiotics:  - Perioperative ancef  - Will re-evaluate levaquin started for concern for pneumonia      Heme:     - Stable, 9.8 this AM  - Transfuse for Hgb < 7      Prophylaxis:    - Mechanical prophylaxis for DVT.   - Lovenox       MSK:    - PT and OT consulted. Appreciate recs.      Lines/ tubes/ drains:  - PleurX catheters bilaterally, pericardial drain, arterial line, PIV, PICC      Disposition:  - Surgical ICU.     Patient seen, findings and plan discussed with surgical ICU staff Dr Sheldon Harvey MD  Orthopaedic Surgery, PGY1  [phone]     ====================================    TODAY'S PROGRESS:   SUBJECTIVE:   - Needed BiPAP for a short period of time overnight. Maintained oxygen sats. Pressors unable to be weaned. Pain somewhat well controlled.       OBJECTIVE:   1. VITAL SIGNS:   Temp:  [97.3  F (36.3  C)-98.9  F (37.2  C)] 97.7  F (36.5  C)  Pulse:  [100-115] 103  Heart Rate:  [] 105  Resp:  [13-42] 33  BP: ()/(45-73) 84/68  MAP:  [66 mmHg-82 mmHg] 73 mmHg  Arterial Line BP: (68-98)/(53-71) 86/62  FiO2 (%):  [70 %-80 %] 70 %  SpO2:  [88 %-100 %] 94 %  FiO2 (%): 70 %  Resp: (!) 33      2. INTAKE/ OUTPUT:   I/O last 3 completed shifts:  In: 3488.32 [P.O.:350; I.V.:2638.32; IV Piggyback:500]  Out: 2409 [Urine:605; Blood:20; Chest Tube:1784]    3. PHYSICAL EXAMINATION:   General: awake, alert, NAD, resting comfortably  Neuro: A&Ox3, NAD, moves all extremities  Resp: Breathing non-labored, pleurX catheters in place  CV: Mildly tachycardic  Abdomen: Soft, Non-distended, Non-tender  Incisions: Intact  Extremities: warm and well perfused    4. INVESTIGATIONS:   Arterial Blood Gases   Recent Labs   Lab 02/15/19  1431 02/15/19  1142 02/15/19  1051   PH 7.29* 7.21* 7.17*   PCO2 33* 39 47*   PO2 172* 121* 75*   HCO3 16* 16* 17*     Complete Blood Count   Recent Labs   Lab 02/16/19  0408 02/15/19  2306 02/15/19  1630 02/15/19  1431 02/15/19  1051 02/14/19  0716   WBC 9.1 9.2   --  8.5  --  3.3*   HGB 9.8* 10.7*  --  11.8 11.0* 10.1*    335 482* 468*  --  256     Basic Metabolic Panel  Recent Labs   Lab 02/16/19  0408 02/15/19  2306 02/15/19  1431 02/15/19  1051 02/14/19  0716    137 137 137 137   POTASSIUM 4.7 4.2 3.7 3.9 4.0   CHLORIDE 108 112* 111*  --  111*   CO2 15* 17* 17*  --  18*   BUN 9 8 5*  --  6*   CR 0.77 0.76 0.66  --  0.65   * 151* 194* 158* 109*     Liver Function Tests  Recent Labs   Lab 02/15/19  1431 02/14/19  2236 02/13/19  1559   AST 30  --  38   ALT 21  --  27   ALKPHOS 147  --  162*   BILITOTAL 0.2  --  0.2   ALBUMIN 1.7*  --  1.8*   INR 1.31* 1.17*  --      Pancreatic Enzymes  No lab results found in last 7 days.  Coagulation Profile  Recent Labs   Lab 02/15/19  1431 02/14/19  2236   INR 1.31* 1.17*     Lactate  Invalid input(s): LACTATE    5. RADIOLOGY:   Recent Results (from the past 24 hour(s))   XR Chest Port 1 View    Narrative    Exam: TEMPORARY, 2/15/2019 12:38 PM    Indication: Status post right VATS     Comparison: X-ray 2/14/2019, CT chest 2/13/2019    Findings:   AP single view of the chest. Tip of the right-sided Port-A-Cath  projects over the cavoatrial junction. Postsurgical changes of right  sided VATS with 1 right chest tube and one pericardial drains.   Minimal right pneumothorax. Left basilar drain in stable position.  Patchy mixed interstitial and airspace opacity in both lungs. Small  bilateral pleural effusion. Extensive subcutaneous emphysema of the  right chest.      Impression    Impression:   1. Postsurgical changes of right-sided VATS. Extensive subcutaneous  emphysema about the right chest wall.  2. Trace right pneumothorax with a PleurX drain in place.   3.  Ongoing pulmonary edema and lymphangitic carcinomatosis  4. Unchanged bilateral pleural effusion    I have personally reviewed the examination and initial interpretation  and I agree with the findings.    MISTI WEIR MD   XR Chest Port 1 View    Narrative     Exam: XR CHEST PORT 1 VW, 2/16/2019 7:45 AM    Indication: S/p R VATS pericardial window, pericardial drain and R  pleurex placement    Comparison: Radiograph of the chest 2/15/2018    Findings:   Upright AP view of the chest. Right IJ central venous catheter  projects with the tip over the right atrium. There is postprocedural  changes of right-sided VATS. Right apical left basilar chest tubes.  Pericardial drain. Trace right pneumothorax. Small bilateral pleural  effusions. Diffuse interstitial and scattered patchy airspace  opacities. Subjacent emphysema of the right greater than left chest  wall. Gaseous distention of the stomach. Surgical clips in the mid  upper abdomen.       Impression    Impression:   1.  Unchanged trace right pneumothorax with small bilateral pleural  effusions. Bilateral chest tubes and pericardial drain are stable.  2.  Pulmonary edema and lymphangitic carcinomatosis, with improved  aeration of the left lung.  3.  Marked gastric distention.    I have personally reviewed the examination and initial interpretation  and I agree with the findings.    ELY MADSEN MD       =========================================

## 2019-02-16 NOTE — PROGRESS NOTES
Thoracic Progress note    S:  No overnight events. Weaned off pressors. Respiratory status improved and off BiPAP  O:  BP (!) 74/34 (BP Location: Right arm, Cuff Size: Adult Regular)   Pulse 101   Temp 97.8  F (36.6  C) (Oral)   Resp 18   Wt 60.6 kg (133 lb 8 oz)   LMP 12/20/2013   SpO2 93%   BMI 26.07 kg/m    A&Ox3, NAD  Breathing non-labored on facemask. Pericardial drain with serosanguinous output   RRR  Soft, ND  Distal extremities warm.         Intake/Output Summary (Last 24 hours) at 2/16/2019 1247  Last data filed at 2/16/2019 1200  Gross per 24 hour   Intake 3065.96 ml   Output 2359 ml   Net 706.96 ml         BMP  Recent Labs   Lab 02/16/19  0408 02/15/19  2306 02/15/19  1431 02/15/19  1051 02/14/19  0716    137 137 137 137   POTASSIUM 4.7 4.2 3.7 3.9 4.0   CHLORIDE 108 112* 111*  --  111*   MUNDO 7.0* 7.1* 6.6*  --  6.5*   CO2 15* 17* 17*  --  18*   BUN 9 8 5*  --  6*   CR 0.77 0.76 0.66  --  0.65   * 151* 194* 158* 109*     CBC  Recent Labs   Lab 02/16/19  0408 02/15/19  2306 02/15/19  1630 02/15/19  1431 02/15/19  1051 02/14/19  0716   WBC 9.1 9.2  --  8.5  --  3.3*   RBC 3.02* 3.25*  --  3.57*  --  3.03*   HGB 9.8* 10.7*  --  11.8 11.0* 10.1*   HCT 31.0* 33.2*  --  36.3  --  31.4*   * 102*  --  102*  --  104*   MCH 32.5 32.9  --  33.1*  --  33.3*   MCHC 31.6 32.2  --  32.5  --  32.2   RDW 19.9* 19.9*  --  19.9*  --  19.5*    335 482* 468*  --  256     INR  Recent Labs   Lab 02/15/19  1431 02/14/19  2236   INR 1.31* 1.17*      Liver Function Studies -   Recent Labs   Lab Test 02/15/19  1431   PROTTOTAL 5.5*   ALBUMIN 1.7*   BILITOTAL 0.2   ALKPHOS 147   AST 30   ALT 21     CXR reviewed      A/P: Suzi Gonsales is a 53 yoF with metastatic renal cell carcinoma, with recurrent bilateral pleural effusions s/p L PleurX catheter and recent bilateral thoracenteses (R yest, L today -- drainage of the pleurX) who presented with hypoxia and hypotension, found to have a moderate  pericardial effusion on ECHO s/p Right video-assisted thoracoscopic surgery with pericardial window and right PleurX catheter insertion 2/15. Patient was admitted to the SICU post op for pressor requirement and respiratory support   - Keep chest tubes to water seal  - Diet as tolerated as long as patient is off BiPAP  - SCD, lovenox  - Dipso: SICU for hemodynamic monitoring. Most likely transfer to the floor to the oncology service tomorrow if continues to improve.     Seen with staff     Jackie Giraldo MD  PGY-3, General Surgery  [phone]

## 2019-02-17 NOTE — PROGRESS NOTES
Thoracic Progress note    S: Increased pressor requirements for worsening hypotension. Likely nearing end of life.    O:  BP (!) 86/55   Pulse 106   Temp 98.7  F (37.1  C) (Axillary)   Resp 24   Wt 60.6 kg (133 lb 8 oz)   LMP 12/20/2013   SpO2 96%   BMI 26.07 kg/m      Lethargic appearing  Breathing unlabored on oxymask   Abdomen moderately distended, tender diffusely, voluntary guarding    I/O:  UOP: 380/130  R pericardial drain: 77/6  L PleurX: 693/647  R PleurX: 162/0      A/P: Suzi Gonsales is a 53 yoF with metastatic renal cell carcinoma, with recurrent bilateral pleural effusions s/p L PleurX catheter and recent bilateral thoracenteses (R yest, L today -- drainage of the pleurX) who presented with hypoxia and hypotension, found to have a moderate pericardial effusion on ECHO s/p Right video-assisted thoracoscopic surgery with pericardial window and right PleurX catheter insertion 2/15. Patient was admitted to the SICU post op for pressor requirement and respiratory support     -Keep chest tubes to water seal  -Monitor CT output  -Continue care per SICU     Seen with staff, Dr. Silvano Ramon, PGY1  Thoracic Surgery

## 2019-02-17 NOTE — PROVIDER NOTIFICATION
MD notified about patient's increasing O2 needs, patient is very lethargic and labored breathing. Vasopressor needs have increased in the last 24 hours. Continue to monitor, awaiting Hem/Onc.

## 2019-02-17 NOTE — PROVIDER NOTIFICATION
Attempted to put in NG x2, gave hydromorphone per patient request. Patient refused both attempts. MD notified.

## 2019-02-17 NOTE — PROGRESS NOTES
ICU Attending Addendum    Spoke with the patient and her .  They are aware that she is declining and may not recover from this situation.  They expressed wishes to not have CPR or be intubated.  She expressed fear.  I reassured her that we weren't changing anything other than the code status and that we will make sure she has relief of pain and suffering.  I told her that while she is awake and aware, she can ask for medications if she is uncomfortable.  The goal is now relief of pain and suffering rather than restorative care.  It is uncertain how long this will take.

## 2019-02-17 NOTE — PLAN OF CARE
PT: Patient not medically appropriate for therapy this afternoon, medical status declining with ongoing goals of care discussions. PT will complete orders at this time. OT will continue to follow for any mobility needs. Please re-consult if new needs arise.

## 2019-02-17 NOTE — PROGRESS NOTES
Heme/Onc Progress Note:    Reviewed overnight events.  Pt more lethargic. Remains hypotensive and unable to wean NE.  Increased O2 requirements. Pericardial drain with minimal output since MN and R pleurx with no output since MN.      Unclear etiology for ongoing hypotension and lethargy.      Consider adrenal insufficiency - perhaps a trial of stress dose steroids would be effective.     Consider surveillance blood culture.      Discussed code status with the patient and her  who was at bedside.   Discussed the critical nature of her current condition.  Pt receptive to hearing about this and we discussed what would happen if her heart were to stop or if she would need to be on the ventilator.  I did recommend to her that if she were to be in that circumstance, that it would not likely result in any positive outcome in terms of survival to discharge.  Therefore, I recommended to her that we consider changing her code status to DNR/DNI.  She did not want to change this as yet, but she and her  both indicated understanding and will think about it.     For now, will continue supportive cares and pt and  interested in doing things to try to investigate further the etiology of hypotension and hypoxia.      We will continue to follow.   Fady Chairez   of Medicine  Division of Hematology, Oncology, and Transplantation

## 2019-02-17 NOTE — PLAN OF CARE
D: 53 year old female with metastatic renal cell carcinoma with recurrent bilateral pleural effusions who was admitted on 2/13 with recurrent pleural effusions and persistent hypoxia and hypotension. Found to have recurrent pleural effusions and also pericardial effusion,2/15 pericardial window and drain placement and Right VATS with PleurX catheter placement by thoracic. She required pressor support and BiPAP for respiratory support and was sent to the SICU for monitoring.    I/A: PERRL, A&OX4, follows commands with generalized weakness bilaterally. Oxycodone and dilaudid for chest tube pain with good effect. Xanax for anxiety x 2. SR/ST, epineprine between 0.06 and 0.1 mcg/kg/min to keep MAP > 60. Urine output decreased from 15/20 mL/hr to approx 10 mL/hr, 12.5 g 5% albumin, small response (75 mL output over next 4 hrs). Lungs with crackles throughout, 5L NC most of the night, around 0600 switched to oxy plus, required increase to 10L oxyplus. Regular diet, no appetite, boost offered but declined due to nausea. Compazine and Zofran given with no effect. Consider Reglan. Last BM 2/14, pt refused senna. Chest tubes to water seal; L pleurX 730 mL, R pleurX 50 mL, R pericardial 78 mL this shift. Please see flowsheets for for vitals and assessments.    P: Continue to monitor and treat as ordered. Offer support to patient and family as able.

## 2019-02-17 NOTE — PROGRESS NOTES
SURGICAL ICU PROGRESS NOTE  February 17, 2019      CO-MORBIDITIES:   Renal cell carcinoma    ASSESSMENT: 53 year old female with metastatic renal cell carcinoma with recurrent bilateral pleural effusions who was admitted on 2/13 with recurrent pleural effusions and persistent hypoxia and hypotension. Found to have recurrent pleural effusions and also pericardial effusion and was therefore taken to the OR on 2/15 by thoracic surgery for pericardial window and drain placement and Right VATS with PleurX catheter placement. She required pressor support and BiPAP for respiratory support and was therefore sent to the SICU for monitoring. Worsening O2 requirements and hypotension overnight 2/16-2/17.      CHANGES FOR TODAY :  - start midodrine  - Pressors: Switch Epi --> NE  - CXR, Ab-Xray, ABG  - Lasix 20 mg IV once   - Goal 1-1.5 L negative  - Pt refusing NG tube placement  - NPO - okay for meds given all colonic in nature likely per Dr. Chung   - Pt offered palliative care consult but patient refusing   - Likely requiring end of life care    PLAN:  PLAN:  Neuro/ pain/ sedation:  #Metastatic Renal Cell Carcinoma  # H/O anxiety and depression  - Monitor neurological status. Notify the MD for any acute changes in exam.  - Pain: PRN oxycodone and dilaudid  - PTA wellbutrin     Pulmonary care:  # Recurrent pleural effusions  # Bilateral pleurX catheters   - Oxymask, maintain SpO2 > 92  - Monitor pleurX catheter output  - AVOID BIPAP given concern for ileus       Cardiovascular:  #Pericardial Effusion s/p pericardial window and drain placement  #Hypotension    - Monitor hemodynamic status.   - MAP > 60, wean epi as able  - Pericardial drain in place, monitor output     GI care:   #Ileus, likely narcotic induced  - NPO except meds  - Bowel regimen  - PRN zofran for nausea      Fluids/ Electrolytes/ Nutrition:   - ICU electrolyte replacement protocol  - No indication for parenteral nutrition.  - Nutrition consulted.  Appreciate recs     Renal/ Fluid Balance:  # Metastatic renal cell carcinoma  # Hyperchloremic Metabolic acidosis, stable    - Will monitor intake and output.  - Sodium bicarb given      Endocrine:  # Stress hypoglycemia    - Goal < 180      ID/ Antibiotics:  - Perioperative ancef  - Will re-evaluate levaquin started for concern for pneumonia      Heme:    #Anemia of chronic disease   - Slow drift  - Transfuse for Hgb < 7      Prophylaxis:    - Mechanical prophylaxis for DVT.   - Lovenox       MSK:    - PT and OT consulted. Appreciate recs.      Lines/ tubes/ drains:  - PleurX catheters bilaterally, pericardial drain, arterial line, PIV, PICC      Disposition:  - Surgical ICU.     Patient seen, findings and plan discussed with surgical ICU staff Dr Sheldon Sanchez, DO  CA[facility]   Department of Anesthesiology  P: [phone]    ====================================    TODAY'S PROGRESS:   SUBJECTIVE:   - Increasing O2 requirements and hypotension. Pt denies chest pain, SOB, fatigue, headaches. Decreased UOP overnight with increasing Epi requirements.       OBJECTIVE:   1. VITAL SIGNS:   Temp:  [97.7  F (36.5  C)-99.6  F (37.6  C)] 98.6  F (37  C)  Pulse:  [100-106] 105  Heart Rate:  [101-107] 105  Resp:  [14-34] 16  BP: ()/(30-86) 105/58  MAP:  [37 mmHg-107 mmHg] 37 mmHg  Arterial Line BP: ()/() 37/36  SpO2:  [88 %-96 %] 94 %  FiO2 (%): 70 %  Resp: 16      2. INTAKE/ OUTPUT:   I/O last 3 completed shifts:  In: 2467.74 [I.V.:2467.74]  Out: 1838 [Urine:460; Chest Tube:1378]    3. PHYSICAL EXAMINATION:   General: awake, alert, NAD, resting comfortably  Neuro: A&Ox3, NAD, moves all extremities  Resp: Breathing non-labored, pleurX catheters in place  CV: Mildly tachycardic  Abdomen: Soft, Non-distended, Non-tender  Incisions: Intact  Extremities: warm and well perfused    4. INVESTIGATIONS:   Arterial Blood Gases   Recent Labs   Lab 02/17/19  0845 02/15/19  1431 02/15/19  1142 02/15/19  1051   PH 7.35  7.29* 7.21* 7.17*   PCO2 37 33* 39 47*   PO2 76* 172* 121* 75*   HCO3 20* 16* 16* 17*     Complete Blood Count   Recent Labs   Lab 02/17/19  0522 02/16/19  0408 02/15/19  2306 02/15/19  1630 02/15/19  1431   WBC 5.6 9.1 9.2  --  8.5   HGB 8.9* 9.8* 10.7*  --  11.8    357 335 482* 468*     Basic Metabolic Panel  Recent Labs   Lab 02/17/19  0522 02/16/19  0408 02/15/19  2306 02/15/19  1431   * 133 137 137   POTASSIUM 4.0 4.7 4.2 3.7   CHLORIDE 104 108 112* 111*   CO2 19* 15* 17* 17*   BUN 18 9 8 5*   CR 0.80 0.77 0.76 0.66   * 171* 151* 194*     Liver Function Tests  Recent Labs   Lab 02/15/19  1431 02/14/19  2236 02/13/19  1559   AST 30  --  38   ALT 21  --  27   ALKPHOS 147  --  162*   BILITOTAL 0.2  --  0.2   ALBUMIN 1.7*  --  1.8*   INR 1.31* 1.17*  --      Pancreatic Enzymes  No lab results found in last 7 days.  Coagulation Profile  Recent Labs   Lab 02/15/19  1431 02/14/19  2236   INR 1.31* 1.17*     Lactate  Invalid input(s): LACTATE    5. RADIOLOGY:   Recent Results (from the past 24 hour(s))   XR Chest Port 1 View    Narrative    XR CHEST PORT 1 VW  2/17/2019 6:40 AM      HISTORY: hypoxia    COMPARISON: 2/16/2019    FINDINGS: Portable AP view the chest. Bilateral chest tubes are  unchanged in position. Low lung volumes. Increased perihilar  interstitial opacities and Kerley B-lines. Small pleural effusions. No  definite pneumothorax. Heart size is normal. Subcutaneous emphysema  along the right chest wall is decreased. Prominent dilated loops of  bowel in the upper abdomen.      Impression    IMPRESSION:   1. Increased perihilar interstitial opacities and Kerley B line  suggestive of pulmonary edema.  2. Small pleural effusions bilaterally.  3. Severely distended loops of bowel in the upper abdomen concerning  for potential obstruction or ileus. Recommend dedicated abdominal  films for further evaluation.    I have personally reviewed the examination and initial interpretation  and I  agree with the findings.    ELY MADSEN MD       =========================================

## 2019-02-17 NOTE — PROGRESS NOTES
02/17/19 1530   Values Beliefs and Spiritual Care   C: Community: In support of your spiritual health, is there someone we may contact for you? (identify all that apply) ([facility] 2/17)   Visit Information   Visit Made By Staff    Type of Visit On-call;Staff consultation/triage   Distress Catholic/Spiritual;Emotional   Visited Patient;Family;Friend(s)   Interventions   Basic Spiritual Interventions   Assessment of spiritual needs/resources   Advanced Assessments/Interventions   Presenting Concerns/Issues Grief and adjustment issues     SPIRITUAL HEALTH SERVICES  SPIRITUAL ASSESSMENT Progress Note  [facility] ([place]) 4A   ON-CALL VISIT    PRIMARY FOCUS:     Emotional/spiritual/Protestant distress    ILLNESS CIRCUMSTANCES:   Reviewed documentation, check in with pt RN. On call visit due to STAT consult order, noting family asking for 'last rites.'  Arrived as decisions were being considered about status/comfort cares, family gathered at bedside as the family's parish  arrived for anointing of the sick. Provided comfort, reflective conversation, for patient and family members.      PLAN: Arranged for   Arias to check in with family as follow up; I will inform the unit  of care provided.    Trisha Obregon MDiv    Pager [phone]

## 2019-02-17 NOTE — SIGNIFICANT EVENT
"SPIRITUAL HEALTH SERVICES Significant Event  Zoroastrianism Sacrament of ANOINTING  [facility] ([place]) 4A    REFERRAL SOURCE: Epic Consult    Pt. is declining and expected to die. Pt. and her family are in distress/grief. Pt's  came to anoint/give Last Rites to pt.    I spoke with the pt's  and brother outside the pt's room. The  said she was improving yesterday and today she suddenly took a turn for the worse. Numerous family members have been present.    The family appeared to desire alone time. I wrote down and offered the pt's brother the \"four gifts\": encouraging family members to allow each who wishes to have one-on-one time with pt. to express thanks, love, forgiveness, and when they feel it appropriate permission for the pt to go to God.      PATIENT ANOINTED ON 2/17/2019    PLAN: Spiritual Health Services remains available to pt and family.     Arias Kruger M.Div.       "

## 2019-02-17 NOTE — PLAN OF CARE
D/I: Upon start of shift, patient has been very lethargic, and a change in breathing. MD notified. Increased O2 and vasopressor needs. Patient switched from full code to DNR/DNI. Expect comfort cares when patient more actively dying.  Neuro: Patient is lethargic but oriented x4. Able to make decisions. Follows commands. Weaker throughout day.   Respiratory: Increased O2 to 15L oxiplus mask. LS coarse, lasix given with some improvement.   GI/: Foster in place. UO increased since Lasix given. Intermittently nauseated, abdomen is tender with hypoactive to absent bowel sounds. Abd x-ray done. Dig stim done with no progress. Patient refused NG placement for decompression. NPO. Refusing medications except for comfort.   Vitals: Patient is afebrile. HR is sinus tach in the 100s. Breathing is labored. BP is low with levophed gtt at 0.15 but meeting MAP requirements.  Plan: Patient had last rites given, switched to DNR/DNI. Patient does not want oxygen or vasopressors dced at this point but does not want any active treatment such as medications for other reasons except for pain or anxiety. Patient was told by hem/onc, SICU,  the severity of condition. Patient decided that as she gets uncomfortable she would like pain medications given. MD wrote orders for no escalation of cares, as well as an order for increased hydromorphone and midazolam when the patient starts to exhibit air hunger. MD would like nurse to call the resident on call FIRST to get the order switched to comfort care before the midazolam is given. By doing this, it will help bypass waiting for approval from pharmacy, to help decrease patient's time in distress. Family and patient are aware of plan and have been concerned that we keep her comfortable as the patient gets closer to passing and as the time nears. Patient and family participated in last rites,  consult, MD met with family and patient, and friends and family are currently present.

## 2019-02-18 NOTE — PROGRESS NOTES
SURGICAL ICU PROGRESS NOTE  February 17, 2019      CO-MORBIDITIES:   Renal cell carcinoma    ASSESSMENT: 53 year old female with metastatic renal cell carcinoma with recurrent bilateral pleural effusions who was admitted on 2/13 with recurrent pleural effusions and persistent hypoxia and hypotension. Found to have recurrent pleural effusions and also pericardial effusion and was therefore taken to the OR on 2/15 by thoracic surgery for pericardial window and drain placement and Right VATS with PleurX catheter placement. She required pressor support and BiPAP for respiratory support and was therefore sent to the SICU for monitoring. Worsening O2 requirements and hypotension overnight 2/16-2/17.      Over Night Events 2/17-2/18:  Pt seen and evaluated by Dr. Chairez (Heme/onc) and Dr. Chung (SICU) yesterday afternoon with discussion surrounding goals of care and prognosis. Following conference patient made DNR/DNI but not full comfort cares. Pt with significant abdominal distention on abdominal x-ray and declining NG tube placement. Digital rectal exam preformed by Dr. Chung unable to alleviate any distention. Pt remains NPO except meds. Pt requesting pain medications to keep comfortable. Pt with worsening hemodynamic and respiratory status overnight, O2 requirements now up to 15 L/min Oxiplus facemask and levophed infusion increased to 0.15 this AM. Despite these interventions patients respiratory rate, O2 sat and blood pressure continue to decline.    Changes Today 2/18:  Dr. Castro (SICU staff) and I met with family at bedside to discuss goals of care. Pt and family agree with DNR/DNI order and at this time have refused invasive lines or more aggressive medication treatment. Pt and family informed of limited efficacy of significant escalation in care at this time and that it would be unlikely to significantly change her outcome. They have requested that each care team (thoracic surgery and heme/onc) come by  and discuss further treatment options in order to make informed discission about comfort cares and/or further goals of care.  - Changed IV Dilaudid to IV Morphine for potential air hunger  - Heme/Onc and thoracic surgery to meet with patient  - Palliative care consulted  - Keep NPO but okay for sips with medications   - Lasix 20 mg IV once  - Holding chemotherapy per Heme/Onc    PLAN:  PLAN:  Neuro/ pain/ sedation:  #Metastatic Renal Cell Carcinoma  # H/O anxiety and depression  - Monitor neurological status. Notify the MD for any acute changes in exam.  - Pain: PRN oxycodone and morphine  - PTA wellbutrin     Pulmonary care:  # Recurrent pleural effusions  # Bilateral pleurX catheters   - Oxymask, maintain SpO2 > 92  - Monitor pleurX catheter output  - AVOID BIPAP given concern for ileus       Cardiovascular:  #Pericardial Effusion s/p pericardial window and drain placement  #Hypotension likely cardiogenic shock 2/2 malignancy    - Monitor hemodynamic status.   - MAP > 60  - Pericardial drain in place, monitor output  - Levophed gtt, wean as tolerated     GI care:   #Ileus, likely narcotic induced  - NPO except meds  - Bowel regimen  - PRN zofran for nausea      Fluids/ Electrolytes/ Nutrition:   - ICU electrolyte replacement protocol  - No indication for parenteral nutrition.  - Nutrition consulted. Appreciate recs     Renal/ Fluid Balance:  # Metastatic renal cell carcinoma  # Hyperchloremic Metabolic acidosis, stable    - Will monitor intake and output.  - Sodium bicarb given      Endocrine:  # Stress hypoglycemia    - Goal < 180      ID/ Antibiotics:  - Perioperative ancef  - Levaquin stopped       Heme:    #Anemia of chronic disease   - Slow drift  - Transfuse for Hgb < 7      Prophylaxis:    - Mechanical prophylaxis for DVT.   - Lovenox       MSK:    - PT and OT consulted. Appreciate recs.      Lines/ tubes/ drains:  - PleurX catheters bilaterally, pericardial drain, PIV, PICC      Disposition:  - Surgical  ICU. Unlikely to make meaningful recovery from illness. Likely to go to full comfort cares soon.      Patient seen, findings and plan discussed with surgical ICU staff Dr Matthew Pollock Daniel, DO  CA-2   Department of Anesthesiology  P: [phone]    ====================================    TODAY'S PROGRESS:   SUBJECTIVE:   - Increasing O2 requirements and hypotension. Pt fatigued and drowsy. Pt reports some pain and suffering from chronic illness. Pt denies nausea, vomiting, chest pain. Pt reports moderate SOB. Pt and family considering comfort cares and have made the decision for DNR/DNI and no invasive lines currently      OBJECTIVE:   1. VITAL SIGNS:   Temp:  [97.6  F (36.4  C)-99.1  F (37.3  C)] 98.1  F (36.7  C)  Pulse:  [101-112] 107  Heart Rate:  [102-112] 111  Resp:  [11-35] 25  BP: ()/(24-73) 98/57  MAP:  [35 mmHg-78 mmHg] 43 mmHg  Arterial Line BP: ()/(34-78) 44/43  SpO2:  [91 %-97 %] 93 %  Resp: 25      2. INTAKE/ OUTPUT:   I/O last 3 completed shifts:  In: 457.16 [P.O.:100; I.V.:357.16]  Out: 2353 [Urine:1575; Chest Tube:778]    3. PHYSICAL EXAMINATION:   General: awake, alert, NAD, resting comfortably  Neuro: A&Ox3, NAD, moves all extremities  Resp: Labored breathing, tachypnea, mild crackles noted in lower lung fields, pleurX catheters in place  CV:  Tachycardic, no murmurs   Abdomen: Soft, Non-distended, Non-tender  Incisions: Intact  Extremities: warm and well perfused    4. INVESTIGATIONS:   Arterial Blood Gases   Recent Labs   Lab 02/17/19  0845 02/15/19  1431 02/15/19  1142 02/15/19  1051   PH 7.35 7.29* 7.21* 7.17*   PCO2 37 33* 39 47*   PO2 76* 172* 121* 75*   HCO3 20* 16* 16* 17*     Complete Blood Count   Recent Labs   Lab 02/18/19  0451 02/17/19  0522 02/16/19  0408 02/15/19  2306   WBC 6.2 5.6 9.1 9.2   HGB 10.3* 8.9* 9.8* 10.7*    288 357 335     Basic Metabolic Panel  Recent Labs   Lab 02/18/19  0451 02/17/19  0522 02/16/19  0408 02/15/19  2306    132* 133 137    POTASSIUM 4.0 4.0 4.7 4.2   CHLORIDE 101 104 108 112*   CO2 24 19* 15* 17*   BUN 15 18 9 8   CR 0.74 0.80 0.77 0.76   * 142* 171* 151*     Liver Function Tests  Recent Labs   Lab 02/15/19  1431 02/14/19  2236 02/13/19  1559   AST 30  --  38   ALT 21  --  27   ALKPHOS 147  --  162*   BILITOTAL 0.2  --  0.2   ALBUMIN 1.7*  --  1.8*   INR 1.31* 1.17*  --      Pancreatic Enzymes  No lab results found in last 7 days.  Coagulation Profile  Recent Labs   Lab 02/15/19  1431 02/14/19 2236   INR 1.31* 1.17*     Lactate  Invalid input(s): LACTATE    5. RADIOLOGY:   No results found for this or any previous visit (from the past 24 hour(s)).    =========================================

## 2019-02-18 NOTE — PROGRESS NOTES
Notified by palliative care that patient requesting some water to help with dry mouth. Spoke with family and patient about risk of aspiration with liquids and how if that were to occur patient's respiratory status would likely further deteriorate and be a non survivable event. Patient and family () acknowledge risk and wish to allow patient to have clear liquids as part of comfort measures.    - Clear liquid diet placed  - MAP goals liberalized to 60 mmHg   - Bed side nurse notified that if levophed increased past 0.2 mcg/min to notify SICU team  - This morning patient and  declined invasive monitoring, lines, or interventions     Phill Sanchez, DO  CA-2   SICU   P: [phone]

## 2019-02-18 NOTE — CONSULTS
"[facility], [place]    Palliative Care Consultation Note    Patient: Suzi Gonsales  Date of Admission:  2/13/2019    Requesting provider/team: SICU  Reason for consult: Goals of care  Decisional support  Patient and family support    Recommendations: Patient seen and examined.  Family members present included patient's mother and her spouse.  Her current clinical status was reviewed with primary team as well as with Dr. Meier from [facility] oncology.  Patient verbalized understanding that there are no further cancer interventions available given her weakened state.  Stated goal is to go home.  Tells me she does not want to \"be a lifelong cancer patient\".  Wide-ranging conversation was held regarding discharge planning and her current requirement for IV vasopressors.  This would preclude her discharge from the ICU.  Her stated goal is to return home likely in hospice care.  Physical layout of her home may prevent this.  -Palliative care will continue to follow.  Priest suh to see.  -Hospice liaison RN to see for further information regarding potential home discharge with hospice care.  -Reviewed options with primary team regarding weaning IV levophed (possibly using steroids or/and midodrine.  -Symptoms appear reasonably controlled at this time except for dry mouth.  Encouraged Biotene every hour as needed liberal application.  -Primary team to reevaluate current n.p.o. status given probable comfort care and implementation near future.  Would recommend clear liquids with appropriate precautions.  This would be a quality of life consideration for the patient.    These recommendations have been discussed with patient, patient's family, primary team and oncology service..    Thank you for the opportunity to participate in the care of this patient and family. Our team will follow. Please feel free to contact the on-call Palliative provider with any urgent needs.     Itz Red NP " [facility]  Pager: [phone]  [facility] Inpatient Team Consult pager [phone] (M-F 8-4:30)  After-hours Answering Service [phone]   Palliative Clinic: [phone]       Assessment: 53 year old female with metastatic renal cell carcinoma with recurrent bilateral pleural effusions who was admitted to [facility] 2/13/19 with recurrent pleural effusions, persistent hypoxia and hypotension. Found to also have pericardial effusion- taken to the OR on 2/15 by thoracic surgery for pericardial window and drain placement and Right VATS with PleurX catheter placement. She required pressor support and BiPAP for respiratory support and was therefore sent to the SICU for monitoring. Worsening O2 requirements and hypotension overnight 2/16-2/17.Palliative care was consulted for support and goals of care conversation in the setting of no viable further treatment options for her underlying cancer.         Social: Lives with spouse in multiple level home in [place]       Living situation: as above- bedroom and bath up a flight of stairs       Support system: extensive network of family and friends       Occupation: retired         Hobbies: 3 dogs, movies, lunch with friends.     History of Present Illness   Sources of History:electronic health record and patient's family-see above.      ROS:  A comprehensive ROS has been negative other than stated in the HPI and below:   Palliative Symptom Review (0=no symptom/no concern, 1=mild, 2=moderate, 3=severe):  Pain: 0-1  Fatigue: 2  Nausea: 0  Constipation: 0  Diarrhea: 0-1  Depressive Symptoms: 0-1  Anxiety: 1  Drowsiness: 1  Poor Appetite: 2-3  Shortness of Breath: 2   Insomnia: 0-1  Delirium: 0-1     Past Medical History:   Past Medical History:   Diagnosis Date     Adrenal nodule (H)      Anxiety      Brain mass      Depression      Former tobacco use      Lacunar infarct, acute 02/2018     Mass of left lung     Upper lobe     Renal cell carcinoma (H) 12/2014     Clear cell     Solitary kidney     S/P right nephrectomy due to kidney cancer          Past Surgical History:   Past Surgical History:   Procedure Laterality Date     C/SECTION, LOW TRANSVERSE  2000    , Low Transverse     ENDOSCOPIC ULTRASOUND UPPER GASTROINTESTINAL TRACT (GI) N/A 2017    Procedure: ENDOSCOPIC ULTRASOUND, ESOPHAGOSCOPY / UPPER GASTROINTESTINAL TRACT (GI);  Esophagogastroduodenoscopy, Endoscopic Ultrasound with Fine Needle Biopsies;  Surgeon: Asad Velez MD;  Location: [facility] OR     INSERT PORT VASCULAR ACCESS N/A 2017    Procedure: INSERT PORT VASCULAR ACCESS;  Chest Port Placement-Right;  Surgeon: Melanie Cevallos PA-C;  [facility]: [facility] OR     IR THORACENTESIS  2018     open radical nephrectomy Right 14     OPEN REDUCTION INTERNAL FIXATION HIP NAILING Left 10/1/2018    Procedure: OPEN REDUCTION INTERNAL FIXATION HIP NAILING;  Left Hip Screw ;  Surgeon: Randy Dunn MD;  Location: [facility] OR     OPTICAL TRACKING SYSTEM CRANIOTOMY, EXCISE TUMOR, COMBINED Left 3/6/2018    Procedure: COMBINED OPTICAL TRACKING SYSTEM CRANIOTOMY, EXCISE TUMOR;  Left Stealth Assisted Posterior fossa Craniotomy And Tumor Resection ;  Surgeon: Quintin Palencia MD;  Location: [facility] OR     PICC INSERTION Left 2017    5fr DL BioFlo PICC, 40cm (4cm external) in the L basilic vein w/ tip in the low SVC     PICC INSERTION Right 08/15/2017    5fr DL BioFlo PICC, 36cm (1cm external) in the R basilic vein w/ tip in the low SVC     THORACENTESIS Left 2018    Procedure: THORACENTESIS;  Left Thoracentesis ;  Surgeon: Juanito Weinstein PA-C;  [facility]     THORACENTESIS Left 2018    Procedure: THORACENTESIS;  Thoracentesis;  Surgeon: Juanito Weinstein PA-C;  [facility]     THORACENTESIS Left 9/10/2018    Procedure: THORACENTESIS;  Left Thoracentesis;  Surgeon: Romie Eduardo PA-C;  [facility]     THORACENTESIS Left 2018    Procedure:  Thoracentesis, Left;  Surgeon: Quintin Marin PA-C;  [facility] OR     THORACENTESIS Left 11/23/2018    Procedure: Left Therapeutic Thoracentesis;  Surgeon: Melanie Cevallos PA-C;  [facility]     THORACENTESIS Left 12/21/2018    Procedure: Left Thoracentesis;  Surgeon: Quintin Marin PA-C;  [facility] OR     THORACENTESIS Left 1/23/2019    Procedure: THORACENTESIS;  Surgeon: Miky Sherman MD;  [facility]: [facility] GI     THORACENTESIS N/A 2/7/2019    Procedure: Pleurx placement;  Surgeon: Vasiliy Ni MD;  [facility]: [facility] GI     THORACENTESIS N/A 2/13/2019    Procedure: Thoracentesis;  Surgeon: Vasiliy Ni MD;  Location: [facility] GI             Family History:   Family History   Problem Relation Age of Onset     Hypertension Father      Chronic Obstructive Pulmonary Disease Father      Hyperlipidemia Brother      Hyperlipidemia Brother      Cancer No family hx of      Family history reviewed        Allergies:   No Known Allergies         Medications:   I have reviewed this patient's medication profile and medications given in the past 24 hours.             Physical Exam:   Vital Signs: Temp: 98.1  F (36.7  C) Temp src: Oral BP: 98/57 Pulse: 107 Heart Rate: 111 Resp: 25 SpO2: 93 % O2 Device: Oxi Plus Oxygen Delivery: 15 LPM  Weight: 120 lbs 9.6 oz    Physical Exam: Patient seen and examined with family members present.  Lying in ICU bed no acute distress oxygen by mask.  Appears frail and chronically ill.  HEENT: Symmetric facial features, EOMI, mucous membranes mildly dry, dentition in reasonable repair.  Lungs: Diminished quality breath sounds anteriorly bilaterally.  No wheeze or rales.  Loose nonproductive cough. Pleurx catheters in place  Heart: S1-S2 monitor with sinus tachycardia heart rate 108 regular.  Abdomen: Soft diffusely nontender bowel sounds present.  No masses on brief exam.  Extremities: Symmetric tone and strength.   strength appears equal bilaterally.  Moves  lower extremities without difficulty.  No appreciable lower extremity edema.  Peripheral pulses intact.  Scattered ecchymosis.  Neuro: No tremor. Alert and oriented x4.       Data reviewed:      ROUTINE LABS (Last four results)  BMP  Recent Labs   Lab 02/18/19 0451 02/17/19  0522 02/16/19  0408 02/15/19  2306    132* 133 137   POTASSIUM 4.0 4.0 4.7 4.2   CHLORIDE 101 104 108 112*   MUNDO 6.8* 6.7* 7.0* 7.1*   CO2 24 19* 15* 17*   BUN 15 18 9 8   CR 0.74 0.80 0.77 0.76   * 142* 171* 151*     CBC  Recent Labs   Lab 02/18/19  0451 02/17/19  0522 02/16/19  0408 02/15/19  2306   WBC 6.2 5.6 9.1 9.2   RBC 3.12* 2.72* 3.02* 3.25*   HGB 10.3* 8.9* 9.8* 10.7*   HCT 31.1* 27.7* 31.0* 33.2*    102* 103* 102*   MCH 33.0 32.7 32.5 32.9   MCHC 33.1 32.1 31.6 32.2   RDW 19.2* 19.8* 19.9* 19.9*    288 357 335     INR  Recent Labs   Lab 02/15/19  1431 02/14/19  2236   INR 1.31* 1.17*     Itz Red NP [facility]  Pager: [phone]  [facility] Inpatient Team Consult pager [phone] (M-F 8-4:30)  After-hours Answering Service [phone]  Palliative Clinic: [phone]     Total time spent was 65 minutes, >50% of time was spent counseling and/or coordination of care

## 2019-02-18 NOTE — PLAN OF CARE
D: R Pleural CT found tipped, replaced following sterile procedure, serous output into container. WS  continued.

## 2019-02-18 NOTE — PLAN OF CARE
D: Suzi continues to be anxious, treated with xanax and lorazepam. Denies pain. Refuses repositioning, bath, cares.  Cares pending: CT dressing changes, bath, caring for posterior skin surfaces.      Neuro: Alert and oriented x 4. Pupils 3/tracks.  SCOTT UE> LE all cool, + 2 pulses.   VS: Tmax 99.1, -112, BP /46-71(44-90), RR 19-31.  CV: ST , no ectopy. Pericardial friction rub. Unable to palpate subcutaneous emphysema.   Pulm: LS coarse, diminished.Cough is more effective than 2/16.   GI: NPO( for nausea), sips, meds with sip. BS hypo.   : Jacome with 125-150 q 4 hours.   Lines/Gtts:  L PIV SL, L PIV TKO for meds. Portacath R chest with levophed 0.15mcg/kg/min.   Skin:   Refused cares,   Drains: jacome. CT R pericardial 310ml, R pleural 46 ml, L pleural 150, all to water seal, all serous output.   Labs: WNL .   P: Assist Suzi and her family as they need, cares/drsg changes as allowed, encourage to allow within tolerance level.

## 2019-02-18 NOTE — PROGRESS NOTES
STAFF ADDENDUM:  I saw and evaluated Ms. Gonsales and agree with the resident s findings and plan of care as documented in the resident s note and edited by me, as applicable.      In summary, Ms. Gonsales has had a rough post op course with more O2 requirements and pressor requirements and a post operative ileus.  SHe is currently DNR/DNI.  SHe does feel better today.  Plan:  Continue supportive care  Continue midodrine and consider steroids for adrenal insifficiency.  Would aim towards weaning off pressors (accept lower baseline BP) and transfer to floor     Shazia Rhoades MD

## 2019-02-19 NOTE — PROGRESS NOTES
2/19/2019:    Social Work Services Progress Note    Hospital Day: 7  Collaborated with:  Palliative Care,  Hospice    Data:  Suzi Gonsales is a 53 year old female patient admitted to [facility] on 2/13/19 due to Malignant neoplasm of the right kidney, renal cell carcinoma.    Intervention:    Per palliative care team, patient is interested in going home on hospice. She is currently on pressor medications for her blood pressure and would need to remain on this until she is situated at home, where she would then be weaned with expected short life expectancy.    Referral made to  Hospice, message left with liaison Inez Flores. She has collected all information and will present to the hospice team, hopeful for an update tomorrow 2/20.     Assessment:  Patient interested in discharge to home, to be with dogs and by her fireplace, with hospice. Referral to  hospice started.    Plan:    Anticipated Disposition:  Home w/ hospice vs. hospital with comfort cares    Barriers to d/c plan:  Hospice admittance, further coordination with palliative care and discharge planning    Follow Up:  SW following.    ZACHERY Cuevas, ARACELISSW  ICU 4A & 4C   Ph: [phone]  Pager: [phone]

## 2019-02-19 NOTE — PROGRESS NOTES
SURGICAL ICU PROGRESS NOTE  February 19, 2019      CO-MORBIDITIES:   Renal cell carcinoma    ASSESSMENT: 53 year old female with metastatic renal cell carcinoma with recurrent bilateral pleural effusions who was admitted on 2/13 with recurrent pleural effusions and persistent hypoxia and hypotension. Found to have recurrent pleural effusions and also pericardial effusion and was therefore taken to the OR on 2/15 by thoracic surgery for pericardial window and drain placement and Right VATS with PleurX catheter placement. She required pressor support and BiPAP for respiratory support and was therefore sent to the SICU for monitoring. Worsening O2 requirements and hypotension overnight 2/16-2/17.    Changes Today 2/19:  Patient desires hospice and care at home  - Weaning levophed as tolerated  - Midodrine increase to 15mg q8h  - Stress dose steroids started last evening  - Lactate decrease frequency to every day  - MAP 55 goal   - Add sudafed goal to increase afterload and wean levophed  - Advance diet to thick liquids  - Fluids goal: -500 to 1L today  - D/w palliative regarding which lines/tubes she can discharge with  - Discontinue sliding scale insulin    PLAN:  PLAN:  Neuro/ pain/ sedation:  #Metastatic Renal Cell Carcinoma w/ brain metastasis, stable  # H/O anxiety and depression, stable  #Post surgical and cancer pain, well controlled  - Monitor neurological status. Notify the MD for any acute changes in exam.  - Pain: PRN oxycodone and morphine  - PTA wellbutrin     Pulmonary care:  # Recurrent pleural effusions 2/2 malignancy  # Bilateral pleurX catheters   # Hypoxic respiratory insufficiency 2/2 malignant pleural effusions  -  Supplemental O2 PRN maintain SpO2 > 92  - Monitor pleurX catheter output  - AVOID BIPAP given concern for ileus   - Diuresis as tolerated by BP      Cardiovascular:  #Pericardial Effusion s/p pericardial window and drain placement  #Hypotension likely cardiogenic shock 2/2 malignancy     - Monitor hemodynamic status.   - MAP > 60  - Pericardial drain in place, monitor output  - Levophed gtt, wean as tolerated  - Midodrine 15 mg q8hr  - Sudafed 30 mg q6hrs      GI care:   #Ileus, likely narcotic induced  - Given comfort measures Full Liquid diet  - Bowel regimen PRN  - PRN zofran for nausea      Fluids/ Electrolytes/ Nutrition:   - Full liquid diet okay but needs aspiration percautions  - ICU electrolyte replacement protocol  - No indication for parenteral nutrition.  - Nutrition consulted. Appreciate recs     Renal/ Fluid Balance:  # Metastatic renal cell carcinoma  # Hyperchloremic Metabolic acidosis, stable    - Will monitor intake and output.      Endocrine:  # Stress hypoglycemia    - Goal < 180  - sliding scale insulin stopped per patients wishes      ID/ Antibiotics:  - Perioperative ancef  - Levaquin stopped       Heme:    #Anemia of chronic disease   - Slow drift  - Transfuse for Hgb < 7      Prophylaxis:    - Mechanical prophylaxis for DVT.   - Lovenox       MSK:    - PT and OT consulted. Appreciate recs.      Lines/ tubes/ drains:  - PleurX catheters bilaterally, pericardial drain, PIV, PICC      Disposition:  - Surgical ICU. Unlikely to make meaningful recovery from illness. Likely to go to full comfort cares soon.   - Social work and clinical coordinator working on hospice and family needs to make decision on which company to use     Patient seen, findings and plan discussed with surgical ICU staff Dr Matthew Sanchez,   CA-2   Department of Anesthesiology  P: [phone]    ====================================    TODAY'S PROGRESS:   SUBJECTIVE:   - 02 requirements significantly better with diuresis. Pt with persistent hypotension despite midodrine. Pt reports feeling significantly better with diuresis and denies SOB, fever, chills, nausea, vomiting, bloating, or abdominal pain.      OBJECTIVE:   1. VITAL SIGNS:   Temp:  [96.2  F (35.7  C)-99.2  F (37.3  C)] 97.3  F (36.3   C)  Pulse:  [] 81  Heart Rate:  [] 87  Resp:  [12-34] 20  BP: ()/() 77/58  SpO2:  [90 %-98 %] 92 %  Resp: 20      2. INTAKE/ OUTPUT:   I/O last 3 completed shifts:  In: 690.01 [P.O.:60; I.V.:380.01; IV Piggyback:250]  Out: 2875 [Urine:2015; Chest Tube:860]    3. PHYSICAL EXAMINATION:   General: awake, alert, NAD, resting comfortably  Neuro: A&Ox3, NAD, moves all extremities  Resp: Improved but still mildly Labored breathing, tachypnea, crackles improved in lower lung fields, pleurX catheters in place  CV:  Tachycardic, no murmurs   Abdomen: Soft, Non-distended, Non-tender  Incisions: Intact  Extremities: warm and well perfused    4. INVESTIGATIONS:   Arterial Blood Gases   Recent Labs   Lab 02/17/19  0845 02/15/19  1431 02/15/19  1142 02/15/19  1051   PH 7.35 7.29* 7.21* 7.17*   PCO2 37 33* 39 47*   PO2 76* 172* 121* 75*   HCO3 20* 16* 16* 17*     Complete Blood Count   Recent Labs   Lab 02/19/19 0318 02/18/19  0451 02/17/19  0522 02/16/19  0408   WBC 5.5 6.2 5.6 9.1   HGB 9.9* 10.3* 8.9* 9.8*    340 288 357     Basic Metabolic Panel  Recent Labs   Lab 02/19/19  0318 02/18/19  0451 02/17/19  0522 02/16/19  0408    134 132* 133   POTASSIUM 4.1 4.0 4.0 4.7   CHLORIDE 105 101 104 108   CO2 24 24 19* 15*   BUN 14 15 18 9   CR 0.62 0.74 0.80 0.77   * 108* 142* 171*     Liver Function Tests  Recent Labs   Lab 02/15/19  1431 02/14/19  2236 02/13/19  1559   AST 30  --  38   ALT 21  --  27   ALKPHOS 147  --  162*   BILITOTAL 0.2  --  0.2   ALBUMIN 1.7*  --  1.8*   INR 1.31* 1.17*  --      Pancreatic Enzymes  No lab results found in last 7 days.  Coagulation Profile  Recent Labs   Lab 02/15/19  1431 02/14/19  2236   INR 1.31* 1.17*     Lactate  Invalid input(s): LACTATE    5. RADIOLOGY:   No results found for this or any previous visit (from the past 24 hour(s)).    =========================================

## 2019-02-19 NOTE — PLAN OF CARE
D: Patient in ICU for treatment of hypotension and high O2 requirements.      I/A: Patient met with palliative care today to discuss needs and further plan of care. Patient remains on levophed and high O2 demands. Patient's goal is to go home.      VS: Tmax 99.2. -110s. 15L O2. Levo titrated to keep MAPs above 55.  Lines/drains/airway: Port-a-cath. 2 PIVs. Foster. 3 chest tubes.  Gtts: Levo @ 0.15.   Neuro: Oriented x4. Lethargic. Arouseable to voice. Follows commands.  CV: HR ST. 100-110s.  Pulm: LS course. 15L Oxi Plus.  GI:  No BM this shift. Positive BS. Advanced to clear liquid diet.  : Foster with good UOP. Diuresed well with IV Lasix.  Pain: PRN morphine for pain.      P: Continue to monitor and notify MD of changes as they occur. Plan to wean levophed as able.

## 2019-02-19 NOTE — PROGRESS NOTES
Hem/Onc Follow up note:     Patient was originally in Oncology service, was transferred to ICU due to hypoxia, hypotension and hemodynamic instability. Dr Chairez was following patient in the past couple of days. Suzi agreed to change her Code status to DNR/DNI yesterday given overall prognosis.   We had a family discussion with Suzi and palliative medicine team today. We explained to Suzi and her family member that she had been fighting her metastatic renal cell cancer for a long time, she was following with Dr Green in the clinic, she has gone through multiple lines of treatment, currently on cabozantinib, also had craniotomy for brain metastatic lesion, had XRT for bone metastatic lesion, currently has large b/l pleural effusion, suspected to be malignant though pleural effusion cytology was so far negative. Her most recent CT showed extensive mediastinal, hilar, axillary and upper abdominal  Lymphadenopathy presumably metastatic disease. Her currently performance status is 4, and it is unlikely she could tolerate further lines of targeted treatment. At this point, transition to best supportive care will give her best chance to maintain a decent quality of life.   Suzi agreed with the plan and stated she was tired, she did not want her family to think she was giving up. We explained to her family further targeted treatment or chemotherapy would like give her more harm than help her, and her decision of transition to hospice and go home to stay with her family is appropriate and is for the best of her interest.     Dr Meier presented and led the family discussion.       Mark Liao MD  Hem/Onc Fellow.

## 2019-02-19 NOTE — PLAN OF CARE
Neuro: Oriented x 4 - able to answer orientation questions but patient is intermittently confused, especially with what time of day it is and short term memory retention.  Follows commands.  Moves all extremities but generalized weakness.  Pupils equal and reactive.  CV: SR/ST 80s-110s.  MAP goal >55 and Levophed titrated as appropriate to achieve goal.  Issue obtaining temperature this am.  Patient states she feels cold and skin is cool to the touch.  Gema hugger placed.  Pulm: Weaning oxygen - started at 15L overnight by Oxy-Mask and currently weaned down to 4L.  3 CTs in place with one on left having greatest amount of output overnight.  Output serous and patient appears to be breathing more comfortably after increased output.  GI: Per notes and orders, patient ok for clear liquid diet.  Appears to be tolerating sips of apple juice and pills swallowed whole - refusing to take pills crushed and requesting them whole.  No BM since 2/13 - patient refusing stool softeners.  Education provided regarding constipation related to pain medication and immobility.  : Urine output decreased overnight (15-35 ml/hour).  Spoke to SICU and 250 ml bolus given.  Jacome catheter remains in place for accurate intake and output.  Skin: Unable to complete full skin assessment as patient declines turns/repositioning.  IV/Gtt: Levophed titrating to keep MAP >55.  TKO @ 10.  Scheduled Hydrocortisone administered.  Pain: Morphine given x 1 at beginning of shift; patient states her pain is now nearly gone.    Patient refusing to be turned or some basic cares such as bathing, jacome care, and taking stool softeners.  Education provided.  As patient remains oriented and clear in her requests, will continue to allow patient to guide her care as appropriate.   at bedside and also supportive of patient and her wishes for as much rest as possible and as little interruptions throughout the night from nursing as possible.

## 2019-02-19 NOTE — CONSULTS
2/19/2019:    Brief SW Note:  REgarding SW discharge and hospice coordiation- [facility] hospice currently reviewing. Complicated discharge and hospice requested due to need for pressor, hopefully update will be available 2/20 and information to be shared with family.    ZACHERY Cuevas, [facility]  ICU 4A & 4C   Ph: [phone]  Pager: [phone]

## 2019-02-19 NOTE — PROGRESS NOTES
[facility], [place]    Palliative Care Progress Note    Patient: Suzi Gonsales  Date of Admission:  2/13/2019    Recommendations:  Palliative care will continue to follow.  Priest suh to follow.  -Hospice liaison RN to see for further information regarding potential home discharge with hospice care and possibility of transport discharge with IV BP meds and subsequent withdrawal after home plan solidified.  - Primary team weaning IV levophed (steroids and now maximum midodrine).  - Biotene every hour as needed- liberal application for dry mouth.  - continue with recommended clear liquids with appropriate precautions as a quality of life consideration.  - continue dnr/dni status    Assessment:   53 year old female with metastatic renal cell carcinoma with recurrent bilateral pleural effusions who was admitted to [facility] 2/13/19 with recurrent pleural effusions, persistent hypoxia and hypotension. Found to also have pericardial effusion- taken to the OR on 2/15 by thoracic surgery for pericardial window and drain placement and Right VATS with PleurX catheter placement. She required pressor support and BiPAP for respiratory support and was therefore sent to the SICU for monitoring. Worsening O2 requirements and hypotension overnight 2/16-2/17. Palliative care consulted for support and goals of care conversation in the setting of no viable further treatment options for her underlying cancer.     These recommendations have been discussed with pt and primary team.    Itz Red NP [facility]  Pager: [phone]  [facility] Inpatient Team Consult pager [phone] (M-F 8-4:30)  After-hours Answering Service [phone]   Palliative Clinic: [phone]       Interval History:   Pt seen and examined with family present for interview and brief exam. Primary team is trying to wean IV vasopressors in anticipation of patient desired discharge home with hospice care. Minimal sx burden after pleurx  drainage. Appears more vital and in less distress today.     Medications:   I have reviewed this patient's medication profile and medications during this hospitalization          Review of Systems:   A comprehensive ROS has been negative other than stated in the HPI and below:   Palliative Symptom Review (0=no symptom/no concern, 1=mild, 2=moderate, 3=severe):      Pain: 0      Fatigue: 1      Nausea: 0      Constipation: 1-2      Diarrhea: 0      Depressive Symptoms: 0      Anxiety: 1      Drowsiness: 1      Poor Appetite: (clear liquids- 2)      Shortness of Breath: 1-2            Physical Exam:     Vital Signs: Temp: 98.2  F (36.8  C) Temp src: Axillary BP: (!) 87/54 Pulse: 81 Heart Rate: 95 Resp: (P) 22 SpO2: 90 % O2 Device: Nasal cannula Oxygen Delivery: 8 LPM  Weight: 120 lbs 9.6 oz    Physical Exam: Patient seen and examined again with family members present.  Lying in ICU bed no acute distress oxygen by mask. Appearance less frail and more interactive.  HEENT: Symmetric facial features, EOMI, mucous membranes moist, dentition in reasonable repair.  Lungs: Diminished quality breath sounds anteriorly bilaterally. No wheeze or rales. Improved nonproductive cough. Pleurx catheters in place  Heart: S1-S2 monitor with sinus tachycardia heart rate 96 regular.  Abdomen: Soft diffusely nontender bowel sounds present.  No masses on brief exam.  Extremities: Symmetric tone and strength.   strength appears equal bilaterally.  Moves lower extremities without difficulty.  No appreciable lower extremity edema.  Peripheral pulses intact.  Scattered ecchymosis.  Neuro: No tremor. Alert and oriented x4.      Data Reviewed:      ROUTINE LABS (Last four results)  BMP  Recent Labs   Lab 02/19/19  0318 02/18/19  0451 02/17/19  0522 02/16/19  0408    134 132* 133   POTASSIUM 4.1 4.0 4.0 4.7   CHLORIDE 105 101 104 108   MUNDO 6.4* 6.8* 6.7* 7.0*   CO2 24 24 19* 15*   BUN 14 15 18 9   CR 0.62 0.74 0.80 0.77   * 108*  142* 171*     CBC  Recent Labs   Lab 02/19/19  0318 02/18/19  0451 02/17/19  0522 02/16/19  0408   WBC 5.5 6.2 5.6 9.1   RBC 3.06* 3.12* 2.72* 3.02*   HGB 9.9* 10.3* 8.9* 9.8*   HCT 30.3* 31.1* 27.7* 31.0*   MCV 99 100 102* 103*   MCH 32.4 33.0 32.7 32.5   MCHC 32.7 33.1 32.1 31.6   RDW 19.0* 19.2* 19.8* 19.9*    340 288 357     INR  Recent Labs   Lab 02/15/19  1431 02/14/19  2236   INR 1.31* 1.17*       Itz Red NP [facility]  Pager: [phone]  [facility] Inpatient Team Consult pager [phone] (M-F 8-4:30)  After-hours Answering Service [phone]  Palliative Clinic: [phone]     Total time spent was 35 minutes, >50% of time was spent counseling and/or coordination of care.

## 2019-02-20 NOTE — SIGNIFICANT EVENT
SPIRITUAL HEALTH SERVICES  SPIRITUAL ASSESSMENT Progress Note  [facility] ([place]) 4A     REFERRAL SOURCE: Palliative Care      Visited with pt and her , discussing end of life questions, relationships with God and others, forgiveness, and thanksgiving. Pt and her  have a supportive relationship. Pt expressed sadness of how her death will be affect their sons.  I encouraged honest, open discussions between pt./ and their sons. Pt expressed her feeling in a forthright manner.    Pt said she worked through her funersl planning yesterday with someone from her [employer]ism.    I ANOINTED PT. ON 2/20/2019 and gave her Communion.     PLAN: I hope to visit again this week.      Arias Kruger M.Div.  Priest Correa

## 2019-02-20 NOTE — PLAN OF CARE
"N: AOX4, occasionally forgetful with short term memory events. Able to make needs known. Better this evening. Pupils equal and reactive. Moves all extremities to command weakly, very deconditioned. Denied pain. Xanax X1 for anxiety per patient request.   C: NSR 80-90's. MAP > 55 with levophed weaned off. Midodrine increased, sudafed added. Stress dose steroids continued. Bear hugger on/off throughout shift. Pericardial drain clamped by thoracic @ 1000.    R: NC 8L with humidification throughout shift. Tachypnea in 20's with shallow breathing but patient states breathing feels \"comfortable\" and denies shortness of breath.  Bilateral pleurex catheters to water seal - see flowsheet for details. Left with greatest serous output.   GI: Diet advanced to full liquid per patient request for chocolate shake. Aware of risk for aspiration. Tolerated well with sitting upright and taking slow sips. No BM since 2/13, refused scheduled stool softeners. Abdomen distended but patient denies discomfort. Education completed, MD aware. One episode of nausea, relief with zofran.   : Minimal urine output throughout day, MDs aware - 500 ml fluid bolus ordered this evening. Foster kept per patient request for comfort.   SKIN: Mepiborder applied to coccyx for blanchable redness.   ACCESS: Right port accessed. PIV X2.   GTTS: leophed + TKO   ACTIVITY: Refused full repositioning q2., encouraged frequent weight shifts per patient. Hair shampooed and patient bathed this shift.   SOCIAL: Many family members and friends at bedside throughout day to provide support.     PLAN: Attempt to keep levophed off. Wean O2 as able. MD to remove pericardial catheter if clamping trial successful. Allow patient to guide care to achieve optimum comfort and respect wishes. Continue to monitor and notify MD of changes/concerns.   GOAL: transfer to home with hospice once levophed remains off and arrangements are made.   "

## 2019-02-20 NOTE — PROGRESS NOTES
[facility], [place]    Palliative Care Progress Note    Patient: Suzi Gonsales  Date of Admission:  2/13/2019    Recommendations:  Palliative care will continue to follow. Priest suh saw patient today.  - Hospice liaison RN seeing for further information regarding potential home discharge with hospice care.  - Primary team briefly successfully weaned IV levophed- had to be restarted at low doses.  -  Biotene every hour as needed- liberal application for dry mouth.  - continue with clear liquids with appropriate precautions as a quality of life consideration.  - continue dnr/dni status    Assessment:   53 year old female with metastatic renal cell carcinoma with recurrent bilateral pleural effusions who was admitted to [facility] 2/13/19 with recurrent pleural effusions, persistent hypoxia and hypotension. Found to also have pericardial effusion- taken to the OR on 2/15 by thoracic surgery for pericardial window and drain placement and Right VATS with PleurX catheter placement. She required pressor support and BiPAP for respiratory support and was therefore sent to the SICU for monitoring. Worsening O2 requirements and hypotension overnight 2/16-2/17. Now more stable.   Palliative care consulted for support and goals of care conversation in the setting of no viable further treatment options for her underlying cancer.    I discussed her possible discharge to home from ICU with Hospice MD who was concerned about resource availability to permit a safe transition. Will review and discuss with staff.      These recommendations have been discussed with pt and primary team.    Itz Red NP [facility]  Pager: [phone]  [facility] Inpatient Team Consult pager [phone] (M-F 8-4:30)  After-hours Answering Service [phone]   Palliative Clinic: [phone]       Interval History:   Pt seen and examined with her  again present for interview and brief exam. MICU team with some success  "in weaning IV vasopressors in anticipation of patient desired discharge home with hospice care. Minimal sx burden after pleurx drainage. Appears in less distress today. Plan to remove pericardial window drain.    Aware that time is short. \" I am really going to miss these guys.\" [indicating her family.] strongly restating goal to discharge home to her family and her dogs.      Medications:   I have reviewed this patient's medication profile and medications during this hospitalization          Review of Systems:   A comprehensive ROS has been negative other than stated in the HPI and below:   Palliative Symptom Review (0=no symptom/no concern, 1=mild, 2=moderate, 3=severe):      Pain: 0      Fatigue: 1      Nausea: 0      Constipation: 1      Diarrhea: 0      Depressive Symptoms: 0      Anxiety: 1      Drowsiness: 1      Poor Appetite: (clear liquids- 2)      Shortness of Breath: 1-2            Physical Exam:     Vital Signs: Temp: 97.2  F (36.2  C) Temp src: Oral BP: (!) 83/54   Heart Rate: 84 Resp: 22 SpO2: 90 % O2 Device: Nasal cannula Oxygen Delivery: 8 LPM  Weight: 120 lbs 9.6 oz    Physical Exam: Patient seen and examined again with her  present. Lying in ICU bed no acute distress oxygen by NC. Appearance - alert and interactive.  HEENT: Symmetric facial features, EOMI, mucous membranes moist, dentition in reasonable repair.  Lungs: Diminished quality breath sounds anteriorly bilaterally. No wheeze or rales. Improved nonproductive cough. Pleurx catheters in place  Heart: S1-S2 monitor with sinus tachycardia heart rate 90 regular.  Abdomen: Soft diffusely nontender bowel sounds present.  No masses on brief exam.  Extremities: Symmetric tone and strength.  strength appears equal bilaterally. Moves lower extremities without difficulty.  No appreciable lower extremity edema.  Peripheral pulses intact.  Scattered ecchymosis.  Neuro: No tremor. Alert and oriented x4.      Data Reviewed:      ROUTINE LABS " (Last four results)  BMP  Recent Labs   Lab 02/20/19  0343 02/19/19  0318 02/18/19  0451 02/17/19  0522    137 134 132*   POTASSIUM 4.1 4.1 4.0 4.0   CHLORIDE 105 105 101 104   MUNDO 6.7* 6.4* 6.8* 6.7*   CO2 28 24 24 19*   BUN 14 14 15 18   CR 0.58 0.62 0.74 0.80   * 133* 108* 142*     CBC  Recent Labs   Lab 02/20/19  0343 02/19/19  0318 02/18/19  0451 02/17/19  0522   WBC 7.1 5.5 6.2 5.6   RBC 2.98* 3.06* 3.12* 2.72*   HGB 9.7* 9.9* 10.3* 8.9*   HCT 29.2* 30.3* 31.1* 27.7*   MCV 98 99 100 102*   MCH 32.6 32.4 33.0 32.7   MCHC 33.2 32.7 33.1 32.1   RDW 18.6* 19.0* 19.2* 19.8*    279 340 288     INR  Recent Labs   Lab 02/15/19  1431 02/14/19  2236   INR 1.31* 1.17*       Itz Red NP [facility]  Pager: [phone]  [facility] Inpatient Team Consult pager [phone] (M-F 8-4:30)  After-hours Answering Service [phone]  Palliative Clinic: [phone]     Total time spent was 55 minutes, >50% of time was spent counseling and/or coordination of care.

## 2019-02-20 NOTE — PROGRESS NOTES
THORACIC & FOREGUT SURGERY    Seen and examined.      Vitals:    02/19/19 1830 02/19/19 1845 02/19/19 1900 02/19/19 2000   BP: (!) 82/45 (!) 73/48 (!) 79/46 (!) 72/48   BP Location:    Right arm   Cuff Size:    Adult Regular   Pulse:       Resp: 25 21 15 24   Temp:    98  F (36.7  C)   TempSrc:    Axillary   SpO2: (!) 89% 93% 95% 90%   Weight:                A/P: 53 year old female POD#4 s/p pericardial window, bilateral Pleurx catheter insertion  - Clamp pericardial tube and plan removal  -  Dispo planning    Marshall Pollock MD

## 2019-02-20 NOTE — PROGRESS NOTES
53yoF with metastatic renal cell CA and bilateral pleural effusions and pericardial effusion s/p R VATS pericardial window, pleurX placement.    Tolerated clamp of pericardial drain. Will remove today prior to discharge home on hospice.    Management of pleurX can be to gravity drainage cannisters or capped with intermittent drainage at home.    Call with any questions.  Elina Garcia  Cardiothoracic surgery fellow  [facility] [phone]

## 2019-02-20 NOTE — PLAN OF CARE
Neuro: Oriented x 4 but occasionally confused.  Follows commands and moves all extremities.  Anxious at times but improved with Xanax.    CV: SR.  MAPs dropping to low 50s and spoke to SICU.  Restarted on Levo for overnight and will readdress regarding discharge plan in am.  Afebrile.  Pulm: 10L Oxy Mask.  Patient occasionally removes while sleeping and has to be reminded to put it back on.  Lung sounds coarse over diminished.  Increasing cough overnight but non productive.  Pericardial drain clamped.  PleurX tubes patent with output charted.  GI: Tolerating full liquid diet and swallowing pills whole without issue.  No BM overnight and still refusing bowel meds.  No nausea.  : Foster catheter in place.  Urine output decreasing overnight.  LR boluses given with minimal response.  SICU aware.  Skin: Complete skin assessment unable to be completed as patient made request for minimal interruptions overnight as well as plan for her to go home on hospice.  IV/Gtt: TKO infusing as carrier for Levophed through Port with positive blood return.  2 L PIVs saline locked.     at bedside.

## 2019-02-20 NOTE — PLAN OF CARE
OT/4A: Cancel- RN requesting to let pt rest and visit with family today. Requesting to return to tomorrow to assess if any home discharge needs

## 2019-02-20 NOTE — PROGRESS NOTES
SURGICAL ICU PROGRESS NOTE  February 20, 2019      CO-MORBIDITIES:   Renal cell carcinoma    ASSESSMENT: 53 year old female with metastatic renal cell carcinoma with recurrent bilateral pleural effusions who was admitted on 2/13 with recurrent pleural effusions and persistent hypoxia and hypotension. Found to have recurrent pleural effusions and also pericardial effusion and was therefore taken to the OR on 2/15 by thoracic surgery for pericardial window and drain placement and Right VATS with PleurX catheter placement. She required pressor support and BiPAP for respiratory support and was therefore sent to the SICU for monitoring. Worsening O2 requirements and hypotension overnight 2/16-2/17. Pt with improved oxygenation requirements on 2/19 and weaning of levophed with midodrine, steroids, and pseudoephedrine     Changes Today 2/20:  Patient desires hospice and care at home  - Discontinue levophed  - Sudafed increased to Q4H  - Stress dose steroids changed to prednisone 25mg BID PO  - MAP 55 goal   - D/w palliative regarding which lines/tubes she can discharge with  - Thoracic to remove pericardial drain today    PLAN:  PLAN:  Neuro/ pain/ sedation:  #Metastatic Renal Cell Carcinoma w/ brain metastasis, stable  # H/O anxiety and depression, stable  #Post surgical and cancer pain, well controlled  - Monitor neurological status. Notify the MD for any acute changes in exam.  - Pain: PRN oxycodone  - PTA wellbutrin     Pulmonary care:  # Recurrent pleural effusions 2/2 malignancy  # Bilateral pleurX catheters   # Hypoxic respiratory insufficiency 2/2 malignant pleural effusions  - Supplemental O2 PRN maintain SpO2 > 92  - Monitor pleurX catheter output  - Diuresis as tolerated by BP      Cardiovascular:  #Pericardial Effusion s/p pericardial window and drain placement   #Hypotension likely cardiogenic shock 2/2 malignancy    - Monitor hemodynamic status.   - MAP > 60  - Pericardial drain to be removed by thoracic sx  (2/20)  - Levophed gtt stopped 2/20  - Midodrine 15 mg q8hr  - Sudafed 30 mg q4hrs   - Prednisone 25 mg BID     GI care:   #Ileus, likely narcotic induced  - Given comfort measures Full Liquid diet  - Bowel regimen PRN  - PRN zofran for nausea      Fluids/ Electrolytes/ Nutrition:   - Full liquid diet okay but needs aspiration percautions  - ICU electrolyte replacement protocol  - No indication for parenteral nutrition.  - Nutrition consulted. Appreciate recs     Renal/ Fluid Balance:  # Metastatic renal cell carcinoma  # Hyperchloremic Metabolic acidosis, stable    - Will monitor intake and output.      Endocrine:  # Stress hypoglycemia    - Goal < 180  - sliding scale insulin stopped per patients wishes      ID/ Antibiotics:  - No acute concerns       Heme:    #Anemia of chronic disease   - Slow drift  - Transfuse for Hgb < 7      Prophylaxis:    - Mechanical prophylaxis for DVT.   - Lovenox       MSK:    - PT and OT consulted. Appreciate recs.      Lines/ tubes/ drains:  - PleurX catheters bilaterally, pericardial drain, PIV, PICC      Disposition:  - Surgical ICU. Unlikely to make meaningful recovery from illness. Likely to go to full comfort cares soon.   - Social work and clinical coordinator working on hospice and family needs to make decision on which company to use     Patient seen, findings and plan discussed with surgical ICU staff Dr Matthew Pollock [place], DO  CA-2   Department of Anesthesiology  P: [phone]    ====================================    TODAY'S PROGRESS:   SUBJECTIVE:   - Stable overnight without any acute concerns. O2 and pressor requirements mildly increased with sleep overnight. Pt denied fever, chills, nausea, vomiting, chest pain, or increasing dyspnea      OBJECTIVE:   1. VITAL SIGNS:   Temp:  [97.2  F (36.2  C)-98.2  F (36.8  C)] 97.4  F (36.3  C)  Heart Rate:  [62-98] 80  Resp:  [14-38] 26  BP: ()/() 75/47  SpO2:  [88 %-97 %] 97 %  Resp: 26      2. INTAKE/ OUTPUT:    I/O last 3 completed shifts:  In: 2119.21 [P.O.:880; I.V.:239.21; IV Piggyback:1000]  Out: 829 [Urine:596; Chest Tube:233]    3. PHYSICAL EXAMINATION:   General: awake, alert, NAD, resting comfortably  Neuro: A&Ox3, NAD, moves all extremities  Resp: Stable but still mildly Labored breathing, tachypnea, CTA bilaterally, pleurX catheters in place  CV:  Tachycardic, no murmurs, pericardial drain in place  Abdomen: Soft, Non-distended, Non-tender  Incisions: Intact  Extremities: warm and well perfused    4. INVESTIGATIONS:   Arterial Blood Gases   Recent Labs   Lab 02/17/19  0845 02/15/19  1431 02/15/19  1142 02/15/19  1051   PH 7.35 7.29* 7.21* 7.17*   PCO2 37 33* 39 47*   PO2 76* 172* 121* 75*   HCO3 20* 16* 16* 17*     Complete Blood Count   Recent Labs   Lab 02/20/19  0343 02/19/19  0318 02/18/19  0451 02/17/19  0522   WBC 7.1 5.5 6.2 5.6   HGB 9.7* 9.9* 10.3* 8.9*    279 340 288     Basic Metabolic Panel  Recent Labs   Lab 02/20/19  0343 02/19/19  0318 02/18/19  0451 02/17/19  0522    137 134 132*   POTASSIUM 4.1 4.1 4.0 4.0   CHLORIDE 105 105 101 104   CO2 28 24 24 19*   BUN 14 14 15 18   CR 0.58 0.62 0.74 0.80   * 133* 108* 142*     Liver Function Tests  Recent Labs   Lab 02/15/19  1431 02/14/19 2236 02/13/19  1559   AST 30  --  38   ALT 21  --  27   ALKPHOS 147  --  162*   BILITOTAL 0.2  --  0.2   ALBUMIN 1.7*  --  1.8*   INR 1.31* 1.17*  --      Pancreatic Enzymes  No lab results found in last 7 days.  Coagulation Profile  Recent Labs   Lab 02/15/19  1431 02/14/19 2236   INR 1.31* 1.17*     Lactate  Invalid input(s): LACTATE    5. RADIOLOGY:   No results found for this or any previous visit (from the past 24 hour(s)).    =========================================

## 2019-02-20 NOTE — PLAN OF CARE
Pt admitted for recurrentpleural and pericardial effusion, s/p pericardial window and pleurx drain placement. Pt alert and oriented, will occasionally get a little confuse. Strengths equal throughout. Pt givne prn xanax x1 and prn oxy 5-10mg x2 for chest pain/upper abdominal pain. Pt afebrile, sinus rhythm. Levo has been off since 730am today and discontinued. Pt on oral medications to keep pressure up. Pt with maps between 50-60. SICU verbalized being ok with maps being lower than 55 but if it is consistent and needing to be put back on levo to notify them. Pt on 8L nasal cannula with humidifier. Chest tube and pleurx drain with no output in all drain this shift. Pt refuses sodium bicarb and her stool softner. Pt did not have a BM this shift. Pt hoping to be discharge by Friday Foster with borderline urine output. Continue to monitor, notify MD for any changes or concern.

## 2019-02-20 NOTE — PROGRESS NOTES
Social Work Services Progress Note    Hospital Day: 7  Date of Initial Social Work Evaluation:  Not Completed   Collaborated with:  SICU team,  Hospice Liaison, bedside RN, Pt, pt's  and family at bedside    Data:  SW following for facilitation of home w/ hospice care. Writer reviewed case with  Hospice Liaison and they are able to meet pt's needs at home.  Hospice liaison met w/ pt and reported back to  that was having some additional questions around discharge. Per SICU team, IV pressors were discontinued this morning.     Writer met w/ pt and family, after they visited with  Hospice Liaison. Pt expressing some anxiety about leaving the care team and the great care she has received. Reviewed with family plan for additional help to pt and her  while she was at home. Pt's family expressed to pt that there will be plenty of help from family to allow  to take breaks and be able to sleep. Pt had concerns as to whether or not family would be able to administer medication and explained that her family would be taught how and when to administer medications. Writer also emphasized the hospice support that would be available to them including 24/7 phone support. As we were discussing above, pt expressed having less anxiety and her desire to be at home with her sons and family.     Pt and family expressed desire to discharge on Friday to allow for hospice equipment to be arranged. Writer discussed timeline with  Hospice Liaison and this is reasonable as tomorrow will be used to arrange for all of pt's equipment to be delivered.  Hospice Liaison requested that stretcher transport be arranged for 900 on Friday morning. Writer was able to schedule 0900 stretcher transport, via , for Friday. Hospice will meet pt upon arrival home.     Intervention:  Discharge Planning    Assessment:  Pt and family agreeable to discharge plan to go home with  Hospice services. Pt's wish is to return home and  be with her sons and it appears  and extended family are in support of this goal and will be able to provide additional caregiver support.     Plan:    Anticipated Disposition:  Home w/ [facility] on Friday, February 22nd, at 0900. [facility] will plan to have nursing meet pt upon arrival home.     Barriers to d/c plan:  Medical Stability     Follow Up:  SW to follow-up tomorrow and confirm discharge plan. If pt should have to go back on IV pressors then HE transport and [facility] need to be updated as additional services need to be arranged. Pt will require PCS form to be completed.

## 2019-02-21 NOTE — PROGRESS NOTES
[facility]    Palliative Care Progress Note    Patient: Suzi Gonsales  Date of Admission:  2/13/2019    Recommendations:     Hospice PRN orders- for Friday discharge:  - Bisacodyl 10 mg Suppository RI daily to bid prn constipation  - Tylenol 650 mg Suspension or suppository PO/RI q4hr prn pain, fever  - Lorazepam suspension 0.5-1.0 mg PO/SL/RI q4h prn anxiety/restlessness  - Atropine sulfate 1% opth solution 1-2 drops SL q2h prn secretions  - Senna 8.6 mg 1-2 tabs PO prn constipation- use suspension as available  - Haldol suspension 1mg PO/SL/RI q6h prn nausea, agitation or delirium.   - Oxycodone suspension 5-10 mg q 3 hours pain- use first for sx of shortness of breath or air hunger.        - Palliative care continues to follow.   - Hospice liaison RN coordinating home discharge with hospice care 2/22/19- 0900. Per discussion with  hospice- meds as above.   - Primary team briefly successfully weaned IV levophed. Hospice will support prednisone and midodrine dosing. Will not accept IV pressors  - discontinue lovenox at the time of discharge.  - Would continue with serotonin specific reuptake inhibitor's as long as she is able to swallow.  - OK to discontinue IV access at the time of discharge.   - Biotene every hour as needed- liberal application for dry mouth.  - Continue with clear liquids with appropriate precautions as a quality of life consideration.  - Continue dnr/dni status    Assessment:   53 year old female with metastatic renal cell carcinoma with recurrent bilateral pleural effusions who was admitted to [facility] 2/13/19 with recurrent pleural effusions, persistent hypoxia and hypotension. Found to also have pericardial effusion- taken to the OR on 2/15 by thoracic surgery for pericardial window and drain placement and Right VATS with PleurX catheter placement. She required pressor support and BiPAP for respiratory support and was therefore sent to the SICU for  monitoring. Worsening O2 requirements and hypotension overnight 2/16-2/17. Now more stable. Able to be off IV pressors for 24 hours  Palliative care consulted for support and goals of care conversation in the setting of no viable further treatment options for her underlying cancer.    I discussed her possible discharge to home from ICU with Hospice MD. Planning ongoing for resource availability to permit a safe transition. Will review and discuss with staff.      These recommendations have been discussed with pt and primary team.    Itz Red NP [facility]  Pager: [phone]  [facility] Inpatient Team Consult pager [phone] (M-F 8-4:30)  After-hours Answering Service [phone]   Palliative Clinic: [phone]       Interval History:   Pt seen and examined with her  again present for interview and brief exam. MICU team with ongoing success in weaning IV vasopressors in anticipation of patient desired discharge home with hospice care. Minimal sx burden with pleurx drainage. Appears in less distress today. Removed pericardial window drain.      Medications:   I have reviewed this patient's medication profile and medications during this hospitalization          Review of Systems:   A comprehensive ROS has been negative other than stated in the HPI and below:   Palliative Symptom Review (0=no symptom/no concern, 1=mild, 2=moderate, 3=severe):      Pain: 0      Fatigue: 1      Nausea: 0      Constipation: 1      Diarrhea: 0      Depressive Symptoms: 0      Anxiety: 1      Drowsiness: 1      Poor Appetite: (clear liquids- 2)      Shortness of Breath: 1            Physical Exam:     Vital Signs: Temp: 96.2  F (35.7  C) Temp src: Oral BP: (!) 80/44   Heart Rate: 76 Resp: 24 SpO2: 95 % O2 Device: Nasal cannula Oxygen Delivery: 8 LPM  Weight: 120 lbs 9.6 oz    Physical Exam: Patient seen and examined again with her  present. Lying in ICU bed no acute distress oxygen by NC. Appearance - alert and  interactive. Anticipating discharge in am with hospice care  HEENT: Symmetric facial features, EOMI, mucous membranes moist, dentition in reasonable repair.  Lungs: Diminished quality breath sounds anteriorly bilaterally. No wheeze or rales. Improved nonproductive cough. Pleurx catheters in place  Heart: S1-S2 monitor with sinus tachycardia heart rate 92 regular.  Abdomen: Soft diffusely nontender bowel sounds present.  No masses on brief exam.  Extremities: Symmetric tone and strength.  strength appears equal bilaterally. Moves lower extremities without difficulty.  No appreciable lower extremity edema.  Peripheral pulses intact.  Scattered ecchymosis.  Neuro: No tremor. Alert and oriented x4.      Data Reviewed:      ROUTINE LABS (Last four results)  BMP  Recent Labs   Lab 02/21/19  0508 02/20/19  0343 02/19/19  0318 02/18/19  0451    138 137 134   POTASSIUM 4.5 4.1 4.1 4.0   CHLORIDE 106 105 105 101   MUNDO 6.9* 6.7* 6.4* 6.8*   CO2 25 28 24 24   BUN 13 14 14 15   CR 0.59 0.58 0.62 0.74   * 150* 133* 108*     CBC  Recent Labs   Lab 02/21/19  0508 02/20/19  0343 02/19/19  0318 02/18/19  0451   WBC 5.9 7.1 5.5 6.2   RBC 2.91* 2.98* 3.06* 3.12*   HGB 9.5* 9.7* 9.9* 10.3*   HCT 29.3* 29.2* 30.3* 31.1*   * 98 99 100   MCH 32.6 32.6 32.4 33.0   MCHC 32.4 33.2 32.7 33.1   RDW 19.4* 18.6* 19.0* 19.2*    299 279 340     INR  Recent Labs   Lab 02/15/19  1431 02/14/19  2236   INR 1.31* 1.17*       Itz Red NP [facility]  Pager: [phone]  [facility] Inpatient Team Consult pager [phone] (M-F 8-4:30)  After-hours Answering Service [phone]  Palliative Clinic: [phone]     Total time spent was 35 minutes, >50% of time was spent counseling and/or coordination of care.

## 2019-02-21 NOTE — PROGRESS NOTES
2/21/2019:    Social Work Services Progress Note    Hospital Day: 9  Date of Initial Social Work Evaluation:  Not complete  Collaborated with:  SICU, [facility] Hospice Liaison    Data:  Patient will be discharging on Friday 2/22 with [facility] at 9am. [facility] liaison will be on site and team will meet the family at home.    Intervention:    Confirmed plans with [facility] Hospice liaison for discharge tomorrow on hospice. Ride has been arranged via [employer].     Followed up with SICU regarding medication orders, will be complete by anesthesiology fellow. Confirmed with discharge pharmacy that they will be complete this evening and available to leave with patient Friday morning.    Assessment:  Pt and family agreeable to discharge plan to go home with  Hospice services. Pt's wish is to return home and be with her sons and it appears  and extended family are in support of this goal and will be able to provide additional caregiver support.     Plan:    Anticipated Disposition:  Home with services:  Hospice Friday 2/22 at 9am    Barriers to d/c plan:  Medical Stability    Follow Up:  SW following.    ZACHERY Cuevas, LGSW  ICU 4A & 4C   Ph: [phone]  Pager: [phone]

## 2019-02-21 NOTE — TELEPHONE ENCOUNTER
Inez called to update care team that pt is enrolling with  Hospice tomorrow. Their team will follow and request all clinic apts to be cancelled (done).

## 2019-02-21 NOTE — PROGRESS NOTES
NUTRITION SERVICES - BRIEF NOTE    Pt refusing most cares and does not wish to receive FT or nutrition support. Plan to discharge home with hospice in the coming days.    Nutrition will sign off at this time. If further nutrition needs are indicated, please send appropriate consult.    Jagruti Cobb RD, LD  SICU RD Pgr: [phone]

## 2019-02-21 NOTE — PROGRESS NOTES
"Oriented x 4; no confusion this evening.  Moves all extremities with generalized weakness.  No complaints of pain.  Changed from humidified nasal cannula to oxy-mask this evening per patient request at 8L.  Lungs diminished.  SR on monitor.  MAP goal >55 and patient within range.  Levophed remains off.  R pericardial site leaking copiously and ostomy bag placed to keep skin dry.  Patient did not want a lot of time spent on changing dressing stating that she was \"exhausted\" and asked nursing staff to be quick with change.  Aware that dressing will continue to leak and notified to let staff know if she feels dampness/wetness and wants to have dressing changed.  PleurX tubes with minimal output.  No BM and refusing bowel meds.  Decreased urine output through Foster catheter.  Plan for hospice on Friday.  Report given to Adriana who assumes care of patient at this time.  "

## 2019-02-21 NOTE — PLAN OF CARE
D/I: Patient gently refusing most cares and assessments. Declined to turn. Unable to assess left chest tube due to patient declining turn. No signs of dyspnea at rest.          Issues: BP MAPs intermittently below parameters of 55. However, patient turned to left and BP cuff on right arm and above heart. Patient declined turns and cares and unable to fully assess actual BP. Urine output 95 ml/2 hours. Patient is mentating adequately. Notified SICU resident of hypotension. LR bolus of 500 ml given (also see lactic acid of 2.4). Plan to defer adding pressor to morning as this would be an escalation in cares. Requested clarification of BP parameters by primary team.     P: Transfer home with hospice tomorrow.

## 2019-02-21 NOTE — PROGRESS NOTES
SURGICAL ICU PROGRESS NOTE  February 21, 2019      CO-MORBIDITIES:   Renal cell carcinoma    ASSESSMENT: 53 year old female with metastatic renal cell carcinoma with recurrent bilateral pleural effusions who was admitted on 2/13 with recurrent pleural effusions and persistent hypoxia and hypotension. Found to have recurrent pleural effusions and also pericardial effusion and was therefore taken to the OR on 2/15 by thoracic surgery for pericardial window and drain placement and Right VATS with PleurX catheter placement. She required pressor support and BiPAP for respiratory support and was therefore sent to the SICU for monitoring. Worsening O2 requirements and hypotension overnight 2/16-2/17. Pt with improved oxygenation requirements on 2/19 and weaning of levophed with midodrine, steroids, and pseudoephedrine     Changes Today 2/21:  Patient desires hospice and care at home  - Goal to keep off levophed  - Hospice coming today to discuss lines and medication  - LR 500cc bolus this morning  - 25 LR MIVF  - Increase midodrine to 20mg Q8H  - Discharge to hospice 2/22 0900       PLAN:  PLAN:  Neuro/ pain/ sedation:  #Metastatic Renal Cell Carcinoma w/ brain metastasis, stable  # H/O anxiety and depression, stable  #Post surgical and cancer pain, well controlled  - Monitor neurological status. Notify the MD for any acute changes in exam.  - Pain: PRN oxycodone  - PTA wellbutrin     Pulmonary care:  # Recurrent pleural effusions 2/2 malignancy  # Bilateral pleurX catheters   # Hypoxic respiratory insufficiency 2/2 malignant pleural effusions  - Supplemental O2 PRN maintain SpO2 > 92  - Monitor pleurX catheter output  - Diuresis as tolerated by BP      Cardiovascular:  #Pericardial Effusion s/p pericardial window and drain placement   #Hypotension likely cardiogenic shock 2/2 malignancy    - Monitor hemodynamic status.   - MAP > 60  - Pericardial drain to be removed by thoracic sx (2/20)  - Levophed gtt stopped 2/20  -  Midodrine 20 mg q8hr  - Sudafed 30 mg q4hrs   - Prednisone 25 mg BID     GI care:   #Ileus, likely narcotic induced  - Given comfort measures Full Liquid diet  - Bowel regimen PRN  - PRN zofran for nausea      Fluids/ Electrolytes/ Nutrition:   - Full liquid diet okay but needs aspiration percautions  - ICU electrolyte replacement protocol  - No indication for parenteral nutrition.  - Nutrition consulted. Appreciate recs  - MIVF 25 ml/hr     Renal/ Fluid Balance:  # Metastatic renal cell carcinoma  # Hyperchloremic Metabolic acidosis, stable    - Will monitor intake and output.      Endocrine:  # Stress hypoglycemia    - Goal < 180  - sliding scale insulin stopped per patients wishes      ID/ Antibiotics:  - No acute concerns       Heme:    #Anemia of chronic disease   - Slow drift  - Transfuse for Hgb < 7      Prophylaxis:    - Mechanical prophylaxis for DVT.   - Lovenox       MSK:    - PT and OT consulted. Appreciate recs.      Lines/ tubes/ drains:  - PleurX catheters bilaterally, pericardial drain, PIV, PICC      Disposition:  - Home to hospice 0900 on 2/22     Patient seen, findings and plan discussed with surgical ICU staff Dr Matthew Hunters, [facility]   Department of Anesthesiology  P: [phone]    ====================================    TODAY'S PROGRESS:   SUBJECTIVE:   - Persistently low BP overnight. Lactate trending up this AM. Discussion with patient and  this morning about prognosis and her persistently low BP. Decision to support maximally with oral medications but hold on IV vasopressors. Goal to get home tomorrow. Pt reports being very comfortable without pain and reports breathing is non labored        OBJECTIVE:   1. VITAL SIGNS:   Temp:  [95.9  F (35.5  C)-97.4  F (36.3  C)] 96.2  F (35.7  C)  Heart Rate:  [66-89] 76  Resp:  [11-27] 24  BP: ()/(33-58) 80/44  SpO2:  [87 %-99 %] 95 %  Resp: 24      2. INTAKE/ OUTPUT:   I/O last 3 completed shifts:  In: 1232.22 [P.O.:660;  I.V.:72.22; IV Piggyback:500]  Out: 469 [Urine:457; Chest Tube:12]    3. PHYSICAL EXAMINATION:   General: awake, alert, NAD, resting comfortably  Neuro: A&Ox3, NAD, moves all extremities  Resp: non Labored breathing, tachypnea, CTA bilaterally, pleurX catheters in place  CV:  Tachycardic, no murmurs  Abdomen: Soft, Non-distended, Non-tender  Incisions: Intact  Extremities: warm and well perfused    4. INVESTIGATIONS:   Arterial Blood Gases   Recent Labs   Lab 02/17/19  0845 02/15/19  1431 02/15/19  1142 02/15/19  1051   PH 7.35 7.29* 7.21* 7.17*   PCO2 37 33* 39 47*   PO2 76* 172* 121* 75*   HCO3 20* 16* 16* 17*     Complete Blood Count   Recent Labs   Lab 02/21/19  0508 02/20/19  0343 02/19/19  0318 02/18/19  0451   WBC 5.9 7.1 5.5 6.2   HGB 9.5* 9.7* 9.9* 10.3*    299 279 340     Basic Metabolic Panel  Recent Labs   Lab 02/21/19  0508 02/20/19  0343 02/19/19  0318 02/18/19  0451    138 137 134   POTASSIUM 4.5 4.1 4.1 4.0   CHLORIDE 106 105 105 101   CO2 25 28 24 24   BUN 13 14 14 15   CR 0.59 0.58 0.62 0.74   * 150* 133* 108*     Liver Function Tests  Recent Labs   Lab 02/15/19  1431 02/14/19  2236   AST 30  --    ALT 21  --    ALKPHOS 147  --    BILITOTAL 0.2  --    ALBUMIN 1.7*  --    INR 1.31* 1.17*     Pancreatic Enzymes  No lab results found in last 7 days.  Coagulation Profile  Recent Labs   Lab 02/15/19  1431 02/14/19 2236   INR 1.31* 1.17*     Lactate  Invalid input(s): LACTATE    5. RADIOLOGY:   No results found for this or any previous visit (from the past 24 hour(s)).    =========================================

## 2019-02-21 NOTE — PLAN OF CARE
D/I: patient remains in the Surgical/Neuro ICU.     Neuro- AAOx4. Moves all extremities, generalized weakness.   CV- sinus rhythm on monitor HR 70s BP 70s-90s/30s-50s MAP 45-60 goal MAP >55.   Pulm- lungs coarse, fair congested/non-productive cough. 8L oxy mask while sleeping.  GI- full liquid diet -tolerating well. No BM charted since 2/13 -declines BM meds.   - urine output 10-15cc/hr -MD aware per report. No further interventions ordered.  Pain- denies  Activity- bedrest overnight declines frequent turns.  Drains- right and left chest tubes to water seal. Foster in place for end of life comfort.   Social-  at bedside throughout the night  See flow sheets for further details and assessments.  A: stable  P: continue to monitor, notify MD of significant changes. Home friday

## 2019-02-21 NOTE — PROGRESS NOTES
Addendum: Concerns raised by  hospice that catheter chest tubes were not removed as requested, hospice nurse was connected to beside nurse to adjust for discharge to home. Palliative care provider involved as there were concerns about patient once home and nurse assessed. [employer] was called at 945 due to concerns about no transportation arriving for 9am scheduled Hospice transport. They advised that they were very busy and the patient was still on the board and the 2nd ride to be serviced next. Due to the late arrival and discharge (1010am),  hospice was unable to stay for the planned hospice intake and concerns that they were unable to meet patient as planned for smooth home hospice transition.    2/22/19     Social Work Services Discharge Note      Patient Name:  Suzi Gonsales     Anticipated Discharge Date:  Friday 2/22/19    Discharge Disposition:   Hospice:  [facility]    Following MD:  Phill Sanchez     Pre-Admission Screening (PAS) online form has been completed.  The Level of Care (LOC) is:  Determined  Confirmation Code is:  n/a  Patient/caregiver informed of referral to [facility] Line for Pre-Admission Screening for skilled nursing facility (SNF) placement and to expect a phone call post discharge from SNF.     Additional Services/Equipment Arranged:    [facility] transport, 9am 2/22.   hospice delivered equipment afternoon of 2/21.     Patient / Family response to discharge plan:  Family agreeable and appreciative of support offered by  hospice.     Persons notified of above discharge plan:  SICU, patient, family members, palliative team    Staff Discharge Instructions:  Medications ordered at discharge pharmacy, filled evening of 2/21 to go home with patient.  hospice liaison picking up upon discharge.    CTS Handoff completed:  YES    Medicare Notice of Rights provided to the patient/family:  n/a    ZACHERY Cuevas, [facility]  ICU 4A & 4C   Ph: [phone]  Pager:  [phone]

## 2019-02-21 NOTE — PLAN OF CARE
Occupational Therapy Discharge Summary    Reason for therapy discharge:    Per chart/discussion with interdisciplinary team; plan home with hospice.     Progress towards therapy goal(s). See goals on Care Plan in [facility] electronic health record for goal details.  Pt. not seen by d/cing therapist. Per chart pt. A with all ADLs/mobility. D/cing home with 24 hr.A, hospice.    Therapy recommendation(s):    No further therapy is recommended.

## 2019-02-21 NOTE — PROGRESS NOTES
[facility]  Met with pt and her spouse on Wednesday  to discuss plans for hospice.  Working on arrangements to get pt home on hospice. Pt to be discharged home Friday at 9 am   and has agreed to have [facility] follow with hospice services. Equipment of a hospital bed with 1/2 rails, OBT and O2 are being ordered to be delivered this afternoon to the pt address.  Medications are being filled at the discharge pharmacy and will be sent home with the pt,  Pt and family verbalized understanding that completion of the  admission visit is scheduled for Friday at 9:30am.  The POLST documentation will be completed prior to discharge.      The  Hospice consents will be done when pt arrives at her home on Friday am.  Left a message for pt spouse as to the plan and to call with any concerns. Pt has the 24 hour phone number for  Hospice for any questions or concerns.     Inez Flores RN Liaison   [facility]

## 2019-02-21 NOTE — PROGRESS NOTES
SPIRITUAL HEALTH SERVICES  SPIRITUAL ASSESSMENT Progress Note  [facility] 4A     REFERRAL SOURCE: Follow up    Gave Communion to pt and pt's family.  Pt was tried and wanted a short visit.    PLAN: Will continue to visit a couple time per week.    Arias Kruger M.Div.  [facility]

## 2019-02-22 NOTE — SIGNIFICANT EVENT
SPIRITUAL HEALTH SERVICES Significant Event  [employer] [employer] [facility] [employer]OINTING  [facility] 4A    Visited Pt, , brother and friend. Pt's  said pt going home today.  I encouraged him to be in contact with kristine thomas.    I ANOINTED PT. ON 2/22/2019 and gave her/her  Communion.    PLAN: Spiritual Health Services remains available to pt. during her stay.    Arias Kruger M.Div.  [facility]

## 2019-02-22 NOTE — PLAN OF CARE
Pt with metastatic renal cell carcinoma; mets to brain and lung with recurrent pleural and pericardial effusion. Pt is alert and oriented, able to use call light appropriately and make needs known. Generalized weakness with equal strengths in all extremities, following command. Maps 50-60, does dip lower when sleeping. Pt currently on 8L O2 nasal cannula. Lung sounds clear. No BM yet, has jacome with minimal urine output. Full liquid diet, pt has poor appetite. Pt did drank almost all of her smoothie brought home from family. Old pericardial site leaking, 700ml serous drainage out this shift.  Plan to go home tomorrow with hospice care. Continue to monitor, notify MD for any changes or concern.

## 2019-02-22 NOTE — DISCHARGE SUMMARY
[facility] SICU Discharge Summary 2/22/19    Suzi Gonsales MRN# [id number]   YOB: 1965 Age: 53 year old     Date of Admission:  2/13/2019  Date of Discharge::  2/22/2019 10:51 AM  Admitting Physician:  Fady Chairez DO  Discharge Physician:  Dr. Castro  Primary Care Physician:        Molly Adame          Admission Diagnoses:   Acute Hypoxic Respiratory Failure  Metastatic Renal Cell Carcinoma  Malignant pleural effusions  Malignant pericardial effusion  Hypotension w/Shock  Anemia of chronic disease              Discharge Diagnosis:   Acute Hypoxic Respiratory Failure  Metastatic Renal Cell Carcinoma  Malignant pleural effusions  Malignant pericardial effusion  Hypotension w/Shock  Anemia of chronic disease  Narcotic induced ileus         Procedures:   R VATS Pericardial window with drain placement  Left PleurX Catheter          Non-operative procedures:   None performed          Consultations:   PHYSICAL THERAPY ADULT IP CONSULT  CARDIOLOGY GENERAL ADULT IP CONSULT  THORACIC SURGERY ADULT IP CONSULT  PHYSICAL THERAPY ADULT IP CONSULT  OCCUPATIONAL THERAPY ADULT IP CONSULT  VASCULAR ACCESS CARE ADULT IP CONSULT  SPIRITUAL HEALTH SERVICES IP CONSULT  PALLIATIVE CARE ADULT IP CONSULT  SOCIAL WORK IP CONSULT           Medications Prior to Admission:   Wellbutrin  Celexa  Prilosec  Tylenol  Xanax  Cabometyx  Norco  Zofran  Roxicodone  Miralax  Senna  Ambien         Discharge Medications:      Suzi Gonsales   Home Medication Instructions MAXIMINO:[id number]    Printed on:02/22/19 7717   Medication Information                      acetaminophen (TYLENOL) 160 MG/5ML suspension  Take 20.5 mLs (650 mg) by mouth every 4 hours as needed for fever or mild pain             acetaminophen (TYLENOL) 650 MG suppository  Place 1 suppository (650 mg) rectally every 4 hours as needed for fever             atropine 1 % ophthalmic solution  Place 1-2 drops into both eyes 4 times daily for 1 day              atropine 1 % ophthalmic solution  Take 1-2 drops by mouth, place under tongue or place inside cheek 4 times daily for 1 day             bisacodyl (DULCOLAX) 10 MG suppository  Place 1 suppository (10 mg) rectally daily as needed for constipation             buPROPion (WELLBUTRIN XL) 300 MG 24 hr tablet  Take 300 mg by mouth every morning              citalopram (CELEXA) 20 MG tablet  Take 1 tablet (20 mg) by mouth every evening             haloperidol (HALDOL) 2 MG/ML (HIGH CONC) solution  Take 0.5 mLs (1 mg) by mouth every 6 hours as needed for agitation or other (nausea, delirium)             LORazepam (ATIVAN) 2 MG/ML (HIGH CONC) solution  Take 0.25-0.5 mLs (0.5-1 mg) by mouth every 4 hours as needed for anxiety             midodrine (PROAMATINE) 10 MG tablet  Take 2 tablets (20 mg) by mouth every 8 hours             omeprazole (PRILOSEC) 40 MG capsule  Take 1 capsule (40 mg) by mouth daily             ondansetron (ZOFRAN-ODT) 4 MG ODT tab  Take 1-2 tablets (4-8 mg) by mouth every 6 hours as needed for nausea or vomiting             oxyCODONE (ROXICODONE) 5 MG/5ML solution  Take 5-10 mLs (5-10 mg) by mouth every 3 hours as needed for pain (use first for shortness of breath or air hunger)             oxyCODONE HCl (ROXICODONE INTENSOL) 10 MG/0.5ML (HIGH CONC) solution  Take 0.25-0.5 mLs (5-10 mg) by mouth every 4 hours as needed for severe pain             oxyCODONE HCl (ROXICODONE INTENSOL) 10 MG/0.5ML (HIGH CONC) solution  Take 0.25-0.5 mLs (5-10 mg) by mouth every 4 hours as needed for severe pain             polyethylene glycol (MIRALAX/GLYCOLAX) packet  Take 17 g by mouth daily as needed for constipation             predniSONE (DELTASONE) 5 MG tablet  Take 25 mg by mouth every 12 hours for 7 days.             pseudoePHEDrine (SUDAFED) 30 MG tablet  Take 1 tablet (30 mg) by mouth every 4 hours for 10 days             senna-docusate (SENOKOT-S/PERICOLACE) 8.6-50 MG tablet  Take 1 tablet by mouth 2 times  daily                       Day of Discharge Exam   BP (!) 89/50   Pulse 81   Temp 97.5  F (36.4  C) (Oral)   Resp 14   Wt 57.5 kg (126 lb 12.2 oz)   LMP 12/20/2013   SpO2 99%   BMI 24.76 kg/m    General: Awake, alert, NAD, frail, bed bound  NEURO: No focal deficits noted  Cardio: RRR, no murmurs/rubs/gallops  Chest: Tachypnea, mild crackles noted in lower lung fields, chest tubes noted, no wheezes  Abd: Soft, non-distended  Ext: warm and well perfused           Brief History of Illness & Hospital Course:   53 year old female with metastatic renal cell carcinoma s/p craniotomy and gamma knife for brain mets and recurrent bilateral pleural effusions who was admitted on 2/13 with recurrent pleural effusions and persistent hypoxia and hypotension. Found to have recurrent pleural effusions and also pericardial effusion and was therefore taken to the OR on 2/15 by thoracic surgery for pericardial window and drain placement and Right VATS with PleurX catheter placement. She required pressor support and BiPAP for respiratory support and was therefore sent postoperative to the SICU for monitoring. Worsening O2 requirements and hypotension overnight 2/16-2/17. Pt with improved oxygenation requirements on 2/19 but requiring pressor support. Given advanced stage of cancer, persistent malignant effusions, and hypotension discussion with Oncology doctors, SICU, thoracic surgery, and the patients family it was determined that further chemotherapy or invasive interventions would be against goals of care. Pt made DNR/DNI and hospice and palliative services consulted on 2/20. As home to hospice was patient goal weaning from levophed with oral midodrine, steroids, and pseudoephedrine occurred along with liberalization of MAP goals. Pt was discharged home to [place] hospice services on 2/22/19.           Antibiotics Prescribed at Discharge:   None prescribed           Imaging Studies:     Results for orders placed or performed  during the hospital encounter of 02/13/19   XR Chest Port 1 View    Narrative    Exam: XR CHEST PORT 1 VW, 2/13/2019 12:37 PM    Indication: severe pain at pleurx site    Comparison: X-ray 02/07/2019    Findings:   AP single view of the chest. Tip of the right-sided IJ Port-A-Cath  projects over the cavoatrial junction. Left basilar chest tube in  place. No pneumothorax. Mild worsening small bilateral pleural  effusion. Some pleural effusion tracking along the right major  fissure. Hazy bibasilar opacity, right greater than left.  Cardiomediastinal silhouettes is grossly unchanged. Upper abdomen is  unremarkable.      Impression    Impression:   1. Mild worsening small bilateral pleural effusion with tracking of  fluids along the right major fissure.  2. Streaky bibasilar opacity favors atelectasis. Cannot rule out  superimposed infection  3. Left-sided chest tube in place    I have personally reviewed the examination and initial interpretation  and I agree with the findings.    MISTI WEIR MD   XR Chest Port 1 View    Narrative    Chest one view portable semiupright    HISTORY: Status post right thoracentesis.    COMPARISON STUDY: 2/13/2019    FINDINGS: Right IJ Port-A-Cath tip in the right atrium. Left basilar  chest tube. Perihilar interstitial opacities are noted. Bibasilar  atelectasis and consolidation. Small bilateral pleural effusions  decreased on the right.      Impression    IMPRESSION: Slight decrease in pulmonary edema.    MISTI WEIR MD   CT Chest Pulmonary Embolism w Contrast    Narrative    Examination: CT CHEST PULMONARY EMBOLISM W CONTRAST, 2/13/2019 6:09 PM    Comparison: 2/6/2019, 12/12/2018. Chest radiograph same day..    History: hypoxia despite throacentesis, known metastatic RCC with  extensive disease.    Technique: Axial thin slice images from the lung apices to the  diaphragm were obtained following the injection of intravenous  contrast media in the pulmonary arterial phase.      Contrast dose: iopamidol (ISOVUE-370) solution 50 mL    Findings:     Right IJ Port-A-Cath tip is in the high right atrium.     There is adequate contrast bolus in the study.  No pulmonary embolus  noted.   Main pulmonary artery is not dilated. No intracardiac  thrombus. No evidence of right heart strain.    The heart size is normal. Moderate pericardial effusion. Normal  caliber and configuration of the thoracic great vessels.  Aorta and  its major branches are patent. No convincing change in extensive  mediastinal, hilar, and axillary lymphadenopathy. Bibasilar  atelectasis and atelectasis of the right middle lobe.     New left chest tube. Decreased bilateral pleural effusions since  2/6/2019. Associated atelectasis. No pneumothorax.  Continued  interlobular septal thickening and diffuse groundglass opacities with  with patchy consolidative opacities primarily in the right lower and  middle lobes. Trachea and central airways are clear.    Limited views of the abdomen reveal no acute pathology. Post surgical  changes from right nephrectomy. Upper abdominal adenopathy, stable.  Small unchanged left adrenal nodule. Unchanged cystic structure at the  pancreatic tail, measuring approximately 1 cm.    No suspicious osseous or soft tissue lesions.      Impression    IMPRESSION:   1. No evidence of pulmonary embolus.  2. New left chest tube. Right and left pleural effusions have  decreased when compared to 2/6/2019.  3. Extensive mediastinal, hilar, axillary, and upper abdominal  lymphadenopathy is again seen, similar to the prior.  4. Stable moderate pericardial effusion.  5. Improving aeration; however, ongoing smooth interlobular septal  thickening with patchy areas of groundglass attenuation with more  consolidative patchy opacities in the right lower lobe and right  middle lobe. Findings could represent pulmonary edema or infection,  including atypicals.    I have personally reviewed the examination and initial  interpretation  and I agree with the findings.    JODI CHOE MD   XR Chest Port 1 View    Narrative    Exam: XR CHEST PORT 1 VW, 2/14/2019 9:29 AM    Indication: increasing SOB, recent R sided thoracentesis, coarse lung  sounds, evaluate for pulm edema vs infection vs other?    Comparison: 2/13/2018    Findings:   Mild increase in perihilar pulmonary edema. Pulmonary vasculature is  indistinct. Redemonstrated are bilateral pleural effusions. No  pneumothorax. Unchanged left-sided chest tube. Right chest wall  Port-A-Cath tip at the high right atrium.      Impression    Impression:   1. Increased perihilar pulmonary edema and lymphangitic  carcinomatosis.  2. Bilateral pleural effusions appear unchanged.    I have personally reviewed the examination and initial interpretation  and I agree with the findings.    MISTI WEIR MD   XR Chest Port 1 View    Narrative    Exam: TEMPORARY, 2/15/2019 12:38 PM    Indication: Status post right VATS     Comparison: X-ray 2/14/2019, CT chest 2/13/2019    Findings:   AP single view of the chest. Tip of the right-sided Port-A-Cath  projects over the cavoatrial junction. Postsurgical changes of right  sided VATS with 1 right chest tube and one pericardial drains.   Minimal right pneumothorax. Left basilar drain in stable position.  Patchy mixed interstitial and airspace opacity in both lungs. Small  bilateral pleural effusion. Extensive subcutaneous emphysema of the  right chest.      Impression    Impression:   1. Postsurgical changes of right-sided VATS. Extensive subcutaneous  emphysema about the right chest wall.  2. Trace right pneumothorax with a PleurX drain in place.   3.  Ongoing pulmonary edema and lymphangitic carcinomatosis  4. Unchanged bilateral pleural effusion    I have personally reviewed the examination and initial interpretation  and I agree with the findings.    MISTI WEIR MD   XR Chest Port 1 View    Narrative    Exam: XR CHEST PORT 1 VW, 2/16/2019 7:45  AM    Indication: S/p R VATS pericardial window, pericardial drain and R  pleurex placement    Comparison: Radiograph of the chest 2/15/2018    Findings:   Upright AP view of the chest. Right IJ central venous catheter  projects with the tip over the right atrium. There is postprocedural  changes of right-sided VATS. Right apical left basilar chest tubes.  Pericardial drain. Trace right pneumothorax. Small bilateral pleural  effusions. Diffuse interstitial and scattered patchy airspace  opacities. Subjacent emphysema of the right greater than left chest  wall. Gaseous distention of the stomach. Surgical clips in the mid  upper abdomen.       Impression    Impression:   1.  Unchanged trace right pneumothorax with small bilateral pleural  effusions. Bilateral chest tubes and pericardial drain are stable.  2.  Pulmonary edema and lymphangitic carcinomatosis, with improved  aeration of the left lung.  3.  Marked gastric distention.    I have personally reviewed the examination and initial interpretation  and I agree with the findings.    ELY MADSEN MD   XR Chest Port 1 View    Narrative    XR CHEST PORT 1 VW  2/17/2019 6:40 AM      HISTORY: hypoxia    COMPARISON: 2/16/2019    FINDINGS: Portable AP view the chest. Bilateral chest tubes are  unchanged in position. Low lung volumes. Increased perihilar  interstitial opacities and Kerley B-lines. Small pleural effusions. No  definite pneumothorax. Heart size is normal. Subcutaneous emphysema  along the right chest wall is decreased. Prominent dilated loops of  bowel in the upper abdomen.      Impression    IMPRESSION:   1. Increased perihilar interstitial opacities and Kerley B line  suggestive of pulmonary edema.  2. Small pleural effusions bilaterally.  3. Severely distended loops of bowel in the upper abdomen concerning  for potential obstruction or ileus. Recommend dedicated abdominal  films for further evaluation.    I have personally reviewed the examination and  initial interpretation  and I agree with the findings.    ELY MADSEN MD   XR Abdomen Port 1 View    Narrative    Examination:  XR ABDOMEN PORT 1 VW    Date:  2/17/2019 10:20 AM     Clinical Information: concern for bowel obstruction     Additional Information: none    Comparison: 8/28/2018 lower abdominal film.    Findings:     The colonic bowel gas pattern is normal without changes of  obstruction. Minimal stool is present within the colon. There is a  left hip fixation. Subcutaneous emphysema is present along the right  lateral chest wall and extending down to the upper abdomen. Multiple  clips are present within the right paraspinal space at approximately  L1 and L2. No lytic changes of metastatic renal cell carcinoma  appreciated on this film.      Impression    Impression:    1. Nonobstructive bowel gas pattern..    ELY MADSEN MD   XR Chest Port 1 View    Narrative    Exam: XR CHEST PORT 1 VW, 2/18/2019 6:10 AM    Indication: hypoxia    Comparison: 2/17/2019, 2/16/2019    Findings:   Right-sided Port-A-Cath with distal tip in the right atrium. Right  apical and left basilar chest tubes unchanged in position from prior.  Prominent pulmonary vasculature similar to prior. Perihilar  interstitial opacities. No new focal pulmonary consolidation. Right  chest wall subcutaneous emphysema decreased from prior. Small  bilateral pleural effusions. Nonobstructive bowel gas pattern in the  partially visualized upper abdomen.      Impression    Impression:   1. Stable support devices.  2. Perihilar interstitial opacities and prominent pulmonary  vasculature suggestive of pulmonary edema superimposed on lymphangitic  carcinomatosis unchanged from prior.    I have personally reviewed the examination and initial interpretation  and I agree with the findings.    MISTI WEIR MD            Final Pathology Result:   No pathology submitted         Discharge Instructions and Follow-Up:        Reason for your hospital  stay    Metastatic renal cell carcinoma     Activity    Your activity upon discharge: activity as tolerated     Tubes and drains    You are going home with the following tubes or drains: PleurX catheters bilaterally, [place].  Routine cares.     DNR/DNI     Oxygen Adult    [facility] Order 8 liter(s) by nasal cannula continuously with use of portable tank. Expected treatment length is indefinite (99 months).. Test on conserving device as applicable.    Patients who qualify for home O2 coverage under the CMS guidelines require ABG tests or O2 sat readings obtained closest to, but no earlier than 2 days prior to the discharge, as evidence of the need for home oxygen therapy. Testing must be performed while patient is in the chronic stable state. See notes for O2 sats.    I certify that this patient, Suzi Gonsales has been under my care and that I, or a nurse practitioner or physician's assistant working with me, had a face-to-face encounter that meets the face-to-face encounter requirements with this patient on 2/22/2019. The patient, Suzi Gonsales was evaluated or treated in whole, or in part, for the following medical condition, which necessitates the use of the ordered oxygen. Treatment Diagnosis: Metastatic renal cell carcinoma    Attending Provider: Fady Chairez DO  Physician signature: See electronic signature associated with these discharge orders  Date of Order: February 22, 2019     Diet    Follow this diet upon discharge: Orders Placed This Encounter      Full Liquid Diet           Home Health Care:   [facility] hospice services initiated            Discharge Disposition:   Admited to hospice care.   Agency: [facility].      Condition at discharge: Terminal      Patients care and plan discussed with ICU staff Dr. Matthew Sanchez DO  CA-2   Department of Anesthesiology  P: [phone]

## 2019-02-22 NOTE — PROGRESS NOTES
D:  Patient being treated for b/l pleural effusions secondary to renal cell carcinoma with mets to brain, lungs, and pancreas.         I/A: Patient discharging home with hospice care. No significant changes this shift. Stable.    VS: BPs soft. Afebrile. HR 70-80s. 4L O2 NC.  Neuro: Oriented x4, lethargic. Follows commands.  CV: SR.  Pulm: LS diminished. 4L O2, NC.  GI:  No BM. Patient refusing senna. Positive BS and passing flatus.  : Discharging home with jacome catheter for comfort.  Pain: PRN oxycodone given for pain.        P: Patient discharging home with hospice care. Patient's  Tunde received all of patient's medications from discharge pharmacy, and has all of patient's belongings accounted for.

## 2019-02-22 NOTE — PLAN OF CARE
"Status: patient resting overnight and mostly does not want to be disturbed.  D/I: Patient on unit 4A Surgical/Neuro ICU s/p: renal cell carcinoma with mets to brain, lungs, and pancreas.  Neuro- alert and oriented x3 and intermittently x4. Has trouble with time;  states \"she gets confused at night.\" Scheduled celexa and buproprion. PRN xanax and ambien given. Moves all extremities spontaneously and to command; patient is very weak.   CV- VSS, afebrile. Sinus rhythm. LEVO turned off 02/20/19. Oral heart medications such as midodrine. KEEP MAPs above 55. Right and Left chest tubes to -20 water seal; not much output from both of these overnight. Right ostomy bag over previous pericardial drain placement.   Pulm- lung sounds clear to diminished at bases. Oxymask with 8 liters infusing.   GI/- jacome catheter in place. Patient has not had a BM since 02/13/19 and refuses senna. Full liquid diet; swallows pills. On prednisone.   Skin- intact; right chest port accessed.  Gtts- saline locked.  Pain- PRN oxycodone, tylenol, and ibuprofen.  Electrolytes- replaced per protocol.  See flow sheets for further interventions and assessments.  P: Continue to monitor pt closely, Notify MD of changes/concerns.     "

## 2019-02-24 NOTE — PROGRESS NOTES
ORAL CHEMOTHERAPY DISCONTINUATION       Primary Oncologist:  Dr. Green  Primary Oncology Clinic: [facility]  Cancer Diagnosis:  Renal Cell Carcinoma  Therapy History:  Start Date: 4/19/18  Cabometyx 40 mg daily  Dose reduction to 40 mg daily x 6 days a week on 12/12/18  Therapy Ended On:  2/6/2019  Reason For Discontinuation: patient enrolled into hospice    Additional Notes:  Thank you for the opportunity to be a part in this patient's oral chemotherapy. The oncology pharmacy will no longer be following this patient for oral chemotherapy. If there are an questions or the plan changes, feel free to contact us.    Kristen Weiler, Pharmacy Intern  Oral Chemotherapy Monitoring Program   [facility]  [phone]

## 2019-03-12 NOTE — PROGRESS NOTES
Writer received request for medical records and physicians statement regarding patient leave from work. Patient is currently enrolled in hospice care. Writer placed call to Juan who advised sending a note that patient is enrolled in hospice and no form would be filled out. Request for records from September 2018 going forward sent to HIM.

## 2019-03-18 ENCOUNTER — CARE COORDINATION (OUTPATIENT)
Dept: ONCOLOGY | Facility: CLINIC | Age: 54
End: 2019-03-18

## 2019-03-18 NOTE — PROGRESS NOTES
Received notice of patient death.  Date of Death  3/17/19  All appointments, orders and treatment plans cancelled.  Care TEAM and HIM notified

## 2019-04-11 LAB
MYCOBACTERIUM SPEC CULT: NORMAL
MYCOBACTERIUM SPEC CULT: NORMAL
SPECIMEN SOURCE: NORMAL

## 2019-09-17 NOTE — PROGRESS NOTES
Infusion Nursing Note:  Suzi Gonsales presents today for cycle 1, day 1 Nivolumab.    Patient seen by provider today: Yes: TABITHA Reeves   present during visit today: Not Applicable.    Note: Patient denies complaints of pain, dyspnea, dizziness, nausea, numbness and tingling, and dysurea at this time. Will proceed with treatment.    Patient new to oncology infusion room and is receiving treatment for the first time. Oriented to infusion room, location of bathrooms, nutrition stations, and call light.This writer also reinforced teaching regarding nivolumab. Patient received new patient folder prior to coming to infusion. This writer reinforced teaching in infusion. All medications and side effects reviewed with patient.      Intravenous Access:  Implanted Port.    Treatment Conditions:  Lab Results   Component Value Date    HGB 11.7 12/05/2017     Lab Results   Component Value Date    WBC 4.9 12/05/2017      Lab Results   Component Value Date    ANEU 2.4 12/05/2017     Lab Results   Component Value Date     12/05/2017      Lab Results   Component Value Date     12/05/2017                   Lab Results   Component Value Date    POTASSIUM 4.3 12/05/2017           Lab Results   Component Value Date    MAG 1.8 08/19/2017            Lab Results   Component Value Date    CR 1.09 12/05/2017                   Lab Results   Component Value Date    MUNDO 9.6 12/05/2017                Lab Results   Component Value Date    BILITOTAL 0.4 12/05/2017           Lab Results   Component Value Date    ALBUMIN 3.8 12/05/2017                    Lab Results   Component Value Date    ALT 9 12/05/2017           Lab Results   Component Value Date    AST 20 12/05/2017     Results reviewed, labs MET treatment parameters, ok to proceed with treatment.    Post Infusion Assessment:  Patient tolerated infusion without incident.  Blood return noted pre and post infusion.  Site patent and intact, free from redness, edema or  TRANSFER - IN REPORT:    Verbal report received from ORN & CRNA on [facility]  being received from OR (unit) for routine post - op      Report consisted of patients Situation, Background, Assessment and   Recommendations(SBAR). Information from the following report(s) SBAR was reviewed with the receiving nurse. Opportunity for questions and clarification was provided. Assessment completed upon patients arrival to unit and care assumed. discomfort.  No evidence of extravasations.  Access discontinued per protocol.    Discharge Plan:   Prescription refills given for ativan and compazine.  Discharge instructions reviewed with: Patient.  Patient and/or family verbalized understanding of discharge instructions and all questions answered.  Copy of AVS reviewed with patient and/or family.  Patient will return 12/18 for next appointment.  Patient discharged in stable condition accompanied by: self.  Departure Mode: Ambulatory.    Ced Gould RN

## 2020-01-01 NOTE — TELEPHONE ENCOUNTER
Faxed rx of zolpidem (AMBIEN) 5 MG tablet to [facility] [address] [place]    
Message sent to patient to clarify how she is taking her medication.  Dee Vences RN    
Messaged patient.  Dee Vences RN    
rx in grand box, please fax, thanks.  I wonder why she is running out, does she sometimes take 2 tabs?  Can f/u with PCP on it if desired.  Thanks.    
negative

## 2021-06-02 ENCOUNTER — RECORDS - HEALTHEAST (OUTPATIENT)
Dept: ADMINISTRATIVE | Facility: CLINIC | Age: 56
End: 2021-06-02

## 2022-03-10 NOTE — TELEPHONE ENCOUNTER
Please note that there is another about.met message regarding the request for Ambien.  Dr. Arnulfo Trinidad just filled # 10 tablets.   Dee Vences RN     no

## 2022-03-30 NOTE — MR AVS SNAPSHOT
After Visit Summary   1/4/2018    Suzi Gonsales    MRN: [id number]           Patient Information     Date Of Birth          1965        Visit Information        Provider Department      1/4/2018 2:30 PM  19 ATC;  ONCOLOGY INFUSION [facility]        Today's Diagnoses     Malignant neoplasm of right kidney (H)    -  1      Care Instructions    Contact Numbers  [facility]: [phone]  (Choose Option 3 for triage RN)  After Hours: [phone]    Call triage with chills and/or temperature greater than or equal to 100.5, uncontrolled nausea/vomiting, diarrhea, constipation, dizziness, shortness of breath, chest pain, bleeding, unexplained bruising, or any new/concerning symptoms, questions/concerns.     If after hours, weekends, or holidays, call the main clinic number. Calls will be forwarded to the hospital , please ask for the adult oncology doctor on call.     If you are having any concerning symptoms or wish to speak to a provider before your next infusion visit, please call your care coordinator or triage to notify them so we can adequately serve you.     If you need a refill on a narcotic prescription, please call triage or your care coordinator before your infusion appointment.             January 2018 Sunday Monday Tuesday Wednesday Thursday Friday Saturday        1     2     3     4     [facility] DRAW    2:00 PM   (15 min.)   [facility]   [facility] Lab Draw     [facility] ONC INFUSION 120    2:30 PM   (120 min.)    ONCOLOGY INFUSION   [facility] 5     6       7     8     9     10     11     12     13       14     15     16     17     [facility] DRAW    7:15 AM   (15 min.)   [facility]   [facility] Lab Draw     P RETURN    7:35 AM   (50 min.)   Molly Max PA-C   [facility] ONC INFUSION 120    9:00 AM   (120 min.)    ONCOLOGY INFUSION   [facility]  MycRevettot message sent.   Cancer Clinic 18     19     20       21     22     23     24     25     26     27       28     29     30     31     [facility] DRAW   12:00 PM   (15 min.)    [facility] DRAW   [facility] Lab Draw     [facility] ONC INFUSION 120   12:30 PM   (120 min.)    ONCOLOGY INFUSION   [facility]                           February 2018 Sunday Monday Tuesday Wednesday Thursday Friday Saturday                       1     2     3       4     5     6     7     8     9     10       11     12     13     14     15     16     17       18     19     20     21     22     23     24       25     26     27     28                                 Lab Results:  Recent Results (from the past 12 hour(s))   Comprehensive metabolic panel    Collection Time: 01/04/18  2:15 PM   Result Value Ref Range    Sodium 138 133 - 144 mmol/L    Potassium 4.2 3.4 - 5.3 mmol/L    Chloride 105 94 - 109 mmol/L    Carbon Dioxide 26 20 - 32 mmol/L    Anion Gap 6 3 - 14 mmol/L    Glucose 97 70 - 99 mg/dL    Urea Nitrogen 15 7 - 30 mg/dL    Creatinine 0.90 0.52 - 1.04 mg/dL    GFR Estimate 66 >60 mL/min/1.7m2    GFR Estimate If Black 79 >60 mL/min/1.7m2    Calcium 9.1 8.5 - 10.1 mg/dL    Bilirubin Total 0.3 0.2 - 1.3 mg/dL    Albumin 3.6 3.4 - 5.0 g/dL    Protein Total 7.8 6.8 - 8.8 g/dL    Alkaline Phosphatase 76 40 - 150 U/L    ALT 16 0 - 50 U/L    AST 20 0 - 45 U/L   TSH with free T4 reflex    Collection Time: 01/04/18  2:15 PM   Result Value Ref Range    TSH 0.91 0.40 - 4.00 mU/L   CBC with platelets differential    Collection Time: 01/04/18  2:15 PM   Result Value Ref Range    WBC 4.7 4.0 - 11.0 10e9/L    RBC Count 4.04 3.8 - 5.2 10e12/L    Hemoglobin 12.0 11.7 - 15.7 g/dL    Hematocrit 37.0 35.0 - 47.0 %    MCV 92 78 - 100 fl    MCH 29.7 26.5 - 33.0 pg    MCHC 32.4 31.5 - 36.5 g/dL    RDW 13.3 10.0 - 15.0 %    Platelet Count 382 150 - 450 10e9/L    Diff Method Automated Method     % Neutrophils 41.2 %    % Lymphocytes 45.9 %    %  Monocytes 7.5 %    % Eosinophils 4.3 %    % Basophils 0.9 %    % Immature Granulocytes 0.2 %    Nucleated RBCs 0 0 /100    Absolute Neutrophil 1.9 1.6 - 8.3 10e9/L    Absolute Lymphocytes 2.1 0.8 - 5.3 10e9/L    Absolute Monocytes 0.4 0.0 - 1.3 10e9/L    Absolute Eosinophils 0.2 0.0 - 0.7 10e9/L    Absolute Basophils 0.0 0.0 - 0.2 10e9/L    Abs Immature Granulocytes 0.0 0 - 0.4 10e9/L    Absolute Nucleated RBC 0.0                Follow-ups after your visit        Your next 10 appointments already scheduled     Jan 17, 2018  7:15 AM CST   [facility] with [facility]   [facility]    [address] [place] [address]  [address]  [facility] [address]   [phone]            Jan 17, 2018  7:50 AM CST   (Arrive by 7:35 AM)   Return Visit with Molly Max PA-C   [facility] ([facility])    [address] [place] [address]  [address]  [facility] [address]   [phone]            Jan 17, 2018  9:00 AM CST   Infusion 120 with [facility] ONCOLOGY INFUSION, [facility] 25 ATC   [facility] ([facility])    [address] [place] [address]  [address]  [facility] [address]   [phone]            Jan 31, 2018 12:00 PM CST   [facility] Draw with [facility]   [facility] Draw ([facility])    [address] [place] [address]  [address]  [facility] [address]   [phone]            Jan 31, 2018 12:30 PM CST   Infusion 120 with [facility] ONCOLOGY INFUSION, UC 29 ATC   [facility] ([facility])    [address] [place] [address]  [address]  [facility] [address] [phone]              Who to contact     If you have questions or need follow up information about today's clinic visit or your schedule please contact [facility] directly at [phone].  Normal or non-critical lab and imaging results will be communicated  to you by CashSentinelhart, letter or phone within 4 business days after the clinic has received the results. If you do not hear from us within 7 days, please contact the clinic through PeerPong or phone. If you have a critical or abnormal lab result, we will notify you by phone as soon as possible.  Submit refill requests through PeerPong or call your pharmacy and they will forward the refill request to us. Please allow 3 business days for your refill to be completed.          Additional Information About Your Visit        PeerPong Information     PeerPong gives you secure access to your electronic health record. If you see a primary care provider, you can also send messages to your care team and make appointments. If you have questions, please call your primary care clinic.  If you do not have a primary care provider, please call [phone] and they will assist you.        Care EveryWhere ID     This is your Care EveryWhere ID. This could be used by other organizations to access your [place] medical records  [id number]        Your Vitals Were     Pulse Temperature Respirations Last Period Pulse Oximetry BMI (Body Mass Index)    69 98.2  F (36.8  C) (Oral) 16 (Within Months) 95% 23.17 kg/m2       Blood Pressure from Last 3 Encounters:   01/04/18 105/64   12/18/17 99/59   12/05/17 100/57    Weight from Last 3 Encounters:   01/04/18 57.5 kg (126 lb 11.2 oz)   12/18/17 57 kg (125 lb 9.6 oz)   12/05/17 56.7 kg (125 lb)              We Performed the Following     CBC with platelets differential     Comprehensive metabolic panel     TSH with free T4 reflex        Primary Care Provider Office Phone # Fax #    Molly ARIEL Platt [facility] [phone] [address] [facility] [address]        Equal Access to Services     ROSAURA INTERIANO : Odilon Santana, alexis robles, michelle tilley, josemanuel plata. So [facility] [phone].    ATENCIÓN: Ferny olmedo,  tiene a chacon disposición servicios gratuitos de asistencia lingüística. Mario mitchell [phone].    We comply with applicable federal civil rights laws and [place] laws. We do not discriminate on the basis of race, color, national origin, age, disability, sex, sexual orientation, or gender identity.            Thank you!     Thank you for choosing [facility]  for your care. Our goal is always to provide you with excellent care. Hearing back from our patients is one way we can continue to improve our services. Please take a few minutes to complete the written survey that you may receive in the mail after your visit with us. Thank you!             Your Updated Medication List - Protect others around you: Learn how to safely use, store and throw away your medicines at www.disposemymeds.org.          This list is accurate as of: 1/4/18  4:40 PM.  Always use your most recent med list.                   Brand Name Dispense Instructions for use Diagnosis    acetaminophen 325 MG tablet    TYLENOL    100 tablet    Take 2 tablets (650 mg) by mouth every 4 hours as needed for mild pain (mild pain)    Malignant neoplasm of right kidney (H)       acetaminophen-codeine 300-30 MG per tablet    TYLENOL #3    45 tablet    Take 1 tablet by mouth every 6 hours as needed for moderate pain    Metastatic renal cell carcinoma to lung, right (H)       ALPRAZolam 0.5 MG tablet    XANAX    60 tablet    Take 1 tablet (0.5 mg) by mouth 3 times daily as needed for anxiety    Renal cell carcinoma, unspecified laterality (H), Mass of upper lobe of left lung       benzonatate 100 MG capsule    TESSALON    60 capsule    Take 1 capsule (100 mg) by mouth 3 times daily as needed for cough    Metastatic renal cell carcinoma to lung, right (H)       buPROPion 300 MG 24 hr tablet    WELLBUTRIN XL     Take 300 mg by mouth every morning        citalopram 20 MG tablet    celeXA    90 tablet    Take 1 tablet (20 mg) by mouth every evening     Insomnia, unspecified type       clonazePAM 0.5 MG tablet    klonoPIN    20 tablet    TAKE 0.5-1 TABLETS BY MOUTH 2 TIMES DAILY AS NEEDED FOR ANXIETY    Panic attack       IBUPROFEN      Take 400 mg by mouth every 6 hours as needed        lidocaine-prilocaine cream    EMLA    60 g    Apply 1 hour prior to port access.    Malignant neoplasm of right kidney (H)       LORazepam 0.5 MG tablet    ATIVAN    30 tablet    Take 1 tablet (0.5 mg) by mouth every 4 hours as needed (Anxiety, Nausea/Vomiting or Sleep)    Malignant neoplasm of right kidney (H)       * prochlorperazine 10 MG tablet    COMPAZINE    30 tablet    Take 1 tablet (10 mg) by mouth every 6 hours as needed for nausea or vomiting (Breakthrough Nausea/Vomiting)    Malignant neoplasm of right kidney (H)       * prochlorperazine 10 MG tablet    COMPAZINE    30 tablet    Take 1 tablet (10 mg) by mouth every 6 hours as needed (Nausea/Vomiting)    Malignant neoplasm of right kidney (H)       zolpidem 5 MG tablet    AMBIEN    30 tablet    Take 1 tablet (5 mg) by mouth nightly as needed for sleep    Sleep disorder       * Notice:  This list has 2 medication(s) that are the same as other medications prescribed for you. Read the directions carefully, and ask your doctor or other care provider to review them with you.

## 2022-05-05 NOTE — CONSULTS
Hospitalist Service Consultation      Suzi Gonsales MRN# [id number]   YOB: 1965 Age: 53 year old      Date of Admission:  10/1/2018  Date of Consult: 10/1/2018            Assessment and Plan:   Metastatic renal cell cancer left femoral head:    S/p Prophylactic and internal fixation using a hip screw left hip    Procedure was done under general anesthesia.  Estimated blood loss was 20 mL.    Estimated blood loss was 20 mL.    Pain management per orthopedic surgery service.    DVT prophylaxis as per surgery. The patient currently on SCD    Metastatic renal cell cancer with metastatic disease to the lungs are adrenal gland lungs.    Hold chemotherapy for now per oncology instructions.    Follow-up with primary oncology service after discharge as scheduled.    History of anxiety and depression    Patient is noted to be on Celexa, Wellbutrin and Xanax    Continue current medications.    GERD    Continue PPI         Code Status:   Full Code         Primary Care Physician:   Molly Adame [phone]         Requesting Physician:      Randy Espinoza MD         Chief Complaint:   Left hip pain    History is obtained from the patient and review of the records.         History of Present Illness:   Suzi Gonsales is a 53 year old female with a past medical history of metastatic renal cancer with metastatic disease to the lungs, brain, adrenal gland, pancreas, femoral head.  The patient underwent Prophylactic and internal fixation using a hip screw left hip.  She tolerated the procedure well.  Estimated blood loss was 20 mL.    Hospitalist service was consulted for comanagement of patient.    With regards to her cancer, it was diagnosed in 2014.  She is a status post nephrectomy and chemoradiation.    The patient currently complaining of some discomfort at the site of the surgery.  She has more pain with moving the leg.  She denies any chest pain or shortness of breath.  She denies any nausea or  vomiting.           Past Medical History:     Past Medical History:   Diagnosis Date     Adrenal nodule (H)      Anxiety      Brain mass      Depression      Former tobacco use      Lacunar infarct, acute 2018     Mass of left lung     Upper lobe     Renal cell carcinoma (H) 2014    Clear cell     Solitary kidney     S/P right nephrectomy due to kidney cancer             Past Surgical History:     Past Surgical History:   Procedure Laterality Date     C/SECTION, LOW TRANSVERSE  ,     , Low Transverse     ENDOSCOPIC ULTRASOUND UPPER GASTROINTESTINAL TRACT (GI) N/A 2017    Procedure: ENDOSCOPIC ULTRASOUND, ESOPHAGOSCOPY / UPPER GASTROINTESTINAL TRACT (GI);  Esophagogastroduodenoscopy, Endoscopic Ultrasound with Fine Needle Biopsies;  Surgeon: Asad Velez MD;  Location: [facility] OR     INSERT PORT VASCULAR ACCESS N/A 2017    Procedure: INSERT PORT VASCULAR ACCESS;  Chest Port Placement-Right;  Surgeon: Melanie Cevallos PA-C;  [facility]     open radical nephrectomy Right 14     OPTICAL TRACKING SYSTEM CRANIOTOMY, EXCISE TUMOR, COMBINED Left 3/6/2018    Procedure: COMBINED OPTICAL TRACKING SYSTEM CRANIOTOMY, EXCISE TUMOR;  Left Stealth Assisted Posterior fossa Craniotomy And Tumor Resection ;  Surgeon: Quintin Palencia MD;  Location: [facility] OR     PICC INSERTION Left 2017    5fr DL BioFlo PICC, 40cm (4cm external) in the L basilic vein w/ tip in the low SVC     PICC INSERTION Right 08/15/2017    5fr DL BioFlo PICC, 36cm (1cm external) in the R basilic vein w/ tip in the low SVC     THORACENTESIS Left 2018    Procedure: THORACENTESIS;  Left Thoracentesis ;  Surgeon: Juanito Weinstein PA-C;  [facility]     THORACENTESIS Left 2018    Procedure: THORACENTESIS;  Thoracentesis;  Surgeon: Juanito Weinstein PA-C;  [facility]     THORACENTESIS Left 9/10/2018    Procedure: THORACENTESIS;  Left Thoracentesis;  Surgeon: Romie Eduardo  BRENDA;  Location:  OR            Home Medications:     Prior to Admission medications    Medication Sig Last Dose Taking? Auth Provider   ALPRAZolam (XANAX) 0.5 MG tablet Take 1 tablet (0.5 mg) by mouth 3 times daily as needed for anxiety 9/30/2018 at 0800 Yes Henri Green MD   buPROPion (WELLBUTRIN XL) 300 MG 24 hr tablet Take 300 mg by mouth every morning  10/1/2018 at 0800 Yes Reported, Patient   citalopram (CELEXA) 20 MG tablet Take 1 tablet (20 mg) by mouth every evening 9/30/2018 at 2000 Yes Molly Adame APRN CNP   diphenoxylate-atropine (LOMOTIL) 2.5-0.025 MG per tablet Take 1-2 tablets by mouth 4 times daily as needed for diarrhea 9/30/2018 at Unknown time Yes Day Ren PA-C   HYDROcodone-acetaminophen (NORCO) 5-325 MG per tablet Take 1 tablet by mouth every 6 hours as needed for severe pain Past Week at Unknown time Yes Henri rGeen MD   Probiotic Product (PROBIOTIC PO) Take by mouth daily (with lunch) Past Week at Unknown time Yes Reported, Patient   zolpidem (AMBIEN) 5 MG tablet Take 1 tablet (5 mg) by mouth nightly as needed for sleep 9/30/2018 at 2100 Yes Henri Green MD   Cabozantinib S-Malate (CABOMETYX) 40 MG Take 1 tablet (40 mg) by mouth daily Take on an empty stomach 1 hour before or 2 hours after a meal. Avoid grapefruit and grapefruit juice. 9/28/2018  Henri Green MD   Cabozantinib S-Malate (CABOMETYX) 40 MG Take 1 tablet (40 mg) by mouth daily Take on an empty stomach 1 hour before or 2 hours after a meal. Avoid grapefruit and grapefruit juice. 9/28/2018  Henri Green MD   Cabozantinib S-Malate (CABOMETYX) 40 MG Take 1 tablet (40 mg) by mouth daily Take on an empty stomach 1 hour before or 2 hours after a meal. Avoid grapefruit and grapefruit juice. 9/28/2018  Henri Green MD   omeprazole (PRILOSEC) 40 MG capsule Take 1 capsule (40 mg) by mouth daily More than a month at Unknown time  Huyen Cuellar MD   ondansetron  "(ZOFRAN) 8 MG tablet Take 1 tablet (8 mg) by mouth every 8 hours as needed for nausea More than a month at Unknown time  Day Ren, BRENDA            Current Medications:           [START ON 10/2/2018] buPROPion  300 mg Oral QAM     Cabozantinib S-Malate  40 mg Oral Daily     Cabozantinib S-Malate  40 mg Oral Daily     Cabozantinib S-Malate  40 mg Oral Daily     ceFAZolin  1 g Intravenous Q8H     citalopram  20 mg Oral QPM     omeprazole  40 mg Oral Daily     sodium chloride (PF)  3 mL Intracatheter Q8H     ALPRAZolam, sore throat lozenge, diphenoxylate-atropine, HYDROmorphone, hydrOXYzine, lidocaine 4%, lidocaine (buffered or not buffered), metoclopramide **OR** metoclopramide, naloxone, [DISCONTINUED] ondansetron **OR** ondansetron, ondansetron, oxyCODONE-acetaminophen, prochlorperazine **OR** prochlorperazine, sodium chloride (PF), zolpidem         Allergies:   No Known Allergies         Social History:     Social History   Substance Use Topics     Smoking status: Former Smoker     Packs/day: 0.50     Years: 20.00     Types: Cigarettes     Quit date: 1/1/2004     Smokeless tobacco: Never Used     Alcohol use Yes      Comment: 1-2 drinks couple times per week             Family History:     Family History   Problem Relation Age of Onset     Hypertension Father      Chronic Obstructive Pulmonary Disease Father      Hyperlipidemia Brother      Hyperlipidemia Brother      Cancer No family hx of               Review of Systems:   The 10 point Review of Systems is negative other than noted in the HPI            Physical Exam:   Blood pressure 113/47, pulse 76, temperature 95.4  F (35.2  C), temperature source Axillary, resp. rate 13, height 1.549 m (5' 1\"), weight 53.6 kg (118 lb 2.7 oz), last menstrual period 12/20/2013, SpO2 94 %, not currently breastfeeding.  118 lbs 2.66 oz    GENERAL: Alert and awake, no apparent distress    HEENT: Patient is normocephalic, atraumatic.   EYES: No conjunctival injection, no " icterus  NECK: Neck is supple, symmetrical, No JVD  RESPIRATORY: Lungs clear to auscultation bilaterally. No wheezes, rales or rhonchi. Respirations unlabored.  CARDIAC:S1, S2 audible. Regular rhythm, normal rate  GI: Soft, abdomen is non-distended without organomegaly. Non-tender without rebound or guarding. Bowel sounds are normoactive.  VASCULAR: Peripheral pulses palpable and normal  Musculoskeletal: Extremities: Warm & well perfused. No edema or cyanosis, no clubbing of fingers.   NEUROLOGIC: no facial asymmetry. Alert and awake and oriented. No gross focal abnormalities.  PSYCHIATRIC: Normal affect, normal speech, no loose associations.    SKIN: normal skin turgor and color, no rashes           Data:   All new lab and imaging data was reviewed.   Recent Labs   Lab Test  09/30/18   1139  09/27/18   1724  09/10/18   0643   WBC  4.4  4.2   --    HGB  12.1  11.6*   --    MCV  99  97   --    PLT  312  417   --    INR   --   1.04  1.06      Recent Labs   Lab Test  09/30/18   1139  09/27/18   1724   NA  141  142   POTASSIUM  4.0  3.0*   CHLORIDE  109  107   CO2  22  27   BUN  7  6*   CR  0.82  0.78   ANIONGAP  9  8   MUNDO  8.7  7.9*   GLC  146*  117*     No lab results found.     Regina Haynes MD  Hospitalist (Internal Medicine)     No/Not applicable

## 2023-04-03 PROBLEM — C79.51 MALIGNANT NEOPLASM METASTATIC TO BONE (H): Status: ACTIVE | Noted: 2018-01-01

## 2023-08-01 NOTE — PROGRESS NOTES
D: Suzi continues to complain of pain,attempting to decrease it with oxycodone 10 mg q 4 hours supplemented by dilaudid.  Low urine output continued as prior shift. Suzi is thirsty and drinking good amounts without difficulty.      Neuro: Alert and oriented. Pupils 3, brisk/equal/tracks. SCOTT strongly, all warm, pale pink, normal pulses all.   VS: Afeb, HR , BP Cuff:73-96/45-60(45-79), Art ( which is dampened and positional): 68-98/54-69(67-76) , RR 14-22.   CV: SR, no ectopy. Murmur.  Pericardial friction rub.   Pulm: NC 5 L sats O2 88-94%,LS clear uppers, diminished lowers. Very weak cough. IS poorly to 400 x 1.  GI: ABD flat, no flatus noted, BS audible.   : Foster with small amounts clear ernesto urine. Maintenance IV rate increased to 50 ml/hour now. LR bolus given and albumin.  Lines/Gtts:  L radial art line, positional/dampened, returns blood.   Skin:   Pale pink. Flank sites covered.   Drains: R pleural 60 ml out sero sang, R pericardial 10 ml out, serosang, L pleurax 400 ml out, serous. All to waterseal, no leaks noted.   Up to chair today.  Continue to monitor, notify MD with any concerns.              Admission

## 2023-11-02 NOTE — TELEPHONE ENCOUNTER
Prior Authorization Approval    Authorization Effective Date: 3/10/2018  Authorization Expiration Date: 4/10/2021  Medication: Cabometyx=PA Approved  Approved Dose/Quantity: 30/30ds  Reference #: case [id number]   Insurance Company: [employer] - Phone [phone] Fax [phone]  Expected CoPay:     Unknown  CoPay Card Available:   Yes   Foundation Assistance Needed:  No, pt is elgible for the copay card  Which Pharmacy is filling the prescription (Not needed for infusion/clinic administered): [address]  Pharmacy Notified: Yes  Patient Notified: Yes       Vital Signs/Plan of Care/Assessment and Intervention Occupational Therapy Evaluation

## 2023-12-27 NOTE — PROGRESS NOTES
Oncology/Hematology Visit Note  Dec 12, 2018    Reason for Visit: [facility], routine followup    History of Present Illness: Suzi presented to ED with hematuria and right flank pain thinking she had a renal stone in December 2014. She was worked up with a CT abd/pelvis which suggested a renal mass. She was referred to Dr. Max Zelaya in [facility]. 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 Per charts tumor resection had negative margins and it was staged at pT2bN0.     Her staging work up was negative except for pulmonary nodules. She has been followed every 6 months with scans. CT CAP on 5/11/17 showed enlarging hilar LAD, enlarging pulmonary nodules, adrenal nodules and a pancreatic body mass. Biopsies of hilar LN, adrenal nodule and pancreatic mass were + for RCC.      ONCOLOGY THERAPY:   6/6/17-8/15/18-- IL-2, of which she received 4 cycles   11/22/17 CT CAP showed disease progression  She completed three months of Opdivo and then 12/5/17-2/13/18- 3 months of Opdivo  3/6/18- cerebellar lesion s/p craniotomy and GK 4/5 to tumor bed and 4 new lesions  4/12/18- 5 fractions of XRT to left supraclav/MERARY  4/19/18- Cabo 40 mg   6/20/18- Femur radiation  10/1/18- surgical stabilization of the femur   9/30/18-10/22/18 Off Cabo due to femur surgery      Interval History:  Suzi is here today with concerns of JIMÉNEZ.  She had a tap last week and was disappointed that she didn't feel better.  In retrospect, she cannot remember if she did feel better, but it was short lived, or if she didn't feel better at all.  She was surprised they only took out 650 ml as she felt it was more than a liter.  Thus far, since being back on Cabo (roughtly 3 weeks) she has been doing OK.  She noticed mouth sensitivities and decreased appetite, but no nausea or diarrhea. Left hip.leg getting stronger, almost done with the lovenox.      Current Outpatient Medications    Medication Sig     acetaminophen (TYLENOL) 325 MG tablet Take 3 tablets (975 mg) by mouth every 6 hours     ALPRAZolam (XANAX) 0.5 MG tablet Take 1 tablet (0.5 mg) by mouth 3 times daily as needed for anxiety     buPROPion (WELLBUTRIN XL) 300 MG 24 hr tablet Take 300 mg by mouth every morning      Cabozantinib S-Malate (CABOMETYX) 40 MG Take 1 tablet (40 mg) by mouth daily Take on an empty stomach 1 hour before or 2 hours after a meal. Avoid grapefruit and grapefruit juice.     Cabozantinib S-Malate (CABOMETYX) 40 MG Take 1 tablet (40 mg) by mouth daily Take on an empty stomach 1 hour before or 2 hours after a meal. Avoid grapefruit and grapefruit juice.     citalopram (CELEXA) 20 MG tablet Take 1 tablet (20 mg) by mouth every evening     diphenoxylate-atropine (LOMOTIL) 2.5-0.025 MG per tablet Take 1-2 tablets by mouth 4 times daily as needed for diarrhea     HYDROcodone-acetaminophen (NORCO) 5-325 MG per tablet      omeprazole (PRILOSEC) 40 MG capsule Take 1 capsule (40 mg) by mouth daily     ondansetron (ZOFRAN) 8 MG tablet Take 1 tablet (8 mg) by mouth every 8 hours as needed for nausea     oxyCODONE IR (ROXICODONE) 5 MG tablet Take 1-2 tablets (5-10 mg) by mouth every 4 hours as needed for moderate to severe pain     polyethylene glycol (MIRALAX/GLYCOLAX) Packet Take 17 g by mouth daily as needed for constipation     Probiotic Product (PROBIOTIC PO) Take by mouth daily (with lunch)     sennosides (SENOKOT) 8.6 MG tablet Take 1-2 tablets by mouth 2 times daily as needed for constipation (no stool in 24 hrs)     zolpidem (AMBIEN) 5 MG tablet Take 1 tablet (5 mg) by mouth nightly as needed for sleep     No current facility-administered medications for this visit.         Physical Examination:  General: The patient is a pleasant female in no acute distress.  /79   Pulse 80   Temp 97.8  F (36.6  C) (Oral)   Resp 16   Ht 1.524 m (5')   Wt 52.4 kg (115 lb 8 oz)   LMP 12/20/2013   SpO2 95%   BMI 22.56  kg/m    Wt Readings from Last 10 Encounters:   12/12/18 52.4 kg (115 lb 8 oz)   11/23/18 53.1 kg (117 lb 0 oz)   11/14/18 53.1 kg (117 lb)   11/06/18 53.1 kg (117 lb 1.8 oz)   10/16/18 53.4 kg (117 lb 11.2 oz)   10/16/18 53.4 kg (117 lb 11.2 oz)   10/01/18 53.6 kg (118 lb 2.7 oz)   09/27/18 54.2 kg (119 lb 8 oz)   09/10/18 52.8 kg (116 lb 4.8 oz)   09/06/18 52.7 kg (116 lb 3.2 oz)     Limited exam today.   Heart: Regular rate and rhythm.   Lungs: Breathing comfortably on room air.  Decreased BS at left base with dullness to percussion      Laboratory Data:  Recent Labs   Lab Test 12/12/18  1533 11/14/18  1531 10/16/18  1419 10/04/18  0629 10/02/18  0619    134 137 136 142   POTASSIUM 3.4 4.3 4.0 3.9 4.0   CHLORIDE 104 105 106 103 106   CO2 23 23 24 30 31   ANIONGAP 9 6 7 3 5   BUN 8 10 11 7 6*   CR 0.82 0.88 0.80 0.72 0.73   GLC 90 88 143* 98 92   MUNDO 7.7* 8.3* 8.6 7.2* 7.6*     Recent Labs   Lab Test 03/07/18  0400 08/24/17  1520 08/19/17  0535 08/18/17  0328 08/17/17  0557 08/16/17  1550 08/16/17  1241   MAG 2.2  --  1.8 2.3 2.0  --  3.1*   PHOS  --  3.4 2.2* 2.4* 2.3* 2.0*  --      Recent Labs   Lab Test 12/12/18  1533 11/14/18  1531 10/16/18  1419 10/04/18  0629 10/03/18  0522  09/30/18  1139  08/22/18  1253   WBC 3.0* 3.9* 4.1 5.4  --   --  4.4   < > 4.7   HGB 11.1* 12.7 11.0* 10.2* 10.5*   < > 12.1   < > 13.7    319 490* 286  --   --  312   < > 322   MCV 98 99 102* 100  --   --  99   < > 95   NEUTROPHIL 56.8 52.9 59.7  --   --   --  50.1  --  43.8    < > = values in this interval not displayed.     Recent Labs   Lab Test 12/12/18  1533 11/14/18  1531 10/16/18  1419  08/19/17  0535 08/18/17  0328 08/17/17  0557   BILITOTAL 0.3 0.2 0.2   < > 0.2 0.2 0.3   ALKPHOS 85 80 80   < > 60 41 43   ALT 72* 31 27   < > 50 32 21   AST 81* 42 26   < > 31 31 16   ALBUMIN 2.9* 3.2* 3.1*   < > 2.0* 1.9* 2.2*   LDH  --   --   --   --  214 199 182    < > = values in this interval not displayed.     TSH   Date Value  Ref Range Status   11/14/2018 8.91 (H) 0.40 - 4.00 mU/L Final   04/13/2018 1.02 0.40 - 4.00 mU/L Final   01/31/2018 0.93 0.40 - 4.00 mU/L Final     No results for input(s): CEA in the last 06569 hours.  Results for orders placed or performed in visit on 12/12/18   CT Chest/Abdomen/Pelvis w Contrast    Narrative    EXAMINATION: CT CHEST/ABDOMEN/PELVIS W CONTRAST, 12/12/2018 3:10 PM    TECHNIQUE:  Helical CT images from the thoracic inlet through the  symphysis pubis were obtained  with contrast. Contrast dose: 72mL  Isovue-[id number]    COMPARISON: 11/13/2018; 8/20/2018.    HISTORY: metastatic kidney cancer, on oral chemo, routine 3 month  follow up; metastatic kidney cancer, on oral chemo, routine 3 month  follow up; Malignant neoplasm of right kidney (H)    FINDINGS:    LUNGS: Atelectatic changes in the left lung, stable. Mild interval  improvement of previously seen left upper lobe/left lingula  groundglass opacities and consolidative changes. Atelectatic changes  in the right lung base. Pulmonary nodules measuring up to 6 mm in the  right lower lobe. No new or enlarging pulmonary nodules.     Chest: Partially visualized thyroid gland with a dominant nodule in  the right lobe. Central tracheobronchial tree is patent. Thoracic  aorta and main pulmonary artery have normal diameter. No central  pulmonary embolism. Right-sided chest wall Port-A-Cath with its tip in  the high right atrium. Cardiac size within normal limits. Small  pericardial effusion comment stable. Small right and moderate left  pleural effusion with overlying atelectasis. No pneumothorax. Multiple  thoracic lymphadenopathy with heterogeneous enhancement for example  measuring 1.2 cm right paratracheal; 2.0 cm subcarinal; 1.5 cm in the  right hilum, not significantly changed from the prior 11/13/2018 CT  exam considering differences in technique. There are also enlarged and  left axillary and pectoral lymph nodes measuring up to 1.3 cm short  axis, stable.  There is haziness of the mediastinal fat, unchanged.  Persistent soft tissue density in the left hilum encasing the left  upper lobe pulmonary artery branches and bronchi.    Abdomen and pelvis: Heterogeneous arterial enhancement in hepatic  segment 6 surrounding a subcentimeter hypodensity becoming  isoattenuating in the portal venous phase. Patent hepatic vasculature.  No biliary tree dilation. Unremarkable gallbladder.    Heterogeneous arterial enhancement about the pancreatic body  correlating with location of previously seen pancreatic lesions with  abrupt interruption of a prominent main pancreatic duct, stable. The  distal pancreatic body and tail are mildly atrophic with decreased  enhancement. Scattered subcentimeter hypoattenuating foci measuring up  to 6 mm at the tail, stable. Main pancreatic duct is not dilated. The  spleen is not enlarged. No focal splenic lesions.    Stable 10 mm enhancing left adrenal gland nodule. Postoperative  changes of right adrenalectomy and right nephrectomy. No evidence for  local recurrence in the present study. Subcentimeter cortical  hypodensities in the left kidney, too small to characterize, stable,  favoring renal cysts. No left renal stones or hydronephrosis.  Unremarkable urinary bladder. Heterogeneous enhancement of the uterus.  No adnexal masses. No free fluid. No free air.    No inguinal lymphadenopathy. Scattered pelvic lymph nodes for example  measuring up to 11 mm along the right external iliac chain, stable  with central hypoattenuation. No abdominal aortic aneurysm.  Subcentimeter retroperitoneal and mesenteric lymph nodes, not enlarged  by size criteria, stable.    No dilated loops of bowel. No bowel wall thickening. Moderate colonic  stool burden.    Bones: Diffuse bone demineralization. Wedge compression deformity off  the vertebral bodies T7, stable. Partially visualized postoperative  changes of ORIF in the left femur. Stable lytic lesions at the  left  femoral head with associated cortical thinning.      Impression    IMPRESSION: In this patient with history of metastatic renal cell  carcinoma:. Postoperative changes of right nephrectomy and right  adrenalectomy with no evidence for local recurrence at the surgical  bed.  2. Metastatic lesions in the pancreas and left adrenal gland, stable.  3. Small right and moderate left pleural effusion with overlying  atelectasis.  4. Mild interval improvement of previously seen groundglass opacities  and consolidative changes in the left upper lobe and lingula likely  resolving inflammatory/infectious process. Attention on follow-up  studies.   5. Stable soft tissue density in the left hilum as well as stable  adenopathy in the chest. Stable pulmonary nodules.  6. Stable metastatic bone lesions. New postoperative changes of  partially visualized left ORIF.  7. Heterogeneous arterial enhancement in hepatic segment 6, new from  the prior study, indeterminate. Finding could represent perfusional  changes. Attention on follow-up studies.    NIA GUILLORY MD         Assessment and Plan:  52 year old with mRCC s/p 4 cycles of IL-2.  She had a stable CT after 2 cycles, mild disease progression after 4 cycles.  After a short break, CT showed worsening disease and she has started with Opdivo.   Three months later she had new brain disease and worsening systemic disease and started Cabo on 4/19, since then had regression and stable disease.    JIMÉNEZ:She continues to have JIMÉNEZ. She gets SOB trying to climb stairs. She has moderate left pleural effusion. She has about the same amount of fluid as the last time. She had 6 previous thoracentesis done and was ready to have pleurex catheter placed. She is planning a trip to [place] where she would love to spend time on a tube floating in the pool She would not be able to soak in water with a pleurex catheter. I did check this with thoracic surgery and they confirmed this. She would  like to defer the pleurex till next visit. She has spoken to a family member who had a pleurex catheter but had no difficulty with it.     RCC: She started Cabo 40 mg daily on 4/19. CT CAP on 6/5 showed improved disease. Suzi was off for 3.5 weeks due to her hip.  She is again noticing a range of symptoms on the medication. She is starting to notice mucositis, no taste, tongue feels excoriated, fatigue, poor appetite. Besides this she has SOB as above.   We did review option of dose reduction to 20 mg daily. This would be a much more drastic dose reduction. I would suggest that we try a different way with 20% dose reduction - she can skip the drug every 5th day. This will get the dose intensity lower. If this is not adequate we could try skipping twice a week - Thurs/Sunday or some such schedule. If none of these strategies is adequate we will try 20 mg daily dose.     Pleural effusion: med- large on exam and CT, will schedule thoracentesis soon. She is considering pleurex after her trip to [place]     Brain mets: no neuro symptoms, HA or confusion.   Craniotomy successful on 3/6.  Had GK to the bed on 4/5 and FOUR new lesions were noted during the planning MRI.    -6/5/18 and 9/6/18 brain MRI stable.    -Will reschedule her MRI and follow up with Dr. Jovel    Left femoral head lytic lesion:   Present since November 2017. 10/1/18 had surgical stabilization.  Healing well, doing PT.    -Xgeva 11/19/18; will repeat today    Diarrhea: since last few days, h    Health maintenance : had flu shot, repeat Shingrix in Jan-Feb    Over 45 min of direct face to face time spent with patient with more than 50% time spent in counseling and coordinating care.     mouth/neck details…

## 2024-07-20 NOTE — PROGRESS NOTES
Clinic Care Coordination Contact    Situation: Patient chart reviewed by care coordinator.    Background: Hospital admission 2/13/2/22/2019-Hypoxia     Assessment:Patient is enrolling into Hospice tomorrow     Plan/Recommendations: Patient is not opened to care coordination     Brigitte Pierson RN / Clinical Care Coordinator     [facility]   [email or web address]  Office :  [phone] / Fax :  [phone]      
no

## (undated) DEVICE — GLOVE PROTEXIS BLUE W/NEU-THERA 8.5  2D73EB85

## (undated) DEVICE — DRSG STERI STRIP 1/2X4" R1547

## (undated) DEVICE — LINEN TOWEL PACK X5 5464

## (undated) DEVICE — COVER ULTRASOUND PROBE W/GEL FLEXI-FEEL 6"X58" LF  25-FF658

## (undated) DEVICE — SU DERMABOND ADVANCED .7ML DNX12

## (undated) DEVICE — PREP CHLORAPREP 26ML TINTED ORANGE  260815

## (undated) DEVICE — CONNECTOR MALE TO MALE LL

## (undated) DEVICE — SU VICRYL 3-0 SH 27" J316H

## (undated) DEVICE — CATH TRAY FOLEY SURESTEP 16FR W/URINE MTR STATLK LF A303416A

## (undated) DEVICE — NDL 15GA 1.5" 8881200029

## (undated) DEVICE — DRSG TEGADERM 4X4 3/4" 1626W

## (undated) DEVICE — GLOVE PROTEXIS POWDER FREE 8.0 ORTHOPEDIC 2D73ET80

## (undated) DEVICE — STOPCOCK ANGIO 1-WAY MARQUIS MLL  H1RC

## (undated) DEVICE — APPLICATOR COTTON TIP 6"X2 STERILE LF 6012

## (undated) DEVICE — CONNECTOR STOPCOCK 3 WAY MALE LL MX4311L

## (undated) DEVICE — GLOVE PROTEXIS MICRO 7.5  2D73PM75

## (undated) DEVICE — TUBING MEDRAD 48" HIGH PRESSURE MX694

## (undated) DEVICE — PIN GUIDE 2.5X230MM 900.723

## (undated) DEVICE — GLOVE PROTEXIS POWDER FREE SMT 7.0  2D72PT70X

## (undated) DEVICE — SPONGE COTTONOID 1/2X1/2" 20-04S

## (undated) DEVICE — DRAPE MAYO STAND 23X54 8337

## (undated) DEVICE — PREP CHLORAPREP CLEAR 3ML 260400

## (undated) DEVICE — GLOVE PROTEXIS POWDER FREE SMT 7.5  2D72PT75X

## (undated) DEVICE — Device

## (undated) DEVICE — SU VICRYL 2-0 SH 27" UND J417H

## (undated) DEVICE — DRSG TEGADERM 2 3/8X2 3/4" 1624W

## (undated) DEVICE — ENDO SYSTEM WATER BOTTLE & TUBING W/CO2 FILTER 00711549

## (undated) DEVICE — GLOVE PROTEXIS BLUE W/NEU-THERA 8.0  2D73EB80

## (undated) DEVICE — DRAIN JACKSON PRATT ROUND SIL 19FR W/TROCAR LF JP-2232

## (undated) DEVICE — DRSG PRIMAPORE 03 1/8X6" 66000318

## (undated) DEVICE — GLOVE PROTEXIS W/NEU-THERA 7.5  2D73TE75

## (undated) DEVICE — SOL ADH LIQUID BENZOIN SWAB 0.6ML C1544

## (undated) DEVICE — DRAPE IOBAN INCISE 23X17" 6650EZ

## (undated) DEVICE — SOL WATER IRRIG 1000ML BOTTLE 2F7114

## (undated) DEVICE — STRAP KNEE/BODY 31143004

## (undated) DEVICE — RETR ELASTIC STAYS LONE STAR BLUNT DUAL LEAD 3550-1G

## (undated) DEVICE — SUCTION MANIFOLD DORNOCH ULTRA CART UL-CL500

## (undated) DEVICE — COVER NEOPROBE SOFTFLEX 5X96" W/BANDS 20-PC596

## (undated) DEVICE — SYR 30ML SLIP TIP W/O NDL 302833

## (undated) DEVICE — PREP POVIDONE IODINE SCRUB 7.5% 120ML

## (undated) DEVICE — NDL BLUNT 18GA 1" W/O FILTER 305181

## (undated) DEVICE — LINEN TOWEL PACK X30 5481

## (undated) DEVICE — SU ETHILON 3-0 PS-1 18" 1663H

## (undated) DEVICE — ESU GROUND PAD ADULT W/CORD E7507

## (undated) DEVICE — SU VICRYL 0 UR-6 27" J603H

## (undated) DEVICE — SYR 10ML LL W/O NDL 302995

## (undated) DEVICE — KNIFE HANDLE W/15 BLADE 371615

## (undated) DEVICE — SU NUROLON 4-0 TF CR 8X18" C584D

## (undated) DEVICE — CONNECTOR BLAKE DRAIN SGL BCC1

## (undated) DEVICE — LINEN TOWEL PACK X6 WHITE 5487

## (undated) DEVICE — DRSG PRIMAPORE 02X3" 7133

## (undated) DEVICE — LUBRICANT INST KIT ENDO-LUBE 220-90

## (undated) DEVICE — ENDO NDL ASPIRATION 19GA FLEX SLIMLINE EXPECT EUS M00555530

## (undated) DEVICE — SU SILK 0 TIE 6X30" A306H

## (undated) DEVICE — TIES BANDING T50R

## (undated) DEVICE — DRAIN PLEURAL CATH KIT PLEURX 15.5FR 50-7000B

## (undated) DEVICE — PACK TOTAL HIP W/U DRAPE RIVERSIDE LATEX FREE

## (undated) DEVICE — DECANTER BAG 2002S

## (undated) DEVICE — RX BACITRACIN OINTMENT 0.9G 1/32OZ CUR001109

## (undated) DEVICE — DRAPE MICROSCOPE LEICA 54X150" AR8033650

## (undated) DEVICE — DRAIN CHEST TUBE 24FR STR 8024

## (undated) DEVICE — ENDO DISSECTOR BLUNT 05MM  BTD05

## (undated) DEVICE — DRAPE SHEET REV FOLD 3/4 9349

## (undated) DEVICE — CRANIOTOME ADULT ANSPACH A-CRN

## (undated) DEVICE — GOWN XLG DISP 9545

## (undated) DEVICE — SU VICRYL 2-0 CT 36" J957H

## (undated) DEVICE — DRSG TELFA 3X8" 1238

## (undated) DEVICE — SU VICRYL 2-0 CT-2 CR 8X18" J726D

## (undated) DEVICE — LINEN GOWN XLG 5407

## (undated) DEVICE — SU ETHIBOND 2-0TIE 30" X305H

## (undated) DEVICE — SU PDS II 3-0 PS-2 18" Z497G

## (undated) DEVICE — DRSG PRIMAPORE 04 3/4X13 3/4" 66007140

## (undated) DEVICE — SPONGE COTTONOID 1/2X3" 20-07S

## (undated) DEVICE — SPONGE DOUBLE 1.25" W/STRING 23275-690

## (undated) DEVICE — TALC AEROSOL POWDER

## (undated) DEVICE — SYR 10ML FINGER CONTROL W/O NDL 309695

## (undated) DEVICE — LINEN GOWN X4 5410

## (undated) DEVICE — TAPE CLOTH 3" CARDINAL 3TRCL03

## (undated) DEVICE — SYR 50ML LL W/O NDL 309653

## (undated) DEVICE — DRILL BIT 3.2X145MM  310.31

## (undated) DEVICE — ADH FLOSEAL W/HUMAN THROMBIN 5ML W/APPLICATOR TIP ADS201844

## (undated) DEVICE — DRAPE POUCH IRR 1016

## (undated) DEVICE — SOL RINGERS LACTATED 500ML BAG 2B2323QA

## (undated) DEVICE — SU SILK 0 SH 30" K834H

## (undated) DEVICE — SOL RINGERS LACTATED 1000ML BAG 07953-09

## (undated) DEVICE — BNDG ELASTIC 6"X5YDS STERILE 6611-6S

## (undated) DEVICE — ESU ELEC BLADE 2.75" COATED/INSULATED E1455

## (undated) DEVICE — SPONGE COTTONOID 1X3" 20-10S

## (undated) DEVICE — SPONGE COTTONOID 1/2X1 1/2" 20-06S

## (undated) DEVICE — PREP POVIDONE IODINE SOLUTION 10% 120ML

## (undated) DEVICE — PACK CENTRAL LINE INSERTION SAN32CLFCG

## (undated) DEVICE — NDL BLUNT 17GA 1.5" 8881202330

## (undated) DEVICE — DRSG KERLIX 4 1/2"X4YDS ROLL 6715

## (undated) DEVICE — SUCTION DRY CHEST DRAIN OASIS 3600-100

## (undated) DEVICE — GLOVE PROTEXIS POWDER FREE SMT 6.5  2D72PT65X

## (undated) DEVICE — ESU CORD BIPOLAR AND IRR TUBING AESCULAP US355

## (undated) DEVICE — PREP SKIN SCRUB TRAY 4461A

## (undated) DEVICE — SU VICRYL 0 CT-1 3X27" J430T

## (undated) DEVICE — DRSG AQUACEL AG 3.5X9.75" HYDROFIBER 412011

## (undated) DEVICE — BNDG ELASTIC 4"X5YDS UNSTERILE 6611-40

## (undated) DEVICE — SOL NACL 0.9% IRRIG 1000ML BOTTLE 2F7124

## (undated) DEVICE — PAD CHUX UNDERPAD 23X24" 7136

## (undated) DEVICE — SPONGE TONSIL W/STRING MED 23275-680

## (undated) DEVICE — CUP AND LID 2PK 2OZ STERILE  SSK9006A

## (undated) DEVICE — WIPES FOLEY CARE SURESTEP PROVON DFC100

## (undated) DEVICE — SU MONOCRYL 4-0 P-3 18" UND Y494G

## (undated) DEVICE — MARKER SPHERES PASSIVE MEDT PACK 5 8801075

## (undated) DEVICE — TUBING SUCTION 10'X3/16" N510

## (undated) DEVICE — RAD KNIFE HANDLE W/11 BLADE DISPOSABLE 371611

## (undated) DEVICE — GLOVE PROTEXIS MICRO 6.0  2D73PM60

## (undated) DEVICE — SU SILK 3-0 TIE 12X30" A304H

## (undated) DEVICE — ESU ELEC BLADE 6" COATED/INSULATED E1455-6

## (undated) DEVICE — DRAPE C-ARM W/STRAPS 42X72" 07-CA104

## (undated) DEVICE — NDL BIOPSY SYSTEM SHARKCORE 22GA W/HANDLE DSC-22-01

## (undated) DEVICE — SPONGE SURGIFOAM 100 1974

## (undated) DEVICE — KIT INTRODUCER FLUENT MICRO 5FRX10CM ECHO TIP KIT-038-04

## (undated) DEVICE — ENDO PROBE COVER ULTRASOUND BALLOON LATEX  MAJ-249

## (undated) DEVICE — PACK CRANIOTOMY

## (undated) DEVICE — PIN SKULL MAYFIELD ADULT TITANIUM 3/PK A1120

## (undated) DEVICE — SYR 30ML LL W/O NDL 302832

## (undated) DEVICE — DRILL BIT

## (undated) DEVICE — ENDO ADPT BRONCH SWIVEL Y A1002

## (undated) DEVICE — IMPLANTABLE DEVICE
Type: IMPLANTABLE DEVICE | Site: HIP | Status: NON-FUNCTIONAL
Removed: 2018-10-01

## (undated) DEVICE — DRAPE POUCH INSTRUMENT 1018

## (undated) DEVICE — PERFORATOR 14MM CODMAN

## (undated) DEVICE — SUCTION TIP YANKAUER STR K87

## (undated) DEVICE — DRSG DRAIN 2X2" 7087

## (undated) DEVICE — NDL YUEH CENTESIS 5FRX10CM G09490 DTVN-5.0-19-10.0-YUEH

## (undated) DEVICE — DRAPE U SPLIT 74X120" 29440

## (undated) DEVICE — ESU PENCIL W/COATED BLADE E2450H

## (undated) DEVICE — SU VICRYL 4-0 PS-2 18" UND J496H

## (undated) DEVICE — BLADE KNIFE SURG 10 371110

## (undated) DEVICE — ESU GROUND PAD UNIVERSAL W/O CORD

## (undated) RX ORDER — GABAPENTIN 300 MG/1
CAPSULE ORAL
Status: DISPENSED
Start: 2017-05-23

## (undated) RX ORDER — FENTANYL CITRATE 50 UG/ML
INJECTION, SOLUTION INTRAMUSCULAR; INTRAVENOUS
Status: DISPENSED
Start: 2018-03-06

## (undated) RX ORDER — BACITRACIN 50000 [IU]/1
INJECTION, POWDER, FOR SOLUTION INTRAMUSCULAR
Status: DISPENSED
Start: 2018-03-06

## (undated) RX ORDER — ACETAMINOPHEN 500 MG
TABLET ORAL
Status: DISPENSED
Start: 2018-01-01

## (undated) RX ORDER — HEPARIN SODIUM (PORCINE) LOCK FLUSH IV SOLN 100 UNIT/ML 100 UNIT/ML
SOLUTION INTRAVENOUS
Status: DISPENSED
Start: 2018-01-01

## (undated) RX ORDER — SODIUM CHLORIDE, SODIUM LACTATE, POTASSIUM CHLORIDE, CALCIUM CHLORIDE 600; 310; 30; 20 MG/100ML; MG/100ML; MG/100ML; MG/100ML
INJECTION, SOLUTION INTRAVENOUS
Status: DISPENSED
Start: 2019-01-01

## (undated) RX ORDER — LIDOCAINE HYDROCHLORIDE 10 MG/ML
INJECTION, SOLUTION EPIDURAL; INFILTRATION; INTRACAUDAL; PERINEURAL
Status: DISPENSED
Start: 2019-01-01

## (undated) RX ORDER — FENTANYL CITRATE 50 UG/ML
INJECTION, SOLUTION INTRAMUSCULAR; INTRAVENOUS
Status: DISPENSED
Start: 2019-01-01

## (undated) RX ORDER — VERAPAMIL HYDROCHLORIDE 2.5 MG/ML
INJECTION, SOLUTION INTRAVENOUS
Status: DISPENSED
Start: 2017-02-06

## (undated) RX ORDER — PROPOFOL 10 MG/ML
INJECTION, EMULSION INTRAVENOUS
Status: DISPENSED
Start: 2018-01-01

## (undated) RX ORDER — CEFAZOLIN SODIUM 2 G/100ML
INJECTION, SOLUTION INTRAVENOUS
Status: DISPENSED
Start: 2017-05-23

## (undated) RX ORDER — CEFAZOLIN SODIUM 2 G/100ML
INJECTION, SOLUTION INTRAVENOUS
Status: DISPENSED
Start: 2018-01-01

## (undated) RX ORDER — LIDOCAINE 40 MG/G
CREAM TOPICAL
Status: DISPENSED
Start: 2018-01-01

## (undated) RX ORDER — KETOROLAC TROMETHAMINE 30 MG/ML
INJECTION, SOLUTION INTRAMUSCULAR; INTRAVENOUS
Status: DISPENSED
Start: 2018-01-01

## (undated) RX ORDER — SODIUM CHLORIDE 9 MG/ML
INJECTION, SOLUTION INTRAVENOUS
Status: DISPENSED
Start: 2018-03-06

## (undated) RX ORDER — CEFAZOLIN SODIUM 2 G/100ML
INJECTION, SOLUTION INTRAVENOUS
Status: DISPENSED
Start: 2019-01-01

## (undated) RX ORDER — ONDANSETRON 2 MG/ML
INJECTION INTRAMUSCULAR; INTRAVENOUS
Status: DISPENSED
Start: 2018-01-01

## (undated) RX ORDER — CEFAZOLIN SODIUM 2 G/100ML
INJECTION, SOLUTION INTRAVENOUS
Status: DISPENSED
Start: 2018-03-06

## (undated) RX ORDER — OXYCODONE HYDROCHLORIDE 5 MG/1
TABLET ORAL
Status: DISPENSED
Start: 2018-03-06

## (undated) RX ORDER — LORAZEPAM 0.5 MG/1
TABLET ORAL
Status: DISPENSED
Start: 2018-01-01

## (undated) RX ORDER — SODIUM CHLORIDE 9 MG/ML
INJECTION, SOLUTION INTRAVENOUS
Status: DISPENSED
Start: 2017-02-06

## (undated) RX ORDER — HYDROMORPHONE HYDROCHLORIDE 1 MG/ML
INJECTION, SOLUTION INTRAMUSCULAR; INTRAVENOUS; SUBCUTANEOUS
Status: DISPENSED
Start: 2018-01-01

## (undated) RX ORDER — GLYCOPYRROLATE 0.2 MG/ML
INJECTION, SOLUTION INTRAMUSCULAR; INTRAVENOUS
Status: DISPENSED
Start: 2019-01-01

## (undated) RX ORDER — FENTANYL CITRATE 50 UG/ML
INJECTION, SOLUTION INTRAMUSCULAR; INTRAVENOUS
Status: DISPENSED
Start: 2018-01-01

## (undated) RX ORDER — ACETAMINOPHEN 325 MG/1
TABLET ORAL
Status: DISPENSED
Start: 2018-01-01

## (undated) RX ORDER — FENTANYL CITRATE 50 UG/ML
INJECTION, SOLUTION INTRAMUSCULAR; INTRAVENOUS
Status: DISPENSED
Start: 2017-02-06

## (undated) RX ORDER — LIDOCAINE HYDROCHLORIDE 10 MG/ML
INJECTION, SOLUTION EPIDURAL; INFILTRATION; INTRACAUDAL; PERINEURAL
Status: DISPENSED
Start: 2017-08-15

## (undated) RX ORDER — BUPIVACAINE HYDROCHLORIDE 2.5 MG/ML
INJECTION, SOLUTION EPIDURAL; INFILTRATION; INTRACAUDAL
Status: DISPENSED
Start: 2019-01-01

## (undated) RX ORDER — SODIUM CHLORIDE 9 MG/ML
INJECTION, SOLUTION INTRAVENOUS
Status: DISPENSED
Start: 2018-01-01

## (undated) RX ORDER — PHENYLEPHRINE HCL IN 0.9% NACL 1 MG/10 ML
SYRINGE (ML) INTRAVENOUS
Status: DISPENSED
Start: 2019-01-01

## (undated) RX ORDER — DEXAMETHASONE SODIUM PHOSPHATE 4 MG/ML
INJECTION, SOLUTION INTRA-ARTICULAR; INTRALESIONAL; INTRAMUSCULAR; INTRAVENOUS; SOFT TISSUE
Status: DISPENSED
Start: 2018-01-01

## (undated) RX ORDER — ONDANSETRON 2 MG/ML
INJECTION INTRAMUSCULAR; INTRAVENOUS
Status: DISPENSED
Start: 2019-01-01

## (undated) RX ORDER — CELECOXIB 200 MG/1
CAPSULE ORAL
Status: DISPENSED
Start: 2017-05-23

## (undated) RX ORDER — LIDOCAINE HYDROCHLORIDE 20 MG/ML
INJECTION, SOLUTION EPIDURAL; INFILTRATION; INTRACAUDAL; PERINEURAL
Status: DISPENSED
Start: 2019-01-01

## (undated) RX ORDER — LIDOCAINE HYDROCHLORIDE 20 MG/ML
INJECTION, SOLUTION EPIDURAL; INFILTRATION; INTRACAUDAL; PERINEURAL
Status: DISPENSED
Start: 2018-01-01

## (undated) RX ORDER — CHLORHEXIDINE GLUCONATE ORAL RINSE 1.2 MG/ML
SOLUTION DENTAL
Status: DISPENSED
Start: 2017-05-23

## (undated) RX ORDER — PROPOFOL 10 MG/ML
INJECTION, EMULSION INTRAVENOUS
Status: DISPENSED
Start: 2019-01-01

## (undated) RX ORDER — PHENYLEPHRINE HCL IN 0.9% NACL 1 MG/10 ML
SYRINGE (ML) INTRAVENOUS
Status: DISPENSED
Start: 2018-01-01

## (undated) RX ORDER — ACETAMINOPHEN 325 MG/1
TABLET ORAL
Status: DISPENSED
Start: 2017-05-23

## (undated) RX ORDER — LIDOCAINE HYDROCHLORIDE 10 MG/ML
INJECTION, SOLUTION EPIDURAL; INFILTRATION; INTRACAUDAL; PERINEURAL
Status: DISPENSED
Start: 2018-01-01

## (undated) RX ORDER — HEPARIN SODIUM,PORCINE 10 UNIT/ML
VIAL (ML) INTRAVENOUS
Status: DISPENSED
Start: 2018-01-01

## (undated) RX ORDER — ONDANSETRON 2 MG/ML
INJECTION INTRAMUSCULAR; INTRAVENOUS
Status: DISPENSED
Start: 2017-05-23

## (undated) RX ORDER — BUPIVACAINE HYDROCHLORIDE AND EPINEPHRINE 5; 5 MG/ML; UG/ML
INJECTION, SOLUTION EPIDURAL; INTRACAUDAL; PERINEURAL
Status: DISPENSED
Start: 2018-03-06